# Patient Record
Sex: MALE | Race: ASIAN | NOT HISPANIC OR LATINO | Employment: OTHER | URBAN - METROPOLITAN AREA
[De-identification: names, ages, dates, MRNs, and addresses within clinical notes are randomized per-mention and may not be internally consistent; named-entity substitution may affect disease eponyms.]

---

## 2017-07-14 ENCOUNTER — HOSPITAL ENCOUNTER (INPATIENT)
Facility: CLINIC | Age: 22
LOS: 6 days | Discharge: HOME | DRG: 305 | End: 2017-07-20
Attending: EMERGENCY MEDICINE | Admitting: INTERNAL MEDICINE
Payer: COMMERCIAL

## 2017-07-14 ENCOUNTER — APPOINTMENT (OUTPATIENT)
Dept: CT IMAGING | Facility: CLINIC | Age: 22
DRG: 305 | End: 2017-07-14
Attending: EMERGENCY MEDICINE
Payer: COMMERCIAL

## 2017-07-14 ENCOUNTER — APPOINTMENT (OUTPATIENT)
Dept: ULTRASOUND IMAGING | Facility: CLINIC | Age: 22
DRG: 305 | End: 2017-07-14
Attending: EMERGENCY MEDICINE
Payer: COMMERCIAL

## 2017-07-14 ENCOUNTER — OFFICE VISIT (OUTPATIENT)
Dept: FAMILY MEDICINE | Facility: CLINIC | Age: 22
End: 2017-07-14
Payer: COMMERCIAL

## 2017-07-14 VITALS
DIASTOLIC BLOOD PRESSURE: 159 MMHG | BODY MASS INDEX: 39.2 KG/M2 | SYSTOLIC BLOOD PRESSURE: 202 MMHG | OXYGEN SATURATION: 100 % | WEIGHT: 280 LBS | HEART RATE: 98 BPM | HEIGHT: 71 IN | TEMPERATURE: 98.6 F | RESPIRATION RATE: 20 BRPM

## 2017-07-14 DIAGNOSIS — I16.0 HYPERTENSIVE URGENCY: Primary | ICD-10-CM

## 2017-07-14 DIAGNOSIS — R80.9 PROTEINURIA, UNSPECIFIED TYPE: ICD-10-CM

## 2017-07-14 DIAGNOSIS — I16.1 HYPERTENSIVE EMERGENCY: ICD-10-CM

## 2017-07-14 DIAGNOSIS — N28.9 RENAL INSUFFICIENCY: ICD-10-CM

## 2017-07-14 PROBLEM — I10 HYPERTENSION: Status: ACTIVE | Noted: 2017-07-14

## 2017-07-14 PROBLEM — E66.01 MORBID OBESITY (H): Status: ACTIVE | Noted: 2017-07-14

## 2017-07-14 PROBLEM — I10 MALIGNANT HYPERTENSION: Status: ACTIVE | Noted: 2017-07-14

## 2017-07-14 PROBLEM — I11.9 LVH (LEFT VENTRICULAR HYPERTROPHY) DUE TO HYPERTENSIVE DISEASE: Status: ACTIVE | Noted: 2017-07-14

## 2017-07-14 LAB
ALBUMIN SERPL-MCNC: 3.5 G/DL (ref 3.4–5)
ALBUMIN UR-MCNC: >=300 MG/DL
ALP SERPL-CCNC: 97 U/L (ref 40–150)
ALT SERPL W P-5'-P-CCNC: 40 U/L (ref 0–70)
AMORPH CRY #/AREA URNS HPF: ABNORMAL /HPF
ANION GAP SERPL CALCULATED.3IONS-SCNC: 6 MMOL/L (ref 3–14)
APPEARANCE UR: CLEAR
AST SERPL W P-5'-P-CCNC: 25 U/L (ref 0–45)
BACTERIA #/AREA URNS HPF: ABNORMAL /HPF
BILIRUB DIRECT SERPL-MCNC: <0.1 MG/DL (ref 0–0.2)
BILIRUB SERPL-MCNC: 0.6 MG/DL (ref 0.2–1.3)
BILIRUB UR QL STRIP: NEGATIVE
BUN SERPL-MCNC: 33 MG/DL (ref 7–30)
CALCIUM SERPL-MCNC: 8.9 MG/DL (ref 8.5–10.1)
CHLORIDE SERPL-SCNC: 105 MMOL/L (ref 94–109)
CK SERPL-CCNC: 126 U/L (ref 30–300)
CO2 SERPL-SCNC: 27 MMOL/L (ref 20–32)
COLOR UR AUTO: YELLOW
CREAT SERPL-MCNC: 2.63 MG/DL (ref 0.66–1.25)
CREAT UR-MCNC: 99 MG/DL
ERYTHROCYTE [DISTWIDTH] IN BLOOD BY AUTOMATED COUNT: 15.5 % (ref 10–15)
GFR SERPL CREATININE-BSD FRML MDRD: 31 ML/MIN/1.7M2
GLUCOSE SERPL-MCNC: 121 MG/DL (ref 70–99)
GLUCOSE UR STRIP-MCNC: NEGATIVE MG/DL
GRAN CASTS #/AREA URNS LPF: ABNORMAL /LPF
HCT VFR BLD AUTO: 41.4 % (ref 40–53)
HETEROPH AB SER QL: NEGATIVE
HGB BLD-MCNC: 13.8 G/DL (ref 13.3–17.7)
HGB UR QL STRIP: ABNORMAL
KETONES UR STRIP-MCNC: NEGATIVE MG/DL
LEUKOCYTE ESTERASE UR QL STRIP: NEGATIVE
MAGNESIUM SERPL-MCNC: 2.3 MG/DL (ref 1.6–2.3)
MCH RBC QN AUTO: 25.7 PG (ref 26.5–33)
MCHC RBC AUTO-ENTMCNC: 33.3 G/DL (ref 31.5–36.5)
MCV RBC AUTO: 77 FL (ref 78–100)
NITRATE UR QL: NEGATIVE
PH UR STRIP: 5.5 PH (ref 5–7)
PLATELET # BLD AUTO: 172 10E9/L (ref 150–450)
POTASSIUM SERPL-SCNC: 3 MMOL/L (ref 3.4–5.3)
PROT SERPL-MCNC: 7.6 G/DL (ref 6.8–8.8)
PROT UR-MCNC: 2.3 G/L
PROT/CREAT 24H UR: 2.31 G/G CR (ref 0–0.2)
RBC # BLD AUTO: 5.37 10E12/L (ref 4.4–5.9)
RBC #/AREA URNS AUTO: ABNORMAL /HPF (ref 0–2)
SODIUM SERPL-SCNC: 138 MMOL/L (ref 133–144)
SP GR UR STRIP: 1.02 (ref 1–1.03)
TROPONIN I BLD-MCNC: 0.06 UG/L (ref 0–0.1)
TSH SERPL DL<=0.05 MIU/L-ACNC: 4.88 MU/L (ref 0.4–4)
URN SPEC COLLECT METH UR: ABNORMAL
UROBILINOGEN UR STRIP-ACNC: 0.2 EU/DL (ref 0.2–1)
WBC # BLD AUTO: 11.2 10E9/L (ref 4–11)
WBC #/AREA URNS AUTO: ABNORMAL /HPF (ref 0–2)

## 2017-07-14 PROCEDURE — 12000007 ZZH R&B INTERMEDIATE

## 2017-07-14 PROCEDURE — 80076 HEPATIC FUNCTION PANEL: CPT | Performed by: EMERGENCY MEDICINE

## 2017-07-14 PROCEDURE — 76770 US EXAM ABDO BACK WALL COMP: CPT

## 2017-07-14 PROCEDURE — 86308 HETEROPHILE ANTIBODY SCREEN: CPT | Performed by: EMERGENCY MEDICINE

## 2017-07-14 PROCEDURE — 96376 TX/PRO/DX INJ SAME DRUG ADON: CPT

## 2017-07-14 PROCEDURE — 80048 BASIC METABOLIC PNL TOTAL CA: CPT | Performed by: FAMILY MEDICINE

## 2017-07-14 PROCEDURE — 36415 COLL VENOUS BLD VENIPUNCTURE: CPT | Performed by: EMERGENCY MEDICINE

## 2017-07-14 PROCEDURE — 82088 ASSAY OF ALDOSTERONE: CPT | Performed by: EMERGENCY MEDICINE

## 2017-07-14 PROCEDURE — 82550 ASSAY OF CK (CPK): CPT | Performed by: EMERGENCY MEDICINE

## 2017-07-14 PROCEDURE — 85027 COMPLETE CBC AUTOMATED: CPT | Performed by: FAMILY MEDICINE

## 2017-07-14 PROCEDURE — 93005 ELECTROCARDIOGRAM TRACING: CPT

## 2017-07-14 PROCEDURE — 25000128 H RX IP 250 OP 636: Performed by: EMERGENCY MEDICINE

## 2017-07-14 PROCEDURE — 25000132 ZZH RX MED GY IP 250 OP 250 PS 637: Performed by: INTERNAL MEDICINE

## 2017-07-14 PROCEDURE — 93000 ELECTROCARDIOGRAM COMPLETE: CPT | Performed by: FAMILY MEDICINE

## 2017-07-14 PROCEDURE — 84484 ASSAY OF TROPONIN QUANT: CPT

## 2017-07-14 PROCEDURE — 84156 ASSAY OF PROTEIN URINE: CPT | Performed by: INTERNAL MEDICINE

## 2017-07-14 PROCEDURE — 82043 UR ALBUMIN QUANTITATIVE: CPT | Performed by: FAMILY MEDICINE

## 2017-07-14 PROCEDURE — 25000132 ZZH RX MED GY IP 250 OP 250 PS 637: Performed by: EMERGENCY MEDICINE

## 2017-07-14 PROCEDURE — 25000128 H RX IP 250 OP 636: Performed by: INTERNAL MEDICINE

## 2017-07-14 PROCEDURE — G0378 HOSPITAL OBSERVATION PER HR: HCPCS

## 2017-07-14 PROCEDURE — 84244 ASSAY OF RENIN: CPT | Performed by: EMERGENCY MEDICINE

## 2017-07-14 PROCEDURE — 96374 THER/PROPH/DIAG INJ IV PUSH: CPT

## 2017-07-14 PROCEDURE — 99223 1ST HOSP IP/OBS HIGH 75: CPT | Mod: AI | Performed by: INTERNAL MEDICINE

## 2017-07-14 PROCEDURE — 80048 BASIC METABOLIC PNL TOTAL CA: CPT | Performed by: EMERGENCY MEDICINE

## 2017-07-14 PROCEDURE — 84443 ASSAY THYROID STIM HORMONE: CPT | Performed by: FAMILY MEDICINE

## 2017-07-14 PROCEDURE — 84443 ASSAY THYROID STIM HORMONE: CPT | Performed by: EMERGENCY MEDICINE

## 2017-07-14 PROCEDURE — 84439 ASSAY OF FREE THYROXINE: CPT | Performed by: FAMILY MEDICINE

## 2017-07-14 PROCEDURE — 99207 ZZC OFFICE-HOSPITAL ADMIT: CPT | Performed by: FAMILY MEDICINE

## 2017-07-14 PROCEDURE — 83735 ASSAY OF MAGNESIUM: CPT | Performed by: EMERGENCY MEDICINE

## 2017-07-14 PROCEDURE — 81001 URINALYSIS AUTO W/SCOPE: CPT | Performed by: FAMILY MEDICINE

## 2017-07-14 PROCEDURE — 99285 EMERGENCY DEPT VISIT HI MDM: CPT | Mod: 25

## 2017-07-14 PROCEDURE — 70450 CT HEAD/BRAIN W/O DYE: CPT

## 2017-07-14 PROCEDURE — 36415 COLL VENOUS BLD VENIPUNCTURE: CPT | Performed by: FAMILY MEDICINE

## 2017-07-14 RX ORDER — ACETAMINOPHEN 325 MG/1
650 TABLET ORAL EVERY 4 HOURS PRN
Status: DISCONTINUED | OUTPATIENT
Start: 2017-07-14 | End: 2017-07-20 | Stop reason: HOSPADM

## 2017-07-14 RX ORDER — LABETALOL HYDROCHLORIDE 5 MG/ML
20 INJECTION, SOLUTION INTRAVENOUS ONCE
Status: COMPLETED | OUTPATIENT
Start: 2017-07-14 | End: 2017-07-14

## 2017-07-14 RX ORDER — CLONIDINE HYDROCHLORIDE 0.1 MG/1
0.2 TABLET ORAL ONCE
Status: COMPLETED | OUTPATIENT
Start: 2017-07-14 | End: 2017-07-14

## 2017-07-14 RX ORDER — ONDANSETRON 4 MG/1
4 TABLET, ORALLY DISINTEGRATING ORAL EVERY 6 HOURS PRN
Status: DISCONTINUED | OUTPATIENT
Start: 2017-07-14 | End: 2017-07-20 | Stop reason: HOSPADM

## 2017-07-14 RX ORDER — AMOXICILLIN 250 MG
1-2 CAPSULE ORAL 2 TIMES DAILY
Status: DISCONTINUED | OUTPATIENT
Start: 2017-07-14 | End: 2017-07-20 | Stop reason: HOSPADM

## 2017-07-14 RX ORDER — LIDOCAINE 40 MG/G
CREAM TOPICAL
Status: DISCONTINUED | OUTPATIENT
Start: 2017-07-14 | End: 2017-07-20 | Stop reason: HOSPADM

## 2017-07-14 RX ORDER — LISINOPRIL/HYDROCHLOROTHIAZIDE 10-12.5 MG
1 TABLET ORAL DAILY
Qty: 30 TABLET | Refills: 0 | Status: ON HOLD | OUTPATIENT
Start: 2017-07-14 | End: 2017-07-20

## 2017-07-14 RX ORDER — ONDANSETRON 2 MG/ML
4 INJECTION INTRAMUSCULAR; INTRAVENOUS EVERY 6 HOURS PRN
Status: DISCONTINUED | OUTPATIENT
Start: 2017-07-14 | End: 2017-07-20 | Stop reason: HOSPADM

## 2017-07-14 RX ORDER — SODIUM CHLORIDE 9 MG/ML
INJECTION, SOLUTION INTRAVENOUS CONTINUOUS
Status: DISCONTINUED | OUTPATIENT
Start: 2017-07-14 | End: 2017-07-15

## 2017-07-14 RX ORDER — POTASSIUM CHLORIDE 1500 MG/1
20 TABLET, EXTENDED RELEASE ORAL ONCE
Status: COMPLETED | OUTPATIENT
Start: 2017-07-14 | End: 2017-07-14

## 2017-07-14 RX ORDER — BISACODYL 5 MG
5-15 TABLET, DELAYED RELEASE (ENTERIC COATED) ORAL DAILY PRN
Status: DISCONTINUED | OUTPATIENT
Start: 2017-07-14 | End: 2017-07-20 | Stop reason: HOSPADM

## 2017-07-14 RX ORDER — HEPARIN SODIUM 5000 [USP'U]/.5ML
5000 INJECTION, SOLUTION INTRAVENOUS; SUBCUTANEOUS EVERY 12 HOURS
Status: DISCONTINUED | OUTPATIENT
Start: 2017-07-14 | End: 2017-07-14

## 2017-07-14 RX ORDER — HYDRALAZINE HYDROCHLORIDE 20 MG/ML
5 INJECTION INTRAMUSCULAR; INTRAVENOUS EVERY 4 HOURS PRN
Status: DISCONTINUED | OUTPATIENT
Start: 2017-07-14 | End: 2017-07-15

## 2017-07-14 RX ORDER — NALOXONE HYDROCHLORIDE 0.4 MG/ML
.1-.4 INJECTION, SOLUTION INTRAMUSCULAR; INTRAVENOUS; SUBCUTANEOUS
Status: DISCONTINUED | OUTPATIENT
Start: 2017-07-14 | End: 2017-07-20 | Stop reason: HOSPADM

## 2017-07-14 RX ORDER — BISACODYL 10 MG
10 SUPPOSITORY, RECTAL RECTAL DAILY PRN
Status: DISCONTINUED | OUTPATIENT
Start: 2017-07-14 | End: 2017-07-20 | Stop reason: HOSPADM

## 2017-07-14 RX ORDER — METOPROLOL TARTRATE 25 MG/1
25 TABLET, FILM COATED ORAL 2 TIMES DAILY
Status: DISCONTINUED | OUTPATIENT
Start: 2017-07-14 | End: 2017-07-15

## 2017-07-14 RX ADMIN — METOPROLOL TARTRATE 25 MG: 25 TABLET, FILM COATED ORAL at 20:59

## 2017-07-14 RX ADMIN — LABETALOL HYDROCHLORIDE 20 MG: 5 INJECTION, SOLUTION INTRAVENOUS at 12:36

## 2017-07-14 RX ADMIN — SENNOSIDES AND DOCUSATE SODIUM 1 TABLET: 8.6; 5 TABLET ORAL at 20:59

## 2017-07-14 RX ADMIN — SODIUM CHLORIDE: 9 INJECTION, SOLUTION INTRAVENOUS at 17:26

## 2017-07-14 RX ADMIN — CLONIDINE HYDROCHLORIDE 0.2 MG: 0.1 TABLET ORAL at 13:59

## 2017-07-14 RX ADMIN — METOPROLOL TARTRATE 12.5 MG: 25 TABLET ORAL at 19:43

## 2017-07-14 RX ADMIN — LABETALOL HYDROCHLORIDE 20 MG: 5 INJECTION, SOLUTION INTRAVENOUS at 13:59

## 2017-07-14 RX ADMIN — HYDRALAZINE HYDROCHLORIDE 5 MG: 20 INJECTION INTRAMUSCULAR; INTRAVENOUS at 22:30

## 2017-07-14 RX ADMIN — METOPROLOL TARTRATE 12.5 MG: 25 TABLET ORAL at 16:39

## 2017-07-14 RX ADMIN — POTASSIUM CHLORIDE 20 MEQ: 1500 TABLET, EXTENDED RELEASE ORAL at 17:29

## 2017-07-14 ASSESSMENT — ENCOUNTER SYMPTOMS
UNEXPECTED WEIGHT CHANGE: 1
FREQUENCY: 1
NECK PAIN: 1
FATIGUE: 1
HEADACHES: 1

## 2017-07-14 NOTE — IP AVS SNAPSHOT
[place] [address] [facility]    [address]    [facility] [address]    Phone:  [phone]    Fax:  [phone]                                       After Visit Summary   7/14/2017    Taylor Valiente    MRN: [id number]           After Visit Summary Signature Page     I have received my discharge instructions, and my questions have been answered. I have discussed any challenges I see with this plan with the nurse or doctor.    ..........................................................................................................................................  Patient/Patient Representative Signature      ..........................................................................................................................................  Patient Representative Print Name and Relationship to Patient    ..................................................               ................................................  Date                                            Time    ..........................................................................................................................................  Reviewed by Signature/Title    ...................................................              ..............................................  Date                                                            Time

## 2017-07-14 NOTE — ED PROVIDER NOTES
"  History     Chief Complaint:  Hypertension    HPI   Taylor Valiente is a 21 year old male who presents to the emergency department today via [facility] for evaluation of hypertension. The patient reports that he went in to see his primary care physician because he has been having intermittent headaches and neck pain for the past week that has been worsening recently, along with increasing fatigue and weight gain. He reports that he has not had headaches every day and that he had no headaches for two weeks until today. He describes the headaches as a pain around the sides of his head like \"someone is clamping down\". He denies any recent head or neck trauma. He also notes that he has had mild increased urinary frequency and \"foamy\" urine. He denies hematuria. When he was at the clinic, his blood pressure was measured 250/150s and he was shown to have protein in his urine. He was prescribed Lisinopril and took one today at 1050. His mother reports that the patient had higher blood pressure as a child but that it was attributed to his larger size so he was never given medications. The patient states that he has not had a recent physical exam and has not had his blood pressure checked recently.    Allergies:  No Known Drug Allergies     Medications:    Prinzide/Zestoretic    Past Medical History:    LVH (left ventricular hypertrophy due to hypertensive disease)  Obesity    Past Surgical History:    History reviewed. No pertinent surgical history.    Family History:    Mother: Hepatitis B, gestational diabetes  Father: Obesity  Maternal Grandmother: Hypertension, diabetes    Social History:  The patient was accompanied to the ED by his mother.  Smoking Status: Never Smoker  Smokeless Tobacco: Never Used  Alcohol Use: Negative  Marital Status:  Single     Review of Systems   Constitutional: Positive for fatigue and unexpected weight change (weight gain).   Genitourinary: Positive for frequency. "   Musculoskeletal: Positive for neck pain.   Neurological: Positive for headaches.     Physical Exam   Vitals:  Patient Vitals for the past 24 hrs:   BP Temp Temp src Pulse Heart Rate Resp SpO2 Height Weight   07/14/17 1533 (!) 187/111 - - - 86 - - - -   07/14/17 1531 (!) 172/98 99  F (37.2  C) Oral - 80 16 98 % - -   07/14/17 1505 (!) 168/116 - - - - - - - -   07/14/17 1435 (!) 180/123 - - - - - - - -   07/14/17 1420 (!) 170/129 - - 85 - - - - -   07/14/17 1405 (!) 146/118 - - 85 - - - - -   07/14/17 1350 (!) 173/129 - - - - - 99 % - -   07/14/17 1349 - - - - - - 98 % - -   07/14/17 1348 - - - - - - 93 % - -   07/14/17 1346 - - - - - - 100 % - -   07/14/17 1345 - - - - - - 99 % - -   07/14/17 1344 - - - - - - 99 % - -   07/14/17 1342 - - - - - - 100 % - -   07/14/17 1341 (!) 198/153 - - - - - - - -   07/14/17 1250 (!) 174/131 - - - 84 - 99 % - -   07/14/17 1249 - - - - 81 - (!) 84 % - -   07/14/17 1248 - - - - 73 - 99 % - -   07/14/17 1247 - - - - 72 - 100 % - -   07/14/17 1246 (!) 168/116 - - - 73 - 100 % - -   07/14/17 1235 (!) 195/150 - - - 95 - 98 % - -   07/14/17 1220 (!) 181/138 - - - 89 - 98 % - -   07/14/17 1159 (!) 186/145 - - - 99 - 99 % - -   07/14/17 1139 (!) 188/127 - - - - - 99 % - -   07/14/17 1134 (!) 87/63 98.8  F (37.1  C) Oral 100 100 18 100 % 1.829 m (6') 117.9 kg (260 lb)   07/14/17 1129 (!) 66/46 - - - - - - - -     Physical Exam  Vital signs and nursing notes reviewed.     Constitutional: laying on gurney appears comfortable  HENT: Oropharynx is clear and moist  Eyes: Conjunctivae are normal bilaterally. Pupils equal, round, reactive.  Neck: Normal range of motion  Cardiovascular: Normal rate, regular rhythm, normal heart sounds.   Pulmonary/Chest: Effort normal and breath sounds normal. No respiratory distress.   Abdominal: Soft. Bowel sounds are normal. No tenderness to palpation. No rebound or guarding.   Musculoskeletal: No joint swelling or edema.   Neurological: Alert and oriented. No  focal weakness  Skin: Skin is warm and dry. No rash noted.   Psych: normal affect    Emergency Department Course     ECG:  ECG taken at 1135, ECG read at 1138  Sinus tachycardia  Minimal voltage criteria for LVH, may be normal variant  T wave abnormality, consider inferolateral ischemia  Abnormal ECG  Rate 103 bpm. HI interval 156 ms. QRS duration 90 ms. QT/QTc 384/503 ms. P-R-T axes 55 23 166.    Imaging:  Radiology findings were communicated with the patient who voiced understanding of the findings.    Retroperitoneal US  Normal bilateral renal ultrasound. No hydronephrosis. No  renal cyst or mass.  DARRICK BURNHAM MD  Reading per radiology    Head CT w/o contrast  No evidence of acute hemorrhage, mass, or herniation.   JACKIE RIOS MD  Reading per radiology    Laboratory:  Laboratory findings were communicated with the patient who voiced understanding of the findings.    Renin activity: pending  Troponin (Collected 1158): 0.06  BMP: Potassium 3.0 (L), Glucose 121 (H), BUN 33 (H), Creatinine 2.63 (H), GFR Estimate 31 (L), o/w WNL  Hepatic panel: Negative  TSH: 4.88 (H)  Mononucleosis screen: Negative  Aldosterone: pending  CK total: 126  Magnesium: 2.3    Interventions:  1236 Labetalol 20 mg IV  1359 Labetalol 20 mg IV  1359 Catapres 0.2 mg PO    Emergency Department Course:  Nursing notes and vitals reviewed.  I performed an exam of the patient as documented above.   IV was inserted and blood was drawn for laboratory testing, results above.  The patient was sent for a Retroperitoneal US and Head CT w/o contrast while in the emergency department, results above.   1419: I spoke with Dr. Mery Snow of the hospitalist service from [facility] regarding patient's presentation, findings, and plan of care.  I discussed the treatment plan with the patient. They expressed understanding of this plan and consented to admission. I discussed the patient with Dr. Mery Snow, who will admit the patient to a monitored bed for  further evaluation and treatment.  I personally reviewed the ECG, imaging, and laboratory results with the patient and answered all related questions prior to admission.    Impression & Plan      Medical Decision Making:  Taylor Valiente is a 21 year old male who presents to the emergency department from an outlying facility for evaluation of significantly elevated high blood pressure as well as protein in his urine with fatigue symptoms. On examination, patient has no concerning neurologic abnormalities or reproducible symptoms. His ECG shows some lateral T wave inversions, without old ECG's to compare to, which may represent a cardiac strain. His troponin is 0.06, and he has a creatinine of 2.63 with a BUN of 33 and a potassium of 3.0. I did a renal ultrasound which did not show any obvious renal abnormalities and a CT of the head that was thankfully unremarkable. Patient may be experiencing long-standing hypertension that could be affecting his kidney function however I cannot rule out a more acute cause. I did discuss if he had any recent sever sore throat symptoms that went untreated that would suggest streptococcal induced glomerulonephritis but this is not noted in the history. We can't exclude a renal artery stenosis or other complication as well causing his symptoms. Based on his renal insufficiency and unclear etiology of his symptoms, I felt that he should be admitted especially as his blood pressure is so labile and significantly elevated. He was given IV medication for his blood pressure which did improve it but he was still moderately elevated. Discussed this with Dr. Snow and she agreed to accept the patient. I reviewed the results of all testing with the patient and family member and they are in agreement with admission.    Diagnosis:    ICD-10-CM   1. Hypertensive emergency I16.1   2. Renal insufficiency N28.9   3. Proteinuria, unspecified type R80.9     Disposition:   The patient is admitted  into the care of Dr. Mery Snow.    Scribe Disclosure:  I, Ghanshyam Mayes, am serving as a scribe at 11:29 AM on 7/14/2017 to document services personally performed by Kong Medrano MD based on my observations and the provider's statements to me.    [facility] EMERGENCY DEPARTMENT       Kong Medrano MD  07/14/17 8125

## 2017-07-14 NOTE — H&P
History and Physical     Taylor Valiente MRN# [id number]   YOB: 1995 Age: 21 year old      Date of Admission:  7/14/2017    Primary care provider: Priyank Lawrence          Assessment and Plan:   Mr Valiente is an otherwise healthy 20 yo who presented to [facility] and was found to be very hypertensive with e/o proteinuria and renal failure with creat 2.6 of unclear duration.  He is admitted 7/14/17 for further evaluation and treatment.      1.   Hypertension: given young age almost certainly related to a secondary cause.  Will check renin, glendy to eval for hyperaldo (especially in light of hypokalemia), should get eval of renal artery perfusion as well with duplex ultrasonography.  Of note TSH minimally elevated at 4.9-check Free T4. For now will treat with metoprolol in low dose titrating to get SBP < 180 and DBP < 105 if able.  I would not want to drop him too quickly as duration is unclear.  Pheo seems less likely but still in the differential     2.  Proteinuria:  ? Due to uncontrolled hypertension or due to nephrotic syndrome, he gives the classic description of foamy urine-present for months.  Not nephritic as has blood but no RBCs, check CK.  24 hour urine collection, check FLP in am.  High thrombotic risk if true nephrotic syndrome.      3.  Renal insufficiency:  Suspect subacute-will check FeNa, urine osm, ask for renal input for this and for above. Of note he did receive single dose of lisinopril-hctz at 10-12.5 mg (but doubt significant impact)    4.  Hypokalemia:  Check mag, check urine electrolytes-replaced with 20 meq KCL    PPX:  Ambulate, avoid pharmacologic AC in light of profound htn-if under better control and not leaving then would initiate  Code: Full  Dispo:  Admit observation               Chief Complaint:   hypertension       History of Present Illness:   Mr Valiente is an otherwise healthy 20 yo who presented to [facility] clinic at the urging of her mother to  get a PE and was found to be very hypertensive with e/o proteinuria and renal failure with creat 2.6 of unclear duration.     At that visit he was noted to be hypertensive and so was started on lisinopril/hctz.  About 20 minutes after being home he was called and told to come into the ER for evaluation due to heavy proteinuria and elevated creat. He feels fine    He states he feels well, he denies any cp/sob/vision changes/headache, he can walk up 4 flights of stairs without stopping but would be SOB, He hikes with his friends and can walk a mile but will get sob with that.  He has noted foamy urine for the past 3 months.  He denies any palpitations but has had infrequent episodes where he feels hot/sweaty.  He takes ibuprofen up to twice per week.  He has no edema except trace at the end of a long working shift.    He has infrequent headaches where he is photophobic/phonophobic that resolve with ibuprofen    In the ER initial VS are hypotensive which I think are erroneous as within 10 minutes they were very elevated.  He was given 20 mg IV labetalol x 2 and clonidine 0.2 mg x 1 and BPs still elevated and BPs did improve from the 180/120 range to the 180/ range. Labs notable for creat 2.6, K 3.0, UA with >/= 300, mod blood without RBCs, granular casts.     He is admitted for BP control, and evaluation for suspected nephrotic range proteinuria    History is obtained in discussion with the ER physician Dr. Medrano and the patient with good reliability             Past Medical History:     Past Medical History:   Diagnosis Date     LVH (left ventricular hypertrophy) due to hypertensive disease 7/14/2017     Obesity, unspecified              Past Surgical History:     Past Surgical History:   Procedure Laterality Date     NO HISTORY OF SURGERY               Social History:     Social History   Substance Use Topics     Smoking status: Never Smoker     Smokeless tobacco: Never Used     Alcohol use No              Family History:     Family History   Problem Relation Age of Onset     Blood Disease Mother      has hep b     DIABETES Mother      gestionanal diabetes     Hypertension Mother      Obesity Father      Hypertension Father      Hypertension Maternal Grandmother      DIABETES Maternal Grandmother      Hypertension Paternal Grandmother      Hypertension Paternal Grandfather      Coronary Artery Disease Maternal Uncle             Allergies:     Allergies   Allergen Reactions     No Known Allergies              Medications:     No current facility-administered medications on file prior to encounter.   Current Outpatient Prescriptions on File Prior to Encounter:  lisinopril-hydrochlorothiazide (PRINZIDE/ZESTORETIC) 10-12.5 MG per tablet Take 1 tablet by mouth daily               Review of Systems:   A Comprehensive greater than 10 system review of systems was carried out.  Pertinent positives and negatives are noted above.  Otherwise negative for contributory information.           Physical Exam:   Blood pressure (!) 173/129, pulse 100, temperature 98.8  F (37.1  C), temperature source Oral, resp. rate 18, height 1.829 m (6'), weight 117.9 kg (260 lb), SpO2 99 %.  Exam:  Pleasant nad, obese, head nc/at sclera clear perrl O/P moist mucus membranes no posterior pharyngeal erythema or exudate, dark spot on tip of tongue stable and chronic neck supple lungs ctab nl effort RRR no mrg trace le edema skin w/d no c/c abd s/nt/nd cn 2-12 grossly intact and strength-and -sensation intact in the b ue and le alert and oriented affect appropriate          Data:          Lab Results   Component Value Date     07/14/2017    Lab Results   Component Value Date    CHLORIDE 105 07/14/2017    Lab Results   Component Value Date    BUN 33 07/14/2017      Lab Results   Component Value Date    POTASSIUM 3.0 07/14/2017    Lab Results   Component Value Date    CO2 27 07/14/2017    Lab Results   Component Value Date    CR 2.63 07/14/2017         Lab Results   Component Value Date    WBC 11.2 (H) 07/14/2017    HGB 13.8 07/14/2017    HCT 41.4 07/14/2017    MCV 77 (L) 07/14/2017     07/14/2017     Lab Results   Component Value Date     (H) 07/14/2017     Lab Results   Component Value Date    AST 25 07/14/2017    ALT 40 07/14/2017    ALKPHOS 97 07/14/2017    BILITOTAL 0.6 07/14/2017   troponin 0.06  UA:  > 300 protein, mod blood with 0-2 RBCs, 5-10 granular casts  EKG:  NSR with LVH with strain pattern to my personal read         Imaging:     Recent Results (from the past 24 hour(s))   Head CT w/o contrast    Narrative    CT SCAN OF THE HEAD WITHOUT CONTRAST   7/14/2017 1:17 PM     HISTORY: Headaches. High blood pressure.    TECHNIQUE:  Axial images of the head and coronal reformations without  IV contrast material. Radiation dose for this scan was reduced using  automated exposure control, adjustment of the mA and/or kV according  to patient size, or iterative reconstruction technique.    COMPARISON: None.    FINDINGS:  The ventricles are normal in size, shape and configuration.   The brain parenchyma and subarachnoid spaces are normal. There is no  evidence of intracranial hemorrhage, mass, acute infarct or anomaly.     Large polypoid lesion almost completely filling the right maxillary  sinus representing mucous retention cyst or mucosal polyp. Remaining  visualized paranasal sinuses appear clear. Soft tissue debris within  the external auditory canals bilaterally, presumably represents  cerumen. There is no evidence of trauma.      Impression    IMPRESSION: No evidence of acute hemorrhage, mass, or herniation.     JACKIE RIOS MD   Retroperitoneal US    Narrative    ULTRASOUND RENAL COMPLETE 7/14/2017 1:31 PM     HISTORY: Proteinuria, hypertension, renal insufficiency.     FINDINGS: The kidneys are normal in echogenicity without  hydronephrosis bilaterally. Right kidney measures 11.4 x 4.3 x 6.5 cm  and the left measures 10.1 x 5.1 x 4.8  cm. No significant renal  cortical thinning is appreciated. No renal cyst or mass is evident. No  definite renal calculi are appreciated. Bladder is partly  decompressed, but otherwise unremarkable.      Impression    IMPRESSION: Normal bilateral renal ultrasound. No hydronephrosis. No  renal cyst or mass.    DARRICK UBRNHAM MD

## 2017-07-14 NOTE — IP AVS SNAPSHOT
MRN:[id number]                      After Visit Summary   7/14/2017    Taylor Valiente    MRN: [id number]           Thank you!     Thank you for choosing [facility] for your care. Our goal is always to provide you with excellent care. Hearing back from our patients is one way we can continue to improve our services. Please take a few minutes to complete the written survey that you may receive in the mail after you visit. If you would like to speak to someone directly about your visit please contact Patient Relations at [phone]. Thank you!          Patient Information     Date Of Birth          1995        Designated Caregiver       Most Recent Value    Caregiver    Will someone help with your care after discharge? yes    Name of designated caregiver Liyah    Phone number of caregiver [phone]    Caregiver address [place]      About your hospital stay     You were admitted on:  July 14, 2017 You last received care in the:  [facility]    You were discharged on:  July 20, 2017       Who to Call     For medical emergencies, please call 911.  For non-urgent questions about your medical care, please call your primary care provider or clinic, [phone]          Attending Provider     Provider Specialty    Kong Medrano MD Emergency Medicine    Mery Snow MD Internal Medicine    Sharon Durand MD Internal Medicine       Primary Care Provider Office Phone # Fax #    Priyank Lawrence [phone]      After Care Instructions     Activity       Your activity upon discharge: activity as tolerated            Diet       Follow this diet upon discharge: Orders Placed This Encounter      2 Gram K Diet                  Follow-up Appointments     Follow-up and recommended labs and tests        Follow up with primary care provider, Priyank Lawrence, within 7 days for hospital follow- up.  No follow up labs or test are needed. F/u kidney biopsy  "results.  Follow up with kidney doctor, Dr Hooper as scheduled.  Avoid aspirin, ibuprofen, advil.                  Further instructions from your care team       I will follow-up with him regarding the biopsy result. He can see us in 1-2 weeks. I advised him to avoid NSAIDs, no lifting over 10 lbs or physical/contact sport for 7-10 days.    Pending Results     Date and Time Order Name Status Description    2017 0000 Nuclear Antibody BOBO by IFA IgG In process             Statement of Approval     Ordered          17 1423  I have reviewed and agree with all the recommendations and orders detailed in this document.  EFFECTIVE NOW     Approved and electronically signed by:  Star Singleton MD             Admission Information     Date & Time Provider Department Dept. Phone    2017 HerSharon MD [place] [address] Medical Surgical [phone]      Your Vitals Were     Blood Pressure Pulse Temperature Respirations Height Weight    143/90 65 97.5  F (36.4  C) (Oral) 20 1.829 m (6') 126.4 kg (278 lb 10.6 oz)    Pulse Oximetry BMI (Body Mass Index)                97% 37.79 kg/m2          [employer] Information     [employer] lets you send messages to your doctor, view your test results, renew your prescriptions, schedule appointments and more. To sign up, go to [email or web address] . Click on \"Log in\" on the left side of the screen, which will take you to the Welcome page. Then click on \"Sign up Now\" on the right side of the page.     You will be asked to enter the access code listed below, as well as some personal information. Please follow the directions to create your username and password.     Your access code is: [email or web address]  Expires: 10/12/2017 11:01 AM     Your access code will  in 90 days. If you need help or a new code, please call your [facility] clinic or [phone].        Care EveryWhere ID     This is your Care EveryWhere ID. This could be used by other organizations to access your [facility] " medical records  [id number]        Equal Access to Services     DEUCE FLYNN : Hadii maggie wilson ananyabritni Somelanie, waaxda luqadaha, qaybta kajewelsda dashaandrzejtom, waxjerome bernice hickmantevinkathryn fan. So [facility] [phone].    ATENCIÓN: Si habla español, tiene a spangler disposición servicios gratuitos de asistencia lingüística. Llame al [phone].    We comply with applicable federal civil rights laws and [place] laws. We do not discriminate on the basis of race, color, national origin, age, disability sex, sexual orientation or gender identity.               Review of your medicines      START taking        Dose / Directions    amLODIPine 10 MG tablet   Commonly known as:  NORVASC   Used for:  Hypertensive urgency        Dose:  10 mg   Take 1 tablet (10 mg) by mouth daily   Quantity:  30 tablet   Refills:  0       cloNIDine 0.1 MG tablet   Commonly known as:  CATAPRES   Used for:  Hypertensive urgency        Dose:  0.1 mg   Take 1 tablet (0.1 mg) by mouth 2 times daily   Quantity:  60 tablet   Refills:  0       metoprolol 100 MG tablet   Commonly known as:  LOPRESSOR   Used for:  Hypertensive urgency        Dose:  100 mg   Take 1 tablet (100 mg) by mouth 2 times daily   Quantity:  60 tablet   Refills:  0         STOP taking     lisinopril-hydrochlorothiazide 10-12.5 MG per tablet   Commonly known as:  PRINZIDE/ZESTORETIC                Where to get your medicines      Some of these will need a paper prescription and others can be bought over the counter. Ask your nurse if you have questions.     Bring a paper prescription for each of these medications     amLODIPine 10 MG tablet    cloNIDine 0.1 MG tablet    metoprolol 100 MG tablet                Protect others around you: Learn how to safely use, store and throw away your medicines at www.disposemymeds.org.             Medication List: This is a list of all your medications and when to take them. Check marks below indicate your daily home schedule. Keep this list as  a reference.      Medications           Morning Afternoon Evening Bedtime As Needed    amLODIPine 10 MG tablet   Commonly known as:  NORVASC   Take 1 tablet (10 mg) by mouth daily   Last time this was given:  10 mg on 7/20/2017  7:33 AM                                   cloNIDine 0.1 MG tablet   Commonly known as:  CATAPRES   Take 1 tablet (0.1 mg) by mouth 2 times daily   Last time this was given:  0.1 mg on 7/20/2017  7:33 AM                                      metoprolol 100 MG tablet   Commonly known as:  LOPRESSOR   Take 1 tablet (100 mg) by mouth 2 times daily   Last time this was given:  100 mg on 7/20/2017  7:33 AM                                                More Information        Discharge Instructions: Taking Your Blood Pressure  Blood pressure is the force of blood as it moves from the heart through the blood vessels. You can take your own blood pressure reading using a digital monitor. Take readings as often as your healthcare provider instructs. Take your readings each time in the same way, using the same arm. Here are guidelines for taking your blood pressure.  The American Heart Association (AHA) recommends purchasing a blood pressure monitor that is validated and approved by the Association for the Advancement of Medical Instrumentation, the [place] Hypertension Society, and the International Protocol for the Validation of Automated BP Measuring Devices. If the blood pressure monitor is for a senior adult, a pregnant woman, or a child, make certain it is validated for use with such a population. For the most reliable readings, the AHA recommends an automatic, cuff-style, upper arm (bicep) monitor. The readings from finger and wrist monitors are not as reliable as the upper arm monitor.        Step 1. Relax      Wait at least a half hour after smoking, eating, or exercising. Do not drink coffee, tea, soda, or other caffeinated beverages before checking your blood pressure.     Sit comfortably at  a table. Place the monitor near you.    Rest for a few minutes before you begin.        Step 2. Wrap the cuff      Place your arm on the table, palm up. Put your arm in a position that is level with your heart. Wrap the cuff around your upper arm, about an inch above your elbow. It s best to wrap the cuff on bare skin, not over clothing.    Make sure your cuff fits. If it doesn t wrap around your upper arm, order a larger cuff. A cuff that is too large or too small can result in an inaccurate blood pressure reading.           Step 3. Inflate the cuff      Pump the cuff until the scale reads 200. If you have a self-inflating cuff, push the button that starts the pump.    The cuff will tighten, then loosen.    The numbers will change. When they stop changing, your blood pressure reading will appear.    If you get a reading that is too high or too low for you, relax for a few minutes. Then do the test again.    Step 4. Write down the results    Write down your blood pressure numbers. Corky the date and time. Keep your results in one place, such as a notebook.    Remove the cuff from your arm. Turn off the machine.    Take the readings with you to your medical appointments.    If you start a new blood pressure medicine, or change a blood pressure medicine dose, note the day you started the new drug or dosage on your blood pressure recording sheet. This will help your healthcare provider monitor the effect of medication changes.     Date Last Reviewed: 4/27/2016 2000-2017 [employer]. [address] [place] [address], [place] [address]. All rights reserved. This information is not intended as a substitute for professional medical care. Always follow your healthcare professional's instructions.                Amlodipine Besylate Oral tablet  What is this medicine?  AMLODIPINE (am ANDREW di peen) is a calcium-channel blocker. It affects the amount of calcium found in your heart and muscle cells. This relaxes your blood  vessels, which can reduce the amount of work the heart has to do. This medicine is used to lower high blood pressure. It is also used to prevent chest pain.  This medicine may be used for other purposes; ask your health care provider or pharmacist if you have questions.  What should I tell my health care provider before I take this medicine?  They need to know if you have any of these conditions:    heart problems like heart failure or aortic stenosis    liver disease    an unusual or allergic reaction to amlodipine, other medicines, foods, dyes, or preservatives    pregnant or trying to get pregnant    breast-feeding  How should I use this medicine?  Take this medicine by mouth with a glass of water. Follow the directions on the prescription label. Take your medicine at regular intervals. Do not take more medicine than directed.  Talk to your pediatrician regarding the use of this medicine in children. Special care may be needed. This medicine has been used in children as young as 6.  Persons over 65 years old may have a stronger reaction to this medicine and need smaller doses.  Overdosage: If you think you have taken too much of this medicine contact a poison control center or emergency room at once.  NOTE: This medicine is only for you. Do not share this medicine with others.  What if I miss a dose?  If you miss a dose, take it as soon as you can. If it is almost time for your next dose, take only that dose. Do not take double or extra doses.  What may interact with this medicine?    herbal or dietary supplements    local or general anesthetics    medicines for high blood pressure    medicines for prostate problems    rifampin  This list may not describe all possible interactions. Give your health care provider a list of all the medicines, herbs, non-prescription drugs, or dietary supplements you use. Also tell them if you smoke, drink alcohol, or use illegal drugs. Some items may interact with your  medicine.  What should I watch for while using this medicine?  Visit your doctor or health care professional for regular check ups. Check your blood pressure and pulse rate regularly. Ask your health care professional what your blood pressure and pulse rate should be, and when you should contact him or her.  This medicine may make you feel confused, dizzy or lightheaded. Do not drive, use machinery, or do anything that needs mental alertness until you know how this medicine affects you. To reduce the risk of dizzy or fainting spells, do not sit or stand up quickly, especially if you are an older patient. Avoid alcoholic drinks; they can make you more dizzy.  Do not suddenly stop taking amlodipine. Ask your doctor or health care professional how you can gradually reduce the dose.  What side effects may I notice from receiving this medicine?  Side effects that you should report to your doctor or health care professional as soon as possible:    allergic reactions like skin rash, itching or hives, swelling of the face, lips, or tongue    breathing problems    changes in vision or hearing    chest pain    fast, irregular heartbeat    swelling of legs or ankles  Side effects that usually do not require medical attention (report to your doctor or health care professional if they continue or are bothersome):    dry mouth    facial flushing    nausea, vomiting    stomach gas, pain    tired, weak    trouble sleeping  This list may not describe all possible side effects. Call your doctor for medical advice about side effects. You may report side effects to FDA at 2-396-FDA-5411.  Where should I keep my medicine?  Keep out of the reach of children.  Store at room temperature between 59 and 86 degrees F (15 and 30 degrees C). Protect from light. Keep container tightly closed. Throw away any unused medicine after the expiration date.  NOTE:This sheet is a summary. It may not cover all possible information. If you have questions  about this medicine, talk to your doctor, pharmacist, or health care provider. Copyright  2016 [employer] Standard                Clonidine Hydrochloride, Chlorthalidone Oral tablet  What is this medicine?  CHLORTHALIDONE; CLONIDINE (klor THAL i done ; ROD ramos) is a combination of two medicines that are used to treat high blood pressure. Chlorthalidone is a diuretic. It lowers blood pressure and increases the amount of urine passed, which causes the body to lose salt and water. Clonidine also lowers blood pressure.  This medicine may be used for other purposes; ask your health care provider or pharmacist if you have questions.  What should I tell my health care provider before I take this medicine?  They need to know if you have any of these conditions:    asthma    diabetes    gout    kidney disease    liver disease    systemic lupus erythematosus (SLE)    an unusual or allergic reaction to chlorthalidone, sulfa drugs, clonidine, other medicines, foods, dyes, or preservatives    pregnant or trying to get pregnant    breast-feeding  How should I use this medicine?  Take this medicine by mouth with a glass of water. Take this medicine with food. Follow the directions on the prescription label. Take your doses at regular intervals. Do not take your medicine more often than directed. Remember that you will need to pass urine frequently after taking this medicine. Do not suddenly stop taking this medicine. You must gradually reduce the dose or you may get a dangerous increase in blood pressure. Ask your doctor or health care professional for advice.  Talk to your pediatrician regarding the use of this medicine in children. Special care may be needed.  Overdosage: If you think you've taken too much of this medicine contact a poison control center or emergency room at once.  NOTE: This medicine is only for you. Do not share this medicine with others.  What if I miss a dose?  If you miss a dose, take it as soon as you can.  If it is almost time for your next dose, take only that dose. Do not take double or extra doses.  What may interact with this medicine?    barbiturate medicines for inducing sleep or treating seizures like phenobarbital    certain medicines for depression, like amitriptyline or imipramine    digoxin    ephedra, Ma Ramírez    glycyrrhizin (licorice)    lithium    medicines for diabetes    other medicines for high blood pressure    steroid medicines like prednisone or cortisone  This list may not describe all possible interactions. Give your health care provider a list of all the medicines, herbs, non-prescription drugs, or dietary supplements you use. Also tell them if you smoke, drink alcohol, or use illegal drugs. Some items may interact with your medicine.  What should I watch for while using this medicine?  Visit your doctor or health care professional for regular checks on your progress. Check your blood pressure regularly as directed. Ask your doctor or health care professional what your blood pressure should be and when you should contact him or her. You may need blood work done while you are taking this medicine.  You may need to be on a special diet while taking this medicine. Ask your doctor.  You may get drowsy or dizzy. Do not drive, use machinery, or do anything that needs mental alertness until you know how this medicine affects you. To avoid dizzy or fainting spells, do not stand or sit up quickly, especially if you are an older person. Alcohol can make you more drowsy and dizzy. Avoid alcoholic drinks.  This medicine may affect blood sugar levels. If you have diabetes, check with your doctor or health care professional before you change your diet or the dose of your diabetic medicine.  Your mouth may get dry. Chewing sugarless gum or sucking hard candy, and drinking plenty of water may help. Contact your doctor if the problem does not go away or is severe.  This medicine can make you more sensitive to  the sun. Keep out of the sun. If you cannot avoid being in the sun, wear protective clothing and use sunscreen. Do not use sun lamps or tanning beds/booths.  Do not treat yourself for coughs, colds, or pain while you are taking this medicine without asking your doctor or health care professional for advice. Some ingredients may increase your blood pressure.  If you are going to have surgery tell your doctor or health care professional that you are taking this medicine.  What side effects may I notice from receiving this medicine?  Side effects that you should report to your doctor or health care professional as soon as possible:    allergic reactions like skin rash, itching or hives, swelling of the face, lips, or tongue    anxiety, nervousness    chest pain    depressed mood    fast, irregular heartbeat    feeling faint or lightheaded, falls    increased hunger or thirst    muscle pain, cramps, or spasm    pain or difficulty when passing urine    pain, tingling, numbness in the hands or feet    redness, blistering, peeling or loosening of the skin, including inside the mouth    swelling of feet or legs    unusually weak or tired    yellowing of the eyes or skin  Side effects that usually do not require medical attention (Report these to your doctor or health care professional if they continue or are bothersome.):    change in sex drive or performance    drowsiness    dry mouth    headache    nausea    stomach upset  This list may not describe all possible side effects. Call your doctor for medical advice about side effects. You may report side effects to FDA at 6-878-FDA-6876.  Where should I keep my medicine?  Keep out of the reach of children.  Store between 20 and 25 degrees C (68 and 77 degrees F). Protect from moisture. Throw away any unused medicine after the expiration date.  NOTE: This sheet is a summary. It may not cover all possible information. If you have questions about this medicine, talk to your  doctor, pharmacist, or health care provider.  NOTE:This sheet is a summary. It may not cover all possible information. If you have questions about this medicine, talk to your doctor, pharmacist, or health care provider. Copyright  2016 Gold Standard                Metoprolol tablets  What is this medicine?  METOPROLOL (me TOE proe lole) is a beta-blocker. Beta-blockers reduce the workload on the heart and help it to beat more regularly. This medicine is used to treat high blood pressure and to prevent chest pain. It is also used to after a heart attack and to prevent an additional heart attack from occurring.  How should I use this medicine?  Take this medicine by mouth with a drink of water. Follow the directions on the prescription label. Take this medicine immediately after meals. Take your doses at regular intervals. Do not take more medicine than directed. Do not stop taking this medicine suddenly. This could lead to serious heart-related effects.  Talk to your pediatrician regarding the use of this medicine in children. Special care may be needed.  What side effects may I notice from receiving this medicine?  Side effects that you should report to your doctor or health care professional as soon as possible:    allergic reactions like skin rash, itching or hives    cold or numb hands or feet    depression    difficulty breathing    faint    fever with sore throat    irregular heartbeat, chest pain    rapid weight gain    swollen legs or ankles  Side effects that usually do not require medical attention (report to your doctor or health care professional if they continue or are bothersome):    anxiety or nervousness    change in sex drive or performance    dry skin    headache    nightmares or trouble sleeping    short term memory loss    stomach upset or diarrhea    unusually tired  What may interact with this medicine?  This medicine may interact with the following medications:    certain medicines for blood  pressure, heart disease, irregular heart beat    certain medicines for depression like monoamine oxidase (MAO) inhibitors, fluoxetine, or paroxetine    clonidine    dobutamine    epinephrine    isoproterenol    reserpine  What if I miss a dose?  If you miss a dose, take it as soon as you can. If it is almost time for your next dose, take only that dose. Do not take double or extra doses.  Where should I keep my medicine?  Keep out of the reach of children.  Store at room temperature between 15 and 30 degrees C (59 and 86 degrees F). Throw away any unused medicine after the expiration date.  What should I tell my health care provider before I take this medicine?  They need to know if you have any of these conditions:    diabetes    heart or vessel disease like slow heart rate, worsening heart failure, heart block, sick sinus syndrome or Raynaud's disease    kidney disease    liver disease    lung or breathing disease, like asthma or emphysema    pheochromocytoma    thyroid disease    an unusual or allergic reaction to metoprolol, other beta-blockers, medicines, foods, dyes, or preservatives    pregnant or trying to get pregnant    breast-feeding  What should I watch for while using this medicine?  Visit your doctor or health care professional for regular check ups. Contact your doctor right away if your symptoms worsen. Check your blood pressure and pulse rate regularly. Ask your health care professional what your blood pressure and pulse rate should be, and when you should contact them.  You may get drowsy or dizzy. Do not drive, use machinery, or do anything that needs mental alertness until you know how this medicine affects you. Do not sit or stand up quickly, especially if you are an older patient. This reduces the risk of dizzy or fainting spells. Contact your doctor if these symptoms continue. Alcohol may interfere with the effect of this medicine. Avoid alcoholic drinks.  NOTE:This sheet is a summary. It may  not cover all possible information. If you have questions about this medicine, talk to your doctor, pharmacist, or health care provider. Copyright  2017 [employer] Standard                Living with High Blood Pressure and Kidney Disease  By lowering high blood pressure, you can reduce the amount of damage to your kidneys, and help slow any progression of kidney disease. Visit your healthcare provider as scheduled and follow the tips below.    Eat right  To control blood pressure and kidney disease:    Limit salt (sodium) intake. Your sodium limit is 1,500 mg a day. Sodium makes up 40% of table salt, which is also called sodium chloride. So 1,500 mg of sodium equals 3,800 mg of salt. It's very important to read and understand this difference when reading food labels. The following guide will make it easy to understand how much sodium is in different amounts of salt:    1/4 teaspoon salt = 600 mg sodium    1/2 teaspoon salt = 1,200 mg sodium    3/4 teaspoon salt = 1,800 mg sodium    1 teaspoon salt = 2,400 mg sodium    Cook with spices and herbs instead of salt.    Eat fresh foods instead of canned or processed ones.    Eat less fat. Avoid fats that come from animal sources.    Choose low-fat dairy foods.  You may also need to    Eat less protein.    Drink less fluid.    Eat foods that are low in phosphorus and potassium.  Stay active  Regular activity helps reduce high blood pressure. For best results:    Talk with your healthcare provider before starting a fitness program. Your provider may be able to suggest activities that will help you feel your best.    Ask your healthcare provider how often you should exercise and for how long.    Try to exercise at the same time each day.  Date Last Reviewed: 2/1/2017 2000-2017 [employer]. [address] [place] [address], [place] [address]. All rights reserved. This information is not intended as a substitute for professional medical care. Always follow your healthcare  professional's instructions.

## 2017-07-14 NOTE — PLAN OF CARE
Problem: Renal Failure/Kidney Injury, Acute (Adult)  Goal: Signs and Symptoms of Listed Potential Problems Will be Absent or Manageable (Renal Failure/Kidney Injury, Acute)  Signs and symptoms of listed potential problems will be absent or manageable by discharge/transition of care (reference Renal Failure/Kidney Injury, Acute (Adult) CPG).  Outcome: Improving  B/p elevated. Started on metoprolol. B/p decreased slightly. edu given on new meds. poc discussed with pt. Voiding. Npo after midnight for abd us. ivf infusing.

## 2017-07-14 NOTE — PROGRESS NOTES
SUBJECTIVE:                                                    Taylor Valiente is a 21 year old male who presents to clinic today for the following health issues:      Hypertension noticed today on office visit.      Outpatient blood pressures are not being checked.    Low Salt Diet: low salt    Occasionally he gets headaches, on top of the head and sometimes the neck hurts.    At this time, his neck hurts slightly as he feel he slept wrong.          Amount of exercise or physical activity: None    Problems taking medications regularly: n/a    Medication side effects: n/a    Diet: regular (no restrictions)          Problem list and histories reviewed & adjusted, as indicated.  Additional history: as documented    There is no problem list on file for this patient.    History reviewed. No pertinent surgical history.    Social History   Substance Use Topics     Smoking status: Never Smoker     Smokeless tobacco: Never Used     Alcohol use No     Family History   Problem Relation Age of Onset     Blood Disease Mother      has hep b     DIABETES Mother      gestionanal diabetes     Obesity Father      Hypertension Maternal Grandmother      DIABETES Maternal Grandmother          No current outpatient prescriptions on file.     Allergies   Allergen Reactions     No Known Allergies        Reviewed and updated as needed this visit by clinical staff  Tobacco  Allergies  Med Hx  Surg Hx  Fam Hx  Soc Hx      Reviewed and updated as needed this visit by Provider         ROS:  C: NEGATIVE for fever, chills, change in weight  INTEGUMENTARY/SKIN: NEGATIVE for worrisome rashes, moles or lesions  E/M: NEGATIVE for ear, mouth and throat problems  R: NEGATIVE for significant cough or SOB  CV: NEGATIVE for chest pain, palpitations or peripheral edema  GI: NEGATIVE for nausea, abdominal pain, heartburn, or change in bowel habits  MUSCULOSKELETAL: NEGATIVE for significant arthralgias or myalgia  ENDOCRINE: NEGATIVE for  "temperature intolerance, skin/hair changes  HEME/ALLERGY/IMMUNE: NEGATIVE for bleeding problems  PSYCHIATRIC: NEGATIVE for changes in mood or affect    OBJECTIVE:     BP (!) 202/159 (BP Location: Right arm, Patient Position: Chair, Cuff Size: Adult Large)  Pulse 98  Temp 98.6  F (37  C) (Oral)  Resp 20  Ht 5' 11\" (1.803 m)  Wt 280 lb (127 kg)  SpO2 100%  BMI 39.05 kg/m2  Body mass index is 39.05 kg/(m^2).  GENERAL: healthy, alert and no distress  NECK: no adenopathy, no asymmetry, masses, or scars and thyroid normal to palpation  RESP: lungs clear to auscultation - no rales, rhonchi or wheezes  CV: regular rate and rhythm, normal S1 S2, no S3 or S4, no murmur, click or rub, no peripheral edema and peripheral pulses strong  ABDOMEN: soft, nontender, no hepatosplenomegaly, no masses and bowel sounds normal  MS: no gross musculoskeletal defects noted, no edema  NEURO: Normal strength and tone, sensory exam grossly normal, mentation intact, cranial nerves 2-12 intact and gait normal including heel/toe/tandem walking      EKG : showed LVH, which is consistent with long standing HTN.  Labs : pending.    ASSESSMENT/PLAN:             1. Hypertensive urgency  I noticed that his urine test is showing significant protien in the urine, EKG is showing LVH, which indicate end organ damage, I will send pt to ER for evaluation and management.  I called home and spoke with patient and mother, and she will drive him there right away.  - EKG 12-lead complete w/read - Clinics  - CBC with platelets  - Basic metabolic panel  - TSH with free T4 reflex  - Albumin Random Urine Quantitative  - *UA reflex to Microscopic and Culture ([facility] (except [place] and [place])  - lisinopril-hydrochlorothiazide (PRINZIDE/ZESTORETIC) 10-12.5 MG per tablet; Take 1 tablet by mouth daily  Dispense: 30 tablet; Refill: 0  - Urine Microscopic    Follow up with the ER.      Priyank Lawrence MD  [facility]"

## 2017-07-14 NOTE — MR AVS SNAPSHOT
After Visit Summary   7/14/2017    Taylor Valiente    MRN: [id number]           Patient Information     Date Of Birth          1995        Visit Information        Provider Department      7/14/2017 9:00 AM Priyank Lawrence MD [facility]        Today's Diagnoses     Hypertensive urgency    -  1      Care Instructions      Preventive Health Recommendations  Male Ages 18 - 25     Yearly exam:             See your health care provider every year in order to  o   Review health changes.   o   Discuss preventive care.    o   Review your medicines if your doctor has prescribed any.    You should be tested each year for STDs (sexually transmitted diseases).     Talk to your provider about cholesterol testing.      If you are at risk for diabetes, you should have a diabetes test (fasting glucose).    Shots: Get a flu shot each year. Get a tetanus shot every 10 years.     Nutrition:    Eat at least 5 servings of fruits and vegetables daily.     Eat whole-grain bread, whole-wheat pasta and brown rice instead of white grains and rice.     Talk to your provider about calcium and Vitamin D.     Lifestyle    Exercise for at least 150 minutes a week (30 minutes a day, 5 days a week). This will help you control your weight and prevent disease.     Limit alcohol to one drink per day.     No smoking.     Wear sunscreen to prevent skin cancer.     See your dentist every six months for an exam and cleaning.             Follow-ups after your visit        Your next 10 appointments already scheduled     Jul 14, 2017  3:00 PM CDT   Nurse Only with NIC MIRANDA MA/LPN   [facility] ([facility]    [id number] [place] [id number]   [phone]            Jul 15, 2017  9:00 AM CDT   SHORT with Carolin Ventura MD   [facility] ([facility])    [address] [place] [id number]   [phone]     "          Who to contact     If you have questions or need follow up information about today's clinic visit or your schedule please contact [facility] directly at [phone].  Normal or non-critical lab and imaging results will be communicated to you by MyChart, letter or phone within 4 business days after the clinic has received the results. If you do not hear from us within 7 days, please contact the clinic through MyChart or phone. If you have a critical or abnormal lab result, we will notify you by phone as soon as possible.  Submit refill requests through [employer] or call your pharmacy and they will forward the refill request to us. Please allow 3 business days for your refill to be completed.          Additional Information About Your Visit        [employer] Information     [employer] lets you send messages to your doctor, view your test results, renew your prescriptions, schedule appointments and more. To sign up, go to [email or web address] . Click on \"Log in\" on the left side of the screen, which will take you to the Welcome page. Then click on \"Sign up Now\" on the right side of the page.     You will be asked to enter the access code listed below, as well as some personal information. Please follow the directions to create your username and password.     Your access code is: [email or web address]  Expires: 10/12/2017 11:01 AM     Your access code will  in 90 days. If you need help or a new code, please call your [facility] clinic or [phone].        Care EveryWhere ID     This is your Care EveryWhere ID. This could be used by other organizations to access your [facility] medical records  [id number]        Your Vitals Were     Pulse Temperature Respirations Height Pulse Oximetry BMI (Body Mass Index)    98 98.6  F (37  C) (Oral) 20 5' 11\" (1.803 m) 100% 39.05 kg/m2       Blood Pressure from Last 3 Encounters:   17 (!) 202/159   08 124/62   07 128/60    Weight from Last 3 " Encounters:   07/14/17 280 lb (127 kg)   08/13/08 195 lb (88.5 kg) (>99 %)*   05/14/07 175 lb (79.4 kg) (>99 %)*     * Growth percentiles are based on [facility] 2-20 Years data.              We Performed the Following     *UA reflex to Microscopic and Culture ([facility] (except [place] and [place])     Albumin Random Urine Quantitative     Basic metabolic panel     CBC with platelets     EKG 12-lead complete w/read - Clinics     TSH with free T4 reflex     Urine Microscopic          Today's Medication Changes          These changes are accurate as of: 7/14/17 11:01 AM.  If you have any questions, ask your nurse or doctor.               Start taking these medicines.        Dose/Directions    lisinopril-hydrochlorothiazide 10-12.5 MG per tablet   Commonly known as:  PRINZIDE/ZESTORETIC   Used for:  Hypertensive urgency   Started by:  Priyank Lawrence MD        Dose:  1 tablet   Take 1 tablet by mouth daily   Quantity:  30 tablet   Refills:  0            Where to get your medicines      These medications were sent to [facility] [place] [id number]     Phone:  [phone]     lisinopril-hydrochlorothiazide 10-12.5 MG per tablet                Primary Care Provider Office Phone # Fax #    Priyank Lawrence [phone]       [address] [address]        Equal Access to Services     DEUCE FLYNN AH: Hadii maggie ku hadasho Soomaali, waaxda luqadaha, qaybta kaalmada adeegyada, jean-paul queen hayjyoti fan. So [facility] [phone].    ATENCIÓN: Si habla español, tiene a spangler disposición servicios gratuitos de asistencia lingüística. Holden al [phone].    We comply with applicable federal civil rights laws and [place] laws. We do not discriminate on the basis of race, color, national origin, age, disability sex, sexual orientation or gender identity.            Thank you!     Thank  you for choosing [facility]  for your care. Our goal is always to provide you with excellent care. Hearing back from our patients is one way we can continue to improve our services. Please take a few minutes to complete the written survey that you may receive in the mail after your visit with us. Thank you!             Your Updated Medication List - Protect others around you: Learn how to safely use, store and throw away your medicines at www.disposemymeds.org.          This list is accurate as of: 7/14/17 11:01 AM.  Always use your most recent med list.                   Brand Name Dispense Instructions for use Diagnosis    lisinopril-hydrochlorothiazide 10-12.5 MG per tablet    PRINZIDE/ZESTORETIC    30 tablet    Take 1 tablet by mouth daily    Hypertensive urgency

## 2017-07-14 NOTE — ED NOTES
[facility]  ED Nurse Handoff Report    Taylor Valiente is a 21 year old male   ED Chief complaint: Hypertension  . ED Diagnosis:   Final diagnoses:   Hypertensive emergency   Renal insufficiency   Proteinuria, unspecified type     Allergies:   Allergies   Allergen Reactions     No Known Allergies        Code Status: Full Code  Activity level - Baseline/Home:  Independent. Activity Level - Current:   Independent. Lift room needed: Yes. Bariatric: No   Needed: No   Isolation: No. Infection: Not Applicable.     Vital Signs:   Vitals:    07/14/17 1346 07/14/17 1348 07/14/17 1349 07/14/17 1350   BP:    (!) 173/129   Pulse:       Resp:       Temp:       TempSrc:       SpO2: 100% 93% 98% 99%   Weight:       Height:           Cardiac Rhythm:  ,      Pain level: 0-10 Pain Scale: 0  Patient confused: No. Patient Falls Risk: Yes   Elimination Status: Has eliminated  Patient Report - Initial Complaint: Hypertension. Focused Assessment: A&O. Independent. Denies pain. Elevated BP since arrival. No other complaints.  Pt has eaten box lunch, tolerated well.   Tests Performed: CT head negative, Renal US negative, . Abnormal Results: Cr 2.63.   Treatments provided: Labetalol x 2 doses, clonidine PO. Monitoring BP  Family Comments: Pt presented with his mother who has since left.   OBS brochure/video discussed/provided to patient:   ED Medications:   Medications   labetalol (NORMODYNE/TRANDATE) injection 20 mg (20 mg Intravenous Given 7/14/17 1236)   labetalol (NORMODYNE/TRANDATE) injection 20 mg (20 mg Intravenous Given 7/14/17 1359)   cloNIDine (CATAPRES) tablet 0.2 mg (0.2 mg Oral Given 7/14/17 1359)     Drips infusing:  No         ED Nurse Name/Phone Number: Ranjana Solares,   2:07 PM   .RECEIVING UNIT ED HANDOFF REVIEW    Above ED Nurse Handoff Report was reviewed: Yes  Reviewed by: Arie Berkowitz on July 14, 2017 at 3:09 PM

## 2017-07-14 NOTE — PHARMACY-ADMISSION MEDICATION HISTORY
Admission medication history interview status for this patient is complete. See [facility] admission navigator for allergy information, prior to admission medications and immunization status.     Medication history interview source(s):Patient  Medication history resources (including written lists, pill bottles, clinic record):None  Primary pharmacy:[facility]    Changes made to PTA medication list:  Added: none  Deleted: none  Changed: none    Actions taken by pharmacist (provider contacted, etc):None     Additional medication history information:None    Medication reconciliation/reorder completed by provider prior to medication history? No    Do you take OTC medications (eg tylenol, ibuprofen, fish oil, eye/ear drops, etc)? NO    For patients on insulin therapy: No    Prior to Admission medications    Medication Sig Last Dose Taking? Auth Provider   lisinopril-hydrochlorothiazide (PRINZIDE/ZESTORETIC) 10-12.5 MG per tablet Take 1 tablet by mouth daily 7/14/2017 at 1100 Yes Priyank Lawrence MD

## 2017-07-14 NOTE — NURSING NOTE
"Chief Complaint   Patient presents with     Hypertension       Initial BP (!) 202/159 (BP Location: Right arm, Patient Position: Chair, Cuff Size: Adult Large)  Pulse 98  Temp 98.6  F (37  C) (Oral)  Resp 20  Ht 5' 11\" (1.803 m)  Wt 280 lb (127 kg)  SpO2 100%  BMI 39.05 kg/m2 Estimated body mass index is 39.05 kg/(m^2) as calculated from the following:    Height as of this encounter: 5' 11\" (1.803 m).    Weight as of this encounter: 280 lb (127 kg).  Medication Reconciliation: complete   Maria Del Carmen Santos, ESETLLA      "

## 2017-07-15 ENCOUNTER — APPOINTMENT (OUTPATIENT)
Dept: ULTRASOUND IMAGING | Facility: CLINIC | Age: 22
DRG: 305 | End: 2017-07-15
Attending: INTERNAL MEDICINE
Payer: COMMERCIAL

## 2017-07-15 ENCOUNTER — APPOINTMENT (OUTPATIENT)
Dept: CARDIOLOGY | Facility: CLINIC | Age: 22
DRG: 305 | End: 2017-07-15
Attending: INTERNAL MEDICINE
Payer: COMMERCIAL

## 2017-07-15 PROBLEM — N17.9 ACUTE KIDNEY INJURY (H): Status: ACTIVE | Noted: 2017-07-15

## 2017-07-15 LAB
ALBUMIN SERPL-MCNC: 3 G/DL (ref 3.4–5)
ALDOST SERPL-MCNC: 39.3 NG/DL
ANION GAP SERPL CALCULATED.3IONS-SCNC: 11 MMOL/L (ref 3–14)
ANION GAP SERPL CALCULATED.3IONS-SCNC: 8 MMOL/L (ref 3–14)
BUN SERPL-MCNC: 33 MG/DL (ref 7–30)
BUN SERPL-MCNC: 33 MG/DL (ref 7–30)
CALCIUM SERPL-MCNC: 8.5 MG/DL (ref 8.5–10.1)
CALCIUM SERPL-MCNC: 8.8 MG/DL (ref 8.5–10.1)
CHLORIDE SERPL-SCNC: 104 MMOL/L (ref 94–109)
CHLORIDE SERPL-SCNC: 106 MMOL/L (ref 94–109)
CHOLEST SERPL-MCNC: 223 MG/DL
CO2 SERPL-SCNC: 25 MMOL/L (ref 20–32)
CO2 SERPL-SCNC: 26 MMOL/L (ref 20–32)
CREAT SERPL-MCNC: 2.4 MG/DL (ref 0.66–1.25)
CREAT SERPL-MCNC: 2.55 MG/DL (ref 0.66–1.25)
CREAT UR-MCNC: 139 MG/DL
ERYTHROCYTE [DISTWIDTH] IN BLOOD BY AUTOMATED COUNT: 15.3 % (ref 10–15)
FERRITIN SERPL-MCNC: 341 NG/ML (ref 26–388)
GFR SERPL CREATININE-BSD FRML MDRD: 32 ML/MIN/1.7M2
GFR SERPL CREATININE-BSD FRML MDRD: 34 ML/MIN/1.7M2
GLUCOSE SERPL-MCNC: 106 MG/DL (ref 70–99)
GLUCOSE SERPL-MCNC: 114 MG/DL (ref 70–99)
HCT VFR BLD AUTO: 39.8 % (ref 40–53)
HDLC SERPL-MCNC: 35 MG/DL
HGB BLD-MCNC: 12.8 G/DL (ref 13.3–17.7)
IRON SATN MFR SERPL: 15 % (ref 15–46)
IRON SERPL-MCNC: 38 UG/DL (ref 35–180)
LDLC SERPL CALC-MCNC: 154 MG/DL
MCH RBC QN AUTO: 25.1 PG (ref 26.5–33)
MCHC RBC AUTO-ENTMCNC: 32.2 G/DL (ref 31.5–36.5)
MCV RBC AUTO: 78 FL (ref 78–100)
MICROALBUMIN UR-MCNC: 5560 MG/L
MICROALBUMIN/CREAT UR: 4000 MG/G CR (ref 0–17)
NONHDLC SERPL-MCNC: 188 MG/DL
PHOSPHATE SERPL-MCNC: 3.8 MG/DL (ref 2.5–4.5)
PLATELET # BLD AUTO: 172 10E9/L (ref 150–450)
POTASSIUM SERPL-SCNC: 3 MMOL/L (ref 3.4–5.3)
POTASSIUM SERPL-SCNC: 3.1 MMOL/L (ref 3.4–5.3)
POTASSIUM SERPL-SCNC: 3.3 MMOL/L (ref 3.4–5.3)
POTASSIUM SERPL-SCNC: 3.5 MMOL/L (ref 3.4–5.3)
RBC # BLD AUTO: 5.09 10E12/L (ref 4.4–5.9)
SODIUM SERPL-SCNC: 139 MMOL/L (ref 133–144)
SODIUM SERPL-SCNC: 141 MMOL/L (ref 133–144)
T4 FREE SERPL-MCNC: 1.25 NG/DL (ref 0.76–1.46)
T4 FREE SERPL-MCNC: 1.26 NG/DL (ref 0.76–1.46)
TIBC SERPL-MCNC: 247 UG/DL (ref 240–430)
TRIGL SERPL-MCNC: 170 MG/DL
TSH SERPL DL<=0.005 MIU/L-ACNC: 4.38 MU/L (ref 0.4–4)
WBC # BLD AUTO: 12.7 10E9/L (ref 4–11)

## 2017-07-15 PROCEDURE — 84132 ASSAY OF SERUM POTASSIUM: CPT | Performed by: INTERNAL MEDICINE

## 2017-07-15 PROCEDURE — 80061 LIPID PANEL: CPT | Performed by: INTERNAL MEDICINE

## 2017-07-15 PROCEDURE — 25000132 ZZH RX MED GY IP 250 OP 250 PS 637: Performed by: INTERNAL MEDICINE

## 2017-07-15 PROCEDURE — 25000128 H RX IP 250 OP 636: Performed by: INTERNAL MEDICINE

## 2017-07-15 PROCEDURE — 93306 TTE W/DOPPLER COMPLETE: CPT | Mod: 26 | Performed by: INTERNAL MEDICINE

## 2017-07-15 PROCEDURE — 87340 HEPATITIS B SURFACE AG IA: CPT | Performed by: INTERNAL MEDICINE

## 2017-07-15 PROCEDURE — 86706 HEP B SURFACE ANTIBODY: CPT | Performed by: INTERNAL MEDICINE

## 2017-07-15 PROCEDURE — 93975 VASCULAR STUDY: CPT | Mod: TC

## 2017-07-15 PROCEDURE — 86160 COMPLEMENT ANTIGEN: CPT | Performed by: INTERNAL MEDICINE

## 2017-07-15 PROCEDURE — 36415 COLL VENOUS BLD VENIPUNCTURE: CPT | Performed by: INTERNAL MEDICINE

## 2017-07-15 PROCEDURE — 83516 IMMUNOASSAY NONANTIBODY: CPT | Performed by: INTERNAL MEDICINE

## 2017-07-15 PROCEDURE — 12000007 ZZH R&B INTERMEDIATE

## 2017-07-15 PROCEDURE — 25500064 ZZH RX 255 OP 636: Performed by: INTERNAL MEDICINE

## 2017-07-15 PROCEDURE — 86803 HEPATITIS C AB TEST: CPT | Performed by: INTERNAL MEDICINE

## 2017-07-15 PROCEDURE — 84439 ASSAY OF FREE THYROXINE: CPT | Performed by: INTERNAL MEDICINE

## 2017-07-15 PROCEDURE — 87389 HIV-1 AG W/HIV-1&-2 AB AG IA: CPT | Performed by: INTERNAL MEDICINE

## 2017-07-15 PROCEDURE — G0378 HOSPITAL OBSERVATION PER HR: HCPCS

## 2017-07-15 PROCEDURE — 99232 SBSQ HOSP IP/OBS MODERATE 35: CPT | Performed by: INTERNAL MEDICINE

## 2017-07-15 PROCEDURE — 85027 COMPLETE CBC AUTOMATED: CPT | Performed by: INTERNAL MEDICINE

## 2017-07-15 PROCEDURE — 83876 ASSAY MYELOPEROXIDASE: CPT | Performed by: INTERNAL MEDICINE

## 2017-07-15 PROCEDURE — 80069 RENAL FUNCTION PANEL: CPT | Performed by: INTERNAL MEDICINE

## 2017-07-15 PROCEDURE — 40000264 ECHO COMPLETE WITH LUMASON

## 2017-07-15 PROCEDURE — 83550 IRON BINDING TEST: CPT | Performed by: INTERNAL MEDICINE

## 2017-07-15 PROCEDURE — 82728 ASSAY OF FERRITIN: CPT | Performed by: INTERNAL MEDICINE

## 2017-07-15 PROCEDURE — 83540 ASSAY OF IRON: CPT | Performed by: INTERNAL MEDICINE

## 2017-07-15 RX ORDER — METOPROLOL TARTRATE 50 MG
50 TABLET ORAL 2 TIMES DAILY
Status: DISCONTINUED | OUTPATIENT
Start: 2017-07-15 | End: 2017-07-16

## 2017-07-15 RX ORDER — HYDRALAZINE HYDROCHLORIDE 20 MG/ML
10 INJECTION INTRAMUSCULAR; INTRAVENOUS EVERY 4 HOURS PRN
Status: DISCONTINUED | OUTPATIENT
Start: 2017-07-15 | End: 2017-07-20 | Stop reason: HOSPADM

## 2017-07-15 RX ORDER — AMLODIPINE BESYLATE 5 MG/1
5 TABLET ORAL DAILY
Status: DISCONTINUED | OUTPATIENT
Start: 2017-07-15 | End: 2017-07-16

## 2017-07-15 RX ORDER — POTASSIUM CHLORIDE 29.8 MG/ML
20 INJECTION INTRAVENOUS
Status: DISCONTINUED | OUTPATIENT
Start: 2017-07-15 | End: 2017-07-20 | Stop reason: HOSPADM

## 2017-07-15 RX ORDER — POTASSIUM CHLORIDE 1.5 G/1.58G
20-40 POWDER, FOR SOLUTION ORAL
Status: DISCONTINUED | OUTPATIENT
Start: 2017-07-15 | End: 2017-07-20 | Stop reason: HOSPADM

## 2017-07-15 RX ORDER — POTASSIUM CHLORIDE 7.45 MG/ML
10 INJECTION INTRAVENOUS
Status: DISCONTINUED | OUTPATIENT
Start: 2017-07-15 | End: 2017-07-20 | Stop reason: HOSPADM

## 2017-07-15 RX ORDER — HYDRALAZINE HYDROCHLORIDE 20 MG/ML
10 INJECTION INTRAMUSCULAR; INTRAVENOUS ONCE
Status: COMPLETED | OUTPATIENT
Start: 2017-07-15 | End: 2017-07-15

## 2017-07-15 RX ORDER — POTASSIUM CL/LIDO/0.9 % NACL 10MEQ/0.1L
10 INTRAVENOUS SOLUTION, PIGGYBACK (ML) INTRAVENOUS
Status: DISCONTINUED | OUTPATIENT
Start: 2017-07-15 | End: 2017-07-20 | Stop reason: HOSPADM

## 2017-07-15 RX ORDER — POTASSIUM CHLORIDE 1500 MG/1
20-40 TABLET, EXTENDED RELEASE ORAL
Status: DISCONTINUED | OUTPATIENT
Start: 2017-07-15 | End: 2017-07-20 | Stop reason: HOSPADM

## 2017-07-15 RX ORDER — LABETALOL HYDROCHLORIDE 5 MG/ML
20 INJECTION, SOLUTION INTRAVENOUS ONCE
Status: COMPLETED | OUTPATIENT
Start: 2017-07-15 | End: 2017-07-15

## 2017-07-15 RX ADMIN — SULFUR HEXAFLUORIDE 5 ML: KIT at 10:40

## 2017-07-15 RX ADMIN — HYDRALAZINE HYDROCHLORIDE 5 MG: 20 INJECTION INTRAMUSCULAR; INTRAVENOUS at 15:38

## 2017-07-15 RX ADMIN — POTASSIUM CHLORIDE 20 MEQ: 1500 TABLET, EXTENDED RELEASE ORAL at 14:30

## 2017-07-15 RX ADMIN — POTASSIUM CHLORIDE 40 MEQ: 1500 TABLET, EXTENDED RELEASE ORAL at 11:45

## 2017-07-15 RX ADMIN — HYDRALAZINE HYDROCHLORIDE 5 MG: 20 INJECTION INTRAMUSCULAR; INTRAVENOUS at 02:46

## 2017-07-15 RX ADMIN — METOPROLOL TARTRATE 50 MG: 50 TABLET, FILM COATED ORAL at 20:09

## 2017-07-15 RX ADMIN — HYDRALAZINE HYDROCHLORIDE 10 MG: 20 INJECTION INTRAMUSCULAR; INTRAVENOUS at 17:19

## 2017-07-15 RX ADMIN — AMLODIPINE BESYLATE 5 MG: 5 TABLET ORAL at 17:09

## 2017-07-15 RX ADMIN — LABETALOL HYDROCHLORIDE 20 MG: 5 INJECTION, SOLUTION INTRAVENOUS at 04:43

## 2017-07-15 RX ADMIN — HYDRALAZINE HYDROCHLORIDE 10 MG: 20 INJECTION INTRAMUSCULAR; INTRAVENOUS at 20:13

## 2017-07-15 RX ADMIN — HYDRALAZINE HYDROCHLORIDE 10 MG: 20 INJECTION INTRAMUSCULAR; INTRAVENOUS at 03:38

## 2017-07-15 RX ADMIN — METOPROLOL TARTRATE 50 MG: 50 TABLET, FILM COATED ORAL at 09:02

## 2017-07-15 NOTE — PROGRESS NOTES
[facility]  Hospitalist Progress Note  Name: Taylor Valiente    MRN: [id number]  YOB: 1995    Age: 21 year old  Date of admission: 7/14/2017  Primary care provider: Priyank Lawrence      Reason for Stay (Diagnosis): Hypertensive urgency/proteinuria         Assessment and Plan:      Summary of Stay:  Mr Valiente is an otherwise healthy 22 yo who presented to [facility] clinic and was found to be very hypertensive with e/o proteinuria and renal failure with creat 2.6 of unclear duration.  He is admitted 7/14/17 for further evaluation and treatment.       1.   Hypertensive urgency: given young age almost certainly related to a secondary cause. Nephrology input appreciated. Due to uncontrolled htn, will add norvasc 5mg  today and continue scheduled metoprolol with prn iv hydralazine.     2.  Proteinuria:  Suspect nephritic range proteinuria although no significant RBC's. Broad differential at this time. ?htnsive nephrosclerosis. FSGS also possible. Less likely vasculitides although checking labs. Suspect may need bx to confirm dx but will need better bp control.     3.  Renal insufficiency:  Suspect subacute vs chronic. Will continue to monitor and w/up as above.     4.  Hypokalemia:  start K+ replacement protocol      DVT Prophylaxis: Ambulate every shift  Code Status: Full Code  Discharge Dispo: home  Estimated Disch Date / # of Days until Disch: >2-3 midnights after better bp control and further w/up for Renal insufficiency/proteinuria    Will transition to inpatient        Interval History (Subjective):      No spec c/o. No uremic sx and denies headache         Physical Exam:      Vital signs:  Temp: 98.2  F (36.8  C) Temp src: Oral BP: (!) 178/118   Heart Rate: 72 Resp: 20 SpO2: 99 % O2 Device: None (Room air)   Height: 182.9 cm (6') Weight: 129 kg (284 lb 6.4 oz)  Estimated body mass index is 38.57 kg/(m^2) as calculated from the following:    Height as of this  encounter: 1.829 m (6').    Weight as of this encounter: 129 kg (284 lb 6.4 oz).      I/O last 3 completed shifts:  In: 2086 [P.O.:1040; I.V.:1046]  Out: 1250 [Urine:1250]  Vitals:    07/14/17 1134 07/14/17 1533 07/15/17 0421   Weight: 117.9 kg (260 lb) 127.3 kg (280 lb 9.6 oz) 129 kg (284 lb 6.4 oz)       Constitutional: Awake, alert, cooperative, no apparent distress   Respiratory: Nl work of breathing. Clear to auscultation bilaterally, no crackles or wheezing   Cardiovascular: Regular rate and rhythm, normal S1 and S2, and no murmur noted   Abdomen: Normal bowel sounds, soft, non-distended, non-tender   Skin: No rashes, no cyanosis, dry to touch   Neuro: CN 2-12 intact, no localizing weakness   Extremities: No edema, normal range of motion   HEENT Normocephalic, atraumatic, normal nasal turbinates; oropharynx clear   Neck Supple; nl inspection; trachea midline; no thryomegaly   Psychiatric: A+O x3. Normal affect          Medications:      All current medications were reviewed with changes reflected in problem list.         Data:      All new lab and imaging data was reviewed.   Labs:    Recent Labs  Lab 07/15/17  0635 07/14/17  0949   WBC 12.7* 11.2*   HGB 12.8* 13.8   HCT 39.8* 41.4   MCV 78 77*    172       Recent Labs  Lab 07/15/17  0635 07/14/17  1140 07/14/17  0949    138 141   POTASSIUM 3.1* 3.0* 3.0*   CHLORIDE 106 105 104   CO2 25 27 26   ANIONGAP 8 6 11   * 121* 106*   BUN 33* 33* 33*   CR 2.40* 2.63* 2.55*   GFRESTIMATED 34* 31* 32*   GFRESTBLACK 42* 37* 39*   BUBBA 8.5 8.9 8.8   MAG  --  2.3  --    PHOS 3.8  --   --    PROTTOTAL  --  7.6  --    ALBUMIN 3.0* 3.5  --    BILITOTAL  --  0.6  --    ALKPHOS  --  97  --    AST  --  25  --    ALT  --  40  --       Imaging:   No results found for this or any previous visit (from the past 24 hour(s)).    Sharon Durand [phone]

## 2017-07-15 NOTE — CONSULTS
RENAL CONSULTATION NOTE    REFERRING MD:  Mery Snow MD    REASON FOR CONSULTATION:  Hypertensive urgency, nephrotic range proteinuria and renal insuffiiency    HPI:  21 y.o obesity gentleman without significant medical disease, who was admitted on 7/15 for hypertensive urgency and renal insufficiency. Patient says he was told that he has high blood pressure when he was a child. It looks like his last visit to the doctor was in 2008. His blood pressure was good at the time. His mother urged him to see a doctor, so he went and had a physical done yesterday. His blood pressure was 202/159 at the clinic. He was prescribed lisinoprl-HCTZ 10-12.5, and he took one dose. Labs showed significant albuminuria and renal insufficiency, so he was directed to come to the hospital for further evaluation.     His father, mother and grandfather have hypertension but no kidney disease. No autoimmune disease in the family. He has headache at least once a week for the last year, associated with photophobia and loud noise. He takes 1-2 tabs of Advil for the headache. No headache for the last three weeks. He denies other OTC, herbal or drugs. He does not smoke. He has noticed foamy urine for the last three months. He had a cold in January/February. No other recent infection. No other  complaints. He denies dysuria or hematuria. No fever, chill, night sweat or weight lost. He denies peripheral edema. He denies muscle ache and joint pain. No new lump and bump in the groin or axillary areas.    ROS:  A complete review of systems was performed and is negative except as noted above.    PMH:    Past Medical History:   Diagnosis Date     LVH (left ventricular hypertrophy) due to hypertensive disease 7/14/2017     Obesity, unspecified        PSH:    Past Surgical History:   Procedure Laterality Date     NO HISTORY OF SURGERY         MEDICATIONS:      metoprolol  50 mg Oral BID     sodium chloride (PF)  3 mL Intracatheter Q8H      senna-docusate  1-2 tablet Oral BID       ALLERGIES:    Allergies as of 07/14/2017 - Corky as Reviewed 07/14/2017   Allergen Reaction Noted     No known allergies  10/26/2004       FH:    Family History   Problem Relation Age of Onset     Blood Disease Mother      has hep b     DIABETES Mother      gestionanal diabetes     Hypertension Mother      Obesity Father      Hypertension Father      Hypertension Maternal Grandmother      DIABETES Maternal Grandmother      Hypertension Paternal Grandmother      Hypertension Paternal Grandfather      Coronary Artery Disease Maternal Uncle        SH:    Social History     Social History     Marital status: Single     Spouse name: N/A     Number of children: N/A     Years of education: N/A     Occupational History           Social History Main Topics     Smoking status: Never Smoker     Smokeless tobacco: Never Used     Alcohol use No     Drug use: No     Sexual activity: Not Currently     Other Topics Concern     Not on file     Social History Narrative       PHYSICAL EXAM:    BP (!) 164/102 (BP Location: Right arm)  Pulse 85  Temp 98.5  F (36.9  C) (Oral)  Resp 18  Ht 1.829 m (6')  Wt 129 kg (284 lb 6.4 oz)  SpO2 100%  BMI 38.57 kg/m2  GENERAL: pleasant, alert, NAD  HEENT:  Normocephalic. No gross abnormalities.  Pupils equal.  MMM.  Dentition is ok.  CV: RRR, no murmurs, no clicks, gallops, or rubs, no edema, no carotid bruits  RESP: Clear bilaterally with good efforts. No wheezes or crackles.  GI: Abdomen obese, soft, NT  MUSCULOSKELETAL: extremities nl - no gross deformities noted  SKIN: no suspicious lesions or rashes, dry to touch  NEURO:  Strength normal and symmetric. A/o x 3. Nonfocal.  PSYCH: mood good, affect appropriate  LYMPH: No palpable ant/post cervical     LABS:      CBC RESULTS:     Recent Labs  Lab 07/15/17  0635 07/14/17  0949   WBC 12.7* 11.2*   RBC 5.09 5.37   HGB 12.8* 13.8   HCT 39.8* 41.4    172       BMP  RESULTS:    Recent Labs  Lab 07/15/17  0635 07/14/17  1140 07/14/17  0949    138 141   POTASSIUM 3.1* 3.0* 3.0*   CHLORIDE 106 105 104   CO2 25 27 26   BUN 33* 33* 33*   CR 2.40* 2.63* 2.55*   * 121* 106*   BUBBA 8.5 8.9 8.8       INRNo lab results found in last 7 days.     DIAGNOSTICS:  Reviewed    I reviewed the images of his renal ultrasound. Poor quality. Kidneys are not echogenic. No mass. No stone. No hydronephrosis. Renal duplex pending.    A/P:  21 y.o man without past medical history, admitted for hypertensive urgency and renal insufficiency.     1. Pt with unknown baseline kidney function.     2. Renal insufficiency. Chronic vs. Acute. UA with granular case, suggesting ATN. UA also has moderate blood but normal RBC. Heme pigmentation? CK level is not high. Hgb and plt are okay. He has 4 g/g of albuminuria and only 2.3 g/g proteinuria, so there is a big discrepancy. It looks like he has nephritic range proteinuria, which may be due to hypertensive nephrosclerosis. His hypertension could also be due to a primary renal disease. He does not have any other symptoms to suggest a vasculitis related renal disease, but I will check his vasculitis markers. His clinical presentation does not appear to be ЮЛИЯ. FSGS (secondary vs primary) is a possibility. I discussed the possible etiologies and work-up for his hypertension and renal disease. Most likely, he will need a renal biopsy on Monday. His SBP needs to be ~140s before I can biopsy his kidney to reduce the risk of bleeding.     3. Hypertensive urgency. Current BP is okay, and will lower it further tomorrow   -started metoprol 12.5 mg bid   -can add Norvasc 5 mg daily tomorrow.   -renal doppler pending   -low K in the setting of renal insufficiency suggests possible hyper glendy state   -PRA/ald pending   -can check serum catecholamines   -TTE pending to evaluate for LVH   -r/o secondary causes of hypertension    4. Hypokalemia? High mineralocorticoid  state?    5. Nephritic range proteinuria. Discrepancy between albumin/creat and protein/creat ratio.    -check 24 hrs urine for total protein.    Jer Hooper MD  [facility] - Nephrology  Office Phone: [phone]  Pager: [phone]

## 2017-07-15 NOTE — PROVIDER NOTIFICATION
BP remains hypertensive /121 HR 72 given 5mg IV hydralazine times two. Last dose at 0245. Pt asymptomatic. Please advise thank you.     -10mg IV hydralazine ordered    Pt /116 HR 79. Pt asymptomatic. 10mg IV hydralazine given at 0338. Please advise. Thank you  - 20mg IV labetalol given and IVF stopped

## 2017-07-15 NOTE — PLAN OF CARE
Problem: Renal Failure/Kidney Injury, Acute (Adult)  Goal: Signs and Symptoms of Listed Potential Problems Will be Absent or Manageable (Renal Failure/Kidney Injury, Acute)  Signs and symptoms of listed potential problems will be absent or manageable by discharge/transition of care (reference Renal Failure/Kidney Injury, Acute (Adult) CPG).   Outcome: Improving  B/p slightly improved. Metoprolol increased to 50mg po. Denies light headed, dizziness, or pain. Up ind. Voiding. Good intake. ivf d/c'd. ls clear. k 3.1 being replaced. A&Ox4. sr inverted T's

## 2017-07-15 NOTE — PLAN OF CARE
" Problem: Discharge Planning  Goal: Discharge Planning (Adult, OB, Behavioral, Peds)  PRIMARY DIAGNOSIS: \"GENERIC\" NURSING  OUTPATIENT/OBSERVATION GOALS TO BE MET BEFORE DISCHARGE:  1. ADLs back to baseline: Yes, Independent in the room      2. Activity and level of assistance: Ambulating independently.      3. Pain status: Pain free.      4. Return to near baseline physical activity: Yes      Discharge Planner Nurse   Barriers to discharge: Yes, Hypertensive, will have echo in the AM, and abd. US.     Please review provider order for any additional goals.   Nurse to notify provider when observation goals have been met and patient is ready for discharge.      [id number]: Pt resting comfortably. AOx4, afebrile.  BP hypertensive in the 180's over 110-130's. MD notified after 5mg hydralazine given times two with no relief /121. 10mg IV hydralazine given. MD notified after no relief  116. Labetalol 20mg given with relief /98. Pt remained asymptomatic. PT NPO since 0000 for abd US today and echo. Ind. In the room. Voiding appropriately. Tele reads SR with Inverted T's. Will continue to monitor.       "

## 2017-07-15 NOTE — PLAN OF CARE
Problem: Goal Outcome Summary  Goal: Goal Outcome Summary  Outcome: No Change  Page to  MD at 1642: PRN hydralazine given at 1538. DBP recheck at 1618 still >110. Please advise. Thanks!     PRN hydralazine order increased, amlodipine started.   Pt A&Ox4, VSS ex elevated BP. LS clear, BS audible - BM 7/15. K+ recheck 3.5. Up independently, renal diet. Renal US results pending. Tele - SR with ST depression and inverted T waves.

## 2017-07-15 NOTE — PLAN OF CARE
"Problem: Discharge Planning  Goal: Discharge Planning (Adult, OB, Behavioral, Peds)  PRIMARY DIAGNOSIS: \"GENERIC\" NURSING  OUTPATIENT/OBSERVATION GOALS TO BE MET BEFORE DISCHARGE:  1. ADLs back to baseline: Yes, Independent in the room      2. Activity and level of assistance: Ambulating independently.      3. Pain status: Pain free.      4. Return to near baseline physical activity: Yes          Discharge Planner Nurse   Barriers to discharge: Yes, Hypertensive, will have echo in the AM       Entered by: Elida Langley 07/15/2017 1:16 AM     Please review provider order for any additional goals.   Nurse to notify provider when observation goals have been met and patient is ready for discharge.      "

## 2017-07-15 NOTE — CONSULTS
"CLINICAL NUTRITION SERVICES  -  ASSESSMENT NOTE      Malnutrition: Patient does not meet criteria at this time         REASON FOR ASSESSMENT  Taylor Valiente is a 21 year old male seen by Registered Dietitian for Admission Nutrition Risk Screen - Unintentional weight loss of 10# or more in past 2 months.    NUTRITION HISTORY  - Information obtained from patient.   - Regular diet at baseline.   - Meals BID-TID.   - No decrease in appetite/intake PTA.    - NKFA.       CURRENT NUTRITION ORDERS  Diet Order:     Renal     Current Intake/Tolerance:  Previously NPO for US.   Renal diet ordered by MD.  Good appetite/intake for lunch meal though patient reports he \"wasn't overly hungry\".       PHYSICAL FINDINGS  Observed  Appears well-nourished  Obtained from Chart/Interdisciplinary Team  No nutritionally pertinent    ANTHROPOMETRICS  Height: 6' 0\"  Weight: 129.1 kg (284#)  Body mass index is 38.57 kg/(m^2).  Weight Status:  Obesity Grade II BMI 35-39.9  IBW: 80.9 kg (178#)  % IBW: 160%  Weight History  Wt Readings from Last 10 Encounters:   07/15/17 129 kg (284 lb 6.4 oz)   07/14/17 127 kg (280 lb)   08/13/08 88.5 kg (195 lb) (>99 %)*   05/14/07 79.4 kg (175 lb) (>99 %)*   12/29/06 73.7 kg (162 lb 8 oz) (>99 %)*   11/22/04 59 kg (130 lb) (>99 %)*   10/26/04 60.8 kg (134 lb) (>99 %)*     * Growth percentiles are based on CDC 2-20 Years data.   - 280# UBW per patient report.  Denies recent wt changes.     LABS  Labs reviewed    MEDICATIONS  Medications reviewed    Dosing Weight 93 kg - adjusted weight     ASSESSED NUTRITION NEEDS PER APPROVED PRACTICE GUIDELINES:  Estimated Energy Needs: 4705-9626 kcals (20-25 Kcal/Kg)  Justification: obese  Estimated Protein Needs:  grams protein (1-1.2 g pro/Kg)  Justification: maintenance  Estimated Fluid Needs: 2459-2412 mL (1 mL/Kcal)  Justification: maintenance and per provider pending fluid status    MALNUTRITION:  % Weight Loss:  None noted  % Intake:  No decreased intake " noted  Subcutaneous Fat Loss:  None observed  Muscle Loss:  None observed  Fluid Retention:  None noted    Malnutrition Diagnosis: Patient does not meet two of the above criteria necessary for diagnosing malnutrition    NUTRITION DIAGNOSIS:  No nutrition diagnosis at this time       NUTRITION INTERVENTIONS  Recommendations / Nutrition Prescription  Continue renal diet as ordered by MD.       Implementation  Nutrition education: Provided education on renal diet as above.  Discussed phos, K, and protein restrictions.  Patient had already received renal menu from nursing staff.       MONITORING AND EVALUATION:  Progress towards goals will be monitored and evaluated per protocol and Practice Guidelines          Annamarie Velazco RD, LD  Clinical Dietitian  3rd floor/ICU: [phone]  All other floors: [phone]  Weekend/holiday: [phone]

## 2017-07-16 LAB
ANION GAP SERPL CALCULATED.3IONS-SCNC: 10 MMOL/L (ref 3–14)
BUN SERPL-MCNC: 29 MG/DL (ref 7–30)
CALCIUM SERPL-MCNC: 9.1 MG/DL (ref 8.5–10.1)
CHLORIDE SERPL-SCNC: 106 MMOL/L (ref 94–109)
CO2 SERPL-SCNC: 23 MMOL/L (ref 20–32)
CORTIS SERPL-MCNC: 5 UG/DL (ref 4–22)
CREAT SERPL-MCNC: 2.22 MG/DL (ref 0.66–1.25)
ERYTHROCYTE [DISTWIDTH] IN BLOOD BY AUTOMATED COUNT: 15.7 % (ref 10–15)
GFR SERPL CREATININE-BSD FRML MDRD: 38 ML/MIN/1.7M2
GLUCOSE SERPL-MCNC: 104 MG/DL (ref 70–99)
HCT VFR BLD AUTO: 42.1 % (ref 40–53)
HGB BLD-MCNC: 13.5 G/DL (ref 13.3–17.7)
MCH RBC QN AUTO: 25.3 PG (ref 26.5–33)
MCHC RBC AUTO-ENTMCNC: 32.1 G/DL (ref 31.5–36.5)
MCV RBC AUTO: 79 FL (ref 78–100)
PLATELET # BLD AUTO: 187 10E9/L (ref 150–450)
POTASSIUM SERPL-SCNC: 3.5 MMOL/L (ref 3.4–5.3)
RBC # BLD AUTO: 5.33 10E12/L (ref 4.4–5.9)
RENIN PLAS-CCNC: 5.9 NG/ML/H
SODIUM SERPL-SCNC: 139 MMOL/L (ref 133–144)
WBC # BLD AUTO: 10.4 10E9/L (ref 4–11)

## 2017-07-16 PROCEDURE — 82533 TOTAL CORTISOL: CPT | Performed by: INTERNAL MEDICINE

## 2017-07-16 PROCEDURE — 36415 COLL VENOUS BLD VENIPUNCTURE: CPT | Performed by: INTERNAL MEDICINE

## 2017-07-16 PROCEDURE — 99233 SBSQ HOSP IP/OBS HIGH 50: CPT | Performed by: INTERNAL MEDICINE

## 2017-07-16 PROCEDURE — 80048 BASIC METABOLIC PNL TOTAL CA: CPT | Performed by: INTERNAL MEDICINE

## 2017-07-16 PROCEDURE — 25000128 H RX IP 250 OP 636: Performed by: INTERNAL MEDICINE

## 2017-07-16 PROCEDURE — 12000000 ZZH R&B MED SURG/OB

## 2017-07-16 PROCEDURE — 85027 COMPLETE CBC AUTOMATED: CPT | Performed by: INTERNAL MEDICINE

## 2017-07-16 PROCEDURE — 25000132 ZZH RX MED GY IP 250 OP 250 PS 637: Performed by: INTERNAL MEDICINE

## 2017-07-16 RX ORDER — METOPROLOL TARTRATE 50 MG
50 TABLET ORAL ONCE
Status: COMPLETED | OUTPATIENT
Start: 2017-07-16 | End: 2017-07-16

## 2017-07-16 RX ORDER — AMLODIPINE BESYLATE 5 MG/1
5 TABLET ORAL ONCE
Status: COMPLETED | OUTPATIENT
Start: 2017-07-16 | End: 2017-07-16

## 2017-07-16 RX ORDER — METOPROLOL TARTRATE 100 MG
100 TABLET ORAL 2 TIMES DAILY
Status: DISCONTINUED | OUTPATIENT
Start: 2017-07-16 | End: 2017-07-20 | Stop reason: HOSPADM

## 2017-07-16 RX ORDER — AMLODIPINE BESYLATE 10 MG/1
10 TABLET ORAL DAILY
Status: DISCONTINUED | OUTPATIENT
Start: 2017-07-17 | End: 2017-07-20 | Stop reason: HOSPADM

## 2017-07-16 RX ADMIN — METOPROLOL TARTRATE 50 MG: 50 TABLET, FILM COATED ORAL at 10:37

## 2017-07-16 RX ADMIN — HYDRALAZINE HYDROCHLORIDE 10 MG: 20 INJECTION INTRAMUSCULAR; INTRAVENOUS at 23:45

## 2017-07-16 RX ADMIN — METOPROLOL TARTRATE 50 MG: 50 TABLET, FILM COATED ORAL at 07:42

## 2017-07-16 RX ADMIN — HYDRALAZINE HYDROCHLORIDE 10 MG: 20 INJECTION INTRAMUSCULAR; INTRAVENOUS at 09:07

## 2017-07-16 RX ADMIN — AMLODIPINE BESYLATE 5 MG: 5 TABLET ORAL at 07:42

## 2017-07-16 RX ADMIN — METOPROLOL TARTRATE 100 MG: 100 TABLET, FILM COATED ORAL at 20:00

## 2017-07-16 RX ADMIN — HYDRALAZINE HYDROCHLORIDE 10 MG: 20 INJECTION INTRAMUSCULAR; INTRAVENOUS at 17:56

## 2017-07-16 RX ADMIN — AMLODIPINE BESYLATE 5 MG: 5 TABLET ORAL at 10:38

## 2017-07-16 NOTE — PROGRESS NOTES
Renal Medicine Progress Note            Assessment/Plan:     21 y.o man without past medical history, admitted for hypertensive urgency and renal insufficiency.      1. Pt with unknown kidney function at baseline.     2. Renal insufficiency. Chronic vs. Acute. UA with granular case, suggesting ATN. UA also has moderate blood but normal RBC. Heme pigmentation? CK level is not high. Hgb and plt are okay. He has 4 g/g of albuminuria and only 2.3 g/g proteinuria, so there is a big discrepancy. It looks like he has nephritic range proteinuria, which may be due to hypertensive nephrosclerosis. His hypertension could also be due to a primary renal disease. He does not have any other symptoms to suggest a vasculitis related renal disease, but I will check his vasculitis markers. His clinical presentation does not appear to be ЮЛИЯ. FSGS (secondary vs primary) is a possibility. I discussed the possible etiologies and work-up for his hypertension and renal disease. Most likely, he will need a renal biopsy on Monday. His SBP needs to be ~140s before I can biopsy his kidney to reduce the risk of bleeding.     -Scr is slightly better today    -vasculitis markers and 24hrs urine collection are pending.     3. Hypertensive urgency.                          -metoprolol up to 50 mg bid    -added Norvasc                         -renal doppler no MARY                         -PRA/ald pending                         -added catecholamines to urine collection                         -TTE mod cLVH and EF 40-45% in this young man suggesting longstanding uncontrolled hypertension                         -r/o secondary causes of hypertension     4. Hypokalemia? High mineralocorticoid state?     5. Nephritic range proteinuria. Discrepancy between albumin/creat and protein/creat ratio.                          -24 hrs urine for total protein in process    6. Obesity. Weight lost is highly recommended.    Plan.  1. TTE with moderate cLVH is  suggestive of longstanding uncontrolled hypertension. Kidney disease and proteinuria may be 2/2 hypertensive nephrosclerosis. No MARY on renal doppler.  2. Add ACEi or ARB when kidney function stabilized        Interval History:     Pt feels fine. He denies headache. No  complaints. No cardiopulmonary complaints.          Medications and Allergies:       metoprolol  100 mg Oral BID     [START ON 7/17/2017] amLODIPine  10 mg Oral Daily     sodium chloride (PF)  3 mL Intracatheter Q8H     senna-docusate  1-2 tablet Oral BID        Allergies   Allergen Reactions     No Known Allergies             Physical Exam:   Vitals were reviewed  Heart Rate: 73, Blood pressure (!) 178/96, pulse 85, temperature 97.9  F (36.6  C), temperature source Oral, resp. rate 22, height 1.829 m (6'), weight 127.4 kg (280 lb 12.8 oz), SpO2 93 %.    Wt Readings from Last 3 Encounters:   07/16/17 127.4 kg (280 lb 12.8 oz)   07/14/17 127 kg (280 lb)   08/13/08 88.5 kg (195 lb) (>99 %)*     * Growth percentiles are based on CDC 2-20 Years data.       Intake/Output Summary (Last 24 hours) at 07/16/17 1205  Last data filed at 07/16/17 0820   Gross per 24 hour   Intake              243 ml   Output              550 ml   Net             -307 ml       GENERAL APPEARANCE: pleasant, NAD, alert  HEENT:  Eyes/ears/nose/neck grossly normal  RESP: lungs cta b c good efforts, no crackles, rhonchi or wheezes  CV: RRR, nl S1/S2, no m/r/g   ABDOMEN: obese, soft, NT  EXTREMITIES/SKIN: no rashes/lesions on observed skin; no edema  Neuro: a/o x3         Data:     CBC RESULTS:     Recent Labs  Lab 07/16/17  0607 07/15/17  0635 07/14/17  0949   WBC 10.4 12.7* 11.2*   RBC 5.33 5.09 5.37   HGB 13.5 12.8* 13.8   HCT 42.1 39.8* 41.4    172 172       Basic Metabolic Panel:    Recent Labs  Lab 07/16/17  0607 07/15/17  2115 07/15/17  1535 07/15/17  0635 07/14/17  1140 07/14/17  0949     --   --  139 138 141   POTASSIUM 3.5 3.5 3.3* 3.1* 3.0* 3.0*   CHLORIDE 106   --   --  106 105 104   CO2 23  --   --  25 27 26   BUN 29  --   --  33* 33* 33*   CR 2.22*  --   --  2.40* 2.63* 2.55*   *  --   --  114* 121* 106*   BUBBA 9.1  --   --  8.5 8.9 8.8       INRNo lab results found in last 7 days.   Attestation:   I have reviewed today's relevant vital signs, notes, medications, labs and imaging.    Jer Hooper MD  [facility] - Nephrology  Office phone :[phone]  Pager: [phone]

## 2017-07-16 NOTE — PLAN OF CARE
Problem: Renal Failure/Kidney Injury, Acute (Adult)  Goal: Signs and Symptoms of Listed Potential Problems Will be Absent or Manageable (Renal Failure/Kidney Injury, Acute)  Signs and symptoms of listed potential problems will be absent or manageable by discharge/transition of care (reference Renal Failure/Kidney Injury, Acute (Adult) CPG).   Outcome: Improving    07/16/17 0017   Renal Failure/Kidney Injury, Acute   Problems Assessed (Acute Renal Failure/Kidney Injury) all   Problems Present (Acute Renal Failure/Kidney Injury) electrolyte imbalance;hypertension;situational response   Tele: SR, ST depression, inverted Ts (75-77). Physical assessment per Adult PCS. Creatinine levels trending down, this AM level is 2.22. Note acanthosis nigricans to posterior neck. BPs overnight 164-170/. Refer to VS, I/O, Adult PCS for full assessment & shift details. Disposition pending nephrology recommendations.   Salma Ellison, LEAHN, RN  Medical/Telemetry - 3

## 2017-07-16 NOTE — PROGRESS NOTES
[facility]  Hospitalist Progress Note  Name: Taylor Valiente    MRN: [id number]  YOB: 1995    Age: 21 year old  Date of admission: 7/14/2017  Primary care provider: Priyank Lawrence      Reason for Stay (Diagnosis): Hypertensive urgency/proteinuria         Assessment and Plan:      Summary of Stay:  Mr Valiente is an otherwise healthy 22 yo who presented to [facility] and was found to be very hypertensive with e/o proteinuria and renal failure with creat 2.6 of unclear duration.  He is admitted 7/14/17 for further evaluation and treatment.       1.   Hypertensive urgency: given young age almost certainly related to a secondary cause. Nephrology input appreciated. Due to uncontrolled htn, will increase to norvasc 10mg  today and continue scheduled metoprolol with prn iv hydralazine.     2.  Proteinuria:  Suspect nephritic range proteinuria although no significant RBC's. Broad differential at this time. ?htnsive nephrosclerosis. FSGS also possible. Less likely vasculitides although checking labs. Suspect may need bx to confirm dx but will need better bp control. Will defer to nephrology on need and timing for bx     3.  Renal insufficiency:  Suspect subacute vs chronic. Creatinine slightly improved. Will continue to monitor and w/up as above.     4.  Hypokalemia:  Cont K+ replacement protocol      DVT Prophylaxis: Ambulate every shift  Code Status: Full Code  Discharge Dispo: home  Estimated Disch Date / # of Days until Disch: >2 midnights after better bp control and further w/up for Renal insufficiency/proteinuria    Mom updated on poc  Time spent >35 minutes      Interval History (Subjective):      No spec c/o. No uremic sx and denies headache         Physical Exam:      Vital signs:  Temp: 98  F (36.7  C) Temp src: Axillary BP: (!) 166/118   Heart Rate: 75 Resp: 18 SpO2: 99 % O2 Device: None (Room air)   Height: 182.9 cm (6') Weight: 127.4 kg (280 lb 12.8  oz)  Estimated body mass index is 38.08 kg/(m^2) as calculated from the following:    Height as of this encounter: 1.829 m (6').    Weight as of this encounter: 127.4 kg (280 lb 12.8 oz).      I/O last 3 completed shifts:  In: 743 [P.O.:740; I.V.:3]  Out: 550 [Urine:550]  Vitals:    07/14/17 1134 07/14/17 1533 07/15/17 0421 07/16/17 0418   Weight: 117.9 kg (260 lb) 127.3 kg (280 lb 9.6 oz) 129 kg (284 lb 6.4 oz) 127.4 kg (280 lb 12.8 oz)       Constitutional: Awake, alert, cooperative, no apparent distress   Respiratory: Nl work of breathing. Clear to auscultation bilaterally, no crackles or wheezing   Cardiovascular: Regular rate and rhythm, normal S1 and S2, and no murmur noted   Abdomen: Normal bowel sounds, soft, non-distended, non-tender   Skin: No rashes, no cyanosis, dry to touch   Neuro: CN 2-12 intact, no localizing weakness   Extremities: No edema, normal range of motion   HEENT Normocephalic, atraumatic, normal nasal turbinates; oropharynx clear   Neck Supple; nl inspection; trachea midline; no thryomegaly   Psychiatric: A+O x3. Normal affect          Medications:      All current medications were reviewed with changes reflected in problem list.         Data:      All new lab and imaging data was reviewed.   Labs:    Recent Labs  Lab 07/16/17  0607 07/15/17  0635 07/14/17  0949   WBC 10.4 12.7* 11.2*   HGB 13.5 12.8* 13.8   HCT 42.1 39.8* 41.4   MCV 79 78 77*    172 172       Recent Labs  Lab 07/16/17  0607 07/15/17  2115 07/15/17  1535 07/15/17  0635 07/14/17  1140     --   --  139 138   POTASSIUM 3.5 3.5 3.3* 3.1* 3.0*   CHLORIDE 106  --   --  106 105   CO2 23  --   --  25 27   ANIONGAP 10  --   --  8 6   *  --   --  114* 121*   BUN 29  --   --  33* 33*   CR 2.22*  --   --  2.40* 2.63*   GFRESTIMATED 38*  --   --  34* 31*   GFRESTBLACK 45*  --   --  42* 37*   BUBBA 9.1  --   --  8.5 8.9   MAG  --   --   --   --  2.3   PHOS  --   --   --  3.8  --    PROTTOTAL  --   --   --   --  7.6    ALBUMIN  --   --   --  3.0* 3.5   BILITOTAL  --   --   --   --  0.6   ALKPHOS  --   --   --   --  97   AST  --   --   --   --  25   ALT  --   --   --   --  40      Imaging:   No results found for this or any previous visit (from the past 24 hour(s)).    Sharon Durand [phone]

## 2017-07-17 LAB
ANION GAP SERPL CALCULATED.3IONS-SCNC: 9 MMOL/L (ref 3–14)
BUN SERPL-MCNC: 30 MG/DL (ref 7–30)
C3 SERPL-MCNC: 152 MG/DL (ref 76–169)
C4 SERPL-MCNC: 30 MG/DL (ref 15–50)
CALCIUM SERPL-MCNC: 9 MG/DL (ref 8.5–10.1)
CHLORIDE SERPL-SCNC: 106 MMOL/L (ref 94–109)
CO2 SERPL-SCNC: 22 MMOL/L (ref 20–32)
COLLECT DURATION TIME UR: 24 H
CREAT 24H UR-MRATE: 1.69 G/(24.H) (ref 1–2)
CREAT SERPL-MCNC: 2.16 MG/DL (ref 0.66–1.25)
CREAT UR-MCNC: 109 MG/DL
GBM IGG SER IA-ACNC: NORMAL AI (ref 0–0.9)
GFR SERPL CREATININE-BSD FRML MDRD: 39 ML/MIN/1.7M2
GLUCOSE SERPL-MCNC: 96 MG/DL (ref 70–99)
HBV SURFACE AB SERPL IA-ACNC: 113.45 M[IU]/ML
HBV SURFACE AG SERPL QL IA: NONREACTIVE
HCV AB SERPL QL IA: NORMAL
HIV 1+2 AB+HIV1 P24 AG SERPL QL IA: NORMAL
INTERPRETATION ECG - MUSE: NORMAL
MYELOPEROXIDASE AB SER-ACNC: NORMAL AI (ref 0–0.9)
PLATELET # BLD AUTO: 222 10E9/L (ref 150–450)
POTASSIUM SERPL-SCNC: 3.7 MMOL/L (ref 3.4–5.3)
PROT 24H UR-MRATE: 3.28 G/(24.H) (ref 0.04–0.23)
PROT UR-MCNC: 2.12 G/L
PROT/CREAT 24H UR: 1.94 G/G CR (ref 0–0.2)
PROTEINASE3 IGG SER-ACNC: NORMAL AI (ref 0–0.9)
SODIUM 24H UR-SRATE: 87 MMOL/24 H (ref 40–220)
SODIUM SERPL-SCNC: 137 MMOL/L (ref 133–144)
SODIUM UR-SCNC: 56 MMOL/L
SPECIMEN VOL UR: 1550 ML

## 2017-07-17 PROCEDURE — 81050 URINALYSIS VOLUME MEASURE: CPT | Performed by: INTERNAL MEDICINE

## 2017-07-17 PROCEDURE — 12000000 ZZH R&B MED SURG/OB

## 2017-07-17 PROCEDURE — 82384 ASSAY THREE CATECHOLAMINES: CPT | Performed by: INTERNAL MEDICINE

## 2017-07-17 PROCEDURE — 99232 SBSQ HOSP IP/OBS MODERATE 35: CPT | Performed by: INTERNAL MEDICINE

## 2017-07-17 PROCEDURE — 36415 COLL VENOUS BLD VENIPUNCTURE: CPT | Performed by: INTERNAL MEDICINE

## 2017-07-17 PROCEDURE — 85049 AUTOMATED PLATELET COUNT: CPT | Performed by: INTERNAL MEDICINE

## 2017-07-17 PROCEDURE — 84156 ASSAY OF PROTEIN URINE: CPT | Performed by: INTERNAL MEDICINE

## 2017-07-17 PROCEDURE — 84300 ASSAY OF URINE SODIUM: CPT | Performed by: INTERNAL MEDICINE

## 2017-07-17 PROCEDURE — 25000132 ZZH RX MED GY IP 250 OP 250 PS 637: Performed by: INTERNAL MEDICINE

## 2017-07-17 PROCEDURE — 80048 BASIC METABOLIC PNL TOTAL CA: CPT | Performed by: INTERNAL MEDICINE

## 2017-07-17 RX ORDER — CLONIDINE HYDROCHLORIDE 0.1 MG/1
0.1 TABLET ORAL 2 TIMES DAILY
Status: DISCONTINUED | OUTPATIENT
Start: 2017-07-17 | End: 2017-07-17

## 2017-07-17 RX ORDER — CLONIDINE HYDROCHLORIDE 0.1 MG/1
0.1 TABLET ORAL 2 TIMES DAILY
Status: DISCONTINUED | OUTPATIENT
Start: 2017-07-17 | End: 2017-07-20 | Stop reason: HOSPADM

## 2017-07-17 RX ADMIN — CLONIDINE HYDROCHLORIDE 0.1 MG: 0.1 TABLET ORAL at 09:51

## 2017-07-17 RX ADMIN — AMLODIPINE BESYLATE 10 MG: 10 TABLET ORAL at 09:51

## 2017-07-17 RX ADMIN — METOPROLOL TARTRATE 100 MG: 100 TABLET, FILM COATED ORAL at 09:51

## 2017-07-17 RX ADMIN — CLONIDINE HYDROCHLORIDE 0.1 MG: 0.1 TABLET ORAL at 19:57

## 2017-07-17 RX ADMIN — METOPROLOL TARTRATE 100 MG: 100 TABLET, FILM COATED ORAL at 19:57

## 2017-07-17 RX ADMIN — SENNOSIDES AND DOCUSATE SODIUM 1 TABLET: 8.6; 5 TABLET ORAL at 19:57

## 2017-07-17 RX ADMIN — SENNOSIDES AND DOCUSATE SODIUM 1 TABLET: 8.6; 5 TABLET ORAL at 09:51

## 2017-07-17 NOTE — PLAN OF CARE
Problem: Goal Outcome Summary  Goal: Goal Outcome Summary  Outcome: No Change  Orientation: oriented and alert   VS: afebrile, hypertensive continues added clonidine 0.1mg BID  LS: clear  GI: intact   : 24 hr urine complete and pending   Activity: independent   Pain: denies   Plan: continue to regulate bp, pending kidney biopsy when sbp <140  Lines: piv

## 2017-07-17 NOTE — PROGRESS NOTES
Renal Medicine Progress Note            Assessment/Plan:     21 y.o man without past medical history, admitted for hypertensive urgency and renal insufficiency.       1. Pt with unknown kidney function at baseline.      2. Renal insufficiency. Chronic vs. Acute. UA with granular case, suggesting ATN. UA also has moderate blood but normal RBC. Heme pigmentation? CK level is not high. Hgb and plt are okay. He has 4 g/g of albuminuria and only 2.3 g/g proteinuria, so there is a big discrepancy. It looks like he has nephritic range proteinuria, which may be due to hypertensive nephrosclerosis. His hypertension could also be due to a primary renal disease. He does not have any other symptoms to suggest a vasculitis related renal disease, and vasculitis markers are pending. His clinical presentation does not appear to be ЮЛИЯ. FSGS (secondary vs primary) is a possibility. I discussed the possible etiologies and work-up for his hypertension and renal disease. \His kidney function is stable.         3. Hypertensive urgency   -metoprolol 100 mg bid   -Norvasc 10 mg daily   -clonidine 0.1 mg bid              -TTE mod cLVH and EF 40-45% in this young man suggesting longstanding uncontrolled hypertension                         -r/o secondary causes of hypertension      4. Hypokalemia? High mineralocorticoid state? PAC is high but PRA is also high, so PAC/PRA ratio is normal.      5. Nephritic range proteinuria. Discrepancy between albumin/creat and protein/creat ratio.                          -24 hrs urine for total protein in process     6. Obesity. Weight lost is highly recommended.     Plan.  1. Awaiting rest of the labs result. I suspect his renal disease is due to hypertensive nephrosclerosis. He could also have FSGS. Possible renal biopsy in 1-2 days.         Interval History:     Patient does not have any complaints. Kidney function is stable. 24-hrs urine collectin for protein and catecholamines completed. Clonidine  0.1 mg bid added.           Medications and Allergies:       cloNIDine  0.1 mg Oral BID     metoprolol  100 mg Oral BID     amLODIPine  10 mg Oral Daily     sodium chloride (PF)  3 mL Intracatheter Q8H     senna-docusate  1-2 tablet Oral BID        Allergies   Allergen Reactions     No Known Allergies             Physical Exam:   Vitals were reviewed  Heart Rate: 65, Blood pressure 130/75, pulse 91, temperature 97.7  F (36.5  C), temperature source Oral, resp. rate 18, height 1.829 m (6'), weight 126.6 kg (279 lb 3.2 oz), SpO2 98 %.    Wt Readings from Last 3 Encounters:   07/17/17 126.6 kg (279 lb 3.2 oz)   07/14/17 127 kg (280 lb)   08/13/08 88.5 kg (195 lb) (>99 %)*     * Growth percentiles are based on [facility] 2-20 Years data.       Intake/Output Summary (Last 24 hours) at 07/17/17 1229  Last data filed at 07/17/17 0627   Gross per 24 hour   Intake              800 ml   Output                0 ml   Net              800 ml     GENERAL APPEARANCE: pleasant, NAD, alert  HEENT:  Eyes/ears/nose/neck grossly normal  RESP: lungs cta b c good efforts, no crackles, rhonchi or wheezes  CV: RRR, nl S1/S2, no m/r/g   ABDOMEN: obese, soft, NT  EXTREMITIES/SKIN: no rashes/lesions on observed skin; no edema  Neuro: a/o x3         Data:     CBC RESULTS:     Recent Labs  Lab 07/17/17  0618 07/16/17  0607 07/15/17  0635 07/14/17  0949   WBC  --  10.4 12.7* 11.2*   RBC  --  5.33 5.09 5.37   HGB  --  13.5 12.8* 13.8   HCT  --  42.1 39.8* 41.4    187 172 172       Basic Metabolic Panel:    Recent Labs  Lab 07/17/17  0618 07/16/17  0607 07/15/17  2115 07/15/17  1535 07/15/17  0635 07/14/17  1140 07/14/17  0949    139  --   --  139 138 141   POTASSIUM 3.7 3.5 3.5 3.3* 3.1* 3.0* 3.0*   CHLORIDE 106 106  --   --  106 105 104   CO2 22 23  --   --  25 27 26   BUN 30 29  --   --  33* 33* 33*   CR 2.16* 2.22*  --   --  2.40* 2.63* 2.55*   GLC 96 104*  --   --  114* 121* 106*   BUBBA 9.0 9.1  --   --  8.5 8.9 8.8       [facility]  results found in last 7 days.   Attestation:   I have reviewed today's relevant vital signs, notes, medications, labs and imaging.    Jer Hooper MD  [facility]phrology  Office phone :[phone]  Pager: [phone]

## 2017-07-17 NOTE — PROGRESS NOTES
[facility]  Hospitalist Progress Note  Name: Taylor Valiente    MRN: [id number]  YOB: 1995    Age: 21 year old  Date of admission: 7/14/2017  Primary care provider: Priyank Lawrence      Reason for Stay (Diagnosis): Hypertensive urgency/proteinuria         Assessment and Plan:      Summary of Stay:  Mr Valiente is an otherwise healthy 22 yo who presented to [facility] and was found to be very hypertensive with e/o proteinuria and renal failure with creat 2.6 of unclear duration.  He is admitted 7/14/17 for further evaluation and treatment.       1.   Hypertensive urgency: Improved. given young age almost certainly related to a secondary cause. Nephrology input appreciated. Due to uncontrolled htn, 0.1 mg p.o. b.i.d. and will continue norvasc 10mg and scheduled metoprolol with prn iv hydralazine.     2.  Proteinuria:  Suspect nephritic range proteinuria although no significant RBC's. Broad differential at this time. ?htnsive nephrosclerosis. FSGS also possible. Less likely vasculitides although checking labs. Suspect may need bx to confirm dx but will need better bp control. Will defer to nephrology on need and timing for bx     3.  Renal insufficiency:  Suspect subacute vs chronic. Creatinine continues to slowly improve. Will continue to monitor and w/up as above.     4.  Hypokalemia:  Cont K+ replacement protocol      DVT Prophylaxis: Ambulate every shift  Code Status: Full Code  Discharge Dispo: home  Estimated Disch Date / # of Days until Disch: In 1-2 days after better bp control and further w/up for Renal insufficiency/proteinuria.  Possibly discharge home with outpatient follow-up after biopsy if needed and if blood pressure is better controlled        Interval History (Subjective):      No spec c/o. No uremic sx and denies headache         Physical Exam:      Vital signs:  Temp: 97.7  F (36.5  C) Temp src: Oral BP: 130/75 Pulse: 91 Heart Rate: 65 Resp: 18  SpO2: 98 % O2 Device: None (Room air)   Height: 182.9 cm (6') Weight: 126.6 kg (279 lb 3.2 oz)  Estimated body mass index is 37.87 kg/(m^2) as calculated from the following:    Height as of this encounter: 1.829 m (6').    Weight as of this encounter: 126.6 kg (279 lb 3.2 oz).      I/O last 3 completed shifts:  In: 800 [P.O.:800]  Out: 550 [Urine:550]  Vitals:    07/14/17 1134 07/14/17 1533 07/15/17 0421 07/16/17 0418   Weight: 117.9 kg (260 lb) 127.3 kg (280 lb 9.6 oz) 129 kg (284 lb 6.4 oz) 127.4 kg (280 lb 12.8 oz)    07/17/17 0627   Weight: 126.6 kg (279 lb 3.2 oz)       Constitutional: Awake, alert, cooperative, no apparent distress   Respiratory: Nl work of breathing. Clear to auscultation bilaterally, no crackles or wheezing   Cardiovascular: Regular rate and rhythm, normal S1 and S2, and no murmur noted   Abdomen: Normal bowel sounds, soft, non-distended, non-tender   Skin: No rashes, no cyanosis, dry to touch   Neuro: CN 2-12 intact, no localizing weakness   Extremities: No edema, normal range of motion   HEENT Normocephalic, atraumatic, normal nasal turbinates; oropharynx clear   Neck Supple; nl inspection; trachea midline; no thryomegaly   Psychiatric: A+O x3. Normal affect          Medications:      All current medications were reviewed with changes reflected in problem list.         Data:      All new lab and imaging data was reviewed.   Labs:    Recent Labs  Lab 07/17/17  0618 07/16/17  0607 07/15/17  0635 07/14/17  0949   WBC  --  10.4 12.7* 11.2*   HGB  --  13.5 12.8* 13.8   HCT  --  42.1 39.8* 41.4   MCV  --  79 78 77*    187 172 172       Recent Labs  Lab 07/17/17  0618 07/16/17  0607 07/15/17  2115  07/15/17  0635 07/14/17  1140    139  --   --  139 138   POTASSIUM 3.7 3.5 3.5  < > 3.1* 3.0*   CHLORIDE 106 106  --   --  106 105   CO2 22 23  --   --  25 27   ANIONGAP 9 10  --   --  8 6   GLC 96 104*  --   --  114* 121*   BUN 30 29  --   --  33* 33*   CR 2.16* 2.22*  --   --  2.40*  2.63*   GFRESTIMATED 39* 38*  --   --  34* 31*   GFRESTBLACK 47* 45*  --   --  42* 37*   BUBBA 9.0 9.1  --   --  8.5 8.9   MAG  --   --   --   --   --  2.3   PHOS  --   --   --   --  3.8  --    PROTTOTAL  --   --   --   --   --  7.6   ALBUMIN  --   --   --   --  3.0* 3.5   BILITOTAL  --   --   --   --   --  0.6   ALKPHOS  --   --   --   --   --  97   AST  --   --   --   --   --  25   ALT  --   --   --   --   --  40   < > = values in this interval not displayed.   Imaging:   No results found for this or any previous visit (from the past 24 hour(s)).    Sharon Durand -[phone]

## 2017-07-17 NOTE — PLAN OF CARE
Problem: Goal Outcome Summary  Goal: Goal Outcome Summary  Outcome: No Change  [id number]: Pt resting comfortably. AO. VSS- BP hypertensive.  10mg hydralazine given for DBP. Pt denies pain or nausea. Transferring ind. On a 24hr urine collection that ends 7/17 at 0930. Will continue to monitor.

## 2017-07-18 LAB
ABO + RH BLD: NORMAL
ABO + RH BLD: NORMAL
ANION GAP SERPL CALCULATED.3IONS-SCNC: 8 MMOL/L (ref 3–14)
APTT PPP: 34 SEC (ref 22–37)
BLD GP AB SCN SERPL QL: NORMAL
BLOOD BANK CMNT PATIENT-IMP: NORMAL
BUN SERPL-MCNC: 32 MG/DL (ref 7–30)
CALCIUM SERPL-MCNC: 8.9 MG/DL (ref 8.5–10.1)
CHLORIDE SERPL-SCNC: 106 MMOL/L (ref 94–109)
CLOSURE TME COLL+EPINEP BLD: 121 SEC
CO2 SERPL-SCNC: 25 MMOL/L (ref 20–32)
CREAT SERPL-MCNC: 2.46 MG/DL (ref 0.66–1.25)
GFR SERPL CREATININE-BSD FRML MDRD: 33 ML/MIN/1.7M2
GLUCOSE SERPL-MCNC: 94 MG/DL (ref 70–99)
INR PPP: 1.04 (ref 0.86–1.14)
PLATELET FUNCTION ASA: NORMAL ARU
POTASSIUM SERPL-SCNC: 3.6 MMOL/L (ref 3.4–5.3)
SODIUM SERPL-SCNC: 139 MMOL/L (ref 133–144)
SPECIMEN EXP DATE BLD: NORMAL

## 2017-07-18 PROCEDURE — 86900 BLOOD TYPING SEROLOGIC ABO: CPT | Performed by: INTERNAL MEDICINE

## 2017-07-18 PROCEDURE — 36415 COLL VENOUS BLD VENIPUNCTURE: CPT | Performed by: INTERNAL MEDICINE

## 2017-07-18 PROCEDURE — 85610 PROTHROMBIN TIME: CPT | Performed by: INTERNAL MEDICINE

## 2017-07-18 PROCEDURE — 80048 BASIC METABOLIC PNL TOTAL CA: CPT | Performed by: INTERNAL MEDICINE

## 2017-07-18 PROCEDURE — 40000556 ZZH STATISTIC PERIPHERAL IV START W US GUIDANCE

## 2017-07-18 PROCEDURE — 12000000 ZZH R&B MED SURG/OB

## 2017-07-18 PROCEDURE — 86850 RBC ANTIBODY SCREEN: CPT | Performed by: INTERNAL MEDICINE

## 2017-07-18 PROCEDURE — 25000132 ZZH RX MED GY IP 250 OP 250 PS 637: Performed by: INTERNAL MEDICINE

## 2017-07-18 PROCEDURE — 86039 ANTINUCLEAR ANTIBODIES (ANA): CPT | Performed by: INTERNAL MEDICINE

## 2017-07-18 PROCEDURE — 85730 THROMBOPLASTIN TIME PARTIAL: CPT | Performed by: INTERNAL MEDICINE

## 2017-07-18 PROCEDURE — 99232 SBSQ HOSP IP/OBS MODERATE 35: CPT | Performed by: INTERNAL MEDICINE

## 2017-07-18 PROCEDURE — 86901 BLOOD TYPING SEROLOGIC RH(D): CPT | Performed by: INTERNAL MEDICINE

## 2017-07-18 PROCEDURE — 85576 BLOOD PLATELET AGGREGATION: CPT | Performed by: INTERNAL MEDICINE

## 2017-07-18 PROCEDURE — 86225 DNA ANTIBODY NATIVE: CPT | Performed by: INTERNAL MEDICINE

## 2017-07-18 RX ADMIN — CLONIDINE HYDROCHLORIDE 0.1 MG: 0.1 TABLET ORAL at 21:19

## 2017-07-18 RX ADMIN — CLONIDINE HYDROCHLORIDE 0.1 MG: 0.1 TABLET ORAL at 08:17

## 2017-07-18 RX ADMIN — METOPROLOL TARTRATE 100 MG: 100 TABLET, FILM COATED ORAL at 21:19

## 2017-07-18 RX ADMIN — METOPROLOL TARTRATE 100 MG: 100 TABLET, FILM COATED ORAL at 08:17

## 2017-07-18 RX ADMIN — AMLODIPINE BESYLATE 10 MG: 10 TABLET ORAL at 08:17

## 2017-07-18 NOTE — PLAN OF CARE
Problem: Renal Failure/Kidney Injury, Acute (Adult)  Goal: Signs and Symptoms of Listed Potential Problems Will be Absent or Manageable (Renal Failure/Kidney Injury, Acute)  Signs and symptoms of listed potential problems will be absent or manageable by discharge/transition of care (reference Renal Failure/Kidney Injury, Acute (Adult) CPG).   Outcome: No Change  VSS- /80, 149/87, afebrile, denied pain, non tele. See documentation flowsheets Adult PCS for complete physical assessment. Up independently in room. Nephrology following. Plan is to continue to monitor BP, possible renal biopsy.

## 2017-07-18 NOTE — PLAN OF CARE
Problem: Goal Outcome Summary  Goal: Goal Outcome Summary  Outcome: No Change  A&Ox4, 's/90's.  Denies pain/headache/nausea. Tolerating Renal diet, good intake. Voiding spontaneously.  Up independently in room.  Cr. 2.46.  Plan for kidney biopsy tomorrow, given handout re kidney biopsy.  NPO after midnight. Continue with plan of care.

## 2017-07-18 NOTE — PROGRESS NOTES
[facility]  Hospitalist Progress Note  Name: Taylor Valiente    MRN: [id number]  YOB: 1995    Age: 21 year old  Date of admission: 7/14/2017  Primary care provider: Priyank Lawrence      Reason for Stay (Diagnosis): Hypertensive urgency/proteinuria         Assessment and Plan:      Summary of Stay:  Mr Valiente is an otherwise healthy 22 yo who presented to [facility] and was found to be very hypertensive with e/o proteinuria and renal failure with creat 2.6 of unclear duration.  He is admitted 7/14/17 for further evaluation and treatment.       1.   Hypertensive urgency: Improved. given young age almost certainly related to a secondary cause. Nephrology input appreciated.  Improved control.  Continue 0.1 mg p.o. b.i.d. and will continue norvasc 10mg and scheduled metoprolol with prn iv hydralazine.     2.  Proteinuria: 24-hour urine collection actually shows what appears to be nephrotic range proteinuria with total protein of greater than three.  Suspecting hypertensive nephrosclerosis. FSGS also possible and other protein losing nephropathy.  Other special diagnostic studies including complement levels, anti-glomerular basement antibody, myeloperoxidase antibody, viral hepatitides are negative.  Plan for biopsy tomorrow.     3.  Renal insufficiency:  Suspect subacute vs chronic. Creatinine slightly worse.  Will continue to monitor and plan for biopsy tomorrow.     4.  Hypokalemia:  Cont K+ replacement protocol      DVT Prophylaxis: Ambulate every shift  Code Status: Full Code  Discharge Dispo: home  Estimated Disch Date / # of Days until Disch: In 2 days after kidney biopsy and if blood pressure remains better controlled. Possibly discharge home with outpatient follow-up after biopsy if needed and if blood pressure is better controlled        Interval History (Subjective):      No spec c/o. No uremic sx and denies headache         Physical Exam:      Vital  signs:  Temp: 96.6  F (35.9  C) Temp src: Oral BP: (!) 141/95   Heart Rate: 63 Resp: 18 SpO2: 100 % O2 Device: None (Room air)   Height: 182.9 cm (6') Weight: 127 kg (279 lb 15.8 oz)  Estimated body mass index is 37.97 kg/(m^2) as calculated from the following:    Height as of this encounter: 1.829 m (6').    Weight as of this encounter: 127 kg (279 lb 15.8 oz).      I/O last 3 completed shifts:  In: 2013 [P.O.:2010; I.V.:3]  Out: 825 [Urine:825]  Vitals:    07/14/17 1533 07/15/17 0421 07/16/17 0418 07/17/17 0627   Weight: 127.3 kg (280 lb 9.6 oz) 129 kg (284 lb 6.4 oz) 127.4 kg (280 lb 12.8 oz) 126.6 kg (279 lb 3.2 oz)    07/18/17 0519   Weight: 127 kg (279 lb 15.8 oz)       Constitutional: Awake, alert, cooperative, no apparent distress   Respiratory: Nl work of breathing. Clear to auscultation bilaterally, no crackles or wheezing   Cardiovascular: Regular rate and rhythm, normal S1 and S2, and no murmur noted   Abdomen: Normal bowel sounds, soft, non-distended, non-tender   Skin: No rashes, no cyanosis, dry to touch   Neuro: CN 2-12 intact, no localizing weakness   Extremities: No edema, normal range of motion   HEENT Normocephalic, atraumatic, normal nasal turbinates; oropharynx clear   Neck Supple; nl inspection; trachea midline; no thryomegaly   Psychiatric: A+O x3. Normal affect          Medications:      All current medications were reviewed with changes reflected in problem list.         Data:      All new lab and imaging data was reviewed.   Labs:    Recent Labs  Lab 07/17/17  0618 07/16/17  0607 07/15/17  0635 07/14/17  0949   WBC  --  10.4 12.7* 11.2*   HGB  --  13.5 12.8* 13.8   HCT  --  42.1 39.8* 41.4   MCV  --  79 78 77*    187 172 172       Recent Labs  Lab 07/18/17  0655 07/17/17  0618 07/16/17  0607  07/15/17  0635 07/14/17  1140    137 139  --  139 138   POTASSIUM 3.6 3.7 3.5  < > 3.1* 3.0*   CHLORIDE 106 106 106  --  106 105   CO2 25 22 23  --  25 27   ANIONGAP 8 9 10  --  8 6   GLC  94 96 104*  --  114* 121*   BUN 32* 30 29  --  33* 33*   CR 2.46* 2.16* 2.22*  --  2.40* 2.63*   GFRESTIMATED 33* 39* 38*  --  34* 31*   GFRESTBLACK 40* 47* 45*  --  42* 37*   BUBBA 8.9 9.0 9.1  --  8.5 8.9   MAG  --   --   --   --   --  2.3   PHOS  --   --   --   --  3.8  --    PROTTOTAL  --   --   --   --   --  7.6   ALBUMIN  --   --   --   --  3.0* 3.5   BILITOTAL  --   --   --   --   --  0.6   ALKPHOS  --   --   --   --   --  97   AST  --   --   --   --   --  25   ALT  --   --   --   --   --  40   < > = values in this interval not displayed.   Imaging:   No results found for this or any previous visit (from the past 24 hour(s)).    Sharon Durand [phone]

## 2017-07-18 NOTE — PLAN OF CARE
Problem: Renal Failure/Kidney Injury, Acute (Adult)  Goal: Signs and Symptoms of Listed Potential Problems Will be Absent or Manageable (Renal Failure/Kidney Injury, Acute)  Signs and symptoms of listed potential problems will be absent or manageable by discharge/transition of care (reference Renal Failure/Kidney Injury, Acute (Adult) CPG).   Outcome: No Change  BP elevated on shift start, improved on recheck throughout night without intervention. Denies pain, CP, HA or SOB. Voiding into urinal with good UO. Tolerating PO overnight. PIV SL, flushes well. Ambulates ad ye in room. Alert and oriented, able to make needs known. Continue to monitor.

## 2017-07-18 NOTE — PROGRESS NOTES
Renal Medicine Progress Note            Assessment/Plan:     21 y.o man without past medical history, admitted for hypertensive urgency and renal insufficiency.       1. Pt with unknown kidney function at baseline.      2. Renal insufficiency. Chronic vs. Acute. UA with granular case, suggesting ATN. UA also has moderate blood but normal RBC. CK level is not high. Hgb and plt are okay. He has ~3.2 grams on a 24-hrs urine collection. Given TTE finding of moderate LVH, his kidney disease and proteinuria may be due to hypertensive nephrosclerosis. FSGS is a good possibility as well.  ANCA, antiGBM, complements, hepatitis, HIV are unremarkable. I discussed the possible etiologies, work-up and treatment options with him. I discussed the renal the risks and benefits of the renal biopsy with him. His parents are coming in later this afternoon. I asked him to have them call me to discuss his condition. He agreed to proceed with a renal biopsy, which I will schedule for tomorrow.      3. Hypertensive urgency. BP is much better.   -Norvasc 10 mg daily   -clonidine 0.1 mg bid   -metoprolol 100 mg bid    -cortisol, TSH and PAC/PRA levels are within carlos alberto limits   -24-hrs urine catecholamines are pending    4. Obesity. Weight lost is highly recommended    Plan.  1. NPO at midnight for renal biopsy tomorrow  2. No NSAIDs  3. Add ds-DNA, BOBO and RF for tomorrow        Interval History:     Pt does not have any new complaints. BP is much better. No  complaints. He ate breakfast this morning.          Medications and Allergies:       cloNIDine  0.1 mg Oral BID     metoprolol  100 mg Oral BID     amLODIPine  10 mg Oral Daily     sodium chloride (PF)  3 mL Intracatheter Q8H     senna-docusate  1-2 tablet Oral BID        Allergies   Allergen Reactions     No Known Allergies             Physical Exam:   Vitals were reviewed  Heart Rate: 66, Blood pressure (!) 142/96, pulse 91, temperature 96.6  F (35.9  C), temperature source Oral,  resp. rate 18, height 1.829 m (6'), weight 127 kg (279 lb 15.8 oz), SpO2 100 %.    Wt Readings from Last 3 Encounters:   07/18/17 127 kg (279 lb 15.8 oz)   07/14/17 127 kg (280 lb)   08/13/08 88.5 kg (195 lb) (>99 %)*     * Growth percentiles are based on [facility] 2-20 Years data.       Intake/Output Summary (Last 24 hours) at 07/18/17 1039  Last data filed at 07/18/17 1000   Gross per 24 hour   Intake             1773 ml   Output              400 ml   Net             1373 ml     GENERAL APPEARANCE: pleasant, NAD, alert  HEENT:  Eyes/ears/nose/neck grossly normal  RESP: lungs cta b c good efforts, no crackles, rhonchi or wheezes  CV: RRR, nl S1/S2, no m/r/g   ABDOMEN: obese, soft, NT  EXTREMITIES/SKIN: no rashes/lesions on observed skin; no edema  Neuro: a/o x3         Data:     CBC RESULTS:     Recent Labs  Lab 07/17/17  0618 07/16/17  0607 07/15/17  0635 07/14/17  0949   WBC  --  10.4 12.7* 11.2*   RBC  --  5.33 5.09 5.37   HGB  --  13.5 12.8* 13.8   HCT  --  42.1 39.8* 41.4    187 172 172       Basic Metabolic Panel:    Recent Labs  Lab 07/18/17  0655 07/17/17  0618 07/16/17  0607 07/15/17  2115 07/15/17  1535 07/15/17  0635 07/14/17  1140 07/14/17  0949    137 139  --   --  139 138 141   POTASSIUM 3.6 3.7 3.5 3.5 3.3* 3.1* 3.0* 3.0*   CHLORIDE 106 106 106  --   --  106 105 104   CO2 25 22 23  --   --  25 27 26   BUN 32* 30 29  --   --  33* 33* 33*   CR 2.46* 2.16* 2.22*  --   --  2.40* 2.63* 2.55*   GLC 94 96 104*  --   --  114* 121* 106*   BUBBA 8.9 9.0 9.1  --   --  8.5 8.9 8.8       INR  Recent Labs  Lab 07/18/17  0910   INR 1.04      Attestation:   I have reviewed today's relevant vital signs, notes, medications, labs and imaging.    Jer Hooper MD  [facility]  Office phone :[phone]  Pager: [phone]

## 2017-07-19 ENCOUNTER — APPOINTMENT (OUTPATIENT)
Dept: CT IMAGING | Facility: CLINIC | Age: 22
DRG: 305 | End: 2017-07-19
Attending: INTERNAL MEDICINE
Payer: COMMERCIAL

## 2017-07-19 LAB
ANION GAP SERPL CALCULATED.3IONS-SCNC: 5 MMOL/L (ref 3–14)
BUN SERPL-MCNC: 31 MG/DL (ref 7–30)
CALCIUM SERPL-MCNC: 8.9 MG/DL (ref 8.5–10.1)
CHLORIDE SERPL-SCNC: 108 MMOL/L (ref 94–109)
CO2 SERPL-SCNC: 26 MMOL/L (ref 20–32)
CREAT SERPL-MCNC: 2.29 MG/DL (ref 0.66–1.25)
GFR SERPL CREATININE-BSD FRML MDRD: 36 ML/MIN/1.7M2
GLUCOSE SERPL-MCNC: 92 MG/DL (ref 70–99)
HGB BLD-MCNC: 12.9 G/DL (ref 13.3–17.7)
PLATELET FUNCTION ASA: 638 ARU
POTASSIUM SERPL-SCNC: 3.7 MMOL/L (ref 3.4–5.3)
RHEUMATOID FACT SER NEPH-ACNC: <20 IU/ML (ref 0–20)
SODIUM SERPL-SCNC: 139 MMOL/L (ref 133–144)

## 2017-07-19 PROCEDURE — 99232 SBSQ HOSP IP/OBS MODERATE 35: CPT | Performed by: INTERNAL MEDICINE

## 2017-07-19 PROCEDURE — 25000128 H RX IP 250 OP 636

## 2017-07-19 PROCEDURE — 25000132 ZZH RX MED GY IP 250 OP 250 PS 637: Performed by: INTERNAL MEDICINE

## 2017-07-19 PROCEDURE — 0TB03ZX EXCISION OF RIGHT KIDNEY, PERCUTANEOUS APPROACH, DIAGNOSTIC: ICD-10-PCS | Performed by: RADIOLOGY

## 2017-07-19 PROCEDURE — 88346 IMFLUOR 1ST 1ANTB STAIN PX: CPT | Performed by: INTERNAL MEDICINE

## 2017-07-19 PROCEDURE — 85018 HEMOGLOBIN: CPT | Performed by: INTERNAL MEDICINE

## 2017-07-19 PROCEDURE — 00000159 ZZHCL STATISTIC H-SEND OUTS PREP: Performed by: INTERNAL MEDICINE

## 2017-07-19 PROCEDURE — 86039 ANTINUCLEAR ANTIBODIES (ANA): CPT | Performed by: INTERNAL MEDICINE

## 2017-07-19 PROCEDURE — 88305 TISSUE EXAM BY PATHOLOGIST: CPT | Performed by: INTERNAL MEDICINE

## 2017-07-19 PROCEDURE — 88313 SPECIAL STAINS GROUP 2: CPT | Performed by: INTERNAL MEDICINE

## 2017-07-19 PROCEDURE — 86431 RHEUMATOID FACTOR QUANT: CPT | Performed by: INTERNAL MEDICINE

## 2017-07-19 PROCEDURE — 80048 BASIC METABOLIC PNL TOTAL CA: CPT | Performed by: INTERNAL MEDICINE

## 2017-07-19 PROCEDURE — 12000000 ZZH R&B MED SURG/OB

## 2017-07-19 PROCEDURE — 77012 CT SCAN FOR NEEDLE BIOPSY: CPT

## 2017-07-19 PROCEDURE — 36415 COLL VENOUS BLD VENIPUNCTURE: CPT | Performed by: INTERNAL MEDICINE

## 2017-07-19 PROCEDURE — 85576 BLOOD PLATELET AGGREGATION: CPT | Performed by: INTERNAL MEDICINE

## 2017-07-19 PROCEDURE — 88348 ELECTRON MICROSCOPY DX: CPT | Performed by: INTERNAL MEDICINE

## 2017-07-19 PROCEDURE — 25000125 ZZHC RX 250: Performed by: RADIOLOGY

## 2017-07-19 PROCEDURE — 88350 IMFLUOR EA ADDL 1ANTB STN PX: CPT | Performed by: INTERNAL MEDICINE

## 2017-07-19 PROCEDURE — 86225 DNA ANTIBODY NATIVE: CPT | Performed by: INTERNAL MEDICINE

## 2017-07-19 RX ORDER — LIDOCAINE HYDROCHLORIDE 10 MG/ML
1-30 INJECTION, SOLUTION EPIDURAL; INFILTRATION; INTRACAUDAL; PERINEURAL
Status: DISCONTINUED | OUTPATIENT
Start: 2017-07-19 | End: 2017-07-20 | Stop reason: HOSPADM

## 2017-07-19 RX ORDER — ACETAMINOPHEN 325 MG/1
650 TABLET ORAL EVERY 4 HOURS PRN
Status: DISCONTINUED | OUTPATIENT
Start: 2017-07-19 | End: 2017-07-20 | Stop reason: HOSPADM

## 2017-07-19 RX ORDER — FENTANYL CITRATE 50 UG/ML
25-50 INJECTION, SOLUTION INTRAMUSCULAR; INTRAVENOUS EVERY 5 MIN PRN
Status: DISCONTINUED | OUTPATIENT
Start: 2017-07-19 | End: 2017-07-20 | Stop reason: HOSPADM

## 2017-07-19 RX ORDER — FENTANYL CITRATE 50 UG/ML
INJECTION, SOLUTION INTRAMUSCULAR; INTRAVENOUS
Status: COMPLETED
Start: 2017-07-19 | End: 2017-07-19

## 2017-07-19 RX ORDER — FLUMAZENIL 0.1 MG/ML
0.2 INJECTION, SOLUTION INTRAVENOUS
Status: DISCONTINUED | OUTPATIENT
Start: 2017-07-19 | End: 2017-07-20 | Stop reason: HOSPADM

## 2017-07-19 RX ORDER — HYDROCODONE BITARTRATE AND ACETAMINOPHEN 5; 325 MG/1; MG/1
1-2 TABLET ORAL EVERY 4 HOURS PRN
Status: DISCONTINUED | OUTPATIENT
Start: 2017-07-19 | End: 2017-07-20 | Stop reason: HOSPADM

## 2017-07-19 RX ORDER — NALOXONE HYDROCHLORIDE 0.4 MG/ML
.1-.4 INJECTION, SOLUTION INTRAMUSCULAR; INTRAVENOUS; SUBCUTANEOUS
Status: DISCONTINUED | OUTPATIENT
Start: 2017-07-19 | End: 2017-07-20 | Stop reason: HOSPADM

## 2017-07-19 RX ADMIN — LIDOCAINE HYDROCHLORIDE 10 ML: 10 INJECTION, SOLUTION EPIDURAL; INFILTRATION; INTRACAUDAL; PERINEURAL at 13:07

## 2017-07-19 RX ADMIN — MIDAZOLAM 2 MG: 1 INJECTION INTRAMUSCULAR; INTRAVENOUS at 13:05

## 2017-07-19 RX ADMIN — CLONIDINE HYDROCHLORIDE 0.1 MG: 0.1 TABLET ORAL at 07:43

## 2017-07-19 RX ADMIN — AMLODIPINE BESYLATE 10 MG: 10 TABLET ORAL at 07:43

## 2017-07-19 RX ADMIN — METOPROLOL TARTRATE 100 MG: 100 TABLET, FILM COATED ORAL at 21:57

## 2017-07-19 RX ADMIN — CLONIDINE HYDROCHLORIDE 0.1 MG: 0.1 TABLET ORAL at 21:57

## 2017-07-19 RX ADMIN — METOPROLOL TARTRATE 100 MG: 100 TABLET, FILM COATED ORAL at 07:43

## 2017-07-19 RX ADMIN — FENTANYL CITRATE 100 MCG: 50 INJECTION INTRAMUSCULAR; INTRAVENOUS at 13:05

## 2017-07-19 NOTE — PLAN OF CARE
Problem: Individualization  Goal: Patient Preferences  Outcome: Improving  RN [id number]: Pt had right kidney biopsy done this afternoon; bandaide intact, no hematoma, scant drainage. [place] until 1900 per restrictions. Pt denies pain. BP; 132/88, HR 60's. LS clear. Due to void post-procedure. No complaints at this time.

## 2017-07-19 NOTE — PROGRESS NOTES
[facility]  Hospitalist Progress Note  Name: Taylor Valiente    MRN: [id number]  YOB: 1995    Age: 21 year old  Date of admission: 7/14/2017  Primary care provider: Priyank Lawrence      Reason for Stay (Diagnosis): Hypertensive urgency/proteinuria         Assessment and Plan:      Summary of Stay:  Mr Valiente is an otherwise healthy 22 yo who presented to [facility] and was found to be very hypertensive with e/o proteinuria and renal failure with creat 2.6 of unclear duration.  He is admitted 7/14/17 for further evaluation and treatment.       1.   Hypertensive urgency: Improved. Given young age almost certainly related to a secondary cause although none so far. Nephrology input appreciated.  Improved control.  Continue 0.1 mg p.o. b.i.d. and continue norvasc 10mg and scheduled metoprolol with prn iv hydralazine.     2.  Proteinuria: 24-hour urine collection actually shows what appears to be nephrotic range proteinuria with total protein of greater than three.  Suspecting hypertensive nephrosclerosis. FSGS also possible and other protein losing nephropathy.  Other special diagnostic studies including complement levels, anti-glomerular basement antibody, myeloperoxidase antibody, viral hepatitides are negative.  Plan for biopsy today.     3.  Renal insufficiency:  Suspect subacute vs chronic. Creatinine slightly improved compared to yesterday.  Will continue to monitor and plan for biopsy today.     4.  Hypokalemia:  Cont K+ replacement protocol      DVT Prophylaxis: Ambulate every shift  Code Status: Full Code  Discharge Dispo: home  Estimated Disch Date / # of Days until Disch: Possibly discharge home tomorrow with outpatient follow-up after biopsy if there are no postprocedural complications, blood pressure is better controlled, and if okay with nephrology.         Interval History (Subjective):      No spec c/o. No uremic sx and denies headache          Physical Exam:      Vital signs:  Temp: 97.6  F (36.4  C) Temp src: Oral BP: (!) 139/91 Pulse: 65 Heart Rate: 71 Resp: 20 SpO2: 100 % O2 Device: None (Room air)   Height: 182.9 cm (6') Weight: 127 kg (279 lb 15.8 oz)  Estimated body mass index is 37.97 kg/(m^2) as calculated from the following:    Height as of this encounter: 1.829 m (6').    Weight as of this encounter: 127 kg (279 lb 15.8 oz).      I/O last 3 completed shifts:  In: 360 [P.O.:360]  Out: 1825 [Urine:1825]  Vitals:    07/15/17 0421 07/16/17 0418 07/17/17 0627 07/18/17 0519   Weight: 129 kg (284 lb 6.4 oz) 127.4 kg (280 lb 12.8 oz) 126.6 kg (279 lb 3.2 oz) 127 kg (279 lb 15.8 oz)    07/19/17 0637   Weight: 127 kg (279 lb 15.8 oz)       Constitutional: Awake, alert, cooperative, no apparent distress   Respiratory: Nl work of breathing. Clear to auscultation bilaterally, no crackles or wheezing   Cardiovascular: Regular rate and rhythm, normal S1 and S2, and no murmur noted   Abdomen: Normal bowel sounds, soft, non-distended, non-tender   Skin: No rashes, no cyanosis, dry to touch   Neuro: CN 2-12 intact, no localizing weakness   Extremities: No edema, normal range of motion   HEENT Normocephalic, atraumatic, normal nasal turbinates; oropharynx clear   Neck Supple; nl inspection; trachea midline; no thryomegaly   Psychiatric: A+O x3. Normal affect          Medications:      All current medications were reviewed with changes reflected in problem list.         Data:      All new lab and imaging data was reviewed.   Labs:    Recent Labs  Lab 07/17/17  0618 07/16/17  0607 07/15/17  0635 07/14/17  0949   WBC  --  10.4 12.7* 11.2*   HGB  --  13.5 12.8* 13.8   HCT  --  42.1 39.8* 41.4   MCV  --  79 78 77*    187 172 172       Recent Labs  Lab 07/19/17  0650 07/18/17  0655 07/17/17  0618  07/15/17  0635 07/14/17  1140    139 137  < > 139 138   POTASSIUM 3.7 3.6 3.7  < > 3.1* 3.0*   CHLORIDE 108 106 106  < > 106 105   CO2 26 25 22  < > 25 27    ANIONGAP 5 8 9  < > 8 6   GLC 92 94 96  < > 114* 121*   BUN 31* 32* 30  < > 33* 33*   CR 2.29* 2.46* 2.16*  < > 2.40* 2.63*   GFRESTIMATED 36* 33* 39*  < > 34* 31*   GFRESTBLACK 44* 40* 47*  < > 42* 37*   BUBBA 8.9 8.9 9.0  < > 8.5 8.9   MAG  --   --   --   --   --  2.3   PHOS  --   --   --   --  3.8  --    PROTTOTAL  --   --   --   --   --  7.6   ALBUMIN  --   --   --   --  3.0* 3.5   BILITOTAL  --   --   --   --   --  0.6   ALKPHOS  --   --   --   --   --  97   AST  --   --   --   --   --  25   ALT  --   --   --   --   --  40   < > = values in this interval not displayed.   Imaging:   No results found for this or any previous visit (from the past 24 hour(s)).    Sharon Durand -[phone]

## 2017-07-19 NOTE — PROGRESS NOTES
Renal Medicine Progress Note            Assessment/Plan:     21 y.o man without past medical history, admitted for hypertensive urgency and renal insufficiency.       1. Pt with unknown kidney function at baseline.      2. Renal insufficiency. Chronic vs. Acute. UA with granular case, suggesting ATN. UA also has moderate blood but normal RBC. CK level is not high. Hgb and plt are okay. He has ~3.2 grams on a 24-hrs urine collection. Given TTE finding of moderate LVH, his kidney disease and proteinuria may be due to hypertensive nephrosclerosis. FSGS is a good possibility as well.  ANCA, antiGBM, complements, hepatitis, HIV are unremarkable. I discussed the possible etiologies, work-up and treatment options with him. I discussed the renal the risks and benefits of the renal biopsy with him. His parents are coming in later this afternoon. I asked him to have them call me to discuss his condition. He agreed to proceed with a renal biopsy, which I will schedule for tomorrow.      3. Hypertensive urgency. BP is much better.                         -Norvasc 10 mg daily                         -clonidine 0.1 mg bid                         -metoprolol 100 mg bid                         -cortisol, TSH and PAC/PRA levels are within carlos alberto limits                         -24-hrs urine catecholamines are pending     4. Obesity. Weight lost is highly recommended     Plan.  1. Check Hgb at 1700  2. He can be discharged tomorrow if he remained stable          Interval History:     Pt was nervous prior to renal biopsy. R renal biopsy went smoothly, and we had three good cords.           Medications and Allergies:       cloNIDine  0.1 mg Oral BID     metoprolol  100 mg Oral BID     amLODIPine  10 mg Oral Daily     sodium chloride (PF)  3 mL Intracatheter Q8H     senna-docusate  1-2 tablet Oral BID        Allergies   Allergen Reactions     No Known Allergies             Physical Exam:   Vitals were reviewed  Heart Rate: 62, Blood  pressure 143/78, pulse 65, temperature 97.6  F (36.4  C), temperature source Oral, resp. rate 24, height 1.829 m (6'), weight 127 kg (279 lb 15.8 oz), SpO2 100 %.    Wt Readings from Last 3 Encounters:   07/19/17 127 kg (279 lb 15.8 oz)   07/14/17 127 kg (280 lb)   08/13/08 88.5 kg (195 lb) (>99 %)*     * Growth percentiles are based on CDC 2-20 Years data.       Intake/Output Summary (Last 24 hours) at 07/19/17 1335  Last data filed at 07/18/17 2111   Gross per 24 hour   Intake              600 ml   Output             1325 ml   Net             -725 ml     GENERAL APPEARANCE: pleasant, NAD, alert  HEENT:  Eyes/ears/nose/neck grossly normal  RESP: lungs cta b c good efforts, no crackles, rhonchi or wheezes  CV: RRR, nl S1/S2, no m/r/g   ABDOMEN: obese, soft, NT  EXTREMITIES/SKIN: no rashes/lesions on observed skin; no edema  Neuro: a/o x3            Data:     CBC RESULTS:     Recent Labs  Lab 07/17/17  0618 07/16/17  0607 07/15/17  0635 07/14/17  0949   WBC  --  10.4 12.7* 11.2*   RBC  --  5.33 5.09 5.37   HGB  --  13.5 12.8* 13.8   HCT  --  42.1 39.8* 41.4    187 172 172       Basic Metabolic Panel:    Recent Labs  Lab 07/19/17  0650 07/18/17  0655 07/17/17  0618 07/16/17  0607 07/15/17  2115 07/15/17  1535 07/15/17  0635 07/14/17  1140    139 137 139  --   --  139 138   POTASSIUM 3.7 3.6 3.7 3.5 3.5 3.3* 3.1* 3.0*   CHLORIDE 108 106 106 106  --   --  106 105   CO2 26 25 22 23  --   --  25 27   BUN 31* 32* 30 29  --   --  33* 33*   CR 2.29* 2.46* 2.16* 2.22*  --   --  2.40* 2.63*   GLC 92 94 96 104*  --   --  114* 121*   BUBBA 8.9 8.9 9.0 9.1  --   --  8.5 8.9       INR  Recent Labs  Lab 07/18/17  0910   INR 1.04      Attestation:   I have reviewed today's relevant vital signs, notes, medications, labs and imaging.    Jer Hooper MD  [facility] - Nephrology  Office phone :[phone]  Pager: [phone]

## 2017-07-19 NOTE — PLAN OF CARE
End of Shift Summary.  For vital signs and complete assessments, please see documentation flowsheets.     Pertinent assessments: pt alert and oriented, up ad ye in the room. Pt voiding without difficulty. Pt to have a renal biopsy today  Major Shift Events: none, slept well between cares  Plan (Upcoming Events): continue current cares, renal bopsy  Discharge/Transfer Needs: none    Bedside Shift Report Completed :   Bedside Safety Check Completed:

## 2017-07-19 NOTE — PLAN OF CARE
Problem: Goal Outcome Summary  Goal: Goal Outcome Summary  Outcome: No Change  VSS, afebrile   LS: clear  GI: intact   : intact  Skin: puncture from right kidney biopsy, dressing with marked drainage   Activity: bedrest till 1900  Pain: denies   Plan: tentative discharge if remains stable, due to void   Lines: piv

## 2017-07-19 NOTE — PROGRESS NOTES
Right kidney biopsy by Dr. Lombardo well tolerted. Dr. Hooper in room handling specimens.  Fentanyl 100 mcg and Versed 2.0 mgm Iv well tolerated.  Vital Signs stable.  Pt transported back to IP room.  Per cart with family.

## 2017-07-19 NOTE — PROCEDURES
RADIOLOGY PROCEDURE NOTE  Patient name: Taylor Valiente  MRN: [id number]  : 1995    Pre-procedure diagnosis: proteinuria  Post-procedure diagnosis: Same    Procedure Date/Time: 2017  1:31 PM  Procedure: CT guided renal biopsy  Estimated blood loss: None  Specimen(s) collected with description: 18ga cores  The patient tolerated the procedure well with no immediate complications.  Significant findings:none    See imaging dictation for procedural details.    Provider name: Trino Lombardo  Assistant(s):None

## 2017-07-19 NOTE — CONSULTS
"CLINICAL NUTRITION SERVICES - REASSESSMENT NOTE      Malnutrition: Does not meet criteria at this time      MD consult - \"renal diet education and overall healthy diet in general\".     EVALUATION OF PROGRESS TOWARD GOALS   Diet:  NPO  Intake:  NPO for renal biopsy today though previously on renal diet.  100% meal consumption TID since admit.  Patient reports good appetite and no issues with ordering.     Dosing Weight 93 kg - adjusted weight      ASSESSED NUTRITION NEEDS PER APPROVED PRACTICE GUIDELINES:  Estimated Energy Needs: 7140-0177 kcals (20-25 Kcal/Kg)  Justification: obese  Estimated Protein Needs:  grams protein (1-1.2 g pro/Kg)  Justification: maintenance  Estimated Fluid Needs: 8801-3302 mL (1 mL/Kcal)  Justification: maintenance and per provider pending fluid status      NEW FINDINGS:   - Medications reviewed.  - Labs reviewed:   Na, K WNL   Mag and phos WNL 7/15   Cr 2.29 this AM  - Wt stable since admit:  Vitals:    07/15/17 0421 07/16/17 0418 07/17/17 0627 07/18/17 0519   Weight: 129 kg (284 lb 6.4 oz) 127.4 kg (280 lb 12.8 oz) 126.6 kg (279 lb 3.2 oz) 127 kg (279 lb 15.8 oz)    07/19/17 0637   Weight: 127 kg (279 lb 15.8 oz)       Previous Goals:   None as no nutrition diagnosis   Evaluation: Unable to evaluate    Previous Nutrition Diagnosis:   No nutrition diagnosis at this time   Evaluation: No change, continued below      MALNUTRITION: (7/19/2017)  % Weight Loss:  None noted  % Intake:  No decreased intake noted  Subcutaneous Fat Loss:  None observed  Muscle Loss:  None observed  Fluid Retention:  None noted     Malnutrition Diagnosis: Patient does not meet two of the above criteria necessary for diagnosing malnutrition    CURRENT NUTRITION DIAGNOSIS  No nutrition diagnosis at this time.    INTERVENTIONS  Recommendations / Nutrition Prescription  Continue renal diet per MD.  Continue monitoring need for diet long-term.     Implementation  Collaboration and Referral of Nutrition care: " "Discussed POC with team during rounds.     Nutrition Education: Reviewed renal diet per MD order, discussed with patient that too soon to tell if diet required as d/c:    Assessed learning needs, learning preferences, and willingness to learn    Nutrition Education (Content):  a) Provided handout from [employer] on high potassium foods, appropriate substitutes + high phosphorus foods, appropriate substitutes  b) Discussed mg of Na, phos and K restrictions  c) Also discussed importance of portion control, limiting added sugars/fats (Nephrology note indicating wt loss)    Nutrition Education (Application):  a) Discussed eating habits and recommended alternative food choices    Patient verbalizes understanding of diet by stating need to \"not drink too much milk\"    Anticipate good compliance    Diet Education - refer to Education Flowsheet      MONITORING AND EVALUATION:  Progress towards goals will be monitored and evaluated per protocol and Practice Guidelines      Annamarie Velazco RD, LD  Clinical Dietitian  3rd floor/ICU: [phone]  All other floors: [phone]  Weekend/holiday: [phone]  "

## 2017-07-19 NOTE — PROGRESS NOTES
SPIRITUAL HEALTH SERVICES Progress Note  [facility]    Attempted to meet with pt. At each attempt pt. Was out of the room or receiving cares.     I and other chaplains remain available per pt/family/staff request.       Kandy Yu   Intern  [phone]

## 2017-07-20 VITALS
BODY MASS INDEX: 37.74 KG/M2 | SYSTOLIC BLOOD PRESSURE: 143 MMHG | RESPIRATION RATE: 20 BRPM | TEMPERATURE: 97.5 F | HEIGHT: 72 IN | OXYGEN SATURATION: 97 % | DIASTOLIC BLOOD PRESSURE: 90 MMHG | HEART RATE: 65 BPM | WEIGHT: 278.66 LBS

## 2017-07-20 LAB
ANION GAP SERPL CALCULATED.3IONS-SCNC: 7 MMOL/L (ref 3–14)
BUN SERPL-MCNC: 30 MG/DL (ref 7–30)
CALCIUM SERPL-MCNC: 8.9 MG/DL (ref 8.5–10.1)
CHLORIDE SERPL-SCNC: 106 MMOL/L (ref 94–109)
CO2 SERPL-SCNC: 25 MMOL/L (ref 20–32)
COPATH REPORT: NORMAL
CREAT SERPL-MCNC: 2.3 MG/DL (ref 0.66–1.25)
DSDNA AB SER-ACNC: NORMAL IU/ML
ERYTHROCYTE [DISTWIDTH] IN BLOOD BY AUTOMATED COUNT: 14.6 % (ref 10–15)
GFR SERPL CREATININE-BSD FRML MDRD: 36 ML/MIN/1.7M2
GLUCOSE SERPL-MCNC: 91 MG/DL (ref 70–99)
HCT VFR BLD AUTO: 41.2 % (ref 40–53)
HGB BLD-MCNC: 13.1 G/DL (ref 13.3–17.7)
MCH RBC QN AUTO: 25.1 PG (ref 26.5–33)
MCHC RBC AUTO-ENTMCNC: 31.8 G/DL (ref 31.5–36.5)
MCV RBC AUTO: 79 FL (ref 78–100)
PLATELET # BLD AUTO: 218 10E9/L (ref 150–450)
POTASSIUM SERPL-SCNC: 3.6 MMOL/L (ref 3.4–5.3)
RBC # BLD AUTO: 5.21 10E12/L (ref 4.4–5.9)
SODIUM SERPL-SCNC: 138 MMOL/L (ref 133–144)
WBC # BLD AUTO: 8.7 10E9/L (ref 4–11)

## 2017-07-20 PROCEDURE — 80048 BASIC METABOLIC PNL TOTAL CA: CPT | Performed by: INTERNAL MEDICINE

## 2017-07-20 PROCEDURE — 99239 HOSP IP/OBS DSCHRG MGMT >30: CPT | Performed by: INTERNAL MEDICINE

## 2017-07-20 PROCEDURE — 85049 AUTOMATED PLATELET COUNT: CPT | Performed by: INTERNAL MEDICINE

## 2017-07-20 PROCEDURE — 25000132 ZZH RX MED GY IP 250 OP 250 PS 637: Performed by: INTERNAL MEDICINE

## 2017-07-20 PROCEDURE — 36415 COLL VENOUS BLD VENIPUNCTURE: CPT | Performed by: INTERNAL MEDICINE

## 2017-07-20 PROCEDURE — 85027 COMPLETE CBC AUTOMATED: CPT | Performed by: INTERNAL MEDICINE

## 2017-07-20 RX ORDER — CLONIDINE HYDROCHLORIDE 0.1 MG/1
0.1 TABLET ORAL 2 TIMES DAILY
Qty: 60 TABLET | Refills: 0 | Status: SHIPPED | OUTPATIENT
Start: 2017-07-20 | End: 2017-07-28

## 2017-07-20 RX ORDER — METOPROLOL TARTRATE 100 MG
100 TABLET ORAL 2 TIMES DAILY
Qty: 60 TABLET | Refills: 0 | Status: SHIPPED | OUTPATIENT
Start: 2017-07-20 | End: 2017-07-28

## 2017-07-20 RX ORDER — AMLODIPINE BESYLATE 10 MG/1
10 TABLET ORAL DAILY
Qty: 30 TABLET | Refills: 0 | Status: SHIPPED | OUTPATIENT
Start: 2017-07-20 | End: 2017-07-28

## 2017-07-20 RX ADMIN — CLONIDINE HYDROCHLORIDE 0.1 MG: 0.1 TABLET ORAL at 07:33

## 2017-07-20 RX ADMIN — AMLODIPINE BESYLATE 10 MG: 10 TABLET ORAL at 07:33

## 2017-07-20 RX ADMIN — METOPROLOL TARTRATE 100 MG: 100 TABLET, FILM COATED ORAL at 07:33

## 2017-07-20 NOTE — PLAN OF CARE
Problem: Renal Failure/Kidney Injury, Acute (Adult)  Goal: Signs and Symptoms of Listed Potential Problems Will be Absent or Manageable (Renal Failure/Kidney Injury, Acute)  Signs and symptoms of listed potential problems will be absent or manageable by discharge/transition of care (reference Renal Failure/Kidney Injury, Acute (Adult) CPG).   Cr 2.30 today.

## 2017-07-20 NOTE — PHARMACY - DISCHARGE MEDICATION RECONCILIATION AND EDUCATION
Discharge medication review for this patient is complete. Face-to-face medication education was provided by the pharmacist.  See [facility] for allergy information and immunization status.   Pharmacist assisted with medication reconciliation of discharge medications with PTA medications.    Patient was informed to STOP taking the following HOME medications:   -zestoretic    Patient was informed to START taking the following NEW medications:   -clonidine, metoprolol, amlodipine    Patient was informed to make the following changes to prior to admission medications:  -None    Patient was educated on the following for each discharge medication:   Rationale for therapy  Duration of treatment  Dosing and or monitoring drug levels  Common side effects  Importance of compliance  Drug/food interactions  Missed doses  Self monitoring parameters    Left written materials and instructions (Clinical notes from [facility]) for new medications: No    OUTCOMES: Patient verbalized understanding    IMPORTANT FOLLOW UP NOTES:   - None    Discharge Medication List     Review of your medicines      START taking       Dose / Directions    amLODIPine 10 MG tablet   Commonly known as:  NORVASC   Used for:  Hypertensive urgency        Dose:  10 mg   Take 1 tablet (10 mg) by mouth daily   Quantity:  30 tablet   Refills:  0       cloNIDine 0.1 MG tablet   Commonly known as:  CATAPRES   Used for:  Hypertensive urgency        Dose:  0.1 mg   Take 1 tablet (0.1 mg) by mouth 2 times daily   Quantity:  60 tablet   Refills:  0       metoprolol 100 MG tablet   Commonly known as:  LOPRESSOR   Used for:  Hypertensive urgency        Dose:  100 mg   Take 1 tablet (100 mg) by mouth 2 times daily   Quantity:  60 tablet   Refills:  0         STOP taking          lisinopril-hydrochlorothiazide 10-12.5 MG per tablet   Commonly known as:  PRINZIDE/ZESTORETIC                Where to get your medicines      Some of these will need a paper prescription and others can be  bought over the counter. Ask your nurse if you have questions.     Bring a paper prescription for each of these medications      amLODIPine 10 MG tablet     cloNIDine 0.1 MG tablet     metoprolol 100 MG tablet

## 2017-07-20 NOTE — DISCHARGE INSTRUCTIONS
I will follow-up with him regarding the biopsy result. He can see us in 1-2 weeks. I advised him to avoid NSAIDs, no lifting over 10 lbs or physical/contact sport for 7-10 days.

## 2017-07-20 NOTE — PLAN OF CARE
Problem: Goal Outcome Summary  Goal: Goal Outcome Summary  Outcome: Improving  Right flank kidney biopsy site clean dry and intact. Voiding spontaneously. Eating and drinking well. Denies pain.

## 2017-07-20 NOTE — PLAN OF CARE
Problem: Goal Outcome Summary  Goal: Goal Outcome Summary  Outcome: No Change  Pt. A&O. VSS, /79, denies pain. Transfers independently in room. K 3.7. Continues with Norvasc, Clonidine, and Metoprolol. Biopsy site CDI. Possible d/c likely home today.

## 2017-07-20 NOTE — DISCHARGE SUMMARY
[facility]  Discharge Summary        Taylor Valiente MRN# [id number]   YOB: 1995 Age: 21 year old     Date of Admission:  7/14/2017  Date of Discharge:  7/20/2017  Admitting Physician:  Sharon Durand MD  Discharge Physician: Star Singleton MD  Discharging Service: Hospitalist     Primary Provider: Priyank Lawrence  Primary Care Physician Phone Number: [phone]         Discharge Diagnoses/Problem Oriented Hospital Course (Providers):    Taylor Valiente was admitted on 7/14/2017 by Sharon Durand MD and I would refer you to their history and physical.  The following problems were addressed during his hospitalization:    1.   Hypertensive urgency: Improved. Given young age almost certainly related to a secondary cause although none so far. Nephrology input appreciated.  Improved control.  Continue 0.1 mg p.o. b.i.d. and continue norvasc 10mg and scheduled metoprolol.      2.  Proteinuria: 24-hour urine collection actually shows what appears to be nephrotic range proteinuria with total protein of greater than three.  Suspecting hypertensive nephrosclerosis. FSGS also possible and other protein losing nephropathy.  Other special diagnostic studies including complement levels, anti-glomerular basement antibody, myeloperoxidase antibody, viral hepatitides are negative.  s/p R renal biopsy 7/19.      3.  Renal insufficiency:  Suspect subacute vs chronic. Creatinine stable. S/p renal bx 7/19.     Chief Complaint:       hypertension       History of Present Illness:   Mr Valiente is an otherwise healthy 20 yo who presented to [facility] clinic at the urging of her mother to get a PE and was found to be very hypertensive with e/o proteinuria and renal failure with creat 2.6 of unclear duration.      At that visit he was noted to be hypertensive and so was started on lisinopril/hctz.  About 20 minutes after being home he was called and told to come into the ER for evaluation due to heavy  proteinuria and elevated creat. He feels fine     He states he feels well, he denies any cp/sob/vision changes/headache, he can walk up 4 flights of stairs without stopping but would be SOB, He hikes with his friends and can walk a mile but will get sob with that.  He has noted foamy urine for the past 3 months.  He denies any palpitations but has had infrequent episodes where he feels hot/sweaty.  He takes ibuprofen up to twice per week.  He has no edema except trace at the end of a long working shift.     He has infrequent headaches where he is photophobic/phonophobic that resolve with ibuprofen     In the ER initial VS are hypotensive which I think are erroneous as within 10 minutes they were very elevated.  He was given 20 mg IV labetalol x 2 and clonidine 0.2 mg x 1 and BPs still elevated and BPs did improve from the 180/120 range to the 180/ range. Labs notable for creat 2.6, K 3.0, UA with >/= 300, mod blood without RBCs, granular casts.      He is admitted for BP control, and evaluation for suspected nephrotic range proteinuria     History is obtained in discussion with the ER physician Dr. Medrano and the patient with good reliability       Admitted as inpatient, was initiated on BP meds, TTE showed moderate LVH with EF 40-45%, can f/u with PCP. Renal saw him and he had a renal bx 7/19, results of which are pending.    No fevers/chills/chest pain/SOB/abdominal pain/N/V overnight.        Code Status:      Full Code        Brief Hospital Stay Summary Sent Home With Patient in AVS:                Important Results:      TTE, Cr, renal bx pending.         Pending Results:        Unresulted Labs Ordered in the Past 30 Days of this Admission     Date and Time Order Name Status Description    7/19/2017 0000 Nuclear Antibody BOBO by IFA IgG In process     7/19/2017 0000 DNA double stranded antibodies In process             Discharge Instructions and Follow-Up:      Follow-up Appointments     Follow-up and  recommended labs and tests        Follow up with primary care provider, Priyank Lawrence, within 7 days for   hospital follow- up.  No follow up labs or test are needed. F/u kidney   biopsy results.  Follow up with kidney doctor, Dr Hooper as scheduled.  Avoid aspirin, ibuprofen, advil.                      Discharge Disposition:      Discharged to home         Discharge Medications:        Current Discharge Medication List      START taking these medications    Details   cloNIDine (CATAPRES) 0.1 MG tablet Take 1 tablet (0.1 mg) by mouth 2 times daily  Qty: 60 tablet, Refills: 0    Associated Diagnoses: Hypertensive urgency      metoprolol (LOPRESSOR) 100 MG tablet Take 1 tablet (100 mg) by mouth 2 times daily  Qty: 60 tablet, Refills: 0    Associated Diagnoses: Hypertensive urgency      amLODIPine (NORVASC) 10 MG tablet Take 1 tablet (10 mg) by mouth daily  Qty: 30 tablet, Refills: 0    Associated Diagnoses: Hypertensive urgency         STOP taking these medications       lisinopril-hydrochlorothiazide (PRINZIDE/ZESTORETIC) 10-12.5 MG per tablet Comments:   Reason for Stopping:                 Allergies:         Allergies   Allergen Reactions     No Known Allergies            Consultations This Hospital Stay:      Consultation during this admission received from nephrology         Condition and Physical on Discharge:      Discharge condition: Stable   Vitals: Blood pressure (!) 142/91, pulse 65, temperature 97.5  F (36.4  C), temperature source Oral, resp. rate 20, height 1.829 m (6'), weight 126.4 kg (278 lb 10.6 oz), SpO2 97 %.  278 lbs 10.58 oz      Constitutional: AAO x 3 in NAD   Lungs: CTA B   Cardiovascular: RR,S1+S2 nml, no m/g/r.   Abdomen: Soft, NT, ND, + BS   Skin: No edema.   Other:          Discharge Time:        > 30 mins on chart review, exam and .        Image Results From This Hospital Stay (For Non-EPIC Providers):        Results for orders placed or performed during the hospital encounter of  07/14/17   Head CT w/o contrast    Narrative    CT SCAN OF THE HEAD WITHOUT CONTRAST   7/14/2017 1:17 PM     HISTORY: Headaches. High blood pressure.    TECHNIQUE:  Axial images of the head and coronal reformations without  IV contrast material. Radiation dose for this scan was reduced using  automated exposure control, adjustment of the mA and/or kV according  to patient size, or iterative reconstruction technique.    COMPARISON: None.    FINDINGS:  The ventricles are normal in size, shape and configuration.   The brain parenchyma and subarachnoid spaces are normal. There is no  evidence of intracranial hemorrhage, mass, acute infarct or anomaly.     Large polypoid lesion almost completely filling the right maxillary  sinus representing mucous retention cyst or mucosal polyp. Remaining  visualized paranasal sinuses appear clear. Soft tissue debris within  the external auditory canals bilaterally, presumably represents  cerumen. There is no evidence of trauma.      Impression    IMPRESSION: No evidence of acute hemorrhage, mass, or herniation.     JACKIE RIOS MD   Retroperitoneal US    Narrative    ULTRASOUND RENAL COMPLETE 7/14/2017 1:31 PM     HISTORY: Proteinuria, hypertension, renal insufficiency.     FINDINGS: The kidneys are normal in echogenicity without  hydronephrosis bilaterally. Right kidney measures 11.4 x 4.3 x 6.5 cm  and the left measures 10.1 x 5.1 x 4.8 cm. No significant renal  cortical thinning is appreciated. No renal cyst or mass is evident. No  definite renal calculi are appreciated. Bladder is partly  decompressed, but otherwise unremarkable.      Impression    IMPRESSION: Normal bilateral renal ultrasound. No hydronephrosis. No  renal cyst or mass.    DARRICK BURNHAM MD   US Renal Complete w Duplex Complete    Narrative    ULTRASOUND RETROPERITONEAL COMPLETE 7/15/2017 8:58 AM     HISTORY: Renal doppler for MARY.    COMPARISON: None.    TECHNIQUE: Doppler spectral waveform analysis, color Doppler  flow, and  grayscale imaging performed.    FINDINGS: The bilateral renal parenchyma are normal in echogenicity  without evidence for shadowing stone or mass. No renal cysts. No  hydronephrosis. Right kidney measures 11.7 x 5.4 x 5.4 cm and the left  measures 11.7 x 6.2 x 5.6 cm. Cortical thickness is 1.6 cm on the  right and 1.4 cm on the left. Urinary bladder was not visualized.    The bilateral renal arteries are patent without elevated velocities.  Renal veins are unremarkable on color Doppler imaging. Resistive  indices in the cortical arteries range from 0.55-0.59 on the right and  0.55-0.58 on the left.      Impression    IMPRESSION: Unremarkable renal ultrasound with Doppler. No evidence of  renal artery stenosis.    No significant discrepancy from the preliminary report.    ELADIO HOWARD MD   CT Renal Biopsy Percutaneous    Narrative    CT RENAL BIOPSY PERCUTANEOUS 7/19/2017 1:18 PM    HISTORY: Proteinuria, renal failure.    COMPARISON: None.    MEDICATIONS: 2 mg Versed, 100 mcg fentanyl.    TECHNIQUE: Informed consent was obtained from the patient. A timeout  was performed. Noncontrast images obtained through the mid abdomen for  procedural guidance. Radiation dose for this scan was reduced using  automated exposure control, adjustment of the mA and/or kV according  to patient size, or iterative reconstruction technique. Patient was  given conscious sedation with Versed and fentanyl with constant  monitoring by the physician and nursing staff. Total sedation time was  15 minutes.    The skin was prepped and draped in a sterile manner. 5 mL of 1%  lidocaine was used for local anesthesia. Under CT guidance, a 17-gauge  needle was advanced towards the lower pole of the right kidney.  Through this access needle, 3 core biopsy specimens were obtained with  a 18 gauge biopsy needle. The tissue was given to Dr. Hooper. There were  no immediate complications.    FINDINGS: Limited CT images demonstrate needle  "access towards the  lower pole of the right kidney. Follow-up images demonstrated no  evidence of complication.      Impression    IMPRESSION: Successful CT guided right native kidney biopsy.    ELADIO HOWARD MD           Most Recent Lab Results In EPIC (For Non-EPIC Providers):      Results for orders placed or performed during the hospital encounter of 07/14/17 (from the past 24 hour(s))   Surgical pathology exam   Result Value Ref Range    Copath Report       Patient Name: STEFAN RIOS  MR#: [id number]  Specimen #: [id number]  Collected: 7/19/2017  Received: 7/19/2017  Reported: 7/20/2017 07:01  Ordering Phy(s): TOSIN BRADLEY    For improved result formatting, select 'View Enhanced Report Format'  under Linked Documents section.    SPECIMEN(S):  Native right kidney biopsy    FINAL DIAGNOSIS:  Native right kidney, biopsy.  - Specimens sent to [facility] for evaluation.  - See separate forthcoming [facility] report.    Electronically signed out by:    Henrik Landeros M.D.    CLINICAL HISTORY:  Kidney disease.    GROSS:  The specimen is received in three tubes, labeled with the patient's  name, identifying information and \"native right kidney\".  A 0.9 cm pink  tissue fragment is received in immunofluorescence media.  The EM  fixative media to contains a 1 cm tan tissue core.  The 10% neutral  buffered formalin tube contains two pink tissue cores each up to 1 cm.  All three tubes are sent to [facility]. (Dictated  by: Melanie Bansal 7/19/2017 02:13 PM)/PEDRO LUISK    CPT Codes:  A: 72170-ZAPU(9), 67485-QGMOFX(3), 87558-YTVQB, 24591-AWIU, SOH    TESTING LAB LOCATION:  [facility] [address] [place]  [address] [phone]    COLLECTION SITE:  Client: [facility]  Location: [facility]3 (R)     CT Renal Biopsy Percutaneous    Narrative    CT RENAL BIOPSY PERCUTANEOUS 7/19/2017 1:18 PM    HISTORY: Proteinuria, renal " failure.    COMPARISON: None.    MEDICATIONS: 2 mg Versed, 100 mcg fentanyl.    TECHNIQUE: Informed consent was obtained from the patient. A timeout  was performed. Noncontrast images obtained through the mid abdomen for  procedural guidance. Radiation dose for this scan was reduced using  automated exposure control, adjustment of the mA and/or kV according  to patient size, or iterative reconstruction technique. Patient was  given conscious sedation with Versed and fentanyl with constant  monitoring by the physician and nursing staff. Total sedation time was  15 minutes.    The skin was prepped and draped in a sterile manner. 5 mL of 1%  lidocaine was used for local anesthesia. Under CT guidance, a 17-gauge  needle was advanced towards the lower pole of the right kidney.  Through this access needle, 3 core biopsy specimens were obtained with  a 18 gauge biopsy needle. The tissue was given to Dr. Hooper. There were  no immediate complications.    FINDINGS: Limited CT images demonstrate needle access towards the  lower pole of the right kidney. Follow-up images demonstrated no  evidence of complication.      Impression    IMPRESSION: Successful CT guided right native kidney biopsy.    ELADIO HOWARD MD   Hemoglobin   Result Value Ref Range    Hemoglobin 12.9 (L) 13.3 - 17.7 g/dL

## 2017-07-20 NOTE — PLAN OF CARE
Problem: Hypertensive Disease/Crisis (Arterial) (Adult)  Goal: Signs and Symptoms of Listed Potential Problems Will be Absent or Manageable (Hypertensive Disease/Crisis)  Signs and symptoms of listed potential problems will be absent or manageable by discharge/transition of care (reference Hypertensive Disease/Crisis (Arterial) (Adult) CPG).   Outcome: Improving  B/P improving on 3 medications. Reviewed meds by pharmacy for discharge. Discussed weight loss importance with managing b/p. Will follow up with nephrology for renal biopsy results.

## 2017-07-20 NOTE — PROGRESS NOTES
Renal Medicine Progress Note            Assessment/Plan:     21 y.o man without past medical history, admitted for hypertensive urgency and renal insufficiency.       1. Pt with unknown kidney function at baseline.      2. Renal insufficiency. Chronic vs. Acute. UA with granular case, suggesting ATN. UA also has moderate blood but normal RBC. CK level is not high. Hgb and plt are okay. He has ~3.2 grams on a 24-hrs urine collection. Given TTE finding of moderate LVH, his kidney disease and proteinuria may be due to hypertensive nephrosclerosis. FSGS is a good possibility as well.  ANCA, antiGBM, complements, ds-DNA, hepatitis, HIV are unremarkable. He had a CT guided right renal biopsy on 7/19.       3. Hypertensive urgency. BP is much better.                         -Norvasc 10 mg daily                         -clonidine 0.1 mg bid                         -metoprolol 100 mg bid                         -cortisol, TSH and PAC/PRA levels are within carlos alberto limits                         -24-hrs urine catecholamines is normal    -sleep study outpatient as needed      4. Obesity. Weight lost is highly recommended.      Plan.    1. Patient can be discharged. I will follow-up with him regarding the biopsy result. He can see us in 1-2 weeks. I advised him to avoid NSAIDs, no lifting over 10 lbs or physical/contact sport for 7-10 days.         Interval History:     Patient is doing well. He does not have any new complaints. He denies back pain. No gross hematuria. No fever or chill.          Medications and Allergies:       cloNIDine  0.1 mg Oral BID     metoprolol  100 mg Oral BID     amLODIPine  10 mg Oral Daily     sodium chloride (PF)  3 mL Intracatheter Q8H     senna-docusate  1-2 tablet Oral BID        Allergies   Allergen Reactions     No Known Allergies             Physical Exam:   Vitals were reviewed  Heart Rate: 62, Blood pressure (!) 142/91, pulse 65, temperature 97.5  F (36.4  C), temperature source Oral, resp.  rate 20, height 1.829 m (6'), weight 126.4 kg (278 lb 10.6 oz), SpO2 97 %.    Wt Readings from Last 3 Encounters:   07/20/17 126.4 kg (278 lb 10.6 oz)   07/14/17 127 kg (280 lb)   08/13/08 88.5 kg (195 lb) (>99 %)*     * Growth percentiles are based on [facility] 2-20 Years data.       Intake/Output Summary (Last 24 hours) at 07/20/17 1313  Last data filed at 07/20/17 0921   Gross per 24 hour   Intake             1260 ml   Output              450 ml   Net              810 ml     GENERAL APPEARANCE: pleasant, NAD, alert  HEENT:  Eyes/ears/nose/neck grossly normal  RESP: lungs cta b c good efforts, no crackles, rhonchi or wheezes  CV: RRR, nl S1/S2, no m/r/g   ABDOMEN: obese, soft, NT  EXTREMITIES/SKIN: no rashes/lesions on observed skin; no edema  Neuro: a/o x3         Data:     CBC RESULTS:     Recent Labs  Lab 07/20/17  0740 07/19/17  1820 07/17/17  0618 07/16/17  0607 07/15/17  0635 07/14/17  0949   WBC 8.7  --   --  10.4 12.7* 11.2*   RBC 5.21  --   --  5.33 5.09 5.37   HGB 13.1* 12.9*  --  13.5 12.8* 13.8   HCT 41.2  --   --  42.1 39.8* 41.4     --  222 187 172 172       Basic Metabolic Panel:    Recent Labs  Lab 07/20/17  0740 07/19/17  0650 07/18/17  0655 07/17/17  0618 07/16/17  0607 07/15/17  2115  07/15/17  0635    139 139 137 139  --   --  139   POTASSIUM 3.6 3.7 3.6 3.7 3.5 3.5  < > 3.1*   CHLORIDE 106 108 106 106 106  --   --  106   CO2 25 26 25 22 23  --   --  25   BUN 30 31* 32* 30 29  --   --  33*   CR 2.30* 2.29* 2.46* 2.16* 2.22*  --   --  2.40*   GLC 91 92 94 96 104*  --   --  114*   BUBBA 8.9 8.9 8.9 9.0 9.1  --   --  8.5   < > = values in this interval not displayed.    INR  Recent Labs  Lab 07/18/17  0910   INR 1.04      Attestation:   I have reviewed today's relevant vital signs, notes, medications, labs and imaging.    Jer Hooper MD  [facility] - Nephrology  Office phone :[phone]  Pager: [phone]

## 2017-07-21 LAB — NUCLEAR IGG TITR SER IF: NORMAL {TITER}

## 2017-07-28 ENCOUNTER — OFFICE VISIT (OUTPATIENT)
Dept: FAMILY MEDICINE | Facility: CLINIC | Age: 22
End: 2017-07-28
Payer: COMMERCIAL

## 2017-07-28 VITALS
OXYGEN SATURATION: 100 % | DIASTOLIC BLOOD PRESSURE: 85 MMHG | TEMPERATURE: 98.3 F | WEIGHT: 272 LBS | BODY MASS INDEX: 36.84 KG/M2 | HEART RATE: 60 BPM | RESPIRATION RATE: 16 BRPM | HEIGHT: 72 IN | SYSTOLIC BLOOD PRESSURE: 130 MMHG

## 2017-07-28 DIAGNOSIS — N05.1 FOCAL GLOMERULAR SCLEROSIS: ICD-10-CM

## 2017-07-28 DIAGNOSIS — N18.30 CKD (CHRONIC KIDNEY DISEASE) STAGE 3, GFR 30-59 ML/MIN (H): ICD-10-CM

## 2017-07-28 DIAGNOSIS — I16.0 HYPERTENSIVE URGENCY: ICD-10-CM

## 2017-07-28 DIAGNOSIS — E66.01 MORBID OBESITY DUE TO EXCESS CALORIES (H): Primary | ICD-10-CM

## 2017-07-28 DIAGNOSIS — I10 BENIGN ESSENTIAL HYPERTENSION: ICD-10-CM

## 2017-07-28 PROCEDURE — 99214 OFFICE O/P EST MOD 30 MIN: CPT | Performed by: FAMILY MEDICINE

## 2017-07-28 RX ORDER — CLONIDINE HYDROCHLORIDE 0.1 MG/1
0.1 TABLET ORAL 2 TIMES DAILY
Qty: 120 TABLET | Refills: 1 | Status: ON HOLD | OUTPATIENT
Start: 2017-07-28 | End: 2019-10-06

## 2017-07-28 RX ORDER — AMLODIPINE BESYLATE 10 MG/1
10 TABLET ORAL DAILY
Qty: 90 TABLET | Refills: 3 | Status: SHIPPED | OUTPATIENT
Start: 2017-07-28 | End: 2020-09-09

## 2017-07-28 RX ORDER — METOPROLOL TARTRATE 100 MG
100 TABLET ORAL 2 TIMES DAILY
Qty: 180 TABLET | Refills: 3 | Status: SHIPPED | OUTPATIENT
Start: 2017-07-28 | End: 2018-10-15

## 2017-07-28 NOTE — MR AVS SNAPSHOT
"              After Visit Summary   2017    Taylor Valiente    MRN: [id number]           Patient Information     Date Of Birth          1995        Visit Information        Provider Department      2017 9:30 AM Priyank Lawrence MD [facility]        Today's Diagnoses     Morbid obesity due to excess calories (H)    -  1    CKD (chronic kidney disease) stage 3, GFR 30-59 ml/min        Focal glomerular sclerosis        Hypertensive urgency        Benign essential hypertension           Follow-ups after your visit        Who to contact     If you have questions or need follow up information about today's clinic visit or your schedule please contact [facility] directly at [phone].  Normal or non-critical lab and imaging results will be communicated to you by MyChart, letter or phone within 4 business days after the clinic has received the results. If you do not hear from us within 7 days, please contact the clinic through MyChart or phone. If you have a critical or abnormal lab result, we will notify you by phone as soon as possible.  Submit refill requests through [employer] or call your pharmacy and they will forward the refill request to us. Please allow 3 business days for your refill to be completed.          Additional Information About Your Visit        MyChart Information     [employer] lets you send messages to your doctor, view your test results, renew your prescriptions, schedule appointments and more. To sign up, go to [email or web address] . Click on \"Log in\" on the left side of the screen, which will take you to the Welcome page. Then click on \"Sign up Now\" on the right side of the page.     You will be asked to enter the access code listed below, as well as some personal information. Please follow the directions to create your username and password.     Your access code is: [email or web address]  Expires: 10/12/2017 11:01 AM     Your access code will  in 90 " days. If you need help or a new code, please call your [facility] clinic or [phone].        Care EveryWhere ID     This is your Care EveryWhere ID. This could be used by other organizations to access your [facility] medical records  [id number]        Your Vitals Were     Pulse Temperature Respirations Height Pulse Oximetry BMI (Body Mass Index)    60 98.3  F (36.8  C) (Oral) 16 6' (1.829 m) 100% 36.89 kg/m2       Blood Pressure from Last 3 Encounters:   07/28/17 130/85   07/20/17 143/90   07/14/17 (!) 202/159    Weight from Last 3 Encounters:   07/28/17 272 lb (123.4 kg)   07/20/17 278 lb 10.6 oz (126.4 kg)   07/14/17 280 lb (127 kg)              Today, you had the following     No orders found for display         Where to get your medicines      These medications were sent to [facility] [id number] [place] [address]  [id number] [place] [address]     Phone:  [phone]     amLODIPine 10 MG tablet    cloNIDine 0.1 MG tablet    metoprolol 100 MG tablet          Primary Care Provider Office Phone # Fax #    Priyank Lawrence [phone]       [facility] [address] [place] [address]        Equal Access to Services     DEUCE FLYNN : Hadii maggie wilson hadasho Sogonzalezali, waaxda luqadaha, qaybta kaalmada isis, jean-paul fan. So [facility] [phone].    ATENCIÓN: Si habla español, tiene a spangler disposición servicios gratuitos de asistencia lingüística. Holden al [phone].    We comply with applicable federal civil rights laws and [place] laws. We do not discriminate on the basis of race, color, national origin, age, disability sex, sexual orientation or gender identity.            Thank you!     Thank you for choosing [facility]  for your care. Our goal is always to provide you with excellent care. Hearing back from our patients is one way we can continue to improve our services. Please take a few minutes to  complete the written survey that you may receive in the mail after your visit with us. Thank you!             Your Updated Medication List - Protect others around you: Learn how to safely use, store and throw away your medicines at www.disposemymeds.org.          This list is accurate as of: 7/28/17 10:01 AM.  Always use your most recent med list.                   Brand Name Dispense Instructions for use Diagnosis    amLODIPine 10 MG tablet    NORVASC    90 tablet    Take 1 tablet (10 mg) by mouth daily    Hypertensive urgency       cloNIDine 0.1 MG tablet    CATAPRES    120 tablet    Take 1 tablet (0.1 mg) by mouth 2 times daily    Hypertensive urgency       metoprolol 100 MG tablet    LOPRESSOR    180 tablet    Take 1 tablet (100 mg) by mouth 2 times daily    Hypertensive urgency

## 2017-07-28 NOTE — NURSING NOTE
Chief Complaint   Patient presents with     Hospital F/U     high blood pressure       Initial /85 (BP Location: Right arm, Patient Position: Chair, Cuff Size: Adult Large)  Pulse 60  Temp 98.3  F (36.8  C) (Oral)  Resp 16  Ht 6' (1.829 m)  Wt 272 lb (123.4 kg)  SpO2 100%  BMI 36.89 kg/m2 Estimated body mass index is 36.89 kg/(m^2) as calculated from the following:    Height as of this encounter: 6' (1.829 m).    Weight as of this encounter: 272 lb (123.4 kg).  Medication Reconciliation: complete   Maria Del Carmen Santos, CMA

## 2017-07-28 NOTE — PROGRESS NOTES
SUBJECTIVE:                                                    Taylor Valiente is a 21 year old male who presents to clinic today for the following health issues:          Hospital Follow-up Visit:    Hospital/Nursing Home/ Rehab Facility: [facility]  Date of Admission: 7/14/17  Date of Discharge: 7/20/17  Reason(s) for Admission: hypertension            Problems taking medications regularly:  None       Medication changes since discharge: None       Problems adhering to non-medication therapy:  None    Summary of hospitalization:  [facility] discharge summary reviewed  Diagnostic Tests/Treatments reviewed.  Follow up needed: with nephrologist.  Other Healthcare Providers Involved in Patient s Care:         None  Update since discharge: improved. Now he is having mild pain in the upper back.    Post Discharge Medication Reconciliation: discharge medications reconciled, continue medications without change.  Plan of care communicated with patient     Coding guidelines for this visit:  Type of Medical   Decision Making Face-to-Face Visit       within 7 Days of discharge Face-to-Face Visit        within 14 days of discharge   Moderate Complexity [id number]   High Complexity [id number]              Hypertension Follow-up      Outpatient blood pressures are not being checked.    Low Salt Diet: not monitoring salt          Problem list and histories reviewed & adjusted, as indicated.  Additional history: as documented    Patient Active Problem List   Diagnosis     Morbid obesity (H)     Hypertensive urgency     LVH (left ventricular hypertrophy) due to hypertensive disease     Malignant hypertension     Acute kidney injury (H)     CKD (chronic kidney disease) stage 3, GFR 30-59 ml/min     Focal glomerular sclerosis     Benign essential hypertension     Past Surgical History:   Procedure Laterality Date     NO HISTORY OF SURGERY         Social History   Substance Use Topics     Smoking status:  Never Smoker     Smokeless tobacco: Never Used     Alcohol use No     Family History   Problem Relation Age of Onset     Blood Disease Mother      has hep b     DIABETES Mother      gestionanal diabetes     Hypertension Mother      Obesity Father      Hypertension Father      Hypertension Maternal Grandmother      DIABETES Maternal Grandmother      Hypertension Paternal Grandmother      Hypertension Paternal Grandfather      Coronary Artery Disease Maternal Uncle          Current Outpatient Prescriptions   Medication Sig Dispense Refill     cloNIDine (CATAPRES) 0.1 MG tablet Take 1 tablet (0.1 mg) by mouth 2 times daily 120 tablet 1     metoprolol (LOPRESSOR) 100 MG tablet Take 1 tablet (100 mg) by mouth 2 times daily 180 tablet 3     amLODIPine (NORVASC) 10 MG tablet Take 1 tablet (10 mg) by mouth daily 90 tablet 3     [DISCONTINUED] cloNIDine (CATAPRES) 0.1 MG tablet Take 1 tablet (0.1 mg) by mouth 2 times daily 60 tablet 0     [DISCONTINUED] metoprolol (LOPRESSOR) 100 MG tablet Take 1 tablet (100 mg) by mouth 2 times daily 60 tablet 0     [DISCONTINUED] amLODIPine (NORVASC) 10 MG tablet Take 1 tablet (10 mg) by mouth daily 30 tablet 0     Allergies   Allergen Reactions     No Known Allergies          Reviewed and updated as needed this visit by clinical staffTobacco  Allergies  Med Hx  Surg Hx  Fam Hx  Soc Hx      Reviewed and updated as needed this visit by Provider         ROS:  C: NEGATIVE for fever, chills, change in weight  R: NEGATIVE for significant cough or SOB  CV: NEGATIVE for chest pain, palpitations or peripheral edema    OBJECTIVE:     /85 (BP Location: Right arm, Patient Position: Chair, Cuff Size: Adult Large)  Pulse 60  Temp 98.3  F (36.8  C) (Oral)  Resp 16  Ht 6' (1.829 m)  Wt 272 lb (123.4 kg)  SpO2 100%  BMI 36.89 kg/m2  Body mass index is 36.89 kg/(m^2).  GENERAL: healthy, alert and no distress  NECK: no adenopathy, no asymmetry, masses, or scars and thyroid normal to  palpation  RESP: lungs clear to auscultation - no rales, rhonchi or wheezes  CV: regular rate and rhythm, normal S1 S2, no S3 or S4, no murmur, click or rub, no peripheral edema and peripheral pulses strong  ABDOMEN: soft, nontender, no hepatosplenomegaly, no masses and bowel sounds normal  MS: no gross musculoskeletal defects noted, no edema        ASSESSMENT/PLAN:             1. CKD (chronic kidney disease) stage 3, GFR 30-59 ml/min  Stable.    2. Focal glomerular sclerosis  Related to HTN, follow up with Nephrology, his mother will call and make an appointment for him ASAP    3. Hypertensive urgency  Controlled BP, continue on same medications.  - cloNIDine (CATAPRES) 0.1 MG tablet; Take 1 tablet (0.1 mg) by mouth 2 times daily  Dispense: 120 tablet; Refill: 1  - metoprolol (LOPRESSOR) 100 MG tablet; Take 1 tablet (100 mg) by mouth 2 times daily  Dispense: 180 tablet; Refill: 3  - amLODIPine (NORVASC) 10 MG tablet; Take 1 tablet (10 mg) by mouth daily  Dispense: 90 tablet; Refill: 3    4. Morbid obesity due to excess calories (H)  Talked about diet and exercise, pt started on walking at this time.    5. Benign essential hypertension            Priyank Lawrence MD  [facility]

## 2017-08-09 ENCOUNTER — TELEPHONE (OUTPATIENT)
Dept: OTHER | Facility: CLINIC | Age: 22
End: 2017-08-09

## 2017-08-09 ENCOUNTER — CARE COORDINATION (OUTPATIENT)
Dept: CARE COORDINATION | Facility: CLINIC | Age: 22
End: 2017-08-09

## 2017-08-09 DIAGNOSIS — I10 HTN (HYPERTENSION): Primary | ICD-10-CM

## 2017-08-09 NOTE — PROGRESS NOTES
Clinic Care Coordination Contact  [facility]/Voicemail    Referral Source: Care Team (Zeer GALLITO MOREAU)  Clinical Data: Care Coordinator Outreach - referral from [employer] - hypertensive urgency   Patient admitted from 7/14 - 7/20 with hypertensive urgency and kidney injury     Patient followed up with pcp on 7/28/17   Patient advised to check blood pressures and low sodium diet under 2000 mg q 24 hours     Mom answered phone - CCRN explained reason for call and mom stated patient was at work and unable to reach him at this time   Mom asks if she can help with anything - CCRN advised that there is no consent to communicate on file so unable to talk with her at this time - advised that if patient would like [facility] to be able to talk to anyone other than himself that he can sign a consent form - will mail to patient after talking with him     Mom shared some information with CCRN without CCRN sharing information with her   Mom states patient has been checking blood pressures and they have not been able to get into see nephrology - however appt. set up for next week     CCRN asked mom to have patient call when he has time - advised it was not an urgent/emergent matter     Outreach attempted x 1.  Left message on voicemail with call back information and requested return call.    Plan: Care Coordinator will try to reach patient again in 1-2 business days.    Sultana Urbina Care Coordinator RN  [facility]  [phone]  August 9, 2017

## 2017-08-09 NOTE — LETTER
[facility]  Complex Care Plan  About Me  Patient Name:  Taylor Valiente    YOB: 1995  Age:   22 year old   [place] MRN: [id number] Telephone Information:     Home Phone [phone]   Mobile [phone]       Address:    [address] [id number] Email address:  No e-mail address on record      Emergency Contact(s)  Name Relationship Lgl Grd Work Phone Home Phone Mobile Phone   MANISH LAMBERT* Mother  [phone]           Primary language:  English     needed? No   [employer]:  [phone] op. 1  Other communication barriers: No  Preferred Method of Communication:  Phone  Current living arrangement: I live in a private home with family  Mobility Status/ Medical Equipment: Independent  Other information to know about me:    Health Maintenance  Health Maintenance Reviewed: Due/Overdue   Health Maintenance Due   Topic Date Due     HPV IMMUNIZATION (1 of 3 - Male 3 Dose Series) 08/05/2007     My Access Plan  Medical Emergency 911   Primary Clinic Line [facility]- [phone]   24 Hour Appointment Line [phone] or  [phone]) (toll-free)   24 Hour Nurse Line [phone] (toll-free)   Preferred Urgent Care [facility], [phone]   Preferred Hospital [facility]  [phone]   Preferred Pharmacy [facility] - [place], [place] - [address]     Behavioral Health Crisis Line The National Suicide Prevention Lifeline at 1-272.893.4642 or 911     My Care Team Members   Priyank Lawrence MD PCP - General Family Practice 7/14/17    Phone: [phone] Fax: [phone]         Sultana Urbina RN Clinic Care Coordinator  8/9/17    Phone: [phone] Fax: [phone]      Jer Hooper MD MD Nephrology 8/9/17    Phone: [phone] Fax: [phone]           My Care Plans  Self Management and Treatment Plan  Goals  and (Comments)  Goal #1: low sodium diet under 2000 mg daily           Goal #2: monitor blood pressure daily and call with high readings over 140/90          Action Plans on File: None  Advance Care Plans/Directives Type:   Type Advanced Care Plans/Directives:  (NA)    My Medical and Care Information  Problem List   Patient Active Problem List   Diagnosis     Morbid obesity (H)     Hypertensive urgency     LVH (left ventricular hypertrophy) due to hypertensive disease     Malignant hypertension     Acute kidney injury (H)     CKD (chronic kidney disease) stage 3, GFR 30-59 ml/min     Focal glomerular sclerosis     Benign essential hypertension        Current Medications and Allergies:  See printed Medication Report.

## 2017-08-09 NOTE — TELEPHONE ENCOUNTER
Clinical Product Navigator RN reviewed chart; patient on payer product coverage.  Review results: Met referral criteria for Care Coordinator; referral to be sent.    Pt had a recent IP stay for hypertensive emergency.  Pt was started on medication and needs to follow up with nephrologist.  Please assess for med compliance/adherence and follow appt has been made.    Abby Prater RN/Clinical Product Navigator

## 2017-08-09 NOTE — LETTER
[place] CARE COORDINATION  [address] [address]  [phone]    August 14, 2017    Taylor Valiente  [address] [address] [facility] [place] [address]    Dear Taylor ,  I am the Clinic Care Coordinator that works with your primary care provider's clinic. I wanted to introduce myself and provide you with my contact information for you to be able to call me with any questions or concerns. I wanted to thank you for spending the time to talk with me.  Below is a description of what Clinic Care Coordination is and how I can further assist you.     The Clinic Care Coordinator role is a Registered Nurse and/or  who understands the health care system. The goal of Clinic Care Coordination is to help you manage your health and improve access to the [facility] system in the most efficient manner.  The Registered Nurse can assist you in meeting your health care goals by providing education, coordinating services, and strengthening the communication among your providers. The  can assist you with financial, behavioral, psychosocial, and chemical dependency and counseling/psychiatric resources.    Please feel free to keep this letter and contact information to contact me at [phone] with any further questions or concerns that may arise. We at [facility] are focused on providing you with the highest-quality healthcare experience possible and that all starts with you.     Sincerely,     Sultana Urbina Care Coordinator RN  [facility]  [phone]  August 14, 2017     Enclosed: I have enclosed a copy of the Complex Care Plan. This has helpful information and goals that we have talked about. Please keep this in an easy to access place to use as needed.

## 2017-08-14 NOTE — PROGRESS NOTES
Clinic Care Coordination Contact  OUTREACH    Referral Information:  Referral Source: Care Team ([employer] GALLITO MOREAU)  Reason for Contact: hypertensive emergency   Care Conference: No     Universal Utilization:   ED Visits in last year: 0  Hospital visits in last year: 1 (IN [facility])  Last PCP appointment: 07/28/17  Missed Appointments:  (NONE)  Concerns: NO CONCERNS   Multiple Providers or Specialists: YES    Clinical Concerns:  Current Medical Concerns:   Recent admission from 7/14 - 7/20 for hypertensive emergency and kidney injury   Patient states he has been checking blood pressures and they have been running in the 130/under 90 range   CCRN asked patient if he had been following low sodium diet - he reports that his mom has been monitoring sodium as she does the cooking   CCRN asked patient if he was able to get an appt. With nephrology - patient reports he has an appt. Tomorrow with Dr. Hooper from nephrology     CCRN discussed with patient that she had joce a few times and mom had answered - advised that were unable to talk with mom as we do not have a consent form on file for her - patient does wish to fill out consent - CCRN will mail today     Patient agrees to have CCRN follow up with him next week     Current Behavioral Concerns: no concerns noted     Education Provided to patient: continue monitoring blood pressure and low sodium diet     Clinical Pathway Name: None    Medication Management:  States compliance - no concerns or side effects noted     Functional Status:  Mobility Status: Independent  Equipment Currently Used at Home:  (blood pressure cuff )  Transportation: no concerns   Lives with parents   Works however does not share what he does for work      Psychosocial:  Current living arrangement: I live in a private home with family  Financial/Insurance: No concerns noted      Resources and Interventions:  Current Resources:  (NA);  (NA)  PAS Number:  (NA)  Senior Linkage Line Referral  Placed:  (NA)  Advanced Care Plans/Directives on file:: No  Referrals Placed:  (NA)     Goals:   #1 low sodium diet - under 2000 mg sodium daily   #2 monitor blood pressure call with readings above 140/90    Patient/Caregiver understanding: yes   Frequency of Care Coordination: 1 WEEK   Upcoming appointment:  (NONE IN EPIC)     Plan:   Patient will f/u with Dr. Hooper nephrology tomorrow   Patient will continue to work on goals   Patient to call with questions/concerns   CCRN will f/u with patient in 1 week - CCRN mailed consent to communicate - [facility] care plan and care coordination letter     Sultana Urbina Care Coordinator RN  [facility]  [phone]  August 14, 2017

## 2017-08-25 DIAGNOSIS — I10 BENIGN HYPERTENSION: ICD-10-CM

## 2017-08-25 DIAGNOSIS — N18.30 CHRONIC KIDNEY DISEASE, STAGE III (MODERATE) (H): Primary | ICD-10-CM

## 2017-08-25 DIAGNOSIS — R80.9 PROTEINURIA: ICD-10-CM

## 2017-08-25 LAB
ANION GAP SERPL CALCULATED.3IONS-SCNC: 6 MMOL/L (ref 3–14)
BUN SERPL-MCNC: 18 MG/DL (ref 7–30)
CALCIUM SERPL-MCNC: 9.2 MG/DL (ref 8.5–10.1)
CHLORIDE SERPL-SCNC: 109 MMOL/L (ref 94–109)
CO2 SERPL-SCNC: 26 MMOL/L (ref 20–32)
CREAT SERPL-MCNC: 2.28 MG/DL (ref 0.66–1.25)
GFR SERPL CREATININE-BSD FRML MDRD: 36 ML/MIN/1.7M2
GLUCOSE SERPL-MCNC: 104 MG/DL (ref 70–99)
POTASSIUM SERPL-SCNC: 4.3 MMOL/L (ref 3.4–5.3)
SODIUM SERPL-SCNC: 141 MMOL/L (ref 133–144)

## 2017-08-25 PROCEDURE — 80048 BASIC METABOLIC PNL TOTAL CA: CPT | Performed by: FAMILY MEDICINE

## 2017-08-25 PROCEDURE — 36415 COLL VENOUS BLD VENIPUNCTURE: CPT | Performed by: FAMILY MEDICINE

## 2017-09-08 DIAGNOSIS — R80.9 PROTEINURIA: ICD-10-CM

## 2017-09-08 DIAGNOSIS — N18.30 CHRONIC KIDNEY DISEASE, STAGE III (MODERATE) (H): Primary | ICD-10-CM

## 2017-09-15 DIAGNOSIS — N18.30 CHRONIC KIDNEY DISEASE, STAGE III (MODERATE) (H): ICD-10-CM

## 2017-09-15 DIAGNOSIS — R80.9 PROTEINURIA: ICD-10-CM

## 2017-09-15 PROCEDURE — 36415 COLL VENOUS BLD VENIPUNCTURE: CPT | Performed by: FAMILY MEDICINE

## 2017-09-15 PROCEDURE — 80048 BASIC METABOLIC PNL TOTAL CA: CPT | Performed by: FAMILY MEDICINE

## 2017-09-16 LAB
ANION GAP SERPL CALCULATED.3IONS-SCNC: 13 MMOL/L (ref 3–14)
BUN SERPL-MCNC: 38 MG/DL (ref 7–30)
CALCIUM SERPL-MCNC: 9 MG/DL (ref 8.5–10.1)
CHLORIDE SERPL-SCNC: 108 MMOL/L (ref 94–109)
CO2 SERPL-SCNC: 22 MMOL/L (ref 20–32)
CREAT SERPL-MCNC: 2.56 MG/DL (ref 0.66–1.25)
GFR SERPL CREATININE-BSD FRML MDRD: 32 ML/MIN/1.7M2
GLUCOSE SERPL-MCNC: 103 MG/DL (ref 70–99)
POTASSIUM SERPL-SCNC: 4.5 MMOL/L (ref 3.4–5.3)
SODIUM SERPL-SCNC: 143 MMOL/L (ref 133–144)

## 2017-09-26 DIAGNOSIS — I10 ESSENTIAL HYPERTENSION, MALIGNANT: ICD-10-CM

## 2017-09-26 DIAGNOSIS — N18.30 CHRONIC KIDNEY DISEASE, STAGE III (MODERATE) (H): Primary | ICD-10-CM

## 2017-10-01 ENCOUNTER — CARE COORDINATION (OUTPATIENT)
Dept: CARE COORDINATION | Facility: CLINIC | Age: 22
End: 2017-10-01

## 2017-10-01 NOTE — LETTER
[place] CARE COORDINATION  [facility]   [address] [place] [address]. [address]  [phone]    October 9, 2017      Taylor Valiente  [address] [facility] [address]    Dear Taylor,  I am the Clinic Care Coordinator that works with your primary care provider's clinic. I recently tried to call and was unable to reach you. Below is a description of what Clinic Care Coordination is and how I can further assist you.     The Clinic Care Coordinator role is a Registered Nurse and/or  who understands the health care system. The goal of Clinic Care Coordination is to help you manage your health and improve access to the [facility] in the most efficient manner.  The Registered Nurse can assist you in meeting your health care goals by providing education, coordinating services, and strengthening the communication among your providers. The  can assist you with financial, behavioral, psychosocial, and chemical dependency and counseling/psychiatric resources.    Please feel free to keep this letter and contact information to contact me at [phone] with any further questions or concerns that may arise. We at [facility] are focused on providing you with the highest-quality healthcare experience possible and that all starts with you.       Sincerely,     Sultana Urbina Care Coordinator RN  [facility]  [phone]  October 9, 2017

## 2017-10-03 NOTE — PROGRESS NOTES
Clinic Care Coordination Contact  [facility]/Voicemail    Referral Source: Care Team ([employer] GALLITO MOREAU)  Clinical Data: Care Coordinator Outreach  Outreach attempted x 1 10/3 and outreach x 2 10/5.  Left message on voicemail with call back information and requested return call.  Plan:  Care Coordinator will try to reach patient again in 2 business days with unable to contact letter     Sultana Urbina Care Coordinator RN  [facility]  [phone]  October 3, 2017

## 2017-10-06 DIAGNOSIS — N18.30 CHRONIC KIDNEY DISEASE, STAGE III (MODERATE) (H): ICD-10-CM

## 2017-10-06 DIAGNOSIS — I10 ESSENTIAL HYPERTENSION, MALIGNANT: ICD-10-CM

## 2017-10-06 PROCEDURE — 80069 RENAL FUNCTION PANEL: CPT | Performed by: FAMILY MEDICINE

## 2017-10-06 PROCEDURE — 36415 COLL VENOUS BLD VENIPUNCTURE: CPT | Performed by: FAMILY MEDICINE

## 2017-10-07 LAB
ALBUMIN SERPL-MCNC: 3.7 G/DL (ref 3.4–5)
ANION GAP SERPL CALCULATED.3IONS-SCNC: 8 MMOL/L (ref 3–14)
BUN SERPL-MCNC: 27 MG/DL (ref 7–30)
CALCIUM SERPL-MCNC: 9.2 MG/DL (ref 8.5–10.1)
CHLORIDE SERPL-SCNC: 108 MMOL/L (ref 94–109)
CO2 SERPL-SCNC: 23 MMOL/L (ref 20–32)
CREAT SERPL-MCNC: 2.2 MG/DL (ref 0.66–1.25)
GFR SERPL CREATININE-BSD FRML MDRD: 38 ML/MIN/1.7M2
GLUCOSE SERPL-MCNC: 98 MG/DL (ref 70–99)
PHOSPHATE SERPL-MCNC: 3.6 MG/DL (ref 2.5–4.5)
POTASSIUM SERPL-SCNC: 4.5 MMOL/L (ref 3.4–5.3)
SODIUM SERPL-SCNC: 139 MMOL/L (ref 133–144)

## 2017-10-09 NOTE — PROGRESS NOTES
Clinic Care Coordination Contact  [facility]    Referral Source: Care Team ([employer] GALLITO MOREAU)  Clinical Data: Care Coordinator Outreach - DM/CKD/HTN   Outreach attempted x 3.  UNABLE TO CONTACT LETTER MAILED    Plan: Care Coordinator will f/u in 7-10 business days and if no contact from patient will close care coordination at that time.     Sultana Urbina Care Coordinator RN  [facility]  [phone]  October 9, 2017

## 2017-10-29 ENCOUNTER — CARE COORDINATION (OUTPATIENT)
Dept: CARE COORDINATION | Facility: CLINIC | Age: 22
End: 2017-10-29

## 2017-10-29 NOTE — PROGRESS NOTES
Clinic Care Coordination Contact    Situation: Patient chart reviewed by RN care coordinator.    Background: DM/CKD/HTN - received from Abby HERNANDEZ RN [employer]     Assessment: CCRN was unable to  F/u with patient via phone as he did not return calls - unable to assess goals, unable to contact letter was mailed and no contact from patient     Plan/Recommendations: no further care coordination outreach at this time as unable to reach patient - will advise Abby KASPER that unable to reach patient     Sultana Urbina Care Coordinator RN  [facility]  [phone]  October 29, 2017

## 2018-01-04 ENCOUNTER — TRANSFERRED RECORDS (OUTPATIENT)
Dept: HEALTH INFORMATION MANAGEMENT | Facility: CLINIC | Age: 23
End: 2018-01-04

## 2018-01-05 ENCOUNTER — MEDICAL CORRESPONDENCE (OUTPATIENT)
Dept: HEALTH INFORMATION MANAGEMENT | Facility: CLINIC | Age: 23
End: 2018-01-05

## 2018-01-05 DIAGNOSIS — N18.30 CHRONIC KIDNEY DISEASE, STAGE III (MODERATE) (H): Primary | ICD-10-CM

## 2018-01-05 DIAGNOSIS — I10 BENIGN ESSENTIAL HYPERTENSION: ICD-10-CM

## 2018-01-07 DIAGNOSIS — I16.0 HYPERTENSIVE URGENCY: ICD-10-CM

## 2018-01-09 RX ORDER — CLONIDINE HYDROCHLORIDE 0.1 MG/1
TABLET ORAL
Start: 2018-01-09

## 2018-01-09 NOTE — TELEPHONE ENCOUNTER
Denied with note to pharmacist:  Per Dr. Lawrence, please forward this refill request to patient's nephrologist.    Denton Abreu RN

## 2018-01-09 NOTE — TELEPHONE ENCOUNTER
"Routing refill request to provider for review/approval because:  Labs out of range:  Labs are ordered by nephrology  Gerardo Hooper RN, BSN        Last Written Prescription Date:  7/28/17  Last Fill Quantity: 120,  # refills: 1   Last Office Visit with [facility] or [facility] prescribing provider:  7/28/17   Future Office Visit:     Requested Prescriptions   Pending Prescriptions Disp Refills     cloNIDine (CATAPRES) 0.1 MG tablet [Pharmacy Med Name: CLONIDINE HCL 0.1MG TABS] 120 tablet 1     Sig: TAKE ONE TABLET BY MOUTH TWICE A DAY    Central Acting Antiadrenergic Agents Failed    1/7/2018 10:13 PM       Failed - Normal Serum Creatinine on file within past 12 months    Recent Labs   Lab Test  10/06/17   1359   CR  2.20*            Passed - Blood pressure less than 140/90    BP Readings from Last 3 Encounters:   07/28/17 130/85   07/20/17 143/90   07/14/17 (!) 202/159                Passed - Patient is 6 years of age or older       Passed - Past or future appointment on file.    Patient had office visit in the last year or has a visit in the next 30 days with authorizing provider.  See \"Patient Info\" tab in inbasket, or \"Choose Columns\" in Meds & Orders section of the refill encounter.                 "

## 2018-01-12 DIAGNOSIS — I10 BENIGN ESSENTIAL HYPERTENSION: ICD-10-CM

## 2018-01-12 DIAGNOSIS — N18.30 CHRONIC KIDNEY DISEASE, STAGE III (MODERATE) (H): ICD-10-CM

## 2018-01-12 PROCEDURE — 80069 RENAL FUNCTION PANEL: CPT | Performed by: FAMILY MEDICINE

## 2018-01-12 PROCEDURE — 36415 COLL VENOUS BLD VENIPUNCTURE: CPT | Performed by: FAMILY MEDICINE

## 2018-01-13 LAB
ALBUMIN SERPL-MCNC: 3.6 G/DL (ref 3.4–5)
ANION GAP SERPL CALCULATED.3IONS-SCNC: 11 MMOL/L (ref 3–14)
BUN SERPL-MCNC: 33 MG/DL (ref 7–30)
CALCIUM SERPL-MCNC: 8.9 MG/DL (ref 8.5–10.1)
CHLORIDE SERPL-SCNC: 106 MMOL/L (ref 94–109)
CO2 SERPL-SCNC: 21 MMOL/L (ref 20–32)
CREAT SERPL-MCNC: 1.92 MG/DL (ref 0.66–1.25)
GFR SERPL CREATININE-BSD FRML MDRD: 44 ML/MIN/1.7M2
GLUCOSE SERPL-MCNC: 82 MG/DL (ref 70–99)
PHOSPHATE SERPL-MCNC: 3.7 MG/DL (ref 2.5–4.5)
POTASSIUM SERPL-SCNC: 4.4 MMOL/L (ref 3.4–5.3)
SODIUM SERPL-SCNC: 138 MMOL/L (ref 133–144)

## 2018-01-16 DIAGNOSIS — I10 ESSENTIAL HYPERTENSION, MALIGNANT: Primary | ICD-10-CM

## 2018-01-16 DIAGNOSIS — N18.30 CKD (CHRONIC KIDNEY DISEASE) STAGE 3, GFR 30-59 ML/MIN (H): ICD-10-CM

## 2018-01-16 DIAGNOSIS — R80.9 PROTEINURIA: ICD-10-CM

## 2018-02-23 DIAGNOSIS — I10 ESSENTIAL HYPERTENSION, MALIGNANT: ICD-10-CM

## 2018-02-23 DIAGNOSIS — R80.9 PROTEINURIA: ICD-10-CM

## 2018-02-23 DIAGNOSIS — N18.30 CKD (CHRONIC KIDNEY DISEASE) STAGE 3, GFR 30-59 ML/MIN (H): ICD-10-CM

## 2018-02-23 PROCEDURE — 36415 COLL VENOUS BLD VENIPUNCTURE: CPT | Performed by: FAMILY MEDICINE

## 2018-02-23 PROCEDURE — 80069 RENAL FUNCTION PANEL: CPT | Performed by: FAMILY MEDICINE

## 2018-02-25 LAB
ALBUMIN SERPL-MCNC: 3.9 G/DL (ref 3.4–5)
ANION GAP SERPL CALCULATED.3IONS-SCNC: 9 MMOL/L (ref 3–14)
BUN SERPL-MCNC: 40 MG/DL (ref 7–30)
CALCIUM SERPL-MCNC: 9.2 MG/DL (ref 8.5–10.1)
CHLORIDE SERPL-SCNC: 108 MMOL/L (ref 94–109)
CO2 SERPL-SCNC: 22 MMOL/L (ref 20–32)
CREAT SERPL-MCNC: 1.9 MG/DL (ref 0.66–1.25)
GFR SERPL CREATININE-BSD FRML MDRD: 45 ML/MIN/1.7M2
GLUCOSE SERPL-MCNC: 93 MG/DL (ref 70–99)
PHOSPHATE SERPL-MCNC: 3.7 MG/DL (ref 2.5–4.5)
POTASSIUM SERPL-SCNC: 4.5 MMOL/L (ref 3.4–5.3)
SODIUM SERPL-SCNC: 139 MMOL/L (ref 133–144)

## 2018-03-12 LAB
CATECHOLS UR-IMP: NORMAL
COLLECT DURATION TIME UR: 24 HR
CREAT 24H UR-MRATE: 1587 MG/D (ref 1000–2500)
CREAT UR-MCNC: 92 MG/DL
DOPAMINE 24H UR-MRATE: 167 UG/D (ref 77–324)
DOPAMINE UR-MCNC: 2 UG/L
DOPAMINE/CREAT UR: 105 UG/G CRT (ref 0–250)
EPINEPH 24H UR-MRATE: 3 UG/D (ref 1–7)
EPINEPH UR-MCNC: 97 UG/L
EPINEPH/CREAT UR: 2 UG/G CRT (ref 0–20)
NOREPINEPH 24H UR-MRATE: 40 UG/D (ref 16–71)
NOREPINEPH UR-MCNC: 23 UG/L
NOREPINEPH/CREAT UR: 25 UG/G CRT (ref 0–45)
SPECIMEN VOL ?TM UR: 1725 ML

## 2018-08-04 ENCOUNTER — HEALTH MAINTENANCE LETTER (OUTPATIENT)
Age: 23
End: 2018-08-04

## 2018-09-14 ENCOUNTER — TRANSFERRED RECORDS (OUTPATIENT)
Dept: HEALTH INFORMATION MANAGEMENT | Facility: CLINIC | Age: 23
End: 2018-09-14

## 2018-10-15 DIAGNOSIS — I16.0 HYPERTENSIVE URGENCY: ICD-10-CM

## 2018-10-15 NOTE — LETTER
October 16, 2018          Taylor Valiente  [address] [address] [facility] [address]        Dear Taylor,     We sent a one month refill of metoprolol to your pharmacy today. This is a reminder to schedule an office visit as soon as possible and before the next refill. Your last clinic visit was   July 28, 2017.     Taking care of your health is important to us and ongoing visits with a provider are vital to your care. Please let us know if you have any questions about this reminder.     Sincerely,     GALLITO Chawla - Care Team of Priyank Lawrence MD

## 2018-10-15 NOTE — TELEPHONE ENCOUNTER
"Requested Prescriptions   Pending Prescriptions Disp Refills     metoprolol tartrate (LOPRESSOR) 100 MG tablet [Pharmacy Med Name: METOPROLOL TARTRATE 100MG TABS]  Last Written Prescription Date:  7/28/18  Last Fill Quantity: 180,  # refills: 3   Last office visit: 7/28/2017 with prescribing provider:  Howard   Future Office Visit:     180 tablet 3     Sig: TAKE ONE TABLET BY MOUTH TWICE A DAY    Beta-Blockers Protocol Failed    10/15/2018  5:01 AM       Failed - Blood pressure under 140/90 in past 12 months    BP Readings from Last 3 Encounters:   07/28/17 130/85   07/20/17 143/90   07/14/17 (!) 202/159          Failed - Recent (12 mo) or future (30 days) visit within the authorizing provider's specialty    Patient had office visit in the last 12 months or has a visit in the next 30 days with authorizing provider or within the authorizing provider's specialty.  See \"Patient Info\" tab in inbasket, or \"Choose Columns\" in Meds & Orders section of the refill encounter.           Passed - Patient is age 6 or older          "

## 2018-10-16 RX ORDER — METOPROLOL TARTRATE 100 MG
TABLET ORAL
Qty: 60 TABLET | Refills: 0 | Status: SHIPPED | OUTPATIENT
Start: 2018-10-16 | End: 2018-11-16

## 2018-10-16 NOTE — TELEPHONE ENCOUNTER
Medication is being filled for 1 time refill only due to:  Patient needs to be seen because it has been more than one year since last visit.    Letter.  Denton Abreu RN

## 2018-11-16 DIAGNOSIS — I16.0 HYPERTENSIVE URGENCY: ICD-10-CM

## 2018-11-16 NOTE — LETTER
November 19, 2018          Taylor Valiente  [address] [facility] [facility] [address]        Dear Taylor,     We sent a 7 day refill of metoprolol to your pharmacy today. This is a reminder to schedule an office visit. No further refills will be approved until your next appointment.    Taking care of your health is important to us and ongoing visits with a provider are vital to your care. Please let us know if you have any questions about this reminder.    Sincerely,     GALLITO Chawla - Care Team of Priyank Lawrence MD

## 2018-11-16 NOTE — TELEPHONE ENCOUNTER
"Requested Prescriptions   Pending Prescriptions Disp Refills     metoprolol tartrate (LOPRESSOR) 100 MG tablet [Pharmacy Med Name: METOPROLOL TARTRATE 100MG TABS] 60 tablet 0    Last Written Prescription Date:  10/16/18  Last Fill Quantity: 60,  # refills: 0   Last Office Visit: 7/28/2017   Future Office Visit:      Sig: TAKE ONE TABLET BY MOUTH TWICE A DAY (NEED TO BE SEEN IN CLINIC FOR FURTHER REFILLS)    Beta-Blockers Protocol Failed    11/16/2018  5:02 AM       Failed - Blood pressure under 140/90 in past 12 months    BP Readings from Last 3 Encounters:   07/28/17 130/85   07/20/17 143/90   07/14/17 (!) 202/159                Failed - Recent (12 mo) or future (30 days) visit within the authorizing provider's specialty    Patient had office visit in the last 12 months or has a visit in the next 30 days with authorizing provider or within the authorizing provider's specialty.  See \"Patient Info\" tab in inbasket, or \"Choose Columns\" in Meds & Orders section of the refill encounter.             Passed - Patient is age 6 or older        From last Refill:  Denton Abreu RN     10/16/18   11:51 AM   Note      Medication is being filled for 1 time refill only due to:  Patient needs to be seen because it has been more than one year since last visit.    Letter.  Denton Abreu, RN          "

## 2018-11-19 RX ORDER — METOPROLOL TARTRATE 100 MG
TABLET ORAL
Qty: 14 TABLET | Refills: 0 | Status: SHIPPED | OUTPATIENT
Start: 2018-11-19 | End: 2018-11-26

## 2018-11-19 NOTE — TELEPHONE ENCOUNTER
Prescription approved per [facility] Refill Protocol. #14 (7 days)  per Dr. Lawrence. Letter mailed.   Denton Abreu RN

## 2018-11-26 ENCOUNTER — OFFICE VISIT (OUTPATIENT)
Dept: FAMILY MEDICINE | Facility: CLINIC | Age: 23
End: 2018-11-26
Payer: COMMERCIAL

## 2018-11-26 VITALS
RESPIRATION RATE: 16 BRPM | SYSTOLIC BLOOD PRESSURE: 123 MMHG | HEART RATE: 60 BPM | OXYGEN SATURATION: 100 % | BODY MASS INDEX: 39.62 KG/M2 | DIASTOLIC BLOOD PRESSURE: 70 MMHG | HEIGHT: 71 IN | TEMPERATURE: 98.5 F | WEIGHT: 283 LBS

## 2018-11-26 DIAGNOSIS — E66.01 MORBID OBESITY DUE TO EXCESS CALORIES (H): ICD-10-CM

## 2018-11-26 DIAGNOSIS — Z23 NEED FOR TDAP VACCINATION: ICD-10-CM

## 2018-11-26 DIAGNOSIS — Z00.01 ENCOUNTER FOR ROUTINE ADULT MEDICAL EXAM WITH ABNORMAL FINDINGS: Primary | ICD-10-CM

## 2018-11-26 DIAGNOSIS — L91.8 SKIN TAG: ICD-10-CM

## 2018-11-26 DIAGNOSIS — Z23 NEED FOR PROPHYLACTIC VACCINATION AND INOCULATION AGAINST INFLUENZA: ICD-10-CM

## 2018-11-26 DIAGNOSIS — I16.0 HYPERTENSIVE URGENCY: ICD-10-CM

## 2018-11-26 DIAGNOSIS — I10 BENIGN ESSENTIAL HYPERTENSION: ICD-10-CM

## 2018-11-26 PROCEDURE — 11200 RMVL SKIN TAGS UP TO&INC 15: CPT | Performed by: FAMILY MEDICINE

## 2018-11-26 PROCEDURE — 90471 IMMUNIZATION ADMIN: CPT | Performed by: FAMILY MEDICINE

## 2018-11-26 PROCEDURE — 99395 PREV VISIT EST AGE 18-39: CPT | Mod: 25 | Performed by: FAMILY MEDICINE

## 2018-11-26 PROCEDURE — 90472 IMMUNIZATION ADMIN EACH ADD: CPT | Performed by: FAMILY MEDICINE

## 2018-11-26 PROCEDURE — 90686 IIV4 VACC NO PRSV 0.5 ML IM: CPT | Performed by: FAMILY MEDICINE

## 2018-11-26 PROCEDURE — 90715 TDAP VACCINE 7 YRS/> IM: CPT | Performed by: FAMILY MEDICINE

## 2018-11-26 RX ORDER — METOPROLOL TARTRATE 100 MG
TABLET ORAL
Qty: 180 TABLET | Refills: 3 | Status: ON HOLD | OUTPATIENT
Start: 2018-11-26 | End: 2019-10-06

## 2018-11-26 RX ORDER — CLONIDINE HYDROCHLORIDE 0.1 MG/1
0.1 TABLET ORAL 2 TIMES DAILY
Qty: 120 TABLET | Refills: 1 | Status: CANCELLED | OUTPATIENT
Start: 2018-11-26

## 2018-11-26 RX ORDER — LISINOPRIL 40 MG/1
40 TABLET ORAL DAILY
COMMUNITY
End: 2020-09-24

## 2018-11-26 ASSESSMENT — ENCOUNTER SYMPTOMS
CHILLS: 0
DYSURIA: 0
NAUSEA: 0
ABDOMINAL PAIN: 0
CONSTIPATION: 0
HEARTBURN: 0
NERVOUS/ANXIOUS: 1
PALPITATIONS: 0
MYALGIAS: 0
HEMATOCHEZIA: 0
PARESTHESIAS: 0
WEAKNESS: 0
EYE PAIN: 0
SHORTNESS OF BREATH: 0
DIZZINESS: 0
ARTHRALGIAS: 0
JOINT SWELLING: 0
HEADACHES: 0
FEVER: 0
FREQUENCY: 0
HEMATURIA: 0
COUGH: 0
SORE THROAT: 0
DIARRHEA: 0

## 2018-11-26 NOTE — MR AVS SNAPSHOT
After Visit Summary   11/26/2018    Taylor Valiente    MRN: [id number]           Patient Information     Date Of Birth          1995        Visit Information        Provider Department      11/26/2018 7:30 AM Priyank Lawrence MD [facility]        Today's Diagnoses     Encounter for routine adult medical exam with abnormal findings    -  1    Hypertensive urgency        Benign essential hypertension        Morbid obesity due to excess calories (H)        Need for prophylactic vaccination and inoculation against influenza        Need for Tdap vaccination        Skin tag          Care Instructions      Preventive Health Recommendations  Male Ages 21 - 25     Yearly exam:             See your health care provider every year in order to  o   Review health changes.   o   Discuss preventive care.    o   Review your medicines if your doctor has prescribed any.    You should be tested each year for STDs (sexually transmitted diseases).     Talk to your provider about cholesterol testing.      If you are at risk for diabetes, you should have a diabetes test (fasting glucose).    Shots: Get a flu shot each year. Get a tetanus shot every 10 years.     Nutrition:    Eat at least 5 servings of fruits and vegetables daily.     Eat whole-grain bread, whole-wheat pasta and brown rice instead of white grains and rice.     Get adequate calcium and Vitamin D.     Lifestyle    Exercise for at least 150 minutes a week (30 minutes a day, 5 days a week). This will help you control your weight and prevent disease.     Limit alcohol to one drink per day.     No smoking.     Wear sunscreen to prevent skin cancer.     See your dentist every six months for an exam and cleaning.             Follow-ups after your visit        Additional Services     HEALTH  REFERRAL       Your provider has referred you to a Health .    A Health  will reach out to the patient within three days, if the following  "criteria has been met.     Clinic: [facility]    Reason for Referral: Obesity    Patient does have knowledge of this service.    Patient Criteria: Obesity (BMI > 35kg/m2)    Provider's Main Focus: Diet/Weight Loss                  Follow-up notes from your care team     Return in about 1 year (around 11/26/2019).      Who to contact     If you have questions or need follow up information about today's clinic visit or your schedule please contact [facility] directly at [phone].  Normal or non-critical lab and imaging results will be communicated to you by MyChart, letter or phone within 4 business days after the clinic has received the results. If you do not hear from us within 7 days, please contact the clinic through MyChart or phone. If you have a critical or abnormal lab result, we will notify you by phone as soon as possible.  Submit refill requests through [employer] or call your pharmacy and they will forward the refill request to us. Please allow 3 business days for your refill to be completed.          Additional Information About Your Visit        Care EveryWhere ID     This is your Care EveryWhere ID. This could be used by other organizations to access your [place] medical records  [id number]        Your Vitals Were     Pulse Temperature Respirations Height Pulse Oximetry BMI (Body Mass Index)    60 98.5  F (36.9  C) (Oral) 16 5' 11\" (1.803 m) 100% 39.47 kg/m2       Blood Pressure from Last 3 Encounters:   11/26/18 123/70   07/28/17 130/85   07/20/17 143/90    Weight from Last 3 Encounters:   11/26/18 283 lb (128.4 kg)   07/28/17 272 lb (123.4 kg)   07/20/17 278 lb 10.6 oz (126.4 kg)              We Performed the Following     FLU VACCINE, SPLIT VIRUS, IM (QUADRIVALENT) [51214]- >3 YRS     HEALTH  REFERRAL     REMOVAL OF SKIN TAGS, FIRST 15     TDAP VACCINE (ADACEL)     Vaccine Administration, Each Additional [47990]          Today's Medication Changes        "   These changes are accurate as of 11/26/18  2:06 PM.  If you have any questions, ask your nurse or doctor.               These medicines have changed or have updated prescriptions.        Dose/Directions    metoprolol tartrate 100 MG tablet   Commonly known as:  LOPRESSOR   This may have changed:  See the new instructions.   Used for:  Hypertensive urgency   Changed by:  Priyank Lawrence MD        Twice daily   Quantity:  180 tablet   Refills:  3            Where to get your medicines      These medications were sent to [facility] [id number] [place] [address]  [id number] [place] [address]     Phone:  [phone]     metoprolol tartrate 100 MG tablet                Primary Care Provider Office Phone # Fax #    Priyank Lawrence [phone] [address] [facility] [address]        Equal Access to Services     [facility]: Rowan wilson hadasho Soomaali, waaxda luqadaha, qaybta kaalmada adelydiayada, jean-paul chavez . So [facility] [phone].    ATENCIÓN: Si habla español, tiene a spangler disposición servicios gratuitos de asistencia lingüística. [facility] [phone].    We comply with applicable federal civil rights laws and [place] laws. We do not discriminate on the basis of race, color, national origin, age, disability, sex, sexual orientation, or gender identity.            Thank you!     Thank you for choosing [facility]  for your care. Our goal is always to provide you with excellent care. Hearing back from our patients is one way we can continue to improve our services. Please take a few minutes to complete the written survey that you may receive in the mail after your visit with us. Thank you!             Your Updated Medication List - Protect others around you: Learn how to safely use, store and throw away your medicines at www.disposemymeds.org.          This list is accurate as of 11/26/18  2:06 PM.  Always use your  most recent med list.                   Brand Name Dispense Instructions for use Diagnosis    amLODIPine 10 MG tablet    NORVASC    90 tablet    Take 1 tablet (10 mg) by mouth daily    Hypertensive urgency       cloNIDine 0.1 MG tablet    CATAPRES    120 tablet    Take 1 tablet (0.1 mg) by mouth 2 times daily    Hypertensive urgency       lisinopril 40 MG tablet    PRINIVIL/ZESTRIL     Take 40 mg by mouth daily        metoprolol tartrate 100 MG tablet    LOPRESSOR    180 tablet    Twice daily    Hypertensive urgency

## 2018-11-26 NOTE — PROGRESS NOTES
SUBJECTIVE:   CC: Taylor Valiente is an 23 year old male who presents for preventative health visit.     Healthy Habits:  Answers for HPI/ROS submitted by the patient on 11/26/2018   Annual Exam:  Frequency of exercise:: 2-3 days/week  Getting at least 3 servings of Calcium per day:: NO  Diet:: Low salt  Taking medications regularly:: Yes  Medication side effects:: Not applicable  Bi-annual eye exam:: Yes  Dental care twice a year:: NO  Sleep apnea or symptoms of sleep apnea:: None  Positive for the following: nervous/anxious  Negative for the following: abdominal pain, Blood in stool, Blood in urine, chest pain, chills, congestion, constipation, cough, diarrhea, dizziness, ear pain, eye pain, fever, frequency, genital sores, headaches, hearing loss, heartburn, arthralgias, joint swelling, peripheral edema, mood changes, myalgias, nausea, dysuria, palpitations, Skin sensation changes, sore throat, urgency, rash, shortness of breath, visual disturbance, weakness  impotence: No  penile discharge: No  Additional concerns today:: Yes  PHQ-2 Score: 0  Duration of exercise:: 30-45 minutes           Today's PHQ-2 Score:   PHQ-2 ( 1999 Pf[employer]) 11/26/2018 7/28/2017   Q1: Little interest or pleasure in doing things 0 0   Q2: Feeling down, depressed or hopeless 0 0   PHQ-2 Score 0 0   Q1: Little interest or pleasure in doing things Not at all -   Q2: Feeling down, depressed or hopeless Not at all -   PHQ-2 Score 0 -       Abuse: Current or Past(Physical, Sexual or Emotional)- No  Do you feel safe in your environment - Yes    Social History   Substance Use Topics     Smoking status: Never Smoker     Smokeless tobacco: Never Used     Alcohol use No      If you drink alcohol do you typically have >3 drinks per day or >7 drinks per week? Not Applicable                      Last PSA: No results found for: PSA    Reviewed orders with patient. Reviewed health maintenance and updated orders accordingly - Yes  Labs reviewed in  EPIC  BP Readings from Last 3 Encounters:   11/26/18 (!) 150/118   07/28/17 130/85   07/20/17 143/90    Wt Readings from Last 3 Encounters:   11/26/18 283 lb (128.4 kg)   07/28/17 272 lb (123.4 kg)   07/20/17 278 lb 10.6 oz (126.4 kg)                  Patient Active Problem List   Diagnosis     Morbid obesity (H)     Hypertensive urgency     LVH (left ventricular hypertrophy) due to hypertensive disease     Malignant hypertension     Acute kidney injury (H)     CKD (chronic kidney disease) stage 3, GFR 30-59 ml/min (H)     Focal glomerular sclerosis     Benign essential hypertension     Past Surgical History:   Procedure Laterality Date     NO HISTORY OF SURGERY         Social History   Substance Use Topics     Smoking status: Never Smoker     Smokeless tobacco: Never Used     Alcohol use No     Family History   Problem Relation Age of Onset     Blood Disease Mother      has hep b     Diabetes Mother      gestionanal diabetes     Hypertension Mother      Obesity Father      Hypertension Father      Hypertension Maternal Grandmother      Diabetes Maternal Grandmother      Hypertension Paternal Grandmother      Hypertension Paternal Grandfather      Coronary Artery Disease Maternal Uncle          Current Outpatient Prescriptions   Medication Sig Dispense Refill     amLODIPine (NORVASC) 10 MG tablet Take 1 tablet (10 mg) by mouth daily 90 tablet 3     cloNIDine (CATAPRES) 0.1 MG tablet Take 1 tablet (0.1 mg) by mouth 2 times daily 120 tablet 1     lisinopril (PRINIVIL/ZESTRIL) 40 MG tablet Take 40 mg by mouth daily       metoprolol tartrate (LOPRESSOR) 100 MG tablet TAKE ONE TABLET BY MOUTH TWICE A DAY (NEED TO BE SEEN IN CLINIC FOR FURTHER REFILLS) 14 tablet 0     Allergies   Allergen Reactions     No Known Allergies        Reviewed and updated as needed this visit by clinical staff  Tobacco  Allergies  Meds  Med Hx  Surg Hx  Fam Hx  Soc Hx        Reviewed and updated as needed this visit by Provider        Past  "Medical History:   Diagnosis Date     Benign essential hypertension 7/28/2017     CKD (chronic kidney disease) stage 3, GFR 30-59 ml/min (H) 7/28/2017    Due to HTN     Focal glomerular sclerosis 7/28/2017    Due to Hypertension injury.     LVH (left ventricular hypertrophy) due to hypertensive disease 7/14/2017     Obesity, unspecified       Past Surgical History:   Procedure Laterality Date     NO HISTORY OF SURGERY         ROS:  CONSTITUTIONAL: NEGATIVE for fever, chills, change in weight  INTEGUMENTARY/SKIN: skin tags on the neck, bothersome, get stuck on the shirt collar.  EYES: NEGATIVE for vision changes or irritation  ENT: NEGATIVE for ear, mouth and throat problems  Plugged ears (common problem for patient)  RESP: NEGATIVE for significant cough or SOB  CV: NEGATIVE for chest pain, palpitations or peripheral edema  GI: NEGATIVE for nausea, abdominal pain, heartburn, or change in bowel habits   male: negative for dysuria, hematuria, decreased urinary stream, erectile dysfunction, urethral discharge  MUSCULOSKELETAL: NEGATIVE for significant arthralgias or myalgia  NEURO: NEGATIVE for weakness, dizziness or paresthesias  PSYCHIATRIC: NEGATIVE for changes in mood or affect    OBJECTIVE:   BP (!) 150/118 (BP Location: Right arm, Patient Position: Chair, Cuff Size: Adult Large)  Pulse 60  Temp 98.5  F (36.9  C) (Oral)  Resp 16  Ht 5' 11\" (1.803 m)  Wt 283 lb (128.4 kg)  SpO2 100%  BMI 39.47 kg/m2  EXAM:  GENERAL: healthy, alert and no distress  EYES: Eyes grossly normal to inspection, PERRL and conjunctivae and sclerae normal  HENT: right ear: occluded with wax, left ear: occluded with wax, nose and mouth without ulcers or lesions, oropharynx clear and oral mucous membranes moist  NECK: no adenopathy, no asymmetry, masses, or scars and thyroid normal to palpation  RESP: lungs clear to auscultation - no rales, rhonchi or wheezes  CV: regular rate and rhythm, normal S1 S2, no S3 or S4, no murmur, click or " rub, no peripheral edema and peripheral pulses strong  ABDOMEN: soft, nontender, no hepatosplenomegaly, no masses and bowel sounds normal  MS: no gross musculoskeletal defects noted, no edema  SKIN: 7 skin tags on the neck, range between 1mm to 4 mm in size.  NEURO: Normal strength and tone, mentation intact and speech normal  PSYCH: mentation appears normal, affect normal/bright    Diagnostic Test Results:  No results found for this or any previous visit (from the past 24 hour(s)).    ASSESSMENT/PLAN:   1. Encounter for routine adult medical exam with abnormal findings  Today I counseled the patient about diet, regular exercise and weight loss planning.      2. Hypertensive urgency  Controlled, BP now is better, continue folloiwng up with Nephrology.  - metoprolol tartrate (LOPRESSOR) 100 MG tablet; Twice daily  Dispense: 180 tablet; Refill: 3    3. Benign essential hypertension  As above.    4. Morbid obesity due to excess calories (H)  Today I counseled the patient about diet, regular exercise and weight loss planning.  Refer to   - HEALTH  REFERRAL    5. Need for prophylactic vaccination and inoculation against influenza    - FLU VACCINE, SPLIT VIRUS, IM (QUADRIVALENT) [55857]- >3 YRS  - Vaccine Administration, Each Additional [25676]    6. Need for Tdap vaccination    - TDAP VACCINE (ADACEL)    7. Skin tag  verbal consent was taken for removal of skin tags  After cleaning the area with Hibiclens, I injected about 1 cc of lidocaine 1% without epi into multiple skin tags, using a scissors and forceps I removed the lesions ffom the neck. the bleeding stopped by using Drysol.  Pt tolerated the procedure well,area was covered with bacitracin and guaze, pt was advised to watch for signs of infection.    - REMOVAL OF SKIN TAGS, FIRST 15    COUNSELING:  Reviewed preventive health counseling, as reflected in patient instructions       Regular exercise       Healthy diet/nutrition    BP Readings from Last 1  "Encounters:   11/26/18 (!) 150/118     Estimated body mass index is 39.47 kg/(m^2) as calculated from the following:    Height as of this encounter: 5' 11\" (1.803 m).    Weight as of this encounter: 283 lb (128.4 kg).      Weight management plan: Discussed healthy diet and exercise guidelines and patient will follow up in 12 months in clinic to re-evaluate.     reports that he has never smoked. He has never used smokeless tobacco.      Counseling Resources:  ATP IV Guidelines  Pooled Cohorts Equation Calculator  FRAX Risk Assessment  ICSI Preventive Guidelines  Dietary Guidelines for Americans, 2010  USDA's MyPlate  ASA Prophylaxis  Lung CA Screening    Priyank Lawrence MD  [facility]  "

## 2018-11-26 NOTE — PROGRESS NOTES

## 2018-11-30 ENCOUNTER — PATIENT OUTREACH (OUTPATIENT)
Dept: NURSING | Facility: CLINIC | Age: 23
End: 2018-11-30

## 2018-11-30 NOTE — PROGRESS NOTES
Received Health Coaching Referral-Outreached patient for the first time, but wasn't able to connect. Left message for call back.    [facility]    11/30/2018    4:18 PM

## 2018-12-11 ENCOUNTER — PATIENT OUTREACH (OUTPATIENT)
Dept: NURSING | Facility: CLINIC | Age: 23
End: 2018-12-11

## 2018-12-11 NOTE — PROGRESS NOTES
Outreached patient for second time, but wasn't able to connect. Left message for call back.    Abel Mendiola, Health    12/11/2018    1:49 PM

## 2018-12-21 ENCOUNTER — PATIENT OUTREACH (OUTPATIENT)
Dept: NURSING | Facility: CLINIC | Age: 23
End: 2018-12-21

## 2018-12-21 NOTE — PROGRESS NOTES
Outreached patient for third and final time, but wasn't able to connect. Unable to leave message as voicemail box is full.    Abel Mendiola, Health    12/21/2018    2:35 PM

## 2019-04-30 ENCOUNTER — TRANSFERRED RECORDS (OUTPATIENT)
Dept: HEALTH INFORMATION MANAGEMENT | Facility: CLINIC | Age: 24
End: 2019-04-30

## 2019-05-09 ENCOUNTER — MEDICAL CORRESPONDENCE (OUTPATIENT)
Dept: HEALTH INFORMATION MANAGEMENT | Facility: CLINIC | Age: 24
End: 2019-05-09

## 2019-05-09 DIAGNOSIS — E78.5 HYPERLIPIDEMIA: ICD-10-CM

## 2019-05-09 DIAGNOSIS — N18.30 CHRONIC KIDNEY DISEASE, STAGE III (MODERATE) (H): Primary | ICD-10-CM

## 2019-05-09 DIAGNOSIS — R80.9 PROTEINURIA: ICD-10-CM

## 2019-05-11 ENCOUNTER — HOSPITAL ENCOUNTER (OUTPATIENT)
Dept: CT IMAGING | Facility: CLINIC | Age: 24
Discharge: HOME | End: 2019-05-11
Attending: INTERNAL MEDICINE | Admitting: INTERNAL MEDICINE
Payer: COMMERCIAL

## 2019-05-11 DIAGNOSIS — I10 HYPERTENSION: ICD-10-CM

## 2019-05-11 PROCEDURE — 74150 CT ABDOMEN W/O CONTRAST: CPT

## 2019-06-06 ENCOUNTER — TRANSFERRED RECORDS (OUTPATIENT)
Dept: HEALTH INFORMATION MANAGEMENT | Facility: CLINIC | Age: 24
End: 2019-06-06

## 2019-07-03 DIAGNOSIS — E78.5 HYPERLIPIDEMIA: ICD-10-CM

## 2019-07-03 DIAGNOSIS — N18.30 CHRONIC KIDNEY DISEASE, STAGE III (MODERATE) (H): ICD-10-CM

## 2019-07-03 DIAGNOSIS — R80.9 PROTEINURIA: ICD-10-CM

## 2019-07-03 LAB
ALT SERPL W P-5'-P-CCNC: 40 U/L (ref 0–70)
AST SERPL W P-5'-P-CCNC: 20 U/L (ref 0–45)
CHOLEST SERPL-MCNC: 145 MG/DL
HDLC SERPL-MCNC: 34 MG/DL
HGB BLD-MCNC: 16.3 G/DL (ref 13.3–17.7)
LDLC SERPL CALC-MCNC: 86 MG/DL
NONHDLC SERPL-MCNC: 111 MG/DL
TRIGL SERPL-MCNC: 127 MG/DL

## 2019-07-03 PROCEDURE — 80061 LIPID PANEL: CPT | Performed by: INTERNAL MEDICINE

## 2019-07-03 PROCEDURE — 84450 TRANSFERASE (AST) (SGOT): CPT | Performed by: INTERNAL MEDICINE

## 2019-07-03 PROCEDURE — 84460 ALANINE AMINO (ALT) (SGPT): CPT | Performed by: INTERNAL MEDICINE

## 2019-07-03 PROCEDURE — 36415 COLL VENOUS BLD VENIPUNCTURE: CPT | Performed by: INTERNAL MEDICINE

## 2019-07-03 PROCEDURE — 84156 ASSAY OF PROTEIN URINE: CPT | Performed by: INTERNAL MEDICINE

## 2019-07-03 PROCEDURE — 85018 HEMOGLOBIN: CPT | Performed by: INTERNAL MEDICINE

## 2019-07-05 LAB
CREAT UR-MCNC: 25 MG/DL
PROT UR-MCNC: 0.67 G/L
PROT/CREAT 24H UR: 2.71 G/G CR (ref 0–0.2)

## 2019-10-04 ENCOUNTER — HOSPITAL ENCOUNTER (OUTPATIENT)
Facility: CLINIC | Age: 24
Setting detail: OBSERVATION
Discharge: HOME | End: 2019-10-06
Attending: EMERGENCY MEDICINE | Admitting: INTERNAL MEDICINE
Payer: COMMERCIAL

## 2019-10-04 ENCOUNTER — APPOINTMENT (OUTPATIENT)
Dept: CARDIOLOGY | Facility: CLINIC | Age: 24
End: 2019-10-04
Attending: INTERNAL MEDICINE
Payer: COMMERCIAL

## 2019-10-04 DIAGNOSIS — N17.9 ACUTE KIDNEY INJURY (H): Primary | ICD-10-CM

## 2019-10-04 DIAGNOSIS — R45.851 SUICIDAL IDEATION: ICD-10-CM

## 2019-10-04 DIAGNOSIS — I16.0 HYPERTENSIVE URGENCY: ICD-10-CM

## 2019-10-04 DIAGNOSIS — R79.89 ELEVATED TROPONIN: ICD-10-CM
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
## 2019-10-04 LAB
ALBUMIN SERPL-MCNC: 2.8 G/DL (ref 3.4–5)
ALP SERPL-CCNC: 78 U/L (ref 40–150)
ALT SERPL W P-5'-P-CCNC: 43 U/L (ref 0–70)
AMPHETAMINES UR QL SCN: NEGATIVE
ANION GAP SERPL CALCULATED.3IONS-SCNC: 6 MMOL/L (ref 3–14)
APAP SERPL-MCNC: <2 MG/L (ref 10–20)
AST SERPL W P-5'-P-CCNC: 19 U/L (ref 0–45)
BARBITURATES UR QL: NEGATIVE
BASOPHILS # BLD AUTO: 0.1 10E9/L (ref 0–0.2)
BASOPHILS NFR BLD AUTO: 0.5 %
BENZODIAZ UR QL: NEGATIVE
BILIRUB SERPL-MCNC: 0.2 MG/DL (ref 0.2–1.3)
BUN SERPL-MCNC: 28 MG/DL (ref 7–30)
CALCIUM SERPL-MCNC: 8.8 MG/DL (ref 8.5–10.1)
CANNABINOIDS UR QL SCN: POSITIVE
CHLORIDE SERPL-SCNC: 108 MMOL/L (ref 94–109)
CO2 SERPL-SCNC: 27 MMOL/L (ref 20–32)
COCAINE UR QL: NEGATIVE
CREAT SERPL-MCNC: 1.75 MG/DL (ref 0.66–1.25)
DIFFERENTIAL METHOD BLD: ABNORMAL
EOSINOPHIL # BLD AUTO: 0.5 10E9/L (ref 0–0.7)
EOSINOPHIL NFR BLD AUTO: 3.9 %
ERYTHROCYTE [DISTWIDTH] IN BLOOD BY AUTOMATED COUNT: 17.4 % (ref 10–15)
ETHANOL SERPL-MCNC: <0.01 G/DL
GFR SERPL CREATININE-BSD FRML MDRD: 54 ML/MIN/{1.73_M2}
GLUCOSE SERPL-MCNC: 109 MG/DL (ref 70–99)
HCT VFR BLD AUTO: 49.8 % (ref 40–53)
HGB BLD-MCNC: 15.8 G/DL (ref 13.3–17.7)
IMM GRANULOCYTES # BLD: 0.1 10E9/L (ref 0–0.4)
IMM GRANULOCYTES NFR BLD: 0.4 %
INTERPRETATION ECG - MUSE: NORMAL
INTERPRETATION ECG - MUSE: NORMAL
LYMPHOCYTES # BLD AUTO: 2.9 10E9/L (ref 0.8–5.3)
LYMPHOCYTES NFR BLD AUTO: 24.6 %
MAGNESIUM SERPL-MCNC: 1.9 MG/DL (ref 1.6–2.3)
MCH RBC QN AUTO: 25.7 PG (ref 26.5–33)
MCHC RBC AUTO-ENTMCNC: 31.7 G/DL (ref 31.5–36.5)
MCV RBC AUTO: 81 FL (ref 78–100)
MONOCYTES # BLD AUTO: 1.2 10E9/L (ref 0–1.3)
MONOCYTES NFR BLD AUTO: 9.7 %
NEUTROPHILS # BLD AUTO: 7.2 10E9/L (ref 1.6–8.3)
NEUTROPHILS NFR BLD AUTO: 60.9 %
NRBC # BLD AUTO: 0 10*3/UL
NRBC BLD AUTO-RTO: 0 /100
OPIATES UR QL SCN: NEGATIVE
PCP UR QL SCN: NEGATIVE
PLATELET # BLD AUTO: 226 10E9/L (ref 150–450)
POTASSIUM SERPL-SCNC: 3.4 MMOL/L (ref 3.4–5.3)
PROT SERPL-MCNC: 6.5 G/DL (ref 6.8–8.8)
RBC # BLD AUTO: 6.14 10E12/L (ref 4.4–5.9)
SALICYLATES SERPL-MCNC: <2 MG/DL
SODIUM SERPL-SCNC: 141 MMOL/L (ref 133–144)
TROPONIN I SERPL-MCNC: 0.04 UG/L (ref 0–0.04)
TROPONIN I SERPL-MCNC: 0.05 UG/L (ref 0–0.04)
TROPONIN I SERPL-MCNC: 0.05 UG/L (ref 0–0.04)
TROPONIN I SERPL-MCNC: 0.06 UG/L (ref 0–0.04)
WBC # BLD AUTO: 11.9 10E9/L (ref 4–11)

## 2019-10-04 PROCEDURE — 25000125 ZZHC RX 250: Performed by: EMERGENCY MEDICINE

## 2019-10-04 PROCEDURE — 80329 ANALGESICS NON-OPIOID 1 OR 2: CPT | Performed by: EMERGENCY MEDICINE

## 2019-10-04 PROCEDURE — 93005 ELECTROCARDIOGRAM TRACING: CPT

## 2019-10-04 PROCEDURE — 85025 COMPLETE CBC W/AUTO DIFF WBC: CPT | Performed by: EMERGENCY MEDICINE

## 2019-10-04 PROCEDURE — 84484 ASSAY OF TROPONIN QUANT: CPT | Performed by: EMERGENCY MEDICINE

## 2019-10-04 PROCEDURE — G0378 HOSPITAL OBSERVATION PER HR: HCPCS

## 2019-10-04 PROCEDURE — 83735 ASSAY OF MAGNESIUM: CPT | Performed by: EMERGENCY MEDICINE

## 2019-10-04 PROCEDURE — 96361 HYDRATE IV INFUSION ADD-ON: CPT

## 2019-10-04 PROCEDURE — 25800030 ZZH RX IP 258 OP 636: Performed by: EMERGENCY MEDICINE

## 2019-10-04 PROCEDURE — 25000132 ZZH RX MED GY IP 250 OP 250 PS 637: Performed by: INTERNAL MEDICINE

## 2019-10-04 PROCEDURE — 25000132 ZZH RX MED GY IP 250 OP 250 PS 637

## 2019-10-04 PROCEDURE — 40000264 ECHOCARDIOGRAM COMPLETE

## 2019-10-04 PROCEDURE — 80307 DRUG TEST PRSMV CHEM ANLYZR: CPT | Performed by: EMERGENCY MEDICINE

## 2019-10-04 PROCEDURE — 36415 COLL VENOUS BLD VENIPUNCTURE: CPT | Performed by: INTERNAL MEDICINE

## 2019-10-04 PROCEDURE — 25000128 H RX IP 250 OP 636: Performed by: EMERGENCY MEDICINE

## 2019-10-04 PROCEDURE — 90791 PSYCH DIAGNOSTIC EVALUATION: CPT

## 2019-10-04 PROCEDURE — 80320 DRUG SCREEN QUANTALCOHOLS: CPT | Performed by: EMERGENCY MEDICINE

## 2019-10-04 PROCEDURE — 80053 COMPREHEN METABOLIC PANEL: CPT | Performed by: EMERGENCY MEDICINE

## 2019-10-04 PROCEDURE — 25500064 ZZH RX 255 OP 636: Performed by: INTERNAL MEDICINE

## 2019-10-04 PROCEDURE — 96365 THER/PROPH/DIAG IV INF INIT: CPT

## 2019-10-04 PROCEDURE — 25000132 ZZH RX MED GY IP 250 OP 250 PS 637: Performed by: EMERGENCY MEDICINE

## 2019-10-04 PROCEDURE — 93306 TTE W/DOPPLER COMPLETE: CPT | Mod: 26 | Performed by: INTERNAL MEDICINE

## 2019-10-04 PROCEDURE — 99285 EMERGENCY DEPT VISIT HI MDM: CPT | Mod: 25

## 2019-10-04 PROCEDURE — 84484 ASSAY OF TROPONIN QUANT: CPT | Performed by: INTERNAL MEDICINE

## 2019-10-04 PROCEDURE — 99220 ZZC INITIAL OBSERVATION CARE,LEVL III: CPT | Performed by: INTERNAL MEDICINE

## 2019-10-04 PROCEDURE — 96366 THER/PROPH/DIAG IV INF ADDON: CPT

## 2019-10-04 PROCEDURE — 93005 ELECTROCARDIOGRAM TRACING: CPT | Mod: 76

## 2019-10-04 RX ORDER — NALOXONE HYDROCHLORIDE 0.4 MG/ML
.1-.4 INJECTION, SOLUTION INTRAMUSCULAR; INTRAVENOUS; SUBCUTANEOUS
Status: DISCONTINUED | OUTPATIENT
Start: 2019-10-04 | End: 2019-10-06 | Stop reason: HOSPADM

## 2019-10-04 RX ORDER — ACETAMINOPHEN 650 MG/1
650 SUPPOSITORY RECTAL EVERY 4 HOURS PRN
Status: DISCONTINUED | OUTPATIENT
Start: 2019-10-04 | End: 2019-10-06 | Stop reason: HOSPADM

## 2019-10-04 RX ORDER — ONDANSETRON 2 MG/ML
4 INJECTION INTRAMUSCULAR; INTRAVENOUS EVERY 6 HOURS PRN
Status: DISCONTINUED | OUTPATIENT
Start: 2019-10-04 | End: 2019-10-06 | Stop reason: HOSPADM

## 2019-10-04 RX ORDER — CHOLECALCIFEROL (VITAMIN D3) 50 MCG
50 TABLET ORAL DAILY
Status: ON HOLD | COMMUNITY
End: 2023-12-08

## 2019-10-04 RX ORDER — LISINOPRIL 40 MG/1
40 TABLET ORAL DAILY
Status: DISCONTINUED | OUTPATIENT
Start: 2019-10-04 | End: 2019-10-06 | Stop reason: HOSPADM

## 2019-10-04 RX ORDER — AMLODIPINE BESYLATE 10 MG/1
10 TABLET ORAL DAILY
Status: DISCONTINUED | OUTPATIENT
Start: 2019-10-04 | End: 2019-10-06 | Stop reason: HOSPADM

## 2019-10-04 RX ORDER — LORAZEPAM 1 MG/1
1 TABLET ORAL ONCE
Status: COMPLETED | OUTPATIENT
Start: 2019-10-04 | End: 2019-10-04

## 2019-10-04 RX ORDER — ONDANSETRON 4 MG/1
4 TABLET, ORALLY DISINTEGRATING ORAL EVERY 6 HOURS PRN
Status: DISCONTINUED | OUTPATIENT
Start: 2019-10-04 | End: 2019-10-06 | Stop reason: HOSPADM

## 2019-10-04 RX ORDER — ACETAMINOPHEN 325 MG/1
650 TABLET ORAL EVERY 4 HOURS PRN
Status: DISCONTINUED | OUTPATIENT
Start: 2019-10-04 | End: 2019-10-06 | Stop reason: HOSPADM

## 2019-10-04 RX ORDER — CLONIDINE HYDROCHLORIDE 0.1 MG/1
0.2 TABLET ORAL 2 TIMES DAILY
Status: DISCONTINUED | OUTPATIENT
Start: 2019-10-04 | End: 2019-10-05

## 2019-10-04 RX ORDER — HYDRALAZINE HYDROCHLORIDE 25 MG/1
25 TABLET, FILM COATED ORAL 3 TIMES DAILY
Status: DISCONTINUED | OUTPATIENT
Start: 2019-10-04 | End: 2019-10-05

## 2019-10-04 RX ORDER — METOPROLOL TARTRATE 100 MG
100 TABLET ORAL 2 TIMES DAILY
Status: DISCONTINUED | OUTPATIENT
Start: 2019-10-04 | End: 2019-10-05

## 2019-10-04 RX ORDER — ASPIRIN 81 MG/1
81 TABLET ORAL DAILY
COMMUNITY

## 2019-10-04 RX ORDER — SODIUM CHLORIDE 9 MG/ML
1000 INJECTION, SOLUTION INTRAVENOUS CONTINUOUS
Status: DISCONTINUED | OUTPATIENT
Start: 2019-10-04 | End: 2019-10-04

## 2019-10-04 RX ORDER — CLONIDINE HYDROCHLORIDE 0.1 MG/1
0.1 TABLET ORAL 2 TIMES DAILY
Status: DISCONTINUED | OUTPATIENT
Start: 2019-10-04 | End: 2019-10-04

## 2019-10-04 RX ORDER — HYDRALAZINE HYDROCHLORIDE 25 MG/1
25 TABLET, FILM COATED ORAL 3 TIMES DAILY
Status: DISCONTINUED | OUTPATIENT
Start: 2019-10-04 | End: 2019-10-04

## 2019-10-04 RX ORDER — ATORVASTATIN CALCIUM 10 MG/1
10 TABLET, FILM COATED ORAL AT BEDTIME
Status: ON HOLD | COMMUNITY
Start: 2019-08-02 | End: 2020-09-12

## 2019-10-04 RX ADMIN — CLONIDINE HYDROCHLORIDE 0.2 MG: 0.1 TABLET ORAL at 21:41

## 2019-10-04 RX ADMIN — Medication 2 G: at 05:11

## 2019-10-04 RX ADMIN — AMLODIPINE BESYLATE 10 MG: 10 TABLET ORAL at 11:51

## 2019-10-04 RX ADMIN — METOPROLOL TARTRATE 100 MG: 100 TABLET, FILM COATED ORAL at 21:41

## 2019-10-04 RX ADMIN — LORAZEPAM 1 MG: 1 TABLET ORAL at 01:44

## 2019-10-04 RX ADMIN — HYDRALAZINE HYDROCHLORIDE 25 MG: 25 TABLET, FILM COATED ORAL at 13:49

## 2019-10-04 RX ADMIN — CLONIDINE HYDROCHLORIDE 0.1 MG: 0.1 TABLET ORAL at 11:52

## 2019-10-04 RX ADMIN — SODIUM CHLORIDE 1000 ML: 9 INJECTION, SOLUTION INTRAVENOUS at 01:53

## 2019-10-04 RX ADMIN — HYDRALAZINE HYDROCHLORIDE 25 MG: 25 TABLET, FILM COATED ORAL at 18:57

## 2019-10-04 RX ADMIN — HUMAN ALBUMIN MICROSPHERES AND PERFLUTREN 3 ML: 10; .22 INJECTION, SOLUTION INTRAVENOUS at 08:45

## 2019-10-04 RX ADMIN — SODIUM CHLORIDE 1000 ML: 0.9 INJECTION, SOLUTION INTRAVENOUS at 04:19

## 2019-10-04 RX ADMIN — METOPROLOL TARTRATE 100 MG: 100 TABLET, FILM COATED ORAL at 09:01

## 2019-10-04 RX ADMIN — LISINOPRIL 40 MG: 40 TABLET ORAL at 13:19

## 2019-10-04 RX ADMIN — ASPIRIN 325 MG: 325 TABLET, DELAYED RELEASE ORAL at 03:39

## 2019-10-04 ASSESSMENT — MIFFLIN-ST. JEOR: SCORE: 2348.43

## 2019-10-04 NOTE — CONSULTS
Psychiatry Service  Patient was seen for full evaluation this am by [facility] psychiatry service which was reviewed by me today. Agree with recommendation for transfer to psychiatry when medically stable.. Psychiatric medication recommendation deferred to treating psychiatrist after transfer. Call prn until transfer.  Kelsey Calles M.D.

## 2019-10-04 NOTE — PLAN OF CARE
Pleasantly alert and appropriate.  Continue to monitor bp.  Denies being symptomatic,; light headed or headache etc.  Ate well.    DENIES any suicidal thoughts now and feels it was a mistake  Suicidal precautions in place .

## 2019-10-04 NOTE — ED NOTES
[facility]  ED Nurse Handoff Report    Taylor Valiente is a 24 year old male   ED Chief complaint: Drug Overdose  . ED Diagnosis:   Final diagnoses:   Suicidal ideation   Hypertensive urgency   Elevated troponin     Allergies:   Allergies   Allergen Reactions     No Known Allergies        Code Status: Full Code  Activity level - Baseline/Home:  Independent. Activity Level - Current:   Stand by Assist. Lift room needed: No. Bariatric: No   Needed: No   Isolation: No. Infection: Not Applicable.     Vital Signs:   Vitals:    10/04/19 0230 10/04/19 0245 10/04/19 0300 10/04/19 0400   BP: (!) 147/88  136/83 132/60   Pulse: 66  66 67   Resp:       Temp:       TempSrc:       SpO2: 98% 93% 100% 100%   Weight:       Height:           Cardiac Rhythm:  ,      Pain level: 0-10 Pain Scale: 0  Patient confused: No. Patient Falls Risk: Yes.   Elimination Status: Has voided We dumped urine by mistake. Need urine for drug test  Patient Report - Initial Complaint: took double dose of lisinoprl. Focused Assessment: calm and cooperative, elevated BPs,  Elevated troponin trending up. Denies CP or SOB  Tests Performed:   Labs Ordered and Resulted from Time of ED Arrival Up to the Time of Departure from the ED   CBC WITH PLATELETS DIFFERENTIAL - Abnormal; Notable for the following components:       Result Value    WBC 11.9 (*)     RBC Count 6.14 (*)     MCH 25.7 (*)     RDW 17.4 (*)     All other components within normal limits   COMPREHENSIVE METABOLIC PANEL - Abnormal; Notable for the following components:    Glucose 109 (*)     Creatinine 1.75 (*)     GFR Estimate 54 (*)     Albumin 2.8 (*)     Protein Total 6.5 (*)     All other components within normal limits   TROPONIN I - Abnormal; Notable for the following components:    Troponin I ES 0.052 (*)     All other components within normal limits   TROPONIN I - Abnormal; Notable for the following components:    Troponin I ES 0.056 (*)     All other components  within normal limits   SALICYLATE LEVEL   ACETAMINOPHEN LEVEL   ALCOHOL ETHYL   MAGNESIUM   DRUG ABUSE SCREEN 77 URINE (FL, RH, SH)   PULSE OXIMETRY NURSING   CARDIAC CONTINUOUS MONITORING   PERIPHERAL IV CATHETER     . Abnormal Results:   No orders to display     .   Treatments provided: Magnesium infusion. See EMAR for list of meds/ listed below  Family Comments: NA  OBS brochure/video discussed/provided to patient:  Yes  ED Medications:   Medications   0.9% sodium chloride BOLUS (0 mLs Intravenous Stopped 10/4/19 0349)     Followed by   sodium chloride 0.9% infusion (1,000 mLs Intravenous New Bag 10/4/19 0419)   magnesium sulfate 2 g in NS intermittent infusion ([employer] or [facility]) (2 g Intravenous New Bag 10/4/19 0511)   LORazepam (ATIVAN) tablet 1 mg (1 mg Oral Given 10/4/19 0144)   aspirin (ASA) EC tablet 325 mg (325 mg Oral Given 10/4/19 0339)     Drips infusing:  Yes  For the majority of the shift, the patient's behavior Green. Interventions performed were n/a.     Severe Sepsis OR Septic Shock Diagnosis Present: No      ED Nurse Name/Phone Number: Abiodun Sunshine RN,   5:14 AM    RECEIVING UNIT ED HANDOFF REVIEW    Above ED Nurse Handoff Report was reviewed: Yes  Reviewed by: Elen Gentile RN on October 4, 2019 at 7:04 AM

## 2019-10-04 NOTE — PROGRESS NOTES
Pt seen and examined.  See H&P from my colleague earlier this AM.  Pt presented for suicidal ideation.  Was admitted due to minimal trop elevation in the setting of severe HTN.  Psych input appreciatedf and recommend transfer to inpatient psych when medically stable.  BP remains uncontrolled and will start hydralazine 25 mg tid as well as resumeing lisinopril 40 mg daily.  Pt already received his usual doses of metoprolol and amlodipine earlier this AM.  Troponin downtrending and pt had no CP or SOB sx.  I reviewed ECG which shows small TWI in lateral leads, unchanged from ECG from 2017,.  TTE this admit shows no WMA with normal LV ftn.  Suspect his minimal  Trop elevation was due to severe HTN on presentation in the setting of reduced clearance from CKD    Agree with transfer to inpatient psych once BP is adequately controlled.  No further cardiac work up is planned    Addendum:  I attempted to call patient's mother at  to update her on POC per patient request.  There was no answer but I left a brief message

## 2019-10-04 NOTE — ED NOTES
Pt calm and cooperative. Parents left for home.   Pt given water and crackers.  BPs elevated    1:1 discontinued. On CHEMA

## 2019-10-04 NOTE — ED TRIAGE NOTES
Pt was mad at hid parents. Took 80 mg lisinopril instead of 40 mg because he missed the morning dose of 40 mg. Pt reports parents thought he took the whole bottle.   \  Denies suicidal ideation today.

## 2019-10-04 NOTE — PHARMACY-ADMISSION MEDICATION HISTORY
Admission medication history interview status for this patient is complete. See [facility] admission navigator for allergy information, prior to admission medications and immunization status.     Medication history interview source(s):Patient  Medication history resources (including written lists, pill bottles, clinic record): [facility] dispense records  [place] [facility] [place] [address]     Changes made to PTA medication list:  Added: Atorvastatin; Aspirin; Vitamin D;  Deleted:  None   Changed: None    Actions taken by pharmacist (provider contacted, etc): Sticky note to MD     Additional medication history information:None    Medication reconciliation/reorder completed by provider prior to medication history? Yes     Do you take OTC medications (eg tylenol, ibuprofen, fish oil, eye/ear drops, etc)?  Denies other than listed below.     For patients on insulin therapy: No      Prior to Admission medications    Medication Sig Last Dose Taking? Auth Provider   amLODIPine (NORVASC) 10 MG tablet Take 1 tablet (10 mg) by mouth daily 10/3/2019 at 2300 Yes Priyank Lawrence MD   aspirin 81 MG EC tablet Take 81 mg by mouth daily 10/3/2019 at Unknown time Yes Unknown, Entered By History   atorvastatin (LIPITOR) 10 MG tablet Take 10 mg by mouth At Bedtime 10/3/2019 at 2300 Yes Unknown, Entered By History   cloNIDine (CATAPRES) 0.1 MG tablet Take 1 tablet (0.1 mg) by mouth 2 times daily 10/3/2019 at Unknown time Yes Priyank Lawrence MD   lisinopril (PRINIVIL/ZESTRIL) 40 MG tablet Take 40 mg by mouth daily 10/3/2019 at 2300 Yes Reported, Patient   metoprolol tartrate (LOPRESSOR) 100 MG tablet Twice daily 10/3/2019 at Unknown time Yes Priyank Lawrence MD   vitamin D3 (CHOLECALCIFEROL) 2000 units (50 mcg) tablet Take 1 tablet by mouth daily 10/3/2019 at Unknown time Yes Unknown, Entered By History

## 2019-10-04 NOTE — ED PROVIDER NOTES
History     Chief Complaint:    Suicidal    The history is provided by the patient and the EMS personnel.      Taylor Valiente is a 24 year old male with a history of stage 3 chronic kidney disease and hypertension who arrived via EMS and presents with possible overdose and suicidal ideation. According to EMS, the patient's grandpa saw the patient put his bottle of lisinopril to his mouth threatening to ingest all its contents, and his grandma called 911 around 2300. The patient reports there being a misunderstanding and he only took 2 pills. He usually takes 1 40 mg pill in the evening, but missed his morning dose and doubled up in the evening. He denies suicidal ideation currently or taking any other pills. The patient does reports trying to harm himself in the past. He has never been admitted for similar instances to today's situation. He does not have a therapist and is not on any medications for depression.    Allergies:  No known drug allergies     Medications:    Norvasc  Catapres  Prinivil  Lopressor  Lisinopril  Atorvastatin  Amlodipine    Past Medical History:    CKD, stage 3  Focal glomerular sclerosis  Acute kidney injury  Morbid obesity  Hypertensive urgency   Left ventricular hypertrophy due to hypertensive disease  Malignant Hypertension    Past Surgical History:    The patient does not have any pertinent past surgical history.    Family History:    Hepatitis B - mother  Gestational diabetes - mother  Hypertension - mother    Social History:  Negative for tobacco use.  Negative for alcohol use.  Negative for drug use.  Marital Status:  Single [1]     Review of Systems   Psychiatric/Behavioral: Negative for suicidal ideas.   All other systems reviewed and are negative.        Physical Exam     Patient Vitals for the past 24 hrs:   BP Temp Temp src Pulse Resp Height Weight   10/04/19 0049 (!) 195/120 97.9  F (36.6  C) Oral 105 20 1.829 m (6') 131.5 kg (290 lb)     Physical Exam    Constitutional:   Oriented to person, place, and time.   HENT:   Head:    Normocephalic.   Mouth/Throat:   Oropharynx is clear and moist.   Eyes:    EOM are normal. Pupils are equal, round, and reactive to light.   Neck:    Neck supple.   Cardiovascular:  Normal rate, regular rhythm and normal heart sounds.      Exam reveals no gallop and no friction rub.       No murmur heard.  Pulmonary/Chest:  Effort normal and breath sounds normal.      No respiratory distress. No wheezes. No rales.      No reproducible chest wall pain.  Abdominal:   Soft. No distension. No tenderness. No rebound and no guarding.   Musculoskeletal:  Normal range of motion.   Neurological:   Alert and oriented to person, place, and time.           Moves all 4 extremities spontaneously    Skin:    No rash noted. No pallor.   Psych:   Positive for suicidal ideation.    Emergency Department Course     ECG (0:52:08):  Rate 108 bpm. WV interval 166. QRS duration 84. QT/QTc 368/493. P-R-T axes 52 35 8. Sinus tachycardia. Nonspecific T wave abnormality. No significant change compared to EKG dated 7/14/2017. Interpreted at 0102 by Jun Montero MD.    ECG (3:43:50):  Rate 69 bpm. WV interval 178. QRS duration 96. QT/QTc 472/505. P-R-T axes 72 56 20. Normal sinus rhythm with sinus arrhythmia. Nonspecific ST and T wave abnormality. Prolonged QT. Abnormal ECG. Interpreted at 0340 by Jun Montero MD.    Laboratory:  Laboratory findings were communicated with the patient who voiced understanding of the findings.    CBC: WBC 11.9 H o/w WNL (HGB 15.8, )  CMP: Glucose 109 H, Creatinine 1.75 H, GFR Estimate 54 L, Albumin 2.8 L, Protein total 6.5 L  Salicylate level: <2  Acetaminophen level: <2  Alcohol ethyl: <0.01  Troponin I (0224): 0.052 H  Troponin I (0413): 0.056 H  Magnesium: 1.9    Interventions:  0144: Ativan 1 mg PO   0153: 0.9% sodium chloride BOLUS 1 L IV  0339:  mg PO  0419: sodium chloride 0.9% infusion 1 L IV  0511: Pharmedium 2 g IV    Emergency  Department Course:  Past medical records, nursing notes, and vitals reviewed.    0042: I performed an exam of the patient as documented above.     EKG obtained in the ED, see results above.     IV was inserted and blood was drawn for laboratory testing, results above.    0103: I spoke with mental health intake regarding the patient.    I personally reviewed the results with the Patient and answered all related questions prior to admission.    Findings and plan explained to the Patient who consents to admission.     0502: Discussed the patient with Dr. Singleton, who will admit the patient to an adult med/surg bed for further monitoring, evaluation, and treatment.     Impression & Plan     Medical Decision Making:  A 24-year-old male who came in for attempted overdose, but did not actually overdose on his medications. He took one extra dose of his lisinopril that he missed. He was quite anxious, hypertensive, he has a history of high blood pressure as well as chronic kidney disease. In additional to looking for co ingestants , I did end up checking for end organ damage for hypertensive urgency as his blood pressure was quite elevated intially. Patient's creatinine is actually below baseline, which is good. Unfortunately troponin is minimally elevated. Repeat troponin shows mild continued elevation. Patient denied chest pain nor ever having chest pain. While he has nonspecific findings on his EKG, I would doubt NSTEMI and believe this is likely related to his hypertension, but patient will need further rule out. Therefore he will be admitted to observation after discussion with Dec. Dec does agree for inpatient psychiatric admission. Patient is on an CHEMA hold and will likely need transfer to psychiatric care once medically cleared from observation.    Discharge Diagnosis:    ICD-10-CM   1. Suicidal ideation R45.851     Disposition:  Admitted to med/surg bed.    Alysia Disclosure:  Ignacia LALA, am serving as a  scribe at 1:21 AM on 10/4/2019 to document services personally performed by Jun Montero MD based on my observations and the provider's statements to me.     Ignacia Cunningham  10/4/2019   [facility] EMERGENCY DEPARTMENT       Jun Montero MD  10/04/19 0621

## 2019-10-04 NOTE — PROGRESS NOTES
D:  Per record review, pt's discharge recommendation is IP psych.    I:  Sw called [facility] psych [phone] to see if they have a bed available for the pt.  María spoke with Nilda at [facility] who said that as of 0510 today, [facility] cancelled the bed that was reserved for the pt.  [facility]'s reason for canceling the bed was due to the pt needing medical care.    A/P:  Sw will continue with discharge planning and will be available as needed until discharge.      ADDENDUM:  Per physician's note, pt will be discharge ready once BP is controlled.  Due to unknown discharge date, sw will follow-up with IP psych once discharge date is known.  1400    JOHNATHAN Fajardo, LGSW  [phone]  [facility]

## 2019-10-05 PROCEDURE — 25000132 ZZH RX MED GY IP 250 OP 250 PS 637

## 2019-10-05 PROCEDURE — 25000132 ZZH RX MED GY IP 250 OP 250 PS 637: Performed by: INTERNAL MEDICINE

## 2019-10-05 PROCEDURE — 99207 ZZC CDG-CODE CATEGORY CHANGED: CPT | Performed by: INTERNAL MEDICINE

## 2019-10-05 PROCEDURE — G0378 HOSPITAL OBSERVATION PER HR: HCPCS

## 2019-10-05 PROCEDURE — 99224 ZZC SUBSEQUENT OBSERVATION CARE,LEVEL I: CPT | Performed by: INTERNAL MEDICINE

## 2019-10-05 RX ORDER — CARVEDILOL 25 MG/1
25 TABLET ORAL 2 TIMES DAILY WITH MEALS
Status: DISCONTINUED | OUTPATIENT
Start: 2019-10-05 | End: 2019-10-06 | Stop reason: HOSPADM

## 2019-10-05 RX ORDER — CLONIDINE HYDROCHLORIDE 0.1 MG/1
0.3 TABLET ORAL 2 TIMES DAILY
Status: DISCONTINUED | OUTPATIENT
Start: 2019-10-05 | End: 2019-10-06 | Stop reason: HOSPADM

## 2019-10-05 RX ORDER — HYDRALAZINE HYDROCHLORIDE 50 MG/1
50 TABLET, FILM COATED ORAL 3 TIMES DAILY
Status: DISCONTINUED | OUTPATIENT
Start: 2019-10-05 | End: 2019-10-06

## 2019-10-05 RX ADMIN — METOPROLOL TARTRATE 100 MG: 100 TABLET, FILM COATED ORAL at 09:19

## 2019-10-05 RX ADMIN — HYDRALAZINE HYDROCHLORIDE 25 MG: 25 TABLET, FILM COATED ORAL at 00:04

## 2019-10-05 RX ADMIN — HYDRALAZINE HYDROCHLORIDE 50 MG: 50 TABLET, FILM COATED ORAL at 16:01

## 2019-10-05 RX ADMIN — HYDRALAZINE HYDROCHLORIDE 50 MG: 50 TABLET, FILM COATED ORAL at 22:22

## 2019-10-05 RX ADMIN — CLONIDINE HYDROCHLORIDE 0.3 MG: 0.1 TABLET ORAL at 21:21

## 2019-10-05 RX ADMIN — CLONIDINE HYDROCHLORIDE 0.3 MG: 0.1 TABLET ORAL at 09:19

## 2019-10-05 RX ADMIN — AMLODIPINE BESYLATE 10 MG: 10 TABLET ORAL at 09:19

## 2019-10-05 RX ADMIN — HYDRALAZINE HYDROCHLORIDE 50 MG: 50 TABLET, FILM COATED ORAL at 09:19

## 2019-10-05 RX ADMIN — LISINOPRIL 40 MG: 40 TABLET ORAL at 09:19

## 2019-10-05 RX ADMIN — CARVEDILOL 25 MG: 25 TABLET, FILM COATED ORAL at 17:43

## 2019-10-05 NOTE — PLAN OF CARE
A and Ox4. Pt. Declines ant thoughts of suicide. Pt. Resting in bed, sitter in room. Suicide precautions maintained. Will continue to monitor.

## 2019-10-05 NOTE — PLAN OF CARE
A&Ox4. BP continues to be elevated (156/125 and 147/114) otherwise VSS. Given hydralazine per orders. Denies pain. Denies any suicidal ideation/plan overnight. Sitter at bedside. Resting comfortably in bed. Tele SR w/ prolonged QT. Plan to discharge to inpatient psych facility once BP is stable. Will continue to monitor.

## 2019-10-05 NOTE — PLAN OF CARE
B/p elevated. Will switch from metoprolol to coreg at dinner. Pt denies suicide ideation . Sleeping most of shift. Sitter at bedside. Good appetite.

## 2019-10-05 NOTE — PROGRESS NOTES
[facility]  Hospitalist Progress Note  Martin Melgoza MD 10/05/2019    Reason for Stay (Diagnosis): HTN, suicidal threat         Assessment and Plan:      Summary of Stay: Taylor Valiente is a 24 year old male admitted on 10/4/2019 with suicidal ideations.  Pt took one extra dose of his lisinopril after threatening to take the entire bottle in front of a family member.  He decided not to take the entire bottle because he knew it might make him sick.      In talking to the pt today, he does not feel suicidal and has no feelings he wants to harm himself.   DEC in ER initially recommended pt be transferred to inpatient psych, but he was admitted to the medical service because of high blood pressures.  Course has been complicated by difficult to control blood pressure and medications are being adjusted.      Problem List:   1. Suicidal threat:  Resolved.  Pt denies depression or sadness.  Denies suicidal sx.  Admits to hx of anxiety but has never been treated for this  2. Uncontrolled HTN:  Have continued his home meds including amlodipine, lisinopril, metoprolol, and clonidine.  Have increase clonidine dose and also started hydralazine.  Despite this BP remains elevated.    Plans/orders today  - increased clonidine to 0.3 mg bid  - increased hydralazine to 50 mg tid  - will change metoprolol to carvedilol for better BP management  - consult psychiatry to see if pt still requires inpatient psychiatry bed.  Does not appear suicidal or depressed on my assessment         DVT Prophylaxis: Low Risk/Ambulatory with no VTE prophylaxis indicated  Code Status: Full Code  Discharge Dispo: await psych input  Estimated Disch Date / # of Days until Disch: tomorrow after BP better controlled        Interval History (Subjective):      Denies suicidality.  Denies depression. Denies sadness.  States he has hx of anxiety in past                  Physical Exam:      Last Vital Signs:  BP (!) 138/100 (BP Location:  Right arm)   Pulse 68   Temp 96.5  F (35.8  C) (Oral)   Resp 20   Ht 1.829 m (6')   Wt 131.5 kg (290 lb)   SpO2 97%   BMI 39.33 kg/m        Intake/Output Summary (Last 24 hours) at 10/5/2019 1501  Last data filed at 10/4/2019 2054  Gross per 24 hour   Intake 1665 ml   Output --   Net 1665 ml       Constitutional: Awake, alert, cooperative, no apparent distress   Respiratory: Clear to auscultation bilaterally, no crackles or wheezing   Cardiovascular: Regular rate and rhythm, normal S1 and S2, and no murmur noted   Abdomen: Normal bowel sounds, soft, non-distended, non-tender   Skin: No rashes, no cyanosis, dry to touch   Neuro: Alert and oriented x3, no weakness, numbness, memory loss   Extremities: No edema, normal range of motion   Other(s):        All other systems: Negative          Medications:      All current medications were reviewed with changes reflected in problem list.         Data:      All new lab and imaging data was reviewed.   Labs:  Recent Labs   Lab 10/04/19  0148   WBC 11.9*   HGB 15.8   HCT 49.8   MCV 81         Imaging:   No results found for this or any previous visit (from the past 24 hour(s)).

## 2019-10-06 VITALS
HEIGHT: 72 IN | DIASTOLIC BLOOD PRESSURE: 94 MMHG | OXYGEN SATURATION: 99 % | BODY MASS INDEX: 39.28 KG/M2 | WEIGHT: 290 LBS | SYSTOLIC BLOOD PRESSURE: 154 MMHG | HEART RATE: 78 BPM | RESPIRATION RATE: 20 BRPM | TEMPERATURE: 97.3 F

## 2019-10-06 PROCEDURE — 99217 ZZC OBSERVATION CARE DISCHARGE: CPT | Performed by: INTERNAL MEDICINE

## 2019-10-06 PROCEDURE — G0378 HOSPITAL OBSERVATION PER HR: HCPCS

## 2019-10-06 PROCEDURE — 25000132 ZZH RX MED GY IP 250 OP 250 PS 637: Performed by: INTERNAL MEDICINE

## 2019-10-06 RX ORDER — HYDRALAZINE HYDROCHLORIDE 100 MG/1
100 TABLET, FILM COATED ORAL 3 TIMES DAILY
Qty: 90 TABLET | Refills: 1 | Status: SHIPPED | OUTPATIENT
Start: 2019-10-06 | End: 2019-10-28

## 2019-10-06 RX ORDER — HYDRALAZINE HYDROCHLORIDE 50 MG/1
100 TABLET, FILM COATED ORAL 3 TIMES DAILY
Status: DISCONTINUED | OUTPATIENT
Start: 2019-10-06 | End: 2019-10-06 | Stop reason: HOSPADM

## 2019-10-06 RX ORDER — CARVEDILOL 25 MG/1
25 TABLET ORAL 2 TIMES DAILY WITH MEALS
Qty: 60 TABLET | Refills: 1 | Status: SHIPPED | OUTPATIENT
Start: 2019-10-06 | End: 2019-12-06

## 2019-10-06 RX ORDER — HYDRALAZINE HYDROCHLORIDE 50 MG/1
50 TABLET, FILM COATED ORAL ONCE
Status: COMPLETED | OUTPATIENT
Start: 2019-10-06 | End: 2019-10-06

## 2019-10-06 RX ORDER — CLONIDINE HYDROCHLORIDE 0.3 MG/1
0.3 TABLET ORAL 2 TIMES DAILY
Qty: 60 TABLET | Refills: 1 | Status: SHIPPED | OUTPATIENT
Start: 2019-10-06 | End: 2021-07-01

## 2019-10-06 RX ADMIN — CARVEDILOL 25 MG: 25 TABLET, FILM COATED ORAL at 08:02

## 2019-10-06 RX ADMIN — AMLODIPINE BESYLATE 10 MG: 10 TABLET ORAL at 08:02

## 2019-10-06 RX ADMIN — LISINOPRIL 40 MG: 40 TABLET ORAL at 08:02

## 2019-10-06 RX ADMIN — HYDRALAZINE HYDROCHLORIDE 100 MG: 50 TABLET, FILM COATED ORAL at 16:08

## 2019-10-06 RX ADMIN — HYDRALAZINE HYDROCHLORIDE 50 MG: 50 TABLET, FILM COATED ORAL at 08:01

## 2019-10-06 RX ADMIN — HYDRALAZINE HYDROCHLORIDE 50 MG: 50 TABLET, FILM COATED ORAL at 10:34

## 2019-10-06 RX ADMIN — CLONIDINE HYDROCHLORIDE 0.3 MG: 0.1 TABLET ORAL at 08:01

## 2019-10-06 NOTE — PLAN OF CARE
"PRIMARY DIAGNOSIS: \"GENERIC\" NURSING  OUTPATIENT/OBSERVATION GOALS TO BE MET BEFORE DISCHARGE:  ADLs back to baseline: Yes    Activity and level of assistance: Ambulating independently.    Pain status: Pain free.    Return to near baseline physical activity: Yes     Discharge Planner Nurse   Safe discharge environment identified: Yes  Barriers to discharge: Yes HTN       Entered by: Arie Berkowitz 10/06/2019 1:56 PM     Please review provider order for any additional goals.   Nurse to notify provider when observation goals have been met and patient is ready for discharge.  "

## 2019-10-06 NOTE — PLAN OF CARE
B/p 180/125 this am prior to b/p meds. After regular scheduled b/p med b/p 166/115. Additional dose of hydralazine given po  b/p 150/94. Hydralazine was increased to 100mg po TID. . Pt asymptomatic.  Denies suicidal ideation. Pleasant. Up ind in room. Sitter d/c'd. Eating well.

## 2019-10-06 NOTE — DISCHARGE SUMMARY
Pt seen and examined.  Feels well.  No symptoms.  Wants to go home today.  I called his mother (with his permission) and discussed POC with her.      Med changes:  increased dose of clonidine, new script for hydralazine, and new script for carvedilol (replaces metoprolol)    I spoke with psychologist and pt appears appropriate for discharge home; pt not suicidal.      BP improved and most recent was 150/90    Home today

## 2019-10-06 NOTE — PLAN OF CARE
A/O  Independent in room.  BP elevated see flow sheet and MAR.  Tele SR.   PSC at bedside.  BM x2/loose. Will continue to monitor

## 2019-10-06 NOTE — PLAN OF CARE
RN discussed discharge instruction to patient, verbalized understanding, 3 prescription medication handed to patient, questions answered, just waiting for his ride home.    Pt left facility at 1700.

## 2019-10-06 NOTE — PLAN OF CARE
A&Ox4. BP continues to be elevated but much better tonight; 149/104 and 142/100. All other VSS. Denies pain and SOB. Up independently in the room w/ sitter at bedside. Denied any suicidal ideation or plan. Tele SR w/ prolonged QT. Awaiting psych input in regards to discharge. Will continue to monitor.
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
## 2019-10-08 ENCOUNTER — TELEPHONE (OUTPATIENT)
Dept: FAMILY MEDICINE | Facility: CLINIC | Age: 24
End: 2019-10-08

## 2019-10-08 ENCOUNTER — OFFICE VISIT (OUTPATIENT)
Dept: FAMILY MEDICINE | Facility: CLINIC | Age: 24
End: 2019-10-08
Payer: COMMERCIAL

## 2019-10-08 VITALS
DIASTOLIC BLOOD PRESSURE: 98 MMHG | SYSTOLIC BLOOD PRESSURE: 143 MMHG | TEMPERATURE: 98.8 F | OXYGEN SATURATION: 98 % | HEART RATE: 108 BPM | BODY MASS INDEX: 40.42 KG/M2 | WEIGHT: 298 LBS

## 2019-10-08 DIAGNOSIS — F41.0 ANXIETY ATTACK: Primary | ICD-10-CM

## 2019-10-08 PROCEDURE — 99214 OFFICE O/P EST MOD 30 MIN: CPT | Performed by: FAMILY MEDICINE

## 2019-10-08 NOTE — PROGRESS NOTES
Subjective     Taylor Valiente is a 24 year old male who presents to clinic today for the following health issues:    [facility]       Hospital Follow-up Visit:    Hospital/Nursing Home/IP Rehab Facility: [facility]  Date of Admission: 10/4/2019  Date of Discharge: 10/6/2019  Reason(s) for Admission: hypertention            Problems taking medications regularly:  None       Medication changes since discharge: None       Problems adhering to non-medication therapy:  None    Summary of hospitalization:  [facility] discharge summary reviewed  Diagnostic Tests/Treatments reviewed.  Follow up needed: none  Other Healthcare Providers Involved in Patient s Care:         Specialist appointment - Nephrology is arranged .they follow him regularly for hyoertension.  Update since discharge: improved. Still over idea and he's on five drugs     Post Discharge Medication Reconciliation: discharge medications reconciled, continue medications without change.  Plan of care communicated with patient and family     Coding guidelines for this visit:  Type of Medical   Decision Making Face-to-Face Visit       within 7 Days of discharge Face-to-Face Visit        within 14 days of discharge   Moderate Complexity [id number]   High Complexity [id number]            Inventory of mental status shows issues with anxiety and depression.    Discussed regarding anxiety, sleep, anhedonia, irritability, energy,  perspective, self image, affect, current stressors, holistic parameters, work situation,  physical and social mitigating factors.    Past history has included anxiety  episodes and positive  family history.    Prior treatment has include .    Suicidal ideation is  absent.    No problems with vision, hearing, thyroid, chest pain, dyspnea,rash,  stomach upset, polyuria, polydipsia, dysuria,muscle aches, numbness,  cough, tics or balance issues. Memory is generally sound .    AUSTIN,thyroid normal, lungs clear, s1s2,soft  abdoman, symmetrical dtrs,  no abdominal mass,good peripheral pulses, vs stable.  Discussed ssri vs dop/ser grouping vs wellbutrin in depression mgmt  Discussed anxiolytics as situational tools not specific therapy      BP Readings from Last 3 Encounters:   10/08/19 (!) 154/103   10/06/19 (!) 154/94   11/26/18 123/70    Wt Readings from Last 3 Encounters:   10/08/19 135.2 kg (298 lb)   10/04/19 131.5 kg (290 lb)   11/26/18 128.4 kg (283 lb)                    Reviewed and updated as needed this visit by Provider         Review of Systems   ROS COMP: Constitutional, HEENT, cardiovascular, pulmonary, GI, , musculoskeletal, neuro, skin, endocrine and psych systems are negative, except as otherwise noted.      Objective    There were no vitals taken for this visit.  There is no height or weight on file to calculate BMI.  Physical Exam   GENERAL: healthy, alert and no distress  EYES: Eyes grossly normal to inspection, PERRL and conjunctivae and sclerae normal  HENT: ear canals and TM's normal, nose and mouth without ulcers or lesions  NECK: no adenopathy, no asymmetry, masses, or scars and thyroid normal to palpation  RESP: lungs clear to auscultation - no rales, rhonchi or wheezes  CV: regular rate and rhythm, normal S1 S2, no S3 or S4, no murmur, click or rub, no peripheral edema and peripheral pulses strong  ABDOMEN: soft, nontender, no hepatosplenomegaly, no masses and bowel sounds normal  MS: no gross musculoskeletal defects noted, no edema  SKIN: no suspicious lesions or rashes  NEURO: Normal strength and tone, mentation intact and speech normal  PSYCH: mentation appears normal, affect normal/bright    Diagnostic Test Results:  Labs reviewed in Epic        Assessment & Plan     1. Anxiety and depression  Initial referral   - MENTAL HEALTH REFERRAL  - Adult; Outpatient Treatment; Individual/Couples/Family/Group Therapy/Health Psychology[place] [facility] [place] [facility] [phone]; We will contact you to  schedule the appointment or please call with any questions     BMI:   Estimated body mass index is 40.42 kg/m  as calculated from the following:    Height as of 10/4/19: 1.829 m (6').    Weight as of this encounter: 135.2 kg (298 lb).   Weight management plan: Specific weight management program called weight reduction discussed        Work on weight loss  Call Nephrology to verify med regimen with adjusted clonidine,   Mental Health will call regarding CBT evaluation and treatment     Return in about 6 weeks (around 11/19/2019).    Joe Muñiz MD  [facility]

## 2019-10-09 NOTE — TELEPHONE ENCOUNTER
ED / Discharge Outreach Protocol    Patient Contact    Attempt # 1    Was call answered?  No.  Left message on voicemail with information to call clinic back.          Patient did have follow up appointment yesterday in clinic.     Lia Flores RN Flex

## 2019-10-28 DIAGNOSIS — N17.9 ACUTE KIDNEY INJURY (H): ICD-10-CM

## 2019-10-28 NOTE — TELEPHONE ENCOUNTER
"Requested Prescriptions   Pending Prescriptions Disp Refills     hydrALAZINE (APRESOLINE) 100 MG tablet [Pharmacy Med Name: hydrALAZINE * 100MG * TAB]  Last Written Prescription Date:  10/6/2019  Last Fill Quantity: 90 tablet,  # refills: 1   Last office visit: 10/8/2019 with prescribing provider:  Howard   Future Office Visit:     90 tablet 1     Sig: TAKE ONE TABLET BY MOUTH THREE TIMES A DAY       Vasodilators Failed - 10/28/2019  4:28 PM        Failed - Most recent BP less than 140/90 on record     BP Readings from Last 3 Encounters:   10/08/19 (!) 143/98   10/06/19 (!) 154/94   11/26/18 123/70                 Passed - Most recent encounter is not a hospital encounter. Patient has recent (12 mos) or future (1 mos) visit with authorizing provider's specialty     Patient's most recent encounter is NOT a hospital encounter and has had an office visit in the last 12 months or has a visit in the next 30 days with authorizing provider or within the authorizing provider's specialty.      See \"Patient Info\" tab in inbasket, or \"Choose Columns\" in Meds & Orders section of the refill encounter.      If most recent encounter is a hospital encounter AND the patient does NOT have an appointment scheduled with the authorizing provider or authorizing provider's specialty within the next 30 days, forward refill to authorizing provider for medication review.          Passed - Medication is active on med list        Passed - Patient is of age 18 years or older          "

## 2019-10-28 NOTE — LETTER
[facility]  [address] [facility] [id number] [phone]      2019      Taylor Valiente  [address] [place] [address] [address] [place] [address]      We have been trying to reach you - 3 messages have been left and we have not been able to speak to you     Dr. Lawrence would like to see you in clinic - please schedule an appointment     Please schedule appointment using my chart, calling the clinic directly at:   [facility] [phone]  or using the  appointment [phone] ([phone])    If you have any questions feel free to contact me     Take care,   Sultana Urbina, Registered Nurse   [facility]

## 2019-10-29 RX ORDER — HYDRALAZINE HYDROCHLORIDE 100 MG/1
TABLET, FILM COATED ORAL
Qty: 90 TABLET | Refills: 1 | Status: SHIPPED | OUTPATIENT
Start: 2019-10-29 | End: 2019-12-27

## 2019-10-29 NOTE — TELEPHONE ENCOUNTER
I am a little upset about this, this patient supposed to see me for his hospital follow up.. why did he see another physician?   Is there anyway he can see me if possible?   Medicine approved.    Priyank Lawrence MD  [facility]  [phone]

## 2019-10-29 NOTE — TELEPHONE ENCOUNTER
Routing refill request to provider for review/approval because:  Blood pressures out of range, last prescribed in the hospital

## 2019-10-30 NOTE — TELEPHONE ENCOUNTER
Message left for patient to return call to clinic and ask to speak to available triage nurse     Sultana Urbina, Registered Nurse   [facility]

## 2019-10-31 NOTE — TELEPHONE ENCOUNTER
Message left for patient to return call to clinic and ask to speak to available triage nurse     Sultana Urbina, Registered Nurse   [facility]

## 2019-11-04 NOTE — TELEPHONE ENCOUNTER
Message #3 left to return call   My chart not active   Letter sent to patient asking to return call to clinic - when he returns call please assist with scheduling an appointment with Dr. Howard Urbina, Registered Nurse   [facility]

## 2019-12-01 DIAGNOSIS — I16.0 HYPERTENSIVE URGENCY: ICD-10-CM

## 2019-12-02 RX ORDER — METOPROLOL TARTRATE 100 MG
TABLET ORAL
Qty: 60 TABLET | Refills: 0 | Status: SHIPPED | OUTPATIENT
Start: 2019-12-02 | End: 2020-07-29

## 2019-12-02 NOTE — TELEPHONE ENCOUNTER
Routing refill request to provider for review/approval because:  Malini given x1 and patient did not follow up, please advise  BP Readings from Last 3 Encounters:   10/08/19 (!) 143/98   10/06/19 (!) 154/94   11/26/18 123/70

## 2019-12-02 NOTE — TELEPHONE ENCOUNTER
Contacted patient left message to call back. If patient calls back please schedule appointment.     Opal Campos MA

## 2019-12-05 DIAGNOSIS — N17.9 ACUTE KIDNEY INJURY (H): ICD-10-CM

## 2019-12-06 RX ORDER — CARVEDILOL 25 MG/1
25 TABLET ORAL 2 TIMES DAILY WITH MEALS
Qty: 60 TABLET | Refills: 0 | Status: SHIPPED | OUTPATIENT
Start: 2019-12-06 | End: 2020-07-29

## 2019-12-06 NOTE — TELEPHONE ENCOUNTER
"Requested Prescriptions   Pending Prescriptions Disp Refills     carvedilol (COREG) 25 MG tablet 60 tablet 1     Sig: Take 1 tablet (25 mg) by mouth 2 times daily (with meals)       Beta-Blockers Protocol Failed - 12/5/2019  6:56 PM        Failed - Blood pressure under 140/90 in past 12 months     BP Readings from Last 3 Encounters:   10/08/19 (!) 143/98   10/06/19 (!) 154/94   11/26/18 123/70                 Passed - Patient is age 6 or older        Passed - Recent (12 mo) or future (30 days) visit within the authorizing provider's specialty     Patient has had an office visit with the authorizing provider or a provider within the authorizing providers department within the previous 12 mos or has a future within next 30 days. See \"Patient Info\" tab in inbasket, or \"Choose Columns\" in Meds & Orders section of the refill encounter.              Passed - Medication is active on med list        Routing refill request to provider for review/approval because:  Blood pressure out of range       "

## 2020-01-07 DIAGNOSIS — N17.9 ACUTE KIDNEY INJURY (H): ICD-10-CM

## 2020-01-07 DIAGNOSIS — I16.0 HYPERTENSIVE URGENCY: ICD-10-CM

## 2020-01-07 NOTE — TELEPHONE ENCOUNTER
"Requested Prescriptions   Pending Prescriptions Disp Refills     carvedilol (COREG) 25 MG tablet [Pharmacy Med Name: CARVEDILOL 25MG TABS] 60 tablet 0     Sig: TAKE ONE TABLET BY MOUTH TWICE A DAY WITH MEALS   Last Written Prescription Date:  12/6/19  Last Fill Quantity: 60,  # refills: 0   Last office visit: 10/8/2019 with prescribing provider:  Mary Kay Crowe Office Visit:        Beta-Blockers Protocol Failed - 1/7/2020  5:03 AM        Failed - Blood pressure under 140/90 in past 12 months     BP Readings from Last 3 Encounters:   10/08/19 (!) 143/98   10/06/19 (!) 154/94   11/26/18 123/70             Passed - Patient is age 6 or older        Passed - Recent (12 mo) or future (30 days) visit within the authorizing provider's specialty     Patient has had an office visit with the authorizing provider or a provider within the authorizing providers department within the previous 12 mos or has a future within next 30 days. See \"Patient Info\" tab in inbasket, or \"Choose Columns\" in Meds & Orders section of the refill encounter.              Passed - Medication is active on med list        metoprolol tartrate (LOPRESSOR) 100 MG tablet [Pharmacy Med Name: METOPROLOL TARTRATE 100MG TABS] 60 tablet 0     Sig: TAKE ONE TABLET BY MOUTH TWICE A DAY - PATIENT NEEDS TO BE SEEN AT CLINIC FOR REFILLS   Last Written Prescription Date:  12/2/19  Last Fill Quantity: 60,  # refills: 0   Last office visit: 10/8/2019 with prescribing provider:  Mary Kay Crowe Office Visit:        Beta-Blockers Protocol Failed - 1/7/2020  5:03 AM        Failed - Blood pressure under 140/90 in past 12 months     BP Readings from Last 3 Encounters:   10/08/19 (!) 143/98   10/06/19 (!) 154/94   11/26/18 123/70           Passed - Patient is age 6 or older        Passed - Recent (12 mo) or future (30 days) visit within the authorizing provider's specialty     Patient has had an office visit with the authorizing provider or a provider within the " "authorizing providers department within the previous 12 mos or has a future within next 30 days. See \"Patient Info\" tab in inbasket, or \"Choose Columns\" in Meds & Orders section of the refill encounter.              Passed - Medication is active on med list          "

## 2020-01-07 NOTE — TELEPHONE ENCOUNTER
Routing refill request to provider for review/approval because:  Malini given x1 and patient did not follow up, please advise  Labs out of range:  bp    Paty Plummer RN on 1/7/2020 at 11:12 AM

## 2020-01-08 NOTE — TELEPHONE ENCOUNTER
Declined. Pt is not responding to our messages or letters!  Priyank Lawrence MD  [facility]  [phone]

## 2020-01-11 RX ORDER — METOPROLOL TARTRATE 100 MG
TABLET ORAL
Qty: 60 TABLET | Refills: 0 | OUTPATIENT
Start: 2020-01-11

## 2020-01-11 RX ORDER — CARVEDILOL 25 MG/1
TABLET ORAL
Qty: 60 TABLET | Refills: 0 | OUTPATIENT
Start: 2020-01-11

## 2020-01-22 ENCOUNTER — TELEPHONE (OUTPATIENT)
Dept: FAMILY MEDICINE | Facility: CLINIC | Age: 25
End: 2020-01-22

## 2020-01-22 NOTE — TELEPHONE ENCOUNTER
Panel Management Review      Patient has the following on his problem list:     Depression / Dysthymia review    Measure:  Needs PHQ-9 score of 4 or less during index window.  Administer PHQ-9 and if score is 5 or more, send encounter to provider for next steps.        No flowsheet data found.    If PHQ-9 recheck is 5 or more, route to provider for next steps.    Patient is due for:  PHQ9    Hypertension   Last three blood pressure readings:  BP Readings from Last 3 Encounters:   10/08/19 (!) 143/98   10/06/19 (!) 154/94   11/26/18 123/70     Blood pressure: FAILED    HTN Guidelines:  Less than 140/90      Composite cancer screening  Chart review shows that this patient is due/due soon for the following None  Summary:    Patient is due/failing the following:   BP CHECK    Action needed:   Patient needs office visit for Bp and med refills.    Type of outreach:    Phone, left message for patient to call back.     Questions for provider review:    None                                                                                                                                    Alice Harden MA  [facility].         Chart routed to Care Team .

## 2020-01-27 NOTE — TELEPHONE ENCOUNTER
Pt needs a refill of Atorvastatin 10mg  Last filled by Dr. Jer Hooper but they sent back saying to sent to PCP.    Thank you!  Mary Ann Cosby, Pharmacy [facility]  [phone]

## 2020-01-28 NOTE — TELEPHONE ENCOUNTER
"Routing refill request to provider for review/approval because:  Medication is reported/historical          Requested Prescriptions   Pending Prescriptions Disp Refills     atorvastatin (LIPITOR) 10 MG tablet       Sig: Take 1 tablet (10 mg) by mouth At Bedtime       Statins Protocol Passed - 1/27/2020  1:52 PM        Passed - LDL on file in past 12 months     Recent Labs   Lab Test 07/03/19  1529   LDL 86             Passed - No abnormal creatine kinase in past 12 months     Recent Labs   Lab Test 07/14/17  1140                   Passed - Recent (12 mo) or future (30 days) visit within the authorizing provider's specialty     Patient has had an office visit with the authorizing provider or a provider within the authorizing providers department within the previous 12 mos or has a future within next 30 days. See \"Patient Info\" tab in inbasket, or \"Choose Columns\" in Meds & Orders section of the refill encounter.              Passed - Medication is active on med list        Passed - Patient is age 18 or older          "

## 2020-01-31 RX ORDER — ATORVASTATIN CALCIUM 10 MG/1
10 TABLET, FILM COATED ORAL AT BEDTIME
Qty: 30 TABLET | Refills: 0 | OUTPATIENT
Start: 2020-01-31

## 2020-01-31 NOTE — TELEPHONE ENCOUNTER
Called pt-lm to call clinic, needs to schedule appt, letter sent, 01/31/2020.    Rochelle Rojas/ROSA

## 2020-03-10 ENCOUNTER — OFFICE VISIT (OUTPATIENT)
Dept: FAMILY MEDICINE | Facility: CLINIC | Age: 25
End: 2020-03-10
Payer: COMMERCIAL

## 2020-03-10 VITALS
DIASTOLIC BLOOD PRESSURE: 90 MMHG | HEART RATE: 79 BPM | TEMPERATURE: 98 F | BODY MASS INDEX: 40.55 KG/M2 | SYSTOLIC BLOOD PRESSURE: 138 MMHG | OXYGEN SATURATION: 100 % | WEIGHT: 299 LBS

## 2020-03-10 DIAGNOSIS — J06.9 VIRAL URI WITH COUGH: Primary | ICD-10-CM

## 2020-03-10 PROCEDURE — 99213 OFFICE O/P EST LOW 20 MIN: CPT | Performed by: PHYSICIAN ASSISTANT

## 2020-03-10 NOTE — LETTER
March 10, 2020      Taylor Valiente  [address]   [place] [address]        To Whom It May Concern:    Taylor Valiente  was seen on 3/10/2020.  Please excuse him  until 3/12/2020 due to illness.        Sincerely,        Bakari Chávez PA-C

## 2020-03-10 NOTE — PROGRESS NOTES
Subjective     Taylor Valiente is a 24 year old male who presents to clinic today for the following health issues:    HPI   Acute Illness   Acute illness concerns: cough   Onset: 03/07/2020    Fever: no    Chills/Sweats: no    Headache (location?): no    Sinus Pressure:YES    Conjunctivitis:  no    Ear Pain: no    Rhinorrhea: YES    Congestion: YES    Sore Throat: no      Cough: YES-productive of clear sputum    Wheeze: no     Decreased Appetite: no     Nausea: YES    Vomiting: no    Diarrhea:  no     Dysuria/Freq.: no     Fatigue/Achiness: no- body aches a couple days before this started     Sick/Strep Exposure: no      Therapies Tried and outcome: nighttime cough syrup, alkeseltzer with some relief     Did not get a flu shot. No recent travel.     Patient Active Problem List   Diagnosis     Morbid obesity (H)     Hypertensive urgency     LVH (left ventricular hypertrophy) due to hypertensive disease     Malignant hypertension     Acute kidney injury (H)     CKD (chronic kidney disease) stage 3, GFR 30-59 ml/min (H)     Focal glomerular sclerosis     Benign essential hypertension     Suicide (H)     Past Surgical History:   Procedure Laterality Date     NO HISTORY OF SURGERY         Social History     Tobacco Use     Smoking status: Never Smoker     Smokeless tobacco: Never Used   Substance Use Topics     Alcohol use: No     Family History   Problem Relation Age of Onset     Blood Disease Mother         has hep b     Diabetes Mother         gestionanal diabetes     Hypertension Mother      Obesity Father      Hypertension Father      Hypertension Maternal Grandmother      Diabetes Maternal Grandmother      Hypertension Paternal Grandmother      Hypertension Paternal Grandfather      Coronary Artery Disease Maternal Uncle          Current Outpatient Medications   Medication Sig Dispense Refill     amLODIPine (NORVASC) 10 MG tablet Take 1 tablet (10 mg) by mouth daily 90 tablet 3     aspirin 81 MG EC tablet Take  81 mg by mouth daily       atorvastatin (LIPITOR) 10 MG tablet Take 10 mg by mouth At Bedtime       carvedilol (COREG) 25 MG tablet Take 1 tablet (25 mg) by mouth 2 times daily (with meals) 60 tablet 0     cloNIDine (CATAPRES) 0.3 MG tablet Take 1 tablet (0.3 mg) by mouth 2 times daily 60 tablet 1     hydrALAZINE (APRESOLINE) 100 MG tablet TAKE ONE TABLET BY MOUTH THREE TIMES A DAY 30 tablet 0     lisinopril (PRINIVIL/ZESTRIL) 40 MG tablet Take 40 mg by mouth daily       metoprolol tartrate (LOPRESSOR) 100 MG tablet TAKE ONE TABLET BY MOUTH TWICE A DAY - PATIENT NEEDS TO BE SEEN AT CLINIC FOR REFILLS 60 tablet 0     vitamin D3 (CHOLECALCIFEROL) 2000 units (50 mcg) tablet Take 1 tablet by mouth daily       Allergies   Allergen Reactions     No Known Allergies          Reviewed and updated as needed this visit by Provider         Review of Systems   ROS COMP: Constitutional, HEENT, cardiovascular, pulmonary, gi and gu systems are negative, except as otherwise noted.        Objective    BP (!) 148/90 (BP Location: Right arm, Patient Position: Chair, Cuff Size: Adult Regular)   Pulse 79   Temp 98  F (36.7  C) (Oral)   Wt 135.6 kg (299 lb)   SpO2 100%   BMI 40.55 kg/m    Body mass index is 40.55 kg/m .         Physical Exam   GENERAL: healthy, alert and no distress  EYES: Eyes grossly normal to inspection, PERRL and conjunctivae and sclerae normal  HENT: ear canals and TM's normal, nose and mouth without ulcers or lesions  RESP: lungs clear to auscultation - no rales, rhonchi or wheezes  CV: regular rate and rhythm, normal S1 S2, no S3 or S4, no murmur, click or rub, no peripheral edema and peripheral pulses strong  MS: no gross musculoskeletal defects noted, no edema  SKIN: no suspicious lesions or rashes  NEURO: Normal strength and tone, mentation intact and speech normal  PSYCH: mentation appears normal, affect normal/bright  LYMPH: no cervical, supraclavicular, axillary, or inguinal adenopathy    Diagnostic Test  Results:  none         Assessment & Plan     (J06.9,  B97.89) Viral URI with cough  (primary encounter diagnosis)    Comment: Most likely viral. No concerning signs of influenza or other illnesses. Supportive cares encouraged.    Plan: See above.       Patient Instructions   Upper respiratory infections are usually caused by viruses and, sometimes certain bacteria.  Antibiotics don't help the vast majority of people recover any quicker even when caused by a bacteria.  The body will fight this infection but it needs to be treated well in order to help heal itself.  Rest as needed.  It is ok to reduce food intake if appetite is poor but it is quite important to maintain/increase fluid intake.    For cough, dextromethorphan/guaifenesin combinations help loosen secretions and suppress cough safely without significant risk of sedation. Often 2 puffs four times daily of an albuterol inhaler will help with bronchitis.  This is a prescription medicine.    For nasal congestion and sinus pressure, pseudoephedrine (Sudafed) or phenylephrine is often helpful but it can cause elevations in blood pressure and insomnia.  Short courses of a nasal decongestant spray (Afrin or Neosinephrine) can be appropriate but their use should be restricted to 3 days due to the high risk of nasal addiction.    For pain and fevers, acetaminophen (Tylenol) is most appropriate.  Ibuprofen (Advil) or naproxen (Aleve) are useful too and last longer but they can cause elevation of blood pressure or stomach problems.    Antihistamines (Benadryl, Dimetapp, etc.) cause sedation, confusion, bowel and urinary abnormalities and are of little use for infectious causes of cough and nasal congestion.  Their use should be reserved for allergic symptoms.        Return if symptoms worsen or fail to improve.    Bakari Chávez PA-C  [facility]

## 2020-06-17 ENCOUNTER — TELEPHONE (OUTPATIENT)
Dept: FAMILY MEDICINE | Facility: CLINIC | Age: 25
End: 2020-06-17

## 2020-07-29 DIAGNOSIS — N17.9 ACUTE KIDNEY INJURY (H): ICD-10-CM

## 2020-07-29 RX ORDER — HYDRALAZINE HYDROCHLORIDE 100 MG/1
TABLET, FILM COATED ORAL
Qty: 30 TABLET | Refills: 0 | Status: SHIPPED | OUTPATIENT
Start: 2020-07-29 | End: 2020-09-09

## 2020-07-29 NOTE — TELEPHONE ENCOUNTER
Routing refill request to provider for review/approval because:  Labs out of range:  BP    BP Readings from Last 3 Encounters:   03/10/20 (!) 138/90   10/08/19 (!) 143/98   10/06/19 (!) 154/94       Saba Elias RN

## 2020-07-29 NOTE — TELEPHONE ENCOUNTER
Message left for patient to return call to this RN PAL - please transfer if available     Direct number left for this RN PAL on message for return call     RN PAL reviewed chart and appears that patient's nephrologist Dr. Jer Hooper [facility] has advised patient's mother that he needed to schedule a visit 3/2/2020    Sultana Urbina Registered Nurse   [facility] [phone]

## 2020-07-29 NOTE — TELEPHONE ENCOUNTER
Pt needs to contact his nephrologist for these medications and also needs an appointment with them ASAP please.  Will give 15 days supply, but he really need to get an appointment with nephrology ASAP.

## 2020-07-31 NOTE — TELEPHONE ENCOUNTER
Spoke to pt's mother who was there at the time with son.  Relayed msg.  Pt will call Dr Hooper's office to get that set up.  Faviola Higuera RN

## 2020-08-30 DIAGNOSIS — N17.9 ACUTE KIDNEY INJURY (H): ICD-10-CM

## 2020-08-30 DIAGNOSIS — I16.0 HYPERTENSIVE URGENCY: ICD-10-CM

## 2020-08-31 NOTE — TELEPHONE ENCOUNTER
Routing refill request to provider for review/approval because:  Labs out of range:  BP   Patient needs to be seen because:  Annual visit with AA due, see PCP listed, confirm plan  Randee Tello RN, BSN  Message handled by CLINIC NURSE.

## 2020-09-01 RX ORDER — METOPROLOL TARTRATE 100 MG
TABLET ORAL
Qty: 60 TABLET | Refills: 0 | OUTPATIENT
Start: 2020-09-01

## 2020-09-01 RX ORDER — CARVEDILOL 25 MG/1
TABLET ORAL
Qty: 60 TABLET | Refills: 0 | OUTPATIENT
Start: 2020-09-01

## 2020-09-01 RX ORDER — HYDRALAZINE HYDROCHLORIDE 100 MG/1
TABLET, FILM COATED ORAL
Qty: 90 TABLET | Refills: 0 | OUTPATIENT
Start: 2020-09-01

## 2020-09-01 NOTE — TELEPHONE ENCOUNTER
See AA note, called our pharmacy, aware, they will have pt contact us if still sees AA  Randee Tello RN, BSN  Message handled by CLINIC NURSE.

## 2020-09-09 ENCOUNTER — OFFICE VISIT (OUTPATIENT)
Dept: URGENT CARE | Facility: URGENT CARE | Age: 25
End: 2020-09-09
Payer: COMMERCIAL

## 2020-09-09 ENCOUNTER — HOSPITAL ENCOUNTER (INPATIENT)
Facility: CLINIC | Age: 25
LOS: 3 days | Discharge: HOME | DRG: 281 | End: 2020-09-12
Attending: EMERGENCY MEDICINE | Admitting: INTERNAL MEDICINE
Payer: COMMERCIAL

## 2020-09-09 VITALS
WEIGHT: 290 LBS | HEART RATE: 80 BPM | SYSTOLIC BLOOD PRESSURE: 210 MMHG | DIASTOLIC BLOOD PRESSURE: 153 MMHG | OXYGEN SATURATION: 100 % | BODY MASS INDEX: 39.33 KG/M2 | TEMPERATURE: 98.5 F

## 2020-09-09 DIAGNOSIS — R11.2 NON-INTRACTABLE VOMITING WITH NAUSEA, UNSPECIFIED VOMITING TYPE: ICD-10-CM

## 2020-09-09 DIAGNOSIS — N18.30 CKD (CHRONIC KIDNEY DISEASE) STAGE 3, GFR 30-59 ML/MIN (H): ICD-10-CM

## 2020-09-09 DIAGNOSIS — I16.0 HYPERTENSIVE URGENCY: Primary | ICD-10-CM

## 2020-09-09 DIAGNOSIS — N17.9 ACUTE RENAL FAILURE, UNSPECIFIED ACUTE RENAL FAILURE TYPE (H): ICD-10-CM

## 2020-09-09 DIAGNOSIS — I10 MALIGNANT ESSENTIAL HYPERTENSION: ICD-10-CM

## 2020-09-09 LAB
ALBUMIN UR-MCNC: 300 MG/DL
ANION GAP SERPL CALCULATED.3IONS-SCNC: 10 MMOL/L (ref 3–14)
APPEARANCE UR: CLEAR
BASOPHILS # BLD AUTO: 0.1 10E9/L (ref 0–0.2)
BASOPHILS NFR BLD AUTO: 0.4 %
BILIRUB UR QL STRIP: NEGATIVE
BUN SERPL-MCNC: 75 MG/DL (ref 7–30)
CALCIUM SERPL-MCNC: 8.2 MG/DL (ref 8.5–10.1)
CHLORIDE SERPL-SCNC: 110 MMOL/L (ref 94–109)
CO2 SERPL-SCNC: 21 MMOL/L (ref 20–32)
COLOR UR AUTO: ABNORMAL
CREAT SERPL-MCNC: 6.94 MG/DL (ref 0.66–1.25)
DIFFERENTIAL METHOD BLD: ABNORMAL
EOSINOPHIL # BLD AUTO: 0.3 10E9/L (ref 0–0.7)
EOSINOPHIL NFR BLD AUTO: 1.6 %
ERYTHROCYTE [DISTWIDTH] IN BLOOD BY AUTOMATED COUNT: 14.2 % (ref 10–15)
GFR SERPL CREATININE-BSD FRML MDRD: 10 ML/MIN/{1.73_M2}
GLUCOSE SERPL-MCNC: 111 MG/DL (ref 70–99)
GLUCOSE UR STRIP-MCNC: 50 MG/DL
HCT VFR BLD AUTO: 41.5 % (ref 40–53)
HGB BLD-MCNC: 13.3 G/DL (ref 13.3–17.7)
HGB UR QL STRIP: ABNORMAL
IMM GRANULOCYTES # BLD: 0.1 10E9/L (ref 0–0.4)
IMM GRANULOCYTES NFR BLD: 0.5 %
INTERPRETATION ECG - MUSE: NORMAL
KETONES UR STRIP-MCNC: NEGATIVE MG/DL
LEUKOCYTE ESTERASE UR QL STRIP: NEGATIVE
LYMPHOCYTES # BLD AUTO: 2.9 10E9/L (ref 0.8–5.3)
LYMPHOCYTES NFR BLD AUTO: 17.7 %
MCH RBC QN AUTO: 26.9 PG (ref 26.5–33)
MCHC RBC AUTO-ENTMCNC: 32 G/DL (ref 31.5–36.5)
MCV RBC AUTO: 84 FL (ref 78–100)
MONOCYTES # BLD AUTO: 1.2 10E9/L (ref 0–1.3)
MONOCYTES NFR BLD AUTO: 7.5 %
MUCOUS THREADS #/AREA URNS LPF: PRESENT /LPF
NEUTROPHILS # BLD AUTO: 11.7 10E9/L (ref 1.6–8.3)
NEUTROPHILS NFR BLD AUTO: 72.3 %
NITRATE UR QL: NEGATIVE
NRBC # BLD AUTO: 0 10*3/UL
NRBC BLD AUTO-RTO: 0 /100
PH UR STRIP: 6 PH (ref 5–7)
PLATELET # BLD AUTO: 247 10E9/L (ref 150–450)
POTASSIUM SERPL-SCNC: 3.2 MMOL/L (ref 3.4–5.3)
RBC # BLD AUTO: 4.95 10E12/L (ref 4.4–5.9)
RBC #/AREA URNS AUTO: 1 /HPF (ref 0–2)
SODIUM SERPL-SCNC: 141 MMOL/L (ref 133–144)
SOURCE: ABNORMAL
SP GR UR STRIP: 1.01 (ref 1–1.03)
SQUAMOUS #/AREA URNS AUTO: <1 /HPF (ref 0–1)
TROPONIN I SERPL-MCNC: 0.07 UG/L (ref 0–0.04)
TROPONIN I SERPL-MCNC: 0.08 UG/L (ref 0–0.04)
TROPONIN I SERPL-MCNC: 0.08 UG/L (ref 0–0.04)
UROBILINOGEN UR STRIP-MCNC: NORMAL MG/DL (ref 0–2)
WBC # BLD AUTO: 16.2 10E9/L (ref 4–11)
WBC #/AREA URNS AUTO: 3 /HPF (ref 0–5)

## 2020-09-09 PROCEDURE — 81001 URINALYSIS AUTO W/SCOPE: CPT | Performed by: EMERGENCY MEDICINE

## 2020-09-09 PROCEDURE — 25000132 ZZH RX MED GY IP 250 OP 250 PS 637: Performed by: INTERNAL MEDICINE

## 2020-09-09 PROCEDURE — 36415 COLL VENOUS BLD VENIPUNCTURE: CPT | Performed by: INTERNAL MEDICINE

## 2020-09-09 PROCEDURE — U0003 INFECTIOUS AGENT DETECTION BY NUCLEIC ACID (DNA OR RNA); SEVERE ACUTE RESPIRATORY SYNDROME CORONAVIRUS 2 (SARS-COV-2) (CORONAVIRUS DISEASE [COVID-19]), AMPLIFIED PROBE TECHNIQUE, MAKING USE OF HIGH THROUGHPUT TECHNOLOGIES AS DESCRIBED BY CMS-2020-01-R: HCPCS | Performed by: EMERGENCY MEDICINE

## 2020-09-09 PROCEDURE — 99207 ZZC OFFICE-HOSPITAL ADMIT: CPT | Performed by: PHYSICIAN ASSISTANT

## 2020-09-09 PROCEDURE — 99285 EMERGENCY DEPT VISIT HI MDM: CPT | Mod: 25

## 2020-09-09 PROCEDURE — 84484 ASSAY OF TROPONIN QUANT: CPT | Performed by: INTERNAL MEDICINE

## 2020-09-09 PROCEDURE — 96374 THER/PROPH/DIAG INJ IV PUSH: CPT

## 2020-09-09 PROCEDURE — 85025 COMPLETE CBC W/AUTO DIFF WBC: CPT | Performed by: EMERGENCY MEDICINE

## 2020-09-09 PROCEDURE — 99223 1ST HOSP IP/OBS HIGH 75: CPT | Mod: AI | Performed by: INTERNAL MEDICINE

## 2020-09-09 PROCEDURE — C9803 HOPD COVID-19 SPEC COLLECT: HCPCS

## 2020-09-09 PROCEDURE — 25000125 ZZHC RX 250: Performed by: INTERNAL MEDICINE

## 2020-09-09 PROCEDURE — 25000128 H RX IP 250 OP 636: Performed by: EMERGENCY MEDICINE

## 2020-09-09 PROCEDURE — 84484 ASSAY OF TROPONIN QUANT: CPT | Performed by: EMERGENCY MEDICINE

## 2020-09-09 PROCEDURE — 96376 TX/PRO/DX INJ SAME DRUG ADON: CPT

## 2020-09-09 PROCEDURE — 80048 BASIC METABOLIC PNL TOTAL CA: CPT | Performed by: EMERGENCY MEDICINE

## 2020-09-09 PROCEDURE — 12000000 ZZH R&B MED SURG/OB

## 2020-09-09 PROCEDURE — 93005 ELECTROCARDIOGRAM TRACING: CPT

## 2020-09-09 RX ORDER — ACETAMINOPHEN 325 MG/1
650 TABLET ORAL EVERY 4 HOURS PRN
Status: DISCONTINUED | OUTPATIENT
Start: 2020-09-09 | End: 2020-09-12 | Stop reason: HOSPADM

## 2020-09-09 RX ORDER — LABETALOL 20 MG/4 ML (5 MG/ML) INTRAVENOUS SYRINGE
20 ONCE
Status: COMPLETED | OUTPATIENT
Start: 2020-09-09 | End: 2020-09-09

## 2020-09-09 RX ORDER — CARVEDILOL 25 MG/1
25 TABLET ORAL 2 TIMES DAILY WITH MEALS
Status: DISCONTINUED | OUTPATIENT
Start: 2020-09-09 | End: 2020-09-12 | Stop reason: HOSPADM

## 2020-09-09 RX ORDER — PROCHLORPERAZINE 25 MG
25 SUPPOSITORY, RECTAL RECTAL EVERY 12 HOURS PRN
Status: DISCONTINUED | OUTPATIENT
Start: 2020-09-09 | End: 2020-09-12 | Stop reason: HOSPADM

## 2020-09-09 RX ORDER — AMLODIPINE BESYLATE 5 MG/1
5 TABLET ORAL DAILY
Status: DISCONTINUED | OUTPATIENT
Start: 2020-09-09 | End: 2020-09-12 | Stop reason: HOSPADM

## 2020-09-09 RX ORDER — LIDOCAINE 40 MG/G
CREAM TOPICAL
Status: DISCONTINUED | OUTPATIENT
Start: 2020-09-09 | End: 2020-09-12 | Stop reason: HOSPADM

## 2020-09-09 RX ORDER — AMLODIPINE BESYLATE 10 MG/1
10 TABLET ORAL DAILY
COMMUNITY
End: 2024-04-17

## 2020-09-09 RX ORDER — AMOXICILLIN 250 MG
1 CAPSULE ORAL 2 TIMES DAILY PRN
Status: DISCONTINUED | OUTPATIENT
Start: 2020-09-09 | End: 2020-09-12 | Stop reason: HOSPADM

## 2020-09-09 RX ORDER — ATORVASTATIN CALCIUM 10 MG/1
10 TABLET, FILM COATED ORAL AT BEDTIME
Status: DISCONTINUED | OUTPATIENT
Start: 2020-09-09 | End: 2020-09-12 | Stop reason: HOSPADM

## 2020-09-09 RX ORDER — ONDANSETRON 2 MG/ML
4 INJECTION INTRAMUSCULAR; INTRAVENOUS EVERY 6 HOURS PRN
Status: DISCONTINUED | OUTPATIENT
Start: 2020-09-09 | End: 2020-09-12 | Stop reason: HOSPADM

## 2020-09-09 RX ORDER — METOPROLOL TARTRATE 1 MG/ML
5 INJECTION, SOLUTION INTRAVENOUS EVERY 6 HOURS PRN
Status: DISCONTINUED | OUTPATIENT
Start: 2020-09-09 | End: 2020-09-12 | Stop reason: HOSPADM

## 2020-09-09 RX ORDER — PROCHLORPERAZINE MALEATE 10 MG
10 TABLET ORAL EVERY 6 HOURS PRN
Status: DISCONTINUED | OUTPATIENT
Start: 2020-09-09 | End: 2020-09-12 | Stop reason: HOSPADM

## 2020-09-09 RX ORDER — CLONIDINE HYDROCHLORIDE 0.1 MG/1
0.3 TABLET ORAL 2 TIMES DAILY
Status: DISCONTINUED | OUTPATIENT
Start: 2020-09-09 | End: 2020-09-12 | Stop reason: HOSPADM

## 2020-09-09 RX ORDER — NALOXONE HYDROCHLORIDE 0.4 MG/ML
.1-.4 INJECTION, SOLUTION INTRAMUSCULAR; INTRAVENOUS; SUBCUTANEOUS
Status: DISCONTINUED | OUTPATIENT
Start: 2020-09-09 | End: 2020-09-12 | Stop reason: HOSPADM

## 2020-09-09 RX ORDER — HYDRALAZINE HYDROCHLORIDE 50 MG/1
100 TABLET, FILM COATED ORAL 3 TIMES DAILY
Status: DISCONTINUED | OUTPATIENT
Start: 2020-09-09 | End: 2020-09-12 | Stop reason: HOSPADM

## 2020-09-09 RX ORDER — AMOXICILLIN 250 MG
2 CAPSULE ORAL 2 TIMES DAILY PRN
Status: DISCONTINUED | OUTPATIENT
Start: 2020-09-09 | End: 2020-09-12 | Stop reason: HOSPADM

## 2020-09-09 RX ORDER — ONDANSETRON 4 MG/1
4 TABLET, ORALLY DISINTEGRATING ORAL EVERY 6 HOURS PRN
Status: DISCONTINUED | OUTPATIENT
Start: 2020-09-09 | End: 2020-09-12 | Stop reason: HOSPADM

## 2020-09-09 RX ORDER — POLYETHYLENE GLYCOL 3350 17 G/17G
17 POWDER, FOR SOLUTION ORAL DAILY PRN
Status: DISCONTINUED | OUTPATIENT
Start: 2020-09-09 | End: 2020-09-12 | Stop reason: HOSPADM

## 2020-09-09 RX ADMIN — LABETALOL 20 MG/4 ML (5 MG/ML) INTRAVENOUS SYRINGE 20 MG: at 12:09

## 2020-09-09 RX ADMIN — HYDRALAZINE HYDROCHLORIDE 100 MG: 50 TABLET, FILM COATED ORAL at 21:49

## 2020-09-09 RX ADMIN — ATORVASTATIN CALCIUM 10 MG: 10 TABLET, FILM COATED ORAL at 21:49

## 2020-09-09 RX ADMIN — LABETALOL 20 MG/4 ML (5 MG/ML) INTRAVENOUS SYRINGE 20 MG: at 13:54

## 2020-09-09 RX ADMIN — METOPROLOL TARTRATE 4 MG: 5 INJECTION INTRAVENOUS at 16:36

## 2020-09-09 RX ADMIN — CLONIDINE HYDROCHLORIDE 0.3 MG: 0.1 TABLET ORAL at 21:49

## 2020-09-09 RX ADMIN — CARVEDILOL 25 MG: 25 TABLET, FILM COATED ORAL at 17:59

## 2020-09-09 RX ADMIN — HYDRALAZINE HYDROCHLORIDE 100 MG: 50 TABLET, FILM COATED ORAL at 16:36

## 2020-09-09 RX ADMIN — AMLODIPINE BESYLATE 5 MG: 5 TABLET ORAL at 17:58

## 2020-09-09 ASSESSMENT — ENCOUNTER SYMPTOMS
SHORTNESS OF BREATH: 0
VOMITING: 1
COUGH: 0
HEADACHES: 0
NAUSEA: 1
FEVER: 0
PALPITATIONS: 0
CHILLS: 0

## 2020-09-09 ASSESSMENT — ACTIVITIES OF DAILY LIVING (ADL)
ADLS_ACUITY_SCORE: 10
ADLS_ACUITY_SCORE: 10

## 2020-09-09 NOTE — PROGRESS NOTES
Pt had IV in left AC. This writer pushing metoprolol and IV infiltrated. Quarter size hard area of infiltration noted. . This writer informed Primary RN Zaina ROSE This writer pulled IV.

## 2020-09-09 NOTE — ED NOTES
[facility]  ED Nurse Handoff Report    Taylor Valiente is a 25 year old male   ED Chief complaint: Hypertension and Nausea & Vomiting  . ED Diagnosis:   Final diagnoses:   Acute renal failure, unspecified acute renal failure type (H)   Malignant essential hypertension     Allergies:   Allergies   Allergen Reactions     No Known Allergies        Code Status: Full Code  Activity level - Baseline/Home:  Independent. Activity Level - Current:   Independent. Lift room needed: No. Bariatric: No   Needed: No   Isolation: No. Infection: Not Applicable.     Vital Signs:   Vitals:    09/09/20 1200 09/09/20 1215 09/09/20 1230 09/09/20 1315   BP: (!) 222/161 (!) 201/136 (!) 214/154 (!) 219/163   Pulse: 69 68 73 68   Resp:       Temp:       TempSrc:       SpO2:    99%   Weight:           Cardiac Rhythm:  ,      Pain level: 0-10 Pain Scale: 8  Patient confused: No. Patient Falls Risk: No.   Elimination Status: Has voided   Patient Report - Initial Complaint: Hypertension. Focused Assessment: A&O, up independently in ED. Seen at clinic for nausea and vomiting and sent to ED for elevated blood pressure. Hx of htn, reports taking blood pressure meds as prescribed. Creatinine elevated from baseline. Nephrology to be consulted.  Tests Performed: Labs. Abnormal Results:   Labs Ordered and Resulted from Time of ED Arrival Up to the Time of Departure from the ED   CBC WITH PLATELETS DIFFERENTIAL - Abnormal; Notable for the following components:       Result Value    WBC 16.2 (*)     Absolute Neutrophil 11.7 (*)     All other components within normal limits   BASIC METABOLIC PANEL - Abnormal; Notable for the following components:    Potassium 3.2 (*)     Chloride 110 (*)     Glucose 111 (*)     Urea Nitrogen 75 (*)     Creatinine 6.94 (*)     GFR Estimate 10 (*)     GFR Estimate If Black 12 (*)     Calcium 8.2 (*)     All other components within normal limits   ROUTINE UA WITH MICROSCOPIC REFLEX TO CULTURE -  Abnormal; Notable for the following components:    Glucose Urine 50 (*)     Blood Urine Small (*)     Protein Albumin Urine 300 (*)     Mucous Urine Present (*)     All other components within normal limits   TROPONIN I - Abnormal; Notable for the following components:    Troponin I ES 0.079 (*)     All other components within normal limits   COVID-19 VIRUS (CORONAVIRUS) BY PCR   PERIPHERAL IV CATHETER     .   Treatments provided: Labetalol x 2 with some improvement.  Family Comments: NA  OBS brochure/video discussed/provided to patient:  No  ED Medications:   Medications   labetalol (NORMODYNE/TRANDATE) syringe 20 mg (20 mg Intravenous Given 9/9/20 1209)   labetalol (NORMODYNE/TRANDATE) syringe 20 mg (20 mg Intravenous Given 9/9/20 1354)     Drips infusing:  No  For the majority of the shift, the patient's behavior Green. Interventions performed were NA.    Sepsis treatment initiated: No     Patient tested for COVID 19 prior to admission: YES    ED Nurse Name/Phone Number: Ranjana Solares RN,   2:50 PM    RECEIVING UNIT ED HANDOFF REVIEW    Above ED Nurse Handoff Report was reviewed: Yes  Reviewed by: Samaira Quiroz RN on September 9, 2020 at 2:57 PM

## 2020-09-09 NOTE — PROGRESS NOTES
SUBJECTIVE  HPI:  Taylor Valiente is a 25 year old male who presents with the CC of vomiting and nausea for 3 days.  Denies pain.  no change in vision, head ache, chest pain, shortness of breath.     Past Medical History:   Diagnosis Date     Benign essential hypertension 7/28/2017     CKD (chronic kidney disease) stage 3, GFR 30-59 ml/min (H) 7/28/2017    Due to HTN     Focal glomerular sclerosis 7/28/2017    Due to Hypertension injury.     LVH (left ventricular hypertrophy) due to hypertensive disease 7/14/2017     Obesity, unspecified      Current Outpatient Medications   Medication Sig Dispense Refill     amLODIPine (NORVASC) 10 MG tablet Take 1 tablet (10 mg) by mouth daily 90 tablet 3     aspirin 81 MG EC tablet Take 81 mg by mouth daily       atorvastatin (LIPITOR) 10 MG tablet Take 10 mg by mouth At Bedtime       carvedilol (COREG) 25 MG tablet TAKE ONE TABLET BY MOUTH TWICE A DAY WITH MEALS 60 tablet 0     cloNIDine (CATAPRES) 0.3 MG tablet Take 1 tablet (0.3 mg) by mouth 2 times daily 60 tablet 1     hydrALAZINE (APRESOLINE) 100 MG tablet TAKE ONE TABLET BY MOUTH THREE TIMES A DAY 30 tablet 0     hydrALAZINE (APRESOLINE) 100 MG tablet TAKE ONE TABLET BY MOUTH THREE TIMES A DAY 90 tablet 0     lisinopril (PRINIVIL/ZESTRIL) 40 MG tablet Take 40 mg by mouth daily       metoprolol tartrate (LOPRESSOR) 100 MG tablet TAKE ONE TABLET BY MOUTH TWICE A DAY - PATIENT NEEDS TO BE SEEN AT CLINIC FOR REFILLS 60 tablet 0     vitamin D3 (CHOLECALCIFEROL) 2000 units (50 mcg) tablet Take 1 tablet by mouth daily       Social History     Tobacco Use     Smoking status: Never Smoker     Smokeless tobacco: Never Used   Substance Use Topics     Alcohol use: No       ROS:  10 point ROS negative except as listed above      OBJECTIVE:  BP (!) 214/161   Pulse 82   Temp 98.5  F (36.9  C)   Wt 131.5 kg (290 lb)   SpO2 100%   BMI 39.33 kg/m    GENERAL APPEARANCE: healthy, alert and no distress  EYES: conjunctiva clear  NECK:  supple, nontender, no lymphadenopathy  RESP: lungs clear to auscultation - no rales, rhonchi or wheezes  CV: regular rates and rhythm, normal S1 S2, no murmur noted  ABDOMEN:  soft, nontender, no HSM or masses and bowel sounds normal  NEURO: Normal strength and tone, sensory exam grossly normal,  normal speech and mentation  SKIN: no suspicious lesions or rashes        ASSESSMENT:  (I16.0) Hypertensive urgency  (primary encounter diagnosis)  (R11.2) Non-intractable vomiting with nausea, unspecified vomiting type  (N18.3) CKD (chronic kidney disease) stage 3, GFR 30-59 ml/min (H)  Comment: vitals stable  Plan: To ER by private vehicle.  Patient is not driving.

## 2020-09-09 NOTE — ED PROVIDER NOTES
History     Chief Complaint:  Hypertension and Nausea & Vomiting       HPI   Taylor Valiente is a 25 year-old male with a history of hypertension who presents with hypertension, nausea, and vomiting. The patient reports having nausea and vomiting for the past 3 days. He states that he went to Urgent care this morning for his nausea and vomiting but was found to be hypertensive. He was therefore told to come into the ER for further evaluation. Here, the patient states that he is still taking all of his hypertension medications and has had no changes. The patient also complains of a bump around his rectum. He denies having a history of hemorrhoids and has been using preparation H on the bump.     Allergies:  No Known Allergies     Medications:    Amlodipine   aspirin 81 MG EC tablet  atorvastatin   carvedilol   Clonidine   Hydralazine   lisinopril   metoprolol tartrate    Past Medical History:    Benign essential hypertension   CKD (chronic kidney disease) stage 3, GFR 30-59 ml/min (H)   Focal glomerular sclerosis   LVH (left ventricular hypertrophy) due to hypertensive disease   Obesity, unspecified     Past Surgical History:    Surgical history reviewed. No pertinent surgical history.     Family History:    Blood disease  Diabetes   Hypertension      Social History:  Smoking Status: Never Smoker  Smokeless Tobacco: Never Used  Alcohol Use: No  Drug Use: yes, marijuana   PCP: Jer Hooper     Review of Systems   Constitutional: Negative for chills and fever.   Respiratory: Negative for cough and shortness of breath.    Cardiovascular: Negative for chest pain, palpitations and leg swelling.   Gastrointestinal: Positive for nausea and vomiting.        Bump around rectum   Neurological: Negative for headaches.   All other systems reviewed and are negative.      Physical Exam     Patient Vitals for the past 24 hrs:   BP Temp Temp src Pulse Resp SpO2 Weight   09/09/20 1315 (!) 219/163 -- -- 68 -- 99 % --    09/09/20 1230 (!) 214/154 -- -- 73 -- -- --   09/09/20 1215 (!) 201/136 -- -- 68 -- -- --   09/09/20 1200 (!) 222/161 -- -- 69 -- -- --   09/09/20 1132 (!) 250/194 -- -- -- -- -- --   09/09/20 1130 -- 97.7  F (36.5  C) Temporal 75 17 97 % 133 kg (293 lb 3.4 oz)        Physical Exam  Nursing note and vitals reviewed.  Constitutional: Cooperative.   HENT:   Mouth/Throat: Moist mucous membranes.   Eyes: EOMI, nonicteric sclera  Cardiovascular: Normal rate, regular rhythm, no murmurs, rubs, or gallops  Pulmonary/Chest: Effort normal and breath sounds normal. No respiratory distress. No wheezes. No rales.   Abdominal: Soft. Nontender, nondistended, no guarding or rigidity.   Musculoskeletal: Normal range of motion.   Neurological: Alert. Moves all extremities spontaneously.   Skin: Skin is warm and dry. No rash noted.   Psychiatric: Normal mood and affect.       Emergency Department Course   ECG:  ECG taken at 1140, ECG read at 1140  Normal sinus rhythm  T wave abnormality, consider inferolateral ischemia   Prolonged QT  Abnormal ECG  Rate 68 bpm. PA interval 156 ms. QRS duration 82 ms. QT/QTc 448/476 ms. P-R-T axes 33 42 216.   New TWI compared to 10/4/19    Laboratory:  Laboratory findings were communicated with the patient who voiced understanding of the findings.    UA with micro reflex to culture: Urine GLC 50 (A) Urine Blood small (A) Protein Albumin Urine 300 (A) Mucous urine present (A)  o/w wnl/negative       CBC:  WBC 16.2 (H), HGB 13.3, , o/w WNL     BMP: Glucose 111 (H), potassium 3.2 (L), chloride 110 (H), GFR 10 (L), calcium 8.2 (L), o/w WNL (Creatinine: 6.94 (H))     Troponin(1152):  0.079 (H)      Asymptomatic COVID-19 Virus (Coronavirus) by PCR Nasopharyngeal swab: pending      Interventions:  1209 Labetalol 20 mg IV  1354 Labetalol 20 mg IV    Emergency Department Course:  Past medical records, nursing notes, and vitals reviewed.    1145 I performed an exam of the patient as documented above.     IV was inserted and blood was drawn for laboratory testing, results above.     The patient provided a urine sample here in the emergency department. This was sent for laboratory testing, findings above.      1300 Patient rechecked and updated.      1414 I spoke with Dr. Kumari of the Hospitalist service from [facility] regarding patient's presentation, findings, and plan of care.       Findings and plan explained to the Patient who consents to admission. Discussed the patient with Dr. Kumari, who will admit the patient to a cardiac telemetry bed for further monitoring, evaluation, and treatment.      Impression & Plan   Covid-19  Taylor Valiente was evaluated during a global COVID-19 pandemic, which necessitated consideration that the patient might be at risk for infection with the SARS-CoV-2 virus that causes COVID-19.   Applicable protocols for evaluation were followed during the patient's care.   COVID-19 was considered as part of the patient's evaluation. The plan for testing is:  a test was obtained during this visit.    Medical Decision Making:  Taylor Valiente is a 25 year old male who presents to the emergency department today with chief complaint nausea and vomiting.  He has found to be significantly hypertensive.  This was treated with IV labetalol with some improvement.  We were careful not to drop his blood pressure too much, since it has likely been this elevated for some time.  Unfortunately, patient has evidence of end organ injury as his creatinine is significantly elevated to almost 7.  No indication for dialysis at this time.  He has been admitted with very high blood pressures twice in the past and has had rather extensive work-up for cause, but there is no known cause of secondary hypertension at this time.  Discussed with our hospitalist service, Dr. Kumari, who accepts patient for admission.  All of patient's questions answered and he is in agreement with the plan.      Discharge Diagnosis:     ICD-10-CM    1. Acute renal failure, unspecified acute renal failure type (H)  N17.9    2. Malignant essential hypertension  I10        Disposition:  Admitted.    Scribe Disclosure:  I, Alex Sears, am serving as a scribe at 11:45 AM on 9/9/2020 to document services personally performed by Kong Polo MD based on my observations and the provider's statements to me.      9/9/2020   Kong Polo MD [place], Lucas Ray, MD  09/09/20 [id number]

## 2020-09-09 NOTE — H&P
[facility]  Hospitalist Admission Note  Name: Taylor Valiente    MRN: [id number]  YOB: 1995    Age: 25 year old  Date of admission: 9/9/2020  Primary care provider: Jer Hooper    Chief Complaint: Nausea and vomiting    Assessment and Plan:     Taylor Valiente is a 25 year old male with PMH including hypertension with CKD stage III, obesity who was admitted on 9/9/2020 with nausea and vomiting and was found to have hypertensive emergency with acute renal failure and elevated troponin.    1.  Hypertensive emergency: Patient has severe drug-resistant hypertension with associated kidney disease, see below.  He reports that he has not been checking his blood pressure at home.  Presented with a blood pressure of 250/194.  He does have nausea and vomiting but no chest pain, vision changes, headache or shortness of breath.  He does report that he is been compliant with his medications although he has been taking both carvedilol and metoprolol and may have been taking the carvedilol 3 times daily, despite this he does not have bradycardia.  He does have a slightly elevated troponin and T wave inversions on his EKG.  Was also found to have acute renal failure.  He received IV labetalol in the emergency room x2.  Will attempt to slowly lower his blood pressure, should not be lowered below 190/140 tonight.  -Continue prior to admission metoprolol, hydralazine, amlodipine  -Hold lisinopril given acute renal failure  -Resume carvedilol 25 mg twice daily, hydralazine 100 mg 3 times daily, amlodipine 5 mg daily and clonidine 0.3 mg twice daily  -IV metoprolol PRN hypertension (I have placed high parameters on this as I do not want to lead to his blood pressure dropping too quickly)    2.  Acute renal failure with history of hypertensive nephrosclerosis and secondary FSGS: Patient follows with Dr. Hooper with nephrology.  He was originally diagnosed with hypertension in July 2017 with heavy  proteinuria.  He had a kidney biopsy and FSGS was diagnosed.  The cause of his hypertension was thought to be combination of obesity and strong family history.  He did undergo a CT scan of the adrenal glands and was found to have likely adenomas.  He has seen endocrinology and was ruled out for Cushing's and pheochromocytoma.  Last creatinine I have available was from 10/2019 which was 1.75.  At that time his baseline appeared to be 1.6-2.  Today his creatinine is elevated at 6.94.  For now avoid IV fluids given his severe hypertension.  Will check a renal ultrasound.  -Hold nephrotoxic agents  - Hold lisinopril  -Nephrology consult  -Check UA and FeNa  -Check renal ultrasound    3.  Troponin elevation, suspect demand ischemia: Previously patient has had mild troponin elevation in the setting of significant hypertension.  Last TTE was on 10/2019 which showed an EF of 55 to 60%.  He does have T wave inversions in the inferior and lateral leads.  He has not had chest pain or shortness of breath.  I suspect that this troponin elevation is secondary to his hypertensive emergency.  See above for treatment of that.  We will repeat a TTE and trend his troponins.  I did inform him that he needs to let the nursing staff know if he has chest pain or shortness of breath.  -Treatment of hypertension as above  -Continue statin  -Serial troponins  -TTE    4.  Leukocytosis: Patient has a white blood cell count of 16.2.  He has no infectious symptoms.  He has had intermittent leukocytosis looking through his prior chart.  No indication for antibiotics at this time.  Repeat CBC tomorrow.    5.  COVID-19 PUI: Asymptomatic swab pending.    6.  Nausea/vomitin days of nausea and vomiting.  No associated fevers, chills or abdominal pain.  No diarrhea constipation.  This could be related to his hypertensive emergency.  Could also have a viral gastroenteritis.  He also has acute renal failure which could lead to the symptoms.  For now  "symptomatic treatment.  Avoid IV fluids given his severe hypertension.    Diet: Renal diet  DVT Prophylaxis: Pneumatic Compression Devices  Hurt Catheter: not present  Code Status: Full code    Disposition Plan   Expected discharge: Admit inpatient status, recommended to prior living arrangement once renal function improved and Blood pressure improved.  Entered: Netta Kumari MD 09/09/2020, 2:34 PM     The patient's care was discussed with the Patient.    Netta Kumari MD  [facility]    History of Present Illness:  Taylor Valiente is a 25 year old male with PMH including hypertension with CKD stage III, obesity who was admitted on 9/9/2020 with nausea and vomiting and was found to have hypertensive emergency with acute renal failure and elevated troponin. History was obtained through patient interview, chart review and discussion with Dr. Polo in the ER.    The patient states that he was hanging out with his friends on Sunday afternoon (9/6) and he generally felt \"crappy\" so went home.  He woke up Monday morning and had 2-3 episodes of nonbloody emesis.  He continued to feel somewhat nauseated throughout the day and when he woke up again this morning he had another episode of emesis.  He thought that he might have eaten something bad to cause the symptoms but as they were persistent he decided to be evaluated.  He went to urgent care at which time he was found to have severe hypertension with a blood pressure of 120/153 and was told to present to the emergency room.    He states that other than the nausea and vomiting he has been feeling fine.  He denies abdominal pain, fevers, chills, diarrhea, constipation, chest pain, shortness of breath, headache, vision changes, leg swelling.  He has not had known COVID contacts.  He has been urinating normally.  He does have foamy urine but this is not a new issue.  No clots or hematuria.    He does follow with Dr. Hooper with nephrology.  He states " he is overdue for a visit.  He was supposed to a video visit but has not set this up yet.    He states that last night he took his clonidine.  This morning he took his metoprolol and lisinopril.  He has not taken his hydralazine yet today.  He took his amlodipine yesterday.  He reports that he is compliant with his medications.    In the emergency room his initial blood pressure was 250/194.  He had an EKG which does show T wave inversions in most of the inferior and lateral leads.  He has a slight troponin elevation.  He was fine to have acute renal failure.  He was given IV labetalol 20 mg x 2 doses.     Past Medical History:  Past Medical History:   Diagnosis Date     Benign essential hypertension 7/28/2017     CKD (chronic kidney disease) stage 3, GFR 30-59 ml/min (H) 7/28/2017    Due to HTN     Focal glomerular sclerosis 7/28/2017    Due to Hypertension injury.     LVH (left ventricular hypertrophy) due to hypertensive disease 7/14/2017     Obesity, unspecified      Past Surgical History:  Past Surgical History:   Procedure Laterality Date     NO HISTORY OF SURGERY       Social History:  Social History     Tobacco Use     Smoking status: Never Smoker     Smokeless tobacco: Never Used   Substance Use Topics     Alcohol use: No     Social History     Social History Narrative     Not on file   Patient uses marijuana 1-2 times per week.  He does not drink alcohol or use tobacco.  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      Family History:  Family History   Problem Relation Age of Onset     Blood Disease Mother         has hep b     Diabetes Mother         gestionanal diabetes     Hypertension Mother      Obesity Father      Hypertension Father      Hypertension Maternal Grandmother      Diabetes Maternal Grandmother      Hypertension Paternal Grandmother      Hypertension Paternal Grandfather      Coronary Artery Disease Maternal Uncle      Allergies:  Allergies   Allergen  Reactions     No Known Allergies      Medications:  No current facility-administered medications for this encounter.      Current Outpatient Medications   Medication     amLODIPine (NORVASC) 10 MG tablet     aspirin 81 MG EC tablet     atorvastatin (LIPITOR) 10 MG tablet     carvedilol (COREG) 25 MG tablet     cloNIDine (CATAPRES) 0.3 MG tablet     hydrALAZINE (APRESOLINE) 100 MG tablet     lisinopril (PRINIVIL/ZESTRIL) 40 MG tablet     metoprolol tartrate (LOPRESSOR) 100 MG tablet     vitamin D3 (CHOLECALCIFEROL) 2000 units (50 mcg) tablet      Review of Systems:  A Comprehensive greater than 10 system review of systems was carried out.  Pertinent positives and negatives are noted above.  Otherwise negative for contributory information.     Physical Exam:  Blood pressure (!) 219/163, pulse 68, temperature 97.7  F (36.5  C), temperature source Temporal, resp. rate 17, weight 133 kg (293 lb 3.4 oz), SpO2 99 %.  Wt Readings from Last 1 Encounters:   09/09/20 133 kg (293 lb 3.4 oz)     Exam:   General: Alert, awake, no acute distress.  HEENT: NC/AT, eyes anicteric and without injection, EOMI, face symmetric.  Dentition WNL, MMM.  Cardiac: RRR, normal S1, S2.  No murmurs/g/r.  No LE edema  Pulmonary: Normal chest rise, normal work of breathing.  Lungs CTAB  Abdomen: soft, non-tender, non-distended.  Normoactive BS.  No guarding or rebound tenderness.  Extremities: no deformities.  Warm, well perfused.  Skin: no rashes or lesions noted.  Warm and Dry.  Neuro: No focal deficits noted.  Speech clear.  Coordination and strength grossly normal.  Psych: Appropriate affect.  Alert and oriented x3    Data Reviewed Today:  EKG: Normal sinus rhythm with T wave inversions in the inferior and lateral leads  Imaging:  Results for orders placed or performed during the hospital encounter of 10/04/19   Echocardiogram Complete    Narrative    [id number]  [id number]  [id number]  [id number]^[id number]           [facility]  [facility]  Echocardiography Laboratory  [address] [place] [address]        Name: STEFAN RIOS  MRN: [id number]  : 1995  Study Date: 10/04/2019 08:15 AM  Age: 24 yrs  Gender: Male  Patient Location: [facility]  Reason For Study: Abn EKG  Ordering Physician: OPAL GRAVES  Referring Physician: Priyank Lawrence  Performed By: Serene Lee     BSA: 2.4 m2  Height: 72 in  Weight: 260 lb  BP: 105/59 mmHg  _____________________________________________________________________________  __        Procedure  Complete Portable Echo Adult. Optison (NDC #[id number]) given intravenously.  _____________________________________________________________________________  __        Interpretation Summary     The left ventricle is normal in size. Left ventricular systolic function is  normal. The visual ejection fraction is estimated at 55-60%. LVEF 58% based on  biplane 2D tracing. Left ventricular diastolic function is normal. No regional  wall motion abnormalities noted. There is no thrombus seen in the left  ventricle.  The right ventricle is normal size. The right ventricular systolic function is  normal.  Mild left atrial enlargement.  Trace mitral and tricuspid regurgitation.  No pericardial effusion.  In direct comparison to the previous study dated 07/15/2017, there has been an  interval improvement in left ventricular systolic function.  _____________________________________________________________________________  __        Left Ventricle  The left ventricle is normal in size. Left ventricular systolic function is  normal. The visual ejection fraction is estimated at 55-60%. LVEF 58% based on  biplane 2D tracing. Left ventricular diastolic function is normal. No regional  wall motion abnormalities noted. There is no thrombus seen in the left  ventricle.     Right Ventricle  The right ventricle is normal size. The right ventricular systolic function is  normal.     Atria  The left atrium is mildly dilated.  Right atrial size is normal. There is no  color Doppler evidence of an atrial shunt.     Mitral Valve  There is trace mitral regurgitation.        Tricuspid Valve  There is trace tricuspid regurgitation.     Aortic Valve  The aortic valve is not well visualized. No aortic regurgitation is present.  No aortic stenosis is present.     Pulmonic Valve  There is trace pulmonic valvular regurgitation. There is no pulmonic valvular  stenosis.     Vessels  The aortic root is normal size. Normal size ascending aorta. Descending aortic  velocity normal. The inferior vena cava is normal.     Pericardium  There is no pericardial effusion.        Rhythm  Sinus rhythm was noted.  _____________________________________________________________________________  __  MMode/2D Measurements & Calculations     IVSd: 1.0 cm  LVIDd: 5.1 cm  LVIDs: 3.6 cm  LVPWd: 1.0 cm  FS: 29.0 %  LV mass(C)d: 194.2 grams  LV mass(C)dI: 81.6 grams/m2  Ao root diam: 3.5 cm  asc Aorta Diam: 3.3 cm  LVOT diam: 2.6 cm  LVOT area: 5.4 cm2  LA Volume (BP): 85.6 ml  LA Volume Index (BP): 36.0 ml/m2  RWT: 0.40           Doppler Measurements & Calculations  MV E max alexys: 75.9 cm/sec  MV A max alexys: 35.0 cm/sec  MV E/A: 2.2  MV dec slope: 257.1 cm/sec2  MV dec time: 0.58 sec  E/E' av.4  Lateral E/e': 7.2  Medial E/e': 7.7           _____________________________________________________________________________  __           Report approved by: Arleth Blancas 10/04/2019 10:03 AM          Labs:  Recent Labs   Lab 20  1152   WBC 16.2*   HGB 13.3   HCT 41.5   MCV 84        Recent Labs   Lab 20  1152      POTASSIUM 3.2*   CHLORIDE 110*   CO2 21   ANIONGAP 10   *   BUN 75*   CR 6.94*   GFRESTIMATED 10*   GFRESTBLACK 12*   BUBBA 8.2*     Recent Labs   Lab 20  1152   TROPI 0.079*       Netta Kumari MD  Hospitalist  [facility]

## 2020-09-09 NOTE — PHARMACY-ADMISSION MEDICATION HISTORY
Admission medication history interview status for this patient is complete. See [facility] admission navigator for allergy information, prior to admission medications and immunization status.     Medication history interview done via telephone during Covid-19 pandemic, indicate source(s): Patient  Medication history resources (including written lists, pill bottles, clinic record):None    Changes made to PTA medication list:  Added: none  Deleted: none  Changed: amlodipine from 10mg to 5mg    Actions taken by pharmacist (provider contacted, etc):None     Additional medication history information: Pt had been filling and taking both carvedilol and metoprolol(it looked like he should have switched to carvedilol from metoprolol in the past) and he may have been mistakenly taking carvedilol three times daily.    Medication reconciliation/reorder completed by provider prior to medication history?  N   (Y/N)     For patients on insulin therapy: N  (Y/N)    Prior to Admission medications    Medication Sig Last Dose Taking? Auth Provider   amLODIPine (NORVASC) 10 MG tablet Take 5 mg by mouth daily 9/8/2020 at Unknown time Yes Unknown, Entered By History   aspirin 81 MG EC tablet Take 81 mg by mouth daily Past Week at Unknown time Yes Unknown, Entered By History   atorvastatin (LIPITOR) 10 MG tablet Take 10 mg by mouth At Bedtime 9/8/2020 at Unknown time Yes Unknown, Entered By History   carvedilol (COREG) 25 MG tablet TAKE ONE TABLET BY MOUTH TWICE A DAY WITH MEALS 9/8/2020 at Unknown time Yes Priyank Lawrence MD   cloNIDine (CATAPRES) 0.3 MG tablet Take 1 tablet (0.3 mg) by mouth 2 times daily 9/8/2020 at Unknown time Yes Martin Melgoza MD   hydrALAZINE (APRESOLINE) 100 MG tablet TAKE ONE TABLET BY MOUTH THREE TIMES A DAY 9/8/2020 at Unknown time Yes Priyank Lawrence MD   lisinopril (PRINIVIL/ZESTRIL) 40 MG tablet Take 40 mg by mouth daily 9/9/2020 at Unknown time Yes Reported, Patient   metoprolol tartrate (LOPRESSOR) 100 MG tablet  TAKE ONE TABLET BY MOUTH TWICE A DAY - PATIENT NEEDS TO BE SEEN AT CLINIC FOR REFILLS 9/9/2020 at am Yes Priyank Lawrence MD   vitamin D3 (CHOLECALCIFEROL) 2000 units (50 mcg) tablet Take 1 tablet by mouth daily Past Week at Unknown time Yes Unknown, Entered By History

## 2020-09-09 NOTE — ED TRIAGE NOTES
Presents with nausea and vomiting since Sunday. Today reports ongoing symptoms and coincidently is hypertensive at clinic who referred him here. Pt also reports bump by his rectum which he is concerned may be the source of infection.

## 2020-09-10 ENCOUNTER — APPOINTMENT (OUTPATIENT)
Dept: ULTRASOUND IMAGING | Facility: CLINIC | Age: 25
DRG: 281 | End: 2020-09-10
Attending: INTERNAL MEDICINE
Payer: COMMERCIAL

## 2020-09-10 ENCOUNTER — APPOINTMENT (OUTPATIENT)
Dept: CARDIOLOGY | Facility: CLINIC | Age: 25
DRG: 281 | End: 2020-09-10
Attending: INTERNAL MEDICINE
Payer: COMMERCIAL

## 2020-09-10 LAB
ALBUMIN SERPL-MCNC: 2.4 G/DL (ref 3.4–5)
ALP SERPL-CCNC: 94 U/L (ref 40–150)
ALT SERPL W P-5'-P-CCNC: 16 U/L (ref 0–70)
ANION GAP SERPL CALCULATED.3IONS-SCNC: 10 MMOL/L (ref 3–14)
AST SERPL W P-5'-P-CCNC: 11 U/L (ref 0–45)
BILIRUB DIRECT SERPL-MCNC: <0.1 MG/DL (ref 0–0.2)
BILIRUB SERPL-MCNC: 0.5 MG/DL (ref 0.2–1.3)
BUN SERPL-MCNC: 82 MG/DL (ref 7–30)
CALCIUM SERPL-MCNC: 7.9 MG/DL (ref 8.5–10.1)
CHLORIDE SERPL-SCNC: 109 MMOL/L (ref 94–109)
CO2 SERPL-SCNC: 20 MMOL/L (ref 20–32)
CREAT SERPL-MCNC: 7.24 MG/DL (ref 0.66–1.25)
ERYTHROCYTE [DISTWIDTH] IN BLOOD BY AUTOMATED COUNT: 14.1 % (ref 10–15)
GFR SERPL CREATININE-BSD FRML MDRD: 10 ML/MIN/{1.73_M2}
GLUCOSE SERPL-MCNC: 108 MG/DL (ref 70–99)
HCT VFR BLD AUTO: 36.5 % (ref 40–53)
HGB BLD-MCNC: 11.6 G/DL (ref 13.3–17.7)
LABORATORY COMMENT REPORT: NORMAL
MCH RBC QN AUTO: 26.7 PG (ref 26.5–33)
MCHC RBC AUTO-ENTMCNC: 31.8 G/DL (ref 31.5–36.5)
MCV RBC AUTO: 84 FL (ref 78–100)
PLATELET # BLD AUTO: 194 10E9/L (ref 150–450)
POTASSIUM SERPL-SCNC: 3.4 MMOL/L (ref 3.4–5.3)
PROT SERPL-MCNC: 6 G/DL (ref 6.8–8.8)
RBC # BLD AUTO: 4.34 10E12/L (ref 4.4–5.9)
SARS-COV-2 RNA SPEC QL NAA+PROBE: NEGATIVE
SARS-COV-2 RNA SPEC QL NAA+PROBE: NORMAL
SODIUM SERPL-SCNC: 139 MMOL/L (ref 133–144)
SPECIMEN SOURCE: NORMAL
SPECIMEN SOURCE: NORMAL
TROPONIN I SERPL-MCNC: 0.08 UG/L (ref 0–0.04)
WBC # BLD AUTO: 10.8 10E9/L (ref 4–11)

## 2020-09-10 PROCEDURE — 25000132 ZZH RX MED GY IP 250 OP 250 PS 637: Performed by: INTERNAL MEDICINE

## 2020-09-10 PROCEDURE — 76770 US EXAM ABDO BACK WALL COMP: CPT

## 2020-09-10 PROCEDURE — 36415 COLL VENOUS BLD VENIPUNCTURE: CPT | Performed by: INTERNAL MEDICINE

## 2020-09-10 PROCEDURE — 12000000 ZZH R&B MED SURG/OB

## 2020-09-10 PROCEDURE — 99233 SBSQ HOSP IP/OBS HIGH 50: CPT | Performed by: INTERNAL MEDICINE

## 2020-09-10 PROCEDURE — 80076 HEPATIC FUNCTION PANEL: CPT | Performed by: INTERNAL MEDICINE

## 2020-09-10 PROCEDURE — 25500064 ZZH RX 255 OP 636: Performed by: INTERNAL MEDICINE

## 2020-09-10 PROCEDURE — 80048 BASIC METABOLIC PNL TOTAL CA: CPT | Performed by: INTERNAL MEDICINE

## 2020-09-10 PROCEDURE — 85027 COMPLETE CBC AUTOMATED: CPT | Performed by: INTERNAL MEDICINE

## 2020-09-10 PROCEDURE — 40000264 ECHOCARDIOGRAM COMPLETE

## 2020-09-10 PROCEDURE — 93306 TTE W/DOPPLER COMPLETE: CPT | Mod: 26 | Performed by: INTERNAL MEDICINE

## 2020-09-10 RX ADMIN — HYDRALAZINE HYDROCHLORIDE 100 MG: 50 TABLET, FILM COATED ORAL at 17:00

## 2020-09-10 RX ADMIN — ACETAMINOPHEN 650 MG: 325 TABLET, FILM COATED ORAL at 13:29

## 2020-09-10 RX ADMIN — CLONIDINE HYDROCHLORIDE 0.3 MG: 0.1 TABLET ORAL at 21:16

## 2020-09-10 RX ADMIN — HYDRALAZINE HYDROCHLORIDE 100 MG: 50 TABLET, FILM COATED ORAL at 08:45

## 2020-09-10 RX ADMIN — HUMAN ALBUMIN MICROSPHERES AND PERFLUTREN 2 ML: 10; .22 INJECTION, SOLUTION INTRAVENOUS at 10:05

## 2020-09-10 RX ADMIN — Medication 1 LOZENGE: at 14:11

## 2020-09-10 RX ADMIN — CARVEDILOL 25 MG: 25 TABLET, FILM COATED ORAL at 17:00

## 2020-09-10 RX ADMIN — CARVEDILOL 25 MG: 25 TABLET, FILM COATED ORAL at 08:45

## 2020-09-10 RX ADMIN — CLONIDINE HYDROCHLORIDE 0.3 MG: 0.1 TABLET ORAL at 08:45

## 2020-09-10 RX ADMIN — ATORVASTATIN CALCIUM 10 MG: 10 TABLET, FILM COATED ORAL at 21:16

## 2020-09-10 RX ADMIN — HYDRALAZINE HYDROCHLORIDE 100 MG: 50 TABLET, FILM COATED ORAL at 21:17

## 2020-09-10 RX ADMIN — AMLODIPINE BESYLATE 5 MG: 5 TABLET ORAL at 08:45

## 2020-09-10 ASSESSMENT — ACTIVITIES OF DAILY LIVING (ADL)
ADLS_ACUITY_SCORE: 10

## 2020-09-10 NOTE — CONSULTS
"[facility]    Nephrology Consultation     Date of Admission:  9/9/2020    Assessment & Plan     Taylor Valiente is a 25 year old male who was admitted on 9/9/2020.     1) Baseline CKD 3: Due to hypertension and FSGS.      2) VANESSA vs. Progression of CKD:  Cr up to 7.24.  Time course between 2017 and present unclear.  He may have had a slow and steady progression over time.  He could have had a more abrupt fall off due to severe HTN. This is a renal lesion similar to scleroderma with \"onion skinning\" of arteries and arterioles.  If it is the latter it could improve if his BP is kept down.     3) Severe HTN: Strong FH.      4) LVH    5) Proteinuria    Plan:    Serial chemistries  Eval anemia   Jany Gordon MD  [facility] - Nephrology  [phone]    Reason for Consult     I was asked to see the patient for HTN and CKD.      Primary Care Physician     Jer Hooper    Chief Complaint     N/V    History is obtained from the patient and chart review.      History of Present Illness     Taylor Valiente is a 25 year old male who presents with severe HTN and advanced kidney failure.      He has been seen by Dr. Hooper of our group in 2017.  He was admitted for hypertensive urgency and was found to have CKD with nephrotic range proteinuria.  He underwent renal biopsy showing changes of long standing HTN and FSGS.  He never followed up with Dr. Hooper.  He cr then was 2.63 falling to 1.75.      He was admitted via ED on 9/9/20 after presenting with N/V.    He was found to have severe HTN up to 250/194.    He apparently was not adherent to his antihypertensive medicines.      On presentation his cr was up to 6.94 rising to 7.24 today.    Renal US shows only echogenic kidneys.  UA shows proteinuria.      He feels fine now.    No N/V.  Normal appetite.    BP down to 147/89 with amlodipine 5 mg daily, carvedilol 25 mg BID, clonidine 0.3 mg BID, hydralazine 100 mg TID.       Past " Medical History   I have reviewed this patient's medical history and updated it with pertinent information if needed.   Past Medical History:   Diagnosis Date     Benign essential hypertension 7/28/2017     CKD (chronic kidney disease) stage 3, GFR 30-59 ml/min (H) 7/28/2017    Due to HTN     Focal glomerular sclerosis 7/28/2017    Due to Hypertension injury.     LVH (left ventricular hypertrophy) due to hypertensive disease 7/14/2017     Obesity, unspecified        Past Surgical History   I have reviewed this patient's surgical history and updated it with pertinent information if needed.  Past Surgical History:   Procedure Laterality Date     NO HISTORY OF SURGERY         Prior to Admission Medications   Prior to Admission Medications   Prescriptions Last Dose Informant Patient Reported? Taking?   amLODIPine (NORVASC) 10 MG tablet 9/8/2020 at Unknown time  Yes Yes   Sig: Take 5 mg by mouth daily   aspirin 81 MG EC tablet Past Week at Unknown time  Yes Yes   Sig: Take 81 mg by mouth daily   atorvastatin (LIPITOR) 10 MG tablet 9/8/2020 at Unknown time  Yes Yes   Sig: Take 10 mg by mouth At Bedtime   carvedilol (COREG) 25 MG tablet 9/8/2020 at Unknown time  No Yes   Sig: TAKE ONE TABLET BY MOUTH TWICE A DAY WITH MEALS   cloNIDine (CATAPRES) 0.3 MG tablet 9/8/2020 at Unknown time  No Yes   Sig: Take 1 tablet (0.3 mg) by mouth 2 times daily   hydrALAZINE (APRESOLINE) 100 MG tablet 9/8/2020 at Unknown time  No Yes   Sig: TAKE ONE TABLET BY MOUTH THREE TIMES A DAY   lisinopril (PRINIVIL/ZESTRIL) 40 MG tablet 9/9/2020 at Unknown time  Yes Yes   Sig: Take 40 mg by mouth daily   metoprolol tartrate (LOPRESSOR) 100 MG tablet 9/9/2020 at am  No Yes   Sig: TAKE ONE TABLET BY MOUTH TWICE A DAY - PATIENT NEEDS TO BE SEEN AT CLINIC FOR REFILLS   vitamin D3 (CHOLECALCIFEROL) 2000 units (50 mcg) tablet Past Week at Unknown time  Yes Yes   Sig: Take 1 tablet by mouth daily      Facility-Administered Medications: None     Allergies    Allergies   Allergen Reactions     No Known Allergies        Social History   I have reviewed this patient's social history and updated it with pertinent information if needed. Taylor Valiente  reports that he has never smoked. He has never used smokeless tobacco. He reports current drug use. Drug: Marijuana. He reports that he does not drink alcohol.    Family History   I have reviewed this patient's family history and updated it with pertinent information if needed.   Family History   Problem Relation Age of Onset     Blood Disease Mother         has hep b     Diabetes Mother         gestionanal diabetes     Hypertension Mother      Obesity Father      Hypertension Father      Hypertension Maternal Grandmother      Diabetes Maternal Grandmother      Hypertension Paternal Grandmother      Hypertension Paternal Grandfather      Coronary Artery Disease Maternal Uncle        Review of Systems   The 10 point Review of Systems is negative other than noted in the HPI.     Physical Exam   Temp: 98.1  F (36.7  C) Temp src: Oral BP: (!) 147/89 Pulse: 61   Resp: 18 SpO2: 100 % O2 Device: None (Room air)    Vital Signs with Ranges  Temp:  [97.5  F (36.4  C)-98.2  F (36.8  C)] 98.1  F (36.7  C)  Pulse:  [61-87] 61  Resp:  [18-20] 18  BP: (134-227)/() 147/89  SpO2:  [97 %-100 %] 100 %  288 lbs 14.4 oz    GENERAL: sleeping in bed, awakens easily.   HEENT:  Normocephalic. No gross abnormalities.  Pupils equal.  MMM.  Dentition is fair.    CV: RRR, no murmurs, no clicks, gallops, or rubs, no edema, no carotid bruits  RESP: Clear bilaterally with good efforts  GI: Abdomen soft/nt/nd, BS normal. No masses, organomegaly  MUSCULOSKELETAL: extremities nl - no gross deformities noted  SKIN: no suspicious lesions or rashes, dry to touch  NEURO:  Strength normal and symmetric.   PSYCH: mood good, affect appropriate  LYMPH: No palpable ant/post cervical and supraclavicular adenopathy    Data   BMP  Recent Labs   Lab  09/10/20  0654 09/09/20  1152    141   POTASSIUM 3.4 3.2*   CHLORIDE 109 110*   BUBBA 7.9* 8.2*   CO2 20 21   BUN 82* 75*   CR 7.24* 6.94*   * 111*     Phos@[email or web address](phos:4)  CBC)  Recent Labs   Lab 09/10/20  0654 09/09/20  1152   WBC 10.8 16.2*   HGB 11.6* 13.3   HCT 36.5* 41.5   MCV 84 84    247     Recent Labs   Lab 09/10/20  0654   AST 11   ALT 16   ALKPHOS 94   BILITOTAL 0.5     No lab results found in last 7 days.  No results found for: D2VIT, D3VIT, DTOT  Recent Labs   Lab 09/10/20  0654   HGB 11.6*   HCT 36.5*   MCV 84     No results for input(s): PTHI in the last 168 hours.

## 2020-09-10 NOTE — PROGRESS NOTES
[facility]  Hospitalist Progress Note  Sandeep Staton MD 09/10/2020    Reason for Stay        Hypertensive emergency    Acute kidney injury on chronic kidney disease    Elevated troponin         Assessment and Plan:        Summary of Stay:     Taylor Valiente is a 25 year old male with PMH including hypertension with CKD stage III, obesity who was admitted on 9/9/2020 with nausea and vomiting and was found to have hypertensive emergency with acute renal failure and elevated troponin.  .Presented with a blood pressure of 250/194.  He does have nausea and vomiting but no chest pain, vision changes, headache or shortness of breath.  He does report that he is been compliant with his medications although he has been taking both carvedilol and metoprolol and may have been taking the carvedilol 3 times daily,  Patient progress during stay:    Patient was admitted and was closely monitored on his home medications and intravenous metoprolol was ordered to 3 days severely elevated blood pressure.  Nephrology team was consulted        Problem List with Assessment and Plan      1. Uncontrolled hypertension with hypertensive emergency on admission with evidence of acute kidney injury and elevated troponin    Improving blood pressure on oral medications including amlodipine 5 daily, carvedilol 25 mg twice a day, hydralazine 100 mg 3 times a day and Catapres 0.3 mg twice a day.    Patient was counseled regarding the need to stick with blood pressure medication regiment follow-up as serology and primary care physician.  Awaiting nephrology team to evaluate patient for further recommendations    2. Acute kidney injury on chronic kidney disease-history of hypertensive nephrosclerosis and secondary FSGS      He was originally diagnosed with hypertension in July 2017 with heavy proteinuria.  He had a kidney biopsy and FSGS was diagnosed.  The cause of his hypertension was thought to be combination of obesity  and strong family history.  He did undergo a CT scan of the adrenal glands and was found to have likely adenomas.  He has seen endocrinology and was ruled out for Cushing's and pheochromocytoma.  Last creatinine available was from 10/2019 which was 1.75.  At that time his baseline appeared to be 1.6-2.    On admission  his creatinine is elevated at 6.94.     Patient is nonoliguric, serum creatinine is worsening at 7.24 this morning.  BUN is 82.  Potassium is normal and there is no metabolic acidosis.      Ultrasound of the kidneys showed prominent diffuse increased parenchymal echogenicity throughout both kidneys suggesting medical renal disease.  Benign 1.5 cm right renal cyst without hydronephrosis.    Nephrology consult requested and input is pending    Continue to monitor electrolytes and serum creatinine, urine output    3. Elevated troponin: Demand ischemia and acute kidney injury.  No evidence of acute coronary syndrome.  Echocardiogram pending.  Last echo in October 2019 showed preserved EF of 55-60%  4. Nausea and vomiting: Resolved  5. Leukocytosis: Resolved        Plan for today :    Obtain nephrology input    Blood pressure monitoring        LDA     Access : Peripheral     Hurt Catheter: not present        FEN :    Orders Placed This Encounter      Combination Diet Regular Diet Adult; Dialysis Diet           Intake/Output Summary (Last 24 hours) at 9/10/2020 1415  Last data filed at 9/10/2020 1411  Gross per 24 hour   Intake 1280 ml   Output 1000 ml   Net 280 ml          DVT Prophylaxis: Pneumatic Compression Devices    Code Status:  Full Code    Estimated discharge  disposition and plan:  May discharge  to home in 1 day(s) if patient remains stable and okay with nephrology team          Interval History (Subjective):        Patient is seen and examined by me today and medical record reviewed.Overnight events noted and care discussed with nursing staff.    doing well; no cp, sob, n/v/d, or abd pain.                           Physical Exam:        Last Vital Signs:  BP (!) 147/89 (BP Location: Left arm)   Pulse 61   Temp 98.1  F (36.7  C) (Oral)   Resp 18   Wt 131 kg (288 lb 14.4 oz)   SpO2 100%   BMI 39.18 kg/m      I/O last 3 completed shifts:  In: 300 [P.O.:300]  Out: 450 [Urine:450]    Wt Readings from Last 5 Encounters:   09/10/20 131 kg (288 lb 14.4 oz)   09/09/20 131.5 kg (290 lb)   03/10/20 135.6 kg (299 lb)   10/08/19 135.2 kg (298 lb)   10/04/19 131.5 kg (290 lb)        Constitutional: Awake, alert, cooperative, no apparent distress     Respiratory: Clear to auscultation bilaterally, no crackles or wheezing   Cardiovascular: Regular rate and rhythm, normal S1 and S2, and no murmur noted   Abdomen: Normal bowel sounds, soft, non-distended, non-tender   Skin: No rashes, no cyanosis, dry to touch   Neuro: Alert with  no weakness, numbness, memory loss   Extremities: No edema, normal range of motion   Other(s):        All other systems: Negative          Medications:        All current medications were reviewed with changes reflected in problem list.         Data:      All new lab and imaging data was reviewed.      Data reviewed today: I reviewed all new labs and imaging results over the last 24 hours. I personally reviewed no images or EKG's today      Recent Labs   Lab 09/10/20  0654 09/09/20  1152   WBC 10.8 16.2*   HGB 11.6* 13.3   HCT 36.5* 41.5   MCV 84 84    247     No results for input(s): CULT in the last 168 hours.  Recent Labs   Lab 09/10/20  0654 09/09/20  1152    141   POTASSIUM 3.4 3.2*   CHLORIDE 109 110*   CO2 20 21   ANIONGAP 10 10   * 111*   BUN 82* 75*   CR 7.24* 6.94*   GFRESTIMATED 10* 10*   GFRESTBLACK 11* 12*   BUBBA 7.9* 8.2*   PROTTOTAL 6.0*  --    ALBUMIN 2.4*  --    BILITOTAL 0.5  --    ALKPHOS 94  --    AST 11  --    ALT 16  --        Recent Labs   Lab 09/10/20  0654 09/09/20  1152   * 111*       No results for input(s): INR in the last 168  hours.      Recent Labs   Lab 20  2344 20  1827 20  1653   TROPI 0.084* 0.083* 0.072*       Recent Results (from the past 48 hour(s))   US Renal Complete    Narrative    EXAM: US RENAL COMPLETE  LOCATION: [facility]  DATE/TIME: 9/10/2020 5:23 AM    INDICATION: Acute renal failure.  COMPARISON: 2017.  TECHNIQUE: Routine Bilateral Renal and Bladder Ultrasound.    FINDINGS:    RIGHT KIDNEY: 10.1 cm. Marked diffuse increased renal parenchymal echogenicity throughout the right kidney. Benign 1.5 cm right renal cyst. No hydronephrosis.     LEFT KIDNEY: 11.4 cm. Diffuse increased parenchymal echogenicity throughout the left kidney. No hydronephrosis.     BLADDER: Normal.      Impression    IMPRESSION:  1.  Prominent diffuse increased parenchymal echogenicity throughout both kidneys suggesting medical renal disease. This represents a significant change compared to 2017. Clinical correlation.    2.  Benign 1.5 cm right renal cyst. No hydronephrosis.                Echocardiogram Complete    Narrative    [id number]  [id number]  [id number]  [id number]^ONEL^DANY^AAKASH           [facility]  Echocardiography Laboratory  [address] [place] [address]        Name: STEFAN RIOS  MRN: [id number]  : 1995  Study Date: 09/10/2020 09:36 AM  Age: 25 yrs  Gender: Male  Patient Location: [facility]  Reason For Study: Hypertension  Ordering Physician: DANY SYKES  Referring Physician: Jer Hooper  Performed By: Milly Gudino RDCS     BSA: 2.5 m2  Height: 72 in  Weight: 293 lb  BP: 205/141 mmHg  _____________________________________________________________________________  __        Procedure  Complete Portable Echo Adult. Optison (NDC #[id number]) given intravenously.  _____________________________________________________________________________  __        Interpretation Summary     The visual ejection fraction is estimated at 55-60%.  There is mild to moderate  concentric left ventricular hypertrophy.  Regional wall motion abnormalities cannot be excluded due to limited  visualization.  There was essentially no subcostal acoustic window. Technically difficult,  suboptimal study.  _____________________________________________________________________________  __        Left Ventricle  The left ventricle is normal in size. There is mild to moderate concentric  left ventricular hypertrophy. Diastolic Doppler findings (E/E' ratio and/or  other parameters) suggest left ventricular filling pressures are  indeterminate. The visual ejection fraction is estimated at 55-60%. Regional  wall motion abnormalities cannot be excluded due to limited visualization. The  anterior wall was not seen to a diagnostic degree in the apical two chamber  view on this study. The apical two chamber view was foreshortened. The  anterior wall does contract normally in the subcostal views.     Right Ventricle  The right ventricle is normal in size and function.     Atria  Normal left atrial size. Right atrial size is normal. The interatrial septum  was not seen in the subcostal view. No shunt was seen in the apical views.     Mitral Valve  There is trace mitral regurgitation.        Tricuspid Valve  There is trace tricuspid regurgitation. The right ventricular systolic  pressure is approximated at 23.7 mmHg plus the right atrial pressure.     Aortic Valve  The aortic valve is not well visualized. No aortic regurgitation is present.  No hemodynamically significant valvular aortic stenosis.     Pulmonic Valve  The pulmonic valve is not well visualized. Normal pulmonic valve velocity.     Vessels  The aortic root is normal size. Normal size ascending aorta. Inferior vena  cava not well visualized for estimation of right atrial pressure.     Pericardium  There is no pericardial effusion.        Rhythm  The rhythm was undetermined. There was no EKG associated with this  study.  _____________________________________________________________________________  __  MMode/2D Measurements & Calculations     IVSd: 1.2 cm  LVIDd: 4.9 cm  LVIDs: 3.7 cm  LVPWd: 1.4 cm  FS: 25.1 %  LV mass(C)d: 260.1 grams  LV mass(C)dI: 103.8 grams/m2  Ao root diam: 3.5 cm  LA dimension: 4.2 cm  asc Aorta Diam: 3.2 cm  LA/Ao: 1.2  LA Volume (BP): 49.2 ml  LA Volume Index (BP): 19.6 ml/m2  RWT: 0.58           Doppler Measurements & Calculations  MV E max alexys: 50.9 cm/sec  MV A max alexys: 54.3 cm/sec  MV E/A: 0.94  MV max P.0 mmHg  MV mean P.85 mmHg  MV V2 VTI: 19.6 cm  MV dec time: 0.21 sec  TR max alexys: 243.2 cm/sec  TR max P.7 mmHg  E/E' av.9  Lateral E/e': 9.5  Medial E/e': 14.3           _____________________________________________________________________________  __           Report approved by: Arleth Luong 09/10/2020 02:13 PM            Disclaimer: This note consists of symbols derived from keyboarding, dictation and/or voice recognition software. As a result, there may be errors in the script that have gone undetected. Please consider this when interpreting information found in this chart.

## 2020-09-10 NOTE — PLAN OF CARE
A/O x 4. VSS on RA except elevated BP's , see flowsheets for details; improved w/ scheduled HTN meds. C/O sore throat, PRN PO tylenol 650 mg given per pt request, along w/ PRN lozenge and chloraseptic spray. Tele SR w/ invert T's, denied CP . LS dim, no SOB reported. PIV to right intact, SL . Up ad ye in room, steady gait and denies dizziness. Good UOP. Good PO intake dialysis diet. ECHO today. Covid results negative. Possible discharge 9/11, awaiting nephrology consult. Will continue POC.

## 2020-09-10 NOTE — PLAN OF CARE
Pt A/O x 4, VSS (ex BPs high 225/144 MD notified - wants to keep around 190/140) medications administered - see MAR. Pt denies pain, headache, dizziness, N/V & SOB. Pt up independent. Lung sounds clear, RA. Tele: SR w/Prolonged QT. Potassium level 3.2 - MD notified - not replacing at this time due to poor kidney function (creatine 6.94). ECHO & Renal U/S ordered. Neph consulted. Will continue with plan of care.    Trops: 0.043, 0.079, 0.072, 0.083

## 2020-09-10 NOTE — PLAN OF CARE
Pt A&Ox4,/104, RA, LS CTA, denies pain, denies headache.  Renal diet, independent in room, Tele:  w inv Ts. Trop 0.083, K 3.2 MD aware but doesn't want it replaced due to poor kidney function. Echo and US today.  Nephro consulted. Discharge plan TBD.  Continue plan of care.

## 2020-09-11 LAB
ANION GAP SERPL CALCULATED.3IONS-SCNC: 9 MMOL/L (ref 3–14)
BUN SERPL-MCNC: 85 MG/DL (ref 7–30)
CALCIUM SERPL-MCNC: 7.7 MG/DL (ref 8.5–10.1)
CHLORIDE SERPL-SCNC: 109 MMOL/L (ref 94–109)
CO2 SERPL-SCNC: 22 MMOL/L (ref 20–32)
CREAT SERPL-MCNC: 6.9 MG/DL (ref 0.66–1.25)
DEPRECATED CALCIDIOL+CALCIFEROL SERPL-MC: 8 UG/L (ref 20–75)
FERRITIN SERPL-MCNC: 325 NG/ML (ref 26–388)
FOLATE SERPL-MCNC: 10.4 NG/ML
GFR SERPL CREATININE-BSD FRML MDRD: 10 ML/MIN/{1.73_M2}
GLUCOSE SERPL-MCNC: 115 MG/DL (ref 70–99)
IRON SATN MFR SERPL: 18 % (ref 15–46)
IRON SERPL-MCNC: 33 UG/DL (ref 35–180)
POTASSIUM SERPL-SCNC: 3.3 MMOL/L (ref 3.4–5.3)
PTH-INTACT SERPL-MCNC: 440 PG/ML (ref 18–80)
SODIUM SERPL-SCNC: 140 MMOL/L (ref 133–144)
TIBC SERPL-MCNC: 183 UG/DL (ref 240–430)
VIT B12 SERPL-MCNC: 353 PG/ML (ref 193–986)

## 2020-09-11 PROCEDURE — 82607 VITAMIN B-12: CPT | Performed by: INTERNAL MEDICINE

## 2020-09-11 PROCEDURE — 82306 VITAMIN D 25 HYDROXY: CPT | Performed by: INTERNAL MEDICINE

## 2020-09-11 PROCEDURE — 99233 SBSQ HOSP IP/OBS HIGH 50: CPT | Performed by: INTERNAL MEDICINE

## 2020-09-11 PROCEDURE — 82746 ASSAY OF FOLIC ACID SERUM: CPT | Performed by: INTERNAL MEDICINE

## 2020-09-11 PROCEDURE — 12000000 ZZH R&B MED SURG/OB

## 2020-09-11 PROCEDURE — 83970 ASSAY OF PARATHORMONE: CPT | Performed by: INTERNAL MEDICINE

## 2020-09-11 PROCEDURE — 80048 BASIC METABOLIC PNL TOTAL CA: CPT | Performed by: INTERNAL MEDICINE

## 2020-09-11 PROCEDURE — 83550 IRON BINDING TEST: CPT | Performed by: INTERNAL MEDICINE

## 2020-09-11 PROCEDURE — 82728 ASSAY OF FERRITIN: CPT | Performed by: INTERNAL MEDICINE

## 2020-09-11 PROCEDURE — 25000132 ZZH RX MED GY IP 250 OP 250 PS 637: Performed by: INTERNAL MEDICINE

## 2020-09-11 PROCEDURE — 36415 COLL VENOUS BLD VENIPUNCTURE: CPT | Performed by: INTERNAL MEDICINE

## 2020-09-11 PROCEDURE — 83540 ASSAY OF IRON: CPT | Performed by: INTERNAL MEDICINE

## 2020-09-11 RX ORDER — POTASSIUM CHLORIDE 1500 MG/1
40 TABLET, EXTENDED RELEASE ORAL ONCE
Status: COMPLETED | OUTPATIENT
Start: 2020-09-11 | End: 2020-09-11

## 2020-09-11 RX ADMIN — CLONIDINE HYDROCHLORIDE 0.3 MG: 0.1 TABLET ORAL at 21:28

## 2020-09-11 RX ADMIN — HYDRALAZINE HYDROCHLORIDE 100 MG: 50 TABLET, FILM COATED ORAL at 07:46

## 2020-09-11 RX ADMIN — CARVEDILOL 25 MG: 25 TABLET, FILM COATED ORAL at 07:46

## 2020-09-11 RX ADMIN — HYDRALAZINE HYDROCHLORIDE 100 MG: 50 TABLET, FILM COATED ORAL at 15:00

## 2020-09-11 RX ADMIN — CARVEDILOL 25 MG: 25 TABLET, FILM COATED ORAL at 17:18

## 2020-09-11 RX ADMIN — HYDRALAZINE HYDROCHLORIDE 100 MG: 50 TABLET, FILM COATED ORAL at 21:28

## 2020-09-11 RX ADMIN — AMLODIPINE BESYLATE 5 MG: 5 TABLET ORAL at 07:46

## 2020-09-11 RX ADMIN — POTASSIUM CHLORIDE 40 MEQ: 1500 TABLET, EXTENDED RELEASE ORAL at 13:14

## 2020-09-11 RX ADMIN — ATORVASTATIN CALCIUM 10 MG: 10 TABLET, FILM COATED ORAL at 21:28

## 2020-09-11 RX ADMIN — CLONIDINE HYDROCHLORIDE 0.3 MG: 0.1 TABLET ORAL at 07:45

## 2020-09-11 ASSESSMENT — ACTIVITIES OF DAILY LIVING (ADL)
ADLS_ACUITY_SCORE: 10

## 2020-09-11 NOTE — DISCHARGE INSTRUCTIONS
Dr. Gordon  He will need follow up at our office.   I have made an appt for him to see Jennifer Manzanares NP   Sept 22 at 1:30PM, [employer],   [address] [facility] [address] [facility]   [address]   [phone]

## 2020-09-11 NOTE — PLAN OF CARE
A&O x 4. Up independent- ambulated in halls with encouragement. Denies pain. IV SL.   Plan to discharge tomorrow and follow up with Nephrology on Sep. 22ed. Appointment already made  Mother updated with plan with ok from pt

## 2020-09-11 NOTE — PROGRESS NOTES
[facility]  Hospitalist Progress Note  Sandeep Staton MD 09/11/2020    Reason for Stay        Hypertensive emergency    Acute kidney injury on chronic kidney disease    Elevated troponin         Assessment and Plan:        Summary of Stay:     Taylor Valiente is a 25 year old male with PMH including hypertension with CKD stage III, obesity who was admitted on 9/9/2020 with nausea and vomiting and was found to have hypertensive emergency with acute renal failure and elevated troponin.  .Presented with a blood pressure of 250/194.  He does have nausea and vomiting but no chest pain, vision changes, headache or shortness of breath.  He does report that he is been compliant with his medications although he has been taking both carvedilol and metoprolol and may have been taking the carvedilol 3 times daily,  Patient progress during stay:    Patient was admitted and was closely monitored on his home medications and intravenous metoprolol was ordered and the patient was monitored closely with intravenous metoprolol for severely elevated blood pressure.  Nephrology team was consulted and patient was seen and examined by nephrologist.      Problem List with Assessment and Plan      1. Uncontrolled hypertension with hypertensive emergency on admission with evidence of acute kidney injury and elevated troponin    Improving blood pressure on oral medications including amlodipine 5 daily, carvedilol 25 mg twice a day, hydralazine 100 mg 3 times a day and Catapres 0.3 mg twice a day.    Patient was counseled regarding the need to stick with blood pressure medication regiment follow-up as serology and primary care physician.  Patient was seen and examined by nephrology team and I discussed with Dr. Gordon     2. Acute kidney injury on chronic kidney disease-history of hypertensive nephrosclerosis and secondary FSGS      He was originally diagnosed with hypertension in July 2017 with heavy proteinuria.   He had a kidney biopsy and FSGS was diagnosed.  The cause of his hypertension was thought to be combination of obesity and strong family history.  He did undergo a CT scan of the adrenal glands and was found to have likely adenomas.  He has seen endocrinology and was ruled out for Cushing's and pheochromocytoma.  Last creatinine available was from 10/2019 which was 1.75.  At that time his baseline appeared to be 1.6-2.    On admission  his creatinine is elevated at 6.94.     Patient is nonoliguric, serum creatinine is slightly better today at 6.9.  Nephrology input appreciated.     Ultrasound of the kidneys showed prominent diffuse increased parenchymal echogenicity throughout both kidneys suggesting medical renal disease.  Benign 1.5 cm right renal cyst without hydronephrosis.    Continue to monitor electrolytes and serum creatinine, urine output    3. Elevated troponin: Demand ischemia and acute kidney injury.  No evidence of acute coronary syndrome.  Echocardiogram pending.  Last echo in October 2019 showed preserved EF of 55-60%  4. Nausea and vomiting: Resolved  5. Leukocytosis: Resolved        Plan for today :    Patient counseled regarding the need to follow with nephrologist and take his medications.  Plan to monitor kidney function, urine output         LDA     Access : Peripheral     Hurt Catheter: not present        FEN :    Orders Placed This Encounter      Combination Diet Regular Diet Adult; Dialysis Diet           Intake/Output Summary (Last 24 hours) at 9/10/2020 1415  Last data filed at 9/10/2020 1411  Gross per 24 hour   Intake 1280 ml   Output 1000 ml   Net 280 ml          DVT Prophylaxis: Pneumatic Compression Devices    Code Status:  Full Code    Estimated discharge  disposition and plan: May discharge home the next 1 day if okay with nephrology team.        Interval History (Subjective):        Patient is seen and examined by me today and medical record reviewed.Overnight events noted and care  discussed with nursing staff.    doing well; no cp, sob, n/v/d, or abd pain.                          Physical Exam:        Last Vital Signs:  /85 (BP Location: Left arm)   Pulse 68   Temp 98.1  F (36.7  C) (Oral)   Resp 16   Wt 131 kg (288 lb 14.4 oz)   SpO2 99%   BMI 39.18 kg/m      I/O last 3 completed shifts:  In: 1220 [P.O.:1220]  Out: 550 [Urine:550]    Wt Readings from Last 5 Encounters:   09/10/20 131 kg (288 lb 14.4 oz)   09/09/20 131.5 kg (290 lb)   03/10/20 135.6 kg (299 lb)   10/08/19 135.2 kg (298 lb)   10/04/19 131.5 kg (290 lb)        Constitutional: Awake, alert, cooperative, no apparent distress     Respiratory: Clear to auscultation bilaterally, no crackles or wheezing   Cardiovascular: Regular rate and rhythm, normal S1 and S2, and no murmur noted   Abdomen: Normal bowel sounds, soft, non-distended, non-tender   Skin: No rashes, no cyanosis, dry to touch   Neuro: Alert with  no weakness, numbness, memory loss   Extremities: No edema, normal range of motion   Other(s):        All other systems: Negative          Medications:        All current medications were reviewed with changes reflected in problem list.         Data:      All new lab and imaging data was reviewed.      Data reviewed today: I reviewed all new labs and imaging results over the last 24 hours. I personally reviewed no images or EKG's today      Recent Labs   Lab 09/10/20  0654 09/09/20  1152   WBC 10.8 16.2*   HGB 11.6* 13.3   HCT 36.5* 41.5   MCV 84 84    247     No results for input(s): CULT in the last 168 hours.  Recent Labs   Lab 09/11/20  0634 09/10/20  0654 09/09/20  1152    139 141   POTASSIUM 3.3* 3.4 3.2*   CHLORIDE 109 109 110*   CO2 22 20 21   ANIONGAP 9 10 10   * 108* 111*   BUN 85* 82* 75*   CR 6.90* 7.24* 6.94*   GFRESTIMATED 10* 10* 10*   GFRESTBLACK 12* 11* 12*   BUBBA 7.7* 7.9* 8.2*   PROTTOTAL  --  6.0*  --    ALBUMIN  --  2.4*  --    BILITOTAL  --  0.5  --    ALKPHOS  --  94  --     AST  --  11  --    ALT  --  16  --        Recent Labs   Lab 20  0634 09/10/20  0654 20  1152   * 108* 111*       No results for input(s): INR in the last 168 hours.      Recent Labs   Lab 20  2344 20  1827 20  1653   TROPI 0.084* 0.083* 0.072*       Recent Results (from the past 48 hour(s))   US Renal Complete    Narrative    EXAM: US RENAL COMPLETE  LOCATION: [facility]  DATE/TIME: 9/10/2020 5:23 AM    INDICATION: Acute renal failure.  COMPARISON: 2017.  TECHNIQUE: Routine Bilateral Renal and Bladder Ultrasound.    FINDINGS:    RIGHT KIDNEY: 10.1 cm. Marked diffuse increased renal parenchymal echogenicity throughout the right kidney. Benign 1.5 cm right renal cyst. No hydronephrosis.     LEFT KIDNEY: 11.4 cm. Diffuse increased parenchymal echogenicity throughout the left kidney. No hydronephrosis.     BLADDER: Normal.      Impression    IMPRESSION:  1.  Prominent diffuse increased parenchymal echogenicity throughout both kidneys suggesting medical renal disease. This represents a significant change compared to 2017. Clinical correlation.    2.  Benign 1.5 cm right renal cyst. No hydronephrosis.                Echocardiogram Complete    Narrative    [id number]  [id number]  [id number]  [id number]^ONEL^DANY^AAKASH           [facility]  Echocardiography Laboratory  [address] [place] [address]        Name: STEFAN RIOS  MRN: [id number]  : 1995  Study Date: 09/10/2020 09:36 AM  Age: 25 yrs  Gender: Male  Patient Location: [facility]  Reason For Study: Hypertension  Ordering Physician: DANY SYKES  Referring Physician: Jer Hooper  Performed By: Milly Gudion RDCS     BSA: 2.5 m2  Height: 72 in  Weight: 293 lb  BP: 205/141 mmHg  _____________________________________________________________________________  __        Procedure  Complete Portable Echo Adult. Optison (NDC #[id number]) given  intravenously.  _____________________________________________________________________________  __        Interpretation Summary     The visual ejection fraction is estimated at 55-60%.  There is mild to moderate concentric left ventricular hypertrophy.  Regional wall motion abnormalities cannot be excluded due to limited  visualization.  There was essentially no subcostal acoustic window. Technically difficult,  suboptimal study.  _____________________________________________________________________________  __        Left Ventricle  The left ventricle is normal in size. There is mild to moderate concentric  left ventricular hypertrophy. Diastolic Doppler findings (E/E' ratio and/or  other parameters) suggest left ventricular filling pressures are  indeterminate. The visual ejection fraction is estimated at 55-60%. Regional  wall motion abnormalities cannot be excluded due to limited visualization. The  anterior wall was not seen to a diagnostic degree in the apical two chamber  view on this study. The apical two chamber view was foreshortened. The  anterior wall does contract normally in the subcostal views.     Right Ventricle  The right ventricle is normal in size and function.     Atria  Normal left atrial size. Right atrial size is normal. The interatrial septum  was not seen in the subcostal view. No shunt was seen in the apical views.     Mitral Valve  There is trace mitral regurgitation.        Tricuspid Valve  There is trace tricuspid regurgitation. The right ventricular systolic  pressure is approximated at 23.7 mmHg plus the right atrial pressure.     Aortic Valve  The aortic valve is not well visualized. No aortic regurgitation is present.  No hemodynamically significant valvular aortic stenosis.     Pulmonic Valve  The pulmonic valve is not well visualized. Normal pulmonic valve velocity.     Vessels  The aortic root is normal size. Normal size ascending aorta. Inferior vena  cava not well visualized  for estimation of right atrial pressure.     Pericardium  There is no pericardial effusion.        Rhythm  The rhythm was undetermined. There was no EKG associated with this study.  _____________________________________________________________________________  __  MMode/2D Measurements & Calculations     IVSd: 1.2 cm  LVIDd: 4.9 cm  LVIDs: 3.7 cm  LVPWd: 1.4 cm  FS: 25.1 %  LV mass(C)d: 260.1 grams  LV mass(C)dI: 103.8 grams/m2  Ao root diam: 3.5 cm  LA dimension: 4.2 cm  asc Aorta Diam: 3.2 cm  LA/Ao: 1.2  LA Volume (BP): 49.2 ml  LA Volume Index (BP): 19.6 ml/m2  RWT: 0.58           Doppler Measurements & Calculations  MV E max alexys: 50.9 cm/sec  MV A max alexys: 54.3 cm/sec  MV E/A: 0.94  MV max P.0 mmHg  MV mean P.85 mmHg  MV V2 VTI: 19.6 cm  MV dec time: 0.21 sec  TR max alexys: 243.2 cm/sec  TR max P.7 mmHg  E/E' av.9  Lateral E/e': 9.5  Medial E/e': 14.3           _____________________________________________________________________________  __           Report approved by: Arleth Luong 09/10/2020 02:13 PM            Disclaimer: This note consists of symbols derived from keyboarding, dictation and/or voice recognition software. As a result, there may be errors in the script that have gone undetected. Please consider this when interpreting information found in this chart.

## 2020-09-11 NOTE — PROGRESS NOTES
"[facility]    Nephrology Progress Note     Assessment & Plan     Taylor Valiente is a 25 year old male who was admitted on 9/9/2020.      1) Baseline CKD 3: Due to hypertension and FSGS.       2) VANESSA vs. Progression of CKD:  Cr up to 7.24.  Time course between 2017 and present unclear.  He may have had a slow and steady progression over time.  He could have had a more abrupt fall off due to severe HTN. This is a renal lesion similar to scleroderma with \"onion skinning\" of arteries and arterioles.  If it is the latter it could improve if his BP is kept down. Cr down slightly.       3) Severe HTN: Strong FH.       4) LVH     5) Proteinuria    6) Anemia:  Iron stores OK.    7) MBD:  PTH and Vit D in process.     Plan:     Same Rx   Home tomorrow ?  He will need follow up at our office.  I have made an appt for him to see Jennifer Manzanares NP  Sept 22 at 1:30PM, [employer],   [address] [place] [address]   [address]  [phone]               Timothy Gordon MD  [facility] - Nephrology  [phone]    Interval History     Feels fine.  BP down to 128/85.  Cr down slightly.    Pt denies f/c/n/v.  No cp/sob/cough.    No dysuria/hematuria/abd or flank pain.  No dizziness, lightheadedness, headaches, or focal neuro sx.      Physical Exam   Temp: 98.1  F (36.7  C) Temp src: Oral BP: 128/85 Pulse: 68   Resp: 16 SpO2: 99 % O2 Device: None (Room air)    Vitals:    09/09/20 1130 09/10/20 0701   Weight: 133 kg (293 lb 3.4 oz) 131 kg (288 lb 14.4 oz)     Vital Signs with Ranges  Temp:  [98  F (36.7  C)-98.4  F (36.9  C)] 98.1  F (36.7  C)  Pulse:  [65-71] 68  Resp:  [16-18] 16  BP: (128-151)/() 128/85  SpO2:  [99 %-100 %] 99 %  I/O last 3 completed shifts:  In: 1220 [P.O.:1220]  Out: 550 [Urine:550]    GENERAL APPEARANCE: pleasant, NAD, a & o  HEENT:  Eyes/ears/nose/neck grossly normal  RESP: lungs cta b c good efforts, no crackles, rhonchi or wheezes  CV: RRR, nl S1/S2, no m/r/g   ABDOMEN: " o/s/nt/nd, bs present  EXTREMITIES/SKIN: no rashes/lesions; no edema    Medications       amLODIPine  5 mg Oral Daily     atorvastatin  10 mg Oral At Bedtime     carvedilol  25 mg Oral BID w/meals     cloNIDine  0.3 mg Oral BID     hydrALAZINE  100 mg Oral TID     potassium chloride  40 mEq Oral Once     sodium chloride (PF)  3 mL Intracatheter Q8H       Data   BMP  Recent Labs   Lab 09/11/20  0634 09/10/20  0654 09/09/20  1152    139 141   POTASSIUM 3.3* 3.4 3.2*   CHLORIDE 109 109 110*   BUBBA 7.7* 7.9* 8.2*   CO2 22 20 21   BUN 85* 82* 75*   CR 6.90* 7.24* 6.94*   * 108* 111*     [email or web address](phos:4[facility]  CBC)  Recent Labs   Lab 09/10/20  0654 09/09/20  1152   WBC 10.8 16.2*   HGB 11.6* 13.3   HCT 36.5* 41.5   MCV 84 84    247     Recent Labs   Lab 09/10/20  0654   AST 11   ALT 16   ALKPHOS 94   BILITOTAL 0.5     No lab results found in last 7 days.  No results found for: D2VIT, D3VIT, DTOT  Recent Labs   Lab 09/11/20  0634 09/10/20  0654   HGB  --  11.6*   HCT  --  36.5*   MCV  --  84   IRON 33*  --    IRONSAT 18  --    *  --    CARI 325  --    FOLIC 10.4  --      No results for input(s): PTHI in the last 168 hours.    Attestation:   I have reviewed today's relevant vital signs, notes, medications, labs and imaging.

## 2020-09-11 NOTE — PLAN OF CARE
A&Ox4. Independent in room. VSS ex htn, treated with scheduled anti-hypertensives. Renal diet. Nephrology following. Tele SR w/ invert T waves, denies pain or SOB. PIV saline locked. Pleasant and cooperative. Will continue with poc, discharge possible tomorrow if BP remains stable overnight.    Ghanshyam Galeano RN on 9/10/2020 at 9:46 PM

## 2020-09-11 NOTE — PLAN OF CARE
Pt A&Ox4, /91, RA, LS CTA, denies pain.  Renal diet, independent in room, Tele:  w inv Ts. Trop 0.084, K 3.4. Cr 7.24.  Nephro following. Possible discharge today if cleared by nephro.  Continue plan of care.

## 2020-09-12 VITALS
OXYGEN SATURATION: 97 % | TEMPERATURE: 97.5 F | SYSTOLIC BLOOD PRESSURE: 128 MMHG | WEIGHT: 287.5 LBS | DIASTOLIC BLOOD PRESSURE: 77 MMHG | BODY MASS INDEX: 38.99 KG/M2 | HEART RATE: 67 BPM | RESPIRATION RATE: 16 BRPM

## 2020-09-12 LAB
ANION GAP SERPL CALCULATED.3IONS-SCNC: 8 MMOL/L (ref 3–14)
BUN SERPL-MCNC: 86 MG/DL (ref 7–30)
CALCIUM SERPL-MCNC: 7.9 MG/DL (ref 8.5–10.1)
CHLORIDE SERPL-SCNC: 111 MMOL/L (ref 94–109)
CO2 SERPL-SCNC: 21 MMOL/L (ref 20–32)
CREAT SERPL-MCNC: 6.96 MG/DL (ref 0.66–1.25)
GFR SERPL CREATININE-BSD FRML MDRD: 10 ML/MIN/{1.73_M2}
GLUCOSE SERPL-MCNC: 104 MG/DL (ref 70–99)
POTASSIUM SERPL-SCNC: 3.6 MMOL/L (ref 3.4–5.3)
SODIUM SERPL-SCNC: 140 MMOL/L (ref 133–144)

## 2020-09-12 PROCEDURE — 36415 COLL VENOUS BLD VENIPUNCTURE: CPT | Performed by: INTERNAL MEDICINE

## 2020-09-12 PROCEDURE — 25000132 ZZH RX MED GY IP 250 OP 250 PS 637: Performed by: INTERNAL MEDICINE

## 2020-09-12 PROCEDURE — 99239 HOSP IP/OBS DSCHRG MGMT >30: CPT | Performed by: INTERNAL MEDICINE

## 2020-09-12 PROCEDURE — 80048 BASIC METABOLIC PNL TOTAL CA: CPT | Performed by: INTERNAL MEDICINE

## 2020-09-12 RX ORDER — CALCIUM CARBONATE 500 MG/1
1000 TABLET, CHEWABLE ORAL EVERY 4 HOURS PRN
Status: DISCONTINUED | OUTPATIENT
Start: 2020-09-12 | End: 2020-09-12 | Stop reason: HOSPADM

## 2020-09-12 RX ORDER — ATORVASTATIN CALCIUM 10 MG/1
10 TABLET, FILM COATED ORAL AT BEDTIME
Qty: 60 TABLET | Refills: 0 | Status: SHIPPED | OUTPATIENT
Start: 2020-09-12 | End: 2020-11-04

## 2020-09-12 RX ADMIN — AMLODIPINE BESYLATE 5 MG: 5 TABLET ORAL at 08:21

## 2020-09-12 RX ADMIN — CALCIUM CARBONATE (ANTACID) CHEW TAB 500 MG 1000 MG: 500 CHEW TAB at 12:23

## 2020-09-12 RX ADMIN — CARVEDILOL 25 MG: 25 TABLET, FILM COATED ORAL at 08:21

## 2020-09-12 RX ADMIN — CLONIDINE HYDROCHLORIDE 0.3 MG: 0.1 TABLET ORAL at 08:21

## 2020-09-12 RX ADMIN — HYDRALAZINE HYDROCHLORIDE 100 MG: 50 TABLET, FILM COATED ORAL at 08:21

## 2020-09-12 ASSESSMENT — ACTIVITIES OF DAILY LIVING (ADL)
ADLS_ACUITY_SCORE: 10

## 2020-09-12 NOTE — DISCHARGE SUMMARY
Physician Discharge Summary     Name: Taylor Valiente    MRN: [id number]     YOB: 1995    Age: 25 year old                                             [facility]  Hospitalist Discharge Summary-[facility]      Primary care provider: Jer Hooper      Admit date:  9/9/2020      Discharge date and time: 9/12/2020  3:21 PM       Discharge Physician:  Sandeep Staton MD      Primary Discharge Diagnosis        #1.  Hypertensive emergency  #2.  Acute kidney injury on chronic kidney disease  #3.  Elevated troponin due to type II MI from demand ischemia  #4.  Noncompliance            Secondary Diagnosis /chronic medical conditions         Past Medical History:   Diagnosis Date     Benign essential hypertension 7/28/2017     CKD (chronic kidney disease) stage 3, GFR 30-59 ml/min (H) 7/28/2017    Due to HTN     Focal glomerular sclerosis 7/28/2017    Due to Hypertension injury.     LVH (left ventricular hypertrophy) due to hypertensive disease 7/14/2017     Obesity, unspecified          Past Surgical History:      Past Surgical History:   Procedure Laterality Date     NO HISTORY OF SURGERY                 Brief Summary of Hospital stay :       Please refer to  Admission H&P note for full details of patient presentation.    Reason for Hospitalization(C/C,HPI and brief patient summary):Nausea and vomiting        Significant findings(Primary diagnosis )Procedures and treatments provided(Hospital course ,consults, procedures):Please see bellow for details  Taylor Valiente is a 25 year old male with PMH including hypertension with CKD stage III, obesity who was admitted on 9/9/2020 with nausea and vomiting and was found to have hypertensive emergency with acute renal failure and elevated troponin.  .Presented with a blood pressure of 250/194.  He does have nausea and vomiting but no chest pain, vision changes, headache or shortness of breath.  He does report that he is been compliant with his  medications although he has been taking both carvedilol and metoprolol and may have been taking the carvedilol 3 times daily,    Patient was admitted and was closely monitored on his home medications and intravenous metoprolol was ordered and the patient was monitored closely with intravenous metoprolol for severely elevated blood pressure.  Nephrology team was consulted and patient was seen and examined by nephrologist.    Patient was closely monitored and his blood pressure significantly improved and he was instructed and advised to continue his medications and follow-up with primary care provider and nephrology service.  He had nonoliguric acute kidney injury on chronic kidney disease for which nephrology recommended continuation of his blood pressure medications including lisinopril and follow-up as an outpatient and medical compliance.  On the day of discharge patient was asymptomatic without symptoms of nausea or vomiting.  No chest pain or shortness of breath.  He was able to eat and ambulate independently.  His questions and concerns addressed and written discharge instruction was provided.     Consultations during hospital stay:    NEPHROLOGY IP CONSULT      Patient discharge Condition:     good    /77   Pulse 67   Temp 97.5  F (36.4  C) (Oral)   Resp 16   Wt 130.4 kg (287 lb 8 oz)   SpO2 97%   BMI 38.99 kg/m               Discharge Instructions:       Patient/family instructions: Written discharge instruction given to patient/family    Discharge Medications:       Review of your medicines      CONTINUE these medicines which have NOT CHANGED      Dose / Directions   amLODIPine 10 MG tablet  Commonly known as:  NORVASC      Dose:  5 mg  Take 5 mg by mouth daily  Refills:  0     aspirin 81 MG EC tablet      Dose:  81 mg  Take 81 mg by mouth daily  Refills:  0     atorvastatin 10 MG tablet  Commonly known as:  LIPITOR  Used for:  Acute renal failure, unspecified acute renal failure type (H)       Dose:  10 mg  Take 1 tablet (10 mg) by mouth At Bedtime  Quantity:  60 tablet  Refills:  0     carvedilol 25 MG tablet  Commonly known as:  COREG  Used for:  Acute kidney injury (H)      TAKE ONE TABLET BY MOUTH TWICE A DAY WITH MEALS  Quantity:  60 tablet  Refills:  0     cloNIDine 0.3 MG tablet  Commonly known as:  CATAPRES  Used for:  Acute kidney injury (H)      Dose:  0.3 mg  Take 1 tablet (0.3 mg) by mouth 2 times daily  Quantity:  60 tablet  Refills:  1     hydrALAZINE 100 MG tablet  Commonly known as:  APRESOLINE  Used for:  Acute kidney injury (H)      TAKE ONE TABLET BY MOUTH THREE TIMES A DAY  Quantity:  90 tablet  Refills:  0     lisinopril 40 MG tablet  Commonly known as:  ZESTRIL      Dose:  40 mg  Take 40 mg by mouth daily  Refills:  0     vitamin D3 50 mcg (2000 units) tablet  Commonly known as:  CHOLECALCIFEROL      Dose:  1 tablet  Take 1 tablet by mouth daily  Refills:  0        STOP taking    metoprolol tartrate 100 MG tablet  Commonly known as:  LOPRESSOR              Where to get your medicines      These medications were sent to [facility][id number] - [address] AT [address] [address]    Phone:  [phone]     atorvastatin 10 MG tablet          Discharge diet:Orders Placed This Encounter      Combination Diet Regular Diet Adult; Dialysis Diet      Diet    cardiac diet      Discharge activity:Activity as tolerated      Discharge follow-up:    Follow up with primary care provider in 7 days or earlier if symptoms return or gets worse.    Follow up with consultant as instructed  with nephrology      Other instructions:    We discussed with patient/family about detail discharge instructions as well as discharge medications above including potential risks,side effects and benefits.Patient/family understood benefits and potential serious side effects of taking these medications and need to follow up with PCP if  the patient develops complications.  Patient is also advised to see a doctor immediately for severe symptoms.        Major procedure performed/  Significant Diagnostic Studies:            Results for orders placed or performed during the hospital encounter of 20   US Renal Complete    Narrative    EXAM: US RENAL COMPLETE  LOCATION: [facility]  DATE/TIME: 9/10/2020 5:23 AM    INDICATION: Acute renal failure.  COMPARISON: 2017.  TECHNIQUE: Routine Bilateral Renal and Bladder Ultrasound.    FINDINGS:    RIGHT KIDNEY: 10.1 cm. Marked diffuse increased renal parenchymal echogenicity throughout the right kidney. Benign 1.5 cm right renal cyst. No hydronephrosis.     LEFT KIDNEY: 11.4 cm. Diffuse increased parenchymal echogenicity throughout the left kidney. No hydronephrosis.     BLADDER: Normal.      Impression    IMPRESSION:  1.  Prominent diffuse increased parenchymal echogenicity throughout both kidneys suggesting medical renal disease. This represents a significant change compared to 2017. Clinical correlation.    2.  Benign 1.5 cm right renal cyst. No hydronephrosis.                Echocardiogram Complete    Narrative    [id number]  [id number]  [id number]  [id number]^ONEL^DANY^AAKASH           [facility]  Echocardiography Laboratory  [address] [place] [address]        Name: STEFAN RIOS  MRN: [id number]  : 1995  Study Date: 09/10/2020 09:36 AM  Age: 25 yrs  Gender: Male  Patient Location: [facility]  Reason For Study: Hypertension  Ordering Physician: DANY SYKES  Referring Physician: Jer Hooper  Performed By: Milly Gudino RDCS     BSA: 2.5 m2  Height: 72 in  Weight: 293 lb  BP: 205/141 mmHg  _____________________________________________________________________________  __        Procedure  Complete Portable Echo Adult. Optison (NDC #[id number]) given intravenously.  _____________________________________________________________________________  __         Interpretation Summary     The visual ejection fraction is estimated at 55-60%.  There is mild to moderate concentric left ventricular hypertrophy.  Regional wall motion abnormalities cannot be excluded due to limited  visualization.  There was essentially no subcostal acoustic window. Technically difficult,  suboptimal study.  _____________________________________________________________________________  __        Left Ventricle  The left ventricle is normal in size. There is mild to moderate concentric  left ventricular hypertrophy. Diastolic Doppler findings (E/E' ratio and/or  other parameters) suggest left ventricular filling pressures are  indeterminate. The visual ejection fraction is estimated at 55-60%. Regional  wall motion abnormalities cannot be excluded due to limited visualization. The  anterior wall was not seen to a diagnostic degree in the apical two chamber  view on this study. The apical two chamber view was foreshortened. The  anterior wall does contract normally in the subcostal views.     Right Ventricle  The right ventricle is normal in size and function.     Atria  Normal left atrial size. Right atrial size is normal. The interatrial septum  was not seen in the subcostal view. No shunt was seen in the apical views.     Mitral Valve  There is trace mitral regurgitation.        Tricuspid Valve  There is trace tricuspid regurgitation. The right ventricular systolic  pressure is approximated at 23.7 mmHg plus the right atrial pressure.     Aortic Valve  The aortic valve is not well visualized. No aortic regurgitation is present.  No hemodynamically significant valvular aortic stenosis.     Pulmonic Valve  The pulmonic valve is not well visualized. Normal pulmonic valve velocity.     Vessels  The aortic root is normal size. Normal size ascending aorta. Inferior vena  cava not well visualized for estimation of right atrial pressure.     Pericardium  There is no pericardial effusion.         Rhythm  The rhythm was undetermined. There was no EKG associated with this study.  _____________________________________________________________________________  __  MMode/2D Measurements & Calculations     IVSd: 1.2 cm  LVIDd: 4.9 cm  LVIDs: 3.7 cm  LVPWd: 1.4 cm  FS: 25.1 %  LV mass(C)d: 260.1 grams  LV mass(C)dI: 103.8 grams/m2  Ao root diam: 3.5 cm  LA dimension: 4.2 cm  asc Aorta Diam: 3.2 cm  LA/Ao: 1.2  LA Volume (BP): 49.2 ml  LA Volume Index (BP): 19.6 ml/m2  RWT: 0.58           Doppler Measurements & Calculations  MV E max alexys: 50.9 cm/sec  MV A max alexys: 54.3 cm/sec  MV E/A: 0.94  MV max P.0 mmHg  MV mean P.85 mmHg  MV V2 VTI: 19.6 cm  MV dec time: 0.21 sec  TR max alexys: 243.2 cm/sec  TR max P.7 mmHg  E/E' av.9  Lateral E/e': 9.5  Medial E/e': 14.3           _____________________________________________________________________________  __           Report approved by: Arleth Luong 09/10/2020 02:13 PM          [email or web address]   Lab 09/10/20  0654 20  1152   WBC 10.8 16.2*   HGB 11.6* 13.3   HCT 36.5* 41.5   MCV 84 84    247     No results for input(s): CULT in the last 168 hours.  Recent [employer]   Lab 20  0637 20  0634 09/10/20  0654    140 139   POTASSIUM 3.6 3.3* 3.4   CHLORIDE 111* 109 109   CO2 21 22 20   ANIONGAP 8 9 10   * 115* 108*   BUN 86* 85* 82*   CR 6.96* 6.90* 7.24*   GFRESTIMATED 10* 10* 10*   GFRESTBLACK 12* 12* 11*   BUBBA 7.9* 7.7* 7.9*   PROTTOTAL  --   --  6.0*   ALBUMIN  --   --  2.4*   BILITOTAL  --   --  0.5   ALKPHOS  --   --  94   AST  --   --  11   ALT  --   --  16       Recent Labs   Lab 2037 20  0634 09/10/20  0654 20  1152   * 115* 108* 111*       No results for input(s): INR in the last 168 hours.      Incidental findings that was discussed with patient/family and need follow up with PCP: Benign 1.5 cm right renal cyst    Pending Results:       Unresulted Labs Ordered in the Past 30 Days of  this Admission     No orders found from 8/10/2020 to 9/10/2020.             Patient Allergies:       Allergies   Allergen Reactions     No Known Allergies          Disposition:     Disposition: home    Discharge needs: none         I saw and evaluated the patient on day of discharge and  discharge instructions reviewed  and  all the patient's questions and concerns addressed. Over 30 minutes spent on discharge and coordination of discharge process for this patient.      Disclaimer: This note consists of symbols derived from keyboarding, dictation and/or voice recognition software. As a result, there may be errors in the script that have gone undetected. Please consider this when interpreting information found in this chart

## 2020-09-12 NOTE — SIGNIFICANT EVENT
[facility]  [place] [address]  [phone]              To whom it may concern:    Taylor Valiente was under my  professional care for a medical problem from 09/9-12/ 2020 .   The patient   may return to work with the following: no restrictions on or about 9/15/2020.        Sincerely,    Lepe MD

## 2020-09-12 NOTE — PLAN OF CARE
Assumed care at 1900.  Elevated BP otherwise VSS on RA. A/Ox4.  Denies pain.  R PIV SL.  Dialysis diet.  Up independently.  LS clear, denies SOB. Tele: SR w/ inv T's, denies CP.  Neph following.  Poss discharge home tomm.  Continue POC.

## 2020-09-12 NOTE — PLAN OF CARE
A&O x 4. Pt stable and ready for discharge, Vital signs stable. Discharge instructions, signs and symptoms to report, new medications, diet, activity, and follow up appointments reviewed with patient and mother. New Rx sent home with pt and education on each new medication.   All questions answered and verbalized understanding.    IV out and belongings sent home with pt    Reviewed HTN, diet, exercise and importance of lifestyle changes

## 2020-09-12 NOTE — PLAN OF CARE
Pt A&Ox4, BPs elevated, see chart,  RA, LS CTA, denies pain.  Renal diet, independent in room, Tele: SR w inv Ts and pQT. K 3.3 previous shift replaced rechecking in am. Cr 6.9.  Nephro following. Possible discharge today.  Continue plan of care.

## 2020-09-14 ENCOUNTER — TELEPHONE (OUTPATIENT)
Dept: FAMILY MEDICINE | Facility: CLINIC | Age: 25
End: 2020-09-14

## 2020-09-14 NOTE — TELEPHONE ENCOUNTER
ED / Discharge Outreach Protocol    Patient admitted from 9/9 - 9/12 Acute Kidney Injury   Stop metoprolol   F/u with Dr. Hooper within 7 days and BMP needed (Dr. Lawrence was pcp, Dr. Randle is nephrology and is now listed as pcp     Patient Contact    Attempt # 1    Was call answered?  No.  Left message on voicemail with information to call me back.    Sultana Urbina, Registered Nurse, PAL (Patient Advocate Liason)   [facility]   [phone]

## 2020-09-14 NOTE — LETTER
September 16, 2020      Taylor Valiente  [address] [place] [address]          Dear Taylor,      We have been trying to reach you since your hospital discharge to ensure you are feeling well and get the follow up needed. Please return my call     Sincerely,  Sultana Urbina, Registered Nurse, PAL (Patient Advocate Liason)   [facility]   [phone]

## 2020-09-15 NOTE — TELEPHONE ENCOUNTER
ED / Discharge Outreach Protocol    Patient Contact    Attempt # 2    Was call answered?  No.  Left message on voicemail with information to call me back. - RN PAL direct number left for call back     Sultana Urbina Registered Nurse, PAL (Patient Advocate Liason)   [facility]   [phone]

## 2020-09-16 NOTE — TELEPHONE ENCOUNTER
ED / Discharge Outreach Protocol    Patient Contact    Attempt # 3    Was call answered?  No.  Left message on voicemail with information to call me back.    Letter mailed     Sultana Urbina Registered Nurse, PAL (Patient Advocate Liason)   [facility]   [phone]

## 2020-09-22 LAB
CREAT SERPL-MCNC: 8.44 MG/DL (ref 0.6–1.35)
GFR SERPL CREATININE-BSD FRML MDRD: 8 ML/MIN/1.73M2
GLUCOSE SERPL-MCNC: 106 MG/DL (ref 65–99)
POTASSIUM SERPL-SCNC: 4.9 MMOL/L (ref 3.5–5.3)

## 2020-09-23 DIAGNOSIS — I10 BENIGN ESSENTIAL HYPERTENSION: Primary | ICD-10-CM

## 2020-09-24 NOTE — PROGRESS NOTES
Pre-Visit Planning     Future Appointments   Date Time Provider Department Center   2020 10:00 AM Priyank Lawrence MD [facility]     Arrival Time for this Appointment:  9:40 AM     Appointment Notes for this encounter:   Hospital F/U acute renal failure.malignant hypertension   Follow up with Dr. Hooper Nephrology scheduled for 10/21/2020     Questionnaires Reviewed/Assigned  No additional questionnaires are needed    Hospital/ER visits since last pcp appt? YES   Patient admitted from  -  Acute Kidney Injury   Stop metoprolol   F/u with Dr. Hooper within 7 days and BMP needed (Dr. Lawrence was pcp, Dr. Randle is nephrology and is now listed as pcp)    Imagin/10/2020 Renal Ultrasound   IMPRESSION:  1.  Prominent diffuse increased parenchymal echogenicity throughout both kidneys suggesting medical renal disease. This represents a significant change compared to 2017. Clinical correlation.     2.  Benign 1.5 cm right renal cyst. No hydronephrosis.    Lab review:   6.96 Creatinine 2020   Iron LOW   Parathyroid Hormone Elevated   Vitamin D low   Troponin Elevated in ER 2020   WBC 16.2 -  2020     Health Maintenance Due   Topic Date Due     ANNUAL REVIEW OF HM ORDERS  1995     HPV IMMUNIZATION (1 - Male 2-dose series) 2007     PREVENTIVE CARE VISIT  2019     INFLUENZA VACCINE (1) 2020     [employer]  Patient is not active on [employer]. note placed on appt to discuss during check in process    Specialty Visits - Dr. Hooper [facility]phrology [facility] 2019    Patient preferred phone number: [phone]      Spoke to patient via phone. Are there any additional questions or concerns you'd like to review with your provider during your visit? No    Patient does not have additional questions or concerns.        Visit is not preventive.    Meds  Is there anything on your medication list that needs to be updated? Yes: RN reviewed medications over the phone with patient, message  "placed on meds that are ordered by Nephrology   Patient had a new medication ordered by nephrology yesterday, however has not yet picked up so does not know the name, RN advised to bring to appt so we can update med list     Current Outpatient Medications   Medication     amLODIPine (NORVASC) 10 MG tablet     aspirin 81 MG EC tablet     atorvastatin (LIPITOR) 10 MG tablet     carvedilol (COREG) 25 MG tablet     cloNIDine (CATAPRES) 0.3 MG tablet     hydrALAZINE (APRESOLINE) 100 MG tablet     vitamin D3 (CHOLECALCIFEROL) 2000 units (50 mcg) tablet     No current facility-administered medications for this visit.      Which pharmacy do you prefer to use for medications during this visit if needed? [facility]     Do you need refills on any of your medications? Yes: RN pended     Health Maintenance Due   Topic Date Due     ANNUAL REVIEW OF  ORDERS  1995     HPV IMMUNIZATION (1 - Male 2-dose series) 08/05/2007     PREVENTIVE CARE VISIT  11/26/2019     INFLUENZA VACCINE (1) 09/01/2020     Patient is due for:  preventive care visit  will schedule at later date     Questionnaire Review   none needed     Call Summary  \"Thank you for your time today.  If anything comes up before your appointment, please feel free to contact us at [phone].\"    Sultana Urbina, Registered Nurse, PAL (Patient Advocate Liason)   [facility]   [phone]         "

## 2020-09-25 ENCOUNTER — OFFICE VISIT (OUTPATIENT)
Dept: FAMILY MEDICINE | Facility: CLINIC | Age: 25
End: 2020-09-25
Payer: COMMERCIAL

## 2020-09-25 VITALS
HEIGHT: 72 IN | TEMPERATURE: 98.6 F | BODY MASS INDEX: 38.06 KG/M2 | WEIGHT: 281 LBS | DIASTOLIC BLOOD PRESSURE: 74 MMHG | RESPIRATION RATE: 16 BRPM | HEART RATE: 73 BPM | SYSTOLIC BLOOD PRESSURE: 118 MMHG | OXYGEN SATURATION: 100 %

## 2020-09-25 DIAGNOSIS — I16.0 HYPERTENSIVE URGENCY: Primary | ICD-10-CM

## 2020-09-25 DIAGNOSIS — E66.01 MORBID OBESITY DUE TO EXCESS CALORIES (H): ICD-10-CM

## 2020-09-25 DIAGNOSIS — Z23 NEED FOR HPV VACCINATION: ICD-10-CM

## 2020-09-25 DIAGNOSIS — N17.9 ACUTE KIDNEY INJURY (H): ICD-10-CM
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
## 2020-09-25 PROBLEM — N18.5 CKD (CHRONIC KIDNEY DISEASE) STAGE 5, GFR LESS THAN 15 ML/MIN (H): Status: ACTIVE | Noted: 2017-07-28

## 2020-09-25 PROCEDURE — 90651 9VHPV VACCINE 2/3 DOSE IM: CPT | Performed by: FAMILY MEDICINE

## 2020-09-25 PROCEDURE — 36415 COLL VENOUS BLD VENIPUNCTURE: CPT | Performed by: FAMILY MEDICINE

## 2020-09-25 PROCEDURE — 90471 IMMUNIZATION ADMIN: CPT | Performed by: FAMILY MEDICINE

## 2020-09-25 PROCEDURE — 99495 TRANSJ CARE MGMT MOD F2F 14D: CPT | Mod: 25 | Performed by: FAMILY MEDICINE

## 2020-09-25 PROCEDURE — 80048 BASIC METABOLIC PNL TOTAL CA: CPT | Performed by: FAMILY MEDICINE

## 2020-09-25 RX ORDER — HYDRALAZINE HYDROCHLORIDE 100 MG/1
TABLET, FILM COATED ORAL
Qty: 360 TABLET | Refills: 1 | Status: SHIPPED | OUTPATIENT
Start: 2020-09-25 | End: 2021-03-31

## 2020-09-25 RX ORDER — CARVEDILOL 25 MG/1
25 TABLET ORAL 2 TIMES DAILY WITH MEALS
Qty: 180 TABLET | Refills: 1 | Status: ON HOLD | OUTPATIENT
Start: 2020-09-25 | End: 2020-11-29

## 2020-09-25 ASSESSMENT — MIFFLIN-ST. JEOR: SCORE: 2302.61

## 2020-09-25 NOTE — PROGRESS NOTES
RN PAL met with patient -   Welcome to [facility] letter given to patient with PAL direct number for questions/concerns   RN PAL also gave patient a business card with direct number as this will easily fit into his wallet    Dr. Lawrence requests RN PAL follow up every 1-2 months   RN PAL discussed concerns for reaching patient - patient states okay to call his cell phone number [phone] verified today with patient or his home number where parents may answer     Sultana Urbina Registered Nurse, PAL (Patient Advocate Liason)   [facility]   [phone]

## 2020-09-25 NOTE — LETTER
Thank you for choosing [facility] today for your health care needs.     [facility] is transforming primary care  At [facility], we re dedicated to constantly improve how we serve the health care needs of our patients and communities. We re currently making changes to the way we deliver care.     Changes you ll notice include:    An emphasis on building a relationship with a primary care provider    Access to a PAL (personal advocate and liaison) to help guide you with your care needs    Appointment lengths tailored to your specific needs and greater access to a care team to help you and your provider improve and maintain your health and well-being    Improved online access to your care team    Benefits of a primary care provider  If you don t have a designated primary care provider, we encourage you to get to know our care team online and find a provider you d like to see. Most of our providers have a short video on their online provider page. Visit [email or web address] to explore our providers and locations.    Benefits of having a primary care provider include:      They get to know you - your health history, family history and goals, making it easier to make a health plan together.     You get to know them - making health-related conversations and decisions easier      Primary care doctors help you when you re sick or hurt - but also focus on keeping you healthy with preventive care and screenings.      A doctor who sees you regularly is more likely to notice changes in your health.     You ll be connected to a broad care team who partners with your provider to support you.    Patient Advocate Liaison (PAL)   To help make sure you get the right care, at the right time, we include PALs, or Patient Advocate Liaisons, as part of your care team. Your PAL will be your first line of contact. They ll advocate for your needs and help you navigate our services, connecting you with care team members and community resources to ensure  your care is well coordinated. You ll be introduced to a PAL in an upcoming visit.     Expanded care team access with tailored appointment lengths  Depending on your health care needs, you may have longer or shorter appointments and see additional care team providers - including Medication Therapy Management (MTM) pharmacists, diabetes educators, behavioral health clinicians, or social workers. At times, they may be included in your visit with your provider, or you may see them individually.     Online access to your health care records and care team  [employer] is our online tool that makes it easy to see your health care information and communicate with your care team.     [employer] allows you to:     View your health maintenance plan so you know when you re due for a preventive screening    Send secure messages to your care team    View your health history and visit summaries     Schedule appointments     Complete questionnaires and eCheck-in before appointments      Get care from your provider with an e-visit      View and pay your bill     Sign up at [email or web address]. Once you have an account, you also can download the mobile ary.     Sultana Urbina, Registered Nurse, PAL (Patient Advocate Liason)   [facility]   [phone]

## 2020-09-25 NOTE — PROGRESS NOTES
Subjective     Taylor Valiente is a 25 year old male who presents to clinic today for the following health issues:    History of Present Illness        Hypertension: He presents for follow up of hypertension.  He does check blood pressure  regularly outside of the clinic. Outpatient blood pressures have not been over 140/90. He follows a low salt diet.             Hospital Follow-up Visit:    Hospital/Nursing Home/IP Rehab Facility: [facility]  Date of Admission: 9/9/2020  Date of Discharge: 9/12/2020  Reason(s) for Admission: hypertensive emergency      Was your hospitalization related to COVID-19? No   Problems taking medications regularly:  None  Medication changes since discharge: None  Problems adhering to non-medication therapy:  None    Summary of hospitalization:  [facility] discharge summary reviewed  Diagnostic Tests/Treatments reviewed.  Follow up needed: nephrology, done 3 days ago.  Other Healthcare Providers Involved in Patient s Care:         None  Update since discharge: improved. Post Discharge Medication Reconciliation: discharge medications reconciled, continue medications without change.  Plan of care communicated with patient              Review of Systems   CONSTITUTIONAL: NEGATIVE for fever, chills, change in weight  ENT/MOUTH: NEGATIVE for ear, mouth and throat problems  RESP: NEGATIVE for significant cough or SOB  CV: NEGATIVE for chest pain, palpitations or peripheral edema      Objective    /74 (BP Location: Right arm, Patient Position: Chair, Cuff Size: Adult Large)   Pulse 73   Temp 98.6  F (37  C) (Oral)   Resp 16   Ht 1.829 m (6')   Wt 127.5 kg (281 lb)   SpO2 100%   BMI 38.11 kg/m    Body mass index is 38.11 kg/m .  Physical Exam   GENERAL: healthy, alert and no distress  NECK: no adenopathy, no asymmetry, masses, or scars and thyroid normal to palpation  RESP: lungs clear to auscultation - no rales, rhonchi or wheezes  CV: regular rate and  rhythm, normal S1 S2, no S3 or S4, no murmur, click or rub, no peripheral edema and peripheral pulses strong  ABDOMEN: soft, nontender, no hepatosplenomegaly, no masses and bowel sounds normal  MS: no gross musculoskeletal defects noted, no edema            Assessment & Plan     Hypertensive urgency  BP is much better today, well controlled on meds, pt does have problem with compliance but he is back on his medicine regularly, he is using medication pill box to help remembering.   - REVIEW OF HEALTH MAINTENANCE PROTOCOL ORDERS  - Basic metabolic panel  (Ca, Cl, CO2, Creat, Gluc, K, Na, BUN)  - ADMIN 1st VACCINE    Need for HPV vaccination  Given today.  - REVIEW OF HEALTH MAINTENANCE PROTOCOL ORDERS  - C HUMAN PAPILLOMA VIRUS VACCINE (GARDASIL 9) 3 DOSE IM    Acute kidney injury (H)  Related to uncontrolled blood pressure, pt to continue on BP meds, and follow up with nephrology in 1 month (may start on lisinopril at that time if ok by Nephrology)  - carvedilol (COREG) 25 MG tablet; Take 1 tablet (25 mg) by mouth 2 times daily (with meals)  - hydrALAZINE (APRESOLINE) 100 MG tablet; TAKE ONE TABLET BY MOUTH THREE TIMES A DAY    Morbid obesity due to excess calories (H)  Today I counseled the patient about diet, regular exercise and weight loss planning.         BMI:   Estimated body mass index is 38.11 kg/m  as calculated from the following:    Height as of this encounter: 1.829 m (6').    Weight as of this encounter: 127.5 kg (281 lb).   Weight management plan: Discussed healthy diet and exercise guidelines            Return in about 6 months (around 3/25/2021) for CKD.    Priyank Lawrence MD  [facility]

## 2020-09-26 LAB
ANION GAP SERPL CALCULATED.3IONS-SCNC: 14 MMOL/L (ref 3–14)
BUN SERPL-MCNC: 114 MG/DL (ref 7–30)
CALCIUM SERPL-MCNC: 9.2 MG/DL (ref 8.5–10.1)
CHLORIDE SERPL-SCNC: 111 MMOL/L (ref 94–109)
CO2 SERPL-SCNC: 12 MMOL/L (ref 20–32)
CREAT SERPL-MCNC: 8.46 MG/DL (ref 0.66–1.25)
GFR SERPL CREATININE-BSD FRML MDRD: 8 ML/MIN/{1.73_M2}
GLUCOSE SERPL-MCNC: 126 MG/DL (ref 70–99)
POTASSIUM SERPL-SCNC: 5 MMOL/L (ref 3.4–5.3)
SODIUM SERPL-SCNC: 137 MMOL/L (ref 133–144)

## 2020-09-28 ENCOUNTER — TELEPHONE (OUTPATIENT)
Dept: FAMILY MEDICINE | Facility: CLINIC | Age: 25
End: 2020-09-28

## 2020-09-28 NOTE — TELEPHONE ENCOUNTER
I was wondering if we can forward these results to his Nephrologist.   Priyank Lawrence MD  [facility]  [phone]

## 2020-09-29 ENCOUNTER — HOSPITAL ENCOUNTER (EMERGENCY)
Facility: CLINIC | Age: 25
Discharge: HOME | End: 2020-09-29
Attending: NURSE PRACTITIONER | Admitting: NURSE PRACTITIONER
Payer: COMMERCIAL

## 2020-09-29 VITALS
DIASTOLIC BLOOD PRESSURE: 96 MMHG | RESPIRATION RATE: 18 BRPM | BODY MASS INDEX: 37.97 KG/M2 | OXYGEN SATURATION: 99 % | TEMPERATURE: 98.5 F | SYSTOLIC BLOOD PRESSURE: 149 MMHG | HEART RATE: 72 BPM | WEIGHT: 280 LBS

## 2020-09-29 DIAGNOSIS — I10 BENIGN ESSENTIAL HYPERTENSION: ICD-10-CM

## 2020-09-29 DIAGNOSIS — M62.830 BACK MUSCLE SPASM: ICD-10-CM

## 2020-09-29 LAB
ANION GAP SERPL CALCULATED.3IONS-SCNC: 9 MMOL/L (ref 3–14)
BUN SERPL-MCNC: 119 MG/DL (ref 7–30)
CALCIUM SERPL-MCNC: 8.6 MG/DL (ref 8.5–10.1)
CHLORIDE SERPL-SCNC: 113 MMOL/L (ref 94–109)
CO2 SERPL-SCNC: 14 MMOL/L (ref 20–32)
CREAT SERPL-MCNC: 7.94 MG/DL (ref 0.66–1.25)
GFR SERPL CREATININE-BSD FRML MDRD: 9 ML/MIN/{1.73_M2}
GLUCOSE SERPL-MCNC: 132 MG/DL (ref 70–99)
POTASSIUM SERPL-SCNC: 4.6 MMOL/L (ref 3.4–5.3)
SODIUM SERPL-SCNC: 136 MMOL/L (ref 133–144)

## 2020-09-29 PROCEDURE — 99283 EMERGENCY DEPT VISIT LOW MDM: CPT

## 2020-09-29 PROCEDURE — 80048 BASIC METABOLIC PNL TOTAL CA: CPT | Performed by: NURSE PRACTITIONER

## 2020-09-29 PROCEDURE — 25000132 ZZH RX MED GY IP 250 OP 250 PS 637: Performed by: NURSE PRACTITIONER

## 2020-09-29 PROCEDURE — 36415 COLL VENOUS BLD VENIPUNCTURE: CPT | Performed by: NURSE PRACTITIONER

## 2020-09-29 RX ORDER — METHOCARBAMOL 500 MG/1
1000 TABLET, FILM COATED ORAL ONCE
Status: COMPLETED | OUTPATIENT
Start: 2020-09-29 | End: 2020-09-29

## 2020-09-29 RX ORDER — LIDOCAINE 4 G/G
2 PATCH TOPICAL ONCE
Status: DISCONTINUED | OUTPATIENT
Start: 2020-09-29 | End: 2020-09-29 | Stop reason: HOSPADM

## 2020-09-29 RX ORDER — METHOCARBAMOL 500 MG/1
TABLET, FILM COATED ORAL
Qty: 24 TABLET | Refills: 0 | Status: SHIPPED | OUTPATIENT
Start: 2020-09-29 | End: 2020-11-27

## 2020-09-29 RX ADMIN — LIDOCAINE 2 PATCH: 560 PATCH PERCUTANEOUS; TOPICAL; TRANSDERMAL at 18:40

## 2020-09-29 RX ADMIN — METHOCARBAMOL TABLETS 1000 MG: 500 TABLET, COATED ORAL at 18:40

## 2020-09-29 ASSESSMENT — ENCOUNTER SYMPTOMS
NECK PAIN: 1
NUMBNESS: 0
CHILLS: 0
WEAKNESS: 0
SHORTNESS OF BREATH: 1
BACK PAIN: 1
FEVER: 0
COUGH: 0

## 2020-09-29 NOTE — LETTER
September 29, 2020      To Whom It May Concern:      Taylor Valiente was seen in our Emergency Department today, 09/29/20.  I expect his condition to improve over the next 2 days.  He may return to work when improved.    Sincerely,        Ranjana PRATER RN

## 2020-09-29 NOTE — ED TRIAGE NOTES
Right sided neck and shoulder blade pain. Started a few days ago. Denies known injury to area. Sitting makes it better, but when being on his feet for more than 5 to 10 minutes it becomes more painful. Feels a little short of breath, due to the pain. Taking tylenol for the pain without relief. Last dose yesterday.

## 2020-09-29 NOTE — ED AVS SNAPSHOT
[facility] Emergency Department  [address]  [facility] [address]  Phone:  [phone]  Fax:  [phone]                                    Taylor Valiente   MRN: [id number]    Department:  [facility] Emergency Department   Date of Visit:  9/29/2020           After Visit Summary Signature Page    I have received my discharge instructions, and my questions have been answered. I have discussed any challenges I see with this plan with the nurse or doctor.    ..........................................................................................................................................  Patient/Patient Representative Signature      ..........................................................................................................................................  Patient Representative Print Name and Relationship to Patient    ..................................................               ................................................  Date                                   Time    ..........................................................................................................................................  Reviewed by Signature/Title    ...................................................              ..............................................  Date                                               Time          22EPIC Rev 08/18

## 2020-09-30 NOTE — DISCHARGE INSTRUCTIONS
Tylenol scheduled for the next 3-5 days.  650-1000 mg of Tylenol.   Tylenol is every 6 hours Naproxen is 2 times daily. Follow directions. Use OTC lidocaine patches as directed.   Ice or heat to area.  I recommend ice in the first 24-48 hours.  Stretching as we discussed.

## 2020-10-01 ENCOUNTER — MEDICAL CORRESPONDENCE (OUTPATIENT)
Dept: HEALTH INFORMATION MANAGEMENT | Facility: CLINIC | Age: 25
End: 2020-10-01

## 2020-10-01 DIAGNOSIS — I12.0 UNSPECIFIED HYPERTENSIVE KIDNEY DISEASE WITH CHRONIC KIDNEY DISEASE STAGE V OR END STAGE RENAL DISEASE(403.91) (H): Primary | ICD-10-CM

## 2020-10-01 DIAGNOSIS — N18.5 UNSPECIFIED HYPERTENSIVE KIDNEY DISEASE WITH CHRONIC KIDNEY DISEASE STAGE V OR END STAGE RENAL DISEASE(403.91) (H): Primary | ICD-10-CM

## 2020-10-07 DIAGNOSIS — I12.0 UNSPECIFIED HYPERTENSIVE KIDNEY DISEASE WITH CHRONIC KIDNEY DISEASE STAGE V OR END STAGE RENAL DISEASE(403.91) (H): ICD-10-CM

## 2020-10-07 DIAGNOSIS — N18.5 UNSPECIFIED HYPERTENSIVE KIDNEY DISEASE WITH CHRONIC KIDNEY DISEASE STAGE V OR END STAGE RENAL DISEASE(403.91) (H): ICD-10-CM

## 2020-10-07 PROCEDURE — 80069 RENAL FUNCTION PANEL: CPT | Performed by: NURSE PRACTITIONER

## 2020-10-08 LAB
ALBUMIN SERPL-MCNC: 3.3 G/DL (ref 3.4–5)
ANION GAP SERPL CALCULATED.3IONS-SCNC: 7 MMOL/L (ref 3–14)
BUN SERPL-MCNC: 97 MG/DL (ref 7–30)
CALCIUM SERPL-MCNC: 9 MG/DL (ref 8.5–10.1)
CHLORIDE SERPL-SCNC: 115 MMOL/L (ref 94–109)
CO2 SERPL-SCNC: 18 MMOL/L (ref 20–32)
CREAT SERPL-MCNC: 7.24 MG/DL (ref 0.66–1.25)
GFR SERPL CREATININE-BSD FRML MDRD: 10 ML/MIN/{1.73_M2}
GLUCOSE SERPL-MCNC: 101 MG/DL (ref 70–99)
PHOSPHATE SERPL-MCNC: 5.3 MG/DL (ref 2.5–4.5)
POTASSIUM SERPL-SCNC: 4.7 MMOL/L (ref 3.4–5.3)
SODIUM SERPL-SCNC: 140 MMOL/L (ref 133–144)

## 2020-10-21 ENCOUNTER — TRANSFERRED RECORDS (OUTPATIENT)
Dept: HEALTH INFORMATION MANAGEMENT | Facility: CLINIC | Age: 25
End: 2020-10-21

## 2020-10-21 LAB
CREAT SERPL-MCNC: 6.69 MG/DL (ref 0.6–1.35)
GFR SERPL CREATININE-BSD FRML MDRD: 11 ML/MIN/1.73M2
GLUCOSE SERPL-MCNC: 121 MG/DL (ref 65–99)
POTASSIUM SERPL-SCNC: 4.4 MMOL/L (ref 3.5–5.3)

## 2020-10-22 ENCOUNTER — REFERRAL (OUTPATIENT)
Dept: TRANSPLANT | Facility: CLINIC | Age: 25
End: 2020-10-22

## 2020-10-22 DIAGNOSIS — Z01.818 PRE-TRANSPLANT EVALUATION FOR KIDNEY TRANSPLANT: ICD-10-CM

## 2020-10-22 DIAGNOSIS — I10 ESSENTIAL HYPERTENSION: ICD-10-CM

## 2020-10-22 DIAGNOSIS — N18.5 CHRONIC KIDNEY DISEASE, STAGE 5, KIDNEY FAILURE (H): Primary | ICD-10-CM

## 2020-10-22 DIAGNOSIS — N18.5 CHRONIC RENAL FAILURE, STAGE 5 (H): ICD-10-CM

## 2020-10-22 DIAGNOSIS — I10 HYPERTENSION: ICD-10-CM

## 2020-10-22 NOTE — LETTER
Taylor Valiente  [address] [place] [address]                November 13, 2020      Dear Taylor,       You have recently expressed interest in our Solid Organ Transplant Program and have requested to begin the evaluation process.  We have made several attempts to get in touch with you but have been unable to reach you.    If you are still interested and/or have any questions, please contact us at  [phone] or [phone]   Monday - Friday, between the hours of 8:30am and 5:00pm central time.    We look forward to hearing from you.      Regards,     Solid Organ Transplant Intake   [facility]'s [address]  [address], [facility] [address]  [place] [address] to

## 2020-10-22 NOTE — Clinical Note
Patient would like to keep his save the date 1/26/21, no need to call him. K alone, not on dialysis. Thank you!

## 2020-11-02 ENCOUNTER — PATIENT OUTREACH (OUTPATIENT)
Dept: FAMILY MEDICINE | Facility: CLINIC | Age: 25
End: 2020-11-02

## 2020-11-02 ENCOUNTER — DOCUMENTATION ONLY (OUTPATIENT)
Dept: TRANSPLANT | Facility: CLINIC | Age: 25
End: 2020-11-02

## 2020-11-02 NOTE — LETTER
November 4, 2020      Taylor Valiente  [address] [place] [address]          Dear Taylor,    We have been trying to reach you with a message from Dr. Lawrence     Please return call to discuss         Sincerely,    Sultana Urbina, Registered Nurse, PAL (Patient Advocate Liason)   [facility]   [phone]

## 2020-11-02 NOTE — TELEPHONE ENCOUNTER
Message left for patient to return call to this RN PAL - please transfer if available     Direct number left for this RN PAL on message for return call     Office visit needed every 1 to 2 months per Dr. Howard Urbina, Registered Nurse   [facility] [phone]

## 2020-11-03 NOTE — TELEPHONE ENCOUNTER
Message #2 left for patient to return call to this RN PAL - please transfer if available     Direct number left for this RN PAL on message for return call     Sultana Urbina, Registered Nurse   [facility] [phone]

## 2020-11-04 NOTE — TELEPHONE ENCOUNTER
Message #3 left for patient to return call to this RN PAL - please transfer if available     Direct number left for this RN PAL on message for return call     My chart message sent AND letter mailed - RN will follow up again in 1 month if no return call from patient     Patient does have an appointment scheduled 3/25/2021 however this is to long to wait for follow up   Dr. Lawrence would like to see patient every 1-2 months       Sultana Urbina, Registered Nurse   [facility] [phone]

## 2020-11-27 ENCOUNTER — APPOINTMENT (OUTPATIENT)
Dept: GENERAL RADIOLOGY | Facility: CLINIC | Age: 25
DRG: 177 | End: 2020-11-27
Attending: EMERGENCY MEDICINE
Payer: COMMERCIAL

## 2020-11-27 ENCOUNTER — HOSPITAL ENCOUNTER (INPATIENT)
Facility: CLINIC | Age: 25
LOS: 2 days | Discharge: HOME | DRG: 177 | End: 2020-11-29
Attending: EMERGENCY MEDICINE | Admitting: INTERNAL MEDICINE
Payer: COMMERCIAL

## 2020-11-27 DIAGNOSIS — R11.2 NAUSEA VOMITING AND DIARRHEA: ICD-10-CM

## 2020-11-27 DIAGNOSIS — N17.9 ACUTE RENAL FAILURE SUPERIMPOSED ON CHRONIC KIDNEY DISEASE, UNSPECIFIED CKD STAGE, UNSPECIFIED ACUTE RENAL FAILURE TYPE: ICD-10-CM

## 2020-11-27 DIAGNOSIS — R19.7 NAUSEA VOMITING AND DIARRHEA: ICD-10-CM

## 2020-11-27 DIAGNOSIS — U07.1 2019 NOVEL CORONAVIRUS DISEASE (COVID-19): ICD-10-CM

## 2020-11-27 DIAGNOSIS — N18.9 ACUTE RENAL FAILURE SUPERIMPOSED ON CHRONIC KIDNEY DISEASE, UNSPECIFIED CKD STAGE, UNSPECIFIED ACUTE RENAL FAILURE TYPE: ICD-10-CM

## 2020-11-27 DIAGNOSIS — N17.9 ACUTE KIDNEY INJURY (H): ICD-10-CM

## 2020-11-27 LAB
ALBUMIN SERPL-MCNC: 3.3 G/DL (ref 3.4–5)
ALP SERPL-CCNC: 63 U/L (ref 40–150)
ALT SERPL W P-5'-P-CCNC: 34 U/L (ref 0–70)
ANION GAP SERPL CALCULATED.3IONS-SCNC: 6 MMOL/L (ref 3–14)
ANION GAP SERPL CALCULATED.3IONS-SCNC: 9 MMOL/L (ref 3–14)
AST SERPL W P-5'-P-CCNC: 25 U/L (ref 0–45)
BASOPHILS # BLD AUTO: 0 10E9/L (ref 0–0.2)
BASOPHILS NFR BLD AUTO: 0.1 %
BILIRUB SERPL-MCNC: 0.3 MG/DL (ref 0.2–1.3)
BUN SERPL-MCNC: 83 MG/DL (ref 7–30)
BUN SERPL-MCNC: 87 MG/DL (ref 7–30)
CALCIUM SERPL-MCNC: 8 MG/DL (ref 8.5–10.1)
CALCIUM SERPL-MCNC: 8.5 MG/DL (ref 8.5–10.1)
CHLORIDE SERPL-SCNC: 111 MMOL/L (ref 94–109)
CHLORIDE SERPL-SCNC: 114 MMOL/L (ref 94–109)
CO2 SERPL-SCNC: 17 MMOL/L (ref 20–32)
CO2 SERPL-SCNC: 24 MMOL/L (ref 20–32)
CREAT SERPL-MCNC: 8.49 MG/DL (ref 0.66–1.25)
CREAT SERPL-MCNC: 9.43 MG/DL (ref 0.66–1.25)
CRP SERPL-MCNC: 13 MG/L (ref 0–8)
D DIMER PPP FEU-MCNC: 1.2 UG/ML FEU (ref 0–0.5)
DIFFERENTIAL METHOD BLD: ABNORMAL
EOSINOPHIL # BLD AUTO: 0 10E9/L (ref 0–0.7)
EOSINOPHIL NFR BLD AUTO: 0 %
ERYTHROCYTE [DISTWIDTH] IN BLOOD BY AUTOMATED COUNT: 13.4 % (ref 10–15)
GFR SERPL CREATININE-BSD FRML MDRD: 7 ML/MIN/{1.73_M2}
GFR SERPL CREATININE-BSD FRML MDRD: 8 ML/MIN/{1.73_M2}
GLUCOSE SERPL-MCNC: 120 MG/DL (ref 70–99)
GLUCOSE SERPL-MCNC: 141 MG/DL (ref 70–99)
HCT VFR BLD AUTO: 35.6 % (ref 40–53)
HGB BLD-MCNC: 11.3 G/DL (ref 13.3–17.7)
IMM GRANULOCYTES # BLD: 0 10E9/L (ref 0–0.4)
IMM GRANULOCYTES NFR BLD: 0.4 %
INTERPRETATION ECG - MUSE: NORMAL
INTERPRETATION ECG - MUSE: NORMAL
LDH SERPL L TO P-CCNC: 257 U/L (ref 85–227)
LYMPHOCYTES # BLD AUTO: 1.1 10E9/L (ref 0.8–5.3)
LYMPHOCYTES NFR BLD AUTO: 14.6 %
MAGNESIUM SERPL-MCNC: 2.2 MG/DL (ref 1.6–2.3)
MCH RBC QN AUTO: 27.4 PG (ref 26.5–33)
MCHC RBC AUTO-ENTMCNC: 31.7 G/DL (ref 31.5–36.5)
MCV RBC AUTO: 86 FL (ref 78–100)
MONOCYTES # BLD AUTO: 0.9 10E9/L (ref 0–1.3)
MONOCYTES NFR BLD AUTO: 12.3 %
NEUTROPHILS # BLD AUTO: 5.4 10E9/L (ref 1.6–8.3)
NEUTROPHILS NFR BLD AUTO: 72.6 %
NRBC # BLD AUTO: 0 10*3/UL
NRBC BLD AUTO-RTO: 0 /100
PLATELET # BLD AUTO: 159 10E9/L (ref 150–450)
POTASSIUM SERPL-SCNC: 4.2 MMOL/L (ref 3.4–5.3)
POTASSIUM SERPL-SCNC: 4.5 MMOL/L (ref 3.4–5.3)
PROT SERPL-MCNC: 7.4 G/DL (ref 6.8–8.8)
RBC # BLD AUTO: 4.12 10E12/L (ref 4.4–5.9)
SODIUM SERPL-SCNC: 140 MMOL/L (ref 133–144)
SODIUM SERPL-SCNC: 141 MMOL/L (ref 133–144)
WBC # BLD AUTO: 7.4 10E9/L (ref 4–11)

## 2020-11-27 PROCEDURE — 250N000013 HC RX MED GY IP 250 OP 250 PS 637: Performed by: INTERNAL MEDICINE

## 2020-11-27 PROCEDURE — 86140 C-REACTIVE PROTEIN: CPT | Performed by: EMERGENCY MEDICINE

## 2020-11-27 PROCEDURE — 99285 EMERGENCY DEPT VISIT HI MDM: CPT | Mod: 25

## 2020-11-27 PROCEDURE — 83735 ASSAY OF MAGNESIUM: CPT | Performed by: EMERGENCY MEDICINE

## 2020-11-27 PROCEDURE — 258N000003 HC RX IP 258 OP 636: Performed by: EMERGENCY MEDICINE

## 2020-11-27 PROCEDURE — 96374 THER/PROPH/DIAG INJ IV PUSH: CPT

## 2020-11-27 PROCEDURE — 36415 COLL VENOUS BLD VENIPUNCTURE: CPT | Performed by: INTERNAL MEDICINE

## 2020-11-27 PROCEDURE — 96361 HYDRATE IV INFUSION ADD-ON: CPT

## 2020-11-27 PROCEDURE — 250N000011 HC RX IP 250 OP 636: Performed by: EMERGENCY MEDICINE

## 2020-11-27 PROCEDURE — 96375 TX/PRO/DX INJ NEW DRUG ADDON: CPT

## 2020-11-27 PROCEDURE — 85025 COMPLETE CBC W/AUTO DIFF WBC: CPT | Performed by: EMERGENCY MEDICINE

## 2020-11-27 PROCEDURE — 120N000004 HC R&B MS OVERFLOW

## 2020-11-27 PROCEDURE — 80048 BASIC METABOLIC PNL TOTAL CA: CPT | Performed by: INTERNAL MEDICINE

## 2020-11-27 PROCEDURE — 250N000013 HC RX MED GY IP 250 OP 250 PS 637: Performed by: EMERGENCY MEDICINE

## 2020-11-27 PROCEDURE — 250N000009 HC RX 250: Performed by: EMERGENCY MEDICINE

## 2020-11-27 PROCEDURE — 250N000011 HC RX IP 250 OP 636: Performed by: INTERNAL MEDICINE

## 2020-11-27 PROCEDURE — 99223 1ST HOSP IP/OBS HIGH 75: CPT | Mod: AI | Performed by: INTERNAL MEDICINE

## 2020-11-27 PROCEDURE — 85379 FIBRIN DEGRADATION QUANT: CPT | Performed by: EMERGENCY MEDICINE

## 2020-11-27 PROCEDURE — 80053 COMPREHEN METABOLIC PANEL: CPT | Performed by: EMERGENCY MEDICINE

## 2020-11-27 PROCEDURE — 258N000003 HC RX IP 258 OP 636: Performed by: INTERNAL MEDICINE

## 2020-11-27 PROCEDURE — 93005 ELECTROCARDIOGRAM TRACING: CPT

## 2020-11-27 PROCEDURE — 83615 LACTATE (LD) (LDH) ENZYME: CPT | Performed by: INTERNAL MEDICINE

## 2020-11-27 PROCEDURE — 71045 X-RAY EXAM CHEST 1 VIEW: CPT

## 2020-11-27 RX ORDER — LIDOCAINE 40 MG/G
CREAM TOPICAL
Status: DISCONTINUED | OUTPATIENT
Start: 2020-11-27 | End: 2020-11-29 | Stop reason: HOSPADM

## 2020-11-27 RX ORDER — BENZONATATE 100 MG/1
100 CAPSULE ORAL 3 TIMES DAILY PRN
Status: DISCONTINUED | OUTPATIENT
Start: 2020-11-27 | End: 2020-11-29 | Stop reason: HOSPADM

## 2020-11-27 RX ORDER — AMLODIPINE BESYLATE 10 MG/1
10 TABLET ORAL DAILY
Status: DISCONTINUED | OUTPATIENT
Start: 2020-11-27 | End: 2020-11-29 | Stop reason: HOSPADM

## 2020-11-27 RX ORDER — CARVEDILOL 25 MG/1
25 TABLET ORAL 2 TIMES DAILY WITH MEALS
Status: DISCONTINUED | OUTPATIENT
Start: 2020-11-27 | End: 2020-11-27

## 2020-11-27 RX ORDER — CARVEDILOL 25 MG/1
50 TABLET ORAL 2 TIMES DAILY WITH MEALS
Status: DISCONTINUED | OUTPATIENT
Start: 2020-11-27 | End: 2020-11-29 | Stop reason: HOSPADM

## 2020-11-27 RX ORDER — METHOCARBAMOL 500 MG/1
500-1000 TABLET, FILM COATED ORAL 3 TIMES DAILY PRN
Status: DISCONTINUED | OUTPATIENT
Start: 2020-11-27 | End: 2020-11-27

## 2020-11-27 RX ORDER — ASPIRIN 81 MG/1
81 TABLET ORAL DAILY
Status: DISCONTINUED | OUTPATIENT
Start: 2020-11-27 | End: 2020-11-29 | Stop reason: HOSPADM

## 2020-11-27 RX ORDER — ATORVASTATIN CALCIUM 10 MG/1
10 TABLET, FILM COATED ORAL EVERY EVENING
Status: DISCONTINUED | OUTPATIENT
Start: 2020-11-27 | End: 2020-11-29 | Stop reason: HOSPADM

## 2020-11-27 RX ORDER — DEXAMETHASONE SODIUM PHOSPHATE 4 MG/ML
6 INJECTION, SOLUTION INTRA-ARTICULAR; INTRALESIONAL; INTRAMUSCULAR; INTRAVENOUS; SOFT TISSUE DAILY
Status: DISCONTINUED | OUTPATIENT
Start: 2020-11-28 | End: 2020-11-29 | Stop reason: HOSPADM

## 2020-11-27 RX ORDER — SODIUM BICARBONATE 650 MG/1
1300 TABLET ORAL 2 TIMES DAILY
Status: DISCONTINUED | OUTPATIENT
Start: 2020-11-27 | End: 2020-11-29 | Stop reason: HOSPADM

## 2020-11-27 RX ORDER — PROCHLORPERAZINE MALEATE 5 MG
10 TABLET ORAL EVERY 6 HOURS PRN
Status: DISCONTINUED | OUTPATIENT
Start: 2020-11-27 | End: 2020-11-29 | Stop reason: HOSPADM

## 2020-11-27 RX ORDER — CLONIDINE HYDROCHLORIDE 0.1 MG/1
0.3 TABLET ORAL 2 TIMES DAILY
Status: DISCONTINUED | OUTPATIENT
Start: 2020-11-27 | End: 2020-11-29 | Stop reason: HOSPADM

## 2020-11-27 RX ORDER — SODIUM BICARBONATE 650 MG/1
650 TABLET ORAL 2 TIMES DAILY
Status: DISCONTINUED | OUTPATIENT
Start: 2020-11-27 | End: 2020-11-27

## 2020-11-27 RX ORDER — ACETAMINOPHEN 650 MG/1
650 SUPPOSITORY RECTAL EVERY 4 HOURS PRN
Status: DISCONTINUED | OUTPATIENT
Start: 2020-11-27 | End: 2020-11-29 | Stop reason: HOSPADM

## 2020-11-27 RX ORDER — HEPARIN SODIUM 5000 [USP'U]/.5ML
5000 INJECTION, SOLUTION INTRAVENOUS; SUBCUTANEOUS EVERY 8 HOURS SCHEDULED
Status: DISCONTINUED | OUTPATIENT
Start: 2020-11-27 | End: 2020-11-29 | Stop reason: HOSPADM

## 2020-11-27 RX ORDER — HYDRALAZINE HYDROCHLORIDE 50 MG/1
100 TABLET, FILM COATED ORAL ONCE
Status: COMPLETED | OUTPATIENT
Start: 2020-11-27 | End: 2020-11-27

## 2020-11-27 RX ORDER — ONDANSETRON 2 MG/ML
4 INJECTION INTRAMUSCULAR; INTRAVENOUS ONCE
Status: COMPLETED | OUTPATIENT
Start: 2020-11-27 | End: 2020-11-27

## 2020-11-27 RX ORDER — ONDANSETRON 2 MG/ML
4 INJECTION INTRAMUSCULAR; INTRAVENOUS EVERY 6 HOURS PRN
Status: DISCONTINUED | OUTPATIENT
Start: 2020-11-27 | End: 2020-11-29 | Stop reason: HOSPADM

## 2020-11-27 RX ORDER — HYDRALAZINE HYDROCHLORIDE 50 MG/1
100 TABLET, FILM COATED ORAL 3 TIMES DAILY
Status: DISCONTINUED | OUTPATIENT
Start: 2020-11-27 | End: 2020-11-29 | Stop reason: HOSPADM

## 2020-11-27 RX ORDER — PANTOPRAZOLE SODIUM 40 MG/1
40 TABLET, DELAYED RELEASE ORAL
Status: DISCONTINUED | OUTPATIENT
Start: 2020-11-27 | End: 2020-11-29 | Stop reason: HOSPADM

## 2020-11-27 RX ORDER — SODIUM CHLORIDE 9 MG/ML
INJECTION, SOLUTION INTRAVENOUS CONTINUOUS
Status: DISCONTINUED | OUTPATIENT
Start: 2020-11-27 | End: 2020-11-29 | Stop reason: HOSPADM

## 2020-11-27 RX ORDER — POLYETHYLENE GLYCOL 3350 17 G/17G
17 POWDER, FOR SOLUTION ORAL DAILY PRN
Status: DISCONTINUED | OUTPATIENT
Start: 2020-11-27 | End: 2020-11-29 | Stop reason: HOSPADM

## 2020-11-27 RX ORDER — ONDANSETRON 4 MG/1
4 TABLET, ORALLY DISINTEGRATING ORAL ONCE
Status: DISCONTINUED | OUTPATIENT
Start: 2020-11-27 | End: 2020-11-27

## 2020-11-27 RX ORDER — PROCHLORPERAZINE 25 MG
25 SUPPOSITORY, RECTAL RECTAL EVERY 12 HOURS PRN
Status: DISCONTINUED | OUTPATIENT
Start: 2020-11-27 | End: 2020-11-29 | Stop reason: HOSPADM

## 2020-11-27 RX ORDER — AMOXICILLIN 250 MG
2 CAPSULE ORAL 2 TIMES DAILY PRN
Status: DISCONTINUED | OUTPATIENT
Start: 2020-11-27 | End: 2020-11-29 | Stop reason: HOSPADM

## 2020-11-27 RX ORDER — SODIUM BICARBONATE 650 MG/1
1300 TABLET ORAL 2 TIMES DAILY
COMMUNITY
End: 2021-07-01

## 2020-11-27 RX ORDER — VITAMIN B COMPLEX
50 TABLET ORAL DAILY
Status: DISCONTINUED | OUTPATIENT
Start: 2020-11-27 | End: 2020-11-29 | Stop reason: HOSPADM

## 2020-11-27 RX ORDER — DEXAMETHASONE SODIUM PHOSPHATE 10 MG/ML
6 INJECTION, SOLUTION INTRAMUSCULAR; INTRAVENOUS ONCE
Status: COMPLETED | OUTPATIENT
Start: 2020-11-27 | End: 2020-11-27

## 2020-11-27 RX ORDER — AMOXICILLIN 250 MG
1 CAPSULE ORAL 2 TIMES DAILY PRN
Status: DISCONTINUED | OUTPATIENT
Start: 2020-11-27 | End: 2020-11-29 | Stop reason: HOSPADM

## 2020-11-27 RX ORDER — ACETAMINOPHEN 325 MG/1
650 TABLET ORAL EVERY 4 HOURS PRN
Status: DISCONTINUED | OUTPATIENT
Start: 2020-11-27 | End: 2020-11-29 | Stop reason: HOSPADM

## 2020-11-27 RX ORDER — ONDANSETRON 4 MG/1
4 TABLET, ORALLY DISINTEGRATING ORAL EVERY 6 HOURS PRN
Status: DISCONTINUED | OUTPATIENT
Start: 2020-11-27 | End: 2020-11-29 | Stop reason: HOSPADM

## 2020-11-27 RX ADMIN — AMLODIPINE BESYLATE 10 MG: 10 TABLET ORAL at 10:31

## 2020-11-27 RX ADMIN — SODIUM CHLORIDE: 9 INJECTION, SOLUTION INTRAVENOUS at 10:33

## 2020-11-27 RX ADMIN — ASPIRIN 81 MG: 81 TABLET, COATED ORAL at 10:59

## 2020-11-27 RX ADMIN — Medication 50 MCG: at 10:31

## 2020-11-27 RX ADMIN — FAMOTIDINE 20 MG: 10 INJECTION, SOLUTION INTRAVENOUS at 02:23

## 2020-11-27 RX ADMIN — CARVEDILOL 25 MG: 25 TABLET, FILM COATED ORAL at 10:31

## 2020-11-27 RX ADMIN — ATORVASTATIN CALCIUM 10 MG: 10 TABLET ORAL at 20:37

## 2020-11-27 RX ADMIN — CLONIDINE HYDROCHLORIDE 0.3 MG: 0.1 TABLET ORAL at 10:31

## 2020-11-27 RX ADMIN — SODIUM BICARBONATE 650 MG TABLET 1300 MG: at 20:37

## 2020-11-27 RX ADMIN — ONDANSETRON 4 MG: 2 INJECTION INTRAMUSCULAR; INTRAVENOUS at 02:22

## 2020-11-27 RX ADMIN — HYDRALAZINE HYDROCHLORIDE 100 MG: 50 TABLET, FILM COATED ORAL at 10:31

## 2020-11-27 RX ADMIN — SODIUM CHLORIDE: 9 INJECTION, SOLUTION INTRAVENOUS at 18:23

## 2020-11-27 RX ADMIN — HYDRALAZINE HYDROCHLORIDE 100 MG: 50 TABLET, FILM COATED ORAL at 20:37

## 2020-11-27 RX ADMIN — DEXAMETHASONE SODIUM PHOSPHATE 6 MG: 10 INJECTION, SOLUTION INTRAMUSCULAR; INTRAVENOUS at 04:34

## 2020-11-27 RX ADMIN — HEPARIN SODIUM 5000 UNITS: 10000 INJECTION, SOLUTION INTRAVENOUS; SUBCUTANEOUS at 22:36

## 2020-11-27 RX ADMIN — HYDRALAZINE HYDROCHLORIDE 100 MG: 50 TABLET, FILM COATED ORAL at 05:30

## 2020-11-27 RX ADMIN — PANTOPRAZOLE SODIUM 40 MG: 40 TABLET, DELAYED RELEASE ORAL at 10:31

## 2020-11-27 RX ADMIN — HYDRALAZINE HYDROCHLORIDE 100 MG: 50 TABLET, FILM COATED ORAL at 14:53

## 2020-11-27 RX ADMIN — SODIUM CHLORIDE 1000 ML: 9 INJECTION, SOLUTION INTRAVENOUS at 02:22

## 2020-11-27 RX ADMIN — CLONIDINE HYDROCHLORIDE 0.3 MG: 0.1 TABLET ORAL at 20:37

## 2020-11-27 RX ADMIN — CARVEDILOL 50 MG: 25 TABLET, FILM COATED ORAL at 18:22

## 2020-11-27 RX ADMIN — HEPARIN SODIUM 5000 UNITS: 10000 INJECTION, SOLUTION INTRAVENOUS; SUBCUTANEOUS at 13:35

## 2020-11-27 ASSESSMENT — ENCOUNTER SYMPTOMS
NAUSEA: 1
SHORTNESS OF BREATH: 0
FEVER: 1
VOMITING: 1
DIARRHEA: 1
LIGHT-HEADEDNESS: 1
DIZZINESS: 1

## 2020-11-27 NOTE — PROGRESS NOTES
Patient is alert and oriented x4. BP elevated, but patient is on Norvasc, Coreg, Catapres, and Apresoline (will continue to monitor). Lung sounds diminished in posterior lobes, but patient remains on RA. Denies SOB. Active bowel sounds in all 4 quadrants with LBM yesterday. Patient denied any pain, urgency, and frequency when voiding. Denies pain. IV fluids infusing at 100 ml/hr. Patient is on a Renal diet. Independent in room. Creat was 9.43 and now 8.49. Positive Covid and precautions in place. No nausea since admission. Will continue to monitor.     BP (!) 167/103 (BP Location: Right arm)   Pulse 79   Temp 99.3  F (37.4  C) (Oral)   Resp 22   Wt 119.6 kg (263 lb 9.6 oz)   SpO2 98%   BMI 35.75 kg/m

## 2020-11-27 NOTE — PHARMACY-ADMISSION MEDICATION HISTORY
Admission medication history interview status for this patient is complete. See [facility] admission navigator for allergy information, prior to admission medications and immunization status.     Medication history interview done via telephone during Covid-19 pandemic, indicate source(s): Patient  Medication history resources (including written lists, pill bottles, clinic record):Cecilia, previous chart notes  Pharmacy: [facility] [place], [place] - [address]    Changes made to PTA medication list:  Added: None  Deleted: Robaxin   Changed: None    Actions taken by pharmacist (provider contacted, etc):None     Additional medication history information:  - Per chart review, patient is on amlodipine, carvedilol, clonidine, and hydralazine for resistant hypertension.  - Patient hasn't needed Robaxin for a while.  - Patient confirmed taking sodium bicarbonate 1300 mg BID, which is different from what was prescribed (650 mg BID).    Medication reconciliation/reorder completed by provider prior to medication history?  Y    Prior to Admission medications    Medication Sig Last Dose Taking? Auth Provider   amLODIPine (NORVASC) 10 MG tablet Take 5 mg by mouth daily 11/26/2020 at Unknown time Yes Unknown, Entered By History   aspirin 81 MG EC tablet Take 81 mg by mouth daily 11/26/2020 at Unknown time Yes Unknown, Entered By History   atorvastatin (LIPITOR) 10 MG tablet TAKE ONE TABLET BY MOUTH EVERY NIGHT AT BEDTIME 11/26/2020 at Unknown time Yes Priyank Lawrence MD   carvedilol (COREG) 25 MG tablet Take 1 tablet (25 mg) by mouth 2 times daily (with meals) 11/26/2020 at Unknown time Yes Priyank Lawrence MD   cloNIDine (CATAPRES) 0.3 MG tablet Take 1 tablet (0.3 mg) by mouth 2 times daily 11/26/2020 at Unknown time Yes Martin Melgoza MD   hydrALAZINE (APRESOLINE) 100 MG tablet TAKE ONE TABLET BY MOUTH THREE TIMES A DAY 11/26/2020 at Unknown time Yes Priyank Lawrence MD   vitamin D3 (CHOLECALCIFEROL) 2000 units  (50 mcg) tablet Take 1 tablet by mouth daily 11/26/2020 at Unknown time Yes Unknown, Entered By History

## 2020-11-27 NOTE — CONSULTS
[facility]    RENAL CONSULTATION NOTE    REFERRING MD:  Dr. Pinedo    REASON FOR CONSULTATION:  VANESSA/CKD V    DATE OF CONSULTATION: 11/27/20    SHORTHAND KEY FOR MY NOTES:  c = with, s = without, p = after, a = before, x = except, asx = asymptomatic, tx = transplant or treatment, sx = symptoms or symptomatic, cx = canceled or culture, rxn = reaction, yday = yesterday, nl = normal, abx = antibiotics, fxn = function, dx = diagnosis, dz = disease, m/h = melena/hematochezia, c/d/l/ha = cramping/dizziness/lightheadedness/headache, d/c = discharge or diarrhea/constipation, f/c/n/v = fevers/chills/nausea/vomiting, cp/sob = chest pain/shortness of breath, tbv = total body volume, rxn = reaction, tdc = tunneled dialysis catheter, pta = prior to admission, hd = hemodialysis, pd = peritoneal dialysis, hhd = home hemodialysis    HPI: Taylor Valiente is a 25 year old male c CKD V 2 bx-proven FSGS / HTN who was admitted on 11/27/2020 c intractable vomiting and COVID-19 infxn.    Pt has not been feeling well for ~1 wk and had f/c/cough/body aches.  Subsequently, he was tested and found to be COVID +.  His sx worsened and over the past few days he has been unable to eat/drink s vomiting.  On the day of admission, he had multiple episodes of n/v/diarrhea and became d/l.  As a result, he presented to the ER for eval.    In the ER, his vitals were ok, though his BP was relatively low compared to his usual it seems.  A CXR didn't show any fluid or significant infiltrates and labs showed a cr of 9.4.  He was given NS, Zofran, famotidine and dex and admitted.      Overnight, he is still weak, but feeling a little better.  He is urinating and denies any d/h/abd pain.  His appetite is still poor and he doesn't have any sense of smell or taste.  His breathing is fine and no cp.    Pt is followed by Dr. Hooper / Jennifer Manzanares CNP in our office and a recent cr was 6.7 about 1 mo ago.  In Sept, his cr was in the  mid-8s and he was hospitalized for a short period of time.  He states he is going to have an appt c tx coming up soon.  He is leaning towards PD when the time comes for dialysis.    ROS:  A complete review of systems was performed and is x as noted above.    PMH:    Past Medical History:   Diagnosis Date     Anemia      Benign essential hypertension 07/28/2017     CKD (chronic kidney disease) stage 5, GFR less than 15 ml/min (H)     FSGS     Focal glomerular sclerosis 07/28/2017    Due to Hypertension injury.     HLD (hyperlipidemia)      LVH (left ventricular hypertrophy) due to hypertensive disease 07/14/2017     Obesity, unspecified      PSH:    Past Surgical History:   Procedure Laterality Date     NO HISTORY OF SURGERY       MEDICATIONS:      amLODIPine  10 mg Oral Daily     aspirin  81 mg Oral Daily     atorvastatin  10 mg Oral QPM     carvedilol  25 mg Oral BID w/meals     cloNIDine  0.3 mg Oral BID     [START ON 11/28/2020] dexamethasone  6 mg Intravenous Daily     heparin ANTICOAGULANT  5,000 Units Subcutaneous Q8H ARNOL     hydrALAZINE  100 mg Oral TID     pantoprazole  40 mg Oral QAM AC     sodium bicarbonate  1,300 mg Oral BID     sodium chloride (PF)  3 mL Intracatheter Q8H     vitamin D3  50 mcg Oral Daily     ALLERGIES:    Allergies as of 11/27/2020 - Reviewed 11/27/2020   Allergen Reaction Noted     No known allergies  10/26/2004     FH:    Family History   Problem Relation Age of Onset     Blood Disease Mother         has hep b     Diabetes Mother         gestionanal diabetes     Hypertension Mother      Obesity Father      Hypertension Father      Hypertension Maternal Grandmother      Diabetes Maternal Grandmother      Hypertension Paternal Grandmother      Hypertension Paternal Grandfather      Coronary Artery Disease Maternal Uncle      MGM - ESKD on dialysis (in [place])    SH:    Social History     Socioeconomic History     Marital status: Single     Spouse name: Not on file     Number of children:  Not on file     Years of education: Not on file     Highest education level: Not on file   Occupational History     Occupation:    Social Needs     Financial resource strain: Not on file     Food insecurity     Worry: Not on file     Inability: Not on file     Transportation needs     Medical: Not on file     Non-medical: Not on file   Tobacco Use     Smoking status: Never Smoker     Smokeless tobacco: Never Used   Substance and Sexual Activity     Alcohol use: No     Drug use: Yes     Types: Marijuana     Comment: occ     Sexual activity: Yes     Partners: Female   Lifestyle     Physical activity     Days per week: Not on file     Minutes per session: Not on file     Stress: Not on file   Relationships     Social connections     Talks on phone: Not on file     Gets together: Not on file     Attends [employer] service: Not on file     Active member of club or organization: Not on file     Attends meetings of clubs or organizations: Not on file     Relationship status: Not on file     Intimate partner violence     Fear of current or ex partner: Not on file     Emotionally abused: Not on file     Physically abused: Not on file     Forced sexual activity: Not on file   Other Topics Concern     Not on file   Social History Narrative     Not on file     PHYSICAL EXAM:    BP (!) 163/103 (BP Location: Left arm)   Pulse 79   Temp 97.4  F (36.3  C) (Axillary)   Resp 18   Wt 119.6 kg (263 lb 9.6 oz)   SpO2 98%   BMI 35.75 kg/m      GENERAL: awake, alert, NAD  HEENT:  normocephalic, no gross abnormalities; pupils equal, EOMI, no scleral icterus or conj edema; + mask  CV: RRR c 2/6 m, no clicks, gallops, or rubs, no significant ble edema  RESP: CTA B c good efforts  GI: abd o/s/nt/nd, BS present; no masses  MUSCULOSKELETAL: extremities nl - no gross deformities noted  SKIN: no suspicious lesions or rashes, dry to touch  NEURO:  strength normal and symmetric  PSYCH: mood good, affect appropriate  LYMPH:  no palpable ant/post cervical and supraclavicular adenopathy    LABS:      CBC RESULTS:     Recent Labs   Lab 11/27/20  0221   WBC 7.4   RBC 4.12*   HGB 11.3*   HCT 35.6*        BMP RESULTS:  Recent Labs   Lab 11/27/20  1025 11/27/20  0221    141   POTASSIUM 4.5 4.2   CHLORIDE 114* 111*   CO2 17* 24   BUN 83* 87*   CR 8.49* 9.43*   * 120*   BUBBA 8.0* 8.5     INRNo lab results found in last 7 days.     DIAGNOSTICS:  Personally reviewed CXR - looks clear    A/P:  Taylor Valiente is a 25 year old male c CKD V 2 FSGS / HTN who has VANESSA and a symptomatic COVID-19 infxn.    1.  VANESSA/CKD V.  Pt's cr was 9.4 on admission and c fluids it has improved to the 8s.  His baseline is prob in the 6.5-7 range.  His volume status is ok and he is tolerating fluids.  He has some sx that could be considered to be uremic, but they are more likely related to his COVID infxn.  There is no indication for dialysis at this time.  A.  Follow labs, uo, sx daily.  B.  Continue IVF.  C.  When he is d/c'd, he will need a hosp f/u c Jennifer Manzanares CNP in our office in 2 wks.    2.  COVID-19 infxn.  Pt is on dex.  He is not requiring o2 at present.  A.  Follow clinically.  B.  Continue dex.    3.  HTN.  Pt's BP is generally on the high side and is ranging from 130-160s now.  His current regimen is ok, but he is not properly beta blocked.  The amlod is maxed and the clonidine is already at a higher dose.    A.  Increase carvedilol to 50 bid.  B.  Continue other meds at same doses.    4.  NAGMA.  Pt has diarrhea and renal dysfxn.  A.  Continue NaHCO3.  B.  Follow labs.    5.  FEN.  Electrolytes are ok.  A.  Renal diet.    Thank you for this consultation. We will follow c you.  Please call if any questions.      Attestation:   I have reviewed today's relevant vital signs, notes, medications, labs and imaging.    Balta Alba MD  [facility] - Nephrology  [phone]

## 2020-11-27 NOTE — PLAN OF CARE
ROOM # 222    Living Situation (if not independent, order SW consult): Lives with parents   Facility name:  : Radha (mom) [phone]    Activity level at baseline: Independent   Activity level on admit: Independent       Patient registered to observation; given Patient Bill of Rights; given the opportunity to ask questions about observation status and their plan of care.  Patient has been oriented to the observation room, bathroom and call light is in place.    Discussed discharge goals and expectations with patient/family.

## 2020-11-27 NOTE — ED TRIAGE NOTES
Pt states tested positive for COVID on 11/25. Pt c/o n/v/d, cough, and worsening myalgias. ABCs intact GCS 15

## 2020-11-27 NOTE — ED PROVIDER NOTES
History     Chief Complaint:  Nausea, vomiting and diarrhea    HPI   Taylor Valiente is a 25 year old male with hypertension and CKD who presents with nausea, vomiting and diarrhea. Two days ago the patient tested positive for COVID and since then he has experienced nausea, vomiting, nonbloody diarrhea and worsening myalgias. He states that he hasn't been able to keep any food down recently and has had 10 episodes of nonbloody emesis today and 6-7 episodes of diarrhea.  Along with this, he feels dizzy and light headed when he stands up but denies any shortness of breath, fever, chest pain, headache or other symptoms. Of note, he does smoke marijuana but hasn't smoked in over a week.    Allergies:  No Known Drug Allergies    Medications:    Norvasc  Lipitor  Coreg  Catapres  Apresoline  Robaxin  Vitamin D3  Sodium bicarbonate    Past Medical History:    Hypertension  CKD  Focal glomerular sclerosis  LVH  Obesity  ARF  Vitamin D deficiency  Proteinuria    Past Surgical History:    Surgical history reviewed. No pertinent surgical history.    Family History:    Mother: Hepatitis B, Diabetes, Hypertension  Father: Obesity, Hypertension    Social History:  The patient was unaccompanied to the ED  Smoking Status: Never Smoker  Smokeless Tobacco: Never Used  Alcohol Use: Negative  Drug Use: Positive  PCP: Jer Hooper  Marital Status:  Single     Review of Systems   Constitutional: Positive for fever.   Respiratory: Negative for shortness of breath.    Gastrointestinal: Positive for diarrhea (darker stool), nausea and vomiting.   Neurological: Positive for dizziness and light-headedness.   All other systems reviewed and are negative.      Physical Exam     Patient Vitals for the past 24 hrs:   BP Temp Temp src Pulse Resp SpO2   11/27/20 0400 (!) 153/131 -- -- 84 -- 100 %   11/27/20 0345 (!) 135/95 -- -- 87 -- 93 %   11/27/20 0330 (!) 172/103 -- -- 76 -- 90 %   11/27/20 0320 -- -- -- -- -- 96 %   11/27/20 0315 (!)  154/102 -- -- 82 -- 100 %   11/27/20 0310 -- -- -- -- -- 100 %   11/27/20 0305 -- -- -- -- -- 100 %   11/27/20 0300 (!) 147/69 -- -- 82 -- 100 %   11/27/20 0255 -- -- -- -- -- 100 %   11/27/20 0250 (!) 167/97 -- -- 72 -- 97 %   11/27/20 0245 (!) 158/105 -- -- 77 -- 95 %   11/27/20 0240 -- -- -- -- -- 91 %   11/27/20 0235 -- -- -- -- -- 99 %   11/27/20 0230 (!) 149/88 -- -- 75 -- 100 %   11/27/20 0227 -- -- -- -- -- 100 %   11/27/20 0057 129/80 98.2  F (36.8  C) Oral 84 20 98 %       Physical Exam  Nursing note and vitals reviewed.  Constitutional: Well nourished. Resting comfortably.   Eyes: Conjunctiva normal.  Pupils are equal, round, and reactive to light.   ENT: Nose normal. Mucous membranes pink and moist.    Neck: Normal range of motion.  CVS: Normal rate, regular rhythm.  Normal heart sounds.  No murmur.  Pulmonary: Lungs clear to auscultation bilaterally. No wheezes/rales/rhonchi.  GI: Abdomen soft. Nontender, nondistended. No rigidity or guarding.    MSK: No calf tenderness or swelling.  Neuro: Alert. Follows simple commands.  Skin: Skin is warm and dry. No rash noted.   Psychiatric: Normal affect.       Emergency Department Course     ECG:  ECG taken at 0245, ECG read at 0245  Normal sinus rhythm  Nonspecific T wave abnormality  Abnromal ECG  Rate 73 bpm. NM interval 152 ms. QRS duration 92 ms. QT/QTc 388/427 ms. P-R-T axes 30 50 90.    Imaging:   CXR  IMPRESSION: Cardiomediastinal silhouette is within normal limits. Peripheral infiltrates. Findings can be seen with COVID pneumonia. No vascular congestion or pleural effusion.    Laboratory:  Laboratory findings were communicated with the patient who voiced understanding of the findings.    CBC: WBC 7.4, HGB 11.3 (L),   CMP: Chloride 111 (H), Glucose 120 (H), BUN 87 (H), GFR 8 (L), Albumin 3.3 (L) o/w WNL (Creatinine 9.43 (H))    Magneium: 2.2    Interventions:  0222 NS 1L IV Bolus  0222 Zofran 4mg IV  0223 Pepcid 20mg IV  0420 Decadron 6mg  IV    Emergency Department Course:    Past medical records, nursing notes, and vitals reviewed.  0140: I performed an exam of the patient and obtained history, as documented above.     IV was inserted and blood was drawn for laboratory testing, results above.    0253: When tried to ambulate his pulse ox dropped to 90%    0255: Patient rechecked and updated.     0412: I spoke with the hospitalist, Dr. Webb, regarding this patient.    I personally reviewed the laboratory results with the Patient and answered all related questions prior to admission.     Findings and plan explained to the Patient who consents to admission. Discussed the patient with Dr. Webb, who will admit the patient to an inpatient bed for further monitoring, evaluation, and treatment.    Impression & Plan      Medical Decision Making:  Taylor Valiente is a 25 year old male who presents to the emergency department today for evaluation of nausea, vomiting and diarrhea in setting of known COVID 19 diagnosis.  Patient nontoxic on arrival.  He has no significant abdominal tenderness on exam and I do not feel emergent abdominal imaging is needed at this point in time.  Labs reviewed.  Noted acute on chronic renal insufficiency.  I suspect in part this is more secondary to reported volume losses.  No hyperkalemia noted.  He denies any urinary symptoms.  When patient attempted to ambulate his SPO2 did drop to 90%.  He became progressively fatigued. He was given a dose of IV decadron during his time in the ED; CXR suggests concerns for COVID pneumonia. At this point time I do feel that patient would benefit from continued IV fluids as well as close oxygen monitoring as he is at higher risk for decompensation.    Diagnosis:    ICD-10-CM    1. Acute renal failure superimposed on chronic kidney disease, unspecified CKD stage, unspecified acute renal failure type (H)  N17.9     N18.9    2. 2019 novel coronavirus disease (COVID-19)  U07.1    3. Nausea  vomiting and diarrhea  R11.2     R19.7        Disposition:   The patient is admitted into the care of Dr. Webb.    Scribe Disclosure:  I, Fabio Darling, am serving as a scribe at 1:36 AM on 11/27/2020 to document services personally performed by Renay Shannon DO based on my observations and the provider's statements to me.  [facility] EMERGENCY DEPT         Renay Shannon DO  11/27/20 0447

## 2020-11-27 NOTE — ED NOTES
[facility]  ED Nurse Handoff Report    Taylor Valiente is a 25 year old male   ED Chief complaint: Covid Concern  . ED Diagnosis:   Final diagnoses:   Acute renal failure superimposed on chronic kidney disease, unspecified CKD stage, unspecified acute renal failure type (H)   2019 novel coronavirus disease (COVID-19)   Nausea vomiting and diarrhea     Allergies:   Allergies   Allergen Reactions     No Known Allergies        Code Status: Full Code  Activity level - Baseline/Home:  Independent. Activity Level - Current:   Independent. Lift room needed: No. Bariatric: No   Needed: No   Isolation: Yes. Infection: Not Applicable  COVID r/o and special precautions.     Vital Signs:   Vitals:    11/27/20 0655 11/27/20 0700 11/27/20 0715 11/27/20 0730   BP:  (!) 146/92 (!) 147/95    Pulse:  84 84    Resp:       Temp:       TempSrc:       SpO2: 100% 100% 100% 100%       Cardiac Rhythm:  ,      Pain level:    Patient confused: No. Patient Falls Risk: Yes.   Elimination Status: has not voided since arrival   Patient Report - Initial Complaint: Increasing COVID sx. . Focused Assessment:   03:39 Gastrointestinal Gastrointestinal - Gastrointestinal WDL: .WDL except (n/v, decreased appetite. no sense of taste or smell x6 days) AM      03:39 Respiratory Respiratory - Respiratory WDL: WDL (denies SOB) Breath Sounds: All Fields   Head To Toe Assessment - All Lung Fields Breath Sounds: Anterior:; Posterior:; clear; equal bilaterally  AM     03:38 Musculoskeletal Musculoskeletal - Musculoskeletal WDL: .WDL except  General Mobility: generalized weakness (pt c/o generalized weakness that is increasing since dx with COVID on tuesday. ) LUE Extremity Movement: full active movement of extremity  RUE Extremity Movement: full active movement of extremity  LLE Extremity Movement: full active movement of extremity  RLE Extremity Movement: full active movement of extremity  LLE Weight-Bearing Status: full  weight-bearing  RLE Weight-Bearing Status: full weight-bearing  CMS Intact: Yes  AM     03:36 Neurological Cognitive - Cognitive/Neuro/Behavioral WDL: .WDL except (pt c/o light headedness and dizziness with moving. pt states that he feels unsteady on his feet and had one syncopal episiode the past couple of days. denies hitting his head or any LOC) Level of Consciousness: alert  Arousal Level: opens eyes spontaneously  Orientation: oriented x 4  Follows Commands: yes  Speech: clear; logical; spontaneous  Best Language: 0 - No aphasia  Mood/Behavior: calm; cooperative   Gregory Coma Scale - Best Eye Response: 4-->(E4) spontaneous  Best Motor Response: 6-->(M6) obeys commands  Best Verbal Response: 5-->(V5) oriented  Sims Coma Scale Score: 15          Tests Performed: blood work, ekg, chest xray. Abnormal Results:   Labs Ordered and Resulted from Time of ED Arrival Up to the Time of Departure from the ED   CBC WITH PLATELETS DIFFERENTIAL - Abnormal; Notable for the following components:       Result Value    RBC Count 4.12 (*)     Hemoglobin 11.3 (*)     Hematocrit 35.6 (*)     All other components within normal limits   COMPREHENSIVE METABOLIC PANEL - Abnormal; Notable for the following components:    Chloride 111 (*)     Glucose 120 (*)     Urea Nitrogen 87 (*)     Creatinine 9.43 (*)     GFR Estimate 7 (*)     GFR Estimate If Black 8 (*)     Albumin 3.3 (*)     All other components within normal limits   MAGNESIUM     XR Chest Port 1 View    (Results Pending)     .   Treatments provided: fluids, ice chips  Family Comments: no family present  OBS brochure/video discussed/provided to patient:  Yes  ED Medications:   Medications   0.9% sodium chloride BOLUS (1,000 mLs Intravenous New Bag 11/27/20 0222)   ondansetron (ZOFRAN) injection 4 mg (4 mg Intravenous Given 11/27/20 0222)   famotidine (PEPCID) injection 20 mg (20 mg Intravenous Given 11/27/20 0223)     Drips infusing:  No  For the majority of the shift, the  patient's behavior Green. Interventions performed were frequent rounding.    Sepsis treatment initiated: No     Patient tested for COVID 19 prior to admission: NO. Known COVID +    ED Nurse Name/Phone Number: Carmen Gibson RN,   3:35 AM    RECEIVING UNIT ED HANDOFF REVIEW    Above ED Nurse Handoff Report was reviewed: Yes  Reviewed by: Juana Gonzalez RN on November 27, 2020 at 8:47 AM        Acute depression

## 2020-11-27 NOTE — H&P
[facility]  Hospitalist Admission Note  Name: Taylor Valiente    MRN: [id number]  YOB: 1995    Age: 25 year old  Date of admission: 11/27/2020  Primary care provider: Jer Hooper    Chief Complaint:  vomiting    Assessment and Plan:   Intractable vomiting, COVID-19 pneumonia: Unwell for approximately 1 week.  Positive COVID-19 test on 11/24.  Primarily having fevers, chills, fatigue, decreased appetite.  Occasional cough and minimal shortness of breath.  Past 2-3 days has had intractable vomiting with any food or liquid intake.  Has been tolerating pills.  Endorsing heartburn.  Denies any abdominal pain or diarrhea.  Oxygen saturation 89-90% with ambulation in the ER.  Received 6 mg IV Decadron.  Nausea better with Zofran and Pepcid.  No leukocytosis.  Currently afebrile.  Chest x-ray showing some peripheral infiltrates consistent with COVID-19.  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  Creatinine had improved to 6 more recently.  Now with VANESSA with creatinine 9.4 due to GI losses.  Potassium is 4.2, BUN 87, bicarb 24.  He has not noticed any decrease in urine output or change in urine color.  Received 1 L normal saline in the ER.  Follows with Dr. Hooper from nephrology.  -Consult nephrology  -Continue IV NS at 100 ml/hr  -Repeat BMP time for 10 AM  -Track intake and output  -No NSAIDs  -Continue his p.o. sodium bicarb 650 mg twice daily    HTN: PTA on amlodipine 10 mg daily, carvedilol 25 mg twice daily, clonidine 0.3 mg twice daily, and hydralazine 100 mg 3 times daily that will be resumed.  Systolic blood pressure 120-170 in the ER.    Marijuana use: He denies any use in the past 1 week due to his  symptoms of COVID-19.    HLD: Resume PTA atorvastatin.    Chronic anemia: Secondary to kidney disease.  Hemoglobin at baseline 11.    Obesity: 127 kg.    DVT Prophylaxis: Heparin SQ  Code Status: Full Code  FEN: renal diet as tolerated, NS at 100 ml/hr  Discharge Dispo: Home  Estimated Disch Date / # of Days until Disch: Given worsening renal dysfunction and O2 sats 89% with ambulation in the setting of active COVID-19 and poor p.o. intake admit inpatient.  Anticipate 2-3 night hospitalization.      History of Present Illness:  Taylor Valiente is a 25 year old male with PMH including CKD stage V due to FSGS, HTN, HLD, obesity, anemia, and marijuana use who presents with intractable vomiting.  The patient has felt unwell for approximately 1 week.  He initially felt generally unwell with malaise and occasional cough.  This prompted him to get tested for COVID-19 on 11/24 which is positive.  For the past few days he has had numerous episodes of emesis each day anytime he tries to eat or drink.  He denies any hematemesis.  Is not really having any abdominal pain or significant diarrhea.  He is able to keep his medications down including his blood pressure pills.  He is feeling lightheaded and dizzy upon standing up.  He does not feel overly short of breath and his cough is very mild.  He has had some intermittent fevers.  Denies any headache.  No chest pain.  Has not noticed any decreased urine output or change in urine color.  Has not used marijuana in 1 week due to his symptoms.    History obtained from patient, medical record, and from Dr. Shannon in the emergency department.  Variable blood pressure in the -170 systolic.  Heart rate 84.  Temperature 98.2  F.  Oxygen was down to 89% with ambulation on room air.  WBC 7.4, hemoglobin 1.3, platelet count 159.  Creatinine up to 9.4 from 6.6 last month.  Sodium is 141, K4.2, chloride 111, bicarb 24, BUN 87.  LFTs normal.  Chest x-ray shows some peripheral  infiltrates consistent with COVID-19.  He received 6 mg of IV Decadron.  Also received 1 L normal saline, Zofran, and Pepcid 20 mg.  Nausea is better and he is tolerating water.  Admit inpatient due to relative hypoxia and significant rise in baseline creatinine.     Past Medical History reviewed:  Past Medical History:   Diagnosis Date     Benign essential hypertension 7/28/2017     CKD (chronic kidney disease) stage 3, GFR 30-59 ml/min (H) 7/28/2017    Due to HTN     Focal glomerular sclerosis 7/28/2017    Due to Hypertension injury.     LVH (left ventricular hypertrophy) due to hypertensive disease 7/14/2017     Obesity, unspecified      Past Surgical History reviewed:  Past Surgical History:   Procedure Laterality Date     NO HISTORY OF SURGERY       Social History reviewed:  Social History     Tobacco Use     Smoking status: Never Smoker     Smokeless tobacco: Never Used   Substance Use Topics     Alcohol use: No     Social History     Social History Narrative     Not on file     Family History reviewed:  Family History   Problem Relation Age of Onset     Blood Disease Mother         has hep b     Diabetes Mother         gestionanal diabetes     Hypertension Mother      Obesity Father      Hypertension Father      Hypertension Maternal Grandmother      Diabetes Maternal Grandmother      Hypertension Paternal Grandmother      Hypertension Paternal Grandfather      Coronary Artery Disease Maternal Uncle      Allergies:  Allergies   Allergen Reactions     No Known Allergies      Medications:  Prior to Admission medications    Medication Sig Last Dose Taking? Auth Provider   amLODIPine (NORVASC) 10 MG tablet Take 5 mg by mouth daily   Unknown, Entered By History   aspirin 81 MG EC tablet Take 81 mg by mouth daily   Unknown, Entered By History   atorvastatin (LIPITOR) 10 MG tablet TAKE ONE TABLET BY MOUTH EVERY NIGHT AT BEDTIME   Priyank Lawrence MD   carvedilol (COREG) 25 MG tablet Take 1 tablet (25 mg) by mouth 2 times  daily (with meals)   Priyank Lawrence MD   cloNIDine (CATAPRES) 0.3 MG tablet Take 1 tablet (0.3 mg) by mouth 2 times daily   Martin Melgoza MD   hydrALAZINE (APRESOLINE) 100 MG tablet TAKE ONE TABLET BY MOUTH THREE TIMES A DAY   Priyank aLwrence MD   methocarbamol (ROBAXIN) 500 MG tablet 1-2 tabs q TID PRN for muscle spasm/pain   Brian Roberto, APRN CNP   vitamin D3 (CHOLECALCIFEROL) 2000 units (50 mcg) tablet Take 1 tablet by mouth daily   Unknown, Entered By History     Review of Systems:  A Comprehensive greater than 10 system review of systems was carried out.  Pertinent positives and negatives are noted above.  Otherwise negative.     Physical Exam:  Blood pressure (!) 156/75, pulse 83, temperature 98.2  F (36.8  C), temperature source Oral, resp. rate 20, SpO2 99 %.  Wt Readings from Last 1 Encounters:   09/29/20 127 kg (280 lb)     Exam:  Constitutional: Awake, NAD   Eyes: sclera white   HEENT: atraumatic, MMM  Respiratory: On nasal cannula, no tachypnea, no focal crackles or wheeze  Cardiovascular: RRR.  No murmur   GI: Obese, non-tender, not distended, bowel sounds present  Skin: Dry skin to the lower extremities.  No rash  Musculoskeletal/extremities: Trace bilateral lower extremity edema  Neurologic: A&O, speech clear, strength and light touch sensation grossly normal  Psychiatric: calm, cooperative, normal affect    Lab and imaging data personally reviewed:  Labs:  Recent Labs   Lab 11/27/20 0221   WBC 7.4   HGB 11.3*   HCT 35.6*   MCV 86        Recent Labs   Lab 11/27/20 0221      POTASSIUM 4.2   CHLORIDE 111*   CO2 24   ANIONGAP 6   *   BUN 87*   CR 9.43*   GFRESTIMATED 7*   GFRESTBLACK 8*   BUBBA 8.5   MAG 2.2   PROTTOTAL 7.4   ALBUMIN 3.3*   BILITOTAL 0.3   ALKPHOS 63   AST 25   ALT 34     COVID-19 PCR from 11/24/2020 is positive      Imaging:  Recent Results (from the past 24 hour(s))   XR Chest Port 1 View    Narrative    EXAM: XR CHEST PORT 1 VW  LOCATION: [facility]  Services  DATE/TIME: 11/27/2020 3:24 AM    INDICATION: Cough.  COMPARISON: None.      Impression    IMPRESSION: Cardiomediastinal silhouette is within normal limits. Peripheral infiltrates. Findings can be seen with COVID pneumonia. No vascular congestion or pleural effusion.       Gideon Pinedo MD  Hospitalist  [facility]

## 2020-11-27 NOTE — PROGRESS NOTES
Patient was admitted by colleague, Dr. Pinedo, this morning.  I reviewed the chart and spoke with the patient briefly.  He has known Covid (diagnosed 11/24/2020).  He presented with 2 or 3 days of nausea, vomiting, and diarrhea.  He had some associated lightheadedness.  Emergency department evaluation showed acute on chronic renal failure with creatinine of 9.4.  Of note, patient has chronic kidney disease stage IV or V.  Follows with Dr. Deal of [facility] and pursuing renal transplant.  I agree with plan to advance diet as tolerated, hydrate with normal saline, avoid nephrotoxins, consult nephrology, and follow daily basic metabolic panel.  I will enter a full note tomorrow.

## 2020-11-28 LAB
ANION GAP SERPL CALCULATED.3IONS-SCNC: 7 MMOL/L (ref 3–14)
BASOPHILS # BLD AUTO: 0 10E9/L (ref 0–0.2)
BASOPHILS NFR BLD AUTO: 0.2 %
BUN SERPL-MCNC: 81 MG/DL (ref 7–30)
CALCIUM SERPL-MCNC: 7.8 MG/DL (ref 8.5–10.1)
CHLORIDE SERPL-SCNC: 113 MMOL/L (ref 94–109)
CO2 SERPL-SCNC: 20 MMOL/L (ref 20–32)
CREAT SERPL-MCNC: 7.58 MG/DL (ref 0.66–1.25)
CRP SERPL-MCNC: 7.4 MG/L (ref 0–8)
D DIMER PPP FEU-MCNC: 0.9 UG/ML FEU (ref 0–0.5)
DIFFERENTIAL METHOD BLD: ABNORMAL
EOSINOPHIL # BLD AUTO: 0 10E9/L (ref 0–0.7)
EOSINOPHIL NFR BLD AUTO: 0 %
ERYTHROCYTE [DISTWIDTH] IN BLOOD BY AUTOMATED COUNT: 13.2 % (ref 10–15)
GFR SERPL CREATININE-BSD FRML MDRD: 9 ML/MIN/{1.73_M2}
GLUCOSE SERPL-MCNC: 115 MG/DL (ref 70–99)
HCT VFR BLD AUTO: 32.1 % (ref 40–53)
HGB BLD-MCNC: 10.2 G/DL (ref 13.3–17.7)
IMM GRANULOCYTES # BLD: 0 10E9/L (ref 0–0.4)
IMM GRANULOCYTES NFR BLD: 0.6 %
LDH SERPL L TO P-CCNC: 268 U/L (ref 85–227)
LYMPHOCYTES # BLD AUTO: 1.2 10E9/L (ref 0.8–5.3)
LYMPHOCYTES NFR BLD AUTO: 18.6 %
MCH RBC QN AUTO: 27.6 PG (ref 26.5–33)
MCHC RBC AUTO-ENTMCNC: 31.8 G/DL (ref 31.5–36.5)
MCV RBC AUTO: 87 FL (ref 78–100)
MONOCYTES # BLD AUTO: 0.9 10E9/L (ref 0–1.3)
MONOCYTES NFR BLD AUTO: 14.4 %
NEUTROPHILS # BLD AUTO: 4.1 10E9/L (ref 1.6–8.3)
NEUTROPHILS NFR BLD AUTO: 66.2 %
NRBC # BLD AUTO: 0 10*3/UL
NRBC BLD AUTO-RTO: 0 /100
PLATELET # BLD AUTO: 138 10E9/L (ref 150–450)
POTASSIUM SERPL-SCNC: 4.4 MMOL/L (ref 3.4–5.3)
RBC # BLD AUTO: 3.69 10E12/L (ref 4.4–5.9)
SODIUM SERPL-SCNC: 140 MMOL/L (ref 133–144)
TROPONIN I SERPL-MCNC: <0.015 UG/L (ref 0–0.04)
WBC # BLD AUTO: 6.3 10E9/L (ref 4–11)

## 2020-11-28 PROCEDURE — 250N000013 HC RX MED GY IP 250 OP 250 PS 637: Performed by: INTERNAL MEDICINE

## 2020-11-28 PROCEDURE — 258N000003 HC RX IP 258 OP 636: Performed by: INTERNAL MEDICINE

## 2020-11-28 PROCEDURE — 120N000004 HC R&B MS OVERFLOW

## 2020-11-28 PROCEDURE — 99232 SBSQ HOSP IP/OBS MODERATE 35: CPT | Performed by: INTERNAL MEDICINE

## 2020-11-28 PROCEDURE — 80048 BASIC METABOLIC PNL TOTAL CA: CPT | Performed by: INTERNAL MEDICINE

## 2020-11-28 PROCEDURE — 86140 C-REACTIVE PROTEIN: CPT | Performed by: INTERNAL MEDICINE

## 2020-11-28 PROCEDURE — 85379 FIBRIN DEGRADATION QUANT: CPT | Performed by: INTERNAL MEDICINE

## 2020-11-28 PROCEDURE — 36415 COLL VENOUS BLD VENIPUNCTURE: CPT | Performed by: INTERNAL MEDICINE

## 2020-11-28 PROCEDURE — 83615 LACTATE (LD) (LDH) ENZYME: CPT | Performed by: INTERNAL MEDICINE

## 2020-11-28 PROCEDURE — 85025 COMPLETE CBC W/AUTO DIFF WBC: CPT | Performed by: INTERNAL MEDICINE

## 2020-11-28 PROCEDURE — 84484 ASSAY OF TROPONIN QUANT: CPT | Performed by: INTERNAL MEDICINE

## 2020-11-28 PROCEDURE — 250N000011 HC RX IP 250 OP 636: Performed by: INTERNAL MEDICINE

## 2020-11-28 RX ADMIN — ASPIRIN 81 MG: 81 TABLET, COATED ORAL at 08:40

## 2020-11-28 RX ADMIN — CARVEDILOL 50 MG: 25 TABLET, FILM COATED ORAL at 08:39

## 2020-11-28 RX ADMIN — HEPARIN SODIUM 5000 UNITS: 10000 INJECTION, SOLUTION INTRAVENOUS; SUBCUTANEOUS at 06:38

## 2020-11-28 RX ADMIN — HYDRALAZINE HYDROCHLORIDE 100 MG: 50 TABLET, FILM COATED ORAL at 13:16

## 2020-11-28 RX ADMIN — HYDRALAZINE HYDROCHLORIDE 100 MG: 50 TABLET, FILM COATED ORAL at 21:12

## 2020-11-28 RX ADMIN — HEPARIN SODIUM 5000 UNITS: 10000 INJECTION, SOLUTION INTRAVENOUS; SUBCUTANEOUS at 21:12

## 2020-11-28 RX ADMIN — CLONIDINE HYDROCHLORIDE 0.3 MG: 0.1 TABLET ORAL at 08:40

## 2020-11-28 RX ADMIN — SODIUM BICARBONATE 650 MG TABLET 1300 MG: at 08:40

## 2020-11-28 RX ADMIN — HYDRALAZINE HYDROCHLORIDE 100 MG: 50 TABLET, FILM COATED ORAL at 08:40

## 2020-11-28 RX ADMIN — DEXAMETHASONE SODIUM PHOSPHATE 6 MG: 4 INJECTION, SOLUTION INTRAMUSCULAR; INTRAVENOUS at 08:41

## 2020-11-28 RX ADMIN — PANTOPRAZOLE SODIUM 40 MG: 40 TABLET, DELAYED RELEASE ORAL at 06:38

## 2020-11-28 RX ADMIN — SODIUM BICARBONATE 650 MG TABLET 1300 MG: at 21:11

## 2020-11-28 RX ADMIN — Medication 50 MCG: at 08:40

## 2020-11-28 RX ADMIN — SODIUM CHLORIDE: 9 INJECTION, SOLUTION INTRAVENOUS at 04:34

## 2020-11-28 RX ADMIN — ATORVASTATIN CALCIUM 10 MG: 10 TABLET ORAL at 21:12

## 2020-11-28 RX ADMIN — AMLODIPINE BESYLATE 10 MG: 10 TABLET ORAL at 08:40

## 2020-11-28 RX ADMIN — CLONIDINE HYDROCHLORIDE 0.3 MG: 0.1 TABLET ORAL at 21:11

## 2020-11-28 RX ADMIN — CARVEDILOL 50 MG: 25 TABLET, FILM COATED ORAL at 17:31

## 2020-11-28 RX ADMIN — HEPARIN SODIUM 5000 UNITS: 10000 INJECTION, SOLUTION INTRAVENOUS; SUBCUTANEOUS at 13:17

## 2020-11-28 NOTE — PLAN OF CARE
BP (!) 154/92 (BP Location: Left arm)   Pulse 79   Temp 95.7  F (35.4  C) (Oral)   Resp 20   Wt 119.6 kg (263 lb 9.6 oz)   SpO2 95%   BMI 35.75 kg/m       Neuro: WDL  Cardiac: WDL  Lungs: diminished  GI: WDL  : WDL  Pain: Denies  IV: NS at 100 mL/hr.   Meds: subcutaneous Heparin, IV Decadron  Labs/tests: COVID + 11/24,  Cr 9.43  Diet: Renal  Activity: Ind  Plan: IVFs, supportive cares. Recheck labs. Nephrology following.

## 2020-11-28 NOTE — PROGRESS NOTES
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 He had felt unwell with malaise and cough for a few days so had COVID-19 testing done on 11/24/20 that came back positive. He developed intractable nausea and vomiting and was unable to eat so he came to the ED on 11/27/2020 for evaluation.  ED work up showed variable blood pressure in the range of 120-170 systolic.  Heart rate was 84.  Temperature was 98.2  F.  Oxygen saturation was 89% with ambulation on room air.  Laboratory evaluation showed: WBC 7.4, hemoglobin 11.3, [id number], BUN 87, creatinine 9.4 (up from 6.6 last month), sodium 141, potassium 4.2, chloride 111, bicarb 24, and normal LFTs.  Chest x-ray showed some peripheral infiltrates consistent with COVID-19.  He received 6 mg of IV Decadron, 1 L normal saline, Zofran, and Pepcid 20 mg. He was admitted to the hospital for ongoing care with IVF hydration, Nephrology consult, and symptom management.    Problem List:     COVID-19 pneumonia with borderline hypoxia  COVID-19 gastroenteritis with nausea and vomiting  - continue supportive care with antiemetics as needed and oxygen if needed  - continue Protonix for heartburn  - continue dexamethasone until discharge  - does not meet criteria for remdesivir- also, remdesivir would be contraindicated with his kidney disease  - will need to complete his quarantine on discharge  - subcutaneous heparin ordered for DVT prophylaxis    Acute kidney injury on chronic kidney disease, stage 5  Due to dehydration  - renal function is improving  - continue NS at 100 ml/hr  - repeat BMP in am  - nephrology is following (appreciated)  - renal diet ordererd    Hypertension  - continue prior to  admission amlodipine 10 mg daily, carvedilol 25 mg bid, clonidine 0.3 mg bid, and hydralazine 100 mg tid    Hypercholesterolemia  - continue prior to admission atorvastatin    Cannabis use  - denies in the last week    Obesity  - BMI 35.75    DVT Prophylaxis:  -  subcutaneous heparin  Code Status: Full Code  Discharge Dispo: Home  Estimated Disch Date / # of Days until Discharge: hopefully tomorrow if renal function continues to improve        Interval History:      Feeling better today. Nausea is better, tolerating oral intake better.                  Physical Exam:      Last Vital Signs:  /68 (BP Location: Left arm)   Pulse 72   Temp 96.8  F (36  C) (Oral)   Resp 18   Wt 119.6 kg (263 lb 9.6 oz)   SpO2 98%   BMI 35.75 kg/m      Intake/Output Summary (Last 24 hours) at 11/28/2020 1529  Last data filed at 11/28/2020 1320  Gross per 24 hour   Intake 3881 ml   Output 1700 ml   Net 2181 ml       GENERAL:  Comfortable. Cooperative.  PSYCH: pleasant, oriented, No acute distress.  EYES: PERRLA, Normal conjunctiva.  HEART:  Regular rate and rhythm. No JVD. Pulses normal. No edema.  LUNGS:  Clear to auscultation, normal Respiratory effort.  ABDOMEN:  Soft, no hepatosplenomegaly, normal bowel sounds.  EXTREMETIES: No clubbing, cyanosis or ischemia  SKIN:  Dry to touch, No rash.           Medications:      All current medications were reviewed.         Data:      All new lab and imaging data was reviewed.   Labs:       Lab Results   Component Value Date     11/28/2020     11/27/2020     11/27/2020    Lab Results   Component Value Date    CHLORIDE 113 11/28/2020    CHLORIDE 114 11/27/2020    CHLORIDE 111 11/27/2020    Lab Results   Component Value Date    BUN 81 11/28/2020    BUN 83 11/27/2020    BUN 87 11/27/2020      Lab Results   Component Value Date    POTASSIUM 4.4 11/28/2020    POTASSIUM 4.5 11/27/2020    POTASSIUM 4.2 11/27/2020    Lab Results   Component Value Date    CO2 20 11/28/2020     CO2 17 11/27/2020    CO2 24 11/27/2020    Lab Results   Component Value Date    CR 7.58 11/28/2020    CR 8.49 11/27/2020    CR 9.43 11/27/2020        Recent Labs   Lab 11/28/20  0606 11/27/20  0221   WBC 6.3 7.4   HGB 10.2* 11.3*   HCT 32.1* 35.6*   MCV 87 86   * 159      Imaging:   Recent Results (from the past 48 hour(s))   XR Chest Port 1 View    Narrative    EXAM: XR CHEST PORT 1 VW  LOCATION: [facility]  DATE/TIME: 11/27/2020 3:24 AM    INDICATION: Cough.  COMPARISON: None.      Impression    IMPRESSION: Cardiomediastinal silhouette is within normal limits. Peripheral infiltrates. Findings can be seen with COVID pneumonia. No vascular congestion or pleural effusion.

## 2020-11-28 NOTE — PROGRESS NOTES
Nephrology Progress Note  11/28/2020         Assessment & Recommendations:   1 advanced CKD 5-baseline creatinine 6.5-7.  Plan to do PD when times for dialysis comes.  2 COVID-19 infection-on dexamethasone.  No respiratory issues.  3 nausea and vomiting-possibly related to COVID-19 infection.  Likely not uremia.  Improving.  4 elevated creatinine-in setting of hypovolemia with nausea and vomiting.  Improving with IV fluid.  Good oral intake.  5 hypertension-blood pressure slightly high in setting of IV fluid and dexamethasone.  Continue oral antihypertensive.  -Decrease IV fluid to 75 cc an hour.   -Okay to discharge in the morning if able to tolerate oral intake and kidney function stable.  Follow-up with nephrology closely within 2 weeks after discharge.  Recommendations were communicated to primary team in person.    Senia Barrett MD  [facility] Nephrology   [phone]      Interval History :   Feels well.  No acute issues overnight.  GI symptoms has resolved.  No nausea vomiting.  Tolerated food this morning.  Continues on IV fluid.  Creatinine is improving and close to baseline now.  Urine volume of 1.1 L.  Electrolytes are fine.  Afebrile.  Blood pressure slightly on the higher side.  No chest pain or shortness of breath.      Physical Exam:   I/O last 3 completed shifts:  In: 3901 [P.O.:1730; I.V.:2171]  Out: 1150 [Urine:1150]  Detailed examination was deferred secondary to Covid positive status.    Labs:   All labs reviewed by me  Electrolytes/Renal -   Recent Labs   Lab Test 11/28/20  0606 11/27/20  1025 11/27/20  0221 10/07/20  1329 10/07/20  1329 10/04/19  0344 10/04/19  0344 02/23/18  1335 02/23/18  1335 01/12/18  1400 07/14/17  1140 07/14/17  1140    140 141  --  140   < >  --    < > 139 138   < > 138   POTASSIUM 4.4 4.5 4.2   < > 4.7   < >  --    < > 4.5 4.4   < > 3.0*   CHLORIDE 113* 114* 111*  --  115*   < >  --    < > 108 106   < > 105   CO2 20 17* 24  --  18*   < >  --    < > 22  21   < > 27   BUN 81* 83* 87*  --  97*   < >  --    < > 40* 33*   < > 33*   CR 7.58* 8.49* 9.43*   < > 7.24*   < >  --    < > 1.90* 1.92*   < > 2.63*   * 141* 120*   < > 101*   < >  --    < > 93 82   < > 121*   BUBBA 7.8* 8.0* 8.5  --  9.0   < >  --    < > 9.2 8.9   < > 8.9   MAG  --   --  2.2  --   --   --  1.9  --   --   --   --  2.3   PHOS  --   --   --   --  5.3*  --   --   --  3.7 3.7   < >  --     < > = values in this interval not displayed.       CBC -   Recent Labs   Lab Test 11/28/20  0606 11/27/20  0221 09/10/20  0654   WBC 6.3 7.4 10.8   HGB 10.2* 11.3* 11.6*   * 159 194       LFTs -   Recent Labs   Lab Test 11/27/20  0221 10/07/20  1329 09/10/20  0654 10/04/19  0148   ALKPHOS 63  --  94 78   BILITOTAL 0.3  --  0.5 0.2   ALT 34  --  16 43   AST 25  --  11 19   PROTTOTAL 7.4  --  6.0* 6.5*   ALBUMIN 3.3* 3.3* 2.4* 2.8*       Iron Panel -   Recent Labs   Lab Test 09/11/20  0634 07/15/17  0635   IRON 33* 38   IRONSAT 18 15   CARI 325 341         Current Medications:    amLODIPine  10 mg Oral Daily     aspirin  81 mg Oral Daily     atorvastatin  10 mg Oral QPM     carvedilol  50 mg Oral BID w/meals     cloNIDine  0.3 mg Oral BID     dexamethasone  6 mg Intravenous Daily     heparin ANTICOAGULANT  5,000 Units Subcutaneous Q8H ARNOL     hydrALAZINE  100 mg Oral TID     pantoprazole  40 mg Oral QAM AC     sodium bicarbonate  1,300 mg Oral BID     sodium chloride (PF)  3 mL Intracatheter Q8H     vitamin D3  50 mcg Oral Daily       - MEDICATION INSTRUCTIONS -       sodium chloride 100 mL/hr at 11/28/20 0434     Senia Barrett MD

## 2020-11-28 NOTE — PLAN OF CARE
Pt alert and oriented x4. He denies pain. COVID pos, special precautions. Denies nausea/vomiting today. No diarrhea on day shift. Renal diet - tolerating well. NS @ 100 ml/hr. Up indep in room. Possible discharge tomorrow. Nephrology following. Recheck labs in am. Will cont supportive cares.     /68 (BP Location: Left arm)   Pulse 72   Temp 96.8  F (36  C) (Oral)   Resp 18   Wt 119.6 kg (263 lb 9.6 oz)   SpO2 98%   BMI 35.75 kg/m

## 2020-11-29 VITALS
WEIGHT: 263.6 LBS | DIASTOLIC BLOOD PRESSURE: 102 MMHG | HEART RATE: 63 BPM | SYSTOLIC BLOOD PRESSURE: 143 MMHG | BODY MASS INDEX: 35.75 KG/M2 | TEMPERATURE: 96.3 F | RESPIRATION RATE: 18 BRPM | OXYGEN SATURATION: 98 %

## 2020-11-29 LAB
ANION GAP SERPL CALCULATED.3IONS-SCNC: 6 MMOL/L (ref 3–14)
BASOPHILS # BLD AUTO: 0 10E9/L (ref 0–0.2)
BASOPHILS NFR BLD AUTO: 0.2 %
BUN SERPL-MCNC: 80 MG/DL (ref 7–30)
CALCIUM SERPL-MCNC: 8.1 MG/DL (ref 8.5–10.1)
CHLORIDE SERPL-SCNC: 113 MMOL/L (ref 94–109)
CO2 SERPL-SCNC: 21 MMOL/L (ref 20–32)
CREAT SERPL-MCNC: 6.93 MG/DL (ref 0.66–1.25)
CRP SERPL-MCNC: 7.9 MG/L (ref 0–8)
D DIMER PPP FEU-MCNC: 0.7 UG/ML FEU (ref 0–0.5)
DIFFERENTIAL METHOD BLD: ABNORMAL
EOSINOPHIL # BLD AUTO: 0 10E9/L (ref 0–0.7)
EOSINOPHIL NFR BLD AUTO: 0 %
ERYTHROCYTE [DISTWIDTH] IN BLOOD BY AUTOMATED COUNT: 13.2 % (ref 10–15)
GFR SERPL CREATININE-BSD FRML MDRD: 10 ML/MIN/{1.73_M2}
GLUCOSE SERPL-MCNC: 124 MG/DL (ref 70–99)
HCT VFR BLD AUTO: 33.7 % (ref 40–53)
HGB BLD-MCNC: 10.6 G/DL (ref 13.3–17.7)
IMM GRANULOCYTES # BLD: 0.1 10E9/L (ref 0–0.4)
IMM GRANULOCYTES NFR BLD: 2 %
LDH SERPL L TO P-CCNC: 242 U/L (ref 85–227)
LYMPHOCYTES # BLD AUTO: 1.1 10E9/L (ref 0.8–5.3)
LYMPHOCYTES NFR BLD AUTO: 23.4 %
MCH RBC QN AUTO: 26.8 PG (ref 26.5–33)
MCHC RBC AUTO-ENTMCNC: 31.5 G/DL (ref 31.5–36.5)
MCV RBC AUTO: 85 FL (ref 78–100)
MONOCYTES # BLD AUTO: 0.7 10E9/L (ref 0–1.3)
MONOCYTES NFR BLD AUTO: 15.1 %
NEUTROPHILS # BLD AUTO: 2.7 10E9/L (ref 1.6–8.3)
NEUTROPHILS NFR BLD AUTO: 59.3 %
NRBC # BLD AUTO: 0 10*3/UL
NRBC BLD AUTO-RTO: 0 /100
PLATELET # BLD AUTO: 130 10E9/L (ref 150–450)
POTASSIUM SERPL-SCNC: 4.8 MMOL/L (ref 3.4–5.3)
RBC # BLD AUTO: 3.95 10E12/L (ref 4.4–5.9)
SODIUM SERPL-SCNC: 140 MMOL/L (ref 133–144)
WBC # BLD AUTO: 4.6 10E9/L (ref 4–11)

## 2020-11-29 PROCEDURE — 86140 C-REACTIVE PROTEIN: CPT | Performed by: INTERNAL MEDICINE

## 2020-11-29 PROCEDURE — 250N000013 HC RX MED GY IP 250 OP 250 PS 637: Performed by: INTERNAL MEDICINE

## 2020-11-29 PROCEDURE — 85025 COMPLETE CBC W/AUTO DIFF WBC: CPT | Performed by: INTERNAL MEDICINE

## 2020-11-29 PROCEDURE — 80048 BASIC METABOLIC PNL TOTAL CA: CPT | Performed by: INTERNAL MEDICINE

## 2020-11-29 PROCEDURE — 250N000011 HC RX IP 250 OP 636: Performed by: INTERNAL MEDICINE

## 2020-11-29 PROCEDURE — 99239 HOSP IP/OBS DSCHRG MGMT >30: CPT | Performed by: INTERNAL MEDICINE

## 2020-11-29 PROCEDURE — 36415 COLL VENOUS BLD VENIPUNCTURE: CPT | Performed by: INTERNAL MEDICINE

## 2020-11-29 PROCEDURE — 85379 FIBRIN DEGRADATION QUANT: CPT | Performed by: INTERNAL MEDICINE

## 2020-11-29 PROCEDURE — 258N000003 HC RX IP 258 OP 636: Performed by: INTERNAL MEDICINE

## 2020-11-29 PROCEDURE — 83615 LACTATE (LD) (LDH) ENZYME: CPT | Performed by: INTERNAL MEDICINE

## 2020-11-29 RX ORDER — ONDANSETRON 4 MG/1
4 TABLET, ORALLY DISINTEGRATING ORAL EVERY 6 HOURS PRN
Qty: 10 TABLET | Refills: 0 | Status: SHIPPED | OUTPATIENT
Start: 2020-11-29 | End: 2021-01-07

## 2020-11-29 RX ORDER — CARVEDILOL 25 MG/1
50 TABLET ORAL 2 TIMES DAILY WITH MEALS
Qty: 120 TABLET | Refills: 1 | Status: SHIPPED | OUTPATIENT
Start: 2020-11-29 | End: 2021-02-15

## 2020-11-29 RX ORDER — ONDANSETRON 4 MG/1
4 TABLET, ORALLY DISINTEGRATING ORAL EVERY 6 HOURS PRN
Qty: 10 TABLET | Refills: 0 | Status: SHIPPED | OUTPATIENT
Start: 2020-11-29 | End: 2020-11-29

## 2020-11-29 RX ORDER — ACETAMINOPHEN 500 MG
500-1000 TABLET ORAL EVERY 4 HOURS PRN
Start: 2020-11-29 | End: 2021-02-15

## 2020-11-29 RX ADMIN — ASPIRIN 81 MG: 81 TABLET, COATED ORAL at 08:00

## 2020-11-29 RX ADMIN — AMLODIPINE BESYLATE 10 MG: 10 TABLET ORAL at 07:59

## 2020-11-29 RX ADMIN — HYDRALAZINE HYDROCHLORIDE 100 MG: 50 TABLET, FILM COATED ORAL at 08:00

## 2020-11-29 RX ADMIN — CARVEDILOL 50 MG: 25 TABLET, FILM COATED ORAL at 07:59

## 2020-11-29 RX ADMIN — DEXAMETHASONE SODIUM PHOSPHATE 6 MG: 4 INJECTION, SOLUTION INTRAMUSCULAR; INTRAVENOUS at 08:02

## 2020-11-29 RX ADMIN — CLONIDINE HYDROCHLORIDE 0.3 MG: 0.1 TABLET ORAL at 07:59

## 2020-11-29 RX ADMIN — PANTOPRAZOLE SODIUM 40 MG: 40 TABLET, DELAYED RELEASE ORAL at 06:48

## 2020-11-29 RX ADMIN — Medication 50 MCG: at 08:00

## 2020-11-29 RX ADMIN — SODIUM BICARBONATE 650 MG TABLET 1300 MG: at 07:59

## 2020-11-29 RX ADMIN — SODIUM CHLORIDE: 9 INJECTION, SOLUTION INTRAVENOUS at 01:34

## 2020-11-29 RX ADMIN — HEPARIN SODIUM 5000 UNITS: 10000 INJECTION, SOLUTION INTRAVENOUS; SUBCUTANEOUS at 06:48

## 2020-11-29 NOTE — DISCHARGE SUMMARY
Discharge Summary    Taylor Valiente MRN# [id number]   YOB: 1995 Age: 25 year old     Date of Admission:  11/27/2020  Date of Discharge:  11/29/2020  Admitting Physician:  Gideon Pinedo MD  Discharge Physician:  Mike Tony MD  Discharging Service:  Hospitalist       Primary Provider: Jer Hooper          Discharge Diagnosis:   COVID-19 pneumonia with borderline hypoxia; hypoxia resolved  COVID-19 gastroenteritis with nausea and vomiting  Acute kidney injury on chronic kidney disease, stage 5  Dehydration due to nausea and vomiting (likely cause of acute kidney injury)  Hypertension  Hypercholesterolemia  Cannabis use  Obesity             Discharge Disposition:   Discharged to home           Allergies:   Allergies   Allergen Reactions     No Known Allergies                 Condition on Discharge:   Discharge condition: Stable   Discharge vitals: Blood pressure (!) 143/102, pulse 63, temperature 96.3  F (35.7  C), temperature source Oral, resp. rate 18, weight 119.6 kg (263 lb 9.6 oz), SpO2 98 %.   Code status on discharge: Full Code   Physical exam on day of discharge:   GENERAL:  Comfortable. Cooperative.  PSYCH: pleasant, oriented, No acute distress.  EYES: PERRLA, Normal conjunctiva.  HEART:  Regular rate and rhythm. No JVD. Pulses normal. No edema.  LUNGS:  Clear to auscultation, normal Respiratory effort.  ABDOMEN:  Soft, no hepatosplenomegaly, normal bowel sounds.  EXTREMETIES: No clubbing, cyanosis or ischemia  SKIN:  Dry to touch, No rash.         History of Present Illness and Hospital Course:     See detailed admission note for full details.  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 He had felt unwell with malaise and cough for a few days so had COVID-19 testing done on 11/24/20 that came back positive. He developed intractable nausea and vomiting and was unable to eat so he came to  the Emergency Department on 11/27/2020 for evaluation.  ED work up showed variable blood pressure in the range of 120-170 (systolic).  Heart rate was 84.  Temperature was 98.2  F. Oxygen saturation was 89% with ambulation on room air.  Laboratory evaluation showed: WBC 7.4, hemoglobin 11.3, [id number], BUN 87, creatinine 9.4 (up from 6.6 last month), sodium 141, potassium 4.2, chloride 111, bicarb 24, and normal LFTs.  Chest x-ray showed some peripheral infiltrates consistent with COVID-19.  Taylor received 6 mg of IV Decadron, 1 L normal saline, Zofran, and Pepcid 20 mg. He was admitted to the hospital for ongoing care with IVF hydration, Nephrology consult, and symptom management. He was treated with dexamethasone for COVID, IV fluid for acute kidney injury, and subcutaneous heparin for prophylaxis of thrombotic complications of COVID.  Renal function improved. Nausea and vomiting resolved. Taylor will discharge home today with Zofran for use as needed for nausea, and 30 days of Eliquis for prophylaxis of thrombotic complications of COVID.              Procedures / Imaging:   Chest X ray           Consultations:   Consultation during this admission received from nephrology             Pending Results:   None           Discharge Instructions and Follow-Up:   Discharge diet: Renal   Discharge activity: Activity as tolerated   Discharge follow-up: Follow up with Dr. Deal in 1-2 weeks   Outpatient therapy: None    Other instructions: None             Discharge Medications:   Discharge Medication List as of 11/29/2020 10:23 AM      START taking these medications    Details   acetaminophen (TYLENOL) 500 MG tablet Take 1-2 tablets (500-1,000 mg) by mouth every 4 hours as needed for fever or pain, No Print Out         CONTINUE these medications which have CHANGED    Details   apixaban ANTICOAGULANT (ELIQUIS) 2.5 MG tablet Take 1 tablet (2.5 mg) by mouth 2 times daily, Disp-60 tablet, R-0, E-Prescribe      carvedilol  (COREG) 25 MG tablet Take 2 tablets (50 mg) by mouth 2 times daily (with meals), Disp-120 tablet, R-1, E-Prescribe      ondansetron (ZOFRAN-ODT) 4 MG ODT tab Take 1 tablet (4 mg) by mouth every 6 hours as needed for nausea or vomiting, Disp-10 tablet, R-0, E-Prescribe         CONTINUE these medications which have NOT CHANGED    Details   amLODIPine (NORVASC) 10 MG tablet Take 5 mg by mouth daily, Historical      aspirin 81 MG EC tablet Take 81 mg by mouth daily, Historical      atorvastatin (LIPITOR) 10 MG tablet TAKE ONE TABLET BY MOUTH EVERY NIGHT AT BEDTIME, Disp-90 tablet, R-3, E-Prescribe      cloNIDine (CATAPRES) 0.3 MG tablet Take 1 tablet (0.3 mg) by mouth 2 times daily, Disp-60 tablet, R-1, E-Prescribe      hydrALAZINE (APRESOLINE) 100 MG tablet TAKE ONE TABLET BY MOUTH THREE TIMES A DAY, Disp-360 tablet,R-1, E-PrescribeProfile Rx: patient will contact pharmacy when needed      sodium bicarbonate 650 MG tablet Take 1,300 mg by mouth 2 times daily, Historical      vitamin D3 (CHOLECALCIFEROL) 2000 units (50 mcg) tablet Take 1 tablet by mouth daily, Historical                Total time spent in face to face contact with the patient and coordinating discharge was:  35 Minutes

## 2020-11-29 NOTE — PLAN OF CARE
Pt is alert and oriented x4. He denies pain. Up indep in room. Covid pos - special precautions. Renal diet. NS @ 100 ml/hr. Nephrology following today. VSS. On room air. Hopeful discharge today.    BP (!) 143/102 (BP Location: Left arm)   Pulse 63   Temp 96.3  F (35.7  C) (Oral)   Resp 18   Wt 119.6 kg (263 lb 9.6 oz)   SpO2 98%   BMI 35.75 kg/m

## 2020-11-29 NOTE — PLAN OF CARE
BP (!) 150/98 (BP Location: Left arm)   Pulse 66   Temp 96.3  F (35.7  C) (Oral)   Resp 18   Wt 119.6 kg (263 lb 9.6 oz)   SpO2 97%   BMI 35.75 kg/m       Neuro: WDL  Cardiac: WDL  Lungs: diminished  GI: WDL  : WDL  Pain: Denies  IV: NS at 100 mL/hr.   Meds: subcutaneous Heparin, IV Decadron  Labs/tests: COVID + 11/24,  Cr 7.58  Diet: Renal  Activity: Ind  Plan: IVFs. Recheck labs. Nephrology- Ok to discharge if Cr is stable.

## 2020-11-29 NOTE — PROGRESS NOTES
Nephrology Progress Note  11/29/2020         Assessment & Recommendations:   1 advanced CKD 5-baseline creatinine 6.5-7.  Plan to do PD when times for dialysis comes.  2 COVID-19 infection-on dexamethasone.  No respiratory issues.  3 nausea and vomiting-possibly related to COVID-19 infection.  Likely not uremia.  Improving.  4 elevated creatinine-in setting of hypovolemia with nausea and vomiting.  Improving with IV fluid.  Good oral intake.  5 hypertension-blood pressure slightly high in setting of IV fluid and dexamethasone.  Continue oral antihypertensive.  - discontinue IVF. Monitor BP at home and bring log to nephrology visit.       -Okay to discharge from renal standpoint.  Follow-up with nephrology closely within 2 weeks after discharge.      Senia Barrett MD  [facility] - Nephrology   [phone]      Interval History :   Feels well.  No acute issues overnight.   No nausea vomiting.    Creatinine is down to 6.9.  Electrolytes are fine.  Excellent urine output of 2.4 L.  Afebrile.  Blood pressure  on the higher side.  No chest pain or shortness of breath.      Physical Exam:   I/O last 3 completed shifts:  In: 3654 [P.O.:1460; I.V.:2194]  Out: 2400 [Urine:2400]  Detailed examination was deferred secondary to Covid positive status.    Labs:   All labs reviewed by me  Electrolytes/Renal -   Recent Labs   Lab Test 11/29/20  0627 11/28/20  0606 11/27/20  1025 11/27/20  0221 10/07/20  1329 10/07/20  1329 10/04/19  0344 10/04/19  0344 02/23/18  1335 02/23/18  1335 01/12/18  1400 07/14/17  1140 07/14/17  1140    140 140 141  --  140   < >  --    < > 139 138   < > 138   POTASSIUM 4.8 4.4 4.5 4.2   < > 4.7   < >  --    < > 4.5 4.4   < > 3.0*   CHLORIDE 113* 113* 114* 111*  --  115*   < >  --    < > 108 106   < > 105   CO2 21 20 17* 24  --  18*   < >  --    < > 22 21   < > 27   BUN 80* 81* 83* 87*  --  97*   < >  --    < > 40* 33*   < > 33*   CR 6.93* 7.58* 8.49* 9.43*   < > 7.24*   < >  --    < >  1.90* 1.92*   < > 2.63*   * 115* 141* 120*   < > 101*   < >  --    < > 93 82   < > 121*   BUBBA 8.1* 7.8* 8.0* 8.5  --  9.0   < >  --    < > 9.2 8.9   < > 8.9   MAG  --   --   --  2.2  --   --   --  1.9  --   --   --   --  2.3   PHOS  --   --   --   --   --  5.3*  --   --   --  3.7 3.7   < >  --     < > = values in this interval not displayed.       CBC -   Recent Labs   Lab Test 11/29/20 0627 11/28/20 0606 11/27/20 0221   WBC 4.6 6.3 7.4   HGB 10.6* 10.2* 11.3*   * 138* 159       LFTs -   Recent Labs   Lab Test 11/27/20  0221 10/07/20  1329 09/10/20  0654 10/04/19  0148   ALKPHOS 63  --  94 78   BILITOTAL 0.3  --  0.5 0.2   ALT 34  --  16 43   AST 25  --  11 19   PROTTOTAL 7.4  --  6.0* 6.5*   ALBUMIN 3.3* 3.3* 2.4* 2.8*       Iron Panel -   Recent Labs   Lab Test 09/11/20  0634 07/15/17  0635   IRON 33* 38   IRONSAT 18 15   CARI 325 341         Current Medications:    amLODIPine  10 mg Oral Daily     aspirin  81 mg Oral Daily     atorvastatin  10 mg Oral QPM     carvedilol  50 mg Oral BID w/meals     cloNIDine  0.3 mg Oral BID     dexamethasone  6 mg Intravenous Daily     heparin ANTICOAGULANT  5,000 Units Subcutaneous Q8H ARNOL     hydrALAZINE  100 mg Oral TID     pantoprazole  40 mg Oral QAM AC     sodium bicarbonate  1,300 mg Oral BID     sodium chloride (PF)  3 mL Intracatheter Q8H     vitamin D3  50 mcg Oral Daily       - MEDICATION INSTRUCTIONS -       sodium chloride 100 mL/hr at 11/29/20 0134     Senia Barrett MD

## 2020-11-29 NOTE — PLAN OF CARE
Patient's After Visit Summary was reviewed with patient.   Patient verbalized understanding of After Visit Summary, recommended follow up and was given an opportunity to ask questions.   Discharge medications sent home with patient/family: YES   Discharged with father.    Pt walked off unit. AVS reviewed and questions answered.    OBSERVATION patient END time: 1130 am

## 2020-11-30 ENCOUNTER — TELEPHONE (OUTPATIENT)
Dept: FAMILY MEDICINE | Facility: CLINIC | Age: 25
End: 2020-11-30

## 2020-11-30 NOTE — LETTER
December 2, 2020      Taylor Valiente  [address] [place] [address]              Dear Taylor,    We have been trying to reach you after your recent hospitalization and have been unsuccessful     Please return call, so we can check on your symptoms and assist with scheduling a hospital follow up appointment with Dr. Lawrence           Sincerely,    Sultana Urbina, Registered Nurse, PAL (Patient Advocate Liason) on behalf of Dr. Howard [facility]   [phone]

## 2020-12-01 NOTE — TELEPHONE ENCOUNTER
Attempt #2    LM on  to call this RN back if he should call the main clinic please forward to me [phone]  Faviola Higuera RN

## 2020-12-02 NOTE — TELEPHONE ENCOUNTER
ED / Discharge Outreach Protocol    Patient Contact    Attempt # 3    Was call answered?  No.  Left message on voicemail with information to call me back.    Letter mailed     Sultana Urbina Registered Nurse, PAL (Patient Advocate Liason)   [facility]   [phone]

## 2020-12-15 ENCOUNTER — TRANSFERRED RECORDS (OUTPATIENT)
Dept: HEALTH INFORMATION MANAGEMENT | Facility: CLINIC | Age: 25
End: 2020-12-15

## 2020-12-15 LAB
CREAT SERPL-MCNC: 6.97 MG/DL (ref 0.6–1.35)
GFR SERPL CREATININE-BSD FRML MDRD: 10 ML/MIN/1.73M2
GLUCOSE SERPL-MCNC: 119 MG/DL (ref 65–99)
POTASSIUM SERPL-SCNC: 4.4 MMOL/L (ref 3.5–5.3)

## 2020-12-16 ENCOUNTER — TELEPHONE (OUTPATIENT)
Dept: TRANSPLANT | Facility: CLINIC | Age: 25
End: 2020-12-16

## 2020-12-16 NOTE — TELEPHONE ENCOUNTER
[facility] consultant calls to discuss current status. Informed that [facility] has not been returning our calls and letter was sent for us to contact us. Consultant asked we use a different number as his primary number. Will forward to intake to try patient again.

## 2020-12-21 ENCOUNTER — OFFICE VISIT (OUTPATIENT)
Dept: FAMILY MEDICINE | Facility: CLINIC | Age: 25
End: 2020-12-21
Payer: COMMERCIAL

## 2020-12-21 ENCOUNTER — ANCILLARY PROCEDURE (OUTPATIENT)
Dept: GENERAL RADIOLOGY | Facility: CLINIC | Age: 25
End: 2020-12-21
Attending: FAMILY MEDICINE
Payer: COMMERCIAL

## 2020-12-21 VITALS
TEMPERATURE: 97.7 F | OXYGEN SATURATION: 97 % | HEART RATE: 76 BPM | SYSTOLIC BLOOD PRESSURE: 115 MMHG | WEIGHT: 261 LBS | BODY MASS INDEX: 35.35 KG/M2 | DIASTOLIC BLOOD PRESSURE: 75 MMHG | HEIGHT: 72 IN | RESPIRATION RATE: 15 BRPM

## 2020-12-21 DIAGNOSIS — N18.9 ANEMIA OF CHRONIC RENAL FAILURE: ICD-10-CM

## 2020-12-21 DIAGNOSIS — S93.402A SPRAIN OF LEFT ANKLE, UNSPECIFIED LIGAMENT, INITIAL ENCOUNTER: ICD-10-CM

## 2020-12-21 DIAGNOSIS — D63.1 ANEMIA OF CHRONIC RENAL FAILURE: ICD-10-CM

## 2020-12-21 DIAGNOSIS — S99.912A ANKLE INJURY, LEFT, INITIAL ENCOUNTER: ICD-10-CM

## 2020-12-21 DIAGNOSIS — M95.8 OSTEOCHONDRAL DEFECT OF ANKLE: ICD-10-CM

## 2020-12-21 DIAGNOSIS — S99.912A ANKLE INJURY, LEFT, INITIAL ENCOUNTER: Primary | ICD-10-CM

## 2020-12-21 PROBLEM — I16.0 HYPERTENSIVE URGENCY: Status: RESOLVED | Noted: 2017-07-14 | Resolved: 2020-12-21

## 2020-12-21 PROCEDURE — 99214 OFFICE O/P EST MOD 30 MIN: CPT | Performed by: FAMILY MEDICINE

## 2020-12-21 PROCEDURE — 73610 X-RAY EXAM OF ANKLE: CPT | Mod: LT | Performed by: RADIOLOGY

## 2020-12-21 ASSESSMENT — MIFFLIN-ST. JEOR: SCORE: 2211.89

## 2020-12-21 NOTE — LETTER
December 21, 2020      Taylor Valiente  [address] [place] [address]        To Whom It May Concern:    Taylor Valiente  was seen on 12/21/20. Patient is cleared to return to work on 12/21/20. I advise patient be allowed to take frequent breaks if needed for ankle pain.  For further questions or concerns please let us know.       Sincerely,          Dr. Sundeep MD

## 2020-12-21 NOTE — PATIENT INSTRUCTIONS
Patient Education     Ankle Sprain (Adult)    An ankle sprain is a stretching or tearing of the ligaments that hold the ankle joint together. There are no broken bones.  An ankle sprain is a common injury for both children and adults. It happens when the ankle turns, twists, or rolls in an awkward way. This can be caused by a sports injury. Or it can happen from doing something as simple as stepping on an uneven surface.  Ligaments are made of tough connective tissue. Normally, ligaments stretch a certain amount and then go back to their normal place. A sprain happens when a ligament is forced to stretch more than the normal amount. A severe sprain can actually tear the ligaments. If you have a severe sprain, you may have felt or heard something like a pop when you were injured.  Ankle sprains are given a grade depending on whether they are mild, moderate, or severe:    Grade 1 sprain. A mild sprain with minor stretching and damage to the ligament.    Grade 2 sprain. A moderate sprain where the ligament is partly torn.    Grade 3 sprain. The most severe kind of sprain. The ligament is completely torn.  Most sprains take about 4 to 6 weeks to heal. A severe sprain can take several months to recover.  Your healthcare provider may order X-rays to be sure you don t have a fracture, or broken bone.  The injured area will feel sore. Swelling and pain may make it hard to walk. You may need crutches if walking is painful. Or your provider may have you use a cast boot or air splint. This will depend on the grade of ankle sprain that you have.  Home care    For a Grade 1 sprain, use RICE (rest, ice, compression, and elevation):    Rest your ankle. Don t walk on it.    Ice should be used right away to help control swelling. Place an ice pack over the injured area for 20 minutes. Do this every 3 to 6 hours for the first 24 to 48 hours. Keep using ice packs to ease pain and swelling as needed. To make an ice pack, put ice  cubes in a plastic bag that seals at the top. Wrap the bag in a clean, thin towel or cloth. Never put ice or an ice pack directly on the skin. The ice pack can be put right on the cast, bandage, or splint. As the ice melts, be careful that the cast, bandage, or splint doesn t get wet. If you have a boot, open it to apply an ice pack, unless told otherwise by your provider.    Compression devices help to control swelling. They also keep the ankle from moving and support your injured ankle. These devices include dressings, bandages, and wraps.    Elevate or raise your ankle above the level of your heart when sitting or lying down. This is very important for the first 48 hours.    Follow the RICE guidelines for a Grade 2 sprain. This type of sprain will take longer to heal. Your provider may have you wear a splint, cast, or brace to keep your ankle from moving.     If you have a Grade 3 sprain, you are at risk for long-term ankle instability. In rare cases, surgery may be needed. Your provider may have you wear a short leg cast or a walking boot for 2 to 3 weeks.    After 48 hours, it may be helpful to apply heat for 20 minutes several times a day. You can do this with a heating pad or warm compress. Or you may want to go back and forth between using ice and heat. Never apply heat directly to the skin. Always wrap the heating pad or warm compress in a clean, thin towel or cloth.    You may use over-the-counter pain medicine (NSAIDS or nonsteroidal anti-inflammatory drugs) to control pain, unless another pain medicine was prescribed. Talk with your provider before using these medicines if you have chronic liver or kidney disease, or have ever had a stomach ulcer or gastrointestinal bleeding.    Follow any rehabilitation exercises your provider gives you. These can help you be more flexible and improve your balance and coordination. This is helpful in preventing long-term ankle problems.  Prevention  To help prevent  ankle sprains, it s important to have good strength, balance, and flexibility. Be sure to:    Always warm up before you exercise or do something very active    Be careful when walking or running on uneven or cracked surfaces    Wear shoes that are in good condition and fit well    Listen to your body s signals to slow down when you are in pain or tired  Follow-up care  Any X-rays you had today don t show any broken bones, breaks, or fractures. Sometimes fractures don t show up on the first X-ray. Bruises and sprains can sometimes hurt as much as a fracture. These injuries can take time to heal completely. If your symptoms don t get better or they get worse, talk with your healthcare provider. You may need a repeat X-ray.  Follow up with your healthcare provider, or as advised. Check for any warning signs listed below.  When to seek medical advice  Call your healthcare provider right away if any of these occur:    Fever of 100.4 F (38 C) or higher, or as directed by your healthcare provider    Chills    The injury doesn t seem to be healing    The swelling comes back    The cast or splint has a bad smell    The plaster cast or splint gets wet or soft    The fiberglass cast or splint gets wet and does not dry for 24 hours    The pain or swelling increases, or redness appears    Your toes become cold, blue, numb, or tingly    The skin is discolored (looks blue, purple, or gray), has blisters, or is irritated    You re-injure your ankle  [employer] last reviewed this educational content on 5/1/2018 2000-2020 [employer]. [address] [place] [address], [place] [address]. All rights reserved. This information is not intended as a substitute for professional medical care. Always follow your healthcare professional's instructions.

## 2020-12-21 NOTE — PROGRESS NOTES
Subjective     Taylor Valiente is a 25 year old male who presents to clinic today for the following health issues:    HPI         Musculoskeletal problem/pain  Onset/Duration: Friday   Description  Location: ankle - left  Joint Swelling: YES  Redness: no  Pain: YES  Warmth: no  Intensity:  moderate  Progression of Symptoms:  improving  Accompanying signs and symptoms:   Fevers: no  Numbness/tingling/weakness: no  History  Trauma to the area: YES- slid on water  Recent illness:  Positive covid in nov  Previous similar problem: no  Previous evaluation:  no  Precipitating or alleviating factors:  Aggravating factors include: standing  Therapies tried and outcome: ice, elevation, icy hot     Per patient his foot slid while at work because there was puddle of water. Has noticed pain since then.           Review of Systems   Constitutional, HEENT, cardiovascular, pulmonary, GI, , musculoskeletal, neuro, skin, endocrine and psych systems are negative, except as otherwise noted.      Objective    /75 (BP Location: Right arm, Patient Position: Chair, Cuff Size: Adult Large)   Pulse 76   Temp 97.7  F (36.5  C) (Oral)   Resp 15   Ht 1.829 m (6')   Wt 118.4 kg (261 lb)   SpO2 97%   BMI 35.40 kg/m    Body mass index is 35.4 kg/m .  Physical Exam   GENERAL: healthy, alert and no distress  MS: left ankle- no obvious edema, TTP anterior talofibular and superior extensor retinaculum; antalgic gait, pain with inversion    Results for orders placed or performed in visit on 12/21/20   XR Ankle Left G/E 3 Views     Status: None    Narrative    LEFT ANKLE THREE OR MORE VIEWS  12/21/2020 9:34 AM     HISTORY:  Left ankle injury, initial encounter.    FINDINGS: Mild calcaneal spurring.      Impression    IMPRESSION: There is an osteochondral defect at the medial talar dome.  This could represent an acute osteochondral fracture, versus  osteochondritis dissecans. I suspect a tibiotalar joint effusion.    SILVIO DUMAS MD            Assessment & Plan     Ankle injury, left, initial encounter  - XR Ankle Left G/E 3 Views; Future  - Ankle/Foot Bracing Supplies Order for DME - ONLY FOR DME  - Orthopedic & Spine  Referral; Future    Sprain of left ankle, unspecified ligament, initial encounter  - supportive care discussed   - Ankle/Foot Bracing Supplies Order for DME - ONLY FOR DME  - Orthopedic & Spine  Referral; Future    Osteochondral defect of ankle  - Orthopedic & Spine  Referral; Future        See Patient Instructions    Return if symptoms worsen or fail to improve.    Guerda Urbano MD  [facility]

## 2020-12-22 ENCOUNTER — OFFICE VISIT (OUTPATIENT)
Dept: INFUSION THERAPY | Facility: CLINIC | Age: 25
End: 2020-12-22
Attending: NURSE PRACTITIONER
Payer: COMMERCIAL

## 2020-12-22 ENCOUNTER — TELEPHONE (OUTPATIENT)
Dept: INFUSION THERAPY | Facility: CLINIC | Age: 25
End: 2020-12-22

## 2020-12-22 ENCOUNTER — MYC MEDICAL ADVICE (OUTPATIENT)
Dept: FAMILY MEDICINE | Facility: CLINIC | Age: 25
End: 2020-12-22

## 2020-12-22 ENCOUNTER — HOSPITAL ENCOUNTER (OUTPATIENT)
Facility: CLINIC | Age: 25
Setting detail: SPECIMEN
Discharge: HOME | End: 2020-12-22
Attending: NURSE PRACTITIONER | Admitting: PHYSICIAN ASSISTANT
Payer: COMMERCIAL

## 2020-12-22 DIAGNOSIS — D63.1 ANEMIA OF CHRONIC RENAL FAILURE: Primary | ICD-10-CM

## 2020-12-22 DIAGNOSIS — N18.9 ANEMIA OF CHRONIC RENAL FAILURE: Primary | ICD-10-CM

## 2020-12-22 DIAGNOSIS — N18.5 CKD (CHRONIC KIDNEY DISEASE) STAGE 5, GFR LESS THAN 15 ML/MIN (H): ICD-10-CM

## 2020-12-22 LAB
HCT VFR BLD AUTO: 29.8 % (ref 40–53)
HGB BLD-MCNC: 9.2 G/DL (ref 13.3–17.7)

## 2020-12-22 PROCEDURE — 36415 COLL VENOUS BLD VENIPUNCTURE: CPT

## 2020-12-22 PROCEDURE — 85018 HEMOGLOBIN: CPT | Performed by: PHYSICIAN ASSISTANT

## 2020-12-22 PROCEDURE — 85014 HEMATOCRIT: CPT | Performed by: PHYSICIAN ASSISTANT

## 2020-12-22 NOTE — TELEPHONE ENCOUNTER
This RN called patient and spoke with his mother to alert patient that he will need to come in to the clinic tomorrow for Aranesp as his Hgb is 9.2.  Patient's mother verbalized understanding and will pass the message on to the patient.

## 2020-12-22 NOTE — PROGRESS NOTES
Medical Assistant Note:  Taylor Valiente presents today for lab draw.    Patient seen by provider today: No.   present during visit today: Not Applicable.    Concerns: No Concerns.    Procedure:  Lab draw site: RAC, Needle type: Butterfly, Gauge: 23.    Post Assessment:  Labs drawn without difficulty: Yes.    Discharge Plan:  Departure Mode: Ambulatory.    Face to Face Time: 4 min.    Shari Schoenberger, CMA

## 2020-12-23 ENCOUNTER — OFFICE VISIT (OUTPATIENT)
Dept: ORTHOPEDICS | Facility: CLINIC | Age: 25
End: 2020-12-23
Payer: COMMERCIAL

## 2020-12-23 ENCOUNTER — INFUSION THERAPY VISIT (OUTPATIENT)
Dept: INFUSION THERAPY | Facility: CLINIC | Age: 25
End: 2020-12-23
Attending: NURSE PRACTITIONER
Payer: COMMERCIAL

## 2020-12-23 VITALS
DIASTOLIC BLOOD PRESSURE: 82 MMHG | SYSTOLIC BLOOD PRESSURE: 139 MMHG | HEART RATE: 98 BPM | TEMPERATURE: 98.2 F | RESPIRATION RATE: 18 BRPM

## 2020-12-23 VITALS
BODY MASS INDEX: 35.35 KG/M2 | SYSTOLIC BLOOD PRESSURE: 147 MMHG | WEIGHT: 261 LBS | HEIGHT: 72 IN | DIASTOLIC BLOOD PRESSURE: 92 MMHG

## 2020-12-23 DIAGNOSIS — D63.1 ANEMIA OF CHRONIC RENAL FAILURE: Primary | ICD-10-CM

## 2020-12-23 DIAGNOSIS — U07.1 2019 NOVEL CORONAVIRUS DISEASE (COVID-19): ICD-10-CM

## 2020-12-23 DIAGNOSIS — N18.5 CKD (CHRONIC KIDNEY DISEASE) STAGE 5, GFR LESS THAN 15 ML/MIN (H): ICD-10-CM

## 2020-12-23 DIAGNOSIS — M95.8 OSTEOCHONDRAL DEFECT OF ANKLE: ICD-10-CM

## 2020-12-23 DIAGNOSIS — S93.402A SPRAIN OF LEFT ANKLE, UNSPECIFIED LIGAMENT, INITIAL ENCOUNTER: ICD-10-CM

## 2020-12-23 DIAGNOSIS — N18.9 ANEMIA OF CHRONIC RENAL FAILURE: Primary | ICD-10-CM

## 2020-12-23 DIAGNOSIS — S99.912A ANKLE INJURY, LEFT, INITIAL ENCOUNTER: ICD-10-CM

## 2020-12-23 PROCEDURE — 99203 OFFICE O/P NEW LOW 30 MIN: CPT | Performed by: FAMILY MEDICINE

## 2020-12-23 PROCEDURE — 250N000011 HC RX IP 250 OP 636: Performed by: PHYSICIAN ASSISTANT

## 2020-12-23 PROCEDURE — 96372 THER/PROPH/DIAG INJ SC/IM: CPT | Performed by: PHYSICIAN ASSISTANT

## 2020-12-23 RX ADMIN — DARBEPOETIN ALFA 60 MCG: 60 INJECTION, SOLUTION INTRAVENOUS; SUBCUTANEOUS at 15:48

## 2020-12-23 ASSESSMENT — MIFFLIN-ST. JEOR: SCORE: 2211.89

## 2020-12-23 NOTE — LETTER
12/23/2020         RE: Taylor Valiente  [address] [place] [address]        Dear Colleague,    Thank you for referring your patient, Taylor Valiente, to the [facility] [place]. Please see a copy of my visit note below.    [facility]   Clinic Visit s Dec 23, 2020    PCP: Jer Hooper    ASSESSMENT/PLAN    ICD-10-CM    1. Ankle injury, left, initial encounter  S99.912A Orthopedic & Spine  Referral   2. Sprain of left ankle, unspecified ligament, initial encounter  S93.402A Orthopedic & Spine  Referral     MR Ankle Left w/o Contrast     Ankle/Foot Bracing Supplies Order for DME - ONLY FOR DME   3. Osteochondral defect of ankle  M95.8 Orthopedic & Spine  Referral     MR Ankle Left w/o Contrast     Orthopedic & Spine  Referral     Ankle/Foot Bracing Supplies Order for DME - ONLY FOR DME       Osteochondral defect on the medial talar dome represents fairly significant injury with potential long-term consequences.  MRI required to evaluate for stability of the osteochondral defect and potential surgical options.  Given a walking boot for protection and stability, and will have him follow-up after the MRI with foot and ankle surgery for definitive management.  Given note to be off work for the next 1 to 2 weeks until he can be seen, due to the need to wear a walking boot which is prohibitive in the .      Today's Visit:  Taylor is a 25 year old male who is seen in consultation at the request of Dr. Urbano for   Chief Complaint   Patient presents with     Left Ankle - Pain     Injury: Patient describes injury as he slid in a puddle at work on 12/18/2020, last Friday. He does not think he rolled or sprained his ankle.     Location of Pain: left ankle, anterior ankle across top of ankle joint, nonradiating   Duration of Pain: acute, 5 day(s),   Rating of Pain at worst: 9/10  Rating  of Pain Currently: 6/10  Pain is better with: nothing   Pain is worse with: walking, driving (drives manual car)  Treatment so far consists of: IcyHot, elevation, brace and ice  Associated symptoms: no distal numbness or tingling; denies swelling or warmth and swelling Mild  Recent imaging completed: X-rays completed 12/21/2020.  Prior History of related problems: denies    Social History: is employed at [employer] at [employer].       Past Medical History:   Diagnosis Date     Anemia      Benign essential hypertension 07/28/2017     CKD (chronic kidney disease) stage 5, GFR less than 15 ml/min (H)     FSGS     Focal glomerular sclerosis 07/28/2017    Due to Hypertension injury.     HLD (hyperlipidemia)      LVH (left ventricular hypertrophy) due to hypertensive disease 07/14/2017     Obesity, unspecified        Patient Active Problem List    Diagnosis Date Noted     Anemia of chronic renal failure 12/21/2020     Priority: Medium     Nausea vomiting and diarrhea 11/27/2020     Priority: Medium     Acute renal failure superimposed on chronic kidney disease, unspecified CKD stage, unspecified acute renal failure type (H) 11/27/2020     Priority: Medium     2019 novel coronavirus disease (COVID-19) 11/27/2020     Priority: Medium     ARF (acute renal failure) (H) 09/09/2020     Priority: Medium     CKD (chronic kidney disease) stage 5, GFR less than 15 ml/min (H) 07/28/2017     Priority: Medium     Due to HTN       Focal glomerular sclerosis 07/28/2017     Priority: Medium     Due to Hypertension injury.       Benign essential hypertension 07/28/2017     Priority: Medium     Acute kidney injury (H) 07/15/2017     Priority: Medium     Morbid obesity (H) 07/14/2017     Priority: Medium     LVH (left ventricular hypertrophy) due to hypertensive disease 07/14/2017     Priority: Medium     Malignant hypertension 07/14/2017     Priority: Medium       Family History   Problem Relation Age of Onset     Blood  Disease Mother         has hep b     Diabetes Mother         gestionanal diabetes     Hypertension Mother      Obesity Father      Hypertension Father      Hypertension Maternal Grandmother      Diabetes Maternal Grandmother      Hypertension Paternal Grandmother      Hypertension Paternal Grandfather      Coronary Artery Disease Maternal Uncle        Social History     Socioeconomic History     Marital status: Single     Spouse name: Not on file     Number of children: Not on file     Years of education: Not on file     Highest education level: Not on file   Occupational History     Occupation:    Social Needs     Financial resource strain: Not on file     Food insecurity     Worry: Not on file     Inability: Not on file     Transportation needs     Medical: Not on file     Non-medical: Not on file   Tobacco Use     Smoking status: Never Smoker     Smokeless tobacco: Never Used   Substance and Sexual Activity     Alcohol use: No     Drug use: Yes     Types: Marijuana     Comment: occ     Sexual activity: Not Currently     Partners: Female   Lifestyle     Physical activity     Days per week: Not on file     Minutes per session: Not on file     Stress: Not on file   Relationships     Social connections     Talks on phone: Not on file     Gets together: Not on file     Attends Jewish service: Not on file     Active member of club or organization: Not on file     Attends meetings of clubs or organizations: Not on file     Relationship status: Not on file     Intimate partner violence     Fear of current or ex partner: Not on file     Emotionally abused: Not on file     Physically abused: Not on file     Forced sexual activity: Not on file   Other Topics Concern     Not on file   Social History Narrative     Not on file       Past Surgical History:   Procedure Laterality Date     NO HISTORY OF SURGERY               Review of Systems   Musculoskeletal: Positive for joint pain.   All other systems  reviewed and are negative.        Physical Exam  BP (!) 147/92   Ht 1.829 m (6')   Wt 118.4 kg (261 lb)   BMI 35.40 kg/m    Constitutional:well-developed, well-nourished, and in no distress.   Cardiovascular: Intact distal pulses.    Neurological: alert. Gait Abnormal:   Antalgic gait  Skin: Skin is warm and dry.   Psychiatric: Mood and affect normal.   Respiratory: unlabored, speaks in full sentences  Lymph: no LAD, no lymphangitis          Left Ankle Exam     Tenderness   The patient is experiencing tenderness in the ATF (Also tibiotalar joint line medially, anteriorly and anterolaterally).   Swelling: mild    Range of Motion   Dorsiflexion: normal   Plantar flexion:  20 abnormal   Eversion: normal   Inversion: normal     Muscle Strength   Dorsiflexion:  5/5   Plantar flexion:  5/5   Anterior tibial:  5/5   Posterior tibial:  5/5  Gastrocsoleus:  5/5  Peroneal muscle:  5/5    Tests   Anterior drawer: negative  Varus tilt: negative    Other   Erythema: absent  Scars: absent  Sensation: normal  Pulse: present            X-ray images Previously done and independently reviewed by me in the office today with the patient. X-ray shows:   LEFT ANKLE THREE OR MORE VIEWS  12/21/2020 9:34 AM      HISTORY:  Left ankle injury, initial encounter.     FINDINGS: Mild calcaneal spurring.                                                                      IMPRESSION: There is an osteochondral defect at the medial talar dome.  This could represent an acute osteochondral fracture, versus  osteochondritis dissecans. I suspect a tibiotalar joint effusion.     SILVIO DUMAS MD      Again, thank you for allowing me to participate in the care of your patient.        Sincerely,        Tommie Levine MD

## 2020-12-23 NOTE — PROGRESS NOTES
Infusion Nursing Note:  Taylor Yousifmannie presents today for [facility].    Patient seen by provider today: No   present during visit today: Not Applicable.    Note: N/A.    Intravenous Access:  No Intravenous access/labs at this visit.    Treatment Conditions:  Lab Results   Component Value Date    HGB 9.2 12/22/2020     Lab Results   Component Value Date    WBC 4.6 11/29/2020      Lab Results   Component Value Date    ANEU 2.7 11/29/2020     Lab Results   Component Value Date     11/29/2020      Results reviewed, labs MET treatment parameters, ok to proceed with treatment.      Post Infusion Assessment:  Patient tolerated injection without incident.       Discharge Plan:   Discharge instructions reviewed with: Patient.  Patient and/or family verbalized understanding of discharge instructions and all questions answered.  Patient discharged in stable condition accompanied by: self.  Departure Mode: Ambulatory.    Ana Herrera RN

## 2020-12-23 NOTE — PROGRESS NOTES
[facility]   Clinic Visit s Dec 23, 2020    PCP: Jer Hooper    ASSESSMENT/PLAN    ICD-10-CM    1. Ankle injury, left, initial encounter  S99.912A Orthopedic & Spine  Referral   2. Sprain of left ankle, unspecified ligament, initial encounter  S93.402A Orthopedic & Spine  Referral     MR Ankle Left w/o Contrast     Ankle/Foot Bracing Supplies Order for DME - ONLY FOR DME   3. Osteochondral defect of ankle  M95.8 Orthopedic & Spine  Referral     MR Ankle Left w/o Contrast     Orthopedic & Spine  Referral     Ankle/Foot Bracing Supplies Order for DME - ONLY FOR DME       Osteochondral defect on the medial talar dome represents fairly significant injury with potential long-term consequences.  MRI required to evaluate for stability of the osteochondral defect and potential surgical options.  Given a walking boot for protection and stability, and will have him follow-up after the MRI with foot and ankle surgery for definitive management.  Given note to be off work for the next 1 to 2 weeks until he can be seen, due to the need to wear a walking boot which is prohibitive in the .      Today's Visit:  Taylor is a 25 year old male who is seen in consultation at the request of Dr. Urbano for   Chief Complaint   Patient presents with     Left Ankle - Pain     Injury: Patient describes injury as he slid in a puddle at work on 12/18/2020, last Friday. He does not think he rolled or sprained his ankle.     Location of Pain: left ankle, anterior ankle across top of ankle joint, nonradiating   Duration of Pain: acute, 5 day(s),   Rating of Pain at worst: 9/10  Rating of Pain Currently: 6/10  Pain is better with: nothing   Pain is worse with: walking, driving (drives manual car)  Treatment so far consists of: IcyHot, elevation, brace and ice  Associated symptoms: no distal numbness or tingling; denies swelling or warmth and swelling  Mild  Recent imaging completed: X-rays completed 12/21/2020.  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       Past Medical History:   Diagnosis Date     Anemia      Benign essential hypertension 07/28/2017     CKD (chronic kidney disease) stage 5, GFR less than 15 ml/min (H)     FSGS     Focal glomerular sclerosis 07/28/2017    Due to Hypertension injury.     HLD (hyperlipidemia)      LVH (left ventricular hypertrophy) due to hypertensive disease 07/14/2017     Obesity, unspecified        Patient Active Problem List    Diagnosis Date Noted     Anemia of chronic renal failure 12/21/2020     Priority: Medium     Nausea vomiting and diarrhea 11/27/2020     Priority: Medium     Acute renal failure superimposed on chronic kidney disease, unspecified CKD stage, unspecified acute renal failure type (H) 11/27/2020     Priority: Medium     2019 novel coronavirus disease (COVID-19) 11/27/2020     Priority: Medium     ARF (acute renal failure) (H) 09/09/2020     Priority: Medium     CKD (chronic kidney disease) stage 5, GFR less than 15 ml/min (H) 07/28/2017     Priority: Medium     Due to HTN       Focal glomerular sclerosis 07/28/2017     Priority: Medium     Due to Hypertension injury.       Benign essential hypertension 07/28/2017     Priority: Medium     Acute kidney injury (H) 07/15/2017     Priority: Medium     Morbid obesity (H) 07/14/2017     Priority: Medium     LVH (left ventricular hypertrophy) due to hypertensive disease 07/14/2017     Priority: Medium     Malignant hypertension 07/14/2017     Priority: Medium       Family History   Problem Relation Age of Onset     Blood Disease Mother         has hep b     Diabetes Mother         gestionanal diabetes     Hypertension Mother      Obesity Father      Hypertension Father      Hypertension Maternal Grandmother      Diabetes Maternal Grandmother      Hypertension Paternal Grandmother       Hypertension Paternal Grandfather      Coronary Artery Disease Maternal Uncle        Social History     Socioeconomic History     Marital status: Single     Spouse name: Not on file     Number of children: Not on file     Years of education: Not on file     Highest education level: Not on file   Occupational History     Occupation:    Social Needs     Financial resource strain: Not on file     Food insecurity     Worry: Not on file     Inability: Not on file     Transportation needs     Medical: Not on file     Non-medical: Not on file   Tobacco Use     Smoking status: Never Smoker     Smokeless tobacco: Never Used   Substance and Sexual Activity     Alcohol use: No     Drug use: Yes     Types: Marijuana     Comment: occ     Sexual activity: Not Currently     Partners: Female   Lifestyle     Physical activity     Days per week: Not on file     Minutes per session: Not on file     Stress: Not on file   Relationships     Social connections     Talks on phone: Not on file     Gets together: Not on file     Attends Muslim service: Not on file     Active member of club or organization: Not on file     Attends meetings of clubs or organizations: Not on file     Relationship status: Not on file     Intimate partner violence     Fear of current or ex partner: Not on file     Emotionally abused: Not on file     Physically abused: Not on file     Forced sexual activity: Not on file   Other Topics Concern     Not on file   Social History Narrative     Not on file       Past Surgical History:   Procedure Laterality Date     NO HISTORY OF SURGERY               Review of Systems   Musculoskeletal: Positive for joint pain.   All other systems reviewed and are negative.        Physical Exam  BP (!) 147/92   Ht 1.829 m (6')   Wt 118.4 kg (261 lb)   BMI 35.40 kg/m    Constitutional:well-developed, well-nourished, and in no distress.   Cardiovascular: Intact distal pulses.    Neurological: alert. Gait  Abnormal:   Antalgic gait  Skin: Skin is warm and dry.   Psychiatric: Mood and affect normal.   Respiratory: unlabored, speaks in full sentences  Lymph: no LAD, no lymphangitis          Left Ankle Exam     Tenderness   The patient is experiencing tenderness in the ATF (Also tibiotalar joint line medially, anteriorly and anterolaterally).   Swelling: mild    Range of Motion   Dorsiflexion: normal   Plantar flexion:  20 abnormal   Eversion: normal   Inversion: normal     Muscle Strength   Dorsiflexion:  5/5   Plantar flexion:  5/5   Anterior tibial:  5/5   Posterior tibial:  5/5  Gastrocsoleus:  5/5  Peroneal muscle:  5/5    Tests   Anterior drawer: negative  Varus tilt: negative    Other   Erythema: absent  Scars: absent  Sensation: normal  Pulse: present            X-ray images Previously done and independently reviewed by me in the office today with the patient. X-ray shows:   LEFT ANKLE THREE OR MORE VIEWS  12/21/2020 9:34 AM      HISTORY:  Left ankle injury, initial encounter.     FINDINGS: Mild calcaneal spurring.                                                                      IMPRESSION: There is an osteochondral defect at the medial talar dome.  This could represent an acute osteochondral fracture, versus  osteochondritis dissecans. I suspect a tibiotalar joint effusion.     SILVIO DUMAS MD

## 2020-12-23 NOTE — LETTER
[facility] [address] [address]  Phone: [phone]  Fax: [phone]    12/23/20    Taylor Valiente  [address] [place] [address]      To whom it may concern:     Taylor has an ankle injury. He will need to be off work for the next 1-2 weeks, while awaiting MRI and a surgical consultation.    Sincerely,      Tommie Levine MD

## 2020-12-24 NOTE — TELEPHONE ENCOUNTER
Routing refill request to provider for review/approval because:  Labs out of range:  Platelets, creatinine    CBC RESULTS:   Recent Labs   Lab Test 12/22/20  1528 11/29/20  0627   WBC  --  4.6   RBC  --  3.95*   HGB 9.2* 10.6*   HCT 29.8* 33.7*   MCV  --  85   MCH  --  26.8   MCHC  --  31.5   RDW  --  13.2   PLT  --  130*     Creatinine   Date Value Ref Range Status   11/29/2020 6.93 (H) 0.66 - 1.25 mg/dL Final     Lance DUARTE, RN, BSN

## 2020-12-28 VITALS — WEIGHT: 260 LBS | HEIGHT: 72 IN | BODY MASS INDEX: 35.21 KG/M2

## 2020-12-28 ASSESSMENT — MIFFLIN-ST. JEOR: SCORE: 2207.35

## 2020-12-28 NOTE — TELEPHONE ENCOUNTER
PCP: Priyank Lawrence MD   Referring Provider: Dr. Jer Hooper  Referring Diagnosis: CKD Stage 5  Hx Htn    Is patient under the age of 65? y  Is patient diabetic? n  Is patient on insulin? n  Was patient offered a pancreas transplant referral? n    Is patient in a group home/assisted living? n  Does patient have a guardian? n    Referral intake process completed.  Patient is aware that after financial approval is received, medical records will be requested.   Patient confirmed for a callback from transplant coordinator on January 11, 2021. (within 2 weeks)  Tentative evaluation date January 26, 2021. (within 4 weeks)    Confirmed coordinator will discuss evaluation process in more detail at the time of their call.   Patient is aware of the need to arrange age appropriate cancer screening, vaccinations, and dental care.  Reminded patient to complete questionnaire, complete medical records release, and review packet prior to evaluation visit .  Assessed patient for special needs (ie--wheelchair, assistance, guardian, and ):  none   Patient instructed to call [phone] with questions.     Patient gave verbal consent during intake call to obtain medical records and documents outside of [facility]:  yes

## 2020-12-29 ENCOUNTER — HOSPITAL ENCOUNTER (OUTPATIENT)
Dept: MRI IMAGING | Facility: CLINIC | Age: 25
Discharge: HOME | End: 2020-12-29
Attending: FAMILY MEDICINE | Admitting: FAMILY MEDICINE
Payer: COMMERCIAL

## 2020-12-29 DIAGNOSIS — S93.402A SPRAIN OF LEFT ANKLE, UNSPECIFIED LIGAMENT, INITIAL ENCOUNTER: ICD-10-CM

## 2020-12-29 DIAGNOSIS — M95.8 OSTEOCHONDRAL DEFECT OF ANKLE: ICD-10-CM

## 2020-12-29 PROCEDURE — 73721 MRI JNT OF LWR EXTRE W/O DYE: CPT | Mod: 26 | Performed by: RADIOLOGY

## 2020-12-29 PROCEDURE — 73721 MRI JNT OF LWR EXTRE W/O DYE: CPT | Mod: LT

## 2021-01-01 NOTE — ED PROVIDER NOTES
History     Chief Complaint:  Back Pain     The history is provided by the patient.     Taylor Valiente is a 25 year old year old male with a history of CKD, stage 4 and hypertension who presents for evaluation of upper back pain/neck pain, worse on his right side, with some shortness of breath secondary to his pain for the last week. 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 Denies any fall. Worsens with standing and being on his feet but improves with sitting. Patient has a history of back pain but not like this or a history of neck pain. Tried taking Tylenol, found little relief. Cannot take ibuprofen secondary to kidney disease.    Allergies:  No Known Allergies    Medications:   Amlodipine   Aspirin 81 mg  Lipitor  Coreg  Clonidine  Hydralazine    Medical History:   Hypertension  CKD, stage 4  Focal glomerular sclerosis  Obesity     Surgical History   The patient does not have any pertinent past surgical history.    Family History:   Hypertension  Diabetes  Coronary arteriosclerosis  Obesity    Social History:  Smoking status: Negative  Alcohol use: Negative  Drug use: Positive  Marital Status: Single  Presents to the ED alone.   PCP: Jer Hooper    Review of Systems   Constitutional: Negative for chills and fever.   Respiratory: Positive for shortness of breath (secondary to pain\). Negative for cough.    Musculoskeletal: Positive for back pain and neck pain.   Neurological: Negative for weakness and numbness.   All other systems reviewed and are negative.      Physical Exam     Patient Vitals for the past 24 hrs:   BP Temp Temp src Pulse Resp SpO2 Weight   09/29/20 1542 (!) 149/96 98.5  F (36.9  C) Oral 72 18 99 % 127 kg (280 lb)       Physical Exam  Nursing notes reviewed. Vitals reviewed.  General: Alert.  Mild  discomfort . Well kept.  HENT: Normal voice.   Neck: non-tender. Full ROM, no bony deformity, step-off, or crepitus.   Eyes:  Sclera and conjunctiva normal  Pulmonary: Normal respiratory effort. No cough.    Musculoskeletal: Normal gross range of motion of all 4 extremities. No spinal tenderness, deformity, step-off, or crepitus. Mild trapezius/parascapular pain, worse on the right.   Neurological: Alert. Normal speech. Responds appropriately. Normal sensation and strength distally.  Skin: Warm and dry. Normal appearance of visualized exposed skin.  Psych: Affect normal. Normal personal interaction. Good eye contact.    Emergency Department Course   Interventions:  1840 Robaxin 1,000 mg PO  1840 2 Lidocaine Patches Transdermal     Emergency Department Course:  Past medical records, nursing notes, and vitals reviewed.    6:25 PM I performed an exam of the patient as documented above.     Findings and plan explained to the Patient. Patient discharged home with instructions regarding supportive care, medications, and reasons to return. The importance of close follow-up was reviewed. The patient was prescribed Robaxin.     I personally answered all related questions prior to discharge.     Impression & Plan   Medical Decision Making:  Taylor Valiente is a 25 year old male presented today with concerns of back pain.  Unable to manage discomfort at home he presented for evaluation. Exam showed muscular source of discomfort. No focal neurological findings, no red flags for cauda equina, epidural abscess, or meningitis. Imagery not indicated. Advised to use methocarbamol, use ice, and stretch. RX for Methocarbamol given. Follow up with PCP in 5-7 days if no improvement immediately if worsening. Advised to return to ED if develops numbness, weakness, loss of bowel or bladder, or for other concerns.     Diagnosis:    ICD-10-CM    1. Back muscle spasm  M62.830        Disposition:  Discharged to home.    Discharge Medications:  New Prescriptions    METHOCARBAMOL (ROBAXIN) 500 MG TABLET    1-2 tabs q TID PRN for muscle spasm/pain       Scribe  Disclosure:  I, Valerie Ibanez, am serving as a scribe on 9/29/2020 at 6:25 PM to personally document services performed by Brian Roberto APRN* based on my observations and the provider's statements to me.      Brian Roberto APRN CNP  09/29/20 6634     No

## 2021-01-07 ENCOUNTER — OFFICE VISIT (OUTPATIENT)
Dept: PODIATRY | Facility: CLINIC | Age: 26
End: 2021-01-07
Attending: FAMILY MEDICINE
Payer: COMMERCIAL

## 2021-01-07 ENCOUNTER — REFERRAL (OUTPATIENT)
Dept: TRANSPLANT | Facility: CLINIC | Age: 26
End: 2021-01-07

## 2021-01-07 VITALS
BODY MASS INDEX: 34.81 KG/M2 | HEIGHT: 72 IN | SYSTOLIC BLOOD PRESSURE: 124 MMHG | DIASTOLIC BLOOD PRESSURE: 78 MMHG | WEIGHT: 257 LBS

## 2021-01-07 DIAGNOSIS — S99.912A INJURY OF LEFT ANKLE, INITIAL ENCOUNTER: Primary | ICD-10-CM

## 2021-01-07 DIAGNOSIS — M95.8 OSTEOCHONDRAL DEFECT OF ANKLE: ICD-10-CM

## 2021-01-07 PROCEDURE — 99204 OFFICE O/P NEW MOD 45 MIN: CPT | Performed by: PODIATRIST

## 2021-01-07 ASSESSMENT — MIFFLIN-ST. JEOR: SCORE: 2193.74

## 2021-01-07 NOTE — PATIENT INSTRUCTIONS
Thank you for choosing [facility]!    DR. GUAMAN'S CLINIC LOCATIONS     [facility] SCHEDULE SURGERY: [phone]   [address] [place] [address] APPOINTMENTS: [phone]   [place] [address] BILLING QUESTIONS: [phone]  [phone] RADIOLOGY: [phone]       [place]    [phone]    [place] [address]    [phone]  [phone]      Follow up: as needed    Next steps: Referral was placed and they should call you within 2 business days. Use crutches and continue in boot.    Please read through the following handouts and if you have any questions, please feel free to call us or send a Wishpot message!      PRICE THERAPY  Many aches and pains throughout the foot and ankle can be helped with many simple treatments. This is usually described as PRICE Therapy.      P - Protection - often times, inflammation/pain in the lower extremity is not able to improve simply because the areas involved are never allowed to rest. Every step we take can bother the problematic area. Protecting those areas is an important step in the healing process. This may involve a walking cast boot, a special insert/orthotic device, an ankle brace, or simply avoiding barefoot walking.    R - Rest - in addition to protecting the foot/ankle, resting is an important, but often times difficult, treatment option. Getting off your feet when they bother you, and specifically avoiding activities that cause pain/discomfort, are very beneficial to prevent, and treat, foot/ankle pain.      I - Ice - icing regularly can help to decrease inflammation and swelling in the foot, thus decreasing pain. Using an ice pack or a bag of frozen veggies works very well. Ice for 20 minutes multiple times per day as needed.  Do not place the ice directly on the skin as this can cause tissue damage.    C - Compression - using a compression wrap or an ACE wrap can help to decrease swelling, which can  help to decrease pain. Wearing the wraps is generally not needed at night, but they should be worn on a regular basis when you are going to be on your feet for prolonged periods as gravity tends to pull fluids down to your feet/ankles.    E - Elevation - elevating your lower extremities multiple times daily for 15-20 minutes can help to decrease swelling, which works well in decreasing pain levels.    NSAID/Tylenol - Anti-inflammatories like Aleve or ibuprofen, and/or a pain medication, such as Tylenol, can help to improve pain levels and get the issue resolved sooner rather than later. Anyone with liver issues should be careful with Tylenol, and anyone with high blood pressure or heart, stomach or kidney issues should be careful with anti-inflammatories. Please ask if you have questions about these medications, including dosage.    AIRCAST / CAM WALKING BOOT INSTRUCTIONS  - Do NOT drive with CAM walker on. This is due to safety and legal issues.   - Do NOT wear the CAM walker on long car/train rides or on an airplane.  - Remove the CAM walker several times a day and do ankle range of motion (ROM) exercises/wiggle toes.  - It is recommended that a thick-soled shoe be worn on the other foot to offset any created leg length issue.   - The boot does not have to be worn at night.   - There is an increased risk of developing a blood clot with lower extremity immobilization. ROM exercises and knee-high compression (tenso /ACE wrap) is recommended to lower that risk.   - You should seek medical attention if you experience calf swelling and/or pain, chest pain, or shortness of breath.

## 2021-01-07 NOTE — LETTER
1/7/2021         RE: Taylor Valiente  [address]   [place] [address]        Dear Colleague,    Thank you for referring your patient, Taylor Valiente, to the [facility]. Please see a copy of my visit note below.    ASSESSMENT:  Encounter Diagnoses   Name Primary?     Osteochondral defect of left ankle      Injury of left ankle, initial encounter Yes     MEDICAL DECISION MAKING:  This particular type of acute injury is likely to become a chronic problem, with ankle pain and degenerative changes.  I discussed the high risk of future ankle arthrosis with the patient.     I reviewed the pertinent documentation from Dr. Levine as well as test results including the plain films and MRI.  I personally reviewed the images with the patient.     With the patient's permission, I discussed this case with Dr. Moreno of Foot and Ankle Surgery. She questioned if ankle arthroscopy would be useful in this case, at this time.   We both agreed to refer Mr. Valiente, and transfer care, to our colleague, Dr. Nicolás Nelson, for his expert opinion.  We question if and when an OATS type procedure or enbloc bone transfer might be considered (niether of which we do).      Mr. Valiente was agreeable with this plan.      For additional protection, in addition to the tall Aircast, I offered crutches.  He had a difficult time learning how to use them and did not feel safe.  Ultimately this was discontinued and an order for a wheeled knee walkerwas provided.      Disclaimer: This note consists of symbols derived from keyboarding, dictation and/or voice recognition software. As a result, there may be errors in the script that have gone undetected. Please consider this when interpreting information found in this chart.    Kevin Sharp DPM, FACFAS, MS    [facility]      ____________________________________________________________________    HPI:        I was asked by Tommie Levine MD  to evaluate Taylor Valiente in consultation for an osteochondral defect of his left ankle..     Chief Complaint: left ankle problem  Onset of problem: 3 weeks. He said his foot slipped out from under him while at work. He does not remember the mechanism of injury. He was standing on a wet surface.   Pain/ discomfort is described as:  Deep ache  Pain Ratin/10   Frequency:  daily    The pain is exacerbated by walking, driving  Previous treatment: ice, rest, elevation    He initially followed up in family practice and then was evaluated by Dr. Tommie Levine, sports medicine.  XR and MRI consistent with a fairly large osteochondral injury of the medial talar dome.    *  Past Medical History:   Diagnosis Date     Anemia      Benign essential hypertension 2017     CKD (chronic kidney disease) stage 5, GFR less than 15 ml/min (H)     FSGS     Focal glomerular sclerosis 2017    Due to Hypertension injury.     HLD (hyperlipidemia)      LVH (left ventricular hypertrophy) due to hypertensive disease 2017     Obesity, unspecified    *  *  Past Surgical History:   Procedure Laterality Date     BIOPSY      renal- [facility]     NO HISTORY OF SURGERY     *  *  Current Outpatient Medications   Medication Sig Dispense Refill     acetaminophen (TYLENOL) 500 MG tablet Take 1-2 tablets (500-1,000 mg) by mouth every 4 hours as needed for fever or pain       amLODIPine (NORVASC) 10 MG tablet Take 5 mg by mouth daily       apixaban ANTICOAGULANT (ELIQUIS) 2.5 MG tablet Take 1 tablet (2.5 mg) by mouth 2 times daily 180 tablet 1     aspirin 81 MG EC tablet Take 81 mg by mouth daily       atorvastatin (LIPITOR) 10 MG tablet TAKE ONE TABLET BY MOUTH EVERY NIGHT AT BEDTIME 90 tablet 3     carvedilol (COREG) 25 MG tablet Take 2 tablets (50 mg) by mouth 2 times daily (with meals) 120 tablet 1     cloNIDine (CATAPRES) 0.3 MG tablet Take 1 tablet (0.3 mg) by  mouth 2 times daily 60 tablet 1     hydrALAZINE (APRESOLINE) 100 MG tablet TAKE ONE TABLET BY MOUTH THREE TIMES A  tablet 1     sodium bicarbonate 650 MG tablet Take 1,300 mg by mouth 2 times daily       vitamin D3 (CHOLECALCIFEROL) 2000 units (50 mcg) tablet Take 1 tablet by mouth daily           EXAM:    Vitals: /78   Ht 1.829 m (6')   Wt 116.6 kg (257 lb)   BMI 34.86 kg/m    BMI: Body mass index is 34.86 kg/m .    Constitutional:  Taylor Valiente is in no apparent distress, appears well-nourished.  Cooperative with history and physical exam.    Vascular:  Pedal pulses are palpable for both the DP and PT arteries.  CFT < 3 sec.  No edema.      Neuro: Light touch sensation is intact to the L4, L5, S1 distributions  No evidence of weakness, spasticity, or contracture in the lower extremities.     Derm: Normal texture and turgor.  No erythema, ecchymosis, or cyanosis.  No open lesions.     Musculoskeletal:    Lower extremity muscle strength is normal. No gross deformities.  Pain on palpation over the anteromedial left ankle. Painful with sagittal plan range of motion - dorsiflexion. No pain on palpation to the left Achilles.     X-Ray Findings:  I personally reviewed the 12/21/20 ankle images.  Medial talar dome defect.    MRI LEFT ANKLE:  IMPRESSION:  1. Moderate to large size left ankle joint effusion with synovitis.  2. Extensive bone marrow edema within the body of the left ankle talus  with an osteochondral lesion along the superior medial aspect of the  talar dome, measuring 5 mm in transverse dimension and 10 mm in AP  dimension. There is mild subchondral bone collapse. Subtle linear low  signal line adjacent to the osteochondral lesion in the region of the  extensive bone marrow edema, related to the osteochondral lesion  versus a nondisplaced fracture.  3. Mild tendinosis of the distal Achilles tendon with minimal  increased intrasubstance signal. The remaining tendinous and  ligamentous  structures about the ankle are intact.     DEANA LEDESMA MD              Again, thank you for allowing me to participate in the care of your patient.        Sincerely,        Kevin Sharp DPM

## 2021-01-07 NOTE — PROGRESS NOTES
ASSESSMENT:  Encounter Diagnoses   Name Primary?     Osteochondral defect of left ankle      Injury of left ankle, initial encounter Yes     MEDICAL DECISION MAKING:  This particular type of acute injury is likely to become a chronic problem, with ankle pain and degenerative changes.  I discussed the high risk of future ankle arthrosis with the patient.     I reviewed the pertinent documentation from Dr. Levine as well as test results including the plain films and MRI.  I personally reviewed the images with the patient.     With the patient's permission, I discussed this case with Dr. Moreno of Foot and Ankle Surgery. She questioned if ankle arthroscopy would be useful in this case, at this time.   We both agreed to refer Mr. Valiente, and transfer care, to our colleague, Dr. Nicolás Nelson, for his expert opinion.  We question if and when an OATS type procedure or enbloc bone transfer might be considered (niether of which we do).      Mr. Valiente was agreeable with this plan.      For additional protection, in addition to the tall Aircast, I offered crutches.  He had a difficult time learning how to use them and did not feel safe.  Ultimately this was discontinued and an order for a wheeled knee walkerwas provided.      Disclaimer: This note consists of symbols derived from keyboarding, dictation and/or voice recognition software. As a result, there may be errors in the script that have gone undetected. Please consider this when interpreting information found in this chart.    Kevin Sharp DPM, FACFAS, MS    [place] [facility] of Pod[facility]/Foot & Ankle Surgery      ____________________________________________________________________    HPI:       I was asked by Tommie Levine MD  to evaluate Taylor Valiente in consultation for an osteochondral defect of his left ankle..     Chief Complaint: left ankle problem  Onset of problem: 3 weeks. He said his foot slipped out from under him while at work. He  does not remember the mechanism of injury. He was standing on a wet surface.   Pain/ discomfort is described as:  Deep ache  Pain Ratin/10   Frequency:  daily    The pain is exacerbated by walking, driving  Previous treatment: ice, rest, elevation    He initially followed up in family practice and then was evaluated by Dr. Tommie Levine, sports medicine.  XR and MRI consistent with a fairly large osteochondral injury of the medial talar dome.    *  Past Medical History:   Diagnosis Date     Anemia      Benign essential hypertension 2017     CKD (chronic kidney disease) stage 5, GFR less than 15 ml/min (H)     FSGS     Focal glomerular sclerosis 2017    Due to Hypertension injury.     HLD (hyperlipidemia)      LVH (left ventricular hypertrophy) due to hypertensive disease 2017     Obesity, unspecified    *  *  Past Surgical History:   Procedure Laterality Date     BIOPSY  2017    renal- [facility]     NO HISTORY OF SURGERY     *  *  Current Outpatient Medications   Medication Sig Dispense Refill     acetaminophen (TYLENOL) 500 MG tablet Take 1-2 tablets (500-1,000 mg) by mouth every 4 hours as needed for fever or pain       amLODIPine (NORVASC) 10 MG tablet Take 5 mg by mouth daily       apixaban ANTICOAGULANT (ELIQUIS) 2.5 MG tablet Take 1 tablet (2.5 mg) by mouth 2 times daily 180 tablet 1     aspirin 81 MG EC tablet Take 81 mg by mouth daily       atorvastatin (LIPITOR) 10 MG tablet TAKE ONE TABLET BY MOUTH EVERY NIGHT AT BEDTIME 90 tablet 3     carvedilol (COREG) 25 MG tablet Take 2 tablets (50 mg) by mouth 2 times daily (with meals) 120 tablet 1     cloNIDine (CATAPRES) 0.3 MG tablet Take 1 tablet (0.3 mg) by mouth 2 times daily 60 tablet 1     hydrALAZINE (APRESOLINE) 100 MG tablet TAKE ONE TABLET BY MOUTH THREE TIMES A  tablet 1     sodium bicarbonate 650 MG tablet Take 1,300 mg by mouth 2 times daily       vitamin D3 (CHOLECALCIFEROL) 2000 units (50 mcg) tablet Take 1  tablet by mouth daily           EXAM:    Vitals: /78   Ht 1.829 m (6')   Wt 116.6 kg (257 lb)   BMI 34.86 kg/m    BMI: Body mass index is 34.86 kg/m .    Constitutional:  Taylor Valiente is in no apparent distress, appears well-nourished.  Cooperative with history and physical exam.    Vascular:  Pedal pulses are palpable for both the DP and PT arteries.  CFT < 3 sec.  No edema.      Neuro: Light touch sensation is intact to the L4, L5, S1 distributions  No evidence of weakness, spasticity, or contracture in the lower extremities.     Derm: Normal texture and turgor.  No erythema, ecchymosis, or cyanosis.  No open lesions.     Musculoskeletal:    Lower extremity muscle strength is normal. No gross deformities.  Pain on palpation over the anteromedial left ankle. Painful with sagittal plan range of motion - dorsiflexion. No pain on palpation to the left Achilles.     X-Ray Findings:  I personally reviewed the 12/21/20 ankle images.  Medial talar dome defect.    MRI LEFT ANKLE:  IMPRESSION:  1. Moderate to large size left ankle joint effusion with synovitis.  2. Extensive bone marrow edema within the body of the left ankle talus  with an osteochondral lesion along the superior medial aspect of the  talar dome, measuring 5 mm in transverse dimension and 10 mm in AP  dimension. There is mild subchondral bone collapse. Subtle linear low  signal line adjacent to the osteochondral lesion in the region of the  extensive bone marrow edema, related to the osteochondral lesion  versus a nondisplaced fracture.  3. Mild tendinosis of the distal Achilles tendon with minimal  increased intrasubstance signal. The remaining tendinous and  ligamentous structures about the ankle are intact.     DEANA LEDESMA MD

## 2021-01-08 NOTE — TELEPHONE ENCOUNTER
RECORDS RECEIVED FROM: Osteochondral defect of left ankle/XR MRI/Kevin Sharp DPM/P1/ortho con   DATE RECEIVED: Jan 19, 2021     NOTES STATUS DETAILS   OFFICE NOTE from referring provider Internal    OFFICE NOTE from other specialist N/A    DISCHARGE SUMMARY from hospital N/A    DISCHARGE REPORT from the ER N/A    OPERATIVE REPORT N/A    MEDICATION LIST Internal    IMPLANT RECORD/STICKER N/A    LABS     CBC/DIFF N/A    CULTURES N/A    INJECTIONS DONE IN RADIOLOGY N/A    MRI Internal    CT SCAN N/A    XRAYS (IMAGES & REPORTS) Internal    TUMOR     PATHOLOGY  Slides & report N/A    01/08/21   12:18 PM   Complete  Cecilia Hernandez, CMA

## 2021-01-11 NOTE — TELEPHONE ENCOUNTER
Reviewed pt's chart for pre-kidney transplant evaluation planning. Pt lives in [place], [place]. Pt has biopsy-proven hypertensive nephrosclerosis and FSGS (secondary type) completed at [facility] on 7/21/17 . GFR 10 on 12/15/2020. Pt is not yet on dialysis. Pt is not diabetic or on insulin. Other hx includes hypertension.  Lung status: denies smoking. Surgical hx includes: none.  BMI 34.9 on 1/7/2021.  Patient has never had a colonoscopy d/t age.  Dental: Saw dentist last year, encouraged to make an appointment for this year, and 6 months after that.  Pt is not a smoker, does rarely consume alcohol, and endorses marjiuana use; encouraged to cease. Pt is independent w/ ADLs.  Pt lives w/mother, father and sister and seems to have adequate support following transplant. Writer and patient discussed the importance of compliance with medications before and after transplant; patient stated he understood this.    I also introduced [employer] and asked pt to create an account and view pre-kidney transplant videos for review with me following evaluation. Confirmed STD 1/26/2021. Informed pt they will hear from scheduling to arrange the evaluation. Smartset orders entered, chart routed to scheduling pool.      Contacted patient and introduced myself as their Transplant Coordinator, also introduced the role of the Transplant Coordinator in the transplant process.  Explained the purpose of this call including reviewing next steps and answering questions.    Confirmed Referring Provider, Dialysis Center, and Primary Care Physician. Notified patient of the importance of continued communication with referring providers and primary care physicians.    Reviewed components of transplant evaluation process including necessary appointments, tests, and procedures.    Answered questions for patient regarding evaluation, provided my name and contact information and requested they call with any additional questions.    Determined  that patient would like additional information regarding transplant by:     Drop Down choices: Mail, Email, MyChart, Phone Call   Encourage MyChart   Notified patients that they will hear from a Transplant  to schedule evaluation.

## 2021-01-14 ENCOUNTER — TRANSFERRED RECORDS (OUTPATIENT)
Dept: HEALTH INFORMATION MANAGEMENT | Facility: CLINIC | Age: 26
End: 2021-01-14

## 2021-01-15 ENCOUNTER — HEALTH MAINTENANCE LETTER (OUTPATIENT)
Age: 26
End: 2021-01-15

## 2021-01-18 ENCOUNTER — TELEPHONE (OUTPATIENT)
Dept: ONCOLOGY | Facility: CLINIC | Age: 26
End: 2021-01-18

## 2021-01-18 ENCOUNTER — MEDICAL CORRESPONDENCE (OUTPATIENT)
Dept: HEALTH INFORMATION MANAGEMENT | Facility: CLINIC | Age: 26
End: 2021-01-18

## 2021-01-18 ENCOUNTER — MYC MEDICAL ADVICE (OUTPATIENT)
Dept: TRANSPLANT | Facility: CLINIC | Age: 26
End: 2021-01-18

## 2021-01-19 ENCOUNTER — TELEPHONE (OUTPATIENT)
Dept: OTHER | Facility: CLINIC | Age: 26
End: 2021-01-19

## 2021-01-19 ENCOUNTER — HOSPITAL ENCOUNTER (OUTPATIENT)
Dept: ULTRASOUND IMAGING | Facility: CLINIC | Age: 26
Discharge: HOME | End: 2021-01-19
Attending: SURGERY | Admitting: SURGERY
Payer: COMMERCIAL

## 2021-01-19 ENCOUNTER — PRE VISIT (OUTPATIENT)
Dept: ORTHOPEDICS | Facility: CLINIC | Age: 26
End: 2021-01-19

## 2021-01-19 ENCOUNTER — PREP FOR PROCEDURE (OUTPATIENT)
Dept: ORTHOPEDICS | Facility: CLINIC | Age: 26
End: 2021-01-19

## 2021-01-19 ENCOUNTER — OFFICE VISIT (OUTPATIENT)
Dept: ORTHOPEDICS | Facility: CLINIC | Age: 26
End: 2021-01-19
Attending: PODIATRIST
Payer: COMMERCIAL

## 2021-01-19 VITALS — HEIGHT: 72 IN | WEIGHT: 257 LBS | BODY MASS INDEX: 34.81 KG/M2

## 2021-01-19 DIAGNOSIS — N18.5 CKD (CHRONIC KIDNEY DISEASE) STAGE 5, GFR LESS THAN 15 ML/MIN (H): Primary | ICD-10-CM

## 2021-01-19 DIAGNOSIS — M93.20 OD (OSTEOCHONDRITIS DISSECANS): Primary | ICD-10-CM

## 2021-01-19 DIAGNOSIS — Z01.818 PREOP TESTING: ICD-10-CM

## 2021-01-19 DIAGNOSIS — S99.912A INJURY OF LEFT ANKLE, INITIAL ENCOUNTER: ICD-10-CM

## 2021-01-19 DIAGNOSIS — N18.5 CKD (CHRONIC KIDNEY DISEASE) STAGE 5, GFR LESS THAN 15 ML/MIN (H): ICD-10-CM

## 2021-01-19 DIAGNOSIS — M95.8 OSTEOCHONDRAL DEFECT OF ANKLE: ICD-10-CM

## 2021-01-19 PROCEDURE — 99203 OFFICE O/P NEW LOW 30 MIN: CPT | Performed by: ORTHOPAEDIC SURGERY

## 2021-01-19 PROCEDURE — 93970 EXTREMITY STUDY: CPT

## 2021-01-19 ASSESSMENT — MIFFLIN-ST. JEOR: SCORE: 2193.74

## 2021-01-19 NOTE — LETTER
1/19/2021         RE: Taylor Valiente  [id number]   [place] [address]        Dear Colleague,    Thank you for referring your patient, Taylor Valiente, to the [facility] [place]. Please see a copy of my visit note below.    CHIEF COMPLAINT:  Left ankle pain secondary to an injury sustained in mid 12/2020.      HISTORY OF PRESENT ILLNESS:  Mr. Valiente is a 25-year-old male who presents today for evaluation of his left ankle.  The patient reports to have slipped on some water while being at work even though he claims that this is not a workers' compensation injury.  He is very clear about the fact that he did not have any pain in this ankle prior to this injury.  Reports not to have twisted his ankle or to have a sustained a rolling mechanism.  He just had an ankle that slipped forward and eventually he caught himself.        He did well until the night of the injury when he developed pain.  Eventually, he was evaluated with plain x-rays and MRI and he has been diagnosed with an osteochondral defect at the dome of the talus.  The patient has been placed in a CAM Walker and presents today for discussion of treatment options.      The patient reports to be making some progress but still to have a significant amount of pain and discomfort with any degree of ambulation.  Currently, he is unemployed but reports to have a line of work where it would be physical.      PAST MEDICAL HISTORY:  Obesity, hypertension, among others.      PAST SURGICAL HISTORY:  Reviewed today.      DRUG ALLERGIES:  None.      CURRENT MEDICATIONS:  Please refer to encounter form.      PHYSICAL EXAMINATION:  On today's visit, he presents as a pleasant male in no apparent distress with a height of 6 feet and a weight of 257 pounds with a BMI of 34.8.      On today's visit, he presents with a relatively unremarkable exam with full range of motion of the left ankle, hindfoot and midfoot joints.   CMS intact.  Skin intact.  There is no effusion.  Ankle is stable to anterior drawer.      ASSESSMENT:  Left ankle osteochondral defect.      PLAN:  I discussed with the patient and his adult  that at this point the options are to proceed with observation and a course of physical therapy with a corticosteroid injection with the hope that we can bring him back to his baseline versus an arthroscopic debridement.  I discussed with the patient also that it is highly unlikely that the injury that he has sustained has provoked the damage that we can see on the MRI given the fact that he never sustained a rolling injury.      After a lengthy discussion, the patient opted to proceed with an arthroscopic debridement of the left ankle.  I discussed with him the most likely postoperative course and complications which include but are not limited to infection, bleeding, nerve damage, residual pain and stiffness.      All questions were answered.  Patient was pleased with the discussion.  The patient has no activity restrictions until the day of surgery.  Surgery will consist again of a left ankle arthroscopic debridement with microfracture.      TT 30 minutes, CT 20 minutes.       Mirza Nelson MD

## 2021-01-19 NOTE — TELEPHONE ENCOUNTER
Referral received via fax on 1/18/21.    Pt referred to [facility] by Dr. Hooper for AVF creation.    Patient is not yet on dialysis.    Pt needs to be scheduled for bilateral UE vein mapping and in person consult with Vascular Surgery.  Will route to scheduling to coordinate an appointment at next available.    Appt note: Ref by by Dr. Hooper for AVF creation; pt not yet on dialysis.    LEAH WuN, RN-[facility] [place]

## 2021-01-19 NOTE — TELEPHONE ENCOUNTER
Patient scheduled as follows     1/19/2021 US  1/27/2021 Appt with Dr. Moran in [facility]

## 2021-01-19 NOTE — NURSING NOTE
Teaching Flowsheet   Relevant Diagnosis: Left ankle OCD  Teaching Topic: preop Left ankle scope and debridement    Liberty lives with his parents in [place], presently not working due to ankle pain, was working in kitchen at the .  Pt had COVID-19 in November which affected him, he has kidney disease, anemia, hypertension, and is on Eloquios.  He has appt for kidney transplant soon.  Pt will plan for surgery at [place] with Dr Nelson on 2/24/21 and has PAC for preop on 2/9/21.     Person(s) involved in teaching:   Patient and grandfather     Motivation Level:  Asks Questions: Yes  Eager to Learn: Yes  Cooperative: Yes  Receptive (willing/able to accept information): Yes  Any cultural factors/Quaker beliefs that may influence understanding or compliance? No  Comments:      Patient and Family demonstrates understanding of the following:  Reason for the appointment, diagnosis and treatment plan: Yes  Knowledge of proper use of medications and conditions for which they are ordered (with special attention to potential side effects or drug interactions): Yes  Which situations necessitate calling provider and whom to contact: Yes       Teaching Concerns Addressed:   Comments:      Proper use and care of dressings (medical equip, care aids, etc.): Yes  Nutritional needs and diet plan: Yes  Pain management techniques: Yes  Wound Care: Yes  How and/when to access community resources: NA     Instructional Materials Used/Given: preop pkt, antiseptic soap     Time spent with patient: 30 minutes.

## 2021-01-19 NOTE — PROGRESS NOTES
CHIEF COMPLAINT:  Left ankle pain secondary to an injury sustained in mid 12/2020.      HISTORY OF PRESENT ILLNESS:  Mr. Valiente is a 25-year-old male who presents today for evaluation of his left ankle.  The patient reports to have slipped on some water while being at work even though he claims that this is not a workers' compensation injury.  He is very clear about the fact that he did not have any pain in this ankle prior to this injury.  Reports not to have twisted his ankle or to have a sustained a rolling mechanism.  He just had an ankle that slipped forward and eventually he caught himself.        He did well until the night of the injury when he developed pain.  Eventually, he was evaluated with plain x-rays and MRI and he has been diagnosed with an osteochondral defect at the dome of the talus.  The patient has been placed in a CAM Walker and presents today for discussion of treatment options.      The patient reports to be making some progress but still to have a significant amount of pain and discomfort with any degree of ambulation.  Currently, he is unemployed but reports to have a line of work where it would be physical.      PAST MEDICAL HISTORY:  Obesity, hypertension, among others.      PAST SURGICAL HISTORY:  Reviewed today.      DRUG ALLERGIES:  None.      CURRENT MEDICATIONS:  Please refer to encounter form.      PHYSICAL EXAMINATION:  On today's visit, he presents as a pleasant male in no apparent distress with a height of 6 feet and a weight of 257 pounds with a BMI of 34.8.      On today's visit, he presents with a relatively unremarkable exam with full range of motion of the left ankle, hindfoot and midfoot joints.  CMS intact.  Skin intact.  There is no effusion.  Ankle is stable to anterior drawer.      ASSESSMENT:  Left ankle osteochondral defect.      PLAN:  I discussed with the patient and his adult  that at this point the options are to proceed with observation and a course  of physical therapy with a corticosteroid injection with the hope that we can bring him back to his baseline versus an arthroscopic debridement.  I discussed with the patient also that it is highly unlikely that the injury that he has sustained has provoked the damage that we can see on the MRI given the fact that he never sustained a rolling injury.      After a lengthy discussion, the patient opted to proceed with an arthroscopic debridement of the left ankle.  I discussed with him the most likely postoperative course and complications which include but are not limited to infection, bleeding, nerve damage, residual pain and stiffness.      All questions were answered.  Patient was pleased with the discussion.  The patient has no activity restrictions until the day of surgery.  Surgery will consist again of a left ankle arthroscopic debridement with microfracture.      TT 30 minutes, CT 20 minutes.

## 2021-01-19 NOTE — NURSING NOTE
Reason For Visit:   Chief Complaint   Patient presents with     Consult     Left ankle osteochondral defect       Ht 1.829 m (6')   Wt 116.6 kg (257 lb)   BMI 34.86 kg/m      Pain Assessment  Patient Currently in Pain: Denies(patient relates that the pain comes with extended walking)    Ana Cortez, ATC

## 2021-01-24 NOTE — TELEPHONE ENCOUNTER
FUTURE VISIT INFORMATION      SURGERY INFORMATION:    Date: 21    Location:  OR    Surgeon:  Dr. Nelson    Anesthesia Type:  Choice    Procedure: Left ankle arthroscopy and debridement    Consult: 21    RECORDS REQUESTED FROM:       Primary Care Provider: Dr. Hooper    Most recent EKG+ Tracin20    Most recent ECHO: 9.10.20

## 2021-01-25 NOTE — PROGRESS NOTES
Taylor is a 25 year old who is being evaluated via a billable video visit.      How would you like to obtain your AVS? MyChart  If the video visit is dropped, the invitation should be resent by: Text to cell phone: [phone]  Will anyone else be joining your video visit? No      Video Start Time: 9:21 am   Video-Visit Details    Type of service:  Video Visit    Video End Time:10:20 am     Originating Location (pt. Location): Home    Distant Location (provider location):  [facility]     Platform used for Video Visit: [facility]    TRANSPLANT NEPHROLOGY RECIPIENT EVALUATION NOTE    Assessment and Plan:  # Kidney Transplant Evaluation: Patient is a good candidate overall. Benefits of a living donor transplant were discussed.    # CKD likely from long standing HTN and secondary FSGS: with prior antihypertensive medication nonadherence and possible contributing adrenal adenomas. Will complete genetic renal panel as his 2017 kidney biopsy had some features of primary FSGS. Nearing dialysis with a GFR of 10. When ready, he would likely benefit from a kidney transplant.       # Adrenal adenomas: with suspicion for Cushings. He reportedly saw an outside endocrinologist and was told he makes too much cortisol although these records as not available today. Will need endocrine here for continued management.     # Noncompliance, depression: with a suicide attempt in 2019 (this was unknown during nephrology visit, later discovered by social work).  Appreciate social work assessment and recommendations. Will likely need neuropsych, compliance contract and establish with mental health provider.     # Marijuana use: a few times weekly to cope with stress. Provided education regarding risk of post-transplant fungal respiratory infections and interactions with immunosuppression.    # Borderline BMI (34.8)    # COVID-19 positive (11/2020): asymptomatic, still needs two repeat negative PCR's. Since maintained  on Eliquis.     # Cardiac Risk: will need risk assessment given history of stress cardiomyopathy in 2017 since improved as of 9/2020 ECHO.     # Health Maintenance: Dental: Not up to date    Discussed the risks and benefits of a transplant, including the risk of surgery and immunosuppression medications.  Patient's overall evaluation will be discussed in the Transplant Program's regular meeting with a final recommendation on the patients suitability for transplant to be made at that time.    Pending completion of the full evaluation, patient presently appears to be enough of an acceptable kidney transplant recipient candidate to have any potential kidney donors start the evaluation process.  Patient was seen in conjunction with Dr. Fernando Estrada as part of a shared visit.    Evaluation:  Taylor Valiente was seen in consultation at the request of Dr. Kevin Maldonado for evaluation as a potential kidney transplant recipient.    Reason for Visit:  Taylor Valiente is a 25 year old male with CKD likely secondary to long standing hypertension and secondary FSGS , who presents for kidney transplant evaluation.    History of Present Illness:  Taylor Valiente is a 25-year-old gentleman of [place] decent who presents with CKD likely secondary to long standing hypertension and secondary FSGS.  He initially presented the summer of 2017 with VANESSA (creatinine 2.5), hypertensive urgency, nephrotic proteinuria with negative serologic work up and had a kidney biopsy that showed features of chronic hypertensive changes and secondary FSGS, although greater than 80% foot effacement was noted.  He followed with Dr. Hooper every 6 months, but had non-adherence issues with blood pressure medications.  He was hospitalized more recently in 9/2020 with VANESSA on CKD (creatinine in the mid 8s), hypertensive urgency and noted to be noncompliance with antihypertensives. He had a renal ultrasound that showed prominent diffuse increased  parenchymal echogenicity throughout both kidneys, representing a significant change compared to 2017.  Of note, a 5/2019 CT noted two adrenal nodules, and he reportedly saw an outside endocrinologist who told him he makes too much cortisol. He was hospitalized again in 12/2020 with COVID-19 and his GFR dropped to 10. He continues to follow with Dr. Hooper and is being assessed for fistula placement. He lives with his parents and is not working. Using a medbox now with reportedly better compliance. No potential donors at this time.            Kidney Disease Hx:        Kidney Disease Dx: likely secondary to long standing hypertension and secondary FSGS       Biopsy Proven: Yes; 2017         On Dialysis: No       Primary Nephrologist: Dr. Hooper /Jennifer Manzanares CNP       H/o Kidney Stones: No       H/o Recurrent/Frequent UTI: No         Diabetic Hx: None           Cardiac/Vascular Disease Risk Factors:        Cardiac Risk Factors: Hypertension and CKD       Known CAD: No, regional wall abnormalities could no be excluded on 9/2020 ECHO.        Known PAD/Caludication Symptoms: No       Known Heart Failure: HFpEF, EF 55-60% in 9/2020 improved from 40-45% in 2017       Arrhythmia: No       Pulmonary Hypertension: No       Valvular Disease: No       Other: None         Viral Serology Status       CMV IgG Antibody: Unknown       EBV IgG Antibody: Unknown         Volume Status/Weight:        Volume status: Not assessed       Weight:  borderline       BMI: There is no height or weight on file to calculate BMI.         Functional Capacity/Frailty:        Formerly worked as a  and was working out at the gym prior to covid.       Fatigue/Decreased Energy: [] No [x] Yes    Chest Pain or SOB with Exertion: [x] No [] Yes    Significant Weight Change: [] No [x] Yes 40 lbs of loss since September d/t poor appetite and some intention with exercise.    Nausea, Vomiting or Diarrhea: [x] No [] Yes    Fever, Sweats or Chills:  [x] No  [] Yes    Leg Swelling [x] No [] Yes        History of Cancer: None    Other Significant Medical Issues:     #Noncompliance, depression: with a suicide attempt in 2019 (this was unknown during nephrologist vist, later discovered by social work).    #Marijuana use: a few times weekly to cope with with stress.   #Borderline BMI (34.8)  #COVID-19 positive in 11/2020: asymptomatic, no repeat PCR's Still on Eliquis.     Review of Systems:  A comprehensive review of systems was obtained and negative, except as noted in the HPI or PMH.    Past Medical History:   Medical record was reviewed and PMH was discussed with patient and noted below.  Past Medical History:   Diagnosis Date     Adrenal adenoma, left      Anemia      Benign essential hypertension 07/28/2017     CKD (chronic kidney disease) stage 5, GFR less than 15 ml/min (H)     FSGS     Depression      Focal glomerular sclerosis 07/28/2017     HLD (hyperlipidemia)      LVH (left ventricular hypertrophy) due to hypertensive disease 07/14/2017     Noncompliance      Obesity, unspecified      Stress-induced cardiomyopathy      Suicide attempt (H) 2019       Past Social History:   Past Surgical History:   [facility]     [facility]     NO HISTORY OF SURGERY       Personal history of bleeding or anesthesia problems: No    Family History:  Family History   Problem Relation Age of Onset     Blood Disease Mother         has hep b     Diabetes Mother         gestionanal diabetes     Hypertension Mother      Obesity Father      Hypertension Father      Hypertension Maternal Grandmother      Diabetes Maternal Grandmother      Hypertension Paternal Grandmother      Hypertension Paternal Grandfather      Coronary Artery Disease Maternal Uncle      Cancer No family hx of        Personal History:   Social History     Socioeconomic History     Marital status: Single     Spouse name: Not on file     Number of children: Not on file      Years of education: Not on file     Highest education level: Not on file   Occupational History     Occupation:    Social Needs     Financial resource strain: Not on file     Food insecurity     Worry: Not on file     Inability: Not on file     Transportation needs     Medical: Not on file     Non-medical: Not on file   Tobacco Use     Smoking status: Never Smoker     Smokeless tobacco: Never Used   Substance and Sexual Activity     Alcohol use: No     Drug use: Yes     Types: Marijuana     Comment: occ     Sexual activity: Not Currently     Partners: Female   Lifestyle     Physical activity     Days per week: Not on file     Minutes per session: Not on file     Stress: Not on file   Relationships     Social connections     Talks on phone: Not on file     Gets together: Not on file     Attends Voodoo service: Not on file     Active member of club or organization: Not on file     Attends meetings of clubs or organizations: Not on file     Relationship status: Not on file     Intimate partner violence     Fear of current or ex partner: Not on file     Emotionally abused: Not on file     Physically abused: Not on file     Forced sexual activity: Not on file   Other Topics Concern     Parent/sibling w/ CABG, MI or angioplasty before 65F 55M? Not Asked   Social History Narrative     Not on file       Allergies:  Allergies   Allergen Reactions     No Known Allergies        Medications:  Current Outpatient Medications   Medication Sig     acetaminophen (TYLENOL) 500 MG tablet Take 1-2 tablets (500-1,000 mg) by mouth every 4 hours as needed for fever or pain     amLODIPine (NORVASC) 10 MG tablet Take 5 mg by mouth daily     apixaban ANTICOAGULANT (ELIQUIS) 2.5 MG tablet Take 1 tablet (2.5 mg) by mouth 2 times daily     aspirin 81 MG EC tablet Take 81 mg by mouth daily     atorvastatin (LIPITOR) 10 MG tablet TAKE ONE TABLET BY MOUTH EVERY NIGHT AT BEDTIME     carvedilol (COREG) 25 MG tablet Take 2  tablets (50 mg) by mouth 2 times daily (with meals)     cloNIDine (CATAPRES) 0.3 MG tablet Take 1 tablet (0.3 mg) by mouth 2 times daily     hydrALAZINE (APRESOLINE) 100 MG tablet TAKE ONE TABLET BY MOUTH THREE TIMES A DAY     sodium bicarbonate 650 MG tablet Take 1,300 mg by mouth 2 times daily     vitamin D3 (CHOLECALCIFEROL) 2000 units (50 mcg) tablet Take 1 tablet by mouth daily     No current facility-administered medications for this visit.        Vitals:  There were no vitals taken for this visit.    Exam:  GENERAL APPEARANCE: alert and no distress  HENT: no obvious abnormalities on appearance  RESP: breathing appears unremarkable with normal rate, no audible wheezing or cough and no apparent shortness of breath with conversation  MS: extremities normal - no gross deformities noted  SKIN: no apparent rash and normal skin tone  NEURO: speech is clear with no obvious neurological deficits  PSYCH: mentation appears normal and affect normal.      Results:   No results found for this or any previous visit (from the past 336 hour(s)).      Patient was seen by myself, Dr. Fernando Estrada, in conjunction with Denton Talavera NP as part of a shared visit.    I personally reviewed past medical and surgical history, vital signs, medications and labs.  Present and past medical history, along with significant physical exam findings were all reviewed with HENRIQUE.    My key findings:  Taylor Valiente is 25 year old, who presents for Kidney transplant evaluation.    Key management decisions made by me and discussed with HENRIQUE:  1.  Advanced CKD likely on account of longstanding hypertension with secondary FSGS.  2.  Transplant candidacy evaluation.  3.  Adrenal adenomas with suspicion for Cushing.  4.  Stress cardiomyopathy resolved pending formal assessment by cardiology.  5.  Need for psychosocial assessment: Remote historic nonadherence resolved no other identifiable barriers pending assessment.  Discussion:  Mr. Belle is a  delightful 25-year-old gentleman who was found to have hypertensive urgency and acute renal dysfunction on chronic kidney disease back in 2017.  His blood pressure has been somewhat hard to control and complicated by early nonadherence.  His biopsy showed features suggestive of secondary FSGS and longstanding hypertensive changes.  As a child he started battling obesity at age 4 and according to his mother this may had been attributed to food intake although 2 adrenal adenomas were discovered on CT scan.  He was seen by endocrine although notes are not available and was told that his body was secreting more cortisol than it should.  His features and his presenting chemistries with hypokalemia and element of alkalosis are in keeping with this suspicion.  Although, the exact mechanism is not fully understood.  It may be reasonable to send a genetic panel to help decipher the etiology of the renal failure and in the meantime patient will be referred to endocrine to comment on adrenal adenomas and any work-up as needed prior to transplantation.  We discussed the importance of securing live donor if possible.  Otherwise I feel that Yoshi is a reasonable candidate for kidney transplantation.  The case will be discussed during our multidisciplinary meeting for final candidacy decision on behalf of [facility] transplant center I would like to thank Dr. Jer Hooper for allowing us to participate in the care of Mr. Valiente

## 2021-01-26 ENCOUNTER — ALLIED HEALTH/NURSE VISIT (OUTPATIENT)
Dept: TRANSPLANT | Facility: CLINIC | Age: 26
End: 2021-01-26
Attending: PHYSICIAN ASSISTANT
Payer: COMMERCIAL

## 2021-01-26 ENCOUNTER — TELEPHONE (OUTPATIENT)
Dept: TRANSPLANT | Facility: CLINIC | Age: 26
End: 2021-01-26

## 2021-01-26 ENCOUNTER — DOCUMENTATION ONLY (OUTPATIENT)
Dept: TRANSPLANT | Facility: CLINIC | Age: 26
End: 2021-01-26

## 2021-01-26 DIAGNOSIS — I10 ESSENTIAL HYPERTENSION: ICD-10-CM

## 2021-01-26 DIAGNOSIS — I51.81 STRESS-INDUCED CARDIOMYOPATHY: ICD-10-CM

## 2021-01-26 DIAGNOSIS — F32.5 MAJOR DEPRESSIVE DISORDER IN FULL REMISSION, UNSPECIFIED WHETHER RECURRENT (H): ICD-10-CM

## 2021-01-26 DIAGNOSIS — N18.5 CHRONIC KIDNEY DISEASE, STAGE 5, KIDNEY FAILURE (H): ICD-10-CM

## 2021-01-26 DIAGNOSIS — I10 HYPERTENSION: ICD-10-CM

## 2021-01-26 DIAGNOSIS — N18.5 CHRONIC RENAL FAILURE, STAGE 5 (H): ICD-10-CM

## 2021-01-26 DIAGNOSIS — N18.5 CKD (CHRONIC KIDNEY DISEASE) STAGE 5, GFR LESS THAN 15 ML/MIN (H): Primary | ICD-10-CM

## 2021-01-26 DIAGNOSIS — N05.1 FOCAL GLOMERULAR SCLEROSIS: ICD-10-CM

## 2021-01-26 DIAGNOSIS — Z91.148 NONCOMPLIANCE WITH MEDICATION REGIMEN: ICD-10-CM

## 2021-01-26 DIAGNOSIS — Z76.82 ORGAN TRANSPLANT CANDIDATE: ICD-10-CM

## 2021-01-26 DIAGNOSIS — Z01.818 PRE-TRANSPLANT EVALUATION FOR KIDNEY TRANSPLANT: ICD-10-CM

## 2021-01-26 DIAGNOSIS — Z91.199 NONCOMPLIANCE: ICD-10-CM

## 2021-01-26 DIAGNOSIS — I15.0 RENOVASCULAR HYPERTENSION: ICD-10-CM

## 2021-01-26 DIAGNOSIS — D35.02 ADRENAL ADENOMA, LEFT: ICD-10-CM

## 2021-01-26 PROCEDURE — 99205 OFFICE O/P NEW HI 60 MIN: CPT | Mod: 95

## 2021-01-26 PROCEDURE — 99213 OFFICE O/P EST LOW 20 MIN: CPT | Mod: 95

## 2021-01-26 NOTE — PROGRESS NOTES
[facility] Solid Organ Transplant  Outpatient MNT: Kidney Transplant Evaluation    Current BMI: 34.9 (HT 72 in,  lbs/117 kg)- data from 1/19  BMI is within recommendation of <35 for kidney transplant      Time Spent: 15 minutes  Visit Type: Initial   Referring Physician: Erica   Pt accompanied by: his mom     History of previous txp: none   Dialysis: no     Nutrition Assessment  Follows low Na, low K, low phos. Low Na/K going well. Has list of high phos foods to limit. Pt or his mom cook at home; very little added salt.     Appetite: reduced from baseline, but still okay     Vitamins, Supplements, Pertinent Meds: vit D, iron, calcium   Herbal Medicines/Supplements: none     Edema: none     Weight hx: lost 40 lbs since September intentionally (diet changes, increased activity)    Food Security Survey  Question 1.  In the last 12 months: We worried food would run out before we had money to buy more. Sometimes True- starting to be more of a concern for pt    Question 2.  In the last 12 months: The food we bought just didn't last and we didn't have money to buy more. Never True    Did the patient answer Sometimes True or Often True to EITHER Question 1 or Question 2? YES      Question 3.  Would you be interested in receiving a call with additional resources? RD will provide resources in AVS    Additional points of discussion: transportation, access to grocery stores-->no concerns    Unemployed now x 1 month  Ankle surgery soon, expected to be out of work x a few months    Diet Recall  Breakfast Boiled egg    Lunch None, combines with dinner    Dinner McChicken; turkey burger; pork stir mae with veggies    Snacks Fruit (pineapple, apples); was eating fruit cups   Beverages Tea with half and half, water, diet cran juice, occ Sprite zero   Alcohol None    Dining out None currently      Physical Activity  Lifts weights at the gym 1-2x/week (60 minutes)  Otherwise limited with ankle     Labs  12/15 K 4.4  1/14 K  4.4, Phos 5.6 with off/on high levels for phos     Nutrition Diagnosis  No nutrition diagnosis identified at this time     Nutrition Intervention  Nutrition education provided:  Discussed sodium intake (low sodium foods and drinks, seasoning food without salt and tips for low sodium diet).  Reviewed wnl K levels, with off and on high phos levels. Reviewed that his occasional fast food or processed food could be contributing to this level. Otherwise, no overtly high phos foods detected via diet recall.     Reviewed current BMI and goal for transplant; pt likely will continue losing weight.     Reviewed post txp diet guidelines in brief (will review in further detail post txp):  (1) Review of proper food safety measures d/t immunosuppressant therapy post-op and increased risk for food-borne illness    (2) Avoid the following post txp d/t risk for rejection, unknown effects on the organs, and/or potential interactions with immunosuppressants:  - Herbal, Chinese, holistic, chiropractic, natural, alternative medicines and supplements  - Detoxes and cleanses  - Weight loss pills  - Protein powders or other products with extracts or herbs (ie green tea extract)    (3) Med regimen and possible side effects    Patient Understanding: Pt verbalized understanding of education provided.  Expected Engagement: Good  Follow-Up Plans: PRN     Nutrition Goals  No nutrition goals identified at this time     Ankita Armando, RD, LD, CCTD    Pt evaluated via billable telephone visit d/t COVID-19 restrictions. Time spent: 15 min

## 2021-01-26 NOTE — LETTER
1/26/2021         RE: Taylor Valiente  [address]   [place] [address]        Dear Colleague,    Thank you for referring your patient, Taylor Valiente, to the [facility]. Please see a copy of my visit note below.    na    How would you like to obtain your AVS? MyChart  Will anyone else be joining your video visit? No       Video Start Time: 10:20 am   Video-Visit Details     Type of service:  Video Visit     Video End Time:10:40 pm    Originating Location (pt. Location): Home     Distant Location (provider location):  [facility]      Platform used for Video Visit: Doximity    Transplant Surgery Consult Note     Medical record number: [id number]  YOB: 1995,   Consult requested by Dr. Hooper for evaluation of kidney transplant candidacy.    Assessment and Recommendations:Mr. Valiente appears to be a excellent candidate for kidney transplantation and has a good understanding of the risks and benefits of this approach to the management of renal failure. The following issues should be addressed prior to finalizing his transplant candidacy:     Mr. 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  Cardiology consult for cardiac risk stratification to be ordered: Yes  Dietician consult ordered: Yes  Social work consult ordered: Yes  Transplant listing labs ordered to include HLA, ABOx2, Cr, etc.  Obtain past medical records  Up-to-date cancer screening    The majority of our visit was spent in counselling, discussing the medical and surgical risks of kidney transplantation. We talked about the pros and cons of transplantation vs. dialysis.  We discussed the fact that it was important to think about the pros and cons of each treatment option and make an  "active decision.  We also discussed the fact that the two were interconnected and that if the transplant failed, it is possible that dialysis might be necessary before another transplant.       We also discussed the fact that if choosing to have a transplant, a second important decision was a living versus a  donor transplant.  We talked about the pros and cons of each option - the advantage of a  donor transplant being not asking someone to go through the living donor operation, the disadvantage, 5-6 years waiting; the advantage of a living donor transplant, much shorter time to transplant and significantly better long-term outcomes, the disadvantage being the risk to the donor.  Although I didn't express an opinion regarding transplantation or dialysis, I suggested that if opting for a transplant, we would strongly recommend a living donor transplant.       He stated he has potential donors.  His mother, who was with him, is interested in donating.  But she has hypertension and, given her age - 46, she would not be accepted for donation.      I also discussed the new ( donor) kidney (KDPI) scoring system. We discussed the advantages and disadvantages of accepting an \"expanded criteria\" donor kidney, and the latest data as to who potentially benefits - those >45 and/or having diabetes a cause for ESKD - by receiving an expanded criteria donor kidney versus waiting longer for a standard criteria donor. I recommended he say \"no\" to a high KDPI kidney.    I attempted to answer any remaining questions.  I also told him that should she have subsequent questions, him should feel free to contact us.  We would be glad to answer any questions either over the phone or at another clinic visit.  His transplant coordinator is Latoyna Avila and may be reached at [phone].  Thank you for the opportunity to see him.     I spent 20 minutes with this patient.  Over 90% of that time was spent in " counseling and coordination of care.      Thank you for the opportunity to participate in the care of this patient.      Total time: 20 minutes          Kevin Maldonado MD  Surgical Director, Kidney Transplantation                                                                                                      ---------------------------------------------------------------------------------------------------    Past medical history includes:  Hypertension  Smoking: rare  Blood tf: no  Alcohol: no  Recreational drugs: yes     Past Medical History:   Diagnosis Date     Adrenal adenoma, left      Anemia      Benign essential hypertension 07/28/2017     CKD (chronic kidney disease) stage 5, GFR less than 15 ml/min (H)     FSGS     Depression      Focal glomerular sclerosis 07/28/2017     HLD (hyperlipidemia)      LVH (left ventricular hypertrophy) due to hypertensive disease 07/14/2017     Noncompliance      Obesity, unspecified      Stress-induced cardiomyopathy      Suicide attempt (H) 2019     Past Surgical History:   Procedure Laterality Date     BIOPSY  2017    renal- [facility]     NO HISTORY OF SURGERY       Family History   Problem Relation Age of Onset     Blood Disease Mother         has hep b     Diabetes Mother         gestionanal diabetes     Hypertension Mother      Obesity Father      Hypertension Father      Hypertension Maternal Grandmother      Diabetes Maternal Grandmother      Hypertension Paternal Grandmother      Hypertension Paternal Grandfather      Coronary Artery Disease Maternal Uncle      Cancer No family hx of      Social History     Socioeconomic History     Marital status: Single     Spouse name: Not on file     Number of children: Not on file     Years of education: Not on file     Highest education level: Not on file   Occupational History     Occupation:    Social Needs     Financial resource strain: Not on file     Food insecurity     Worry: Not on file      Inability: Not on file     Transportation needs     Medical: Not on file     Non-medical: Not on file   Tobacco Use     Smoking status: Never Smoker     Smokeless tobacco: Never Used   Substance and Sexual Activity     Alcohol use: No     Drug use: Yes     Types: Marijuana     Comment: occ     Sexual activity: Not Currently     Partners: Female   Lifestyle     Physical activity     Days per week: Not on file     Minutes per session: Not on file     Stress: Not on file   Relationships     Social connections     Talks on phone: Not on file     Gets together: Not on file     Attends Yazidi service: Not on file     Active member of club or organization: Not on file     Attends meetings of clubs or organizations: Not on file     Relationship status: Not on file     Intimate partner violence     Fear of current or ex partner: Not on file     Emotionally abused: Not on file     Physically abused: Not on file     Forced sexual activity: Not on file   Other Topics Concern     Parent/sibling w/ CABG, MI or angioplasty before 65F 55M? Not Asked   Social History Narrative     Not on file       ROS:   CONSTITUTIONAL:  No fevers or chills  EYES: negative for icterus  ENT:  negative for hearing loss, tinnitus and sore throat  RESPIRATORY:  negative for cough, sputum, dyspnea  CARDIOVASCULAR:  negative for chest pain  GASTROINTESTINAL:  negative for nausea, vomiting, diarrhea or constipation  GENITOURINARY:  negative for incontinence, dysuria, bladder emptying problems  HEME:  No easy bruising  INTEGUMENT:  negative for rash and pruritus  NEURO:  Negative for headache, seizure disorder  Allergies:   Allergies   Allergen Reactions     No Known Allergies      Medications:  Prescription Medications as of 2/3/2021       Rx Number Disp Refills Start End Last Dispensed Date Next Fill Date Owning Pharmacy    acetaminophen (TYLENOL) 500 MG tablet    11/29/2020    [facility] [address]     Sig: Take 1-2 tablets (500-1,000 mg) by mouth every 4 hours as needed for fever or pain    Class: No Print Out    Route: Oral    amLODIPine (NORVASC) 10 MG tablet            Sig: Take 5 mg by mouth daily    Class: Historical    Route: Oral    apixaban ANTICOAGULANT (ELIQUIS) 2.5 MG tablet  180 tablet 1 12/24/2020    [facility] [place] [address] [address]    Sig: Take 1 tablet (2.5 mg) by mouth 2 times daily    Class: E-Prescribe    Route: Oral    aspirin 81 MG EC tablet            Sig: Take 81 mg by mouth daily    Class: Historical    Route: Oral    atorvastatin (LIPITOR) 10 MG tablet  90 tablet 3 11/4/2020    [facility] - [place] - [address] [address]    Sig: TAKE ONE TABLET BY MOUTH EVERY NIGHT AT BEDTIME    Class: E-Prescribe    carvedilol (COREG) 25 MG tablet  120 tablet 1 11/29/2020    [facility] [place] [address] [address]    Sig: Take 2 tablets (50 mg) by mouth 2 times daily (with meals)    Class: E-Prescribe    Route: Oral    cloNIDine (CATAPRES) 0.3 MG tablet  60 tablet 1 10/6/2019    [facility] [place] [address] [address]    Sig: Take 1 tablet (0.3 mg) by mouth 2 times daily    Class: E-Prescribe    Route: Oral    hydrALAZINE (APRESOLINE) 100 MG tablet  360 tablet 1 9/25/2020    [facility], [place] - [address]    Sig: TAKE ONE TABLET BY MOUTH THREE TIMES A DAY    Class: E-Prescribe    Notes to Pharmacy: Profile Rx: patient will contact pharmacy when needed    sodium bicarbonate 650 MG tablet        [facility], [place] - [address]    Sig: Take 1,300 mg by mouth 2 times daily    Class: Historical    Route: Oral    vitamin D3 (CHOLECALCIFEROL) 2000 units (50 mcg) tablet            Sig: Take 1 tablet by mouth daily    Class: Historical    Route: Oral        Exam:   Constitutional - A&O in NAD.   Eyes - no redness or  discharge.  Sclera anicteric  Respiratory - no cough, no labored breathing  Musculoskeletal - range of motion normal  Skin - no discoloration, no jaundice  Neurological - no tremors.  No facial droop or dysarthria  Psychiatric - normal mood and affect  The rest of a comprehensive physical examination is deferred due to PHE (public health emergency) video visit restrictions     Diagnostics:   Recent Results (from the past 672 hour(s))   Hemoglobin    Collection Time: 01/28/21  1:19 PM   Result Value Ref Range    Hemoglobin 10.3 (L) 13.3 - 17.7 g/dL     No results found for: CPRA      Again, thank you for allowing me to participate in the care of your patient.        Sincerely,        NANCY

## 2021-01-26 NOTE — PROGRESS NOTES
Psychosocial Assessment For Kidney Transplantation  Patient Name/ Age: Taylor Valiente 25 year old   Medical Record #: [id number]  Duration of Interview: 40 min  Process:  Telephone Interview                (counseling < 50%)   Present at Appointment: Taylor and his mother         : JOHNATHAN Mistry LIC Date:  January 26, 2021        Type of transplant: Kidney    Donor type:      Cadaver   Prior Transplants:    No Status of Transplant: N/A           Current Living Situation    Location:   [address] [place] [address]  With Whom: lives with their family parents and sister       Family/ Social Support:    Taylor does not have any children. He lives with his younger sister and parents.   available, helpful   Committed Relationship:     Single   Other Supports: Taylor reported his family is his only support.    none       Activities/ Functional Ability    Current Level: ambulatory and independent with ADL's     Transportation drives self       Vocational/Employment/Financial     Employment   unemployed   Job Description   Taylor reported he was working at [employer] [place] [employer] but only worked for a couple weeks until he broke his ankle. He was not eligible for any benefits as he was not employed there long enough. He has not looked for a new job yet but reported he is thinking about it.      Income   other: Parents are helping as much as they can however his mother reported to this writer she is not sure how much longer they will be able to continue to support Taylor financially as they are currently paying for all of his bills (medical, phone, car, food, etc).     Encouraged Taylor to speak with his local county regarding cash assistance, food assistance, and medical assistance to help bridge income while he is looking for employment.      Insurance      At this time, patient can afford medication costs:  Yes  Private Insurance- [employer] through mother's  insurance    Discussed Taylor will only be eligible to be on his mother's insurance until he is 27 years old.        Medical Status    Current Mode of Treatment for ESRD None   Complications None       Behavioral    Tobacco Use No Chemical Dependency Yes   Taylor denied any tobacco use.  Taylor denied any alcohol use. He reported he smokes marijuana a couple times a week. Discussed recommendation of cessation after transplant. Taylor reported he does not have any concerns about stopping smoking post-transplant. Taylor denied any history of chemical dependency treatment.      Psychiatric Impairment Yes   Taylor reported he has anxiety and depression. He reported he does not take any medications at this time and reported he feels he can manage on his own. Taylor reported he had a psychiatric hospitalization in 2019 due to   anger issues, depression, and a suicide attempt (he reported he tried to take a bunch of pills). Taylor reported he did not follow through with establishing a mental health therapist as it was recommended. When asked why he did not follow through he reported it was because he didn't want to. Discussed due to recent suicide attempt and untreated depression this writer recommends Taylor establish with a mental health provider. Taylor stated he would. Explained he needs to contact his insurance to find a provider in-network. Taylor denied any current suicidal ideation. Taylor reported he has the [facility] Crisis Line.    Reading Ability: Good  Education Level: Technical College Recent Legal History Yes   DUI last year- can't drive in [place]    Coping Style/Strategies: Listen to music smoke marijuana        Ability to Adhere to Complex Medical Regime: Yes     Adherence History: Taylor reported he has not always taken his medications like he should. He reported he now uses a pill box which he feels has helped improve his compliance. Taylor reported he follows his physician's  recommendations and attends his appointments. Per chart review, Taylor has a history of medication non-compliance and not following through with medical recommendations. Discussed importance of compliance with Taylor. Due to history of noncompliance transplant team may recommend a compliance contact.             Education  _X_ Medicare  _X_ Rehabilitation  _X_ Donor issues  _X_ Community resources  _X_ Post discharge housing  _X_ Financial resources  _X_ Medical insurance options  _X_ Psych adjustment  _X_ Family adjustment  _X_ Health Care Directive Provided Education   Psychosocial Risks of Transplant Reviewed and Discussed:  _X_ Increased stress related to emotional,            family, social, employment or financial           situation  _X_ Effect on work and/or disability benefits  _X_ Effect on future health and life           insurance  _X_ Transplant outcome expectations may           not be met  _X_ Mental Health Risks: anxiety,           depression, PTSD, guilt, grief and           chronic fatigue     Notable Items:   Taylor reported he has anxiety and depression. He reported he does not take any medications at this time and reported he feels he can manage on his own. Taylor reported he had a psychiatric hospitalization in 2019 due to   anger issues, depression, and a suicide attempt (he reported he tried to take a bunch of pills). Taylor reported he did not follow through with establishing a mental health therapist as it was recommended. When asked why he did not follow through he reported it was because he didn't want to. Discussed due to recent suicide attempt and untreated depression this writer recommends Taylor establish with a mental health provider.     Taylor reported he has not always taken his medications like he should. He reported he now uses a pill box which he feels has helped improve his compliance. Taylor reported he follows his physician's recommendations and attends his  appointments. Per chart review, Taylor has a history of medication non-compliance and not following through with medical recommendations. Discussed importance of compliance with Taylor. Due to history of noncompliance transplant team may recommend a compliance contact.     Taylor is currently unemployed. His parents are supporting him financially but his mother reported she is not sure how long they will be able to. Encouraged Taylor to speak with his local county regarding cash assistance, food assistance, and medical assistance to help bridge income while he is looking for employment. Taylor needs to demonstrate financial stability (find employment or another source of income such as disability) so he will be able to afford post-transplant expenses.       Final Evaluation/Assessment   Patient seemed to process information well. Appeared informed, motivated and able to follow post transplant requirements. Behavior was appropriate during interview. Has adequate insurance coverage. Does not have adequate income. Adequate social support. Recent history of psychiatric hospitalization and suicide attempt and did not follow through with mental health recommendation.  At this time patient appears to understand the risks and benefits of transplant.      Recommendation  Conditional- due to mental health history, this writer recommends pt establish with a mental health provider. This writer also recommends pt have a neuropsych exam due mental health history and noncompliance history. Team may recommend compliance contract.    Selection Criteria Met:  Plan for support Yes   Chemical Dependence Yes   Smoking Yes   Mental Health No  Adequate Finances No   Signature: JOHNATHAN Mistry [facility]   Title: Clinical

## 2021-01-26 NOTE — PATIENT INSTRUCTIONS
[employer]:   Call the [place] Food Help Line at 1-135.860.9310 to talk with a specialist in community and government food assistance programs. They can help you sign-up for SNAP benefits, find food osman, food shelves and free food in your community and help refer you to any other community assistance programs that you qualify for.   https://www.hungersolutions.org/find-help/    [employer]are For All:  Provides discounted groceries. Up to 40% off retail pricing. You can preorder and  or buy in person, depending on the site. Visit their website for list of 38 locations in [place].  [email or web address]/    [place] [facility]:  To find a map of food shelves and food distribution pop-ups. Visit www.[facility].gov/foodshelves    To find map of winter farmers markets (nearly all accept SNAP-EBT benefits). Visit   [email or web address]-markets/  [email or web address].gov/[facility]

## 2021-01-26 NOTE — PROGRESS NOTES
"Kidney Transplant Referral - 10/22/2020  Taylor Valiente  Content reviewed:    Living Donation and how to access that program    Paired exchange    Kidney Donor Profile Index (KDPI)    Waiting list issues (right to decline without penalty, high PHS risk donors, what to expect when called with an offer)    Hospital experience,  length of stay , need to stay locally post-discharge (2-4 weeks)    Surgical options (with pictures)                             Post-surgery lifting and driving restrictions    Post-transplant routines, frequency of lab work and clinic visits    Need to stay locally post-discharge (2-4 weeks)    Role of Transplant Coordinator    Participants were informed of the benefits of transplant as well as potential risks such as infection, cancer, and death.  The need for total adherence with immunosuppression medications and following transplant regimens was stressed.  The overall evaluation/approval/listing process was reviewed.        The patient was provided with the following documents:  What You Need to Know About a Kidney Transplant  Adult Kidney Transplant - A Guide for Patients  SRTR Data Sheet - Kidney  Brochure - Kidney Allocation  What Every Patient Needs to Know (UNOS)  UNOS Facts and Figures  Finding a Donor  My Transplant Place - Quick Start Guide  KDPI Consent  Receipt of Information form    Taylor Wisetelma signed the  Receipt of Information for Organ Transplant Recipient.\" He was provided Latonya Avila's business card and instructed to call with additional questions.      Summary    Team s concerns/comments:   - cardiology   - genetic renal panel   - neuropsych   - compliance contract   - establish with mental health provider   - 2 negative covid PCRs.     Candidacy category: yellow    Action/Plan: continue with evaluation    Expected Selection Meeting Discussion: 2/2/2021    Left voicemail with patient to review PKE and answer any questions, and remind patient that he will be " called next week with next steps or any decisions made. Left RNCC phone number to contact if needed

## 2021-01-26 NOTE — PROGRESS NOTES
"  Patient was called and message left stating I would call back.   Lashon Bull on 1/26/2021 at 8:05 AM    Patient was called and message left stating I would call back.   Lashon Bull on 1/26/2021 at 7:52 AM    Patient was called and message left stating I would call back.   Lashon Bull on 1/26/2021 at 7:35 AM    Taylor is a 25 year old who is being evaluated via a billable video visit.      How would you like to obtain your AVS? MyChart  If the video visit is dropped, the invitation should be resent by: Text to cell phone: [phone]  Will anyone else be joining your video visit? No  {If patient encounters technical issues they should call [phone] :[id number]}    Video Start Time: {video visit start/end time for provider to select:682170}  Video-Visit Details    Type of service:  Video Visit    Video End Time:{video visit start/end time for provider to select:152948}    Originating Location (pt. Location): {video visit patient location:648561::\"Home\"}    Distant Location (provider location):  [facility]     Platform used for Video Visit: {Virtual Visit Platforms:600156::\"[facility]\"}    "

## 2021-01-26 NOTE — LETTER
1/26/2021         RE: Taylor Valiente  [address]   [place] [address]        Dear Colleague,    Thank you for referring your patient, Taylor Valiente, to the [facility]. Please see a copy of my visit note below.    Taylor is a 25 year old who is being evaluated via a billable video visit.      How would you like to obtain your AVS? MyChart  If the video visit is dropped, the invitation should be resent by: Text to cell phone: [phone]  Will anyone else be joining your video visit? No      Video Start Time: 9:21 am   Video-Visit Details    Type of service:  Video Visit    Video End Time:10:20 am     Originating Location (pt. Location): Home    Distant Location (provider location):  [facility]     Platform used for Video Visit: [employer]    TRANSPLANT NEPHROLOGY RECIPIENT EVALUATION NOTE    Assessment and Plan:  # Kidney Transplant Evaluation: Patient is a good candidate overall. Benefits of a living donor transplant were discussed.    # CKD likely from long standing HTN and secondary FSGS: with prior antihypertensive medication nonadherence and possible contributing adrenal adenomas. Will complete genetic renal panel as his 2017 kidney biopsy had some features of primary FSGS. Nearing dialysis with a GFR of 10. When ready, he would likely benefit from a kidney transplant.       # Adrenal adenomas: with suspicion for Cushings. He reportedly saw an outside endocrinologist and was told he makes too much cortisol although these records as not available today. Will need endocrine here for continued management.     # Noncompliance, depression: with a suicide attempt in 2019 (this was unknown during nephrology visit, later discovered by social work).  Appreciate social work assessment and recommendations. Will likely need neuropsych, compliance contract and establish with mental health provider.     # Marijuana use: a few times weekly to cope  with stress. Provided education regarding risk of post-transplant fungal respiratory infections and interactions with immunosuppression.    # Borderline BMI (34.8)    # COVID-19 positive (11/2020): asymptomatic, still needs two repeat negative PCR's. Since maintained on Eliquis.     # Cardiac Risk: will need risk assessment given history of stress cardiomyopathy in 2017 since improved as of 9/2020 ECHO.     # Health Maintenance: Dental: Not up to date    Discussed the risks and benefits of a transplant, including the risk of surgery and immunosuppression medications.  Patient's overall evaluation will be discussed in the Transplant Program's regular meeting with a final recommendation on the patients suitability for transplant to be made at that time.    Pending completion of the full evaluation, patient presently appears to be enough of an acceptable kidney transplant recipient candidate to have any potential kidney donors start the evaluation process.  Patient was seen in conjunction with Dr. Fernando Estrada as part of a shared visit.    Evaluation:  Taylor Valiente was seen in consultation at the request of Dr. Kevin Maldonado for evaluation as a potential kidney transplant recipient.    Reason for Visit:  Taylor Valiente is a 25 year old male with CKD likely secondary to long standing hypertension and secondary FSGS , who presents for kidney transplant evaluation.    History of Present Illness:  Taylor Valiente is a 25-year-old gentleman of [place] decent who presents with CKD likely secondary to long standing hypertension and secondary FSGS.  He initially presented the summer of 2017 with VANESSA (creatinine 2.5), hypertensive urgency, nephrotic proteinuria with negative serologic work up and had a kidney biopsy that showed features of chronic hypertensive changes and secondary FSGS, although greater than 80% foot effacement was noted.  He followed with Dr. Hooper every 6 months, but had non-adherence issues  with blood pressure medications.  He was hospitalized more recently in 9/2020 with VANESSA on CKD (creatinine in the mid 8s), hypertensive urgency and noted to be noncompliance with antihypertensives. He had a renal ultrasound that showed prominent diffuse increased parenchymal echogenicity throughout both kidneys, representing a significant change compared to 2017.  Of note, a 5/2019 CT noted two adrenal nodules, and he reportedly saw an outside endocrinologist who told him he makes too much cortisol. He was hospitalized again in 12/2020 with COVID-19 and his GFR dropped to 10. He continues to follow with Dr. Hooper and is being assessed for fistula placement. He lives with his parents and is not working. Using a medbox now with reportedly better compliance. No potential donors at this time.            Kidney Disease Hx:        Kidney Disease Dx: likely secondary to long standing hypertension and secondary FSGS       Biopsy Proven: Yes; 2017         On Dialysis: No       Primary Nephrologist: Dr. Hooper /Jennifer Manzanares CNP       H/o Kidney Stones: No       H/o Recurrent/Frequent UTI: No         Diabetic Hx: None           Cardiac/Vascular Disease Risk Factors:        Cardiac Risk Factors: Hypertension and CKD       Known CAD: No, regional wall abnormalities could no be excluded on 9/2020 ECHO.        Known PAD/Caludication Symptoms: No       Known Heart Failure: HFpEF, EF 55-60% in 9/2020 improved from 40-45% in 2017       Arrhythmia: No       Pulmonary Hypertension: No       Valvular Disease: No       Other: None         Viral Serology Status       CMV IgG Antibody: Unknown       EBV IgG Antibody: Unknown         Volume Status/Weight:        Volume status: Not assessed       Weight:  borderline       BMI: There is no height or weight on file to calculate BMI.         Functional Capacity/Frailty:        Formerly worked as a  and was working out at the gym prior to covid.       Fatigue/Decreased Energy: [] No [x]  Yes    Chest Pain or SOB with Exertion: [x] No [] Yes    Significant Weight Change: [] No [x] Yes 40 lbs of loss since September d/t poor appetite and some intention with exercise.    Nausea, Vomiting or Diarrhea: [x] No [] Yes    Fever, Sweats or Chills:  [x] No [] Yes    Leg Swelling [x] No [] Yes        History of Cancer: None    Other Significant Medical Issues:     #Noncompliance, depression: with a suicide attempt in 2019 (this was unknown during nephrologist vist, later discovered by social work).    #Marijuana use: a few times weekly to cope with with stress.   #Borderline BMI (34.8)  #COVID-19 positive in 11/2020: asymptomatic, no repeat PCR's Still on Eliquis.     Review of Systems:  A comprehensive review of systems was obtained and negative, except as noted in the HPI or PMH.    Past Medical History:   Medical record was reviewed and PMH was discussed with patient and noted below.  Past Medical History:   Diagnosis Date     Adrenal adenoma, left      Anemia      Benign essential hypertension 07/28/2017     CKD (chronic kidney disease) stage 5, GFR less than 15 ml/min (H)     FSGS     Depression      Focal glomerular sclerosis 07/28/2017     HLD (hyperlipidemia)      LVH (left ventricular hypertrophy) due to hypertensive disease 07/14/2017     Noncompliance      Obesity, unspecified      Stress-induced cardiomyopathy      Suicide attempt (H) 2019       Past Social History:   Past Surgical History:   Procedure Laterality Date     BIOPSY  2017    renal- [facility]     NO HISTORY OF SURGERY       Personal history of bleeding or anesthesia problems: No    Family History:  Family History   Problem Relation Age of Onset     Blood Disease Mother         has hep b     Diabetes Mother         gestionanal diabetes     Hypertension Mother      Obesity Father      Hypertension Father      Hypertension Maternal Grandmother      Diabetes Maternal Grandmother      Hypertension Paternal Grandmother       Hypertension Paternal Grandfather      Coronary Artery Disease Maternal Uncle      Cancer No family hx of        Personal History:   Social History     Socioeconomic History     Marital status: Single     Spouse name: Not on file     Number of children: Not on file     Years of education: Not on file     Highest education level: Not on file   Occupational History     Occupation:    Social Needs     Financial resource strain: Not on file     Food insecurity     Worry: Not on file     Inability: Not on file     Transportation needs     Medical: Not on file     Non-medical: Not on file   Tobacco Use     Smoking status: Never Smoker     Smokeless tobacco: Never Used   Substance and Sexual Activity     Alcohol use: No     Drug use: Yes     Types: Marijuana     Comment: occ     Sexual activity: Not Currently     Partners: Female   Lifestyle     Physical activity     Days per week: Not on file     Minutes per session: Not on file     Stress: Not on file   Relationships     Social connections     Talks on phone: Not on file     Gets together: Not on file     Attends Holiness service: Not on file     Active member of club or organization: Not on file     Attends meetings of clubs or organizations: Not on file     Relationship status: Not on file     Intimate partner violence     Fear of current or ex partner: Not on file     Emotionally abused: Not on file     Physically abused: Not on file     Forced sexual activity: Not on file   Other Topics Concern     Parent/sibling w/ CABG, MI or angioplasty before 65F 55M? Not Asked   Social History Narrative     Not on file       Allergies:  Allergies   Allergen Reactions     No Known Allergies        Medications:  Current Outpatient Medications   Medication Sig     acetaminophen (TYLENOL) 500 MG tablet Take 1-2 tablets (500-1,000 mg) by mouth every 4 hours as needed for fever or pain     amLODIPine (NORVASC) 10 MG tablet Take 5 mg by mouth daily     apixaban  ANTICOAGULANT (ELIQUIS) 2.5 MG tablet Take 1 tablet (2.5 mg) by mouth 2 times daily     aspirin 81 MG EC tablet Take 81 mg by mouth daily     atorvastatin (LIPITOR) 10 MG tablet TAKE ONE TABLET BY MOUTH EVERY NIGHT AT BEDTIME     carvedilol (COREG) 25 MG tablet Take 2 tablets (50 mg) by mouth 2 times daily (with meals)     cloNIDine (CATAPRES) 0.3 MG tablet Take 1 tablet (0.3 mg) by mouth 2 times daily     hydrALAZINE (APRESOLINE) 100 MG tablet TAKE ONE TABLET BY MOUTH THREE TIMES A DAY     sodium bicarbonate 650 MG tablet Take 1,300 mg by mouth 2 times daily     vitamin D3 (CHOLECALCIFEROL) 2000 units (50 mcg) tablet Take 1 tablet by mouth daily     No current facility-administered medications for this visit.        Vitals:  There were no vitals taken for this visit.    Exam:  GENERAL APPEARANCE: alert and no distress  HENT: no obvious abnormalities on appearance  RESP: breathing appears unremarkable with normal rate, no audible wheezing or cough and no apparent shortness of breath with conversation  MS: extremities normal - no gross deformities noted  SKIN: no apparent rash and normal skin tone  NEURO: speech is clear with no obvious neurological deficits  PSYCH: mentation appears normal and affect normal.      Results:   No results found for this or any previous visit (from the past 336 hour(s)).      Patient was seen by myself, Dr. Fernando Estrada, in conjunction with Denton Talavera NP as part of a shared visit.    I personally reviewed past medical and surgical history, vital signs, medications and labs.  Present and past medical history, along with significant physical exam findings were all reviewed with HENRIQUE.    My key findings:  Taylor Valiente is 25 year old, who presents for Kidney transplant evaluation.    Key management decisions made by me and discussed with HENRIQUE:  1.  Advanced CKD likely on account of longstanding hypertension with secondary FSGS.  2.  Transplant candidacy evaluation.  3.  Adrenal  adenomas with suspicion for Cushing.  4.  Stress cardiomyopathy resolved pending formal assessment by cardiology.  5.  Need for psychosocial assessment: Remote historic nonadherence resolved no other identifiable barriers pending assessment.  Discussion:  Mr. Belle is a delightful 25-year-old gentleman who was found to have hypertensive urgency and acute renal dysfunction on chronic kidney disease back in 2017.  His blood pressure has been somewhat hard to control and complicated by early nonadherence.  His biopsy showed features suggestive of secondary FSGS and longstanding hypertensive changes.  As a child he started battling obesity at age 4 and according to his mother this may had been attributed to food intake although 2 adrenal adenomas were discovered on CT scan.  He was seen by endocrine although notes are not available and was told that his body was secreting more cortisol than it should.  His features and his presenting chemistries with hypokalemia and element of alkalosis are in keeping with this suspicion.  Although, the exact mechanism is not fully understood.  It may be reasonable to send a genetic panel to help decipher the etiology of the renal failure and in the meantime patient will be referred to endocrine to comment on adrenal adenomas and any work-up as needed prior to transplantation.  We discussed the importance of securing live donor if possible.  Otherwise I feel that Yoshi is a reasonable candidate for kidney transplantation.  The case will be discussed during our multidisciplinary meeting for final candidacy decision on behalf of [facility] transplant center I would like to thank Dr. Jer Hooper for allowing us to participate in the care of Mr. Valiente      Again, thank you for allowing me to participate in the care of your patient.        Sincerely,        NANCY

## 2021-01-26 NOTE — TELEPHONE ENCOUNTER
Patient's mother called writer reporting that patient had missed a call from a provider during evaluation and wanted to be sure that patient completed the entire evaluation day. After discussion with [facility] team, it was deciphered that patient had missed a call from Dr. Maldonado earlier in the AM and that appt can be done after he is done with nephrology.

## 2021-01-27 ENCOUNTER — OFFICE VISIT (OUTPATIENT)
Dept: SURGERY | Facility: CLINIC | Age: 26
End: 2021-01-27
Attending: SURGERY
Payer: COMMERCIAL

## 2021-01-27 VITALS
RESPIRATION RATE: 16 BRPM | HEART RATE: 80 BPM | HEIGHT: 72 IN | SYSTOLIC BLOOD PRESSURE: 130 MMHG | DIASTOLIC BLOOD PRESSURE: 80 MMHG | BODY MASS INDEX: 34.81 KG/M2 | WEIGHT: 257 LBS | OXYGEN SATURATION: 100 %

## 2021-01-27 DIAGNOSIS — N18.5 CHRONIC KIDNEY DISEASE, STAGE V (H): Primary | ICD-10-CM

## 2021-01-27 PROBLEM — I15.0 RENOVASCULAR HYPERTENSION: Status: ACTIVE | Noted: 2017-07-14

## 2021-01-27 PROCEDURE — 99203 OFFICE O/P NEW LOW 30 MIN: CPT | Performed by: SURGERY

## 2021-01-27 ASSESSMENT — ENCOUNTER SYMPTOMS
ENDOCRINE NEGATIVE: 1
DEPRESSION: 1
NEUROLOGICAL NEGATIVE: 1
ALLERGIC/IMMUNOLOGIC NEGATIVE: 1
RESPIRATORY NEGATIVE: 1
GASTROINTESTINAL NEGATIVE: 1
EYES NEGATIVE: 1
CARDIOVASCULAR NEGATIVE: 1
CONSTITUTIONAL NEGATIVE: 1
BRUISES/BLEEDS EASILY: 1

## 2021-01-27 ASSESSMENT — MIFFLIN-ST. JEOR: SCORE: 2193.74

## 2021-01-27 NOTE — PROGRESS NOTES
[facility] INITIAL VASCULAR SURGERY CONSULT    Impression:   1.  Chronic kidney disease stage V in need of long-term dialysis access.  He is not yet dialysis dependent.    2.  Hospitalized in November 2020 for COVID-19 infection.  Empirically started on Eliquis at that time.  He remains on Eliquis.    Plan:  I had a lengthy discussion with  regarding dialysis access in the creation of AV fistulas.  He is a left-handed individual.  He does not have good quality superficial veins readily visualized on exam.  Upper extremity vein mapping suggests a satisfactory right cephalic vein.  I am therefore recommending creation of a right brachial cephalic AV fistula.  This would be performed as an outpatient at [facility].  He would need to hold his Eliquis preoperatively.    I gave him patient literature regarding AV fistulas and I gave him materials and instructions for vein building exercises.  He is to undergo a left ankle surgery next week.  My office will contact him to schedule creation of a right upper extremity AV fistula.  He is to avoid right arm venipunctures of any kind.  All of his questions were answered and he verbalizes full understanding to the above and agreement with this management plan.      HPI:   Taylor Valiente is a 25-year-old gentleman with worsening stage V chronic kidney disease.  He is not yet dialysis dependent.  He has been referred to me to discuss creation of upper extremity dialysis access.  He is a left-handed individual.  He remains on empiric Eliquis following a COVID-19 infection this past November.  He reports no clotting or thrombotic events during that time, nevertheless he remains on Eliquis.      CURRENT MEDICATIONS       acetaminophen (TYLENOL) 500 MG tablet, Take 1-2 tablets (500-1,000 mg) by mouth every 4 hours as needed for fever or pain       amLODIPine (NORVASC) 10 MG tablet, Take 5 mg by mouth daily        apixaban ANTICOAGULANT (ELIQUIS) 2.5 MG tablet, Take 1 tablet (2.5 mg) by mouth 2 times daily       aspirin 81 MG EC tablet, Take 81 mg by mouth daily       atorvastatin (LIPITOR) 10 MG tablet, TAKE ONE TABLET BY MOUTH EVERY NIGHT AT BEDTIME       carvedilol (COREG) 25 MG tablet, Take 2 tablets (50 mg) by mouth 2 times daily (with meals)       cloNIDine (CATAPRES) 0.3 MG tablet, Take 1 tablet (0.3 mg) by mouth 2 times daily       hydrALAZINE (APRESOLINE) 100 MG tablet, TAKE ONE TABLET BY MOUTH THREE TIMES A DAY       sodium bicarbonate 650 MG tablet, Take 1,300 mg by mouth 2 times daily       vitamin D3 (CHOLECALCIFEROL) 2000 units (50 mcg) tablet, Take 1 tablet by mouth daily    No current facility-administered medications on file prior to visit.         PAST MEDICAL HISTORY  Past Medical History:   Diagnosis Date     Adrenal adenoma, left      Anemia      Benign essential hypertension 07/28/2017     CKD (chronic kidney disease) stage 5, GFR less than 15 ml/min (H)     FSGS     Depression      Focal glomerular sclerosis 07/28/2017     HLD (hyperlipidemia)      LVH (left ventricular hypertrophy) due to hypertensive disease 07/14/2017     Noncompliance      Obesity, unspecified      Stress-induced cardiomyopathy      Suicide attempt (H) 2019         PAST SURGICAL HISTORY:  Past Surgical History:   Procedure Laterality Date     BIOPSY  2017    renal- [facility]     NO HISTORY OF SURGERY         ALLERGIES     Allergies   Allergen Reactions     No Known Allergies        FAMILY HISTORY  Family History   Problem Relation Age of Onset     Blood Disease Mother         has hep b     Diabetes Mother         gestionanal diabetes     Hypertension Mother      Obesity Father      Hypertension Father      Hypertension Maternal Grandmother      Diabetes Maternal Grandmother      Hypertension Paternal Grandmother      Hypertension Paternal Grandfather      Coronary Artery Disease Maternal Uncle      Cancer No family hx of         SOCIAL HISTORY  Social History     Tobacco Use     Smoking status: Never Smoker     Smokeless tobacco: Never Used   Substance Use Topics     Alcohol use: No     Drug use: Yes     Types: Marijuana     Comment: occ       ROS:   Review of Systems   Constitution: Negative.   HENT: Negative.    Eyes: Negative.    Cardiovascular: Negative.    Respiratory: Negative.    Endocrine: Negative.    Hematologic/Lymphatic: Bruises/bleeds easily.   Skin: Negative.    Musculoskeletal: Positive for joint pain.   Gastrointestinal: Negative.    Genitourinary: Negative.    Neurological: Negative.    Psychiatric/Behavioral: Positive for depression.   Allergic/Immunologic: Negative.          EXAM:  /80   Pulse 80   Resp 16   Ht 6' (1.829 m)   Wt 257 lb (116.6 kg)   SpO2 100%   BMI 34.86 kg/m    Physical Exam  Vitals signs reviewed.   Constitutional:       Appearance: Normal appearance. He is obese.   HENT:      Head: Normocephalic and atraumatic.   Eyes:      General: No scleral icterus.     Extraocular Movements: Extraocular movements intact.      Pupils: Pupils are equal, round, and reactive to light.   Cardiovascular:      Pulses:           Radial pulses are 2+ on the right side and 2+ on the left side.      Comments: 2+ brachial pulses bilaterally.  No visible superficial veins in either upper extremity.    Musculoskeletal: Normal range of motion.   Skin:     General: Skin is warm and dry.   Neurological:      General: No focal deficit present.      Mental Status: He is alert and oriented to person, place, and time. Mental status is at baseline.   Psychiatric:         Mood and Affect: Mood normal.         Behavior: Behavior normal.         Thought Content: Thought content normal.         Judgment: Judgment normal.         Labs:  LIPID RESULTS:  Lab Results   Component Value Date    CHOL 145 07/03/2019    HDL 34 (L) 07/03/2019    LDL 86 07/03/2019    TRIG 127 07/03/2019       CBC RESULTS:  Lab Results   Component  Value Date    WBC 4.6 11/29/2020    RBC 3.95 (L) 11/29/2020    HGB 9.2 (L) 12/22/2020    HCT 29.8 (L) 12/22/2020    MCV 85 11/29/2020    MCH 26.8 11/29/2020    MCHC 31.5 11/29/2020    RDW 13.2 11/29/2020     (L) 11/29/2020       BMP RESULTS:  Lab Results   Component Value Date     11/29/2020    POTASSIUM 4.4 12/15/2020    CHLORIDE 113 (H) 11/29/2020    CO2 21 11/29/2020    ANIONGAP 6 11/29/2020     (H) 12/15/2020    BUN 80 (H) 11/29/2020    CR 6.97 (H) 12/15/2020    GFRESTIMATED 10 (L) 12/15/2020    GFRESTBLACK 12 (L) 12/15/2020    BUBBA 8.1 (L) 11/29/2020        A1C RESULTS:  No results found for: A1C      Imaging:    I have reviewed the bilateral upper extremity vein mapping performed on 1/19/2021.  This suggests an adequate right cephalic vein above the elbow.      Total length of this encounter was 30 minutes.      Henrik Moran MD

## 2021-01-28 ENCOUNTER — HOSPITAL ENCOUNTER (OUTPATIENT)
Facility: CLINIC | Age: 26
Setting detail: SPECIMEN
Discharge: HOME | End: 2021-01-28
Attending: NURSE PRACTITIONER | Admitting: PHYSICIAN ASSISTANT
Payer: COMMERCIAL

## 2021-01-28 ENCOUNTER — INFUSION THERAPY VISIT (OUTPATIENT)
Dept: INFUSION THERAPY | Facility: CLINIC | Age: 26
End: 2021-01-28
Payer: COMMERCIAL

## 2021-01-28 ENCOUNTER — TELEPHONE (OUTPATIENT)
Dept: OTHER | Facility: CLINIC | Age: 26
End: 2021-01-28

## 2021-01-28 DIAGNOSIS — N18.6 END STAGE RENAL DISEASE (H): Primary | ICD-10-CM

## 2021-01-28 DIAGNOSIS — D63.1 ANEMIA OF CHRONIC RENAL FAILURE, STAGE 5 (H): Primary | ICD-10-CM

## 2021-01-28 DIAGNOSIS — N18.5 CKD (CHRONIC KIDNEY DISEASE) STAGE 5, GFR LESS THAN 15 ML/MIN (H): ICD-10-CM

## 2021-01-28 DIAGNOSIS — N18.5 ANEMIA OF CHRONIC RENAL FAILURE, STAGE 5 (H): Primary | ICD-10-CM

## 2021-01-28 LAB — HGB BLD-MCNC: 10.3 G/DL (ref 13.3–17.7)

## 2021-01-28 PROCEDURE — 85018 HEMOGLOBIN: CPT | Performed by: PHYSICIAN ASSISTANT

## 2021-01-28 PROCEDURE — 36415 COLL VENOUS BLD VENIPUNCTURE: CPT

## 2021-01-28 NOTE — PROGRESS NOTES
Nursing Note:  Taylor Valiente presents today for labs.    Patient seen by provider today: No   present during visit today: Not Applicable.    Note: N/A.    Intravenous Access:  Lab draw site left ac, Needle type butterfly, Gauge 23.  Labs drawn without difficulty.    Discharge Plan:   Patient was sent to home.    Nalini Calero RN

## 2021-02-03 ENCOUNTER — TELEPHONE (OUTPATIENT)
Dept: TRANSPLANT | Facility: CLINIC | Age: 26
End: 2021-02-03

## 2021-02-03 ENCOUNTER — COMMITTEE REVIEW (OUTPATIENT)
Dept: TRANSPLANT | Facility: CLINIC | Age: 26
End: 2021-02-03

## 2021-02-03 NOTE — LETTER
Taylor Valiente  [address] [place] [address]          2021      Because you have had a problem in the past following a treatment plan, we ask that you read and sign this agreement. Here are the specific reasons we are asking this:     1.  Previous non-adherence to prescribed medication regimen or suggested follow-up recommendations.        Your Compliance Contract is from 21 until 21.      To have a successful transplant, you must follow your treatment plan, both before and after your transplant. We ask that you agree to the followin.  I will keep all my medical appointments, including dialysis sessions (if/when applicable) and visits for my mental ankita, as required by my health care team.     2.  I will get all blood work or other tests as required by my health care team.     3.  I will take all medicines prescribed for me by my health care team, and I will take them as prescribed. I will tell my health care team promptly if I have trouble getting my medicines, paying for them, or dealing with side effects, or if I have any other problems or concerns.    4. If I disagree with a recommendation, I will discuss this with my health care team. My health care team has the final say about whether I am following my treatment plan and what my status should be.                         I have read this agreement. I understand that my status on the transplant waitlist will not be active unless I follow my treatment plan.  I understand that I will be given a copy of this agreement, as will the members of my health care team.        Name___________________________________  Date_________     Transplant Coordinator___ Latonya Avila RN, BSN, CCTN____Date_21________     Transplant Surgeon_________________________ Date_________

## 2021-02-03 NOTE — TELEPHONE ENCOUNTER
Patient's mother called to discuss results of committee review. RNCC discussed the list of items needed to list patient as active status, including mental health care, neuropsych, endocrinology, cardiology, genetic renal panel and 2 negative COVID screens. It was also discussed that the patient will need to sign a compliance contract and follow that contract to remain an eligible candidate. Jessy was agreeable to that and stated she would support the patient in this.

## 2021-02-03 NOTE — PROGRESS NOTES
How would you like to obtain your AVS? MyChart  Will anyone else be joining your video visit? No       Video Start Time: 10:20 am   Video-Visit Details     Type of service:  Video Visit     Video End Time:10:40 pm    Originating Location (pt. Location): Home     Distant Location (provider location):  [facility]      Platform used for Video Visit: [facility]    Transplant Surgery Consult Note     Medical record number: [id number]  YOB: 1995,   Consult requested by Dr. Hooper for evaluation of kidney transplant candidacy.    Assessment and Recommendations:Mr. Valiente appears to be a excellent candidate for kidney transplantation and has a good understanding of the risks and benefits of this approach to the management of renal failure. The following issues should be addressed prior to finalizing his transplant candidacy:     Mr. 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  Cardiology consult for cardiac risk stratification to be ordered: Yes  Dietician consult ordered: Yes  Social work consult ordered: Yes  Transplant listing labs ordered to include HLA, ABOx2, Cr, etc.  Obtain past medical records  Up-to-date cancer screening    The majority of our visit was spent in counselling, discussing the medical and surgical risks of kidney transplantation. We talked about the pros and cons of transplantation vs. dialysis.  We discussed the fact that it was important to think about the pros and cons of each treatment option and make an active decision.  We also discussed the fact that the two were interconnected and that if the transplant failed, it is possible that dialysis might be necessary before another transplant.       We also discussed the fact that if choosing to have a transplant, a second  "important decision was a living versus a  donor transplant.  We talked about the pros and cons of each option - the advantage of a  donor transplant being not asking someone to go through the living donor operation, the disadvantage, 5-6 years waiting; the advantage of a living donor transplant, much shorter time to transplant and significantly better long-term outcomes, the disadvantage being the risk to the donor.  Although I didn't express an opinion regarding transplantation or dialysis, I suggested that if opting for a transplant, we would strongly recommend a living donor transplant.       He stated he has potential donors.  His mother, who was with him, is interested in donating.  But she has hypertension and, given her age - 46, she would not be accepted for donation.      I also discussed the new ( donor) kidney (KDPI) scoring system. We discussed the advantages and disadvantages of accepting an \"expanded criteria\" donor kidney, and the latest data as to who potentially benefits - those >45 and/or having diabetes a cause for ESKD - by receiving an expanded criteria donor kidney versus waiting longer for a standard criteria donor. I recommended he say \"no\" to a high KDPI kidney.    I attempted to answer any remaining questions.  I also told him that should she have subsequent questions, him should feel free to contact us.  We would be glad to answer any questions either over the phone or at another clinic visit.  His transplant coordinator is Latonya Avila and may be reached at [phone].  Thank you for the opportunity to see him.     I spent 20 minutes with this patient.  Over 90% of that time was spent in counseling and coordination of care.      Thank you for the opportunity to participate in the care of this patient.      Total time: 20 minutes          Kevin Madlonado MD  Surgical Director, Kidney Transplantation                                                                   "                                    ---------------------------------------------------------------------------------------------------    Past medical history includes:  Hypertension  Smoking: rare  Blood tf: no  Alcohol: no  Recreational drugs: yes     Past Medical History:   Diagnosis Date     Adrenal adenoma, left      Anemia      Benign essential hypertension 07/28/2017     CKD (chronic kidney disease) stage 5, GFR less than 15 ml/min (H)     FSGS     Depression      Focal glomerular sclerosis 07/28/2017     HLD (hyperlipidemia)      LVH (left ventricular hypertrophy) due to hypertensive disease 07/14/2017     Noncompliance      Obesity, unspecified      Stress-induced cardiomyopathy      Suicide attempt (H) 2019     Past Surgical History:   Procedure Laterality Date     BIOPSY  2017    [facility]     NO HISTORY OF SURGERY       Family History   Problem Relation Age of Onset     Blood Disease Mother         has hep b     Diabetes Mother         gestionanal diabetes     Hypertension Mother      Obesity Father      Hypertension Father      Hypertension Maternal Grandmother      Diabetes Maternal Grandmother      Hypertension Paternal Grandmother      Hypertension Paternal Grandfather      Coronary Artery Disease Maternal Uncle      Cancer No family hx of      Social History     Socioeconomic History     Marital status: Single     Spouse name: Not on file     Number of children: Not on file     Years of education: Not on file     Highest education level: Not on file   Occupational History     Occupation:    Social Needs     Financial resource strain: Not on file     Food insecurity     Worry: Not on file     Inability: Not on file     Transportation needs     Medical: Not on file     Non-medical: Not on file   Tobacco Use     Smoking status: Never Smoker     Smokeless tobacco: Never Used   Substance and Sexual Activity     Alcohol use: No     Drug use: Yes     Types: Marijuana      Comment: occ     Sexual activity: Not Currently     Partners: Female   Lifestyle     Physical activity     Days per week: Not on file     Minutes per session: Not on file     Stress: Not on file   Relationships     Social connections     Talks on phone: Not on file     Gets together: Not on file     Attends Yazdanism service: Not on file     Active member of club or organization: Not on file     Attends meetings of clubs or organizations: Not on file     Relationship status: Not on file     Intimate partner violence     Fear of current or ex partner: Not on file     Emotionally abused: Not on file     Physically abused: Not on file     Forced sexual activity: Not on file   Other Topics Concern     Parent/sibling w/ CABG, MI or angioplasty before 65F 55M? Not Asked   Social History Narrative     Not on file       ROS:   CONSTITUTIONAL:  No fevers or chills  EYES: negative for icterus  ENT:  negative for hearing loss, tinnitus and sore throat  RESPIRATORY:  negative for cough, sputum, dyspnea  CARDIOVASCULAR:  negative for chest pain  GASTROINTESTINAL:  negative for nausea, vomiting, diarrhea or constipation  GENITOURINARY:  negative for incontinence, dysuria, bladder emptying problems  HEME:  No easy bruising  INTEGUMENT:  negative for rash and pruritus  NEURO:  Negative for headache, seizure disorder  Allergies:   Allergies   Allergen Reactions     No Known Allergies      Medications:  Prescription Medications as of 2/3/2021       Rx Number Disp Refills Start End Last Dispensed Date Next Fill Date Owning Pharmacy    acetaminophen (TYLENOL) 500 MG tablet    11/29/2020    [facility] [address]    Sig: Take 1-2 tablets (500-1,000 mg) by mouth every 4 hours as needed for fever or pain    Class: No Print Out    Route: Oral    amLODIPine (NORVASC) 10 MG tablet            Sig: Take 5 mg by mouth daily    Class: Historical    Route: Oral    apixaban ANTICOAGULANT (ELIQUIS) 2.5  MG tablet  180 tablet 1 12/24/2020    [place], [place] - [address] [facility]    Sig: Take 1 tablet (2.5 mg) by mouth 2 times daily    Class: E-Prescribe    Route: Oral    aspirin 81 MG EC tablet            Sig: Take 81 mg by mouth daily    Class: Historical    Route: Oral    atorvastatin (LIPITOR) 10 MG tablet  90 tablet 3 11/4/2020    [facility] [place], [place] - [address] [facility]    Sig: TAKE ONE TABLET BY MOUTH EVERY NIGHT AT BEDTIME    Class: E-Prescribe    carvedilol (COREG) 25 MG tablet  120 tablet 1 11/29/2020    [place], [place] - [address] [facility]    Sig: Take 2 tablets (50 mg) by mouth 2 times daily (with meals)    Class: E-Prescribe    Route: Oral    cloNIDine (CATAPRES) 0.3 MG tablet  60 tablet 1 10/6/2019    [place], [place] - [address] [facility]    Sig: Take 1 tablet (0.3 mg) by mouth 2 times daily    Class: E-Prescribe    Route: Oral    hydrALAZINE (APRESOLINE) 100 MG tablet  360 tablet 1 9/25/2020    [place], [place] - [address]    Sig: TAKE ONE TABLET BY MOUTH THREE TIMES A DAY    Class: E-Prescribe    Notes to Pharmacy: Profile Rx: patient will contact pharmacy when needed    sodium bicarbonate 650 MG tablet        [place], [place] - [address]    Sig: Take 1,300 mg by mouth 2 times daily    Class: Historical    Route: Oral    vitamin D3 (CHOLECALCIFEROL) 2000 units (50 mcg) tablet            Sig: Take 1 tablet by mouth daily    Class: Historical    Route: Oral        Exam:   Constitutional - A&O in NAD.   Eyes - no redness or discharge.  Sclera anicteric  Respiratory - no cough, no labored breathing  Musculoskeletal - range of motion normal  Skin - no discoloration, no jaundice  Neurological - no tremors.  No facial droop or dysarthria  Psychiatric - normal mood and affect  The rest of a comprehensive  physical examination is deferred due to PHE (public health emergency) video visit restrictions     Diagnostics:   Recent Results (from the past 672 hour(s))   Hemoglobin    Collection Time: 01/28/21  1:19 PM   Result Value Ref Range    Hemoglobin 10.3 (L) 13.3 - 17.7 g/dL     No results found for: CPRA

## 2021-02-03 NOTE — LETTER
02/05/21    Taylor Valiente  [address]   [place] [address]    Dear Taylor,    It was a pleasure to see you recently for consideration of kidney transplantation. Your pre-transplant evaluation results were reviewed at our Multidisciplinary Selection Committee. The committee is requesting the following items are completed before determining your candidacy:    1. Get your listing labs drawn as soon as possible. These are required to list you inactive.    2. Cardiology consult for risk assessment due to your history of hypertension.  3. Genetic Renal Panel- lab work to further evaluate your kidney disease.  4. Establish care with a mental health practitioner. Your primary care doctor can help you find a provider if needed.  5. Complete a neuropsychology evaluation to assess mental readiness for transplant/major surgery.  6. Establish care with endocrinology due to your history of adrenal adenoma.  7. You will need 2 negative COVID screens to be listed ACTIVE, please try to schedule your first test as soon as possible.  8. Successful adherence to compliance contract.    For any questions, please contact the Transplant Office at [phone], or contact me directly at [phone].    Sincerely,    Latonya Avila  Solid Organ Transplant  [facility]    CC: Dr. Jer Hooper MD; Dr. Guerda Urbano MD

## 2021-02-03 NOTE — COMMITTEE REVIEW
Abdominal Committee Review Note     Evaluation Date:   Committee Review Date: 2/3/2021    Organ being evaluated for: Kidney    Transplant Phase: Referral  Transplant Status: Active    Transplant Coordinator: Latonya Avila  Transplant Surgeon:       Referring Physician: Jer Hooper    Primary Diagnosis:   Secondary Diagnosis:     Committee Review Members:  Nephrology Fausto Whyte MD, Denton Talavera, APRN CNP, Stephanie Lynn MD, Fernando Estrada MD   Nurse Kamla Miller, RN, Vesna Chaney, RN, Yves Fountain, GALLITO, Latonya Avila, GALLITO   Nutrition Ankita Armando, STEPHEN   Pharmacist Mira Jack, [facility]    - Clinical Leslie Shrestha, [facility], Gunjan Beard, [facility]   Transplant Carmen Venegas PA-C, Jany Moses, GALLITO, Rizwana Martin, GALLITO, Tho Vuong MD, Gretta Benitez, RN, Cate Thomson, GALLITO, Marcy Baires RN       Transplant Eligibility: Irreversible chronic kidney disease treated w/dialysis or expected need for dialysis    Committee Review Decision: Approved    Relative Contraindications:     Absolute Contraindications:     Committee Chair Tho Vuong MD verbally attested to the committee's decision.    Committee Discussion Details: Patient was approved as a candidate for kidney transplant, pending the following items:  1. Compliance contract d/t non-adherence with hypertension medications   2. Establish care with endocrinologist to assess possible Cushing's disease.  3. Cardiology consult with stress ECHO.  4. Genetic renal panel due to previous biopsy results  5. Neuropsychology consult   6. Establish care with mental health provider due to previous suicide attempt.    Patient will be listed INACTIVE to gain wait time as he is not currently on dialysis.

## 2021-02-03 NOTE — Clinical Note
Please see orders for cardiology consult, neuropsych referral, endocrinology consult, and lab work. Thank you!

## 2021-02-03 NOTE — TELEPHONE ENCOUNTER
Spoke with patient regarding results of committee review and gave instructions on next steps. In discussion, patient was instructed to set up a COVID test as he needs 2 negative COVID screens. He will be listed INACTIVE. He was given a list of items that he would need to complete to be listed ACTIVE: adherence to compliance contract for 6 months, establish care with endorcrinologist d/t history of dysthymia, establish care with mental health provider d/t history of suicide attempt, neuropsych consult, genetic renal panel, and cardiology consult. Patient understands reasoning for all tests and compliance contract and is in agreement with the plan.

## 2021-02-08 ENCOUNTER — TELEPHONE (OUTPATIENT)
Dept: TRANSPLANT | Facility: CLINIC | Age: 26
End: 2021-02-08

## 2021-02-08 RX ORDER — CALCITRIOL 0.25 UG/1
0.25 CAPSULE, LIQUID FILLED ORAL
COMMUNITY
End: 2021-07-01

## 2021-02-08 NOTE — TELEPHONE ENCOUNTER
Called patient to remind him to get his listing labs drawn. Patient has a pre-op appointment for ankle surgery and has a lab appt for that, he also stated he is going to see his primary care doctor 2/15 and he can have his labs drawn then as well. RNCC directed patient to get them drawn wherever he can get them done faster so he can be listed inactive and start accruing wait time for kidney transplant. Patient stated he understood this and was agreeable to the plan.

## 2021-02-08 NOTE — PHARMACY - PREOPERATIVE ASSESSMENT CENTER
Anticoagulation Note - Preoperative Assessment Center (PAC) Pharmacist     Patient was interviewed on February 8, 2021 as a part of PAC clinic appointment. The purpose of this note is to document the perioperative anticoagulation plan outlined by the providers caring for Taylor Valiente.     Current Regimen  Anticoagulation Regimen as of February 8, 2021: Apixaban 2.5mg by mouth twice daily   Indication: post covid ppx  Prescriber:  Dr. Yost  Expected Duration of therapy: unknown     Perioperative plan  Taylor Valiente is scheduled for Left ankle arthroscopy and debridement on 2/24/21 with Dr. Nelson and the perioperative anticoagulation plan outlined by PAC is to hold apixaban 48 hours prior to the procedure. Last dose on 2/21/21. Patient will also hold aspirin 7 days prior to the procedure with last dose on 2/16/21.     Resumption of anticoagulation after procedure will be based on surgery team assessment of bleeding risks and complications.  This plan may require re-assessment and modification by his primary team in the perioperative setting depending on patients clinical situation.        Dmitri Ross RPH  February 8, 2021  12:09 PM

## 2021-02-08 NOTE — PROGRESS NOTES
Preoperative Assessment Center Medication History Note    Medication history completed on February 8, 2021 by this writer. See Epic admission navigator for prior to admission medications. Operating room staff will still need to confirm medications and last dose information on day of surgery.     Medication history interview sources:  Patient Interview    Changes made to PTA medication list (reason)  Added:   -- calcitriol    Deleted: None    Changed:   -- carvediolol updated to 25mg by mouth twice daily      Additional medication history information (including reliability of information, actions taken by pharmacist):    -- No recent (within 30 days) course of antibiotics  -- No recent (within 30 days) course of systemic steroids  -- Patient declines being on any other prescription or over-the-counter medications  -- patient on apixaban for post covid ppx so I'm not sure why he's still on it 30 days+. I asked him if he was informed as to how long he was suppose to be on it for and he reported no  -- patient on aspirin for preventative reason only.     Prior to Admission medications    Medication Sig Last Dose Taking? Auth Provider   amLODIPine (NORVASC) 10 MG tablet Take 10 mg by mouth daily  Taking Yes Unknown, Entered By History   apixaban ANTICOAGULANT (ELIQUIS) 2.5 MG tablet Take 1 tablet (2.5 mg) by mouth 2 times daily Taking Yes Priyank Lawrence MD   aspirin 81 MG EC tablet Take 81 mg by mouth daily Taking Yes Unknown, Entered By History   atorvastatin (LIPITOR) 10 MG tablet TAKE ONE TABLET BY MOUTH EVERY NIGHT AT BEDTIME Taking Yes Priyank Lawrence MD   calcitRIOL (ROCALTROL) 0.25 MCG capsule Take 0.25 mcg by mouth three times a week Monday, Wednesday, Friday Taking Yes Unknown, Entered By History   carvedilol (COREG) 25 MG tablet Take 2 tablets (50 mg) by mouth 2 times daily (with meals)  Patient taking differently: Take 25 mg by mouth 2 times daily (with meals)  Taking Yes Mike Tony MD   cloNIDine (CATAPRES)  0.3 MG tablet Take 1 tablet (0.3 mg) by mouth 2 times daily Taking Yes Martin Melgoza MD   hydrALAZINE (APRESOLINE) 100 MG tablet TAKE ONE TABLET BY MOUTH THREE TIMES A DAY Taking Yes Priyank Lawrence MD   sodium bicarbonate 650 MG tablet Take 1,300 mg by mouth 2 times daily Taking Yes Unknown, Entered By History   vitamin D3 (CHOLECALCIFEROL) 2000 units (50 mcg) tablet Take 1 tablet by mouth daily Taking Yes Unknown, Entered By History   acetaminophen (TYLENOL) 500 MG tablet Take 1-2 tablets (500-1,000 mg) by mouth every 4 hours as needed for fever or pain  Patient not taking: Reported on 2/8/2021 Not Taking  Mike Tony MD         Medication history completed by: Dmitri Ross [facility]

## 2021-02-09 ENCOUNTER — ANESTHESIA EVENT (OUTPATIENT)
Dept: SURGERY | Facility: CLINIC | Age: 26
End: 2021-02-09

## 2021-02-09 ENCOUNTER — OFFICE VISIT (OUTPATIENT)
Dept: SURGERY | Facility: CLINIC | Age: 26
End: 2021-02-09
Payer: COMMERCIAL

## 2021-02-09 ENCOUNTER — TELEPHONE (OUTPATIENT)
Dept: ORTHOPEDICS | Facility: CLINIC | Age: 26
End: 2021-02-09

## 2021-02-09 VITALS
BODY MASS INDEX: 34.13 KG/M2 | WEIGHT: 252 LBS | HEART RATE: 80 BPM | TEMPERATURE: 98.5 F | RESPIRATION RATE: 16 BRPM | DIASTOLIC BLOOD PRESSURE: 77 MMHG | SYSTOLIC BLOOD PRESSURE: 118 MMHG | HEIGHT: 72 IN | OXYGEN SATURATION: 99 %

## 2021-02-09 DIAGNOSIS — Z76.82 ORGAN TRANSPLANT CANDIDATE: ICD-10-CM

## 2021-02-09 DIAGNOSIS — N05.1 FOCAL GLOMERULAR SCLEROSIS: ICD-10-CM

## 2021-02-09 DIAGNOSIS — Z01.818 PRE-OP TESTING: Primary | ICD-10-CM

## 2021-02-09 DIAGNOSIS — N18.5 CKD (CHRONIC KIDNEY DISEASE) STAGE 5, GFR LESS THAN 15 ML/MIN (H): ICD-10-CM

## 2021-02-09 DIAGNOSIS — Z01.818 PREOP EXAMINATION: Primary | ICD-10-CM

## 2021-02-09 LAB
ABO + RH BLD: NORMAL
ALBUMIN SERPL-MCNC: 3.8 G/DL (ref 3.4–5)
ALBUMIN UR-MCNC: >499 MG/DL
ALP SERPL-CCNC: 71 U/L (ref 40–150)
ALT SERPL W P-5'-P-CCNC: 19 U/L (ref 0–70)
ANION GAP SERPL CALCULATED.3IONS-SCNC: 9 MMOL/L (ref 3–14)
APPEARANCE UR: ABNORMAL
APTT PPP: 35 SEC (ref 22–37)
AST SERPL W P-5'-P-CCNC: 6 U/L (ref 0–45)
BASOPHILS # BLD AUTO: 0.1 10E9/L (ref 0–0.2)
BASOPHILS NFR BLD AUTO: 0.6 %
BILIRUB SERPL-MCNC: 0.2 MG/DL (ref 0.2–1.3)
BILIRUB UR QL STRIP: NEGATIVE
BLD GP AB SCN SERPL QL: NORMAL
BLOOD BANK CMNT PATIENT-IMP: NORMAL
BLOOD BANK CMNT PATIENT-IMP: NORMAL
BUN SERPL-MCNC: 81 MG/DL (ref 7–30)
CALCIUM SERPL-MCNC: 9.8 MG/DL (ref 8.5–10.1)
CHLORIDE SERPL-SCNC: 110 MMOL/L (ref 94–109)
CO2 SERPL-SCNC: 23 MMOL/L (ref 20–32)
COLOR UR AUTO: YELLOW
CREAT SERPL-MCNC: 7.99 MG/DL (ref 0.66–1.25)
DIFFERENTIAL METHOD BLD: ABNORMAL
EOSINOPHIL # BLD AUTO: 0.5 10E9/L (ref 0–0.7)
EOSINOPHIL NFR BLD AUTO: 5.1 %
ERYTHROCYTE [DISTWIDTH] IN BLOOD BY AUTOMATED COUNT: 13.1 % (ref 10–15)
GFR SERPL CREATININE-BSD FRML MDRD: 8 ML/MIN/{1.73_M2}
GLUCOSE SERPL-MCNC: 101 MG/DL (ref 70–99)
GLUCOSE UR STRIP-MCNC: NEGATIVE MG/DL
GRAN CASTS #/AREA URNS LPF: 4 /LPF
HBV CORE AB SERPL QL IA: NONREACTIVE
HBV SURFACE AB SERPL IA-ACNC: 104.51 M[IU]/ML
HBV SURFACE AG SERPL QL IA: NONREACTIVE
HCT VFR BLD AUTO: 33.4 % (ref 40–53)
HCV AB SERPL QL IA: NONREACTIVE
HGB BLD-MCNC: 10.5 G/DL (ref 13.3–17.7)
HGB UR QL STRIP: NEGATIVE
HIV 1+2 AB+HIV1 P24 AG SERPL QL IA: NONREACTIVE
IMM GRANULOCYTES # BLD: 0 10E9/L (ref 0–0.4)
IMM GRANULOCYTES NFR BLD: 0.2 %
INR PPP: 1.23 (ref 0.86–1.14)
KETONES UR STRIP-MCNC: NEGATIVE MG/DL
LEUKOCYTE ESTERASE UR QL STRIP: ABNORMAL
LYMPHOCYTES # BLD AUTO: 1.6 10E9/L (ref 0.8–5.3)
LYMPHOCYTES NFR BLD AUTO: 18.3 %
MCH RBC QN AUTO: 26.6 PG (ref 26.5–33)
MCHC RBC AUTO-ENTMCNC: 31.4 G/DL (ref 31.5–36.5)
MCV RBC AUTO: 85 FL (ref 78–100)
MONOCYTES # BLD AUTO: 0.7 10E9/L (ref 0–1.3)
MONOCYTES NFR BLD AUTO: 7.9 %
MUCOUS THREADS #/AREA URNS LPF: PRESENT /LPF
NEUTROPHILS # BLD AUTO: 6 10E9/L (ref 1.6–8.3)
NEUTROPHILS NFR BLD AUTO: 67.9 %
NITRATE UR QL: NEGATIVE
NRBC # BLD AUTO: 0 10*3/UL
NRBC BLD AUTO-RTO: 0 /100
PH UR STRIP: 5 PH (ref 5–7)
PHOSPHATE SERPL-MCNC: 4.4 MG/DL (ref 2.5–4.5)
PLATELET # BLD AUTO: 307 10E9/L (ref 150–450)
POTASSIUM SERPL-SCNC: 4.2 MMOL/L (ref 3.4–5.3)
PROT SERPL-MCNC: 7.6 G/DL (ref 6.8–8.8)
RBC # BLD AUTO: 3.95 10E12/L (ref 4.4–5.9)
RBC #/AREA URNS AUTO: 2 /HPF (ref 0–2)
SODIUM SERPL-SCNC: 142 MMOL/L (ref 133–144)
SOURCE: ABNORMAL
SP GR UR STRIP: 1.01 (ref 1–1.03)
SPECIMEN EXP DATE BLD: NORMAL
SPECIMEN EXP DATE BLD: NORMAL
T PALLIDUM AB SER QL: NONREACTIVE
UROBILINOGEN UR STRIP-MCNC: 0 MG/DL (ref 0–2)
WBC # BLD AUTO: 8.9 10E9/L (ref 4–11)
WBC #/AREA URNS AUTO: 4 /HPF (ref 0–5)

## 2021-02-09 PROCEDURE — 81241 F5 GENE: CPT | Performed by: PATHOLOGY

## 2021-02-09 PROCEDURE — 86803 HEPATITIS C AB TEST: CPT | Performed by: PATHOLOGY

## 2021-02-09 PROCEDURE — 86706 HEP B SURFACE ANTIBODY: CPT | Mod: 90 | Performed by: PATHOLOGY

## 2021-02-09 PROCEDURE — 36415 COLL VENOUS BLD VENIPUNCTURE: CPT | Performed by: PATHOLOGY

## 2021-02-09 PROCEDURE — 86644 CMV ANTIBODY: CPT | Performed by: PATHOLOGY

## 2021-02-09 PROCEDURE — 86900 BLOOD TYPING SEROLOGIC ABO: CPT | Performed by: PATHOLOGY

## 2021-02-09 PROCEDURE — 99000 SPECIMEN HANDLING OFFICE-LAB: CPT | Performed by: PATHOLOGY

## 2021-02-09 PROCEDURE — 86147 CARDIOLIPIN ANTIBODY EA IG: CPT | Performed by: PATHOLOGY

## 2021-02-09 PROCEDURE — G0452 MOLECULAR PATHOLOGY INTERPR: HCPCS | Mod: 90 | Performed by: PATHOLOGY

## 2021-02-09 PROCEDURE — 86787 VARICELLA-ZOSTER ANTIBODY: CPT | Performed by: PATHOLOGY

## 2021-02-09 PROCEDURE — 86850 RBC ANTIBODY SCREEN: CPT | Performed by: PATHOLOGY

## 2021-02-09 PROCEDURE — 86704 HEP B CORE ANTIBODY TOTAL: CPT | Performed by: PATHOLOGY

## 2021-02-09 PROCEDURE — 81001 URINALYSIS AUTO W/SCOPE: CPT | Performed by: PATHOLOGY

## 2021-02-09 PROCEDURE — 86665 EPSTEIN-BARR CAPSID VCA: CPT | Performed by: PATHOLOGY

## 2021-02-09 PROCEDURE — 86481 TB AG RESPONSE T-CELL SUSP: CPT | Performed by: PATHOLOGY

## 2021-02-09 PROCEDURE — 86886 COOMBS TEST INDIRECT TITER: CPT | Performed by: PATHOLOGY

## 2021-02-09 PROCEDURE — 85025 COMPLETE CBC W/AUTO DIFF WBC: CPT | Performed by: PATHOLOGY

## 2021-02-09 PROCEDURE — 85730 THROMBOPLASTIN TIME PARTIAL: CPT | Performed by: PATHOLOGY

## 2021-02-09 PROCEDURE — 85670 THROMBIN TIME PLASMA: CPT | Performed by: PATHOLOGY

## 2021-02-09 PROCEDURE — 80053 COMPREHEN METABOLIC PANEL: CPT | Performed by: PATHOLOGY

## 2021-02-09 PROCEDURE — 86905 BLOOD TYPING RBC ANTIGENS: CPT | Performed by: PATHOLOGY

## 2021-02-09 PROCEDURE — 84100 ASSAY OF PHOSPHORUS: CPT | Performed by: PATHOLOGY

## 2021-02-09 PROCEDURE — 86780 TREPONEMA PALLIDUM: CPT | Performed by: PATHOLOGY

## 2021-02-09 PROCEDURE — 81240 F2 GENE: CPT | Mod: 90 | Performed by: PATHOLOGY

## 2021-02-09 PROCEDURE — 99203 OFFICE O/P NEW LOW 30 MIN: CPT | Performed by: CLINICAL NURSE SPECIALIST

## 2021-02-09 PROCEDURE — 85610 PROTHROMBIN TIME: CPT | Performed by: PATHOLOGY

## 2021-02-09 PROCEDURE — 86901 BLOOD TYPING SEROLOGIC RH(D): CPT | Performed by: PATHOLOGY

## 2021-02-09 PROCEDURE — 87340 HEPATITIS B SURFACE AG IA: CPT | Performed by: PATHOLOGY

## 2021-02-09 ASSESSMENT — LIFESTYLE VARIABLES: TOBACCO_USE: 0

## 2021-02-09 ASSESSMENT — MIFFLIN-ST. JEOR: SCORE: 2171.06

## 2021-02-09 ASSESSMENT — PAIN SCALES - GENERAL: PAINLEVEL: NO PAIN (0)

## 2021-02-09 NOTE — ANESTHESIA PREPROCEDURE EVALUATION
Anesthesia Pre-Procedure Evaluation    Patient: Taylor Valiente   MRN: [id number] : 1995        Preoperative Diagnosis: * No surgery found *   Procedure :      Past Medical History:   Diagnosis Date     Adrenal adenoma, left      Anemia      Benign essential hypertension 2017     CKD (chronic kidney disease) stage 5, GFR less than 15 ml/min (H)     FSGS     Depression      Focal glomerular sclerosis 2017     HLD (hyperlipidemia)      LVH (left ventricular hypertrophy) due to hypertensive disease 2017     Noncompliance      Obesity, unspecified      OD (osteochondritis dissecans) 2021     Stress-induced cardiomyopathy      Suicide attempt (H)       Past Surgical History:   Procedure Laterality Date     BIOPSY  2017    renal- [facility]     NO HISTORY OF SURGERY        Allergies   Allergen Reactions     No Known Allergies       Social History     Tobacco Use     Smoking status: Never Smoker     Smokeless tobacco: Never Used   Substance Use Topics     Alcohol use: No      Wt Readings from Last 1 Encounters:   21 114.3 kg (252 lb)        Anesthesia Evaluation   Pt has not had prior anesthetic         ROS/MED HX  ENT/Pulmonary:     (+) FRANCIA risk factors, snores loudly, hypertension,  (-) tobacco use   Neurologic:  - neg neurologic ROS     Cardiovascular:     (+) Dyslipidemia hypertension-range: 120-130/70-85/ ----Taking blood thinners Pt has received instructions: Instructions Given to patient: Will hold aspirin for 7 days prior to surgery, will hold apixaban 48 hours prior. CHF etiology: stress cardiomyopathy  Last EF: 55-60% date: 2020 Previous cardiac testing   Echo: Date: 2020 Results:    Stress Test: Date: Results:    ECG Reviewed: Date: 20 Results:  SR, nonspecific T wave abnormality  Cath: Date: Results:      METS/Exercise Tolerance: >4 METS    Hematologic:     (+) anemia,  (-) history of blood transfusion   Musculoskeletal: Comment: Left ankle  osteochondritis dissecans      GI/Hepatic:  - neg GI/hepatic ROS     Renal/Genitourinary:     (+) renal disease, type: ESRD, Pt does not require dialysis,  (-) History of transplant   Endo: Comment: Adrenal adenoma. Reported history of high Cortisol.     (+) Obesity,     Psychiatric/Substance Use:     (+) psychiatric history : Occ marijuana. (-) alcohol abuse history   Infectious Disease:  - neg infectious disease ROS     Malignancy:  - neg malignancy ROS     Other:     (-) Any chance pregnant       Physical Exam    Airway        Mallampati: III   TM distance: > 3 FB   Neck ROM: full   Mouth opening: > 3 cm    Respiratory Devices and Support         Dental  no notable dental history         Cardiovascular          Rhythm and rate: regular and normal     Pulmonary           breath sounds clear to auscultation           OUTSIDE LABS:  CBC:   Lab Results   Component Value Date    WBC 4.6 11/29/2020    WBC 6.3 11/28/2020    HGB 10.3 (L) 01/28/2021    HGB 9.2 (L) 12/22/2020    HCT 29.8 (L) 12/22/2020    HCT 33.7 (L) 11/29/2020     (L) 11/29/2020     (L) 11/28/2020     BMP:   Lab Results   Component Value Date     11/29/2020     11/28/2020    POTASSIUM 4.4 12/15/2020    POTASSIUM 4.8 11/29/2020    CHLORIDE 113 (H) 11/29/2020    CHLORIDE 113 (H) 11/28/2020    CO2 21 11/29/2020    CO2 20 11/28/2020    BUN 80 (H) 11/29/2020    BUN 81 (H) 11/28/2020    CR 6.97 (H) 12/15/2020    CR 6.93 (H) 11/29/2020     (H) 12/15/2020     (H) 11/29/2020     COAGS:   Lab Results   Component Value Date    PTT 34 07/18/2017    INR 1.04 07/18/2017     POC: No results found for: BGM, HCG, HCGS  HEPATIC:   Lab Results   Component Value Date    ALBUMIN 3.3 (L) 11/27/2020    PROTTOTAL 7.4 11/27/2020    ALT 34 11/27/2020    AST 25 11/27/2020    ALKPHOS 63 11/27/2020    BILITOTAL 0.3 11/27/2020     OTHER:   Lab Results   Component Value Date    BUBBA 8.1 (L) 11/29/2020    PHOS 5.3 (H) 10/07/2020    MAG 2.2  11/27/2020    TSH 4.88 (H) 07/14/2017    T4 1.26 07/15/2017    CRP 7.9 11/29/2020       Anesthesia Plan    ASA Status:  4   NPO Status:  NPO Appropriate    Anesthesia Type: General.     - Airway: LMA   Induction: Intravenous.   Maintenance: Balanced.        Consents    Anesthesia Plan(s) and associated risks, benefits, and realistic alternatives discussed. Questions answered and patient/representative(s) expressed understanding.     - Discussed with:  Patient      - Extended Intubation/Ventilatory Support Discussed: no Extended Intubation.      - Patient is DNR/DNI Status: No    Use of blood products discussed: No .     Postoperative Care    Pain management: IV analgesics.   PONV prophylaxis: Ondansetron (or other 5HT-3), Dexamethasone or Solumedrol     Comments:              PAC Discussion and Assessment    ASA Classification: 3  Case is suitable for: [facility]  Anesthetic techniques and relevant risks discussed: GA, Regional, MAC with GA as backup and GA with regional block for post-op pain control  Invasive monitoring and risk discussed: No    Possibility and Risk of blood transfusion discussed: No            PAC Resident/NP Anesthesia Assessment: Taylor Valiente is a 25 year old male scheduled to undergo  Left ankle arthroscopy and debridement with Dr. Nelson on 2/24/21. He has the following specific operative considerations:   - RCRI : 0.9% risk of major adverse cardiac event.   - VTE risk: 0.5%  - FRANCIA # of risks 3/8 = Intermediate risk  - Risk of PONV score = 2.  If > 2, anti-emetic intervention recommended.    --Left ankle pain with findings of osteochondritis dissecans. Above procedure now planned.   --ESRD with AVF also planned for dialysis access at [facility] on 3/4/21. Undergoing transplant evaluation. Last Cr in 7.50.   --History adrenal adenoma(s). Has seen Endocrinologist at OSH a year ago with elevated Cortisol. Has been referred back for pretransplant evaluation.   --HLD, atorvastatin at HS. HTN.  Will take amlodipine, Coreg, clonidine, and hydralazine. ASA 81 mg daily with planned hold for 7 days prior to surgery. History of stress cardiomyopathy 2017, with improved EF on echo from 9/10/20 55-60%. Denies cardiac symptoms. Good exercise tolerance. Has been referred to Cardiology by transplant team for pre transplant evaluation, not scheduled yet.   --Nonsmoker. Denies pulmonary symptoms. Intermediate risk for FRANCIA.  --Continues on apixaban after COVID 19 infection in 11/2020. Will hold for 48 hours prior to ankle surgery.     Arrival time, NPO, shower and medication instructions provided by nursing staff today for his ankle surgery.     Informed patient that he will receive further instructions from [facility] preadmissions staff prior to AVF procedure. Instructed him to make sure he knows what Dr. Moran would prefer regarding his apixaban and aspirin for that procedure. Message also sent to Dr. Moran.       Patient was discussed with Dr Johnson.    Reviewed and Signed by PAC Mid-Level Provider/Resident  Mid-Level Provider/Resident: ROSA Reyna, CNS  Date: 2/9/21  Time: 9:28am                               ROSA Lara CNS

## 2021-02-09 NOTE — PATIENT INSTRUCTIONS
Preparing for Your Surgery      Name:  Taylor Valiente   MRN:  [id number]   :  1995   Today's Date:  2021       Arriving for surgery:  Surgery date:  21  Arrival time:  5:30AM    Restrictions due to COVID 19:  One consistent visitor per patient is allowed  No ill visitors  All visitors must wear face mask     parking is available for anyone with mobility limitations or disabilities.  ([facility]  24 hours/ 7 days a week; [facility]  7 am- 3:30 pm, Mon- Fri)    Please come to:     [facility] [address]  [address] [place]  [address]    -Proceed to the 3rd floor, check in at the Adult Surgery Waiting Lounge. [phone]    If an escort is needed stop at the Information Desk in the lobby. Inform the information person that you are here for surgery. An escort to the Adult Surgery Waiting Lounge will be provided.       Special Note:  Please make sure you check with Dr. Moran and that you are aware of when to hold the Aspirin and Apixaban(Eliquis) for your upcoming surgery for [place] at [facility].     What can I eat or drink?  -  You may eat and drink normally for up to 8 hours before your surgery. (Until 21, 11:30PM)  -  You may have clear liquids until 2 hours before surgery. (Until 21, 5:30AM)    Examples of clear liquids:  Water  Clear broth  Juices (apple, white grape, white cranberry  and cider) without pulp  Noncarbonated, powder based beverages  (lemonade and Tk-Aid)  Sodas (Sprite, 7-Up, ginger ale and seltzer)  Coffee or tea (without milk or cream)  Gatorade    -  No Alcohol for at least 24 hours before surgery     Which medicines can I take?    Please Hold Apixaban(Eliquis) for 48 hours prior to surgery, last dose 21.   Hold Aspirin for 7 days before surgery.   Hold Multivitamins for 7 days before surgery.  Hold Supplements for 7 days before surgery.  Hold Ibuprofen (Advil, Motrin) for 1 day before  surgery--unless otherwise directed by surgeon.  Hold Naproxen (Aleve) for 4 days before surgery.    -  DO NOT take these medications the day of surgery:    Calcitriol(Rocaltrol)   Vitamin D3    -  PLEASE TAKE these medications the day of surgery:    Amlodipine(Norvasc)   Carvedilol(Coreg)    Clonidine(Catapres)   Hydralazine(Apresoline)    Sodium Bicarbonate   Acetaminophen(Tylenol) as needed    How do I prepare myself?  - Please take 2 showers before surgery using Scrubcare or Hibiclens soap.    Use this soap only from the neck to your toes.     Leave the soap on your skin for one minute--then rinse thoroughly.      You may use your own shampoo and conditioner; no other hair products.   - Please remove all jewelry and body piercings.  - No lotions, deodorants or fragrance.  - Bring your ID and insurance card.    - All patients are required to have a Covid-19 test within 4 days of surgery/procedure.      -Patients will be contacted by the [facility] scheduling team within 1 week of surgery to make an appointment.      - Patients may call the Scheduling team at [phone] if they have not been scheduled within 4 days of  surgery.      ALL PATIENTS GOING HOME THE SAME DAY OF SURGERY ARE REQUIRED TO HAVE A RESPONSIBLE ADULT TO DRIVE AND BE IN ATTENDANCE WITH THEM FOR 24 HOURS FOLLOWING SURGERY     Questions or Concerns:    - For any questions regarding the day of surgery or your hospital stay, please contact the Pre Admission Nursing Office at [phone].       - If you have health changes between today and your surgery please call your surgeon.       For questions after surgery please call your surgeons office.

## 2021-02-09 NOTE — H&P
Pre-Operative H & P     CC:  Preoperative exam to assess for increased cardiopulmonary risk while undergoing surgery and anesthesia.    Date of Encounter: 2/9/2021  Primary Care Physician:  Jer Hooper  Reason for visit: OD (osteochondritis dissecans) [M93.20]  Reason for visit: End stage renal disease (H) [N18.6]  ZEB Valiente is a 25 year old male who presents for pre-operative H & P in preparation for Left ankle arthroscopy and debridement with Dr. Nelson on 2/24/21 at [facility]. This will be later followed by right brachial-cephalic AVF on 3/4/21 by Dr. Moran at [facility]. History is obtained from the patient and medical records.     Patient who was recently evaluated by Dr. Nelson for left ankle pain after slipping on some water at work. He was evaluated with plain x-rays and MRI, and was diagnosed with an osteochondral defect at the dome of the talus. The patient has been placed in a CAM walker. His pain has not improved much over time. His imaging was reviewed and he was counseled for above procedures.     His history is otherwise significant for chronic renal failure due to hypertensive focal segmental glomerulosclerosis (FSGS). He is under evaluation for kidney transplant in the future. He is in need of long term dialysis access and has also been evaluated by Dr. Moran for AVF planned as above. The patient has adrenal adenoma(s) with suspicion for Cushings and has been referred to Endocrinologist for further evaluation. He was first seen by outside Endocrinologist a year ago with reportedly elevated cortisol. Notes are not available for review. In addition, he has history of stress cardiomyopathy in 2017, since improved but has been referred to Cardiology for evaluation prior to transplant.     He was hospitalized in November 2020 for COVID-19 infection and empirically started on Eliquis at that time.     Today patient denies fever, cough, shortness  of breath, chest pain, irregular HR, or ankle edema.     Past Medical History  Past Medical History:   Diagnosis Date     Adrenal adenoma, left      Anemia      Benign essential hypertension 07/28/2017     CKD (chronic kidney disease) stage 5, GFR less than 15 ml/min (H)     FSGS     Depression      Focal glomerular sclerosis 07/28/2017     HLD (hyperlipidemia)      LVH (left ventricular hypertrophy) due to hypertensive disease 07/14/2017     Noncompliance      Obesity, unspecified      OD (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/2021     Stress-induced cardiomyopathy      Suicide attempt (H) 2019       Past Surgical History  Past Surgical History:   Procedure Laterality Date     BIOPSY  2017    renal- [facility]     NO HISTORY OF SURGERY         Hx of Blood transfusions/reactions: Denies.      Hx of abnormal bleeding or anti-platelet use: Anticoagulated on Eliquis, ASA 81 mg daily.     Menstrual history: No LMP for male patient.    Steroid use in the last year: Denies.     Personal or FH with difficulty with Anesthesia:  Denies.     Prior to Admission Medications  Current Outpatient Medications   Medication Sig Dispense Refill     acetaminophen (TYLENOL) 500 MG tablet Take 1-2 tablets (500-1,000 mg) by mouth every 4 hours as needed for fever or pain (Patient not taking: Reported on 2/8/2021)       amLODIPine (NORVASC) 10 MG tablet Take 10 mg by mouth daily        apixaban ANTICOAGULANT (ELIQUIS) 2.5 MG tablet Take 1 tablet (2.5 mg) by mouth 2 times daily 180 tablet 1     aspirin 81 MG EC tablet Take 81 mg by mouth daily       atorvastatin (LIPITOR) 10 MG tablet TAKE ONE TABLET BY MOUTH EVERY NIGHT AT BEDTIME 90 tablet 3     calcitRIOL (ROCALTROL) 0.25 MCG capsule Take 0.25 mcg by mouth three times a week Monday, Wednesday, Friday       carvedilol (COREG) 25 MG tablet Take 2 tablets (50 mg) by mouth 2 times daily (with meals) (Patient taking differently: Take 25 mg by mouth 2 times daily (with meals) ) 120 tablet  1     cloNIDine (CATAPRES) 0.3 MG tablet Take 1 tablet (0.3 mg) by mouth 2 times daily 60 tablet 1     hydrALAZINE (APRESOLINE) 100 MG tablet TAKE ONE TABLET BY MOUTH THREE TIMES A  tablet 1     sodium bicarbonate 650 MG tablet Take 1,300 mg by mouth 2 times daily       vitamin D3 (CHOLECALCIFEROL) 2000 units (50 mcg) tablet Take 1 tablet by mouth daily         Allergies  Allergies   Allergen Reactions     No Known Allergies        Social History  Social History     Socioeconomic History     Marital status: Single     Spouse name: Not on file     Number of children: Not on file     Years of education: Not on file     Highest education level: Not on file   Occupational History     Occupation:    Social Needs     Financial resource strain: Not on file     Food insecurity     Worry: Not on file     Inability: Not on file     Transportation needs     Medical: Not on file     Non-medical: Not on file   Tobacco Use     Smoking status: Never Smoker     Smokeless tobacco: Never Used   Substance and Sexual Activity     Alcohol use: No     Drug use: Yes     Types: Marijuana     Comment: occ     Sexual activity: Not Currently     Partners: Female   Lifestyle     Physical activity     Days per week: Not on file     Minutes per session: Not on file     Stress: Not on file   Relationships     Social connections     Talks on phone: Not on file     Gets together: Not on file     Attends Taoism service: Not on file     Active member of club or organization: Not on file     Attends meetings of clubs or organizations: Not on file     Relationship status: Not on file     Intimate partner violence     Fear of current or ex partner: Not on file     Emotionally abused: Not on file     Physically abused: Not on file     Forced sexual activity: Not on file   Other Topics Concern     Parent/sibling w/ CABG, MI or angioplasty before 65F 55M? Not Asked   Social History Narrative     Not on file       Family  "History  Family History   Problem Relation Age of Onset     Blood Disease Mother         has hep b     Diabetes Mother         gestionanal diabetes     Hypertension Mother      Obesity Father      Hypertension Father      Hypertension Maternal Grandmother      Diabetes Maternal Grandmother      Hypertension Paternal Grandmother      Hypertension Paternal Grandfather      Coronary Artery Disease Maternal Uncle      Cancer No family hx of        ROS/MED HISTORY  The complete review of systems is negative other than noted in the HPI or here.     ENT/Pulmonary:     (+) FRANCIA risk factors, snores loudly, hypertension,  (-) tobacco use   Neurologic:  - neg neurologic ROS     Cardiovascular:     (+) Dyslipidemia hypertensio-range: 120-130/70-85/ ----Taking blood thinners Pt has received instructions: Instructions Given to patient: Will hold aspirin for 7 days prior to surgery, will hold apixaban 48 hours prior. CHF etiology: stress cardiomyopathy 2017 Last EF: 55-60% date: 9/2020 Previous cardiac testing   Echo: Date: 9/2020 Results:    Stress Test: Date: Results:    ECG Reviewed: Date: 11/27/20 Results:  SR, nonspecific T wave abnormality  Cath: Date: Results:      METS/Exercise Tolerance: >4 METS    Hematologic:  - neg hematologic  ROS     Musculoskeletal: Comment: Left ankle osteochondritis dissecans      GI/Hepatic:  - neg GI/hepatic ROS     Renal/Genitourinary:     (+) renal disease, type: ESRD, Pt does not require dialysis,  (-) History of transplant   Endo:     (+) Obesity,     Psychiatric/Substance Use:     (+) psychiatric history : Occ marijuana. (-) alcohol abuse history   Infectious Disease:  - neg infectious disease ROS     Malignancy:  - neg malignancy ROS     Other:     (-) Any chance pregnant       Temp: 98.5  F (36.9  C) Temp src: Oral BP: 118/77 Pulse: 80   Resp: 16 SpO2: 99 %         252 lbs 0 oz  6' 0\"[pt reported[   Body mass index is 34.18 kg/m .       Physical Exam  Constitutional: Awake, alert, " cooperative, no apparent distress, and appears stated age. Accompanied by family member.   Eyes: Pupils equal, round and reactive to light, extra ocular muscles intact, sclera clear, conjunctiva normal.  HENT: Normocephalic, oral pharynx with moist mucus membranes, good dentition. Cheek fullness. No goiter appreciated.   Respiratory: Clear to auscultation bilaterally, no crackles or wheezing. No cough or obvious dyspnea.  Cardiovascular: Regular rate and rhythm, normal S1 and S2, and no murmur noted.  Carotids +2, no bruits. No edema. Palpable pulses to radial  DP and PT arteries.   GI: Normal bowel sounds, soft, non-distended, non-tender, no masses palpated.  Lymph/Hematologic: No cervical lymphadenopathy and no supraclavicular lymphadenopathy.  Genitourinary: Deferred.   Skin: Warm and dry.    Musculoskeletal: Full ROM of neck. There is no redness, warmth, or swelling of the joints. Gross motor strength is normal. Boot to left ankle.  Neurologic: Awake, alert, oriented to name, place and time. Cranial nerves II-XII are grossly intact. Gait is normal.   Neuropsychiatric: Calm, cooperative. Normal affect.     Labs: (personally reviewed) Labs to be updated tomorrow at Nephrology appt.   Last labs: 1/28/1  Hgb 10.3  OSH 1/14/21    Calcium 9.4  Glu 95  Cr 7.50  GFR 9-11  Na 138  K 4.4  Cl 107  Phosphate 5.6  EKG: Personally reviewed 11/27/20 Sinus rhythm, nonspecific T wave abnormality  Cardiac echo:  9/10/20  Interpretation Summary     The visual ejection fraction is estimated at 55-60%.  There is mild to moderate concentric left ventricular hypertrophy.  Regional wall motion abnormalities cannot be excluded due to limited  visualization.  There was essentially no subcostal acoustic window. Technically difficult,  suboptimal study.    12/29/20 MR ankle, left                                                                   IMPRESSION:  1. Moderate to large size left ankle joint effusion with synovitis.  2.  Extensive bone marrow edema within the body of the left ankle talus with an osteochondral lesion along the superior medial aspect of the talar dome, measuring 5 mm in transverse dimension and 10 mm in AP dimension. There is mild subchondral bone collapse. Subtle linear low signal line adjacent to the osteochondral lesion in the region of the extensive bone marrow edema, related to the osteochondral lesion versus a nondisplaced fracture.  3. Mild tendinosis of the distal Achilles tendon with minimal  increased intrasubstance signal. The remaining tendinous and  ligamentous structures about the ankle are intact.  12/21/20 Left ankle Xray                                                                   IMPRESSION: There is an osteochondral defect at the medial talar dome.  This could represent an acute osteochondral fracture, versus  osteochondritis dissecans. I suspect a tibiotalar joint effusion.   Imaging and cardiac testing reviewed by this provider    Outside records reviewed from: Care Everywhere    ASSESSMENT and PLAN  Taylor Valiente is a 25 year old male scheduled to undergo  Left ankle arthroscopy and debridement with Dr. Nelson on 2/24/21. He has the following specific operative considerations:   - RCRI : 0.9% risk of major adverse cardiac event.   - Anesthesia considerations:  Refer to PAC assessment in anesthesia records  - VTE risk: 0.5%  - FRANCIA # of risks 3/8 = Intermediate risk  - Risk of PONV score = 2.  If > 2, anti-emetic intervention recommended.    --Left ankle pain with findings of osteochondritis dissecans. Above procedure now planned.   --ESRD with AVF also planned for dialysis access at [facility] on 3/4/21. Undergoing transplant evaluation. Last Cr in 7.50.   --History adrenal adenoma(s). Has seen Endocrinologist at OSH a year ago with elevated Cortisol. Has been referred back for pretransplant evaluation.   --HLD, atorvastatin at HS. HTN. Will take amlodipine, Coreg, clonidine, and  hydralazine. ASA 81 mg daily with planned hold for 7 days prior to surgery. History of stress cardiomyopathy 2017, with improved EF on echo from 9/10/20 55-60%. Denies cardiac symptoms. Good exercise tolerance. Has been referred to Cardiology by transplant team for pre transplant evaluation, not scheduled yet.   --Nonsmoker. Denies pulmonary symptoms. Intermediate risk for FRANCIA.  --Continues on apixaban after COVID 19 infection in 11/2020. Will hold for 48 hours prior to ankle surgery.     Arrival time, NPO, shower and medication instructions provided by nursing staff today for his ankle surgery.     Informed patient that he will receive further instructions from [facility] preadmissions staff prior to AVF procedure. Instructed him to make sure he knows what Dr. Moran would prefer regarding his apixaban and aspirin for that procedure. Message also sent to Dr. Moran.       Patient was discussed with Dr Johnson.    ROSA Lara CNS  Preoperative Assessment Center  [facility]  Phone: [phone]  Fax: [phone]

## 2021-02-09 NOTE — TELEPHONE ENCOUNTER
Message from PAC clinic came regarding pt's need for COVID test, since surgery is scheduled 92 days from pt's positive COVID test.  Order was entered and pt was given number to schedule the lab test.  Samaria Logan RN

## 2021-02-10 LAB
BLD GP AB SCN TITR SERPL: NORMAL {TITER}
CARDIOLIPIN ANTIBODY IGG: <1.6 GPL-U/ML (ref 0–19.9)
CARDIOLIPIN ANTIBODY IGM: 0.2 MPL-U/ML (ref 0–19.9)
CMV IGG SERPL QL IA: >8 AI (ref 0–0.8)
EBV VCA IGG SER QL IA: >8 AI (ref 0–0.8)
GAMMA INTERFERON BACKGROUND BLD IA-ACNC: 0.05 IU/ML
LA PPP-IMP: ABNORMAL
M TB IFN-G CD4+ BCKGRND COR BLD-ACNC: 9.95 IU/ML
M TB TUBERC IFN-G BLD QL: NEGATIVE
MITOGEN IGNF BCKGRD COR BLD-ACNC: 0.01 IU/ML
MITOGEN IGNF BCKGRD COR BLD-ACNC: 0.04 IU/ML
THROMBIN TIME: 16.7 SEC (ref 13–19)
VZV IGG SER QL IA: 2.5 AI (ref 0–0.8)

## 2021-02-11 ENCOUNTER — TELEPHONE (OUTPATIENT)
Dept: TRANSPLANT | Facility: CLINIC | Age: 26
End: 2021-02-11

## 2021-02-11 DIAGNOSIS — N18.6 END STAGE RENAL DISEASE (H): ICD-10-CM

## 2021-02-11 DIAGNOSIS — Z76.82 ORGAN TRANSPLANT CANDIDATE: Primary | ICD-10-CM

## 2021-02-11 LAB
A* LOCUS: NORMAL
A*: NORMAL
ABTEST METHOD: NORMAL
B* LOCUS: NORMAL
B*: NORMAL
BW-1: NORMAL
BW-2: NORMAL
C* LOCUS: NORMAL
C*: NORMAL
DPA1* LOCUS NMDP: NORMAL
DPA1* NMDP: NORMAL
DPA1*: NORMAL
DPA1*LOCUS: NORMAL
DPB1* LOCUS NMDP: NORMAL
DPB1* NMDP: NORMAL
DPB1*: NORMAL
DPB1*LOCUS: NORMAL
DQA1*LOCUS: NORMAL
DQB1* LOCUS: NORMAL
DRB1* LOCUS: NORMAL
DRB5* LOCUS: NORMAL
DRSSO TEST METHOD: NORMAL

## 2021-02-11 NOTE — PROGRESS NOTES
Pre-Visit Planning     Future Appointments   Date Time Provider Department Center   2/15/2021  7:10 AM Priyank Lawrence MD [facility]   3/10/2021  9:00 AM Nurse, [facility]   3/25/2021  9:30 AM Priyank Lawrence MD [facility]   2021 10:00 AM Mirza Nelson MD [facility]     Arrival Time for this Appointment:  6:50 AM     Appointment Notes for this encounter:   Normal check up    Last pcp visit 20     Questionnaires Reviewed/Assigned  Additional questionnaires assigned PHQ 9 and NIURKA 7     Hospital/ER visits since last pcp appt? YES   ER 20 muscle back spasm   20 Covid, CKD     Imagin21 ultrasound venous mapping   20 MRI Ankle   IMPRESSION:  1. Moderate to large size left ankle joint effusion with synovitis.  2. Extensive bone marrow edema within the body of the left ankle talus  with an osteochondral lesion along the superior medial aspect of the  talar dome, measuring 5 mm in transverse dimension and 10 mm in AP  dimension. There is mild subchondral bone collapse. Subtle linear low  signal line adjacent to the osteochondral lesion in the region of the  extensive bone marrow edema, related to the osteochondral lesion  versus a nondisplaced fracture.  3. Mild tendinosis of the distal Achilles tendon with minimal  increased intrasubstance signal. The remaining tendinous and  ligamentous structures about the ankle are intact.    Lab review: multiple specialty labs in [facility] for review     Specialty Visits -   2/10/21 Nephrology, stage 5 end stage kidney disease plan below   Plan:  # Renal panel and Hgb next month  # Tx team is planning to have [facility] to re-evaluate adrenal nodules.   # Decrease Norvasc to 5 mg   # Increase vitamin D to 2000 units  # Aim for goal BP is ~ 140s/90 given advanced CKD  # AVF creation on 3/4 with Dr. Moran  # RTC in 4-6 weeks or sooner sooner as needed.     MyChart  Patient is active on MyChart.     Patient preferred phone number:  [phone]    Unable to reach. Left voicemail. RN PAL direct number left for return call     Sultana Urbina Registered Nurse, PAL (Patient Advocate Liason)   [facility]   [phone]

## 2021-02-11 NOTE — TELEPHONE ENCOUNTER
Called patient to explain that for the renal genetic testing, the kit will be sent to his home. He is to follow the instructions to collect the sample and send it back to us as soon as he can. He stated he understood, had no questions and could call RNCC if something came up.

## 2021-02-12 LAB
COPATH REPORT: NORMAL
PROTOCOL CUTOFF: NORMAL
SA1 CELL: NORMAL
SA1 COMMENTS: NORMAL
SA1 HI RISK ABY: NORMAL
SA1 MOD RISK ABY: NORMAL
SA1 TEST METHOD: NORMAL
SA2 CELL: NORMAL
SA2 COMMENTS: NORMAL
SA2 HI RISK ABY UA: NORMAL
SA2 MOD RISK ABY: NORMAL
SA2 TEST METHOD: NORMAL
UNACCEPTABLE ANTIGEN: NORMAL
UNOS CPRA: 46

## 2021-02-15 ENCOUNTER — PATIENT OUTREACH (OUTPATIENT)
Dept: FAMILY MEDICINE | Facility: CLINIC | Age: 26
End: 2021-02-15

## 2021-02-15 ENCOUNTER — OFFICE VISIT (OUTPATIENT)
Dept: FAMILY MEDICINE | Facility: CLINIC | Age: 26
End: 2021-02-15
Payer: COMMERCIAL

## 2021-02-15 ENCOUNTER — TELEPHONE (OUTPATIENT)
Dept: TRANSPLANT | Facility: CLINIC | Age: 26
End: 2021-02-15

## 2021-02-15 VITALS
WEIGHT: 250 LBS | RESPIRATION RATE: 16 BRPM | HEART RATE: 80 BPM | HEIGHT: 72 IN | SYSTOLIC BLOOD PRESSURE: 118 MMHG | DIASTOLIC BLOOD PRESSURE: 80 MMHG | BODY MASS INDEX: 33.86 KG/M2 | TEMPERATURE: 98.2 F | OXYGEN SATURATION: 99 %

## 2021-02-15 DIAGNOSIS — F33.42 RECURRENT MAJOR DEPRESSIVE DISORDER, IN FULL REMISSION (H): ICD-10-CM

## 2021-02-15 DIAGNOSIS — Z23 NEED FOR HPV VACCINATION: ICD-10-CM

## 2021-02-15 DIAGNOSIS — Z00.00 ROUTINE GENERAL MEDICAL EXAMINATION AT A HEALTH CARE FACILITY: Primary | ICD-10-CM

## 2021-02-15 DIAGNOSIS — Z23 NEED FOR PNEUMOCOCCAL VACCINATION: ICD-10-CM

## 2021-02-15 DIAGNOSIS — Z76.82 KIDNEY TRANSPLANT CANDIDATE: ICD-10-CM

## 2021-02-15 DIAGNOSIS — E66.01 MORBID OBESITY DUE TO EXCESS CALORIES (H): ICD-10-CM

## 2021-02-15 DIAGNOSIS — N17.9 ACUTE KIDNEY INJURY (H): ICD-10-CM

## 2021-02-15 PROBLEM — N18.6 END STAGE RENAL DISEASE (H): Status: RESOLVED | Noted: 2021-01-28 | Resolved: 2021-02-15

## 2021-02-15 PROBLEM — R19.7 NAUSEA VOMITING AND DIARRHEA: Status: RESOLVED | Noted: 2020-11-27 | Resolved: 2021-02-15

## 2021-02-15 PROBLEM — E87.1 HYPO-OSMOLALITY AND HYPONATREMIA: Status: RESOLVED | Noted: 2018-09-19 | Resolved: 2021-02-15

## 2021-02-15 PROBLEM — R11.2 NAUSEA VOMITING AND DIARRHEA: Status: RESOLVED | Noted: 2020-11-27 | Resolved: 2021-02-15

## 2021-02-15 PROBLEM — E87.1 HYPO-OSMOLALITY AND HYPONATREMIA: Status: ACTIVE | Noted: 2018-09-19

## 2021-02-15 PROBLEM — I10 BENIGN ESSENTIAL HYPERTENSION: Status: RESOLVED | Noted: 2017-07-28 | Resolved: 2021-02-15

## 2021-02-15 PROBLEM — I15.0 RENOVASCULAR HYPERTENSION: Status: RESOLVED | Noted: 2017-07-14 | Resolved: 2021-02-15

## 2021-02-15 PROBLEM — E78.5 HYPERLIPIDEMIA: Status: ACTIVE | Noted: 2019-05-01

## 2021-02-15 PROCEDURE — 90471 IMMUNIZATION ADMIN: CPT | Performed by: FAMILY MEDICINE

## 2021-02-15 PROCEDURE — 90651 9VHPV VACCINE 2/3 DOSE IM: CPT | Performed by: FAMILY MEDICINE

## 2021-02-15 PROCEDURE — 99395 PREV VISIT EST AGE 18-39: CPT | Mod: 25 | Performed by: FAMILY MEDICINE

## 2021-02-15 PROCEDURE — 99213 OFFICE O/P EST LOW 20 MIN: CPT | Mod: 25 | Performed by: FAMILY MEDICINE

## 2021-02-15 RX ORDER — FERROUS SULFATE 325(65) MG
325 TABLET ORAL DAILY
COMMUNITY
End: 2021-07-01

## 2021-02-15 RX ORDER — CARVEDILOL 25 MG/1
25 TABLET ORAL 2 TIMES DAILY WITH MEALS
COMMUNITY
Start: 2021-02-15 | End: 2021-05-26

## 2021-02-15 SDOH — ECONOMIC STABILITY: FOOD INSECURITY: WITHIN THE PAST 12 MONTHS, THE FOOD YOU BOUGHT JUST DIDN'T LAST AND YOU DIDN'T HAVE MONEY TO GET MORE.: NOT ASKED

## 2021-02-15 SDOH — ECONOMIC STABILITY: FOOD INSECURITY: WITHIN THE PAST 12 MONTHS, YOU WORRIED THAT YOUR FOOD WOULD RUN OUT BEFORE YOU GOT MONEY TO BUY MORE.: NOT ASKED

## 2021-02-15 SDOH — ECONOMIC STABILITY: INCOME INSECURITY: HOW HARD IS IT FOR YOU TO PAY FOR THE VERY BASICS LIKE FOOD, HOUSING, MEDICAL CARE, AND HEATING?: NOT ASKED

## 2021-02-15 SDOH — ECONOMIC STABILITY: TRANSPORTATION INSECURITY
IN THE PAST 12 MONTHS, HAS THE LACK OF TRANSPORTATION KEPT YOU FROM MEDICAL APPOINTMENTS OR FROM GETTING MEDICATIONS?: NOT ASKED

## 2021-02-15 SDOH — ECONOMIC STABILITY: TRANSPORTATION INSECURITY
IN THE PAST 12 MONTHS, HAS LACK OF TRANSPORTATION KEPT YOU FROM MEETINGS, WORK, OR FROM GETTING THINGS NEEDED FOR DAILY LIVING?: NOT ASKED

## 2021-02-15 ASSESSMENT — ENCOUNTER SYMPTOMS
HEARTBURN: 0
CONSTIPATION: 0
FREQUENCY: 0
JOINT SWELLING: 0
DYSURIA: 0
EYE PAIN: 0
HEMATURIA: 0
SORE THROAT: 0
ARTHRALGIAS: 0
NAUSEA: 0
SHORTNESS OF BREATH: 0
MYALGIAS: 0
COUGH: 0
WEAKNESS: 0
FEVER: 0
DIARRHEA: 0
NERVOUS/ANXIOUS: 0
HEADACHES: 0
CHILLS: 0
DIZZINESS: 0
PARESTHESIAS: 0
PALPITATIONS: 0
ABDOMINAL PAIN: 0
HEMATOCHEZIA: 0

## 2021-02-15 ASSESSMENT — ANXIETY QUESTIONNAIRES
2. NOT BEING ABLE TO STOP OR CONTROL WORRYING: NOT AT ALL
4. TROUBLE RELAXING: NOT AT ALL
7. FEELING AFRAID AS IF SOMETHING AWFUL MIGHT HAPPEN: NOT AT ALL
GAD7 TOTAL SCORE: 1
7. FEELING AFRAID AS IF SOMETHING AWFUL MIGHT HAPPEN: NOT AT ALL
3. WORRYING TOO MUCH ABOUT DIFFERENT THINGS: SEVERAL DAYS
6. BECOMING EASILY ANNOYED OR IRRITABLE: NOT AT ALL
5. BEING SO RESTLESS THAT IT IS HARD TO SIT STILL: NOT AT ALL
1. FEELING NERVOUS, ANXIOUS, OR ON EDGE: NOT AT ALL
GAD7 TOTAL SCORE: 1
GAD7 TOTAL SCORE: 1

## 2021-02-15 ASSESSMENT — PATIENT HEALTH QUESTIONNAIRE - PHQ9
SUM OF ALL RESPONSES TO PHQ QUESTIONS 1-9: 0
SUM OF ALL RESPONSES TO PHQ QUESTIONS 1-9: 0
10. IF YOU CHECKED OFF ANY PROBLEMS, HOW DIFFICULT HAVE THESE PROBLEMS MADE IT FOR YOU TO DO YOUR WORK, TAKE CARE OF THINGS AT HOME, OR GET ALONG WITH OTHER PEOPLE: NOT DIFFICULT AT ALL

## 2021-02-15 ASSESSMENT — MIFFLIN-ST. JEOR: SCORE: 2161.99

## 2021-02-15 NOTE — PROGRESS NOTES
SUBJECTIVE:   CC: Taylor Valiente is an 25 year old male who presents for preventative health visit.       Patient has been advised of split billing requirements and indicates understanding: Yes  Healthy Habits:     Getting at least 3 servings of Calcium per day:  Yes    Bi-annual eye exam:  NO    Dental care twice a year:  NO    Sleep apnea or symptoms of sleep apnea:  Excessive snoring    Diet:  Low salt and Other    Frequency of exercise:  2-3 days/week    Duration of exercise:  45-60 minutes    Taking medications regularly:  Yes    Medication side effects:  None    PHQ-2 Total Score: 0    Additional concerns today:  No          Depression Followup    How are you doing with your depression since your last visit? Improved he is living with his parents, and now he is not working.    Are you having other symptoms that might be associated with depression? No    Have you had a significant life event?  OTHER: medical problems.     Are you feeling anxious or having panic attacks?   No    Do you have any concerns with your use of alcohol or other drugs? No    Social History     Tobacco Use     Smoking status: Never Smoker     Smokeless tobacco: Never Used   Substance Use Topics     Alcohol use: No     Drug use: Yes     Types: Marijuana     Comment: occ     PHQ 2/15/2021   PHQ-9 Total Score 0   Q9: Thoughts of better off dead/self-harm past 2 weeks Not at all     NIURKA-7 SCORE 2/15/2021   Total Score 1 (minimal anxiety)   Total Score 1     Last PHQ-9 2/15/2021   1.  Little interest or pleasure in doing things 0   2.  Feeling down, depressed, or hopeless 0   3.  Trouble falling or staying asleep, or sleeping too much 0   4.  Feeling tired or having little energy 0   5.  Poor appetite or overeating 0   6.  Feeling bad about yourself 0   7.  Trouble concentrating 0   8.  Moving slowly or restless 0   Q9: Thoughts of better off dead/self-harm past 2 weeks 0   PHQ-9 Total Score 0     NIURKA-7  2/15/2021   1. Feeling nervous,  anxious, or on edge 0   2. Not being able to stop or control worrying 0   3. Worrying too much about different things 1   4. Trouble relaxing 0   5. Being so restless that it is hard to sit still 0   6. Becoming easily annoyed or irritable 0   7. Feeling afraid, as if something awful might happen 0   NIURKA-7 Total Score 1         Suicide Assessment Five-step Evaluation and Treatment (SAFE-T)    Chronic Kidney Disease Follow-up      Do you take any over the counter pain medicine?: No      Today's PHQ-2 Score:   PHQ-2 ( 1999 Pfizer) 2/15/2021   Q1: Little interest or pleasure in doing things 0   Q2: Feeling down, depressed or hopeless 0   PHQ-2 Score 0   Q1: Little interest or pleasure in doing things Not at all   Q2: Feeling down, depressed or hopeless Not at all   PHQ-2 Score 0       Abuse: Current or Past(Physical, Sexual or Emotional)- No  Do you feel safe in your environment? Yes        Social History     Tobacco Use     Smoking status: Never Smoker     Smokeless tobacco: Never Used   Substance Use Topics     Alcohol use: No     If you drink alcohol do you typically have >3 drinks per day or >7 drinks per week? Not applicable    Alcohol Use 2/15/2021   Prescreen: >3 drinks/day or >7 drinks/week? No   No flowsheet data found.    Last PSA: No results found for: PSA    Reviewed orders with patient. Reviewed health maintenance and updated orders accordingly - Yes  Lab work is in process  Labs reviewed in EPIC  BP Readings from Last 3 Encounters:   02/15/21 118/80   02/09/21 118/77   01/27/21 130/80    Wt Readings from Last 3 Encounters:   02/15/21 113.4 kg (250 lb)   02/09/21 114.3 kg (252 lb)   01/27/21 116.6 kg (257 lb)                  Patient Active Problem List   Diagnosis     Morbid obesity (H)     LVH (left ventricular hypertrophy) due to hypertensive disease     Renovascular hypertension     Acute kidney injury (H)     CKD (chronic kidney disease) stage 5, GFR less than 15 ml/min (H)     Focal glomerular  sclerosis     2019 novel coronavirus disease (COVID-19)     Anemia of chronic renal failure     OD (osteochondritis dissecans)     Adrenal adenoma, left     Stress-induced cardiomyopathy     Depression     Hyperlipidemia     Past Surgical History:   Procedure Laterality Date     BIOPSY  2017    renal- [facility]     NO HISTORY OF SURGERY         Social History     Tobacco Use     Smoking status: Never Smoker     Smokeless tobacco: Never Used   Substance Use Topics     Alcohol use: No     Family History   Problem Relation Age of Onset     Blood Disease Mother         has hep b     Diabetes Mother         gestionanal diabetes     Hypertension Mother      Obesity Father      Hypertension Father      Hypertension Maternal Grandmother      Diabetes Maternal Grandmother      Hypertension Paternal Grandmother      Hypertension Paternal Grandfather      Coronary Artery Disease Maternal Uncle      Cancer No family hx of          Current Outpatient Medications   Medication Sig Dispense Refill     carvedilol (COREG) 25 MG tablet Take 1 tablet (25 mg) by mouth 2 times daily (with meals)       amLODIPine (NORVASC) 10 MG tablet Take 10 mg by mouth daily        aspirin 81 MG EC tablet Take 81 mg by mouth daily       atorvastatin (LIPITOR) 10 MG tablet TAKE ONE TABLET BY MOUTH EVERY NIGHT AT BEDTIME 90 tablet 3     calcitRIOL (ROCALTROL) 0.25 MCG capsule Take 0.25 mcg by mouth three times a week Monday, Wednesday, Friday       cloNIDine (CATAPRES) 0.3 MG tablet Take 1 tablet (0.3 mg) by mouth 2 times daily 60 tablet 1     ferrous sulfate (FEROSUL) 325 (65 Fe) MG tablet Take 325 mg by mouth daily       hydrALAZINE (APRESOLINE) 100 MG tablet TAKE ONE TABLET BY MOUTH THREE TIMES A  tablet 1     sodium bicarbonate 650 MG tablet Take 1,300 mg by mouth 2 times daily       vitamin D3 (CHOLECALCIFEROL) 2000 units (50 mcg) tablet Take 1 tablet by mouth daily         Reviewed and updated as needed this visit by clinical  staff                 Reviewed and updated as needed this visit by Provider                Past Medical History:   Diagnosis Date     Adrenal adenoma, left      Anemia      Benign essential hypertension 07/28/2017     CKD (chronic kidney disease) stage 5, GFR less than 15 ml/min (H)     FSGS     Depression      Focal glomerular sclerosis 07/28/2017     HLD (hyperlipidemia)      LVH (left ventricular hypertrophy) due to hypertensive disease 07/14/2017     Noncompliance      Obesity, unspecified      OD (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/2021     Stress-induced cardiomyopathy      Suicide attempt (H) 2019      Past Surgical History:   Procedure Laterality Date     BIOPSY  2017    renal- [facility]     NO HISTORY OF SURGERY         Review of Systems   Constitutional: Negative for chills and fever.   HENT: Negative for congestion, ear pain, hearing loss and sore throat.    Eyes: Negative for pain and visual disturbance.   Respiratory: Negative for cough and shortness of breath.    Cardiovascular: Negative for chest pain, palpitations and peripheral edema.   Gastrointestinal: Negative for abdominal pain, constipation, diarrhea, heartburn, hematochezia and nausea.   Genitourinary: Negative for dysuria, frequency, genital sores, hematuria, impotence and urgency.   Musculoskeletal: Negative for arthralgias, joint swelling and myalgias.   Skin: Negative for rash.   Neurological: Negative for dizziness, weakness, headaches and paresthesias.   Psychiatric/Behavioral: Negative for mood changes. The patient is not nervous/anxious.          OBJECTIVE:   There were no vitals taken for this visit.    Physical Exam  GENERAL: healthy, alert and no distress  EYES: Eyes grossly normal to inspection, PERRL and conjunctivae and sclerae normal  HENT: ear canals and TM's normal, nose and mouth without ulcers or lesions  NECK: no adenopathy, no asymmetry, masses, or scars and thyroid normal to palpation  RESP: lungs clear to  auscultation - no rales, rhonchi or wheezes  CV: regular rate and rhythm, normal S1 S2, no S3 or S4, no murmur, click or rub, no peripheral edema and peripheral pulses strong  ABDOMEN: soft, nontender, no hepatosplenomegaly, no masses and bowel sounds normal  MS: no gross musculoskeletal defects noted, no edema  NEURO: Normal strength and tone, mentation intact and speech normal  PSYCH: mentation appears normal, affect normal/bright        ASSESSMENT/PLAN:   1. Need for HPV vaccination  Given today.     2. Recurrent major depressive disorder, in full remission (H)  Under full remission, he is doing excellent.  - DEPRESSION ACTION PLAN (DAP)    3. Need for pneumococcal vaccination  Await for nephrology to approve these faccines.    4. Routine general medical examination at a health care facility  Today I counseled the patient about diet, regular exercise and weight loss planning.      5. Morbid obesity due to excess calories (H)  As above.     - carvedilol (COREG) 25 MG tablet; Take 1 tablet (25 mg) by mouth 2 times daily (with meals)    7. Kidney transplant candidate  Pt is applying for kidney transplant, will refer to Cardiology and psychiatry per Nephrology recommendations.   - CARDIOLOGY EVAL ADULT REFERRAL; Future  - MENTAL HEALTH REFERRAL  - Adult; Outpatient Treatment; Individual/Couples/Family/Group Therapy/Health Psychology; [facility] the [place]: [facility] [phone]; We will contact you to schedule the appointment or please call with any questions    Patient has been advised of split billing requirements and indicates understanding: Yes  COUNSELING:   Reviewed preventive health counseling, as reflected in patient instructions       Regular exercise       Healthy diet/nutrition    Estimated body mass index is 34.18 kg/m  as calculated from the following:    Height as of 2/9/21: 1.829 m (6').    Weight as of 2/9/21: 114.3 kg (252 lb).     Weight management plan: Discussed healthy diet and exercise  guidelines    He reports that he has never smoked. He has never used smokeless tobacco.      Counseling Resources:  ATP IV Guidelines  Pooled Cohorts Equation Calculator  FRAX Risk Assessment  ICSI Preventive Guidelines  Dietary Guidelines for Americans, 2010  USDA's MyPlate  ASA Prophylaxis  Lung CA Screening    Priyank Lawrence MD  [facility]  Answers for HPI/ROS submitted by the patient on 2/15/2021   Annual Exam:  If you checked off any problems, how difficult have these problems made it for you to do your work, take care of things at home, or get along with other people?: Not difficult at all  PHQ9 TOTAL SCORE: 0  NIURKA 7 TOTAL SCORE: 1

## 2021-02-15 NOTE — TELEPHONE ENCOUNTER
Dr. Howard Liu RN with Dr. Hooper's office (neurology) calling with medication question     Eliquis - was thought to be given for 30 days after Covid + infection     However this medication was given refills - diagnosis listed in rx is covid infection     Is there a change in status? New diagnosis? That would require patient to continue taking this?     Please advise     Sultana Urbina, Registered Nurse, PAL (Patient Advocate Liason)   [facility]   [phone]

## 2021-02-15 NOTE — TELEPHONE ENCOUNTER
RN placed call to [facility] nephrology Dr. Hooper's office     Advised that the eliquis was stopped this am when patient was in to see Dr. Howard Urbina, Registered Nurse, PAL (Patient Advocate Liason)   [facility]   [phone]

## 2021-02-15 NOTE — TELEPHONE ENCOUNTER
"No, I\"M so sorry it was my mistake, I shouldn't renewed it, it was given only for 1 month.  Talked to him today in the clinic and stopped it.  "

## 2021-02-16 ASSESSMENT — PATIENT HEALTH QUESTIONNAIRE - PHQ9: SUM OF ALL RESPONSES TO PHQ QUESTIONS 1-9: 0

## 2021-02-16 ASSESSMENT — ANXIETY QUESTIONNAIRES: GAD7 TOTAL SCORE: 1

## 2021-02-17 ENCOUNTER — VIRTUAL VISIT (OUTPATIENT)
Dept: CARDIOLOGY | Facility: CLINIC | Age: 26
End: 2021-02-17
Attending: INTERNAL MEDICINE
Payer: COMMERCIAL

## 2021-02-17 DIAGNOSIS — Z76.82 ORGAN TRANSPLANT CANDIDATE: ICD-10-CM

## 2021-02-17 DIAGNOSIS — N05.1 FOCAL GLOMERULAR SCLEROSIS: ICD-10-CM

## 2021-02-17 DIAGNOSIS — N18.5 CKD (CHRONIC KIDNEY DISEASE) STAGE 5, GFR LESS THAN 15 ML/MIN (H): ICD-10-CM

## 2021-02-17 PROCEDURE — 99204 OFFICE O/P NEW MOD 45 MIN: CPT | Mod: 95 | Performed by: INTERNAL MEDICINE

## 2021-02-17 NOTE — PROGRESS NOTES
"Taylor is a 25 year old who is being evaluated via a billable video visit.      How would you like to obtain your AVS? Jackhart  If the video visit is dropped, the invitation should be resent by: Other e-mail: jackhart  Will anyone else be joining your video visit? No      The patient has been notified of following:     \"This video visit will be conducted via a call between you and your physician/provider. We have found that certain health care needs can be provided without the need for an in-person physical exam.  This service lets us provide the care you need with a video conversation.  If a prescription is necessary we can send it directly to your pharmacy.  If lab work is needed we can place an order for that and you can then stop by our lab to have the test done at a later time.    Video visits are billed at different rates depending on your insurance coverage.  Please reach out to your insurance provider with any questions.    If during the course of the call the physician/provider feels a video visit is not appropriate, you will not be charged for this service.\"    Patient has given verbal consent for video visit? Yes    How would you like to obtain your AVS? Mail    Video-Visit Details    Type of service:  Video Visit    Video Start Time:322pm    Video End Time:333pm    Total visit time:20min    Originating Location (pt. Location):patient home      Distant Location (provider location):  home office    Platform used for Video Visit: Bladimir    See dictation #[id number]    "

## 2021-02-17 NOTE — PROGRESS NOTES
Service Date: 2021      Jer Hooper MD   [facility]   [address]   [place]  [address]      RE: Taylor Valiente   MRN: [id number]   : 1995      Dear Dr. Hooper:      It was a pleasure participating in the care of your patient, Mr. Taylor Valiente.  As you know, he is a 25-year-old gentleman who I saw today over virtual video visit via [employer] for preoperative evaluation prior to kidney transplant.      His past medical history is significant for the followin.  Hypertension.   2.  Hyperlipidemia.   3.  Chronic renal insufficiency, thought secondary to hypertension and/or FSGS, currently not on dialysis.  Plan for fistula placement 2021.   4.  Depression with suicide attempt .   5.  Adrenal adenoma with possible Cushing disease.   6.  COVID-19 positive 2020 with secondary anosmia and a loss of taste, along with diarrhea and nausea.   7.  Morbid obesity.   8.  Noncompliance.   9.  Marijuana abuse.      His cardiac history is significant for a mild transient cardiomyopathy, at which time 2017 his echocardiogram revealed an ejection fraction of 40%-45% without significant valvular pathology.  His blood pressures he says were in the 200s at that time and possibly secondary to adrenal adenoma.  After his blood pressure control was improved, his ejection fraction has improved as of echo 09/10/2020.      However, he did not undergo any type of ischemic workup.      In terms of his present symptoms, he recently broke his ankle slipping on the ice, but he can currently walk a flight of stairs without symptoms.  He does some light weights, but denies other significant chest pain, shortness of breath, PND, orthopnea, edema, palpitations, syncope or near-syncope.      He denies history of diabetes.  He does have hypertension.  Denies smoking or drinking.  Uses marijuana twice a week recreationally.  His uncle had a heart attack at age 49.  He does have  hyperlipidemia.      He used to work in the [employer].      CURRENT MEDICATIONS:     1.  Aspirin 81 mg a day.   2.  Amlodipine 10 mg a day.   3.  Lipitor 10 mg a day.   4.  Carvedilol 25 twice daily.   5.  Clonidine 0.3 twice daily.   6.  Hydralazine 100 three times a day.      PHYSICAL EXAMINATION:     VITAL SIGNS:  Blood pressure as of 02/15/2021 was 118/80 with a pulse of 80.  His weight was 250 pounds.   GENERAL:  He appears comfortable, well groomed.   PSYCHIATRIC:  He is alert and oriented x3.   HEENT:  His eyes do not appear grossly erythematous or have exudate.   RESPIRATORY:  He is breathing comfortably without gross cough.      The remainder of the comprehensive physical exam was deferred secondary to the COVID-19 pandemic and secondary to video visit restrictions.      LABORATORY DATA:  02/09/2021 potassium 4.2, GFR 8, LDL was 86 on 07/03/2019, hemoglobin 10.5.      Echocardiogram 07/14/2017 revealed ejection fraction of 40%-45%, global hypokinesis, no significant valvular pathology identified.      Echocardiogram 09/10/2020 reveals an ejection fraction of 55%-60% without significant valve pathology.      EKG 11/27/2020 normal sinus rhythm at a rate of 73 beats per minute, nonspecific T-wave changes laterally.      02/9/2021 potassium 4.2, GFR 8, hemoglobin 10.5.        IMPRESSION:      Taylor is a 25-year-old gentleman whose cardiac history is significant for a transient cardiomyopathy of unclear etiology as of 07/14/2017.  At that time, his ejection fraction was documented to be 40%-45% range, which subsequently improved.      The patient never underwent an ischemic workup and he now presents for preoperative evaluation prior to kidney transplant.      From a clinical standpoint, he broke his ankle recently and is currently asymptomatic at a low level of exertion.  However, he likely cannot do more than 4 metabolic equivalents of activity due to his broken ankle.        Further  noninvasive evaluation in particular for the underlying etiology for his prior cardiomyopathy to rule out an ischemic etiology would be indicated.        PLAN:     1.  The patient is currently not on dialysis.      However, coronary CTA after the patient is stable on dialysis would be warranted in order to rule out significant underlying coronary artery disease as the underlying etiology for his prior cardiomyopathy.      If coronary CTA is completely normal, then the underlying etiology is most likely hypertensive in origin otherwise and should be approved for his procedure at acceptable perioperative risk for event; otherwise, further updates to follow.      Once again, it was a pleasure participating in the care of your patient, Mr. Valiente.  Please feel free to contact me anytime if you have any questions regarding his care in the future.      Sincerely,         CYNTHIA RANDOLPH MD             D: 2021   T: 2021   MT: [email or web address]      Name:     STEFAN VALIENTE   MRN:      [id number]        Account:      [id number]   :      1995           Service Date: 2021      Document: [id number]

## 2021-02-17 NOTE — PATIENT INSTRUCTIONS
Patient Instructions:  It was a pleasure to see you in the cardiology clinic today.      If you have any questions, you can reach my nurse, Winter PRATER LPN, at [phone].  Press Option #1 for the [facility], and then press Option #4 for nursing.    We are encouraging the use of MyChart to communicate with your HealthCare Provider    Medication Changes: None.    Recommendations: None.    Studies Ordered: We will have you complete a CT Coronary Angiogram once you are up and stable on dialysis.  Please let your transplant team know when this has occurred.    The results from today include: None.    Please follow up: With Dr. Marin to be determined by your transplant team.    Sincerely,    Enrique Marin MD     If you have an urgent need after hours (8:00 am to 4:30 pm) please call [phone] and ask for the cardiology fellow on call.

## 2021-02-17 NOTE — LETTER
"2/17/2021      RE: Taylor Valiente  [address]   [place] [address]       Dear Colleague,    Thank you for the opportunity to participate in the care of your patient, Taylor Valiente, at the [facility] [place] at [facility]. Please see a copy of my visit note below.    Taylor is a 25 year old who is being evaluated via a billable video visit.      How would you like to obtain your AVS? MyChart  If the video visit is dropped, the invitation should be resent by: Other e-mail: [email or web address]  Will anyone else be joining your video visit? No      The patient has been notified of following:     \"This video visit will be conducted via a call between you and your physician/provider. We have found that certain health care needs can be provided without the need for an in-person physical exam.  This service lets us provide the care you need with a video conversation.  If a prescription is necessary we can send it directly to your pharmacy.  If lab work is needed we can place an order for that and you can then stop by our lab to have the test done at a later time.    Video visits are billed at different rates depending on your insurance coverage.  Please reach out to your insurance provider with any questions.    If during the course of the call the physician/provider feels a video visit is not appropriate, you will not be charged for this service.\"    Patient has given verbal consent for video visit? Yes    How would you like to obtain your AVS? Mail    Video-Visit Details    Type of service:  Video Visit    Video Start Time:322pm    Video End Time:333pm    Total visit time:20min    Originating Location (pt. Location):patient home      Distant Location (provider location):  home office    Platform used for Video Visit: Podimetrics      Service Date: 02/17/2021      Jer Hooper MD   [facility]   [address], [place] [address]   [place], [place]  " [address]      RE: Taylor Valiente   MRN: [id number]   : 1995      Dear Dr. Hooper:      It was a pleasure participating in the care of your patient, . Taylor Valiente.  As you know, he is a 25-year-old gentleman who I saw today over virtual video visit via [employer] for preoperative evaluation prior to kidney transplant.      His past medical history is significant for the followin.  Hypertension.   2.  Hyperlipidemia.   3.  Chronic renal insufficiency, thought secondary to hypertension and/or FSGS, currently not on dialysis.  Plan for fistula placement 2021.   4.  Depression with suicide attempt .   5.  Adrenal adenoma with possible Cushing disease.   6.  COVID-19 positive 2020 with secondary anosmia and a loss of taste, along with diarrhea and nausea.   7.  Morbid obesity.   8.  Noncompliance.   9.  Marijuana abuse.      His cardiac history is significant for a mild transient cardiomyopathy, at which time 2017 his echocardiogram revealed an ejection fraction of 40%-45% without significant valvular pathology.  His blood pressures he says were in the 200s at that time and possibly secondary to adrenal adenoma.  After his blood pressure control was improved, his ejection fraction has improved as of echo 09/10/2020.      However, he did not undergo any type of ischemic workup.      In terms of his present symptoms, he recently broke his ankle slipping on the ice, but he can currently walk a flight of stairs without symptoms.  He does some light weights, but denies other significant chest pain, shortness of breath, PND, orthopnea, edema, palpitations, syncope or near-syncope.      He denies history of diabetes.  He does have hypertension.  Denies smoking or drinking.  Uses marijuana twice a week recreationally.  His uncle had a heart attack at age 49.  He does have hyperlipidemia.      He used to work in the [employer].      CURRENT MEDICATIONS:     1.  Aspirin 81 mg  a day.   2.  Amlodipine 10 mg a day.   3.  Lipitor 10 mg a day.   4.  Carvedilol 25 twice daily.   5.  Clonidine 0.3 twice daily.   6.  Hydralazine 100 three times a day.      PHYSICAL EXAMINATION:     VITAL SIGNS:  Blood pressure as of 02/15/2021 was 118/80 with a pulse of 80.  His weight was 250 pounds.   GENERAL:  He appears comfortable, well groomed.   PSYCHIATRIC:  He is alert and oriented x3.   HEENT:  His eyes do not appear grossly erythematous or have exudate.   RESPIRATORY:  He is breathing comfortably without gross cough.      The remainder of the comprehensive physical exam was deferred secondary to the COVID-19 pandemic and secondary to video visit restrictions.      LABORATORY DATA:  02/09/2021 potassium 4.2, GFR 8, LDL was 86 on 07/03/2019, hemoglobin 10.5.      Echocardiogram 07/14/2017 revealed ejection fraction of 40%-45%, global hypokinesis, no significant valvular pathology identified.      Echocardiogram 09/10/2020 reveals an ejection fraction of 55%-60% without significant valve pathology.      EKG 11/27/2020 normal sinus rhythm at a rate of 73 beats per minute, nonspecific T-wave changes laterally.      02/9/2021 potassium 4.2, GFR 8, hemoglobin 10.5.        IMPRESSION:      Taylor is a 25-year-old gentleman whose cardiac history is significant for a transient cardiomyopathy of unclear etiology as of 07/14/2017.  At that time, his ejection fraction was documented to be 40%-45% range, which subsequently improved.      The patient never underwent an ischemic workup and he now presents for preoperative evaluation prior to kidney transplant.      From a clinical standpoint, he broke his ankle recently and is currently asymptomatic at a low level of exertion.  However, he likely cannot do more than 4 metabolic equivalents of activity due to his broken ankle.        Further noninvasive evaluation in particular for the underlying etiology for his prior cardiomyopathy to rule out an ischemic etiology  would be indicated.        PLAN:     1.  The patient is currently not on dialysis.      However, coronary CTA after the patient is stable on dialysis would be warranted in order to rule out significant underlying coronary artery disease as the underlying etiology for his prior cardiomyopathy.      If coronary CTA is completely normal, then the underlying etiology is most likely hypertensive in origin otherwise and should be approved for his procedure at acceptable perioperative risk for event; otherwise, further updates to follow.      Once again, it was a pleasure participating in the care of your patient, Mr. Valiente.  Please feel free to contact me anytime if you have any questions regarding his care in the future.      Sincerely,         CYNTHIA RANDOLPH MD             D: 2021   T: 2021   MT: saurabh      Name:     STEFAN VALIENTE   MRN:      [id number]        Account:      [id number]   :      1995           Service Date: 2021      Document: [id number]        Please do not hesitate to contact me if you have any questions/concerns.     Sincerely,     Cynthia Randolph MD

## 2021-02-18 ENCOUNTER — DOCUMENTATION ONLY (OUTPATIENT)
Dept: TRANSPLANT | Facility: CLINIC | Age: 26
End: 2021-02-18

## 2021-02-18 DIAGNOSIS — Z11.59 ENCOUNTER FOR SCREENING FOR OTHER VIRAL DISEASES: ICD-10-CM

## 2021-02-18 NOTE — LETTER
2021    Taylor Clarkajital  [address]   [place] [address]      Dear Mr. Valiente,    This letter is sent to confirm you are a candidate in the kidney transplant program at the [facility].  You were placed on the kidney INACTIVE waitlist on 2021.  This means you will accumulate waiting time but not receive  donor calls.       Items we will need from you:      We have received approval from you insurance company for the transplant procedure.  It is critical that you notify us if there is any change in your insurance.  It is also important that you familiarize yourself with the details of your specific insurance policy.  Our patient  is available to assist you if you should have any questions regarding your coverage.      During this waiting period, we may request additional periodic laboratory tests with your primary physician.  It will be your responsibility to remind your physician to forward your results to the Transplant Office.      We need to be kept informed of any changes in your medical condition such as:    o changes in your medications,   o significant changes in your health  o significant infections (such as pneumonia or abscesses)  o blood transfusions  o any condition which requires hospitalization  o any surgery      Remember to complete any routine cancer screening tests required before your transplant.  This includes colonoscopy; prostrate screening for men, and mammogram and gynecologic testing for women, as well as dental work.  Your primary care clinic can assist you with arranging for these exams.  Remind your caregivers to forward copies of the records and final reports.    We want you to know that our program has physician and surgeon coverage 24 hours a day, 365 days a year. If this coverage changes or there are substantial program changes, you will be notified in writing by letter.      Attached is a letter from the [employer] for Organ Sharing (UNOS). It describes the services and information offered to patients by UNOS and the Organ Procurement and Transplantation Network.    We appreciate having had the opportunity to participate in your care.  If you have questions, please feel free to call the Transplant Office at [phone] or [phone].      Sincerely,     Latonya Avila RN BSN CCTN   Pre-Kidney/Pancreas Transplant Coordinator  [facility]  Solid Organ Transplant Care      Enclosures: Telephone Contact List, Travel Resources, UNOS Letter, Waitlist Information Update and While You Are Waiting  CC:   Dr. Jer Hooper MD; Dr. Priyank Lawrence MD                                The Organ Procurement and Transplantation Network  Toll-free patient services line:     Your resource for organ transplant information    If you have a question regarding your own medical care, you always should call your transplant hospital first. However, for general organ transplant-related information, you can call the Organ Procurement and Transplantation Network (OPTN) toll-free patient services line at [phone]. Anyone, including potential transplant candidates, candidates, recipients, family members, friends, living donors, and donor family members, can call this number to:          Talk about organ donation, living donation, the transplant process, the donation process, and transplant policies.    Get a free patient information kit with helpful booklets, waiting list and transplant information, and a list of all transplant hospitals.    Ask questions about the OPTN website (https://optn.transplant.hrsa.gov/), the [employer] for Organ Sharing s (UNOS) website (https://unos.org/), or the UNOS website for living donors and transplant recipients. (https://www.transplantliving.org/).    Learn how the OPTN can help you.    Talk about any concerns that you may have with a transplant  hospital.    The [facility] s transplant system, the OPTN, is managed under federal contract by the [employer] Organ Sharing (UNOS), which is a non-profit charitable organization. The OPTN helps create and define organ sharing policies that make the best use of donated organs. This process continuously evaluating new advances and discoveries so policies can be adapted to best serve patients waiting for transplants. To do so, the OPTN works closely with transplant professionals, transplant patients, transplant candidates, donor families, living donors, and the public. All transplant programs and organ procurement organizations throughout the country are OPTN members and are obligated to follow the policies the OPTN creates for allocating organs.    The OPTN also is responsible for:      Providing educational material for patients, the public, and professionals.    Raising awareness of the need for donated organs and tissue.    Coordinating organ procurement, matching, and placement.    Collecting information about every organ transplant and donation that occurs in the [place].    Remember, you should contact your transplant hospital directly if you have questions or concerns about your own medical care including medical records, work-up progress, and test results.    We are not your transplant hospital, and our staff will not be able to answer questions about your case, so please keep your transplant hospital s phone number handy.    However, while you research your transplant needs and learn as much as you can about transplantation and donation, we welcome your call to our toll-free patient services line at [phone].          Updated 4/1/2019

## 2021-02-22 DIAGNOSIS — Z01.818 PRE-OP TESTING: ICD-10-CM

## 2021-02-22 LAB
LABORATORY COMMENT REPORT: NORMAL
SARS-COV-2 RNA RESP QL NAA+PROBE: NEGATIVE
SARS-COV-2 RNA RESP QL NAA+PROBE: NORMAL
SPECIMEN SOURCE: NORMAL
SPECIMEN SOURCE: NORMAL

## 2021-02-22 PROCEDURE — U0003 INFECTIOUS AGENT DETECTION BY NUCLEIC ACID (DNA OR RNA); SEVERE ACUTE RESPIRATORY SYNDROME CORONAVIRUS 2 (SARS-COV-2) (CORONAVIRUS DISEASE [COVID-19]), AMPLIFIED PROBE TECHNIQUE, MAKING USE OF HIGH THROUGHPUT TECHNOLOGIES AS DESCRIBED BY CMS-2020-01-R: HCPCS | Mod: 90 | Performed by: PATHOLOGY

## 2021-02-22 PROCEDURE — U0005 INFEC AGEN DETEC AMPLI PROBE: HCPCS | Performed by: PATHOLOGY

## 2021-02-23 PROBLEM — N18.6 END STAGE RENAL DISEASE (H): Status: ACTIVE | Noted: 2021-01-28

## 2021-02-24 ENCOUNTER — ANESTHESIA (OUTPATIENT)
Dept: SURGERY | Facility: CLINIC | Age: 26
End: 2021-02-24

## 2021-02-24 ENCOUNTER — HOSPITAL ENCOUNTER (OUTPATIENT)
Facility: CLINIC | Age: 26
Discharge: HOME | End: 2021-02-24
Attending: ORTHOPAEDIC SURGERY | Admitting: ORTHOPAEDIC SURGERY
Payer: COMMERCIAL

## 2021-02-24 VITALS
BODY MASS INDEX: 33.98 KG/M2 | HEIGHT: 72 IN | SYSTOLIC BLOOD PRESSURE: 121 MMHG | TEMPERATURE: 97.5 F | HEART RATE: 56 BPM | DIASTOLIC BLOOD PRESSURE: 80 MMHG | RESPIRATION RATE: 16 BRPM | OXYGEN SATURATION: 100 % | WEIGHT: 250.88 LBS

## 2021-02-24 DIAGNOSIS — M93.20 OD (OSTEOCHONDRITIS DISSECANS): ICD-10-CM

## 2021-02-24 DIAGNOSIS — G89.18 POST-OP PAIN: Primary | ICD-10-CM

## 2021-02-24 LAB
CREAT SERPL-MCNC: 8.25 MG/DL (ref 0.66–1.25)
GFR SERPL CREATININE-BSD FRML MDRD: 8 ML/MIN/{1.73_M2}
GLUCOSE BLDC GLUCOMTR-MCNC: 114 MG/DL (ref 70–99)
POTASSIUM SERPL-SCNC: 3.9 MMOL/L (ref 3.4–5.3)

## 2021-02-24 PROCEDURE — 250N000009 HC RX 250: Performed by: NURSE ANESTHETIST, CERTIFIED REGISTERED

## 2021-02-24 PROCEDURE — 272N000001 HC OR GENERAL SUPPLY STERILE: Performed by: ORTHOPAEDIC SURGERY

## 2021-02-24 PROCEDURE — 360N000076 HC SURGERY LEVEL 3, PER MIN: Performed by: ORTHOPAEDIC SURGERY

## 2021-02-24 PROCEDURE — 250N000011 HC RX IP 250 OP 636: Performed by: ORTHOPAEDIC SURGERY

## 2021-02-24 PROCEDURE — 710N000012 HC RECOVERY PHASE 2, PER MINUTE: Performed by: ORTHOPAEDIC SURGERY

## 2021-02-24 PROCEDURE — 250N000025 HC SEVOFLURANE, PER MIN: Performed by: ORTHOPAEDIC SURGERY

## 2021-02-24 PROCEDURE — 271N000001 HC OR GENERAL SUPPLY NON-STERILE: Performed by: ORTHOPAEDIC SURGERY

## 2021-02-24 PROCEDURE — 710N000010 HC RECOVERY PHASE 1, LEVEL 2, PER MIN: Performed by: ORTHOPAEDIC SURGERY

## 2021-02-24 PROCEDURE — 250N000013 HC RX MED GY IP 250 OP 250 PS 637: Performed by: NURSE PRACTITIONER

## 2021-02-24 PROCEDURE — 250N000009 HC RX 250: Performed by: ORTHOPAEDIC SURGERY

## 2021-02-24 PROCEDURE — 258N000001 HC RX 258: Performed by: ORTHOPAEDIC SURGERY

## 2021-02-24 PROCEDURE — 82565 ASSAY OF CREATININE: CPT | Performed by: CLINICAL NURSE SPECIALIST

## 2021-02-24 PROCEDURE — 82962 GLUCOSE BLOOD TEST: CPT

## 2021-02-24 PROCEDURE — 999N000141 HC STATISTIC PRE-PROCEDURE NURSING ASSESSMENT: Performed by: ORTHOPAEDIC SURGERY

## 2021-02-24 PROCEDURE — 258N000003 HC RX IP 258 OP 636: Performed by: NURSE ANESTHETIST, CERTIFIED REGISTERED

## 2021-02-24 PROCEDURE — 370N000017 HC ANESTHESIA TECHNICAL FEE, PER MIN: Performed by: ORTHOPAEDIC SURGERY

## 2021-02-24 PROCEDURE — 36415 COLL VENOUS BLD VENIPUNCTURE: CPT | Performed by: CLINICAL NURSE SPECIALIST

## 2021-02-24 PROCEDURE — 250N000011 HC RX IP 250 OP 636: Performed by: NURSE ANESTHETIST, CERTIFIED REGISTERED

## 2021-02-24 PROCEDURE — 84132 ASSAY OF SERUM POTASSIUM: CPT | Performed by: CLINICAL NURSE SPECIALIST

## 2021-02-24 RX ORDER — FENTANYL CITRATE 50 UG/ML
INJECTION, SOLUTION INTRAMUSCULAR; INTRAVENOUS PRN
Status: DISCONTINUED | OUTPATIENT
Start: 2021-02-24 | End: 2021-02-24

## 2021-02-24 RX ORDER — SODIUM CHLORIDE, SODIUM LACTATE, POTASSIUM CHLORIDE, CALCIUM CHLORIDE 600; 310; 30; 20 MG/100ML; MG/100ML; MG/100ML; MG/100ML
INJECTION, SOLUTION INTRAVENOUS CONTINUOUS
Status: DISCONTINUED | OUTPATIENT
Start: 2021-02-24 | End: 2021-02-24 | Stop reason: HOSPADM

## 2021-02-24 RX ORDER — BUPIVACAINE HYDROCHLORIDE 5 MG/ML
INJECTION, SOLUTION PERINEURAL PRN
Status: DISCONTINUED | OUTPATIENT
Start: 2021-02-24 | End: 2021-02-24 | Stop reason: HOSPADM

## 2021-02-24 RX ORDER — PROPOFOL 10 MG/ML
INJECTION, EMULSION INTRAVENOUS PRN
Status: DISCONTINUED | OUTPATIENT
Start: 2021-02-24 | End: 2021-02-24

## 2021-02-24 RX ORDER — NALOXONE HYDROCHLORIDE 0.4 MG/ML
0.2 INJECTION, SOLUTION INTRAMUSCULAR; INTRAVENOUS; SUBCUTANEOUS
Status: DISCONTINUED | OUTPATIENT
Start: 2021-02-24 | End: 2021-02-24 | Stop reason: HOSPADM

## 2021-02-24 RX ORDER — HYDROCODONE BITARTRATE AND ACETAMINOPHEN 5; 325 MG/1; MG/1
1 TABLET ORAL EVERY 6 HOURS PRN
Qty: 20 TABLET | Refills: 0 | Status: SHIPPED | OUTPATIENT
Start: 2021-02-24 | End: 2021-03-01

## 2021-02-24 RX ORDER — IBUPROFEN 600 MG/1
600 TABLET, FILM COATED ORAL
Status: DISCONTINUED | OUTPATIENT
Start: 2021-02-24 | End: 2021-02-24 | Stop reason: HOSPADM

## 2021-02-24 RX ORDER — ONDANSETRON 4 MG/1
4 TABLET, ORALLY DISINTEGRATING ORAL EVERY 30 MIN PRN
Status: DISCONTINUED | OUTPATIENT
Start: 2021-02-24 | End: 2021-02-24 | Stop reason: HOSPADM

## 2021-02-24 RX ORDER — HYDROXYZINE PAMOATE 25 MG/1
25 CAPSULE ORAL EVERY 6 HOURS PRN
Qty: 30 CAPSULE | Refills: 0 | Status: SHIPPED | OUTPATIENT
Start: 2021-02-24 | End: 2021-03-06

## 2021-02-24 RX ORDER — CEFAZOLIN SODIUM 1 G/3ML
1 INJECTION, POWDER, FOR SOLUTION INTRAMUSCULAR; INTRAVENOUS SEE ADMIN INSTRUCTIONS
Status: DISCONTINUED | OUTPATIENT
Start: 2021-02-24 | End: 2021-02-24 | Stop reason: HOSPADM

## 2021-02-24 RX ORDER — LIDOCAINE 40 MG/G
CREAM TOPICAL
Status: DISCONTINUED | OUTPATIENT
Start: 2021-02-24 | End: 2021-02-24 | Stop reason: HOSPADM

## 2021-02-24 RX ORDER — ONDANSETRON 2 MG/ML
INJECTION INTRAMUSCULAR; INTRAVENOUS PRN
Status: DISCONTINUED | OUTPATIENT
Start: 2021-02-24 | End: 2021-02-24

## 2021-02-24 RX ORDER — CEFAZOLIN SODIUM 2 G/100ML
2 INJECTION, SOLUTION INTRAVENOUS
Status: COMPLETED | OUTPATIENT
Start: 2021-02-24 | End: 2021-02-24

## 2021-02-24 RX ORDER — DEXAMETHASONE SODIUM PHOSPHATE 4 MG/ML
INJECTION, SOLUTION INTRA-ARTICULAR; INTRALESIONAL; INTRAMUSCULAR; INTRAVENOUS; SOFT TISSUE PRN
Status: DISCONTINUED | OUTPATIENT
Start: 2021-02-24 | End: 2021-02-24

## 2021-02-24 RX ORDER — ONDANSETRON 2 MG/ML
4 INJECTION INTRAMUSCULAR; INTRAVENOUS EVERY 30 MIN PRN
Status: DISCONTINUED | OUTPATIENT
Start: 2021-02-24 | End: 2021-02-24 | Stop reason: HOSPADM

## 2021-02-24 RX ORDER — NALOXONE HYDROCHLORIDE 0.4 MG/ML
0.4 INJECTION, SOLUTION INTRAMUSCULAR; INTRAVENOUS; SUBCUTANEOUS
Status: DISCONTINUED | OUTPATIENT
Start: 2021-02-24 | End: 2021-02-24 | Stop reason: HOSPADM

## 2021-02-24 RX ORDER — FENTANYL CITRATE 50 UG/ML
25-50 INJECTION, SOLUTION INTRAMUSCULAR; INTRAVENOUS
Status: DISCONTINUED | OUTPATIENT
Start: 2021-02-24 | End: 2021-02-24 | Stop reason: HOSPADM

## 2021-02-24 RX ORDER — HYDRALAZINE HYDROCHLORIDE 20 MG/ML
2.5-5 INJECTION INTRAMUSCULAR; INTRAVENOUS EVERY 10 MIN PRN
Status: DISCONTINUED | OUTPATIENT
Start: 2021-02-24 | End: 2021-02-24 | Stop reason: HOSPADM

## 2021-02-24 RX ORDER — EPHEDRINE SULFATE 50 MG/ML
INJECTION, SOLUTION INTRAMUSCULAR; INTRAVENOUS; SUBCUTANEOUS PRN
Status: DISCONTINUED | OUTPATIENT
Start: 2021-02-24 | End: 2021-02-24

## 2021-02-24 RX ORDER — HYDROMORPHONE HYDROCHLORIDE 1 MG/ML
.3-.5 INJECTION, SOLUTION INTRAMUSCULAR; INTRAVENOUS; SUBCUTANEOUS EVERY 5 MIN PRN
Status: DISCONTINUED | OUTPATIENT
Start: 2021-02-24 | End: 2021-02-24 | Stop reason: HOSPADM

## 2021-02-24 RX ORDER — HYDROCODONE BITARTRATE AND ACETAMINOPHEN 5; 325 MG/1; MG/1
1 TABLET ORAL
Status: COMPLETED | OUTPATIENT
Start: 2021-02-24 | End: 2021-02-24

## 2021-02-24 RX ORDER — SODIUM CHLORIDE, SODIUM LACTATE, POTASSIUM CHLORIDE, CALCIUM CHLORIDE 600; 310; 30; 20 MG/100ML; MG/100ML; MG/100ML; MG/100ML
INJECTION, SOLUTION INTRAVENOUS CONTINUOUS PRN
Status: DISCONTINUED | OUTPATIENT
Start: 2021-02-24 | End: 2021-02-24

## 2021-02-24 RX ORDER — LIDOCAINE HYDROCHLORIDE 20 MG/ML
INJECTION, SOLUTION INFILTRATION; PERINEURAL PRN
Status: DISCONTINUED | OUTPATIENT
Start: 2021-02-24 | End: 2021-02-24

## 2021-02-24 RX ADMIN — HYDROCODONE BITARTRATE AND ACETAMINOPHEN 1 TABLET: 5; 325 TABLET ORAL at 08:39

## 2021-02-24 RX ADMIN — Medication 5 MG: at 07:30

## 2021-02-24 RX ADMIN — CEFAZOLIN 2 G: 10 INJECTION, POWDER, FOR SOLUTION INTRAVENOUS at 07:27

## 2021-02-24 RX ADMIN — HYDROMORPHONE HYDROCHLORIDE 0.5 MG: 1 INJECTION, SOLUTION INTRAMUSCULAR; INTRAVENOUS; SUBCUTANEOUS at 07:49

## 2021-02-24 RX ADMIN — PROPOFOL 200 MG: 10 INJECTION, EMULSION INTRAVENOUS at 07:30

## 2021-02-24 RX ADMIN — MIDAZOLAM 2 MG: 1 INJECTION INTRAMUSCULAR; INTRAVENOUS at 07:23

## 2021-02-24 RX ADMIN — SODIUM CHLORIDE, POTASSIUM CHLORIDE, SODIUM LACTATE AND CALCIUM CHLORIDE: 600; 310; 30; 20 INJECTION, SOLUTION INTRAVENOUS at 07:25

## 2021-02-24 RX ADMIN — FENTANYL CITRATE 100 MCG: 50 INJECTION, SOLUTION INTRAMUSCULAR; INTRAVENOUS at 07:39

## 2021-02-24 RX ADMIN — ONDANSETRON 4 MG: 2 INJECTION INTRAMUSCULAR; INTRAVENOUS at 07:37

## 2021-02-24 RX ADMIN — FENTANYL CITRATE 50 MCG: 50 INJECTION, SOLUTION INTRAMUSCULAR; INTRAVENOUS at 07:46

## 2021-02-24 RX ADMIN — DEXAMETHASONE SODIUM PHOSPHATE 6 MG: 4 INJECTION, SOLUTION INTRAMUSCULAR; INTRAVENOUS at 07:35

## 2021-02-24 RX ADMIN — LIDOCAINE HYDROCHLORIDE 100 MG: 20 INJECTION, SOLUTION INFILTRATION; PERINEURAL at 07:30

## 2021-02-24 ASSESSMENT — MIFFLIN-ST. JEOR: SCORE: 2166.13

## 2021-02-24 NOTE — ANESTHESIA CARE TRANSFER NOTE
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    Anesthesia Type:   General     Note:    Oropharynx: oral airway in place  Level of Consciousness: drowsy  Oxygen Supplementation: face mask    Independent Airway: airway patency satisfactory and stable    Vital Signs Stable: post-procedure vital signs reviewed and stable  Report to RN Given: handoff report given  Patient transferred to: PACU    Handoff Report: Identifed the Patient, Identified the Reponsible Provider, Reviewed the pertinent medical history, Discussed the surgical course, Reviewed Intra-OP anesthesia mangement and issues during anesthesia, Set expectations for post-procedure period and Allowed opportunity for questions and acknowledgement of understanding      Vitals: (Last set prior to Anesthesia Care Transfer)  CRNA VITALS  2/24/2021 0743 - 2/24/2021 0828      2/24/2021             NIBP:  110/82    NIBP Mean:  87        Electronically Signed By: ROSA Gunn CRNA  February 24, 2021  8:28 AM

## 2021-02-24 NOTE — OR NURSING
Discharge instructions reviewed with patient's father Ricarda.  Stated no questions at this time.

## 2021-02-24 NOTE — ANESTHESIA POSTPROCEDURE EVALUATION
Patient: Taylor Yousifdial    Procedure(s):  Left ankle arthroscopy and debridement/micro fracture    Diagnosis:OD (osteochondritis dissecans) [M93.20]  Diagnosis Additional Information: No value filed.    Anesthesia Type:  General    Note:  Disposition: Outpatient   Postop Pain Control: Uneventful            Sign Out: Well controlled pain   PONV: No   Neuro/Psych: Uneventful            Sign Out: Acceptable/Baseline neuro status   Airway/Respiratory: Uneventful            Sign Out: Acceptable/Baseline resp. status   CV/Hemodynamics: Uneventful            Sign Out: Acceptable CV status   Other NRE: NONE   DID A NON-ROUTINE EVENT OCCUR? No         Last vitals:  Vitals:    02/24/21 0540 02/24/21 0818 02/24/21 0830   BP: 117/81 122/83 134/83   Pulse: 64 71 58   Resp: 16 11 10   Temp: 36.7  C (98  F) 36.6  C (97.9  F)    SpO2: 99% 100% 98%       Last vitals prior to Anesthesia Care Transfer:  CRNA VITALS  2/24/2021 0743 - 2/24/2021 0843      2/24/2021             NIBP:  127/81    Pulse:  77    NIBP Mean:  96    Temp:  36.6  C (97.9  F)    SpO2:  98 %          Electronically Signed By: Bautista Ross MD  February 24, 2021  8:47 AM

## 2021-02-24 NOTE — BRIEF OP NOTE
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  Complications: None.  Implants: * No implants in log *

## 2021-02-24 NOTE — DISCHARGE INSTRUCTIONS
Same-Day Surgery   Adult Discharge Orders & Instructions     For 24 hours after surgery:  1. Get plenty of rest.  A responsible adult must stay with you for at least 24 hours after you leave the hospital.   2. Pain medication can slow your reflexes. Do not drive or use heavy equipment.  If you have weakness or tingling, don't drive or use heavy equipment until this feeling goes away.  3. Mixing alcohol and pain medication can cause dizziness and slow your breathing. It can even be fatal. Do not drink alcohol while taking pain medication.  4. Avoid strenuous or risky activities.  Ask for help when climbing stairs.   5. You may feel lightheaded.  If so, sit for a few minutes before standing.  Have someone help you get up.   6. If you have nausea (feel sick to your stomach), drink only clear liquids such as apple juice, ginger ale, broth or 7-Up.  Rest may also help.  Be sure to drink enough fluids.  Move to a regular diet as you feel able. Take pain medications with a small amount of solid food, such as toast or crackers, to avoid nausea.   7. A slight fever is normal. Call the doctor if your fever is over 100 F (37.7 C) (taken under the tongue) or lasts longer than 24 hours.  8. You may have a dry mouth, muscle aches, trouble sleeping or a sore throat.  These symptoms should go away after 24 hours.  9. Do not make important or legal decisions.   Pain Management:      1. Take pain medication (if prescribed) for pain as directed by your physician.        2. WARNING: If the pain medication you have been prescribed contains Tylenol  (acetaminophen), DO NOT take additional doses of Tylenol (acetaminophen).     Call your doctor for any of the followin.  Signs of infection (fever, growing tenderness at the surgery site, severe pain, a large amount of drainage or bleeding, foul-smelling drainage, redness, swelling).    2.  It has been over 8 to 10 hours since surgery and you are still not able to urinate (pee).    3.   Headache for over 24 hours.    4.  Numbness, tingling or weakness the day after surgery (if you had spinal anesthesia).  To contact a doctor, call _____________________________________ or:      [phone] and ask for the Resident On Call for:          _____orthopedics _________________ (answered 24 hours a day)      Emergency Department:  [facility]: [phone]  [facility]: [phone]               Rev. 10/2014

## 2021-02-24 NOTE — PROGRESS NOTES
Contacted patient to review outcome of selection committee meeting (See selection committee encounter).   Explained to patient that he/she needs to complete all components of the evaluation to be eligible for active status on the waiting list or to proceed with a live donor kidney transplant.   Reviewed next steps based on outcomes:   Patient will be listed as inactive (is not on dialysis and evaluation is not complete)-will receive:   -An Evaluation Summary Letter indicating what is needed to complete evaluation-discussed with patient if they would like to have testing done with [facility] or locally   -A listing letter indicating inactive status  Confirmed with patient that on successful completion of outstanding components, patient is eligible for active status and they will receive a follow-up call.   Confirmed that patient has contact information for additional questions or concerns.

## 2021-02-24 NOTE — OP NOTE
Procedure Date: 02/24/2021      PREOPERATIVE DIAGNOSIS:  Left ankle osteochondral defect.      POSTOPERATIVE DIAGNOSES:     1.  Left ankle osteochondral defect.   2.  Left ankle extensive hypertrophic synovitis.      PROCEDURES:   1.  Left ankle extensive arthroscopic debridement.   2.  Left ankle arthroscopic debridement with microfracture of osteochondral defect.      SURGEON:  Mirza Nelson MD      ASSISTANT:  Timothy Lopez CNP  MrReshma Lopez' assistance was required in order to provide assistance with positioning, the surgery itself, holding retractors, closure of the wound and application of immobilization devices.  At the time of the surgery, there was no available help from an orthopedic trainee.     COMPLICATIONS:  None.      DRAINS:  None.      ESTIMATED BLOOD LOSS:  Less than 20 mL      ANESTHESIA:  General endotracheal.      INDICATIONS:  Please refer to clinic notes for further details regarding indications for Mr. Valiente's case.      DESCRIPTION OF PROCEDURE:  On 02/24/2021, the patient was taken to surgery.  Preoperative antibiotics were administered to the patient prior to arrival to the OR.      After successful induction of general endotracheal anesthesia, he was placed supine on the operating table.  The left lower extremity was prepped and draped in a sterile fashion.  After exsanguination by gravity, the tourniquet cuff was inflated to 300 mmHg on the proximal third of the left thigh.      The pause for the cause was performed according to our institution's policy, which confirmed laterality of the procedure.      Eventually, the patient was placed in a low beachchair position with 30 pounds of traction through a soft tissue harness.  An anteromedial portal was created medial to the anterior tibialis tendon and a lateral portal was created lateral to the extensor tendon longus.  We proceeded with exploration of the joint with the following findings:   1.  Large amount of hemorrhagic,  hypertrophic synovitis in the anterior compartment of the ankle joint.   2.  The presence of anterior distal tibia osteophyte.   3.  Extensive arthritic changes.   4.  The presence of an osteochondral defect across the medial dome of the talus measuring approximately 1.5 cm from anterior to posterior with 1 cm medial to lateral.   5.  Clear medial and lateral gutters.   6.  Absence of loose bodies.      Upon the findings mentioned above, we proceeded with debridement of the anterior compartment of the ankle joint with a shaver through both anteromedial and anterolateral portals in order to avoid any soft tissue impingement.  Once this was concluded, we proceeded with resection of the most anterior margin of the distal tibia in order to have absence of any bony impingement as well as better access to the subchondral defect.      We proceeded with debridement of the osteochondral defect until stable chondral flaps were created.  Following this, we proceeded with a microfracture of the defect.      Inflow was stopped, the tourniquet cuff was deflated, and we confirmed the presence of retrograde bleeding into the defect.      Sterile dressings were applied.  The patient was placed in a toe CAM walker and transferred in stable condition to PACU.      PLAN:  The patient will remain nonweightbearing x 6 weeks.  He will maintain the CAM walker at all times except for hygiene for the first 2 weeks from surgery.  At that time, sutures will be removed if indicated.  Proceed with the CAM walker at all times except for hygiene, resting, sitting and sleeping activities.      He will proceed with physical therapy without restrictions except for the fact that he will be nonweightbearing between weeks 2 and 6.      At his 6-week appointment, he will be evaluated; no x-rays will be obtained.  Based on clinical findings, further recommendations will be given to the patient.         MARAH OJEDA MD             D: 02/24/2021   T:  2021   MT: VANESSA      Name:     STEFAN RIOS   MRN:      [id number]        Account:        [id number]   :      1995           Procedure Date: 2021      Document: [id number]

## 2021-03-02 DIAGNOSIS — Z11.59 ENCOUNTER FOR SCREENING FOR OTHER VIRAL DISEASES: ICD-10-CM

## 2021-03-02 DIAGNOSIS — Z11.59 ENCOUNTER FOR SCREENING FOR OTHER VIRAL DISEASES: Primary | ICD-10-CM

## 2021-03-02 LAB
SARS-COV-2 RNA RESP QL NAA+PROBE: NORMAL
SPECIMEN SOURCE: NORMAL

## 2021-03-02 PROCEDURE — U0005 INFEC AGEN DETEC AMPLI PROBE: HCPCS | Performed by: SURGERY

## 2021-03-02 PROCEDURE — U0003 INFECTIOUS AGENT DETECTION BY NUCLEIC ACID (DNA OR RNA); SEVERE ACUTE RESPIRATORY SYNDROME CORONAVIRUS 2 (SARS-COV-2) (CORONAVIRUS DISEASE [COVID-19]), AMPLIFIED PROBE TECHNIQUE, MAKING USE OF HIGH THROUGHPUT TECHNOLOGIES AS DESCRIBED BY CMS-2020-01-R: HCPCS | Performed by: SURGERY

## 2021-03-03 ENCOUNTER — ANESTHESIA EVENT (OUTPATIENT)
Dept: SURGERY | Facility: CLINIC | Age: 26
End: 2021-03-03
Payer: COMMERCIAL

## 2021-03-03 LAB
LABORATORY COMMENT REPORT: NORMAL
SARS-COV-2 RNA RESP QL NAA+PROBE: NEGATIVE
SPECIMEN SOURCE: NORMAL

## 2021-03-04 ENCOUNTER — HOSPITAL ENCOUNTER (OUTPATIENT)
Facility: CLINIC | Age: 26
Discharge: HOME | End: 2021-03-04
Attending: SURGERY | Admitting: SURGERY
Payer: COMMERCIAL

## 2021-03-04 ENCOUNTER — ANESTHESIA (OUTPATIENT)
Dept: SURGERY | Facility: CLINIC | Age: 26
End: 2021-03-04
Payer: COMMERCIAL

## 2021-03-04 ENCOUNTER — APPOINTMENT (OUTPATIENT)
Dept: SURGERY | Facility: PHYSICIAN GROUP | Age: 26
End: 2021-03-04
Payer: COMMERCIAL

## 2021-03-04 ENCOUNTER — TELEPHONE (OUTPATIENT)
Dept: FAMILY MEDICINE | Facility: CLINIC | Age: 26
End: 2021-03-04

## 2021-03-04 VITALS
SYSTOLIC BLOOD PRESSURE: 128 MMHG | OXYGEN SATURATION: 100 % | RESPIRATION RATE: 12 BRPM | HEIGHT: 72 IN | WEIGHT: 250.4 LBS | TEMPERATURE: 96.6 F | HEART RATE: 56 BPM | DIASTOLIC BLOOD PRESSURE: 82 MMHG | BODY MASS INDEX: 33.92 KG/M2

## 2021-03-04 DIAGNOSIS — N18.6 END STAGE RENAL DISEASE (H): ICD-10-CM

## 2021-03-04 DIAGNOSIS — Z79.2 NEED FOR PROPHYLACTIC ANTIBIOTIC: Primary | ICD-10-CM

## 2021-03-04 LAB
ANION GAP SERPL CALCULATED.3IONS-SCNC: 10 MMOL/L (ref 3–14)
BUN SERPL-MCNC: 99 MG/DL (ref 7–30)
CALCIUM SERPL-MCNC: 9.3 MG/DL (ref 8.5–10.1)
CHLORIDE SERPL-SCNC: 110 MMOL/L (ref 94–109)
CO2 SERPL-SCNC: 21 MMOL/L (ref 20–32)
CREAT SERPL-MCNC: 8.2 MG/DL (ref 0.66–1.25)
GFR SERPL CREATININE-BSD FRML MDRD: 8 ML/MIN/{1.73_M2}
GLUCOSE SERPL-MCNC: 104 MG/DL (ref 70–99)
HBA1C MFR BLD: 5 % (ref 0–5.6)
POTASSIUM SERPL-SCNC: 4 MMOL/L (ref 3.4–5.3)
SODIUM SERPL-SCNC: 141 MMOL/L (ref 133–144)

## 2021-03-04 PROCEDURE — 250N000011 HC RX IP 250 OP 636: Performed by: SURGERY

## 2021-03-04 PROCEDURE — 250N000009 HC RX 250: Performed by: SURGERY

## 2021-03-04 PROCEDURE — 250N000025 HC SEVOFLURANE, PER MIN: Performed by: SURGERY

## 2021-03-04 PROCEDURE — 250N000011 HC RX IP 250 OP 636

## 2021-03-04 PROCEDURE — 258N000003 HC RX IP 258 OP 636: Performed by: SURGERY

## 2021-03-04 PROCEDURE — 258N000003 HC RX IP 258 OP 636

## 2021-03-04 PROCEDURE — 93010 ELECTROCARDIOGRAM REPORT: CPT | Performed by: INTERNAL MEDICINE

## 2021-03-04 PROCEDURE — 272N000001 HC OR GENERAL SUPPLY STERILE: Performed by: SURGERY

## 2021-03-04 PROCEDURE — 93005 ELECTROCARDIOGRAM TRACING: CPT

## 2021-03-04 PROCEDURE — 83036 HEMOGLOBIN GLYCOSYLATED A1C: CPT | Performed by: SURGERY

## 2021-03-04 PROCEDURE — 36415 COLL VENOUS BLD VENIPUNCTURE: CPT | Performed by: SURGERY

## 2021-03-04 PROCEDURE — 999N000141 HC STATISTIC PRE-PROCEDURE NURSING ASSESSMENT: Performed by: SURGERY

## 2021-03-04 PROCEDURE — 710N000012 HC RECOVERY PHASE 2, PER MINUTE: Performed by: SURGERY

## 2021-03-04 PROCEDURE — 999N000054 HC STATISTIC EKG NON-CHARGEABLE

## 2021-03-04 PROCEDURE — 710N000009 HC RECOVERY PHASE 1, LEVEL 1, PER MIN: Performed by: SURGERY

## 2021-03-04 PROCEDURE — 258N000003 HC RX IP 258 OP 636: Performed by: ANESTHESIOLOGY

## 2021-03-04 PROCEDURE — 360N000076 HC SURGERY LEVEL 3, PER MIN: Performed by: SURGERY

## 2021-03-04 PROCEDURE — 250N000009 HC RX 250

## 2021-03-04 PROCEDURE — 80048 BASIC METABOLIC PNL TOTAL CA: CPT | Performed by: SURGERY

## 2021-03-04 PROCEDURE — 370N000017 HC ANESTHESIA TECHNICAL FEE, PER MIN: Performed by: SURGERY

## 2021-03-04 RX ORDER — DEXAMETHASONE SODIUM PHOSPHATE 4 MG/ML
INJECTION, SOLUTION INTRA-ARTICULAR; INTRALESIONAL; INTRAMUSCULAR; INTRAVENOUS; SOFT TISSUE PRN
Status: DISCONTINUED | OUTPATIENT
Start: 2021-03-04 | End: 2021-03-04

## 2021-03-04 RX ORDER — OXYCODONE HYDROCHLORIDE 5 MG/1
5-10 TABLET ORAL EVERY 4 HOURS PRN
Qty: 10 TABLET | Refills: 0 | Status: SHIPPED | OUTPATIENT
Start: 2021-03-04 | End: 2021-03-10

## 2021-03-04 RX ORDER — EPHEDRINE SULFATE 50 MG/ML
INJECTION, SOLUTION INTRAMUSCULAR; INTRAVENOUS; SUBCUTANEOUS PRN
Status: DISCONTINUED | OUTPATIENT
Start: 2021-03-04 | End: 2021-03-04

## 2021-03-04 RX ORDER — BUPIVACAINE HYDROCHLORIDE 2.5 MG/ML
INJECTION, SOLUTION INFILTRATION; PERINEURAL PRN
Status: DISCONTINUED | OUTPATIENT
Start: 2021-03-04 | End: 2021-03-04 | Stop reason: HOSPADM

## 2021-03-04 RX ORDER — ONDANSETRON 4 MG/1
4 TABLET, ORALLY DISINTEGRATING ORAL EVERY 30 MIN PRN
Status: DISCONTINUED | OUTPATIENT
Start: 2021-03-04 | End: 2021-03-04 | Stop reason: HOSPADM

## 2021-03-04 RX ORDER — SODIUM CHLORIDE, SODIUM LACTATE, POTASSIUM CHLORIDE, CALCIUM CHLORIDE 600; 310; 30; 20 MG/100ML; MG/100ML; MG/100ML; MG/100ML
INJECTION, SOLUTION INTRAVENOUS CONTINUOUS
Status: DISCONTINUED | OUTPATIENT
Start: 2021-03-04 | End: 2021-03-04 | Stop reason: HOSPADM

## 2021-03-04 RX ORDER — ACETAMINOPHEN 325 MG/1
650 TABLET ORAL EVERY 4 HOURS PRN
Qty: 50 TABLET | Refills: 0 | Status: SHIPPED | OUTPATIENT
Start: 2021-03-04 | End: 2021-07-01

## 2021-03-04 RX ORDER — GLYCOPYRROLATE 0.2 MG/ML
INJECTION, SOLUTION INTRAMUSCULAR; INTRAVENOUS PRN
Status: DISCONTINUED | OUTPATIENT
Start: 2021-03-04 | End: 2021-03-04

## 2021-03-04 RX ORDER — OXYCODONE HYDROCHLORIDE 5 MG/1
5 TABLET ORAL
Status: DISCONTINUED | OUTPATIENT
Start: 2021-03-04 | End: 2021-03-04 | Stop reason: HOSPADM

## 2021-03-04 RX ORDER — AMOXICILLIN 250 MG
1-2 CAPSULE ORAL 2 TIMES DAILY
Qty: 30 TABLET | Refills: 0 | Status: SHIPPED | OUTPATIENT
Start: 2021-03-04 | End: 2021-03-10

## 2021-03-04 RX ORDER — HEPARIN SODIUM 1000 [USP'U]/ML
INJECTION, SOLUTION INTRAVENOUS; SUBCUTANEOUS PRN
Status: DISCONTINUED | OUTPATIENT
Start: 2021-03-04 | End: 2021-03-04 | Stop reason: HOSPADM

## 2021-03-04 RX ORDER — MAGNESIUM HYDROXIDE 1200 MG/15ML
LIQUID ORAL PRN
Status: DISCONTINUED | OUTPATIENT
Start: 2021-03-04 | End: 2021-03-04 | Stop reason: HOSPADM

## 2021-03-04 RX ORDER — HEPARIN SODIUM 5000 [USP'U]/.5ML
INJECTION, SOLUTION INTRAVENOUS; SUBCUTANEOUS PRN
Status: DISCONTINUED | OUTPATIENT
Start: 2021-03-04 | End: 2021-03-04

## 2021-03-04 RX ORDER — SODIUM CHLORIDE 9 MG/ML
INJECTION, SOLUTION INTRAVENOUS CONTINUOUS
Status: DISCONTINUED | OUTPATIENT
Start: 2021-03-04 | End: 2021-03-04 | Stop reason: HOSPADM

## 2021-03-04 RX ORDER — ACETAMINOPHEN 325 MG/1
650 TABLET ORAL
Status: DISCONTINUED | OUTPATIENT
Start: 2021-03-04 | End: 2021-03-04 | Stop reason: HOSPADM

## 2021-03-04 RX ORDER — HYDROMORPHONE HYDROCHLORIDE 1 MG/ML
.3-.5 INJECTION, SOLUTION INTRAMUSCULAR; INTRAVENOUS; SUBCUTANEOUS EVERY 10 MIN PRN
Status: DISCONTINUED | OUTPATIENT
Start: 2021-03-04 | End: 2021-03-04 | Stop reason: HOSPADM

## 2021-03-04 RX ORDER — NALOXONE HYDROCHLORIDE 0.4 MG/ML
0.4 INJECTION, SOLUTION INTRAMUSCULAR; INTRAVENOUS; SUBCUTANEOUS
Status: DISCONTINUED | OUTPATIENT
Start: 2021-03-04 | End: 2021-03-04 | Stop reason: HOSPADM

## 2021-03-04 RX ORDER — FENTANYL CITRATE 50 UG/ML
INJECTION, SOLUTION INTRAMUSCULAR; INTRAVENOUS PRN
Status: DISCONTINUED | OUTPATIENT
Start: 2021-03-04 | End: 2021-03-04

## 2021-03-04 RX ORDER — MEPERIDINE HYDROCHLORIDE 25 MG/ML
12.5 INJECTION INTRAMUSCULAR; INTRAVENOUS; SUBCUTANEOUS
Status: DISCONTINUED | OUTPATIENT
Start: 2021-03-04 | End: 2021-03-04 | Stop reason: HOSPADM

## 2021-03-04 RX ORDER — NALOXONE HYDROCHLORIDE 0.4 MG/ML
0.2 INJECTION, SOLUTION INTRAMUSCULAR; INTRAVENOUS; SUBCUTANEOUS
Status: DISCONTINUED | OUTPATIENT
Start: 2021-03-04 | End: 2021-03-04 | Stop reason: HOSPADM

## 2021-03-04 RX ORDER — DIPHENHYDRAMINE HYDROCHLORIDE 50 MG/ML
INJECTION INTRAMUSCULAR; INTRAVENOUS PRN
Status: DISCONTINUED | OUTPATIENT
Start: 2021-03-04 | End: 2021-03-04

## 2021-03-04 RX ORDER — FENTANYL CITRATE 50 UG/ML
25-50 INJECTION, SOLUTION INTRAMUSCULAR; INTRAVENOUS
Status: DISCONTINUED | OUTPATIENT
Start: 2021-03-04 | End: 2021-03-04 | Stop reason: HOSPADM

## 2021-03-04 RX ORDER — LIDOCAINE HYDROCHLORIDE 20 MG/ML
INJECTION, SOLUTION INFILTRATION; PERINEURAL PRN
Status: DISCONTINUED | OUTPATIENT
Start: 2021-03-04 | End: 2021-03-04

## 2021-03-04 RX ORDER — ONDANSETRON 2 MG/ML
INJECTION INTRAMUSCULAR; INTRAVENOUS PRN
Status: DISCONTINUED | OUTPATIENT
Start: 2021-03-04 | End: 2021-03-04

## 2021-03-04 RX ORDER — ONDANSETRON 2 MG/ML
4 INJECTION INTRAMUSCULAR; INTRAVENOUS EVERY 30 MIN PRN
Status: DISCONTINUED | OUTPATIENT
Start: 2021-03-04 | End: 2021-03-04 | Stop reason: HOSPADM

## 2021-03-04 RX ORDER — CEFAZOLIN SODIUM 500 MG/2.2ML
500 INJECTION, POWDER, FOR SOLUTION INTRAMUSCULAR; INTRAVENOUS
Status: COMPLETED | OUTPATIENT
Start: 2021-03-04 | End: 2021-03-04

## 2021-03-04 RX ORDER — PROPOFOL 10 MG/ML
INJECTION, EMULSION INTRAVENOUS PRN
Status: DISCONTINUED | OUTPATIENT
Start: 2021-03-04 | End: 2021-03-04

## 2021-03-04 RX ORDER — HEPARIN SODIUM 1000 [USP'U]/ML
INJECTION, SOLUTION INTRAVENOUS; SUBCUTANEOUS PRN
Status: DISCONTINUED | OUTPATIENT
Start: 2021-03-04 | End: 2021-03-04

## 2021-03-04 RX ORDER — PROPOFOL 10 MG/ML
INJECTION, EMULSION INTRAVENOUS CONTINUOUS PRN
Status: DISCONTINUED | OUTPATIENT
Start: 2021-03-04 | End: 2021-03-04

## 2021-03-04 RX ADMIN — Medication 5 MG: at 09:10

## 2021-03-04 RX ADMIN — CEFAZOLIN 500 MG: 225 INJECTION, POWDER, FOR SOLUTION INTRAMUSCULAR; INTRAVENOUS at 07:43

## 2021-03-04 RX ADMIN — SODIUM CHLORIDE: 9 INJECTION, SOLUTION INTRAVENOUS at 07:33

## 2021-03-04 RX ADMIN — FENTANYL CITRATE 25 MCG: 50 INJECTION, SOLUTION INTRAMUSCULAR; INTRAVENOUS at 07:36

## 2021-03-04 RX ADMIN — PHENYLEPHRINE HYDROCHLORIDE 50 MCG: 10 INJECTION INTRAVENOUS at 09:16

## 2021-03-04 RX ADMIN — PHENYLEPHRINE HYDROCHLORIDE 50 MCG: 10 INJECTION INTRAVENOUS at 09:29

## 2021-03-04 RX ADMIN — DIPHENHYDRAMINE HYDROCHLORIDE 12.5 MG: 50 INJECTION, SOLUTION INTRAMUSCULAR; INTRAVENOUS at 07:44

## 2021-03-04 RX ADMIN — Medication 5 MG: at 08:56

## 2021-03-04 RX ADMIN — PROPOFOL 150 MG: 10 INJECTION, EMULSION INTRAVENOUS at 07:36

## 2021-03-04 RX ADMIN — LIDOCAINE HYDROCHLORIDE 40 MG: 20 INJECTION, SOLUTION INFILTRATION; PERINEURAL at 07:36

## 2021-03-04 RX ADMIN — HEPARIN SODIUM 5000 UNITS: 1000 INJECTION INTRAVENOUS; SUBCUTANEOUS at 08:57

## 2021-03-04 RX ADMIN — PROPOFOL 50 MCG/KG/MIN: 10 INJECTION, EMULSION INTRAVENOUS at 07:40

## 2021-03-04 RX ADMIN — MIDAZOLAM 2 MG: 1 INJECTION INTRAMUSCULAR; INTRAVENOUS at 07:33

## 2021-03-04 RX ADMIN — FENTANYL CITRATE 25 MCG: 50 INJECTION, SOLUTION INTRAMUSCULAR; INTRAVENOUS at 07:49

## 2021-03-04 RX ADMIN — DEXAMETHASONE SODIUM PHOSPHATE 4 MG: 4 INJECTION, SOLUTION INTRA-ARTICULAR; INTRALESIONAL; INTRAMUSCULAR; INTRAVENOUS; SOFT TISSUE at 08:17

## 2021-03-04 RX ADMIN — ONDANSETRON 4 MG: 2 INJECTION INTRAMUSCULAR; INTRAVENOUS at 10:06

## 2021-03-04 RX ADMIN — PHENYLEPHRINE HYDROCHLORIDE 100 MCG: 10 INJECTION INTRAVENOUS at 09:44

## 2021-03-04 RX ADMIN — FENTANYL CITRATE 50 MCG: 50 INJECTION, SOLUTION INTRAMUSCULAR; INTRAVENOUS at 08:13

## 2021-03-04 RX ADMIN — Medication 5 MG: at 09:04

## 2021-03-04 RX ADMIN — GLYCOPYRROLATE 0.1 MG: 0.2 INJECTION, SOLUTION INTRAMUSCULAR; INTRAVENOUS at 09:36

## 2021-03-04 RX ADMIN — Medication 5 MG: at 08:25

## 2021-03-04 ASSESSMENT — MIFFLIN-ST. JEOR: SCORE: 2163.81

## 2021-03-04 NOTE — TELEPHONE ENCOUNTER
Pt's called back, information below was notified. Scheduled pt phone visit 03/11/2021 @2:00om with Dr.azar Hank Wolfe on 3/4/2021 at 2:49 PM

## 2021-03-04 NOTE — DISCHARGE INSTRUCTIONS
Same Day Surgery Discharge Instructions for  Sedation and General Anesthesia       It's not unusual to feel dizzy, light-headed or faint for up to 24 hours after surgery or while taking pain medication.  If you have these symptoms: sit for a few minutes before standing and have someone assist you when you get up to walk or use the bathroom.      You should rest and relax for the next 24 hours. We recommend you make arrangements to have an adult stay with you for at least 24 hours after your discharge.  Avoid hazardous and strenuous activity.      DO NOT DRIVE any vehicle or operate mechanical equipment for 24 hours following the end of your surgery.  Even though you may feel normal, your reactions may be affected by the medication you have received.      Do not drink alcoholic beverages for 24 hours following surgery.       Slowly progress to your regular diet as you feel able. It's not unusual to feel nauseated and/or vomit after receiving anesthesia.  If you develop these symptoms, drink clear liquids (apple juice, ginger ale, broth, 7-up, etc. ) until you feel better.  If your nausea and vomiting persists for 24 hours, please notify your surgeon.        All narcotic pain medications, along with inactivity and anesthesia, can cause constipation. Drinking plenty of liquids and increasing fiber intake will help.      For any questions of a medical nature, call your surgeon.      Do not make important decisions for 24 hours.      If you had general anesthesia, you may have a sore throat for a couple of days related to the breathing tube used during surgery.  You may use Cepacol lozenges to help with this discomfort.  If it worsens or if you develop a fever, contact your surgeon.       If you feel your pain is not well managed with the pain medications prescribed by your surgeon, please contact your surgeon's office to let them know so they can address your concerns.       CoVid 19 Information    We want to give you  information regarding Covid. Please consult your primary care provider with any questions you might have.     Patient who have symptoms (cough, fever, or shortness of breath), need to isolate for 7 days from when symptoms started OR 72 hours after fever resolves (without fever reducing medications) AND improvement of respiratory symptoms (whichever is longer).      Isolate yourself at home (in own room/own bathroom if possible)    Do Not allow any visitors    Do Not go to work or school    Do Not go to [employer],  centers, shopping, or other public places.    Do Not shake hands.    Avoid close and intimate contact with others (hugging, kissing).    Follow CDC recommendations for household cleaning of frequently touched services.     After the initial 7 days, continue to isolate yourself from household members as much as possible. To continue decrease the risk of community spread and exposure, you and any members of your household should limit activities in public for 14 days after starting home isolation.     You can reference the following CDC link for helpful home isolation/care tips:  https://www.cdc.gov/coronavirus/2019-ncov/downloads/10Things.pdf    Protect Others:    Cover Your Mouth and Nose with a mask, disposable tissue or wash cloth to avoid spreading germs to others.    Wash your hands and face frequently with soap and water    Call Your Primary Doctor If: Breathing difficulty develops or you become worse.    For more information about COVID19 and options for caring for yourself at home, please visit the CDC website at https://www.cdc.gov/coronavirus/2019-ncov/about/steps-when-sick.html  For more options for care at [facility], please visit our website at [email or web address]    Call your Surgeon If You Have Any of the Following:  Fever of 100.4 F or higher  Signs of infection at the incision site, such as increased redness or swelling, warmth, worsening pain,  "bleeding, or foul-smelling drainage  Lack of a \"thrill\" (you can t feel it)  Pain or numbness in your fingers, hand, or arm  Bleeding, redness, or warmth around your fistula  Sudden bulging of the fistula (more than usual; a slight bulge is normal)   Follow-Up  The doctor will check your fistula within 1 to 2 weeks after the procedure.   It will likely take about 6 to 8 weeks for the fistula to enlarge enough to start dialysis. After that, make sure the fistula is checked each time you have dialysis.       **If you have questions or concerns about your procedure,   call Dr. Moran at [phone]**  "

## 2021-03-04 NOTE — OP NOTE
Procedure Date: 03/04/2021      PREOPERATIVE DIAGNOSIS:  End-stage renal disease.      POSTOPERATIVE DIAGNOSIS:  End-stage renal disease.      PROCEDURE PERFORMED:  Creation of a proximal right radial artery to median cephalic vein arteriovenous fistula.      SURGEON:  Henrik Moran MD      ASSISTANT:  Fallon Martinez MD, [facility] Surgery resident      ANESTHESIA:  LMA general anesthesia.      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  He is not yet dialysis dependent.  He is a left-handed individual.  Preoperative vein mapping has demonstrated a satisfactory appearing cephalic vein of the upper right arm.      OPERATIVE FINDINGS:  Upon interrogating his right upper arm with ultrasound prior to the procedure, I felt that he had a good quality median cephalic vein crossing over the antecubital fossa.  At the level of his brachial artery bifurcation, he had a good quality proximal radial artery.  I chose to base the fistula off of that, thereby decreasing the likelihood of a steal phenomenon in his right hand.  We performed an end-to-side median cephalic vein to proximal radial artery anastomosis.  There was a good quality thrill in the median cephalic vein post-procedure and a multiphasic Doppler signal in the radial artery at the wrist.      DESCRIPTION OF PROCEDURE:  After informed consent was obtained, the patient was brought to the operating room and placed on the table in a supine position.  LMA general anesthesia was achieved without incident.  I utilized a portable ultrasound and mapped and marked the course of the cephalic vein of the upper arm, including a good quality median cephalic vein crossing over the antecubital crease.  I also marked out the course of the brachial artery below the antecubital crease to the level of its bifurcation into the radial and ulnar  arteries.  The right upper extremity was then prepped and draped in the usual sterile fashion.  A timeout was called and we verified the patient's identity, the operative site, and the proposed procedure.        We began with a transverse incision centered 2 cm below the right antecubital crease.  Dissection proceeded sharply downward to isolate both the median cephalic vein and the median cubital vein along with their common confluence into a median antebrachial vein.  These structures were completely freed up and mobilized.  We incised the biceps aponeurosis.  Dissection proceeded sharply downward to isolate the brachial artery at the level of its bifurcation.  I dissected out 2 cm of the proximal right radial artery, including the recurrent radial artery branch.  These structures were individually encircled with vessel loops.  The patient was systemically heparinized with 5000 units of intravenous heparin.  After a 5-minute circulation time, we divided the median cubital vein between ties.  We controlled the radial artery proximally and distally.  An 8 mm radial arteriotomy was made.  Our median cephalic vein was cut to length in an appropriately spatulated manner.  We proceeded with an end-to-side median cephalic vein to proximal radial artery anastomosis using running 7-0 Prolene suture.  Prior to securing the suture line, all vessel loops were briefly released to flush any debris out of the arterial lumen.  The anastomosis was subsequently secured and flow was restored up the median cephalic vein.  A single 7-0 Prolene repair suture was required at our anastomosis.  Following this, we had excellent anastomotic hemostasis.  The wound was locally infiltrated with 1% lidocaine, 0.5% Marcaine mixture.  Hemostasis for the wound was assured.  The wound was then closed with interrupted 3-0 Vicryl subdermal sutures.  Skin was closed with a 4-0 Monocryl running subcuticular suture.  Steri-Strips and sterile dressings  were applied.  Final sponge and needle count were reported as correct.  He had a palpable thrill along the median cephalic vein with dopplerable flow in the cephalic vein throughout his upper arm.  He had a triphasic Doppler signal in his ulnar artery at the wrist and a biphasic Doppler signal in the right radial artery at the wrist.  He was extubated and returned to the outpatient recovery area awake and hemodynamically stable.         CAYLA LOCKWOOD MD             D: 2021   T: 2021   MT: INDIRA      Name:     STEFAN RIOS   MRN:      [id number]        Account:        [id number]   :      1995           Procedure Date: 2021      Document: [id number]

## 2021-03-04 NOTE — TELEPHONE ENCOUNTER
Forms/Letter Request    Name of form/letter:NADIA    Have you been seen for this request: Yes     Do we have the form/letter: Yes:     When is form/letter needed by: asap    How would you like the form/letter returned:     Patient Notified form requests are processed in 3-5 business days:Yes    Okay to leave a detailed message? Yes Other phone number:   [phone]

## 2021-03-04 NOTE — BRIEF OP NOTE
[facility]    Brief Operative Note    Pre-operative diagnosis: End stage renal disease (H) [N18.6]  Post-operative diagnosis Same as pre-operative diagnosis    Procedure: Procedure(s):  RIGHT proximal radial  to CEPHALIC ARTERIOVENOUS FISTULA  Surgeon: Surgeon(s) and Role:     * Henrik Moran MD - Primary     * Fallon Martinez MD - Resident - Assisting  Anesthesia: General   Estimated blood loss: 25ml  Drains: None  Specimens: * No specimens in log *  Findings:   Right AVF radiocephalic, anastomosis distal to antecubital fossa.  Complications: None.  Implants: * No implants in log *

## 2021-03-04 NOTE — ANESTHESIA PROCEDURE NOTES
Airway   Date/Time: 3/4/2021 7:53 AM   Patient location during procedure: OR  Staff -   Anesthesiologist:  Phoebe Ledesma MD  CRNA: Tej Andino APRN CRNA  Other Anesthesia Staff: Jocelyn Nieto  Performed By: RYNE    Consent for Airway   Urgency: elective    Indications and Patient Condition  Indications for airway management: stephanie-procedural  Induction type:intravenousMask difficulty assessment: 0 - not attempted    Final Airway Details  Final airway type: supraglottic airway    Endotracheal Airway Details   Secured with: pink tape    Post intubation assessment   Placement verified by: capnometry, equal breath sounds and chest rise   Number of attempts at approach: 1  Secured with:pink tape  Ease of procedure: easy  Dentition: Unchanged

## 2021-03-04 NOTE — TELEPHONE ENCOUNTER
AA - pt dropped off [place] paperwork.  Do you need to have a visit with pt to complete?  Maria Del Carmen Santos CMA

## 2021-03-04 NOTE — ANESTHESIA PREPROCEDURE EVALUATION
Anesthesia Pre-Procedure Evaluation    Patient: Taylor Valiente   MRN: [id number] : 1995        Preoperative Diagnosis: End stage renal disease (H) [N18.6]   Procedure : Procedure(s):  RIGHT BRACHIAL-CEPHALIC ARTERIOVENOUS FISTULA     Past Medical History:   Diagnosis Date     Adrenal adenoma, left      Anemia      Benign essential hypertension 2017     CKD (chronic kidney disease) stage 5, GFR less than 15 ml/min (H)     FSGS     Depression      Focal glomerular sclerosis 2017     HLD (hyperlipidemia)      LVH (left ventricular hypertrophy) due to hypertensive disease 2017     Noncompliance      Obesity, unspecified      OD (osteochondritis dissecans) 2021     Stress-induced cardiomyopathy      Suicide attempt (H)       Past Surgical History:   Procedure Laterality Date     ARTHROSCOPY ANKLE Left 2021    Procedure: Left ankle arthroscopy and debridement/micro fracture;  Surgeon: Mirza Nelson MD;  Location: [facility]     [facility]     NO HISTORY OF SURGERY        Allergies   Allergen Reactions     No Known Allergies       Social History     Tobacco Use     Smoking status: Never Smoker     Smokeless tobacco: Never Used   Substance Use Topics     Alcohol use: No      Wt Readings from Last 1 Encounters:   21 113.8 kg (250 lb 14.1 oz)        Anesthesia Evaluation   Pt has had prior anesthetic.     No history of anesthetic complications       ROS/MED HX  ENT/Pulmonary:    (-) sleep apnea   Neurologic:       Cardiovascular: Comment: H/o stress induced CM, h/o HTN now low BP    Study Date: 09/10/2020 09:36 AM  Age: 25 yrs  Gender: Male  Patient [facility]  Reason For Study: Hypertension  Ordering Physician: DANY SYKES  Referring Physician: Jer Hooper  Performed By: Milly Gudino RDCS     BSA: 2.5 m2  Height: 72 in  Weight: 293 lb  BP: 205/141  mmHg  _____________________________________________________________________________  __        Procedure  Complete Portable Echo Adult. Optison (NDC #0597-1602) given intravenously.  _____________________________________________________________________________  __        Interpretation Summary     The visual ejection fraction is estimated at 55-60%.  There is mild to moderate concentric left ventricular hypertrophy.  Regional wall motion abnormalities cannot be excluded due to limited  visualization.  There was essentially no subcostal acoustic window. Technically difficult,  suboptimal study.  _____________________________________________________________________________    (+) Dyslipidemia hypertension-----Taking blood thinners CHF     METS/Exercise Tolerance: >4 METS    Hematologic:     (+) anemia,     Musculoskeletal:       GI/Hepatic:    (-) GERD   Renal/Genitourinary: Comment: Denies nephrotic sx.     (+) renal disease, type: CRI,     Endo: Comment: Adrenal adenoma, increased cortisol , hyperparathyroid    (+) Obesity,     Psychiatric/Substance Use: Comment: Suicide attempt    (+) psychiatric history depression Recreational drug usage: Cannabis.    Infectious Disease:       Malignancy:       Other:            Physical Exam    Airway        Mallampati: III   TM distance: > 3 FB   Neck ROM: full   Mouth opening: > 3 cm    Respiratory Devices and Support         Dental  no notable dental history         Cardiovascular          Rhythm and rate: regular     Pulmonary           breath sounds clear to auscultation           OUTSIDE LABS:  CBC:   Lab Results   Component Value Date    WBC 8.9 02/09/2021    WBC 4.6 11/29/2020    HGB 10.5 (L) 02/09/2021    HGB 10.3 (L) 01/28/2021    HCT 33.4 (L) 02/09/2021    HCT 29.8 (L) 12/22/2020     02/09/2021     (L) 11/29/2020     BMP:   Lab Results   Component Value Date     02/09/2021     11/29/2020    POTASSIUM 3.9 02/24/2021    POTASSIUM 4.2 02/09/2021     CHLORIDE 110 (H) 02/09/2021    CHLORIDE 113 (H) 11/29/2020    CO2 23 02/09/2021    CO2 21 11/29/2020    BUN 81 (H) 02/09/2021    BUN 80 (H) 11/29/2020    CR 8.25 (H) 02/24/2021    CR 7.99 (H) 02/09/2021     (H) 02/09/2021     (H) 12/15/2020     COAGS:   Lab Results   Component Value Date    PTT 35 02/09/2021    INR 1.23 (H) 02/09/2021     POC:   Lab Results   Component Value Date     (H) 02/24/2021     HEPATIC:   Lab Results   Component Value Date    ALBUMIN 3.8 02/09/2021    PROTTOTAL 7.6 02/09/2021    ALT 19 02/09/2021    AST 6 02/09/2021    ALKPHOS 71 02/09/2021    BILITOTAL 0.2 02/09/2021     OTHER:   Lab Results   Component Value Date    BUBBA 9.8 02/09/2021    PHOS 4.4 02/09/2021    MAG 2.2 11/27/2020    TSH 4.88 (H) 07/14/2017    T4 1.26 07/15/2017    CRP 7.9 11/29/2020       Anesthesia Plan    ASA Status:  3      Anesthesia Type: General.     - Airway: LMA              Consents    Anesthesia Plan(s) and associated risks, benefits, and realistic alternatives discussed. Questions answered and patient/representative(s) expressed understanding.     - Discussed with:  Patient         Postoperative Care       PONV prophylaxis: Ondansetron (or other 5HT-3), Background Propofol Infusion     Comments:                Phoebe Ledesma MD

## 2021-03-04 NOTE — ANESTHESIA CARE TRANSFER NOTE
Patient: Chaitram Thakurdial    Procedure(s):  RIGHT proximal radial  to CEPHALIC ARTERIOVENOUS FISTULA    Diagnosis: End stage renal disease (H) [N18.6]  Diagnosis Additional Information: No value filed.    Anesthesia Type:   General     Note:    Oropharynx: oropharynx clear of all foreign objects  Level of Consciousness: awake  Oxygen Supplementation: face mask    Independent Airway: airway patency satisfactory and stable  Dentition: dentition unchanged  Vital Signs Stable: post-procedure vital signs reviewed and stable  Report to RN Given: handoff report given  Patient transferred to: PACU    Handoff Report: Identifed the Patient, Identified the Reponsible Provider, Reviewed the pertinent medical history, Discussed the surgical course, Reviewed Intra-OP anesthesia mangement and issues during anesthesia, Set expectations for post-procedure period and Allowed opportunity for questions and acknowledgement of understanding      Vitals: (Last set prior to Anesthesia Care Transfer)  CRNA VITALS  3/4/2021 1020 - 3/4/2021 1100      3/4/2021             Pulse:  62    SpO2:  100 %    Resp Rate (set):  10        Electronically Signed By: ROSA Valdez CRNA  March 4, 2021  11:00 AM

## 2021-03-06 LAB — INTERPRETATION ECG - MUSE: NORMAL

## 2021-03-10 ENCOUNTER — OFFICE VISIT (OUTPATIENT)
Dept: ORTHOPEDICS | Facility: CLINIC | Age: 26
End: 2021-03-10
Payer: COMMERCIAL

## 2021-03-10 DIAGNOSIS — S99.912A INJURY OF LEFT ANKLE, INITIAL ENCOUNTER: Primary | ICD-10-CM

## 2021-03-10 PROCEDURE — 99024 POSTOP FOLLOW-UP VISIT: CPT

## 2021-03-10 NOTE — PROGRESS NOTES
Reason for visit:    Taylor Valiente came in to the clinic for a two week post op check.    His surgery was done 2/24/21 by Dr Nelson.  He had Left ankle arthroscopy and debridement/micro fracture     Assessment:    Taylor came into the clinic in CAM walker Non-WB, although he did WB during transfer to bed as well as once visit was complete we walked to the door to meet me with his wheel chair. I reminded him that he is Non-WB and needs to be sure that he is not walking.    The Surgical wounds were exposed and found to be well-healed and without evidence of infection; so the sutures were removed. CMS was found to be intact.     Plan:     He was placed in CAM walker.  He was told to remain Non-WB     He has an appointment to see Dr. Nelson at that time Dr. Nelson will determine further restrictions. A course of physical therapy was prescribed today that he can begin without restrictions except for weightbearing of course.     He has our phone number and will call with questions or problems.      Ana Cortez, ATC

## 2021-03-10 NOTE — PROGRESS NOTES
Pre-Visit Planning     Future Appointments   Date Time Provider Department Center   3/11/2021  2:00 PM Priyank Lawrence MD [facility]   3/25/2021  9:30 AM Priyank Lawrence MD [facility]   2021 10:00 AM Mirza Nelson MD [facility]   2021  3:00 PM [facility]     Arrival Time for this Appointment:  1:45 PM     Appointment Notes for this encounter:   FMLA paperwork - Forms in AA folder at [facility]    Questionnaires Reviewed/Assigned  No additional questionnaires are needed    Hospital/ER visits since last pcp appt? YES   2021 left ankle arthroscopy and debridement   3/4/021 Right proximal radial to cephalic arteriovenous fistula     Imagin21 MRI left ankle   IMPRESSION:  1. Moderate to large size left ankle joint effusion with synovitis.  2. Extensive bone marrow edema within the body of the left ankle talus  with an osteochondral lesion along the superior medial aspect of the  talar dome, measuring 5 mm in transverse dimension and 10 mm in AP  dimension. There is mild subchondral bone collapse. Subtle linear low  signal line adjacent to the osteochondral lesion in the region of the  extensive bone marrow edema, related to the osteochondral lesion  versus a nondisplaced fracture.  3. Mild tendinosis of the distal Achilles tendon with minimal  increased intrasubstance signal. The remaining tendinous and  ligamentous structures about the ankle are intact.    Lab review: multiple labs for review in epic   Creatinine   Date Value Ref Range Status   2021 8.20 (H) 0.66 - 1.25 mg/dL Final     Specialty Visits -   21 cardiology preop physical prior to procedures on  and 3/4     [facility]  Patient is active on [employer].     Patient preferred phone number: [phone]      Spoke to patient via phone. Are there any additional questions or concerns you'd like to review with your provider during your visit? No    Patient does not have additional questions or concerns.    "     Visit is not preventive.    Meds  Is there anything on your medication list that needs to be updated? Yes: RN reviewed with patient and removed oxycodone and stool softener as patient has completed this was given after surgery     Current Outpatient Medications   Medication     acetaminophen (TYLENOL) 325 MG tablet     amLODIPine (NORVASC) 10 MG tablet     aspirin 81 MG EC tablet     atorvastatin (LIPITOR) 10 MG tablet     calcitRIOL (ROCALTROL) 0.25 MCG capsule     carvedilol (COREG) 25 MG tablet     cloNIDine (CATAPRES) 0.3 MG tablet     ferrous sulfate (FEROSUL) 325 (65 Fe) MG tablet     hydrALAZINE (APRESOLINE) 100 MG tablet     sodium bicarbonate 650 MG tablet     vitamin D3 (CHOLECALCIFEROL) 2000 units (50 mcg) tablet     No current facility-administered medications for this visit.      Which pharmacy do you prefer to use for medications during this visit if needed? [facility]     Do you need refills on any of your medications? No    Health Maintenance Due   Topic Date Due     ADVANCE CARE PLANNING  Never done     Pneumococcal Vaccine: Pediatrics (0 to 5 Years) and At-Risk Patients (6 to 64 Years) (1 of 4 - PCV13) Never done     COVID-19 Vaccine (1 of 2) Never done       Call Summary  \"Thank you for your time today.  If anything comes up before your appointment, please feel free to contact us at [phone].\"    Sultana Urbina Registered Nurse, PAL (Patient Advocate Liason)   [facility]   [phone]         "

## 2021-03-11 ENCOUNTER — VIRTUAL VISIT (OUTPATIENT)
Dept: FAMILY MEDICINE | Facility: CLINIC | Age: 26
End: 2021-03-11
Payer: COMMERCIAL

## 2021-03-11 DIAGNOSIS — M93.20 OD (OSTEOCHONDRITIS DISSECANS): Primary | ICD-10-CM

## 2021-03-11 PROCEDURE — 99213 OFFICE O/P EST LOW 20 MIN: CPT | Mod: TEL | Performed by: FAMILY MEDICINE

## 2021-03-11 NOTE — PROGRESS NOTES
Taylor is a 25 year old who is being evaluated via a billable telephone visit.      What phone number would you like to be contacted at? [phone]  How would you like to obtain your AVS? Jackhart    Assessment & Plan     OD (osteochondritis dissecans)  S/p surgical procedure, FMLA form filled for mother as she will be driving patient to his PT and specialist appointment as he cannot put any weight on the left foot.                     Priyank Lawrence MD  [facility]    Subjective     HPI       Form: FMLA paperwork  Pt has been working in [employer] and he was out of work since 12/26 since he had his ankle injury.  Pt did have a surgery on it recently, and it is improving gradually, but he cannot put any weight on it, so his mother has to take time off work to drive him to his PT and specialist appointments.        Review of Systems         Objective           Vitals:  No vitals were obtained today due to virtual visit.    Physical Exam   healthy, alert and no distress  PSYCH: Alert and oriented times 3; coherent speech, normal   rate and volume, able to articulate logical thoughts, able   to abstract reason, no tangential thoughts, no hallucinations   or delusions  His affect is normal                Phone call duration: 15 minutes

## 2021-03-11 NOTE — TELEPHONE ENCOUNTER
Called pt-informed form at  ready for , copy in accordion file at [facility].    Rochelle Rojas/ROSA

## 2021-03-12 ENCOUNTER — THERAPY VISIT (OUTPATIENT)
Dept: PHYSICAL THERAPY | Facility: CLINIC | Age: 26
End: 2021-03-12
Attending: ORTHOPAEDIC SURGERY
Payer: COMMERCIAL

## 2021-03-12 DIAGNOSIS — Z47.89 AFTERCARE FOLLOWING SURGERY OF THE MUSCULOSKELETAL SYSTEM: ICD-10-CM

## 2021-03-12 DIAGNOSIS — M25.572 PAIN IN JOINT INVOLVING ANKLE AND FOOT, LEFT: ICD-10-CM

## 2021-03-12 DIAGNOSIS — S99.912A INJURY OF LEFT ANKLE, INITIAL ENCOUNTER: ICD-10-CM

## 2021-03-12 PROCEDURE — 97110 THERAPEUTIC EXERCISES: CPT | Mod: GP | Performed by: PHYSICAL THERAPIST

## 2021-03-12 PROCEDURE — 97161 PT EVAL LOW COMPLEX 20 MIN: CPT | Mod: GP | Performed by: PHYSICAL THERAPIST

## 2021-03-12 NOTE — PROGRESS NOTES
Physical Therapy Initial Evaluation  Subjective:  The history is provided by the patient. No  was used.   Patient Health History  Taylor Valiente being seen for Left ankle surgery.     Problem began: 2/24/2021.   Problem occurred: skipped on ice on 12/18/20   Pain is reported as 0/10 on pain scale.  General health as reported by patient is fair.  Pertinent medical history includes: anemia, high blood pressure and kidney disease.   Red flags:  None as reported by patient.  Medical allergies: none.   Surgeries include:  Orthopedic surgery and other. Other surgery history details: L ankle, fistula in arm.    Current medications:  High blood pressure medication.    Current occupation is Nutrition services.   Primary job tasks include:  Prolonged standing.                  Therapist Generated HPI Evaluation  Problem details: Patient slipped on the ice and injured his left ankle on 12/18/20.  He went into the clinic and had x rays on 12/21/20 which showed an osteochondral defect of the medial talar dome.  He had an MRI on 12/29/20 which showed a moderate to large joint effusion and synovitis, talus bone marrow edema and a large osteochondal lesion as well as mild distal achilles tendinosis.  He underwent a left ankle arthroscopic debridement and microfracture of the osteochondral defect on 2/24/21.  He has been in a CAM boot NWB using a walker or knee roller.  He had his stitches removed on 3/10/21 and have a recheck with Dr. Nelson on 4/6/21.  Today the patient walked into our clinic with his CAM and no assistive device, FWB on the left.  I advised his that he is to be NWB on left and gave his a walker to use while in the clinic and to get back into the car.  I reviewed the importance limiting his weight bearing to allow proper postop healing. Patient denies ankle pain and has limited function, stiffness, weakness and swelling. Patient also has stage 5 renal disease and is awaiting a transplant..          Type of problem:  Left ankle.    This is a new condition.  Condition occurred with:  A fall/slip.  Where condition occurred: at home.  Patient reports pain:  Joint.  Pain is described as aching and is intermittent.  Pain radiates to:  No radiation. Pain is worse during the day.  Since onset symptoms are gradually improving.  Associated symptoms:  Loss of strength and loss of motion/stiffness. Symptoms are exacerbated by activity  and relieved by ice, rest and bracing/immobilizing.  Special tests included:  MRI and x-ray.  Previous treatment includes surgery. There was moderate improvement following previous treatment.  Restrictions due to condition include:  Currently not working due to present treatment.                          Objective:    Gait:  Despite NWB status patient came to the clinic without an assistive device.  He has a walker and knee roller at home. A clinic walker was used during his appointment.   Gait Type:  Antalgic   Weight Bearing Status:  NWB   Assistive Devices:  CAM            Ankle/Foot Evaluation  ROM:    AROM:    Dorsiflexion:  Left:   2  Right:   20  Plantarflexion:  Left:  18    Right:  30  Inversion:  Left:  20     Right:  50  Eversion:  5     Right:  20      PROM:              Great Toe Extension:  Left:    70     Right: 75      Strength:                Anterior Tibialis:Left: 4-/5  Pain:  Right: 5/5  Pain:  Posterior Tibialis: Left: 4-/5  Pain:  Right: 5/5  Pain:  Peroneals: Left: 3+/5  Pain:  Right: 5/5  Pain:  Extensor Digitorum: Left: 3+/5  Pain:Right: 5/5  Pain:  Gastroc/Soleus:Left: 3-/5   Pain:  Right: 5/5  Pain:  LIGAMENT TESTING: not assessed              SPECIAL TESTS: not assessed    PALPATION:   Left ankle tenderness present at:  incisional (Lateral scope hole is closed and healing well, anterior has scabbed and has broken open)    EDEMA: Edema ankle: mild L lateral ankle edema.                                                              General      ROS    Assessment/Plan:    Patient is a 25 year old male with left side ankle complaints.    Patient has the following significant findings with corresponding treatment plan.                Diagnosis 1:  S/p Left ankle arthroscopic debridement and microfracture of osteochondral defect on 2/24/21  Pain -  hot/cold therapy and home program  Decreased ROM/flexibility - therapeutic exercise  Decreased strength - therapeutic exercise and therapeutic activities  Edema - cold therapy  Impaired gait - gait training  Impaired muscle performance - neuro re-education  Decreased function - therapeutic activities    Therapy Evaluation Codes:   1) History comprised of:   Personal factors that impact the plan of care:      None.   2)  Examination of Body Systems comprised of:   Body structures and functions that impact the plan of care:      Ankle.   Activity limitations that impact the plan of care are:      Bathing, Squatting/kneeling, Stairs, Standing, Walking and Working.  3) Clinical presentation characteristics are:   Stable/Uncomplicated.  4) Decision-Making    Low complexity using standardized patient assessment instrument and/or measureable assessment of functional outcome.  Cumulative Therapy Evaluation is: Low complexity.    Previous and current functional limitations:  (See Goal Flow Sheet for this information)    Short term and Long term goals: (See Goal Flow Sheet for this information)     Communication ability:  Patient appears to be able to clearly communicate and understand verbal and written communication and follow directions correctly.  Treatment Explanation - The following has been discussed with the patient:   RX ordered/plan of care  Anticipated outcomes  Possible risks and side effects  This patient would benefit from PT intervention to resume normal activities.   Rehab potential is good.    Frequency:  1 X week, once daily  Duration:  for 12 weeks  Discharge Plan:  Achieve all LTG.  Independent in home  treatment program.  Reach maximal therapeutic benefit.    Please refer to the daily flowsheet for treatment today, total treatment time and time spent performing 1:1 timed codes.

## 2021-03-16 ENCOUNTER — OFFICE VISIT (OUTPATIENT)
Dept: OTHER | Facility: CLINIC | Age: 26
End: 2021-03-16
Attending: SURGERY
Payer: COMMERCIAL

## 2021-03-16 VITALS — TEMPERATURE: 98 F | DIASTOLIC BLOOD PRESSURE: 89 MMHG | HEART RATE: 86 BPM | SYSTOLIC BLOOD PRESSURE: 148 MMHG

## 2021-03-16 DIAGNOSIS — Z09 FOLLOW-UP EXAMINATION FOLLOWING SURGERY: Primary | ICD-10-CM

## 2021-03-16 PROCEDURE — G0463 HOSPITAL OUTPT CLINIC VISIT: HCPCS

## 2021-03-16 PROCEDURE — 99024 POSTOP FOLLOW-UP VISIT: CPT | Performed by: SURGERY

## 2021-03-16 NOTE — PROGRESS NOTES
Taylor Valiente is a 25 year old male who presents for:  Chief Complaint   Patient presents with     RECHECK     1st PO - S/P Creation of a proximal right radial artery to median cephalic vein arteriovenous fistula 3/4/2021         Vitals:    Vitals:    03/16/21 1600   BP: (!) 148/89   BP Location: Left arm   Patient Position: Chair   Cuff Size: Adult Regular   Pulse: 86   Temp: 98  F (36.7  C)   TempSrc: Temporal       BMI:  Estimated body mass index is 33.96 kg/m  as calculated from the following:    Height as of 3/4/21: 6' (1.829 m).    Weight as of 3/4/21: 250 lb 6.4 oz (113.6 kg).    Pain Score:  Data Unavailable        Yadi Gonzalez MA

## 2021-03-16 NOTE — PROGRESS NOTES
Taylor Valiente is POD #12 status post creation of a proximal right radial artery to median cephalic vein AV fistula.  He notes some mild pain in his right forearm with activity but he is not limited by this discomfort.  He denies right hand pain or paresthesias.  He has not yet dialysis dependent.    Exam:  Well-developed male in no acute distress.  Blood pressure 148/89 with a pulse of 86.  Nicely healing incision just below the right antecubital crease.  Palpable thrill along the course of the median cephalic vein for about 5 or 6 cm before the vein courses deeper in the arm and becomes more difficult to palpate.  1+ palpable right radial pulse at the wrist.    ASSESSMENT:  POD #12 creation of a right proximal radial artery to median cephalic vein AV fistula clinically doing well.    RECOMMENDATION:  I reviewed all the above with Taylor.  He will resume his vein building exercises and I reviewed instructions for that.  Vascular surgical follow-up will be with me in 4-6 weeks for a repeat right arm AV fistula ultrasound.    Edwardo Moran MD

## 2021-03-17 ENCOUNTER — THERAPY VISIT (OUTPATIENT)
Dept: PHYSICAL THERAPY | Facility: CLINIC | Age: 26
End: 2021-03-17
Payer: COMMERCIAL

## 2021-03-17 DIAGNOSIS — M25.572 PAIN IN JOINT INVOLVING ANKLE AND FOOT, LEFT: ICD-10-CM

## 2021-03-17 DIAGNOSIS — Z47.89 AFTERCARE FOLLOWING SURGERY OF THE MUSCULOSKELETAL SYSTEM: ICD-10-CM

## 2021-03-17 PROCEDURE — 97110 THERAPEUTIC EXERCISES: CPT | Mod: GP | Performed by: PHYSICAL THERAPIST

## 2021-03-22 ENCOUNTER — APPOINTMENT (OUTPATIENT)
Dept: LAB | Facility: CLINIC | Age: 26
End: 2021-03-22
Attending: INTERNAL MEDICINE
Payer: COMMERCIAL

## 2021-03-22 ENCOUNTER — TRANSFERRED RECORDS (OUTPATIENT)
Dept: HEALTH INFORMATION MANAGEMENT | Facility: CLINIC | Age: 26
End: 2021-03-22

## 2021-03-24 ENCOUNTER — THERAPY VISIT (OUTPATIENT)
Dept: PHYSICAL THERAPY | Facility: CLINIC | Age: 26
End: 2021-03-24
Payer: COMMERCIAL

## 2021-03-24 DIAGNOSIS — M25.572 PAIN IN JOINT INVOLVING ANKLE AND FOOT, LEFT: ICD-10-CM

## 2021-03-24 DIAGNOSIS — Z47.89 AFTERCARE FOLLOWING SURGERY OF THE MUSCULOSKELETAL SYSTEM: ICD-10-CM

## 2021-03-24 PROCEDURE — 97110 THERAPEUTIC EXERCISES: CPT | Mod: GP | Performed by: PHYSICAL THERAPIST

## 2021-03-24 NOTE — PROGRESS NOTES
SUBJECTIVE  Subjective changes as noted by pt:   Mild soreness in L ankle with exercises. Not icing much. Trying to stay NWB.   Current pain level: (1-2/10)   Changes in function:  None     Adverse reaction to treatment or activity:  None    OBJECTIVE  Changes in objective findings:  Yes,  Came in to clinic today with walker NWB on L with CAM boot. L ankle AROM DF 14 deg, PF 34, inv 46, ev 24. Incisions are closed with small scab on meidial scope incision. MMT generall grade 4/5 t/o.     ASSESSMENT  Leesaitram continues to require intervention to meet STG and LTG's: PT  Patient's symptoms are resolving.  Patient is ready to progress to more complex exercises.  Response to therapy has shown an improvement in  ROM  and strength  Progress made towards STG/LTG?  Yes (See Goal flowsheet attached for updates on achievement of STG and LTG)    PLAN  Current treatment program is being advanced to more complex exercises.    PTA/ATC plan:  N/A    Please refer to the daily flowsheet for treatment today, total treatment time and time spent performing 1:1 timed codes.

## 2021-03-26 ENCOUNTER — TELEPHONE (OUTPATIENT)
Dept: OTHER | Facility: CLINIC | Age: 26
End: 2021-03-26

## 2021-03-26 DIAGNOSIS — I77.0 AVF (ARTERIOVENOUS FISTULA) (H): Primary | ICD-10-CM

## 2021-03-26 DIAGNOSIS — N18.6 END STAGE RENAL DISEASE (H): ICD-10-CM

## 2021-03-26 NOTE — TELEPHONE ENCOUNTER
Per Dr. Moran, pt to follow up in approximately 1 month with AVF US.  Order entered.  Routing to scheduling to contact patient to coordinate.    Meka Vazquez, LEAHN, RN-[facility] [place]

## 2021-03-31 ENCOUNTER — THERAPY VISIT (OUTPATIENT)
Dept: PHYSICAL THERAPY | Facility: CLINIC | Age: 26
End: 2021-03-31
Payer: COMMERCIAL

## 2021-03-31 DIAGNOSIS — M25.572 PAIN IN JOINT INVOLVING ANKLE AND FOOT, LEFT: ICD-10-CM

## 2021-03-31 DIAGNOSIS — N17.9 ACUTE KIDNEY INJURY (H): ICD-10-CM

## 2021-03-31 DIAGNOSIS — Z47.89 AFTERCARE FOLLOWING SURGERY OF THE MUSCULOSKELETAL SYSTEM: ICD-10-CM

## 2021-03-31 PROCEDURE — 97110 THERAPEUTIC EXERCISES: CPT | Mod: GP | Performed by: PHYSICAL THERAPIST

## 2021-03-31 RX ORDER — HYDRALAZINE HYDROCHLORIDE 100 MG/1
TABLET, FILM COATED ORAL
Qty: 360 TABLET | Refills: 1 | Status: SHIPPED | OUTPATIENT
Start: 2021-03-31 | End: 2021-07-01

## 2021-03-31 NOTE — PROGRESS NOTES
Addendum:  This patient failed to follow up with his surgeon or return to PT after 3/31/21. He will be discharged with a home exercise program.    PROGRESS  REPORT    Progress reporting period is from 3/12/21 to 3/31/21.       SUBJECTIVE  Subjective changes noted by patient:  Exercises going well. Occassional soreness with stretching exercises. Trying to maintain NWB status on L. Came into clinic today with knee roller and CAM on L.     Current pain level is 0/10  .     Previous pain level was  0/10  .   Changes in function:  None  Adverse reaction to treatment or activity: None    OBJECTIVE  Changes noted in objective findings:  Yes, L ankle AROM: DF 18 deg, PF 40. inv 55, ev 25. MMT DF, Inv , Ev grade 5-/5, PF grade 4-/5 at least. Scope holes well healed and minimal edema. Gait is NWB on L with walker or knee roller with CAM boot on L.    ASSESSMENT/PLAN  Updated problem list and treatment plan: Diagnosis 1:  S/p Left ankle arthroscopic debridement and microfracture of osteochondral defect on 2/24/21  Pain -  hot/cold therapy  Decreased ROM/flexibility - therapeutic exercise  Decreased strength - therapeutic exercise and therapeutic activities  Impaired gait - gait training  Impaired muscle performance - neuro re-education  Decreased function - therapeutic activities  STG/LTGs have been met or progress has been made towards goals:  None  Assessment of Progress: The patient's condition is improving.  Self Management Plans:  Patient has been instructed in a home treatment program.  I have re-evaluated this patient and find that the nature, scope, duration and intensity of the therapy is appropriate for the medical condition of the patient.  Taylor continues to require the following intervention to meet STG and LTG's:  PT    Recommendations:  This patient would benefit from continued therapy.     Frequency:  1 X week, once daily  Duration:  for 6 weeks      Please advise if weight bearing status can be progress  from NWB on L and status of CAM use moving forward.    Please refer to the daily flowsheet for treatment today, total treatment time and time spent performing 1:1 timed codes.

## 2021-03-31 NOTE — TELEPHONE ENCOUNTER
2nd and final attempt to schedule:    AVF US & in person OV with [facility] in [place].    Adia MCCANN

## 2021-04-02 DIAGNOSIS — N18.5 CHRONIC KIDNEY DISEASE, STAGE V (H): Primary | ICD-10-CM

## 2021-04-05 DIAGNOSIS — N18.5 CHRONIC KIDNEY DISEASE, STAGE V (H): ICD-10-CM

## 2021-04-05 LAB
ALBUMIN SERPL-MCNC: 3.3 G/DL (ref 3.4–5)
ANION GAP SERPL CALCULATED.3IONS-SCNC: <1 MMOL/L (ref 3–14)
BUN SERPL-MCNC: 84 MG/DL (ref 7–30)
CALCIUM SERPL-MCNC: 9.1 MG/DL (ref 8.5–10.1)
CHLORIDE SERPL-SCNC: 108 MMOL/L (ref 94–109)
CO2 SERPL-SCNC: 29 MMOL/L (ref 20–32)
CREAT SERPL-MCNC: 8.23 MG/DL (ref 0.66–1.25)
GFR SERPL CREATININE-BSD FRML MDRD: 8 ML/MIN/{1.73_M2}
GLUCOSE SERPL-MCNC: 105 MG/DL (ref 70–99)
PHOSPHATE SERPL-MCNC: 6.1 MG/DL (ref 2.5–4.5)
POTASSIUM SERPL-SCNC: 4.2 MMOL/L (ref 3.4–5.3)
SODIUM SERPL-SCNC: 137 MMOL/L (ref 133–144)

## 2021-04-05 PROCEDURE — 80069 RENAL FUNCTION PANEL: CPT | Performed by: NURSE PRACTITIONER

## 2021-04-05 PROCEDURE — 36415 COLL VENOUS BLD VENIPUNCTURE: CPT | Performed by: NURSE PRACTITIONER

## 2021-04-06 ENCOUNTER — PRE VISIT (OUTPATIENT)
Dept: ORTHOPEDICS | Facility: CLINIC | Age: 26
End: 2021-04-06

## 2021-04-12 ENCOUNTER — VIRTUAL VISIT (OUTPATIENT)
Dept: PSYCHOLOGY | Facility: CLINIC | Age: 26
End: 2021-04-12
Attending: FAMILY MEDICINE
Payer: COMMERCIAL

## 2021-04-12 DIAGNOSIS — F43.22 ADJUSTMENT DISORDER WITH ANXIETY: Primary | ICD-10-CM

## 2021-04-12 PROCEDURE — 90834 PSYTX W PT 45 MINUTES: CPT | Mod: GT | Performed by: SOCIAL WORKER

## 2021-04-12 ASSESSMENT — ANXIETY QUESTIONNAIRES
6. BECOMING EASILY ANNOYED OR IRRITABLE: SEVERAL DAYS
GAD7 TOTAL SCORE: 2
3. WORRYING TOO MUCH ABOUT DIFFERENT THINGS: NOT AT ALL
1. FEELING NERVOUS, ANXIOUS, OR ON EDGE: SEVERAL DAYS
7. FEELING AFRAID AS IF SOMETHING AWFUL MIGHT HAPPEN: NOT AT ALL
6. BECOMING EASILY ANNOYED OR IRRITABLE: SEVERAL DAYS
5. BEING SO RESTLESS THAT IT IS HARD TO SIT STILL: NOT AT ALL
5. BEING SO RESTLESS THAT IT IS HARD TO SIT STILL: NOT AT ALL
2. NOT BEING ABLE TO STOP OR CONTROL WORRYING: NOT AT ALL
2. NOT BEING ABLE TO STOP OR CONTROL WORRYING: NOT AT ALL
1. FEELING NERVOUS, ANXIOUS, OR ON EDGE: SEVERAL DAYS
3. WORRYING TOO MUCH ABOUT DIFFERENT THINGS: NOT AT ALL
IF YOU CHECKED OFF ANY PROBLEMS ON THIS QUESTIONNAIRE, HOW DIFFICULT HAVE THESE PROBLEMS MADE IT FOR YOU TO DO YOUR WORK, TAKE CARE OF THINGS AT HOME, OR GET ALONG WITH OTHER PEOPLE: SOMEWHAT DIFFICULT
7. FEELING AFRAID AS IF SOMETHING AWFUL MIGHT HAPPEN: NOT AT ALL
IF YOU CHECKED OFF ANY PROBLEMS ON THIS QUESTIONNAIRE, HOW DIFFICULT HAVE THESE PROBLEMS MADE IT FOR YOU TO DO YOUR WORK, TAKE CARE OF THINGS AT HOME, OR GET ALONG WITH OTHER PEOPLE: SOMEWHAT DIFFICULT
GAD7 TOTAL SCORE: 2

## 2021-04-12 ASSESSMENT — COLUMBIA-SUICIDE SEVERITY RATING SCALE - C-SSRS
FIRST ATTEMPT DATE: 65344
LETHALITY/MEDICAL DAMAGE CODE MOST RECENT ACTUAL ATTEMPT: NO PHYSICAL DAMAGE OR VERY MINOR PHYSICAL DAMAGE
6. HAVE YOU EVER DONE ANYTHING, STARTED TO DO ANYTHING, OR PREPARED TO DO ANYTHING TO END YOUR LIFE?: NO
1. IN THE PAST MONTH, HAVE YOU WISHED YOU WERE DEAD OR WISHED YOU COULD GO TO SLEEP AND NOT WAKE UP?: NO
5. HAVE YOU STARTED TO WORK OUT OR WORKED OUT THE DETAILS OF HOW TO KILL YOURSELF? DO YOU INTEND TO CARRY OUT THIS PLAN?: NO
2. HAVE YOU ACTUALLY HAD ANY THOUGHTS OF KILLING YOURSELF LIFETIME?: YES
TOTAL  NUMBER OF ACTUAL ATTEMPTS LIFETIME: 1
REASONS FOR IDEATION LIFETIME: EQUALLY TO GET ATTENTION, REVENGE OR A REACTION FROM OTHERS AND TO END/STOP THE PAIN
4. HAVE YOU HAD THESE THOUGHTS AND HAD SOME INTENTION OF ACTING ON THEM?: YES
6. HAVE YOU EVER DONE ANYTHING, STARTED TO DO ANYTHING, OR PREPARED TO DO ANYTHING TO END YOUR LIFE?: NO
LETHALITY/MEDICAL DAMAGE CODE FIRST POTENTIAL ATTEMPT: BEHAVIOR LIKELY TO RESULT IN INJURY BUT NOT LIKELY TO CAUSE DEATH
5. HAVE YOU STARTED TO WORK OUT OR WORKED OUT THE DETAILS OF HOW TO KILL YOURSELF? DO YOU INTEND TO CARRY OUT THIS PLAN?: YES
LETHALITY/MEDICAL DAMAGE CODE FIRST ACTUAL ATTEMPT: NO PHYSICAL DAMAGE OR VERY MINOR PHYSICAL DAMAGE
MOST LETHAL DATE: 65344
3. HAVE YOU BEEN THINKING ABOUT HOW YOU MIGHT KILL YOURSELF?: YES
TOTAL  NUMBER OF INTERRUPTED ATTEMPTS LIFETIME: NO
4. HAVE YOU HAD THESE THOUGHTS AND HAD SOME INTENTION OF ACTING ON THEM?: NO
TOTAL  NUMBER OF ABORTED OR SELF INTERRUPTED ATTEMPTS PAST LIFETIME: NO
3. HAVE YOU BEEN THINKING ABOUT HOW YOU MIGHT KILL YOURSELF?: SEE ABOVE
LETHALITY/MEDICAL DAMAGE CODE FIRST PROTENTIAL ATTEMPT: BEHAVIOR LIKELY TO RESULT IN INJURY BUT NOT LIKELY TO CAUSE DEATH
TOTAL  NUMBER OF ABORTED OR SELF INTERRUPTED ATTEMPTS PAST 3 MONTHS: NO
1. IN THE PAST MONTH, HAVE YOU WISHED YOU WERE DEAD OR WISHED YOU COULD GO TO SLEEP AND NOT WAKE UP?: YES
2. HAVE YOU ACTUALLY HAD ANY THOUGHTS OF KILLING YOURSELF?: NO
LETHALITY/MEDICAL DAMAGE CODE MOST RECENT POTENTIAL ATTEMPT: BEHAVIOR LIKELY TO RESULT IN INJURY BUT NOT LIKELY TO CAUSE DEATH
LETHALITY/MEDICAL DAMAGE CODE MOST LETHAL ACTUAL ATTEMPT: NO PHYSICAL DAMAGE OR VERY MINOR PHYSICAL DAMAGE
ATTEMPT LIFETIME: YES
ATTEMPT PAST THREE MONTHS: NO
TOTAL  NUMBER OF INTERRUPTED ATTEMPTS PAST 3 MONTHS: NO
1. IN THE PAST MONTH, HAVE YOU WISHED YOU WERE DEAD OR WISHED YOU COULD GO TO SLEEP AND NOT WAKE UP?: SEE ABOVE.

## 2021-04-12 ASSESSMENT — PATIENT HEALTH QUESTIONNAIRE - PHQ9
5. POOR APPETITE OR OVEREATING: NOT AT ALL
SUM OF ALL RESPONSES TO PHQ QUESTIONS 1-9: 0
5. POOR APPETITE OR OVEREATING: NOT AT ALL

## 2021-04-12 NOTE — PROGRESS NOTES
"                                                             Taylor Valiente     SAFETY PLAN:  Step 1: Warning signs / cues (Thoughts, images, mood, situation, behavior) that a crisis may be developing:    Thoughts: \"People would be better off without me\" and this was how he felt at the time 1 1/2 years ago but not now.    Images: na    Thinking Processes: ruminations (can't stop thinking about my problems): lost job; broke up with girlfriend; conflict with his parents and health condition. This was again 1 1/2 years ago.    Mood:     Behaviors:     Situations:    Step 2: Coping strategies - Things I can do to take my mind off of my problems without contacting another person (relaxation technique, physical activity):    Distress Tolerance Strategies:      Physical Activities:     Focus on helpful thoughts:    Step 3: People and social settings that provide distraction:   Name:   Phone:     Name:   Phone:     Name:   Phone:         Step 4: Remind myself of people and things that are important to me and worth living for:         Step 5: When I am in crisis, I can ask these people to help me use my safety plan:   Name:   Phone:     Name:   Phone:     Name:   Phone:    Step 6: Making the environment safe:       Step 7: Professionals or agencies I can contact during a crisis:    [facility] Daytime Number: [phone]    Suicide Prevention Lifeline: 2-721-690-GFUT (9275)    Crisis Text Line Service (available 24 hours a day, 7 days a week): Text [place] to 089850  Local Crisis Services:      Call 911 or go to my nearest emergency department.   I helped develop this safety plan and agree to use it when needed.  I have been given a copy of this plan.      Client signature _________________________________________________________________  Today s date:  4/12/2021  Adapted from Safety Plan Template 2008 Linda Ravi and Davion Palencia is reprinted with the express permission of the authors.  No portion " of the Safety Plan Template may be reproduced without the express, written permission.  You can contact the authors at bhs@[email or web address] or [email or web address].                                 Progress Note    Patient Name: Taylor Valiente  Date: 21         Service Type: Individual      Session Start Time: 3 pm  Session End Time: 3:45 pm     Session Length: 45 min    Session #: 1    Attendees: Client attended alone    Service Modality:  Video Visit: went to phone after video started to freeze.      Provider verified identity through the following two step process.  Patient provided:  Patient photo, Patient  and Patient address    Telemedicine Visit: The patient's condition can be safely assessed and treated via synchronous audio and visual telemedicine encounter.      Reason for Telemedicine Visit: Services only offered telehealth    Originating Site (Patient Location): Patient's home    Distant Site (Provider Location): [facility]    Consent:  The patient/guardian has verbally consented to: the potential risks and benefits of telemedicine (video visit) versus in person care; bill my insurance or make self-payment for services provided; and responsibility for payment of non-covered services.     Patient would like the video invitation sent by:  Text to cell phone: [phone]    Mode of Communication:  Video Conference via [employer]    As the provider I attest to compliance with applicable laws and regulations related to telemedicine.     Treatment Plan Last Reviewed: na  PHQ-9 / NIURKA-7 : phq=0; niurka=2.    DATA  Interactive Complexity: No  Crisis: No       Progress Since Last Session (Related to Symptoms / Goals / Homework):   Symptoms: first session    Homework: none given      Episode of Care Goals: first session    Current / Ongoing Stressors and Concerns:  He reported needing to see a therapist related to kidney transplant. Told me  he was depressed in the past and took an overdose of his medication in front of his father and grandfather following argument with father. This occurred he believed in November 2019. He was dealing with kidney disease; recent break up; loss of job and arguments with father. He reported things are better with his parents. He is close with his younger sister and has a puppy he is quite fond of.     Treatment Objective(s) Addressed in This Session:   Medical condition; anxiety.        Intervention:   Assessed functioning and went over the results of the phq/margaux. Explored reason for referral. Completed the [place] and began working on a safety plan.        ASSESSMENT: Current Emotional / Mental Status (status of significant symptoms):   Risk status (Self / Other harm or suicidal ideation)   Patient denies current fears or concerns for personal safety.   Patient denies current or recent suicidal ideation or behaviors.   Patient denies current or recent homicidal ideation or behaviors.   Patient denies current or recent self injurious behavior or ideation.   Patient denies other safety concerns.   Patient reports there has been no change in risk factors since their last session.     Patient reports there has been no change in protective factors since their last session.     Recommended that patient call 911 or go to the local ED should there be a change in any of these risk factors. we started a safety plan before disconnected.     Appearance:   Appropriate    Eye Contact:   Good    Psychomotor Behavior: Normal    Attitude:   Cooperative    Orientation:   All   Speech    Rate / Production: Normal     Volume:  Normal    Mood:    Anxious  Normal   Affect:    Appropriate    Thought Content:  Clear    Thought Form:  Coherent  Logical    Insight:    Good      Medication Review:   No changes to current psychiatric medication(s)     Medication Compliance:   Yes     Changes in Health Issues:   None reported     Chemical Use  Review:   Substance Use: Chemical use reviewed, no active concerns identified  he endorsed occasional use of marijuana; it helps him feel relaxed.     Tobacco Use: No current tobacco use.      Diagnosis:  Adjustment disorder with anxious mood.     Collateral Reports Completed:   Communicated with: PCP and my supervisor    PLAN: (Patient Tasks / Therapist Tasks / Other)  It was requested he call our behavioral access dept at [phone] to reschedule. I attempted to reach him at least 3 times after call was interrupted. Sent message to his PCP and my supervisor about how session ended.        MYLES Sanford                                                         ______________________________________________________________________    Treatment Plan    Patient's Name: Taylor Valiente  YOB: 1995    Date:      DSM5 Diagnoses: Adjustment Disorders  309.24 (F43.22) With anxiety  Psychosocial / Contextual Factors: awaiting kidney transplant.  WHODAS:     Referral / Collaboration:  Referral to another professional/service is not indicated at this time.    Anticipated number of session or this episode of care:        MeasurableTreatment Goal(s) related to diagnosis / functional impairment(s)  Goal 1: Patient will      I will know I've met my goal when  .      Objective #A (Patient Action)    Patient will .  Status:      Intervention(s)  Therapist will .    Objective #B  Patient will .  Status:      Intervention(s)  Therapist will .    Objective #C  Patient will .  Status:      Intervention(s)  Therapist will .          .      MYLES Sanford  April 12, 2021

## 2021-04-13 ENCOUNTER — TELEPHONE (OUTPATIENT)
Dept: PSYCHOLOGY | Facility: CLINIC | Age: 26
End: 2021-04-13

## 2021-04-13 ASSESSMENT — ANXIETY QUESTIONNAIRES: GAD7 TOTAL SCORE: 2

## 2021-04-21 ENCOUNTER — HOSPITAL ENCOUNTER (OUTPATIENT)
Dept: ULTRASOUND IMAGING | Facility: CLINIC | Age: 26
End: 2021-04-21
Attending: SURGERY
Payer: COMMERCIAL

## 2021-04-21 ENCOUNTER — OFFICE VISIT (OUTPATIENT)
Dept: OTHER | Facility: CLINIC | Age: 26
End: 2021-04-21
Attending: SURGERY
Payer: COMMERCIAL

## 2021-04-21 VITALS — DIASTOLIC BLOOD PRESSURE: 102 MMHG | HEART RATE: 83 BPM | SYSTOLIC BLOOD PRESSURE: 162 MMHG | TEMPERATURE: 98.4 F

## 2021-04-21 DIAGNOSIS — N18.6 END STAGE RENAL DISEASE (H): ICD-10-CM

## 2021-04-21 DIAGNOSIS — Z76.82 ORGAN TRANSPLANT CANDIDATE: ICD-10-CM

## 2021-04-21 DIAGNOSIS — Z09 SURGICAL FOLLOW-UP CARE: Primary | ICD-10-CM

## 2021-04-21 DIAGNOSIS — I77.0 AVF (ARTERIOVENOUS FISTULA) (H): ICD-10-CM

## 2021-04-21 LAB
LAB SCANNED RESULT: NORMAL
MISCELLANEOUS TEST: NORMAL

## 2021-04-21 PROCEDURE — 99024 POSTOP FOLLOW-UP VISIT: CPT | Performed by: SURGERY

## 2021-04-21 PROCEDURE — G0463 HOSPITAL OUTPT CLINIC VISIT: HCPCS

## 2021-04-21 PROCEDURE — 93990 DOPPLER FLOW TESTING: CPT

## 2021-04-21 NOTE — PROGRESS NOTES
Taylor Valiente is a 25 year old male who presents for:  Chief Complaint   Patient presents with     RECHECK     AVF (8:00[facility];9:00[facility])TRANSPORTATION History of creation of proximal right radial artery to median cephalic vein AVF on 3/4/21; 1 month follow up to 3/16/21 appointment with Dr. Moran.        Vitals:    Vitals:    04/21/21 0846   BP: (!) 162/102   BP Location: Right arm   Patient Position: Chair   Cuff Size: Adult Large   Pulse: 83   Temp: 98.4  F (36.9  C)   TempSrc: Temporal       BMI:  Estimated body mass index is 33.96 kg/m  as calculated from the following:    Height as of 3/4/21: 6' (1.829 m).    Weight as of 3/4/21: 250 lb 6.4 oz (113.6 kg).    Pain Score:  Data Unavailable        Yadi Gonzalez MA

## 2021-04-22 NOTE — PROGRESS NOTES
Taylor Valiente returns today 6 weeks status post creation of a proximal right radial artery to median cephalic vein AV fistula.  He had a 6-week postop right AV fistula ultrasound earlier.  At today's visit he is entirely without complaints.    Exam:  Obese male alert and oriented x3.  Blood pressure 162/102 with a pulse of 83.  Well-healed right antecubital incision.  There is a prominent, easily visualized right median cubital vein with an excellent thrill.  As one follows this up the cephalic vein it becomes a bit more difficult to appreciate the thrill due to the depth of the vein.  The vein is distended and readily visible for at least 5 or 6 cm before diving a bit deeper in the upper arm.  1+ palpable right radial pulse at the wrist.    Imaging:  ULTRASOUND EXTREMITY ARTERIOVENOUS DIALYSIS ACCESS GRAFT 4/21/2021  8:55 AM     HISTORY:  25-year-old patient with history of AV fistula, created  March 4, 2021 in the right upper extremity.     COMPARISON: None.     TECHNIQUE: Color Doppler and spectral waveform analysis obtained  throughout the inflow brachial artery as well as proximal-most radial  artery. Images also obtained in the outflow cephalic vein of the upper  arm.     FINDINGS: Inflow brachial artery is patent ranging from 5.6 to 6.1 mm.  Proximal-most radial artery is 4 mm. AV anastomosis is patent with  velocity just beyond the anastomosis of 573/280 cm/sec. Total blood  flow volume is 1074 mL/min. Diameter is 3.6 mm just beyond the  anastomosis, though throughout the remainder of the length, diameters  range from 2.6 mm centrally to 10.8 mm several centimeters beyond the  anastomosis. Two side branches identified, measuring 4.1 mm at the  distal humerus with velocity of 310/202 cm/sec and at the mid humerus  measuring 2.4 mm and velocity of 145/90 cm/sec.                                                                      IMPRESSION:  1. Patent right upper arm AV fistula. Mild stenosis in the  central  cephalic vein with diameter of 2.6 mm.  2. Two side branches noted in the mid upper arm and distal upper arm  measuring 2.4 mm and 4.1 mm, respectively.     PINEDA CAVAZOS MD    ASSESSMENT:  6 weeks status post creation of proximal right radial artery to median cephalic vein AV fistula clinically doing well.  There is an excellent thrill along the first 5-6 cm of the fistula before it becomes a bit deeper and more difficult to appreciate.  Adequate calculated outflow volume of 1074 mL/min.  Mild stenosis of the central cephalic vein with luminal diameter at that level of 2.6 mm.    RECOMMENDATION:  I reviewed all the above with Taylor.  I encouraged him to continue his vein building exercises 5-6 times per day and reviewed those instructions.  He is not yet dialysis dependent.  When he becomes dialysis dependent I will authorize his center to attempt access of his fistula.  He understands that the mid and upper part of his fistula is a bit deeper below the skin surface.  If the techs have difficulty accessing this fistula I may need to consider him for a secondary procedure either in the form of lipectomy or transposition of the vein.  There is also the possibility that the central venous stenosis may become clinically significant.  For now, he will perform his vein building exercises and we await the results of any attempt to access his fistula.  Follow-up will be with me on an as-needed basis.    Edwardo Moran MD

## 2021-04-26 ENCOUNTER — TELEPHONE (OUTPATIENT)
Dept: OTHER | Facility: CLINIC | Age: 26
End: 2021-04-26

## 2021-04-26 NOTE — TELEPHONE ENCOUNTER
"GALLITO Thomas [phone] called, states   Dr. Hooper and RN cannot interpret per 4/21/21 progress note if they may attempt dialysis/cannulate fistula.    Chart reviewed.   \"When he becomes dialysis dependent I will authorize his center to attempt access of his fistula.\"  Verified pt not yet on dialysis, however Dr. Hooper is looking at starting dialysis soon.   We will discuss with Dr. Moran and verify okay to begin access.     Routing to Meka CONTI and Dr. Moran for follow up.     LEAH ColonN, RN  [facility]  Office:  [phone] Fax: [phone]    "

## 2021-04-27 NOTE — TELEPHONE ENCOUNTER
Returned call to Martha.  Confirmed patient's fistula could be accessed at this time.  [place] [facility]  Requested a call back if staff has issues accessing fistula.  Martha verbalized understanding and had no further questions.    Meka Vazquez, BSN, RN-[facility] [place]

## 2021-05-26 DIAGNOSIS — N17.9 ACUTE KIDNEY INJURY (H): ICD-10-CM

## 2021-05-26 RX ORDER — CARVEDILOL 25 MG/1
50 TABLET ORAL 2 TIMES DAILY WITH MEALS
Qty: 180 TABLET | Refills: 3 | Status: SHIPPED | OUTPATIENT
Start: 2021-05-26 | End: 2022-03-03

## 2021-05-26 NOTE — TELEPHONE ENCOUNTER
Patient received dose of 50mg BID upon discharge from the hospital. Please review and approve if appropriate.    Thank you,  Sabiha [facility] Staff Technician   [facility]  
no

## 2021-05-28 ENCOUNTER — TELEPHONE (OUTPATIENT)
Dept: BEHAVIORAL HEALTH | Facility: CLINIC | Age: 26
End: 2021-05-28

## 2021-06-01 ENCOUNTER — HOSPITAL ENCOUNTER (OUTPATIENT)
Dept: BEHAVIORAL HEALTH | Facility: CLINIC | Age: 26
Discharge: HOME | End: 2021-06-01
Attending: FAMILY MEDICINE | Admitting: FAMILY MEDICINE
Payer: COMMERCIAL

## 2021-06-01 VITALS — BODY MASS INDEX: 33.86 KG/M2 | WEIGHT: 250 LBS | HEIGHT: 72 IN

## 2021-06-01 PROCEDURE — H0001 ALCOHOL AND/OR DRUG ASSESS: HCPCS | Mod: 95

## 2021-06-01 ASSESSMENT — PATIENT HEALTH QUESTIONNAIRE - PHQ9: SUM OF ALL RESPONSES TO PHQ QUESTIONS 1-9: 2

## 2021-06-01 ASSESSMENT — MIFFLIN-ST. JEOR: SCORE: 2161.99

## 2021-06-01 ASSESSMENT — ANXIETY QUESTIONNAIRES
2. NOT BEING ABLE TO STOP OR CONTROL WORRYING: NOT AT ALL
3. WORRYING TOO MUCH ABOUT DIFFERENT THINGS: SEVERAL DAYS
GAD7 TOTAL SCORE: 2
5. BEING SO RESTLESS THAT IT IS HARD TO SIT STILL: NOT AT ALL
4. TROUBLE RELAXING: NOT AT ALL
6. BECOMING EASILY ANNOYED OR IRRITABLE: SEVERAL DAYS
IF YOU CHECKED OFF ANY PROBLEMS ON THIS QUESTIONNAIRE, HOW DIFFICULT HAVE THESE PROBLEMS MADE IT FOR YOU TO DO YOUR WORK, TAKE CARE OF THINGS AT HOME, OR GET ALONG WITH OTHER PEOPLE: NOT DIFFICULT AT ALL
7. FEELING AFRAID AS IF SOMETHING AWFUL MIGHT HAPPEN: NOT AT ALL
1. FEELING NERVOUS, ANXIOUS, OR ON EDGE: NOT AT ALL

## 2021-06-01 ASSESSMENT — PAIN SCALES - GENERAL: PAINLEVEL: MILD PAIN (2)

## 2021-06-01 NOTE — PROGRESS NOTES
[facility]  Provider Name:  Maria Esther Suresh     Credentials:  [facility]    PATIENT'S NAME: Taylor Wisetelma  PREFERRED NAME: Taylor  PRONOUNS: he/him/his  MRN: [id number]  : 1995  ADDRESS: [address] [place] [address] [place] [address]  ACCT. NUMBER:  [id number]  DATE OF SERVICE: 21  START TIME:1:00 pm  END TIME: 2:07 pm  PREFERRED PHONE: [phone]  May we leave a program related message: Yes  SERVICE MODALITY:  Video Visit:      Provider verified identity through the following two step process.  Patient provided:  Patient  and Patient's last 4 digits of SSN    Telemedicine Visit: The patient's condition can be safely assessed and treated via synchronous audio and visual telemedicine encounter.      Reason for Telemedicine Visit: Patient has requested telehealth visit    Originating Site (Patient Location): Patient's car    Distant Site (Provider Location): Provider Remote Setting    Consent:  The patient/guardian has verbally consented to: the potential risks and benefits of telemedicine (video visit) versus in person care; bill my insurance or make self-payment for services provided; and responsibility for payment of non-covered services.     The pt gives verbal consent for ROSALIA to and from:      Himself, Email: [email or web address]     Department of Transportation [facility], FAX: [phone], Tel:[phone]    His Emergency and Collateral Contact, Ricarda Valiente (Dad) Tel: [phone]    [facility]    Patient would like the video invitation sent by:  My Chart    Mode of Communication:  Video Conference via Amwell    As the provider I attest to compliance with applicable laws and regulations related to telemedicine.    UNIVERSAL ADULT Substance Use Disorder DIAGNOSTIC ASSESSMENT    Identifying Information:  Patient is a 25 year old,  American.  The pronoun use throughout this assessment reflects the  "patient's chosen pronoun.  Patient was referred for an assessment by Patient attended the session alone.     Chief Complaint:   The reason for seeking services at this time is:  \"I had a DUI(marijuana) about two years ago, April of 2019 in [place] and the DOT wanted me to get this assessment.\"   The problem(s) began \"with the DUI\". Patient has attempted to resolve these concerns in the past through abstinence from marijuana for the past year and alcohol for the past 2 years..  Patient does not appear to be in severe withdrawal, an imminent safety risk to self or others, or requiring immediate medical attention and may proceed with the assessment interview.    Social/Family History:  Patient reported they grew up in [place] until I was 13 and then [place], [place].  They were raised by his mother, father and grandparents.  \"I have a younger sister\".  Patient reported that their childhood was \"It was pretty good.  We went on a lot of road trips\".   Patient describes current relationships with family of origin as \"good\".      The patient describes their cultural background as Welsh and Sabianist.  Cultural influences and impact on patient's life structure, values, norms, and healthcare: Pt reports his family were brought over by the [place]an in the late 1800's/early 1900's.  Contextual influences on patient's health include: Individual Factors The pt reports some heavier use of marijuana from age [age], but alo for a year.  He also reports no use of alcohol for the past 2 years.  He states he was diagnosed with Depression, but is currently feeling much better and is not on any medication., Family Factors The pt reports that he lives with his parents, paternal grandparents, his younger sister and their dog.  He reports close relationships with his family.  He reports being single at this time and having no children., Learning Environment Factors The pt reports a technical degree in [employer]., Community Factors " "The pt reports that he is connected to his Scientology culture and community,, Societal Factors none identified., Economic Factors The pt reports he is currently on disability for late stage kidney disease caused by high blood pressure. and Health- Seeking Factors The pt reports that he is interested in his health..  Patient identified their preferred language to be English. Patient reported they does not need the assistance of an  or other support involved in therapy.  Patient reports they are not involved in community of jaylon activities.  \"Our family is Scientology and I go to [employer] on occasion\".  They reports spirituality impacts recovery in the following ways:  'I believe in a Higher power and it is a positive impact\".    Patient reported had no significant delays in developmental tasks.   Patient's highest education level was associate degree / vocational certificate. Patient identified the following learning problems: none reported.  Patient reports they are  able to understand written materials.    Patient reported the following relationship history \"never \".  Patient's current relationship status is single for a year and a half. .   Patient identified their sexual orientation as heterosexual.  Patient reported having no child(chucky).     Patient's current living/housing situation involves staying with his parents, paternal grandparents and little sister..  They live with his family and their dog and they report that housing is stable. Patient identified parents, siblings, pets, friends, therapist and his grandparents as part of their support system.  Patient identified the quality of these relationships as good.      Patient reports engaging in the following recreational/leisure activities: \"hang out with friends, car shows\" .  Patient is currently disabled.  Patient reports their income is obtained through disability.  Patient does identify finances as a current stressor.      Patientreports the " "following substance related arrests or legal issues: DUI for marijuana in [place] in April of 2019.     Patient denies being on probation / parole / under the jurisdiction of the court.  \"I cannot drive in [place] at this time\".    Patient's Strengths and Limitations:  Patient identified the following strengths or resources that will help them succeed in treatment: Christian / Jain, commitment to health and well being, community involvement, jaylon / spirituality, friends / good social support, family support, insight, intelligence, motivation, sense of humor and strong social skills. Things that may interfere with the patient's success in treatment include: financial hardship and transportation concerns.     Personal and Family Medical History:  Patient did not report a family history of mental health concerns.  Patient reports family history includes Blood Disease in his mother; Coronary Artery Disease in his maternal uncle; Diabetes in his maternal grandmother and mother; Hypertension in his father, maternal grandmother, mother, paternal grandfather, and paternal grandmother; Obesity in his father..      Patient reported the following previous mental health diagnoses: \"Depression\"..  Patient reports their primary mental health symptoms include:  \"none currently\" and these do not impact his ability to function.   Patient has received mental health services in the past: \"recently started seeing a therapist\".  Psychiatric Hospitalizations: [facility].  Patient denies a history of civil commitment.  Current mental health services/providers include:  \"seeing a therapist\".    GAIN-SS Tool:    When was the last time that you had significant problems... 6/1/2021   with feeling very trapped, lonely, sad, blue, depressed or hopeless about the future? 1+ years ago   with sleep trouble, such as bad dreams, sleeping restlessly, or falling asleep during the day? Never   with feeling very anxious, nervous, tense, " "scared, panicked or like something bad was going to happen? Never   with becoming very distressed & upset when something reminded you of the past? Never   with thinking about ending your life or committing suicide? 1+ years ago     When was the last time that you did the following things 2 or more times? 6/1/2021   Lied or conned to get things you wanted or to avoid having to do something? 1+ years ago   Had a hard time paying attention at school, work or home? Never   Had a hard time listening to instructions at school, work or home? Never   Were a bully or threatened other people? 1+ years ago   Started physical fights with other people? 1+ years ago       Patient has had a physical exam to rule out medical causes for current symptoms.  Date of last physical exam was within the past year. Client was encouraged to follow up with PCP if symptoms were to develop. The patient has a [place] Primary Care Provider, who is named Priyank Lawrence..  Patient reports the following current medical concerns: \"End stage kidney disease and am on the transplant list and dialysis, ankle surgery in April of 2020 and High Blood Pressure.\".  Patient reports pain concerns including \"some leg pain at a 2 out of 10\"..  Patient does not want help addressing pain concerns. There are not significant appetite / nutritional concerns / weight changes.  Patient does not report a history of an eating disorder.  Patient does not report a history of head injury / trauma / cognitive impairment.      Patient reports current meds as:   Outpatient Medications Marked as Taking for the 6/1/21 encounter (Hospital Encounter) with Maria Esther Suresh LADC   Medication Sig     acetaminophen (TYLENOL) 325 MG tablet Take 2 tablets (650 mg) by mouth every 4 hours as needed for mild pain     amLODIPine (NORVASC) 10 MG tablet Take 10 mg by mouth daily      aspirin 81 MG EC tablet Take 81 mg by mouth daily     atorvastatin (LIPITOR) 10 MG tablet TAKE ONE TABLET BY " MOUTH EVERY NIGHT AT BEDTIME     calcitRIOL (ROCALTROL) 0.25 MCG capsule Take 0.25 mcg by mouth three times a week Monday, Wednesday, Friday     carvedilol (COREG) 25 MG tablet Take 2 tablets (50 mg) by mouth 2 times daily (with meals)     cloNIDine (CATAPRES) 0.3 MG tablet Take 1 tablet (0.3 mg) by mouth 2 times daily (Patient taking differently: Take 0.1 mg by mouth 2 times daily Changed from 0.3 to 0.1 on Tuesday)     ferrous sulfate (FEROSUL) 325 (65 Fe) MG tablet Take 325 mg by mouth daily     hydrALAZINE (APRESOLINE) 100 MG tablet TAKE ONE TABLET BY MOUTH THREE TIMES A DAY     sodium bicarbonate 650 MG tablet Take 1,300 mg by mouth 2 times daily     vitamin D3 (CHOLECALCIFEROL) 2000 units (50 mcg) tablet Take 1 tablet by mouth daily       Medication Adherence:  Patient reports taking prescribed medications as prescribed.    Patient Allergies:    Allergies   Allergen Reactions     Benadryl [Diphenhydramine]      No Known Allergies        Medical History:    Past Medical History:   Diagnosis Date     Adrenal adenoma, left      Anemia      Benign essential hypertension 07/28/2017     CKD (chronic kidney disease) stage 5, GFR less than 15 ml/min (H)     FSGS     Depression      Focal glomerular sclerosis 07/28/2017     HLD (hyperlipidemia)      LVH (left ventricular hypertrophy) due to hypertensive disease 07/14/2017     Noncompliance      Obesity, unspecified      OD (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/2021     Stress-induced cardiomyopathy      Suicide attempt (H) 2019       Rating Scales:    PHQ9:    PHQ-9 SCORE 4/12/2021 4/12/2021 6/1/2021   PHQ-9 Total Score MyChart - - -   PHQ-9 Total Score 0 0 2   ;      GAD7:    NIURKA-7 SCORE 4/12/2021 4/12/2021 6/1/2021   Total Score - - -   Total Score 2 2 2       Substance Use:  Patient reported no family history of chemical health issues.  Patient has not received substance use disorder and/or gambling treatment in the past.  Patient has not ever been to detox.  Patient is  "not currently receiving any chemical dependency treatment. Patient reports no history of support group attendance.        Substance Age of first use Pattern and duration of use (include amounts and frequency) Date of last use     Owen potential Route of administration   has used Alcohol 19 From age [age]:  Age 19- 22, \"I drank shots of hard liquor on the weekends, 4-5 shots in a night, 2-3x/week. I have never been a daily drinker.  At age 22, I cut down because of my kidney diagnosis.  Age [age], Maybe a drink or 2 out at the bar once or twice a week.\" \"2 years ago.  I am not supposed to drink\" No oral   has used Marijuana   19  \"when out I would smoke 1-2 joints in a night, twice a week on the weekends\". \"a year ago\" No smoked     has not used Amphetamines          has not used Cocaine/crack           has not used Hallucinogens        has not used Inhalants        has not used Heroin        has not used Other Opiates        has not used Benzodiazepine          has not used Barbiturates        has not used Over the counter meds.        has use Caffeine 3-4 years old. Soda Pop- \"1-2 a week.\"  Coffee- \"every once in awhile\" 6/1/2021   No oral   has used Nicotine  19 \"just smoked with co-workers in the past.  Did some vaping\". 2016 No smoked   has not used other substances not listed above:  Identify:             Patient reported the following problems as a result of their substance use: DUI and family problems in the past.  Patient is not concerned about substance use. Patient reports no one is concerned about their substance use.  Patient reports their recovery goals are \"to not use alcohol again.  I am not planning to use pot again either.\".     Patient reports experiencing the following withdrawal symptoms within the past 12 months: none and the following within the past 30 days: none.   Patients reports no urges to use Alcohol and Cannabis/ Hashish.  Patient reports he has used more Cannabis/ Hashish than " "intended and over a longer period of time than intended. Patient reports he has not had unsuccessful attempts to cut down or control use of Alcohol and Cannabis/ Hashish.  Patient reports longest period of abstinence was this past year for pot and 2 years for alcohol and he has not returned to use. Patient reports he has not needed to use more Alcohol and Cannabis/ Hashish to achieve the same effect.  Patient does not report diminished effect with use of same amount of Alcohol and Cannabis/ Hashish.     Patient does not report a great deal of time is spent in activities necessary to obtain, use, or recover from Alcohol and Cannabis/ Hashish effects.  Patient does not report important social, occupational, or recreational activities are given up or reduced because of Alcohol and Cannabis/ Hashish use.  Alcohol and Cannabis/ Hashish use is continued despite knowledge of having a persistent or recurrent physical or psychological problem that is likely to have caused or exacerbated by use.  Patient reports the following problem behaviors while under the influence of substances \"none\".     Patient reports substance use has not ever impacted their ability to function in a school setting. Patient reports substance use has not ever impacted their ability to function in a work setting.  Patients demographics and history impact their recovery in the following ways:  None identified.  Patient reports engaging in the following recreation/leisure activities while using:  \"none\".  Patient reports the following people are supportive of recovery: his family.  Patient does not have a history of gambling concerns and/or treatment.  Patient does not have other addictive behaviors he is concerned about.    \"I used to eat a lot, but not anymore\".    Dimension Scale Ratings:    Dimension 1 -  Acute Intoxication/Withdrawal: 0 - No Problem  Dimension 2 - Biomedical: 2 - Moderate Problem  Dimension 3 - Emotional/Behavioral/Cognitive " Conditions: 1 - Minor Problem  Dimension 4 - Readiness to Change:  0 - No Problem  Dimension 5 - Relapse/Continued Use/ Continued Problem Potential: 1 - Minor Problem  Dimension 6 - Recovery Environment:  1 - Minor Problem    Significant Losses / Trauma / Abuse / Neglect Issues:   Patient has not served in the .  There are indications or report of significant loss, trauma, abuse or neglect issues related to: are no indications and client denies any losses, trauma, abuse, or neglect concerns.  Concerns for possible neglect are not present.     Safety Assessment:   Current Safety Concerns:  Ragan Suicide Severity Rating Scale (Short Version)  Ragan Suicide Severity Rating (Short Version) 9/9/2020 9/29/2020 11/27/2020 11/27/2020 2/24/2021 3/4/2021 6/1/2021   Over the past 2 weeks have you felt down, depressed, or hopeless? no no no no no no no   Comments - - - - - - -   Over the past 2 weeks have you had thoughts of killing yourself? no no no no no no no   Have you ever attempted to kill yourself? no no no no yes no yes   When did this last happen? - - - - more than 6 months ago - (No Data)   Comments - - - - - - 2 years ago, bunch of pills, hospitalized, no major damage   Q1 Wished to be Dead (Past Month) - - - - - - -   Q2 Suicidal Thoughts (Past Month) - - - - - - -   Comments - - - - - - -   Q3 Suicidal Thought Method - - - - - - -   Q4 Suicidal Intent without Specific Plan - - - - - - -   Q6 Suicide Behavior (Lifetime) - - - - - - -   Comments - - - - - - -   High Risk Required Interventions - - - - - - -   Required Interventions - - - - - - -   Interventions - - - - - - -     Patient denies current homicidal ideation and behaviors.  Patient denies current self-injurious ideation and behaviors.    Patient denied risk behaviors associated with substance use.  Patient denies any high risk behaviors associated with mental health symptoms.  Patient reports the following current concerns for their  "personal safety: None.  Patient reports there are no firearms in the house.        History of Safety Concerns:  Patient denied a history of homicidal ideation.     Patient denied a history of personal safety concerns.    Patient denied a history of assaultive behaviors.    Patient denied a history of sexual assault behaviors.     Patient reported a history unsafe motor vehicle operation associated with substance use.  Patient denies any history of high risk behaviors associated with mental health symptoms.  Patient reports the following protective factors:      Risk Plan:  See Recommendations for Safety and Risk Management Plan    Review of Symptoms per patient report:  Substance Use:  hangovers and driving under the influence     Diagnostic Criteria:  OP BEH SPENSER CRITERIA: Substance is often taken in larger amounts or over a longer period than was intended.  Met for:  Cannabis in the past.    Collateral Contact Summary:   Collateral contacts contributing to this assessment:      His Emergency and Collateral Contact, Ricarda Valiente (Dad) Tel: [phone]     6/2/21,  \"His car always smells like marijuana.  I think he has a problem with marijuana.  He has not been drinking alcohol that I have seen.  He does not listen to us.  He gets irritable sometimes and yells.  We are concerned about him.  We have only 2 kids\".  Per conversation with the patient on 6/25/2021 he reports no use of marijuana for a year.  Pt states that \"there are cigarettes in my car and maybe that is what he smells.\"    If court related records were reviewed, summarize here: NA    Information from collateral contacts supported/largely agreed with information from the client and associated risk ratings.    Information in this assessment was obtained from the medical record and provided by patient who is a good historian.    Patient will have open access to their mental health medical record.      Recommendations:     1. Plan for Safety and " "Risk Management:  Recommended that patient call 911 or go to the local ED should there be a change in any of these risk factors..      Report to child / adult protection services was NA.     2. SPENSER Referrals:     Recommendations:      Pt does not meet criteria for a substance use disorder at this time according to the information he reported.  However, collateral information indicates he may be currently using marijuana based on the way his car smells, but the pt reported last use as \"a year ago\".  This evaluator attempted to call the pt on 6/2/2021 to discuss collateral information and to verify last use.  The pt did not answer.  A message was left requesting a call back, but as of 3:56 pm on 6/3/2021 he has not returned the call.  Pt returned this W's call on 6/25/2021.  Per conversation with the patient on 6/25/2021 he reports no use of marijuana for a year.  Pt states that \"there are cigarettes in my car and maybe that is what he(his father) smells.\"            1). Participate in and complete a Level One Driving with Care DWI prevention class.  Pt was provided with a contact         phone number to enroll in [facility] Driving With Care classes.     2).  If drinking alcohol, follow the National [place] on Alcohol Abuse and Alcoholism guidelines for men, consuming no            more no more than 14 standard drinks/week, with no more than 4 of the 14 on any one day.     3).  Refrain from using all mood-altering substances except those prescribed by a medical professional.     4.)  If he drinks or uses mood-altering drugs, do not drive.     5). Follow all court recommendations including regular drug testing and remain law abiding.    Patient reports they are willing to follow these recommendations.  Patient would like the following family or other support people involved in their treatment:. Patient does not have a history of opiate use.    3. Mental Health Referrals:  Pt will be seeing his MH therapist, "     4. Patient's identified no special considerations needed..     5. Recommendations for treatment focus:   NA, no treatment recommended..        Provider Name/ Credentials:  LISA Tobar  June 1, 2021

## 2021-06-02 PROBLEM — M25.572 PAIN IN JOINT INVOLVING ANKLE AND FOOT, LEFT: Status: RESOLVED | Noted: 2021-03-12 | Resolved: 2021-06-02

## 2021-06-02 PROBLEM — Z47.89 AFTERCARE FOLLOWING SURGERY OF THE MUSCULOSKELETAL SYSTEM: Status: RESOLVED | Noted: 2021-03-12 | Resolved: 2021-06-02

## 2021-06-02 ASSESSMENT — ANXIETY QUESTIONNAIRES: GAD7 TOTAL SCORE: 2

## 2021-06-02 NOTE — ADDENDUM NOTE
Encounter addended by: Maria Esther Suresh, [facility] on: 6/2/2021 8:56 AM   Actions taken: Flowsheet accepted, Pend clinical note, Clinical Note Signed

## 2021-06-03 NOTE — ADDENDUM NOTE
Encounter addended by: Maria Esther Suresh, [facility] on: 6/3/2021 4:04 PM   Actions taken: Pend clinical note, Clinical Note Signed

## 2021-06-15 ENCOUNTER — APPOINTMENT (OUTPATIENT)
Dept: CT IMAGING | Facility: CLINIC | Age: 26
DRG: 673 | End: 2021-06-15
Attending: EMERGENCY MEDICINE
Payer: COMMERCIAL

## 2021-06-15 ENCOUNTER — HOSPITAL ENCOUNTER (EMERGENCY)
Facility: CLINIC | Age: 26
End: 2021-06-15

## 2021-06-15 ENCOUNTER — HOSPITAL ENCOUNTER (INPATIENT)
Facility: CLINIC | Age: 26
LOS: 4 days | Discharge: HOME | DRG: 673 | End: 2021-06-19
Attending: EMERGENCY MEDICINE | Admitting: HOSPITALIST
Payer: COMMERCIAL

## 2021-06-15 DIAGNOSIS — N17.0 ACUTE RENAL FAILURE WITH ACUTE TUBULAR NECROSIS SUPERIMPOSED ON STAGE 5 CHRONIC KIDNEY DISEASE, NOT ON CHRONIC DIALYSIS (H): ICD-10-CM

## 2021-06-15 DIAGNOSIS — R94.31 PROLONGED Q-T INTERVAL ON ECG: ICD-10-CM

## 2021-06-15 DIAGNOSIS — R11.2 NAUSEA AND VOMITING, INTRACTABILITY OF VOMITING NOT SPECIFIED, UNSPECIFIED VOMITING TYPE: ICD-10-CM

## 2021-06-15 DIAGNOSIS — N18.5 ACUTE RENAL FAILURE WITH ACUTE TUBULAR NECROSIS SUPERIMPOSED ON STAGE 5 CHRONIC KIDNEY DISEASE, NOT ON CHRONIC DIALYSIS (H): ICD-10-CM

## 2021-06-15 PROBLEM — N18.6 ESRD NEEDING DIALYSIS (H): Status: ACTIVE | Noted: 2021-06-15

## 2021-06-15 PROBLEM — Z99.2 ESRD NEEDING DIALYSIS (H): Status: ACTIVE | Noted: 2021-06-15

## 2021-06-15 LAB
ALBUMIN UR-MCNC: 600 MG/DL
AMPHETAMINES UR QL SCN: NEGATIVE
ANION GAP SERPL CALCULATED.3IONS-SCNC: 13 MMOL/L (ref 3–14)
APPEARANCE UR: CLEAR
BACTERIA #/AREA URNS HPF: ABNORMAL /HPF
BARBITURATES UR QL: NEGATIVE
BASOPHILS # BLD AUTO: 0 10E9/L (ref 0–0.2)
BASOPHILS NFR BLD AUTO: 0.5 %
BENZODIAZ UR QL: NEGATIVE
BILIRUB UR QL STRIP: NEGATIVE
BUN SERPL-MCNC: 163 MG/DL (ref 7–30)
CALCIUM SERPL-MCNC: 8.5 MG/DL (ref 8.5–10.1)
CANNABINOIDS UR QL SCN: NEGATIVE
CHLORIDE SERPL-SCNC: 111 MMOL/L (ref 94–109)
CO2 SERPL-SCNC: 19 MMOL/L (ref 20–32)
COCAINE UR QL: NEGATIVE
COLOR UR AUTO: ABNORMAL
CREAT SERPL-MCNC: 16.1 MG/DL (ref 0.66–1.25)
DIFFERENTIAL METHOD BLD: ABNORMAL
EOSINOPHIL # BLD AUTO: 0.2 10E9/L (ref 0–0.7)
EOSINOPHIL NFR BLD AUTO: 3 %
ERYTHROCYTE [DISTWIDTH] IN BLOOD BY AUTOMATED COUNT: 13.6 % (ref 10–15)
GFR SERPL CREATININE-BSD FRML MDRD: 4 ML/MIN/{1.73_M2}
GLUCOSE SERPL-MCNC: 112 MG/DL (ref 70–99)
GLUCOSE UR STRIP-MCNC: 50 MG/DL
HCT VFR BLD AUTO: 29.2 % (ref 40–53)
HGB BLD-MCNC: 9.3 G/DL (ref 13.3–17.7)
HGB UR QL STRIP: ABNORMAL
IMM GRANULOCYTES # BLD: 0 10E9/L (ref 0–0.4)
IMM GRANULOCYTES NFR BLD: 0.1 %
KETONES UR STRIP-MCNC: NEGATIVE MG/DL
LABORATORY COMMENT REPORT: NORMAL
LEUKOCYTE ESTERASE UR QL STRIP: NEGATIVE
LYMPHOCYTES # BLD AUTO: 1.9 10E9/L (ref 0.8–5.3)
LYMPHOCYTES NFR BLD AUTO: 25 %
MCH RBC QN AUTO: 27 PG (ref 26.5–33)
MCHC RBC AUTO-ENTMCNC: 31.8 G/DL (ref 31.5–36.5)
MCV RBC AUTO: 85 FL (ref 78–100)
MONOCYTES # BLD AUTO: 0.6 10E9/L (ref 0–1.3)
MONOCYTES NFR BLD AUTO: 7.9 %
MUCOUS THREADS #/AREA URNS LPF: PRESENT /LPF
NEUTROPHILS # BLD AUTO: 4.8 10E9/L (ref 1.6–8.3)
NEUTROPHILS NFR BLD AUTO: 63.5 %
NITRATE UR QL: NEGATIVE
NRBC # BLD AUTO: 0 10*3/UL
NRBC BLD AUTO-RTO: 0 /100
OPIATES UR QL SCN: NEGATIVE
PCP UR QL SCN: NEGATIVE
PH UR STRIP: 6.5 PH (ref 5–7)
PLATELET # BLD AUTO: 252 10E9/L (ref 150–450)
POTASSIUM SERPL-SCNC: 3.9 MMOL/L (ref 3.4–5.3)
RBC # BLD AUTO: 3.44 10E12/L (ref 4.4–5.9)
RBC #/AREA URNS AUTO: 1 /HPF (ref 0–2)
SARS-COV-2 RNA RESP QL NAA+PROBE: NEGATIVE
SODIUM SERPL-SCNC: 143 MMOL/L (ref 133–144)
SOURCE: ABNORMAL
SP GR UR STRIP: 1.01 (ref 1–1.03)
SPECIMEN SOURCE: NORMAL
UROBILINOGEN UR STRIP-MCNC: NORMAL MG/DL (ref 0–2)
WBC # BLD AUTO: 7.6 10E9/L (ref 4–11)
WBC #/AREA URNS AUTO: 3 /HPF (ref 0–5)

## 2021-06-15 PROCEDURE — 120N000001 HC R&B MED SURG/OB

## 2021-06-15 PROCEDURE — 99223 1ST HOSP IP/OBS HIGH 75: CPT | Mod: AI | Performed by: HOSPITALIST

## 2021-06-15 PROCEDURE — 258N000003 HC RX IP 258 OP 636: Performed by: HOSPITALIST

## 2021-06-15 PROCEDURE — 250N000013 HC RX MED GY IP 250 OP 250 PS 637: Performed by: HOSPITALIST

## 2021-06-15 PROCEDURE — 93005 ELECTROCARDIOGRAM TRACING: CPT | Performed by: HOSPITALIST

## 2021-06-15 PROCEDURE — C9803 HOPD COVID-19 SPEC COLLECT: HCPCS

## 2021-06-15 PROCEDURE — 96361 HYDRATE IV INFUSION ADD-ON: CPT | Performed by: HOSPITALIST

## 2021-06-15 PROCEDURE — 258N000003 HC RX IP 258 OP 636: Performed by: EMERGENCY MEDICINE

## 2021-06-15 PROCEDURE — 80048 BASIC METABOLIC PNL TOTAL CA: CPT | Performed by: EMERGENCY MEDICINE

## 2021-06-15 PROCEDURE — 99285 EMERGENCY DEPT VISIT HI MDM: CPT | Mod: 25 | Performed by: HOSPITALIST

## 2021-06-15 PROCEDURE — 80307 DRUG TEST PRSMV CHEM ANLYZR: CPT | Performed by: EMERGENCY MEDICINE

## 2021-06-15 PROCEDURE — 250N000011 HC RX IP 250 OP 636: Performed by: EMERGENCY MEDICINE

## 2021-06-15 PROCEDURE — 96374 THER/PROPH/DIAG INJ IV PUSH: CPT

## 2021-06-15 PROCEDURE — 74176 CT ABD & PELVIS W/O CONTRAST: CPT

## 2021-06-15 PROCEDURE — 87635 SARS-COV-2 COVID-19 AMP PRB: CPT | Performed by: EMERGENCY MEDICINE

## 2021-06-15 PROCEDURE — 81001 URINALYSIS AUTO W/SCOPE: CPT | Mod: XU | Performed by: EMERGENCY MEDICINE

## 2021-06-15 PROCEDURE — 85025 COMPLETE CBC W/AUTO DIFF WBC: CPT | Performed by: EMERGENCY MEDICINE

## 2021-06-15 RX ORDER — ATORVASTATIN CALCIUM 10 MG/1
10 TABLET, FILM COATED ORAL EVERY EVENING
Status: DISCONTINUED | OUTPATIENT
Start: 2021-06-15 | End: 2021-06-19 | Stop reason: HOSPADM

## 2021-06-15 RX ORDER — METOCLOPRAMIDE HYDROCHLORIDE 5 MG/ML
2.5 INJECTION INTRAMUSCULAR; INTRAVENOUS ONCE
Status: COMPLETED | OUTPATIENT
Start: 2021-06-15 | End: 2021-06-15

## 2021-06-15 RX ORDER — CARVEDILOL 25 MG/1
50 TABLET ORAL 2 TIMES DAILY WITH MEALS
Status: DISCONTINUED | OUTPATIENT
Start: 2021-06-16 | End: 2021-06-19 | Stop reason: HOSPADM

## 2021-06-15 RX ORDER — SODIUM BICARBONATE 650 MG/1
1300 TABLET ORAL 2 TIMES DAILY
Status: DISCONTINUED | OUTPATIENT
Start: 2021-06-15 | End: 2021-06-17

## 2021-06-15 RX ORDER — HYDRALAZINE HYDROCHLORIDE 50 MG/1
100 TABLET, FILM COATED ORAL 2 TIMES DAILY
Status: DISCONTINUED | OUTPATIENT
Start: 2021-06-15 | End: 2021-06-19 | Stop reason: HOSPADM

## 2021-06-15 RX ORDER — SODIUM CHLORIDE 9 MG/ML
1000 INJECTION, SOLUTION INTRAVENOUS CONTINUOUS
Status: DISCONTINUED | OUTPATIENT
Start: 2021-06-15 | End: 2021-06-15

## 2021-06-15 RX ORDER — ASPIRIN 81 MG/1
81 TABLET ORAL DAILY
Status: DISCONTINUED | OUTPATIENT
Start: 2021-06-16 | End: 2021-06-19 | Stop reason: HOSPADM

## 2021-06-15 RX ORDER — CALCITRIOL 0.25 UG/1
0.25 CAPSULE, LIQUID FILLED ORAL
Status: DISCONTINUED | OUTPATIENT
Start: 2021-06-16 | End: 2021-06-17

## 2021-06-15 RX ORDER — VITAMIN B COMPLEX
75 TABLET ORAL DAILY
Status: DISCONTINUED | OUTPATIENT
Start: 2021-06-16 | End: 2021-06-19 | Stop reason: HOSPADM

## 2021-06-15 RX ORDER — FERROUS SULFATE 325(65) MG
325 TABLET ORAL DAILY
Status: DISCONTINUED | OUTPATIENT
Start: 2021-06-16 | End: 2021-06-17

## 2021-06-15 RX ORDER — METOCLOPRAMIDE HYDROCHLORIDE 5 MG/ML
2.5 INJECTION INTRAMUSCULAR; INTRAVENOUS EVERY 6 HOURS
Status: DISCONTINUED | OUTPATIENT
Start: 2021-06-15 | End: 2021-06-15 | Stop reason: DRUGHIGH

## 2021-06-15 RX ORDER — CLONIDINE HYDROCHLORIDE 0.1 MG/1
0.3 TABLET ORAL 2 TIMES DAILY
Status: DISCONTINUED | OUTPATIENT
Start: 2021-06-15 | End: 2021-06-19 | Stop reason: HOSPADM

## 2021-06-15 RX ORDER — METOCLOPRAMIDE HYDROCHLORIDE 5 MG/ML
2.5 INJECTION INTRAMUSCULAR; INTRAVENOUS EVERY 6 HOURS
Status: COMPLETED | OUTPATIENT
Start: 2021-06-16 | End: 2021-06-16

## 2021-06-15 RX ORDER — LIDOCAINE 40 MG/G
CREAM TOPICAL
Status: DISCONTINUED | OUTPATIENT
Start: 2021-06-15 | End: 2021-06-19 | Stop reason: HOSPADM

## 2021-06-15 RX ORDER — SODIUM CHLORIDE 9 MG/ML
INJECTION, SOLUTION INTRAVENOUS CONTINUOUS
Status: DISCONTINUED | OUTPATIENT
Start: 2021-06-15 | End: 2021-06-16

## 2021-06-15 RX ADMIN — METOCLOPRAMIDE HYDROCHLORIDE 2.5 MG: 5 INJECTION INTRAMUSCULAR; INTRAVENOUS at 21:00

## 2021-06-15 RX ADMIN — SODIUM BICARBONATE 650 MG TABLET 1300 MG: at 22:34

## 2021-06-15 RX ADMIN — SODIUM CHLORIDE: 9 INJECTION, SOLUTION INTRAVENOUS at 22:34

## 2021-06-15 RX ADMIN — CLONIDINE HYDROCHLORIDE 0.3 MG: 0.1 TABLET ORAL at 22:35

## 2021-06-15 RX ADMIN — HYDRALAZINE HYDROCHLORIDE 100 MG: 50 TABLET, FILM COATED ORAL at 22:35

## 2021-06-15 RX ADMIN — SODIUM CHLORIDE 250 ML: 9 INJECTION, SOLUTION INTRAVENOUS at 20:59

## 2021-06-15 RX ADMIN — ATORVASTATIN CALCIUM 10 MG: 10 TABLET, FILM COATED ORAL at 22:35

## 2021-06-15 ASSESSMENT — ENCOUNTER SYMPTOMS
DIZZINESS: 1
CONSTIPATION: 0
ABDOMINAL PAIN: 0
VOMITING: 1
FEVER: 0
CHILLS: 0
NAUSEA: 1
DIARRHEA: 0
FREQUENCY: 1
SHORTNESS OF BREATH: 0
FATIGUE: 1
ROS GI COMMENTS: DENIES HEMATEMESIS.
FLANK PAIN: 1

## 2021-06-15 ASSESSMENT — MIFFLIN-ST. JEOR: SCORE: 2144.98

## 2021-06-15 NOTE — LETTER
Dialysis Intake Checklist      Your Name: Valerie Becker RN Contact Phone Number: [phone]     Fax Number and Email: [phone] Hospital/Practice: [facility]     Patient Name: Taylor Valiente   Referring Nephrologist: Dr. Alba     Requested Facility(s) or Zip Code: [facility]     Patient Started Date of Dialysis: 6/17/21      Estimated Outpatient Start Date of Dialysis: Monday 6/21    Treatment Duration & Frequency: 3x/wk      Preferred Schedule: No preference MWF or TTS schedule, prefers 3rd shift     Is the patient employed? No   Is there a working shift we can accommodate?  No   Is patient flexible? Yes Facility: 2nd choice would be [place]       Modality:   In-Center Hemo   Diagnosis:   End Stage Renal Disease (ESRD - Stage 5 Chronic Kidney Disease)     Access Type(s):   CVC and Fistula - Has immature fistula, CVC placed 6/17    If only a CVC, is there an active AV Access Plan (e.g., Vessel Mapping, Surgeon Consult, etc.)?:   NA         Where will this patient be discharged to? Home     Is this patient trach or vent dependent? No     Does this patient have an L-VAD or Life Vest? No     Does this patient have outpatient dialysis history? No     Does this patient receive continuous medication via infusion pumps? No     Daily Care - Activity Management/Activity assistance needed?   Activity Management: up ad ye   Activity Assistance Provided: assistance, stand-by          Can this patient sit in a standard chair to dialyze? Yes     Require treatment in a bed?  No     Is the patient HEP B positive? No     Can this patient sign their own legal consents? Yes     Other special needs? NA       Allergies:   Allergies   Allergen Reactions     Benadryl [Diphenhydramine]      No Known Allergies         Medication List:   Current Facility-Administered Medications   Medication     - MEDICATION INSTRUCTIONS for Dialysis Patients -     0.9% sodium chloride  BOLUS     0.9% sodium chloride BOLUS     0.9% sodium chloride BOLUS     acetaminophen (TYLENOL) tablet 325 mg     albumin human 25 % injection 50 mL     albumin human 5 % injection 250 mL     anticoagulant citrate flush 3 mL     anticoagulant citrate flush 3 mL     aspirin EC tablet 81 mg     atorvastatin (LIPITOR) tablet 10 mg     carvedilol (COREG) tablet 50 mg     ceFAZolin-dextrose (ANCEF) 2-4 GM/100ML-% infusion     cloNIDine (CATAPRES) tablet 0.3 mg     glucose gel 15-30 g    Or     dextrose 50 % injection 25-50 mL    Or     glucagon injection 1 mg     epoetin melanie-epbx (RETACRIT) injection 3,000 Units     fentaNYL (PF) (SUBLIMAZE) 100 MCG/2ML injection     fentaNYL (PF) (SUBLIMAZE) injection 25-50 mcg     flumazenil (ROMAZICON) injection 0.2 mg     heparin (porcine) 1000 UNIT/ML injection     heparin (PRESSURE BAG) 2 Units/mL 0.9% NaCl (1000 mL)     heparin Lock (1000 units/mL High concentration) 3,000 Units     heparin Lock (1000 units/mL High concentration) 3,000 Units     hydrALAZINE (APRESOLINE) tablet 100 mg     lidocaine (LMX4) cream     lidocaine (PF) (XYLOCAINE) 1 % injection     lidocaine 1 % 0.1-1 mL     lidocaine 1 % 0.5 mL     lidocaine 1 % 0.5 mL     lidocaine 1 % 1-30 mL     Medication Instructions     melatonin tablet 1 mg     midazolam (VERSED) 1 MG/ML injection     midazolam (VERSED) injection 0.5-2 mg     naloxone (NARCAN) injection 0.2 mg    Or     naloxone (NARCAN) injection 0.4 mg    Or     naloxone (NARCAN) injection 0.2 mg    Or     naloxone (NARCAN) injection 0.4 mg     No heparin via hemodialysis machine     sodium chloride (PF) 0.9% PF flush 3 mL     sodium chloride (PF) 0.9% PF flush 3 mL     sodium chloride 0.9 % bag TABLE SOLN     Stop Heparin 60 minutes before end of treatment     Vitamin D3 (CHOLECALCIFEROL) tablet 75 mcg          HEP Panel:  Hep B Antigen (HBsAG):   Lab Results   Component Value Date    HEPBANG Nonreactive 02/09/2021     Hep B Surface Antibody (HBsAb):   Lab  Results   Component Value Date    AUSAB 104.51 (H) 02/09/2021     Hep B Total Core Antibody:   Lab Results   Component Value Date    HBCAB Nonreactive 02/09/2021        Chest X-Ray:   Results for orders placed during the hospital encounter of 06/15/21   XR CHEST 2 VW    Narrative CHEST TWO VIEWS  6/17/2021 1:20 PM     HISTORY:  End-stage renal disease. On dialysis. Anemia. Hypertension.    COMPARISON: 11/27/2020.      Impression IMPRESSION: Right IJ dual-lumen central venous catheter, with tip near  the SVC/RA junction. Lungs clear. No pneumothorax. Heart size and  pulmonary vascularity are within normal limits.        PPD:  M TUBERCULOSIS RESULT: No results found for: TBRSLT  M TUBERCULOSIS ANTIGEN VALUE: No results found for: TBAGN

## 2021-06-15 NOTE — ED PROVIDER NOTES
History   Chief Complaint:  Vomiting       HPI   Taylor Valiente is a 25 year old male with history of FSGS, renovascular hypertension, hyperlipidemia, and Chronic kidney disease, Stage V who presents with vomiting. Symptoms have been ongoing since Friday and Saturday. Symptoms are worse in the morning with multiple episodes of vomiting, but he states symptoms progressively improve throughout out the day. Notes a past similar episode when he had COVID-19. Associated symptoms include nausea, urinary frequency, leg swelling (baseline), and one episode of dizziness here in the ER involving a room spinning sensation while he was in the bathroom which caused him to lose his balance. He notes stinging, right sided flank pain since the incident. Denies fever, chills, diarrhea, hematemesis, abdominal pain, stool incontinence, chest pain , and shortness of breath. He took warm showers without relief. Currently, he is still slightly nauseated . He has a scheduled appointment tomorrow with nephrology at [facility] with Dr. Hooper. He called their office today, but was told to come into the ER.     Per his mother, she states the patient sleeps a lot since his chronic kidney disease diagnosis.     Review of Systems   Constitutional: Positive for fatigue. Negative for chills and fever.   Respiratory: Negative for shortness of breath.    Cardiovascular: Positive for leg swelling (baseline). Negative for chest pain.   Gastrointestinal: Positive for nausea and vomiting. Negative for abdominal pain, constipation and diarrhea.        Denies hematemesis.    Genitourinary: Positive for flank pain and frequency.   Neurological: Positive for dizziness.   All other systems reviewed and are negative.        Allergies:  Benadryl [Diphenhydramine]    Medications:  amlodipine   aspirin   atorvastatin   calcitriol  carvedilol   clonidine   ferrous sulfate   hydralazine   senna-docusate  sodium bicarbonate 650 MG tablet    Past  Medical History:    Adrenal adenoma, left   Anemia  Hypertension   Chronic kidney disease , stage 5 GFR less than 15ml/min  Hyperlipidemia   Depression   Focal glomerular sclerosis   LVH due to hypertensive disease   Noncompliance   Obesity   Suicidal attempt   Stress-induced cardiomyopathy   End stage renal disease   Osteochondritis dissecans   Renovascular hypertension   Acute kidney injury   2019 novel coronavirus disease (COVID-19)   Vitamin D deficiency   Heart failure   Proteinuria      Past Surgical History:    Renal biopsy   Create fistula arteriovenous upper extremity     Family History:    Mother: hepatitis B , diabetes mellitus, hypertension   Father: hypertension, obesity     Social History:  Presents with his mother, Nikky.  PCP: Priyank Lawrence  Smokes marijuana.     Physical Exam     Patient Vitals for the past 24 hrs:   BP Temp Temp src Pulse Resp SpO2 Height Weight   06/15/21 2204 (!) 162/89 98.8  F (37.1  C) Oral 85 20 99 % 1.829 m (6') 111.7 kg (246 lb 4 oz)   06/15/21 1900 (!) 141/100 -- -- 82 -- -- -- --   06/15/21 1845 -- -- -- -- -- 98 % -- --   06/15/21 1830 138/87 -- -- 76 -- 100 % -- --   06/15/21 1628 121/72 -- -- 78 16 99 % -- --   06/15/21 1556 (!) 168/110 97.2  F (36.2  C) Temporal 93 18 99 % -- --       Physical Exam  General: Alert, no acute distress; well appearing  HEENT:  Moist mucous membranes.  Conjunctiva normal.   CV:  RRR, no m/r/g, skin warm and well perfused; palpable thrill to RUE antecubital fistula  Pulm:  CTAB, no wheezes/ronchi/rales.  No acute distress, breathing comfortably  GI:  Soft, nontender, nondistended.  No rebound or guarding.  Normal bowel sounds  MSK:  Moving all extremities.  No focal areas of edema, erythema; right CVA tenderness  Skin:  WWP, no rashes, no lower extremity edema, skin color normal, no diaphoresis  Psych:  Well-appearing, normal affect, regular speech    Emergency Department Course     ECG:  ECG taken at 1855  Normal sinus rhythm  Nonspecific T  wave abnormality   Prolonged QT   Abnormal ECG  No significant change when compared to EKG dated 3/4/21.  Rate 70 bpm. CT interval 144 ms. QRS duration 92 ms. QT/QTc 506/546 ms. P-R-T axes 39 49 62.    Imaging:  Abd/pelvis CT no contrast - Stone Protocol  IMPRESSION:   1.  No renal calculi or hydronephrosis.  2.  Small right renal cyst.  3.  Diffuse bladder wall thickening can be seen with cystitis or hypertrophy.  4.  Left adrenal adenoma.  Reading per radiology    Laboratory:  CBC: WBC 7.6, HGB 9.3 (L) ,    BMP: chloride 111 (H), carbon dioxide 19 (L) , Glucose 112 (H),  (H) , GFR 4 (L)   Creatinine 16.10 (H)  o/w WNL  Asymptomatic COVID19 Virus PCR by nasopharyngeal swab Negative   Drug abuse screen 77 urine: pending o/w all negative    UA with microscopic: glucose 50, blood trace, protein 600, bacteria few, mucous present  o/w WNL    Emergency Department Course:    Reviewed:  I reviewed nursing notes, vitals, past medical history and care everywhere    Assessments:  1940 I obtained history and examined the patient as noted above.   1956 I rechecked the patient and explained findings.     Consults:   1952 : I spoke with Dr. Hernandez of the Nephrology service from [facility] regarding patient's presentation, findings, and plan of care.    2021 : I spoke with Dr. Jones of the Hospitalist service from [facility] regarding patient's presentation, findings, and plan of care.    Interventions:  2100 Reglan 2.5mg IV   2059 NS, 250mL, IV     Disposition:  The patient was admitted to the hospital under the care of Dr. Jones .     Impression & Plan     Medical Decision Making:  Tayolr Valiente is a 25 year old male with history of FSGS, renovascular hypertension, hyperlipidemia presents to the ER for evaluation of nausea/vomiting.  Please see above for details of HPI and exam.  He is noted to be slightly hypertensive but otherwise vitally stable.  He denies any fevers or abdominal pain.   Abdominal exam is completely benign without any focal tenderness or guarding suggest perforated viscus or other intra-abdominal catastrophe.  He did have an episode of dizziness and right flank pain starting as noted above.  I did consider possible nephrolithiasis but CT scan showed no acute pathology.  Clinically doubt intra-abdominal vascular emergency causing his symptoms of right flank pain.  Lab work-up remarkable for worsened renal function with creatinine 16.10 (baseline ~8), uremia 163, anemia 9.3 (likely from CKD).  Suspect her is nausea/vomiting from worsening renal failure and uremia.  Gastroenteritis also possible.  Patient given Reglan in the ER for nausea given prolonged QT on EKG.  Otherwise no acute ischemic changes on screening EKG.  Discussed case with nephrology we will plan to admit the patient for gentle fluid hydration and he will likely need dialysis tomorrow.  Agreeable with admission.  Discussed case with Dr. Jones who accepted the patient for admission.    Covid-19  Taylor Valiente was evaluated during a global COVID-19 pandemic, which necessitated consideration that the patient might be at risk for infection with the SARS-CoV-2 virus that causes COVID-19.   Applicable protocols for evaluation were followed during the patient's care.   COVID-19 was considered as part of the patient's evaluation. The plan for testing is:  a test was obtained during this visit.     Diagnosis:    ICD-10-CM    1. Acute renal failure with acute tubular necrosis superimposed on stage 5 chronic kidney disease, not on chronic dialysis (H)  N17.0 UA with Microscopic    N18.5 Drug abuse screen 77 urine     Asymptomatic SARS-CoV-2 COVID-19 Virus (Coronavirus) by PCR   2. Nausea and vomiting, intractability of vomiting not specified, unspecified vomiting type  R11.2    3. Prolonged Q-T interval on ECG  R94.31        Scribe Disclosure:  Niraj LALA, am serving as a scribe at 6:30 PM on 6/15/2021 to document  services personally performed by Jaret Mohr MD based on my observations and the provider's statements to me.          Jarte Mohr MD  06/16/21 0253

## 2021-06-15 NOTE — ED TRIAGE NOTES
Hx of Kidney disease. x4 days of vomiting. Concerned he has been vomiting and can't keep anything down.

## 2021-06-16 LAB
ANION GAP SERPL CALCULATED.3IONS-SCNC: 15 MMOL/L (ref 3–14)
BUN SERPL-MCNC: 159 MG/DL (ref 7–30)
CALCIUM SERPL-MCNC: 8.3 MG/DL (ref 8.5–10.1)
CHLORIDE SERPL-SCNC: 108 MMOL/L (ref 94–109)
CO2 SERPL-SCNC: 18 MMOL/L (ref 20–32)
CREAT SERPL-MCNC: 15.8 MG/DL (ref 0.66–1.25)
GFR SERPL CREATININE-BSD FRML MDRD: 4 ML/MIN/{1.73_M2}
GLUCOSE SERPL-MCNC: 107 MG/DL (ref 70–99)
INTERPRETATION ECG - MUSE: NORMAL
POTASSIUM SERPL-SCNC: 4 MMOL/L (ref 3.4–5.3)
SODIUM SERPL-SCNC: 141 MMOL/L (ref 133–144)

## 2021-06-16 PROCEDURE — 99233 SBSQ HOSP IP/OBS HIGH 50: CPT | Performed by: INTERNAL MEDICINE

## 2021-06-16 PROCEDURE — 80048 BASIC METABOLIC PNL TOTAL CA: CPT | Performed by: HOSPITALIST

## 2021-06-16 PROCEDURE — 120N000001 HC R&B MED SURG/OB

## 2021-06-16 PROCEDURE — 36415 COLL VENOUS BLD VENIPUNCTURE: CPT | Performed by: HOSPITALIST

## 2021-06-16 PROCEDURE — 250N000013 HC RX MED GY IP 250 OP 250 PS 637: Performed by: HOSPITALIST

## 2021-06-16 PROCEDURE — 250N000011 HC RX IP 250 OP 636: Performed by: HOSPITALIST

## 2021-06-16 RX ORDER — ALBUMIN (HUMAN) 12.5 G/50ML
50 SOLUTION INTRAVENOUS
Status: DISCONTINUED | OUTPATIENT
Start: 2021-06-16 | End: 2021-06-16

## 2021-06-16 RX ORDER — ALBUMIN, HUMAN INJ 5% 5 %
250 SOLUTION INTRAVENOUS
Status: DISCONTINUED | OUTPATIENT
Start: 2021-06-16 | End: 2021-06-16

## 2021-06-16 RX ADMIN — CALCITRIOL 0.25 MCG: 0.25 CAPSULE, LIQUID FILLED ORAL at 08:22

## 2021-06-16 RX ADMIN — METOCLOPRAMIDE HYDROCHLORIDE 2.5 MG: 5 INJECTION INTRAMUSCULAR; INTRAVENOUS at 08:15

## 2021-06-16 RX ADMIN — CLONIDINE HYDROCHLORIDE 0.3 MG: 0.1 TABLET ORAL at 22:13

## 2021-06-16 RX ADMIN — METOCLOPRAMIDE HYDROCHLORIDE 2.5 MG: 5 INJECTION INTRAMUSCULAR; INTRAVENOUS at 18:42

## 2021-06-16 RX ADMIN — HYDRALAZINE HYDROCHLORIDE 100 MG: 50 TABLET, FILM COATED ORAL at 08:17

## 2021-06-16 RX ADMIN — ATORVASTATIN CALCIUM 10 MG: 10 TABLET, FILM COATED ORAL at 22:12

## 2021-06-16 RX ADMIN — FERROUS SULFATE TAB 325 MG (65 MG ELEMENTAL FE) 325 MG: 325 (65 FE) TAB at 08:17

## 2021-06-16 RX ADMIN — CARVEDILOL 50 MG: 25 TABLET, FILM COATED ORAL at 08:17

## 2021-06-16 RX ADMIN — Medication 75 MCG: at 08:17

## 2021-06-16 RX ADMIN — ASPIRIN 81 MG: 81 TABLET ORAL at 08:17

## 2021-06-16 RX ADMIN — SODIUM BICARBONATE 650 MG TABLET 1300 MG: at 08:17

## 2021-06-16 RX ADMIN — METOCLOPRAMIDE HYDROCHLORIDE 2.5 MG: 5 INJECTION INTRAMUSCULAR; INTRAVENOUS at 02:21

## 2021-06-16 RX ADMIN — SODIUM BICARBONATE 650 MG TABLET 1300 MG: at 22:12

## 2021-06-16 RX ADMIN — CARVEDILOL 50 MG: 25 TABLET, FILM COATED ORAL at 18:42

## 2021-06-16 RX ADMIN — CLONIDINE HYDROCHLORIDE 0.3 MG: 0.1 TABLET ORAL at 08:17

## 2021-06-16 RX ADMIN — HYDRALAZINE HYDROCHLORIDE 100 MG: 50 TABLET, FILM COATED ORAL at 22:12

## 2021-06-16 ASSESSMENT — ACTIVITIES OF DAILY LIVING (ADL)
ADLS_ACUITY_SCORE: 12

## 2021-06-16 ASSESSMENT — MIFFLIN-ST. JEOR: SCORE: 2149.29

## 2021-06-16 NOTE — PROGRESS NOTES
[facility]    Hospitalist Progress Note  Name: Taylor Valiente    MRN: [id number]  Provider:  Tej Machado DO  Date of Service: 06/16/2021    Summary of Stay: Taylor Valiente is a 25 year old male with a history of chronic kidney disease with fistula placement but not on HD yet, hyperlipidemia, LVH, anemia of chronic kidney disease, obesity admitted on 6/15/2021 with 48 hours of profuse emesis.  The patient was schedule to see his nephrologist on 6/16/2021, but considering his symptoms he was told to come to the emergency department.  Pt reports he still makes urine.  In the Emergency Department, the patient was found to have , bicarb 19, creatinine 16.10, hgb 9.3.  The patient was admitted for dialysis with an Nephrology consult and social work assistance with placement.  The patient also reports that he is on the kidney transplant waiting list.    Problem List:   1.  Acute Intractable Nausea and Vomiting  - Improved this AM  - Likely secondary to uremia  - Recommend metoclopramide if needed as QTc is prolonged at 546 ms    2.  ESRD  - Nephrology consulted - discussed with Nephro, plans for HD this hospitalization  - Social Work consulted for dialysis chair placement  - Daily BMP    3.  Anemia of Chronic Kidney Disease   - Stable  - Daily CBC    4.  Obesity  - BMI = 33    5.  Prolonged QTc    Chronic Medical Problems:  Depression  Hx of Adrenal Adenoma    TODAY'S PLAN: Appreciate nephrology recommendations.  Continue home blood pressure regimen and other medications.  Plan for hemodialysis.    DVT Prophylaxis: Pneumatic Compression Devices  Code Status: Full Code  Diet: Dialysis Diet    Hurt Catheter: not present  Disposition: Expected discharge in 1-3 days to home. Goals prior to discharge include dialysis completed and outpatient dialysis set up.   Family updated today: No     Interval History   Pt seen and examined.  Pt states he feels better this morning.    -Data reviewed  today: I personally reviewed all new labs and imaging results over the last 24 hours.     Physical Exam   Temp: 95.9  F (35.5  C) Temp src: Oral BP: (!) 170/111 Pulse: 67   Resp: 18 SpO2: 100 % O2 Device: None (Room air)    Vitals:    06/15/21 2204 06/16/21 0606   Weight: 111.7 kg (246 lb 4 oz) 112.1 kg (247 lb 3.2 oz)     Vital Signs with Ranges  Temp:  [95.9  F (35.5  C)-98.8  F (37.1  C)] 95.9  F (35.5  C)  Pulse:  [67-94] 67  Resp:  [16-20] 18  BP: (121-170)/() 170/111  SpO2:  [98 %-100 %] 100 %  I/O last 3 completed shifts:  In: -   Out: 300 [Urine:300]    GENERAL: No apparent distress. Awake, alert, and fully oriented.  HEENT: Normocephalic, atraumatic. Extraocular movements intact.  CARDIOVASCULAR: Regular rate and rhythm without murmurs or rubs. No S3.  PULMONARY: Clear bilaterally.  GASTROINTESTINAL: Soft, non-tender, non-distended. Bowel sounds normoactive.   EXTREMITIES: No cyanosis or clubbing. No edema.  NEUROLOGICAL: CN 2-12 grossly intact, no focal neurological deficits.  DERMATOLOGICAL: No rash, ulcer, bruising, nor jaundice.    Medications     - MEDICATION INSTRUCTIONS -         sodium chloride 0.9%  250 mL Intravenous Once in dialysis     sodium chloride 0.9%  300 mL Hemodialysis Machine Once     aspirin  81 mg Oral Daily     atorvastatin  10 mg Oral QPM     calcitRIOL  0.25 mcg Oral Once per day on Mon Wed Fri     carvedilol  50 mg Oral BID w/meals     cloNIDine  0.3 mg Oral BID     ferrous sulfate  325 mg Oral Daily     hydrALAZINE  100 mg Oral BID     metoclopramide  2.5 mg Intravenous Q6H     - MEDICATION INSTRUCTIONS -   Does not apply Once     sodium bicarbonate  1,300 mg Oral BID     sodium chloride (PF)  3 mL Intracatheter Q8H     vitamin D3  75 mcg Oral Daily     Data     Laboratory:  Recent Labs   Lab 06/15/21  1634   WBC 7.6   HGB 9.3*   HCT 29.2*   MCV 85        Recent Labs   Lab 06/16/21  0738 06/15/21  1634    143   POTASSIUM 4.0 3.9   CHLORIDE 108 111*   CO2 18*  19*   ANIONGAP 15* 13   * 112*   * 163*   CR 15.80* 16.10*   GFRESTIMATED 4* 4*   GFRESTBLACK 4* 4*   BUBBA 8.3* 8.5     No results for input(s): CULT in the last 168 hours.    Imaging:  Recent Results (from the past 24 hour(s))   Abd/pelvis CT no contrast - Stone Protocol    Narrative    EXAM: CT ABDOMEN PELVIS W/O CONTRAST  LOCATION: [facility]  DATE/TIME: 6/15/2021 8:00 PM    INDICATION: Flank pain, kidney stone suspected  COMPARISON: CT abdomen exam 05/11/2019  TECHNIQUE: CT scan of the abdomen and pelvis was performed without IV contrast. Multiplanar reformats were obtained. Dose reduction techniques were used.  CONTRAST: None.    FINDINGS:   LOWER CHEST: Normal.    HEPATOBILIARY: Normal.    PANCREAS: Normal.    SPLEEN: Normal.    ADRENAL GLANDS: Enlarged left adrenal gland is unchanged and should represent an adenoma.    KIDNEYS/BLADDER: Both kidneys are negative for renal calculi or hydroureter versus. Small cyst upper pole right kidney. No bladder stones. Diffuse bladder wall thickening.    BOWEL: No evidence for bowel obstruction. No inflammatory changes.    LYMPH NODES: A few borderline-enlarged retroperitoneal nodes are unchanged.    VASCULATURE: Unremarkable.    PELVIC ORGANS: Normal.    MUSCULOSKELETAL: Normal.      Impression    IMPRESSION:   1.  No renal calculi or hydronephrosis.  2.  Small right renal cyst.  3.  Diffuse bladder wall thickening can be seen with cystitis or hypertrophy.  4.  Left adrenal adenoma.           Tej Machado DO  [facility] Hospital[facility]  [address] [place] [address]  06/16/2021

## 2021-06-16 NOTE — PROGRESS NOTES
Attempted first dialysis treatment, using a brand new fistula on the right arm.  Arterial needle was successful with blood return, but venous needle infiltrated with two attempts.  MD aware.  VO from Dr. Alba to try again tomorrow with the RAVF and if that doesn't work again, the pt will get a line placed and an ultrasound performed to check the fistula's patency.  Pt vitally stable throughout.     Sabiha Pham RN

## 2021-06-16 NOTE — ED NOTES
[facility]  ED Nurse Handoff Report    Taylor Valiente is a 25 year old male   ED Chief complaint: Vomiting  . ED Diagnosis:   Final diagnoses:   Acute renal failure with acute tubular necrosis superimposed on stage 5 chronic kidney disease, not on chronic dialysis (H)   Nausea and vomiting, intractability of vomiting not specified, unspecified vomiting type   Prolonged Q-T interval on ECG     Allergies:   Allergies   Allergen Reactions     Benadryl [Diphenhydramine]      No Known Allergies        Code Status: Full Code  Activity level - Baseline/Home:  Independent. Activity Level - Current:   Stand by Assist. Lift room needed: No. Bariatric: No   Needed: No   Isolation: No. Infection: Not Applicable.     Vital Signs:   Vitals:    06/15/21 1628 06/15/21 1830 06/15/21 1845 06/15/21 1900   BP: 121/72 138/87  (!) 141/100   Pulse: 78 76  82   Resp: 16      Temp:       TempSrc:       SpO2: 99% 100% 98%        Cardiac Rhythm:  ,      Pain level:    Patient confused: No. Patient Falls Risk: Yes.   Elimination Status: Unable to void   Patient Report - Initial Complaint:    15:55 ED Triage Notes Filed Hx of Kidney disease. x4 days of vomiting. Concerned he has been vomiting and can't keep anything down.      . Focused Assessment:   18:35 Musculoskeletal Musculoskeletal - General Mobility: generalized weakness  KA     18:34 Gastrointestinal Gastrointestinal - Gastrointestinal WDL: nausea and vomiting  Nausea/Vomiting Signs/Symptoms: nausea continuous  Gastrointestinal Comment: Vomiting last few days, hx of kidney failure         Tests Performed: Labs, CT. Abnormal Results:   Labs Ordered and Resulted from Time of ED Arrival Up to the Time of Departure from the ED   CBC WITH PLATELETS DIFFERENTIAL - Abnormal; Notable for the following components:       Result Value    RBC Count 3.44 (*)     Hemoglobin 9.3 (*)     Hematocrit 29.2 (*)     All other components within normal limits   BASIC METABOLIC  PANEL - Abnormal; Notable for the following components:    Chloride 111 (*)     Carbon Dioxide 19 (*)     Glucose 112 (*)     Urea Nitrogen 163 (*)     Creatinine 16.10 (*)     GFR Estimate 4 (*)     GFR Estimate If Black 4 (*)     All other components within normal limits   SARS-COV-2 (COVID-19) VIRUS RT-PCR   ROUTINE UA WITH MICROSCOPIC   DRUG ABUSE SCREEN 77 URINE (FL, RH, SH)     Abd/pelvis CT no contrast - Stone Protocol   Final Result   IMPRESSION:    1.  No renal calculi or hydronephrosis.   2.  Small right renal cyst.   3.  Diffuse bladder wall thickening can be seen with cystitis or hypertrophy.   4.  Left adrenal adenoma.           .   Treatments provided: See MAR  Family Comments: Mother aware of admit  OBS brochure/video discussed/provided to patient:  N/A  ED Medications:   Medications   sodium chloride 0.9% infusion (has no administration in time range)   0.9% sodium chloride BOLUS (250 mLs Intravenous New Bag 6/15/21 2059)   metoclopramide (REGLAN) injection 2.5 mg (2.5 mg Intravenous Given 6/15/21 2100)     Drips infusing:  No  For the majority of the shift, the patient's behavior Green.     Sepsis treatment initiated: No     Patient tested for COVID 19 prior to admission: YES    ED Nurse Name/Phone Number: Gladys Ham RN,   9:02 PM    RECEIVING UNIT ED HANDOFF REVIEW    Above ED Nurse Handoff Report was reviewed: Yes  Reviewed by: Ranjana Palencia RN on Marcelina 15, 2021 at 9:36 PM

## 2021-06-16 NOTE — CONSULTS
[facility]    RENAL CONSULTATION NOTE    REFERRING MD:  Dr. Jones    REASON FOR CONSULTATION:  Worsening renal failure, n/v    DATE OF CONSULTATION: 06/16/21    SHORTHAND KEY FOR MY NOTES:  c = with, s = without, p = after, a = before, x = except, asx = asymptomatic, tx = transplant or treatment, sx = symptoms or symptomatic, cx = canceled or culture, rxn = reaction, yday = yesterday, nl = normal, abx = antibiotics, fxn = function, dx = diagnosis, dz = disease, m/h = melena/hematochezia, c/d/l/ha = cramping/dizziness/lightheadedness/headache, d/c = discharge or diarrhea/constipation, f/c/n/v = fevers/chills/nausea/vomiting, cp/sob = chest pain/shortness of breath, tbv = total body volume, rxn = reaction, tdc = tunneled dialysis catheter, pta = prior to admission, hd = hemodialysis, pd = peritoneal dialysis, hhd = home hemodialysis, edw = estimated dry wt    HPI: Taylor Valiente is a 25 year old male c CKD V 2 HTN/FSGS who was admitted on 6/15/2021 c c/o persistent n/v and found to have severe VANESSA.  Pt is followed by Dr. Hooper in our office.      The pt's mother called our office yday stating that she had just returned in town and found the pt lying in the ground. He reported that he had been having n/v all week.  She was advised to bring him to the ER.  In the ER, he was noted to be dry and his labs showed significant abnormalities.  His BUN was in the 160s and Cr 16s.  Reviewing records in Care Everywhere, his last cr was in the 8s in April.  He was given some IVF and admitted.    Overnight, the pt rec'd IVF and today he feels better.  He is tired, but is not having any n/v.  He is urinating a nl amt still and denies any UTI sx.  No f/c/abd pain.  No cp/sob.  He is willing to start dialysis.    ROS:  A complete review of systems was performed and is x as noted above.    PMH:    Past Medical History:   Diagnosis Date     Adrenal adenoma, left      Anemia      Benign essential hypertension  07/28/2017     CKD (chronic kidney disease) stage 5, GFR less than 15 ml/min (H)     FSGS     Depression      Focal glomerular sclerosis 07/28/2017     HLD (hyperlipidemia)      LVH (left ventricular hypertrophy) due to hypertensive disease 07/14/2017     Noncompliance      Obesity, unspecified      OD (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/2021     Stress-induced cardiomyopathy      Suicide attempt (H) 2019     PSH:    Past Surgical History:   Procedure Laterality Date     ARTHROSCOPY ANKLE Left 2/24/2021    Procedure: Left ankle arthroscopy and debridement/micro fracture;  Surgeon: Mirza Nelson MD;  Location: [facility] OR     BIOPSY  2017    renal- [facility]     CREATE FISTULA ARTERIOVENOUS UPPER EXTREMITY Right 3/4/2021    Procedure: RIGHT proximal radial  to CEPHALIC ARTERIOVENOUS FISTULA;  Surgeon: Henrik Moran MD;  Location: [facility] OR     NO HISTORY OF SURGERY       MEDICATIONS:      sodium chloride 0.9%  250 mL Intravenous Once in dialysis     sodium chloride 0.9%  300 mL Hemodialysis Machine Once     aspirin  81 mg Oral Daily     atorvastatin  10 mg Oral QPM     calcitRIOL  0.25 mcg Oral Once per day on Mon Wed Fri     carvedilol  50 mg Oral BID w/meals     cloNIDine  0.3 mg Oral BID     ferrous sulfate  325 mg Oral Daily     hydrALAZINE  100 mg Oral BID     metoclopramide  2.5 mg Intravenous Q6H     - MEDICATION INSTRUCTIONS -   Does not apply Once     sodium bicarbonate  1,300 mg Oral BID     sodium chloride (PF)  3 mL Intracatheter Q8H     vitamin D3  75 mcg Oral Daily     ALLERGIES:    Allergies as of 06/15/2021 - Reviewed 06/15/2021   Allergen Reaction Noted     Benadryl [diphenhydramine]  06/01/2021     No known allergies  10/26/2004     FH:    Family History   Problem Relation Age of Onset     Blood Disease Mother         has hep b     Diabetes Mother         gestionanal diabetes     Hypertension Mother      Obesity Father      Hypertension Father      Hypertension Maternal Grandmother       Diabetes Maternal Grandmother      Hypertension Paternal Grandmother      Hypertension Paternal Grandfather      Coronary Artery Disease Maternal Uncle      Cancer No family hx of      SH:    Social History     Socioeconomic History     Marital status: Single     Spouse name: Not on file     Number of children: 0     Years of education: Not on file     Highest education level: Not on file   Occupational History     Occupation:      Occupation: kitchen     Comment: [employer]   Social Needs     Financial resource strain: Not on file     Food insecurity     Worry: Not on file     Inability: Not on file     Transportation needs     Medical: Not on file     Non-medical: Not on file   Tobacco Use     Smoking status: Former Smoker     Packs/day: 0.00     Types: Cigarettes     Smokeless tobacco: Never Used   Substance and Sexual Activity     Alcohol use: No     Drug use: Yes     Types: Marijuana     Comment: occ     Sexual activity: Not Currently     Partners: Female   Lifestyle     Physical activity     Days per week: Not on file     Minutes per session: Not on file     Stress: Not on file   Relationships     Social connections     Talks on phone: Not on file     Gets together: Not on file     Attends Pentecostalism service: Not on file     Active member of club or organization: Not on file     Attends meetings of clubs or organizations: Not on file     Relationship status: Not on file     Intimate partner violence     Fear of current or ex partner: Not on file     Emotionally abused: Not on file     Physically abused: Not on file     Forced sexual activity: Not on file   Other Topics Concern     Parent/sibling w/ CABG, MI or angioplasty before 65F 55M? Not Asked   Social History Narrative     Not on file     PHYSICAL EXAM:    BP (!) 170/111 (BP Location: Left arm)   Pulse 67   Temp 95.9  F (35.5  C) (Oral)   Resp 18   Ht 1.829 m (6')   Wt 112.1 kg (247 lb 3.2 oz)   SpO2 100%   BMI 33.53 kg/m       GENERAL: awake, alert, NAD  HEENT:  normocephalic, no gross abnormalities; dry mouth, dentition is ok; pupils equal, EOMI, no scleral icterus or conj edema  CV: RRR c 2/6 m; no clicks, gallops, or rubs; no significant ble edema  RESP: CTA B c good efforts; no crackles, rhonchi, wheezes  GI: abd o/s/nt/nd, BS present; no masses  MUSCULOSKELETAL: extremities nl - no gross deformities noted  SKIN: no suspicious lesions or rashes, dry to touch  NEURO:  strength normal and symmetric  PSYCH: mood good, affect appropriate  LYMPH: no palpable ant/post cervical and supraclavicular adenopathy  LINES:   +PIV  ACCESS:  + RADHA - feels ok, good thrill/bruit; no high-pitched sounds    LABS:      CBC RESULTS:     Recent Labs   Lab 06/15/21  1634   WBC 7.6   RBC 3.44*   HGB 9.3*   HCT 29.2*        BMP RESULTS:  Recent Labs   Lab 06/16/21  0738 06/15/21  1634    143   POTASSIUM 4.0 3.9   CHLORIDE 108 111*   CO2 18* 19*   * 163*   CR 15.80* 16.10*   * 112*   BUBBA 8.3* 8.5     INRNo lab results found in last 7 days.     DIAGNOSTICS:  Personally reviewed abd CT - no hydro B    A/P:  Taylor Valiente is a 25 year old male c ESKD 2 HTN/FSGS who has uremic sx and must initiate HD.    1.  ESKD.  Pt has a RADHA that seems mature.  Hopefully, it will work today.  He is willing to initiate HD today due to uremic sx and understands he will be in the hospital for a few days as we initiate HD.  We will not pull any fluid on his first run given that he still urinates quite a bit and his volume is fine.  A.  HD #1 today - 2.5h, 250 BFR, 17g needles, 0L UF.  B.  If the fistula doesn't work, we will plan a TDC placement by [facility].  ANN.  Will need SW to help find a dialysis unit.  [facility].  [facility].    2.  HTN.  BP is high today and he is on multiple meds.  Once he's run a couple of times, we will try to pull a little fluid.  A.  Follow BP, clinically.  B.  Continue  meds at same doses for now.  We will adjust prn.    3.  Anemia.  Pt's is in the 9s, which is a bit lower than before.  It was ~10 in our office (see Care Everywhere) in April.  No obv bleeding.  This is related to EPO def due to renal dysfxn.  He has not been fe def.  A.  Follow hb, clinically.    4.  FEN.  Electrolytes are ok.  A.  Dialysis diet.    Thank you for this consultation. We will follow c you.  Please call if any questions.      Attestation:   I have reviewed today's relevant vital signs, notes, medications, labs and imaging.    Balta Alba MD  [facility] Nephrology  [phone]

## 2021-06-16 NOTE — PHARMACY-ADMISSION MEDICATION HISTORY
Admission medication history interview status for this patient is complete. See [facility] admission navigator for allergy information, prior to admission medications and immunization status.     Medication history interview done, indicate source(s): Patient  Medication history resources (including written lists, pill bottles, clinic record):None  Pharmacy: -    Changes made to PTA medication list:  Added: -  Deleted: senokot  Changed: vit D3 to 3000 units daily    Actions taken by pharmacist (provider contacted, etc):None     Additional medication history information:None    Medication reconciliation/reorder completed by provider prior to medication history?  no   (Y/N)     For patients on insulin therapy:   Do you use sliding scale insulin based on blood sugars?   What is your pre-meal insulin coverage?    Do you typically eat three meals a day?   How many times do you check your blood glucose per day?   How many episodes of hypoglycemia do you typically have per month?   Do you have a Continuous Glucose Monitor (CGM)?      Prior to Admission medications    Medication Sig Last Dose Taking? Auth Provider   acetaminophen (TYLENOL) 325 MG tablet Take 2 tablets (650 mg) by mouth every 4 hours as needed for mild pain  Yes Fallon Martinez MD   amLODIPine (NORVASC) 10 MG tablet Take 10 mg by mouth daily  6/15/2021 at Unknown time Yes Unknown, Entered By History   aspirin 81 MG EC tablet Take 81 mg by mouth daily 6/15/2021 at Unknown time Yes Unknown, Entered By History   atorvastatin (LIPITOR) 10 MG tablet TAKE ONE TABLET BY MOUTH EVERY NIGHT AT BEDTIME 6/14/2021 at Unknown time Yes Priyank Lawrence MD   calcitRIOL (ROCALTROL) 0.25 MCG capsule Take 0.25 mcg by mouth three times a week Monday, Wednesday, Friday 6/14/2021 at noon Yes Unknown, Entered By History   carvedilol (COREG) 25 MG tablet Take 2 tablets (50 mg) by mouth 2 times daily (with meals) 6/15/2021 at x1 Yes Priyank Lawrence MD   cloNIDine (CATAPRES) 0.3 MG tablet Take 1  tablet (0.3 mg) by mouth 2 times daily  Patient taking differently: Take 0.1 mg by mouth 2 times daily Changed from 0.3 to 0.1 on Tuesday 6/15/2021 at x1 Yes Martin Melgoza MD   ferrous sulfate (FEROSUL) 325 (65 Fe) MG tablet Take 325 mg by mouth daily 6/14/2021 at noon Yes Reported, Patient   hydrALAZINE (APRESOLINE) 100 MG tablet TAKE ONE TABLET BY MOUTH THREE TIMES A DAY 6/15/2021 at x2 Yes Priyank Lawrence MD   sodium bicarbonate 650 MG tablet Take 1,300 mg by mouth 2 times daily 6/15/2021 at x1 Yes Unknown, Entered By History   vitamin D3 (CHOLECALCIFEROL) 2000 units (50 mcg) tablet Take 75 mcg by mouth daily  6/15/2021 at Unknown time Yes Unknown, Entered By History

## 2021-06-16 NOTE — PLAN OF CARE
Presentation/Diagnosis: Pt admitted 6/15 with nausea , fatigue, flank pain, and dizziness over the past few days.   History: Recent placement of AV fistula, CKD stage 5, HLD, HTN, anemia   Labs/Protocols: Cr: 16.1, BUN: 163. GFR: 4   Vitals: Elevated Bp's. No pain reported.   Telemetry: T SR with prolonged QT   Respiratory: RA. LS dim but clear,   Neuro: Aox4  GI/: Continent.  Skin: WDL   LDA's: LPIV infusing NS @ 50/hr  Diet: Renal   Activity: SBA    Plan: Continue to monitor symptoms overnight. Continue POC.

## 2021-06-16 NOTE — H&P (VIEW-ONLY)
[facility]    RENAL CONSULTATION NOTE    REFERRING MD:  Dr. Jones    REASON FOR CONSULTATION:  Worsening renal failure, n/v    DATE OF CONSULTATION: 06/16/21    SHORTHAND KEY FOR MY NOTES:  c = with, s = without, p = after, a = before, x = except, asx = asymptomatic, tx = transplant or treatment, sx = symptoms or symptomatic, cx = canceled or culture, rxn = reaction, yday = yesterday, nl = normal, abx = antibiotics, fxn = function, dx = diagnosis, dz = disease, m/h = melena/hematochezia, c/d/l/ha = cramping/dizziness/lightheadedness/headache, d/c = discharge or diarrhea/constipation, f/c/n/v = fevers/chills/nausea/vomiting, cp/sob = chest pain/shortness of breath, tbv = total body volume, rxn = reaction, tdc = tunneled dialysis catheter, pta = prior to admission, hd = hemodialysis, pd = peritoneal dialysis, hhd = home hemodialysis, edw = estimated dry wt    HPI: Taylor Valiente is a 25 year old male c CKD V 2 HTN/FSGS who was admitted on 6/15/2021 c c/o persistent n/v and found to have severe VANESSA.  Pt is followed by Dr. Hooper in our office.      The pt's mother called our office yday stating that she had just returned in town and found the pt lying in the ground. He reported that he had been having n/v all week.  She was advised to bring him to the ER.  In the ER, he was noted to be dry and his labs showed significant abnormalities.  His BUN was in the 160s and Cr 16s.  Reviewing records in Care Everywhere, his last cr was in the 8s in April.  He was given some IVF and admitted.    Overnight, the pt rec'd IVF and today he feels better.  He is tired, but is not having any n/v.  He is urinating a nl amt still and denies any UTI sx.  No f/c/abd pain.  No cp/sob.  He is willing to start dialysis.    ROS:  A complete review of systems was performed and is x as noted above.    PMH:    Past Medical History:   Diagnosis Date     Adrenal adenoma, left      Anemia      Benign essential hypertension  07/28/2017     CKD (chronic kidney disease) stage 5, GFR less than 15 ml/min (H)     FSGS     Depression      Focal glomerular sclerosis 07/28/2017     HLD (hyperlipidemia)      LVH (left ventricular hypertrophy) due to hypertensive disease 07/14/2017     Noncompliance      Obesity, unspecified      OD (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/2021     Stress-induced cardiomyopathy      Suicide attempt (H) 2019     PSH:    Past Surgical History:   Procedure Laterality Date     ARTHROSCOPY ANKLE Left 2/24/2021    Procedure: Left ankle arthroscopy and debridement/micro fracture;  Surgeon: Mirza Nelson MD;  Location: [facility] OR     BIOPSY  2017    renal- [facility]     CREATE FISTULA ARTERIOVENOUS UPPER EXTREMITY Right 3/4/2021    Procedure: RIGHT proximal radial  to CEPHALIC ARTERIOVENOUS FISTULA;  Surgeon: Henrik Moran MD;  Location: [facility] OR     NO HISTORY OF SURGERY       MEDICATIONS:      sodium chloride 0.9%  250 mL Intravenous Once in dialysis     sodium chloride 0.9%  300 mL Hemodialysis Machine Once     aspirin  81 mg Oral Daily     atorvastatin  10 mg Oral QPM     calcitRIOL  0.25 mcg Oral Once per day on Mon Wed Fri     carvedilol  50 mg Oral BID w/meals     cloNIDine  0.3 mg Oral BID     ferrous sulfate  325 mg Oral Daily     hydrALAZINE  100 mg Oral BID     metoclopramide  2.5 mg Intravenous Q6H     - MEDICATION INSTRUCTIONS -   Does not apply Once     sodium bicarbonate  1,300 mg Oral BID     sodium chloride (PF)  3 mL Intracatheter Q8H     vitamin D3  75 mcg Oral Daily     ALLERGIES:    Allergies as of 06/15/2021 - Reviewed 06/15/2021   Allergen Reaction Noted     Benadryl [diphenhydramine]  06/01/2021     No known allergies  10/26/2004     FH:    Family History   Problem Relation Age of Onset     Blood Disease Mother         has hep b     Diabetes Mother         gestionanal diabetes     Hypertension Mother      Obesity Father      Hypertension Father      Hypertension Maternal Grandmother       Diabetes Maternal Grandmother      Hypertension Paternal Grandmother      Hypertension Paternal Grandfather      Coronary Artery Disease Maternal Uncle      Cancer No family hx of      SH:    Social History     Socioeconomic History     Marital status: Single     Spouse name: Not on file     Number of children: 0     Years of education: Not on file     Highest education level: Not on file   Occupational History     Occupation:      Occupation: kitchen     Comment: [employer]   Social Needs     Financial resource strain: Not on file     Food insecurity     Worry: Not on file     Inability: Not on file     Transportation needs     Medical: Not on file     Non-medical: Not on file   Tobacco Use     Smoking status: Former Smoker     Packs/day: 0.00     Types: Cigarettes     Smokeless tobacco: Never Used   Substance and Sexual Activity     Alcohol use: No     Drug use: Yes     Types: Marijuana     Comment: occ     Sexual activity: Not Currently     Partners: Female   Lifestyle     Physical activity     Days per week: Not on file     Minutes per session: Not on file     Stress: Not on file   Relationships     Social connections     Talks on phone: Not on file     Gets together: Not on file     Attends Mandaeism service: Not on file     Active member of club or organization: Not on file     Attends meetings of clubs or organizations: Not on file     Relationship status: Not on file     Intimate partner violence     Fear of current or ex partner: Not on file     Emotionally abused: Not on file     Physically abused: Not on file     Forced sexual activity: Not on file   Other Topics Concern     Parent/sibling w/ CABG, MI or angioplasty before 65F 55M? Not Asked   Social History Narrative     Not on file     PHYSICAL EXAM:    BP (!) 170/111 (BP Location: Left arm)   Pulse 67   Temp 95.9  F (35.5  C) (Oral)   Resp 18   Ht 1.829 m (6')   Wt 112.1 kg (247 lb 3.2 oz)   SpO2 100%   BMI 33.53 kg/m       GENERAL: awake, alert, NAD  HEENT:  normocephalic, no gross abnormalities; dry mouth, dentition is ok; pupils equal, EOMI, no scleral icterus or conj edema  CV: RRR c 2/6 m; no clicks, gallops, or rubs; no significant ble edema  RESP: CTA B c good efforts; no crackles, rhonchi, wheezes  GI: abd o/s/nt/nd, BS present; no masses  MUSCULOSKELETAL: extremities nl - no gross deformities noted  SKIN: no suspicious lesions or rashes, dry to touch  NEURO:  strength normal and symmetric  PSYCH: mood good, affect appropriate  LYMPH: no palpable ant/post cervical and supraclavicular adenopathy  LINES:   +PIV  ACCESS:  + RADHA - feels ok, good thrill/bruit; no high-pitched sounds    LABS:      CBC RESULTS:     Recent Labs   Lab 06/15/21  1634   WBC 7.6   RBC 3.44*   HGB 9.3*   HCT 29.2*        BMP RESULTS:  Recent Labs   Lab 06/16/21  0738 06/15/21  1634    143   POTASSIUM 4.0 3.9   CHLORIDE 108 111*   CO2 18* 19*   * 163*   CR 15.80* 16.10*   * 112*   BUBBA 8.3* 8.5     INRNo lab results found in last 7 days.     DIAGNOSTICS:  Personally reviewed abd CT - no hydro B    A/P:  Taylor Valiente is a 25 year old male c ESKD 2 HTN/FSGS who has uremic sx and must initiate HD.    1.  ESKD.  Pt has a RADHA that seems mature.  Hopefully, it will work today.  He is willing to initiate HD today due to uremic sx and understands he will be in the hospital for a few days as we initiate HD.  We will not pull any fluid on his first run given that he still urinates quite a bit and his volume is fine.  A.  HD #1 today - 2.5h, 250 BFR, 17g needles, 0L UF.  B.  If the fistula doesn't work, we will plan a TDC placement by [facility].  ANN.  Will need SW to help find a dialysis unit.  [facility].  [facility]    2.  HTN.  BP is high today and he is on multiple meds.  Once he's run a couple of times, we will try to pull a little fluid.  A.  Follow BP, clinically.  B.  Continue  meds at same doses for now.  We will adjust prn.    3.  Anemia.  Pt's is in the 9s, which is a bit lower than before.  It was ~10 in our office (see Care Everywhere) in April.  No obv bleeding.  This is related to EPO def due to renal dysfxn.  He has not been fe def.  A.  Follow hb, clinically.    4.  FEN.  Electrolytes are ok.  A.  Dialysis diet.    Thank you for this consultation. We will follow c you.  Please call if any questions.      Attestation:   I have reviewed today's relevant vital signs, notes, medications, labs and imaging.    Balta Alba MD  [facility] - Nephrology  [phone]

## 2021-06-16 NOTE — PLAN OF CARE
[id number]: Pt resting comfortably. VSS. A&O. Denies pain or nausea. R arm fistula CDI. Mother updated. Tele reads SR long QT. Pt currently at dialysis.

## 2021-06-16 NOTE — PLAN OF CARE
Orientation: A&Ox4  VS: stable, hypertensive  Pain: denies  Tele: SR w/ prolonged QT  Activity: SBA  Resp: WDL  GI: WDL  : pt on hemodialysis   Notable Labs: Cr: 16.1, BUN: 163, GFR: 4  Lines: PIV S/L   Other: recent AV fistula placement  Plan: nephrology consult and hemodialysis

## 2021-06-16 NOTE — UTILIZATION REVIEW
Admission Status; Secondary Review Determination       Under the authority of the Utilization Management Committee, the utilization review process indicated a secondary review on the above patient. The review outcome is based on review of the medical records, discussions with staff, and applying clinical experience noted on the date of the review.     (x) Inpatient Status Appropriate - This patient's medical care is consistent with medical management for inpatient care and reasonable inpatient medical practice.     RATIONALE FOR DETERMINATION   26 yo man with complex medical history including hypertension, CKD stage 5, obesity, anemia of chronic disease, h/o COVID-19 infection, depression, lower extremity edema who presented with acute symptoms of uremia including nausea, intractable vomiting, somnolence. Uremia is an indication for acute dialysis and nephrology consultation has been requested to initiate dialysis. Creatinine now >16. He will need close monitoring of electrolytes while dialysis is initiated and to manage his uremic symptoms.     At the time of admission with the information available to the attending physician more than 2 nights hospital complex care was anticipated, based on patient risk of adverse outcome if treated as outpatient and complex care required. Inpatient admission is appropriate based on the Medicare guidelines.     This document was produced using voice recognition software.    The information on this document is developed by the utilization review team in order for the business office to ensure compliance. This only denotes the appropriateness of proper admission status and does not reflect the quality of care rendered.   The definitions of Inpatient Status and Observation Status used in making the determination above are those provided in the CMS Coverage Manual, Chapter 1 and Chapter 6, section 70.4.     Sincerely,   Sabiha Levy MD  Utilization Review  Physician  Advisor  [facility].

## 2021-06-16 NOTE — H&P
[facility]    History and Physical  Hospitalist     Date of Admission:  6/15/2021  Date of Service (when I saw the patient): 06/15/21  Provider: Tone Jones MD      Chief Complaint   Vomiting     History is obtained from the patient, electronic health record and emergency department physician    History of Present Illness   Taylor Valiente is a 25 year old male who presents with profuse emesis in the last 48 hours.  He has a complicated past medical history of essential hypertension and chronic kidney disease in need for hemodialysis, a fistula has been already created in his right arm.  He has history of hyperlipidemia, LVH, chronic lower extremity edema, chronic anemia, obesity and others.  He was supposed to see nephrologist tomorrow for initiation of dialysis, however because of profuse vomiting in the last 2 days and feeling somnolent he called the clinic for advice and they asked him to present to the emergency department for admission.  He is making urine still, denies dysuria, no diarrhea, no fever and no respiratory symptoms.  In conversation over the phone with the nephrologist on-call, he recommended to Dr. Mohr from the emergency department to bolus the patient with 250 mL and continue normal saline 50 mL/h overnight maintenance.  In the emergency department he has been treated with metoclopramide with improvement of his nausea.  Last emesis episode was this morning at home.  At the moment of my visit the patient is feeling hungry and better than earlier today.  Vital signs:  Temp: 97.2  F (36.2  C) Temp src: Temporal BP: (Abnormal) 141/100 Pulse: 82   Resp: 16 SpO2: 98 % O2 Device: None (Room air)        Estimated body mass index is 33.91 kg/m  as calculated from the following:    Height as of 6/1/21: 1.829 m (6').    Weight as of 6/1/21: 113.4 kg (250 lb).    Lab work-up:  CBC with normal white count, hemoglobin 9.3, differential is normal.  Platelets are  normal.  Chemistry with normal sodium and potassium, chloride is 111, carbon dioxide 19, , creatinine 16.10, GFR 4, calcium is normal anion gap is normal.  Glycemia 112.  CT of the abdomen and pelvis:  1.  No renal calculi or hydronephrosis.  2.  Small right renal cyst.  3.  Diffuse bladder wall thickening can be seen with cystitis or hypertrophy.  4.  Left adrenal adenoma.      Past Medical History    I have reviewed this patient's medical history and updated it with pertinent information if needed.   Past Medical History:   Diagnosis Date     Adrenal adenoma, left      Anemia      Benign essential hypertension 07/28/2017     CKD (chronic kidney disease) stage 5, GFR less than 15 ml/min (H)     FSGS     Depression      Focal glomerular sclerosis 07/28/2017     HLD (hyperlipidemia)      LVH (left ventricular hypertrophy) due to hypertensive disease 07/14/2017     Noncompliance      Obesity, unspecified      OD (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/2021     Stress-induced cardiomyopathy      Suicide attempt (H) 2019       Assessment & Plan   Taylor Valiente is a 25 year old male who has several comorbidities including longstanding hypertension and chronic kidney disease in need for hemodialysis.  He presents with profuse emesis in the last 2 days.  He has been feeling weak and somnolent.  No evidence of any acute intercurrent infection.  He consulted his nephrology clinic and was redirected to the emergency department for admission.    1.  Profuse emesis in the context of uremia, end-stage renal disease in need to start hemodialysis.  Symptomatically improved with hydration and antiemetic in the emergency department.  2.  CKD, now end-stage and need for hemodialysis.  Fistula already created in the right arm.  Nephrology consultation requested for a.m.  3.  Longstanding history of hypertension, with severe damage to target organs such as LVH and CKD.  Patient has family history of hypertension and renal  disease in the maternal side.  In his past medical history essential hypertension and renovascular hypertension have been both listed.  4.  Anemia of chronic disease in the context of chronic kidney disease.  5.  Obesity.  6.  History of depression.  7.  History of adrenal adenoma.    Plan.  Inpatient.  Telemetry.  Dialysis diet.  Hydration with NS at 50 mils an hour.  Metoclopramide 2.5 mg IV every 6 hour as needed.  Amlodipine 10 mg 1 tablet daily.  Carvedilol 50 mg twice daily.  Clonidine 0.3 mg 1 tablet twice daily hydralazine 100 mg 1 tablet 3 times daily  Aspirin 81 mg 1 tablet daily.  Atorvastatin 10 mg 1 tablet at bedtime.  Ferrous sulfate 325 mg 1 tablet daily.  Vitamin D3 2000 units over 75 mcg daily.  Sodium bicarbonate 650 mg 2 tablet twice daily.  Calcitriol 0.25 mg 1 capsule 3 times daily.  On Monday Wednesdays and Fridays  Nephrology consult.    Code Status   Full Code    Primary Care Physician   Priyank Lawrence      Past Surgical History   I have reviewed this patient's surgical history and updated it with pertinent information if needed.  Past Surgical History:   Procedure Laterality Date     ARTHROSCOPY ANKLE Left 2/24/2021    Procedure: Left ankle arthroscopy and debridement/micro fracture;  Surgeon: Mirza Nelson MD;  Location: [facility] OR     BIOPSY  2017    renal- [facility]     CREATE FISTULA ARTERIOVENOUS UPPER EXTREMITY Right 3/4/2021    Procedure: RIGHT proximal radial  to CEPHALIC ARTERIOVENOUS FISTULA;  Surgeon: Henrik Moran MD;  Location: [facility] OR     NO HISTORY OF SURGERY         Prior to Admission Medications   Prior to Admission Medications   Prescriptions Last Dose Informant Patient Reported? Taking?   acetaminophen (TYLENOL) 325 MG tablet   No No   Sig: Take 2 tablets (650 mg) by mouth every 4 hours as needed for mild pain   amLODIPine (NORVASC) 10 MG tablet   Yes No   Sig: Take 10 mg by mouth daily    aspirin 81 MG EC tablet   Yes No   Sig: Take 81 mg by mouth daily    atorvastatin (LIPITOR) 10 MG tablet   No No   Sig: TAKE ONE TABLET BY MOUTH EVERY NIGHT AT BEDTIME   calcitRIOL (ROCALTROL) 0.25 MCG capsule   Yes No   Sig: Take 0.25 mcg by mouth three times a week Monday, Wednesday, Friday   carvedilol (COREG) 25 MG tablet   No No   Sig: Take 2 tablets (50 mg) by mouth 2 times daily (with meals)   cloNIDine (CATAPRES) 0.3 MG tablet   No No   Sig: Take 1 tablet (0.3 mg) by mouth 2 times daily   Patient taking differently: Take 0.1 mg by mouth 2 times daily Changed from 0.3 to 0.1 on Tuesday   ferrous sulfate (FEROSUL) 325 (65 Fe) MG tablet   Yes No   Sig: Take 325 mg by mouth daily   hydrALAZINE (APRESOLINE) 100 MG tablet   No No   Sig: TAKE ONE TABLET BY MOUTH THREE TIMES A DAY   senna-docusate (SENOKOT-S/PERICOLACE) 8.6-50 MG tablet   No No   Sig: Take 1-2 tablets by mouth daily as needed for constipation Take these any day in which you are taking narcotic pain medications   Patient not taking: Reported on 6/1/2021   sodium bicarbonate 650 MG tablet   Yes No   Sig: Take 1,300 mg by mouth 2 times daily   vitamin D3 (CHOLECALCIFEROL) 2000 units (50 mcg) tablet   Yes No   Sig: Take 1 tablet by mouth daily      Facility-Administered Medications: None     Allergies   Allergies   Allergen Reactions     Benadryl [Diphenhydramine]      No Known Allergies        Social History   I have personally reviewed the social history with the patient showing.  Social History     Tobacco Use     Smoking status: Former Smoker     Packs/day: 0.00     Types: Cigarettes     Smokeless tobacco: Never Used   Substance Use Topics     Alcohol use: No     Family History   I have reviewed this patient's family history and it is not contributory to the admission .       Review of Systems   Except for positive as noted in the HPI, a 12-system Review of Systems was found to be negative.    Physical Exam   Vital Signs with Ranges  Temp:  [97.2  F (36.2  C)] 97.2  F (36.2  C)  Pulse:  [76-93] 82  Resp:  [16-18]  16  BP: (121-168)/() 141/100  SpO2:  [98 %-100 %] 98 %  0 lbs 0 oz    GEN:  Alert, oriented x 3, appears comfortable, NAD.  HEENT:  Normocephalic/atraumatic, no scleral icterus, no nasal discharge, mouth moist.  CV:  Regular rate and rhythm, no murmur or JVD.  S1 + S2 noted, no S3 or S4.  LUNGS:  Clear to auscultation bilaterally without rales/rhonchi/wheezing/retractions.  Symmetric chest rise on inhalation noted.  ABD:  Active bowel sounds, soft, non-tender/non-distended.  No rebound/guarding/rigidity.  EXT:  No edema or cyanosis.  No joint synovitis noted.  SKIN:  Dry to touch, no exanthems noted in the visualized areas.       Data   I personally reviewed the EKG tracing showing NSR and long QTi.  Results for orders placed or performed during the hospital encounter of 06/15/21 (from the past 24 hour(s))   CBC with platelets differential   Result Value Ref Range    WBC 7.6 4.0 - 11.0 10e9/L    RBC Count 3.44 (L) 4.4 - 5.9 10e12/L    Hemoglobin 9.3 (L) 13.3 - 17.7 g/dL    Hematocrit 29.2 (L) 40.0 - 53.0 %    MCV 85 78 - 100 fl    MCH 27.0 26.5 - 33.0 pg    MCHC 31.8 31.5 - 36.5 g/dL    RDW 13.6 10.0 - 15.0 %    Platelet Count 252 150 - 450 10e9/L    Diff Method Automated Method     % Neutrophils 63.5 %    % Lymphocytes 25.0 %    % Monocytes 7.9 %    % Eosinophils 3.0 %    % Basophils 0.5 %    % Immature Granulocytes 0.1 %    Nucleated RBCs 0 0 /100    Absolute Neutrophil 4.8 1.6 - 8.3 10e9/L    Absolute Lymphocytes 1.9 0.8 - 5.3 10e9/L    Absolute Monocytes 0.6 0.0 - 1.3 10e9/L    Absolute Eosinophils 0.2 0.0 - 0.7 10e9/L    Absolute Basophils 0.0 0.0 - 0.2 10e9/L    Abs Immature Granulocytes 0.0 0 - 0.4 10e9/L    Absolute Nucleated RBC 0.0    Basic metabolic panel   Result Value Ref Range    Sodium 143 133 - 144 mmol/L    Potassium 3.9 3.4 - 5.3 mmol/L    Chloride 111 (H) 94 - 109 mmol/L    Carbon Dioxide 19 (L) 20 - 32 mmol/L    Anion Gap 13 3 - 14 mmol/L    Glucose 112 (H) 70 - 99 mg/dL    Urea Nitrogen  163 (H) 7 - 30 mg/dL    Creatinine 16.10 (H) 0.66 - 1.25 mg/dL    GFR Estimate 4 (L) >60 mL/min/[1.73_m2]    GFR Estimate If Black 4 (L) >60 mL/min/[1.73_m2]    Calcium 8.5 8.5 - 10.1 mg/dL   EKG 12 lead   Result Value Ref Range    Interpretation ECG Click View Image link to view waveform and result    Abd/pelvis CT no contrast - Stone Protocol    Narrative    EXAM: CT ABDOMEN PELVIS W/O CONTRAST  LOCATION: [facility]  DATE/TIME: 6/15/2021 8:00 PM    INDICATION: Flank pain, kidney stone suspected  COMPARISON: CT abdomen exam 05/11/2019  TECHNIQUE: CT scan of the abdomen and pelvis was performed without IV contrast. Multiplanar reformats were obtained. Dose reduction techniques were used.  CONTRAST: None.    FINDINGS:   LOWER CHEST: Normal.    HEPATOBILIARY: Normal.    PANCREAS: Normal.    SPLEEN: Normal.    ADRENAL GLANDS: Enlarged left adrenal gland is unchanged and should represent an adenoma.    KIDNEYS/BLADDER: Both kidneys are negative for renal calculi or hydroureter versus. Small cyst upper pole right kidney. No bladder stones. Diffuse bladder wall thickening.    BOWEL: No evidence for bowel obstruction. No inflammatory changes.    LYMPH NODES: A few borderline-enlarged retroperitoneal nodes are unchanged.    VASCULATURE: Unremarkable.    PELVIC ORGANS: Normal.    MUSCULOSKELETAL: Normal.      Impression    IMPRESSION:   1.  No renal calculi or hydronephrosis.  2.  Small right renal cyst.  3.  Diffuse bladder wall thickening can be seen with cystitis or hypertrophy.  4.  Left adrenal adenoma.         Disclaimer: This note consists of symbols derived from keyboarding, dictation and/or voice recognition software. As a result, there may be errors in the script that have gone undetected. Please consider this when interpreting information found in this chart.

## 2021-06-17 ENCOUNTER — APPOINTMENT (OUTPATIENT)
Dept: INTERVENTIONAL RADIOLOGY/VASCULAR | Facility: CLINIC | Age: 26
DRG: 673 | End: 2021-06-17
Attending: INTERNAL MEDICINE
Payer: COMMERCIAL

## 2021-06-17 ENCOUNTER — APPOINTMENT (OUTPATIENT)
Dept: GENERAL RADIOLOGY | Facility: CLINIC | Age: 26
DRG: 673 | End: 2021-06-17
Attending: INTERNAL MEDICINE
Payer: COMMERCIAL

## 2021-06-17 LAB
ALBUMIN SERPL-MCNC: 2.8 G/DL (ref 3.4–5)
ANION GAP SERPL CALCULATED.3IONS-SCNC: 16 MMOL/L (ref 3–14)
BUN SERPL-MCNC: 147 MG/DL (ref 7–30)
CALCIUM SERPL-MCNC: 8.2 MG/DL (ref 8.5–10.1)
CHLORIDE SERPL-SCNC: 107 MMOL/L (ref 94–109)
CO2 SERPL-SCNC: 17 MMOL/L (ref 20–32)
CREAT SERPL-MCNC: 15.8 MG/DL (ref 0.66–1.25)
ERYTHROCYTE [DISTWIDTH] IN BLOOD BY AUTOMATED COUNT: 13.3 % (ref 10–15)
GFR SERPL CREATININE-BSD FRML MDRD: 4 ML/MIN/{1.73_M2}
GLUCOSE SERPL-MCNC: 99 MG/DL (ref 70–99)
HBV CORE AB SERPL QL IA: NONREACTIVE
HBV SURFACE AB SERPL IA-ACNC: 78.16 M[IU]/ML
HBV SURFACE AG SERPL QL IA: NONREACTIVE
HCT VFR BLD AUTO: 27.6 % (ref 40–53)
HGB BLD-MCNC: 8.9 G/DL (ref 13.3–17.7)
MCH RBC QN AUTO: 27.3 PG (ref 26.5–33)
MCHC RBC AUTO-ENTMCNC: 32.2 G/DL (ref 31.5–36.5)
MCV RBC AUTO: 85 FL (ref 78–100)
PHOSPHATE SERPL-MCNC: 8.2 MG/DL (ref 2.5–4.5)
PLATELET # BLD AUTO: 213 10E9/L (ref 150–450)
POTASSIUM SERPL-SCNC: 3.9 MMOL/L (ref 3.4–5.3)
RBC # BLD AUTO: 3.26 10E12/L (ref 4.4–5.9)
SODIUM SERPL-SCNC: 138 MMOL/L (ref 133–144)
WBC # BLD AUTO: 7.5 10E9/L (ref 4–11)

## 2021-06-17 PROCEDURE — 86704 HEP B CORE ANTIBODY TOTAL: CPT | Performed by: INTERNAL MEDICINE

## 2021-06-17 PROCEDURE — 36558 INSERT TUNNELED CV CATH: CPT

## 2021-06-17 PROCEDURE — 02H633Z INSERTION OF INFUSION DEVICE INTO RIGHT ATRIUM, PERCUTANEOUS APPROACH: ICD-10-PCS | Performed by: RADIOLOGY

## 2021-06-17 PROCEDURE — 120N000001 HC R&B MED SURG/OB

## 2021-06-17 PROCEDURE — 71046 X-RAY EXAM CHEST 2 VIEWS: CPT

## 2021-06-17 PROCEDURE — 0JH63XZ INSERTION OF TUNNELED VASCULAR ACCESS DEVICE INTO CHEST SUBCUTANEOUS TISSUE AND FASCIA, PERCUTANEOUS APPROACH: ICD-10-PCS | Performed by: RADIOLOGY

## 2021-06-17 PROCEDURE — 250N000011 HC RX IP 250 OP 636: Performed by: RADIOLOGY

## 2021-06-17 PROCEDURE — 272N000604 HC WOUND GLUE CR3

## 2021-06-17 PROCEDURE — 99233 SBSQ HOSP IP/OBS HIGH 50: CPT | Performed by: INTERNAL MEDICINE

## 2021-06-17 PROCEDURE — 80069 RENAL FUNCTION PANEL: CPT | Performed by: INTERNAL MEDICINE

## 2021-06-17 PROCEDURE — 250N000013 HC RX MED GY IP 250 OP 250 PS 637: Performed by: HOSPITALIST

## 2021-06-17 PROCEDURE — 36415 COLL VENOUS BLD VENIPUNCTURE: CPT | Performed by: INTERNAL MEDICINE

## 2021-06-17 PROCEDURE — 77001 FLUOROGUIDE FOR VEIN DEVICE: CPT

## 2021-06-17 PROCEDURE — 272N000504 HC NEEDLE CR4

## 2021-06-17 PROCEDURE — 87340 HEPATITIS B SURFACE AG IA: CPT | Performed by: INTERNAL MEDICINE

## 2021-06-17 PROCEDURE — 86706 HEP B SURFACE ANTIBODY: CPT | Performed by: INTERNAL MEDICINE

## 2021-06-17 PROCEDURE — 99152 MOD SED SAME PHYS/QHP 5/>YRS: CPT

## 2021-06-17 PROCEDURE — 76937 US GUIDE VASCULAR ACCESS: CPT

## 2021-06-17 PROCEDURE — 99232 SBSQ HOSP IP/OBS MODERATE 35: CPT | Performed by: INTERNAL MEDICINE

## 2021-06-17 PROCEDURE — 634N000001 HC RX 634: Performed by: INTERNAL MEDICINE

## 2021-06-17 PROCEDURE — 250N000009 HC RX 250: Performed by: RADIOLOGY

## 2021-06-17 PROCEDURE — C1750 CATH, HEMODIALYSIS,LONG-TERM: HCPCS

## 2021-06-17 PROCEDURE — C1769 GUIDE WIRE: HCPCS

## 2021-06-17 PROCEDURE — 258N000003 HC RX IP 258 OP 636: Performed by: INTERNAL MEDICINE

## 2021-06-17 PROCEDURE — 85027 COMPLETE CBC AUTOMATED: CPT | Performed by: INTERNAL MEDICINE

## 2021-06-17 RX ORDER — LIDOCAINE HYDROCHLORIDE 10 MG/ML
INJECTION, SOLUTION EPIDURAL; INFILTRATION; INTRACAUDAL; PERINEURAL
Status: DISCONTINUED
Start: 2021-06-17 | End: 2021-06-19 | Stop reason: HOSPADM

## 2021-06-17 RX ORDER — FENTANYL CITRATE 50 UG/ML
INJECTION, SOLUTION INTRAMUSCULAR; INTRAVENOUS
Status: DISCONTINUED
Start: 2021-06-17 | End: 2021-06-19 | Stop reason: HOSPADM

## 2021-06-17 RX ORDER — HEPARIN SODIUM 1000 [USP'U]/ML
3 INJECTION, SOLUTION INTRAVENOUS; SUBCUTANEOUS ONCE
Status: COMPLETED | OUTPATIENT
Start: 2021-06-17 | End: 2021-06-17

## 2021-06-17 RX ORDER — ALBUMIN (HUMAN) 12.5 G/50ML
50 SOLUTION INTRAVENOUS
Status: DISCONTINUED | OUTPATIENT
Start: 2021-06-17 | End: 2021-06-18

## 2021-06-17 RX ORDER — NICOTINE POLACRILEX 4 MG
15-30 LOZENGE BUCCAL
Status: DISCONTINUED | OUTPATIENT
Start: 2021-06-17 | End: 2021-06-19 | Stop reason: HOSPADM

## 2021-06-17 RX ORDER — ALBUMIN, HUMAN INJ 5% 5 %
250 SOLUTION INTRAVENOUS
Status: DISCONTINUED | OUTPATIENT
Start: 2021-06-17 | End: 2021-06-17

## 2021-06-17 RX ORDER — NALOXONE HYDROCHLORIDE 0.4 MG/ML
0.2 INJECTION, SOLUTION INTRAMUSCULAR; INTRAVENOUS; SUBCUTANEOUS
Status: DISCONTINUED | OUTPATIENT
Start: 2021-06-17 | End: 2021-06-19 | Stop reason: HOSPADM

## 2021-06-17 RX ORDER — CEFAZOLIN SODIUM 2 G/100ML
2 INJECTION, SOLUTION INTRAVENOUS
Status: COMPLETED | OUTPATIENT
Start: 2021-06-17 | End: 2021-06-17

## 2021-06-17 RX ORDER — FENTANYL CITRATE 50 UG/ML
INJECTION, SOLUTION INTRAMUSCULAR; INTRAVENOUS PRN
Status: COMPLETED | OUTPATIENT
Start: 2021-06-17 | End: 2021-06-17

## 2021-06-17 RX ORDER — CEFAZOLIN SODIUM 2 G/100ML
INJECTION, SOLUTION INTRAVENOUS
Status: DISCONTINUED
Start: 2021-06-17 | End: 2021-06-19 | Stop reason: HOSPADM

## 2021-06-17 RX ORDER — NALOXONE HYDROCHLORIDE 0.4 MG/ML
0.4 INJECTION, SOLUTION INTRAMUSCULAR; INTRAVENOUS; SUBCUTANEOUS
Status: DISCONTINUED | OUTPATIENT
Start: 2021-06-17 | End: 2021-06-19 | Stop reason: HOSPADM

## 2021-06-17 RX ORDER — HEPARIN SODIUM 1000 [USP'U]/ML
INJECTION, SOLUTION INTRAVENOUS; SUBCUTANEOUS
Status: DISCONTINUED
Start: 2021-06-17 | End: 2021-06-19 | Stop reason: HOSPADM

## 2021-06-17 RX ORDER — ALBUMIN, HUMAN INJ 5% 5 %
250 SOLUTION INTRAVENOUS
Status: DISCONTINUED | OUTPATIENT
Start: 2021-06-17 | End: 2021-06-18

## 2021-06-17 RX ORDER — FENTANYL CITRATE 50 UG/ML
25-50 INJECTION, SOLUTION INTRAMUSCULAR; INTRAVENOUS EVERY 5 MIN PRN
Status: DISCONTINUED | OUTPATIENT
Start: 2021-06-17 | End: 2021-06-19 | Stop reason: HOSPADM

## 2021-06-17 RX ORDER — HEPARIN SODIUM 200 [USP'U]/100ML
1 INJECTION, SOLUTION INTRAVENOUS CONTINUOUS PRN
Status: DISCONTINUED | OUTPATIENT
Start: 2021-06-17 | End: 2021-06-19 | Stop reason: HOSPADM

## 2021-06-17 RX ORDER — HYDRALAZINE HYDROCHLORIDE 20 MG/ML
10-20 INJECTION INTRAMUSCULAR; INTRAVENOUS EVERY 4 HOURS PRN
Status: DISCONTINUED | OUTPATIENT
Start: 2021-06-17 | End: 2021-06-19 | Stop reason: HOSPADM

## 2021-06-17 RX ORDER — ACETAMINOPHEN 325 MG/1
325 TABLET ORAL
Status: DISCONTINUED | OUTPATIENT
Start: 2021-06-17 | End: 2021-06-19 | Stop reason: HOSPADM

## 2021-06-17 RX ORDER — FLUMAZENIL 0.1 MG/ML
0.2 INJECTION, SOLUTION INTRAVENOUS
Status: DISCONTINUED | OUTPATIENT
Start: 2021-06-17 | End: 2021-06-19 | Stop reason: HOSPADM

## 2021-06-17 RX ORDER — DEXTROSE MONOHYDRATE 25 G/50ML
25-50 INJECTION, SOLUTION INTRAVENOUS
Status: DISCONTINUED | OUTPATIENT
Start: 2021-06-17 | End: 2021-06-19 | Stop reason: HOSPADM

## 2021-06-17 RX ORDER — ALBUMIN (HUMAN) 12.5 G/50ML
50 SOLUTION INTRAVENOUS
Status: DISCONTINUED | OUTPATIENT
Start: 2021-06-17 | End: 2021-06-17

## 2021-06-17 RX ADMIN — CLONIDINE HYDROCHLORIDE 0.3 MG: 0.1 TABLET ORAL at 09:46

## 2021-06-17 RX ADMIN — CARVEDILOL 50 MG: 25 TABLET, FILM COATED ORAL at 09:47

## 2021-06-17 RX ADMIN — HEPARIN SODIUM 3000 UNITS: 1000 INJECTION, SOLUTION INTRAVENOUS; SUBCUTANEOUS at 16:39

## 2021-06-17 RX ADMIN — Medication 75 MCG: at 09:45

## 2021-06-17 RX ADMIN — SODIUM CHLORIDE 300 ML: 9 INJECTION, SOLUTION INTRAVENOUS at 16:38

## 2021-06-17 RX ADMIN — LIDOCAINE HYDROCHLORIDE 14 ML: 10 INJECTION, SOLUTION EPIDURAL; INFILTRATION; INTRACAUDAL; PERINEURAL at 11:27

## 2021-06-17 RX ADMIN — ASPIRIN 81 MG: 81 TABLET ORAL at 09:45

## 2021-06-17 RX ADMIN — HEPARIN SODIUM 3000 UNITS: 1000 INJECTION, SOLUTION INTRAVENOUS; SUBCUTANEOUS at 16:40

## 2021-06-17 RX ADMIN — HYDRALAZINE HYDROCHLORIDE 100 MG: 50 TABLET, FILM COATED ORAL at 09:45

## 2021-06-17 RX ADMIN — CEFAZOLIN SODIUM 2 G: 2 INJECTION, SOLUTION INTRAVENOUS at 11:35

## 2021-06-17 RX ADMIN — CARVEDILOL 50 MG: 25 TABLET, FILM COATED ORAL at 17:33

## 2021-06-17 RX ADMIN — MIDAZOLAM 0.5 MG: 1 INJECTION INTRAMUSCULAR; INTRAVENOUS at 11:26

## 2021-06-17 RX ADMIN — EPOETIN ALFA-EPBX 3000 UNITS: 3000 INJECTION, SOLUTION INTRAVENOUS; SUBCUTANEOUS at 16:36

## 2021-06-17 RX ADMIN — FERROUS SULFATE TAB 325 MG (65 MG ELEMENTAL FE) 325 MG: 325 (65 FE) TAB at 09:45

## 2021-06-17 RX ADMIN — SODIUM CHLORIDE 250 ML: 9 INJECTION, SOLUTION INTRAVENOUS at 16:38

## 2021-06-17 RX ADMIN — ATORVASTATIN CALCIUM 10 MG: 10 TABLET, FILM COATED ORAL at 20:35

## 2021-06-17 RX ADMIN — HYDRALAZINE HYDROCHLORIDE 100 MG: 50 TABLET, FILM COATED ORAL at 20:35

## 2021-06-17 RX ADMIN — CLONIDINE HYDROCHLORIDE 0.3 MG: 0.1 TABLET ORAL at 20:38

## 2021-06-17 RX ADMIN — FENTANYL CITRATE 50 MCG: 50 INJECTION, SOLUTION INTRAMUSCULAR; INTRAVENOUS at 11:26

## 2021-06-17 RX ADMIN — SODIUM BICARBONATE 650 MG TABLET 1300 MG: at 09:46

## 2021-06-17 ASSESSMENT — MIFFLIN-ST. JEOR: SCORE: 2166.07

## 2021-06-17 ASSESSMENT — ACTIVITIES OF DAILY LIVING (ADL)
ADLS_ACUITY_SCORE: 12
DEPENDENT_IADLS:: INDEPENDENT
ADLS_ACUITY_SCORE: 12
ADLS_ACUITY_SCORE: 12

## 2021-06-17 NOTE — PROGRESS NOTES
Potassium   Date Value Ref Range Status   06/17/2021 3.9 3.4 - 5.3 mmol/L Final     Hemoglobin   Date Value Ref Range Status   06/17/2021 8.9 (L) 13.3 - 17.7 g/dL Final     Creatinine   Date Value Ref Range Status   06/17/2021 15.80 (H) 0.66 - 1.25 mg/dL Final     Urea Nitrogen   Date Value Ref Range Status   06/17/2021 147 (H) 7 - 30 mg/dL Final     Sodium   Date Value Ref Range Status   06/17/2021 138 133 - 144 mmol/L Final     INR   Date Value Ref Range Status   02/09/2021 1.23 (H) 0.86 - 1.14 Final       DIALYSIS PROCEDURE NOTE  Hepatitis status of previous patient on machine log was checked and verified ok to use with this patients hepatitis status.  Patient dialyzed for 2.5 hrs. on a K3 bath with a net fluid removal of  0L.  A BFR of 250 ml/min was obtained via a RIJ. Attempted to use RAVF with 17 gauge needles this morning without success. New tunneled CVC works well.      The treatment plan was discussed with Dr. Alba during the treatment.    Total heparin received during the treatment: 0 units.   Line flushed, clamped and capped with heparin 1:1000 1.9 mL (1900 units) per lumen    Meds  given: Epogen, see MAR   Complications: none      Person educated: patient. Knowledge base none. Barriers to learning: none. Educated on procedure and access care via verbal mode. Patient requires follow-up. Pt prefers verbal and written education style.     ICEBOAT? Timeout performed pre-treatment  I: Patient was identified using 2 identifiers  C:  Consent Signed Yes  E: Equipment preventative maintenance is current and dialysis delivery system OK to use  B: Hepatitis B Surface Antigen: NEGATIVE; Draw Date: 2/9/2021      Hepatitis B Surface Antibody: IMMUNE; Draw Date: 2/9/2021  O: Dialysis orders present and complete prior to treatment  A: Vascular access verified and assessed prior to treatment  T: Treatment was performed at a clinically appropriate time  ?: Patient was allowed to ask questions and address concerns prior  to treatment  See flowsheet in EPIC for further details and post assessment.  Machine water alarm in place and functioning. Transducer pods intact and checked every 15min.   Pt returned via wheelchair.  Chlorine/Chloramine water system checked every 4 hours.  Outpatient Dialysis at [facility]    Nilda Ling RN

## 2021-06-17 NOTE — PRE-PROCEDURE
GENERAL PRE-PROCEDURE:   Procedure:  IR Tunneled HD Cath Placement  Date/Time:  6/17/2021 10:52 AM    Written consent obtained?: Yes    Risks and benefits: Risks, benefits and alternatives were discussed    Consent given by:  Patient  Patient states understanding of procedure being performed: Yes    Patient's understanding of procedure matches consent: Yes    Procedure consent matches procedure scheduled: Yes    Expected level of sedation:  Moderate  Appropriately NPO:  Yes  ASA Class:  Class 2- mild systemic disease, no acute problems, no functional limitations  Mallampati  :  Grade 2- soft palate, base of uvula, tonsillar pillars, and portion of posterior pharyngeal wall visible  Lungs:  Lungs clear with good breath sounds bilaterally  Heart:  Normal heart sounds and rate  History & Physical reviewed:  History and physical reviewed and no updates needed  Statement of review:  I have reviewed the lab findings, diagnostic data, medications, and the plan for sedation

## 2021-06-17 NOTE — PROGRESS NOTES
[facility]     Renal Progress Note       SHORTHAND KEY FOR MY NOTES:  c = with, s = without, p = after, a = before, x = except, asx = asymptomatic, tx = transplant or treatment, sx = symptoms or symptomatic, cx = canceled or culture, rxn = reaction, yday = yesterday, nl = normal, abx = antibiotics, fxn = function, dx = diagnosis, dz = disease, m/h = melena/hematochezia, c/d/l/ha = cramping/dizziness/lightheadedness/headache, d/c = discharge or diarrhea/constipation, f/c/n/v = fevers/chills/nausea/vomiting, cp/sob = chest pain/shortness of breath, tbv = total body volume, rxn = reaction, tdc = tunneled dialysis catheter, pta = prior to admission, hd = hemodialysis, pd = peritoneal dialysis, hhd = home hemodialysis, edw = estimated dry wt         Assessment/Plan:     1.  ESKD. Pt will initiate HD today via a RIJ TDC since the access infiltrated again when attempted by the dialysis RN.  We will give his arm a rest for 2-3 wks and he will work on strengthening the fistula.  We will consider another US of the access before discharge.    A.  Plan to initiate HD later today.  2.5h today, 3h tmrw, 4h Sat.  B.  Will ask SW to look for a spot at [facility] as that is closer to the pt's house than [place].    2.  HTN.  Pt's BP is still high, even p receiving his meds.  We are not planning to pull any fluid today given that this is his first run, but we will attempt fluid removal tmrw.  A.  Continue same meds/doses.  B.  Plan to slowly pull fluid tmrw, as able.  C.  BP in the 150-160s is fine for now.    3.  Anemia.  Hb is low, but he has not been fe def.  He is on oral fe.  A.  Follow hb, clinically.  B.  EPO 3k qHD.  C.  Stop oral fe.    4.  RTA.  Pt was on oral bicarb as an outpt.  Now that he's starting HD, we will stop it.  Bicarb is 17, but it will improve c dialysis.  A.  Stop bicarb.    5.  Secondary hyperPTH.  Pt was on calcitriol, which will now be stopped.  A.  Start doxercalciferol  at HD.    6.  FEN.  Electrolytes are ok.        Interval History:     Pt is feeling fine right now p the dialysis catheter was placed x he has some pain at the insertion site.  No f/c.  He is not having any n/v now, but does report he was sick at home for a few day.  No cp/sob/abd pain.  He is still urinating a decent amt.          Medications and Allergies:       - MEDICATION INSTRUCTIONS for Dialysis Patients -   Does not apply See Admin Instructions     sodium chloride 0.9%  250 mL Intravenous Once in dialysis     sodium chloride 0.9%  300 mL Hemodialysis Machine Once     anticoagulant citrate  3 mL Intracatheter Once     anticoagulant citrate  3 mL Intracatheter Once     aspirin  81 mg Oral Daily     atorvastatin  10 mg Oral QPM     calcitRIOL  0.25 mcg Oral Once per day on Mon Wed Fri     carvedilol  50 mg Oral BID w/meals     ceFAZolin-dextrose         cloNIDine  0.3 mg Oral BID     epoetin melanie-epbx (RETACRIT) inj ESRD  3,000 Units Intravenous Once in dialysis     fentaNYL (PF)         ferrous sulfate  325 mg Oral Daily     heparin (porcine)         heparin Lock (1000 units/mL High concentration)  3 mL Intracatheter Once     heparin Lock (1000 units/mL High concentration)  3 mL Intracatheter Once     hydrALAZINE  100 mg Oral BID     lidocaine (PF)         midazolam         - MEDICATION INSTRUCTIONS -   Does not apply Once     sodium bicarbonate  1,300 mg Oral BID     sodium chloride (PF)  3 mL Intracatheter Q8H     vitamin D3  75 mcg Oral Daily     Allergies   Allergen Reactions     Benadryl [Diphenhydramine]      No Known Allergies           Physical Exam:     Vitals were reviewed     , Blood pressure (!) 154/102, pulse 69, temperature 95.3  F (35.2  C), temperature source Oral, resp. rate 20, height 1.829 m (6'), weight 113.8 kg (250 lb 14.4 oz), SpO2 100 %.  Wt Readings from Last 3 Encounters:   06/17/21 113.8 kg (250 lb 14.4 oz)   03/04/21 113.6 kg (250 lb 6.4 oz)   02/24/21 113.8 kg (250 lb 14.1 oz)      Intake/Output Summary (Last 24 hours) at 6/17/2021 1318  Last data filed at 6/17/2021 0800  Gross per 24 hour   Intake 1830 ml   Output 1500 ml   Net 330 ml     GENERAL APPEARANCE: pleasant, NAD, alert  HEENT:  eyes/ears/nose/neck grossly nl  RESP: CTA B c good efforts; no crackles  CV: RRR c m, nl S1/S2   ABDOMEN: o/s/nt/nd, bs present  EXTREMITIES/SKIN: no significant ble edema  ACCESS:   RADHA c good bruit, but upper part is immature and infiltrated; + RIJ TDC - slightly tender at insertion site         Data:     CBC RESULTS:     Recent Labs   Lab 06/17/21  0655 06/15/21  1634   WBC 7.5 7.6   RBC 3.26* 3.44*   HGB 8.9* 9.3*   HCT 27.6* 29.2*    252     Basic Metabolic Panel:  Recent Labs   Lab 06/17/21  0655 06/16/21  0738 06/15/21  1634    141 143   POTASSIUM 3.9 4.0 3.9   CHLORIDE 107 108 111*   CO2 17* 18* 19*   * 159* 163*   CR 15.80* 15.80* 16.10*   GLC 99 107* 112*   BUBBA 8.2* 8.3* 8.5     INRNo lab results found in last 7 days.   Attestation:   I have reviewed today's relevant vital signs, notes, medications, labs and imaging.    Balta Alba MD  [facility] - Nephrology  [phone]

## 2021-06-17 NOTE — PROCEDURES
[facility]    Procedure: Tunneled HD Cath Placement    Date/Time: 6/17/2021 11:36 AM  Performed by: Karl Whiting MD  Authorized by: Karl Whiting MD     UNIVERSAL PROTOCOL   Site Marked: Yes  Prior Images Obtained and Reviewed:  Yes  Required items: Required blood products, implants, devices and special equipment available    Patient identity confirmed:  Verbally with patient  Patient was reevaluated immediately before administering moderate or deep sedation or anesthesia  Confirmation Checklist:  Patient's identity using two indicators, relevant allergies, procedure was appropriate and matched the consent or emergent situation and correct equipment/implants were available  Time out: Immediately prior to the procedure a time out was called    Universal Protocol: the Joint Commission Universal Protocol was followed    Preparation: Patient was prepped and draped in usual sterile fashion    ESBL (mL):  10         ANESTHESIA    Local Anesthetic: Lidocaine 1% without epinephrine  Anesthetic Total (mL):  17      SEDATION    Patient Sedated: Yes    Sedation Type:  Moderate (conscious) sedation  Sedation:  Fentanyl, midazolam and see MAR for details  Vital signs: Vital signs monitored during sedation    See dictated procedure note for full details.  PROCEDURE   Patient Tolerance:  Patient tolerated the procedure well with no immediate complications  Describe Procedure: Successful placement of right IJV tunneled HD catheter.  Tip at the cavoatrial junction.  Catheter is flushed with heparin and ready to use.  Length of time physician/provider present for 1:1 monitoring during sedation: 15

## 2021-06-17 NOTE — PROGRESS NOTES
RN report provided to Leonila Hines RN. Pt is NPO now - however, at 0735, the pt had eaten 50% breakfast sandwich and apple juice/water. Transfer to 315 at 0736 for dialysis.    Addendum 1100: Pt to IR for parveen cath placement now. Originally was to have placed at 3PM. Cardiac meds given this a.m.     Addendum 1200 - Dr Alba in room as pt returned from IR. Pt able to walk from cart to bed, steady on feet. Parveen site clean/dry/clamped. Plan dialysis this afternoon, Friday 3 hours, Saturday 4 hours. CC following for outpt dialysis planning. Mother updated by Dr. Alba.

## 2021-06-17 NOTE — INTERVAL H&P NOTE
H&P documented within 30 days.  I have reviewed the pertinent progress notes since the initial H&P.     I have performed an assessment and examined the patient, as necessary, to update the patient's current status that may have changed.     Karl Whiting M.D.   Vascular and Interventional Radiology   Pager: [phone]   After Hours / Scheduling: [phone]

## 2021-06-17 NOTE — PROGRESS NOTES
[facility]  Hospitalist Progress Note  Martin Melgoza MD 06/17/2021    Reason for Stay (Diagnosis): uremic sx, ESRD         Assessment and Plan:      Summary of Stay: Taylor Valiente is a 25 year old male with a history of chronic kidney disease with fistula placement but not on HD yet, hyperlipidemia, LVH, anemia of chronic kidney disease, obesity admitted on 6/15/2021 with 48 hours of profuse emesis.  The patient was schedule to see his nephrologist on 6/16/2021, but considering his symptoms he was told to come to the emergency department.  Pt reports he still makes urine.  In the Emergency Department, the patient was found to have , bicarb 19, creatinine 16.10, hgb 9.3.  The patient was admitted for dialysis with an Nephrology consult and social work assistance with placement.  The patient also reports that he is on the kidney transplant waiting list.  Course complicated by malfunctioning fistula (placed in March of this year and had not yet been used for access).  Because of this, tunneled dialysis cath placed by IR this admission to allow fistula to continue to mature.  Anticipate discharge when OK with nephrology after several dialysis runs     Problem List:   1.  Acute Intractable Nausea and Vomiting  - Improved   - Likely secondary to uremia       2.  ESRD  - Nephrology consulted - anticipate initiating dialysis today.  Plan to dialyze again tomorrow  - Social Work consulted for outpt dialysis scheduling  - Daily BMP     3.  Anemia of Chronic Kidney Disease   - Stable  - Daily CBC     4.  Obesity  - BMI = 33     5.  Prolonged QTc     Chronic Medical Problems:  Depression  Hx of Adrenal Adenoma        DVT Prophylaxis: Pneumatic Compression Devices  Code Status: Full Code  Diet: Dialysis Diet    Hurt Catheter: not present  Disposition: Expected discharge in 1-3 days to home. Goals prior to discharge include dialysis completed and outpatient dialysis set up.   Family updated  today: No        Interval History (Subjective):      No complaints.  Feels better since admit.  Tunneled dialysis cath placed by IR today                  Physical Exam:      Last Vital Signs:  BP (!) 140/106   Pulse 56   Temp 95.3  F (35.2  C) (Oral)   Resp 20   Ht 1.829 m (6')   Wt 113.8 kg (250 lb 14.4 oz)   SpO2 100%   BMI 34.03 kg/m        Intake/Output Summary (Last 24 hours) at 6/17/2021 1508  Last data filed at 6/17/2021 0800  Gross per 24 hour   Intake 1590 ml   Output 1500 ml   Net 90 ml       Constitutional: Awake, alert, cooperative, no apparent distress   Respiratory: Clear to auscultation bilaterally, no crackles or wheezing   Cardiovascular: Regular rate and rhythm, normal S1 and S2, and no murmur noted   Abdomen: Normal bowel sounds, soft, non-distended, non-tender   Skin: No rashes, no cyanosis, dry to touch   Neuro: Alert and oriented x3, no weakness, numbness, memory loss   Extremities: No edema, normal range of motion   Other(s): tunneled dialysis site examined       All other systems: Negative          Medications:      All current medications were reviewed with changes reflected in problem list.         Data:      All new lab and imaging data was reviewed.   Labs:  Recent Labs   Lab 06/17/21  0655   WBC 7.5   HGB 8.9*   HCT 27.6*   MCV 85         Imaging:   Recent Results (from the past 24 hour(s))   IR CVC Tunnel Placement > 5 Yrs of Age    Narrative    DATE: 6/17/2021    PROCEDURE: TUNNELED DIALYSIS CATHETER PLACEMENT  1.  Insertion of a tunneled central venous catheter.  2.  Ultrasound guidance for vascular access. A permanent image was  stored.  3.  Fluoroscopic guidance for central venous access device placement.  4.  Moderate sedation.    INTERVENTIONAL RADIOLOGIST: Karl Whiting MD    INDICATION: End-stage renal disease. Failed fistula. The patient  presents to Interventional Radiology for placement of a tunneled  hemodialysis catheter for moderate-term central venous  access for  hemodialysis.    CONSENT: The risks, benefits and alternatives of tunneled hemodialysis  catheter placement were discussed with the patient  in detail. All  questions were answered. Informed consent was given to proceed with  the procedure.    MODERATE SEDATION: Versed 0.5 mg IV; Fentanyl 50 mcg IV. During the  time out, immediately prior to the administration of medications, the  patient was reassessed for adequacy to receive conscious sedation.   Under physician supervision, Versed and fentanyl were administered for  moderate sedation. Pulse oximetry, heart rate and blood pressure were  continuously monitored by an independent trained observer. The  physician spent 15 minutes of face-to-face sedation time with the  patient.    CONTRAST: None.  ANTIBIOTICS: 2 g of IV Ancef.  ADDITIONAL MEDICATIONS: None.    FLUOROSCOPIC TIME: 0.5 minutes.  RADIATION DOSE: Air Kerma: 10 mGy.    COMPLICATIONS: No immediate complications.    STERILE BARRIER TECHNIQUE: Maximum sterile barrier technique was used.  Cutaneous antisepsis was performed at the operative site with  application of 2% chlorhexidine and large sterile drape. Prior to the  procedure, the  and assistant performed hand hygiene and wore  hat, mask, sterile gown, and sterile gloves during the entire  procedure.    PROCEDURE:    The Central Venous Catheter Insertion checklist was reviewed prior to  placement and followed throughout the procedure. Using local  anesthesia and real-time ultrasound guidance the right internal  jugular vein was accessed. A permanent ultrasound image was saved,  documenting patency and compressibility. A subcutaneous tunnel was  created requiring a second incision. Using this access, a 23 cm tip to  cuff 14 Fr Palindrome dialysis catheter was advanced until the tip was  in the mid/low right atrium.  The catheter was tested and found to  flush and aspirate appropriately.    FINDINGS:  Ultrasound shows an anechoic and  compressible jugular vein.      Fluoroscopic spot film at completion of study show that the tunneled  dialysis catheter tip lies in the mid/low right atrium.      Impression    IMPRESSION:    1.  Successful tunneled dialysis catheter placement.  2.  The catheter is ready for use.    JOHN PEREZ MD   XR Chest 2 Views    Narrative    CHEST TWO VIEWS  6/17/2021 1:20 PM     HISTORY:  End-stage renal disease. On dialysis. Anemia. Hypertension.    COMPARISON: 11/27/2020.      Impression    IMPRESSION: Right IJ dual-lumen central venous catheter, with tip near  the SVC/RA junction. Lungs clear. No pneumothorax. Heart size and  pulmonary vascularity are within normal limits.

## 2021-06-17 NOTE — PLAN OF CARE
Vitals stable. Denies pain. Tele sinus rhythm with prolonged QT. Sleeping between cares. Mother called and was updated on pt condition. Pt spoke to her on phone and they seemed to have good conversation. Ate well. Denied pain.

## 2021-06-17 NOTE — CONSULTS
Care Management Initial Consult    General Information  Assessment completed with: Patient, Family, Pt and Mother at bedside   Type of CM/SW Visit: Initial Assessment    Primary Care Provider verified and updated as needed: Yes   Readmission within the last 30 days: no previous admission in last 30 days      Reason for Consult: care coordination/care conference, discharge planning    Communication Assessment  Patient's communication style: spoken language (English or Bilingual)    Hearing Difficulty or Deaf: no   Wear Glasses or Blind: no    Cognitive  Cognitive/Neuro/Behavioral: WDL                    Living Environment:   People in home: parent(s)     Current living Arrangements: house      Able to return to prior arrangements: yes       Family/Social Support:  Care provided by: self  Provides care for: no one     Parent(s)          Description of Support System: Supportive, Involved       Current Resources:   Patient receiving home care services: No  Community Resources: OP Dialysis  Equipment currently used at home: none    Employment/Financial:  Employment Status: unemployed        Lifestyle & Psychosocial Needs:        Socioeconomic History     Marital status: Single     Spouse name: Not on file     Number of children: 0     Years of education: Not on file     Highest education level: Not on file   Occupational History     Occupation:      Occupation: kitchen     Comment: [employer]     Tobacco Use     Smoking status: Former Smoker     Packs/day: 0.00     Types: Cigarettes     Smokeless tobacco: Never Used   Substance and Sexual Activity     Alcohol use: No     Drug use: Yes     Types: Marijuana     Comment: occ     Sexual activity: Not Currently     Partners: Female       Functional Status:  Prior to admission patient needed assistance:   Dependent ADLs:: Independent  Dependent IADLs:: Independent  Assesssment of Functional Status: At functional baseline        Additional  Information:  CM consulted for coordination of new OP HD. Met w/ pt and mother at bedside to discuss discharge needs. Pt indicates that he would like to attend HD at [facility], 2nd choice would be [facility]. He has no preference on MWF or TTS schedule, he would prefer 3rd shift. HD was attempted through fistula on 6/16 without success so CVC was placed today 6/17 with plan to intitiate HD today as well. Referral sent to [facility] admissions [phone], awaiting response.     Pt and mother also have questions regarding insurance coverage. Pt is currently unemployed and on his mother's insurance, they would like to explore if pt would qualify for state insurance. Called and spoke with Ankita from [employer] [phone] who will plan to follow-up with pt later today.     Pt and mother feel that pt or family will be able to assist with transportation to/from OP HD. They do not have any other concerns related to discharge planning at this time.     Valerie Becker RN BSN   Inpatient Care Coordination  [facility]   Phone [phone]

## 2021-06-17 NOTE — PLAN OF CARE
End of Shift Summary  For vital signs and complete assessments, please see documentation flowsheets.     Pertinent assessments: VSS. Afebrile. LS clear. BS x4. Pt denies pain and nausea. Dialysis diet. SBA to ambulate, steady. PIV - SL. Thrill and Bruit noted in (R) arm fistula. Tele monitoring - SR, HR 70s. A&O, alarm on bed for safety. Creat 15.80. Slept well.     Major Shift Events: uneventful     Treatment Plan: Dialysis

## 2021-06-18 LAB
ALBUMIN SERPL-MCNC: 2.8 G/DL (ref 3.4–5)
ANION GAP SERPL CALCULATED.3IONS-SCNC: 7 MMOL/L (ref 3–14)
BUN SERPL-MCNC: 95 MG/DL (ref 7–30)
CALCIUM SERPL-MCNC: 8.4 MG/DL (ref 8.5–10.1)
CHLORIDE SERPL-SCNC: 108 MMOL/L (ref 94–109)
CO2 SERPL-SCNC: 23 MMOL/L (ref 20–32)
CREAT SERPL-MCNC: 11.5 MG/DL (ref 0.66–1.25)
ERYTHROCYTE [DISTWIDTH] IN BLOOD BY AUTOMATED COUNT: 13.4 % (ref 10–15)
GFR SERPL CREATININE-BSD FRML MDRD: 5 ML/MIN/{1.73_M2}
GLUCOSE SERPL-MCNC: 106 MG/DL (ref 70–99)
HCT VFR BLD AUTO: 28.1 % (ref 40–53)
HGB BLD-MCNC: 9.1 G/DL (ref 13.3–17.7)
MCH RBC QN AUTO: 27.2 PG (ref 26.5–33)
MCHC RBC AUTO-ENTMCNC: 32.4 G/DL (ref 31.5–36.5)
MCV RBC AUTO: 84 FL (ref 78–100)
PHOSPHATE SERPL-MCNC: 7.2 MG/DL (ref 2.5–4.5)
PLATELET # BLD AUTO: 203 10E9/L (ref 150–450)
POTASSIUM SERPL-SCNC: 3.8 MMOL/L (ref 3.4–5.3)
RBC # BLD AUTO: 3.35 10E12/L (ref 4.4–5.9)
SODIUM SERPL-SCNC: 138 MMOL/L (ref 133–144)
WBC # BLD AUTO: 6.8 10E9/L (ref 4–11)

## 2021-06-18 PROCEDURE — 99232 SBSQ HOSP IP/OBS MODERATE 35: CPT | Performed by: INTERNAL MEDICINE

## 2021-06-18 PROCEDURE — 250N000011 HC RX IP 250 OP 636: Performed by: INTERNAL MEDICINE

## 2021-06-18 PROCEDURE — 90937 HEMODIALYSIS REPEATED EVAL: CPT

## 2021-06-18 PROCEDURE — 258N000003 HC RX IP 258 OP 636: Performed by: INTERNAL MEDICINE

## 2021-06-18 PROCEDURE — 250N000013 HC RX MED GY IP 250 OP 250 PS 637: Performed by: INTERNAL MEDICINE

## 2021-06-18 PROCEDURE — 80069 RENAL FUNCTION PANEL: CPT | Performed by: INTERNAL MEDICINE

## 2021-06-18 PROCEDURE — 5A1D70Z PERFORMANCE OF URINARY FILTRATION, INTERMITTENT, LESS THAN 6 HOURS PER DAY: ICD-10-PCS | Performed by: INTERNAL MEDICINE

## 2021-06-18 PROCEDURE — 99233 SBSQ HOSP IP/OBS HIGH 50: CPT | Performed by: INTERNAL MEDICINE

## 2021-06-18 PROCEDURE — 120N000001 HC R&B MED SURG/OB

## 2021-06-18 PROCEDURE — 36415 COLL VENOUS BLD VENIPUNCTURE: CPT | Performed by: INTERNAL MEDICINE

## 2021-06-18 PROCEDURE — 85027 COMPLETE CBC AUTOMATED: CPT | Performed by: INTERNAL MEDICINE

## 2021-06-18 PROCEDURE — 250N000013 HC RX MED GY IP 250 OP 250 PS 637: Performed by: HOSPITALIST

## 2021-06-18 RX ORDER — AMLODIPINE BESYLATE 10 MG/1
10 TABLET ORAL DAILY
Status: DISCONTINUED | OUTPATIENT
Start: 2021-06-18 | End: 2021-06-19 | Stop reason: HOSPADM

## 2021-06-18 RX ADMIN — HYDRALAZINE HYDROCHLORIDE 10 MG: 20 INJECTION INTRAMUSCULAR; INTRAVENOUS at 00:08

## 2021-06-18 RX ADMIN — AMLODIPINE BESYLATE 10 MG: 10 TABLET ORAL at 14:27

## 2021-06-18 RX ADMIN — ATORVASTATIN CALCIUM 10 MG: 10 TABLET, FILM COATED ORAL at 21:18

## 2021-06-18 RX ADMIN — ASPIRIN 81 MG: 81 TABLET ORAL at 12:04

## 2021-06-18 RX ADMIN — SODIUM CHLORIDE 300 ML: 9 INJECTION, SOLUTION INTRAVENOUS at 08:51

## 2021-06-18 RX ADMIN — HYDRALAZINE HYDROCHLORIDE 100 MG: 50 TABLET, FILM COATED ORAL at 12:04

## 2021-06-18 RX ADMIN — SODIUM CHLORIDE 250 ML: 9 INJECTION, SOLUTION INTRAVENOUS at 08:52

## 2021-06-18 RX ADMIN — CARVEDILOL 50 MG: 25 TABLET, FILM COATED ORAL at 18:37

## 2021-06-18 RX ADMIN — Medication 75 MCG: at 12:05

## 2021-06-18 RX ADMIN — CLONIDINE HYDROCHLORIDE 0.3 MG: 0.1 TABLET ORAL at 12:06

## 2021-06-18 RX ADMIN — CLONIDINE HYDROCHLORIDE 0.3 MG: 0.1 TABLET ORAL at 21:18

## 2021-06-18 RX ADMIN — CARVEDILOL 50 MG: 25 TABLET, FILM COATED ORAL at 12:08

## 2021-06-18 RX ADMIN — HYDRALAZINE HYDROCHLORIDE 100 MG: 50 TABLET, FILM COATED ORAL at 21:18

## 2021-06-18 ASSESSMENT — ACTIVITIES OF DAILY LIVING (ADL)
ADLS_ACUITY_SCORE: 12

## 2021-06-18 ASSESSMENT — MIFFLIN-ST. JEOR: SCORE: 2161.99

## 2021-06-18 NOTE — PROGRESS NOTES
Potassium   Date Value Ref Range Status   06/18/2021 3.8 3.4 - 5.3 mmol/L Final     Hemoglobin   Date Value Ref Range Status   06/18/2021 9.1 (L) 13.3 - 17.7 g/dL Final     Creatinine   Date Value Ref Range Status   06/18/2021 11.50 (H) 0.66 - 1.25 mg/dL Final     Urea Nitrogen   Date Value Ref Range Status   06/18/2021 95 (H) 7 - 30 mg/dL Final     Sodium   Date Value Ref Range Status   06/18/2021 138 133 - 144 mmol/L Final     INR   Date Value Ref Range Status   02/09/2021 1.23 (H) 0.86 - 1.14 Final       DIALYSIS PROCEDURE NOTE  Hepatitis status of previous patient on machine log was checked and verified ok to use with this patients hepatitis status.  Patient dialyzed for 3 hrs. on a K3 bath with a net fluid removal of  0.5L.  A BFR of 300 ml/min was obtained via a right subclavian.      The treatment plan was discussed with Dr. Alba during the treatment.    Total heparin received during the treatment: 0 units.   Line flushed, clamped and capped with heparin 1:1000 2 mL (2000 units) per lumen    Meds  given: none ordered   Complications: none - pt presented hypertensive, this is pt's baseline. Floor nurse inquired about giving patients BP meds due to high BP, nephrologist consulted and advised to hold them until after dialysis.     Person educated: patient. Knowledge base minimal. Barriers to learning: none. Educated on access care and procedural via verbal mode. Patient verbalized understanding. Pt prefers verbal education style.     ICEBOAT? Timeout performed pre-treatment  I: Patient was identified using 2 identifiers  C:  Consent Signed Yes  E: Equipment preventative maintenance is current and dialysis delivery system OK to use  B: Hepatitis B Surface Antigen: Negative; Draw Date: 6/17/2021      Hepatitis B Surface Antibody: Immune; Draw Date: 6/17/2021  O: Dialysis orders present and complete prior to treatment  A: Vascular access verified and assessed prior to treatment  T: Treatment was performed at a  clinically appropriate time  ?: Patient was allowed to ask questions and address concerns prior to treatment  See flowsheet in EPIC for further details and post assessment.  Machine water alarm in place and functioning. Transducer pods intact and checked every 15min.   Pt returned via wheelchair - is ambulatory without assist.  Chlorine/Chloramine water system checked every 4 hours.  Outpatient Dialysis at [facility]

## 2021-06-18 NOTE — PROGRESS NOTES
[facility]  Hospitalist Progress Note  Martin Melgoza MD 06/18/2021    Reason for Stay (Diagnosis): uremia, CKD         Assessment and Plan:      Summary of Stay: Taylor Valiente is a 25 year old male with a history of chronic kidney disease with fistula placement but not on HD yet, hyperlipidemia, LVH, anemia of chronic kidney disease, obesity admitted on 6/15/2021 with 48 hours of profuse emesis.  The patient was schedule to see his nephrologist on 6/16/2021, but considering his symptoms he was told to come to the emergency department.  Pt reports he still makes urine.  In the Emergency Department, the patient was found to have , bicarb 19, creatinine 16.10, hgb 9.3.  The patient was admitted for dialysis with an Nephrology consult and social work assistance with placement.  The patient also reports that he is on the kidney transplant waiting list.  Course complicated by malfunctioning fistula - attempt was made to dialyze using fistula (created in 3/21) this admission unsuccessful.  Because of this, tunneled dialysis cath placed by IR this admission to allow fistula to continue to mature.  Anticipate discharge when OK with nephrology; likely this weekend     Problem List:   1.  Acute Intractable Nausea and Vomiting  - Improved/resolved  - Likely secondary to uremia     2.  ESRD  - Nephrology assistance with dialysis initiation this admission appreciated  - Social Work consulted for outpt dialysis scheduling.  Care coordinator arranging outpatient dialysis starting on Monday     3.  Anemia of Chronic Kidney Disease     4.  HTN  - anticipate improvement with dialysis but if persists may need medication dose change.  Currently on Coreg, hydralazine, and clonidine.    - Will resume home dose of amlodipine today    5.  Prolonged QTc     Chronic Medical Problems:  Depression  Hx of Adrenal Adenoma        DVT Prophylaxis: Pneumatic Compression Devices  Code Status: Full  Code  Diet: Dialysis Diet    Hurt Catheter: not present  Disposition: Expected discharge when OK with nephrology; likely this weekend  Family updated today: No           Interval History (Subjective):      No complaints.  Had another dialysis run today; pt reported no issues during run                   Physical Exam:      Last Vital Signs:  BP (!) 183/113   Pulse 79   Temp 98  F (36.7  C) (Oral)   Resp 18   Ht 1.829 m (6')   Wt 113.4 kg (250 lb)   SpO2 100%   BMI 33.91 kg/m        Intake/Output Summary (Last 24 hours) at 6/18/2021 1417  Last data filed at 6/18/2021 1141  Gross per 24 hour   Intake 550 ml   Output 1975 ml   Net -1425 ml       Constitutional: Awake, alert, cooperative, no apparent distress   Respiratory: Clear to auscultation bilaterally, no crackles or wheezing   Cardiovascular: Regular rate and rhythm, normal S1 and S2, and no murmur noted   Abdomen: Normal bowel sounds, soft, non-distended, non-tender   Skin: No rashes, no cyanosis, dry to touch   Neuro: Alert and oriented x3, no weakness, numbness, memory loss   Extremities: No edema, normal range of motion   Other(s):        All other systems: Negative          Medications:      All current medications were reviewed with changes reflected in problem list.         Data:      All new lab and imaging data was reviewed.   Labs:  Recent Labs   Lab 06/18/21  0740 06/17/21  0655 06/16/21  0738    138 141   POTASSIUM 3.8 3.9 4.0   CHLORIDE 108 107 108   CO2 23 17* 18*   ANIONGAP 7 16* 15*   * 99 107*   BUN 95* 147* 159*   CR 11.50* 15.80* 15.80*   GFRESTIMATED 5* 4* 4*   GFRESTBLACK 6* 4* 4*   BUBBA 8.4* 8.2* 8.3*     Recent Labs   Lab 06/18/21  0740   WBC 6.8   HGB 9.1*   HCT 28.1*   MCV 84         Imaging:   No results found for this or any previous visit (from the past 24 hour(s)).

## 2021-06-18 NOTE — PLAN OF CARE
After morning meds , given late due to dialysis run this am, bp came down to 140/83.  Additional antihypertensive ordered and given.  Mild edema in rt arm where fistula use was tried yesterday.  Elevated on 2 pillows with less tightness by afternoon.  Tele SR

## 2021-06-18 NOTE — DISCHARGE INSTRUCTIONS
You will get your outpatient dialysis at:  [facility]  [address] [place] [address]  Phone: [phone]    Your first outpatient run will be on Monday 6/21.  Please arrive at 1:45pm to complete paperwork.    For subsequent runs, your chair time will be every  Monday, Wednesday and Friday at 3pm.  You should plan to arrive 15 minutes prior to your chair time.    Please bring:   Two forms of ID  Insurance cards  Your CURRENT medication list  Something quiet to do  A small blanket because the room can be chilly.

## 2021-06-18 NOTE — PROGRESS NOTES
[facility]     Renal Progress Note       SHORTHAND KEY FOR MY NOTES:  c = with, s = without, p = after, a = before, x = except, asx = asymptomatic, tx = transplant or treatment, sx = symptoms or symptomatic, cx = canceled or culture, rxn = reaction, yday = yesterday, nl = normal, abx = antibiotics, fxn = function, dx = diagnosis, dz = disease, m/h = melena/hematochezia, c/d/l/ha = cramping/dizziness/lightheadedness/headache, d/c = discharge or diarrhea/constipation, f/c/n/v = fevers/chills/nausea/vomiting, cp/sob = chest pain/shortness of breath, tbv = total body volume, rxn = reaction, tdc = tunneled dialysis catheter, pta = prior to admission, hd = hemodialysis, pd = peritoneal dialysis, hhd = home hemodialysis, edw = estimated dry wt         Assessment/Plan:     1.  ESKD. Pt is having run #2 today.  No issues expected.  He needs to keep exercising the RADHA.  A.  Next HD tmrw.  B.  Outpt HD arranged at [place] instead of [facility].  He can start there and then transfer later given the shortage of available spaces.    2.  HTN.  Pt's BP is better and he tolerated a little bit of fluid removal today.    A.  Continue same meds/doses for now.  B.  Will try to pull a little more tmrw and see if he can tolerate it.    3.  Anemia.  Hb is stable.  He is getting EPO c HD.  A.  Follow hb, clinically.  B.  EPO 3k qHD.    4.  RTA.  Resolved c HD.    A.  Follow labs.    5.  Secondary hyperPTH.  Pt was on calcitriol and now will start doxercalciferol.   A.  Start doxercalciferol  1qHD.    6.  FEN.  Electrolytes are ok.        Interval History:     Pt is doing ok and feels fine.  He did exercise his arm a bit yday p his mother brought in the squeeze ball.  No issues c c/d/l/ha p his first run.  So far, he is ok today.          Medications and Allergies:       - MEDICATION INSTRUCTIONS for Dialysis Patients -   Does not apply See Admin Instructions     amLODIPine  10 mg Oral Daily     aspirin  81 mg Oral  Daily     atorvastatin  10 mg Oral QPM     carvedilol  50 mg Oral BID w/meals     ceFAZolin-dextrose         cloNIDine  0.3 mg Oral BID     fentaNYL (PF)         heparin (porcine)         hydrALAZINE  100 mg Oral BID     lidocaine (PF)         midazolam         sodium chloride (PF)  3 mL Intracatheter Q8H     vitamin D3  75 mcg Oral Daily     Allergies   Allergen Reactions     Benadryl [Diphenhydramine]      No Known Allergies           Physical Exam:     Vitals were reviewed     , Blood pressure (!) 139/90, pulse 78, temperature 96.4  F (35.8  C), temperature source Oral, resp. rate 18, height 1.829 m (6'), weight 113.4 kg (250 lb), SpO2 100 %.  Wt Readings from Last 3 Encounters:   06/18/21 113.4 kg (250 lb)   03/04/21 113.6 kg (250 lb 6.4 oz)   02/24/21 113.8 kg (250 lb 14.1 oz)     Intake/Output Summary (Last 24 hours) at 6/18/2021 1628  Last data filed at 6/18/2021 1141  Gross per 24 hour   Intake 550 ml   Output 1975 ml   Net -1425 ml     GENERAL APPEARANCE: pleasant, NAD, alert  HEENT:  eyes/ears/nose/neck grossly nl  RESP: CTA B c good efforts; no crackles  CV: RRR c m, nl S1/S2   ABDOMEN: o/s/nt/nd  EXTREMITIES/SKIN: no significant ble edema  ACCESS:   RADHA c good bruit; lower part mature, upper immature    Pt seen on HD.  Stable run expected. -0.5L UF goal.  Good BFR via RIJ.         Data:     CBC RESULTS:     Recent Labs   Lab 06/18/21  0740 06/17/21  0655 06/15/21  1634   WBC 6.8 7.5 7.6   RBC 3.35* 3.26* 3.44*   HGB 9.1* 8.9* 9.3*   HCT 28.1* 27.6* 29.2*    213 252     Basic Metabolic Panel:  Recent Labs   Lab 06/18/21  0740 06/17/21  0655 06/16/21  0738 06/15/21  1634    138 141 143   POTASSIUM 3.8 3.9 4.0 3.9   CHLORIDE 108 107 108 111*   CO2 23 17* 18* 19*   BUN 95* 147* 159* 163*   CR 11.50* 15.80* 15.80* 16.10*   * 99 107* 112*   BUBBA 8.4* 8.2* 8.3* 8.5     INRNo lab results found in last 7 days.   Attestation:   I have reviewed today's relevant vital signs, notes, medications,  labs and imaging.    Balta Alba MD  [facility] - Nephrology  [phone]

## 2021-06-18 NOTE — PLAN OF CARE
Patient in dialysis.  Informed dialyses nurse of elevated bp  Run started and said he would talk to Dr Hernandez about bp

## 2021-06-18 NOTE — PLAN OF CARE
A&Ox4, BP elevated, has prn Hydralazine IV for SBP>180, scheduled Coreg, Clonidine and Hydralazine, up with SBA, HD this shift, tomorrow and Saturday, Nephrology following, will continue with POC.

## 2021-06-18 NOTE — PLAN OF CARE
Patient resting comfortably overnight. Elevated blood pressure, hydralazine administered and effective in lowering blood pressure. Up in room independently. Nephrology following. Plan for dialysis today and Saturday. Tele is

## 2021-06-18 NOTE — PROGRESS NOTES
Care Management Follow Up    Length of Stay (days): 3    Expected Discharge Date: 06/19/21     Concerns to be Addressed: care coordination/care conferences, discharge planning     Patient plan of care discussed at interdisciplinary rounds: Yes    Anticipated Discharge Disposition: Home, Outpatient Dialysis     Anticipated Discharge Services: None  Anticipated Discharge DME: None    Patient/family educated on Medicare website which has current facility and service quality ratings: no  Education Provided on the Discharge Plan:  yes  Patient/Family in Agreement with the Plan: yes    Referrals Placed by CM/SW: [employer]    Additional Information:  CM continues to follow for coordination of new OP HD. Faxed additional records with lab results, chest x-ray and dialysis notes to [facility] Admissions. Received call from Asia at [facility], pt has a confirmed chair time for [facility] on Monday, Wednesday, Friday at 3pm. Pt should arrive on Monday 6/21 at 1:45pm to complete admission paperwork. AVS updated. Spoke with Dr. Melgoza who anticipates that pt will be medically ready to discharge on 6/19 after HD. Updated pt at bedside.     Valerie Becker RN BSN   Inpatient Care Coordination  [facility]   Phone [phone]

## 2021-06-19 VITALS
OXYGEN SATURATION: 98 % | BODY MASS INDEX: 33.79 KG/M2 | HEART RATE: 80 BPM | SYSTOLIC BLOOD PRESSURE: 140 MMHG | DIASTOLIC BLOOD PRESSURE: 102 MMHG | TEMPERATURE: 98.3 F | RESPIRATION RATE: 18 BRPM | WEIGHT: 249.5 LBS | HEIGHT: 72 IN

## 2021-06-19 LAB
ANION GAP SERPL CALCULATED.3IONS-SCNC: 7 MMOL/L (ref 3–14)
BUN SERPL-MCNC: 58 MG/DL (ref 7–30)
CALCIUM SERPL-MCNC: 8.6 MG/DL (ref 8.5–10.1)
CHLORIDE SERPL-SCNC: 105 MMOL/L (ref 94–109)
CO2 SERPL-SCNC: 27 MMOL/L (ref 20–32)
CREAT SERPL-MCNC: 8.93 MG/DL (ref 0.66–1.25)
GFR SERPL CREATININE-BSD FRML MDRD: 7 ML/MIN/{1.73_M2}
GLUCOSE SERPL-MCNC: 99 MG/DL (ref 70–99)
POTASSIUM SERPL-SCNC: 3.8 MMOL/L (ref 3.4–5.3)
SODIUM SERPL-SCNC: 139 MMOL/L (ref 133–144)

## 2021-06-19 PROCEDURE — 250N000013 HC RX MED GY IP 250 OP 250 PS 637: Performed by: HOSPITALIST

## 2021-06-19 PROCEDURE — 99232 SBSQ HOSP IP/OBS MODERATE 35: CPT | Performed by: INTERNAL MEDICINE

## 2021-06-19 PROCEDURE — 99239 HOSP IP/OBS DSCHRG MGMT >30: CPT | Performed by: INTERNAL MEDICINE

## 2021-06-19 PROCEDURE — 36415 COLL VENOUS BLD VENIPUNCTURE: CPT | Performed by: INTERNAL MEDICINE

## 2021-06-19 PROCEDURE — 250N000013 HC RX MED GY IP 250 OP 250 PS 637: Performed by: INTERNAL MEDICINE

## 2021-06-19 PROCEDURE — 80048 BASIC METABOLIC PNL TOTAL CA: CPT | Performed by: INTERNAL MEDICINE

## 2021-06-19 PROCEDURE — 258N000003 HC RX IP 258 OP 636: Performed by: INTERNAL MEDICINE

## 2021-06-19 PROCEDURE — 90937 HEMODIALYSIS REPEATED EVAL: CPT

## 2021-06-19 RX ORDER — ALBUMIN (HUMAN) 12.5 G/50ML
50 SOLUTION INTRAVENOUS
Status: DISCONTINUED | OUTPATIENT
Start: 2021-06-19 | End: 2021-06-19

## 2021-06-19 RX ORDER — ALBUMIN, HUMAN INJ 5% 5 %
250 SOLUTION INTRAVENOUS
Status: DISCONTINUED | OUTPATIENT
Start: 2021-06-19 | End: 2021-06-19

## 2021-06-19 RX ADMIN — CLONIDINE HYDROCHLORIDE 0.3 MG: 0.1 TABLET ORAL at 07:52

## 2021-06-19 RX ADMIN — AMLODIPINE BESYLATE 10 MG: 10 TABLET ORAL at 07:48

## 2021-06-19 RX ADMIN — CARVEDILOL 50 MG: 25 TABLET, FILM COATED ORAL at 07:50

## 2021-06-19 RX ADMIN — Medication 75 MCG: at 07:50

## 2021-06-19 RX ADMIN — ASPIRIN 81 MG: 81 TABLET ORAL at 07:49

## 2021-06-19 RX ADMIN — SODIUM CHLORIDE 250 ML: 9 INJECTION, SOLUTION INTRAVENOUS at 13:25

## 2021-06-19 RX ADMIN — SODIUM CHLORIDE 300 ML: 9 INJECTION, SOLUTION INTRAVENOUS at 13:25

## 2021-06-19 RX ADMIN — HYDRALAZINE HYDROCHLORIDE 100 MG: 50 TABLET, FILM COATED ORAL at 07:51

## 2021-06-19 ASSESSMENT — ACTIVITIES OF DAILY LIVING (ADL)
ADLS_ACUITY_SCORE: 12

## 2021-06-19 ASSESSMENT — MIFFLIN-ST. JEOR: SCORE: 2159.72

## 2021-06-19 NOTE — PROGRESS NOTES
[facility]     Renal Progress Note       SHORTHAND KEY FOR MY NOTES:  c = with, s = without, p = after, a = before, x = except, asx = asymptomatic, tx = transplant or treatment, sx = symptoms or symptomatic, cx = canceled or culture, rxn = reaction, yday = yesterday, nl = normal, abx = antibiotics, fxn = function, dx = diagnosis, dz = disease, m/h = melena/hematochezia, c/d/l/ha = cramping/dizziness/lightheadedness/headache, d/c = discharge or diarrhea/constipation, f/c/n/v = fevers/chills/nausea/vomiting, cp/sob = chest pain/shortness of breath, tbv = total body volume, rxn = reaction, tdc = tunneled dialysis catheter, pta = prior to admission, hd = hemodialysis, pd = peritoneal dialysis, hhd = home hemodialysis, edw = estimated dry wt         Assessment/Plan:     1.  ESKD.  Pt has done well c initiation of HD.  No issues so far.  He is having run #3 today and we will try to pull a little fluid.  He still makes a decent amt of urine, so won't be too aggressive to avoid sx of c/d/l.  He needs to keep exercising the RADHA.  A.  Next HD on Monday at [facility].  This isn't his first choice, so he can transfer to AV later.    2.  HTN.  Pt's BP is better today, but he was given his BP meds.  This will potentially inhibit our ability to pull fluid on the run.  A.  Continue same meds/doses for now.    3.  Anemia.  Hb is stable.  He is getting EPO c HD.  A.  Follow hb, clinically.  B.  EPO 3k qHD.    4.  RTA.  Resolved c HD and oral bicarb stopped.  A.  Follow as outpt.    5.  Secondary hyperPTH.  Pt was on calcitriol and now will start doxercalciferol.   A.  Start doxercalciferol  1qHD and adjust based on protocols as outpt.    6.  Dispo.  Ok to discharge today p HD.        Interval History:     Pt is doing ok and feels fine.  No issues c HD.  Still urinating a decent amt.  No f/c/n/v.  No cp/sob.          Medications and Allergies:       - MEDICATION INSTRUCTIONS for Dialysis Patients -   Does  not apply See Admin Instructions     amLODIPine  10 mg Oral Daily     aspirin  81 mg Oral Daily     atorvastatin  10 mg Oral QPM     carvedilol  50 mg Oral BID w/meals     ceFAZolin-dextrose         cloNIDine  0.3 mg Oral BID     fentaNYL (PF)         heparin (porcine)         hydrALAZINE  100 mg Oral BID     lidocaine (PF)         midazolam         sodium chloride (PF)  3 mL Intracatheter Q8H     vitamin D3  75 mcg Oral Daily     Allergies   Allergen Reactions     Benadryl [Diphenhydramine]      No Known Allergies           Physical Exam:     Vitals were reviewed     , Blood pressure (!) 151/96, pulse 81, temperature 97.9  F (36.6  C), temperature source Oral, resp. rate 18, height 1.829 m (6'), weight 113.2 kg (249 lb 8 oz), SpO2 100 %.  Wt Readings from Last 3 Encounters:   06/19/21 113.2 kg (249 lb 8 oz)   03/04/21 113.6 kg (250 lb 6.4 oz)   02/24/21 113.8 kg (250 lb 14.1 oz)     Intake/Output Summary (Last 24 hours) at 6/19/2021 1119  Last data filed at 6/18/2021 1851  Gross per 24 hour   Intake 700 ml   Output 1400 ml   Net -700 ml     GENERAL APPEARANCE: pleasant, NAD, alert  HEENT:  eyes/ears/nose/neck grossly nl  RESP: CTA B c good efforts; no crackles  CV: RRR c m, nl S1/S2   ABDOMEN: o/s/nt/nd  EXTREMITIES/SKIN: no significant ble edema  ACCESS:   RADHA c good bruit; lower part mature, upper immature         Data:     CBC RESULTS:     Recent Labs   Lab 06/18/21  0740 06/17/21  0655 06/15/21  1634   WBC 6.8 7.5 7.6   RBC 3.35* 3.26* 3.44*   HGB 9.1* 8.9* 9.3*   HCT 28.1* 27.6* 29.2*    213 252     Basic Metabolic Panel:  Recent Labs   Lab 06/19/21  0659 06/18/21  0740 06/17/21  0655 06/16/21  0738 06/15/21  1634    138 138 141 143   POTASSIUM 3.8 3.8 3.9 4.0 3.9   CHLORIDE 105 108 107 108 111*   CO2 27 23 17* 18* 19*   BUN 58* 95* 147* 159* 163*   CR 8.93* 11.50* 15.80* 15.80* 16.10*   GLC 99 106* 99 107* 112*   BUBBA 8.6 8.4* 8.2* 8.3* 8.5     INRNo lab results found in last 7 days.    Attestation:   I have reviewed today's relevant vital signs, notes, medications, labs and imaging.    Balta Alba MD  [facility] - Nephrology  [phone]

## 2021-06-19 NOTE — PROVIDER NOTIFICATION
PT is scheduled for discharge this afternoon, but is complaining of R arm pain around fistula site after dialysis. Fistula was not accessed for dialysis, but PT complains of R arm pain and has swelling of RUE. Please advise, thanks.

## 2021-06-19 NOTE — PLAN OF CARE
BP (!) 142/94   Pulse 78   Temp 96.4  F (35.8  C) (Oral)   Resp 18   Ht 1.829 m (6')   Wt 113.4 kg (250 lb)   SpO2 100%   BMI 33.91 kg/m    VSS, A&Ox4. Elevated blood pressure, hydralazine administered and effective in lowering blood pressure. Up in room independently. Nephrology following.Tele SR. New IV placed in lower L hand. Will continue PoC.

## 2021-06-19 NOTE — PROGRESS NOTES
Spoke to Nephrology - said PT okay for discharge tonight, recommend PT elevate limb and apply cold pack. Okay to evaluate arm at next dialysis appointment.

## 2021-06-19 NOTE — PLAN OF CARE
BP (!) 140/102   Pulse 80   Temp 98.3  F (36.8  C) (Oral)   Resp 18   Ht 1.829 m (6')   Wt 113.2 kg (249 lb 8 oz)   SpO2 98%   BMI 33.84 kg/m    VSS, PT tolerated dialysis well. PT cleared for discharge. Reviewed discharge paperwork with PT and PT verbalized understanding. PT left unit via wheelchair and transferred home via private car with family at 1800. PT has appointment for OP dialysis on 6/21.

## 2021-06-19 NOTE — PLAN OF CARE
Denies any discomfort.  Rt arm still swollen from fistula access attempt two days ago.  Dr Guzman informed .  Elevation encouraged.   In dialysis now with probable dischg after.  AM meds given early due to late dialysis run today.  Agreed to give by dialyses nurse.  BP at 1130,  146/97  Tele SR

## 2021-06-19 NOTE — PLAN OF CARE
Vitals VSS except BP elevated  Neuro A&Ox4  Respiratory 99% RA  Cardiac/Tele NSR  GI/ Continent  Skin edema  LDAs PIV SL. R Fistula, Parveen Cath. HD MWF schedule  Labs Creatinine 11.50  Diet dialysis   Activity Independent  Plan Possible discharge sometime this weekend. [facility] MWF schedule set up. Continue with POC.

## 2021-06-19 NOTE — PROGRESS NOTES
Potassium   Date Value Ref Range Status   06/19/2021 3.8 3.4 - 5.3 mmol/L Final     Hemoglobin   Date Value Ref Range Status   06/18/2021 9.1 (L) 13.3 - 17.7 g/dL Final     Creatinine   Date Value Ref Range Status   06/19/2021 8.93 (H) 0.66 - 1.25 mg/dL Final     Urea Nitrogen   Date Value Ref Range Status   06/19/2021 58 (H) 7 - 30 mg/dL Final     Sodium   Date Value Ref Range Status   06/19/2021 139 133 - 144 mmol/L Final     INR   Date Value Ref Range Status   02/09/2021 1.23 (H) 0.86 - 1.14 Final       DIALYSIS PROCEDURE NOTE  Hepatitis status of previous patient on machine log was checked and verified ok to use with this patients hepatitis status.  Patient dialyzed for 3 hrs. on a K3 bath with a net fluid removal of  1L.  A BFR of 300 ml/min was obtained via a right subclavian.      The treatment plan was discussed with Dr. Alba during the treatment.    Total heparin received during the treatment: 0 units.   Line flushed, clamped and capped with heparin 1:1000 2 mL (2000 units) per lumen    Meds  given: none ordered   Complications: none      Person educated: patient. Knowledge base minimal. Barriers to learning: none. Educated on access care via verbal mode. patient verbalized understanding. Pt prefers verbal education style.     ICEBOAT? Timeout performed pre-treatment  I: Patient was identified using 2 identifiers  C:  Consent Signed Yes  E: Equipment preventative maintenance is current and dialysis delivery system OK to use  B: Hepatitis B Surface Antigen: Negative; Draw Date: 6/17/2021      Hepatitis B Surface Antibody: Immune; Draw Date: 6/17/2021  O: Dialysis orders present and complete prior to treatment  A: Vascular access verified and assessed prior to treatment  T: Treatment was performed at a clinically appropriate time  ?: Patient was allowed to ask questions and address concerns prior to treatment  See flowsheet in EPIC for further details and post assessment.  Machine water alarm in place and  functioning. Transducer pods intact and checked every 15min.   Pt returned via wheelchair, is ambulatory.  Chlorine/Chloramine water system checked every 4 hours.  Outpatient Dialysis at D

## 2021-06-21 ENCOUNTER — TELEPHONE (OUTPATIENT)
Dept: FAMILY MEDICINE | Facility: CLINIC | Age: 26
End: 2021-06-21

## 2021-06-21 NOTE — TELEPHONE ENCOUNTER
"Hospital/TCU/ED for chronic condition Discharge Protocol    \"Hi, my name is Sultana Urbina RN, a registered nurse, and I am calling from [facility].  I am calling to follow up and see how things are going for you after your recent emergency visit/hospital/TCU stay.\"    Admission from 6/15-6/19 end stage renal disease on hemodialysis     Tell me how you are doing now that you are home?\"   feeling much better than I was before - no complaints   Patient is monitoring I & O along with BP as advised on discharge   Scheduled for dialysis today at [facility]     Discharge Instructions    \"Let's review your discharge instructions.  What is/are the follow-up recommendations?  Pt. Response: f/u with nephrology and pcp and dialysis     \"Has an appointment with your primary care provider been scheduled?\"   No (schedule appointment)    RN scheduled with pcp - patient will f/u schedule nephrology appt which has not yet been scheduled   Next 5 appointments (look out 90 days)    Jul 01, 2021  9:50 AM  (Arrive by 9:30 AM)  Office Visit with Priyank Lawrence MD  [facility] ([facility] ) [address] [place] [id number] [phone]        \"When you see the provider, I would recommend that you bring your medications with you.\"    Medications    \"Tell me what changed about your medicines when you discharged?\"    Changes to chronic meds?    No medication changes made     \"What questions do you have about your medications?\"    None     New diagnoses of heart failure, COPD, diabetes, or MI?    No         Post Discharge Medication Reconciliation Status: discharge medications reconciled, continue medications without change.    Was MTM referral placed (*Make sure to put transitions as reason for referral)?   No    Call Summary    \"What questions or concerns do you have about your recent visit and your follow-up care?\"     none    \"If you have questions " "or things don't continue to improve, we encourage you contact us through the main clinic number (give number).  Even if the clinic is not open, triage nurses are available 24/7 to help you.     We would like you to know that our clinic has extended hours (provide information).  We also have urgent care (provide details on closest location and hours/contact info)\"    \"Thank you for your time and take care!\"    Patient to call RN PAL with questions/concerns   Sultana Urbina Registered Nurse, PAL (Patient Advocate Liason)   [facility]   [phone]                "

## 2021-06-21 NOTE — TELEPHONE ENCOUNTER
ED / Discharge Outreach Protocol    Patient admission from 6/15-6/19 end stage renal disease on hemodialysis   Advised to monitor I&O and BP daily   F/u with  within 7 days - needs to be scheduled   F/u with nephrology as scheduled   Dialysis [place] scheduled for today 6/21/21 @ 1:45 advised to arrive 15 mins early   No medication changes     Patient Contact    Attempt # 1    Was call answered?  No.  Left message on voicemail with information to call me back.    Sultana Urbina, Registered Nurse, PAL (Patient Advocate Liason)   [facility]   [phone]

## 2021-06-23 DIAGNOSIS — N17.9 ACUTE RENAL FAILURE, UNSPECIFIED ACUTE RENAL FAILURE TYPE (H): ICD-10-CM

## 2021-06-24 RX ORDER — ATORVASTATIN CALCIUM 10 MG/1
TABLET, FILM COATED ORAL
Qty: 30 TABLET | Refills: 0 | OUTPATIENT
Start: 2021-06-24

## 2021-06-24 NOTE — TELEPHONE ENCOUNTER
Should already have refills on file.   7 months ago (11/4/2020)   atorvastatin (LIPITOR) 10 MG tablet   TAKE ONE TABLET BY MOUTH EVERY NIGHT AT BEDTIME   Dispense: 90 tablet     Refills: 3     Start: 11/4/2020         Mabel Paiz, LEAHN, RN  [facility]

## 2021-06-24 NOTE — PROGRESS NOTES
Pre-Visit Planning   Next 5 appointments (look out 90 days)    Jul 01, 2021  9:50 AM  (Arrive by 9:30 AM)  Office Visit with Priyank Lawrence MD  [facility] ([facility] ) [address] [facility] [address] [phone]        Appointment Notes for this encounter:   hospital follow up  Admission from 6/15-6/19 end stage renal disease started on hemodialysis   Patient advised to see nephrology, pcp and continue with dialysis   No medication changes made     Last pcp visit, 3/11/21 - nothing pending from this visit     Specialty -   4/20/21 Nephrology, stage 5 kidney disease, eliquis stopped   4/21/21 vascular, returns today 6 weeks status post creation of a proximal right radial artery to median cephalic vein AV fistula - advised to f/u PRN   6/1/21 behavioral health (RN did not break glass to review)     Labs -   Creatinine   Date Value Ref Range Status   06/19/2021 8.93 (H) 0.66 - 1.25 mg/dL Final     Lab Results   Component Value Date    ALBUMIN 2.8 06/18/2021     Component      Latest Ref Rng & Units 6/17/2021   Phosphorus      2.5 - 4.5 mg/dL 8.2 (H)     Hemoglobin   Date Value Ref Range Status   06/18/2021 9.1 (L) 13.3 - 17.7 g/dL Final     Imaging -   6/15/21 CT abdomen/pelvis   IMPRESSION:   1.  No renal calculi or hydronephrosis.  2.  Small right renal cyst.  3.  Diffuse bladder wall thickening can be seen with cystitis or hypertrophy.  4.  Left adrenal adenoma.    6/17/21 Xray Chest   IMPRESSION: Right IJ dual-lumen central venous catheter, with tip near  the SVC/RA junction. Lungs clear. No pneumothorax. Heart size and  pulmonary vascularity are within normal limits.  Questionnaires Reviewed/Assigned    No additional questionnaires are needed     Patient preferred phone number: [phone]    Unable to reach. Left voicemail with time/date of appt along with RN PAL number for return call     Sultana Urbina Registered Nurse, PAL (Patient  Advocate Melendrez)   [facility]   [phone]

## 2021-06-25 NOTE — ADDENDUM NOTE
[facility]: [facility] on: 6/25/2021 11:39 AM   Actions taken: Clinical Note Signed

## 2021-06-29 NOTE — ADDENDUM NOTE
Enc[facility]ended [facility]: [facility] on: 6/29/2021 11:34 AM   Actions taken: Clinical Note Signed

## 2021-07-01 ENCOUNTER — OFFICE VISIT (OUTPATIENT)
Dept: FAMILY MEDICINE | Facility: CLINIC | Age: 26
End: 2021-07-01
Payer: COMMERCIAL

## 2021-07-01 VITALS
WEIGHT: 246.1 LBS | OXYGEN SATURATION: 100 % | DIASTOLIC BLOOD PRESSURE: 82 MMHG | SYSTOLIC BLOOD PRESSURE: 121 MMHG | RESPIRATION RATE: 16 BRPM | HEART RATE: 74 BPM | BODY MASS INDEX: 33.38 KG/M2

## 2021-07-01 DIAGNOSIS — Z71.89 ADVANCED CARE PLANNING/COUNSELING DISCUSSION: ICD-10-CM

## 2021-07-01 DIAGNOSIS — N17.0 ACUTE RENAL FAILURE WITH ACUTE TUBULAR NECROSIS SUPERIMPOSED ON STAGE 5 CHRONIC KIDNEY DISEASE, NOT ON CHRONIC DIALYSIS (H): ICD-10-CM

## 2021-07-01 DIAGNOSIS — Z23 NEED FOR HPV VACCINATION: ICD-10-CM

## 2021-07-01 DIAGNOSIS — D35.02 ADRENAL ADENOMA, LEFT: ICD-10-CM

## 2021-07-01 DIAGNOSIS — N18.5 ACUTE RENAL FAILURE WITH ACUTE TUBULAR NECROSIS SUPERIMPOSED ON STAGE 5 CHRONIC KIDNEY DISEASE, NOT ON CHRONIC DIALYSIS (H): ICD-10-CM

## 2021-07-01 DIAGNOSIS — Z23 NEED FOR PNEUMOCOCCAL VACCINATION: ICD-10-CM

## 2021-07-01 DIAGNOSIS — N17.9 ACUTE KIDNEY INJURY (H): Primary | ICD-10-CM

## 2021-07-01 PROBLEM — I15.0 RENOVASCULAR HYPERTENSION: Status: RESOLVED | Noted: 2017-07-14 | Resolved: 2021-07-01

## 2021-07-01 PROBLEM — E66.01 MORBID OBESITY (H): Status: RESOLVED | Noted: 2017-07-14 | Resolved: 2021-07-01

## 2021-07-01 PROBLEM — N18.6 END STAGE RENAL DISEASE (H): Status: RESOLVED | Noted: 2021-01-28 | Resolved: 2021-07-01

## 2021-07-01 PROBLEM — R11.2 NAUSEA AND VOMITING, INTRACTABILITY OF VOMITING NOT SPECIFIED, UNSPECIFIED VOMITING TYPE: Status: RESOLVED | Noted: 2021-06-15 | Resolved: 2021-07-01

## 2021-07-01 PROCEDURE — 90471 IMMUNIZATION ADMIN: CPT | Performed by: FAMILY MEDICINE

## 2021-07-01 PROCEDURE — 90651 9VHPV VACCINE 2/3 DOSE IM: CPT | Performed by: FAMILY MEDICINE

## 2021-07-01 PROCEDURE — 90472 IMMUNIZATION ADMIN EACH ADD: CPT | Performed by: FAMILY MEDICINE

## 2021-07-01 PROCEDURE — 99495 TRANSJ CARE MGMT MOD F2F 14D: CPT | Mod: 25 | Performed by: FAMILY MEDICINE

## 2021-07-01 PROCEDURE — 90732 PPSV23 VACC 2 YRS+ SUBQ/IM: CPT | Performed by: FAMILY MEDICINE

## 2021-07-01 RX ORDER — CLONIDINE HYDROCHLORIDE 0.1 MG/1
0.1 TABLET ORAL
Status: ON HOLD | COMMUNITY
Start: 2021-07-01 | End: 2023-05-30

## 2021-07-01 RX ORDER — HYDRALAZINE HYDROCHLORIDE 100 MG/1
100 TABLET, FILM COATED ORAL 3 TIMES DAILY
Qty: 360 TABLET | Refills: 1 | COMMUNITY
Start: 2021-07-01 | End: 2021-12-15

## 2021-07-01 RX ORDER — VIT B COMP NO.3/FOLIC/C/BIOTIN 1 MG-60 MG
1 TABLET ORAL DAILY
COMMUNITY
Start: 2021-06-28 | End: 2023-08-04

## 2021-07-01 ASSESSMENT — PATIENT HEALTH QUESTIONNAIRE - PHQ9
SUM OF ALL RESPONSES TO PHQ QUESTIONS 1-9: 1
10. IF YOU CHECKED OFF ANY PROBLEMS, HOW DIFFICULT HAVE THESE PROBLEMS MADE IT FOR YOU TO DO YOUR WORK, TAKE CARE OF THINGS AT HOME, OR GET ALONG WITH OTHER PEOPLE: NOT DIFFICULT AT ALL
SUM OF ALL RESPONSES TO PHQ QUESTIONS 1-9: 1

## 2021-07-01 ASSESSMENT — ANXIETY QUESTIONNAIRES
4. TROUBLE RELAXING: NOT AT ALL
5. BEING SO RESTLESS THAT IT IS HARD TO SIT STILL: NOT AT ALL
7. FEELING AFRAID AS IF SOMETHING AWFUL MIGHT HAPPEN: NOT AT ALL
3. WORRYING TOO MUCH ABOUT DIFFERENT THINGS: SEVERAL DAYS
GAD7 TOTAL SCORE: 1
7. FEELING AFRAID AS IF SOMETHING AWFUL MIGHT HAPPEN: NOT AT ALL
GAD7 TOTAL SCORE: 1
1. FEELING NERVOUS, ANXIOUS, OR ON EDGE: NOT AT ALL
GAD7 TOTAL SCORE: 1
2. NOT BEING ABLE TO STOP OR CONTROL WORRYING: NOT AT ALL
6. BECOMING EASILY ANNOYED OR IRRITABLE: NOT AT ALL

## 2021-07-01 NOTE — PROGRESS NOTES
RN placed call to Dr. Hooper's office at [facility]     Patient is now on dialysis and will no longer see Dr. Hooper - he is seeing [facility] provider/dialysis     Sultana Urbina, Registered Nurse, PAL (Patient Advocate Liason)   [facility]   [phone]

## 2021-07-01 NOTE — PROGRESS NOTES
Assessment & Plan     Need for pneumococcal vaccination    - PNEUMOCOCCAL VACCINE,ADULT,SQ OR IM ([id number])    Need for HPV vaccination    - C HUMAN PAPILLOMA VIRUS VACCINE (GARDASIL 9) 3 DOSE IM    Advanced care planning/counseling discussion    - HONORING CHOICES REFERRAL    Acute kidney injury (H)    - cloNIDine (CATAPRES) 0.1 MG tablet  - hydrALAZINE (APRESOLINE) 100 MG tablet; Take 1 tablet (100 mg) by mouth 3 times daily    Acute renal failure with acute tubular necrosis superimposed on stage 5 chronic kidney disease,   I couldn't find any discharge summery, but patient is on dialysis at this time, 3 times weekly, using the catheter, AV fistula  is not used it.   Pt to follow up with Dr. Hooper, medications were changed and updated today.   Doing very well, denies any symptoms at this time, except for mild fatigue, and cramps of the leg at the end of dialysis.  Adrenal adenoma, left  Stable.                   Return in about 1 year (around 7/1/2022) for Routine physical..    Priyank Lawrence MD  [facility]    Gaudencio Vazquez is a 25 year old who presents for the following health issues     History of Present Illness       CKD: He uses over the counter pain medication, including Tylenol, a few times a month.He exercises with enough effort to increase his heart rate 10 to 19 minutes per day.    He is taking medications regularly.         Hospital Follow-up Visit:    Hospital/Nursing Home/IP Rehab Facility: [facility]  Date of Admission: 06/15/2021  Date of Discharge: 06/19/2021  Reason(s) for Admission: Acute renal failure       Was your hospitalization related to COVID-19? No   Problems taking medications regularly:  None  Medication changes since discharge: Stopped Sodium bicarbonate, CalcitRIOL and Ferrous sulfate. Statred taking Dalvate   Problems adhering to non-medication therapy:  None    Summary of hospitalization:  Discharge summary  unavailable  Diagnostic Tests/Treatments reviewed.  Follow up needed: with [facility] for dialysis, he is doing it 3 times a week (switching to [place] locations).  Other Healthcare Providers Involved in Patient s Care:         None  Update since discharge: improved. Post Discharge Medication Reconciliation: discharge medications reconciled, continue medications without change.  Plan of care communicated with patient              Review of Systems   CONSTITUTIONAL:fatigue.  ENT/MOUTH: NEGATIVE for ear, mouth and throat problems  RESP: NEGATIVE for significant cough or SOB  CV: NEGATIVE for chest pain, palpitations or peripheral edema      Objective    /82 (BP Location: Right arm, Patient Position: Sitting, Cuff Size: Adult Large)   Pulse 74   Resp 16   Wt 111.6 kg (246 lb 1.6 oz)   SpO2 100%   BMI 33.38 kg/m    Body mass index is 33.38 kg/m .  Physical Exam   GENERAL: healthy, alert and no distress  NECK: no adenopathy, no asymmetry, masses, or scars and thyroid normal to palpation  RESP: lungs clear to auscultation - no rales, rhonchi or wheezes  RESP: catheter in place, no signs of infection.  CV: regular rate and rhythm, normal S1 S2, no S3 or S4, no murmur, click or rub, no peripheral edema and peripheral pulses strong  ABDOMEN: soft, nontender, no hepatosplenomegaly, no masses and bowel sounds normal  MS: AV fistula in the right arm, positive bruit.                 Answers for HPI/ROS submitted by the patient on 7/1/2021   Chronic problems general questions HPI Form  If you checked off any problems, how difficult have these problems made it for you to do your work, take care of things at home, or get along with other people?: Not difficult at all  PHQ9 TOTAL SCORE: 1  NIURKA 7 TOTAL SCORE: 1    Conflicting answers have been found for some questions. Please document the patient's answers manually.

## 2021-07-02 ASSESSMENT — ANXIETY QUESTIONNAIRES: GAD7 TOTAL SCORE: 1

## 2021-07-02 ASSESSMENT — PATIENT HEALTH QUESTIONNAIRE - PHQ9: SUM OF ALL RESPONSES TO PHQ QUESTIONS 1-9: 1

## 2021-07-19 NOTE — DISCHARGE SUMMARY
[facility]    Discharge Summary  Hospitalist    Date of Admission:  6/15/2021  Date of Discharge:  6/19/2021  6:08 PM  Discharging Provider: Eric Guzman MD  Date of Service (when I saw the patient):  6/19/2021    Discharge Diagnoses   ESRD, started on hemodialysis.  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  Hypertension  Anemia, anemia of chronic disease  Dyslipidemia  Nausea and vomiting, in the setting of ESRD, uremia.  Malfuntioning fistula  Depression  Adrenal adenoma    History of Present Illness   Taylor Valiente is a 25 year old male with a history of chronic kidney disease with fistula placement but not on HD yet, hyperlipidemia, LVH, anemia of chronic kidney disease, obesity admitted on 6/15/2021 with 48 hours of profuse emesis.  The patient was schedule to see his nephrologist on 6/16/2021, but considering his symptoms he was told to come to the emergency department.  Pt reports he still makes urine.  In the Emergency Department, the patient was found to have , bicarb 19, creatinine 16.10, hgb 9.3.  The patient was admitted for dialysis with an Nephrology consult and social work assistance with placement.  The patient also reports that he is on the kidney transplant waiting list.  Course complicated by malfunctioning fistula - attempt was made to dialyze using fistula (created in 3/21) this admission unsuccessful.  Because of this, tunneled dialysis cath placed by IR this admission to allow fistula to continue to mature.    Hospital Course     ESRD.   During this admission, patient was started on hemodialysis. His symptoms of nausea and vomiting are now resolved. Tolerating diet by mouth.  Dr Alba has arranged outpatient HD.    Acute Intractable Nausea and Vomiting  - Improved/resolved  - Likely secondary to uremia    Anemia of Chronic Kidney Disease      I personally evaluated and examined the patient on the day of discharge.    Eric Guzman MD      Pending Results   These results  will be followed up by PCP  Unresulted Labs Ordered in the Past 30 Days of this Admission     No orders found from 5/16/2021 to 6/16/2021.               Primary Care Physician   Priyank Lawrence        Discharge Disposition   Discharged to home  Condition at discharge: Stable    Consultations This Hospital Stay   CARE MANAGEMENT / SOCIAL WORK IP CONSULT    Time Spent on this Encounter   Discharge time: greater than 30 minutes.    Discharge Orders      Reason for your hospital stay    End stage renal disease, started on hemo-dialysis     Follow-up and recommended labs and tests     Follow up with primary care provider, Priyank Lawrence, within 7 days for hospital follow- up.   Follow up with nephrology as planned. Hemodialysis per nephrology recommendations.     Activity    Your activity upon discharge: activity as tolerated     Monitor and record    blood pressure daily  fluid intake and output daily     Diet    Follow this diet upon discharge: Orders Placed This Encounter      Dialysis Diet     Discharge Medications   Discharge Medication List as of 6/19/2021  5:12 PM      CONTINUE these medications which have NOT CHANGED    Details   amLODIPine (NORVASC) 10 MG tablet Take 10 mg by mouth daily , Historical      aspirin 81 MG EC tablet Take 81 mg by mouth daily, Historical      atorvastatin (LIPITOR) 10 MG tablet TAKE ONE TABLET BY MOUTH EVERY NIGHT AT BEDTIME, Disp-90 tablet, R-3, E-Prescribe      carvedilol (COREG) 25 MG tablet Take 2 tablets (50 mg) by mouth 2 times daily (with meals), Disp-180 tablet, R-3, E-Prescribe      vitamin D3 (CHOLECALCIFEROL) 2000 units (50 mcg) tablet Take 75 mcg by mouth daily , Historical      acetaminophen (TYLENOL) 325 MG tablet Take 2 tablets (650 mg) by mouth every 4 hours as needed for mild pain, Disp-50 tablet, R-0, E-Prescribe      calcitRIOL (ROCALTROL) 0.25 MCG capsule Take 0.25 mcg by mouth three times a week Monday, Wednesday, Friday, Historical      cloNIDine (CATAPRES) 0.3 MG tablet  Take 1 tablet (0.3 mg) by mouth 2 times daily, Disp-60 tablet, R-1, E-Prescribe      ferrous sulfate (FEROSUL) 325 (65 Fe) MG tablet Take 325 mg by mouth daily, Historical      hydrALAZINE (APRESOLINE) 100 MG tablet TAKE ONE TABLET BY MOUTH THREE TIMES A DAY, Disp-360 tablet, R-1, E-Prescribe      sodium bicarbonate 650 MG tablet Take 1,300 mg by mouth 2 times daily, Historical           Allergies   Allergies   Allergen Reactions     Benadryl [Diphenhydramine]      No Known Allergies      Data   Most Recent 3 CBC's:Recent Labs   Lab Test 06/18/21  0740 06/17/21  0655 06/15/21  1634   WBC 6.8 7.5 7.6   HGB 9.1* 8.9* 9.3*   MCV 84 85 85    213 252      Most Recent 3 BMP's:  Recent Labs   Lab Test 06/19/21  0659 06/18/21  0740 06/17/21  0655    138 138   POTASSIUM 3.8 3.8 3.9   CHLORIDE 105 108 107   CO2 27 23 17*   BUN 58* 95* 147*   CR 8.93* 11.50* 15.80*   ANIONGAP 7 7 16*   BUBBA 8.6 8.4* 8.2*   GLC 99 106* 99     Most Recent 2 LFT's:  Recent Labs   Lab Test 02/09/21  1055 11/27/20  0221   AST 6 25   ALT 19 34   ALKPHOS 71 63   BILITOTAL 0.2 0.3     Most Recent INR's and Anticoagulation Dosing History:  Anticoagulation Dose History     Recent Dosing and Labs Latest Ref Rng & Units 7/18/2017 2/9/2021    INR 0.86 - 1.14 1.04 1.23(H)        Most Recent 3 Troponin's:  Recent Labs   Lab Test 11/28/20  0606 09/09/20  2344 09/09/20  1827 07/14/17  1158   TROPI <0.015 0.084* 0.083*  --    TROPONIN  --   --   --  0.06     Most Recent Cholesterol Panel:  Recent Labs   Lab Test 07/03/19  1529   CHOL 145   LDL 86   HDL 34*   TRIG 127     Most Recent 6 Bacteria Isolates From Any Culture (See EPIC Reports for Culture Details):No lab results found.  Most Recent TSH, T4 and A1c Labs:  Recent Labs   Lab Test 03/04/21  0627 07/15/17  0635 07/14/17  1140   TSH  --   --  4.88*   T4  --  1.26  --    A1C 5.0  --   --

## 2021-07-21 ENCOUNTER — TELEPHONE (OUTPATIENT)
Dept: NEUROPSYCHOLOGY | Facility: CLINIC | Age: 26
End: 2021-07-21

## 2021-07-27 ENCOUNTER — OFFICE VISIT (OUTPATIENT)
Dept: INTERNAL MEDICINE | Facility: CLINIC | Age: 26
End: 2021-07-27
Payer: COMMERCIAL

## 2021-07-27 VITALS
OXYGEN SATURATION: 100 % | HEIGHT: 72 IN | BODY MASS INDEX: 32.76 KG/M2 | HEART RATE: 81 BPM | SYSTOLIC BLOOD PRESSURE: 136 MMHG | WEIGHT: 241.9 LBS | DIASTOLIC BLOOD PRESSURE: 86 MMHG | TEMPERATURE: 97.8 F | RESPIRATION RATE: 18 BRPM

## 2021-07-27 DIAGNOSIS — M76.62 ACHILLES TENDINITIS OF BOTH LOWER EXTREMITIES: Primary | ICD-10-CM

## 2021-07-27 DIAGNOSIS — M76.61 ACHILLES TENDINITIS OF BOTH LOWER EXTREMITIES: Primary | ICD-10-CM

## 2021-07-27 PROCEDURE — 99213 OFFICE O/P EST LOW 20 MIN: CPT | Performed by: INTERNAL MEDICINE

## 2021-07-27 RX ORDER — BUMETANIDE 2 MG/1
2 TABLET ORAL DAILY
Status: ON HOLD | COMMUNITY
Start: 2021-07-21 | End: 2023-12-08

## 2021-07-27 ASSESSMENT — MIFFLIN-ST. JEOR: SCORE: 2125.25

## 2021-07-27 NOTE — PROGRESS NOTES
Assessment & Plan     (M76.61,  M76.62) Achilles tendinitis of both lower extremities  (primary encounter diagnosis)  Plan: Explained about the condition, advised to use ice as needed as directed, use  over-the-counter Tylenol as needed, advised to wear proper shoe wear  and avoid running on hard surfaces, advised to wrap ankle with elastic bandage, patient was given Ace wrap.  Patient was also given printed information on Achilles tendinitis and also exercises.  Also referred to podiatrist for further evaluation and management- orthopedic  Referral  Follow-up with his PCP if no symptom relief.        Note for work given that patient was seen in the clinic today for medical reasons       Return f/u with PCP Dr Lawrence if symptoms worsen or fail to improve.    Lorin Wong MD  [facility]   Taylor is a 25 year old who presents for the following health issues     HPI     Pt is a 25 year old male who is pt of  Dr Howard Kennyer is  seen here to day with c/o pain back of bilateral ankles/ heel since 2 days, no h/o injury  Pt went to [facility] 2 days ago and was standing for 2-3 hrs.  took Tyelnol.for pain   Patient is also requesting letter for work that he was seen in clinic today.         Current Outpatient Medications   Medication Sig Dispense Refill     amLODIPine (NORVASC) 10 MG tablet Take 10 mg by mouth daily        aspirin 81 MG EC tablet Take 81 mg by mouth daily       atorvastatin (LIPITOR) 10 MG tablet TAKE ONE TABLET BY MOUTH EVERY NIGHT AT BEDTIME 90 tablet 3     bumetanide (BUMEX) 2 MG tablet        carvedilol (COREG) 25 MG tablet Take 2 tablets (50 mg) by mouth 2 times daily (with meals) 180 tablet 3     cloNIDine (CATAPRES) 0.1 MG tablet        hydrALAZINE (APRESOLINE) 100 MG tablet Take 1 tablet (100 mg) by mouth 3 times daily 360 tablet 1     multivitamin RENAL (RENAVITE RX/NEPHROVITE) 1 MG tablet Take 1 tablet by mouth        vitamin D3 (CHOLECALCIFEROL) 2000 units (50 mcg) tablet Take 75 mcg by mouth daily               Review of Systems   CONSTITUTIONAL: NEGATIVE for fever, chills, change in weight  MUSCULOSKELETAL: pain back of ricky ankles      Objective    /86   Pulse 81   Temp 97.8  F (36.6  C) (Oral)   Resp 18   Ht 1.829 m (6')   Wt 109.7 kg (241 lb 14.4 oz)   SpO2 100%   BMI 32.81 kg/m    Body mass index is 32.81 kg/m .  Physical Exam   GENERAL: healthy, alert and no distress  MS:  Flat feet noted bilaterally , has tenderness on bilateral Achilles tendons, no redness or swelling on the Achilles tendon, no tenderness on bilateral heels.  Range of motion normal  .

## 2021-07-27 NOTE — LETTER
[place] [address]  [facility] [address]  [phone]          July 27, 2021    RE:  Taylor Valiente                                                                                                                                                       [address]   [place] [address]            To whom it may concern:    Taylor Valiente was seen in clinic today 07/27/21 for medical reasons.       Sincerely,        Lorin Wong MD

## 2021-07-27 NOTE — NURSING NOTE
/86   Pulse 81   Temp 97.8  F (36.6  C) (Oral)   Resp 18   Ht 1.829 m (6')   Wt 109.7 kg (241 lb 14.4 oz)   SpO2 100%   BMI 32.81 kg/m    Patient in for Bilateral back of heels x's 2 days.  Sabiha Brown, [facility]

## 2021-07-28 ENCOUNTER — TRANSFERRED RECORDS (OUTPATIENT)
Dept: HEALTH INFORMATION MANAGEMENT | Facility: CLINIC | Age: 26
End: 2021-07-28

## 2021-08-03 ENCOUNTER — TELEPHONE (OUTPATIENT)
Dept: TRANSPLANT | Facility: CLINIC | Age: 26
End: 2021-08-03

## 2021-08-03 NOTE — TELEPHONE ENCOUNTER
Spoke with Pretty at patient's dialysis center to request the 2728 form. Gave fax number to send today.

## 2021-08-03 NOTE — TELEPHONE ENCOUNTER
Call from pt's mom  Pt was to have called us when he starts dialysis  She does not think he called  He started dialysis while inpt in June  She thinks he started on 6/17/2021   His number has changed but she will get his current #  Call her with questions and she will carine out to him

## 2021-08-03 NOTE — TELEPHONE ENCOUNTER
Spoke with Radha about Taylor's next steps. Cardiology to arrange angiogram now that patient is stable on dialysis.

## 2021-08-04 ENCOUNTER — TELEPHONE (OUTPATIENT)
Dept: OTHER | Facility: CLINIC | Age: 26
End: 2021-08-04

## 2021-08-04 DIAGNOSIS — Z01.818 PREOP TESTING: ICD-10-CM

## 2021-08-04 DIAGNOSIS — I77.0 AVF (ARTERIOVENOUS FISTULA) (H): Primary | ICD-10-CM

## 2021-08-04 NOTE — TELEPHONE ENCOUNTER
"Received fax on 7/28/21 stating patient needed to be referred back to vascular.    Pt is s/p creation of proximal right radial artery to median cephalic vein AVF on 3/4/21 with Dr. Moran.    Contacted [facility] to discuss further.  Spoke with GALLITO Scott.  Staff has been having difficulty accessing fistula and there are concerns the fistula is \"diving deeper\".  Sonia also mentioned a concern for central stenosis.    Per review of chart, pt was admitted on 6/15/21 at [facility] and HD was initiated.  This was attempted on 6/16/21 with successful placement of arterial needle, however venous needle infiltrated with two attempts.  Patient ultimately required a CVC tunnel catheter and is currently dialyzing via CVC.    Patient needs AVF US and bilateral upper extremity vein mapping for possible new fistula creation.  Patient will need to see Dr. Moran in clinic, at next available, to discuss further.  Please schedule as 30 minute return appointment.    Appt note:  History of proximal right radial artery to median cephalic vein AVF on 3/4/21; pt recently initiated on dialysis, fistula infiltrated multiple times, see nurse note; AVF US and BUE vein mapping to be scheduled.    Meka Vazquez, LEAHN, RN-[facility]        "

## 2021-08-04 NOTE — TELEPHONE ENCOUNTER
8/4/2021 LMTCB and schedule    Patient needs AVF US and bilateral upper extremity vein mapping for possible new fistula creation.  Patient will need to see Dr. Moran in clinic, at next available, to discuss further.  Please schedule as 30 minute return appointment.     Appt note:  History of proximal right radial artery to median cephalic vein AVF on 3/4/21; pt recently initiated on dialysis, fistula infiltrated multiple times, see nurse note; AVF US and BUE vein mapping to be scheduled.      Marika OLIVER

## 2021-08-10 ENCOUNTER — APPOINTMENT (OUTPATIENT)
Dept: GENERAL RADIOLOGY | Facility: CLINIC | Age: 26
End: 2021-08-10
Attending: EMERGENCY MEDICINE
Payer: COMMERCIAL

## 2021-08-10 ENCOUNTER — HOSPITAL ENCOUNTER (EMERGENCY)
Facility: CLINIC | Age: 26
Discharge: HOME | End: 2021-08-10
Attending: EMERGENCY MEDICINE | Admitting: EMERGENCY MEDICINE
Payer: COMMERCIAL

## 2021-08-10 VITALS
RESPIRATION RATE: 20 BRPM | WEIGHT: 244 LBS | BODY MASS INDEX: 33.09 KG/M2 | DIASTOLIC BLOOD PRESSURE: 125 MMHG | TEMPERATURE: 100.1 F | HEART RATE: 80 BPM | OXYGEN SATURATION: 96 % | SYSTOLIC BLOOD PRESSURE: 196 MMHG

## 2021-08-10 DIAGNOSIS — B34.9 VIRAL SYNDROME: ICD-10-CM

## 2021-08-10 LAB
ALBUMIN SERPL-MCNC: 3.5 G/DL (ref 3.4–5)
ALP SERPL-CCNC: 64 U/L (ref 40–150)
ALT SERPL W P-5'-P-CCNC: 18 U/L (ref 0–70)
ANION GAP SERPL CALCULATED.3IONS-SCNC: 9 MMOL/L (ref 3–14)
AST SERPL W P-5'-P-CCNC: 12 U/L (ref 0–45)
BASOPHILS # BLD AUTO: 0 10E3/UL (ref 0–0.2)
BASOPHILS NFR BLD AUTO: 1 %
BILIRUB SERPL-MCNC: 0.4 MG/DL (ref 0.2–1.3)
BUN SERPL-MCNC: 36 MG/DL (ref 7–30)
CALCIUM SERPL-MCNC: 9.1 MG/DL (ref 8.5–10.1)
CHLORIDE BLD-SCNC: 103 MMOL/L (ref 94–109)
CO2 SERPL-SCNC: 24 MMOL/L (ref 20–32)
CREAT SERPL-MCNC: 8.52 MG/DL (ref 0.66–1.25)
EOSINOPHIL # BLD AUTO: 0.2 10E3/UL (ref 0–0.7)
EOSINOPHIL NFR BLD AUTO: 2 %
ERYTHROCYTE [DISTWIDTH] IN BLOOD BY AUTOMATED COUNT: 13.7 % (ref 10–15)
GFR SERPL CREATININE-BSD FRML MDRD: 8 ML/MIN/1.73M2
GLUCOSE BLD-MCNC: 100 MG/DL (ref 70–99)
HCT VFR BLD AUTO: 28.6 % (ref 40–53)
HGB BLD-MCNC: 9 G/DL (ref 13.3–17.7)
IMM GRANULOCYTES # BLD: 0 10E3/UL
IMM GRANULOCYTES NFR BLD: 0 %
LACTATE SERPL-SCNC: 0.7 MMOL/L (ref 0.7–2)
LYMPHOCYTES # BLD AUTO: 1 10E3/UL (ref 0.8–5.3)
LYMPHOCYTES NFR BLD AUTO: 13 %
MCH RBC QN AUTO: 27.3 PG (ref 26.5–33)
MCHC RBC AUTO-ENTMCNC: 31.5 G/DL (ref 31.5–36.5)
MCV RBC AUTO: 87 FL (ref 78–100)
MONOCYTES # BLD AUTO: 0.9 10E3/UL (ref 0–1.3)
MONOCYTES NFR BLD AUTO: 12 %
NEUTROPHILS # BLD AUTO: 5.6 10E3/UL (ref 1.6–8.3)
NEUTROPHILS NFR BLD AUTO: 72 %
NRBC # BLD AUTO: 0 10E3/UL
NRBC BLD AUTO-RTO: 0 /100
PLATELET # BLD AUTO: 162 10E3/UL (ref 150–450)
POTASSIUM BLD-SCNC: 4.1 MMOL/L (ref 3.4–5.3)
PROT SERPL-MCNC: 7.1 G/DL (ref 6.8–8.8)
RBC # BLD AUTO: 3.3 10E6/UL (ref 4.4–5.9)
SARS-COV-2 RNA RESP QL NAA+PROBE: NEGATIVE
SODIUM SERPL-SCNC: 136 MMOL/L (ref 133–144)
WBC # BLD AUTO: 7.7 10E3/UL (ref 4–11)

## 2021-08-10 PROCEDURE — 87635 SARS-COV-2 COVID-19 AMP PRB: CPT | Performed by: EMERGENCY MEDICINE

## 2021-08-10 PROCEDURE — 71046 X-RAY EXAM CHEST 2 VIEWS: CPT

## 2021-08-10 PROCEDURE — 83605 ASSAY OF LACTIC ACID: CPT | Performed by: EMERGENCY MEDICINE

## 2021-08-10 PROCEDURE — 99284 EMERGENCY DEPT VISIT MOD MDM: CPT | Mod: 25

## 2021-08-10 PROCEDURE — 36415 COLL VENOUS BLD VENIPUNCTURE: CPT | Performed by: EMERGENCY MEDICINE

## 2021-08-10 PROCEDURE — C9803 HOPD COVID-19 SPEC COLLECT: HCPCS

## 2021-08-10 PROCEDURE — 250N000011 HC RX IP 250 OP 636: Performed by: EMERGENCY MEDICINE

## 2021-08-10 PROCEDURE — 85025 COMPLETE CBC W/AUTO DIFF WBC: CPT | Performed by: EMERGENCY MEDICINE

## 2021-08-10 PROCEDURE — 80053 COMPREHEN METABOLIC PANEL: CPT | Performed by: EMERGENCY MEDICINE

## 2021-08-10 PROCEDURE — 250N000013 HC RX MED GY IP 250 OP 250 PS 637: Performed by: EMERGENCY MEDICINE

## 2021-08-10 RX ORDER — ACETAMINOPHEN 325 MG/1
650 TABLET ORAL ONCE
Status: COMPLETED | OUTPATIENT
Start: 2021-08-10 | End: 2021-08-10

## 2021-08-10 RX ORDER — ONDANSETRON 2 MG/ML
4 INJECTION INTRAMUSCULAR; INTRAVENOUS EVERY 30 MIN PRN
Status: DISCONTINUED | OUTPATIENT
Start: 2021-08-10 | End: 2021-08-10 | Stop reason: HOSPADM

## 2021-08-10 RX ORDER — ONDANSETRON 4 MG/1
4 TABLET, ORALLY DISINTEGRATING ORAL ONCE
Status: COMPLETED | OUTPATIENT
Start: 2021-08-10 | End: 2021-08-10

## 2021-08-10 RX ORDER — ACETAMINOPHEN 500 MG
1000 TABLET ORAL EVERY 4 HOURS PRN
Status: DISCONTINUED | OUTPATIENT
Start: 2021-08-10 | End: 2021-08-10 | Stop reason: HOSPADM

## 2021-08-10 RX ORDER — IBUPROFEN 600 MG/1
600 TABLET, FILM COATED ORAL ONCE
Status: DISCONTINUED | OUTPATIENT
Start: 2021-08-10 | End: 2021-08-10

## 2021-08-10 RX ADMIN — ACETAMINOPHEN 650 MG: 325 TABLET, FILM COATED ORAL at 08:07

## 2021-08-10 RX ADMIN — ONDANSETRON 4 MG: 4 TABLET, ORALLY DISINTEGRATING ORAL at 09:48

## 2021-08-10 RX ADMIN — ACETAMINOPHEN 1000 MG: 500 TABLET, FILM COATED ORAL at 07:03

## 2021-08-10 ASSESSMENT — ENCOUNTER SYMPTOMS
DIARRHEA: 1
FEVER: 1
COUGH: 1
VOMITING: 0
HEADACHES: 1
SHORTNESS OF BREATH: 1
RHINORRHEA: 1
ARTHRALGIAS: 1
NAUSEA: 1

## 2021-08-10 NOTE — ED TRIAGE NOTES
Pt aox4, ABCs intact. Pt c/o fever, cough, and headache x3 days. Pt states that this morning he started having bilateral knee and elbow pain, shortness of breath, and nausea. Temp of 103 at home. Tylenol last at 2300. Pt is COVID vaccinated

## 2021-08-10 NOTE — ED NOTES
All patient questions have been answered, no further questions at this time. Discharge paperwork was gone over with patient. Patient verbalizes understanding of discharge teaching, medications, when to return and the importance of follow up.  Patient verbalizes feeling safe returning home at this time.    MD aware of BP upon discharge. No further orders. Okay to discharge per Himanshu KLINE

## 2021-08-10 NOTE — ED PROVIDER NOTES
History   Chief Complaint:  Multiple complaints     The history is provided by the patient.      Taylor Valiente is a 26 year old male with history of chronic kidney disease, hyperlipidemia, and hypertension who presents with multiple complaints. Patient has had rhinorrhea, fever, headache, and cough for the past three days. No neck pain.  No bites and rashes.  This morning he woke up with joint pain, shortness of breath, nausea, and diarrhea. Fever of 103 last night at home and took Tylenol last night at 2300. Patient is on dialysis and underwent the whole thing yesterday. He has another dialysis appointment tomorrow. He is Covid and flu vaccinated. No vomiting    Review of Systems   Constitutional: Positive for fever.   HENT: Positive for rhinorrhea.    Respiratory: Positive for cough and shortness of breath.    Gastrointestinal: Positive for diarrhea and nausea. Negative for vomiting.   Musculoskeletal: Positive for arthralgias.   Neurological: Positive for headaches.   All other systems reviewed and are negative.    Allergies:  Benadryl    Medications:  Norvasc  Aspirin 81 mg  Lipitor  Bumex  Coreg  Catapres  Apresoline  Renvela    Past Medical History:    Adrenal adenoma  Anemia  Benign essential hypertension  Chronic kidney disease stage 5  Depression  Focal glomerular sclerosis  Hyperlipidemia  Left ventricular hypertrophy  Obesity  Osteochondritis dissecans  Stress-induced cardiomyopathy  Suicide attempt  Prolonged Q-T interval  Acute renal failure  COVID-19    Past Surgical History:    Arthroscopy ankle  Renal biopsy  Create fistula arteriovenous upper extremity  CVC tunnel placement     Family History:    Mother: hepatitis b, gestational diabetes, hypertension  Father: hypertension, obesity    Social History:  Presents to ED alone    Physical Exam     Patient Vitals for the past 24 hrs:   BP Temp Pulse Resp SpO2 Weight   08/10/21 1045 (!) 196/125 -- 80 -- 96 % --   08/10/21 1030 (!) 207/128 -- 84 --  (!) 83 % --   08/10/21 1015 (!) 192/123 -- 87 -- 100 % --   08/10/21 1000 (!) 180/113 -- 87 -- 100 % --   08/10/21 0950 (!) 181/122 -- 86 -- 95 % --   08/10/21 0945 (!) 181/122 -- -- -- 96 % --   08/10/21 0940 -- -- -- -- 100 % --   08/10/21 0930 -- -- -- -- 100 % --   08/10/21 0920 -- -- -- -- 99 % --   08/10/21 0915 (!) 190/125 -- 87 -- 96 % --   08/10/21 0910 (!) 190/125 -- -- -- -- --   08/10/21 0700 -- -- -- -- -- 110.7 kg (244 lb)   08/10/21 0658 -- 100.1  F (37.8  C) -- -- -- --   08/10/21 0657 (!) 178/117 -- 92 20 99 % --       Physical Exam  Constitutional: Patient is well appearing. No distress. Not septic.  Smiling and well in room.  Head: Atraumatic.  Eyes: Conjunctivae and EOM are normal. No scleral icterus.  Neck: Normal range of motion. Neck supple.   Cardiovascular: Normal rate, regular rhythm, normal heart sounds and intact distal pulses.   Pulmonary/Chest: Breath sounds normal. No respiratory distress.   Abdominal: Soft. Bowel sounds are normal. No distension. No tenderness. No rebound or guarding.   Musculoskeletal: Normal range of motion. No edema or tenderness. Right side dialysis catheter.  Neurological: Alert and orientated to person, place, and time. No observable focal neuro deficit  Skin: Warm and dry. No rash noted. Not diaphoretic.     Emergency Department Course   Imaging:  XR Chest 2 Views  Dual lumen right IJ central venous catheter, with tip in   the right atrium. Chest otherwise negative. Lungs clear. No pleural   effusions. Heart size and pulmonary vascularity are within normal   limits.  As per radiology.     Laboratory:  SARS-COV2 (COVID-19) Virus RT-PCR: Negative  Lactic acid (result time 1026): 0.7   CMP: Urea Nitrogen: 36(H), Creatinine: 8.52(H), Glucose: 100(H), GFR: 8(L) o/w WNL  CBC: WBC 7.7, HGB 9.0(L),      Emergency Department Course:    Reviewed:  I reviewed nursing notes, vitals, past medical history and care everywhere    Assessments:  0808 I obtained history  and examined the patient as noted above.   1050 I rechecked the patient and explained findings.     Interventions:  0703 Tylenol, 1000 mg, PO  0807 Tylenol, 650 mg, PO  0948 Zofran-odt, 4 mg, PO    Disposition:  The patient was discharged to home.       Impression & Plan       Medical Decision Making:  Taylro Valiente is a 26 year old male was evaluated in the ED for a rhinorrhea, fever, headache, and cough. The patient was febrile in the ED. The patient's workup did not reveal a severe source for infection.  WBC CMP and Lactate reassuring.  The patient's exam was unremarkable. At this time the patient is stable for discharge. Anticipatory guidance given prior to discharge and will follow up with HD tomorrow.    All questions and concerns were answered. The patient was discharged home and recommended to follow up with his primary physician and return with any new or worsening symptoms.       Covid-19  Taylor Valiente was evaluated during a global COVID-19 pandemic, which necessitated consideration that the patient might be at risk for infection with the SARS-CoV-2 virus that causes COVID-19.   Applicable protocols for evaluation were followed during the patient's care.   COVID-19 was considered as part of the patient's evaluation. The plan for testing is:  a test was obtained during this visit.    Diagnosis:    ICD-10-CM    1. Viral syndrome  B34.9        Scribe Disclosure:  Orlando LALA, am serving as a scribe at 8:07 AM on 8/10/2021 to document services personally performed by Huey Downs MD based on my observations and the provider's statements to me.            Huey Downs MD  08/10/21 2794

## 2021-08-17 ENCOUNTER — HOSPITAL ENCOUNTER (OUTPATIENT)
Dept: ULTRASOUND IMAGING | Facility: CLINIC | Age: 26
End: 2021-08-17
Attending: SURGERY
Payer: COMMERCIAL

## 2021-08-17 ENCOUNTER — TELEPHONE (OUTPATIENT)
Dept: OTHER | Facility: CLINIC | Age: 26
End: 2021-08-17

## 2021-08-17 ENCOUNTER — OFFICE VISIT (OUTPATIENT)
Dept: OTHER | Facility: CLINIC | Age: 26
End: 2021-08-17
Attending: SURGERY
Payer: COMMERCIAL

## 2021-08-17 VITALS — DIASTOLIC BLOOD PRESSURE: 101 MMHG | HEART RATE: 85 BPM | SYSTOLIC BLOOD PRESSURE: 146 MMHG

## 2021-08-17 DIAGNOSIS — Z01.818 PREOP TESTING: ICD-10-CM

## 2021-08-17 DIAGNOSIS — I77.0 AVF (ARTERIOVENOUS FISTULA) (H): ICD-10-CM

## 2021-08-17 DIAGNOSIS — Z11.59 ENCOUNTER FOR SCREENING FOR OTHER VIRAL DISEASES: ICD-10-CM

## 2021-08-17 DIAGNOSIS — N18.6 ESRD (END STAGE RENAL DISEASE) ON DIALYSIS (H): ICD-10-CM

## 2021-08-17 DIAGNOSIS — T82.590A DIALYSIS AV FISTULA MALFUNCTION, INITIAL ENCOUNTER (H): Primary | ICD-10-CM

## 2021-08-17 DIAGNOSIS — Z99.2 ESRD (END STAGE RENAL DISEASE) ON DIALYSIS (H): ICD-10-CM

## 2021-08-17 PROCEDURE — 99214 OFFICE O/P EST MOD 30 MIN: CPT | Performed by: SURGERY

## 2021-08-17 PROCEDURE — G0463 HOSPITAL OUTPT CLINIC VISIT: HCPCS

## 2021-08-17 PROCEDURE — 93990 DOPPLER FLOW TESTING: CPT

## 2021-08-17 PROCEDURE — 93970 EXTREMITY STUDY: CPT | Mod: 59

## 2021-08-17 NOTE — PROGRESS NOTES
Taylor Valiente is 5 months status post creation of a right proximal radial artery to cephalic vein AV fistula.  At his last office visit on 4/21/2021 there was a prominent easily visualized right median cubital vein with an excellent thrill.  As one followed that vein up into the cephalic vein it became deeper and more difficult to appreciate.  The vein was distended and readily visible for at least 5 or 6 cm before diving a bit deeper in the upper arm.  AV fistula ultrasound at that time showed a widely patent fistula with a calculated outflow volume of 1074 mL/min.  At the time of that visit he was not yet dialysis dependent.    He subsequently has become dialysis dependent, but the technicians at the dialysis center are having difficulty accessing the fistula.  It has infiltrated on multiple occasions.  He is therefore returning today for reevaluation of the fistula.  He has been dialyzing via a right IJ tunneled catheter placed on 6/15/2021.  At today's visit he is without complaints.  He is dialyzing on Mondays, Wednesdays, and Fridays at [facility] in [place].    Exam:  Pleasant, overweight male alert and oriented x3.  Blood pressure 146/101 with a pulse of 85.  Prominent median cubital vein coursing across the right antecubital fossa with an excellent thrill.  The cephalic vein is more difficult to appreciate due to his body habitus and the depth of that vein.  There is a thrill but it is a bit difficult to localize.  2+ palpable right radial pulse at the wrist.        Right antecubital fossa with prominent median cubital vein.      Imaging:  Review of today's AV fistula ultrasound shows a widely patent fistula with excellent caliber with diameters of the outflow vein ranging between 6.2 to 9.4 mm.  Calculated outflow volume is also excellent at 2230 mL/min.    ASSESSMENT:  5 months status post creation of a right proximal radial artery to cephalic vein AV fistula with widely patent fistula having  excellent caliber and calculated outflow volume but with difficulty for the technicians to access due to body habitus and the depth of the cephalic vein under his skin.    RECOMMENDATION:  I reviewed all the above with Taylor.  Although the fistula would appear to be excellent on the AV ultrasound the reality is that technicians are having difficulty accessing it more distally.  I feel this mandates some form of revision.  We may be able to superficialize the cephalic vein closer to the skin surface or I may actually have to transpose it.  Another possibility would be placement of an interposition graft again closer to the skin surface.  The above-noted procedure would be performed as an outpatient at [facility].  He will need at least 6-8 weeks for this revision to mature before he can be utilized.  He will continue to dialyze via his existing tunneled catheter.  He will need a preop medical exam and a negative COVID-19 test.    All of this was discussed in great detail.  He verbalizes full understanding to the above and agreement with this management plan.    Total length of this encounter was 30 minutes.    Edwardo Moran MD

## 2021-08-17 NOTE — NURSING NOTE
Patient Education    Procedure: Revision of right brachiocephalic AV-fistula  Diagnosis: ESRD on dialysis  Anticoagulation Instruction: continue ASA  Pre-Operative Physical Exam: You need to have a pre-op physical exam within 30 days of your procedure. Your procedure may be cancelled if you do not have a current History and Physical. Call your PCP's office to schedule.  Allergies:  Updated in Epic  Bowel Prep: NPO per protocol.   Post Procedure Education: [facility] post-procedure fact sheet reviewed with patient.    COVID-19 instructions for isolation and pre testing reviewed with pt.    Learner(s):patient  Method: Listening  Barriers to Learning:No Barrier  Outcome: Patient did verbalize understanding of above education.    Meka Vazquez, LEAHN, RN-[facility]

## 2021-08-17 NOTE — LETTER
August 17, 2021      Taylor Valiente  [address] [place] [address]        To Whom It May Concern:    Taylor Valiente was seen in clinic on 8/17/21.  Please excuse him due to physician appointment.        Sincerely,        Henrik Moran MD

## 2021-08-17 NOTE — PROGRESS NOTES
[facility]        Patient is here for a  follow up      Pt is currently taking Aspirin and Statin.    Patient's condition is stable.    BP (!) 146/101 (BP Location: Left arm, Patient Position: Chair, Cuff Size: Adult Regular)   Pulse 85     The provider has been notified that the patient has no concerns.     Questions patient would like addressed today are: N/A.    Refills are needed: No    Has homecare services and agency name:  Juanita Gonzalez MA

## 2021-08-17 NOTE — TELEPHONE ENCOUNTER
Type of surgery: REVISION OF RIGHT BRACHIOCEPHALIC ARTERIOVENOUS FISTULA   Location of surgery: [facility]  Date and time of surgery: 8/26/2021 2:05pm  Surgeon: DR. LOCKWOOD  Pre-Op Appt Date: TO BE SCHEDULED BY PATIENT  Post-Op Appt Date: 9/8/2021 Andrew   Packet sent out: GIVEN AT 8/17/2021 APPT  Pre-cert/Authorization completed:  Yes  Date: 8/17/2021 VG

## 2021-08-24 ENCOUNTER — LAB (OUTPATIENT)
Dept: LAB | Facility: CLINIC | Age: 26
End: 2021-08-24
Payer: COMMERCIAL

## 2021-08-24 DIAGNOSIS — Z11.59 ENCOUNTER FOR SCREENING FOR OTHER VIRAL DISEASES: ICD-10-CM

## 2021-08-24 PROCEDURE — U0005 INFEC AGEN DETEC AMPLI PROBE: HCPCS

## 2021-08-24 PROCEDURE — U0003 INFECTIOUS AGENT DETECTION BY NUCLEIC ACID (DNA OR RNA); SEVERE ACUTE RESPIRATORY SYNDROME CORONAVIRUS 2 (SARS-COV-2) (CORONAVIRUS DISEASE [COVID-19]), AMPLIFIED PROBE TECHNIQUE, MAKING USE OF HIGH THROUGHPUT TECHNOLOGIES AS DESCRIBED BY CMS-2020-01-R: HCPCS

## 2021-08-25 ENCOUNTER — OFFICE VISIT (OUTPATIENT)
Dept: FAMILY MEDICINE | Facility: CLINIC | Age: 26
End: 2021-08-25
Payer: COMMERCIAL

## 2021-08-25 ENCOUNTER — TELEPHONE (OUTPATIENT)
Dept: FAMILY MEDICINE | Facility: CLINIC | Age: 26
End: 2021-08-25

## 2021-08-25 VITALS
RESPIRATION RATE: 18 BRPM | SYSTOLIC BLOOD PRESSURE: 150 MMHG | WEIGHT: 254 LBS | HEIGHT: 72 IN | HEART RATE: 64 BPM | DIASTOLIC BLOOD PRESSURE: 90 MMHG | BODY MASS INDEX: 34.4 KG/M2 | OXYGEN SATURATION: 99 % | TEMPERATURE: 98.2 F

## 2021-08-25 DIAGNOSIS — Z01.818 PREOP GENERAL PHYSICAL EXAM: Primary | ICD-10-CM

## 2021-08-25 DIAGNOSIS — N18.6 ESRD NEEDING DIALYSIS (H): ICD-10-CM

## 2021-08-25 DIAGNOSIS — Z99.2 ESRD NEEDING DIALYSIS (H): ICD-10-CM

## 2021-08-25 DIAGNOSIS — I15.0 RENOVASCULAR HYPERTENSION: ICD-10-CM

## 2021-08-25 LAB
ANION GAP SERPL CALCULATED.3IONS-SCNC: 8 MMOL/L (ref 3–14)
BUN SERPL-MCNC: 68 MG/DL (ref 7–30)
CALCIUM SERPL-MCNC: 8.8 MG/DL (ref 8.5–10.1)
CHLORIDE BLD-SCNC: 112 MMOL/L (ref 94–109)
CO2 SERPL-SCNC: 24 MMOL/L (ref 20–32)
CREAT SERPL-MCNC: 11.3 MG/DL (ref 0.66–1.25)
ERYTHROCYTE [DISTWIDTH] IN BLOOD BY AUTOMATED COUNT: 14.6 % (ref 10–15)
GFR SERPL CREATININE-BSD FRML MDRD: 6 ML/MIN/1.73M2
GLUCOSE BLD-MCNC: 112 MG/DL (ref 70–99)
HCT VFR BLD AUTO: 30 % (ref 40–53)
HGB BLD-MCNC: 9.5 G/DL (ref 13.3–17.7)
MCH RBC QN AUTO: 27.9 PG (ref 26.5–33)
MCHC RBC AUTO-ENTMCNC: 31.7 G/DL (ref 31.5–36.5)
MCV RBC AUTO: 88 FL (ref 78–100)
PLATELET # BLD AUTO: 203 10E3/UL (ref 150–450)
POTASSIUM BLD-SCNC: 5.5 MMOL/L (ref 3.4–5.3)
RBC # BLD AUTO: 3.41 10E6/UL (ref 4.4–5.9)
SARS-COV-2 RNA RESP QL NAA+PROBE: NEGATIVE
SODIUM SERPL-SCNC: 144 MMOL/L (ref 133–144)
WBC # BLD AUTO: 7 10E3/UL (ref 4–11)

## 2021-08-25 PROCEDURE — 80048 BASIC METABOLIC PNL TOTAL CA: CPT | Performed by: PHYSICIAN ASSISTANT

## 2021-08-25 PROCEDURE — 36415 COLL VENOUS BLD VENIPUNCTURE: CPT | Performed by: PHYSICIAN ASSISTANT

## 2021-08-25 PROCEDURE — 99214 OFFICE O/P EST MOD 30 MIN: CPT | Performed by: PHYSICIAN ASSISTANT

## 2021-08-25 PROCEDURE — 85027 COMPLETE CBC AUTOMATED: CPT | Performed by: PHYSICIAN ASSISTANT

## 2021-08-25 ASSESSMENT — PAIN SCALES - GENERAL: PAINLEVEL: NO PAIN (0)

## 2021-08-25 ASSESSMENT — MIFFLIN-ST. JEOR: SCORE: 2175.14

## 2021-08-25 NOTE — PROGRESS NOTES
[facility]  [address] [facility] [address]  Phone: [phone]  Primary Provider: Priyank Lawrence  Pre-op Performing Provider: NOMAN MAZARIEGOS      PREOPERATIVE EVALUATION:  Today's date: 8/25/2021    Taylor Valiente is a 26 year old male who presents for a preoperative evaluation.    Surgical Information:  Surgery/Procedure: revision of right brachiocephalic AV fistula  Surgery Location: [facility]   Surgeon: randee Moran   Surgery Date: 08/26/2021  Time of Surgery: 2:05pm   Where patient plans to recover: At home with family  Fax number for surgical facility: Note does not need to be faxed, will be available electronically in Epic.    Type of Anesthesia Anticipated: General    Assessment & Plan     The proposed surgical procedure is considered INTERMEDIATE risk.    Preop general physical exam  CBC and BMP obtained.   AV fistula is malfunctioning per patient, this is a somewhat urgent procedure.  Blood pressure not optimal today.  CBC shows stable hemoglobin.  BMP shows quite elevated creatinine. Continue with plan for procedure for tomorrow. Patient certainly needs intervention regarding his kidney function/status.  - CBC with platelets; Future  - Basic metabolic panel  (Ca, Cl, CO2, Creat, Gluc, K, Na, BUN); Future  - CBC with platelets  - Basic metabolic panel  (Ca, Cl, CO2, Creat, Gluc, K, Na, BUN)    Renovascular hypertension    - CBC with platelets; Future  - Basic metabolic panel  (Ca, Cl, CO2, Creat, Gluc, K, Na, BUN); Future  - CBC with platelets  - Basic metabolic panel  (Ca, Cl, CO2, Creat, Gluc, K, Na, BUN)    ESRD needing dialysis (H)    - CBC with platelets; Future  - Basic metabolic panel  (Ca, Cl, CO2, Creat, Gluc, K, Na, BUN); Future  - CBC with platelets  - Basic metabolic panel  (Ca, Cl, CO2, Creat, Gluc, K, Na, BUN)    Possible Sleep Apnea:   STOP-Bang Total Score 8/25/2021   Total Score 3   Risk Stratification 3 - 4: Moderate Risk for FRANCIA           Risks and Recommendations:  The patient has the following additional risks and recommendations for perioperative complications:   - Monitor kidney function, due for intervention regarding kidney function    Medication Instructions:  Patient is to take all scheduled medications on the day of surgery EXCEPT for modifications listed below:   - Diuretics: HOLD on the day of surgery.    RECOMMENDATION:  APPROVAL GIVEN to proceed with proposed procedure, without further diagnostic evaluation.    30 minutes spent on the date of the encounter doing chart review, history and exam, documentation and further activities per the note        STOP Bang: 3    Subjective     HPI related to upcoming procedure: AV fistula malfunction, CKD stage 5 on hemodialysis     Preop Questions 8/25/2021   1. Have you ever had a heart attack or stroke? No   2. Have you ever had surgery on your heart or blood vessels, such as a stent placement, a coronary artery bypass, or surgery on an artery in your head, neck, heart, or legs? UNKNOWN - Has had AV fistula placed previously   3. Do you have chest pain with activity? No   4. Do you have a history of  heart failure? UNKNOWN - History of cardiomyopathy   5. Do you currently have a cold, bronchitis or symptoms of other infection? YES - Rhinorrhea, slight cough (no shortness of breath or chest pain)   6. Do you have a cough, shortness of breath, or wheezing? No   7. Do you or anyone in your family have previous history of blood clots? UNKNOWN - No known personal history of blood clots, patient unsure about family history   8. Do you or does anyone in your family have a serious bleeding problem such as prolonged bleeding following surgeries or cuts? No   9. Have you ever had problems with anemia or been told to take iron pills? YES - anemia of chronic disease   10. Have you had any abnormal blood loss such as black, tarry or bloody stools? No   11. Have you ever had a blood transfusion? No    12. Are you willing to have a blood transfusion if it is medically needed before, during, or after your surgery? YES   13. Have you or any of your relatives ever had problems with anesthesia? YES - Patient had some itching after a surgery (ankle surgery)   14. Do you have sleep apnea, excessive snoring or daytime drowsiness? UNKNOWN - He does snore, no daytime drowsiness   15. Do you have any artifical heart valves or other implanted medical devices like a pacemaker, defibrillator, or continuous glucose monitor? No   16. Do you have artificial joints? No   17. Are you allergic to latex? No       Preoperative Review of :   reviewed - controlled substances prescribed by other outside provider(s).      Status of Chronic Conditions:  See problem list for active medical problems.  Problems all longstanding and stable, except as noted/documented.  See ROS for pertinent symptoms related to these conditions.      Review of Systems  CONSTITUTIONAL: NEGATIVE for fever, chills, change in weight  INTEGUMENTARY/SKIN: NEGATIVE for worrisome rashes, moles or lesions  EYES: NEGATIVE for vision changes or irritation  ENT/MOUTH: As noted above  RESP: NEGATIVE for significant cough or SOB  CV: NEGATIVE for chest pain, palpitations or peripheral edema  GI: NEGATIVE for nausea, abdominal pain, heartburn, or change in bowel habits  : NEGATIVE for frequency, dysuria, or hematuria  MUSCULOSKELETAL: NEGATIVE for significant arthralgias or myalgia  NEURO: NEGATIVE for weakness, dizziness or paresthesias  ENDOCRINE: NEGATIVE for temperature intolerance, skin/hair changes  HEME/ALLERGY/IMMUNE: chronic anemia  PSYCHIATRIC: NEGATIVE for changes in mood or affect    Patient Active Problem List    Diagnosis Date Noted     Prolonged Q-T interval on ECG 06/15/2021     Priority: Medium     Acute renal failure with acute tubular necrosis superimposed on stage 5 chronic kidney disease, not on chronic dialysis (H) 06/15/2021     Priority:  Medium     ESRD needing dialysis (H) 06/15/2021     Priority: Medium     Adrenal adenoma, left      Priority: Medium     Benign adenoma.        Stress-induced cardiomyopathy      Priority: Medium     OD (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/2021     Priority: Medium     Added automatically from request for surgery [id number]       Anemia of chronic renal failure 12/21/2020     Priority: Medium     2019 novel coronavirus disease (COVID-19) 11/27/2020     Priority: Medium     Hyperlipidemia 05/01/2019     Priority: Medium     CKD (chronic kidney disease) stage 5, GFR less than 15 ml/min (H) 07/28/2017     Priority: Medium     Due to HTN       Focal glomerular sclerosis 07/28/2017     Priority: Medium     Due to Hypertension injury.       LVH (left ventricular hypertrophy) due to hypertensive disease 07/14/2017     Priority: Medium     Renovascular hypertension 07/14/2017     Priority: Medium      Past Medical History:   Diagnosis Date     Adrenal adenoma, left      Anemia      Benign essential hypertension 07/28/2017     CKD (chronic kidney disease) stage 5, GFR less than 15 ml/min (H)     FSGS     Depression      Focal glomerular sclerosis 07/28/2017     HLD (hyperlipidemia)      LVH (left ventricular hypertrophy) due to hypertensive disease 07/14/2017     Noncompliance      Obesity, unspecified      OD (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/2021     Stress-induced cardiomyopathy      Suicide attempt (H) 2019     Past Surgical History:   Procedure Laterality Date     ARTHROSCOPY ANKLE Left 2/24/2021    Procedure: Left ankle arthroscopy and debridement/micro fracture;  Surgeon: Mirza Nelson MD;  Location: [facility] OR     BIOPSY  2017    renal- [facility]     CREATE FISTULA ARTERIOVENOUS UPPER EXTREMITY Right 3/4/2021    Procedure: RIGHT proximal radial  to CEPHALIC ARTERIOVENOUS FISTULA;  Surgeon: Henrik Moran MD;  Location: [facility] OR     IR CVC TUNNEL PLACEMENT > 5 YRS OF AGE  6/17/2021     NO HISTORY OF SURGERY        Current Outpatient Medications   Medication Sig Dispense Refill     amLODIPine (NORVASC) 10 MG tablet Take 10 mg by mouth daily        aspirin 81 MG EC tablet Take 81 mg by mouth daily       atorvastatin (LIPITOR) 10 MG tablet TAKE ONE TABLET BY MOUTH EVERY NIGHT AT BEDTIME 90 tablet 3     bumetanide (BUMEX) 2 MG tablet        carvedilol (COREG) 25 MG tablet Take 2 tablets (50 mg) by mouth 2 times daily (with meals) 180 tablet 3     cloNIDine (CATAPRES) 0.1 MG tablet        hydrALAZINE (APRESOLINE) 100 MG tablet Take 1 tablet (100 mg) by mouth 3 times daily 360 tablet 1     multivitamin RENAL (RENAVITE RX/NEPHROVITE) 1 MG tablet Take 1 tablet by mouth       vitamin D3 (CHOLECALCIFEROL) 2000 units (50 mcg) tablet Take 75 mcg by mouth daily          Allergies   Allergen Reactions     Benadryl [Diphenhydramine] Itching        Social History     Tobacco Use     Smoking status: Former Smoker     Packs/day: 0.00     Types: Cigarettes     Smokeless tobacco: Never Used   Substance Use Topics     Alcohol use: No     Family History   Problem Relation Age of Onset     Blood Disease Mother         has hep b     Diabetes Mother         gestionanal diabetes     Hypertension Mother      Obesity Father      Hypertension Father      Hypertension Maternal Grandmother      Diabetes Maternal Grandmother      Hypertension Paternal Grandmother      Hypertension Paternal Grandfather      Coronary Artery Disease Maternal Uncle      Cancer No family hx of      History   Drug Use     Types: Marijuana     Comment: occ         Objective     BP (!) 150/90   Pulse 64   Temp 98.2  F (36.8  C) (Oral)   Resp 18   Ht 1.829 m (6')   Wt 115.2 kg (254 lb)   SpO2 99%   BMI 34.45 kg/m      Physical Exam    GENERAL APPEARANCE: healthy, alert and no distress     EYES: EOMI,  PERRL     HENT: ear canals and TM's normal and nose and mouth without ulcers or lesions     NECK: no adenopathy, no asymmetry, masses, or scars and thyroid normal to  palpation     RESP: lungs clear to auscultation - no rales, rhonchi or wheezes     CV: regular rates and rhythm, normal S1 S2, no S3 or S4 and no murmur, click or rub     ABDOMEN:  soft, nontender, no HSM or masses and bowel sounds normal     MS: extremities normal- no gross deformities noted, no evidence of inflammation in joints, FROM in all extremities. AV fistula in right arm     SKIN: no suspicious lesions or rashes     NEURO: Normal strength and tone, sensory exam grossly normal, mentation intact and speech normal     PSYCH: mentation appears normal. and affect normal/bright     LYMPHATICS: No cervical adenopathy    Recent Labs   Lab Test 08/10/21  1006 06/19/21  0659 06/18/21  0740 04/05/21  1545 03/04/21  0627 02/09/21  1055   HGB 9.0*  --  9.1*   < >  --  10.5*     --  203   < >  --  307   INR  --   --   --   --   --  1.23*    139 138  --  141 142   POTASSIUM 4.1 3.8 3.8  --  4.0 4.2   CR 8.52* 8.93* 11.50*  --  8.20* 7.99*   A1C  --   --   --   --  5.0  --     < > = values in this interval not displayed.        Diagnostics:  Recent Results (from the past 48 hour(s))   Asymptomatic COVID-19 Virus (Coronavirus) by PCR Nasopharyngeal    Collection Time: 08/24/21  2:50 PM    Specimen: Nasopharyngeal; Swab   Result Value Ref Range    SARS CoV2 PCR Negative Negative   CBC with platelets    Collection Time: 08/25/21  3:30 PM   Result Value Ref Range    WBC Count 7.0 4.0 - 11.0 10e3/uL    RBC Count 3.41 (L) 4.40 - 5.90 10e6/uL    Hemoglobin 9.5 (L) 13.3 - 17.7 g/dL    Hematocrit 30.0 (L) 40.0 - 53.0 %    MCV 88 78 - 100 fL    MCH 27.9 26.5 - 33.0 pg    MCHC 31.7 31.5 - 36.5 g/dL    RDW 14.6 10.0 - 15.0 %    Platelet Count 203 150 - 450 10e3/uL   Basic metabolic panel  (Ca, Cl, CO2, Creat, Gluc, K, Na, BUN)    Collection Time: 08/25/21  3:30 PM   Result Value Ref Range    Sodium 144 133 - 144 mmol/L    Potassium 5.5 (H) 3.4 - 5.3 mmol/L    Chloride 112 (H) 94 - 109 mmol/L    Carbon Dioxide (CO2) 24 20  - 32 mmol/L    Anion Gap 8 3 - 14 mmol/L    Urea Nitrogen 68 (H) 7 - 30 mg/dL    Creatinine 11.30 (HH) 0.66 - 1.25 mg/dL    Calcium 8.8 8.5 - 10.1 mg/dL    Glucose 112 (H) 70 - 99 mg/dL    GFR Estimate 6 (L) >60 mL/min/1.73m2      No EKG this visit, completed in the last 90 days.   Please see EKG from 6/15/21    ECHO 9/9/20  Interpretation Summary     The visual ejection fraction is estimated at 55-60%.  There is mild to moderate concentric left ventricular hypertrophy.  Regional wall motion abnormalities cannot be excluded due to limited  visualization.  There was essentially no subcostal acoustic window. Technically difficult,  suboptimal study.    Revised Cardiac Risk Index (RCRI):  The patient has the following serious cardiovascular risks for perioperative complications:   - Serum Creatinine >2.0 mg/dl = 1 point     RCRI Interpretation: 1 point: Class II (low risk - 0.9% complication rate)           Signed Electronically by: Stefani Fischer PA-C  Copy of this evaluation report is provided to requesting physician.

## 2021-08-25 NOTE — PATIENT INSTRUCTIONS

## 2021-08-25 NOTE — H&P (VIEW-ONLY)
[facility]  [address] [address] [address] [address]  Phone: [phone]  Primary Provider: Priyank Lawrence  Pre-op Performing Provider: NOMAN MAZARIEGOS      PREOPERATIVE EVALUATION:  Today's date: 8/25/2021    Taylor Valiente is a 26 year old male who presents for a preoperative evaluation.    Surgical Information:  Surgery/Procedure: revision of right brachiocephalic AV fistula  Surgery Location: [facility]   Surgeon: randee Moran   Surgery Date: 08/26/2021  Time of Surgery: 2:05pm   Where patient plans to recover: At home with family  Fax number for surgical facility: Note does not need to be faxed, will be available electronically in Epic.    Type of Anesthesia Anticipated: General    Assessment & Plan     The proposed surgical procedure is considered INTERMEDIATE risk.    Preop general physical exam  CBC and BMP obtained.   AV fistula is malfunctioning per patient, this is a somewhat urgent procedure.  Blood pressure not optimal today.  CBC shows stable hemoglobin.  BMP shows quite elevated creatinine. Continue with plan for procedure for tomorrow. Patient certainly needs intervention regarding his kidney function/status.  - CBC with platelets; Future  - Basic metabolic panel  (Ca, Cl, CO2, Creat, Gluc, K, Na, BUN); Future  - CBC with platelets  - Basic metabolic panel  (Ca, Cl, CO2, Creat, Gluc, K, Na, BUN)    Renovascular hypertension    - CBC with platelets; Future  - Basic metabolic panel  (Ca, Cl, CO2, Creat, Gluc, K, Na, BUN); Future  - CBC with platelets  - Basic metabolic panel  (Ca, Cl, CO2, Creat, Gluc, K, Na, BUN)    ESRD needing dialysis (H)    - CBC with platelets; Future  - Basic metabolic panel  (Ca, Cl, CO2, Creat, Gluc, K, Na, BUN); Future  - CBC with platelets  - Basic metabolic panel  (Ca, Cl, CO2, Creat, Gluc, K, Na, BUN)    Possible Sleep Apnea:   STOP-Bang Total Score 8/25/2021   Total Score 3   Risk Stratification 3 - 4: Moderate Risk for FRANCIA           Risks and Recommendations:  The patient has the following additional risks and recommendations for perioperative complications:   - Monitor kidney function, due for intervention regarding kidney function    Medication Instructions:  Patient is to take all scheduled medications on the day of surgery EXCEPT for modifications listed below:   - Diuretics: HOLD on the day of surgery.    RECOMMENDATION:  APPROVAL GIVEN to proceed with proposed procedure, without further diagnostic evaluation.    30 minutes spent on the date of the encounter doing chart review, history and exam, documentation and further activities per the note        STOP Bang: 3    Subjective     HPI related to upcoming procedure: AV fistula malfunction, CKD stage 5 on hemodialysis     Preop Questions 8/25/2021   1. Have you ever had a heart attack or stroke? No   2. Have you ever had surgery on your heart or blood vessels, such as a stent placement, a coronary artery bypass, or surgery on an artery in your head, neck, heart, or legs? UNKNOWN - Has had AV fistula placed previously   3. Do you have chest pain with activity? No   4. Do you have a history of  heart failure? UNKNOWN - History of cardiomyopathy   5. Do you currently have a cold, bronchitis or symptoms of other infection? YES - Rhinorrhea, slight cough (no shortness of breath or chest pain)   6. Do you have a cough, shortness of breath, or wheezing? No   7. Do you or anyone in your family have previous history of blood clots? UNKNOWN - No known personal history of blood clots, patient unsure about family history   8. Do you or does anyone in your family have a serious bleeding problem such as prolonged bleeding following surgeries or cuts? No   9. Have you ever had problems with anemia or been told to take iron pills? YES - anemia of chronic disease   10. Have you had any abnormal blood loss such as black, tarry or bloody stools? No   11. Have you ever had a blood transfusion? No    12. Are you willing to have a blood transfusion if it is medically needed before, during, or after your surgery? YES   13. Have you or any of your relatives ever had problems with anesthesia? YES - Patient had some itching after a surgery (ankle surgery)   14. Do you have sleep apnea, excessive snoring or daytime drowsiness? UNKNOWN - He does snore, no daytime drowsiness   15. Do you have any artifical heart valves or other implanted medical devices like a pacemaker, defibrillator, or continuous glucose monitor? No   16. Do you have artificial joints? No   17. Are you allergic to latex? No       Preoperative Review of :   reviewed - controlled substances prescribed by other outside provider(s).      Status of Chronic Conditions:  See problem list for active medical problems.  Problems all longstanding and stable, except as noted/documented.  See ROS for pertinent symptoms related to these conditions.      Review of Systems  CONSTITUTIONAL: NEGATIVE for fever, chills, change in weight  INTEGUMENTARY/SKIN: NEGATIVE for worrisome rashes, moles or lesions  EYES: NEGATIVE for vision changes or irritation  ENT/MOUTH: As noted above  RESP: NEGATIVE for significant cough or SOB  CV: NEGATIVE for chest pain, palpitations or peripheral edema  GI: NEGATIVE for nausea, abdominal pain, heartburn, or change in bowel habits  : NEGATIVE for frequency, dysuria, or hematuria  MUSCULOSKELETAL: NEGATIVE for significant arthralgias or myalgia  NEURO: NEGATIVE for weakness, dizziness or paresthesias  ENDOCRINE: NEGATIVE for temperature intolerance, skin/hair changes  HEME/ALLERGY/IMMUNE: chronic anemia  PSYCHIATRIC: NEGATIVE for changes in mood or affect    Patient Active Problem List    Diagnosis Date Noted     Prolonged Q-T interval on ECG 06/15/2021     Priority: Medium     Acute renal failure with acute tubular necrosis superimposed on stage 5 chronic kidney disease, not on chronic dialysis (H) 06/15/2021     Priority:  Medium     ESRD needing dialysis (H) 06/15/2021     Priority: Medium     Adrenal adenoma, left      Priority: Medium     Benign adenoma.        Stress-induced cardiomyopathy      Priority: Medium     OD (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/2021     Priority: Medium     Added automatically from request for surgery [id number]       Anemia of chronic renal failure 12/21/2020     Priority: Medium     2019 novel coronavirus disease (COVID-19) 11/27/2020     Priority: Medium     Hyperlipidemia 05/01/2019     Priority: Medium     CKD (chronic kidney disease) stage 5, GFR less than 15 ml/min (H) 07/28/2017     Priority: Medium     Due to HTN       Focal glomerular sclerosis 07/28/2017     Priority: Medium     Due to Hypertension injury.       LVH (left ventricular hypertrophy) due to hypertensive disease 07/14/2017     Priority: Medium     Renovascular hypertension 07/14/2017     Priority: Medium      Past Medical History:   Diagnosis Date     Adrenal adenoma, left      Anemia      Benign essential hypertension 07/28/2017     CKD (chronic kidney disease) stage 5, GFR less than 15 ml/min (H)     FSGS     Depression      Focal glomerular sclerosis 07/28/2017     HLD (hyperlipidemia)      LVH (left ventricular hypertrophy) due to hypertensive disease 07/14/2017     Noncompliance      Obesity, unspecified      OD (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/2021     Stress-induced cardiomyopathy      Suicide attempt (H) 2019     Past Surgical History:   Procedure Laterality Date     ARTHROSCOPY ANKLE Left 2/24/2021    Procedure: Left ankle arthroscopy and debridement/micro fracture;  Surgeon: Mirza Nelson MD;  Location: [facility]     BIOPSY  2017    [facility]al- [facility]     CREATE FISTULA ARTERIOVENOUS UPPER EXTREMITY Right 3/4/2021    Procedure: RIGHT proximal radial  to CEPHALIC ARTERIOVENOUS FISTULA;  Surgeon: Henrik Moran MD;  Location: [facility] OR     IR CVC TUNNEL PLACEMENT > 5 YRS OF AGE  6/17/2021     NO HISTORY OF SURGERY        Current Outpatient Medications   Medication Sig Dispense Refill     amLODIPine (NORVASC) 10 MG tablet Take 10 mg by mouth daily        aspirin 81 MG EC tablet Take 81 mg by mouth daily       atorvastatin (LIPITOR) 10 MG tablet TAKE ONE TABLET BY MOUTH EVERY NIGHT AT BEDTIME 90 tablet 3     bumetanide (BUMEX) 2 MG tablet        carvedilol (COREG) 25 MG tablet Take 2 tablets (50 mg) by mouth 2 times daily (with meals) 180 tablet 3     cloNIDine (CATAPRES) 0.1 MG tablet        hydrALAZINE (APRESOLINE) 100 MG tablet Take 1 tablet (100 mg) by mouth 3 times daily 360 tablet 1     multivitamin RENAL (RENAVITE RX/NEPHROVITE) 1 MG tablet Take 1 tablet by mouth       vitamin D3 (CHOLECALCIFEROL) 2000 units (50 mcg) tablet Take 75 mcg by mouth daily          Allergies   Allergen Reactions     Benadryl [Diphenhydramine] Itching        Social History     Tobacco Use     Smoking status: Former Smoker     Packs/day: 0.00     Types: Cigarettes     Smokeless tobacco: Never Used   Substance Use Topics     Alcohol use: No     Family History   Problem Relation Age of Onset     Blood Disease Mother         has hep b     Diabetes Mother         gestionanal diabetes     Hypertension Mother      Obesity Father      Hypertension Father      Hypertension Maternal Grandmother      Diabetes Maternal Grandmother      Hypertension Paternal Grandmother      Hypertension Paternal Grandfather      Coronary Artery Disease Maternal Uncle      Cancer No family hx of      History   Drug Use     Types: Marijuana     Comment: occ         Objective     BP (!) 150/90   Pulse 64   Temp 98.2  F (36.8  C) (Oral)   Resp 18   Ht 1.829 m (6')   Wt 115.2 kg (254 lb)   SpO2 99%   BMI 34.45 kg/m      Physical Exam    GENERAL APPEARANCE: healthy, alert and no distress     EYES: EOMI,  PERRL     HENT: ear canals and TM's normal and nose and mouth without ulcers or lesions     NECK: no adenopathy, no asymmetry, masses, or scars and thyroid normal to  palpation     RESP: lungs clear to auscultation - no rales, rhonchi or wheezes     CV: regular rates and rhythm, normal S1 S2, no S3 or S4 and no murmur, click or rub     ABDOMEN:  soft, nontender, no HSM or masses and bowel sounds normal     MS: extremities normal- no gross deformities noted, no evidence of inflammation in joints, FROM in all extremities. AV fistula in right arm     SKIN: no suspicious lesions or rashes     NEURO: Normal strength and tone, sensory exam grossly normal, mentation intact and speech normal     PSYCH: mentation appears normal. and affect normal/bright     LYMPHATICS: No cervical adenopathy    Recent Labs   Lab Test 08/10/21  1006 06/19/21  0659 06/18/21  0740 04/05/21  1545 03/04/21  0627 02/09/21  1055   HGB 9.0*  --  9.1*   < >  --  10.5*     --  203   < >  --  307   INR  --   --   --   --   --  1.23*    139 138  --  141 142   POTASSIUM 4.1 3.8 3.8  --  4.0 4.2   CR 8.52* 8.93* 11.50*  --  8.20* 7.99*   A1C  --   --   --   --  5.0  --     < > = values in this interval not displayed.        Diagnostics:  Recent Results (from the past 48 hour(s))   Asymptomatic COVID-19 Virus (Coronavirus) by PCR Nasopharyngeal    Collection Time: 08/24/21  2:50 PM    Specimen: Nasopharyngeal; Swab   Result Value Ref Range    SARS CoV2 PCR Negative Negative   CBC with platelets    Collection Time: 08/25/21  3:30 PM   Result Value Ref Range    WBC Count 7.0 4.0 - 11.0 10e3/uL    RBC Count 3.41 (L) 4.40 - 5.90 10e6/uL    Hemoglobin 9.5 (L) 13.3 - 17.7 g/dL    Hematocrit 30.0 (L) 40.0 - 53.0 %    MCV 88 78 - 100 fL    MCH 27.9 26.5 - 33.0 pg    MCHC 31.7 31.5 - 36.5 g/dL    RDW 14.6 10.0 - 15.0 %    Platelet Count 203 150 - 450 10e3/uL   Basic metabolic panel  (Ca, Cl, CO2, Creat, Gluc, K, Na, BUN)    Collection Time: 08/25/21  3:30 PM   Result Value Ref Range    Sodium 144 133 - 144 mmol/L    Potassium 5.5 (H) 3.4 - 5.3 mmol/L    Chloride 112 (H) 94 - 109 mmol/L    Carbon Dioxide (CO2) 24 20  - 32 mmol/L    Anion Gap 8 3 - 14 mmol/L    Urea Nitrogen 68 (H) 7 - 30 mg/dL    Creatinine 11.30 (HH) 0.66 - 1.25 mg/dL    Calcium 8.8 8.5 - 10.1 mg/dL    Glucose 112 (H) 70 - 99 mg/dL    GFR Estimate 6 (L) >60 mL/min/1.73m2      No EKG this visit, completed in the last 90 days.   Please see EKG from 6/15/21    ECHO 9/9/20  Interpretation Summary     The visual ejection fraction is estimated at 55-60%.  There is mild to moderate concentric left ventricular hypertrophy.  Regional wall motion abnormalities cannot be excluded due to limited  visualization.  There was essentially no subcostal acoustic window. Technically difficult,  suboptimal study.    Revised Cardiac Risk Index (RCRI):  The patient has the following serious cardiovascular risks for perioperative complications:   - Serum Creatinine >2.0 mg/dl = 1 point     RCRI Interpretation: 1 point: Class II (low risk - 0.9% complication rate)           Signed Electronically by: Stefani Fischer PA-C  Copy of this evaluation report is provided to requesting physician.

## 2021-08-26 ENCOUNTER — HOSPITAL ENCOUNTER (OUTPATIENT)
Facility: CLINIC | Age: 26
Discharge: HOME | End: 2021-08-26
Attending: SURGERY | Admitting: SURGERY
Payer: COMMERCIAL

## 2021-08-26 ENCOUNTER — ANESTHESIA (OUTPATIENT)
Dept: SURGERY | Facility: CLINIC | Age: 26
End: 2021-08-26
Payer: COMMERCIAL

## 2021-08-26 ENCOUNTER — ANESTHESIA EVENT (OUTPATIENT)
Dept: SURGERY | Facility: CLINIC | Age: 26
End: 2021-08-26
Payer: COMMERCIAL

## 2021-08-26 ENCOUNTER — APPOINTMENT (OUTPATIENT)
Dept: SURGERY | Facility: PHYSICIAN GROUP | Age: 26
End: 2021-08-26
Payer: COMMERCIAL

## 2021-08-26 ENCOUNTER — TELEPHONE (OUTPATIENT)
Dept: OTHER | Facility: CLINIC | Age: 26
End: 2021-08-26

## 2021-08-26 VITALS
DIASTOLIC BLOOD PRESSURE: 98 MMHG | HEIGHT: 72 IN | HEART RATE: 68 BPM | SYSTOLIC BLOOD PRESSURE: 145 MMHG | OXYGEN SATURATION: 97 % | BODY MASS INDEX: 33.49 KG/M2 | TEMPERATURE: 98.4 F | RESPIRATION RATE: 14 BRPM | WEIGHT: 247.3 LBS

## 2021-08-26 DIAGNOSIS — N18.6 ESRD NEEDING DIALYSIS (H): ICD-10-CM

## 2021-08-26 DIAGNOSIS — Z99.2 ESRD (END STAGE RENAL DISEASE) ON DIALYSIS (H): ICD-10-CM

## 2021-08-26 DIAGNOSIS — Z79.2 NEED FOR PROPHYLACTIC ANTIBIOTIC: Primary | ICD-10-CM

## 2021-08-26 DIAGNOSIS — N18.6 ESRD (END STAGE RENAL DISEASE) ON DIALYSIS (H): ICD-10-CM

## 2021-08-26 DIAGNOSIS — Z99.2 ESRD NEEDING DIALYSIS (H): ICD-10-CM

## 2021-08-26 LAB
ANION GAP SERPL CALCULATED.3IONS-SCNC: 7 MMOL/L (ref 3–14)
BUN SERPL-MCNC: 49 MG/DL (ref 7–30)
CALCIUM SERPL-MCNC: 8.9 MG/DL (ref 8.5–10.1)
CHLORIDE BLD-SCNC: 105 MMOL/L (ref 94–109)
CO2 SERPL-SCNC: 25 MMOL/L (ref 20–32)
CREAT SERPL-MCNC: 8.41 MG/DL (ref 0.66–1.25)
GFR SERPL CREATININE-BSD FRML MDRD: 8 ML/MIN/1.73M2
GLUCOSE BLD-MCNC: 102 MG/DL (ref 70–99)
HBA1C MFR BLD: 5.1 % (ref 0–5.6)
POTASSIUM BLD-SCNC: 4.7 MMOL/L (ref 3.4–5.3)
SODIUM SERPL-SCNC: 137 MMOL/L (ref 133–144)

## 2021-08-26 PROCEDURE — 250N000013 HC RX MED GY IP 250 OP 250 PS 637: Performed by: ANESTHESIOLOGY

## 2021-08-26 PROCEDURE — 999N000141 HC STATISTIC PRE-PROCEDURE NURSING ASSESSMENT: Performed by: SURGERY

## 2021-08-26 PROCEDURE — 250N000011 HC RX IP 250 OP 636: Performed by: NURSE ANESTHETIST, CERTIFIED REGISTERED

## 2021-08-26 PROCEDURE — 93005 ELECTROCARDIOGRAM TRACING: CPT

## 2021-08-26 PROCEDURE — 250N000009 HC RX 250: Performed by: NURSE ANESTHETIST, CERTIFIED REGISTERED

## 2021-08-26 PROCEDURE — 250N000011 HC RX IP 250 OP 636: Performed by: ANESTHESIOLOGY

## 2021-08-26 PROCEDURE — 36415 COLL VENOUS BLD VENIPUNCTURE: CPT | Performed by: SURGERY

## 2021-08-26 PROCEDURE — 710N000009 HC RECOVERY PHASE 1, LEVEL 1, PER MIN: Performed by: SURGERY

## 2021-08-26 PROCEDURE — 710N000012 HC RECOVERY PHASE 2, PER MINUTE: Performed by: SURGERY

## 2021-08-26 PROCEDURE — 999N000054 HC STATISTIC EKG NON-CHARGEABLE

## 2021-08-26 PROCEDURE — 36832 AV FISTULA REVISION OPEN: CPT | Mod: RT | Performed by: SURGERY

## 2021-08-26 PROCEDURE — 250N000011 HC RX IP 250 OP 636: Performed by: SURGERY

## 2021-08-26 PROCEDURE — 258N000003 HC RX IP 258 OP 636: Performed by: NURSE ANESTHETIST, CERTIFIED REGISTERED

## 2021-08-26 PROCEDURE — 82374 ASSAY BLOOD CARBON DIOXIDE: CPT | Performed by: SURGERY

## 2021-08-26 PROCEDURE — 272N000001 HC OR GENERAL SUPPLY STERILE: Performed by: SURGERY

## 2021-08-26 PROCEDURE — 250N000025 HC SEVOFLURANE, PER MIN: Performed by: SURGERY

## 2021-08-26 PROCEDURE — 360N000076 HC SURGERY LEVEL 3, PER MIN: Performed by: SURGERY

## 2021-08-26 PROCEDURE — 370N000017 HC ANESTHESIA TECHNICAL FEE, PER MIN: Performed by: SURGERY

## 2021-08-26 PROCEDURE — 250N000009 HC RX 250: Performed by: SURGERY

## 2021-08-26 PROCEDURE — 83036 HEMOGLOBIN GLYCOSYLATED A1C: CPT | Performed by: SURGERY

## 2021-08-26 PROCEDURE — 258N000003 HC RX IP 258 OP 636: Performed by: SURGERY

## 2021-08-26 RX ORDER — OXYCODONE HYDROCHLORIDE 5 MG/1
5 TABLET ORAL EVERY 4 HOURS PRN
Status: DISCONTINUED | OUTPATIENT
Start: 2021-08-26 | End: 2021-08-26 | Stop reason: HOSPADM

## 2021-08-26 RX ORDER — FENTANYL CITRATE 0.05 MG/ML
50 INJECTION, SOLUTION INTRAMUSCULAR; INTRAVENOUS EVERY 5 MIN PRN
Status: DISCONTINUED | OUTPATIENT
Start: 2021-08-26 | End: 2021-08-26 | Stop reason: HOSPADM

## 2021-08-26 RX ORDER — HEPARIN SODIUM 1000 [USP'U]/ML
INJECTION, SOLUTION INTRAVENOUS; SUBCUTANEOUS PRN
Status: DISCONTINUED | OUTPATIENT
Start: 2021-08-26 | End: 2021-08-26

## 2021-08-26 RX ORDER — BUPIVACAINE HYDROCHLORIDE 2.5 MG/ML
INJECTION, SOLUTION INFILTRATION; PERINEURAL PRN
Status: DISCONTINUED | OUTPATIENT
Start: 2021-08-26 | End: 2021-08-26 | Stop reason: HOSPADM

## 2021-08-26 RX ORDER — SODIUM CHLORIDE 9 MG/ML
INJECTION, SOLUTION INTRAVENOUS CONTINUOUS PRN
Status: DISCONTINUED | OUTPATIENT
Start: 2021-08-26 | End: 2021-08-26

## 2021-08-26 RX ORDER — FENTANYL CITRATE 50 UG/ML
INJECTION, SOLUTION INTRAMUSCULAR; INTRAVENOUS PRN
Status: DISCONTINUED | OUTPATIENT
Start: 2021-08-26 | End: 2021-08-26

## 2021-08-26 RX ORDER — SODIUM CHLORIDE, SODIUM LACTATE, POTASSIUM CHLORIDE, CALCIUM CHLORIDE 600; 310; 30; 20 MG/100ML; MG/100ML; MG/100ML; MG/100ML
INJECTION, SOLUTION INTRAVENOUS CONTINUOUS
Status: DISCONTINUED | OUTPATIENT
Start: 2021-08-26 | End: 2021-08-26 | Stop reason: HOSPADM

## 2021-08-26 RX ORDER — PROPOFOL 10 MG/ML
INJECTION, EMULSION INTRAVENOUS PRN
Status: DISCONTINUED | OUTPATIENT
Start: 2021-08-26 | End: 2021-08-26

## 2021-08-26 RX ORDER — LIDOCAINE HYDROCHLORIDE 20 MG/ML
INJECTION, SOLUTION INFILTRATION; PERINEURAL PRN
Status: DISCONTINUED | OUTPATIENT
Start: 2021-08-26 | End: 2021-08-26

## 2021-08-26 RX ORDER — OXYCODONE HYDROCHLORIDE 5 MG/1
5 TABLET ORAL EVERY 4 HOURS PRN
Qty: 15 TABLET | Refills: 0 | Status: SHIPPED | OUTPATIENT
Start: 2021-08-26 | End: 2021-08-29

## 2021-08-26 RX ORDER — ONDANSETRON 4 MG/1
4 TABLET, ORALLY DISINTEGRATING ORAL EVERY 30 MIN PRN
Status: DISCONTINUED | OUTPATIENT
Start: 2021-08-26 | End: 2021-08-26 | Stop reason: HOSPADM

## 2021-08-26 RX ORDER — HYDROMORPHONE HYDROCHLORIDE 1 MG/ML
0.5 INJECTION, SOLUTION INTRAMUSCULAR; INTRAVENOUS; SUBCUTANEOUS EVERY 5 MIN PRN
Status: DISCONTINUED | OUTPATIENT
Start: 2021-08-26 | End: 2021-08-26 | Stop reason: HOSPADM

## 2021-08-26 RX ORDER — DEXAMETHASONE SODIUM PHOSPHATE 4 MG/ML
INJECTION, SOLUTION INTRA-ARTICULAR; INTRALESIONAL; INTRAMUSCULAR; INTRAVENOUS; SOFT TISSUE PRN
Status: DISCONTINUED | OUTPATIENT
Start: 2021-08-26 | End: 2021-08-26

## 2021-08-26 RX ORDER — PROTAMINE SULFATE 10 MG/ML
INJECTION, SOLUTION INTRAVENOUS PRN
Status: DISCONTINUED | OUTPATIENT
Start: 2021-08-26 | End: 2021-08-26

## 2021-08-26 RX ORDER — ONDANSETRON 2 MG/ML
4 INJECTION INTRAMUSCULAR; INTRAVENOUS EVERY 30 MIN PRN
Status: DISCONTINUED | OUTPATIENT
Start: 2021-08-26 | End: 2021-08-26 | Stop reason: HOSPADM

## 2021-08-26 RX ORDER — EPHEDRINE SULFATE 50 MG/ML
INJECTION, SOLUTION INTRAMUSCULAR; INTRAVENOUS; SUBCUTANEOUS PRN
Status: DISCONTINUED | OUTPATIENT
Start: 2021-08-26 | End: 2021-08-26

## 2021-08-26 RX ORDER — MAGNESIUM HYDROXIDE 1200 MG/15ML
LIQUID ORAL PRN
Status: DISCONTINUED | OUTPATIENT
Start: 2021-08-26 | End: 2021-08-26 | Stop reason: HOSPADM

## 2021-08-26 RX ORDER — HEPARIN SODIUM 1000 [USP'U]/ML
INJECTION, SOLUTION INTRAVENOUS; SUBCUTANEOUS PRN
Status: DISCONTINUED | OUTPATIENT
Start: 2021-08-26 | End: 2021-08-26 | Stop reason: HOSPADM

## 2021-08-26 RX ORDER — CEFAZOLIN SODIUM 500 MG/2.2ML
500 INJECTION, POWDER, FOR SOLUTION INTRAMUSCULAR; INTRAVENOUS
Status: COMPLETED | OUTPATIENT
Start: 2021-08-26 | End: 2021-08-26

## 2021-08-26 RX ORDER — PROPOFOL 10 MG/ML
INJECTION, EMULSION INTRAVENOUS CONTINUOUS PRN
Status: DISCONTINUED | OUTPATIENT
Start: 2021-08-26 | End: 2021-08-26

## 2021-08-26 RX ADMIN — SODIUM CHLORIDE: 0.9 INJECTION, SOLUTION INTRAVENOUS at 15:19

## 2021-08-26 RX ADMIN — PROPOFOL 40 MG: 10 INJECTION, EMULSION INTRAVENOUS at 16:41

## 2021-08-26 RX ADMIN — PHENYLEPHRINE HYDROCHLORIDE 50 MCG: 10 INJECTION INTRAVENOUS at 15:54

## 2021-08-26 RX ADMIN — Medication 8 MCG: at 16:45

## 2021-08-26 RX ADMIN — DEXAMETHASONE SODIUM PHOSPHATE 4 MG: 4 INJECTION, SOLUTION INTRA-ARTICULAR; INTRALESIONAL; INTRAMUSCULAR; INTRAVENOUS; SOFT TISSUE at 15:40

## 2021-08-26 RX ADMIN — PROPOFOL 200 MG: 10 INJECTION, EMULSION INTRAVENOUS at 15:23

## 2021-08-26 RX ADMIN — Medication 8 MCG: at 16:49

## 2021-08-26 RX ADMIN — PROPOFOL 90 MG: 10 INJECTION, EMULSION INTRAVENOUS at 15:24

## 2021-08-26 RX ADMIN — PROPOFOL 80 MG: 10 INJECTION, EMULSION INTRAVENOUS at 16:01

## 2021-08-26 RX ADMIN — Medication 4 MCG: at 17:15

## 2021-08-26 RX ADMIN — PHENYLEPHRINE HYDROCHLORIDE 50 MCG: 10 INJECTION INTRAVENOUS at 15:46

## 2021-08-26 RX ADMIN — PROPOFOL 50 MG: 10 INJECTION, EMULSION INTRAVENOUS at 16:44

## 2021-08-26 RX ADMIN — CEFAZOLIN 500 MG: 225 INJECTION, POWDER, FOR SOLUTION INTRAMUSCULAR; INTRAVENOUS at 15:29

## 2021-08-26 RX ADMIN — PHENYLEPHRINE HYDROCHLORIDE 100 MCG: 10 INJECTION INTRAVENOUS at 16:10

## 2021-08-26 RX ADMIN — HEPARIN SODIUM 5000 UNITS: 1000 INJECTION INTRAVENOUS; SUBCUTANEOUS at 17:20

## 2021-08-26 RX ADMIN — PHENYLEPHRINE HYDROCHLORIDE 100 MCG: 10 INJECTION INTRAVENOUS at 16:40

## 2021-08-26 RX ADMIN — PROPOFOL 25 MCG/KG/MIN: 10 INJECTION, EMULSION INTRAVENOUS at 15:34

## 2021-08-26 RX ADMIN — PHENYLEPHRINE HYDROCHLORIDE 100 MCG: 10 INJECTION INTRAVENOUS at 16:28

## 2021-08-26 RX ADMIN — FENTANYL CITRATE 50 MCG: 50 INJECTION, SOLUTION INTRAMUSCULAR; INTRAVENOUS at 16:03

## 2021-08-26 RX ADMIN — PROPOFOL 50 MG: 10 INJECTION, EMULSION INTRAVENOUS at 16:56

## 2021-08-26 RX ADMIN — FENTANYL CITRATE 25 MCG: 50 INJECTION, SOLUTION INTRAMUSCULAR; INTRAVENOUS at 16:16

## 2021-08-26 RX ADMIN — OXYCODONE HYDROCHLORIDE 5 MG: 5 TABLET ORAL at 19:35

## 2021-08-26 RX ADMIN — Medication 5 MG: at 16:31

## 2021-08-26 RX ADMIN — LIDOCAINE HYDROCHLORIDE 100 MG: 20 INJECTION, SOLUTION INFILTRATION; PERINEURAL at 15:23

## 2021-08-26 RX ADMIN — FENTANYL CITRATE 25 MCG: 50 INJECTION, SOLUTION INTRAMUSCULAR; INTRAVENOUS at 15:23

## 2021-08-26 RX ADMIN — MIDAZOLAM 2 MG: 1 INJECTION INTRAMUSCULAR; INTRAVENOUS at 15:19

## 2021-08-26 RX ADMIN — FENTANYL CITRATE 25 MCG: 50 INJECTION, SOLUTION INTRAMUSCULAR; INTRAVENOUS at 16:09

## 2021-08-26 RX ADMIN — Medication 5 MG: at 16:40

## 2021-08-26 RX ADMIN — FENTANYL CITRATE 75 MCG: 50 INJECTION, SOLUTION INTRAMUSCULAR; INTRAVENOUS at 15:31

## 2021-08-26 RX ADMIN — PROTAMINE SULFATE 40 MG: 10 INJECTION, SOLUTION INTRAVENOUS at 17:59

## 2021-08-26 ASSESSMENT — LIFESTYLE VARIABLES: TOBACCO_USE: 1

## 2021-08-26 ASSESSMENT — MIFFLIN-ST. JEOR: SCORE: 2144.75

## 2021-08-26 NOTE — TELEPHONE ENCOUNTER
[facility]     Who is the name of the provider? Dean     What is the location you see this provider at?  [place]     Reason for call:  Patient's Creatinine level is 11.30    :  Fallon (RN - [facility])    Phone number to call:  [phone]    Additional Notes:  Dr Lawrence has cleared patient for surgery today (08/26/21) but would like Dr Moran to be aware of Creatinine Level

## 2021-08-26 NOTE — ANESTHESIA CARE TRANSFER NOTE
Patient: Taylor Valiente    Procedure(s):  Second stage RIGHT BRACHIOCEPHALIC transposition ARTERIOVENOUS FISTULA    Diagnosis: ESRD (end stage renal disease) on dialysis (H) [N18.6, Z99.2]  Diagnosis Additional Information: No value filed.    Anesthesia Type:   General     Note:    Oropharynx: oral airway in place  Level of Consciousness: drowsy  Oxygen Supplementation: face mask  Level of Supplemental Oxygen (L/min / FiO2): 6  Independent Airway: airway patency satisfactory and stable  Dentition: dentition unchanged  Vital Signs Stable: post-procedure vital signs reviewed and stable  Report to RN Given: handoff report given  Patient transferred to: PACU    Handoff Report: Identifed the Patient, Identified the Reponsible Provider, Reviewed the pertinent medical history, Discussed the surgical course, Reviewed Intra-OP anesthesia mangement and issues during anesthesia, Set expectations for post-procedure period and Allowed opportunity for questions and acknowledgement of understanding      Vitals:  Vitals Value Taken Time   /69 08/26/21 1846   Temp     Pulse 73 08/26/21 1850   Resp 24 08/26/21 1850   SpO2 98 % 08/26/21 1850   Vitals shown include unvalidated device data.    Electronically Signed By: ROSA Jaeger CRNA  August 26, 2021  6:52 PM

## 2021-08-26 NOTE — TELEPHONE ENCOUNTER
I called [facility] about his less desireable renal funcion, his procedure is set for tomorrow. No answer on his line. Left message to go to ER today  if he feels poorly, nauseous tired or weak and how the labs showed that he really needs his kidney intervention. TOMMY MENDEZ

## 2021-08-26 NOTE — TELEPHONE ENCOUNTER
RN placed call to number below - advised need to call [place] office at [phone]  Spoke to Dr. Moran's team, message will be sent to his RN to review results   Creatinine   Date Value Ref Range Status   08/25/2021 11.30 (HH) 0.66 - 1.25 mg/dL Final   06/19/2021 8.93 (H) 0.66 - 1.25 mg/dL Final     Procedure is scheduled today at 2:00 pm     This RN direct number given for call back if any questions/concerns     Sultana Urbina, Registered Nurse, PAL (Patient Advocate Liason)   [facility]   [phone]

## 2021-08-26 NOTE — BRIEF OP NOTE
[facility]    Brief Operative Note    Pre-operative diagnosis: ESRD (end stage renal disease) on dialysis (H) [N18.6, Z99.2]  Post-operative diagnosis Same as pre-operative diagnosis    Procedure: Procedure(s):  Second stage RIGHT BRACHIOCEPHALIC transposition ARTERIOVENOUS FISTULA  Surgeon: Surgeon(s) and Role:     * Henrik Moran MD - Primary     * Chrissie Gentile MD - Fellow - Assisting  Anesthesia: General   Estimated blood loss: Less than 50 ml  Drains: None  Specimens: * No specimens in log *  Findings:   Cephalic outflow vein was torturous and deep. Divided fistula near anastomosis, tunneled superiorly and re-anastomosed in end-to-end fashion.  Complications: None.  Implants: * No implants in log *

## 2021-08-26 NOTE — TELEPHONE ENCOUNTER
Please call surgery team ([phone]) to inform them of the markedly high creatinine. He has fistula surgery today.

## 2021-08-26 NOTE — TELEPHONE ENCOUNTER
Stefani Fischer, PAC   FYI only     RN received call back from Meka RN with Dr. Moran's office   Okay for procedure but requested RN call nephrology team to advise of creatinine     RN placed call to [facility] reviewed concern with creatinine results and they will pass this onto the nephrologist     Sultana Urbina, Registered Nurse, PAL (Patient Advocate Liason)   [facility]   [phone]

## 2021-08-26 NOTE — ANESTHESIA PREPROCEDURE EVALUATION
Anesthesia Pre-Procedure Evaluation    Patient: Taylor Valiente   MRN: [id number] : 1995        Preoperative Diagnosis: ESRD (end stage renal disease) on dialysis (H) [N18.6, Z99.2]   Procedure : Procedure(s):  REVISION OF RIGHT BRACHIOCEPHALIC ARTERIOVENOUS FISTULA     Past Medical History:   Diagnosis Date     Adrenal adenoma, left      Anemia      Benign essential hypertension 2017     CKD (chronic kidney disease) stage 5, GFR less than 15 ml/min (H)     FSGS     Depression      Focal glomerular sclerosis 2017     HLD (hyperlipidemia)      LVH (left ventricular hypertrophy) due to hypertensive disease 2017     Noncompliance      Obesity, unspecified      OD (osteochondritis dissecans) 2021     Stress-induced cardiomyopathy      Suicide attempt (H)       Past Surgical History:   Procedure Laterality Date     ARTHROSCOPY ANKLE Left 2021    Procedure: Left ankle arthroscopy and debridement/micro fracture;  Surgeon: Mirza Nelson MD;  [facility]     [facility]     CREATE FISTULA ARTERIOVENOUS UPPER EXTREMITY Right 3/4/2021    Procedure: RIGHT proximal radial  to CEPHALIC ARTERIOVENOUS FISTULA;  Surgeon: Henrik Moran MD;  Location: [facility] OR     IR CVC TUNNEL PLACEMENT > 5 YRS OF AGE  2021     NO HISTORY OF SURGERY        Allergies   Allergen Reactions     Benadryl [Diphenhydramine] Itching      Social History     Tobacco Use     Smoking status: Never Smoker     Smokeless tobacco: Never Used   Substance Use Topics     Alcohol use: No      Wt Readings from Last 1 Encounters:   21 112.2 kg (247 lb 4.8 oz)        Anesthesia Evaluation   Pt has had prior anesthetic.     No history of anesthetic complications       ROS/MED HX  ENT/Pulmonary:     (+) tobacco use, Past use,  (-) asthma and sleep apnea   Neurologic:  - neg neurologic ROS     Cardiovascular:     (+) Dyslipidemia hypertension-----CHF etiology: NICM Last EF:  55 Previous cardiac testing   Echo: Date: 2020 Results:        Interpretation Summary     The visual ejection fraction is estimated at 55-60%.  There is mild to moderate concentric left ventricular hypertrophy.  Regional wall motion abnormalities cannot be excluded due to limited  visualization.  There was essentially no subcostal acoustic window. Technically difficult,  suboptimal study.  Stress Test: Date: Results:    ECG Reviewed: Date: Results:    Cath: Date: Results:   (-) CAD   METS/Exercise Tolerance:     Hematologic:     (+) anemia,     Musculoskeletal:   (+) arthritis,     GI/Hepatic:    (-) GERD   Renal/Genitourinary: Comment: Hypertensive ESRD/Glomular sclerosis    (+) renal disease, type: ESRD, Pt requires dialysis, type: Hemodialysis,     Endo:     (+) Obesity,  (-) Type II DM   Psychiatric/Substance Use: Comment: Suicide attempt    (+) psychiatric history depression     Infectious Disease:       Malignancy:       Other:            Physical Exam    Airway        Mallampati: III   TM distance: > 3 FB   Neck ROM: full   Mouth opening: > 3 cm    Respiratory Devices and Support         Dental  no notable dental history         Cardiovascular   cardiovascular exam normal       Rhythm and rate: regular     Pulmonary   pulmonary exam normal                OUTSIDE LABS:  CBC:   Lab Results   Component Value Date    WBC 7.0 08/25/2021    WBC 7.7 08/10/2021    HGB 9.5 (L) 08/25/2021    HGB 9.0 (L) 08/10/2021    HCT 30.0 (L) 08/25/2021    HCT 28.6 (L) 08/10/2021     08/25/2021     08/10/2021     BMP:   Lab Results   Component Value Date     08/26/2021     08/25/2021    POTASSIUM 4.7 08/26/2021    POTASSIUM 5.5 (H) 08/25/2021    CHLORIDE 105 08/26/2021    CHLORIDE 112 (H) 08/25/2021    CO2 25 08/26/2021    CO2 24 08/25/2021    BUN 49 (H) 08/26/2021    BUN 68 (H) 08/25/2021    CR 8.41 (H) 08/26/2021    CR 11.30 (HH) 08/25/2021     (H) 08/26/2021     (H) 08/25/2021     COAGS:    Lab Results   Component Value Date    PTT 35 02/09/2021    INR 1.23 (H) 02/09/2021     POC:   Lab Results   Component Value Date     (H) 02/24/2021     HEPATIC:   Lab Results   Component Value Date    ALBUMIN 3.5 08/10/2021    PROTTOTAL 7.1 08/10/2021    ALT 18 08/10/2021    AST 12 08/10/2021    ALKPHOS 64 08/10/2021    BILITOTAL 0.4 08/10/2021     OTHER:   Lab Results   Component Value Date    LACT 0.7 08/10/2021    A1C 5.1 08/26/2021    BUBBA 8.9 08/26/2021    PHOS 7.2 (H) 06/18/2021    MAG 2.2 11/27/2020    TSH 4.88 (H) 07/14/2017    T4 1.26 07/15/2017    CRP 7.9 11/29/2020       Anesthesia Plan    ASA Status:  3   NPO Status:  NPO Appropriate    Anesthesia Type: General.     - Airway: LMA   Induction: Intravenous, Propofol.   Maintenance: Balanced.        Consents    Anesthesia Plan(s) and associated risks, benefits, and realistic alternatives discussed. Questions answered and patient/representative(s) expressed understanding.     - Discussed with:  Patient      - Extended Intubation/Ventilatory Support Discussed: No.      - Patient is DNR/DNI Status: No    Use of blood products discussed: No .     Postoperative Care    Pain management: Multi-modal analgesia.   PONV prophylaxis: Ondansetron (or other 5HT-3), Dexamethasone or Solumedrol     Comments:    Patient is counseled on the anesthesia plan and relevant anesthesia procedures (airway devices) including all risks and benefits. All patient questions were answered.               Deepak Chowdhury MD

## 2021-08-26 NOTE — TELEPHONE ENCOUNTER
Returned call to Fallon.  Patient is on dialysis.  No further action needed.  Fallon stated she will call [facility] with update as well.  Meka Vazquez, LEAHN, RN-[facility] [place]

## 2021-08-27 ENCOUNTER — TELEPHONE (OUTPATIENT)
Dept: OTHER | Facility: CLINIC | Age: 26
End: 2021-08-27

## 2021-08-27 NOTE — TELEPHONE ENCOUNTER
Patient had surgery yesterday with Dr. Moran. He is requesting a letter be put in [facility] [employer] regarding any work restrictions.

## 2021-08-27 NOTE — OR NURSING
Dr. Edwardo Moran to bedside in PACU to explain to patient what he did in surgery.  Patient nodded understanding.  Patient's sister has been called by Dr. Moran.

## 2021-08-27 NOTE — DISCHARGE INSTRUCTIONS
Same Day Surgery Discharge Instructions for  Sedation and General Anesthesia       It's not unusual to feel dizzy, light-headed or faint for up to 24 hours after surgery or while taking pain medication.  If you have these symptoms: sit for a few minutes before standing and have someone assist you when you get up to walk or use the bathroom.      You should rest and relax for the next 24 hours. We recommend you make arrangements to have an adult stay with you for at least 24 hours after your discharge.  Avoid hazardous and strenuous activity.      DO NOT DRIVE any vehicle or operate mechanical equipment for 24 hours following the end of your surgery.  Even though you may feel normal, your reactions may be affected by the medication you have received.      Do not drink alcoholic beverages for 24 hours following surgery.       Slowly progress to your regular diet as you feel able. It's not unusual to feel nauseated and/or vomit after receiving anesthesia.  If you develop these symptoms, drink clear liquids (apple juice, ginger ale, broth, 7-up, etc. ) until you feel better.  If your nausea and vomiting persists for 24 hours, please notify your surgeon.        All narcotic pain medications, along with inactivity and anesthesia, can cause constipation. Drinking plenty of liquids and increasing fiber intake will help.      For any questions of a medical nature, call your surgeon.      Do not make important decisions for 24 hours.      If you had general anesthesia, you may have a sore throat for a couple of days related to the breathing tube used during surgery.  You may use Cepacol lozenges to help with this discomfort.  If it worsens or if you develop a fever, contact your surgeon.       If you feel your pain is not well managed with the pain medications prescribed by your surgeon, please contact your surgeon's office to let them know so they can address your concerns.       CoVid 19 Information    We want to give you  information regarding Covid. Please consult your primary care provider with any questions you might have.     Patient who have symptoms (cough, fever, or shortness of breath), need to isolate for 7 days from when symptoms started OR 72 hours after fever resolves (without fever reducing medications) AND improvement of respiratory symptoms (whichever is longer).      Isolate yourself at home (in own room/own bathroom if possible)    Do Not allow any visitors    Do Not go to work or school    Do Not go to [employer],  centers, shopping, or other public places.    Do Not shake hands.    Avoid close and intimate contact with others (hugging, kissing).    Follow CDC recommendations for household cleaning of frequently touched services.     After the initial 7 days, continue to isolate yourself from household members as much as possible. To continue decrease the risk of community spread and exposure, you and any members of your household should limit activities in public for 14 days after starting home isolation.     You can reference the following CDC link for helpful home isolation/care tips:  https://www.cdc.gov/coronavirus/2019-ncov/downloads/10Things.pdf    Protect Others:    Cover Your Mouth and Nose with a mask, disposable tissue or wash cloth to avoid spreading germs to others.    Wash your hands and face frequently with soap and water    Call Your Primary Doctor If: Breathing difficulty develops or you become worse.    For more information about COVID19 and options for caring for yourself at home, please visit the CDC website at https://www.cdc.gov/coronavirus/2019-ncov/about/steps-when-sick.html  For more options for care at [facility], please visit our website at [email or web address]

## 2021-08-27 NOTE — OP NOTE
Procedure Date: 08/26/2021    PREOPERATIVE DIAGNOSIS:  Status post right brachiocephalic arteriovenous fistula, now with difficulty obtaining satisfactory access of the fistula.    POSTOPERATIVE DIAGNOSIS:  Status post right brachiocephalic arteriovenous fistula, now with difficulty obtaining satisfactory access of the fistula.    PROCEDURE PERFORMED:  Second stage right brachiocephalic transposition AV fistula.    SURGEON:  Edwardo Moran MD    :  Chrissie Gentile MD    ANESTHESIA:  General endotracheal anesthesia.    ESTIMATED BLOOD LOSS:  50 mL    OPERATIVE INDICATIONS:  This patient is a 26-year-old gentleman who is 5 months status post creation of a right brachiocephalic AV fistula.  Attempts at accessing the fistula have been unsuccessful on several occasions as the technicians at the dialysis center have infiltrated the fistula at the mid humeral level.  He has undergone AV fistula ultrasound, which demonstrates an excellent caliber to the cephalic vein.  It appears to be rather tortuous and a bit deep.  Flow throughout the fistula is excellent with a calculated out flow volume of 2230 mL per minute.  My plan for today is to locally cut down and explore the central portion of the fistula, potentially performing lipectomy versus transposition.    OPERATIVE FINDINGS:  The outflow vein from the level of the antecubital crease to the level of the proximal humerus was extremely tortuous in a corkscrew like configuration.  Ultimately, I made a longitudinal incision parallel to this fistula and I completely freed it up.  I then created a new subcutaneous tunnel lateral to the existing fistula.  The fistula was divided just above the antecubital crease and drawn through the subcutaneous tunnel.  We then created an end-to-end anastomosis and shortened the fistula just a bit.  At the completion of this procedure, there was a nice thrill palpated along the course of the transposed cephalic vein and a 1+  palpable right radial artery pulse at the wrist.    DESCRIPTION OF TECHNIQUE:  After informed consent was obtained, the patient was brought to the operating room and placed on the table in a supine position.  General endotracheal anesthesia was achieved without incident.  I used a portable ultrasound, and I interrogated the fistula throughout its length.  It was clear that this was extremely tortuous and that was likely the reason that there was such difficulty obtaining satisfactory access.  The entire right upper extremity was then prepped and draped in the usual sterile fashion.  Timeout was called and we verified the patient's identity, the operative site, and the proposed procedure.    I began with a longitudinal incision just 1 fingerbreadth medial and parallel to the existing cephalic vein fistula.  This incision extended from the level of the antecubital crease to the level of the proximal humerus.  Dissection proceeded sharply downward to isolate the cephalic vein at the mid humeral level.  There was significant venous hypertension and considerable time was spent identifying crossing veins and dividing them between ties.  Ultimately, we dissected free the entire length of the fistula.  It was extremely tortuous and in this corkscrew like configuration.  Two very large venous side branches were identified and divided between silk ties.  We skeletonized the entire cephalic vein from the antecubital crease to the level of the proximal humerus.  A subcutaneous tunnel was then created lateral to the existing fistula.  The patient was systemically heparinized with 5000 units of intravenous heparin.  After a 5-minute circulation time, the cephalic vein fistula at the level of the antecubital crease was divided.  The vein was attached to our tunneler and drawn through the lateral subcutaneous tunnel, taking great care to avoid any twisting or kinking of the cephalic vein.  We had additional length of cephalic vein  secondary to its tortuosity.  We resected about 2 cm of cephalic vein and prepared it in a spatulated manner.  We then proceeded with an end to end venovenostomy between these 2 transected ends of cephalic vein using running 6-0 Prolene suture.  This anastomotic level is just above the antecubital crease.  This anastomosis was subsequently secured and flow was restored back up the fistula.  Two repair sutures were required along the anastomosis and ultimately we had satisfactory anastomotic hemostasis.  Surgicel gauze and gentle compression was held over the open aspects of the incision for about 5-10 minutes.  Heparin was reversed with 40 mg of protamine.  Ultimately, we had excellent wound hemostasis.  Fascia for the old fistula bed had been reapproximated with interrupted 2-0 Vicryl suture.  Subcutaneous tissue and subdermal tissue was closed with interrupted 3-0 Vicryl suture.  Great care was taken to avoid any fascial impingement on the vein at either end of our incision.  Skin was closed with interrupted 3-0 nylon simple sutures.  Sterile dressing was applied.  Final sponge and needle count were reported as correct.  The wound was also infiltrated with 30 mL of 0.25% Marcaine.  At the completion of this procedure, there was a readily palpable thrill along the course of the transposed cephalic vein and a 1+ palpable right radial pulse at the wrist.  He was returned awake and hemodynamically stable to the outpatient surgical area with no immediate complications apparent.  I called his sister, Belinda and provided her with an update.      Henrik Moran MD        D: 2021   T: 2021   MT: SPMT    Name:     STEFAN RIOS  MRN:      [id number]        Account:        [id number]   :      1995           Procedure Date: 2021     Document: [id number]

## 2021-08-27 NOTE — LETTER
[facility] [address]  [place] [address]  Phone: [phone]  Fax: [phone]      August 27, 2021      Taylor Valiente  [address] [place] [address]          To Whom It May Concern,    Mr. Taylor Valiente has recently been under my professional medical care.  He may return to work with the following restrictions:     No lifting more than 10 pounds with right arm until 9/9/21.    Sincerely,      Edwardo Moran MD    [facility] [place] [address]  [place] [address]  Phone: [phone]  Fax: [phone]

## 2021-08-27 NOTE — ANESTHESIA POSTPROCEDURE EVALUATION
Patient: Taylor Valiente    Procedure(s):  Second stage RIGHT BRACHIOCEPHALIC transposition ARTERIOVENOUS FISTULA    Diagnosis:ESRD (end stage renal disease) on dialysis (H) [N18.6, Z99.2]  Diagnosis Additional Information: No value filed.    Anesthesia Type:  General    Note:  Disposition: Admission   Postop Pain Control: Uneventful            Sign Out: Well controlled pain   PONV: No   Neuro/Psych: Uneventful            Sign Out: Acceptable/Baseline neuro status   Airway/Respiratory: Uneventful            Sign Out: Acceptable/Baseline resp. status   CV/Hemodynamics: Uneventful            Sign Out: Acceptable CV status; No obvious hypovolemia; No obvious fluid overload   Other NRE: NONE   DID A NON-ROUTINE EVENT OCCUR? No           Last vitals:  Vitals Value Taken Time   /98 08/26/21 1940   Temp 36.9  C (98.4  F) 08/26/21 1846   Pulse 68 08/26/21 1950   Resp 14 08/26/21 1950   SpO2 95 % 08/26/21 1916   Vitals shown include unvalidated device data.    Electronically Signed By: Laura Sim MD  August 26, 2021  7:59 PM

## 2021-08-29 LAB
ATRIAL RATE - MUSE: 81 BPM
DIASTOLIC BLOOD PRESSURE - MUSE: NORMAL MMHG
INTERPRETATION ECG - MUSE: NORMAL
P AXIS - MUSE: 51 DEGREES
PR INTERVAL - MUSE: 164 MS
QRS DURATION - MUSE: 82 MS
QT - MUSE: 428 MS
QTC - MUSE: 497 MS
R AXIS - MUSE: 55 DEGREES
SYSTOLIC BLOOD PRESSURE - MUSE: NORMAL MMHG
T AXIS - MUSE: 72 DEGREES
VENTRICULAR RATE- MUSE: 81 BPM

## 2021-09-08 ENCOUNTER — OFFICE VISIT (OUTPATIENT)
Dept: SURGERY | Facility: CLINIC | Age: 26
End: 2021-09-08
Payer: COMMERCIAL

## 2021-09-08 VITALS
DIASTOLIC BLOOD PRESSURE: 88 MMHG | OXYGEN SATURATION: 98 % | BODY MASS INDEX: 33.46 KG/M2 | RESPIRATION RATE: 16 BRPM | HEIGHT: 72 IN | HEART RATE: 68 BPM | SYSTOLIC BLOOD PRESSURE: 150 MMHG | WEIGHT: 247 LBS

## 2021-09-08 DIAGNOSIS — Z09 SURGICAL FOLLOW-UP CARE: Primary | ICD-10-CM

## 2021-09-08 PROCEDURE — 99024 POSTOP FOLLOW-UP VISIT: CPT | Performed by: SURGERY

## 2021-09-08 ASSESSMENT — MIFFLIN-ST. JEOR: SCORE: 2143.38

## 2021-09-08 NOTE — PROGRESS NOTES
Taylor Valiente is a 26-year-old gentleman who is 5-1/2 months status post creation of a right brachiocephalic AV fistula.  Attempts at accessing the fistula have been unsuccessful on several occasions as the technicians at his dialysis center have infiltrated the fistula at the mid humeral level.  AV fistula ultrasound had demonstrated an excellent caliber cephalic vein which appeared rather tortuous and deep but had a calculated outflow volume of 2230 mL/min.  I took him to surgery for revision of this fistula on 8/26/2021.  I originally planned to perform lipectomy or superficialization of the fistula.  Upon mobilizing the fistula, however, I discovered that it was extremely tortuous.  Ultimately, I completely freed up the cephalic vein and shortened it.  It was transposed and drawn through a more superficial subcutaneous tunnel lateral to the original fistula and anastomosed end and into a short remnant.  In effect this became a second stage right brachial cephalic transposition AV fistula.  He returns today for his first postoperative check.  He had no complaints.    Exam:  Well-developed male alert and oriented x3 in no acute distress.  The medial right arm incision is healing nicely with sutures intact.  They are not yet ready to be taken out.  There is a palpable thrill along the course of the transposed cephalic vein.  2+ palpable right radial pulse at the wrist.    ASSESSMENT:  POD #13 status post revision of right brachiocephalic AV fistula (second stage transposition with lateral subcutaneous tunneling of the cephalic vein) clinically doing well.  Excellent thrill along the course of the transposed cephalic vein.  His medial right upper arm sutures are not yet ready to be removed.    RECOMMENDATION:  I reviewed all the above with Taylor.  He will resume his vein building exercises.  He continues to dialyze via a tunneled IJ catheter.  Vascular surgical follow-up will be with me in 1-2 weeks for  right upper arm suture removal.  We will plan on a right upper arm AV fistula ultrasound in 4-5 weeks to assess fistula maturation.    Edwardo Moran MD

## 2021-09-15 NOTE — TELEPHONE ENCOUNTER
Routing refill request to provider for review/approval because:  Labs out of range:  Creat & Bp's:  Lab Test 08/26/21  1245   CR 8.41*

## 2021-09-15 NOTE — TELEPHONE ENCOUNTER
amLODIPine (NORVASC) 10 MG tablet         Sig: Take 1 tablet (10 mg) by mouth daily    Disp:  90 tablet    Refills:      Start: 9/15/2021    Class: E-Prescribe    Last ordered: 1 year ago by Entered By History Unknown     Calcium Channel Blockers Protocol Fpmddp41/15/2021 01:09 PM   Blood pressure under 140/90 in past 12 months Protocol Details    Normal serum creatinine on file in past 12 months

## 2021-09-21 ENCOUNTER — TELEPHONE (OUTPATIENT)
Dept: OTHER | Facility: CLINIC | Age: 26
End: 2021-09-21

## 2021-09-21 NOTE — TELEPHONE ENCOUNTER
Per 9/8/21 appointment with Dr. Moran, pt was to scheduled a follow up for suture removal.  Per appt desk review, it does not appear patient has scheduled.      Routing to scheduling to contact patient for in clinic visit with Dr. Moran for suture removal.     Meka Vazquez, BSN, RN-[facility] [place]

## 2021-09-23 NOTE — TELEPHONE ENCOUNTER
LM on preferred cell asking patient to call to schedule.  Did note this is our final attempt to reach him.

## 2021-09-24 RX ORDER — AMLODIPINE BESYLATE 10 MG/1
10 TABLET ORAL DAILY
Qty: 90 TABLET | OUTPATIENT
Start: 2021-09-24

## 2021-09-24 NOTE — TELEPHONE ENCOUNTER
Ward Higuera,     Patient is on dialysis. I think his nephrologist might be Dr. Barrett if he gets dialysis in [place].   Thanks.

## 2021-09-28 NOTE — TELEPHONE ENCOUNTER
Looks like pt did fill this medication on 9/16/21.  He has 3 refills. At  AV    Faviola Higuera RN

## 2021-10-05 ENCOUNTER — OFFICE VISIT (OUTPATIENT)
Dept: OTHER | Facility: CLINIC | Age: 26
End: 2021-10-05
Attending: SURGERY
Payer: COMMERCIAL

## 2021-10-05 VITALS — RESPIRATION RATE: 16 BRPM | SYSTOLIC BLOOD PRESSURE: 170 MMHG | DIASTOLIC BLOOD PRESSURE: 103 MMHG | HEART RATE: 76 BPM

## 2021-10-05 DIAGNOSIS — Z09 SURGICAL FOLLOW-UP CARE: Primary | ICD-10-CM

## 2021-10-05 PROCEDURE — 99024 POSTOP FOLLOW-UP VISIT: CPT | Performed by: SURGERY

## 2021-10-05 PROCEDURE — G0463 HOSPITAL OUTPT CLINIC VISIT: HCPCS

## 2021-10-05 NOTE — PROGRESS NOTES
[facility]        Patient is here for a follow up  to discuss about suture removal of AVF    BP (!) 170/103 (BP Location: Left arm, Patient Position: Chair, Cuff Size: Adult Regular)   Pulse 76   Resp 16     The provider has been notified that the patient has no concerns.     Questions patient would like addressed today are: N/A.    Refills are needed: No    Has homecare services and agency name:  Juanita Olguin [facility]

## 2021-10-06 ENCOUNTER — TELEPHONE (OUTPATIENT)
Dept: OTHER | Facility: CLINIC | Age: 26
End: 2021-10-06

## 2021-10-06 DIAGNOSIS — N18.6 ESRD (END STAGE RENAL DISEASE) ON DIALYSIS (H): Primary | ICD-10-CM

## 2021-10-06 DIAGNOSIS — Z99.2 ESRD (END STAGE RENAL DISEASE) ON DIALYSIS (H): Primary | ICD-10-CM

## 2021-10-06 NOTE — TELEPHONE ENCOUNTER
Per 10/5/21 visit with Dr. Moran, pt needs AVF US at next available.  No specific appointment needed for follow up, Dr. Moran to call with results.    Please route back to RN to place flag once US is scheduled.    Meka Vazquez, BSN, RN-[facility] [place]

## 2021-10-07 ENCOUNTER — HOSPITAL ENCOUNTER (OUTPATIENT)
Dept: ULTRASOUND IMAGING | Facility: CLINIC | Age: 26
Discharge: HOME | End: 2021-10-07
Attending: SURGERY | Admitting: SURGERY
Payer: COMMERCIAL

## 2021-10-07 DIAGNOSIS — N18.6 ESRD (END STAGE RENAL DISEASE) ON DIALYSIS (H): ICD-10-CM

## 2021-10-07 DIAGNOSIS — Z99.2 ESRD (END STAGE RENAL DISEASE) ON DIALYSIS (H): ICD-10-CM

## 2021-10-07 PROCEDURE — 93990 DOPPLER FLOW TESTING: CPT

## 2021-10-08 ENCOUNTER — TELEPHONE (OUTPATIENT)
Dept: OTHER | Facility: CLINIC | Age: 26
End: 2021-10-08

## 2021-10-08 NOTE — TELEPHONE ENCOUNTER
Reviewed 10/7/21 AVF US with Dr. Moran.  Per Dr. Moran, patient's fistula may be accessed in 2 weeks.  Will update Leonila [place] patient can be accessed on 10/25/21.      LVM for patient with update.  Contacted Leonila and spoke with GALLITO Scott.  Provided update of the above.  Will also fax note to Leonila Buenrostro.    Requested Leonila contact us should they have any issues or concerns with access.    Meka Vazquez, LEAHN, RN-[facility] [place]

## 2021-10-12 ENCOUNTER — MYC MEDICAL ADVICE (OUTPATIENT)
Dept: FAMILY MEDICINE | Facility: CLINIC | Age: 26
End: 2021-10-12

## 2021-10-12 DIAGNOSIS — N17.9 ACUTE KIDNEY INJURY (H): ICD-10-CM

## 2021-10-12 RX ORDER — HYDRALAZINE HYDROCHLORIDE 100 MG/1
TABLET, FILM COATED ORAL
Qty: 360 TABLET | Refills: 1 | OUTPATIENT
Start: 2021-10-12

## 2021-10-13 NOTE — TELEPHONE ENCOUNTER
Summary:    Patient is due/failing the following:   Flu vaccine    Reviewed:  [] CARE EVERYWHERE  [] LAST OV NOTE INCLUDING ENDO  [] FYI TAB  [] MYCHART ACTIVE?  [] LAST PANEL ENCOUNTER  [] FUTURE APPTS  [] IMMUNIZATIONS          Action needed:   Patient needs nurse only appointment.    Type of outreach:    Sent [employer] message.                                                                               Cydney Baumann/ESTELLA  [place]---[facility]

## 2021-10-15 DIAGNOSIS — N17.9 ACUTE KIDNEY INJURY (H): ICD-10-CM

## 2021-10-15 NOTE — TELEPHONE ENCOUNTER
Ranjana Stone PA  PCP is requesting meds be ordered by nephrology.  Thank you  Faviola Higuera. RN, BSN, PAL (Patient Advocate Liaison)  [facility]   [phone]

## 2021-10-15 NOTE — TELEPHONE ENCOUNTER
I really think his meds should be ordered by his nephrologist.   Priyank Lawrence MD  [facility]  [phone]

## 2021-10-15 NOTE — TELEPHONE ENCOUNTER
Routing refill request to provider for review/approval because:  Medication is reported/historical    Radha Chino RN

## 2021-10-19 NOTE — PROGRESS NOTES
Taylor Valiente is a 26-year-old gentleman who is 7 months status post creation of a proximal right radial to cephalic vein AV fistula.  Attempts at accessing the fistula have been unsuccessful on several occasions due to tortuosity of the cephalic vein and the depth of the cephalic vein given his body habitus.  I took him back to surgery for revision of this fistula on 8/26/2021.  I expose the cephalic vein throughout the humeral level.  It was shortened, transposed, and reanastomosed to a proximal remnant.  In effect this became a second stage right proximal radial to cephalic vein transposition AV fistula.  He returns today for his second postoperative check and suture removal.  He had no complaints.    Exam:  Obese male alert oriented x3.  Blood pressure 170/103 with a pulse of 76.  The medial right arm incision is healed.  Sutures are removed today.  Excellent thrill along the course of the transposed cephalic vein.  2+ palpable right radial pulse at the wrist.    ASSESSMENT:  6 weeks status post revision of right proximal radial to cephalic vein AV fistula (second stage transposition with lateral subcutaneous tunneling of the cephalic vein) clinically doing well.  Excellent thrill along the course of the transposed cephalic vein.    RECOMMENDATION:  I reviewed all the above with Taylor.  He will continue his vein building exercises.  He continues to dialyze via a tunneled IJ catheter.  We will arrange for a right AV fistula ultrasound and call him with results.  Assuming adequate maturation, we will authorize his dialysis center to attempt access.  Vascular surgical follow-up will be with me on an as-needed basis.    Edwardo Moran MD

## 2021-10-20 RX ORDER — HYDRALAZINE HYDROCHLORIDE 100 MG/1
TABLET, FILM COATED ORAL
Qty: 360 TABLET | Refills: 1 | OUTPATIENT
Start: 2021-10-20

## 2021-10-21 ENCOUNTER — TELEPHONE (OUTPATIENT)
Dept: TRANSPLANT | Facility: CLINIC | Age: 26
End: 2021-10-21

## 2021-10-21 NOTE — TELEPHONE ENCOUNTER
Patient Call:Елена wanted to discuss what test Taylor needs to do.       Call back needed? Yes    Return Call Needed  Same as documented in contacts section  When to return call?: Same day: Route [facility]

## 2021-10-24 ENCOUNTER — HEALTH MAINTENANCE LETTER (OUTPATIENT)
Age: 26
End: 2021-10-24

## 2021-11-03 ENCOUNTER — COMMITTEE REVIEW (OUTPATIENT)
Dept: TRANSPLANT | Facility: CLINIC | Age: 26
End: 2021-11-03

## 2021-11-03 NOTE — COMMITTEE REVIEW
Abdominal Committee Review Note     Evaluation Date: 1/26/2021  Committee Review Date: 11/3/2021    Organ being evaluated for: Kidney    Transplant Phase: Waitlist  Transplant Status: Inactive    Transplant Coordinator: Latonya Avila  Transplant Surgeon:       Referring Physician: Jer Hooper    Primary Diagnosis:   Secondary Diagnosis:     Committee Review Members:  Nephrology Fausto Whyte MD, Denton Talavera, APRN CNP, Bhupinder Mcmanus MD, Adam Simons MD   Nutrition Ankita Armando, RD   Pharmacy Carlos Dudley [facility]    - Clinical Gunjanparish Beard, [facility]   Transplant Rizwana Martin, GALLITO, Tho Vuong MD, Kamla Miller, RN, Flor Rose, RN, Yves Fountain, RN, Cate Thomson, RN, Latonya Avila, RN, Marcy Baires, RN       Transplant Eligibility: Irreversible chronic kidney disease treated w/dialysis or expected need for dialysis    Committee Review Decision: Remain Inactive    Relative Contraindications:     Absolute Contraindications: Active/ongoing non-compliance and/or failure to provide medical information     Committee Chair Tho Vuong MD verbally attested to the committee's decision.    Committee Discussion Details: Reviewed patient's health history and evaluation to date. Patient has no showed or failed to answer voicemails for appointments to complete transplant evaluation. He has not set up neuropsych testing, nor has he established with a mental health provider. He needs an angiogram and an endocrine consult. His compliance contract was up in August and his mother has been calling to attempt to help patient complete evaluation. Decision was made to renew compliance contract for 6 months. If these appointments are not completed within 6 months, he will be delisted.

## 2021-11-04 NOTE — TELEPHONE ENCOUNTER
"Discussed with Radha the results of selection committee: the transplant team is concerned about his ability to follow up post-transplant, and he failed his compliance contract. The decision was made to renew the contract for 6 month, with the expectation that his evaluation is complete within 6 months. Radha understands the above and voiced that the she has been trying to help the patient get things completed, but stated \"I have to be on him all the time for him to get things done\". RNCC thanked Radha for supporting patient. She will have patient call writer from his phone, as he has a new phone number and she is unsure if his records reflect his updated contact information.  "

## 2021-11-16 ENCOUNTER — OFFICE VISIT (OUTPATIENT)
Dept: FAMILY MEDICINE | Facility: CLINIC | Age: 26
End: 2021-11-16
Payer: COMMERCIAL

## 2021-11-16 VITALS
SYSTOLIC BLOOD PRESSURE: 122 MMHG | OXYGEN SATURATION: 98 % | DIASTOLIC BLOOD PRESSURE: 89 MMHG | HEART RATE: 74 BPM | BODY MASS INDEX: 32.64 KG/M2 | WEIGHT: 241 LBS | RESPIRATION RATE: 20 BRPM | HEIGHT: 72 IN

## 2021-11-16 DIAGNOSIS — Z99.2 ESRD NEEDING DIALYSIS (H): ICD-10-CM

## 2021-11-16 DIAGNOSIS — H91.93 HEARING DIFFICULTY OF BOTH EARS: Primary | ICD-10-CM

## 2021-11-16 DIAGNOSIS — N18.6 ESRD NEEDING DIALYSIS (H): ICD-10-CM

## 2021-11-16 DIAGNOSIS — H61.23 BILATERAL IMPACTED CERUMEN: ICD-10-CM

## 2021-11-16 PROCEDURE — 99214 OFFICE O/P EST MOD 30 MIN: CPT | Mod: 25 | Performed by: FAMILY MEDICINE

## 2021-11-16 PROCEDURE — 69209 REMOVE IMPACTED EAR WAX UNI: CPT | Mod: 50 | Performed by: FAMILY MEDICINE

## 2021-11-16 ASSESSMENT — ANXIETY QUESTIONNAIRES
7. FEELING AFRAID AS IF SOMETHING AWFUL MIGHT HAPPEN: NOT AT ALL
3. WORRYING TOO MUCH ABOUT DIFFERENT THINGS: SEVERAL DAYS
GAD7 TOTAL SCORE: 1
4. TROUBLE RELAXING: NOT AT ALL
5. BEING SO RESTLESS THAT IT IS HARD TO SIT STILL: NOT AT ALL
1. FEELING NERVOUS, ANXIOUS, OR ON EDGE: NOT AT ALL
7. FEELING AFRAID AS IF SOMETHING AWFUL MIGHT HAPPEN: NOT AT ALL
GAD7 TOTAL SCORE: 1
6. BECOMING EASILY ANNOYED OR IRRITABLE: NOT AT ALL
8. IF YOU CHECKED OFF ANY PROBLEMS, HOW DIFFICULT HAVE THESE MADE IT FOR YOU TO DO YOUR WORK, TAKE CARE OF THINGS AT HOME, OR GET ALONG WITH OTHER PEOPLE?: NOT DIFFICULT AT ALL
2. NOT BEING ABLE TO STOP OR CONTROL WORRYING: NOT AT ALL
GAD7 TOTAL SCORE: 1

## 2021-11-16 ASSESSMENT — PATIENT HEALTH QUESTIONNAIRE - PHQ9
10. IF YOU CHECKED OFF ANY PROBLEMS, HOW DIFFICULT HAVE THESE PROBLEMS MADE IT FOR YOU TO DO YOUR WORK, TAKE CARE OF THINGS AT HOME, OR GET ALONG WITH OTHER PEOPLE: NOT DIFFICULT AT ALL
SUM OF ALL RESPONSES TO PHQ QUESTIONS 1-9: 1
SUM OF ALL RESPONSES TO PHQ QUESTIONS 1-9: 1

## 2021-11-16 ASSESSMENT — MIFFLIN-ST. JEOR: SCORE: 2116.17

## 2021-11-16 NOTE — PROGRESS NOTES
Assessment & Plan     Hearing difficulty of both ears  - likely related to impacted cerumen. If in a few days following irrigation he continues to have issues with refer to ENT.     Bilateral impacted cerumen  -    Bilateral impacted cerumen.  Verbal consent obtained. Cerumen disimpacted using a combination of forced water irrigation, bionix lighted curette. Otoscopic Examination at conclusion of procedure confirms clean ear Canal and normal tympanic membrane.  - PA REMOVAL IMPACTED CERUMEN IRRIGATION/LVG UNILAT    ESRD needing dialysis (H)  - doing well with HD. Follow nephrology recommendations              BMI:   Estimated body mass index is 32.69 kg/m  as calculated from the following:    Height as of this encounter: 1.829 m (6').    Weight as of this encounter: 109.3 kg (241 lb).       See Patient Instructions    Return in about 1 month (around 12/16/2021) for Routine Visit, with your primary care physician.    Guerda Urbano MD  [facility]    Gaudencio Vazquez is a 26 year old who presents for the following health issues     Hearing Problem    History of Present Illness       He eats 0-1 servings of fruits and vegetables daily.He consumes 2 sweetened beverage(s) daily.He exercises with enough effort to increase his heart rate 20 to 29 minutes per day.  He exercises with enough effort to increase his heart rate 5 days per week.   He is taking medications regularly.     Patient here today with concerns of decreased hearing over the last month or so. States this is worse at work even when the person is right next to him. Notes the right ear is worse than the left.   Denies any pain or discharge.      Review of Systems   Constitutional, HEENT, cardiovascular, pulmonary, GI, , musculoskeletal, neuro, skin, endocrine and psych systems are negative, except as otherwise noted.      Objective    /89   Pulse 74   Resp 20   Ht 1.829 m (6')   Wt 109.3 kg (241 lb)    SpO2 98%   BMI 32.69 kg/m    Body mass index is 32.69 kg/m .  Physical Exam   GENERAL: healthy, alert and no distress  HENT: normal cephalic/atraumatic, both ears: occluded with wax, oropharynx clear and oral mucous membranes moist  RESP: lungs clear to auscultation - no rales, rhonchi or wheezes  CV: regular rate and rhythm, normal S1 S2, no S3 or S4, no murmur, click or rub, no peripheral edema and peripheral pulses strong  PSYCH: mentation appears normal, affect normal/bright            Answers for HPI/ROS submitted by the patient on 11/16/2021  If you checked off any problems, how difficult have these problems made it for you to do your work, take care of things at home, or get along with other people?: Not difficult at all  PHQ9 TOTAL SCORE: 1  NIURKA 7 TOTAL SCORE: 1

## 2021-11-16 NOTE — PATIENT INSTRUCTIONS
Patient Education       Earwax, Home Treatment    Everyone produces earwax from the lining of the ear canal. It serves to lubricate and protect the ear. The wax that forms in the canal naturally moves toward the outside of the ear and falls out. Sometimes the ear canal may contain too much wax. This can cause a blockage and loss of hearing. Directions are given below for home treatment.  Home care  If your doctor has advised you to remove a wax blockage yourself, follow these directions:    Unless a medicine was prescribed, you may use an over-the-counter product made for clearing earwax. These contain carbamide peroxide. Lie down with the blocked ear facing upward. Apply one dropper full of medicine and wait a few minutes. Grasp the outer ear and wiggle it to help the solution enter the canal.    Lean over a sink or basin with the blocked ear facing downward. Use a bulb syringe filled with warm (not hot or cold) water to rinse the ear several times. Use gentle pressure only.    If you are having trouble draining the water out of your ear canal, put a few drops of rubbing alcohol (isopropyl alcohol) into the ear canal. This will help remove the remaining water.    Repeat this procedure once a day for up to three days, or until your hearing is back to normal. Do not use this treatment for more than three days in a row.  Don ts    Don t use cold water to rinse the ear. This will make you dizzy.    Don t perform this procedure if you have an ear infection.    Don t perform this procedure if you have a ruptured eardrum.    Don t use cotton swabs, matches, hairpins, keys, or other objects to  clean  the ear canal. This can cause infection of the ear canal or rupture the eardrum. Because of their size and shape, cotton swabs can push earwax deeper into the ear canal instead of removing it.  Follow-up care  Follow up with your health care provider if you are not improving after three cleaning attempts, or as  advised.  When to seek medical advice  Call your health care provider right away if any of these occur:    Worsening ear pain    Fever of 101 F (38.3 C) or higher, or as directed by your health care provider    Hearing does not return to normal after three days of treatment    Fluid drainage or bleeding from the ear canal    Swelling, redness, or tenderness of the outer ear    Headache, neck pain, or stiff neck    7904-2178 [employer]. [address] [place] [address], [place] [id number]. All rights reserved. This information is not intended as a substitute for professional medical care. Always follow your healthcare professional's instructions.

## 2021-11-17 ASSESSMENT — PATIENT HEALTH QUESTIONNAIRE - PHQ9: SUM OF ALL RESPONSES TO PHQ QUESTIONS 1-9: 1

## 2021-11-17 ASSESSMENT — ANXIETY QUESTIONNAIRES: GAD7 TOTAL SCORE: 1

## 2021-11-20 ENCOUNTER — TRANSFERRED RECORDS (OUTPATIENT)
Dept: HEALTH INFORMATION MANAGEMENT | Facility: CLINIC | Age: 26
End: 2021-11-20
Payer: COMMERCIAL

## 2021-11-22 ENCOUNTER — TRANSFERRED RECORDS (OUTPATIENT)
Dept: HEALTH INFORMATION MANAGEMENT | Facility: CLINIC | Age: 26
End: 2021-11-22
Payer: COMMERCIAL

## 2021-11-22 LAB
ALT SERPL-CCNC: 20 U/L (ref 10–49)
AST SERPL-CCNC: 12 U/L (ref 0–34)
CREATININE (EXTERNAL): 15.79 MG/DL (ref 0.7–1.3)
POTASSIUM (EXTERNAL): 5.1 MEQ/L (ref 3.5–5.5)

## 2021-11-23 ENCOUNTER — E-VISIT (OUTPATIENT)
Dept: URGENT CARE | Facility: CLINIC | Age: 26
End: 2021-11-23
Payer: COMMERCIAL

## 2021-11-23 DIAGNOSIS — Z20.822 SUSPECTED COVID-19 VIRUS INFECTION: Primary | ICD-10-CM

## 2021-11-23 PROCEDURE — 99421 OL DIG E/M SVC 5-10 MIN: CPT | Performed by: PHYSICIAN ASSISTANT

## 2021-11-23 NOTE — PATIENT INSTRUCTIONS
Dear Taylor Valiente,    Your symptoms show that you may have coronavirus (COVID-19). This illness can cause fever, cough and trouble breathing. Many people get a mild case and get better on their own. Some people can get very sick.    Will I be tested for COVID-19?  We would like to test you for Covid-19 virus. I have placed orders for this test.     For all employees or close contacts (except [employer] and [employer] - see below), go to your [employer] home page and scroll down to the section that says  You have an appointment that needs to be scheduled  and click the large green button that says  Schedule Now  and follow the steps to find the next available opening.     If you are unable to complete these steps or if you cannot find any available times, please call [phone] to schedule employee testing.     [employer] employees or close contacts, please call [phone].   [employer]) employees or close contacts call [phone].    Return to work/school/ guidance:  Please let your workplace manager and staffing office know when your quarantine ends     We can t give you an exact date as it depends on the above. You can calculate this on your own or work with your manager/staffing office to calculate this. (For example if you were exposed on 10/4, you would have to quarantine for 14 full days. That would be through 10/18. You could return on 10/19.)      If you receive a positive COVID-19 test result, follow the guidance of the those who are giving you the results. Usually the return to work is 10 (or in some cases 20 days from symptom onset.) If you work at [employer], you must also be cleared by Employee Occupational Health and Safety to return to work.        If you receive a negative COVID-19 test result and did not have a high risk exposure to someone with a known positive COVID-19 test, you can return to work once you're free of fever for 24 hours without fever-reducing medication  and your symptoms are improving or resolved.      If you receive a negative COVID-19 test and If you had a high risk exposure to someone who has tested positive for COVID-19 then you can return to work 14 days after your last contact with the positive individual    Note: If you have ongoing exposure to the covid positive person, this quarantine period may be more than 14 days. (For example, if you are continued to be exposed to your child who tested positive and cannot isolate from them, then the quarantine of 7-14 days can't start until your child is no longer contagious. This is typically 10 days from onset of the child's symptoms. So the total duration may be 17-24 days in this case.)    Sign up for [employer].   We know it's scary to hear that you might have COVID-19. We want to track your symptoms to make sure you're okay over the next 2 weeks. Please look for an email from [employer]--this is a free, online program that we'll use to keep in touch. To sign up, follow the link in the email you will receive. Learn more at [email or web address]    How can I take care of myself?    Get lots of rest. Drink extra fluids (unless a doctor has told you not to)    Take Tylenol (acetaminophen) or ibuprofen for fever or pain. If you have liver or kidney problems, ask your family doctor if it's okay to take Tylenol o ibuprofen    If you have other health problems (like cancer, heart failure, an organ transplant or severe kidney disease): Call your specialty clinic if you don't feel better in the next 2 days.    Know when to call 911. Emergency warning signs include:  o Trouble breathing or shortness of breath  o Pain or pressure in the chest that doesn't go away  o Feeling confused like you haven't felt before, or not being able to wake up  o Bluish-colored lips or face    Where can I get more information?   [employer] - About COVID-19:   [email or web address]    CDC - What to Do If You're  Sick:   www.cdc.gov/coronavirus/2019-ncov/about/steps-when-sick.html    November 23, 2021  RE:  Taylor Valiente                                                                                                                  [address]   [place] [address]      To whom it may concern:    I evaluated Taylor Valiente on November 23, 2021. Taylor Valiente should be excused from work/school.     They should let their workplace manager and staffing office know when their quarantine ends.    We can not give an exact date as it depends on the information below. They can calculate this on their own or work with their manager/staffing office to calculate this. (For example if they were exposed on 10/04, they would have to quarantine for 14 full days. That would be through 10/18. They could return on 10/19.)    Quarantine Guidelines:      If patient receives a positive COVID-19 test result, they should follow the guidance of those who are giving the results. Usually the return to work is 10 (or in some cases 20 days from symptom onset.) If they work at [employer], they must be cleared by Employee Occupational Health and Safety to return to work.        If patient receives a negative COVID-19 test result and did not have a high risk exposure to someone with a known positive COVID-19 test, they can return to work once they're free of fever for 24 hours without fever-reducing medication and their symptoms are improving or resolved.      If patient receives a negative COVID-19 test and if they had a high risk exposure to someone who has tested positive for COVID-19 then they can return to work 14 days after their last contact with the positive individual    Note: If there is ongoing exposure to the covid positive person, this quarantine period may be longer than 14 days. (For example, if they are continually exposed to their child, who tested positive and cannot isolate from them, then the quarantine  of 7-14 days can't start until their child is no longer contagious. This is typically 10 days from onset to the child's symptoms. So the total duration may be 17-24 days in this case.)     Sincerely,  Vesna Encinas PA-C, KAELYN

## 2021-11-29 ENCOUNTER — HOSPITAL ENCOUNTER (OUTPATIENT)
Facility: CLINIC | Age: 26
Setting detail: OBSERVATION
Discharge: HOME | End: 2021-11-29
Attending: EMERGENCY MEDICINE | Admitting: HOSPITALIST
Payer: COMMERCIAL

## 2021-11-29 VITALS
SYSTOLIC BLOOD PRESSURE: 195 MMHG | OXYGEN SATURATION: 99 % | DIASTOLIC BLOOD PRESSURE: 119 MMHG | RESPIRATION RATE: 16 BRPM | TEMPERATURE: 36.7 F | HEART RATE: 88 BPM | BODY MASS INDEX: 32.55 KG/M2 | WEIGHT: 240 LBS

## 2021-11-29 DIAGNOSIS — Z99.2 ESRD (END STAGE RENAL DISEASE) ON DIALYSIS (H): ICD-10-CM

## 2021-11-29 DIAGNOSIS — N18.6 ESRD (END STAGE RENAL DISEASE) (H): ICD-10-CM

## 2021-11-29 DIAGNOSIS — N18.6 ESRD (END STAGE RENAL DISEASE) ON DIALYSIS (H): ICD-10-CM

## 2021-11-29 DIAGNOSIS — I10 ESSENTIAL HYPERTENSION: ICD-10-CM

## 2021-11-29 DIAGNOSIS — E87.5 HYPERKALEMIA: ICD-10-CM

## 2021-11-29 LAB
ALBUMIN SERPL-MCNC: 3.8 G/DL (ref 3.4–5)
ALP SERPL-CCNC: 73 U/L (ref 40–150)
ALT SERPL W P-5'-P-CCNC: 34 U/L (ref 0–70)
ANION GAP SERPL CALCULATED.3IONS-SCNC: 15 MMOL/L (ref 3–14)
AST SERPL W P-5'-P-CCNC: 37 U/L (ref 0–45)
ATRIAL RATE - MUSE: 75 BPM
BASOPHILS # BLD AUTO: 0.1 10E3/UL (ref 0–0.2)
BASOPHILS NFR BLD AUTO: 1 %
BILIRUB SERPL-MCNC: 0.4 MG/DL (ref 0.2–1.3)
BUN SERPL-MCNC: 99 MG/DL (ref 7–30)
CALCIUM SERPL-MCNC: 9.4 MG/DL (ref 8.5–10.1)
CHLORIDE BLD-SCNC: 104 MMOL/L (ref 94–109)
CO2 SERPL-SCNC: 17 MMOL/L (ref 20–32)
CREAT SERPL-MCNC: 15.1 MG/DL (ref 0.66–1.25)
DIASTOLIC BLOOD PRESSURE - MUSE: NORMAL MMHG
EOSINOPHIL # BLD AUTO: 0.2 10E3/UL (ref 0–0.7)
EOSINOPHIL NFR BLD AUTO: 2 %
ERYTHROCYTE [DISTWIDTH] IN BLOOD BY AUTOMATED COUNT: 13.5 % (ref 10–15)
FLUAV RNA SPEC QL NAA+PROBE: NEGATIVE
FLUBV RNA RESP QL NAA+PROBE: NEGATIVE
GFR SERPL CREATININE-BSD FRML MDRD: 4 ML/MIN/1.73M2
GLUCOSE BLD-MCNC: 99 MG/DL (ref 70–99)
HCT VFR BLD AUTO: 42.6 % (ref 40–53)
HGB BLD-MCNC: 13.1 G/DL (ref 13.3–17.7)
IMM GRANULOCYTES # BLD: 0 10E3/UL
IMM GRANULOCYTES NFR BLD: 0 %
INTERPRETATION ECG - MUSE: NORMAL
LIPASE SERPL-CCNC: 304 U/L (ref 73–393)
LYMPHOCYTES # BLD AUTO: 1.7 10E3/UL (ref 0.8–5.3)
LYMPHOCYTES NFR BLD AUTO: 16 %
MCH RBC QN AUTO: 28.4 PG (ref 26.5–33)
MCHC RBC AUTO-ENTMCNC: 30.8 G/DL (ref 31.5–36.5)
MCV RBC AUTO: 92 FL (ref 78–100)
MONOCYTES # BLD AUTO: 0.4 10E3/UL (ref 0–1.3)
MONOCYTES NFR BLD AUTO: 4 %
NEUTROPHILS # BLD AUTO: 8 10E3/UL (ref 1.6–8.3)
NEUTROPHILS NFR BLD AUTO: 77 %
NRBC # BLD AUTO: 0 10E3/UL
NRBC BLD AUTO-RTO: 0 /100
P AXIS - MUSE: 26 DEGREES
PLAT MORPH BLD: NORMAL
PLATELET # BLD AUTO: 172 10E3/UL (ref 150–450)
POTASSIUM BLD-SCNC: 4.9 MMOL/L (ref 3.4–5.3)
POTASSIUM BLD-SCNC: 6.7 MMOL/L (ref 3.4–5.3)
POTASSIUM BLD-SCNC: 6.7 MMOL/L (ref 3.4–5.3)
PR INTERVAL - MUSE: 158 MS
PROT SERPL-MCNC: 8 G/DL (ref 6.8–8.8)
QRS DURATION - MUSE: 88 MS
QT - MUSE: 454 MS
QTC - MUSE: 506 MS
R AXIS - MUSE: 42 DEGREES
RBC # BLD AUTO: 4.62 10E6/UL (ref 4.4–5.9)
RBC MORPH BLD: NORMAL
SARS-COV-2 RNA RESP QL NAA+PROBE: NEGATIVE
SODIUM SERPL-SCNC: 136 MMOL/L (ref 133–144)
SYSTOLIC BLOOD PRESSURE - MUSE: NORMAL MMHG
T AXIS - MUSE: 62 DEGREES
VENTRICULAR RATE- MUSE: 75 BPM
WBC # BLD AUTO: 10.4 10E3/UL (ref 4–11)

## 2021-11-29 PROCEDURE — G0378 HOSPITAL OBSERVATION PER HR: HCPCS

## 2021-11-29 PROCEDURE — 36415 COLL VENOUS BLD VENIPUNCTURE: CPT | Performed by: EMERGENCY MEDICINE

## 2021-11-29 PROCEDURE — 87636 SARSCOV2 & INF A&B AMP PRB: CPT | Performed by: EMERGENCY MEDICINE

## 2021-11-29 PROCEDURE — 80053 COMPREHEN METABOLIC PANEL: CPT | Performed by: EMERGENCY MEDICINE

## 2021-11-29 PROCEDURE — 96361 HYDRATE IV INFUSION ADD-ON: CPT

## 2021-11-29 PROCEDURE — 36415 COLL VENOUS BLD VENIPUNCTURE: CPT | Performed by: INTERNAL MEDICINE

## 2021-11-29 PROCEDURE — 85025 COMPLETE CBC W/AUTO DIFF WBC: CPT | Performed by: EMERGENCY MEDICINE

## 2021-11-29 PROCEDURE — 84132 ASSAY OF SERUM POTASSIUM: CPT | Mod: 91 | Performed by: EMERGENCY MEDICINE

## 2021-11-29 PROCEDURE — 250N000011 HC RX IP 250 OP 636: Performed by: EMERGENCY MEDICINE

## 2021-11-29 PROCEDURE — 99285 EMERGENCY DEPT VISIT HI MDM: CPT | Mod: 25

## 2021-11-29 PROCEDURE — 83690 ASSAY OF LIPASE: CPT | Performed by: EMERGENCY MEDICINE

## 2021-11-29 PROCEDURE — 96365 THER/PROPH/DIAG IV INF INIT: CPT

## 2021-11-29 PROCEDURE — 250N000013 HC RX MED GY IP 250 OP 250 PS 637: Performed by: EMERGENCY MEDICINE

## 2021-11-29 PROCEDURE — G0257 UNSCHED DIALYSIS ESRD PT HOS: HCPCS

## 2021-11-29 PROCEDURE — 99220 PR INITIAL OBSERVATION CARE,LEVEL III: CPT | Performed by: PHYSICIAN ASSISTANT

## 2021-11-29 PROCEDURE — C9803 HOPD COVID-19 SPEC COLLECT: HCPCS

## 2021-11-29 PROCEDURE — 84132 ASSAY OF SERUM POTASSIUM: CPT | Mod: 91 | Performed by: INTERNAL MEDICINE

## 2021-11-29 PROCEDURE — 93005 ELECTROCARDIOGRAM TRACING: CPT

## 2021-11-29 PROCEDURE — 99214 OFFICE O/P EST MOD 30 MIN: CPT | Performed by: INTERNAL MEDICINE

## 2021-11-29 PROCEDURE — 258N000003 HC RX IP 258 OP 636: Performed by: INTERNAL MEDICINE

## 2021-11-29 PROCEDURE — 258N000001 HC RX 258: Performed by: EMERGENCY MEDICINE

## 2021-11-29 PROCEDURE — 250N000011 HC RX IP 250 OP 636: Performed by: INTERNAL MEDICINE

## 2021-11-29 PROCEDURE — 90937 HEMODIALYSIS REPEATED EVAL: CPT

## 2021-11-29 PROCEDURE — 250N000009 HC RX 250: Performed by: EMERGENCY MEDICINE

## 2021-11-29 PROCEDURE — 96375 TX/PRO/DX INJ NEW DRUG ADDON: CPT | Mod: XU

## 2021-11-29 RX ORDER — CARVEDILOL 25 MG/1
50 TABLET ORAL 2 TIMES DAILY WITH MEALS
Status: DISCONTINUED | OUTPATIENT
Start: 2021-11-29 | End: 2021-11-29 | Stop reason: HOSPADM

## 2021-11-29 RX ORDER — DOXERCALCIFEROL 4 UG/2ML
0.5 INJECTION INTRAVENOUS
Status: COMPLETED | OUTPATIENT
Start: 2021-11-29 | End: 2021-11-29

## 2021-11-29 RX ORDER — ONDANSETRON 2 MG/ML
4 INJECTION INTRAMUSCULAR; INTRAVENOUS ONCE
Status: COMPLETED | OUTPATIENT
Start: 2021-11-29 | End: 2021-11-29

## 2021-11-29 RX ORDER — BUMETANIDE 2 MG/1
2 TABLET ORAL DAILY
Status: DISCONTINUED | OUTPATIENT
Start: 2021-11-29 | End: 2021-11-29 | Stop reason: HOSPADM

## 2021-11-29 RX ORDER — AMLODIPINE BESYLATE 5 MG/1
10 TABLET ORAL DAILY
Status: DISCONTINUED | OUTPATIENT
Start: 2021-11-29 | End: 2021-11-29 | Stop reason: HOSPADM

## 2021-11-29 RX ORDER — VIT B COMP NO.3/FOLIC/C/BIOTIN 1 MG-60 MG
1 TABLET ORAL DAILY
Status: DISCONTINUED | OUTPATIENT
Start: 2021-11-29 | End: 2021-11-29 | Stop reason: HOSPADM

## 2021-11-29 RX ORDER — SEVELAMER HYDROCHLORIDE 400 MG/1
800 TABLET, FILM COATED ORAL
Status: DISCONTINUED | OUTPATIENT
Start: 2021-11-29 | End: 2021-11-29 | Stop reason: HOSPADM

## 2021-11-29 RX ORDER — AMLODIPINE BESYLATE 5 MG/1
10 TABLET ORAL ONCE
Status: COMPLETED | OUTPATIENT
Start: 2021-11-29 | End: 2021-11-29

## 2021-11-29 RX ORDER — CLONIDINE HYDROCHLORIDE 0.1 MG/1
0.1 TABLET ORAL ONCE
Status: COMPLETED | OUTPATIENT
Start: 2021-11-29 | End: 2021-11-29

## 2021-11-29 RX ORDER — ONDANSETRON 4 MG/1
4 TABLET, ORALLY DISINTEGRATING ORAL EVERY 6 HOURS PRN
Status: DISCONTINUED | OUTPATIENT
Start: 2021-11-29 | End: 2021-11-29 | Stop reason: HOSPADM

## 2021-11-29 RX ORDER — ATORVASTATIN CALCIUM 10 MG/1
10 TABLET, FILM COATED ORAL DAILY
Status: DISCONTINUED | OUTPATIENT
Start: 2021-11-29 | End: 2021-11-29 | Stop reason: HOSPADM

## 2021-11-29 RX ORDER — CLONIDINE HYDROCHLORIDE 0.1 MG/1
0.3 TABLET ORAL 2 TIMES DAILY
Status: DISCONTINUED | OUTPATIENT
Start: 2021-11-29 | End: 2021-11-29 | Stop reason: HOSPADM

## 2021-11-29 RX ORDER — CALCIUM GLUCONATE 94 MG/ML
1 INJECTION, SOLUTION INTRAVENOUS ONCE
Status: COMPLETED | OUTPATIENT
Start: 2021-11-29 | End: 2021-11-29

## 2021-11-29 RX ORDER — DEXTROSE MONOHYDRATE 25 G/50ML
25 INJECTION, SOLUTION INTRAVENOUS ONCE
Status: COMPLETED | OUTPATIENT
Start: 2021-11-29 | End: 2021-11-29

## 2021-11-29 RX ORDER — SEVELAMER HYDROCHLORIDE 800 MG/1
4000 TABLET, FILM COATED ORAL
Status: ON HOLD | COMMUNITY
End: 2023-12-08

## 2021-11-29 RX ORDER — ASPIRIN 81 MG/1
81 TABLET ORAL DAILY
Status: DISCONTINUED | OUTPATIENT
Start: 2021-11-29 | End: 2021-11-29 | Stop reason: HOSPADM

## 2021-11-29 RX ORDER — HYDRALAZINE HYDROCHLORIDE 100 MG/1
100 TABLET, FILM COATED ORAL 3 TIMES DAILY
Status: DISCONTINUED | OUTPATIENT
Start: 2021-11-29 | End: 2021-11-29 | Stop reason: HOSPADM

## 2021-11-29 RX ORDER — ONDANSETRON 2 MG/ML
4 INJECTION INTRAMUSCULAR; INTRAVENOUS EVERY 6 HOURS PRN
Status: DISCONTINUED | OUTPATIENT
Start: 2021-11-29 | End: 2021-11-29 | Stop reason: HOSPADM

## 2021-11-29 RX ORDER — VITAMIN B COMPLEX
75 TABLET ORAL DAILY
Status: DISCONTINUED | OUTPATIENT
Start: 2021-11-29 | End: 2021-11-29 | Stop reason: HOSPADM

## 2021-11-29 RX ORDER — HYDRALAZINE HYDROCHLORIDE 50 MG/1
100 TABLET, FILM COATED ORAL ONCE
Status: COMPLETED | OUTPATIENT
Start: 2021-11-29 | End: 2021-11-29

## 2021-11-29 RX ORDER — HEPARIN SODIUM 1000 [USP'U]/ML
500 INJECTION, SOLUTION INTRAVENOUS; SUBCUTANEOUS CONTINUOUS
Status: DISCONTINUED | OUTPATIENT
Start: 2021-11-29 | End: 2021-11-29

## 2021-11-29 RX ORDER — ALBUTEROL SULFATE 0.83 MG/ML
10 SOLUTION RESPIRATORY (INHALATION) ONCE
Status: COMPLETED | OUTPATIENT
Start: 2021-11-29 | End: 2021-11-29

## 2021-11-29 RX ADMIN — SODIUM CHLORIDE 250 ML: 9 INJECTION, SOLUTION INTRAVENOUS at 14:25

## 2021-11-29 RX ADMIN — HUMAN INSULIN 10 UNITS: 100 INJECTION, SOLUTION SUBCUTANEOUS at 08:46

## 2021-11-29 RX ADMIN — CLONIDINE HYDROCHLORIDE 0.1 MG: 0.1 TABLET ORAL at 09:43

## 2021-11-29 RX ADMIN — HEPARIN SODIUM 500 UNITS/HR: 1000 INJECTION INTRAVENOUS; SUBCUTANEOUS at 14:24

## 2021-11-29 RX ADMIN — SODIUM CHLORIDE 300 ML: 9 INJECTION, SOLUTION INTRAVENOUS at 14:25

## 2021-11-29 RX ADMIN — SODIUM BICARBONATE 25 MEQ: 84 INJECTION, SOLUTION INTRAVENOUS at 08:41

## 2021-11-29 RX ADMIN — HEPARIN SODIUM 500 UNITS: 1000 INJECTION INTRAVENOUS; SUBCUTANEOUS at 14:24

## 2021-11-29 RX ADMIN — ALBUTEROL SULFATE 10 MG: 2.5 SOLUTION RESPIRATORY (INHALATION) at 08:41

## 2021-11-29 RX ADMIN — AMLODIPINE BESYLATE 10 MG: 5 TABLET ORAL at 09:43

## 2021-11-29 RX ADMIN — DOXERCALCIFEROL 0.5 MCG: 2 INJECTION, SOLUTION INTRAVENOUS at 17:46

## 2021-11-29 RX ADMIN — DEXTROSE MONOHYDRATE 25 G: 25 INJECTION, SOLUTION INTRAVENOUS at 08:41

## 2021-11-29 RX ADMIN — ONDANSETRON 4 MG: 2 INJECTION INTRAMUSCULAR; INTRAVENOUS at 07:33

## 2021-11-29 RX ADMIN — CALCIUM GLUCONATE 1 G: 98 INJECTION, SOLUTION INTRAVENOUS at 08:42

## 2021-11-29 RX ADMIN — HYDRALAZINE HYDROCHLORIDE 100 MG: 50 TABLET, FILM COATED ORAL at 09:43

## 2021-11-29 ASSESSMENT — ENCOUNTER SYMPTOMS
CHILLS: 1
NAUSEA: 1
LIGHT-HEADEDNESS: 1
DIAPHORESIS: 1
DYSURIA: 0
FEVER: 0
ABDOMINAL PAIN: 1
VOMITING: 1
DIARRHEA: 0

## 2021-11-29 NOTE — ED TRIAGE NOTES
Pt aox4, ABCs intact. Pt c/o vomiting, lightheaded and chills x2 hours. Pt has dialysis scheduled today at 1500

## 2021-11-29 NOTE — ED NOTES
[facility]  ED Nurse Handoff Report    Taylor Valiente is a 26 year old male   ED Chief complaint: Vomiting  . ED Diagnosis:   Final diagnoses:   Hyperkalemia   ESRD (end stage renal disease) on dialysis (H)   Essential hypertension     Allergies:   Allergies   Allergen Reactions     Benadryl [Diphenhydramine] Itching       Code Status: Full Code  Activity level - Baseline/Home:  Independent. Activity Level - Current:   Independent. Lift room needed: No. Bariatric: No   Needed: No   Isolation: No. Infection: Not Applicable.     Vital Signs:   Vitals:    11/29/21 0930 11/29/21 1000 11/29/21 1030 11/29/21 1045   BP: (!) 199/158 (!) 181/114 (!) 173/124 (!) 170/116   Pulse: 79 72 86 88   Resp:       Temp:       TempSrc:       SpO2: 100% 100% 95% 97%   Weight:           Cardiac Rhythm:  ,      Pain level:    Patient confused: No. Patient Falls Risk: No.   Elimination Status: makes little urine   Patient Report - Initial Complaint: Taylor Valiente is a 26 year old male with history of ESRD on dialysis, hypertension, hyperlipidemia who presents with vomiting. he complains of vomiting, chills, and lightheadedness that started about five hours ago. He woke up from sleep to get water and then vomited. He has two episodes of emesis. He states it was a large volume of emesis and he became sweaty. He also complains of mild mid abdominal pain. The pain on its own is not bad enough that he would have come to the ER. He makes some urine. Denies dysuria. No hematemesis. No diarrhea. No fever. No history of abdominal surgeries. He dialyzes Monday, Wednesday, and Friday. He admits that he can have nausea the morning he is due for dialysis but normally does not persist like it did today.. Focused Assessment: N/V currently relieved   Tests Performed: refer to results review. Abnormal Results: refer to results review.   Treatments provided: refer to MAR  Family Comments: grand father at bedside,  aware  OBS brochure/video discussed/provided to patient:  Yes  ED Medications:   Medications   0.9% sodium chloride BOLUS (has no administration in time range)   0.9% sodium chloride BOLUS (has no administration in time range)   0.9% sodium chloride BOLUS (has no administration in time range)   heparin (porcine) injection (has no administration in time range)   heparin 10,000 units/10 mL infusion (DIALYSIS USE) (has no administration in time range)   doxercalciferol (HECTOROL) injection 0.5 mcg (has no administration in time range)   lidocaine 1 % 0.5 mL (has no administration in time range)   lidocaine 1 % 0.5 mL (has no administration in time range)   Stop Heparin 60 minutes before end of treatment (has no administration in time range)   ondansetron (ZOFRAN) injection 4 mg (4 mg Intravenous Given 11/29/21 0733)   calcium gluconate 10 % injection 1 g (0 g Intravenous Stopped 11/29/21 0906)   insulin regular 1 unit/mL injection 10 Units (10 Units Intravenous Given 11/29/21 0846)   dextrose 50 % injection 25 g (25 g Intravenous Given 11/29/21 0841)   sodium bicarbonate 8.4 % injection 25 mEq (25 mEq Intravenous Given 11/29/21 0841)   albuterol (PROVENTIL) neb solution 10 mg (10 mg Nebulization Given 11/29/21 0841)   hydrALAZINE (APRESOLINE) tablet 100 mg (100 mg Oral Given 11/29/21 0943)   amLODIPine (NORVASC) tablet 10 mg (10 mg Oral Given 11/29/21 0943)   cloNIDine (CATAPRES) tablet 0.1 mg (0.1 mg Oral Given 11/29/21 0943)     Drips infusing:  No  For the majority of the shift, the patient's behavior Green. Interventions performed were n/a.    Sepsis treatment initiated: No     Patient tested for COVID 19 prior to admission: YES    ED Nurse Name/Phone Number: Emeka Pickens RN,   10:55 AM

## 2021-11-29 NOTE — LETTER
November 29, 2021      To Whom It May Concern:      Taylor Valiente was seen in our Emergency Department today, 11/29/21.  He may return to work without restriction.        Sincerely,            Lloyd Abdi, DO

## 2021-11-29 NOTE — ED PROVIDER NOTES
History   Chief Complaint:  Vomiting     The history is provided by the patient.      Taylor Valiente is a 26 year old male with history of ESRD on dialysis, hypertension, hyperlipidemia who presents with vomiting. he complains of vomiting, chills, and lightheadedness that started about five hours ago. He woke up from sleep to get water and then vomited. He has two episodes of emesis. He states it was a large volume of emesis and he became sweaty. He also complains of mild mid abdominal pain. The pain on its own is not bad enough that he would have come to the ER. He makes some urine. Denies dysuria. No hematemesis. No diarrhea. No fever. No history of abdominal surgeries. He dialyzes Monday, Wednesday, and Friday. He admits that he can have nausea the morning he is due for dialysis but normally does not persist like it did today.    Review of Systems   Constitutional: Positive for chills and diaphoresis. Negative for fever.   Gastrointestinal: Positive for abdominal pain, nausea and vomiting. Negative for diarrhea.   Genitourinary: Negative for dysuria.   Neurological: Positive for light-headedness.   All other systems reviewed and are negative.    Allergies:  Benadryl     Medications:  Norvasc  Aspirin 81 mg  Lipitor  Bumex  Coreg  Catapres  Apresoline  Renvela   Sensipar     Past Medical History:     Adrenal adenoma  Anemia  CKD  Hypertension  Depression  Hyperlipidemia  Left ventricular hypertrophy   Obesity   Osteochondritis dissecans  Stress-induced cardiomyopathy   Suicide attempt   COVID-19  Prolonged q-t  ESRD on dialysis       Past Surgical History:    Arthroscopy ankle  Biopsy   Create fistula arteriovenous upper extremity   CVC tunnel placement  Revision fistula arteriovenous upper extremity      Family History:    Mother: hepatitis b, gestational diabetes, hypertension  Father: obesity, hypertension    Social History:  Presents to ED alone    Physical Exam     Patient Vitals for the past 24 hrs:    BP Temp Temp src Pulse Resp SpO2 Weight   11/29/21 0930 (!) 199/158 -- -- 79 -- 100 % --   11/29/21 0915 (!) 196/122 -- -- 77 -- 100 % --   11/29/21 0900 (!) 193/117 -- -- 75 -- 100 % --   11/29/21 0815 (!) 188/131 -- -- 77 -- 97 % --   11/29/21 0800 (!) 179/132 -- -- 77 -- 97 % --   11/29/21 0745 (!) 185/131 -- -- 75 -- 97 % --   11/29/21 0739 (!) 191/133 -- -- 75 -- 96 % --   11/29/21 0738 -- -- -- -- -- -- 108.9 kg (240 lb)   11/29/21 0737 -- -- -- 70 -- 98 % --   11/29/21 0632 (!) 184/132 -- -- -- -- -- --   11/29/21 0631 (!) 198/143 -- -- -- -- -- --   11/29/21 0630 -- 97.5  F (36.4  C) Temporal 82 20 99 % --       Physical Exam    Eyes:    Conjunctiva normal  Neck:    Supple, no meningismus.     CV:     Regular rate and rhythm.      No murmurs, rubs or gallops.     No lower extremity edema.     RUE fistula     Tunneled dialysis catheter to right upper chest  PULM:    Clear to auscultation bilateral.       No respiratory distress.      Good air exchange.     No rales or wheezing.  ABD:    Soft, non-distended.       Minimal tenderness at the umbilicus.     Bowel sounds normal.     No pulsatile masses.       No rebound, guarding or rigidity.     No CVA tenderness.   MSK:     No gross deformity to all four extremities.   LYMPH:   No cervical lymphadenopathy.  NEURO:   Alert.  Good muscular tone, no atrophy.   Skin:    Warm, dry and intact.    Psych:    Mood is good and affect is appropriate.      Emergency Department Course   ECG  ECG obtained at 0700, ECG read at 0705  Normal sinus rhythm   Prolonged QT  Abnormal ECG   No significant change as compared to prior, dated 8/26/21.  Rate 75 bpm. MS interval 158 ms. QRS duration 88 ms. QT/QTc 454/506 ms. P-R-T axes 26 42 62.     Laboratory:  Labs Ordered and Resulted from Time of ED Arrival to Time of ED Departure   COMPREHENSIVE METABOLIC PANEL - Abnormal       Result Value    Sodium 136      Potassium 6.7 (*)     Chloride 104      Carbon Dioxide (CO2) 17 (*)      Anion Gap 15 (*)     Urea Nitrogen 99 (*)     Creatinine 15.10 (*)     Calcium 9.4      Glucose 99      Alkaline Phosphatase 73      AST 37      ALT 34      Protein Total 8.0      Albumin 3.8      Bilirubin Total 0.4      GFR Estimate 4 (*)    CBC WITH PLATELETS AND DIFFERENTIAL - Abnormal    WBC Count 10.4      RBC Count 4.62      Hemoglobin 13.1 (*)     Hematocrit 42.6      MCV 92      MCH 28.4      MCHC 30.8 (*)     RDW 13.5      Platelet Count 172      % Neutrophils 77      % Lymphocytes 16      % Monocytes 4      % Eosinophils 2      % Basophils 1      % Immature Granulocytes 0      NRBCs per 100 WBC 0      Absolute Neutrophils 8.0      Absolute Lymphocytes 1.7      Absolute Monocytes 0.4      Absolute Eosinophils 0.2      Absolute Basophils 0.1      Absolute Immature Granulocytes 0.0      Absolute NRBCs 0.0     POTASSIUM - Abnormal    Potassium 6.7 (*)    LIPASE - Normal    Lipase 304     INFLUENZA A/B & SARS-COV2 PCR MULTIPLEX - Normal    Influenza A PCR Negative      Influenza B PCR Negative      SARS CoV2 PCR Negative     RBC AND PLATELET MORPHOLOGY    Platelet Assessment        Value: Automated Count Confirmed. Platelet morphology is normal.    RBC Morphology Confirmed RBC Indices        Emergency Department Course:  Reviewed:  I reviewed nursing notes, vitals, past medical history and Care Everywhere    Assessments:  0640 I obtained history and examined the patient as noted above.   0830 I rechecked and updated the patient.   0924 I rechecked the patient and explained findings.     Consults:  0921 I spoke with Dr. Gordon from Nephrology regarding patient's presentation, findings, and plan of care.   0936 I spoke with Jing Aguila PA-C for Dr. Nagel from the Hospitalist service regarding patient's presentation, findings, and plan of care.     Interventions:  0733 Zofran, 4 mg, IV   0841 Proventil, 10 mg, Nebulization  0841 Sodium bicarbonate 8.4%, 25 mEq, IV  0841 Dextrose 50%, 25 g, IV  0842 Calcium  gluconate 10%, 1 g, IV  0846 Insulin regular, 10 units, IV  0943 Apresoline, 100 mg, PO   0943 Norvasc, 10 mg, PO  0943 Catapres, 0.1 mg, PO    Disposition:  The patient was admitted to the hospital under the care of Dr. Nagel.     Impression & Plan       Medical Decision Makin-year-old male with end-stage renal disease on dialysis presents with nausea and vomiting.  He reports symptoms similar on mornings in which he is due for dialysis.  Abdominal examination is benign.  No indication for advanced imaging of the abdomen.  COVID/influenza swabs negative.  Low suspicion for infectious process although may represent nonspecific viral illness.  Laboratory studies reveal hyperkalemia without EKG changes.  Patient was given calcium gluconate and reversal agents with insulin, D50, sodium bicarb and albuterol.  I spoke with Dr. Gordon of nephrology who recommended urgent dialysis.  Patient will be transferred to an observation bed pending dialysis.    Patient was also noted to be markedly hypertensive.  He vomited is antihypertensives this morning likely resulting in marked hypertension today.  Patient was given his oral amlodipine, clonidine and hydroxyzine.    Diagnosis:    ICD-10-CM    1. Hyperkalemia  E87.5    2. ESRD (end stage renal disease) on dialysis (H)  N18.6     Z99.2    3. Essential hypertension  I10      Scribe Disclosure:  [place] LALA, am serving as a scribe at 6:39 AM on 2021 to document services personally performed by Julio Escoto MD based on my observations and the provider's statements to me.            Julio Escoto MD  21 1002

## 2021-11-29 NOTE — PHARMACY-ADMISSION MEDICATION HISTORY
Admission medication history interview status for this patient is complete. See Psychiatric admission navigator for allergy information, prior to admission medications and immunization status.     Medication history interview done, indicate source(s): Patient  Medication history resources (including written lists, pill bottles, clinic record):Cecilia, Care Everywhere  Pharmacy: [facility] ([facility])     Changes made to [facility] medication list:  Added: sevelamer  Changed: added dose/directions to bumex, clonidine, renal multivitamin  Reported as Not Taking: none  Removed: none    Actions taken by pharmacist (provider contacted, etc):None     Additional medication history information: Patient is a reliable historian. Cinacalcet 30 mg tablet filled 11/10/21 #84 day supply, #36 (3x/week dosing), on Care Everywhere records from [facility], however patient states not taking, so not added to [facility] med list.     Medication reconciliation/reorder completed by provider prior to medication history?  N     Prior to Admission medications    Medication Sig Last Dose Taking? Auth Provider   amLODIPine (NORVASC) 10 MG tablet Take 10 mg by mouth daily  11/28/2021 at AM Yes Unknown, Entered By History   aspirin 81 MG EC tablet Take 81 mg by mouth daily 11/28/2021 at AM Yes Unknown, Entered By History   atorvastatin (LIPITOR) 10 MG tablet TAKE ONE TABLET BY MOUTH EVERY NIGHT AT BEDTIME 11/28/2021 at PM Yes Priyank Lawrence MD   bumetanide (BUMEX) 2 MG tablet Take 2 mg by mouth daily  11/28/2021 at AM Yes Reported, Patient   carvedilol (COREG) 25 MG tablet Take 2 tablets (50 mg) by mouth 2 times daily (with meals) 11/28/2021 at PM Yes Priyank Lawrence MD   cloNIDine (CATAPRES) 0.1 MG tablet Take 0.3 mg by mouth 2 times daily  11/28/2021 at PM Yes Priyank Lawrence MD   hydrALAZINE (APRESOLINE) 100 MG tablet Take 1 tablet (100 mg) by mouth 3 times daily 11/28/2021 at PM Yes Priyank Lawrence MD   multivitamin RENAL (RENAVITE RX/NEPHROVITE) 1 MG tablet Take 1 tablet  by mouth daily  11/28/2021 at AM Yes Reported, Patient   sevelamer HCl (RENAGEL) 800 MG tablet Take 800 mg by mouth 3 times daily (with meals) 11/28/2021 at PM Yes Unknown, Entered By History   vitamin D3 (CHOLECALCIFEROL) 2000 units (50 mcg) tablet Take 75 mcg by mouth daily  11/28/2021 at AM Yes Unknown, Entered By History       Jaylene Arriaza, PharmD

## 2021-11-29 NOTE — CONSULTS
[facility]    Nephrology Consultation     Date of Admission:  11/29/2021    Assessment & Plan     Taylor Valiente is a 26 year old male with ESKD who was admitted on 11/29/2021.     1) N/V:  Cause unclear.  It is not a typical Monday AM symptom..    2) HTN: He has not had his usual meds yet.  I expect it will be better once he has.      3) ESKD: This is due to FSGS (secondary) and HTN.  He has HD at [place] on MWF under direction of Dr. Barrett and Ranjana Stone.  He runs for 4 hours via a R AVF at  BFR of 400 to a TW of 109 kg.      4) HTN: Quite severe. He is on a number of antihypertensives.  BP high as has not had his meds due to N/V.  I think he may have some fluid on despite the N/V. Will pull 1-2 kg as tolerated.      5) anemia: HGB 13.  No need for BRIE now.  Last week hgb was 11.6.  Holding BRIE.    6) MBD:  On hectorol 0.5 mcg     Plan:    HD today  I think he could be discharged after.  He should resume prior antihypertensives.         Timothy Gordon MD  [facility] - Nephrology  [phone]    Reason for Consult     I was asked to see the patient for ESKD.      Primary Care Physician     Priyank Lawrence    Chief Complaint     N/V and hyperkalemia.      History is obtained from the patient and chart review.      History of Present Illness     Taylor Valiente is a 26 year old male with ESKD.who presents with N/V and hyperkalemia.      He normally has HD MWF at [place] [facility].  He is due to run today.  He came to ED this AM with complaints of N/V.  He says nausea often comes on Monday AM.  Usually he can wait it out.    His K was found to be 6.7 confirmed on repeat.      He did have treatment for hyperkalemia in ED.  Follow up K was 4.9.    He is undergoing dialysis at present and feels much better.    His BP is high as he has not had his usual BP meds yet.     Past Medical History   I have reviewed this patient's medical history and updated it with pertinent  information if needed.   Past Medical History:   Diagnosis Date     Adrenal adenoma, left      Anemia      Benign essential hypertension 07/28/2017     CKD (chronic kidney disease) stage 5, GFR less than 15 ml/min (H)     FSGS     Depression      Focal glomerular sclerosis 07/28/2017     HLD (hyperlipidemia)      LVH (left ventricular hypertrophy) due to hypertensive disease 07/14/2017     Noncompliance      Obesity, unspecified      OD (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/2021     Stress-induced cardiomyopathy      Suicide attempt (H) 2019       Past Surgical History   I have reviewed this patient's surgical history and updated it with pertinent information if needed.  Past Surgical History:   Procedure Laterality Date     ARTHROSCOPY ANKLE Left 2/24/2021    Procedure: Left ankle arthroscopy and debridement/micro fracture;  Surgeon: Mirza Nelson MD;  [facility]     [facility]     CREATE FISTULA ARTERIOVENOUS UPPER EXTREMITY Right 3/4/2021    Procedure: RIGHT proximal radial  to CEPHALIC ARTERIOVENOUS FISTULA;  Surgeon: Henrik Moran MD;  Location: [facility] OR     IR CVC TUNNEL PLACEMENT > 5 YRS OF AGE  6/17/2021     NO HISTORY OF SURGERY       REVISION FISTULA ARTERIOVENOUS UPPER EXTREMITY Right 8/26/2021    Procedure: Second stage RIGHT BRACHIOCEPHALIC transposition ARTERIOVENOUS FISTULA;  Surgeon: Henrik Moran MD;  Location: [facility] OR       Prior to Admission Medications   Prior to Admission Medications   Prescriptions Last Dose Informant Patient Reported? Taking?   amLODIPine (NORVASC) 10 MG tablet 11/28/2021 at AM Self Yes Yes   Sig: Take 10 mg by mouth daily    aspirin 81 MG EC tablet 11/28/2021 at AM Self Yes Yes   Sig: Take 81 mg by mouth daily   atorvastatin (LIPITOR) 10 MG tablet 11/28/2021 at PM Self No Yes   Sig: TAKE ONE TABLET BY MOUTH EVERY NIGHT AT BEDTIME   bumetanide (BUMEX) 2 MG tablet 11/28/2021 at AM Self Yes Yes   Sig: Take 2 mg by mouth daily     carvedilol (COREG) 25 MG tablet 11/28/2021 at PM Self No Yes   Sig: Take 2 tablets (50 mg) by mouth 2 times daily (with meals)   cloNIDine (CATAPRES) 0.1 MG tablet 11/28/2021 at PM Self Yes Yes   Sig: Take 0.3 mg by mouth 2 times daily    hydrALAZINE (APRESOLINE) 100 MG tablet 11/28/2021 at PM Self Yes Yes   Sig: Take 1 tablet (100 mg) by mouth 3 times daily   multivitamin RENAL (RENAVITE RX/NEPHROVITE) 1 MG tablet 11/28/2021 at AM Self Yes Yes   Sig: Take 1 tablet by mouth daily    sevelamer HCl (RENAGEL) 800 MG tablet 11/28/2021 at PM Self Yes Yes   Sig: Take 800 mg by mouth 3 times daily (with meals)   vitamin D3 (CHOLECALCIFEROL) 2000 units (50 mcg) tablet 11/28/2021 at AM Self Yes Yes   Sig: Take 75 mcg by mouth daily       Facility-Administered Medications: None     Allergies   Allergies   Allergen Reactions     Benadryl [Diphenhydramine] Itching       Social History   I have reviewed this patient's social history and updated it with pertinent information if needed. Taylor Valiente  reports that he has never smoked. He has never used smokeless tobacco. He reports current drug use. Drug: Marijuana. He reports that he does not drink alcohol.    Family History   I have reviewed this patient's family history and updated it with pertinent information if needed.   Family History   Problem Relation Age of Onset     Blood Disease Mother         has hep b     Diabetes Mother         gestionanal diabetes     Hypertension Mother      Obesity Father      Hypertension Father      Hypertension Maternal Grandmother      Diabetes Maternal Grandmother      Hypertension Paternal Grandmother      Hypertension Paternal Grandfather      Coronary Artery Disease Maternal Uncle      Cancer No family hx of        Review of Systems   The 10 point Review of Systems is negative other than noted in the HPI.     Physical Exam   Temp: 97.9  F (36.6  C) Temp src: Oral BP: (!) 171/137 Pulse: 81   Resp: 16 SpO2: 99 %      Vital Signs  with Ranges  Temp:  [97.5  F (36.4  C)-97.9  F (36.6  C)] 97.9  F (36.6  C)  Pulse:  [70-99] 81  Resp:  [16-20] 16  BP: (159-199)/() 171/137  SpO2:  [95 %-100 %] 99 %  240 lbs 0 oz    GENERAL: healthy, alert, NAD  HEENT:  Normocephalic. No gross abnormalities.  Pupils equal.  MMM.  Dentition is ok.  CV: RRR, no murmurs, no clicks, gallops, or rubs, 1+ BLE edema, no carotid bruits  RESP: Clear bilaterally with good efforts  GI: Abdomen soft/nt/nd, BS normal. No masses, organomegaly  MUSCULOSKELETAL: extremities nl - no gross deformities noted  SKIN: no suspicious lesions or rashes, dry to touch  NEURO:  Strength normal and symmetric.   PSYCH: mood good, affect appropriate  LYMPH: No palpable ant/post cervical and supraclavicular adenopathy    Data   BMP  Recent Labs   Lab 11/29/21  1034 11/29/21  0836 11/29/21  0650   NA  --   --  136   POTASSIUM 4.9 6.7* 6.7*   CHLORIDE  --   --  104   BUBBA  --   --  9.4   CO2  --   --  17*   BUN  --   --  99*   CR  --   --  15.10*   GLC  --   --  99     [email or web address](phos:4)  CBC)  Recent Labs   Lab 11/29/21  0650   WBC 10.4   HGB 13.1*   HCT 42.6   MCV 92        Recent Labs   Lab 11/29/21  0650   AST 37   ALT 34   ALKPHOS 73   BILITOTAL 0.4

## 2021-11-29 NOTE — H&P
History and Physical     Taylor Valiente MRN# [id number]   YOB: 1995 Age: 26 year old      Date of Admission:  11/29/2021    Primary care provider: Priyank Lawrence          Assessment and Plan:   Taylor Valiente is a 26 year old male with history of ESRD on dialysis secondary to hypertension and FSGS, hypertension, hyperlipidemia who presents with episode of nausea and vomiting along w/ chills, and lightheadedness that started earlier this morning. He had no illness prior. States that he sometimes does have chronic nausea, and feels a bit down the morning of dialysis but certainly his symptoms today seem to last longer which point him to come into the emergency room.    The emergency room, he is found to be hypotensive with systolic in the 170s to 190s.   Laboratory results shows hyperkalemia at 6.7 with expected BUN/creatinine of 99 and 15.  No signs of infection with normal white count.  Covid influenza were both negative.  EKG sinus rhythm with prolonged QT but unchanged from previous.    When I saw him in emergency room, he had received IV Zofran and symptoms were better and states that he is feeling hungry and was able to tolerate a bit of water nephrology was contacted and he will be planning for hemodialysis and potential discharge home after his symptoms have improved.    1.  Episode of nausea vomiting  --This seems to be chronic in nature, perhaps related to uremia/hyperkalemia  --Symptoms have already improved when I saw him in the emergency room  --Continue antiemetics as needed  --Advance diet as tolerated    2.  End-stage renal disease on hemodialysis  #Hyperkalemia 2/2 above  --Appreciate above assessment, plan for hemodialysis now  --Repeat potassium improved at 4.9    3.  Hypertension  --Blood pressure elevated likely related to episodes of nausea vomiting, acute illness and not having meds yet  --BP meds resumed, showed improve after dialysis as well  --Continue to monitor    Other  chronic medical illness including anemia chronic disease, depression, adrenal adenoma all stable  --Continue other pertinent home medications only if needed    Covid-negative status post vaccination  DVT prophylaxis-not needed   Disposition-if patient is tolerating fluids able to hold down a meal, can likely discharge after dialysis if okay with nephrology.  If patient is still symptomatic, will need to come into observation overnight for symptom support.           Chief Complaint:   N/v        History of Present Illness:   Taylor Valiente is a 26 year old male with history of ESRD on dialysis secondary to hypertension and FSGS (still makes some urine), hypertension, hyperlipidemia who presents with episode of nausea and vomiting along w/ chills, and lightheadedness that started earlier this morning. He had no illness prior. States that he sometimes does have chronic nausea, and feels a bit down the morning of dialysis but certainly his symptoms today seem to last longer which point him to come into the emergency room.    The emergency room, he is found to be hypotensive with systolic in the 170s to 190s.   Laboratory results shows hyperkalemia at 6.7 with expected BUN/creatinine of 99 and 15.  No signs of infection with normal white count.  Covid influenza were both negative.  EKG sinus rhythm with prolonged QT but unchanged from previous.    When I saw him in emergency room, he had received IV Zofran and symptoms were better and states that he is feeling hungry and was able to tolerate a bit of water nephrology was contacted and he will be planning for hemodialysis and potential discharge home after his symptoms have improved.           Past Medical History:     Past Medical History:   Diagnosis Date     Adrenal adenoma, left      Anemia      Benign essential hypertension 07/28/2017     CKD (chronic kidney disease) stage 5, GFR less than 15 ml/min (H)     FSGS     Depression      Focal glomerular sclerosis  07/28/2017     HLD (hyperlipidemia)      LVH (left ventricular hypertrophy) due to hypertensive disease 07/14/2017     Noncompliance      Obesity, unspecified      OD (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/2021     Stress-induced cardiomyopathy      Suicide attempt (H) 2019               Past Surgical History:     Past Surgical History:   Procedure Laterality Date     ARTHROSCOPY ANKLE Left 2/24/2021    Procedure: Left ankle arthroscopy and debridement/micro fracture;  Surgeon: Mirza Nelson MD;  [facility]     BIOPSY  2017    [facility]- [facility]     CREATE FISTULA ARTERIOVENOUS UPPER EXTREMITY Right 3/4/2021    Procedure: RIGHT proximal radial  to CEPHALIC ARTERIOVENOUS FISTULA;  Surgeon: Henrik Moran MD;  [facility]     IR CVC TUNNEL PLACEMENT > 5 YRS OF AGE  6/17/2021     NO HISTORY OF SURGERY       REVISION FISTULA ARTERIOVENOUS UPPER EXTREMITY Right 8/26/2021    Procedure: Second stage RIGHT BRACHIOCEPHALIC transposition ARTERIOVENOUS FISTULA;  Surgeon: Henrik Moran MD;  Location: [facility] OR               Social History:     Social History     Socioeconomic History     Marital status: Single     Spouse name: Not on file     Number of children: 0     Years of education: Not on file     Highest education level: Not on file   Occupational History     Occupation:      Occupation: kitchen     Comment: [employer]   Tobacco Use     Smoking status: Never Smoker     Smokeless tobacco: Never Used   Vaping Use     Vaping Use: Some days     Substances: THC     Devices: Disposable, Pre-filled or refillable cartridge   Substance and Sexual Activity     Alcohol use: No     Drug use: Yes     Types: Marijuana     Comment: occ     Sexual activity: Not Currently     Partners: Female   Other Topics Concern     Parent/sibling w/ CABG, MI or angioplasty before 65F 55M? Not Asked   Social History Narrative     Not on file     Social Determinants of Health     Financial Resource  Strain: Not on file   Food Insecurity: Not on file   Transportation Needs: Not on file   Physical Activity: Not on file   Stress: Not on file   Social Connections: Not on file   Intimate Partner Violence: Not on file   Housing Stability: Not on file               Family History:     Family History   Problem Relation Age of Onset     Blood Disease Mother         has hep b     Diabetes Mother         gestionanal diabetes     Hypertension Mother      Obesity Father      Hypertension Father      Hypertension Maternal Grandmother      Diabetes Maternal Grandmother      Hypertension Paternal Grandmother      Hypertension Paternal Grandfather      Coronary Artery Disease Maternal Uncle      Cancer No family hx of               Allergies:      Allergies   Allergen Reactions     Benadryl [Diphenhydramine] Itching               Medications:     Prior to Admission medications    Medication Sig Last Dose Taking? Auth Provider   amLODIPine (NORVASC) 10 MG tablet Take 10 mg by mouth daily  11/28/2021 at AM Yes Unknown, Entered By History   aspirin 81 MG EC tablet Take 81 mg by mouth daily 11/28/2021 at AM Yes Unknown, Entered By History   atorvastatin (LIPITOR) 10 MG tablet TAKE ONE TABLET BY MOUTH EVERY NIGHT AT BEDTIME 11/28/2021 at PM Yes Priyank Lawrence MD   bumetanide (BUMEX) 2 MG tablet Take 2 mg by mouth daily  11/28/2021 at AM Yes Reported, Patient   carvedilol (COREG) 25 MG tablet Take 2 tablets (50 mg) by mouth 2 times daily (with meals) 11/28/2021 at PM Yes Priyank Lawrence MD   cloNIDine (CATAPRES) 0.1 MG tablet Take 0.3 mg by mouth 2 times daily  11/28/2021 at PM Yes Priyank Lawrence MD   hydrALAZINE (APRESOLINE) 100 MG tablet Take 1 tablet (100 mg) by mouth 3 times daily 11/28/2021 at PM Yes Priyank Lawrence MD   multivitamin RENAL (RENAVITE RX/NEPHROVITE) 1 MG tablet Take 1 tablet by mouth daily  11/28/2021 at AM Yes Reported, Patient   sevelamer HCl (RENAGEL) 800 MG tablet Take 800 mg by mouth 3 times daily (with meals) 11/28/2021  at PM Yes Unknown, Entered By History   vitamin D3 (CHOLECALCIFEROL) 2000 units (50 mcg) tablet Take 75 mcg by mouth daily  11/28/2021 at AM Yes Unknown, Entered By History              Review of Systems:     A Comprehensive greater than 10 system review of systems was carried out.  Pertinent positives and negatives are noted above.  Otherwise negative for contributory information.            Physical Exam:   Blood pressure (!) 170/116, pulse 88, temperature 97.5  F (36.4  C), temperature source Temporal, resp. rate 20, weight 108.9 kg (240 lb), SpO2 97 %.  Exam:  GENERAL:  Comfortable.Non toxic appearing, asking for water.   PSYCH: pleasant, oriented, No acute distress.  HEENT:  PERRLA. Normal conjunctiva, normal hearing, nasal mucosa and Oropharynx are normal.  NECK:  Supple, no neck vein distention, adenopathy or bruits, normal thyroid.  HEART:  Normal S1, S2 with no murmur, no pericardial rub, gallops or S3 or S4.  LUNGS:  Clear to auscultation, normal Respiratory effort. No wheezing, rales or ronchi.  ABDOMEN:  Soft, no hepatosplenomegaly, normal bowel sounds. Non-tender, non distended.   EXTREMITIES:  No pedal edema, +2 pulses bilateral and equal. Right arm fistula intact with p[palpable thrill  SKIN:  Dry to touch, No rash, wound or ulcerations.  NEUROLOGIC:  CN 2-12 intact, BL 5/5 symmetric upper and lower extremity strength, sensation is intact with no focal deficits.           Data:     Recent Labs   Lab 11/29/21  0650   WBC 10.4   HGB 13.1*   HCT 42.6   MCV 92        Recent Labs   Lab 11/29/21  1034 11/29/21  0836 11/29/21  0650   NA  --   --  136   POTASSIUM 4.9 6.7* 6.7*   CHLORIDE  --   --  104   CO2  --   --  17*   ANIONGAP  --   --  15*   GLC  --   --  99   BUN  --   --  99*   CR  --   --  15.10*   GFRESTIMATED  --   --  4*   BUBBA  --   --  9.4       Results for orders placed or performed during the hospital encounter of 10/07/21   US Ext Arterial Venous Dialys Acs Graft    Narrative     ULTRASOUND EXTREMITY ARTERIOVENOUS DIALYSIS ACCESS GRAFT  10/7/2021  8:58 AM    CLINICAL HISTORY/INDICATION: History of right brachiocephalic AVF  8/26/2021. ESRD (end stage renal disease) on dialysis (H).    COMPARISON: 8/17/2021    TECHNIQUE: Grayscale, color-flow, and spectral waveform analysis with  velocities were performed of the dialysis access circuit.    FINDINGS:  The arterial inflow is patent with diameters ranging  between 5.4 and 6.3 millimeters. The arterial anastomosis is patent.  The fistula is patent.    Total outflow volume is 2523 mL/min.      Impression    IMPRESSION: Patent arteriovenous fistula.    ARNEL HICKS DO         SYSTEM ID:  [id number]         Anne Marie Valerio PA-C  Text page via [facility] Paging/Directory

## 2021-11-29 NOTE — ED NOTES
DATE:  11/29/2021   TIME OF RECEIPT FROM LAB:  905  LAB TEST:  k  LAB VALUE:  6.7  RESULTS GIVEN WITH READ-BACK TO (PROVIDER):  Julio Escoto MD  TIME LAB VALUE REPORTED TO PROVIDER:   90

## 2021-11-30 NOTE — PROGRESS NOTES
Potassium   Date Value Ref Range Status   11/29/2021 4.9 3.4 - 5.3 mmol/L Final   06/19/2021 3.8 3.4 - 5.3 mmol/L Final     Hemoglobin   Date Value Ref Range Status   11/29/2021 13.1 (L) 13.3 - 17.7 g/dL Final   06/18/2021 9.1 (L) 13.3 - 17.7 g/dL Final     Creatinine   Date Value Ref Range Status   11/29/2021 15.10 (H) 0.66 - 1.25 mg/dL Final   06/19/2021 8.93 (H) 0.66 - 1.25 mg/dL Final     Urea Nitrogen   Date Value Ref Range Status   11/29/2021 99 (H) 7 - 30 mg/dL Final   06/19/2021 58 (H) 7 - 30 mg/dL Final     Sodium   Date Value Ref Range Status   11/29/2021 136 133 - 144 mmol/L Final   06/19/2021 139 133 - 144 mmol/L Final     INR   Date Value Ref Range Status   02/09/2021 1.23 (H) 0.86 - 1.14 Final       DIALYSIS PROCEDURE NOTE  Hepatitis status of previous patient on machine log was checked and verified ok to use with this patients hepatitis status.  Patient dialyzed for 4 hrs. on a K2 bath with a net fluid removal of  1.5L.  A BFR of 350 ml/min was obtained via a R AVF using 15gauge needles.      The treatment plan was discussed with Dr. Gordon during the treatment.    Total heparin received during the treatment: 1800 units.   Needle cannulation sites held x 8 min.       Meds  given: hect   Complications: none      Person educated: pt. Knowledge base good. Barriers to learning: none. Educated on procedure.     ICEBOAT? Timeout performed pre-treatment  I: Patient was identified using 2 identifiers  C:  Consent Signed Yes  E: Equipment preventative maintenance is current and dialysis delivery system OK to use  B: Hepatitis B Surface Antigen: neg; Draw Date: 6/17/21       Hepatitis B Surface Antibody: immune; Draw Date: 6/17/21  O: Dialysis orders present and complete prior to treatment  A: Vascular access verified and assessed prior to treatment  T: Treatment was performed at a clinically appropriate time  ?: Patient was allowed to ask questions and address concerns prior to treatment  See flowsheet in EPIC  for further details and post assessment.  Machine water alarm in place and functioning. Transducer pods intact and checked every 15min.   Pt returned via w.  Chlorine/Chloramine water system checked every 4 hours.  Outpatient Dialysis at [facility]    Please remove patient dressing on AVF and AVG needle sites 24 hours after dialysis. If leaking occurs please apply a Band-Aid.

## 2021-12-02 NOTE — PROGRESS NOTES
Ordering Clinic:  [facility]    IR procedure #:  Procedure: cvc removal  Reason for procedure: no longer needed  Arrival date: 12/3/21  Arrival time: 1200  Covid-19 testing requirements explained: 11/29 epic  NPO explained: na                 Does patient have transportation available pre and post procedure?   na  Check-in procedure explained: yes  Allergies reviewed: yes  Blood thinners: none  Labs: na  Results:_____________________________________  H&P:  leah 11/22/21  H&P requested?:   Letter sent/email?: none  Does patient require /language?        PMH:

## 2021-12-03 ENCOUNTER — APPOINTMENT (OUTPATIENT)
Dept: INTERVENTIONAL RADIOLOGY/VASCULAR | Facility: CLINIC | Age: 26
End: 2021-12-03
Attending: INTERNAL MEDICINE
Payer: COMMERCIAL

## 2021-12-03 ENCOUNTER — HOSPITAL ENCOUNTER (OUTPATIENT)
Facility: CLINIC | Age: 26
Discharge: HOME | End: 2021-12-03
Attending: RADIOLOGY | Admitting: RADIOLOGY
Payer: COMMERCIAL

## 2021-12-03 DIAGNOSIS — N18.6 ESRD (END STAGE RENAL DISEASE) (H): ICD-10-CM

## 2021-12-03 PROCEDURE — 250N000009 HC RX 250

## 2021-12-03 PROCEDURE — 36589 REMOVAL TUNNELED CV CATH: CPT

## 2021-12-03 RX ORDER — LIDOCAINE HYDROCHLORIDE 10 MG/ML
1-30 INJECTION, SOLUTION EPIDURAL; INFILTRATION; INTRACAUDAL; PERINEURAL
Status: COMPLETED | OUTPATIENT
Start: 2021-12-03 | End: 2021-12-03

## 2021-12-03 RX ORDER — LIDOCAINE HYDROCHLORIDE 10 MG/ML
INJECTION, SOLUTION EPIDURAL; INFILTRATION; INTRACAUDAL; PERINEURAL
Status: COMPLETED
Start: 2021-12-03 | End: 2021-12-03

## 2021-12-03 RX ADMIN — LIDOCAINE HYDROCHLORIDE 10 ML: 10 INJECTION, SOLUTION EPIDURAL; INFILTRATION; INTRACAUDAL; PERINEURAL at 12:42

## 2021-12-03 NOTE — DISCHARGE INSTRUCTIONS
Going Home after the Removal of Your Tunneled Dialysis Catheter  __________________________________________    Patient Name:  Taylor Valiente  Today's Date:   December 3, 2021    The doctor who remove your port or catheter was: Dr. Cerrato    When you get home:    Nffects from the medicine you received today (you may feel drowsy, unsteady or forgetful)    You should have an adult with you for your first six hours at home.    You may go driving or drinking alcohol until tomorrow.  You may still have side eo back to your regular diet today.     If you take aspirin or Plavix, you may begin taking it again tomorrow. You may restart all other medicines today. Use pain medicine as directed.    Avoid heavy lifting or the excess use of your shoulder for three days.    Port sit and bandage care:    Keep the wound clean and dry for three days. Cover it with plastic before taking a shower.    Change the bandage if it becomes wet or dirty.  Use bacitracin (antibiotic cream) and clean gauze.    After three days, you may use Band-Aids until the wound has healed.    If you have oozing or bleeding from the port sit or catheter (tube) site:    Put direct pressure on the wound for 5 to 10 minutes with a gauze pad.    If you still have bleeding after 10 minutes, call your doctor.     If you are bleeding a lot and can't control it with direct pressure, call 911.    Call your doctor if you have:    Swelling in your neck.    Signs of infection: fever over 100 degrees Fahrenheit (37.8 degrees celsius) under the tongue; the wound is red, tender or draining.      [facility] at [phone]

## 2021-12-03 NOTE — SEDATION DOCUMENTATION
CVC Removed at bedside.  Tip intact.  Sterile precautions followed.  Removed by [facility].  No complications.

## 2021-12-03 NOTE — IP AVS SNAPSHOT
After Visit Summary Template Not Found    This Print Group is only intended to be used in the After Visit Summary and can only be used in a report that uses a released After Visit Summary Template.                      MRN:[id number]                   After Visit Summary   12/3/2021    Taylor Valiente   MRN: [id number]           Visit Information        Department      12/3/2021 11:46 AM [facility]          Review of your medicines      UNREVIEWED medicines. Ask your doctor about these medicines       Dose / Directions   amLODIPine 10 MG tablet  Commonly known as: NORVASC      Dose: 10 mg  Take 10 mg by mouth daily  Refills: 0     aspirin 81 MG EC tablet      Dose: 81 mg  Take 81 mg by mouth daily  Refills: 0     atorvastatin 10 MG tablet  Commonly known as: LIPITOR  Used for: Acute renal failure, unspecified acute renal failure type (H)      TAKE ONE TABLET BY MOUTH EVERY NIGHT AT BEDTIME  Quantity: 90 tablet  Refills: 3     bumetanide 2 MG tablet  Commonly known as: BUMEX      Dose: 2 mg  Take 2 mg by mouth daily  Refills: 0     carvedilol 25 MG tablet  Commonly known as: COREG  Used for: Acute kidney injury (H)      Dose: 50 mg  Take 2 tablets (50 mg) by mouth 2 times daily (with meals)  Quantity: 180 tablet  Refills: 3     cloNIDine 0.1 MG tablet  Commonly known as: CATAPRES  Used for: Acute kidney injury (H)      Dose: 0.3 mg  Take 0.3 mg by mouth 2 times daily  Refills: 0     hydrALAZINE 100 MG tablet  Commonly known as: APRESOLINE  Used for: Acute kidney injury (H)      Dose: 100 mg  Take 1 tablet (100 mg) by mouth 3 times daily  Quantity: 360 tablet  Refills: 1     multivitamin RENAL 1 MG tablet      Dose: 1 tablet  Take 1 tablet by mouth daily  Refills: 0     sevelamer HCl 800 MG tablet  Commonly known as: RENAGEL      Dose: 800 mg  Take 800 mg by mouth 3 times daily (with meals)  Refills: 0     vitamin D3 50 mcg (2000 units) tablet  Commonly known as: CHOLECALCIFEROL      Dose:  75 mcg  Take 75 mcg by mouth daily  Refills: 0              Protect others around you: Learn how to safely use, store and throw away your medicines at www.disposemymeds.org.       Follow-ups after your visit       Your next 10 appointments already scheduled    Dec 28, 2021  2:00 PM  (Arrive by 1:40 PM)  PHYSICAL with Priyank Lawrence MD  [facility] ([facility] ) [address] [place] [id number] [phone]   The purpose of this visit is to discuss your medical history and prevent health problems before you are sick. If you have additional concerns you would like to discuss outside of preventive care, you may be billed for that service.  If you have further questions, you may want to contact your insurance company to understand what is included in your preventive health benefits.         Care Instructions       Further instructions from your care team       Going Home after the Removal of Your Tunneled Dialysis Catheter  __________________________________________    Patient Name:  Taylor Valiente  Today's Date:   December 3, 2021    The doctor who remove your port or catheter was: Dr. Cerrato    When you get home:    No driving or drinking alcohol until tomorrow.  You may still have side effects from the medicine you received today (you may feel drowsy, unsteady or forgetful)    You should have an adult with you for your first six hours at home.    You may go back to your regular diet today.     If you take aspirin or Plavix, you may begin taking it again tomorrow. You may restart all other medicines today. Use pain medicine as directed.    Avoid heavy lifting or the excess use of your shoulder for three days.    Port sit and bandage care:    Keep the wound clean and dry for three days. Cover it with plastic before taking a shower.    Change the bandage if it becomes wet or dirty.  Use bacitracin (antibiotic cream) and clean gauze.    After three  days, you may use Band-Aids until the wound has healed.    If you have oozing or bleeding from the port sit or catheter (tube) site:    Put direct pressure on the wound for 5 to 10 minutes with a gauze pad.    If you still have bleeding after 10 minutes, call your doctor.     If you are bleeding a lot and can't control it with direct pressure, call 911.    Call your doctor if you have:    Swelling in your neck.    Signs of infection: fever over 100 degrees Fahrenheit (37.8 degrees celsius) under the tongue; the wound is red, tender or draining.      [facility] at [phone]        Additional Information About Your Visit       [employer] Information    [employer] gives you secure access to your electronic health record. If you see a primary care provider, you can also send messages to your care team and make appointments. If you have questions, please call your primary care clinic.  If you do not have a primary care provider, please call [phone] and they will assist you.       Care EveryWhere ID    This is your Care EveryWhere ID. This could be used by other organizations to access your [facility] medical records  [id number]        Primary Care Provider Office Phone # Fax #    Priyank Lawrence [phone]      Equal Access to Services    DEUCE FLYNN : Rowan hareo Sogonzalezali, waaxda luqadaha, qaybta kaalmada adeegyada, jean-paul fan. So [facility] [phone].    ATENCIÓN: Si habla español, tiene a spangler disposición servicios gratuitos de asistencia lingüística. Llame al [phone].    We comply with applicable federal and state civil rights laws, including the [place] Human Rights Act. We do not discriminate on the basis of race, color, creed, Temple, national origin, marital status, age, disability, sex, sexual orientation, or gender identity.    If you would like an itemization of your charges they will now be available in [employer] 30 days after discharge.  To access the itemized statements in [employer] go to billing/billing summary. From there select view account. There will be multiple tabs showing an overview of your account, detail, payments, and communications. From the communications tab you can see your monthly statements, your itemized statements, and any billing letters generated for your account. If you do not have a [employer] account and need help getting access please contact [employer] support at [phone].  If you would prefer to have your itemized statements mailed please contact our automated itemized bill request line at [phone] option  2.       Thank you!    Thank you for choosing [facility] for your care. Our goal is always to provide you with excellent care. Hearing back from our patients is one way we can continue to improve our services. Please take a few minutes to complete the written survey that you may receive in the mail after you visit. If you would like to speak to someone directly about your visit please contact Patient Relations at [phone]. Thank you!              Medication List      ASK your doctor about these medications          Morning Afternoon Evening Bedtime As Needed    amLODIPine 10 MG tablet  Also known as: NORVASC  INSTRUCTIONS: Take 10 mg by mouth daily                     aspirin 81 MG EC tablet  INSTRUCTIONS: Take 81 mg by mouth daily                     atorvastatin 10 MG tablet  Also known as: LIPITOR  INSTRUCTIONS: TAKE ONE TABLET BY MOUTH EVERY NIGHT AT BEDTIME                     bumetanide 2 MG tablet  Also known as: BUMEX  INSTRUCTIONS: Take 2 mg by mouth daily                     carvedilol 25 MG tablet  Also known as: COREG  INSTRUCTIONS: Take 2 tablets (50 mg) by mouth 2 times daily (with meals)                     cloNIDine 0.1 MG tablet  Also known as: CATAPRES  INSTRUCTIONS: Take 0.3 mg by mouth 2 times daily                     hydrALAZINE 100 MG tablet  Also known as:  APRESOLINE  INSTRUCTIONS: Take 1 tablet (100 mg) by mouth 3 times daily                     multivitamin RENAL 1 MG tablet  INSTRUCTIONS: Take 1 tablet by mouth daily                     sevelamer HCl 800 MG tablet  Also known as: RENAGEL  INSTRUCTIONS: Take 800 mg by mouth 3 times daily (with meals)                     vitamin D3 50 mcg (2000 units) tablet  Also known as: CHOLECALCIFEROL  INSTRUCTIONS: Take 75 mcg by mouth daily

## 2021-12-03 NOTE — IP AVS SNAPSHOT
[facility]  [address]  [facility] [address]  Phone: [phone]                                  After Visit Summary   12/3/2021    Taylor Valiente   MRN: [id number]           After Visit Summary Signature Page    I have received my discharge instructions, and my questions have been answered. I have discussed any challenges I see with this plan with the nurse or doctor.    ..........................................................................................................................................  Patient/Patient Representative Signature      ..........................................................................................................................................  Patient Representative Print Name and Relationship to Patient    ..................................................               ................................................  Date                                   Time    ..........................................................................................................................................  Reviewed by Signature/Title    ...................................................              ..............................................  Date                                               Time          22EPIC Rev 08/18

## 2021-12-10 ENCOUNTER — TELEPHONE (OUTPATIENT)
Dept: TRANSPLANT | Facility: CLINIC | Age: 26
End: 2021-12-10
Payer: COMMERCIAL

## 2021-12-10 DIAGNOSIS — Z91.199 PERSONAL HISTORY OF NONCOMPLIANCE WITH MEDICAL TREATMENT, PRESENTING HAZARDS TO HEALTH: ICD-10-CM

## 2021-12-10 DIAGNOSIS — N05.1 FSGS (FOCAL SEGMENTAL GLOMERULOSCLEROSIS): ICD-10-CM

## 2021-12-10 DIAGNOSIS — Z01.818 PRE-TRANSPLANT EVALUATION FOR KIDNEY TRANSPLANT: ICD-10-CM

## 2021-12-10 DIAGNOSIS — N18.5 CHRONIC KIDNEY DISEASE, STAGE V (H): ICD-10-CM

## 2021-12-10 DIAGNOSIS — I10 ESSENTIAL HYPERTENSION: ICD-10-CM

## 2021-12-10 NOTE — TELEPHONE ENCOUNTER
Left message for patient regarding his next steps in transplant evaluation, explaining the following are needed for his evaluation:  1) due to prior non-compliance and depression, neuropsych testing is requested. The department had left several voicemails this summer and was unable to get ahold of him.  2) Cardiology requests an angiogram now that he is on dialysis  3) Endocrinology consult to address his increased cortisol levels and adrenal adenoma history  4) Due to prior non-compliance and depression symptoms with prior suicide attempt, he needs to establish care with a mental health provider/therapist and attend appointments consistently for 6 months.    In message, requested that patient refrain from calling multiple times in the same minute and explained that there are times that writer is in a meeting or on another call and can't answer, but his call will be returned when writer is able.     Letter sent to patient via [employer] with next steps for evaluation.

## 2021-12-10 NOTE — LETTER
12/10/21        Taylor Valiente  [address]   [place] [address]        Dear Taylor,    The committee is requesting the following items are completed before determining your candidacy:    1. Due to prior non-compliance and depression, the transplant team requests that you undergo neuropsychology testing to determine your ability to comply with a complex medication and follow-up routine that is required of a transplant patient. The neurospychology department will reach out to schedule this.  2. In your visit with the cardiologist 2/17/21, it was request that you undergo a coronary angiogram to assess your heart vessels once you start dialysis. Now that you are on dialysis, this can be scheduled. The cardiology team will reach out to facilitate this.  3. Because of your history of increased cortisol levels and adrenal adenomas, the transplant team requests that you are seen by the endocrinology team. The transplant scheduling team will reach out to schedule this.  4. Because of your history of depression, suicide attempt and non-compliance, it is requested that you establish care with a mental health professional (therapist) and consistently attend appointments for 6 months prior to being put on the wait list. Please work with your primary care provider to find a mental health professional in network with your insurance.      For any questions, please contact the Transplant Office at [phone].      Sincerely,      Solid Organ Transplant  [facility], [facility]

## 2021-12-12 DIAGNOSIS — N17.9 ACUTE KIDNEY INJURY (H): ICD-10-CM

## 2021-12-13 NOTE — TELEPHONE ENCOUNTER
Routing refill request to provider for review/approval because:  Elevated BP    Ana Rollins RN   [facility] [place]  -- Triage Nurse

## 2021-12-15 RX ORDER — HYDRALAZINE HYDROCHLORIDE 100 MG/1
TABLET, FILM COATED ORAL
Qty: 360 TABLET | Refills: 1 | Status: SHIPPED | OUTPATIENT
Start: 2021-12-15 | End: 2023-08-04

## 2021-12-22 NOTE — PROGRESS NOTES
Pre-Visit Planning     Date Time Provider Department Center   12/28/2021  2:00 PM Priyank Lawrence MD [facility]     Arrival Time for this Appointment:  1:40 PM     Appointment Notes for this encounter:   Check up    Questionnaires Reviewed/Assigned  No additional questionnaires are needed     Health Maintenance Due   Topic Date Due     MICROALBUMIN  10/03/2019     LIPID  07/03/2020     COVID-19 Vaccine (3 - Pfizer risk 4-dose series) 06/09/2021     ANNUAL REVIEW OF HM ORDERS  09/25/2021     Patient preferred phone number: [phone]    Unable to reach. Left voicemail with time/date of appt along with RN PAL direct call back number     Sultana Urbina Registered Nurse, PAL (Patient Advocate Liason)   [employer]   [phone]

## 2021-12-26 ENCOUNTER — E-VISIT (OUTPATIENT)
Dept: URGENT CARE | Facility: CLINIC | Age: 26
End: 2021-12-26
Payer: COMMERCIAL

## 2021-12-26 ENCOUNTER — APPOINTMENT (OUTPATIENT)
Dept: GENERAL RADIOLOGY | Facility: CLINIC | Age: 26
End: 2021-12-26
Attending: EMERGENCY MEDICINE
Payer: COMMERCIAL

## 2021-12-26 ENCOUNTER — HOSPITAL ENCOUNTER (EMERGENCY)
Facility: CLINIC | Age: 26
Discharge: HOME | End: 2021-12-26
Attending: EMERGENCY MEDICINE | Admitting: EMERGENCY MEDICINE
Payer: COMMERCIAL

## 2021-12-26 VITALS
TEMPERATURE: 98.7 F | SYSTOLIC BLOOD PRESSURE: 159 MMHG | OXYGEN SATURATION: 99 % | DIASTOLIC BLOOD PRESSURE: 103 MMHG | HEART RATE: 72 BPM | BODY MASS INDEX: 32.23 KG/M2 | HEIGHT: 72 IN | WEIGHT: 238 LBS | RESPIRATION RATE: 18 BRPM

## 2021-12-26 DIAGNOSIS — J02.9 SORE THROAT: ICD-10-CM

## 2021-12-26 DIAGNOSIS — R11.10 VOMITING AND DIARRHEA: ICD-10-CM

## 2021-12-26 DIAGNOSIS — Z00.00 ROUTINE GENERAL MEDICAL EXAMINATION AT A HEALTH CARE FACILITY: ICD-10-CM

## 2021-12-26 DIAGNOSIS — R19.7 VOMITING AND DIARRHEA: ICD-10-CM

## 2021-12-26 DIAGNOSIS — Z20.822 SUSPECTED 2019 NOVEL CORONAVIRUS INFECTION: ICD-10-CM

## 2021-12-26 DIAGNOSIS — R68.89 FLU-LIKE SYMPTOMS: Primary | ICD-10-CM

## 2021-12-26 DIAGNOSIS — Z20.822 SUSPECTED COVID-19 VIRUS INFECTION: ICD-10-CM

## 2021-12-26 LAB
ANION GAP SERPL CALCULATED.3IONS-SCNC: 11 MMOL/L (ref 3–14)
BASOPHILS # BLD AUTO: 0.1 10E3/UL (ref 0–0.2)
BASOPHILS NFR BLD AUTO: 1 %
BUN SERPL-MCNC: 91 MG/DL (ref 7–30)
CALCIUM SERPL-MCNC: 9.1 MG/DL (ref 8.5–10.1)
CHLORIDE BLD-SCNC: 104 MMOL/L (ref 94–109)
CO2 SERPL-SCNC: 24 MMOL/L (ref 20–32)
CREAT SERPL-MCNC: 14 MG/DL (ref 0.66–1.25)
DEPRECATED S PYO AG THROAT QL EIA: NEGATIVE
EOSINOPHIL # BLD AUTO: 0.5 10E3/UL (ref 0–0.7)
EOSINOPHIL NFR BLD AUTO: 5 %
ERYTHROCYTE [DISTWIDTH] IN BLOOD BY AUTOMATED COUNT: 13.6 % (ref 10–15)
FLUAV RNA SPEC QL NAA+PROBE: NEGATIVE
FLUBV RNA RESP QL NAA+PROBE: NEGATIVE
GFR SERPL CREATININE-BSD FRML MDRD: 5 ML/MIN/1.73M2
GLUCOSE BLD-MCNC: 96 MG/DL (ref 70–99)
GROUP A STREP BY PCR: NOT DETECTED
HCT VFR BLD AUTO: 40.1 % (ref 40–53)
HGB BLD-MCNC: 12.8 G/DL (ref 13.3–17.7)
IMM GRANULOCYTES # BLD: 0 10E3/UL
IMM GRANULOCYTES NFR BLD: 0 %
LYMPHOCYTES # BLD AUTO: 1.9 10E3/UL (ref 0.8–5.3)
LYMPHOCYTES NFR BLD AUTO: 19 %
MCH RBC QN AUTO: 28.7 PG (ref 26.5–33)
MCHC RBC AUTO-ENTMCNC: 31.9 G/DL (ref 31.5–36.5)
MCV RBC AUTO: 90 FL (ref 78–100)
MONOCYTES # BLD AUTO: 0.9 10E3/UL (ref 0–1.3)
MONOCYTES NFR BLD AUTO: 9 %
NEUTROPHILS # BLD AUTO: 6.6 10E3/UL (ref 1.6–8.3)
NEUTROPHILS NFR BLD AUTO: 66 %
NRBC # BLD AUTO: 0 10E3/UL
NRBC BLD AUTO-RTO: 0 /100
PLATELET # BLD AUTO: 140 10E3/UL (ref 150–450)
POTASSIUM BLD-SCNC: 5.2 MMOL/L (ref 3.4–5.3)
RBC # BLD AUTO: 4.46 10E6/UL (ref 4.4–5.9)
RSV RNA SPEC NAA+PROBE: NEGATIVE
SARS-COV-2 RNA RESP QL NAA+PROBE: NEGATIVE
SODIUM SERPL-SCNC: 139 MMOL/L (ref 133–144)
WBC # BLD AUTO: 10 10E3/UL (ref 4–11)

## 2021-12-26 PROCEDURE — 80048 BASIC METABOLIC PNL TOTAL CA: CPT | Performed by: EMERGENCY MEDICINE

## 2021-12-26 PROCEDURE — C9803 HOPD COVID-19 SPEC COLLECT: HCPCS

## 2021-12-26 PROCEDURE — 99421 OL DIG E/M SVC 5-10 MIN: CPT | Performed by: PHYSICIAN ASSISTANT

## 2021-12-26 PROCEDURE — 85025 COMPLETE CBC W/AUTO DIFF WBC: CPT | Performed by: EMERGENCY MEDICINE

## 2021-12-26 PROCEDURE — 87651 STREP A DNA AMP PROBE: CPT | Performed by: EMERGENCY MEDICINE

## 2021-12-26 PROCEDURE — 80061 LIPID PANEL: CPT

## 2021-12-26 PROCEDURE — 36415 COLL VENOUS BLD VENIPUNCTURE: CPT | Performed by: EMERGENCY MEDICINE

## 2021-12-26 PROCEDURE — 99284 EMERGENCY DEPT VISIT MOD MDM: CPT | Mod: 25

## 2021-12-26 PROCEDURE — 87637 SARSCOV2&INF A&B&RSV AMP PRB: CPT | Performed by: EMERGENCY MEDICINE

## 2021-12-26 PROCEDURE — 71045 X-RAY EXAM CHEST 1 VIEW: CPT

## 2021-12-26 ASSESSMENT — MIFFLIN-ST. JEOR: SCORE: 2102.56

## 2021-12-26 ASSESSMENT — ENCOUNTER SYMPTOMS
FEVER: 1
COUGH: 1
DIARRHEA: 1
NAUSEA: 1
VOMITING: 1

## 2021-12-26 NOTE — ED PROVIDER NOTES
History   Chief Complaint:  Fever and Cough     HPI   Taylor Valiente is a 26 year old male with history of focal glomerular sclerosis and ESRD on dialysis who presents for evaluation of a fever and a cough. Approximately three days ago the patient started to develop a fever and a cough with intermittent symptoms of nausea, vomiting, and diarrhea. He presents to the ED today with primary concern that he could have COVID-19. He is vaccinated for COVID-19. He is scheduled for his next dialysis run tomorrow.     Review of Systems   Constitutional: Positive for fever.   Respiratory: Positive for cough.    Gastrointestinal: Positive for diarrhea, nausea and vomiting.   All other systems reviewed and are negative.      Allergies:  Benadryl     Medications:  Amlodipine   Aspirin  Lipitor  Bumex  Coreg  Clonidine   Hydralazine  Renagel     Past Medical History:     Anemia  Adrenal adenoma left  Hypertension  End stage renal disease on dialysis   Depression  Focal glomerular sclerosis   Hyperlipidemia  Left ventricular hypertrophy   Osteochondritis dissecans  Stress-induced cardiomyopathy        Past Surgical History:    Left knee arthroscopy  Biopsy  Create fistula arteriovenous upper extremity right   IR CVC tunnel placement  IR CVC tunnel removal right   Revision fistula arteriovenous upper extremity right      Family History:    Hepatitis B - Mother  Hypertension - Mother and father     Social History:  The patient presents to the ED alone.   The patient is vaccinated for COVID-19 but has not received a booster.     Physical Exam     Patient Vitals for the past 24 hrs:   BP Temp Temp src Pulse Resp SpO2 Height Weight   12/26/21 1645 -- -- -- -- -- 100 % -- --   12/26/21 1600 -- -- -- -- -- 100 % -- --   12/26/21 1545 -- -- -- -- -- 100 % -- --   12/26/21 1530 -- -- -- -- -- 98 % -- --   12/26/21 1515 (!) 151/110 -- -- -- -- -- -- --   12/26/21 1426 (!) 167/124 98.7  F (37.1  C) Oral 72 16 99 % 1.829 m (6') 108 kg  (238 lb)       Physical Exam  Constitutional: Alert, attentive  HENT:    Nose: Nose normal.    Mouth/Throat: Oropharynx is clear, mucous membranes are moist   Eyes: EOM are normal.   CV: regular rate and rhythm; no murmurs, rubs or gallups  Chest: Effort normal and breath sounds normal.   GI:  There is no tenderness. No distension. Normal bowel sounds  MSK: Normal range of motion.   Neurological: Alert, attentive  Skin: Skin is warm and dry.      Emergency Department Course     Imaging:  XR Chest Port:  IMPRESSION: Negative chest.  Per radiology.      Laboratory:  CBC: WBC 10.0, HGB 12.8 low,  low  BMP: Urea nitrogen 91 high, Creatinine 14.00 high, GFR estimate 5 low   Symptomatic Influenza A/B & SARS-CoV2 (COVID19) Virus PCR Multiplex: Pending   Streptococcus A rapid screen with reflex to PCR: Negative   Group A streptococcus PCR throat swab: Pending       Emergency Department Course:  Reviewed:  I reviewed nursing notes, vitals and past medical history    Assessments:  1639: I obtained history and examined the patient as noted above.     1746: I updated and reassessed the patient.     Disposition:  The patient was discharged to home.     Impression & Plan       Medical Decision Making:  Taylor Valiente is a 26 year old year old male with history of ESRD on dialysis who presents for evaluation of symptoms consistent with URI vs. influenza-like vs. COVID-19 illness.    Covid or influenza are possible but he is afebrile and has mild symptoms.  . Testing is performed and pending; educated to home isolation until results are available. There is no convincing clinical signs of pneumonia; XR deferred. The patient is well-appearing, and safe for discharge at this time. Return to the ED is indicated for high fevers and/or worsening life-threatening symptoms such as increasing productive cough, shortness of breath, confusion, or any other concerning symptoms.  I have recommended they stay home, practice good  personal health hygiene, and try to limit interactions with others (self quarantine for 7 days), and at least 3 days of being fever-free. Plan primary care follow-up for recheck thereafter and strict return precautions as per above. The patient voiced understanding of the above.      Covid-19  Taylor Valiente was evaluated during a global COVID-19 pandemic, which necessitated consideration that the patient might be at risk for infection with the SARS-CoV-2 virus that causes COVID-19.   Applicable protocols for evaluation were followed during the patient's care.   COVID-19 was considered as part of the patient's evaluation. The plan for testing is:  a test was obtained during this visit.     Diagnosis:    ICD-10-CM    1. Suspected 2019 novel coronavirus infection  Z20.822    2. Vomiting and diarrhea  R11.10     R19.7         Scribe Disclosure:  I, Manjinder Red, am serving as a scribe at 4:15 PM on 12/26/2021 to document services personally performed by Timothy Smith MD based on my observations and the provider's statements to me.         Timothy Smith MD  12/27/21 9705

## 2021-12-26 NOTE — ED TRIAGE NOTES
Pt presents for complaint of fevers, nausea and vomiting, abdominal pain and diarrhea. Pt states he started coughing a few days ago with the other symptoms starting this morning. ABC intact, A&Ox4.    Pt is currently receiving dialysis. States most recent run was Friday morning and he is scheduled for this next run tomorrow.

## 2021-12-26 NOTE — DISCHARGE INSTRUCTIONS
Discharge Instructions  Suspected COVID-19    COVID-19 is the disease caused by a new coronavirus. The virus spreads from person-to-person primarily by droplets when an infected person coughs or sneezes and the droplets are then breathed in by another person. There are tests available to diagnose COVID-19. You may have been diagnosed with COVID, may be being tested for COVID and have a pending test result, or may have been exposed to COVID.    Symptoms of COVID-19  Many people have no symptoms or mild symptoms.  Symptoms may usually appear 4 to 5 days (up to 14 days) after contact with a person with COVID-19. Some people will get severe symptoms and pneumonia. Usual symptoms are:     ? Fever  ? Cough  ? Trouble breathing    Less common symptoms are: Headache, body aches, sore throat, sneezing, diarrhea, loss of taste or smell.    Isolation and Quarantine    You may have been seen because you have symptoms, had an exposure, or had some other concern about possible COVID. The best way to stop the spread of the virus is to avoid contact with others.    Isolation refers to sick people staying away from people who are not sick. A person in quarantine is limiting activity because they were exposed and are waiting to see if they might become sick.    If you test positive for COVID, you should stay home (isolation) for at least 10 days after your symptoms began, and for 24 hours with no fever and improvement of symptoms--whichever is longer. (Your fever should be gone for 24 hours without using fever-reducing medicine). If you have no symptoms, you should stay home (isolation) for 10 days from the day of the test. If you have been vaccinated for COVID, the vaccination will not cause you to test positive so a positive test result generally is a  true positive .    For example, if you have a fever and cough for 6 days, you need to stay home 4 more days with no fever for a total of 10 days. Or, if you have a fever and cough  for 10 days, you need to stay home one more day with no fever for a total of 11 days.    If you have a high-risk exposure to COVID (you spent 15 minutes or more within six feet of somebody who has COVID), you should stay home (quarantine) for 14 days, unless you are vaccinated. Even if you test negative for COVID, the CDC recommends a 14-day quarantine from the time of your last exposure to that individual (unless you are vaccinated). There are options for a shortened (<14 day quarantine) you can review at:  https://www.health.[facility]./diseases/coronavirus/close.html#long    If you live in the same house as somebody with COVID and cannot separate from them, you will need to quarantine for 14-days after that person's isolation (infectious) period. That means that you may need to quarantine for 24-days after that person became symptomatic/ill.    If you are vaccinated and do not develop symptoms, you do not need to quarantine after exposure.    If you have symptoms but a negative test, you should stay at home until you are symptom-free and without fever for 24 hours, using the same judgment you would for when it is safe to return to work/school from strep throat, influenza, or the common cold. If you worsen, you should consider being re-evaluated.    If you are being tested for COVID because of symptoms and your test is pending, you should stay home until you know your test result.    If I have COVID, how should I protect myself and others?    Do not go to work or school. Have a friend or relative do your shopping. Do not use public transportation (bus, train) or ridesharing (Lyft, Uber).    Separate yourself from other people in your home. As much as possible, you should stay in one room and away from other people in your home. Also, use a separate bathroom, if possible. Avoid handling pets or other animals while sick.     Wear a facemask if you need to be around other people and cover your mouth and nose with a  tissue when you cough or sneeze.     Avoid sharing personal household items. You should not share dishes, drinking glasses, forks/knives/spoons, towels, or bedding with other people in your home. After using these items, they should be washed with soap and water. Clean parts of your home that are touched often (doorknobs, faucets, countertops, etc.) daily.     Wash your hands often with soap and water for at least 20 seconds or use an alcohol-based hand  containing at least 60% alcohol.     Avoid touching your face.    Treat your symptoms. You can take Acetaminophen (Tylenol) to treat body aches and fever as needed for comfort. Ibuprofen (Advil or Motrin) can be used as well if you still have symptoms after taking Tylenol. Drink fluids. Rest.    Watch for worsening symptoms such as shortness of breath/difficulty breathing or very severe weakness.    Employers/workplaces are being asked by the Centers for Disease Control (CDC) to not request notes/documentation for you to return to work or prove that you were ill. You may choose to show your employer this paperwork. Also, repeat testing should not be required to return to work.    Exercise/Sports in rare cases, COVID could affect your heart in a way that makes exercise or participation in sports dangerous.    If you have a mild COVID illness (fever, cough, sore throat, and similar symptoms but no difficulty breathing or abnormalities of the lung): After your COVID symptoms have resolved, wait 14-days before returning to activity.  If you have more than a mild illness (meaning that you have problems with your breathing or lungs) or if you participate in competitive or strenuous activity or have a history of heart disease: Please see your primary doctor/provider prior to return to activity/competition.    Antibody treatments are available for patients with mild to moderate COVID illness in order to prevent severe illness. In general, only patients with risk  factors for severe illness are eligible for treatment. For more information, to see if you are eligible, and to find treatment, go to the [facility]:  [email or web address].[facility].[place]./diseases/coronavirus/mnrap.html     Return to the Emergency Department if:    If you are developing worsening breathing, shortness of breath, or feel worse you should seek medical attention.  If you are uncertain, contact your health care provider/clinic. If you need emergency medical attention, call 911 and tell them you have been ill.

## 2021-12-26 NOTE — PATIENT INSTRUCTIONS
Dear Taylor Valiente,    Your symptoms show that you may have coronavirus (COVID-19). This illness can cause fever, cough and trouble breathing. Many people get a mild case and get better on their own. Some people can get very sick.    Because you also reported sore throat I would like to also test you for Strep Throat and Influenza A&B to determine if we need to treat you for that as well.    What should I do?  We would like to test you for Covid-19 virus and Strep Throat. I have placed orders for these tests.     For all employees or close contacts (except [employer] [place] [employer] and [employer] - see below), go to your "[employer] home page and scroll down to the section that says  You have an appointment that needs to be scheduled  and click the large green button that says  Schedule Now  and follow the steps to find the next available opening.  It is important that when you are asked what the reason for your appointment is that you mention you need ALL Covid, Influenza and Strep tests.  tests.     If you are unable to complete these steps or if you cannot find any available times, please call [phone] to schedule employee testing.     [employer] [place] employees or close contacts, please call [phone].   [place] ([employer]) employees or close contacts call [phone].    If you test positive for strep we will contact you and treat with antibiotics. If Covid positive you can seek treatment through the [place] department of health: COVID-19 [facility] Monoclonal Antibody Program Information  Monoclonal antibody infusion via the [place] [employer] (MNRAP) system      [email or web address]/mnrap - Providers, patients or someone helping may complete this screening tool. [facility] offers this lottery process to refer patients for COVID-19 monoclonal antibody therapeutic REGEN-COV (casirivimab and imdevimab) including post-exposure prophylaxis. Referrals are provisional, and final clinical judgment remains with the infusion site to  determine eligibility and scheduling.      REGEN-COV is authorized to treat non-hospitalized patients with mild to moderate COVID-19 in adult and pediatric patients, age 12 and older, with positive results of direct SARS-CoV-2 viral testing, and who are at high risk for progression to severe COVID 19, including hospitalization or death. For more detail, refer to the Health Advisory: REGEN-COV Approved for Post-Exposure Prophylactic Use (PDF) and Fact Sheet for Health Care Providers Emergency Use Authorization of REGEN-COV  (casirivimab and imdevimab) (PDF).      Return to work/school/ guidance:  Please let your workplace manager and staffing office know when your quarantine ends     We can t give you an exact date as it depends on the above. You can calculate this on your own or work with your manager/staffing office to calculate this. (For example if you were exposed on 10/4, you would have to quarantine for 14 full days. That would be through 10/18. You could return on 10/19.)      If you receive a positive COVID-19 test result, follow the guidance of the those who are giving you the results. Usually the return to work is 10 (or in some cases 20 days from symptom onset.) If you work at [facility], you must also be cleared by Employee Occupational Health and Safety to return to work.        If you receive a negative COVID-19 test result and did not have a high risk exposure to someone with a known positive COVID-19 test, you can return to work once you're free of fever for 24 hours without fever-reducing medication and your symptoms are improving or resolved.      If you receive a negative COVID-19 test and If you had a high risk exposure to someone who has tested positive for COVID-19 then you can return to work 14 days after your last contact with the positive individual    Note: If you have ongoing exposure to the covid positive person, this quarantine period may be more than 14 days. (For example,  if you are continued to be exposed to your child who tested positive and cannot isolate from them, then the quarantine of 7-14 days can't start until your child is no longer contagious. This is typically 10 days from onset of the child's symptoms. So the total duration may be 17-24 days in this case.)    Sign up for [employer].   We know it's scary to hear that you might have COVID-19. We want to track your symptoms to make sure you're okay over the next 2 weeks. Please look for an email from [employer]--this is a free, online program that we'll use to keep in touch. To sign up, follow the link in the email you will receive. Learn more at [email or web address]    How can I take care of myself?    Get lots of rest. Drink extra fluids (unless a doctor has told you not to)    Take Tylenol (acetaminophen) or ibuprofen for fever or pain. If you have liver or kidney problems, ask your family doctor if it's okay to take Tylenol o ibuprofen    If you have other health problems (like cancer, heart failure, an organ transplant or severe kidney disease): Call your specialty clinic if you don't feel better in the next 2 days.    Know when to call 911. Emergency warning signs include:  o Trouble breathing or shortness of breath  o Pain or pressure in the chest that doesn't go away  o Feeling confused like you haven't felt before, or not being able to wake up  o Bluish-colored lips or face    Where can I get more information?  [facility] - About COVID-19:   [email or web address]    CDC - What to Do If You're Sick:   www.cdc.gov/coronavirus/2019-ncov/about/steps-when-sick.html      December 26, 2021  RE:  Taylor Valiente                                                                                                                  [address] [place] [address]      To whom it may concern:    I evaluated Taylor Valiente on December 26, 2021. Taylor Valiente should be  excused from work/school.     They should let their workplace manager and staffing office know when their quarantine ends.    We can not give an exact date as it depends on the information below. They can calculate this on their own or work with their manager/staffing office to calculate this. (For example if they were exposed on 10/04, they would have to quarantine for 14 full days. That would be through 10/18. They could return on 10/19.)    Quarantine Guidelines:      If patient receives a positive COVID-19 test result, they should follow the guidance of those who are giving the results. Usually the return to work is 10 (or in some cases 20 days from symptom onset.) If they work at [employer], they must be cleared by Employee Occupational Health and Safety to return to work.        If patient receives a negative COVID-19 test result and did not have a high risk exposure to someone with a known positive COVID-19 test, they can return to work once they're free of fever for 24 hours without fever-reducing medication and their symptoms are improving or resolved.      If patient receives a negative COVID-19 test and if they had a high risk exposure to someone who has tested positive for COVID-19 then they can return to work 14 days after their last contact with the positive individual    Note: If there is ongoing exposure to the covid positive person, this quarantine period may be longer than 14 days. (For example, if they are continually exposed to their child, who tested positive and cannot isolate from them, then the quarantine of 7-14 days can't start until their child is no longer contagious. This is typically 10 days from onset to the child's symptoms. So the total duration may be 17-24 days in this case.)     Sincerely,  Letitia Adam PA-C

## 2021-12-28 ENCOUNTER — OFFICE VISIT (OUTPATIENT)
Dept: FAMILY MEDICINE | Facility: CLINIC | Age: 26
End: 2021-12-28
Payer: COMMERCIAL

## 2021-12-28 VITALS
HEART RATE: 84 BPM | BODY MASS INDEX: 32.51 KG/M2 | HEIGHT: 72 IN | WEIGHT: 240 LBS | OXYGEN SATURATION: 100 % | RESPIRATION RATE: 16 BRPM | TEMPERATURE: 99 F | SYSTOLIC BLOOD PRESSURE: 112 MMHG | DIASTOLIC BLOOD PRESSURE: 74 MMHG

## 2021-12-28 DIAGNOSIS — Z00.00 ROUTINE GENERAL MEDICAL EXAMINATION AT A HEALTH CARE FACILITY: Primary | ICD-10-CM

## 2021-12-28 DIAGNOSIS — L85.3 DRY SKIN: ICD-10-CM

## 2021-12-28 DIAGNOSIS — N18.5 CKD (CHRONIC KIDNEY DISEASE) STAGE 5, GFR LESS THAN 15 ML/MIN (H): ICD-10-CM

## 2021-12-28 DIAGNOSIS — Z13.220 SCREENING FOR HYPERLIPIDEMIA: ICD-10-CM

## 2021-12-28 LAB
CHOLEST SERPL-MCNC: 112 MG/DL
HDLC SERPL-MCNC: 41 MG/DL
LDLC SERPL CALC-MCNC: 59 MG/DL
NONHDLC SERPL-MCNC: 71 MG/DL
TRIGL SERPL-MCNC: 60 MG/DL

## 2021-12-28 PROCEDURE — 0004A COVID-19,PF,PFIZER (12+ YRS): CPT | Performed by: FAMILY MEDICINE

## 2021-12-28 PROCEDURE — 99395 PREV VISIT EST AGE 18-39: CPT | Performed by: FAMILY MEDICINE

## 2021-12-28 PROCEDURE — 91300 COVID-19,PF,PFIZER (12+ YRS): CPT | Performed by: FAMILY MEDICINE

## 2021-12-28 RX ORDER — HYDROCORTISONE 2.5 %
CREAM (GRAM) TOPICAL 2 TIMES DAILY
Qty: 60 G | Refills: 3 | Status: SHIPPED | OUTPATIENT
Start: 2021-12-28 | End: 2022-10-28

## 2021-12-28 RX ORDER — CINACALCET 90 MG/1
90 TABLET, FILM COATED ORAL DAILY
Status: ON HOLD | COMMUNITY
Start: 2021-11-10 | End: 2023-12-08

## 2021-12-28 SDOH — ECONOMIC STABILITY: INCOME INSECURITY: HOW HARD IS IT FOR YOU TO PAY FOR THE VERY BASICS LIKE FOOD, HOUSING, MEDICAL CARE, AND HEATING?: SOMEWHAT HARD

## 2021-12-28 SDOH — HEALTH STABILITY: PHYSICAL HEALTH: ON AVERAGE, HOW MANY DAYS PER WEEK DO YOU ENGAGE IN MODERATE TO STRENUOUS EXERCISE (LIKE A BRISK WALK)?: 0 DAYS

## 2021-12-28 SDOH — ECONOMIC STABILITY: FOOD INSECURITY: WITHIN THE PAST 12 MONTHS, THE FOOD YOU BOUGHT JUST DIDN'T LAST AND YOU DIDN'T HAVE MONEY TO GET MORE.: SOMETIMES TRUE

## 2021-12-28 SDOH — ECONOMIC STABILITY: INCOME INSECURITY: IN THE LAST 12 MONTHS, WAS THERE A TIME WHEN YOU WERE NOT ABLE TO PAY THE MORTGAGE OR RENT ON TIME?: YES

## 2021-12-28 SDOH — ECONOMIC STABILITY: TRANSPORTATION INSECURITY
IN THE PAST 12 MONTHS, HAS THE LACK OF TRANSPORTATION KEPT YOU FROM MEDICAL APPOINTMENTS OR FROM GETTING MEDICATIONS?: NO

## 2021-12-28 SDOH — ECONOMIC STABILITY: TRANSPORTATION INSECURITY
IN THE PAST 12 MONTHS, HAS LACK OF TRANSPORTATION KEPT YOU FROM MEETINGS, WORK, OR FROM GETTING THINGS NEEDED FOR DAILY LIVING?: NO

## 2021-12-28 SDOH — HEALTH STABILITY: PHYSICAL HEALTH: ON AVERAGE, HOW MANY MINUTES DO YOU ENGAGE IN EXERCISE AT THIS LEVEL?: 30 MIN

## 2021-12-28 SDOH — ECONOMIC STABILITY: FOOD INSECURITY: WITHIN THE PAST 12 MONTHS, YOU WORRIED THAT YOUR FOOD WOULD RUN OUT BEFORE YOU GOT MONEY TO BUY MORE.: SOMETIMES TRUE

## 2021-12-28 ASSESSMENT — ENCOUNTER SYMPTOMS
DIARRHEA: 0
NERVOUS/ANXIOUS: 0
WEAKNESS: 0
FREQUENCY: 0
HEARTBURN: 0
SORE THROAT: 0
DIZZINESS: 0
EYE PAIN: 0
MYALGIAS: 0
PALPITATIONS: 0
PARESTHESIAS: 0
NAUSEA: 0
JOINT SWELLING: 0
HEMATURIA: 0
CONSTIPATION: 0
HEMATOCHEZIA: 0
COUGH: 1
DYSURIA: 0
ABDOMINAL PAIN: 0
FEVER: 0
ARTHRALGIAS: 0
HEADACHES: 0
SHORTNESS OF BREATH: 0
CHILLS: 0

## 2021-12-28 ASSESSMENT — LIFESTYLE VARIABLES
HOW OFTEN DO YOU HAVE SIX OR MORE DRINKS ON ONE OCCASION: NEVER
HOW OFTEN DO YOU HAVE A DRINK CONTAINING ALCOHOL: NEVER
HOW MANY STANDARD DRINKS CONTAINING ALCOHOL DO YOU HAVE ON A TYPICAL DAY: PATIENT DECLINED

## 2021-12-28 ASSESSMENT — SOCIAL DETERMINANTS OF HEALTH (SDOH)
ARE YOU MARRIED, WIDOWED, DIVORCED, SEPARATED, NEVER MARRIED, OR LIVING WITH A PARTNER?: NEVER MARRIED
HOW OFTEN DO YOU GET TOGETHER WITH FRIENDS OR RELATIVES?: MORE THAN THREE TIMES A WEEK
HOW OFTEN DO YOU ATTEND CHURCH OR RELIGIOUS SERVICES?: 1 TO 4 TIMES PER YEAR
IN A TYPICAL WEEK, HOW MANY TIMES DO YOU TALK ON THE PHONE WITH FAMILY, FRIENDS, OR NEIGHBORS?: ONCE A WEEK
DO YOU BELONG TO ANY CLUBS OR ORGANIZATIONS SUCH AS CHURCH GROUPS UNIONS, FRATERNAL OR ATHLETIC GROUPS, OR SCHOOL GROUPS?: NO

## 2021-12-28 ASSESSMENT — MIFFLIN-ST. JEOR: SCORE: 2111.63

## 2021-12-28 NOTE — LETTER
Taylor Valiente  [address]   [place] [address]    Thank you for choosing [facility] today for your health care needs.     [facility] is transforming primary care  At [facility], we re dedicated to constantly improve how we serve the health care needs of our patients and communities. We re currently making changes to the way we deliver care.     Changes you ll notice include:    An emphasis on building a relationship with a primary care provider    Access to a PAL (personal advocate and liaison) to help guide you with your care needs    Appointment lengths tailored to your specific needs and greater access to a care team to help you and your provider improve and maintain your health and well-being    Improved online access to your care team    Benefits of a primary care provider  If you don t have a designated primary care provider, we encourage you to get to know our care team online and find a provider you d like to see. Most of our providers have a short video on their online provider page. Visit [email or web address] to explore our providers and locations.    Benefits of having a primary care provider include:      They get to know you - your health history, family history and goals, making it easier to make a health plan together.     You get to know them - making health-related conversations and decisions easier      Primary care doctors help you when you re sick or hurt - but also focus on keeping you healthy with preventive care and screenings.      A doctor who sees you regularly is more likely to notice changes in your health.     You ll be connected to a broad care team who partners with your provider to support you.    Patient Advocate Liaison (PAL)   To help make sure you get the right care, at the right time, we include PALs, or Patient Advocate Liaisons, as part of your care team. Your PAL will be your first line of contact. They ll advocate for your needs and help you  navigate our services, connecting you with care team members and community resources to ensure your care is well coordinated. You ll be introduced to a PAL in an upcoming visit.     Expanded care team access with tailored appointment lengths  Depending on your health care needs, you may have longer or shorter appointments and see additional care team providers - including Medication Therapy Management (MTM) pharmacists, diabetes educators, behavioral health clinicians, or social workers. At times, they may be included in your visit with your provider, or you may see them individually.     Online access to your health care records and care team  [employer] is our online tool that makes it easy to see your health care information and communicate with your care team.     [employer] allows you to:     View your health maintenance plan so you know when you re due for a preventive screening    Send secure messages to your care team    View your health history and visit summaries     Schedule appointments     Complete questionnaires and eCheck-in before appointments      Get care from your provider with an e-visit      View and pay your bill     Sign up at [email or web address]. Once you have an account, you also can download the mobile ary.     Connecting to fast and convenient care  When you need fast, convenient care - consider one of the following options:       Video Visit: A convenient care option for visiting with your provider out of the comfort of your own home. Most of the things you come to the clinic to address with your provider can now be done virtually through a video. This includes your chronic medication follow up, questions or concerns you may have, and even your annual Medicare Wellness Visit.       Phone Visit: Another convenient option for follow up of common problems that may require a more in-depth discussion with your provider.       E-visit: When you need acute care quickly, or have a quick question about  your medication, an E-visit is completed through [employer] and your provider will respond within one business day.      Sultana Urbina, Registered Nurse, PAL (Patient Advocate Liason)   [facility]   [phone]

## 2021-12-28 NOTE — PROGRESS NOTES
SUBJECTIVE:   CC: Taylor Valiente is an 26 year old male who presents for preventative health visit.       Patient has been advised of split billing requirements and indicates understanding: Yes  Healthy Habits:     Getting at least 3 servings of Calcium per day:  NO    Bi-annual eye exam:  NO    Dental care twice a year:  Yes    Sleep apnea or symptoms of sleep apnea:  Excessive snoring    Diet:  Low salt and Other    Frequency of exercise:  1 day/week    Duration of exercise:  15-30 minutes    Taking medications regularly:  Yes    Medication side effects:  Other    PHQ-2 Total Score: 1    Additional concerns today:  No          Chronic Kidney Disease Follow-up      Do you take any over the counter pain medicine?: No  Still on dialysis 3 times a week, and done through the Rt arm fistula.    Today's PHQ-2 Score:   PHQ-2 ( 1999 [employer]) 12/28/2021   Q1: Little interest or pleasure in doing things 0   Q2: Feeling down, depressed or hopeless 1   PHQ-2 Score 1   PHQ-2 Total Score (12-17 Years)- Positive if 3 or more points; Administer PHQ-A if positive -   Q1: Little interest or pleasure in doing things Not at all   Q2: Feeling down, depressed or hopeless Several days   PHQ-2 Score 1       Abuse: Current or Past(Physical, Sexual or Emotional)- No  Do you feel safe in your environment? Yes        Social History     Tobacco Use     Smoking status: Never Smoker     Smokeless tobacco: Never Used   Substance Use Topics     Alcohol use: No     If you drink alcohol do you typically have >3 drinks per day or >7 drinks per week? No    Alcohol Use 12/28/2021   Prescreen: >3 drinks/day or >7 drinks/week? No   Prescreen: >3 drinks/day or >7 drinks/week? -       Last PSA: No results found for: PSA    Reviewed orders with patient. Reviewed health maintenance and updated orders accordingly - Yes  Patient Active Problem List   Diagnosis     LVH (left ventricular hypertrophy) due to hypertensive disease     Renovascular hypertension      CKD (chronic kidney disease) stage 5, GFR less than 15 ml/min (H)     Focal glomerular sclerosis     2019 novel coronavirus disease (COVID-19)     Anemia of chronic renal failure     OD (osteochondritis dissecans)     Adrenal adenoma, left     Stress-induced cardiomyopathy     Hyperlipidemia     Prolonged Q-T interval on ECG     Acute renal failure with acute tubular necrosis superimposed on stage 5 chronic kidney disease, not on chronic dialysis (H)     ESRD needing dialysis (H)     Hyperkalemia     Essential hypertension     ESRD (end stage renal disease) on dialysis (H)     Past Surgical History:   Procedure Laterality Date     ARTHROSCOPY ANKLE Left 2/24/2021    Procedure: Left ankle arthroscopy and debridement/micro fracture;  Surgeon: Mirza Nelson MD;  [facility]     [facility]     CREATE FISTULA ARTERIOVENOUS UPPER EXTREMITY Right 3/4/2021    Procedure: RIGHT proximal radial  to CEPHALIC ARTERIOVENOUS FISTULA;  Surgeon: Henrik Moran MD;  Location: [facility] OR     IR CVC TUNNEL PLACEMENT > 5 YRS OF AGE  6/17/2021     IR CVC TUNNEL REMOVAL RIGHT  12/3/2021     NO HISTORY OF SURGERY       REVISION FISTULA ARTERIOVENOUS UPPER EXTREMITY Right 8/26/2021    Procedure: Second stage RIGHT BRACHIOCEPHALIC transposition ARTERIOVENOUS FISTULA;  Surgeon: Henrik Moran MD;  Location: [facility] OR       Social History     Tobacco Use     Smoking status: Never Smoker     Smokeless tobacco: Never Used   Substance Use Topics     Alcohol use: No     Family History   Problem Relation Age of Onset     Blood Disease Mother         has hep b     Diabetes Mother         gestionanal diabetes     Hypertension Mother      Obesity Father      Hypertension Father      Hypertension Maternal Grandmother      Diabetes Maternal Grandmother      Hypertension Paternal Grandmother      Hypertension Paternal Grandfather      Coronary Artery Disease Maternal Uncle      Cancer No family hx of           Current Outpatient Medications   Medication Sig Dispense Refill     ACE/ARB/ARNI NOT PRESCRIBED (INTENTIONAL) Please choose reason not prescribed from choices below.       cinacalcet (SENSIPAR) 30 MG tablet Take 30 mg by mouth three times a week       hydrocortisone 2.5 % cream Apply topically 2 times daily 60 g 3     amLODIPine (NORVASC) 10 MG tablet Take 10 mg by mouth daily        aspirin 81 MG EC tablet Take 81 mg by mouth daily       atorvastatin (LIPITOR) 10 MG tablet TAKE ONE TABLET BY MOUTH EVERY NIGHT AT BEDTIME 90 tablet 0     bumetanide (BUMEX) 2 MG tablet Take 2 mg by mouth daily        carvedilol (COREG) 25 MG tablet Take 2 tablets (50 mg) by mouth 2 times daily (with meals) 180 tablet 3     cloNIDine (CATAPRES) 0.1 MG tablet Take 0.3 mg by mouth 2 times daily        hydrALAZINE (APRESOLINE) 100 MG tablet TAKE ONE TABLET BY MOUTH THREE TIMES A  tablet 1     multivitamin RENAL (RENAVITE RX/NEPHROVITE) 1 MG tablet Take 1 tablet by mouth daily        sevelamer HCl (RENAGEL) 800 MG tablet Take 800 mg by mouth 3 times daily (with meals)       vitamin D3 (CHOLECALCIFEROL) 2000 units (50 mcg) tablet Take 75 mcg by mouth daily        Allergies   Allergen Reactions     Benadryl [Diphenhydramine] Itching       Reviewed and updated as needed this visit by clinical staff  Tobacco  Allergies    Med Hx  Surg Hx  Fam Hx  Soc Hx       Reviewed and updated as needed this visit by Provider               Past Medical History:   Diagnosis Date     Adrenal adenoma, left      Anemia      Benign essential hypertension 07/28/2017     CKD (chronic kidney disease) stage 5, GFR less than 15 ml/min (H)     FSGS     Depression      Focal glomerular sclerosis 07/28/2017     HLD (hyperlipidemia)      LVH (left ventricular hypertrophy) due to hypertensive disease 07/14/2017     Noncompliance      Obesity, unspecified      OD (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/2021     Stress-induced cardiomyopathy      Suicide attempt  (H) 2019      Past Surgical History:   Procedure Laterality Date     ARTHROSCOPY ANKLE Left 2/24/2021    Procedure: Left ankle arthroscopy and debridement/micro fracture;  Surgeon: Mirza Nelson MD;  [facility]     BIOPSY  2017    [facility]al- [facility]     CREATE FISTULA ARTERIOVENOUS UPPER EXTREMITY Right 3/4/2021    Procedure: RIGHT proximal radial  to CEPHALIC ARTERIOVENOUS FISTULA;  Surgeon: Henrik Moran MD;  [facility]     IR CVC TUNNEL PLACEMENT > 5 YRS OF AGE  6/17/2021     IR CVC TUNNEL REMOVAL RIGHT  12/3/2021     NO HISTORY OF SURGERY       REVISION FISTULA ARTERIOVENOUS UPPER EXTREMITY Right 8/26/2021    Procedure: Second stage RIGHT BRACHIOCEPHALIC transposition ARTERIOVENOUS FISTULA;  Surgeon: Henrik Moran MD;  Location:  OR       Review of Systems   Constitutional: Negative for chills and fever.   HENT: Negative for congestion, ear pain, hearing loss and sore throat.    Eyes: Negative for pain and visual disturbance.   Respiratory: Positive for cough. Negative for shortness of breath.    Cardiovascular: Negative for chest pain, palpitations and peripheral edema.   Gastrointestinal: Negative for abdominal pain, constipation, diarrhea, heartburn, hematochezia and nausea.   Genitourinary: Negative for dysuria, frequency, genital sores, hematuria, impotence, penile discharge and urgency.   Musculoskeletal: Negative for arthralgias, joint swelling and myalgias.   Skin: Negative for rash.   Neurological: Negative for dizziness, weakness, headaches and paresthesias.   Psychiatric/Behavioral: Negative for mood changes. The patient is not nervous/anxious.          OBJECTIVE:   BP (!) 142/86 (BP Location: Left arm, Patient Position: Chair, Cuff Size: Adult Large)   Pulse 84   Temp 99  F (37.2  C) (Oral)   Resp 16   Ht 1.829 m (6')   Wt 108.9 kg (240 lb)   SpO2 100%   BMI 32.55 kg/m      Physical Exam  GENERAL: healthy, alert and no distress  EYES: Eyes grossly  normal to inspection, PERRL and conjunctivae and sclerae normal  HENT: ear canals and TM's normal, nose and mouth without ulcers or lesions  NECK: no adenopathy, no asymmetry, masses, or scars and thyroid normal to palpation  RESP: lungs clear to auscultation - no rales, rhonchi or wheezes  CV: regular rate and rhythm, normal S1 S2, no S3 or S4, no murmur, click or rub, no peripheral edema and peripheral pulses strong  ABDOMEN: soft, nontender, no hepatosplenomegaly, no masses and bowel sounds normal  MS: fistula on the Rt upper arm, normal bruit.   SKIN: hyperpigmented macules on the lower legs, spread all over the calves.   NEURO: Normal strength and tone, mentation intact and speech normal  PSYCH: mentation appears normal, affect normal/bright        ASSESSMENT/PLAN:   (Z00.00) Routine general medical examination at a health care facility  (primary encounter diagnosis)  Comment: encourage exercise and weight loss.   Plan: Lipid panel reflex to direct LDL Fasting            (Z13.220) Screening for hyperlipidemia  Comment:   Plan:     (N18.5) CKD (chronic kidney disease) stage 5, GFR less than 15 ml/min (H)  Comment: following up with Nephrology, on dialysis 3 times weekly.  Plan: Albumin Random Urine Quantitative with Creat         Ratio, ACE/ARB/ARNI NOT PRESCRIBED         (INTENTIONAL)            (L85.3) Dry skin  Comment: with hyperpigmented lesions, post inflamatory on the lower legs, start on   Plan: hydrocortisone 2.5 % cream        Also Advised the patient to use topical moisturizers daily./    Patient has been advised of split billing requirements and indicates understanding: Yes  COUNSELING:   Reviewed preventive health counseling, as reflected in patient instructions       Regular exercise       Healthy diet/nutrition    Estimated body mass index is 32.55 kg/m  as calculated from the following:    Height as of this encounter: 1.829 m (6').    Weight as of this encounter: 108.9 kg (240 lb).     Weight  management plan: Discussed healthy diet and exercise guidelines    He reports that he has never smoked. He has never used smokeless tobacco.      Counseling Resources:  ATP IV Guidelines  Pooled Cohorts Equation Calculator  FRAX Risk Assessment  ICSI Preventive Guidelines  Dietary Guidelines for Americans, 2010  USDA's MyPlate  ASA Prophylaxis  Lung CA Screening    Priyank Lawrence MD  [facility]

## 2022-01-28 NOTE — TELEPHONE ENCOUNTER
RECORDS RECEIVED FROM: internal    DATE RECEIVED:  3.2.22   NOTES (FOR ALL VISITS) STATUS DETAILS   OFFICE NOTES from referring provider internal  [facility]    OFFICE NOTES from other specialist internal  [facility]    ED NOTES internal  [facility]    OPERATIVE REPORT  (thyroid, pituitary, adrenal, parathyroid) na    MEDICATION LIST internal     IMAGING      DEXASCAN na    MRI (BRAIN) na    XR (Chest) internal  12.26.21, 8/10/21, 6.17.21   CT (HEAD/NECK/CHEST/ABDOMEN) internal  6.15.21   NUCLEAR  na    ULTRASOUND (HEAD/NECK) na    LABS     DIABETES: HBGA1C, CREATININE, FASTING LIPIDS, MICROALBUMIN URINE, POTASSIUM, TSH, T4    THYROID: TSH, T4, CBC, THYRODLONULIN, TOTAL T3, FREE T4, CALCITONIN, CEA internal

## 2022-03-01 NOTE — PROGRESS NOTES
adrenal   Lucille Gould, [facility]    Taylor Valiente is a 26 year old male who is being evaluated via a billable video visit.        [facility]    Consulting provider: ROSA Mckenzie CNP  [address] [place] [address]    Reason for consultation: Adrenal incidentaloma    HPI:   Taylor Valiente is a 26 year old male, comorbidity of hypertension, CRI related to HTN, coming in for work off of adrenal incidentaloma on CT scan.  This is a requirement for the patient in order to be active on renal transplant list.    Patient had prior lab evaluation with a cortisol of 5.  Patient does not recall taking any medications prior to lab draw.    Obesity  Weight heaviest at age 22 at about 300 lbs, gained weight after leaving high school; patient always had been heavy even  throughout his childhood starting around age 4-5 years old and as teenager; father is overweight, mother normal size    Currently has been able to lose weight, lost about 60 pounds over the last 4 years including 7 to 9 pounds over the last 6 months    Patient denies acne, no easy bruising, no muscle weakness, stretch marks are longstanding and unchanged, facial fullness actually has improved over the years since losing weight,     Pt denies any spells of palpitations, headaches, sweating, labile blood pressure    FH negative for MEN1,         Current Problem List:   Patient Active Problem List   Diagnosis     LVH (left ventricular hypertrophy) due to hypertensive disease     Renovascular hypertension     CKD (chronic kidney disease) stage 5, GFR less than 15 ml/min (H)     Focal glomerular sclerosis     2019 novel coronavirus disease (COVID-19)     Anemia of chronic renal failure     OD (osteochondritis dissecans)     Adrenal adenoma, left     Stress-induced cardiomyopathy     Hyperlipidemia     Prolonged Q-T interval on ECG     Acute renal failure with acute tubular necrosis superimposed on stage 5 chronic kidney  "disease, not on chronic dialysis (H)     ESRD needing dialysis (H)     Hyperkalemia     Essential hypertension     ESRD (end stage renal disease) on dialysis (H)         Assessment:  Adrenal incidentaloma  Two nodular regions at the left adrenal have very low density suggesting adenomas  Young male with adrenal incidentaloma in left adrenal gland on CT scan without contrast obtained in May 2019 and June 2021 described as hypodense and \"enlarged\", previously measured at about 14 mm.  Appears unchanged from prior study.  Appearance of adrenal finding and history do not indicate concern of pheochromocytoma.  Patient clinically is obese however not with peripheral wasting and not pronounced abdominal obesity, has been diagnosed with hypertension in 2017 at which time aldosterone access was ruled out will adjust her renin ratio.  He is without occipital dorsal fat pad, his striae are not widened.  He has no muscle weakness.  Triglycerides and glucose are normal.  Therefore clinically no suspicion for cortisol excess.  Patient was pronounced dry skin particular tenderness legs, prior borderline TSH several years ago.  Will recheck today.    Plan:   Lab for   1mg dexmethasone suppression test  Coritsol salivary at HS  Aldosterone/renin ratio    Follow up as needed      The  Following reports and notes were reviewed CT scans abdomen,     Kath Brenner MD  Endocrinology and Diabetes  Telephone contact:  [facility] [place] [phone]  [facility] [phone]        Past Medical and Past Surgical History:  Past Medical History:   Diagnosis Date     Adrenal adenoma, left      Anemia      Benign essential hypertension 07/28/2017     CKD (chronic kidney disease) stage 5, GFR less than 15 ml/min (H)     FSGS     Depression      Focal glomerular sclerosis 07/28/2017     HLD (hyperlipidemia)      LVH (left ventricular hypertrophy) due to hypertensive disease 07/14/2017     " Noncompliance      Obesity, unspecified      OD (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/2021     Stress-induced cardiomyopathy      Suicide attempt (H) 2019       Past Surgical History:   Procedure Laterality Date     ARTHROSCOPY ANKLE Left 2/24/2021    Procedure: Left ankle arthroscopy and debridement/micro fracture;  Surgeon: Mirza Nelson MD;  [facility]     BIOPSY  2017    [facility]al- [facility]     CREATE FISTULA ARTERIOVENOUS UPPER EXTREMITY Right 3/4/2021    Procedure: RIGHT proximal radial  to CEPHALIC ARTERIOVENOUS FISTULA;  Surgeon: Henrik Moran MD;  Location: [facility] OR     IR CVC TUNNEL PLACEMENT > 5 YRS OF AGE  6/17/2021     IR CVC TUNNEL REMOVAL RIGHT  12/3/2021     NO HISTORY OF SURGERY       REVISION FISTULA ARTERIOVENOUS UPPER EXTREMITY Right 8/26/2021    Procedure: Second stage RIGHT BRACHIOCEPHALIC transposition ARTERIOVENOUS FISTULA;  Surgeon: Henrik Moran MD;  Location:  OR       Medications:   Current Outpatient Medications   Medication Sig Dispense Refill     ACE/ARB/ARNI NOT PRESCRIBED (INTENTIONAL) Please choose reason not prescribed from choices below.       amLODIPine (NORVASC) 10 MG tablet Take 10 mg by mouth daily        aspirin 81 MG EC tablet Take 81 mg by mouth daily       atorvastatin (LIPITOR) 10 MG tablet TAKE ONE TABLET BY MOUTH EVERY NIGHT AT BEDTIME 90 tablet 0     bumetanide (BUMEX) 2 MG tablet Take 2 mg by mouth daily        carvedilol (COREG) 25 MG tablet Take 2 tablets (50 mg) by mouth 2 times daily (with meals) 180 tablet 3     cinacalcet (SENSIPAR) 30 MG tablet Take 30 mg by mouth three times a week       cloNIDine (CATAPRES) 0.1 MG tablet Take 0.3 mg by mouth 2 times daily        hydrALAZINE (APRESOLINE) 100 MG tablet TAKE ONE TABLET BY MOUTH THREE TIMES A  tablet 1     hydrocortisone 2.5 % cream Apply topically 2 times daily 60 g 3     multivitamin RENAL (RENAVITE RX/NEPHROVITE) 1 MG tablet Take 1 tablet by mouth daily        sevelamer HCl  (RENAGEL) 800 MG tablet Take 800 mg by mouth 3 times daily (with meals)       vitamin D3 (CHOLECALCIFEROL) 2000 units (50 mcg) tablet Take 75 mcg by mouth daily          Allergies:   Allergies   Allergen Reactions     Benadryl [Diphenhydramine] Itching       Social History     Tobacco Use     Smoking status: Never Smoker     Smokeless tobacco: Never Used   Substance Use Topics     Alcohol use: No       Family History   Problem Relation Age of Onset     Blood Disease Mother         has hep b     Diabetes Mother         gestionanal diabetes     Hypertension Mother      Obesity Father      Hypertension Father      Hypertension Maternal Grandmother      Diabetes Maternal Grandmother      Hypertension Paternal Grandmother      Hypertension Paternal Grandfather      Coronary Artery Disease Maternal Uncle      Cancer No family hx of        Review of Systems:  12 point review of system was negative except for above mentioned    Physical Examination:  BP (!) 146/95 (BP Location: Left arm, Patient Position: Sitting, Cuff Size: Adult Large)   Pulse 76   Ht 1.829 m (6')   Wt 109.6 kg (241 lb 9.6 oz)   BMI 32.77 kg/m        Wt Readings from Last 4 Encounters:   03/02/22 109.6 kg (241 lb 9.6 oz)   12/28/21 108.9 kg (240 lb)   12/26/21 108 kg (238 lb)   11/29/21 108.9 kg (240 lb)     General: Well appearing obese male in no distress, up and go quick  Eyes: EOMI. Sclerae and conjunctivae are clear.  Weight is evenly distributed between trunk and extremity,   HENT: No thyromegaly or mass.  Patient has mild moon facies  Lymphatic: No cervical or supraclavicular lymphadenopathy.  Cardiovascular: RRR, with normal S1+S2 and no murmurs.   Skin: No rash or lesions. No abdominal striae.    Extremities: Trace peripheral edema.   Neurologic: No tremor with hands outstretched. 2+ patellar reflexes.     Labs and Studies:   Lab Results   Component Value Date     12/26/2021    CO2 24 12/26/2021    CHLORIDE 104 12/26/2021    CR 14.00  (H) 12/26/2021    TRIG 60 12/26/2021    HDL 41 12/26/2021    HGB 12.8 (L) 12/26/2021     Recent Labs   Lab Test 02/09/21  1055 07/16/17  0607 07/15/17  0635 07/14/17  1535 07/14/17  1140 07/14/17  0949   TT 16.7  --   --   --   --   --    T4  --   --  1.26  --   --  1.25   TSH  --   --   --   --  4.88* 4.38*   LE  --  5.0  --   --   --   --    LILLY  --   --   --  5.9  --   --    7/14/2017  Aldosterone 39    Lab Results   Component Value Date     12/26/2021    CHLORIDE 104 12/26/2021    CO2 24 12/26/2021    GLC 96 12/26/2021    CR 14.00 (H) 12/26/2021    CR 15.10 (H) 11/29/2021    CR 8.41 (H) 08/26/2021    CR 11.30 (HH) 08/25/2021    CR 8.52 (H) 08/10/2021    BUBBA 9.1 12/26/2021    MAG 2.2 11/27/2020    ALBUMIN 3.8 11/29/2021    ALKPHOS 73 11/29/2021    LDL 59 12/26/2021    HDL 41 12/26/2021    TRIG 60 12/26/2021    TSH 4.88 (H) 07/14/2017    TSH 4.38 (H) 07/14/2017    TSH 3.90 08/19/2002     Lab Results   Component Value Date    MICROL 5,560 07/14/2017     Lab Results   Component Value Date    A1C 5.1 08/26/2021    A1C 5.0 03/04/2021       Lab Results   Component Value Date    HGB 12.8 (L) 12/26/2021       Results for orders placed during the hospital encounter of 06/15/21    Abd/pelvis CT no contrast - Stone Protocol    Narrative  EXAM: CT ABDOMEN PELVIS W/O CONTRAST  LOCATION: [facility]  DATE/TIME: 6/15/2021 8:00 PM    INDICATION: Flank pain, kidney stone suspected  COMPARISON: CT abdomen exam 05/11/2019  TECHNIQUE: CT scan of the abdomen and pelvis was performed without IV contrast. Multiplanar reformats were obtained. Dose reduction techniques were used.  CONTRAST: None.    FINDINGS:  LOWER CHEST: Normal.    HEPATOBILIARY: Normal.    PANCREAS: Normal.    SPLEEN: Normal.    ADRENAL GLANDS: Enlarged left adrenal gland is unchanged and should represent an adenoma.    KIDNEYS/BLADDER: Both kidneys are negative for renal calculi or hydroureter versus. Small cyst upper pole right kidney. No  bladder stones. Diffuse bladder wall thickening.    BOWEL: No evidence for bowel obstruction. No inflammatory changes.    LYMPH NODES: A few borderline-enlarged retroperitoneal nodes are unchanged.    VASCULATURE: Unremarkable.    PELVIC ORGANS: Normal.    MUSCULOSKELETAL: Normal.    Impression  IMPRESSION:  1.  No renal calculi or hydronephrosis.  2.  Small right renal cyst.  3.  Diffuse bladder wall thickening can be seen with cystitis or hypertrophy.  4.  Left adrenal adenoma.

## 2022-03-02 ENCOUNTER — PRE VISIT (OUTPATIENT)
Dept: ENDOCRINOLOGY | Facility: CLINIC | Age: 27
End: 2022-03-02

## 2022-03-02 ENCOUNTER — OFFICE VISIT (OUTPATIENT)
Dept: ENDOCRINOLOGY | Facility: CLINIC | Age: 27
End: 2022-03-02
Attending: NURSE PRACTITIONER
Payer: COMMERCIAL

## 2022-03-02 VITALS
SYSTOLIC BLOOD PRESSURE: 146 MMHG | HEIGHT: 72 IN | BODY MASS INDEX: 32.72 KG/M2 | HEART RATE: 76 BPM | WEIGHT: 241.6 LBS | DIASTOLIC BLOOD PRESSURE: 95 MMHG

## 2022-03-02 DIAGNOSIS — D35.02 ADRENAL ADENOMA, LEFT: Primary | ICD-10-CM

## 2022-03-02 DIAGNOSIS — N05.1 FSGS (FOCAL SEGMENTAL GLOMERULOSCLEROSIS): ICD-10-CM

## 2022-03-02 DIAGNOSIS — Z01.818 PRE-TRANSPLANT EVALUATION FOR KIDNEY TRANSPLANT: ICD-10-CM

## 2022-03-02 DIAGNOSIS — Z91.199 PERSONAL HISTORY OF NONCOMPLIANCE WITH MEDICAL TREATMENT, PRESENTING HAZARDS TO HEALTH: ICD-10-CM

## 2022-03-02 DIAGNOSIS — N18.5 CHRONIC KIDNEY DISEASE, STAGE V (H): ICD-10-CM

## 2022-03-02 DIAGNOSIS — I10 ESSENTIAL HYPERTENSION: ICD-10-CM

## 2022-03-02 PROCEDURE — 99204 OFFICE O/P NEW MOD 45 MIN: CPT | Performed by: INTERNAL MEDICINE

## 2022-03-02 RX ORDER — DEXAMETHASONE 1 MG
TABLET ORAL
Qty: 1 TABLET | Refills: 0 | Status: SHIPPED | OUTPATIENT
Start: 2022-03-02 | End: 2022-10-28

## 2022-03-02 ASSESSMENT — PAIN SCALES - GENERAL: PAINLEVEL: NO PAIN (0)

## 2022-03-02 NOTE — PATIENT INSTRUCTIONS
OVERNIGHT DEXAMETHASONE SUPPRESSION TEST    Timing is important for this test      Purpose: Test of adrenal gland function to ensure the adrenal glands are not overactive.    1. You will be given a prescription for one tablet of a synthetic hormone called dexamethasone.    2. You should take the dexamethasone tablet at 11 pm at night    3. The following morning you will need to have a Cortisol level drawn between 8:00 - 9:00 am. If you have lab drawn at a local clinic, arrange for the result to be faxed to [phone].        Lab order - 8:00 - 9:00 am serum Cortisol.        Please contact us to schedule at any of our [facility] lab locations  Call [phone] ([phone]), select option [phone]    Or you can schedule via [employer]

## 2022-03-02 NOTE — LETTER
3/2/2022       RE: Taylor Valiente  [address] [place] [address]     Dear Colleague,    Thank you for referring your patient, Taylor Valiente, to the [facility] ENDOCRINOLOG[facility] [place] at [facility]. Please see a copy of my visit note below.    adrenal   Navis Bryanna, CMA    Taylor Valiente is a 26 year old male who is being evaluated via a billable video visit.        Endocrinology and Diabetes Clinic    Consulting provider: Denton Talavera, APRN CNP  [address] [place] [address]    Reason for consultation: Adrenal incidentaloma    HPI:   Taylor Valiente is a 26 year old male, comorbidity of hypertension, CRI related to HTN, coming in for work off of adrenal incidentaloma on CT scan.  This is a requirement for the patient in order to be active on renal transplant list.    Patient had prior lab evaluation with a cortisol of 5.  Patient does not recall taking any medications prior to lab draw.    Obesity  Weight heaviest at age 22 at about 300 lbs, gained weight after leaving high school; patient always had been heavy even  throughout his childhood starting around age 4-5 years old and as teenager; father is overweight, mother normal size    Currently has been able to lose weight, lost about 60 pounds over the last 4 years including 7 to 9 pounds over the last 6 months    Patient denies acne, no easy bruising, no muscle weakness, stretch marks are longstanding and unchanged, facial fullness actually has improved over the years since losing weight,     Pt denies any spells of palpitations, headaches, sweating, labile blood pressure    FH negative for MEN1,         Current Problem List:   Patient Active Problem List   Diagnosis     LVH (left ventricular hypertrophy) due to hypertensive disease     Renovascular hypertension     CKD (chronic kidney disease) stage 5, GFR less than 15 ml/min (H)     Focal glomerular  "sclerosis     2019 novel coronavirus disease (COVID-19)     Anemia of chronic renal failure     OD (osteochondritis dissecans)     Adrenal adenoma, left     Stress-induced cardiomyopathy     Hyperlipidemia     Prolonged Q-T interval on ECG     Acute renal failure with acute tubular necrosis superimposed on stage 5 chronic kidney disease, not on chronic dialysis (H)     ESRD needing dialysis (H)     Hyperkalemia     Essential hypertension     ESRD (end stage renal disease) on dialysis (H)         Assessment:  Adrenal incidentaloma  Two nodular regions at the left adrenal have very low density suggesting adenomas  Young male with adrenal incidentaloma in left adrenal gland on CT scan without contrast obtained in May 2019 and June 2021 described as hypodense and \"enlarged\", previously measured at about 14 mm.  Appears unchanged from prior study.  Appearance of adrenal finding and history do not indicate concern of pheochromocytoma.  Patient clinically is obese however not with peripheral wasting and not pronounced abdominal obesity, has been diagnosed with hypertension in 2017 at which time aldosterone access was ruled out will adjust her renin ratio.  He is without occipital dorsal fat pad, his striae are not widened.  He has no muscle weakness.  Triglycerides and glucose are normal.  Therefore clinically no suspicion for cortisol excess.  Patient was pronounced dry skin particular tenderness legs, prior borderline TSH several years ago.  Will recheck today.    Plan:   Lab for   1mg dexmethasone suppression test  Coritsol salivary at HS  Aldosterone/renin ratio    Follow up as needed      The  Following reports and notes were reviewed CT scans abdomen,     Kath Brenner MD  Endocrinology and Diabetes  Telephone contact:  [facility] [place] [phone]  [facility] [phone]        Past Medical and Past Surgical History:  Past Medical History:   Diagnosis Date     " Adrenal adenoma, left      Anemia      Benign essential hypertension 07/28/2017     CKD (chronic kidney disease) stage 5, GFR less than 15 ml/min (H)     FSGS     Depression      Focal glomerular sclerosis 07/28/2017     HLD (hyperlipidemia)      LVH (left ventricular hypertrophy) due to hypertensive disease 07/14/2017     Noncompliance      Obesity, unspecified      OD (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/2021     Stress-induced cardiomyopathy      Suicide attempt (H) 2019       Past Surgical History:   Procedure Laterality Date     ARTHROSCOPY ANKLE Left 2/24/2021    Procedure: Left ankle arthroscopy and debridement/micro fracture;  Surgeon: Mirza Nelson MD;  Location: [facility] OR     BIOPSY  2017    renal- [facility]     CREATE FISTULA ARTERIOVENOUS UPPER EXTREMITY Right 3/4/2021    Procedure: RIGHT proximal radial  to CEPHALIC ARTERIOVENOUS FISTULA;  Surgeon: Henrik Moran MD;  Location: [facility] OR     IR CVC TUNNEL PLACEMENT > 5 YRS OF AGE  6/17/2021     IR CVC TUNNEL REMOVAL RIGHT  12/3/2021     NO HISTORY OF SURGERY       REVISION FISTULA ARTERIOVENOUS UPPER EXTREMITY Right 8/26/2021    Procedure: Second stage RIGHT BRACHIOCEPHALIC transposition ARTERIOVENOUS FISTULA;  Surgeon: Henrik Moran MD;  Location:  OR       Medications:   Current Outpatient Medications   Medication Sig Dispense Refill     ACE/ARB/ARNI NOT PRESCRIBED (INTENTIONAL) Please choose reason not prescribed from choices below.       amLODIPine (NORVASC) 10 MG tablet Take 10 mg by mouth daily        aspirin 81 MG EC tablet Take 81 mg by mouth daily       atorvastatin (LIPITOR) 10 MG tablet TAKE ONE TABLET BY MOUTH EVERY NIGHT AT BEDTIME 90 tablet 0     bumetanide (BUMEX) 2 MG tablet Take 2 mg by mouth daily        carvedilol (COREG) 25 MG tablet Take 2 tablets (50 mg) by mouth 2 times daily (with meals) 180 tablet 3     cinacalcet (SENSIPAR) 30 MG tablet Take 30 mg by mouth three times a week       cloNIDine (CATAPRES) 0.1  MG tablet Take 0.3 mg by mouth 2 times daily        hydrALAZINE (APRESOLINE) 100 MG tablet TAKE ONE TABLET BY MOUTH THREE TIMES A  tablet 1     hydrocortisone 2.5 % cream Apply topically 2 times daily 60 g 3     multivitamin RENAL (RENAVITE RX/NEPHROVITE) 1 MG tablet Take 1 tablet by mouth daily        sevelamer HCl (RENAGEL) 800 MG tablet Take 800 mg by mouth 3 times daily (with meals)       vitamin D3 (CHOLECALCIFEROL) 2000 units (50 mcg) tablet Take 75 mcg by mouth daily          Allergies:   Allergies   Allergen Reactions     Benadryl [Diphenhydramine] Itching       Social History     Tobacco Use     Smoking status: Never Smoker     Smokeless tobacco: Never Used   Substance Use Topics     Alcohol use: No       Family History   Problem Relation Age of Onset     Blood Disease Mother         has hep b     Diabetes Mother         gestionanal diabetes     Hypertension Mother      Obesity Father      Hypertension Father      Hypertension Maternal Grandmother      Diabetes Maternal Grandmother      Hypertension Paternal Grandmother      Hypertension Paternal Grandfather      Coronary Artery Disease Maternal Uncle      Cancer No family hx of        Review of Systems:  12 point review of system was negative except for above mentioned    Physical Examination:  BP (!) 146/95 (BP Location: Left arm, Patient Position: Sitting, Cuff Size: Adult Large)   Pulse 76   Ht 1.829 m (6')   Wt 109.6 kg (241 lb 9.6 oz)   BMI 32.77 kg/m        Wt Readings from Last 4 Encounters:   03/02/22 109.6 kg (241 lb 9.6 oz)   12/28/21 108.9 kg (240 lb)   12/26/21 108 kg (238 lb)   11/29/21 108.9 kg (240 lb)     General: Well appearing obese male in no distress, up and go quick  Eyes: EOMI. Sclerae and conjunctivae are clear.  Weight is evenly distributed between trunk and extremity,   HENT: No thyromegaly or mass.  Patient has mild moon facies  Lymphatic: No cervical or supraclavicular lymphadenopathy.  Cardiovascular: RRR, with normal  S1+S2 and no murmurs.   Skin: No rash or lesions. No abdominal striae.    Extremities: Trace peripheral edema.   Neurologic: No tremor with hands outstretched. 2+ patellar reflexes.     Labs and Studies:   Lab Results   Component Value Date     12/26/2021    CO2 24 12/26/2021    CHLORIDE 104 12/26/2021    CR 14.00 (H) 12/26/2021    TRIG 60 12/26/2021    HDL 41 12/26/2021    HGB 12.8 (L) 12/26/2021     Recent Labs   Lab Test 02/09/21  1055 07/16/17  0607 07/15/17  0635 07/14/17  1535 07/14/17  1140 07/14/17  0949   TT 16.7  --   --   --   --   --    T4  --   --  1.26  --   --  1.25   TSH  --   --   --   --  4.88* 4.38*   LE  --  5.0  --   --   --   --    LILLY  --   --   --  5.9  --   --    7/14/2017  Aldosterone 39    Lab Results   Component Value Date     12/26/2021    CHLORIDE 104 12/26/2021    CO2 24 12/26/2021    GLC 96 12/26/2021    CR 14.00 (H) 12/26/2021    CR 15.10 (H) 11/29/2021    CR 8.41 (H) 08/26/2021    CR 11.30 (HH) 08/25/2021    CR 8.52 (H) 08/10/2021    BUBBA 9.1 12/26/2021    MAG 2.2 11/27/2020    ALBUMIN 3.8 11/29/2021    ALKPHOS 73 11/29/2021    LDL 59 12/26/2021    HDL 41 12/26/2021    TRIG 60 12/26/2021    TSH 4.88 (H) 07/14/2017    TSH 4.38 (H) 07/14/2017    TSH 3.90 08/19/2002     Lab Results   Component Value Date    MICROL 5,560 07/14/2017     Lab Results   Component Value Date    A1C 5.1 08/26/2021    A1C 5.0 03/04/2021       Lab Results   Component Value Date    HGB 12.8 (L) 12/26/2021       Results for orders placed during the hospital encounter of 06/15/21    Abd/pelvis CT no contrast - Stone Protocol    Narrative  EXAM: CT ABDOMEN PELVIS W/O CONTRAST  LOCATION: [facility]  DATE/TIME: 6/15/2021 8:00 PM    INDICATION: Flank pain, kidney stone suspected  COMPARISON: CT abdomen exam 05/11/2019  TECHNIQUE: CT scan of the abdomen and pelvis was performed without IV contrast. Multiplanar reformats were obtained. Dose reduction techniques were used.  CONTRAST:  None.    FINDINGS:  LOWER CHEST: Normal.    HEPATOBILIARY: Normal.    PANCREAS: Normal.    SPLEEN: Normal.    ADRENAL GLANDS: Enlarged left adrenal gland is unchanged and should represent an adenoma.    KIDNEYS/BLADDER: Both kidneys are negative for renal calculi or hydroureter versus. Small cyst upper pole right kidney. No bladder stones. Diffuse bladder wall thickening.    BOWEL: No evidence for bowel obstruction. No inflammatory changes.    LYMPH NODES: A few borderline-enlarged retroperitoneal nodes are unchanged.    VASCULATURE: Unremarkable.    PELVIC ORGANS: Normal.    MUSCULOSKELETAL: Normal.    Impression  IMPRESSION:  1.  No renal calculi or hydronephrosis.  2.  Small right renal cyst.  3.  Diffuse bladder wall thickening can be seen with cystitis or hypertrophy.  4.  Left adrenal adenoma.      Kath Brenner MD

## 2022-03-03 ENCOUNTER — LAB (OUTPATIENT)
Dept: LAB | Facility: CLINIC | Age: 27
End: 2022-03-03
Payer: COMMERCIAL

## 2022-03-03 DIAGNOSIS — N17.9 ACUTE KIDNEY INJURY (H): ICD-10-CM

## 2022-03-03 DIAGNOSIS — N18.5 ACUTE RENAL FAILURE WITH ACUTE TUBULAR NECROSIS SUPERIMPOSED ON STAGE 5 CHRONIC KIDNEY DISEASE, NOT ON CHRONIC DIALYSIS (H): Primary | ICD-10-CM

## 2022-03-03 DIAGNOSIS — D35.02 ADRENAL ADENOMA, LEFT: ICD-10-CM

## 2022-03-03 DIAGNOSIS — N18.5 CKD (CHRONIC KIDNEY DISEASE) STAGE 5, GFR LESS THAN 15 ML/MIN (H): ICD-10-CM

## 2022-03-03 DIAGNOSIS — N17.0 ACUTE RENAL FAILURE WITH ACUTE TUBULAR NECROSIS SUPERIMPOSED ON STAGE 5 CHRONIC KIDNEY DISEASE, NOT ON CHRONIC DIALYSIS (H): Primary | ICD-10-CM

## 2022-03-03 DIAGNOSIS — N17.9 ACUTE RENAL FAILURE, UNSPECIFIED ACUTE RENAL FAILURE TYPE (H): ICD-10-CM

## 2022-03-03 DIAGNOSIS — N18.5 CHRONIC KIDNEY DISEASE, STAGE V (H): ICD-10-CM

## 2022-03-03 DIAGNOSIS — I10 ESSENTIAL HYPERTENSION: ICD-10-CM

## 2022-03-03 LAB
ANION GAP SERPL CALCULATED.3IONS-SCNC: 6 MMOL/L (ref 3–14)
BUN SERPL-MCNC: 48 MG/DL (ref 7–30)
CALCIUM SERPL-MCNC: 9 MG/DL (ref 8.5–10.1)
CHLORIDE BLD-SCNC: 104 MMOL/L (ref 94–109)
CO2 SERPL-SCNC: 28 MMOL/L (ref 20–32)
CORTIS SERPL-MCNC: 2.6 UG/DL (ref 4–22)
CREAT SERPL-MCNC: 9.96 MG/DL (ref 0.66–1.25)
GFR SERPL CREATININE-BSD FRML MDRD: 7 ML/MIN/1.73M2
GLUCOSE BLD-MCNC: 105 MG/DL (ref 70–99)
POTASSIUM BLD-SCNC: 5.3 MMOL/L (ref 3.4–5.3)
SODIUM SERPL-SCNC: 138 MMOL/L (ref 133–144)
TSH SERPL DL<=0.005 MIU/L-ACNC: 1.67 MU/L (ref 0.4–4)

## 2022-03-03 PROCEDURE — 80048 BASIC METABOLIC PNL TOTAL CA: CPT

## 2022-03-03 PROCEDURE — 84244 ASSAY OF RENIN: CPT | Mod: 90

## 2022-03-03 PROCEDURE — 36415 COLL VENOUS BLD VENIPUNCTURE: CPT

## 2022-03-03 PROCEDURE — 82088 ASSAY OF ALDOSTERONE: CPT

## 2022-03-03 PROCEDURE — 84443 ASSAY THYROID STIM HORMONE: CPT

## 2022-03-03 RX ORDER — CARVEDILOL 25 MG/1
TABLET ORAL
Qty: 180 TABLET | Refills: 3 | Status: SHIPPED | OUTPATIENT
Start: 2022-03-03 | End: 2022-08-26

## 2022-03-03 RX ORDER — ATORVASTATIN CALCIUM 10 MG/1
TABLET, FILM COATED ORAL
Qty: 90 TABLET | Refills: 0 | Status: SHIPPED | OUTPATIENT
Start: 2022-03-03 | End: 2022-06-01

## 2022-03-03 NOTE — TELEPHONE ENCOUNTER
Prescription approved per [facility] Refill Protocol.  Latonya Taylor, RN, BSN, PHN  [facility]

## 2022-03-04 LAB — ALDOST SERPL-MCNC: 34.5 NG/DL (ref 0–31)

## 2022-03-05 LAB — RENIN PLAS-CCNC: 0.4 NG/ML/HR

## 2022-03-07 ENCOUNTER — TELEPHONE (OUTPATIENT)
Dept: ENDOCRINOLOGY | Facility: CLINIC | Age: 27
End: 2022-03-07
Payer: COMMERCIAL

## 2022-03-07 DIAGNOSIS — D35.02 ADRENAL ADENOMA, LEFT: ICD-10-CM

## 2022-03-07 DIAGNOSIS — I10 ESSENTIAL HYPERTENSION: Primary | ICD-10-CM

## 2022-03-07 LAB — CORTIS SAL-MCNC: 0.14 UG/DL

## 2022-03-08 NOTE — TELEPHONE ENCOUNTER
Lab Results   Component Value Date    POTASSIUM 5.3 03/03/2022    POTASSIUM 5.2 12/26/2021    POTASSIUM 4.9 11/29/2021    POTASSIUM 6.7 (HH) 11/29/2021    POTASSIUM 6.7 (HH) 11/29/2021    POTASSIUM 4.7 08/26/2021    POTASSIUM 5.5 (H) 08/25/2021    POTASSIUM 4.1 08/10/2021    POTASSIUM 3.8 06/19/2021    POTASSIUM 3.8 06/18/2021    POTASSIUM 3.9 06/17/2021    POTASSIUM 4.0 06/16/2021    POTASSIUM 3.9 06/15/2021    POTASSIUM 4.2 04/05/2021    POTASSIUM 4.0 03/04/2021    POTASSIUM 3.9 02/24/2021    POTASSIUM 4.2 02/09/2021    POTASSIUM 4.4 12/15/2020    POTASSIUM 4.8 11/29/2020    POTASSIUM 4.4 11/28/2020    POTASSIUM 4.5 11/27/2020    POTASSIUM 4.2 11/27/2020    POTASSIUM 4.4 10/21/2020    POTASSIUM 4.7 10/07/2020    POTASSIUM 4.6 09/29/2020    POTASSIUM 5.0 09/25/2020    POTASSIUM 4.9 09/22/2020    POTASSIUM 3.6 09/12/2020    POTASSIUM 3.3 (L) 09/11/2020    POTASSIUM 3.4 09/10/2020    POTASSIUM 3.2 (L) 09/09/2020    POTASSIUM 3.4 10/04/2019    POTASSIUM 4.5 02/23/2018    POTASSIUM 4.4 01/12/2018    POTASSIUM 4.5 10/06/2017    POTASSIUM 4.5 09/15/2017    POTASSIUM 4.3 08/25/2017    POTASSIUM 3.6 07/20/2017    POTASSIUM 3.7 07/19/2017    POTASSIUM 3.6 07/18/2017    POTASSIUM 3.7 07/17/2017    POTASSIUM 3.5 07/16/2017    POTASSIUM 3.5 07/15/2017    POTASSIUM 3.3 (L) 07/15/2017    POTASSIUM 3.1 (L) 07/15/2017    POTASSIUM 3.0 (L) 07/14/2017    POTASSIUM 3.0 (L) 07/14/2017     Lab Results   Component Value Date    CR 9.96 (HH) 03/03/2022    CR 14.00 (H) 12/26/2021    CR 15.10 (H) 11/29/2021    CR 8.41 (H) 08/26/2021    CR 11.30 (HH) 08/25/2021    CR 8.52 (H) 08/10/2021    CR 8.93 (H) 06/19/2021    CR 11.50 (H) 06/18/2021    CR 15.80 (H) 06/17/2021    CR 15.80 (H) 06/16/2021    CR 16.10 (H) 06/15/2021    CR 8.23 (H) 04/05/2021    CR 8.20 (H) 03/04/2021    CR 8.25 (H) 02/24/2021    CR 7.99 (H) 02/09/2021    CR 6.97 (H) 12/15/2020    CR 6.93 (H) 11/29/2020    CR 7.58 (H) 11/28/2020    CR 8.49 (H) 11/27/2020    CR 9.43 (H)  11/27/2020    CR 6.69 (A) 10/21/2020    CR 7.24 (H) 10/07/2020    CR 7.94 (H) 09/29/2020    CR 8.46 (H) 09/25/2020    CR 8.44 (A) 09/22/2020    CR 6.96 (H) 09/12/2020    CR 6.90 (H) 09/11/2020    CR 7.24 (H) 09/10/2020    CR 6.94 (H) 09/09/2020    CR 1.75 (H) 10/04/2019    CR 1.90 (H) 02/23/2018    CR 1.92 (H) 01/12/2018    CR 2.20 (H) 10/06/2017    CR 2.56 (H) 09/15/2017    CR 2.28 (H) 08/25/2017    CR 2.30 (H) 07/20/2017    CR 2.29 (H) 07/19/2017    CR 2.46 (H) 07/18/2017    CR 2.16 (H) 07/17/2017    CR 2.22 (H) 07/16/2017    CR 2.40 (H) 07/15/2017    CR 2.63 (H) 07/14/2017    CR 2.55 (H) 07/14/2017         Lab Results   Component Value Date    ALDOSTERONE 34.5 (H) 03/03/2022    ALDOSTERONE 39.3 07/14/2017     Lab Results   Component Value Date    LLILY 0.4 03/03/2022    LILLY 5.9 07/14/2017     1mg Dex suppression in 2022,   Component Ref Range & Units 4 d ago 4 yr ago     Cortisol 4.0 - 22.0 ug/dL 2.6 Low   5.0 R, CM        Bood pressure meds: Carvedilol, amlodipine, clonidine  Pt - young male- with ESRD and left adrenal adenoma, eval pre transplant,     Kath Brenner MD  Staff Physician  Division of Endocrinology  [facility] [place]  Pager [phone]

## 2022-03-11 ENCOUNTER — MYC MEDICAL ADVICE (OUTPATIENT)
Dept: ENDOCRINOLOGY | Facility: CLINIC | Age: 27
End: 2022-03-11
Payer: COMMERCIAL

## 2022-03-16 ENCOUNTER — LAB (OUTPATIENT)
Dept: LAB | Facility: CLINIC | Age: 27
End: 2022-03-16
Payer: COMMERCIAL

## 2022-03-16 DIAGNOSIS — N18.5 ACUTE RENAL FAILURE WITH ACUTE TUBULAR NECROSIS SUPERIMPOSED ON STAGE 5 CHRONIC KIDNEY DISEASE, NOT ON CHRONIC DIALYSIS (H): Primary | ICD-10-CM

## 2022-03-16 DIAGNOSIS — Z76.82 ORGAN TRANSPLANT CANDIDATE: ICD-10-CM

## 2022-03-16 DIAGNOSIS — N18.5 CKD (CHRONIC KIDNEY DISEASE) STAGE 5, GFR LESS THAN 15 ML/MIN (H): ICD-10-CM

## 2022-03-16 DIAGNOSIS — N18.5 CHRONIC KIDNEY DISEASE, STAGE V (H): ICD-10-CM

## 2022-03-16 DIAGNOSIS — D35.02 ADRENAL ADENOMA, LEFT: ICD-10-CM

## 2022-03-16 DIAGNOSIS — N05.1 FOCAL GLOMERULAR SCLEROSIS: ICD-10-CM

## 2022-03-16 DIAGNOSIS — N17.0 ACUTE RENAL FAILURE WITH ACUTE TUBULAR NECROSIS SUPERIMPOSED ON STAGE 5 CHRONIC KIDNEY DISEASE, NOT ON CHRONIC DIALYSIS (H): Primary | ICD-10-CM

## 2022-03-16 PROCEDURE — 82043 UR ALBUMIN QUANTITATIVE: CPT

## 2022-03-16 NOTE — TELEPHONE ENCOUNTER
"Ramila from [facility] calls requesting clarification on     [facility]; [id number]   (PRA) (Laboratory Miscellaneous Order)     Lab states PT is currently at lab and they need clarification on what this lab order is for.   Clinic visit notes do not mention this lab:   \"Plan:   Lab for   1mg dexmethasone suppression test  Coritsol salivary at HS  Aldosterone/renin ratio\"    Sent to on-call Adv to advise.   Molly Gentile RN on 3/16/2022 at 10:01 AM     "

## 2022-03-16 NOTE — TELEPHONE ENCOUNTER
Spoke w/ Ramila at  [facility]. Confirms understanding of lab order per Dr Campa.     LVM for PT to please have lab drawn at his convenience.   Molly Gentile RN on 3/16/2022 at 10:16 AM        Lab clarification needed     Ishan Campa MD  You; Kath Brenner MD 1 minute ago (10:12 AM)     AM    PRA is plasma renin activity     Message text

## 2022-03-18 LAB
CREAT UR-MCNC: 57 MG/DL
MICROALBUMIN UR-MCNC: 1850 MG/L
MICROALBUMIN/CREAT UR: 3245.61 MG/G CR (ref 0–17)

## 2022-03-24 ENCOUNTER — LAB (OUTPATIENT)
Dept: LAB | Facility: CLINIC | Age: 27
End: 2022-03-24
Payer: COMMERCIAL

## 2022-03-24 DIAGNOSIS — Z76.82 ORGAN TRANSPLANT CANDIDATE: ICD-10-CM

## 2022-03-24 DIAGNOSIS — N18.5 ACUTE RENAL FAILURE WITH ACUTE TUBULAR NECROSIS SUPERIMPOSED ON STAGE 5 CHRONIC KIDNEY DISEASE, NOT ON CHRONIC DIALYSIS (H): ICD-10-CM

## 2022-03-24 DIAGNOSIS — N18.5 CHRONIC KIDNEY DISEASE, STAGE V (H): ICD-10-CM

## 2022-03-24 DIAGNOSIS — D35.02 ADRENAL ADENOMA, LEFT: ICD-10-CM

## 2022-03-24 DIAGNOSIS — N17.0 ACUTE RENAL FAILURE WITH ACUTE TUBULAR NECROSIS SUPERIMPOSED ON STAGE 5 CHRONIC KIDNEY DISEASE, NOT ON CHRONIC DIALYSIS (H): ICD-10-CM

## 2022-03-24 DIAGNOSIS — N05.1 FOCAL GLOMERULAR SCLEROSIS: ICD-10-CM

## 2022-03-24 DIAGNOSIS — N18.5 CKD (CHRONIC KIDNEY DISEASE) STAGE 5, GFR LESS THAN 15 ML/MIN (H): ICD-10-CM

## 2022-03-24 PROCEDURE — 84244 ASSAY OF RENIN: CPT | Mod: 90

## 2022-03-24 PROCEDURE — 82088 ASSAY OF ALDOSTERONE: CPT

## 2022-03-24 PROCEDURE — 36415 COLL VENOUS BLD VENIPUNCTURE: CPT

## 2022-03-24 PROCEDURE — 99000 SPECIMEN HANDLING OFFICE-LAB: CPT

## 2022-03-25 LAB — ALDOST SERPL-MCNC: 26.9 NG/DL (ref 0–31)

## 2022-03-29 LAB — RENIN PLAS-CCNC: 0.4 NG/ML/HR

## 2022-04-13 DIAGNOSIS — N18.6 END STAGE RENAL DISEASE (H): ICD-10-CM

## 2022-04-13 RX ORDER — AMOXICILLIN 250 MG
1-2 CAPSULE ORAL DAILY PRN
Qty: 180 TABLET | Refills: 3 | Status: SHIPPED | OUTPATIENT
Start: 2022-04-13 | End: 2022-10-28

## 2022-04-13 NOTE — TELEPHONE ENCOUNTER
Pt is requesting the Senna s 8.6-50mg tabs not on med list please send over script to the [facility] order spec [phone] [address]

## 2022-04-13 NOTE — TELEPHONE ENCOUNTER
Dr. Lawrence   See note below from Radha CONTI     Patient requested this refill     Sultana Urbina, Registered Nurse, PAL (Patient Advocate Liason)   [facility]   [phone]

## 2022-04-13 NOTE — TELEPHONE ENCOUNTER
See message below.  Senna DC'd by [facility] as med rec clean up.  T'd up.  Last visit was physical 12/28/21.  Visit in 1 year advised.  Please advise.  Radha Chino RN      Date/Time Action Taken User Additional Information   03/24/21 1242 Pend Fallon Lee    03/24/21 1333 Sign Priyank Lawrence MD Reorder from Order:[id number]   03/24/21 1333 Taking Flag Checked Priyank Lawrence MD [phone]   04/21/21 0846 Taking Flag Checked Yadi Gonzalez    06/15/21 2047 Discontinue Manjinder Conklin [facility] Reason:Medication Reconciliation Clean Up     Outpatient Medication Detail     Disp Refills Start End ROSA   senna-docusate (SENOKOT-S/PERICOLACE) 8.6-50 MG tablet (Discontinued) 180 tablet 3 3/24/2021 6/15/2021 No   Sig - Route: Take 1-2 tablets by mouth daily as needed for constipation Take these any day in which you are taking narcotic pain medications - Oral   Patient not taking: Reported on 6/1/2021        Sent to pharmacy as: Sennosides-Docusate Sodium 8.6-50 MG Oral Tablet (SENOKOT-S/PERICOLACE)   Class: E-Prescribe   Reason for Discontinue: Medication Reconciliation Clean Up   Order: [id number]   E-Prescribing Status: Receipt confirmed by pharmacy (3/24/2021  1:33 PM CDT)

## 2022-04-22 NOTE — ED NOTES
Pt presents to ED from [facility] with concerns for hypertension. Reported BP was 250/150s, EKG with LVH and also had protein in urine. Pt has c/o headaches on and off x 1 year, pt reports he does not have one currently. Pt presents with is mother and reports as a boy he has always had blood pressures on the higher side but has never needed meds. Was given lisinopril and took one after picking up at about 1050. BP on arrival 87/63. Denies visual disturbances, dizziness or lightheaded ness at this time.    Currently NPO < 5 days

## 2022-05-11 ENCOUNTER — TELEPHONE (OUTPATIENT)
Dept: TRANSPLANT | Facility: CLINIC | Age: 27
End: 2022-05-11

## 2022-05-11 NOTE — TELEPHONE ENCOUNTER
General      Reason for call: Patient wanted to know if orders for angiogram and therapist were in the system because he hasn't gotten a connie from anyone to schedule those appointments.     Call back needed? Yes  Return Call Needed  Same as documented in contacts section

## 2022-05-17 DIAGNOSIS — Z76.82 ORGAN TRANSPLANT CANDIDATE: Primary | ICD-10-CM

## 2022-05-24 ENCOUNTER — OFFICE VISIT (OUTPATIENT)
Dept: NEUROPSYCHOLOGY | Facility: CLINIC | Age: 27
End: 2022-05-24
Attending: NURSE PRACTITIONER
Payer: COMMERCIAL

## 2022-05-24 DIAGNOSIS — Z01.818 PRE-TRANSPLANT EVALUATION FOR KIDNEY TRANSPLANT: ICD-10-CM

## 2022-05-24 DIAGNOSIS — N18.5 CHRONIC KIDNEY DISEASE, STAGE V (H): ICD-10-CM

## 2022-05-24 DIAGNOSIS — F54 PSYCHOLOGICAL AND BEHAVIORAL FACTORS ASSOCIATED WITH DISORDERS OR DISEASES CLASSIFIED ELSEWHERE: Primary | ICD-10-CM

## 2022-05-24 PROCEDURE — 96139 PSYCL/NRPSYC TST TECH EA: CPT

## 2022-05-24 PROCEDURE — 96132 NRPSYC TST EVAL PHYS/QHP 1ST: CPT

## 2022-05-24 PROCEDURE — 96133 NRPSYC TST EVAL PHYS/QHP EA: CPT

## 2022-05-24 PROCEDURE — 96138 PSYCL/NRPSYC TECH 1ST: CPT

## 2022-05-24 PROCEDURE — 90791 PSYCH DIAGNOSTIC EVALUATION: CPT

## 2022-05-24 NOTE — LETTER
2022       RE: Taylor Valiente  [address]   [place] [address]     Dear Colleague,    Thank you for referring your patient, Taylor Valiente, to the [facility] NEUROPSYCHOLOGY [place] at [facility]. Please see a copy of my visit note below.    NEUROPSYCHOLOGICAL EVALUATION  **CONFIDENTIAL**    **This is a medical document intended for communication with the referring provider. It is written in medical language and may contain abbreviations or verbiage that are unfamiliar. It is recommended that you follow up with your neuropsychologist and/or referring provider to discuss the results of this evaluation. This report and the results within are not intended to be interpreted in isolation without consultation with your medical provider. **      Name: Taylor Valiente Education: 13 years    (age): 1995 (26 years) GODFREY:  2022   Referral: ROSA Machado, CNP  Department of Nephrology Handedness:  MRN (Epic): Left  [id number]     IDENTIFYING INFORMATION AND REASON FOR REFERRAL   Mr. Valiente is a 26-year-old, left handed, [place] male with a history of stage 5 chronic kidney disease, end stage renal failure on dialysis, hyperlipidemia, hyperkalemia, left ventricular hypertrophy, renovascular hypertension, and cardiomyopathy. This evaluation was requested to provide an assessment of his current neuropsychological status as part of a comprehensive workup for kidney transplantation.     IMPRESSION  See below for relevant background information and detailed test results. See separate abstract for supporting documentation including a list of neuropsychological measures and test scores.    Mr. Valiente s neuropsychological profile revealed a select weakness on a test of planning/organization and variability on tests of processing speed, with best performance within expectation. Scores were within expectation across  other cognitive tests including attention, language, basic visuoconstruction, learning and memory, abstract reasoning, and mental flexibility.    Assessment of current psychological and emotional status revealed mild symptoms of depression and anxiety. On a self-report measure of personality and psychopathology, he endorsed a significant history of antisocial behavior, conflictual family relationships, and lack of support from family members. Further examination of his responses revealed he is experiencing poor health, feeling weak or tired, and vague neurological complaints. He endorsed a history of suicidal ideation and/or past attempts as well as feeling helpless and/or hopeless and pessimistic. He reported multiple anxiety-related experiences and engaging in compulsive behavior. Interpersonally, he described himself as shy, easily embarrassed, and uncomfortable around others.    With regard to transplant candidacy, Mr. Valiente demonstrated an adequate understanding of his renal disease and risks of transplantation and he expressed a high degree of motivation for this treatment. Despite responses on a self-report questionnaire suggesting a lack of support from family, he described strong psychosocial supports in place and recognizes the need for increased functional assistance during the post-operative period. Despite ongoing cannabis use, he acknowledged the need to terminate this use if pursuing transplantation and he was confident in his ability to do so. Despite history of depression with prior suicide attempt, he endorsed only mild symptoms of depression currently, normative health-related anxiety, and he adamantly denied suicidal ideation, plan, and intent. Although he acknowledged prior difficulties with managing medications, suggesting possible history of treatment non-compliance, he stated he has a system in place and has demonstrated good treatment compliance in the past year, as well as the capacity  to make lifestyle changes, all of which are positive outcome indicators.     Overall, Mr. Valiente is functioning quite well from a neuropsychological standpoint and does not evidence any objective cognitive deficits. Results revealed only a select weakness on a test of planning/organization and variability on tests of processing speed in the context of other well preserved cognitive abilities. In considering his history, chronic kidney disease, heart conditions, and associated cerebrovascular factors are likely etiological contributors to observed weaknesses on cognitive testing. Mild depression and anxiety, as well as cannabis use, may also be contributory. Although there was no evidence for significant psychiatric issues at this time, ongoing monitoring of his mental health is strongly encouraged and consideration may be given to engagement with a health psychologist for appropriate management of his symptoms. Aside from needing to quit cannabis use there are no neuropsychological contraindications for kidney transplantation.    RECOMMENDATIONS  1. Mr. Valiente is encouraged to live a healthy lifestyle that promotes brain health including getting appropriate exercise, as advised by his healthcare providers, eating healthy, getting regular sleep, and abstaining from cannabis use.  2. Interventions to address mild depression, health-related anxiety, and coping with chronic health conditions may be helpful and he would likely benefit from engagement with a health psychologist.   3. Given Mr. Valiente's report of snoring and possible gasping arousals, it is recommended he complete a sleep study to rule out FRANCIA and ensure adequate oxygen supply to the brain at night.   4. The current evaluation will serve as an objective cognitive baseline against which results of future evaluations may be compared.  No follow-up is currently indicated; however, Mr. Valiente may be referred for a repeat neuropsychological  evaluation if there is significant change in cognitive status in the future.     Thank you for the opportunity to participate in Mr. Valiente s care.  Multiple attempts were made to contact the patient to review findings and recommendations but he was unable to be reached. If you have any questions regarding this evaluation, please do not hesitate to contact me.       Genesis Jones, Ph.D.,   Clinical Neuropsychologist    RELEVANT BACKGROUND INFORMATION AND SUPPORTING DOCUMENTATION  Gathered from a clinical interview with the patient and reviews of electronic medical record.    History of Presenting Problem(s)  Mr. Valiente presented with a history of stage 5 chronic kidney disease, end stage renal failure on dialysis, hyperlipidemia, hyperkalemia, left ventricular hypertrophy, renovascular hypertension, and cardiomyopathy. He denied any cognitive complaints, physical complaints, or sensorimotor disturbances. He is functionally independent with all activities of daily living without difficulty including managing medications, finances, meal preparation, driving, and basic self-care. He admitted to prior difficulty taking medications accurately but he stated he has a system in place since 2020/2021, he has not missed medication doses since using a pillbox, and he has never missed a dialysis appointment. Emotionally, he stated feeling  okay  and denied feelings of sadness/low mood. He reported a history of depression with suicide attempt (overdose with pills) 1.5 years ago. He denied any suicidal ideation, intent or plan since then. He endorsed mild health-related anxiety but denied excessive worry. He denied other psychopathology or behavioral/personality changes. He has abstained from alcohol for the past three years and denied problematic alcohol use in the past. He admitted to daily cannabis use to aid with nausea, low appetite, sleep, and stress. He stated he did not use cannabis prior to this evaluation.  He acknowledged he may need to quit using cannabis for transplantation and he denied concern about his ability to do so.     Mr. Valiente expressed a high degree of motivation for kidney transplantation. He was able to articulate a basic lay knowledge and understanding of his renal disease and risks of transplantation including infection, blood clots, and kidney rejection. He was aware of some necessary lifestyle changes required such as taking medications and dietary restrictions, and stated his immune system would be reduced. He reported his goals/expectations for transplant are to be off dialysis, be in better health, and be able to take a vacation. His current living situation is stable. He is living with his parents, sister, and grandparents and stated his mother and grandfather will provide post-transplant assistance as needed. He described a strong social support network of family and friends.     Neurodiagnostic Findings   ? Head CT w/o contrast (7/14/2017) IMPRESSION: No evidence of acute hemorrhage, mass, or herniation.    Medical History  ? Stage 5 chronic kidney disease  ? End stage renal failure on dialysis  ? Hyperlipidemia  ? Hyperkalemia  ? Left ventricular hypertrophy  ? Renovascular hypertension  ? Cardiomyopathy  ? History of COVID-19 infection  ? Denied history of seizure or head injury with LOC.    Health Behaviors and Activities of Daily Living  ? Sleep: 5-6 hours of cumulative sleep nightly on nights prior to dialysis, and 8-9 hours of sleep on other nights; denied delay in onset with cannabis at night; occasional waking due to noise but can quickly return to sleep; reported snoring and possible gasping arousals; denied parasomnic behaviors; energy is good most days when staying active  ? Appetite: Reduced since prior to starting dialysis  ? Exercise: 1-2 days per week; going to the gym or walking around with friends  ? Pain: Reported occasional muscle cramps; soreness when walking long  distances; denied pain at the time of this evaluation  ? Independent in ADLs and instrumental ADLs    Psychiatric History  ? Mr. Valiente reported current mood is  okay . He admitted to normative health-related anxiety in the context of his transplant workup and dialysis treatment. He denied prominent symptoms of depression currently.   ? He reported a history of depression 1.5 years ago and stated he last felt depressed 1 year ago.  ? He otherwise denied history of significant irritable, hypomanic or manic mood symptoms, excessive rumination, worry or uncontrollable anxiety, engagement in repetitive or compulsive behaviors, alteration of sensory perceptual processes or disordered thought.  ? Suicidality/ Self-harm: He reported a history of suicide attempt 1.5 years ago when he attempted to overdose with pills. He went to the ER and following discharge began outpatient individual psychotherapy. He denied any recent thoughts of suicide and denied intent and plan since his prior attempt.   ? Trauma: Denied  ? Psychiatric treatment: Engaged in individual psychotherapy for a few months following his suicide attempt    Substance Use History  ? Alcohol: None for the past 3 years; prior was drinking socially on weekends   ? Nicotine: None  ? Cannabis: Daily use (afternoons and evenings)  ? Problematic Substance Use: He denied problematic alcohol or cannabis use; however, he admitted to the following (DSM-V criteria): 1. Cannabis is taken in larger amounts than was intended; 4. Craving or a strong desire or urge to use cannabis. He denied recurrent substance use in situations in which it is physically hazardous but admitted to obtaining a DWI in 2019 while driving under the influence of cannabis. Thus, within a 12-month period, he endorsed two symptoms suggesting he meets criteria for cannabis use disorder.     Current Medications (per EMR)  Current Outpatient Medications   Medication     atorvastatin (LIPITOR) 10 MG  tablet     carvedilol (COREG) 25 MG tablet     ACE/ARB/ARNI NOT PRESCRIBED (INTENTIONAL)     amLODIPine (NORVASC) 10 MG tablet     aspirin 81 MG EC tablet     bumetanide (BUMEX) 2 MG tablet     cinacalcet (SENSIPAR) 30 MG tablet     cloNIDine (CATAPRES) 0.1 MG tablet     dexamethasone (DECADRON) 1 MG tablet     hydrALAZINE (APRESOLINE) 100 MG tablet     hydrocortisone 2.5 % cream     multivitamin RENAL (RENAVITE RX/NEPHROVITE) 1 MG tablet     senna-docusate (SENOKOT-S/PERICOLACE) 8.6-50 MG tablet     sevelamer HCl (RENAGEL) 800 MG tablet     vitamin D3 (CHOLECALCIFEROL) 2000 units (50 mcg) tablet     No current facility-administered medications for this visit.     Developmental, Educational, & Occupational History  ? Gestational: Full-term  ?  complications: None reported  ? Developmental milestones: Met at expected ages  ? Childhood behavioral / emotional / academic problems: Denied  ? Native Language: English  ? Education: Graduated from high school on time; attended [employer] for nursing for 1 year; obtained certificate in automotive from [employer].  ? Occupation:  at [employer]; laid off last month; approved for disability but hopes to return to work    Psychosocial History  ? Family of origin: Born and raised in [place], [place]   ? Marital: Never   ? Children: None  ? Housing: Lives with parents, sister, and grandparents  ? Psychosocial support: Strong social support system of family and friends    Family History  ?  No known family history of dementia or neurological disorders    RESULTS  See separate abstract for list of neuropsychological measures and test scores.    PRE-MORBID ABILITY: Premorbid abilities were estimated within the average range based on single word reading ability.  GENERAL COGNITIVE STATUS: Overall level of intellectual functioning is in the average range. Comparison with estimated premorbid ability does not indicate significant decline  in intellectual status from baseline levels. Orientation was within expectation for general personal information, place, time, and cultural information.   ATTENTION/EXECUTIVE FUNCTIONS: Performance on a test of sustained attention was average. Visual reasoning through pattern identification was average. Performance on a test of set-shifting/cognitive flexibility was low average. Psychomotor processing speed performances were variable with average and below average scores.   LANGUAGE: Vocabulary knowledge was average. Confrontation naming performance was low average. Letter-cue verbal fluency performance was low average.   VISUOSPATIAL PROCESSING: Copy of increasingly intricate figures was low average. Copy of a complex figure was exceptionally low and notable for missing lines and incorrect proportions of elements but the overall figural gestalt was preserved.   LEARNING AND MEMORY: Initial encoding of a word list over multiple learning trials was average. Delayed recall of the list after a short delay was average and recall after a longer delay was also average. Recognition of the word list was average. Initial encoding of contextualized verbal information in the form of short stories was high average and delayed retrieval was high average. Recognition of story details was high average. Encoding of visual information was below average; however, delayed retrieval and recognition of visual information was average.   MOTOR: Performance on a test of fine-motor speed was average with the dominant (left) hand and above average with the non-dominant (right) hand.  PSYCHOLOGICAL AND BEHAVIORAL: He endorsed mild symptoms of depression and anxiety. On a self-report measure of personality and psychopathology, his profile was suggestive of consistent and valid responding. He endorsed a significant history of antisocial behavior. He reported conflictual family relationships and lack of support from family members. Further  examination of his responses revealed he is experiencing poor health, feeling weak or tired, and vague neurological complaints. He also endorsed a history of suicidal ideation and/or past attempts. He endorsed feeling helpless and/or hopeless and pessimistic. He reported multiple anxiety-related experiences, including generalized anxiety, re-experiencing, and/or panic, and endorsed engaging in compulsive behavior. Interpersonally, he described himself as shy, easily embarrassed, and uncomfortable around others.     TESTING CONSIDERATIONS  Performance on neuropsychological tests is dependent upon a number of factors, including sufficient engagement and motivation, to reliably establish an examinee s level of cognitive functioning.  Based upon observations made throughout the evaluation, the patient did not appear to deliberately perform in a suboptimal manner and demonstrated good frustration tolerance on cognitively challenging tasks. Overall, test results are believed to accurately represent the patient s current neurocognitive status.    BEHAVIORAL OBSERVATIONS  ? Alert, oriented to self, time, place, and circumstance; attentive and focused while undergoing testing  ? Appearance: Well-groomed, casually dressed  ? Gait/Posture: No abnormalities noted  ? Sensorimotor: No abnormalities noted  ? Behavior: Cooperative, pleasant, no behavioral abnormalities noted  ? Speech: Fluent, articulate, normal rate, prosody, and volume; no conversational word finding difficulty  ? Thought process: Logical, linear, and goal-directed   ? Thought content: Logical, appropriate   ? Affect: Broad, responsive, consistent with reported mood; good eye contact  ? Mood: Euthymic  ? Insight and Judgment: Intact  ? Approach to testing: Efficient, deliberate; good frustration on cognitively demanding tasks  ? Rapport: Easily established and maintained         Activities included in this evaluation: CPT Code #Units Time   Psychiatric diagnostic  interview [id number] 1 --   Test evaluation services by professional; first hour. 74597 1 1:45   Test evaluation services by professional, additional hour (+) [id number] 1    Test administration and scoring by technician, first 30 mins 91021 1 2:30   Test administration and scoring by technician, additional 30 mins (+) [id number]      Dx: F54; N18.5; Z01.818    Sincerely,    LYNDA SMALLWOOD, PhD

## 2022-05-26 DIAGNOSIS — N18.5 CHRONIC KIDNEY DISEASE, STAGE V (H): Primary | ICD-10-CM

## 2022-05-27 NOTE — CONFIDENTIAL NOTE
Provider: CE      Tech: JONNY      Patient Name: Taylor Valiente    : [birth year]      MRN: [id number]     GODFREY: 22      Age: 26      Education: 13      Ethnicity: O      Handedness: Left      Station: OP             NEUROPSYCHOLOGICAL TESTS RAW SCORE STANDARDIZED SCORE* DESCRIPTIVE RANGE**   PREMORBID ABILITY       WRAT-5 Reading 57 SS= 94 Average          INTELLIGENCE RAW SCORE STANDARDIZED SCORE* DESCRIPTIVE RANGE**   Wechsler Abbreviated Scale of Intelligence - 2nd Edition (WASI-II)    Full Scale IQ - 2 (FSIQ-2) 99 SS= 99 Average          ATTENTION AND EXECUTIVE FUNCTIONS RAW SCORE STANDARDIZED SCORE* DESCRIPTIVE RANGE**   Wechsler Adult Intelligence Scale - 4th Edition (WAIS-IV)    Coding 68 ss= 9 Average     TSs,r,e = 46 Average   Wechsler Abbreviated Scale of Intelligence - 2nd Edition (WASI-II)   Matrix Reasoning 21 TS= 50 Average   Test of Sustained Attention and Tracking (TSAT)    Total Time 87 z= 0.23 Average   Total Errors 1 z= 0.52 Average   Trail Making Test (Time/errors)       Part A s,r,e 38/0 TS= 33 Below Average   Part B s,r,e 83/0 TS= 37 Low Average          LANGUAGE RAW SCORE STANDARDIZED SCORE* DESCRIPTIVE RANGE**   WASI-II Vocabulary 38 TS= 49 Average   [place] Naming Test s,r,e 54 TS= 42 Low Average   Letter-Cue Verbal Fluency s,r,e 11-11-14 (36) TS= 41 Low Average          VISUOSPATIAL PROCESSING RAW SCORE STANDARDIZED SCORE* DESCRIPTIVE RANGE**   Shiva Complex Figure Test (RCFT) Copy 26 %ile= <=1 Exceptionally Low   WMS-IV Visual Reproduction Copy 41 %= 10-16% Low Average          LEARNING & MEMORY RAW SCORE STANDARDIZED SCORE* DESCRIPTIVE RANGE**   Wechsler Memory Scale-4th Edition (WMS-IV)     Logical Memory I 30 ss= 12 High Average     TSs,r,e = 57 High Average   Logical Memory II 29 ss= 12 High Average     TSs,r,e = 60 High Average   Logical Memory Recognition 29 %= >75 High Average   Visual Reproduction I 33 ss= 7 Low Average     TSs,r,e = 35 Below Average   Visual Reproduction II 26  ss= 9 Average     TSs,r,e = 45 Average   Visual Reproduction Recognition 6 %= 26-50 Average   Shiva Auditory Verbal Learning Test -  (RAVLT)    Total Trials 1-5 6-10-12-12-13 (53) z= -0.51 Average   Tiral 5 13 z= 0.06 Average   Short Delayed Recall 12 z= 0.22 Average   30' Recall 12 z= 0.28 Average   Recognition Hits 15 z= 0.91 High Average   Recognition False Alarms 0 --            MOTOR  RAW SCORE STANDARDIZED SCORE* DESCRIPTIVE RANGE**   Finger Tapping       RH s,r,e 58.33 TS= 67 Above Average   LH s,r,e 55.33 TS= 53 Average          PSYCHOLOGICAL & BEHAVIORAL SYMPTOMS RAW SCORE STANDARDIZED SCORE* DESCRIPTIVE RANGE**   Geronimo Anxiety Inventory (SHEN) 13 --  Mild   Geronimo Depression Inventory - 2nd Edition (BDI-II) 14 --  Mild   [place] Multiphasic Personality Inventory - 3rd Edition, (MMPI-3)            * All standardized scores are adjusted for age. Superscripts denote additional adjustment for (s)ex, (r)ace/ethnicity, (l)anguage, and/or (e)ducation.   ** Descriptive ranges are based on American Academy of Clinical Neuropsychology (2020) consensus guidelines, or test manuals where appropriate.    WNL = within normal limits. DC = discontinued due to patient s inability to complete the test.    Standardized scores: TS = T-score; mean = 50, standard deviation =10; z = z-score; mean = 0, standard deviation = 1; ss = scaled score; mean = 10, standard deviation = 3; MAS = [place] Older Adult Normative Study age adjusted scaled score; mean = 10, standard deviation = 3; SS = standard score; mean = 100, standard deviation = 15.

## 2022-05-27 NOTE — PROGRESS NOTES
NEUROPSYCHOLOGICAL EVALUATION  **CONFIDENTIAL**    **This is a medical document intended for communication with the referring provider. It is written in medical language and may contain abbreviations or verbiage that are unfamiliar. It is recommended that you follow up with your neuropsychologist and/or referring provider to discuss the results of this evaluation. This report and the results within are not intended to be interpreted in isolation without consultation with your medical provider. **      Name: Taylor Valiente Education: 13 years    (age): 1995 (26 years) GODFREY:  2022   Referral: ROSA Machado, CNP  Department of Nephrology Handedness:  MRN (Epic): Left  [id number]     IDENTIFYING INFORMATION AND REASON FOR REFERRAL   Mr. Valiente is a 26-year-old, left handed, Italian male with a history of stage 5 chronic kidney disease, end stage renal failure on dialysis, hyperlipidemia, hyperkalemia, left ventricular hypertrophy, renovascular hypertension, and cardiomyopathy. This evaluation was requested to provide an assessment of his current neuropsychological status as part of a comprehensive workup for kidney transplantation.     IMPRESSION  See below for relevant background information and detailed test results. See separate abstract for supporting documentation including a list of neuropsychological measures and test scores.    Mr. Valiente s neuropsychological profile revealed a select weakness on a test of planning/organization and variability on tests of processing speed, with best performance within expectation. Scores were within expectation across other cognitive tests including attention, language, basic visuoconstruction, learning and memory, abstract reasoning, and mental flexibility.    Assessment of current psychological and emotional status revealed mild symptoms of depression and anxiety. On a self-report measure of personality and psychopathology, he endorsed a  significant history of antisocial behavior, conflictual family relationships, and lack of support from family members. Further examination of his responses revealed he is experiencing poor health, feeling weak or tired, and vague neurological complaints. He endorsed a history of suicidal ideation and/or past attempts as well as feeling helpless and/or hopeless and pessimistic. He reported multiple anxiety-related experiences and engaging in compulsive behavior. Interpersonally, he described himself as shy, easily embarrassed, and uncomfortable around others.    With regard to transplant candidacy, Mr. Valiente demonstrated an adequate understanding of his renal disease and risks of transplantation and he expressed a high degree of motivation for this treatment. Despite responses on a self-report questionnaire suggesting a lack of support from family, he described strong psychosocial supports in place and recognizes the need for increased functional assistance during the post-operative period. Despite ongoing cannabis use, he acknowledged the need to terminate this use if pursuing transplantation and he was confident in his ability to do so. Despite history of depression with prior suicide attempt, he endorsed only mild symptoms of depression currently, normative health-related anxiety, and he adamantly denied suicidal ideation, plan, and intent. Although he acknowledged prior difficulties with managing medications, suggesting possible history of treatment non-compliance, he stated he has a system in place and has demonstrated good treatment compliance in the past year, as well as the capacity to make lifestyle changes, all of which are positive outcome indicators.     Overall, Mr. Valiente is functioning quite well from a neuropsychological standpoint and does not evidence any objective cognitive deficits. Results revealed only a select weakness on a test of planning/organization and variability on tests of  processing speed in the context of other well preserved cognitive abilities. In considering his history, chronic kidney disease, heart conditions, and associated cerebrovascular factors are likely etiological contributors to observed weaknesses on cognitive testing. Mild depression and anxiety, as well as cannabis use, may also be contributory. Although there was no evidence for significant psychiatric issues at this time, ongoing monitoring of his mental health is strongly encouraged and consideration may be given to engagement with a health psychologist for appropriate management of his symptoms. Aside from needing to quit cannabis use there are no neuropsychological contraindications for kidney transplantation.    RECOMMENDATIONS  1. Mr. Valiente is encouraged to live a healthy lifestyle that promotes brain health including getting appropriate exercise, as advised by his healthcare providers, eating healthy, getting regular sleep, and abstaining from cannabis use.  2. Interventions to address mild depression, health-related anxiety, and coping with chronic health conditions may be helpful and he would likely benefit from engagement with a health psychologist.   3. Given Mr. Valiente's report of snoring and possible gasping arousals, it is recommended he complete a sleep study to rule out FRANCIA and ensure adequate oxygen supply to the brain at night.   4. The current evaluation will serve as an objective cognitive baseline against which results of future evaluations may be compared.  No follow-up is currently indicated; however, Mr. Valiente may be referred for a repeat neuropsychological evaluation if there is significant change in cognitive status in the future.     Thank you for the opportunity to participate in Mr. Valiente s care.  Multiple attempts were made to contact the patient to review findings and recommendations but he was unable to be reached. If you have any questions regarding this  evaluation, please do not hesitate to contact me.       Genesis Jones, Ph.D.,   Clinical Neuropsychologist    RELEVANT BACKGROUND INFORMATION AND SUPPORTING DOCUMENTATION  Gathered from a clinical interview with the patient and reviews of electronic medical record.    History of Presenting Problem(s)  Mr. Valiente presented with a history of stage 5 chronic kidney disease, end stage renal failure on dialysis, hyperlipidemia, hyperkalemia, left ventricular hypertrophy, renovascular hypertension, and cardiomyopathy. He denied any cognitive complaints, physical complaints, or sensorimotor disturbances. He is functionally independent with all activities of daily living without difficulty including managing medications, finances, meal preparation, driving, and basic self-care. He admitted to prior difficulty taking medications accurately but he stated he has a system in place since 2020/2021, he has not missed medication doses since using a pillbox, and he has never missed a dialysis appointment. Emotionally, he stated feeling  okay  and denied feelings of sadness/low mood. He reported a history of depression with suicide attempt (overdose with pills) 1.5 years ago. He denied any suicidal ideation, intent or plan since then. He endorsed mild health-related anxiety but denied excessive worry. He denied other psychopathology or behavioral/personality changes. He has abstained from alcohol for the past three years and denied problematic alcohol use in the past. He admitted to daily cannabis use to aid with nausea, low appetite, sleep, and stress. He stated he did not use cannabis prior to this evaluation. He acknowledged he may need to quit using cannabis for transplantation and he denied concern about his ability to do so.     Mr. Valiente expressed a high degree of motivation for kidney transplantation. He was able to articulate a basic lay knowledge and understanding of his renal disease and risks of  transplantation including infection, blood clots, and kidney rejection. He was aware of some necessary lifestyle changes required such as taking medications and dietary restrictions, and stated his immune system would be reduced. He reported his goals/expectations for transplant are to be off dialysis, be in better health, and be able to take a vacation. His current living situation is stable. He is living with his parents, sister, and grandparents and stated his mother and grandfather will provide post-transplant assistance as needed. He described a strong social support network of family and friends.     Neurodiagnostic Findings   ? Head CT w/o contrast (7/14/2017) IMPRESSION: No evidence of acute hemorrhage, mass, or herniation.    Medical History  ? Stage 5 chronic kidney disease  ? End stage renal failure on dialysis  ? Hyperlipidemia  ? Hyperkalemia  ? Left ventricular hypertrophy  ? Renovascular hypertension  ? Cardiomyopathy  ? History of COVID-19 infection  ? Denied history of seizure or head injury with LOC.    Health Behaviors and Activities of Daily Living  ? Sleep: 5-6 hours of cumulative sleep nightly on nights prior to dialysis, and 8-9 hours of sleep on other nights; denied delay in onset with cannabis at night; occasional waking due to noise but can quickly return to sleep; reported snoring and possible gasping arousals; denied parasomnic behaviors; energy is good most days when staying active  ? Appetite: Reduced since prior to starting dialysis  ? Exercise: 1-2 days per week; going to the gym or walking around with friends  ? Pain: Reported occasional muscle cramps; soreness when walking long distances; denied pain at the time of this evaluation  ? Independent in ADLs and instrumental ADLs    Psychiatric History  ? Mr. Valiente reported current mood is  okay . He admitted to normative health-related anxiety in the context of his transplant workup and dialysis treatment. He denied prominent  symptoms of depression currently.   ? He reported a history of depression 1.5 years ago and stated he last felt depressed 1 year ago.  ? He otherwise denied history of significant irritable, hypomanic or manic mood symptoms, excessive rumination, worry or uncontrollable anxiety, engagement in repetitive or compulsive behaviors, alteration of sensory perceptual processes or disordered thought.  ? Suicidality/ Self-harm: He reported a history of suicide attempt 1.5 years ago when he attempted to overdose with pills. He went to the ER and following discharge began outpatient individual psychotherapy. He denied any recent thoughts of suicide and denied intent and plan since his prior attempt.   ? Trauma: Denied  ? Psychiatric treatment: Engaged in individual psychotherapy for a few months following his suicide attempt    Substance Use History  ? Alcohol: None for the past 3 years; prior was drinking socially on weekends   ? Nicotine: None  ? Cannabis: Daily use (afternoons and evenings)  ? Problematic Substance Use: He denied problematic alcohol or cannabis use; however, he admitted to the following (DSM-V criteria): 1. Cannabis is taken in larger amounts than was intended; 4. Craving or a strong desire or urge to use cannabis. He denied recurrent substance use in situations in which it is physically hazardous but admitted to obtaining a DWI in 2019 while driving under the influence of cannabis. Thus, within a 12-month period, he endorsed two symptoms suggesting he meets criteria for cannabis use disorder.     Current Medications (per EMR)  Current Outpatient Medications   Medication     atorvastatin (LIPITOR) 10 MG tablet     carvedilol (COREG) 25 MG tablet     ACE/ARB/ARNI NOT PRESCRIBED (INTENTIONAL)     amLODIPine (NORVASC) 10 MG tablet     aspirin 81 MG EC tablet     bumetanide (BUMEX) 2 MG tablet     cinacalcet (SENSIPAR) 30 MG tablet     cloNIDine (CATAPRES) 0.1 MG tablet     dexamethasone (DECADRON) 1 MG tablet      hydrALAZINE (APRESOLINE) 100 MG tablet     hydrocortisone 2.5 % cream     multivitamin RENAL (RENAVITE RX/NEPHROVITE) 1 MG tablet     senna-docusate (SENOKOT-S/PERICOLACE) 8.6-50 MG tablet     sevelamer HCl (RENAGEL) 800 MG tablet     vitamin D3 (CHOLECALCIFEROL) 2000 units (50 mcg) tablet     No current facility-administered medications for this visit.     Developmental, Educational, & Occupational History  ? Gestational: Full-term  ?  complications: None reported  ? Developmental milestones: Met at expected ages  ? Childhood behavioral / emotional / academic problems: Denied  ? Native Language: English  ? Education: Graduated from high school on time; attended [employer] for nursing for 1 year; obtained certificate in automotive from [employer].  ? 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 Family of origin: Born and raised in [place], [place]   ? Marital: Never   ? Children: None  ? Housing: Lives with parents, sister, and grandparents  ? Psychosocial support: Strong social support system of family and friends    Family History  ?  No known family history of dementia or neurological disorders    RESULTS  See separate abstract for list of neuropsychological measures and test scores.    PRE-MORBID ABILITY: Premorbid abilities were estimated within the average range based on single word reading ability.  GENERAL COGNITIVE STATUS: Overall level of intellectual functioning is in the average range. Comparison with estimated premorbid ability does not indicate significant decline in intellectual status from baseline levels. Orientation was within expectation for general personal information, place, time, and cultural information.   ATTENTION/EXECUTIVE FUNCTIONS: Performance on a test of sustained attention was average. Visual reasoning through pattern identification was average.  Performance on a test of set-shifting/cognitive flexibility was low average. Psychomotor processing speed performances were variable with average and below average scores.   LANGUAGE: Vocabulary knowledge was average. Confrontation naming performance was low average. Letter-cue verbal fluency performance was low average.   VISUOSPATIAL PROCESSING: Copy of increasingly intricate figures was low average. Copy of a complex figure was exceptionally low and notable for missing lines and incorrect proportions of elements but the overall figural gestalt was preserved.   LEARNING AND MEMORY: Initial encoding of a word list over multiple learning trials was average. Delayed recall of the list after a short delay was average and recall after a longer delay was also average. Recognition of the word list was average. Initial encoding of contextualized verbal information in the form of short stories was high average and delayed retrieval was high average. Recognition of story details was high average. Encoding of visual information was below average; however, delayed retrieval and recognition of visual information was average.   MOTOR: Performance on a test of fine-motor speed was average with the dominant (left) hand and above average with the non-dominant (right) hand.  PSYCHOLOGICAL AND BEHAVIORAL: He endorsed mild symptoms of depression and anxiety. On a self-report measure of personality and psychopathology, his profile was suggestive of consistent and valid responding. He endorsed a significant history of antisocial behavior. He reported conflictual family relationships and lack of support from family members. Further examination of his responses revealed he is experiencing poor health, feeling weak or tired, and vague neurological complaints. He also endorsed a history of suicidal ideation and/or past attempts. He endorsed feeling helpless and/or hopeless and pessimistic. He reported multiple anxiety-related experiences,  including generalized anxiety, re-experiencing, and/or panic, and endorsed engaging in compulsive behavior. Interpersonally, he described himself as shy, easily embarrassed, and uncomfortable around others.     TESTING CONSIDERATIONS  Performance on neuropsychological tests is dependent upon a number of factors, including sufficient engagement and motivation, to reliably establish an examinee s level of cognitive functioning.  Based upon observations made throughout the evaluation, the patient did not appear to deliberately perform in a suboptimal manner and demonstrated good frustration tolerance on cognitively challenging tasks. Overall, test results are believed to accurately represent the patient s current neurocognitive status.    BEHAVIORAL OBSERVATIONS  ? Alert, oriented to self, time, place, and circumstance; attentive and focused while undergoing testing  ? Appearance: Well-groomed, casually dressed  ? Gait/Posture: No abnormalities noted  ? Sensorimotor: No abnormalities noted  ? Behavior: Cooperative, pleasant, no behavioral abnormalities noted  ? Speech: Fluent, articulate, normal rate, prosody, and volume; no conversational word finding difficulty  ? Thought process: Logical, linear, and goal-directed   ? Thought content: Logical, appropriate   ? Affect: Broad, responsive, consistent with reported mood; good eye contact  ? Mood: Euthymic  ? Insight and Judgment: Intact  ? Approach to testing: Efficient, deliberate; good frustration on cognitively demanding tasks  ? Rapport: Easily established and maintained         Activities included in this evaluation: CPT Code #Units Time   Psychiatric diagnostic interview [id number] --   Test evaluation services by professional; first hour. [id number] 1 1:45   Test evaluation services by professional, additional hour (+) [id number] 1    Test administration and scoring by technician, first 30 mins 42804 1 2:30   Test administration and scoring by technician, additional 30 mins  (+) [id number]      Dx: F54; N18.5; Z01.818

## 2022-05-28 DIAGNOSIS — N17.9 ACUTE RENAL FAILURE, UNSPECIFIED ACUTE RENAL FAILURE TYPE (H): ICD-10-CM

## 2022-06-01 RX ORDER — ATORVASTATIN CALCIUM 10 MG/1
TABLET, FILM COATED ORAL
Qty: 90 TABLET | Refills: 1 | Status: SHIPPED | OUTPATIENT
Start: 2022-06-01 | End: 2023-12-27

## 2022-06-01 NOTE — TELEPHONE ENCOUNTER
Prescription approved per [facility] Refill Protocol.  Randee Tello RN, BSN  [facility]

## 2022-06-03 DIAGNOSIS — Z76.82 ORGAN TRANSPLANT CANDIDATE: Primary | ICD-10-CM

## 2022-06-03 DIAGNOSIS — Z01.818 PRE-TRANSPLANT EVALUATION FOR KIDNEY TRANSPLANT: ICD-10-CM

## 2022-06-09 ENCOUNTER — HOSPITAL ENCOUNTER (OUTPATIENT)
Dept: CARDIOLOGY | Facility: CLINIC | Age: 27
Discharge: HOME | End: 2022-06-09
Attending: INTERNAL MEDICINE
Payer: COMMERCIAL

## 2022-06-09 VITALS — SYSTOLIC BLOOD PRESSURE: 126 MMHG | HEART RATE: 56 BPM | DIASTOLIC BLOOD PRESSURE: 81 MMHG

## 2022-06-09 DIAGNOSIS — Z76.82 ORGAN TRANSPLANT CANDIDATE: ICD-10-CM

## 2022-06-09 LAB
CREAT BLD-MCNC: 10.3 MG/DL (ref 0.7–1.3)
GFR SERPL CREATININE-BSD FRML MDRD: 7 ML/MIN/1.73M2

## 2022-06-09 PROCEDURE — 250N000013 HC RX MED GY IP 250 OP 250 PS 637: Performed by: INTERNAL MEDICINE

## 2022-06-09 PROCEDURE — 82565 ASSAY OF CREATININE: CPT

## 2022-06-09 PROCEDURE — 250N000009 HC RX 250: Performed by: INTERNAL MEDICINE

## 2022-06-09 PROCEDURE — 250N000011 HC RX IP 250 OP 636: Performed by: INTERNAL MEDICINE

## 2022-06-09 PROCEDURE — 75574 CT ANGIO HRT W/3D IMAGE: CPT

## 2022-06-09 PROCEDURE — 75574 CT ANGIO HRT W/3D IMAGE: CPT | Mod: 26 | Performed by: INTERNAL MEDICINE

## 2022-06-09 RX ORDER — ONDANSETRON 2 MG/ML
4 INJECTION INTRAMUSCULAR; INTRAVENOUS
Status: DISCONTINUED | OUTPATIENT
Start: 2022-06-09 | End: 2022-06-10 | Stop reason: HOSPADM

## 2022-06-09 RX ORDER — DILTIAZEM HCL 60 MG
120 TABLET ORAL
Status: DISCONTINUED | OUTPATIENT
Start: 2022-06-09 | End: 2022-06-10 | Stop reason: HOSPADM

## 2022-06-09 RX ORDER — DIPHENHYDRAMINE HCL 25 MG
25 CAPSULE ORAL
Status: DISCONTINUED | OUTPATIENT
Start: 2022-06-09 | End: 2022-06-10 | Stop reason: HOSPADM

## 2022-06-09 RX ORDER — NITROGLYCERIN 0.4 MG/1
0.4 TABLET SUBLINGUAL
Status: DISCONTINUED | OUTPATIENT
Start: 2022-06-09 | End: 2022-06-10 | Stop reason: HOSPADM

## 2022-06-09 RX ORDER — METOPROLOL TARTRATE 25 MG/1
25-100 TABLET, FILM COATED ORAL
Status: COMPLETED | OUTPATIENT
Start: 2022-06-09 | End: 2022-06-09

## 2022-06-09 RX ORDER — METOPROLOL TARTRATE 1 MG/ML
5-15 INJECTION, SOLUTION INTRAVENOUS
Status: DISCONTINUED | OUTPATIENT
Start: 2022-06-09 | End: 2022-06-10 | Stop reason: HOSPADM

## 2022-06-09 RX ORDER — METHYLPREDNISOLONE SODIUM SUCCINATE 125 MG/2ML
125 INJECTION, POWDER, LYOPHILIZED, FOR SOLUTION INTRAMUSCULAR; INTRAVENOUS
Status: DISCONTINUED | OUTPATIENT
Start: 2022-06-09 | End: 2022-06-10 | Stop reason: HOSPADM

## 2022-06-09 RX ORDER — IOPAMIDOL 755 MG/ML
120 INJECTION, SOLUTION INTRAVASCULAR ONCE
Status: COMPLETED | OUTPATIENT
Start: 2022-06-09 | End: 2022-06-09

## 2022-06-09 RX ORDER — IVABRADINE 5 MG/1
5-15 TABLET, FILM COATED ORAL
Status: COMPLETED | OUTPATIENT
Start: 2022-06-09 | End: 2022-06-09

## 2022-06-09 RX ORDER — ACYCLOVIR 200 MG/1
0-1 CAPSULE ORAL
Status: DISCONTINUED | OUTPATIENT
Start: 2022-06-09 | End: 2022-06-10 | Stop reason: HOSPADM

## 2022-06-09 RX ORDER — DILTIAZEM HYDROCHLORIDE 5 MG/ML
10-15 INJECTION INTRAVENOUS
Status: DISCONTINUED | OUTPATIENT
Start: 2022-06-09 | End: 2022-06-10 | Stop reason: HOSPADM

## 2022-06-09 RX ORDER — DIPHENHYDRAMINE HYDROCHLORIDE 50 MG/ML
25-50 INJECTION INTRAMUSCULAR; INTRAVENOUS
Status: DISCONTINUED | OUTPATIENT
Start: 2022-06-09 | End: 2022-06-10 | Stop reason: HOSPADM

## 2022-06-09 RX ADMIN — METOPROLOL TARTRATE 10 MG: 5 INJECTION INTRAVENOUS at 09:08

## 2022-06-09 RX ADMIN — IOPAMIDOL 120 ML: 755 INJECTION, SOLUTION INTRAVENOUS at 09:51

## 2022-06-09 RX ADMIN — METOPROLOL TARTRATE 100 MG: 50 TABLET, FILM COATED ORAL at 08:08

## 2022-06-09 RX ADMIN — NITROGLYCERIN 0.4 MG: 0.4 TABLET SUBLINGUAL at 09:35

## 2022-06-09 RX ADMIN — IVABRADINE 10 MG: 5 TABLET, FILM COATED ORAL at 08:08

## 2022-06-10 ENCOUNTER — TELEPHONE (OUTPATIENT)
Dept: CARDIOLOGY | Facility: CLINIC | Age: 27
End: 2022-06-10

## 2022-06-10 ENCOUNTER — TELEPHONE (OUTPATIENT)
Dept: CARDIOLOGY | Facility: CLINIC | Age: 27
End: 2022-06-10
Payer: COMMERCIAL

## 2022-06-10 DIAGNOSIS — I11.9 LVH (LEFT VENTRICULAR HYPERTROPHY) DUE TO HYPERTENSIVE DISEASE: ICD-10-CM

## 2022-06-10 DIAGNOSIS — N18.5 CKD (CHRONIC KIDNEY DISEASE) STAGE 5, GFR LESS THAN 15 ML/MIN (H): Primary | ICD-10-CM

## 2022-06-13 ENCOUNTER — TELEPHONE (OUTPATIENT)
Dept: BEHAVIORAL HEALTH | Facility: CLINIC | Age: 27
End: 2022-06-13

## 2022-06-13 NOTE — TELEPHONE ENCOUNTER
Writer placed a second call this morning to check in patient for video appointment. Unable to get a hold of patient. Writer left a second voicemail with writer's call back number. Writer will inform patient's provider.

## 2022-06-20 ENCOUNTER — HOSPITAL ENCOUNTER (EMERGENCY)
Facility: CLINIC | Age: 27
Discharge: HOME | End: 2022-06-20
Attending: EMERGENCY MEDICINE | Admitting: EMERGENCY MEDICINE
Payer: COMMERCIAL

## 2022-06-20 VITALS
HEART RATE: 75 BPM | DIASTOLIC BLOOD PRESSURE: 99 MMHG | SYSTOLIC BLOOD PRESSURE: 147 MMHG | OXYGEN SATURATION: 100 % | TEMPERATURE: 97.7 F | RESPIRATION RATE: 18 BRPM

## 2022-06-20 DIAGNOSIS — E87.8: ICD-10-CM

## 2022-06-20 LAB
ALBUMIN SERPL-MCNC: 4 G/DL (ref 3.4–5)
ALP SERPL-CCNC: 123 U/L (ref 40–150)
ALT SERPL W P-5'-P-CCNC: 26 U/L (ref 0–70)
ANION GAP SERPL CALCULATED.3IONS-SCNC: 9 MMOL/L (ref 3–14)
AST SERPL W P-5'-P-CCNC: 9 U/L (ref 0–45)
BASOPHILS # BLD AUTO: 0.1 10E3/UL (ref 0–0.2)
BASOPHILS NFR BLD AUTO: 1 %
BILIRUB SERPL-MCNC: 0.3 MG/DL (ref 0.2–1.3)
BUN SERPL-MCNC: 35 MG/DL (ref 7–30)
CALCIUM SERPL-MCNC: 9.4 MG/DL (ref 8.5–10.1)
CHLORIDE BLD-SCNC: 102 MMOL/L (ref 94–109)
CO2 SERPL-SCNC: 25 MMOL/L (ref 20–32)
CREAT SERPL-MCNC: 7.5 MG/DL (ref 0.66–1.25)
EOSINOPHIL # BLD AUTO: 0.6 10E3/UL (ref 0–0.7)
EOSINOPHIL NFR BLD AUTO: 10 %
ERYTHROCYTE [DISTWIDTH] IN BLOOD BY AUTOMATED COUNT: 12.7 % (ref 10–15)
GFR SERPL CREATININE-BSD FRML MDRD: 10 ML/MIN/1.73M2
GLUCOSE BLD-MCNC: 137 MG/DL (ref 70–99)
HCT VFR BLD AUTO: 35.1 % (ref 40–53)
HGB BLD-MCNC: 11.4 G/DL (ref 13.3–17.7)
IMM GRANULOCYTES # BLD: 0 10E3/UL
IMM GRANULOCYTES NFR BLD: 1 %
LYMPHOCYTES # BLD AUTO: 1.3 10E3/UL (ref 0.8–5.3)
LYMPHOCYTES NFR BLD AUTO: 21 %
MCH RBC QN AUTO: 30.1 PG (ref 26.5–33)
MCHC RBC AUTO-ENTMCNC: 32.5 G/DL (ref 31.5–36.5)
MCV RBC AUTO: 93 FL (ref 78–100)
MONOCYTES # BLD AUTO: 0.6 10E3/UL (ref 0–1.3)
MONOCYTES NFR BLD AUTO: 10 %
NEUTROPHILS # BLD AUTO: 3.6 10E3/UL (ref 1.6–8.3)
NEUTROPHILS NFR BLD AUTO: 57 %
NRBC # BLD AUTO: 0 10E3/UL
NRBC BLD AUTO-RTO: 0 /100
PLATELET # BLD AUTO: 163 10E3/UL (ref 150–450)
POTASSIUM BLD-SCNC: 3.9 MMOL/L (ref 3.4–5.3)
PROT SERPL-MCNC: 7.8 G/DL (ref 6.8–8.8)
RBC # BLD AUTO: 3.79 10E6/UL (ref 4.4–5.9)
SODIUM SERPL-SCNC: 136 MMOL/L (ref 133–144)
WBC # BLD AUTO: 6.2 10E3/UL (ref 4–11)

## 2022-06-20 PROCEDURE — 96360 HYDRATION IV INFUSION INIT: CPT

## 2022-06-20 PROCEDURE — 258N000003 HC RX IP 258 OP 636: Performed by: EMERGENCY MEDICINE

## 2022-06-20 PROCEDURE — 85025 COMPLETE CBC W/AUTO DIFF WBC: CPT | Performed by: EMERGENCY MEDICINE

## 2022-06-20 PROCEDURE — 93005 ELECTROCARDIOGRAM TRACING: CPT

## 2022-06-20 PROCEDURE — 36415 COLL VENOUS BLD VENIPUNCTURE: CPT | Performed by: EMERGENCY MEDICINE

## 2022-06-20 PROCEDURE — 80053 COMPREHEN METABOLIC PANEL: CPT | Performed by: EMERGENCY MEDICINE

## 2022-06-20 PROCEDURE — 99284 EMERGENCY DEPT VISIT MOD MDM: CPT | Mod: 25

## 2022-06-20 RX ADMIN — SODIUM CHLORIDE 500 ML: 9 INJECTION, SOLUTION INTRAVENOUS at 12:45

## 2022-06-20 ASSESSMENT — ENCOUNTER SYMPTOMS
FEVER: 0
LIGHT-HEADEDNESS: 1

## 2022-06-20 NOTE — LETTER
June 20, 2022      To Whom It May Concern:      Taylor Valiente was seen in our Emergency Department today, 06/20/22.  I expect his condition to improve over the next day.  He may return to work/school when improved.    Sincerely,        Patrizia AREVALO RN

## 2022-06-20 NOTE — DISCHARGE INSTRUCTIONS
It is very common to have low blood pressure after dialysis.  Consider holding your blood pressure medication like hydralazine that you take 3 times a day and then check your blood pressures before reinstituting it after dialysis.  Please follow-up with your nephrologist if you continue to have low blood pressures after dialysis.  Return with fever chest pain shortness of breath or other concerns.

## 2022-06-20 NOTE — ED TRIAGE NOTES
A&O x4.  ABC's intact.      Pt arrives sent after dialysis this morning for low blood pressure of 60/20 towards the end of dialysis then returns to normal. Pt reports the past couple appts this has been occurring but the told him to come in today.

## 2022-06-20 NOTE — ED PROVIDER NOTES
History   Chief Complaint:  Hypotension       The history is provided by the patient.      Taylor Valiente is a 26 year old male with history of LVH, CKD stage V on dialysis, and end stage renal disease who presents with hypotension. Today at dialysis Taylor noticed that his blood pressure was 61/26. He comes in for evaluation with some lightheadedness and blurry vision secondary to his hypotension. He makes urine in the morning, but not a lot. No changes to appetitive. No fever or other symptoms..    Review of Systems   Constitutional: Negative for fever.   Eyes: Positive for visual disturbance.   Neurological: Positive for light-headedness.   All other systems reviewed and are negative.        Allergies:  Benadryl [Diphenhydramine]    Medications:  Amlodipine   Aspirin 81 MG EC tablet  Atorvastatin   Bumetanide   Carvedilol   cinacalcet   Clonidine    Dexamethasone   Hydralazine    Multivitamin    Senna-docusate   sevelamer HCl    Vitamin D3     Past Medical History:     Adrenal adenoma, left   Anemia   Benign essential hypertension   CKD (chronic kidney disease) stage 5, GFR less than 15 ml/min   Depression   Focal glomerular sclerosis   HLD (hyperlipidemia)   LVH (left ventricular hypertrophy) due to hypertensive disease   Noncompliance   Obesity, unspecified   OD (osteochondritis dissecans)   Stress-induced cardiomyopathy  Suicide attempt   Hyperkalemia  ESRD  Prolonged Q-T interval  Acute renal failure with acute tubular necrosis superimposed on stage V chronic kidney disease    Past Surgical History:    Arthroscopy ankle  Biopsy  Create fistula arteriovenous upper extremity  CVC tunnel placement and subsequent removal  Revision fistula arteriovenous upper extremity.    Family History:   Hepatitis B--mother  Gestational diabetes--mother  Hypertension--mother, father, all grandparents    Social History:  Patient unaccompanied  PCP: Priyank Lawrence     Physical Exam     Patient Vitals for the past 24 hrs:    BP Temp Temp src Pulse Resp SpO2   06/20/22 1100 131/89 97.7  F (36.5  C) Temporal 74 18 100 %       Physical Exam  Vitals reviewed.   HENT:      Head: Normocephalic.   Cardiovascular:      Rate and Rhythm: Normal rate and regular rhythm.   Pulmonary:      Effort: Pulmonary effort is normal.      Breath sounds: Normal breath sounds.   Abdominal:      General: Abdomen is flat.      Palpations: Abdomen is soft.   Musculoskeletal:         General: Normal range of motion.   Skin:     General: Skin is warm.      Capillary Refill: Capillary refill takes less than 2 seconds.   Neurological:      General: No focal deficit present.      Mental Status: He is alert.           Emergency Department Course   ECG  ECG results from 06/20/22   EKG 12 lead     Value    Systolic Blood Pressure     Diastolic Blood Pressure     Ventricular Rate 56    Atrial Rate 56    DE Interval 178    QRS Duration 102        QTc 478    P Axis 31    R AXIS 45    T Axis 52    Interpretation ECG      Sinus bradycardia  Nonspecific T wave abnormality  Prolonged QT  Abnormal ECG  When compared with ECG of 29-NOV-2021 07:00,  No significant change was found       Laboratory:  Labs Ordered and Resulted from Time of ED Arrival to Time of ED Departure   COMPREHENSIVE METABOLIC PANEL - Abnormal       Result Value    Sodium 136      Potassium 3.9      Chloride 102      Carbon Dioxide (CO2) 25      Anion Gap 9      Urea Nitrogen 35 (*)     Creatinine 7.50 (*)     Calcium 9.4      Glucose 137 (*)     Alkaline Phosphatase 123      AST 9      ALT 26      Protein Total 7.8      Albumin 4.0      Bilirubin Total 0.3      GFR Estimate 10 (*)    CBC WITH PLATELETS AND DIFFERENTIAL - Abnormal    WBC Count 6.2      RBC Count 3.79 (*)     Hemoglobin 11.4 (*)     Hematocrit 35.1 (*)     MCV 93      MCH 30.1      MCHC 32.5      RDW 12.7      Platelet Count 163      % Neutrophils 57      % Lymphocytes 21      % Monocytes 10      % Eosinophils 10      % Basophils 1      %  Immature Granulocytes 1      NRBCs per 100 WBC 0      Absolute Neutrophils 3.6      Absolute Lymphocytes 1.3      Absolute Monocytes 0.6      Absolute Eosinophils 0.6      Absolute Basophils 0.1      Absolute Immature Granulocytes 0.0      Absolute NRBCs 0.0        Emergency Department Course:             Reviewed:  I reviewed nursing notes, vitals, past medical history and Care Everywhere    Assessments/Consults:  ED Course as of 06/20/22 1313   Mon Jun 20, 2022   1133 Obtained history and examined the patient as noted above.    1225 Rechecked the patient.     Interventions:  1245  mL, IV    Disposition:  The patient was discharged to home.     Impression & Plan     CMS Diagnoses: None    Medical Decision Making:  Patient presents with low blood pressure after dialysis.  Patient has history of high blood pressure and on multiple medications.  Blood pressures actually seem somewhat reasonable in the emergency room seem to be low afterwards.  Lab work for sepsis and evaluation was sent and was normal.  Clinically suspect patient does take his blood pressure medications before dialysis.  Suspect this should be held care was discussed with the patient would recommend follow-up with his nephrologist to discuss blood pressure management in the setting of end-stage renal disease and hemodialysis.  For now patient has no signs of sepsis anemia dehydration blood pressure is improved with a small amount of return of IV fluids patient is due for dialysis tomorrow we will ask her to follow-up there and asked them to take a less fluid off as well as reduce blood pressure medications prior to dialysis.  Patient was discharged in stable condition.    Diagnosis:    ICD-10-CM    1. 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           Nate Sánchez MD  06/22/22 [id number]

## 2022-06-21 LAB
ATRIAL RATE - MUSE: 56 BPM
DIASTOLIC BLOOD PRESSURE - MUSE: NORMAL MMHG
INTERPRETATION ECG - MUSE: NORMAL
P AXIS - MUSE: 31 DEGREES
PR INTERVAL - MUSE: 178 MS
QRS DURATION - MUSE: 102 MS
QT - MUSE: 496 MS
QTC - MUSE: 478 MS
R AXIS - MUSE: 45 DEGREES
SYSTOLIC BLOOD PRESSURE - MUSE: NORMAL MMHG
T AXIS - MUSE: 52 DEGREES
VENTRICULAR RATE- MUSE: 56 BPM

## 2022-06-24 ENCOUNTER — TELEPHONE (OUTPATIENT)
Dept: BEHAVIORAL HEALTH | Facility: CLINIC | Age: 27
End: 2022-06-24

## 2022-06-24 ENCOUNTER — LAB (OUTPATIENT)
Dept: LAB | Facility: CLINIC | Age: 27
End: 2022-06-24
Payer: COMMERCIAL

## 2022-06-24 DIAGNOSIS — Z76.82 ORGAN TRANSPLANT CANDIDATE: ICD-10-CM

## 2022-06-24 DIAGNOSIS — Z01.818 PRE-TRANSPLANT EVALUATION FOR KIDNEY TRANSPLANT: ICD-10-CM

## 2022-06-24 PROCEDURE — 86833 HLA CLASS II HIGH DEFIN QUAL: CPT

## 2022-06-24 PROCEDURE — 36415 COLL VENOUS BLD VENIPUNCTURE: CPT

## 2022-06-24 PROCEDURE — 86832 HLA CLASS I HIGH DEFIN QUAL: CPT

## 2022-06-28 ENCOUNTER — HOSPITAL ENCOUNTER (OUTPATIENT)
Dept: BEHAVIORAL HEALTH | Facility: CLINIC | Age: 27
Discharge: HOME | End: 2022-06-28
Attending: FAMILY MEDICINE | Admitting: FAMILY MEDICINE
Payer: COMMERCIAL

## 2022-06-28 LAB
PROTOCOL CUTOFF: NORMAL
SA 1 CELL: NORMAL
SA 1 TEST METHOD: NORMAL
SA 2 CELL: NORMAL
SA 2 TEST METHOD: NORMAL
SA1 HI RISK ABY: NORMAL
SA1 MOD RISK ABY: NORMAL
SA2 HI RISK ABY: NORMAL
SA2 MOD RISK ABY: NORMAL
UNACCEPTABLE ANTIGENS: NORMAL
UNOS CPRA: 59
ZZZSA 1  COMMENTS: NORMAL
ZZZSA 2 COMMENTS: NORMAL

## 2022-06-28 PROCEDURE — 90791 PSYCH DIAGNOSTIC EVALUATION: CPT | Mod: GT,95 | Performed by: COUNSELOR

## 2022-06-28 ASSESSMENT — COLUMBIA-SUICIDE SEVERITY RATING SCALE - C-SSRS
6. HAVE YOU EVER DONE ANYTHING, STARTED TO DO ANYTHING, OR PREPARED TO DO ANYTHING TO END YOUR LIFE?: YES
BASED ON RESPONSES TO C-SSRS QS 1-6, WHAT IS THE PATIENT'S OVERALL RISK RATING FOR SUICIDE: MODERATE RISK
1. IN THE PAST MONTH, HAVE YOU WISHED YOU WERE DEAD OR WISHED YOU COULD GO TO SLEEP AND NOT WAKE UP?: NO
4. HAVE YOU HAD THESE THOUGHTS AND HAD SOME INTENTION OF ACTING ON THEM?: NO
5. HAVE YOU STARTED TO WORK OUT OR WORKED OUT THE DETAILS OF HOW TO KILL YOURSELF? DO YOU INTEND TO CARRY OUT THIS PLAN?: NO
2. HAVE YOU ACTUALLY HAD ANY THOUGHTS OF KILLING YOURSELF IN THE PAST MONTH?: NO
3. HAVE YOU BEEN THINKING ABOUT HOW YOU MIGHT KILL YOURSELF?: NO

## 2022-06-28 ASSESSMENT — PAIN SCALES - GENERAL: PAINLEVEL: NO PAIN (0)

## 2022-06-28 NOTE — PROGRESS NOTES
"[facility]  Provider Name:  Kane Payne     Credentials:  LPCC, LADC    PATIENT'S NAME: Taylor Valiente  PREFERRED NAME: \"Taylor\"  PRONOUNS: he/him/his     MRN: [id number]  : 1995  ADDRESS: [address] [place] [address] [address] [place] [address]  ACCT. NUMBER:  [id number]  DATE OF SERVICE: 22  START TIME: 8:00 AM  END TIME: 9:24 AM  PREFERRED PHONE: [phone]  May we leave a program related message: Yes  SERVICE MODALITY:  Video Visit:      Provider verified identity through the following two step process.  Patient provided:  Patient  and Patient address    Telemedicine Visit: The patient's condition can be safely assessed and treated via synchronous audio and visual telemedicine encounter.      Reason for Telemedicine Visit: Patient has requested telehealth visit    Originating Site (Patient Location): Patient's home    Distant Site (Provider Location): Provider Remote Setting- Home Office    Consent:  The patient/guardian has verbally consented to: the potential risks and benefits of telemedicine (video visit) versus in person care; bill my insurance or make self-payment for services provided; and responsibility for payment of non-covered services.     Patient would like the video invitation sent by:  My Chart    Mode of Communication:  Video Conference via [facility]    As the provider I attest to compliance with applicable laws and regulations related to telemedicine.    UNIVERSAL ADULT Mental Health DIAGNOSTIC ASSESSMENT    Identifying Information:  Patient is a 26 year old, [place], [place], American.  The pronoun use throughout this assessment reflects the patient's chosen pronoun.  Patient was referred for an assessment by his kidney coordinator from the [facility].  Patient attended the session alone.    Chief Complaint:   The reason for seeking services at this time is: \"Compliance for kidney transplant\". Patient reports that his  " "wants him to see a mental health therapist for 6 months in order to be in compliance for a kidney transplant\".  The problem(s) began 6/18/2021.    Patient has not attempted to resolve these concerns in the past.    Social/Family History:  Patient reported that he grew up in [facility], 11-12 years old moved to the [facility]. He was raised by biological parents; grandmother; grandfather  .  Parents were always together.  Patient reported that his childhood was \"pretty good\".  Patient described his current relationships with family of origin as \"really good, had some issues in the past 2-3 years ago but it's way better now\".     The patient describes his cultural background as [place], , American.  Cultural influences and impact on patient's life structure, values, norms, and healthcare: Grew up Pentec[employer]alism.  Contextual influences on patient's health include: NA.    These factors will be addressed in the Preliminary Treatment plan. Patient identified his preferred language to be English. Patient reported that he does not need the assistance of an  or other support involved in therapy.     Patient reported had no significant delays in developmental tasks.   Patient's highest education level was associate degree / vocational certificate  .  Patient identified the following learning problems: none reported.  Modifications will be used to assist communication in therapy. Patient reports that he is  able to understand written materials.    Patient reported the following relationship history \"was in a relationships for almost a year\".  Patient's current relationship status is single for about a little over a year.   Patient identified his sexual orientation as heterosexual.  Patient reported having no children. Patient identified parents; pets; friends as part of his support system.  Patient identified the quality of these relationships as fair.      Patient's current living/housing situation involves staying " with someone.  The immediate members of family and household include Ricarda Valiente, 49,Dad and report that housing is stable.    Patient is currently unemployed-Not working right now but have been  since 2015 before not working.  Patient reports that his finances are obtained through SSDI disability. Patient does identify finances as a current stressor.      Patient reported that he has been involved with the legal system.  OWI (operating while under the influence) in [place]-did Driving with care class. Patient does not report being under probation/ parole/ jurisdiction or being under any current court jurisdiction.     Patient's Strengths and Limitations:  Patient identified the following strengths or resources that will help them succeed in treatment: commitment to health and well being, community involvement, exercise routine, jaylon / spirituality, friends / good social support, family support, insight and sober support group / recovery support . Things that may interfere with the patient's success in treatment include: financial hardship and physical health concerns.     Assessments:  The following assessments were completed by patient for this visit:  PHQ9:   PHQ-9 SCORE 2/15/2021 4/11/2021 4/12/2021 4/12/2021 6/1/2021 7/1/2021 11/16/2021   PHQ-9 Total Score MyChart 0 3 (Minimal depression) - - - 1 (Minimal depression) 1 (Minimal depression)   PHQ-9 Total Score 0 3 0 0 2 1 1     GAD7:   NIURKA-7 SCORE 2/15/2021 4/11/2021 4/12/2021 4/12/2021 6/1/2021 7/1/2021 11/16/2021   Total Score 1 (minimal anxiety) 0 (minimal anxiety) - - - 1 (minimal anxiety) 1 (minimal anxiety)   Total Score 1 0 2 2 2 1 1     CAGE-AID:   CAGE-AID Total Score 6/13/2022   Total Score 2   Total Score MyChart 2 (A total score of 2 or greater is considered clinically significant)     PROMIS 10-Global Health (all questions and answers displayed):   PROMIS 10 6/28/2022   In general, would you say your health is: 3   In  general, would you say your quality of life is: 3   In general, how would you rate your physical health? 1   In general, how would you rate your mental health, including your mood and your ability to think? 3   In general, how would you rate your satisfaction with your social activities and relationships? 4   In general, please rate how well you carry out your usual social activities and roles. (This includes activities at home, at work and in your community, and responsibilities as a parent, child, spouse, employee, friend, etc.) 4   To what extent are you able to carry out your everyday physical activities such as walking, climbing stairs, carrying groceries, or moving a chair? 3   In the past 7 days, how often have you been bothered by emotional problems such as feeling anxious, depressed, or irritable? 3   In the past 7 days, how would you rate your fatigue on average? 2   In the past 7 days, how would you rate your pain on average, where 0 means no pain, and 10 means worst imaginable pain? 0   Global Mental Health Score 13   Global Physical Health Score 13   PROMIS TOTAL - SUBSCORES 26   Some recent data might be hidden     Carmel Suicide Severity Rating Scale (Short Version)  Carmel Suicide Severity Rating (Short Version) 6/15/2021 8/10/2021 8/26/2021 11/29/2021 12/26/2021 6/20/2022 6/28/2022   Over the past 2 weeks have you felt down, depressed, or hopeless? no no no no no no -   Comments - - - - - - -   Over the past 2 weeks have you had thoughts of killing yourself? no no no no no no -   Have you ever attempted to kill yourself? no no yes no no no -   When did this last happen? - - more than 6 months ago - - - -   Comments - - 2 years ago - - - -   Q1 Wished to be Dead (Past Month) - - - - - - no   Q2 Suicidal Thoughts (Past Month) - - - - - - no   Comments - - - - - - -   Q3 Suicidal Thought Method - - - - - - no   Q4 Suicidal Intent without Specific Plan - - - - - - no   Q5 Suicide Intent with  "Specific Plan - - - - - - no   Q6 Suicide Behavior (Lifetime) - - - - - - yes   Comments - - - - - - -   Within the Past 3 Months? - - - - - - no   Level of Risk per Screen - - - - - - moderate risk   High Risk Required Interventions - - - - - - -   Required Interventions - - - - - - -   Interventions - - - - - - -     WHODAS 2.0 Total Score 4/11/2021 6/13/2022   Total Score 22 20   Total Score [facility] 22 20       Personal and Family Medical History:  Patient does report a family history of mental health concerns.  Patient reports family history includes Blood Disease in his mother; Coronary Artery Disease in his maternal uncle; Diabetes in his maternal grandmother and mother; Hypertension in his father, maternal grandmother, mother, paternal grandfather, and paternal grandmother; Obesity in his father..     Patient does report Mental Health Diagnosis and/or Treatment.  Patient Patient reported the following previous diagnoses which include(s): depression .  Patient reported symptoms began in 2019 during his suicide attempt.  Patient has received mental health services in the past:  therapy.  Psychiatric Hospitalizations: none.  Patient denies a history of civil commitment.  Currently, patient none  receiving other mental health services.  These include none.         Patient has had a physical exam to rule out medical causes for current symptoms.  Date of last physical exam was within the past year. Client was encouraged to follow up with PCP if symptoms were to develop. The patient has a [facility] Primary Care Provider, who is named Priyank Lawrence.  Patient reports the following current medical concerns: kidney disease, high blood pressure and anemia and no current dental concerns.  Patient denies any issues with pain..   There are not significant appetite / nutritional concerns / weight changes.   Patient does report a history of head injury / trauma / cognitive impairment.  \"fell and got stitches in the back of his " "head\"    Patient reports current meds as:   Outpatient Medications Marked as Taking for the 6/28/22 encounter (Hospital Encounter) with Kane Payne LADC   Medication Sig     ACE/ARB/ARNI NOT PRESCRIBED (INTENTIONAL) Please choose reason not prescribed from choices below.     amLODIPine (NORVASC) 10 MG tablet Take 10 mg by mouth daily      aspirin 81 MG EC tablet Take 81 mg by mouth daily     atorvastatin (LIPITOR) 10 MG tablet TAKE ONE TABLET BY MOUTH EVERY NIGHT AT BEDTIME     bumetanide (BUMEX) 2 MG tablet Take 2 mg by mouth daily      carvedilol (COREG) 25 MG tablet TAKE TWO TABLETS BY MOUTH TWICE A DAY WITH A MEAL     cinacalcet (SENSIPAR) 30 MG tablet Take 30 mg by mouth three times a week     cloNIDine (CATAPRES) 0.1 MG tablet Take 0.3 mg by mouth 2 times daily      hydrALAZINE (APRESOLINE) 100 MG tablet TAKE ONE TABLET BY MOUTH THREE TIMES A DAY     multivitamin RENAL (RENAVITE RX/NEPHROVITE) 1 MG tablet Take 1 tablet by mouth daily      sevelamer HCl (RENAGEL) 800 MG tablet Take 800 mg by mouth 3 times daily (with meals)     vitamin D3 (CHOLECALCIFEROL) 2000 units (50 mcg) tablet Take 75 mcg by mouth daily        Medication Adherence:  Patient reports taking.  taking prescribed medications as prescribed.    Patient Allergies:    Allergies   Allergen Reactions     Benadryl [Diphenhydramine] Itching       Medical History:    Past Medical History:   Diagnosis Date     Adrenal adenoma, left      Anemia      Benign essential hypertension 07/28/2017     CKD (chronic kidney disease) stage 5, GFR less than 15 ml/min (H)     FSGS     Depression      Focal glomerular sclerosis 07/28/2017     HLD (hyperlipidemia)      LVH (left ventricular hypertrophy) due to hypertensive disease 07/14/2017     Noncompliance      Obesity, unspecified      OD (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/2021     Stress-induced cardiomyopathy      Suicide attempt (H) 2019         Current Mental Status Exam:   Appearance:  Appropriate  " "  Eye Contact:  Good   Psychomotor:  Normal       Gait / station:  slow  Attitude / Demeanor: Cooperative   Speech      Rate / Production: Normal/ Responsive      Volume:  Soft  volume      Language:  no problems  Mood:   Normal  Affect:   Flat    Thought Content: Clear   Thought Process: Coherent       Associations: No loosening of associations  Insight:   Fair   Judgment:  Poor  Orientation:  Person Place Time Situation  Attention/concentration: Good      Substance Use:  Patient did not report a family history of substance use concerns; see medical history section for details.  Patient has not received chemical dependency treatment in the past.  Patient has never been to detox.      Patient is not currently receiving any chemical dependency treatment.           Substance History of use Age of first use Date of last use     Pattern and duration of use (include amounts and frequency)   Alcohol used in the past   20 6/19/2018 From age [age]:  Age 19- 22, \"I drank shots of hard liquor on the weekends, 4-5 shots in a night, 2-3x/week. I have never been a daily drinker.  At age 22, I cut down because of my kidney diagnosis.  Age [age], Maybe a drink or 2 out at the bar once or twice a week.\"   Cannabis   currently use 17 6/12/2022 \"when out I would smoke 1-2 joints in a night, twice a week on the weekends\". He reports trying to his medical marijuana card soon.     Amphetamines   never used     REPORTS SUBSTANCE USE: N/A   Cocaine/crack    never used       REPORTS SUBSTANCE USE: N/A   Hallucinogens never used         REPORTS SUBSTANCE USE: N/A   Inhalants never used         REPORTS SUBSTANCE USE: N/A   Heroin never used         REPORTS SUBSTANCE USE: N/A   Other Opiates never used     REPORTS SUBSTANCE USE: N/A   Benzodiazepine   never used     REPORTS SUBSTANCE USE: N/A   Barbiturates never used     REPORTS SUBSTANCE USE: N/A   Over the counter meds never used     REPORTS SUBSTANCE USE: N/A   Caffeine currently use 4-5 " "6/27/22 Soda Pop- \"1-2 a week.\"  Coffee- \"every once in a while\"   Nicotine  used in the past 19 9/30/2016 \"just smoked with co-workers in the past.  Did some vaping\".   Other substances not listed above:  Identify:  never used     REPORTS SUBSTANCE USE: N/A     Patient reported the following problems as a result of their substance use: no problems, not applicable.    Substance Use: daily use    Based on the negative CAGE score and clinical interview there  are indications of drug or alcohol abuse. Recommendation for substance abuse disorder evaluation with a substance use professional was given. Therapist did not recommend client to reduce use or abstain from alcohol or substance use. Therapist did not recommend structured treatment and or community support (AA, 12 step group, etc.). NA.      Significant Losses / Trauma / Abuse / Neglect Issues:   Patient did not serve in the .  There are indications or report of significant loss, trauma, abuse or neglect issues related to: are no indications and client denies any losses, trauma, abuse, or neglect concerns.  Concerns for possible neglect are not present.     Safety Assessment:   Patient denies current homicidal ideation and behaviors.  Patient denies current self-injurious ideation and behaviors.    Patient denied risk behaviors associated with substance use.  Patient denies any high risk behaviors associated with mental health symptoms.  Patient reports the following current concerns for his personal safety: None.  Patient reports there are not firearms in the house. There are no firearms in the home..    History of Safety Concerns:  Patient denied a history of homicidal ideation.     Patient denied a history of personal safety concerns.    Patient denied a history of assaultive behaviors.    Patient denied a history of sexual assault behaviors.     Patient denied a history of risk behaviors associated with substance use.  Patient denies any history of " high risk behaviors associated with mental health symptoms.  Patient reports the following protective factors: forward or future oriented thinking; dedication to family or friends; sense of personal control or determination    Risk Plan:  See Recommendations for Safety and Risk Management Plan    Review of Symptoms per patient report:  Depression: Change in energy level and Change in appetite  Nadia:  No Symptoms  Psychosis: No Symptoms  Anxiety: Excessive worry and Irritability  Panic:  No symptoms  Post Traumatic Stress Disorder:  No Symptoms   Eating Disorder: No Symptoms  ADD / ADHD:  No symptoms  Conduct Disorder: No symptoms  Autism Spectrum Disorder: No symptoms  Obsessive Compulsive Disorder: No Symptoms    Patient reports the following compulsive behaviors and treatment history: none.      Diagnostic Criteria:     Substance Use Disorder There is persistent desire or unsuccessful efforts to cut down or control use of the substance.  Met for:  Cannabis Continued use of the substance despite having persistent or recurrent social or interpersonal problems caused or exacerbated by the effects of its use.  Met for:  Cannabis Tolerance:  either a need for markedly increased amounts of the substance to achieve the desired effect or a markedly diminished effect with continued use of the dame amount of the substance.  Met for:  Cannabis     Unspecified Mental Disorder This category applies to presentations in which symptoms characteristic of a mental disorder  due to another medical condition that cause clinically significant distress or impairment  in social, occupational, or other important areas of functioning predominate but do  not meet the full criteria for any specific mental disorder due to another medical condition.  The unspecified mental disorder due to another medical condition category is used in situations  in which the clinician chooses not to specify the reason that the criteria are not met  for a  specific mental disorder due to another medical condition, and includes presentations  for which there is insufficient information to make a more specific diagnosis (e.g., in emergency  room settings). This is done by recording the name of the disorder, with the specific  etiological medical condition inserted in place of  another medical condition.  Furthermore,  the diagnostic code for the specific medical condition must be listed immediately before  the code for the unspecified mental disorder due to another medical condition. For example,  dissociative symptoms due to complex partial seizures would be coded and recorded  as 345.40 (G40.209) complex partial seizures, 294.9 (F06.9) unspecified mental disorder  due to complex partial seizures.    Functional Status:  Patient reports the following functional impairments:  chronic disease management.     Nonprogrammatic care:  Patient is requesting basic services to address current mental health concerns.    Clinical Summary:  1. Reason for assessment: To determine the level of care.  2. Psychosocial, Cultural and Contextual Factors: biomedical issues.  3. Principal DSM5 Diagnoses  (Sustained by DSM5 Criteria Listed Above):  294.9 Unspecified Mental Disorder  4. Other Diagnoses that is relevant to services:   Substance-Related & Addictive Disorders 305.20 (F12.10) Cannabis Use Disorder Mild  Sustained.  5. Provisional Diagnosis:  none.  6. Prognosis: Relieve Acute Symptoms.  7. Likely consequences of symptoms if not treated: patient's mental health could get worse and may need a higher level of care.  8. Client strengths include:  educated, has a previous history of therapy and support of family, friends and providers .     Recommendations:     1. Plan for Safety and Risk Management:   Recommended that patient call 911 or go to the local ED should there be a change in any of these risk factors..          Report to child / adult protection services was NA.     2.  Patient's not identified any jaylon / Hoahaoism / spiritual influences, ethnic or cultural issues relevant to therapy at this time.     3. Initial Treatment will focus on:    Functional Impairment at: work   Risk Management / Safety Concerns related to: none   Alcohol / Substance Use -      4. Resources/Service Plan:    services are not indicated.   Modifications to assist communication are not indicated.   Additional disability accommodations are not indicated.      5. Collaboration:   Collaboration / coordination of treatment will be initiated with the following  support professionals: primary care physician and COLLINS RIOS (Mother) [phone] (Mobile).        6.  Referrals:   The following referral(s) will be initiated: Outpatient Mental [facility]. Next Scheduled Appointment: Date: Thursday, 6/30/2022  Time: 8:00 am - 9:00 am  Provider: Latonya Bishop  Supervised by: Kassi Wade MA  [facility]  Location: [place] [address]  Phone: [phone]  Type: Teletherapy.     A Release of Information has been obtained for the following: A verbal Release of Information has been obtained for: emergency contact.    7. SPENSER:    SPENSER:  Discussed the general effects of drugs and alcohol on health and well-being. Provider gave patient printed information about the effects of chemical use on their health and well being. Recommendations:  Abstinence.     8. Records:   These were reviewed at time of assessment.   Information in this assessment was obtained from the medical record and  provided by patient who is a fair historian.    Patient will have open access to their mental health medical record.    Recommendations:  Teletherapy  Clinical Substantiation for the above recommendations:  Patient is a 26-year-old heterosexual [place], [place], American single male who presents which a history of depression seeking a mental health evaluation for compliance for a kidney  "transplant. Patient reports that his  wants him to see a mental health therapist for 6 months in order to be in compliance for a kidney transplant\".  Patient has attempted to resolve his concerns in the past through therapy. Patient is currently unemployed and receiving social security disability benefits monthly. Patient endorses with Change in energy level and Change in appetite. Excessive worry and Irritability. Substance Use: daily use. Patient has agreed with referral to teletherapy at [facility] with Latonya Bishop on 6/30/22 at 8:00 AM. Patient's acute suicide risk was determined to be moderate due to his past suicide attempt in 2019 and no suicidal thoughts in the past month, and so a safety plan was not developed. Patient is not currently under the influence of alcohol or illicit substances, denies experiencing command hallucinations. Patient's acute risk could be higher if noncompliant with treatment plan, medications or using illicit substances or alcohol. Patient was instructed to present to his nearest emergency room if mental health symptoms become worse or exacerbate. Protective factors include forward or future oriented thinking; dedication to family or friends; sense of personal control or determination.    Provider Name/ Credentials:  Kane Payne, HODA, LISA  Dual   Phone: [phone]  Fax: [phone]    June 28, 2022                          "

## 2022-07-12 ENCOUNTER — DOCUMENTATION ONLY (OUTPATIENT)
Dept: TRANSPLANT | Facility: CLINIC | Age: 27
End: 2022-07-12

## 2022-07-13 ENCOUNTER — PATIENT OUTREACH (OUTPATIENT)
Dept: FAMILY MEDICINE | Facility: CLINIC | Age: 27
End: 2022-07-13

## 2022-07-13 NOTE — LETTER
July 21, 2022      Taylor Valiente  [address] [place] [address]        Dear Taylor,      We have been trying to reach you to assist in rescheduling an appointment with Dr. Lawrence as the one on 7/19/2022 was not completed     We would also like to discuss a resource that is available that may benefit you MTM (Medication Therapy Management Pharmacist)       Sincerely,    Sultana Urbina, Registered Nurse, PAL (Patient Advocate Liason)   [facility]   [phone]

## 2022-07-18 DIAGNOSIS — N18.5 CHRONIC KIDNEY DISEASE, STAGE 5, KIDNEY FAILURE (H): Primary | ICD-10-CM

## 2022-07-18 PROCEDURE — 999N001093 HLA VIRTUAL CROSSMATCH (VXM), LIVING DONOR: Performed by: SURGERY

## 2022-07-19 ENCOUNTER — TELEPHONE (OUTPATIENT)
Dept: TRANSPLANT | Facility: CLINIC | Age: 27
End: 2022-07-19

## 2022-07-19 NOTE — TELEPHONE ENCOUNTER
Patient Call: General  Route to LPN    Reason for call: patient called wanting to speak with coordinator in regards to kidney transplant compliance question.    Call back needed? Yes    Return Call Needed  Same as documented in contacts section  When to return call?: Greater than one day: Route standard priority

## 2022-07-20 ENCOUNTER — MYC MEDICAL ADVICE (OUTPATIENT)
Dept: OTHER | Age: 27
End: 2022-07-20

## 2022-07-20 LAB
CROSSMATCHDATEVXM: NORMAL
DONOR VXM: NORMAL
DSA VXM B1: NORMAL
DSA VXM B2: NORMAL
DSA VXMT1: NORMAL
DSA VXMT2: NORMAL
RESULT VXM B1: NORMAL
RESULT VXM B2: NORMAL
RESULT VXM T1: NORMAL
RESULT VXM T2: NORMAL
SERUM DATE VXM B1: NORMAL
SERUM DATE VXM B2: NORMAL
SERUM DATE VXM T1: NORMAL
SERUM DATE VXM T2: NORMAL
ZZZCOMMENT VXMB1: NORMAL
ZZZCOMMENT VXMB2: NORMAL
ZZZCOMMENT VXMT1: NORMAL
ZZZCOMMENT VXMT2: NORMAL

## 2022-07-20 NOTE — TELEPHONE ENCOUNTER
Patient called to touch base with the RNCC still waiting for a return call. Patient sent a my chart message.

## 2022-07-20 NOTE — TELEPHONE ENCOUNTER
Called Taylor at his request. He has been seeing a therapist for 3 weeks and he is wondering how often he needs to attend and what his goals in therapy are. We discussed that transplant developing coping skills to handle stressful situations such as transplant would be a good goal. He would like to check in with social work to discuss this.    He is wondering if there are any other tests for cardiology. It seems as though Dr. Marin wanted to see him after his CTA and have him complete an echo. We will check in with them.

## 2022-07-21 NOTE — TELEPHONE ENCOUNTER
Message left for patient to return call to this RN PAL - please transfer if available     Direct number left for this RN PAL on message for return call     Per chart review patient did not show up to see Dr Lawrence at the scheduled time spot 7/19/2022     Sultana Urbina Registered Nurse PAL (patient advocate liaison)   [facility] [phone]    
Message left for patient to return call to this RN PAL - please transfer if available     Direct number left for this RN PAL on message for return call     RN PAL outreach to discuss MTM / CDE resources     Patient is showing up on MTM/CDE list due to two or more of the following:   - Elevated A1C above 8%   - Asthma ACT <20   - BP over 140/90  - Statin medication concerns, no statin prescribed (dx of acute coronary syndrome, myocardial infarction, stable or unstable angina, stroke/TIA, peripheral artery disease, systolic heart failure (ejection fraction < 40 not on ACE/ARB or betablocker, tobacco use     Sultana Urbina, Registered Nurse, PAL (Patient Advocate Liason)   [facility]   [phone]     
Message left for patient to return call to this RN PAL - please transfer if available     Direct number left for this RN PAL on message for return call     Sultana Urbina Registered Nurse PAL (patient advocate liaison)   [facility] [phone]    
Patient has visit in clinic today with Dr. Lawrence. Requested Dr. Lawrence discuss MTM with patient during visit.     Val Ellis RN, BSN, PHN  [facility]    
RN has made 3 attempts to reach patient via phone - no return call     Letter mailed     Sultana Urbina Registered Nurse, PAL (Patient Advocate Liason)   [facility]   [phone]     
Detail Level: Simple
Instructions: This plan will send the code FBSD to the PM system.  DO NOT or CHANGE the price.
Price (Do Not Change): 0.00

## 2022-07-28 NOTE — LETTER
12/22/2020         RE: Taylor Valiente  [address]   [place] [address]        Dear Colleague,    Thank you for referring your patient, Taylor Valiente, to the [facility]. Please see a copy of my visit note below.    Medical Assistant Note:  Taylor Valiente presents today for lab draw.    Patient seen by provider today: No.   present during visit today: Not Applicable.    Concerns: No Concerns.    Procedure:  Lab draw site: RAC, Needle type: Butterfly, Gauge: 23.    Post Assessment:  Labs drawn without difficulty: Yes.    Discharge Plan:  Departure Mode: Ambulatory.    Face to Face Time: 4 min.    Shari Schoenberger, [facility]      Again, thank you for allowing me to participate in the care of your patient.        Sincerely,         Lab Draw    
Name band;

## 2022-08-03 ENCOUNTER — HOSPITAL ENCOUNTER (OUTPATIENT)
Dept: CARDIOLOGY | Facility: CLINIC | Age: 27
Discharge: HOME | End: 2022-08-03
Attending: INTERNAL MEDICINE | Admitting: INTERNAL MEDICINE
Payer: COMMERCIAL

## 2022-08-03 DIAGNOSIS — I11.9 LVH (LEFT VENTRICULAR HYPERTROPHY) DUE TO HYPERTENSIVE DISEASE: ICD-10-CM

## 2022-08-03 LAB — LVEF ECHO: NORMAL

## 2022-08-03 PROCEDURE — 93306 TTE W/DOPPLER COMPLETE: CPT | Mod: 26 | Performed by: INTERNAL MEDICINE

## 2022-08-03 PROCEDURE — 93306 TTE W/DOPPLER COMPLETE: CPT

## 2022-08-16 ENCOUNTER — TELEPHONE (OUTPATIENT)
Dept: CARDIOLOGY | Facility: CLINIC | Age: 27
End: 2022-08-16

## 2022-08-26 DIAGNOSIS — N17.9 ACUTE KIDNEY INJURY (H): ICD-10-CM

## 2022-08-26 RX ORDER — CARVEDILOL 25 MG/1
TABLET ORAL
Qty: 180 TABLET | Refills: 0 | Status: SHIPPED | OUTPATIENT
Start: 2022-08-26 | End: 2022-10-23

## 2022-08-30 ENCOUNTER — VIRTUAL VISIT (OUTPATIENT)
Dept: CARDIOLOGY | Facility: CLINIC | Age: 27
End: 2022-08-30
Attending: INTERNAL MEDICINE
Payer: MEDICAID

## 2022-08-30 DIAGNOSIS — E78.5 DYSLIPIDEMIA: Primary | ICD-10-CM

## 2022-08-30 DIAGNOSIS — N18.5 CKD (CHRONIC KIDNEY DISEASE) STAGE 5, GFR LESS THAN 15 ML/MIN (H): ICD-10-CM

## 2022-08-30 PROCEDURE — 99214 OFFICE O/P EST MOD 30 MIN: CPT | Mod: 95 | Performed by: INTERNAL MEDICINE

## 2022-08-30 NOTE — PROGRESS NOTES
"Visit Date: 2022        [place] [address]    Patient: Taylor Valiente  MRN: [id number]  : 1955    Dear Dr. Lawrence:    It was a pleasure participating in the care of your patient, . Taylor Valiente.  As you know, Taylor is a 27-year-old gentleman who I saw over virtual video visit via [employer] today for prior cardiomyopathy, nonobstructive coronary artery disease, hyperlipidemia and in preoperative evaluation prior to kidney transplant.    PAST MEDICAL HISTORY:      1.  Hyperlipidemia.  2.  Chronic renal insufficiency, thought secondary to hypertension and/or FSGS currently on hemodialysis.  3.  Depression with suicide attempt .  4.  Adrenal adenoma with possible Cushing disease.  5.  COVID-19 positive  with secondary anosmia, and loss of taste, along with diarrhea and nausea.  6.  Morbid obesity.  7.  Noncompliance.  8.  Marijuana abuse.    His cardiac history is significant for mild transient cardiomyopathy.  In 2017.  His echo revealed an ejection fraction of 40% -45% without significant valvular pathology.  He says his blood pressures were in the 200s at that time and possibly secondary to an adrenal adenoma.  After blood pressure control was improved, the ejection fraction has resolved and normalized as of echo  and most recent echo 2022.    When I last saw him 2021, we ordered coronary CTA, which was performed on 2022 with the following results:    Left main, normal.  LAD, less than 25% stenosis in midportion.  Circumflex normal.  RCA small, nondominant, normal.      Calcium score     Left main 0.  LAD 62.  Circumflex 3.  RCA 0.    Since our last visit in , he has not had any new symptoms.  He does not exercise on a regular basis due to the fact that he feels fatigued after dialysis and often does not have \"the time.\"  He does walk his dog every day for 10-15 minutes, " but that is pretty much the extent of his exercise.  He, however, denies any significant chest pain or shortness of breath during exercise.  He denies other PND, orthopnea, edema, palpitations, syncope or near syncope.    He says his blood pressures generally fluctuate from the 120s and 130s on nondialysis days and goes up on dialysis days to the 150s predialysis and generally comes back to the normal range after dialysis.    He otherwise has no other complaints.    He used to work in the [employer].    CURRENT MEDICATIONS:      1.  Amlodipine 10 mg a day.   2.  Aspirin 81 mg a day.  3.  Atorvastatin 10 mg a day.  4.  Bumex 2 mg a day.  5.  Carvedilol 50 mg twice daily.  6.  Clonidine 0.3 mg twice daily.  7.  Hydralazine three times a day.    PHYSICAL EXAMINATION:      VITAL SIGNS:  In general, he appears comfortable, well groomed.  PSYCHIATRIC:  He is alert and oriented x3.  HEENT:  Eyes do not appear grossly erythematous or have exudate.  RESPIRATORY:  He is breathing comfortably without gross cough.    The remainder of the comprehensive physical exam was deferred secondary to the COVID-19 pandemic and secondary to video visit restrictions.    LABORATORY:  Labs 12/26/2021, LDL was 59.  On 06/20/2022, potassium 3.9, GFR 10.  On 06/20/2022 hemoglobin 11.4.  On 03/03/2022 TSH was normal.    Echocardiogram 08/03/2022 reveals an ejection fraction of 60% to 65%, mild to moderate LVH, no significant valvular pathology identified.    EKG 06/20/2022 reveals sinus bradycardia at 56 beats per minute, nonspecific T-wave changes.      IMPRESSION:      Taylor is a 27-year-old gentleman who has several active cardiac issues:    1.  History of prior mild transient cardiomyopathy of unclear etiology.    This was discovered 07/14/2017 with ejection fraction on echo of 40% -45%.    Since he achieved improved blood pressure control, his ejection fraction normalized and latest echo 08/03/2022 reveals an ejection  fraction in the 60-65% range.    He currently maintains a dry weight of around 110 kilos and he feels good from this standpoint, we will continue to follow.    2.  Nonobstructive coronary artery disease.    Coronary CTA 06/09/2022 revealed only less than 25% stenosis in the mid portion of the LAD with the other coronary territories being normal.  He is currently asymptomatic.  We will continue to follow.    3.  Hyperlipidemia.    His goal LDL less than 70 has been achieved as of lipid profile 12/26/2021 when his LDL was 59.  Continue to follow.    4.  Pre-kidney transplant workup.    Considering his relatively unremarkable echo with normal LV systolic function without significant valvular pathology in conjunction with his coronary CTA 06/09/2022 revealed only less than 25% stenosis in the mid portion of the LAD with the other coronary territories being normal.  With him being currently asymptomatic, I do not believe that any additional testing or workup would be required to further decrease his perioperative mortality.      PLAN:      1.  He is approved for his transplant at acceptable perioperative risk for event.    2.  Continue his current medications at present doses.    3.  He can see his primary doctor for routine followup and see me again should he need cardiac clearance, or if he should develop further cardiac issues in the future as well.    Once again, it was a pleasure participating in the care of your patient, Mr. Taylor Valiente.  Please feel free to contact me at any time if any questions regarding his care in the future.          Enrique Marin MD      Addendum 5/5/23:    Notified that patient has had several episodes of chest pain since last visit in ED    Stress echo 10/29/22, negative, but decreased sensitivity due to lower heart rate achieved    Has history of mild non-obstructive CAD    Plan:    1.  Ellen nuc stress     2.  Virtual video visit followup after final results available (late  May/early Marcelina) to discuss clinical progress and transplant pre op eval            D: 2022   T: 2022   MT: SHREYAS    Name:     STEFAN RIOS  MRN:      [id number]        Account:    [id number]   :      1995           Visit Date: 2022     Document: [id number]    cc:  Priyank Lawrence MD

## 2022-08-30 NOTE — NURSING NOTE
Chief Complaint   Patient presents with     Follow Up     1 month, Echo 8/3, No questions while rooming pt, no other vitals to report today     Bienvenido Pelayo, VF/CMA

## 2022-08-30 NOTE — PROGRESS NOTES
"Taylor is a 27 year old who is being evaluated via a billable video visit.      How would you like to obtain your AVS? MyChart  If the video visit is dropped, the invitation should be resent by: Text to cell phone: [phone]  Will anyone else be joining your video visit? CHIO Jeffries/ESTELLA      The patient has been notified of following:     \"This video visit will be conducted via a call between you and your physician/provider. We have found that certain health care needs can be provided without the need for an in-person physical exam.  This service lets us provide the care you need with a video conversation.  If a prescription is necessary we can send it directly to your pharmacy.  If lab work is needed we can place an order for that and you can then stop by our lab to have the test done at a later time.    Video visits are billed at different rates depending on your insurance coverage.  Please reach out to your insurance provider with any questions.    If during the course of the call the physician/provider feels a video visit is not appropriate, you will not be charged for this service.\"    Patient has given verbal consent for video visit? Yes    How would you like to obtain your AVS? Mail    Video-Visit Details    Type of service:  Video Visit    Video Start Time:244pm    Video End Time:252pm    Total visit time including video visit, chart review, charting, coordination of care =33min    Originating Location (pt. Location):patient home      Distant Location (provider location):  home office    Platform used for Video Visit: Jazmine    See dictation #[id number]    [facility]#:[id number]  "

## 2022-10-03 ENCOUNTER — TELEPHONE (OUTPATIENT)
Dept: TRANSPLANT | Facility: CLINIC | Age: 27
End: 2022-10-03

## 2022-10-03 NOTE — TELEPHONE ENCOUNTER
Provider Call: General  Route to LPN    Reason for call: Call from Client Services- Latonya Therapist- she is to sees pt for 6 months - she has seen pt for 3 months  Is faxing over ROSALIA form today  Would like a call back     Call back needed? Yes  Ext- [phone]    Return Call Needed  Same as documented in contacts section  When to return call?: Greater than one day: Route standard priority

## 2022-10-03 NOTE — TELEPHONE ENCOUNTER
Left message for Latonya discussing that our request is that patient establish care with therapist for 6 months, and that his therapist gives the transplant team a letter of recommendation stating that his mental health is stable to follow the medication regimen and follow up necessary to have a successful transplant. Given direct line for call back number.

## 2022-10-04 NOTE — ED NOTES
Bed: ED03  Expected date: 10/4/19  Expected time: 12:26 AM  Means of arrival:   Comments:  Syd ?: 25 M beta blocker ingestion   Breath sounds clear and equal bilaterally.

## 2022-10-06 ENCOUNTER — DOCUMENTATION ONLY (OUTPATIENT)
Dept: TRANSPLANT | Facility: CLINIC | Age: 27
End: 2022-10-06

## 2022-10-16 ENCOUNTER — HEALTH MAINTENANCE LETTER (OUTPATIENT)
Age: 27
End: 2022-10-16

## 2022-10-21 DIAGNOSIS — N17.9 ACUTE KIDNEY INJURY (H): ICD-10-CM

## 2022-10-23 NOTE — TELEPHONE ENCOUNTER
Routing refill request to provider for review/approval because:  - Patient needs to be seen because:  BP out of range, due for visit with PCP in December.   - Closely followed by transplant team and with cardiology, should cards be managing?

## 2022-10-24 RX ORDER — CARVEDILOL 25 MG/1
TABLET ORAL
Qty: 180 TABLET | Refills: 0 | Status: SHIPPED | OUTPATIENT
Start: 2022-10-24 | End: 2023-01-03

## 2022-10-28 ENCOUNTER — HOSPITAL ENCOUNTER (OUTPATIENT)
Facility: CLINIC | Age: 27
Setting detail: OBSERVATION
Discharge: HOME | End: 2022-10-29
Attending: EMERGENCY MEDICINE | Admitting: INTERNAL MEDICINE
Payer: COMMERCIAL

## 2022-10-28 ENCOUNTER — APPOINTMENT (OUTPATIENT)
Dept: GENERAL RADIOLOGY | Facility: CLINIC | Age: 27
End: 2022-10-28
Attending: EMERGENCY MEDICINE
Payer: COMMERCIAL

## 2022-10-28 DIAGNOSIS — I15.0 RENOVASCULAR HYPERTENSION: ICD-10-CM

## 2022-10-28 DIAGNOSIS — N18.6 ESRD (END STAGE RENAL DISEASE) ON DIALYSIS (H): ICD-10-CM

## 2022-10-28 DIAGNOSIS — Z99.2 ESRD (END STAGE RENAL DISEASE) ON DIALYSIS (H): ICD-10-CM

## 2022-10-28 DIAGNOSIS — R07.89 OTHER CHEST PAIN: ICD-10-CM

## 2022-10-28 LAB
ANION GAP SERPL CALCULATED.3IONS-SCNC: 14 MMOL/L (ref 7–15)
BASOPHILS # BLD AUTO: 0.1 10E3/UL (ref 0–0.2)
BASOPHILS NFR BLD AUTO: 1 %
BUN SERPL-MCNC: 31.7 MG/DL (ref 6–20)
CALCIUM SERPL-MCNC: 9.3 MG/DL (ref 8.6–10)
CHLORIDE SERPL-SCNC: 95 MMOL/L (ref 98–107)
CREAT SERPL-MCNC: 7.66 MG/DL (ref 0.67–1.17)
DEPRECATED HCO3 PLAS-SCNC: 27 MMOL/L (ref 22–29)
EOSINOPHIL # BLD AUTO: 0.5 10E3/UL (ref 0–0.7)
EOSINOPHIL NFR BLD AUTO: 6 %
ERYTHROCYTE [DISTWIDTH] IN BLOOD BY AUTOMATED COUNT: 12.6 % (ref 10–15)
GFR SERPL CREATININE-BSD FRML MDRD: 9 ML/MIN/1.73M2
GLUCOSE SERPL-MCNC: 88 MG/DL (ref 70–99)
HCT VFR BLD AUTO: 40.2 % (ref 40–53)
HGB BLD-MCNC: 12.8 G/DL (ref 13.3–17.7)
HOLD SPECIMEN: NORMAL
HOLD SPECIMEN: NORMAL
IMM GRANULOCYTES # BLD: 0 10E3/UL
IMM GRANULOCYTES NFR BLD: 0 %
LYMPHOCYTES # BLD AUTO: 2.8 10E3/UL (ref 0.8–5.3)
LYMPHOCYTES NFR BLD AUTO: 29 %
MCH RBC QN AUTO: 29.5 PG (ref 26.5–33)
MCHC RBC AUTO-ENTMCNC: 31.8 G/DL (ref 31.5–36.5)
MCV RBC AUTO: 93 FL (ref 78–100)
MONOCYTES # BLD AUTO: 1.1 10E3/UL (ref 0–1.3)
MONOCYTES NFR BLD AUTO: 12 %
NEUTROPHILS # BLD AUTO: 5.3 10E3/UL (ref 1.6–8.3)
NEUTROPHILS NFR BLD AUTO: 52 %
NRBC # BLD AUTO: 0 10E3/UL
NRBC BLD AUTO-RTO: 0 /100
PLATELET # BLD AUTO: 193 10E3/UL (ref 150–450)
POTASSIUM SERPL-SCNC: 4.3 MMOL/L (ref 3.4–5.3)
RBC # BLD AUTO: 4.34 10E6/UL (ref 4.4–5.9)
SARS-COV-2 RNA RESP QL NAA+PROBE: NEGATIVE
SODIUM SERPL-SCNC: 136 MMOL/L (ref 136–145)
TROPONIN T SERPL HS-MCNC: 38 NG/L
WBC # BLD AUTO: 9.9 10E3/UL (ref 4–11)

## 2022-10-28 PROCEDURE — 82310 ASSAY OF CALCIUM: CPT | Performed by: EMERGENCY MEDICINE

## 2022-10-28 PROCEDURE — 84484 ASSAY OF TROPONIN QUANT: CPT | Performed by: EMERGENCY MEDICINE

## 2022-10-28 PROCEDURE — 93005 ELECTROCARDIOGRAM TRACING: CPT

## 2022-10-28 PROCEDURE — 96374 THER/PROPH/DIAG INJ IV PUSH: CPT

## 2022-10-28 PROCEDURE — 85025 COMPLETE CBC W/AUTO DIFF WBC: CPT | Performed by: EMERGENCY MEDICINE

## 2022-10-28 PROCEDURE — 93005 ELECTROCARDIOGRAM TRACING: CPT | Mod: 76

## 2022-10-28 PROCEDURE — 99219 PR INITIAL OBSERVATION CARE,LEVEL II: CPT | Performed by: INTERNAL MEDICINE

## 2022-10-28 PROCEDURE — 250N000011 HC RX IP 250 OP 636: Performed by: INTERNAL MEDICINE

## 2022-10-28 PROCEDURE — 99285 EMERGENCY DEPT VISIT HI MDM: CPT | Mod: 25

## 2022-10-28 PROCEDURE — 96375 TX/PRO/DX INJ NEW DRUG ADDON: CPT

## 2022-10-28 PROCEDURE — 36415 COLL VENOUS BLD VENIPUNCTURE: CPT | Performed by: EMERGENCY MEDICINE

## 2022-10-28 PROCEDURE — U0003 INFECTIOUS AGENT DETECTION BY NUCLEIC ACID (DNA OR RNA); SEVERE ACUTE RESPIRATORY SYNDROME CORONAVIRUS 2 (SARS-COV-2) (CORONAVIRUS DISEASE [COVID-19]), AMPLIFIED PROBE TECHNIQUE, MAKING USE OF HIGH THROUGHPUT TECHNOLOGIES AS DESCRIBED BY CMS-2020-01-R: HCPCS | Performed by: EMERGENCY MEDICINE

## 2022-10-28 PROCEDURE — 250N000011 HC RX IP 250 OP 636: Performed by: EMERGENCY MEDICINE

## 2022-10-28 PROCEDURE — G0378 HOSPITAL OBSERVATION PER HR: HCPCS

## 2022-10-28 PROCEDURE — 71046 X-RAY EXAM CHEST 2 VIEWS: CPT

## 2022-10-28 PROCEDURE — 250N000013 HC RX MED GY IP 250 OP 250 PS 637: Performed by: EMERGENCY MEDICINE

## 2022-10-28 PROCEDURE — C9803 HOPD COVID-19 SPEC COLLECT: HCPCS

## 2022-10-28 PROCEDURE — 250N000009 HC RX 250: Performed by: EMERGENCY MEDICINE

## 2022-10-28 RX ORDER — CLONIDINE HYDROCHLORIDE 0.1 MG/1
0.1 TABLET ORAL 2 TIMES DAILY
Status: DISCONTINUED | OUTPATIENT
Start: 2022-10-28 | End: 2022-10-29 | Stop reason: HOSPADM

## 2022-10-28 RX ORDER — METOPROLOL TARTRATE 1 MG/ML
5 INJECTION, SOLUTION INTRAVENOUS ONCE
Status: COMPLETED | OUTPATIENT
Start: 2022-10-28 | End: 2022-10-28

## 2022-10-28 RX ORDER — LANTHANUM CARBONATE 1000 MG/1
1000 TABLET, CHEWABLE ORAL 3 TIMES DAILY
COMMUNITY
End: 2022-10-28

## 2022-10-28 RX ORDER — SEVELAMER CARBONATE 800 MG/1
1600 TABLET, FILM COATED ORAL
Status: ON HOLD | COMMUNITY
End: 2023-03-09

## 2022-10-28 RX ORDER — LANTHANUM CARBONATE 500 MG/1
1500 TABLET, CHEWABLE ORAL
Status: ON HOLD | COMMUNITY
End: 2023-03-09

## 2022-10-28 RX ORDER — ATORVASTATIN CALCIUM 10 MG/1
10 TABLET, FILM COATED ORAL AT BEDTIME
Status: DISCONTINUED | OUTPATIENT
Start: 2022-10-28 | End: 2022-10-29 | Stop reason: HOSPADM

## 2022-10-28 RX ORDER — NITROGLYCERIN 0.4 MG/1
0.4 TABLET SUBLINGUAL EVERY 5 MIN PRN
Status: DISCONTINUED | OUTPATIENT
Start: 2022-10-28 | End: 2022-10-29 | Stop reason: HOSPADM

## 2022-10-28 RX ORDER — AMOXICILLIN 250 MG
2 CAPSULE ORAL 2 TIMES DAILY PRN
Status: DISCONTINUED | OUTPATIENT
Start: 2022-10-28 | End: 2022-10-29 | Stop reason: HOSPADM

## 2022-10-28 RX ORDER — CINACALCET 30 MG/1
60 TABLET, FILM COATED ORAL DAILY
Status: DISCONTINUED | OUTPATIENT
Start: 2022-10-29 | End: 2022-10-29 | Stop reason: HOSPADM

## 2022-10-28 RX ORDER — ACETAMINOPHEN 325 MG/1
650 TABLET ORAL EVERY 6 HOURS PRN
Status: DISCONTINUED | OUTPATIENT
Start: 2022-10-28 | End: 2022-10-29 | Stop reason: HOSPADM

## 2022-10-28 RX ORDER — ONDANSETRON 4 MG/1
4 TABLET, ORALLY DISINTEGRATING ORAL EVERY 6 HOURS PRN
Status: DISCONTINUED | OUTPATIENT
Start: 2022-10-28 | End: 2022-10-29 | Stop reason: HOSPADM

## 2022-10-28 RX ORDER — HYDRALAZINE HYDROCHLORIDE 50 MG/1
100 TABLET, FILM COATED ORAL 3 TIMES DAILY
Status: DISCONTINUED | OUTPATIENT
Start: 2022-10-29 | End: 2022-10-29 | Stop reason: HOSPADM

## 2022-10-28 RX ORDER — ACETAMINOPHEN 650 MG/1
650 SUPPOSITORY RECTAL EVERY 6 HOURS PRN
Status: DISCONTINUED | OUTPATIENT
Start: 2022-10-28 | End: 2022-10-29 | Stop reason: HOSPADM

## 2022-10-28 RX ORDER — NITROGLYCERIN 0.4 MG/1
0.4 TABLET SUBLINGUAL EVERY 5 MIN PRN
Status: DISCONTINUED | OUTPATIENT
Start: 2022-10-28 | End: 2022-10-28

## 2022-10-28 RX ORDER — AMOXICILLIN 250 MG
1 CAPSULE ORAL 2 TIMES DAILY PRN
Status: DISCONTINUED | OUTPATIENT
Start: 2022-10-28 | End: 2022-10-29 | Stop reason: HOSPADM

## 2022-10-28 RX ORDER — AMLODIPINE BESYLATE 10 MG/1
10 TABLET ORAL DAILY
Status: DISCONTINUED | OUTPATIENT
Start: 2022-10-29 | End: 2022-10-29 | Stop reason: HOSPADM

## 2022-10-28 RX ORDER — LANTHANUM CARBONATE 500 MG/1
1500 TABLET, CHEWABLE ORAL
COMMUNITY
End: 2022-10-28

## 2022-10-28 RX ORDER — BUMETANIDE 2 MG/1
2 TABLET ORAL DAILY
Status: DISCONTINUED | OUTPATIENT
Start: 2022-10-29 | End: 2022-10-29 | Stop reason: HOSPADM

## 2022-10-28 RX ORDER — ASPIRIN 81 MG/1
81 TABLET ORAL DAILY
Status: DISCONTINUED | OUTPATIENT
Start: 2022-10-29 | End: 2022-10-29 | Stop reason: HOSPADM

## 2022-10-28 RX ORDER — HYDRALAZINE HYDROCHLORIDE 20 MG/ML
10 INJECTION INTRAMUSCULAR; INTRAVENOUS EVERY 4 HOURS PRN
Status: DISCONTINUED | OUTPATIENT
Start: 2022-10-28 | End: 2022-10-29 | Stop reason: HOSPADM

## 2022-10-28 RX ORDER — ONDANSETRON 2 MG/ML
4 INJECTION INTRAMUSCULAR; INTRAVENOUS EVERY 6 HOURS PRN
Status: DISCONTINUED | OUTPATIENT
Start: 2022-10-28 | End: 2022-10-29 | Stop reason: HOSPADM

## 2022-10-28 RX ORDER — SEVELAMER HYDROCHLORIDE 400 MG/1
4000 TABLET, FILM COATED ORAL
Status: DISCONTINUED | OUTPATIENT
Start: 2022-10-29 | End: 2022-10-29 | Stop reason: CLARIF

## 2022-10-28 RX ORDER — LANTHANUM CARBONATE 500 MG/1
1500 TABLET, CHEWABLE ORAL
Status: DISCONTINUED | OUTPATIENT
Start: 2022-10-29 | End: 2022-10-29 | Stop reason: HOSPADM

## 2022-10-28 RX ORDER — ASPIRIN 81 MG/1
324 TABLET, CHEWABLE ORAL ONCE
Status: COMPLETED | OUTPATIENT
Start: 2022-10-28 | End: 2022-10-28

## 2022-10-28 RX ORDER — POLYETHYLENE GLYCOL 3350 17 G/17G
17 POWDER, FOR SOLUTION ORAL DAILY PRN
Status: DISCONTINUED | OUTPATIENT
Start: 2022-10-28 | End: 2022-10-29 | Stop reason: HOSPADM

## 2022-10-28 RX ORDER — SEVELAMER CARBONATE 800 MG/1
1600 TABLET, FILM COATED ORAL
Status: DISCONTINUED | OUTPATIENT
Start: 2022-10-29 | End: 2022-10-29

## 2022-10-28 RX ORDER — MORPHINE SULFATE 4 MG/ML
4 INJECTION, SOLUTION INTRAMUSCULAR; INTRAVENOUS ONCE
Status: COMPLETED | OUTPATIENT
Start: 2022-10-28 | End: 2022-10-28

## 2022-10-28 RX ADMIN — NITROGLYCERIN 0.4 MG: 0.4 TABLET SUBLINGUAL at 19:12

## 2022-10-28 RX ADMIN — ASPIRIN 81 MG CHEWABLE TABLET 324 MG: 81 TABLET CHEWABLE at 17:43

## 2022-10-28 RX ADMIN — METOPROLOL TARTRATE 5 MG: 5 INJECTION INTRAVENOUS at 19:37

## 2022-10-28 RX ADMIN — ONDANSETRON 4 MG: 4 TABLET, ORALLY DISINTEGRATING ORAL at 22:50

## 2022-10-28 RX ADMIN — NITROGLYCERIN 0.4 MG: 0.4 TABLET SUBLINGUAL at 17:45

## 2022-10-28 RX ADMIN — MORPHINE SULFATE 4 MG: 4 INJECTION INTRAVENOUS at 19:37

## 2022-10-28 ASSESSMENT — ENCOUNTER SYMPTOMS
DIAPHORESIS: 1
BACK PAIN: 1
LIGHT-HEADEDNESS: 1
NAUSEA: 0
SHORTNESS OF BREATH: 0

## 2022-10-28 ASSESSMENT — ACTIVITIES OF DAILY LIVING (ADL)
ADLS_ACUITY_SCORE: 35

## 2022-10-28 NOTE — ED PROVIDER NOTES
History     Chief Complaint:  Chest Pain     HPI:  The history is provided by the patient.      Taylor Valiente is a 27 year old male on 81 mg aspirin with history of ESRD (on dialysis), heart failure, stress-induced cardiomyopathy, hypertension, hyperlipidemia who presents with generalized left-sided chest pain which radiates to his back across his bilateral shoulders. He reports that this morning, he had a routine dialysis session during which he became hypotensive. He was, however, able to finish the whole dialysis run. Shortly after leaving the dialysis clinic approximately 4.5-5.5 hours ago, he was driving with a friend to purchase a new car when he developed left-sided chest pain. His pain has been constant and gradually worsening since the onset. The pain is described as dull and radiates to his back across his bilateral shoulders. The pain is exacerbated by deep inspiration but not movement/positional changes or exertion. In association with his chest pain, he endorses experiencing diaphoresis, lightheadedness, and difficulty focusing his vision. He does note that it is not uncommon for him to experience vision changes while undergoing dialysis. He has had no associated shortness of breath or nausea. Presently, he rates his pain as a 5 out of 10. No aspirin was taken today prior to arrival. He denies experiencing chest pain of this nature in the past. He denies being under an increased amount of stress recently, specifically surrounding the purchase of a new car today. No recent illness, travel, or trauma. He smokes cannabis but does not smoke tobacco or use cocaine. He reports that he started undergoing dialysis last year due to hypertension. His systolic blood pressures have been in the 140s recently. Denies history of migraines. He did undergo an echocardiogram in August 2022; see below for interpretation summary.    Echocardiogram Complete (8/3/22):  There is mild to moderate concentric left  ventricular hypertrophy.  The visual ejection fraction is 60-65%.  Compared to prior study, there is no significant change    Review of Systems   Constitutional: Positive for diaphoresis.   Eyes: Positive for visual disturbance.   Respiratory: Negative for shortness of breath.    Cardiovascular: Positive for chest pain.   Gastrointestinal: Negative for nausea.   Musculoskeletal: Positive for back pain.   Neurological: Positive for light-headedness.   All other systems reviewed and are negative.    Allergies:  Benadryl    Medications:  Amlodipine  Aspirin 81 mg  Lipitor  Bumex  Coreg  Sensipar  Clonidine  Decadron  Hydralazine  Renal multivitamin  Senna-docusate  Sevelamer  Vitamin D    Past Medical History:     Left ventricular hypertrophy due to hypertensive disease  Renovascular hypertension  Focal glomerular sclerosis  COVID-19  Anemia of chronic renal failure  Osteochondritis dissecans  Adrenal adenoma, left  Stress-induced cardiomyopathy  Hyperlipidemia  Prolonged Q-T interval  Acute renal failure with acute tubular necrosis superimposed on stage 5 chronic kidney disease  Hyperkalemia  Hypertension   ESRD on dialysis  Depression  Obesity  Suicide attempt  Hypo-osmolality and hyponatremia  Vitamin D deficiency   Heart failure    Past Surgical History:    Ankle arthroscopy, left  Renal biopsy  AV fistula creation, right upper extremity  AV fistula revision, right upper extremity  CVC tunnel placement  CVC tunnel removal    Family History:    Mother: hepatitis B, gestational diabetes, hypertension   Father: obesity, hypertension     Social History:  The patient presents to the ED alone.  The patient smokes cannabis.  The patient denies use of tobacco or cocaine.    Physical Exam     Patient Vitals for the past 24 hrs:   BP Temp Temp src Pulse Resp SpO2   10/28/22 2000 (!) 144/104 -- -- 85 -- 100 %   10/28/22 1941 131/86 -- -- 79 -- 100 %   10/28/22 1930 (!) 143/96 -- -- 79 -- 99 %   10/28/22 1920 (!) 138/91 -- --  85 -- 98 %   10/28/22 1905 (!) 142/96 -- -- 81 -- --   10/28/22 1850 (!) 150/98 -- -- 77 -- 100 %   10/28/22 1835 (!) 163/111 -- -- 74 -- 92 %   10/28/22 1820 (!) 159/108 -- -- 84 -- 99 %   10/28/22 1805 (!) 153/113 -- -- 72 -- 98 %   10/28/22 1750 (!) 156/109 -- -- 77 -- 100 %   10/28/22 1735 (!) 163/107 -- -- 76 -- 100 %   10/28/22 1724 (!) 146/97 99.5  F (37.5  C) Oral 79 16 100 %     Physical Exam  General: The patient is alert, in no respiratory distress.    HENT: Mucous membranes moist.    Cardiovascular: Regular rate and rhythm. Good pulses in all four extremities. Normal capillary refill and skin turgor. Right forearm vascular access for dialysis.     Respiratory: Lungs are clear. No nasal flaring. No retractions. No wheezing, no crackles.    Gastrointestinal: Abdomen soft. No guarding, no rebound. No palpable hernias.     Musculoskeletal: No gross deformity. Slight left chest wall tenderness.    Skin: No rashes or petechiae.     Neurologic: The patient is alert and oriented x3. GCS 15. No testable cranial nerve deficit. Follows commands with clear and appropriate speech. Gives appropriate answers. Good strength in all extremities. No gross neurologic deficit. Gross sensation intact. Pupils are round and reactive. No meningismus.     Lymphatic: No cervical adenopathy. No lower extremity swelling.    Psychiatric: The patient is non-tearful.     Emergency Department Course     ECG #1:  ECG taken at 1610, ECG read at 1718  Normal sinus rhythm  ST & T wave abnormality, consider inferolateral ischemia  Prolonged QT  Abnormal ECG  Normal ECG/Abnormal ECG  New lateral T wave inversion compared to prior, dated 6/20/2022.  Rate 65 bpm. AL interval 166 ms. QRS duration 88 ms. QT/QTc 474/492 ms. P-R-T axes 55 54 119.     ECG #2:  ECG taken at 1848, ECG read at 1852  Normal sinus rhythm  T wave abnormality, consider inferolateral ischemia  Prolonged QT  Abnormal ECG  No significant change as compared to ECG #1.  Rate 74  bpm. MD interval 174 ms. QRS duration 96 ms. QT/QTc 456/506 ms. P-R-T axes 49 43 193.     Imaging:    XR Chest 2 Views  Cardiac silhouette within normal limits. No pneumothorax or pleural effusion. No focal consolidation.  Report per radiology    Laboratory:  Labs Ordered and Resulted from Time of ED Arrival to Time of ED Departure   BASIC METABOLIC PANEL - Abnormal       Result Value    Sodium 136      Potassium 4.3      Chloride 95 (*)     Carbon Dioxide (CO2) 27      Anion Gap 14      Urea Nitrogen 31.7 (*)     Creatinine 7.66 (*)     Calcium 9.3      Glucose 88      GFR Estimate 9 (*)    TROPONIN T, HIGH SENSITIVITY - Abnormal    Troponin T, High Sensitivity 38 (*)    CBC WITH PLATELETS AND DIFFERENTIAL - Abnormal    WBC Count 9.9      RBC Count 4.34 (*)     Hemoglobin 12.8 (*)     Hematocrit 40.2      MCV 93      MCH 29.5      MCHC 31.8      RDW 12.6      Platelet Count 193      % Neutrophils 52      % Lymphocytes 29      % Monocytes 12      % Eosinophils 6      % Basophils 1      % Immature Granulocytes 0      NRBCs per 100 WBC 0      Absolute Neutrophils 5.3      Absolute Lymphocytes 2.8      Absolute Monocytes 1.1      Absolute Eosinophils 0.5      Absolute Basophils 0.1      Absolute Immature Granulocytes 0.0      Absolute NRBCs 0.0     COVID-19 VIRUS (CORONAVIRUS) BY PCR - Normal    SARS CoV2 PCR Negative        Emergency Department Course:       Reviewed:  I reviewed nursing notes, vitals, past medical history and Care Everywhere    Assessments:  1724 I obtained history and examined the patient as noted above.   1847 I rechecked the patient and explained findings. His pain has improved.  1931 The patient was rechecked and updated. His pain has continued to improve.    Consults:  1947 I spoke with Dr. Mason Machado from the hospitalist service regarding the patient's presentation, findings here in the ED, and plan of care.     Interventions:  1743 Aspirin 324 mg PO  1745 Nitroglycerin 0.4 mg  sublingual  1912 Nitroglycerin 0.4 mg sublingual  1937 Morphine 4 mg IV  1937 Metoprolol 5 mg IV    Disposition:  The patient was admitted to the hospital under the care of Dr. Mason Machado.     Impression & Plan     HEART Score  Background  Calculates the overall risk of adverse event in patient's presenting with chest pain.  Based on 5 criteria (each assigned 0-2 points) including suspiciousness of history, EKG, age, risk factors and troponin.    Data  27 year old male  has LVH (left ventricular hypertrophy) due to hypertensive disease; Renovascular hypertension; CKD (chronic kidney disease) stage 5, GFR less than 15 ml/min (H); Focal glomerular sclerosis; 2019 novel coronavirus disease (COVID-19); Anemia of chronic renal failure; OD (osteochondritis dissecans); Adrenal adenoma, left; Stress-induced cardiomyopathy; Hyperlipidemia; Prolonged Q-T interval on ECG; Acute renal failure with acute tubular necrosis superimposed on stage 5 chronic kidney disease, not on chronic dialysis (H); ESRD needing dialysis (H); Hyperkalemia; Essential hypertension; and ESRD (end stage renal disease) on dialysis (H) on their problem list.   reports that he has never smoked. He has never used smokeless tobacco.  family history includes Blood Disease in his mother; Coronary Artery Disease in his maternal uncle; Diabetes in his maternal grandmother and mother; Hypertension in his father, maternal grandmother, mother, paternal grandfather, and paternal grandmother; Obesity in his father.  Lab Results   Component Value Date    TROPI <0.015 11/28/2020     Criteria   0-2 points for each of 5 items (maximum of 10 points):  Score 1- History moderately suspicious for coronary syndrome  Score 1- EKG with Non-specific repolarization disturbance  Score 0- Age <45 years old  Score 1- One to 2 risk factors for atherosclerotic disease  Score 0- Within normal limits for troponin levels  Interpretation  Risk of adverse outcome  Heart Score: 3  Total  Score 0-3- Adverse Outcome Risk 2.5% - Supports early discharge with appropriate follow-up    Medical Decision Making:  Taylor Valiente is a 27 year old male who presents to the emergency department for evaluation of chest and back pain associate with diaphoresis.  The patient has an extensive medical history including severe hypertension leading to kidney disease necessitating dialysis.  The patient said that he was able to complete his full course of dialysis today but was lightheaded he noted shortly after dialysis when he was traveling with his friend that he noted back pain he thought that he had lifted something too heavy and then developed chest tightness.  The patient said that has persisted is not exertional he denies any trauma says he has had no overt shortness of breath nausea or palpitations.  He still has felt somewhat lightheaded.  He denies cough or cold symptoms was concerned about his heart and therefore presented to the ER.  I looked at his previous work-up he has had a CT scan of the coronary arteries without signs of significant disease but his current EKG compared to all previous show new T wave inversions.  He did have persistent chest pain and has not had in the past.  He tried this nitroglycerin which did help alleviate the pain.  A chest x-ray was checked that I did not feel this is likely a PE.  Intra-abdominal causes were considered but felt to be unlikely such as pancreatitis.  I think these this may be related to his earlier dialysis with lower blood pressure at that time leading to microvascular issues the patient was treated aspirin I did not start him on heparin and he was admitted to hospital in good condition under observation status.    Diagnosis:    ICD-10-CM    1. Other chest pain  R07.89       2. Renovascular hypertension  I15.0       3. ESRD (end stage renal disease) on dialysis (H)  N18.6     Z99.2         Scribe Disclosure:  I, Azeb Garnica, am serving as a scribe at 5:07  PM on 10/28/2022 to document services personally performed by Jose Armando Martinez MD based on my observations and the provider's statements to me.      Jose Armando Martinez MD  10/28/22 2055

## 2022-10-29 ENCOUNTER — APPOINTMENT (OUTPATIENT)
Dept: CARDIOLOGY | Facility: CLINIC | Age: 27
End: 2022-10-29
Attending: INTERNAL MEDICINE
Payer: COMMERCIAL

## 2022-10-29 VITALS
DIASTOLIC BLOOD PRESSURE: 81 MMHG | BODY MASS INDEX: 33.24 KG/M2 | OXYGEN SATURATION: 100 % | SYSTOLIC BLOOD PRESSURE: 128 MMHG | WEIGHT: 245.4 LBS | HEIGHT: 72 IN | TEMPERATURE: 98.1 F | HEART RATE: 79 BPM | RESPIRATION RATE: 16 BRPM

## 2022-10-29 LAB
ANION GAP SERPL CALCULATED.3IONS-SCNC: 13 MMOL/L (ref 7–15)
BUN SERPL-MCNC: 50.6 MG/DL (ref 6–20)
CALCIUM SERPL-MCNC: 9.2 MG/DL (ref 8.6–10)
CHLORIDE SERPL-SCNC: 96 MMOL/L (ref 98–107)
CREAT SERPL-MCNC: 10.38 MG/DL (ref 0.67–1.17)
DEPRECATED HCO3 PLAS-SCNC: 26 MMOL/L (ref 22–29)
ERYTHROCYTE [DISTWIDTH] IN BLOOD BY AUTOMATED COUNT: 12.6 % (ref 10–15)
GFR SERPL CREATININE-BSD FRML MDRD: 6 ML/MIN/1.73M2
GLUCOSE SERPL-MCNC: 93 MG/DL (ref 70–99)
HCT VFR BLD AUTO: 37.4 % (ref 40–53)
HGB BLD-MCNC: 11.6 G/DL (ref 13.3–17.7)
MCH RBC QN AUTO: 29.3 PG (ref 26.5–33)
MCHC RBC AUTO-ENTMCNC: 31 G/DL (ref 31.5–36.5)
MCV RBC AUTO: 94 FL (ref 78–100)
PLATELET # BLD AUTO: 148 10E3/UL (ref 150–450)
POTASSIUM SERPL-SCNC: 5.1 MMOL/L (ref 3.4–5.3)
RBC # BLD AUTO: 3.96 10E6/UL (ref 4.4–5.9)
SODIUM SERPL-SCNC: 135 MMOL/L (ref 136–145)
TROPONIN T SERPL HS-MCNC: 35 NG/L
TROPONIN T SERPL HS-MCNC: 37 NG/L
TROPONIN T SERPL HS-MCNC: 38 NG/L
WBC # BLD AUTO: 7.8 10E3/UL (ref 4–11)

## 2022-10-29 PROCEDURE — 93018 CV STRESS TEST I&R ONLY: CPT | Performed by: INTERNAL MEDICINE

## 2022-10-29 PROCEDURE — 250N000013 HC RX MED GY IP 250 OP 250 PS 637: Performed by: INTERNAL MEDICINE

## 2022-10-29 PROCEDURE — 93016 CV STRESS TEST SUPVJ ONLY: CPT | Performed by: INTERNAL MEDICINE

## 2022-10-29 PROCEDURE — 255N000002 HC RX 255 OP 636: Performed by: INTERNAL MEDICINE

## 2022-10-29 PROCEDURE — 93350 STRESS TTE ONLY: CPT | Mod: 26 | Performed by: INTERNAL MEDICINE

## 2022-10-29 PROCEDURE — 93325 DOPPLER ECHO COLOR FLOW MAPG: CPT | Mod: 26 | Performed by: INTERNAL MEDICINE

## 2022-10-29 PROCEDURE — 82310 ASSAY OF CALCIUM: CPT | Performed by: INTERNAL MEDICINE

## 2022-10-29 PROCEDURE — 36415 COLL VENOUS BLD VENIPUNCTURE: CPT | Performed by: INTERNAL MEDICINE

## 2022-10-29 PROCEDURE — 84484 ASSAY OF TROPONIN QUANT: CPT | Performed by: INTERNAL MEDICINE

## 2022-10-29 PROCEDURE — 93321 DOPPLER ECHO F-UP/LMTD STD: CPT | Mod: TC

## 2022-10-29 PROCEDURE — G0378 HOSPITAL OBSERVATION PER HR: HCPCS

## 2022-10-29 PROCEDURE — 84484 ASSAY OF TROPONIN QUANT: CPT | Mod: 91 | Performed by: INTERNAL MEDICINE

## 2022-10-29 PROCEDURE — 93321 DOPPLER ECHO F-UP/LMTD STD: CPT | Mod: 26 | Performed by: INTERNAL MEDICINE

## 2022-10-29 PROCEDURE — 85027 COMPLETE CBC AUTOMATED: CPT | Performed by: INTERNAL MEDICINE

## 2022-10-29 PROCEDURE — 99217 PR OBSERVATION CARE DISCHARGE: CPT | Performed by: PHYSICIAN ASSISTANT

## 2022-10-29 RX ORDER — SEVELAMER CARBONATE 800 MG/1
1600 TABLET, FILM COATED ORAL
Status: DISCONTINUED | OUTPATIENT
Start: 2022-10-29 | End: 2022-10-29 | Stop reason: HOSPADM

## 2022-10-29 RX ORDER — SEVELAMER CARBONATE 800 MG/1
4000 TABLET, FILM COATED ORAL
Status: DISCONTINUED | OUTPATIENT
Start: 2022-10-29 | End: 2022-10-29 | Stop reason: HOSPADM

## 2022-10-29 RX ORDER — NITROGLYCERIN 0.3 MG/1
TABLET SUBLINGUAL
Qty: 5 TABLET | Refills: 0 | Status: SHIPPED | OUTPATIENT
Start: 2022-10-29 | End: 2024-02-15

## 2022-10-29 RX ADMIN — ASPIRIN 81 MG: 81 TABLET, COATED ORAL at 08:26

## 2022-10-29 RX ADMIN — BUMETANIDE 2 MG: 2 TABLET ORAL at 08:25

## 2022-10-29 RX ADMIN — HUMAN ALBUMIN MICROSPHERES AND PERFLUTREN 3 ML: 10; .22 INJECTION, SOLUTION INTRAVENOUS at 10:21

## 2022-10-29 RX ADMIN — CLONIDINE HYDROCHLORIDE 0.1 MG: 0.1 TABLET ORAL at 00:52

## 2022-10-29 RX ADMIN — CLONIDINE HYDROCHLORIDE 0.1 MG: 0.1 TABLET ORAL at 08:27

## 2022-10-29 RX ADMIN — ATORVASTATIN CALCIUM 10 MG: 10 TABLET, FILM COATED ORAL at 00:52

## 2022-10-29 RX ADMIN — HYDRALAZINE HYDROCHLORIDE 100 MG: 50 TABLET, FILM COATED ORAL at 08:25

## 2022-10-29 RX ADMIN — CINACALCET HYDROCHLORIDE 60 MG: 30 TABLET, FILM COATED ORAL at 08:26

## 2022-10-29 RX ADMIN — AMLODIPINE BESYLATE 10 MG: 10 TABLET ORAL at 08:26

## 2022-10-29 ASSESSMENT — ACTIVITIES OF DAILY LIVING (ADL)
ADLS_ACUITY_SCORE: 18

## 2022-10-29 NOTE — PLAN OF CARE
PRIMARY DIAGNOSIS: CHEST PAIN  OUTPATIENT/OBSERVATION GOALS TO BE MET BEFORE DISCHARGE:  1. Ruled out acute coronary syndrome (negative or stable Troponin):   Troponin 38, 38  2. Pain Status: Pain free.  3. Appropriate provocative testing performed: Yes  - Stress Test Procedure: scheduled for am  - Interpretation of cardiac rhythm per telemetry tech: SR 60s    4. Cleared by Consultants (if applicable):Yes  5. Return to near baseline physical activity: Yes  Discharge Planner Nurse   Safe discharge environment identified: Yes  Barriers to discharge: No       Entered by: Nate Fraser RN 10/29/2022 4:21 AM  A & O  x 4, independent, SR 60s on telemetry, echo stress test scheduled for am, trending trops 38, 38, hemodialysis M,W,F , hydralazine for SBP > 180, renal diet.  Please review provider order for any additional goals.   Nurse to notify provider when observation goals have been met and patient is ready for discharge.  Goal Outcome Evaluation:

## 2022-10-29 NOTE — PLAN OF CARE
PRIMARY DIAGNOSIS: CHEST PAIN  OUTPATIENT/OBSERVATION GOALS TO BE MET BEFORE DISCHARGE:  1. Ruled out acute coronary syndrome (negative or stable Troponin):   troponins 38 and 38  2. Pain Status: Pain free.  3. Appropriate provocative testing performed: No  - Stress Test Procedure: Echo  - Interpretation of cardiac rhythm per telemetry tech: SR    4. Cleared by Consultants (if applicable):N/A  5. Return to near baseline physical activity: Yes  Discharge Planner Nurse   Safe discharge environment identified: Yes  Barriers to discharge: No       Entered by: Franny Marte RN 10/29/2022      Please review provider order for any additional goals.   Nurse to notify provider when observation goals have been met and patient is ready for discharge.

## 2022-10-29 NOTE — PROGRESS NOTES
Notified Cardiopulmonary department   Verbal approval from Dr. Casper to stress echocardiogram. Call, no stress if RWMA.

## 2022-10-29 NOTE — H&P
[facility]  History and Physical  Hospitalist - Tej Machado DO       Date of Admission:  10/28/2022    Chief Complaint   Chest pain    History is obtained from the patient, the emergency department physician, as well as the electronic medical record.    History of Present Illness   Taylor Valiente is a 27 year old male with past medical history of ESRD on HD (MWF), hypertension, depression, hyperlipidemia, history of suicide attempt who presented on 10/28/2022 with chief complaint of chest pain.  The patient states that he had dialysis today.  During dialysis he did have low blood pressures.  They were able to complete his session today.  Afterwards he was driving in a car with his friend when he developed some left-sided chest pain that radiated to his back and bilateral shoulders.  He also felt diaphoretic during this episode.  At that point he decided to come to the emergency department for evaluation.  He denies missing any of his medications or any blood pressure medication dose adjustments recently.  He admits to a family history of heart disease in his uncle on his mom's side and parents both have hypertension.  He does note that he recently saw a cardiologist for work-up regarding the possibility of a kidney transplant and, per patient, was cleared for transplant.  The chest pain did improve after nitroglycerin was given in the emergency department.    In the emergency department, the patient was found to have a temperature of 99.5  F, heart rate 79, blood pressure 146/97, respiratory rate 16, SPO2 100% on room air.  Initial lab work showed BUN/creatinine 31.7/7.66, high-sensitivity troponin 38, hemoglobin 12.8.  Chest x-ray was unremarkable.  EKG showed T wave inversion in the lateral leads.  The patient was admitted for observation.    ASSESSMENT/PLAN    Chest Pain  - Suspect secondary to fluctuating BP from dialysis  - CTA coronaries in 6/9/2022 showed minimal nonobstructive  coronary artery disease  - Trend trops overnight  - Check stress echo tomorrow.  If unavailable, could discharge home and plan for outpatient stress test next week  - Nitroglycerin prn  - Telemetry    Hypertension  - Resume antihypertensives once confirmed by pharmacy    ESRD on HD (MWF)  - Had dialysis on 10/28  - Pt currently getting worked up for possible transplant    Chronic Medical Problems:  Hyperlipidemia  Morbid Obesity  Hx of Suicide Attempt    PLAN: Resume home medications as appropriate once confirmed by pharmacy.     DVT Prophylaxis: Pneumatic Compression Devices  Code Status: Full Code  Discharge Plan:    Expected Discharge Date: 10/29/2022                 Tej Machado DO    Primary Care Physician   Priyank Lawrence    -----------------------------------------------------------------------------------------------------------------------------------------------------------------------------------------------------    Past Medical History    I have reviewed this patient's medical history and updated it with pertinent information if needed.   Past Medical History:   Diagnosis Date     Adrenal adenoma, left      Anemia      Benign essential hypertension 07/28/2017     CKD (chronic kidney disease) stage 5, GFR less than 15 ml/min (H)     FSGS     Depression      Focal glomerular sclerosis 07/28/2017     HLD (hyperlipidemia)      LVH (left ventricular hypertrophy) due to hypertensive disease 07/14/2017     Noncompliance      Obesity, unspecified      OD (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/2021     Stress-induced cardiomyopathy      Suicide attempt (H) 2019       Past Surgical History   I have reviewed this patient's surgical history and updated it with pertinent information if needed.  Past Surgical History:   Procedure Laterality Date     ARTHROSCOPY ANKLE Left 2/24/2021    Procedure: Left ankle arthroscopy and debridement/micro fracture;  Surgeon: Mirza Nelson MD;  Location: [facility] OR     BIOPSY  2017     renal- [place]     CREATE FISTULA ARTERIOVENOUS UPPER EXTREMITY Right 3/4/2021    Procedure: RIGHT proximal radial  to CEPHALIC ARTERIOVENOUS FISTULA;  Surgeon: Henrik Moran MD;  Location: [facility] OR     IR CVC TUNNEL PLACEMENT > 5 YRS OF AGE  6/17/2021     IR CVC TUNNEL REMOVAL RIGHT  12/3/2021     NO HISTORY OF SURGERY       REVISION FISTULA ARTERIOVENOUS UPPER EXTREMITY Right 8/26/2021    Procedure: Second stage RIGHT BRACHIOCEPHALIC transposition ARTERIOVENOUS FISTULA;  Surgeon: Henrik Moran MD;  Location: [facility] OR       Prior to Admission Medications   Prior to Admission Medications   Prescriptions Last Dose Informant Patient Reported? Taking?   ACE/ARB/ARNI NOT PRESCRIBED (INTENTIONAL)   No No   Sig: Please choose reason not prescribed from choices below.   amLODIPine (NORVASC) 10 MG tablet  Self Yes No   Sig: Take 10 mg by mouth daily    aspirin 81 MG EC tablet  Self Yes No   Sig: Take 81 mg by mouth daily   atorvastatin (LIPITOR) 10 MG tablet   No No   Sig: TAKE ONE TABLET BY MOUTH EVERY NIGHT AT BEDTIME   bumetanide (BUMEX) 2 MG tablet  Self Yes No   Sig: Take 2 mg by mouth daily    carvedilol (COREG) 25 MG tablet   No No   Sig: TAKE TWO TABLETS BY MOUTH TWICE A DAY WITH A MEAL   cinacalcet (SENSIPAR) 30 MG tablet   Yes No   Sig: Take 30 mg by mouth three times a week   cloNIDine (CATAPRES) 0.1 MG tablet  Self Yes No   Sig: Take 0.3 mg by mouth 2 times daily    dexamethasone (DECADRON) 1 MG tablet   No No   Sig: Sig 1 tablet orally at 10PM prior to lab draw the following morning   hydrALAZINE (APRESOLINE) 100 MG tablet   No No   Sig: TAKE ONE TABLET BY MOUTH THREE TIMES A DAY   hydrocortisone 2.5 % cream   No No   Sig: Apply topically 2 times daily   multivitamin RENAL (RENAVITE RX/NEPHROVITE) 1 MG tablet  Self Yes No   Sig: Take 1 tablet by mouth daily    senna-docusate (SENOKOT-S/PERICOLACE) 8.6-50 MG tablet   No No   Sig: Take 1-2 tablets by mouth daily as needed for constipation Take  these any day in which you are taking narcotic pain medications   sevelamer HCl (RENAGEL) 800 MG tablet  Self Yes No   Sig: Take 800 mg by mouth 3 times daily (with meals)   vitamin D3 (CHOLECALCIFEROL) 2000 units (50 mcg) tablet  Self Yes No   Sig: Take 75 mcg by mouth daily       Facility-Administered Medications: None     Allergies   Allergies   Allergen Reactions     Benadryl [Diphenhydramine] Itching       Social History   I have reviewed this patient's social history and updated it with pertinent information if needed. Taylor Valiente  reports that he has never smoked. He has never used smokeless tobacco. He reports current drug use. Drug: Marijuana. He reports that he does not drink alcohol.    Family History   I have reviewed this patient's family history and updated it with pertinent information if needed.   Family History   Problem Relation Age of Onset     Blood Disease Mother         has hep b     Diabetes Mother         gestionanal diabetes     Hypertension Mother      Obesity Father      Hypertension Father      Hypertension Maternal Grandmother      Diabetes Maternal Grandmother      Hypertension Paternal Grandmother      Hypertension Paternal Grandfather      Coronary Artery Disease Maternal Uncle      Cancer No family hx of        -----------------------------------------------------------------------------------------------------------------------------------------------------------------------------------------------------    Review of Systems   The 10 point Review of Systems is negative other than noted in the HPI or here.     Physical Exam   Temp: 99.5  F (37.5  C) Temp src: Oral BP: (!) 144/104 Pulse: 85   Resp: 16 SpO2: 100 % O2 Device: None (Room air)    Vital Signs with Ranges  Temp:  [99.5  F (37.5  C)] 99.5  F (37.5  C)  Pulse:  [72-85] 85  Resp:  [16] 16  BP: (131-163)/() 144/104  SpO2:  [92 %-100 %] 100 %  0 lbs 0 oz    Constitutional: Awake, alert, cooperative, no apparent  distress.  Eyes: Conjunctiva and pupils examined and normal.  HEENT: Moist mucous membranes, normal dentition.  Respiratory: Clear to auscultation bilaterally, no crackles or wheezing.  Cardiovascular: Regular rate and rhythm, normal S1 and S2, and no murmur noted.  GI: Soft, non-distended, non-tender, normal bowel sounds.  Lymph/Hematologic: No anterior cervical or supraclavicular adenopathy.  Skin: No rashes, no cyanosis, no edema.  Musculoskeletal: No joint swelling, erythema or tenderness.  Neurologic: Cranial nerves 2-12 intact, normal strength and sensation.  Psychiatric: Alert, oriented to person, place and time, no obvious anxiety or depression.     Data     Recent Labs   Lab 10/28/22  1556   WBC 9.9   HGB 12.8*   MCV 93         POTASSIUM 4.3   CHLORIDE 95*   CO2 27   BUN 31.7*   CR 7.66*   ANIONGAP 14   BUBBA 9.3   GLC 88       Recent Results (from the past 24 hour(s))   XR Chest 2 Views    Narrative    EXAM: XR CHEST 2 VIEWS  LOCATION: [facility]  DATE/TIME: 10/28/2022 6:16 PM    INDICATION: chest pain  COMPARISON: 12/26/2021      Impression    IMPRESSION: Cardiac silhouette within normal limits. No pneumothorax or pleural effusion. No focal consolidation.

## 2022-10-29 NOTE — PLAN OF CARE
Patient's After Visit Summary was reviewed with patient.   Patient verbalized understanding of After Visit Summary, recommended follow up and was given an opportunity to ask questions.   Discharge medications sent home with patient/family: YES -nitro  Discharged with other:self    OBSERVATION patient END time: [id number]

## 2022-10-29 NOTE — DISCHARGE SUMMARY
[facility]  Hospitalist Discharge Summary      Date of Admission:  10/28/2022  Date of Discharge:  10/29/2022  Discharging Provider: Jing Aguila PA-C  Discharge Service: Hospitalist Service    Discharge Diagnoses   Chest pain  ESRD  Renovascular HTN       Follow-ups Needed After Discharge   Follow-up Appointments     Follow-up and recommended labs and tests       Follow up with primary care provider, Priyank Lawrence, within 7-14 days for   hospital follow- up.  Follow up with nephrology as previously recommended for dialysis.  Continue baby aspirin.  Follow-up with cardiology in the clinic to discuss   further testing if symptoms recur.         {    Unresulted Labs Ordered in the Past 30 Days of this Admission     No orders found for last 31 day(s).      These results will be followed up by n/a    Discharge Disposition   Discharged to home  Condition at discharge: Stable      Hospital Course   Taylor Valiente is a 27 year old male with past medical history of ESRD on HD (MWF), hypertension, depression, hyperlipidemia, history of suicide attempt who presented on 10/28/2022 with chief complaint of chest pain.    After dialysis he had hypotension. While in the car on the way home had left-sided chest pain that radiated to his back and bilateral shoulders.  He also felt diaphoretic during this episode.  At that point he decided to come to the emergency department for evaluation.    In the ED VSS. Initial lab work showed BUN/creatinine 31.7/7.66, high-sensitivity troponin 38, hemoglobin 12.8.  Chest x-ray was unremarkable.  EKG showed T wave inversion in the lateral leads.  The patient was admitted for observation.     Serial HS TN I the same level. No recurrent symptoms. No dynamic ST changes on telemetry. Not heparinized or treated for ACS.  Underwent echo stress test at the approval of cardiopulmonary department. Stress testing returned without acute concerning changes however unable to reach  target HR per reading Cardiologist, report not yet finalized.    Patient is doing well day of discharge. No CP, dyspnea. He will return home, continue daily aspirin. Follow up with PCP, Cardiologist to discuss indications for further testing.        Chest Pain  - Suspect secondary to fluctuating BP from dialysis  - CTA coronaries in 6/9/2022 showed minimal nonobstructive coronary artery disease  - no rise in hs tn I, no concerning changes for new ischemia on stress echo however unable to reach target HR, discussed with Dr Casper of Cardiology. Report finalized just not yet scanned into EMR.   - continue current medications.  May discuss indications for further outpatient testing with cardiologist or PCP.     Hypertension  - Resume antihypertensives once confirmed by pharmacy     ESRD on HD (MWF)  - Had dialysis on 10/28  - Pt currently getting worked up for possible transplant  - labs c/w esrd      Chronic Medical Problems:  Hyperlipidemia  Morbid Obesity  Hx of Suicide Attempt    Consultations This Hospital Stay   None    Code Status   Full Code    Time Spent on this Encounter   I, Jing Aguila PA-C, personally saw the patient today and spent greater than 30 minutes discharging this patient.       Jing Aguila PA-C  [facility] OBSERVATION DEPT  [address] [place] [address]  Phone: [phone]  ______________________________________________________________________  Interval history:  No CP, dyspnea overnight. No dyspnea, nausea, vomiting. No recent lifting or chest wall injury.   Patient denies any recent chest pain with activity.   Tolerating diet. No nausea, vomiting.       Physical Exam   Vital Signs: Temp: 97.9  F (36.6  C) Temp src: Oral BP: (!) 136/93 Pulse: 76   Resp: 16 SpO2: 99 % O2 Device: None (Room air)    Weight: 245 lbs 6.4 oz  Constitutional:Awake, alert, no apparent distress  Respiratory:  Normal work of breathing. Lungs clear to auscultation bilaterally, no  crackles or wheezing.  Cardiovascular: Regular rate and rhythm, normal S1 and S2, and no murmur appreciated.   GI: Normal bowel sounds, soft, non-distended, non-tender.   Skin/Integument: No rashes, no cyanosis, no peripheral edema. Av fistula.   Neuro: Alert & oriented x4. Speech is clear. Moving all extremities without lateralizing weakness.  Coordination and sensation grossly intact.   Psych: Appropriate affect.          Primary Care Physician   Priyank Lawrence    Discharge Orders      Adult Cardiology [facility] Referral      Reason for your hospital stay    You were admitted to the observation unit for chest pain. Your heart was monitored overnight with no abnormal findings. Your cardiac enzymes were detectable but negative for heart attack. You had an stress echocardiogram that was negative for any inducible heart disease so we have a lower suspicion your chest pain was related to your heart. Other possibilities include reflux, stress, and musculoskeletal strain. We recommend you follow up with your primary doctor if you continue to have pain.     Follow-up and recommended labs and tests     Follow up with primary care provider, Priyank Lawrence, within 7-14 days for hospital follow- up.  Follow up with nephrology as previously recommended for dialysis.  Continue baby aspirin.  Follow-up with cardiology in the clinic to discuss further testing if symptoms recur.     Activity    Your activity upon discharge: activity as tolerated     When to contact your care team    Call your primary care doctor if you have any of the following: temperature greater than 101 F, worsening shortness of breath, increased swelling, worsening pain, new or unrelenting diarrhea, or any other concerning symptoms. Call 911 or go to the emergency room if you need immediate assistance.     Diet    Follow this diet upon discharge: Orders Placed This Encounter      Renal Diet (dialysis)       Significant Results and Procedures   Results for orders  placed or performed during the hospital encounter of 10/28/22   XR Chest 2 Views    Narrative    EXAM: XR CHEST 2 VIEWS  LOCATION: [facility]  DATE/TIME: 10/28/2022 6:16 PM    INDICATION: chest pain  COMPARISON: 12/26/2021      Impression    IMPRESSION: Cardiac silhouette within normal limits. No pneumothorax or pleural effusion. No focal consolidation.       Discharge Medications   Current Discharge Medication List      START taking these medications    Details   nitroGLYcerin (NITROSTAT) 0.3 MG sublingual tablet For chest pain place 1 tablet under the tongue every 5 minutes for 3 doses. If symptoms persist 5 minutes after 1st dose call 911.  Qty: 5 tablet, Refills: 0    Comments: Ok to adjust to supplied dose  Associated Diagnoses: Other chest pain         CONTINUE these medications which have NOT CHANGED    Details   amLODIPine (NORVASC) 10 MG tablet Take 10 mg by mouth daily       aspirin 81 MG EC tablet Take 81 mg by mouth daily      atorvastatin (LIPITOR) 10 MG tablet TAKE ONE TABLET BY MOUTH EVERY NIGHT AT BEDTIME  Qty: 90 tablet, Refills: 1    Associated Diagnoses: Acute renal failure, unspecified acute renal failure type (H)      bumetanide (BUMEX) 2 MG tablet Take 2 mg by mouth daily       carvedilol (COREG) 25 MG tablet TAKE TWO TABLETS BY MOUTH TWICE A DAY WITH A MEAL  Qty: 180 tablet, Refills: 0    Associated Diagnoses: Acute kidney injury (H)      cinacalcet (SENSIPAR) 30 MG tablet Take 60 mg by mouth daily      cloNIDine (CATAPRES) 0.1 MG tablet Take 0.1 mg by mouth 2 times daily    Associated Diagnoses: Acute kidney injury (H)      hydrALAZINE (APRESOLINE) 100 MG tablet TAKE ONE TABLET BY MOUTH THREE TIMES A DAY  Qty: 360 tablet, Refills: 1    Associated Diagnoses: Acute kidney injury (H)      lanthanum (FOSRENOL) 500 MG chewable tablet Take 1,500 mg by mouth 3 times daily (with meals) . 3 tabs TID with meals      multivitamin RENAL (RENAVITE RX/NEPHROVITE) 1 MG tablet Take 1  tablet by mouth daily       sevelamer carbonate (RENVELA) 800 MG tablet Take 1,600 mg by mouth Take with snacks or supplements      sevelamer HCl (RENAGEL) 800 MG tablet Take 4,000 mg by mouth 3 times daily (with meals) . 5 tabs TID with meals.      vitamin D3 (CHOLECALCIFEROL) 2000 units (50 mcg) tablet Take 75 mcg by mouth daily       ACE/ARB/ARNI NOT PRESCRIBED (INTENTIONAL) Please choose reason not prescribed from choices below.    Associated Diagnoses: CKD (chronic kidney disease) stage 5, GFR less than 15 ml/min (H)           Allergies   Allergies   Allergen Reactions     Benadryl [Diphenhydramine] Itching

## 2022-10-29 NOTE — PROGRESS NOTES
Stress echo completed. Reviewed resting echo and ekg with Dr. Casper prior to completing test. Contrast optison used for image enhancement.

## 2022-10-29 NOTE — PLAN OF CARE
ROOM # 227    Living Situation (if not independent, order SW consult):home  Facility name:  : dad    Activity level at baseline: independent  Activity level on admit: independent    Who will be transporting you at discharge: dad    Patient registered to observation; given Patient Bill of Rights; given the opportunity to ask questions about observation status and their plan of care.  Patient has been oriented to the observation room, bathroom and call light is in place.    Discussed discharge goals and expectations with patient/family.         Goal Outcome Evaluation:

## 2022-10-29 NOTE — PHARMACY-ADMISSION MEDICATION HISTORY
Admission medication history interview status for this patient is complete. See [facility] admission navigator for allergy information, prior to admission medications and immunization status.     Medication history interview done, indicate source(s): Patient  Medication history resources (including written lists, pill bottles, clinic record):None  Pharmacy: -    Changes made to PTA medication list:  Added: lanthanum  Changed: sevelamer, clonidine, cinacalcet,   Reported as Not Taking: -  Removed: dexamethasone, hydrocortisone crm, senna    Actions taken by pharmacist (provider contacted, etc):None     Additional medication history information:None    Medication reconciliation/reorder completed by provider prior to medication history?  no   (Y/N)     For patients on insulin therapy:   Do you use sliding scale insulin based on blood sugars?   What is your pre-meal insulin coverage?    Do you typically eat three meals a day?   How many times do you check your blood glucose per day?   How many episodes of hypoglycemia do you typically have per month?   Do you have a Continuous Glucose Monitor (CGM)?      Prior to Admission medications    Medication Sig Last Dose Taking? Auth Provider Long Term End Date   amLODIPine (NORVASC) 10 MG tablet Take 10 mg by mouth daily  10/27/2022 Yes Unknown, Entered By History No    aspirin 81 MG EC tablet Take 81 mg by mouth daily 10/27/2022 Yes Unknown, Entered By History     atorvastatin (LIPITOR) 10 MG tablet TAKE ONE TABLET BY MOUTH EVERY NIGHT AT BEDTIME 10/27/2022 Yes Priyank Lawrence MD Yes    bumetanide (BUMEX) 2 MG tablet Take 2 mg by mouth daily  10/27/2022 Yes Reported, Patient Yes    carvedilol (COREG) 25 MG tablet TAKE TWO TABLETS BY MOUTH TWICE A DAY WITH A MEAL 10/27/2022 Yes Priyank Lawrence MD Yes    cinacalcet (SENSIPAR) 30 MG tablet Take 60 mg by mouth daily 10/27/2022 at pm Yes Reported, Patient Yes    cloNIDine (CATAPRES) 0.1 MG tablet Take 0.1 mg by mouth 2 times daily 10/27/2022 Yes  Priyank Lawrence MD Yes    hydrALAZINE (APRESOLINE) 100 MG tablet TAKE ONE TABLET BY MOUTH THREE TIMES A DAY 10/27/2022 Yes Ranjana Stone PA-C Yes    lanthanum (FOSRENOL) 500 MG chewable tablet Take 1,500 mg by mouth 3 times daily (with meals) . 3 tabs TID with meals 10/28/2022 at lunch Yes Unknown, Entered By History     multivitamin RENAL (RENAVITE RX/NEPHROVITE) 1 MG tablet Take 1 tablet by mouth daily  10/27/2022 Yes Reported, Patient     sevelamer carbonate (RENVELA) 800 MG tablet Take 1,600 mg by mouth Take with snacks or supplements  Yes Unknown, Entered By History     sevelamer HCl (RENAGEL) 800 MG tablet Take 4,000 mg by mouth 3 times daily (with meals) . 5 tabs TID with meals. 10/28/2022 at lunch Yes Unknown, Entered By History     vitamin D3 (CHOLECALCIFEROL) 2000 units (50 mcg) tablet Take 75 mcg by mouth daily  10/27/2022 Yes Unknown, Entered By History     ACE/ARB/ARNI NOT PRESCRIBED (INTENTIONAL) Please choose reason not prescribed from choices below.   Priyank Lawrence MD Yes

## 2022-10-31 LAB
ATRIAL RATE - MUSE: 65 BPM
ATRIAL RATE - MUSE: 74 BPM
DIASTOLIC BLOOD PRESSURE - MUSE: NORMAL MMHG
DIASTOLIC BLOOD PRESSURE - MUSE: NORMAL MMHG
INTERPRETATION ECG - MUSE: NORMAL
INTERPRETATION ECG - MUSE: NORMAL
P AXIS - MUSE: 49 DEGREES
P AXIS - MUSE: 55 DEGREES
PR INTERVAL - MUSE: 166 MS
PR INTERVAL - MUSE: 174 MS
QRS DURATION - MUSE: 88 MS
QRS DURATION - MUSE: 96 MS
QT - MUSE: 456 MS
QT - MUSE: 474 MS
QTC - MUSE: 492 MS
QTC - MUSE: 506 MS
R AXIS - MUSE: 43 DEGREES
R AXIS - MUSE: 54 DEGREES
SYSTOLIC BLOOD PRESSURE - MUSE: NORMAL MMHG
SYSTOLIC BLOOD PRESSURE - MUSE: NORMAL MMHG
T AXIS - MUSE: 119 DEGREES
T AXIS - MUSE: 193 DEGREES
VENTRICULAR RATE- MUSE: 65 BPM
VENTRICULAR RATE- MUSE: 74 BPM

## 2022-11-09 ENCOUNTER — MYC MEDICAL ADVICE (OUTPATIENT)
Dept: TRANSPLANT | Facility: CLINIC | Age: 27
End: 2022-11-09

## 2022-11-09 DIAGNOSIS — E83.39 HYPERPHOSPHATEMIA: Primary | ICD-10-CM

## 2022-11-09 DIAGNOSIS — N17.9 ACUTE KIDNEY INJURY (H): ICD-10-CM

## 2022-11-09 DIAGNOSIS — N18.6 ESRD (END STAGE RENAL DISEASE) ON DIALYSIS (H): ICD-10-CM

## 2022-11-09 DIAGNOSIS — Z99.2 ESRD (END STAGE RENAL DISEASE) ON DIALYSIS (H): ICD-10-CM

## 2022-11-09 RX ORDER — LANTHANUM CARBONATE 1000 MG/1
1000 TABLET, CHEWABLE ORAL
Qty: 270 TABLET | Refills: 3 | Status: ON HOLD | OUTPATIENT
Start: 2022-11-09 | End: 2023-03-09

## 2022-11-16 NOTE — PROGRESS NOTES
[facility]    Reason for visit: There were no encounter diagnoses.  Referring Provider/PCP: Mahi Rojas MD    History of Present Illness:  Arron Ron is a 53 y.o. female with a history of GERD, constipation, sleeve gastrectomy who is presenting for follow-up evaluation of GERD, dysphagia and constipation.    Remote history:  Previously seen in 2018 for constipation, anemia and history of rectal bleeding, GERD.  She had an EGD and colonoscopy that showed a small hiatal hernia, external hemorrhoids.  Anemia was not iron deficiency, she was referred to Nephrology for possible anemia of chronic disease due to CKD.  She was started on PPI.  Prior to that she had 2 EGDs, showing hiatal hernia. She had a sleeve gastrectomy in [id number] (possible hiatal hernia repair but she is not sure), lost a significant amount of weight after that, but regained it back.     Initially seen by me in June 2020 for dysphagia and GERD. Started on PPI by her PCP, takes it after food, it improved her symptoms slightly but did not resolve them.  Was having constipation with bowel movements about once a week, sometimes less frequently.  I recommended EGD to evaluate for evidence of reflux and hiatal hernia that showed LA grade C esophagitis as well as a 5 cm hiatal hernia.  She failed MiraLax and Metamucil for constipation so I started her on Linzess (was also having abdominal pain associated with constipation).  Started PPI b.i.d. to heal esophagitis, EGD in July revealed healing of esophagitis, irregular Z-line, biopsied, no Tavera's.  Otherwise normal.  Esophageal manometry negative.    Interval history:  The patient reports ongoing postprandial epigastric abdominal pain that starts about 15 minutes after eating, described as fullness, if she eats too much she makes herself throw up to relieve the discomfort.  Reports some breakthrough heartburn symptoms but they are less common since she has been  D: SWS is following to coordinate discharge.   A: SW met with the psych. Pt will discharge home. Psych encouraged pt's mother to assist with follow up with his primary for psych meds and to assist with making therapy appointments.   P: [facility] is available upon request.     VIKTORIYA Burdick  Casual [facility]O [id number]    on PPI b.i.d..  Reports that she has been having 3 bowel movements daily that are loose, but feels like she does not completely evacuate.  During her endoscopy, she was told that her stomach is L-shaped.  She has a hiatal hernia but reports that she had a hiatal hernia repair concomitantly with her sleeve, so she is concerned that the repair was either not done or failed.  Overall her heartburn and constipation symptoms were there prior to sleeve, but the postprandial abdominal pain started after the surgery.    PEndoHx:  EGD 7/2022 - irregular Z-line, healed esophagitis.  Biopsies negative for Tavera's.  EGD 4/2022 - LA grade C esophagitis, 5 cm hiatal hernia, sleeve gastrectomy healthy appearing.  Colonoscopy 4/2021 - 3mm polyp in the descending colon, removed. Path: TA. Repeat in 5years.  EGD 2017 - small hiatal hernia, otherwise unremarkable.    Review of Systems   Constitutional:  Negative for weight loss.   Gastrointestinal:  Positive for abdominal pain, diarrhea, nausea and vomiting.     Medical History:  Past Medical History:   Diagnosis Date    Acid reflux     Anemia     Back pain     was under therapy    Chronic back pain     Constipation     Elevated cholesterol     Encounter for blood transfusion 1987    Endometriosis     Hyperlipidemia     Hypertension     Nausea & vomiting     Sleep apnea     Tendonitis of wrist, right        Past Surgical History:   Procedure Laterality Date    BREAST BIOPSY      left , benign    CHOLECYSTECTOMY      COLONOSCOPY  2017        COLONOSCOPY N/A 4/29/2021    Procedure: COLONOSCOPY;  Surgeon: Norbert Garcia MD;  Location: [facility];  Service: Colon and Rectal;  Laterality: N/A;    DILATION AND CURETTAGE OF UTERUS      ENDOMETRIAL ABLATION      ESOPHAGEAL MANOMETRY WITH MEASUREMENT OF IMPEDANCE N/A 7/13/2022    Procedure: MANOMETRY, ESOPHAGUS, WITH IMPEDANCE MEASUREMENT;  Surgeon: Alan Gomez MD;  Location: [facility] ([address] [place] [address]);  Service: Endoscopy;  Laterality: N/A;   Rapid not vaccinated  instructions mailed    ESOPHAGOGASTRODUODENOSCOPY N/A 4/1/2022    Procedure: EGD (ESOPHAGOGASTRODUODENOSCOPY);  Surgeon: Diego Spear MD;  Location: [facility] (4TH FLR);  Service: Endoscopy;  Laterality: N/A;  rapid covid test arrival is 12pm -   instructions handed -     ESOPHAGOGASTRODUODENOSCOPY N/A 7/20/2022    Procedure: ESOPHAGOGASTRODUODENOSCOPY (EGD);  Surgeon: Diego Spear MD;  Location: [facility] (2ND FLR);  Service: Endoscopy;  Laterality: N/A;  Rapid-not vaccinated  instructions mailed    [facility]      GASTRIC BYPASS  02/18/2020    HYSTERECTOMY  10/1/15    [facility]- bleeding/ endometriosis    OOPHORECTOMY      TUBAL LIGATION      UPPER GASTROINTESTINAL ENDOSCOPY  2017    [facility]        Family History   Problem Relation Age of Onset    Hypertension Mother     Heart attack Mother     Hypertension Brother     Heart disease Maternal Grandmother     Hypertension Maternal Grandmother     Diabetes Maternal Aunt     Breast cancer Neg Hx     Colon cancer Neg Hx     Ovarian cancer Neg Hx     Esophageal cancer Neg Hx        Social History     Socioeconomic History    Marital status: Single   Tobacco Use    Smoking status: Never    Smokeless tobacco: Never   Substance and Sexual Activity    Alcohol use: Yes     Comment: rare    Drug use: No    Sexual activity: Yes     Partners: Male     Birth control/protection: Surgical, See Surgical Hx     Comment: --BTL       Current Outpatient Medications on File Prior to Visit   Medication Sig Dispense Refill    albuterol (ACCUNEB) 1.25 mg/3 mL Nebu Take 3 mLs (1.25 mg total) by nebulization every 6 (six) hours as needed (increased cough/wheeze/shortness of breath). Rescue 150 mL 5    albuterol (PROAIR HFA) 90 mcg/actuation inhaler Inhale 2 puffs into the lungs every 6 (six) hours as needed for Wheezing. Rescue 18 g 3    albuterol (PROVENTIL/VENTOLIN HFA) 90 mcg/actuation inhaler Inhale 2 puffs into the lungs every 4 (four) hours as needed for Wheezing  or Shortness of Breath. Rescue 18 g 5    ALLERGY RELIEF, CETIRIZINE, 10 mg tablet Take 10 mg by mouth once daily.      ALPRAZolam (XANAX) 2 MG Tab Take 2 mg by mouth every evening.       aspirin (ECOTRIN) 81 MG EC tablet Take 81 mg by mouth every Mon, Wed, Fri.       atorvastatin (LIPITOR) 20 MG tablet TAKE ONE TABLET BY MOUTH ONCE A DAY IN THE EVENING      budesonide-formoterol 160-4.5 mcg (SYMBICORT) 160-4.5 mcg/actuation HFAA Inhale 2 puffs into the lungs every 12 (twelve) hours. Controller 10.2 g 11    cyclobenzaprine (FLEXERIL) 10 MG tablet TAKE ONE TABLET BY MOUTH THREE TIMES DAILY AS NEEDED FOR MUSCLE SPASMS 60 tablet 0    diclofenac sodium (VOLTAREN) 1 % Gel APPLY 2 GRAMS TOPICALLY TO THE AFFECTED AREA TWICE DAILY AS DIRECTED 100 g 0    doxazosin (CARDURA) 1 MG tablet Take 1 mg by mouth once daily.      ergocalciferol, vitamin D2, (VITAMIN D ORAL) Take 2,000 Units by mouth once daily.       fish oil-omega-3 fatty acids 300-1,000 mg capsule Take by mouth once daily.      fluticasone propionate (FLONASE) 50 mcg/actuation nasal spray 1 spray (50 mcg total) by Each Nostril route once daily. 16 g 3    gabapentin (NEURONTIN) 100 MG capsule Take 1 capsule (100 mg total) by mouth every evening. 30 capsule 11    linaCLOtide (LINZESS) 72 mcg Cap capsule Take 1 capsule (72 mcg total) by mouth before breakfast. 90 capsule 3    meloxicam (MOBIC) 15 MG tablet TAKE ONE TABLET BY MOUTH ONCE A DAY 30 tablet 1    montelukast (SINGULAIR) 10 mg tablet Take 10 mg by mouth every evening.      NASCOBAL 500 mcg/spray nasal spray SMARTSIG:Both Nares      pantoprazole (PROTONIX) 40 MG tablet Take 1 tablet (40 mg total) by mouth 2 (two) times daily. 180 tablet 1    polyethylene glycol (GLYCOLAX) 17 gram/dose powder Take 17 g by mouth once daily. 1530 g 3    spironolactone (ALDACTONE) 50 MG tablet spironolactone 50 mg tablet      sulindac (CLINORIL) 200 MG Tab Take 1 tablet (200 mg total) by mouth 2 (two) times daily with meals. 60  tablet 1    torsemide (DEMADEX) 20 MG Tab Take 20 mg by mouth once daily.       valsartan (DIOVAN) 320 MG tablet Take 320 mg by mouth once daily.  11    REGLAN 10 mg tablet Take by mouth.      sucralfate (CARAFATE) 1 gram tablet Take 1 tablet (1 g total) by mouth 2 (two) times daily. (Patient not taking: Reported on 11/16/2022) 60 tablet 3     No current facility-administered medications on file prior to visit.       Review of patient's allergies indicates:  No Known Allergies    Physical Exam:  Constitutional:  not in acute distress and well developed  HENT: Head: Normal, normocephalic, atraumatic.  Eyes: conjunctiva clear and sclera nonicteric  Cardiovascular: regular rate and rhythm  Respiratory: normal chest expansion & respiratory effort   and no accessory muscle use  GI: soft.  Tender in the right upper quadrant and right lower quadrant  Musculoskeletal: no muscular tenderness noted  Skin: normal color  Neurological: alert, oriented x3  Psychiatric: mood and affect are within normal limits, pt is a good historian; no memory problems were noted    Laboratory:  Lab Results   Component Value Date     10/04/2022    K 4.4 10/04/2022     10/04/2022    CO2 25 10/04/2022    BUN 19 10/04/2022    CREATININE 1.2 10/04/2022    CALCIUM 9.5 10/04/2022    ANIONGAP 8 10/04/2022    ESTGFRAFRICA 46 (A) 07/29/2022    EGFRNONAA 40 (A) 07/29/2022       Lab Results   Component Value Date    ALT 9 (L) 10/04/2022    AST 18 10/04/2022    ALKPHOS 92 10/04/2022    BILITOT 0.4 10/04/2022       Lab Results   Component Value Date    WBC 8.07 10/04/2022    HGB 10.6 (L) 10/04/2022    HCT 33.8 (L) 10/04/2022    MCV 89 10/04/2022     10/04/2022       Microbiology:  No Pertinent Microbiology    Imaging:  Independently reviewed EGD images from 7/2022, irregular Z line, normal mucosa.  Esophageal manometry reviewed.    Assessment:  Arron Ron is a 53 y.o. female who is presenting for follow-up evaluation of GERD,  IBS-C, postprandial pain.    Problems:  GERD  Postprandial abdominal pain  IBS C    The patient's main complaint today is postprandial abdominal pain, with occasional episodes of making herself throw up to relieve her symptoms.  Does not have them with liquids mostly.  This may be related to her sleeve anatomy causing restriction and postprandial fullness.  Her EGD showed some angulation of her sleeve, however it is wide open, therefore I do not believe any surgical intervention would help with this.  She might have an element of visceral hypersensitivity, therefore will start nortriptyline and re-evaluate her symptoms.  She might need PPI b.i.d. indefinitely given having esophagitis on PPI daily.  Currently has occasional breakthrough symptoms.  She is having diarrhea with Linzess 72 daily, therefore will reduce it to b.i.d. and reassess.  If she continues to have diarrhea/a sense of incomplete evacuation can consider switching to Amitiza or Motegrity.    Plan:  Continue PPI b.i.d.  Start nortriptyline 25mg daily, can escalate as needed   Linzess 72 mcg every other day  Follow up in about 3 months (around 2/16/2023).    Barbie Ford MD  Gastroenterology Fellow    No orders of the defined types were placed in this encounter.

## 2022-12-12 ENCOUNTER — HOSPITAL ENCOUNTER (EMERGENCY)
Facility: CLINIC | Age: 27
Discharge: HOME | End: 2022-12-12
Attending: EMERGENCY MEDICINE | Admitting: EMERGENCY MEDICINE
Payer: COMMERCIAL

## 2022-12-12 ENCOUNTER — APPOINTMENT (OUTPATIENT)
Dept: GENERAL RADIOLOGY | Facility: CLINIC | Age: 27
End: 2022-12-12
Attending: EMERGENCY MEDICINE
Payer: COMMERCIAL

## 2022-12-12 VITALS
DIASTOLIC BLOOD PRESSURE: 114 MMHG | OXYGEN SATURATION: 97 % | SYSTOLIC BLOOD PRESSURE: 164 MMHG | HEART RATE: 91 BPM | TEMPERATURE: 98.7 F | RESPIRATION RATE: 19 BRPM

## 2022-12-12 DIAGNOSIS — R11.2 NAUSEA AND VOMITING, UNSPECIFIED VOMITING TYPE: ICD-10-CM

## 2022-12-12 DIAGNOSIS — N18.6 STAGE 5 CHRONIC KIDNEY DISEASE ON CHRONIC DIALYSIS (H): ICD-10-CM

## 2022-12-12 DIAGNOSIS — Z99.2 STAGE 5 CHRONIC KIDNEY DISEASE ON CHRONIC DIALYSIS (H): ICD-10-CM

## 2022-12-12 DIAGNOSIS — R07.9 CHEST PAIN, UNSPECIFIED TYPE: ICD-10-CM

## 2022-12-12 DIAGNOSIS — R06.00 DYSPNEA, UNSPECIFIED TYPE: ICD-10-CM

## 2022-12-12 DIAGNOSIS — E87.5 HYPERKALEMIA: ICD-10-CM

## 2022-12-12 LAB
ANION GAP SERPL CALCULATED.3IONS-SCNC: 19 MMOL/L (ref 7–15)
ATRIAL RATE - MUSE: 84 BPM
BASOPHILS # BLD AUTO: 0.1 10E3/UL (ref 0–0.2)
BASOPHILS NFR BLD AUTO: 1 %
BUN SERPL-MCNC: 95.6 MG/DL (ref 6–20)
CALCIUM SERPL-MCNC: 8.9 MG/DL (ref 8.6–10)
CHLORIDE SERPL-SCNC: 101 MMOL/L (ref 98–107)
CREAT SERPL-MCNC: 15.45 MG/DL (ref 0.67–1.17)
DEPRECATED HCO3 PLAS-SCNC: 23 MMOL/L (ref 22–29)
DIASTOLIC BLOOD PRESSURE - MUSE: NORMAL MMHG
EOSINOPHIL # BLD AUTO: 0.7 10E3/UL (ref 0–0.7)
EOSINOPHIL NFR BLD AUTO: 6 %
ERYTHROCYTE [DISTWIDTH] IN BLOOD BY AUTOMATED COUNT: 13.4 % (ref 10–15)
FLUAV RNA SPEC QL NAA+PROBE: NEGATIVE
FLUBV RNA RESP QL NAA+PROBE: NEGATIVE
GFR SERPL CREATININE-BSD FRML MDRD: 4 ML/MIN/1.73M2
GLUCOSE SERPL-MCNC: 108 MG/DL (ref 70–99)
HCT VFR BLD AUTO: 34.6 % (ref 40–53)
HGB BLD-MCNC: 10.6 G/DL (ref 13.3–17.7)
IMM GRANULOCYTES # BLD: 0 10E3/UL
IMM GRANULOCYTES NFR BLD: 0 %
INTERPRETATION ECG - MUSE: NORMAL
LYMPHOCYTES # BLD AUTO: 2 10E3/UL (ref 0.8–5.3)
LYMPHOCYTES NFR BLD AUTO: 18 %
MAGNESIUM SERPL-MCNC: 2.3 MG/DL (ref 1.7–2.3)
MCH RBC QN AUTO: 29.9 PG (ref 26.5–33)
MCHC RBC AUTO-ENTMCNC: 30.6 G/DL (ref 31.5–36.5)
MCV RBC AUTO: 98 FL (ref 78–100)
MONOCYTES # BLD AUTO: 0.6 10E3/UL (ref 0–1.3)
MONOCYTES NFR BLD AUTO: 6 %
NEUTROPHILS # BLD AUTO: 7.9 10E3/UL (ref 1.6–8.3)
NEUTROPHILS NFR BLD AUTO: 69 %
NRBC # BLD AUTO: 0 10E3/UL
NRBC BLD AUTO-RTO: 0 /100
P AXIS - MUSE: 41 DEGREES
PLATELET # BLD AUTO: 145 10E3/UL (ref 150–450)
POTASSIUM SERPL-SCNC: 5.6 MMOL/L (ref 3.4–5.3)
PR INTERVAL - MUSE: 160 MS
QRS DURATION - MUSE: 90 MS
QT - MUSE: 448 MS
QTC - MUSE: 529 MS
R AXIS - MUSE: 48 DEGREES
RBC # BLD AUTO: 3.55 10E6/UL (ref 4.4–5.9)
RSV RNA SPEC NAA+PROBE: NEGATIVE
SARS-COV-2 RNA RESP QL NAA+PROBE: NEGATIVE
SODIUM SERPL-SCNC: 143 MMOL/L (ref 136–145)
SYSTOLIC BLOOD PRESSURE - MUSE: NORMAL MMHG
T AXIS - MUSE: 76 DEGREES
TROPONIN T SERPL HS-MCNC: 49 NG/L
TROPONIN T SERPL HS-MCNC: 52 NG/L
VENTRICULAR RATE- MUSE: 84 BPM
WBC # BLD AUTO: 11.2 10E3/UL (ref 4–11)

## 2022-12-12 PROCEDURE — C9803 HOPD COVID-19 SPEC COLLECT: HCPCS

## 2022-12-12 PROCEDURE — 99285 EMERGENCY DEPT VISIT HI MDM: CPT | Mod: CS,25

## 2022-12-12 PROCEDURE — 82310 ASSAY OF CALCIUM: CPT | Performed by: EMERGENCY MEDICINE

## 2022-12-12 PROCEDURE — 250N000013 HC RX MED GY IP 250 OP 250 PS 637: Performed by: EMERGENCY MEDICINE

## 2022-12-12 PROCEDURE — 36415 COLL VENOUS BLD VENIPUNCTURE: CPT | Performed by: EMERGENCY MEDICINE

## 2022-12-12 PROCEDURE — 85025 COMPLETE CBC W/AUTO DIFF WBC: CPT | Performed by: EMERGENCY MEDICINE

## 2022-12-12 PROCEDURE — 250N000011 HC RX IP 250 OP 636: Performed by: EMERGENCY MEDICINE

## 2022-12-12 PROCEDURE — 83735 ASSAY OF MAGNESIUM: CPT | Performed by: EMERGENCY MEDICINE

## 2022-12-12 PROCEDURE — 71045 X-RAY EXAM CHEST 1 VIEW: CPT

## 2022-12-12 PROCEDURE — 84484 ASSAY OF TROPONIN QUANT: CPT | Mod: 91 | Performed by: EMERGENCY MEDICINE

## 2022-12-12 PROCEDURE — 93005 ELECTROCARDIOGRAM TRACING: CPT

## 2022-12-12 PROCEDURE — 87637 SARSCOV2&INF A&B&RSV AMP PRB: CPT | Performed by: EMERGENCY MEDICINE

## 2022-12-12 RX ORDER — AMLODIPINE BESYLATE 5 MG/1
10 TABLET ORAL ONCE
Status: COMPLETED | OUTPATIENT
Start: 2022-12-12 | End: 2022-12-12

## 2022-12-12 RX ORDER — HYDRALAZINE HYDROCHLORIDE 50 MG/1
100 TABLET, FILM COATED ORAL ONCE
Status: COMPLETED | OUTPATIENT
Start: 2022-12-12 | End: 2022-12-12

## 2022-12-12 RX ORDER — CLONIDINE HYDROCHLORIDE 0.1 MG/1
0.1 TABLET ORAL ONCE
Status: COMPLETED | OUTPATIENT
Start: 2022-12-12 | End: 2022-12-12

## 2022-12-12 RX ORDER — NITROGLYCERIN 0.4 MG/1
0.4 TABLET SUBLINGUAL EVERY 5 MIN PRN
Status: DISCONTINUED | OUTPATIENT
Start: 2022-12-12 | End: 2022-12-12 | Stop reason: HOSPADM

## 2022-12-12 RX ORDER — CARVEDILOL 6.25 MG/1
25 TABLET ORAL ONCE
Status: COMPLETED | OUTPATIENT
Start: 2022-12-12 | End: 2022-12-12

## 2022-12-12 RX ORDER — ONDANSETRON 4 MG/1
4 TABLET, ORALLY DISINTEGRATING ORAL EVERY 8 HOURS PRN
Qty: 10 TABLET | Refills: 0 | Status: SHIPPED | OUTPATIENT
Start: 2022-12-12 | End: 2022-12-15

## 2022-12-12 RX ORDER — ONDANSETRON 4 MG/1
4 TABLET, ORALLY DISINTEGRATING ORAL ONCE
Status: COMPLETED | OUTPATIENT
Start: 2022-12-12 | End: 2022-12-12

## 2022-12-12 RX ADMIN — CARVEDILOL 25 MG: 6.25 TABLET, FILM COATED ORAL at 06:34

## 2022-12-12 RX ADMIN — NITROGLYCERIN 0.4 MG: 0.4 TABLET SUBLINGUAL at 06:09

## 2022-12-12 RX ADMIN — AMLODIPINE BESYLATE 10 MG: 5 TABLET ORAL at 06:34

## 2022-12-12 RX ADMIN — HYDRALAZINE HYDROCHLORIDE 100 MG: 50 TABLET, FILM COATED ORAL at 06:34

## 2022-12-12 RX ADMIN — CLONIDINE HYDROCHLORIDE 0.1 MG: 0.1 TABLET ORAL at 06:34

## 2022-12-12 RX ADMIN — ONDANSETRON 4 MG: 4 TABLET, ORALLY DISINTEGRATING ORAL at 06:34

## 2022-12-12 ASSESSMENT — ENCOUNTER SYMPTOMS
MYALGIAS: 1
HEADACHES: 0
DIARRHEA: 1
SORE THROAT: 0
ABDOMINAL PAIN: 0
SHORTNESS OF BREATH: 1
RHINORRHEA: 1
COUGH: 0

## 2022-12-12 ASSESSMENT — ACTIVITIES OF DAILY LIVING (ADL)
ADLS_ACUITY_SCORE: 35
ADLS_ACUITY_SCORE: 35

## 2022-12-12 NOTE — ED TRIAGE NOTES
Pt to ER with c/o SOB pt is a  Dialysis pt and is scheduled for it at 0600, pt stats that he never feels SOB  So  He is concerned,

## 2022-12-12 NOTE — ED PROVIDER NOTES
History   Chief Complaint:  Shortness of Breath       HPI   Taylor Valiente is a 27 year old male with history of stage 5 CKD, hypertension, hyperlipidemia, anemia, and obesity who presents with shortness of breath and chest pain he describes as mild in his left lower rib area which was worse with breathing around 0300 today.  He notes he had associated nausea with vomiting, although this is typical for him prior to dialysis.  Patient was scheduled for dialysis at 0600 choose to present to the ED as he has several concerning symptoms which have never occurred together before. He notes congestion, runny nose, body aches, and slight diarrhea this morning. Patient vomited up his hypertension medications this morning. He denies cough, headaches, sore throat, and sick contacts. He also denies abdominal pain, new leg swelling, leg pain, and history of DVT.  Patient has dialysis on Monday, Wednesday, and Friday. He has been on dialysis since June on 2021 for renal failure secondary to hypertension.     Review of Systems   HENT: Positive for congestion and rhinorrhea. Negative for sore throat.    Respiratory: Positive for shortness of breath. Negative for cough.    Cardiovascular: Positive for chest pain. Negative for leg swelling.   Gastrointestinal: Positive for diarrhea. Negative for abdominal pain.   Musculoskeletal: Positive for myalgias.   Neurological: Negative for headaches.   All other systems reviewed and are negative.    Allergies:  Benadryl [Diphenhydramine]     Medications:  Amlodipine   Aspirin 81 MG EC tablet  Atorvastatin   Bumetanide   Carvedilol   cinacalcet   Clonidine    Dexamethasone   Hydralazine    Multivitamin    Senna-docusate   sevelamer HCl    Nitroglycerin      Past Medical History:     Adrenal adenoma, left   Anemia      Hypertension           Benign essential hypertension             CKD (chronic kidney disease) stage 5, GFR less than 15 ml/min       Depression        Focal glomerular  sclerosis        HLD (hyperlipidemia)    LVH (left ventricular hypertrophy) due to hypertensive disease          Noncompliance             Obesity, unspecified     OD (osteochondritis dissecans)           Stress-induced cardiomyopathy  Suicide attempt   Hyperkalemia  ESRD  Prolonged Q-T interval  Acute renal failure with acute tubular necrosis superimposed on stage V chronic kidney disease     Past Surgical History:    Arthroscopy ankle  Biopsy  Create fistula arteriovenous upper extremity  CVC tunnel placement and subsequent removal  Revision fistula arteriovenous upper extremity.     Family History:   Hepatitis B--mother  Gestational diabetes--mother  Hypertension--mother, father, all grandparents    Social History:  The patient presents to the ED alone  PCP: Priyank Lawrence     Physical Exam     Patient Vitals for the past 24 hrs:   BP Temp Temp src Pulse Resp SpO2   12/12/22 0850 -- -- -- 91 19 97 %   12/12/22 0829 (!) 164/114 -- -- 93 21 --   12/12/22 0820 (!) 166/123 -- -- 91 -- --   12/12/22 0805 (!) 162/121 -- -- 88 10 --   12/12/22 0750 (!) 173/122 -- -- 92 19 --   12/12/22 0610 (!) 194/141 -- -- 86 15 100 %   12/12/22 0525 -- -- -- -- -- 95 %   12/12/22 0524 (!) 191/136 98.7  F (37.1  C) Temporal 81 20 93 %       Physical Exam  General: Large adult male sitting upright  Eyes: PERRL, Conjunctive within normal limits  ENT: Moist mucous membranes, oropharynx clear.   CV: Normal S1S2, no murmur, rub or gallop. Regular rate and rhythm. Fistula with palpable thrill RUE.  Resp: Clear to auscultation bilaterally, no wheezes, rales or rhonchi. Normal respiratory effort.  GI: Abdomen is soft, nontender and nondistended. No palpable masses. No rebound or guarding.  MSK: Edema at the sockline bilaterally.  No chest wall tenderness to palpation.  Nontender extremities. Normal active range of motion.  Skin: Warm and dry. No rashes or lesions or ecchymoses on visible skin.  Neuro: Alert and oriented. Responds appropriately to  all questions and commands. No focal findings appreciated. Normal muscle tone.  Psych: Normal mood and affect.     Emergency Department Course   ECG  ECG results from 12/12/22   EKG 12 lead     Value    Systolic Blood Pressure     Diastolic Blood Pressure     Ventricular Rate 84    Atrial Rate 84    PA Interval 160    QRS Duration 90        QTc 529    P Axis 41    R AXIS 48    T Axis 76    Interpretation ECG      Sinus rhythm  Prolonged QT  Abnormal ECG  When compared with ECG of 29-OCT-2022 10:28,  No significant change was found  Confirmed by - EMERGENCY ROOM, PHYSICIAN ([facility]),  EBER FLORES (Ana) on 12/12/2022 6:48:14 AM         Imaging:  XR Chest Port 1 View   Final Result   IMPRESSION: Pulmonary vascular congestion.        Report per radiology    Laboratory:  Labs Ordered and Resulted from Time of ED Arrival to Time of ED Departure   BASIC METABOLIC PANEL - Abnormal       Result Value    Sodium 143      Potassium 5.6 (*)     Chloride 101      Carbon Dioxide (CO2) 23      Anion Gap 19 (*)     Urea Nitrogen 95.6 (*)     Creatinine 15.45 (*)     Calcium 8.9      Glucose 108 (*)     GFR Estimate 4 (*)    CBC WITH PLATELETS AND DIFFERENTIAL - Abnormal    WBC Count 11.2 (*)     RBC Count 3.55 (*)     Hemoglobin 10.6 (*)     Hematocrit 34.6 (*)     MCV 98      MCH 29.9      MCHC 30.6 (*)     RDW 13.4      Platelet Count 145 (*)     % Neutrophils 69      % Lymphocytes 18      % Monocytes 6      % Eosinophils 6      % Basophils 1      % Immature Granulocytes 0      NRBCs per 100 WBC 0      Absolute Neutrophils 7.9      Absolute Lymphocytes 2.0      Absolute Monocytes 0.6      Absolute Eosinophils 0.7      Absolute Basophils 0.1      Absolute Immature Granulocytes 0.0      Absolute NRBCs 0.0     TROPONIN T, HIGH SENSITIVITY - Abnormal    Troponin T, High Sensitivity 52 (*)    TROPONIN T, HIGH SENSITIVITY - Abnormal    Troponin T, High Sensitivity 49 (*)    INFLUENZA A/B & SARS-COV2 PCR MULTIPLEX - Normal     Influenza A PCR Negative      Influenza B PCR Negative      RSV PCR Negative      SARS CoV2 PCR Negative     MAGNESIUM - Normal    Magnesium 2.3          Emergency Department Course:  Reviewed:  I reviewed nursing notes, vitals, past medical history and Care Everywhere    Assessments:  0611 I obtained history and examined the patient as noted above.   I reassessed the patient.  He notes he is feeling improved.  He denies any significant ongoing symptoms aside from baseline mild shortness of breath.  No further vomiting.    Consults:  0719 I spoke with Dr. Colvin of nephrology regarding patient's presentation, findings, and plan of care.    [facility] dialysis was contacted by unit clerk.  They will arrange for dialysis when the patient arrives after discharge.    Interventions:  0609 Nitrostat, 0.4 mg, sublingual   0634 Zofran, 4 mg, IV   0634 Coreg, 25 mg, oral  0634 Apresoline, 100 mg, oral  0634 Norvasc, 10 mg, oral  0634 Catapress, 0.1 mg, oral    Disposition:  The patient was discharged to home.     Impression & Plan   Medical Decision Making:  Taylor Valiente Is a 27-year-old male dialysis patient who presents emergency department with chest pain, shortness of breath, nausea with vomiting and URI symptoms.  He is afebrile.  He is not toxic in appearance.  Not hypoxic or any respiratory distress on arrival.  His chest pain seems atypical and after review of recent stress echocardiogram and serial troponins normal here, ACS seems less likely.  Chest x-ray shows pulmonary edema which would not be unreasonable to expect just prior to dialysis.  On reassessment he is sleeping and appears comfortable.  When awakened he feels comfortable and denies any ongoing pain, and is comfortable with the plan for discharge directly to dialysis for his regular therapy.  He will follow-up with his primary care provider within 3 days with any ongoing symptoms.  Cannot exclude underlying viral syndrome, COVID and influenza  testing negative here.  He is recommended to return immediately to the emergency room with worsening of symptoms.  All questions were answered prior to discharge.      Diagnosis:    ICD-10-CM    1. Chest pain, unspecified type  R07.9       2. Dyspnea, unspecified type  R06.00       3. Nausea and vomiting, unspecified vomiting type  R11.2       4. Hyperkalemia  E87.5       5. Stage 5 chronic kidney disease on chronic dialysis (H)  N18.6     Z99.2           Discharge Medications:  New Prescriptions    ONDANSETRON (ZOFRAN ODT) 4 MG ODT TAB    Take 1 tablet (4 mg) by mouth every 8 hours as needed for nausea or vomiting       Scribe Disclosure:  [place], am serving as a scribe at 6:02 AM on 12/12/2022 to document services personally performed by Lashon Cardoza MD based on my observations and the provider's statements to me.          Lashon Cardoza MD  12/13/22 6504

## 2022-12-19 ENCOUNTER — PATIENT OUTREACH (OUTPATIENT)
Dept: FAMILY MEDICINE | Facility: CLINIC | Age: 27
End: 2022-12-19

## 2022-12-19 NOTE — TELEPHONE ENCOUNTER
Message left for patient to return call to this RN PAL - please transfer if available     Direct number left for this RN PAL on message for return call     Patient on MTM list, elevated BP   BP Readings from Last 6 Encounters:   12/12/22 (!) 164/114   10/29/22 128/81   06/20/22 (!) 147/99   06/09/22 126/81   03/02/22 (!) 146/95   12/28/21 112/74     Patient has end stage kidney failure, due for visit with pcp     RN was unable to reach patient after several attempts for MTM outreach on 7/13/22    My chart message also sent today     Sultana Urbina Registered Nurse PAL (patient advocate liaison)   [facility] [phone]

## 2022-12-19 NOTE — LETTER
December 21, 2022    Taylor Valiente  [address] [place] [address]    Yennifer Vazquez,       I am reaching out to inform you of a resource that is available to you at the [facility] called Medication Therapy Management (MTM). MTM is designed to help you get the most of out of your medicines.      During an MTM appointment a specially trained pharmacist will review all of your medicines, both prescription and over-the-counter. They will make sure your medicines are the best choice for you and are safe and convenient for you.  MTM pharmacists work together with you and your doctor to help you understand your medicines, solve any problems related to your medicines and help you get the best results from taking your medicines.      At [facility], we strongly believe in a team approach to health care. We want to help you understand your medicines and health conditions. To learn more about how you might benefit from MTM services, watch the patient video at [email or web address].      MTM pharmacist can help get your blood pressure under better control       You are also due for a visit with Dr. Lawrence and some additional health maintenance items     Please return my call so I can assist you with scheduling an appointment      Thank you,      Sultana Urbina, Registered Nurse, PAL (Patient Advocate Liason)   [facility]   [phone]

## 2022-12-20 NOTE — TELEPHONE ENCOUNTER
RN attempted to contact patient phone was picked up and hung up x 2     My chart message sent     Sultana Urbina Registered Nurse, PAL (Patient Advocate Liason)   [facility]   [phone]

## 2022-12-21 NOTE — TELEPHONE ENCOUNTER
Patient has not returned call or viewed my chart message     Letter mailed     Sultana Urbina, Registered Nurse, PAL (Patient Advocate Liason)   [facility]   [phone]

## 2023-01-01 DIAGNOSIS — N17.9 ACUTE KIDNEY INJURY (H): ICD-10-CM

## 2023-01-03 RX ORDER — CARVEDILOL 25 MG/1
TABLET ORAL
Qty: 180 TABLET | Refills: 0 | OUTPATIENT
Start: 2023-01-03

## 2023-01-03 NOTE — TELEPHONE ENCOUNTER
Medication denied to pharmacy asking to resend to nephrology provider per Dr. Howard Urbina, Registered Nurse  [facility]

## 2023-01-03 NOTE — TELEPHONE ENCOUNTER
Routing refill request to provider for review/approval because:  Blood pressure is outside of the RN only protocol parameters.  Please review the sig and the amount dispensed.   Thank you.  PASQUALE Jacobson RN

## 2023-01-04 RX ORDER — CARVEDILOL 25 MG/1
TABLET ORAL
Qty: 180 TABLET | Refills: 0 | Status: SHIPPED | OUTPATIENT
Start: 2023-01-04 | End: 2023-08-04

## 2023-01-04 NOTE — TELEPHONE ENCOUNTER
carvedilol (COREG) 25 MG tablet   Last Written Prescription Date:  10/24/2022  Last Fill Quantity: 180,   # refills: 0     Prescription written by primary care provider  [facility] Priyank Lawrence MD  Forwarded to PCP team [facility]  These refills are not managed by our team.   BUCK Rose R.N.   [facility] Centralized Medication Refill Department

## 2023-01-04 NOTE — TELEPHONE ENCOUNTER
Routing refill request to provider for review/approval because:  Patient needs to be seen because it has been more than 1 year since last office visit.  Failing bp    Radha Chino RN

## 2023-01-16 ENCOUNTER — PATIENT OUTREACH (OUTPATIENT)
Dept: CARE COORDINATION | Facility: CLINIC | Age: 28
End: 2023-01-16

## 2023-01-16 NOTE — PROGRESS NOTES
Clinical Product Navigator RN reviewed chart; patient on payer product coverage.  Review results: Health Plan referred to RN Clinical Product Navigator due to potential for transplant care coordination.  Note patient is active on kidney transplant wait list and followed by SOT care coordinator and transplant team.  Patient also followed by Cardiology, nephrology and Endocrinology.  No further needs identified at this time.    Melissa Behl BSN, RN, PHN, CCM  RN Clinical Product Navigator  Ph: [phone]

## 2023-01-17 ENCOUNTER — TRANSFERRED RECORDS (OUTPATIENT)
Dept: HEALTH INFORMATION MANAGEMENT | Facility: CLINIC | Age: 28
End: 2023-01-17
Payer: COMMERCIAL

## 2023-01-25 ENCOUNTER — TELEPHONE (OUTPATIENT)
Dept: OTHER | Facility: CLINIC | Age: 28
End: 2023-01-25
Payer: COMMERCIAL

## 2023-01-25 DIAGNOSIS — Z99.2 ESRD (END STAGE RENAL DISEASE) ON DIALYSIS (H): Primary | ICD-10-CM

## 2023-01-25 DIAGNOSIS — N18.6 ESRD (END STAGE RENAL DISEASE) ON DIALYSIS (H): Primary | ICD-10-CM

## 2023-01-25 NOTE — TELEPHONE ENCOUNTER
Pt is s/p second stage right brachiocephalic transposition AVF on 8/26/21 with Dr. Moran.    Spoke with [facility] who stated patient has multiple fistula aneurysms and rounding provider requested patient follow up with Dr. Moran regarding this.  There have been no issues with the fistula itself, nor during the dialysis runs.    Will route to scheduling to contact patient to coordinate AVF US and in clinic visit with Dr. Moran at next available.    Meka Vazquez, LEAHN, RN-[facility] [place]

## 2023-01-25 NOTE — TELEPHONE ENCOUNTER
[facility]    Who is the name of the provider?:  Dean  What is the location you see this provider at/preferred location?: [place]   Person calling: Genesis/Sonia from [facility]   Phone number:  [phone]   Nurse call back needed: yes    Reason for call:   Requesting a follow up with Dr. Moran for dialysis access .     Pharmacy location:  na  Outside Imaging: na  Can we leave a detailed message on this number?  Na

## 2023-02-03 NOTE — TELEPHONE ENCOUNTER
Future Appointments   Date Time Provider Department Center   2/8/2023  1:45 PM [facility]   2/8/2023  3:20 PM Henrik Moran MD [facility]

## 2023-02-06 ENCOUNTER — TELEPHONE (OUTPATIENT)
Dept: TRANSPLANT | Facility: CLINIC | Age: 28
End: 2023-02-06
Payer: COMMERCIAL

## 2023-02-06 DIAGNOSIS — N18.6 ESRD (END STAGE RENAL DISEASE) (H): ICD-10-CM

## 2023-02-06 DIAGNOSIS — Z76.82 ORGAN TRANSPLANT CANDIDATE: ICD-10-CM

## 2023-02-08 ENCOUNTER — HOSPITAL ENCOUNTER (OUTPATIENT)
Dept: ULTRASOUND IMAGING | Facility: CLINIC | Age: 28
Discharge: HOME | End: 2023-02-08
Attending: SURGERY | Admitting: SURGERY
Payer: COMMERCIAL

## 2023-02-08 DIAGNOSIS — N18.6 ESRD (END STAGE RENAL DISEASE) ON DIALYSIS (H): ICD-10-CM

## 2023-02-08 DIAGNOSIS — Z99.2 ESRD (END STAGE RENAL DISEASE) ON DIALYSIS (H): ICD-10-CM

## 2023-02-08 PROCEDURE — 93990 DOPPLER FLOW TESTING: CPT

## 2023-02-21 ENCOUNTER — HOSPITAL ENCOUNTER (OUTPATIENT)
Dept: ULTRASOUND IMAGING | Facility: CLINIC | Age: 28
Discharge: HOME | End: 2023-02-21
Attending: SURGERY
Payer: COMMERCIAL

## 2023-02-21 ENCOUNTER — OFFICE VISIT (OUTPATIENT)
Dept: OTHER | Facility: CLINIC | Age: 28
End: 2023-02-21
Attending: SURGERY
Payer: COMMERCIAL

## 2023-02-21 VITALS — HEART RATE: 77 BPM | DIASTOLIC BLOOD PRESSURE: 90 MMHG | SYSTOLIC BLOOD PRESSURE: 158 MMHG

## 2023-02-21 DIAGNOSIS — Z01.818 PREOP TESTING: ICD-10-CM

## 2023-02-21 DIAGNOSIS — I77.0 AVF (ARTERIOVENOUS FISTULA) (H): ICD-10-CM

## 2023-02-21 DIAGNOSIS — T82.898A ANEURYSM OF ARTERIOVENOUS DIALYSIS FISTULA, INITIAL ENCOUNTER (H): Primary | ICD-10-CM

## 2023-02-21 PROCEDURE — 99214 OFFICE O/P EST MOD 30 MIN: CPT | Performed by: SURGERY

## 2023-02-21 PROCEDURE — G0463 HOSPITAL OUTPT CLINIC VISIT: HCPCS | Mod: 25

## 2023-02-21 PROCEDURE — 93970 EXTREMITY STUDY: CPT

## 2023-02-21 PROCEDURE — G0463 HOSPITAL OUTPT CLINIC VISIT: HCPCS | Mod: 25 | Performed by: SURGERY

## 2023-02-21 RX ORDER — CALCIUM ACETATE 667 MG/1
1334 TABLET ORAL
Status: ON HOLD | COMMUNITY
Start: 2022-11-23 | End: 2023-12-08

## 2023-02-21 NOTE — PROGRESS NOTES
Taylor Valiente is a 27-year-old gentleman who is status post creation of a right brachiocephalic AV fistula on 3/4/2021.  Despite an excellent calculated outflow volume, technicians were having difficulty accessing the fistula.  The outflow vein at the level of the antecubital crease to the level of the proximal humerus was extremely tortuous and in a corkscrew like configuration.  I made a longitudinal incision parallel to the fistula and completely freed it up.  I created a new subcutaneous tunnel lateral to the existing fistula.  The fistula was divided just above the antecubital crease and drawn through the subcutaneous tunnel.  We performed an end-to-end anastomosis and shorten the fistula just a bit.  With this revision I in effect created a second stage right brachial cephalic transposition AV fistula.    His dialysis center contacted us with concerns for multiple fistula aneurysms.  The fistula itself seems to be working fine.  He presents today for vascular surgical follow-up and an AV fistula ultrasound.  At today's visit Taylor was in good spirits and had no new complaints.  He is currently undergoing evaluation for renal transplantation.  He does note some subjectively increased bleeding with longer compression times postprocedure over the past few weeks.    Exam:  Alert male in no acute distress.  Blood pressure left arm 187/147 with a pulse of 77.  Right upper arm AV fistula with obvious aneurysmal degeneration throughout its length.  The overlying skin is intact and is not attenuated.  Excellent thrill.  1+ palpable right radial pulse at the wrist.      Imaging:  US EXTREMITY ARTERIAL VENOUS DIALYSIS ACCESS GRAFT 2/8/2023 2:18 PM     HISTORY: 27-year-old patient with right radial artery to cephalic AV  fistula created March 4, 2021. Second stage procedure was August 26, 2021.     COMPARISON: October 7, 2021     TECHNIQUE: Color Doppler and spectral waveform analysis obtained  through the  brachial and radial arteries as well as the outflow  cephalic vein.     FINDINGS: The brachial artery ranges from 7.2-7.7 mm. Radial artery is  6 mm. AV anastomosis is patent with total blood flow volume of 7957  mL/min. The fistula is aneurysmal ranging from 11.2 mm in the most  central segment to 34.3 mm in the segment just beyond the AV  anastomosis.                                                                      IMPRESSION: Patent aneurysmal right upper arm fistula. No suggestion  of stenosis.     PINEDA CAVAZOS MD      ASSESSMENT:  2 years status post creation of a right brachial cephalic AV fistula with subsequent revision.  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    RECOMMENDATION:  I have reviewed all the above with Leesalizabethlelo.  I recommend ligation of his right upper arm AV fistula with creation of a first stage left brachiobasilic AV fistula.  He will require placement of a tunneled dialysis catheter at that same setting.  I reviewed the specifics of this procedure including potential complications and the anticipated postprocedural course.  From this point forward he is to avoid left arm venipunctures.  His left arm basilic vein is marginal and he will continue with vein building exercises.  The above-noted procedure will be scheduled in a timely manner at [facility].    Total length of this encounter was 20 minutes with time spent reviewing studies, interviewing and examining the patient, answering questions, and coordinating a treatment plan.    Edwardo Moran MD

## 2023-02-21 NOTE — NURSING NOTE
Patient Education    Procedure: Ligation of right arm AVF: Creation of 1st stage left brachial-basilic AVF  Diagnosis: AVF aneurysms  Anticoagulation Instruction: continue ASA  Pre-Operative Physical Exam: You need to have a pre-op physical exam within 30 days of your procedure. Your procedure may be cancelled if you do not have a current History and Physical. Call your PCP's office to schedule.  Allergies:  Updated in Epic  Bowel Prep: n/a  NPO for solid 8 hours prior to arrival time.   NPO for clear liquids 2 hours prior to arrival time.   Post Procedure Education: [facility] post-procedure fact sheet reviewed with patient.    Showering instructions reviewed: Yes    Learner(s):patient  Method: Listening, Reading  Barriers to Learning:No Barrier  Outcome: Patient did verbalize understanding of above education.    Meka Vazquez, LEAHN, RN-[facility] [place]

## 2023-02-21 NOTE — PROGRESS NOTES
[facility]        Patient is here for a  follow up.     Pt is currently taking Aspirin and Statin.    BP (!) 158/90 (BP Location: Left arm, Patient Position: Chair, Cuff Size: Adult Large)   Pulse 77     The provider has been notified that the patient has no concerns.     Questions patient would like addressed today are: N/A.    Refills are needed: N/A    Has homecare services and agency name:  Juanita Gonzalez MA

## 2023-03-06 ENCOUNTER — HOSPITAL ENCOUNTER (EMERGENCY)
Facility: CLINIC | Age: 28
Discharge: HOME | End: 2023-03-06
Attending: EMERGENCY MEDICINE | Admitting: EMERGENCY MEDICINE
Payer: MEDICARE

## 2023-03-06 ENCOUNTER — HOSPITAL ENCOUNTER (OUTPATIENT)
Age: 28
End: 2023-03-06
Attending: SURGERY | Admitting: SURGERY
Payer: MEDICARE

## 2023-03-06 VITALS
TEMPERATURE: 98.2 F | DIASTOLIC BLOOD PRESSURE: 118 MMHG | SYSTOLIC BLOOD PRESSURE: 170 MMHG | OXYGEN SATURATION: 100 % | RESPIRATION RATE: 16 BRPM | HEART RATE: 77 BPM

## 2023-03-06 DIAGNOSIS — S46.002A ROTATOR CUFF INJURY, LEFT, INITIAL ENCOUNTER: ICD-10-CM

## 2023-03-06 PROCEDURE — 99283 EMERGENCY DEPT VISIT LOW MDM: CPT

## 2023-03-06 RX ORDER — PREDNISONE 10 MG/1
10 TABLET ORAL DAILY
Qty: 5 TABLET | Refills: 0 | Status: SHIPPED | OUTPATIENT
Start: 2023-03-06 | End: 2023-03-06

## 2023-03-06 RX ORDER — PREDNISONE 10 MG/1
10 TABLET ORAL DAILY
Qty: 5 TABLET | Refills: 0 | Status: SHIPPED | OUTPATIENT
Start: 2023-03-06 | End: 2023-03-13

## 2023-03-06 ASSESSMENT — ENCOUNTER SYMPTOMS
MYALGIAS: 1
SHORTNESS OF BREATH: 0

## 2023-03-06 ASSESSMENT — ACTIVITIES OF DAILY LIVING (ADL): ADLS_ACUITY_SCORE: 33

## 2023-03-06 NOTE — ED PROVIDER NOTES
History     Chief Complaint:  Arm Pain       The history is provided by the patient.      Taylor Valiente is a 27 year old male with a history of adrenal adenoma, anemia, hypertension, CKD, cardiomyopathy, among others, who presents with left arm pain after lifting a box a couple of days ago. Pain worse when moving the arm upwards and outwards. Denies chest pain and shortness of breath. The patient reports that he cannot take ibuprofen. He reports that he is supposed to have an arm surgery on 03/09 to move fistula    Independent Historian:   None - Patient Only    Review of External Notes: None.     ROS:  Review of Systems   Respiratory: Negative for shortness of breath.    Cardiovascular: Negative for chest pain.   Musculoskeletal: Positive for myalgias (left arm).   All other systems reviewed and are negative.      Allergies:  Benadryl [Diphenhydramine]     Medications:    predniSONE (DELTASONE) 10 MG tablet  ACE/ARB/ARNI NOT PRESCRIBED (INTENTIONAL)  amLODIPine (NORVASC) 10 MG tablet  aspirin 81 MG EC tablet  atorvastatin (LIPITOR) 10 MG tablet  bumetanide (BUMEX) 2 MG tablet  calcium acetate (CALPHRON) 667 MG TABS tablet  carvedilol (COREG) 25 MG tablet  cinacalcet (SENSIPAR) 90 MG tablet  cloNIDine (CATAPRES) 0.1 MG tablet  hydrALAZINE (APRESOLINE) 100 MG tablet  lanthanum (FOSRENOL) 1000 MG chewable tablet  lanthanum (FOSRENOL) 500 MG chewable tablet  multivitamin RENAL (RENAVITE RX/NEPHROVITE) 1 MG tablet  nitroGLYcerin (NITROSTAT) 0.3 MG sublingual tablet  sevelamer carbonate (RENVELA) 800 MG tablet  sevelamer HCl (RENAGEL) 800 MG tablet  vitamin D3 (CHOLECALCIFEROL) 2000 units (50 mcg) tablet      Past Medical History:    Past Medical History:   Diagnosis Date     Adrenal adenoma, left      Anemia      Benign essential hypertension 07/28/2017     CKD (chronic kidney disease) stage 5, GFR less than 15 ml/min (H)      Depression      Focal glomerular sclerosis 07/28/2017     HLD (hyperlipidemia)       LVH (left ventricular hypertrophy) due to hypertensive disease 07/14/2017     Noncompliance      Obesity, unspecified      OD (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/2021     Stress-induced cardiomyopathy      Suicide attempt (H) 2019       Past Surgical History:    Past Surgical History:   Procedure Laterality Date     ARTHROSCOPY ANKLE Left 2/24/2021    Procedure: Left ankle arthroscopy and debridement/micro fracture;  Surgeon: Mirza Nelson MD;  Location: [facility]     BIOPSY  2017    renal- [facility]     CREATE FISTULA ARTERIOVENOUS UPPER EXTREMITY Right 3/4/2021    Procedure: RIGHT proximal radial  to CEPHALIC ARTERIOVENOUS FISTULA;  Surgeon: Henrik Moran MD;  Location: [facility]     IR CVC TUNNEL PLACEMENT > 5 YRS OF AGE  6/17/2021     IR CVC TUNNEL REMOVAL RIGHT  12/3/2021     NO HISTORY OF SURGERY       REVISION FISTULA ARTERIOVENOUS UPPER EXTREMITY Right 8/26/2021    Procedure: Second stage RIGHT BRACHIOCEPHALIC transposition ARTERIOVENOUS FISTULA;  Surgeon: Henrik Moran MD;  Location: [facility] OR        Family History:    Family History   Problem Relation Age of Onset     Blood Disease Mother         has hep b     Diabetes Mother         gestionanal diabetes     Hypertension Mother      Obesity Father      Hypertension Father      Hypertension Maternal Grandmother      Diabetes Maternal Grandmother      Hypertension Paternal Grandmother      Hypertension Paternal Grandfather      Coronary Artery Disease Maternal Uncle      Cancer No family hx of      Social History:  The patient presents to the ED via car.  PCP: Priyank Lawrence     Physical Exam     Patient Vitals for the past 24 hrs:   BP Temp Pulse SpO2   03/06/23 1122 -- 98.2  F (36.8  C) -- --   03/06/23 1120 (!) 170/118 -- 77 100 %        Physical Exam  General: Patient is well appearing. No distress.  Head: Atraumatic.  Eyes: Conjunctivae and EOM are normal. No scleral icterus.  Neck: Normal range of motion. Neck supple.   Cardiovascular:  Normal rate, regular rhythm, normal heart sounds and intact distal pulses.   Pulmonary/Chest: Breath sounds normal. No respiratory distress.  Abdominal: Soft. Bowel sounds are normal. No distension. No tenderness. No rebound or guarding.   Musculoskeletal: Fistula right arm. Left arm opposition to abduction, external rotation and flexion of the shoulder reproduces pain and weakness consistent with the rotator cuff.  Skin: Warm and dry. No rash noted. Not diaphoretic.     Emergency Department Course     Emergency Department Course & Assessments:       Interventions:  Medications - No data to display     Assessments:  1320 I obtained history and examined the patient as noted above.    Social Determinants of Health affecting care:   None    Disposition:  The patient was discharged to home.     Impression & Plan      Medical Decision Making:  atraumatic left shoulder pain. Broad differential was considered.  Patient is well appearing with stable vitals. Neurovascular status is intact distally and no signs of compartment syndrome. I suspect soft tissue injury given physical examination. There is no evidence to suggest cervical radiculopathy or thoracic outlet syndrome as an etiology for symptoms. The patient's pain is clearly mechanical in nature, reproducible with movement/palpation, and there is no associated chest pain or shortness of breath.  Suspicion for referred pain from cardiac or pulmonary cause is extremely low.  I doubt septic arthritis or inflammatory effusion such as gout or pseudogout given absence of effusion, joint redness, and afebrile.  No clinical evidence of overlying skin or soft tissue infection such as cellulitis or necrotizing soft tissue infection.    Recommended conservative treatment with ice, rest, stretching and follow-up in clinic if symptoms not improving. Cannot take NSAIDs due to underlying medical disease. I prescribed him low dose steroid instead.    Diagnosis:    ICD-10-CM    1.  Rotator cuff injury, left, initial encounter  S46.002A            Discharge Medications:  New Prescriptions    PREDNISONE (DELTASONE) 10 MG TABLET    Take 1 tablet (10 mg) by mouth daily for 5 days      Scribe Disclosure:  I, Carlos Batista, am serving as a scribe at 1:13 PM on 3/6/2023 to document services personally performed by Huey Downs MD, based on my observations and the provider's statements to me.   3/6/2023   Huey Downs MD Stevens, Andrew C, MD  03/06/23 1503

## 2023-03-06 NOTE — ED TRIAGE NOTES
Pt presents to ED with left arm pain. States it hurts from the shoulder down to the elbow. Started a couple days ago. States he lifted a box prior to it starting, but states it wasn't that heavy. Pain worse with moving it around or lifting it straight up. Denies chest pain or shortness of breath.      Triage Assessment     Row Name 03/06/23 1119       Triage Assessment (Adult)    Airway WDL WDL

## 2023-03-07 ENCOUNTER — TRANSFERRED RECORDS (OUTPATIENT)
Dept: HEALTH INFORMATION MANAGEMENT | Facility: CLINIC | Age: 28
End: 2023-03-07

## 2023-03-07 ENCOUNTER — TELEPHONE (OUTPATIENT)
Dept: INTERVENTIONAL RADIOLOGY/VASCULAR | Facility: CLINIC | Age: 28
End: 2023-03-07
Payer: MEDICARE

## 2023-03-07 ENCOUNTER — OFFICE VISIT (OUTPATIENT)
Dept: FAMILY MEDICINE | Facility: CLINIC | Age: 28
End: 2023-03-07
Payer: MEDICARE

## 2023-03-07 VITALS
RESPIRATION RATE: 18 BRPM | OXYGEN SATURATION: 100 % | BODY MASS INDEX: 33.46 KG/M2 | DIASTOLIC BLOOD PRESSURE: 81 MMHG | SYSTOLIC BLOOD PRESSURE: 120 MMHG | WEIGHT: 247 LBS | HEART RATE: 87 BPM | TEMPERATURE: 98.4 F | HEIGHT: 72 IN

## 2023-03-07 DIAGNOSIS — N18.6 ESRD (END STAGE RENAL DISEASE) (H): ICD-10-CM

## 2023-03-07 DIAGNOSIS — N18.5 ACUTE RENAL FAILURE WITH ACUTE TUBULAR NECROSIS SUPERIMPOSED ON STAGE 5 CHRONIC KIDNEY DISEASE, NOT ON CHRONIC DIALYSIS (H): ICD-10-CM

## 2023-03-07 DIAGNOSIS — N17.0 ACUTE RENAL FAILURE WITH ACUTE TUBULAR NECROSIS SUPERIMPOSED ON STAGE 5 CHRONIC KIDNEY DISEASE, NOT ON CHRONIC DIALYSIS (H): ICD-10-CM

## 2023-03-07 DIAGNOSIS — N18.5 CKD (CHRONIC KIDNEY DISEASE) STAGE 5, GFR LESS THAN 15 ML/MIN (H): ICD-10-CM

## 2023-03-07 DIAGNOSIS — N18.6 ESRD (END STAGE RENAL DISEASE) ON DIALYSIS (H): ICD-10-CM

## 2023-03-07 DIAGNOSIS — Z01.818 PRE-OPERATIVE EXAMINATION: Primary | ICD-10-CM

## 2023-03-07 DIAGNOSIS — Z76.82 ORGAN TRANSPLANT CANDIDATE: ICD-10-CM

## 2023-03-07 DIAGNOSIS — Z99.2 ESRD (END STAGE RENAL DISEASE) ON DIALYSIS (H): ICD-10-CM

## 2023-03-07 DIAGNOSIS — I10 ESSENTIAL HYPERTENSION: ICD-10-CM

## 2023-03-07 LAB
ANION GAP SERPL CALCULATED.3IONS-SCNC: 21 MMOL/L (ref 7–15)
BUN SERPL-MCNC: 64.1 MG/DL (ref 6–20)
CALCIUM SERPL-MCNC: 9 MG/DL (ref 8.6–10)
CHLORIDE SERPL-SCNC: 98 MMOL/L (ref 98–107)
CREAT SERPL-MCNC: 12.2 MG/DL (ref 0.67–1.17)
DEPRECATED HCO3 PLAS-SCNC: 24 MMOL/L (ref 22–29)
ERYTHROCYTE [DISTWIDTH] IN BLOOD BY AUTOMATED COUNT: 13.9 % (ref 10–15)
GFR SERPL CREATININE-BSD FRML MDRD: 5 ML/MIN/1.73M2
GLUCOSE SERPL-MCNC: 109 MG/DL (ref 70–99)
HCT VFR BLD AUTO: 35.8 % (ref 40–53)
HGB BLD-MCNC: 11.2 G/DL (ref 13.3–17.7)
MCH RBC QN AUTO: 30.3 PG (ref 26.5–33)
MCHC RBC AUTO-ENTMCNC: 31.3 G/DL (ref 31.5–36.5)
MCV RBC AUTO: 97 FL (ref 78–100)
PLATELET # BLD AUTO: 213 10E3/UL (ref 150–450)
POTASSIUM SERPL-SCNC: 5 MMOL/L (ref 3.4–5.3)
RBC # BLD AUTO: 3.7 10E6/UL (ref 4.4–5.9)
SODIUM SERPL-SCNC: 143 MMOL/L (ref 136–145)
WBC # BLD AUTO: 8.2 10E3/UL (ref 4–11)

## 2023-03-07 PROCEDURE — 86832 HLA CLASS I HIGH DEFIN QUAL: CPT

## 2023-03-07 PROCEDURE — 99214 OFFICE O/P EST MOD 30 MIN: CPT

## 2023-03-07 PROCEDURE — 85027 COMPLETE CBC AUTOMATED: CPT

## 2023-03-07 PROCEDURE — 86833 HLA CLASS II HIGH DEFIN QUAL: CPT

## 2023-03-07 PROCEDURE — 96127 BRIEF EMOTIONAL/BEHAV ASSMT: CPT

## 2023-03-07 PROCEDURE — 80048 BASIC METABOLIC PNL TOTAL CA: CPT

## 2023-03-07 PROCEDURE — 36415 COLL VENOUS BLD VENIPUNCTURE: CPT

## 2023-03-07 ASSESSMENT — PATIENT HEALTH QUESTIONNAIRE - PHQ9
SUM OF ALL RESPONSES TO PHQ QUESTIONS 1-9: 3
SUM OF ALL RESPONSES TO PHQ QUESTIONS 1-9: 3
10. IF YOU CHECKED OFF ANY PROBLEMS, HOW DIFFICULT HAVE THESE PROBLEMS MADE IT FOR YOU TO DO YOUR WORK, TAKE CARE OF THINGS AT HOME, OR GET ALONG WITH OTHER PEOPLE: NOT DIFFICULT AT ALL

## 2023-03-07 NOTE — H&P (VIEW-ONLY)
[facility]  [address] [place] [id number]  Phone: [phone]  Primary Provider: Priyank Lawrence  Pre-op Performing Provider: MICKY OLEARY      PREOPERATIVE EVALUATION:  Today's date: 3/7/2023    Taylor Valiente is a 27 year old male who presents for a preoperative evaluation.    Surgical Information:  Surgery/Procedure: LIGATION OF RIGHT ARM ARTERIOVENOUS FISTULA, CREATION OF FIRST STAGE LEFT BRACHIOBASILIC ARTERIOVENOUS FISTULA  Surgery Location: [facility]  Surgeon: Dr. Henrik Moran  Surgery Date: 03/09/2023  Time of Surgery: Arrival 6:30am  Where patient plans to recover: At home with family  Fax number for surgical facility: Note does not need to be faxed, will be available electronically in Epic.    Type of Anesthesia Anticipated: General    Assessment & Plan     The proposed surgical procedure is considered LOW risk.    (Z01.818) Pre-operative examination  (primary encounter diagnosis)  Comment: patient approved for surgery at this point. Labs today, follow up with result. Patient agreeable with plan of care and at this point patient will follow up as needed unless acute concerns arise in the meantime.  Plan: BASIC METABOLIC PANEL, CBC with platelets    (N18.6,  Z99.2) ESRD (end stage renal disease) on dialysis (H)  (N17.0,  N18.5) Acute renal failure with acute tubular necrosis superimposed on stage 5 chronic kidney disease, not on chronic dialysis (H)  (N18.6) ESRD (end stage renal disease) (H)  (N18.5) CKD (chronic kidney disease) stage 5, GFR less than 15 ml/min (H)  Comment: BMP today,  Follow up with results. Likely that Cr. Will be elevated given Dialysis patient.   Plan: BASIC METABOLIC PANEL, CBC with platelets    (I10) Essential hypertension  Comment: on amlodipine, coreg, clonidine, hydralazine. Controlled, continue up to surgery.  Plan: see above.         Medication Instructions:  Patient is to take all scheduled medications on the day of surgery EXCEPT  for modifications listed below:  Aspirin instructed to hold starting today 3/7/2023. will notify/FYI surgeon given not 7-10 days prior to surgery.    RECOMMENDATION:  APPROVAL GIVEN to proceed with proposed procedure, without further diagnostic evaluation.            Subjective     HPI related to upcoming procedure:     Preop Questions 3/7/2023   1. Have you ever had a heart attack or stroke? No   2. Have you ever had surgery on your heart or blood vessels, such as a stent placement, a coronary artery bypass, or surgery on an artery in your head, neck, heart, or legs? YES - HD fistula   3. Do you have chest pain with activity? No   4. Do you have a history of  heart failure? No   5. Do you currently have a cold, bronchitis or symptoms of other infection? No   6. Do you have a cough, shortness of breath, or wheezing? No   7. Do you or anyone in your family have previous history of blood clots? UNKNOWN -   8. Do you or does anyone in your family have a serious bleeding problem such as prolonged bleeding following surgeries or cuts? No   9. Have you ever had problems with anemia or been told to take iron pills? YES - anemia of chronic renal failure   10. Have you had any abnormal blood loss such as black, tarry or bloody stools? No   11. Have you ever had a blood transfusion? No   12. Are you willing to have a blood transfusion if it is medically needed before, during, or after your surgery? Yes   13. Have you or any of your relatives ever had problems with anesthesia? No   14. Do you have sleep apnea, excessive snoring or daytime drowsiness? No   15. Do you have any artifical heart valves or other implanted medical devices like a pacemaker, defibrillator, or continuous glucose monitor? No   16. Do you have artificial joints? No   17. Are you allergic to latex? No       Health Care Directive:  Patient does not have a Health Care Directive or Living Will: Discussed advance care planning with patient; however, patient  declined at this time.    Preoperative Review of :   reviewed - no record of controlled substances prescribed.          Review of Systems  Constitutional, neuro, ENT, endocrine, pulmonary, cardiac, gastrointestinal, genitourinary, musculoskeletal, integument and psychiatric systems are negative, except as otherwise noted.    Patient has left rotator cuff tendon tear, was approved to proceed with previous visit today.  Patient Active Problem List    Diagnosis Date Noted     Hyperkalemia 11/29/2021     Priority: Medium     Essential hypertension 11/29/2021     Priority: Medium     ESRD (end stage renal disease) on dialysis (H) 11/29/2021     Priority: Medium     Prolonged Q-T interval on ECG 06/15/2021     Priority: Medium     Acute renal failure with acute tubular necrosis superimposed on stage 5 chronic kidney disease, not on chronic dialysis (H) 06/15/2021     Priority: Medium     ESRD needing dialysis (H) 06/15/2021     Priority: Medium     Adrenal adenoma, left      Priority: Medium     Benign adenoma.        Stress-induced cardiomyopathy      Priority: Medium     OD (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/2021     Priority: Medium     Added automatically from request for surgery [id number]       Anemia of chronic renal failure 12/21/2020     Priority: Medium     2019 novel coronavirus disease (COVID-19) 11/27/2020     Priority: Medium     Hyperlipidemia 05/01/2019     Priority: Medium     CKD (chronic kidney disease) stage 5, GFR less than 15 ml/min (H) 07/28/2017     Priority: Medium     Due to HTN       Focal glomerular sclerosis 07/28/2017     Priority: Medium     Due to Hypertension injury.       LVH (left ventricular hypertrophy) due to hypertensive disease 07/14/2017     Priority: Medium     Renovascular hypertension 07/14/2017     Priority: Medium      Past Medical History:   Diagnosis Date     Adrenal adenoma, left      Anemia      Benign essential hypertension 07/28/2017     CKD (chronic kidney disease)  stage 5, GFR less than 15 ml/min (H)     FSGS     Depression      Focal glomerular sclerosis 07/28/2017     HLD (hyperlipidemia)      LVH (left ventricular hypertrophy) due to hypertensive disease 07/14/2017     Noncompliance      Obesity, unspecified      OD (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/2021     Stress-induced cardiomyopathy      Suicide attempt (H) 2019     Past Surgical History:   Procedure Laterality Date     ARTHROSCOPY ANKLE Left 2/24/2021    Procedure: Left ankle arthroscopy and debridement/micro fracture;  Surgeon: Mirza Nelson MD;  Location: [facility]     BIOPSY  2017    [facility]al- [facility]     CREATE FISTULA ARTERIOVENOUS UPPER EXTREMITY Right 3/4/2021    Procedure: RIGHT proximal radial  to CEPHALIC ARTERIOVENOUS FISTULA;  Surgeon: Henrik Moran MD;  Location: [facility] OR     IR CVC TUNNEL PLACEMENT > 5 YRS OF AGE  6/17/2021     IR CVC TUNNEL REMOVAL RIGHT  12/3/2021     NO HISTORY OF SURGERY       REVISION FISTULA ARTERIOVENOUS UPPER EXTREMITY Right 8/26/2021    Procedure: Second stage RIGHT BRACHIOCEPHALIC transposition ARTERIOVENOUS FISTULA;  Surgeon: Henrik Moran MD;  Location:  OR     Current Outpatient Medications   Medication Sig Dispense Refill     amLODIPine (NORVASC) 10 MG tablet Take 10 mg by mouth daily        aspirin 81 MG EC tablet Take 81 mg by mouth daily       atorvastatin (LIPITOR) 10 MG tablet TAKE ONE TABLET BY MOUTH EVERY NIGHT AT BEDTIME 90 tablet 1     bumetanide (BUMEX) 2 MG tablet Take 2 mg by mouth daily        calcium acetate (CALPHRON) 667 MG TABS tablet 3 times daily (with meals)       carvedilol (COREG) 25 MG tablet TAKE TWO TABLETS BY MOUTH TWICE A DAY WITH A MEAL Strength: 25 mg 180 tablet 0     cinacalcet (SENSIPAR) 90 MG tablet Take 90 mg by mouth daily       cloNIDine (CATAPRES) 0.1 MG tablet Take 0.1 mg by mouth 2 times daily       hydrALAZINE (APRESOLINE) 100 MG tablet TAKE ONE TABLET BY MOUTH THREE TIMES A  tablet 1     multivitamin  RENAL (RENAVITE RX/NEPHROVITE) 1 MG tablet Take 1 tablet by mouth daily        nitroGLYcerin (NITROSTAT) 0.3 MG sublingual tablet For chest pain place 1 tablet under the tongue every 5 minutes for 3 doses. If symptoms persist 5 minutes after 1st dose call 911. 5 tablet 0     predniSONE (DELTASONE) 10 MG tablet Take 1 tablet (10 mg) by mouth daily 5 tablet 0     sevelamer HCl (RENAGEL) 800 MG tablet Take 4,000 mg by mouth 3 times daily (with meals) . 5 tabs TID with meals.       vitamin D3 (CHOLECALCIFEROL) 2000 units (50 mcg) tablet Take 75 mcg by mouth daily        ACE/ARB/ARNI NOT PRESCRIBED (INTENTIONAL) Please choose reason not prescribed from choices below. (Patient not taking: Reported on 3/7/2023)       lanthanum (FOSRENOL) 1000 MG chewable tablet Take 1 tablet (1,000 mg) by mouth 3 times daily (with meals) (Patient not taking: Reported on 2/21/2023) 270 tablet 3     lanthanum (FOSRENOL) 500 MG chewable tablet Take 1,500 mg by mouth 3 times daily (with meals) . 3 tabs TID with meals (Patient not taking: Reported on 2/21/2023)       sevelamer carbonate (RENVELA) 800 MG tablet Take 1,600 mg by mouth Take with snacks or supplements (Patient not taking: Reported on 2/21/2023)         Allergies   Allergen Reactions     Benadryl [Diphenhydramine] Itching        Social History     Tobacco Use     Smoking status: Never     Smokeless tobacco: Never   Substance Use Topics     Alcohol use: No     Family History   Problem Relation Age of Onset     Blood Disease Mother         has hep b     Diabetes Mother         gestionanal diabetes     Hypertension Mother      Obesity Father      Hypertension Father      Hypertension Maternal Grandmother      Diabetes Maternal Grandmother      Hypertension Paternal Grandmother      Hypertension Paternal Grandfather      Coronary Artery Disease Maternal Uncle      Cancer No family hx of      History   Drug Use     Types: Marijuana     Comment: occ     Cardiac stress test  10/28/2022:Probably normal stress echocardiogram with no wall motion abnormalities seen on the post exercise images. The sensitivity of the test for detection of  ischemia is limited since the target heart rate was not achieved. EF 55-60%.      Objective     /81 (BP Location: Left arm, Patient Position: Sitting, Cuff Size: Adult Large)   Pulse 87   Temp 98.4  F (36.9  C) (Oral)   Resp 18   Ht 1.829 m (6')   Wt 112 kg (247 lb)   SpO2 100%   BMI 33.50 kg/m      Physical Exam    GENERAL APPEARANCE: healthy, alert and no distress     EYES: EOMI,  PERRL     NECK: no adenopathy, no asymmetry, masses, or scars and thyroid normal to palpation     RESP: lungs clear to auscultation - no rales, rhonchi or wheezes     CV: regular rates and rhythm, normal S1 S2, no S3 or S4 and no murmur, click or rub     ABDOMEN:  soft, nontender, no HSM or masses and bowel sounds normal     MS: extremities normal- no gross deformities noted, no evidence of inflammation in joints, FROM in all extremities.     SKIN: no suspicious lesions or rashes     NEURO: Normal strength and tone, sensory exam grossly normal, mentation intact and speech normal     PSYCH: mentation appears normal. and affect normal/bright     LYMPHATICS: No cervical adenopathy    Recent Labs   Lab Test 12/12/22  0559 10/29/22  0939 11/29/21  0650 08/26/21  1245   HGB 10.6* 11.6*   < >  --    * 148*   < >  --     135*   < > 137   POTASSIUM 5.6* 5.1   < > 4.7   CR 15.45* 10.38*   < > 8.41*   A1C  --   --   --  5.1    < > = values in this interval not displayed.        Diagnostics:  Labs pending at this time.  Results will be reviewed when available.   No EKG this visit, completed in the last 90 days. 12/12/2022    Revised Cardiac Risk Index (RCRI):  The patient has the following serious cardiovascular risks for perioperative complications:   - Serum Creatinine >2.0 mg/dl = 1 point     RCRI Interpretation: 1 point: Class II (low risk - 0.9% complication  rate)         Signed Electronically by: ROSA Morton CNP  Copy of this evaluation report is provided to requesting physician.                      Answers for HPI/ROS submitted by the patient on 3/7/2023  If you checked off any problems, how difficult have these problems made it for you to do your work, take care of things at home, or get along with other people?: Not difficult at all  PHQ9 TOTAL SCORE: 3  What is the reason for your visit today? : pre op for surgery  How many servings of fruits and vegetables do you eat daily?: 0-1  On average, how many sweetened beverages do you drink each day (Examples: soda, juice, sweet tea, etc.  Do NOT count diet or artificially sweetened beverages)?: 1  How many minutes a day do you exercise enough to make your heart beat faster?: 9 or less  How many days a week do you exercise enough to make your heart beat faster?: 3 or less  How many days per week do you miss taking your medication?: 0

## 2023-03-07 NOTE — Clinical Note
Dr. Moran, Just wanted to FYI. Instructed patient to start holding Aspirin for the remainder up until surgery. Let me know if you have any questions.   ROSA Morton CNP

## 2023-03-07 NOTE — PROGRESS NOTES
[facility]  [id number] [place] [id number]  Phone: [phone]  Primary Provider: Priyank Lawrence  Pre-op Performing Provider: MICKY OLEARY      PREOPERATIVE EVALUATION:  Today's date: 3/7/2023    Taylor Valiente is a 27 year old male who presents for a preoperative evaluation.    Surgical Information:  Surgery/Procedure: LIGATION OF RIGHT ARM ARTERIOVENOUS FISTULA, CREATION OF FIRST STAGE LEFT BRACHIOBASILIC ARTERIOVENOUS FISTULA  Surgery Location: [facility]  Surgeon: Dr. Henrik Moran  Surgery Date: 03/09/2023  Time of Surgery: Arrival 6:30am  Where patient plans to recover: At home with family  Fax number for surgical facility: Note does not need to be faxed, will be available electronically in Epic.    Type of Anesthesia Anticipated: General    Assessment & Plan     The proposed surgical procedure is considered LOW risk.    (Z01.818) Pre-operative examination  (primary encounter diagnosis)  Comment: patient approved for surgery at this point. Labs today, follow up with result. Patient agreeable with plan of care and at this point patient will follow up as needed unless acute concerns arise in the meantime.  Plan: BASIC METABOLIC PANEL, CBC with platelets    (N18.6,  Z99.2) ESRD (end stage renal disease) on dialysis (H)  (N17.0,  N18.5) Acute renal failure with acute tubular necrosis superimposed on stage 5 chronic kidney disease, not on chronic dialysis (H)  (N18.6) ESRD (end stage renal disease) (H)  (N18.5) CKD (chronic kidney disease) stage 5, GFR less than 15 ml/min (H)  Comment: BMP today,  Follow up with results. Likely that Cr. Will be elevated given Dialysis patient.   Plan: BASIC METABOLIC PANEL, CBC with platelets    (I10) Essential hypertension  Comment: on amlodipine, coreg, clonidine, hydralazine. Controlled, continue up to surgery.  Plan: see above.         Medication Instructions:  Patient is to take all scheduled medications on the day of surgery EXCEPT  for modifications listed below:  Aspirin instructed to hold starting today 3/7/2023. will notify/FYI surgeon given not 7-10 days prior to surgery.    RECOMMENDATION:  APPROVAL GIVEN to proceed with proposed procedure, without further diagnostic evaluation.            Subjective     HPI related to upcoming procedure:     Preop Questions 3/7/2023   1. Have you ever had a heart attack or stroke? No   2. Have you ever had surgery on your heart or blood vessels, such as a stent placement, a coronary artery bypass, or surgery on an artery in your head, neck, heart, or legs? YES - HD fistula   3. Do you have chest pain with activity? No   4. Do you have a history of  heart failure? No   5. Do you currently have a cold, bronchitis or symptoms of other infection? No   6. Do you have a cough, shortness of breath, or wheezing? No   7. Do you or anyone in your family have previous history of blood clots? UNKNOWN -   8. Do you or does anyone in your family have a serious bleeding problem such as prolonged bleeding following surgeries or cuts? No   9. Have you ever had problems with anemia or been told to take iron pills? YES - anemia of chronic renal failure   10. Have you had any abnormal blood loss such as black, tarry or bloody stools? No   11. Have you ever had a blood transfusion? No   12. Are you willing to have a blood transfusion if it is medically needed before, during, or after your surgery? Yes   13. Have you or any of your relatives ever had problems with anesthesia? No   14. Do you have sleep apnea, excessive snoring or daytime drowsiness? No   15. Do you have any artifical heart valves or other implanted medical devices like a pacemaker, defibrillator, or continuous glucose monitor? No   16. Do you have artificial joints? No   17. Are you allergic to latex? No       Health Care Directive:  Patient does not have a Health Care Directive or Living Will: Discussed advance care planning with patient; however, patient  declined at this time.    Preoperative Review of :   reviewed - no record of controlled substances prescribed.          Review of Systems  Constitutional, neuro, ENT, endocrine, pulmonary, cardiac, gastrointestinal, genitourinary, musculoskeletal, integument and psychiatric systems are negative, except as otherwise noted.    Patient has left rotator cuff tendon tear, was approved to proceed with previous visit today.  Patient Active Problem List    Diagnosis Date Noted     Hyperkalemia 11/29/2021     Priority: Medium     Essential hypertension 11/29/2021     Priority: Medium     ESRD (end stage renal disease) on dialysis (H) 11/29/2021     Priority: Medium     Prolonged Q-T interval on ECG 06/15/2021     Priority: Medium     Acute renal failure with acute tubular necrosis superimposed on stage 5 chronic kidney disease, not on chronic dialysis (H) 06/15/2021     Priority: Medium     ESRD needing dialysis (H) 06/15/2021     Priority: Medium     Adrenal adenoma, left      Priority: Medium     Benign adenoma.        Stress-induced cardiomyopathy      Priority: Medium     OD (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/2021     Priority: Medium     Added automatically from request for surgery [id number]       Anemia of chronic renal failure 12/21/2020     Priority: Medium     2019 novel coronavirus disease (COVID-19) 11/27/2020     Priority: Medium     Hyperlipidemia 05/01/2019     Priority: Medium     CKD (chronic kidney disease) stage 5, GFR less than 15 ml/min (H) 07/28/2017     Priority: Medium     Due to HTN       Focal glomerular sclerosis 07/28/2017     Priority: Medium     Due to Hypertension injury.       LVH (left ventricular hypertrophy) due to hypertensive disease 07/14/2017     Priority: Medium     Renovascular hypertension 07/14/2017     Priority: Medium      Past Medical History:   Diagnosis Date     Adrenal adenoma, left      Anemia      Benign essential hypertension 07/28/2017     CKD (chronic kidney disease)  stage 5, GFR less than 15 ml/min (H)     FSGS     Depression      Focal glomerular sclerosis 07/28/2017     HLD (hyperlipidemia)      LVH (left ventricular hypertrophy) due to hypertensive disease 07/14/2017     Noncompliance      Obesity, unspecified      OD (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/2021     Stress-induced cardiomyopathy      Suicide attempt (H) 2019     Past Surgical History:   Procedure Laterality Date     ARTHROSCOPY ANKLE Left 2/24/2021    Procedure: Left ankle arthroscopy and debridement/micro fracture;  Surgeon: Mirza Nelson MD;  Location: [facility] OR     BIOPSY  2017    renal- [facility]     CREATE FISTULA ARTERIOVENOUS UPPER EXTREMITY Right 3/4/2021    Procedure: RIGHT proximal radial  to CEPHALIC ARTERIOVENOUS FISTULA;  Surgeon: Henrik Moran MD;  Location: [facility] OR     IR CVC TUNNEL PLACEMENT > 5 YRS OF AGE  6/17/2021     IR CVC TUNNEL REMOVAL RIGHT  12/3/2021     NO HISTORY OF SURGERY       REVISION FISTULA ARTERIOVENOUS UPPER EXTREMITY Right 8/26/2021    Procedure: Second stage RIGHT BRACHIOCEPHALIC transposition ARTERIOVENOUS FISTULA;  Surgeon: Henrik Moran MD;  Location:  OR     Current Outpatient Medications   Medication Sig Dispense Refill     amLODIPine (NORVASC) 10 MG tablet Take 10 mg by mouth daily        aspirin 81 MG EC tablet Take 81 mg by mouth daily       atorvastatin (LIPITOR) 10 MG tablet TAKE ONE TABLET BY MOUTH EVERY NIGHT AT BEDTIME 90 tablet 1     bumetanide (BUMEX) 2 MG tablet Take 2 mg by mouth daily        calcium acetate (CALPHRON) 667 MG TABS tablet 3 times daily (with meals)       carvedilol (COREG) 25 MG tablet TAKE TWO TABLETS BY MOUTH TWICE A DAY WITH A MEAL Strength: 25 mg 180 tablet 0     cinacalcet (SENSIPAR) 90 MG tablet Take 90 mg by mouth daily       cloNIDine (CATAPRES) 0.1 MG tablet Take 0.1 mg by mouth 2 times daily       hydrALAZINE (APRESOLINE) 100 MG tablet TAKE ONE TABLET BY MOUTH THREE TIMES A  tablet 1     multivitamin  RENAL (RENAVITE RX/NEPHROVITE) 1 MG tablet Take 1 tablet by mouth daily        nitroGLYcerin (NITROSTAT) 0.3 MG sublingual tablet For chest pain place 1 tablet under the tongue every 5 minutes for 3 doses. If symptoms persist 5 minutes after 1st dose call 911. 5 tablet 0     predniSONE (DELTASONE) 10 MG tablet Take 1 tablet (10 mg) by mouth daily 5 tablet 0     sevelamer HCl (RENAGEL) 800 MG tablet Take 4,000 mg by mouth 3 times daily (with meals) . 5 tabs TID with meals.       vitamin D3 (CHOLECALCIFEROL) 2000 units (50 mcg) tablet Take 75 mcg by mouth daily        ACE/ARB/ARNI NOT PRESCRIBED (INTENTIONAL) Please choose reason not prescribed from choices below. (Patient not taking: Reported on 3/7/2023)       lanthanum (FOSRENOL) 1000 MG chewable tablet Take 1 tablet (1,000 mg) by mouth 3 times daily (with meals) (Patient not taking: Reported on 2/21/2023) 270 tablet 3     lanthanum (FOSRENOL) 500 MG chewable tablet Take 1,500 mg by mouth 3 times daily (with meals) . 3 tabs TID with meals (Patient not taking: Reported on 2/21/2023)       sevelamer carbonate (RENVELA) 800 MG tablet Take 1,600 mg by mouth Take with snacks or supplements (Patient not taking: Reported on 2/21/2023)         Allergies   Allergen Reactions     Benadryl [Diphenhydramine] Itching        Social History     Tobacco Use     Smoking status: Never     Smokeless tobacco: Never   Substance Use Topics     Alcohol use: No     Family History   Problem Relation Age of Onset     Blood Disease Mother         has hep b     Diabetes Mother         gestionanal diabetes     Hypertension Mother      Obesity Father      Hypertension Father      Hypertension Maternal Grandmother      Diabetes Maternal Grandmother      Hypertension Paternal Grandmother      Hypertension Paternal Grandfather      Coronary Artery Disease Maternal Uncle      Cancer No family hx of      History   Drug Use     Types: Marijuana     Comment: occ     Cardiac stress test  10/28/2022:Probably normal stress echocardiogram with no wall motion abnormalities seen on the post exercise images. The sensitivity of the test for detection of  ischemia is limited since the target heart rate was not achieved. EF 55-60%.      Objective     /81 (BP Location: Left arm, Patient Position: Sitting, Cuff Size: Adult Large)   Pulse 87   Temp 98.4  F (36.9  C) (Oral)   Resp 18   Ht 1.829 m (6')   Wt 112 kg (247 lb)   SpO2 100%   BMI 33.50 kg/m      Physical Exam    GENERAL APPEARANCE: healthy, alert and no distress     EYES: EOMI,  PERRL     NECK: no adenopathy, no asymmetry, masses, or scars and thyroid normal to palpation     RESP: lungs clear to auscultation - no rales, rhonchi or wheezes     CV: regular rates and rhythm, normal S1 S2, no S3 or S4 and no murmur, click or rub     ABDOMEN:  soft, nontender, no HSM or masses and bowel sounds normal     MS: extremities normal- no gross deformities noted, no evidence of inflammation in joints, FROM in all extremities.     SKIN: no suspicious lesions or rashes     NEURO: Normal strength and tone, sensory exam grossly normal, mentation intact and speech normal     PSYCH: mentation appears normal. and affect normal/bright     LYMPHATICS: No cervical adenopathy    Recent Labs   Lab Test 12/12/22  0559 10/29/22  0939 11/29/21  0650 08/26/21  1245   HGB 10.6* 11.6*   < >  --    * 148*   < >  --     135*   < > 137   POTASSIUM 5.6* 5.1   < > 4.7   CR 15.45* 10.38*   < > 8.41*   A1C  --   --   --  5.1    < > = values in this interval not displayed.        Diagnostics:  Labs pending at this time.  Results will be reviewed when available.   No EKG this visit, completed in the last 90 days. 12/12/2022    Revised Cardiac Risk Index (RCRI):  The patient has the following serious cardiovascular risks for perioperative complications:   - Serum Creatinine >2.0 mg/dl = 1 point     RCRI Interpretation: 1 point: Class II (low risk - 0.9% complication  rate)         Signed Electronically by: ROSA Morton CNP  Copy of this evaluation report is provided to requesting physician.                      Answers for HPI/ROS submitted by the patient on 3/7/2023  If you checked off any problems, how difficult have these problems made it for you to do your work, take care of things at home, or get along with other people?: Not difficult at all  PHQ9 TOTAL SCORE: 3  What is the reason for your visit today? : pre op for surgery  How many servings of fruits and vegetables do you eat daily?: 0-1  On average, how many sweetened beverages do you drink each day (Examples: soda, juice, sweet tea, etc.  Do NOT count diet or artificially sweetened beverages)?: 1  How many minutes a day do you exercise enough to make your heart beat faster?: 9 or less  How many days a week do you exercise enough to make your heart beat faster?: 3 or less  How many days per week do you miss taking your medication?: 0

## 2023-03-08 ENCOUNTER — ANESTHESIA EVENT (OUTPATIENT)
Dept: SURGERY | Facility: CLINIC | Age: 28
End: 2023-03-08
Payer: MEDICARE

## 2023-03-08 ENCOUNTER — TELEPHONE (OUTPATIENT)
Dept: OTHER | Facility: CLINIC | Age: 28
End: 2023-03-08
Payer: MEDICARE

## 2023-03-08 RX ORDER — ACETAMINOPHEN 325 MG/1
325 TABLET ORAL
Status: CANCELLED | OUTPATIENT
Start: 2023-03-08

## 2023-03-08 ASSESSMENT — ENCOUNTER SYMPTOMS: DYSRHYTHMIAS: 1

## 2023-03-08 NOTE — TELEPHONE ENCOUNTER
[facility]    Who is the name of the provider?:  Dean  What is the location you see this provider at/preferred location?: [place]  Person calling / Facility: [facility] (in house)  Phone number:  [phone]  Nurse call back needed:  yes    Reason for call:   [facility] called asking which side can they draw from that would not be interfering with tomorrows procedure.    Ask for misa, or en when calling    Pharmacy location:  N/a  Outside Imaging: n/a   Can we leave a detailed message on this number?

## 2023-03-08 NOTE — TELEPHONE ENCOUNTER
Per review of 2/21/23 note:   From this point forward he is to avoid left arm venipunctures.    Discussed with Dr. Moran.  Ok to use right wrist area for IV stick/lab draws.  Returned call to [facility] and spoke with Chantelle and updated her of this.    Spoke with patient as it was also noted he had a left rotator cuff injury and was recently seen in the ED.  He was also given a prednisone taper. Pt stated he can bring his arm out to side.  Advised pt the concern with his injury and positioning for the procedure, in addition to the prednisone taper.  Pt would like to proceed with the surgery as scheduled.    Surgery scheduler notified.    Meka Vazquez, LEAHN, RN-[facility] [place]

## 2023-03-09 ENCOUNTER — ANESTHESIA (OUTPATIENT)
Dept: SURGERY | Facility: CLINIC | Age: 28
End: 2023-03-09
Payer: MEDICARE

## 2023-03-09 ENCOUNTER — HOSPITAL ENCOUNTER (OUTPATIENT)
Facility: CLINIC | Age: 28
Discharge: HOME | End: 2023-03-09
Attending: SURGERY | Admitting: SURGERY
Payer: MEDICARE

## 2023-03-09 ENCOUNTER — APPOINTMENT (OUTPATIENT)
Dept: SURGERY | Facility: PHYSICIAN GROUP | Age: 28
End: 2023-03-09
Payer: MEDICARE

## 2023-03-09 ENCOUNTER — APPOINTMENT (OUTPATIENT)
Dept: INTERVENTIONAL RADIOLOGY/VASCULAR | Facility: CLINIC | Age: 28
End: 2023-03-09
Attending: SURGERY
Payer: MEDICARE

## 2023-03-09 VITALS
TEMPERATURE: 97 F | RESPIRATION RATE: 16 BRPM | BODY MASS INDEX: 33.46 KG/M2 | HEART RATE: 72 BPM | SYSTOLIC BLOOD PRESSURE: 167 MMHG | HEIGHT: 72 IN | DIASTOLIC BLOOD PRESSURE: 94 MMHG | WEIGHT: 247 LBS | OXYGEN SATURATION: 95 %

## 2023-03-09 DIAGNOSIS — Z99.2 ESRD (END STAGE RENAL DISEASE) ON DIALYSIS (H): Primary | ICD-10-CM

## 2023-03-09 DIAGNOSIS — T82.898A ANEURYSM OF ARTERIOVENOUS DIALYSIS FISTULA, INITIAL ENCOUNTER (H): ICD-10-CM

## 2023-03-09 DIAGNOSIS — N18.6 ESRD (END STAGE RENAL DISEASE) ON DIALYSIS (H): Primary | ICD-10-CM

## 2023-03-09 LAB
ANION GAP SERPL CALCULATED.3IONS-SCNC: 15 MMOL/L (ref 7–15)
BUN SERPL-MCNC: 48.6 MG/DL (ref 6–20)
CALCIUM SERPL-MCNC: 9.3 MG/DL (ref 8.6–10)
CHLORIDE SERPL-SCNC: 96 MMOL/L (ref 98–107)
CREAT SERPL-MCNC: 9.91 MG/DL (ref 0.67–1.17)
DEPRECATED HCO3 PLAS-SCNC: 28 MMOL/L (ref 22–29)
GFR SERPL CREATININE-BSD FRML MDRD: 7 ML/MIN/1.73M2
GLUCOSE SERPL-MCNC: 104 MG/DL (ref 70–99)
POTASSIUM SERPL-SCNC: 4.5 MMOL/L (ref 3.4–5.3)
PROTOCOL CUTOFF: NORMAL
SA 1 CELL: NORMAL
SA 1 TEST METHOD: NORMAL
SA 2 CELL: NORMAL
SA 2 TEST METHOD: NORMAL
SA1 HI RISK ABY: NORMAL
SA1 MOD RISK ABY: NORMAL
SA2 HI RISK ABY: NORMAL
SA2 MOD RISK ABY: NORMAL
SODIUM SERPL-SCNC: 139 MMOL/L (ref 136–145)
UNACCEPTABLE ANTIGENS: NORMAL
UNOS CPRA: 57
ZZZSA 1  COMMENTS: NORMAL
ZZZSA 2 COMMENTS: NORMAL

## 2023-03-09 PROCEDURE — 250N000011 HC RX IP 250 OP 636: Performed by: RADIOLOGY

## 2023-03-09 PROCEDURE — 250N000011 HC RX IP 250 OP 636: Performed by: NURSE ANESTHETIST, CERTIFIED REGISTERED

## 2023-03-09 PROCEDURE — 37607 LIG/BANDING ANGIOACS AV FSTL: CPT | Mod: 59 | Performed by: SURGERY

## 2023-03-09 PROCEDURE — 250N000012 HC RX MED GY IP 250 OP 636 PS 637: Performed by: SURGERY

## 2023-03-09 PROCEDURE — 272N000001 HC OR GENERAL SUPPLY STERILE: Performed by: SURGERY

## 2023-03-09 PROCEDURE — 250N000011 HC RX IP 250 OP 636: Performed by: PHYSICIAN ASSISTANT

## 2023-03-09 PROCEDURE — 250N000009 HC RX 250: Performed by: SURGERY

## 2023-03-09 PROCEDURE — 710N000012 HC RECOVERY PHASE 2, PER MINUTE: Performed by: SURGERY

## 2023-03-09 PROCEDURE — 999N000141 HC STATISTIC PRE-PROCEDURE NURSING ASSESSMENT: Performed by: SURGERY

## 2023-03-09 PROCEDURE — 250N000013 HC RX MED GY IP 250 OP 250 PS 637: Performed by: ANESTHESIOLOGY

## 2023-03-09 PROCEDURE — C1750 CATH, HEMODIALYSIS,LONG-TERM: HCPCS

## 2023-03-09 PROCEDURE — 272N000602 HC WOUND GLUE CR1

## 2023-03-09 PROCEDURE — 360N000076 HC SURGERY LEVEL 3, PER MIN: Performed by: SURGERY

## 2023-03-09 PROCEDURE — 36558 INSERT TUNNELED CV CATH: CPT

## 2023-03-09 PROCEDURE — 250N000013 HC RX MED GY IP 250 OP 250 PS 637: Performed by: STUDENT IN AN ORGANIZED HEALTH CARE EDUCATION/TRAINING PROGRAM

## 2023-03-09 PROCEDURE — 258N000003 HC RX IP 258 OP 636: Performed by: SURGERY

## 2023-03-09 PROCEDURE — 36415 COLL VENOUS BLD VENIPUNCTURE: CPT | Performed by: ANESTHESIOLOGY

## 2023-03-09 PROCEDURE — 272N000196 HC ACCESSORY CR5

## 2023-03-09 PROCEDURE — 250N000025 HC SEVOFLURANE, PER MIN: Performed by: SURGERY

## 2023-03-09 PROCEDURE — 710N000009 HC RECOVERY PHASE 1, LEVEL 1, PER MIN: Performed by: SURGERY

## 2023-03-09 PROCEDURE — 250N000009 HC RX 250: Performed by: NURSE ANESTHETIST, CERTIFIED REGISTERED

## 2023-03-09 PROCEDURE — 99152 MOD SED SAME PHYS/QHP 5/>YRS: CPT

## 2023-03-09 PROCEDURE — 36821 AV FUSION DIRECT ANY SITE: CPT | Mod: LT | Performed by: SURGERY

## 2023-03-09 PROCEDURE — C1769 GUIDE WIRE: HCPCS

## 2023-03-09 PROCEDURE — 370N000017 HC ANESTHESIA TECHNICAL FEE, PER MIN: Performed by: SURGERY

## 2023-03-09 PROCEDURE — 250N000009 HC RX 250: Performed by: PHYSICIAN ASSISTANT

## 2023-03-09 PROCEDURE — 258N000003 HC RX IP 258 OP 636: Performed by: NURSE ANESTHETIST, CERTIFIED REGISTERED

## 2023-03-09 PROCEDURE — 82310 ASSAY OF CALCIUM: CPT | Performed by: ANESTHESIOLOGY

## 2023-03-09 RX ORDER — HYDROMORPHONE HCL IN WATER/PF 6 MG/30 ML
0.2 PATIENT CONTROLLED ANALGESIA SYRINGE INTRAVENOUS EVERY 5 MIN PRN
Status: DISCONTINUED | OUTPATIENT
Start: 2023-03-09 | End: 2023-03-09 | Stop reason: HOSPADM

## 2023-03-09 RX ORDER — SODIUM CHLORIDE, SODIUM LACTATE, POTASSIUM CHLORIDE, CALCIUM CHLORIDE 600; 310; 30; 20 MG/100ML; MG/100ML; MG/100ML; MG/100ML
INJECTION, SOLUTION INTRAVENOUS CONTINUOUS
Status: DISCONTINUED | OUTPATIENT
Start: 2023-03-09 | End: 2023-03-09 | Stop reason: HOSPADM

## 2023-03-09 RX ORDER — HYDROMORPHONE HCL IN WATER/PF 6 MG/30 ML
0.4 PATIENT CONTROLLED ANALGESIA SYRINGE INTRAVENOUS EVERY 5 MIN PRN
Status: DISCONTINUED | OUTPATIENT
Start: 2023-03-09 | End: 2023-03-09 | Stop reason: HOSPADM

## 2023-03-09 RX ORDER — NALOXONE HYDROCHLORIDE 0.4 MG/ML
0.4 INJECTION, SOLUTION INTRAMUSCULAR; INTRAVENOUS; SUBCUTANEOUS
Status: DISCONTINUED | OUTPATIENT
Start: 2023-03-09 | End: 2023-03-09 | Stop reason: HOSPADM

## 2023-03-09 RX ORDER — ONDANSETRON 4 MG/1
4 TABLET, ORALLY DISINTEGRATING ORAL
Status: DISCONTINUED | OUTPATIENT
Start: 2023-03-09 | End: 2023-03-09 | Stop reason: HOSPADM

## 2023-03-09 RX ORDER — FENTANYL CITRATE 50 UG/ML
INJECTION, SOLUTION INTRAMUSCULAR; INTRAVENOUS PRN
Status: DISCONTINUED | OUTPATIENT
Start: 2023-03-09 | End: 2023-03-09

## 2023-03-09 RX ORDER — BUPIVACAINE HYDROCHLORIDE 2.5 MG/ML
INJECTION, SOLUTION EPIDURAL; INFILTRATION; INTRACAUDAL
Status: DISCONTINUED
Start: 2023-03-09 | End: 2023-03-09 | Stop reason: HOSPADM

## 2023-03-09 RX ORDER — HEPARIN SODIUM 1000 [USP'U]/ML
2000-4000 INJECTION, SOLUTION INTRAVENOUS; SUBCUTANEOUS ONCE
Status: COMPLETED | OUTPATIENT
Start: 2023-03-09 | End: 2023-03-09

## 2023-03-09 RX ORDER — AMOXICILLIN 250 MG
1-2 CAPSULE ORAL 2 TIMES DAILY
Qty: 30 TABLET | Refills: 0 | Status: SHIPPED | OUTPATIENT
Start: 2023-03-09 | End: 2023-04-04

## 2023-03-09 RX ORDER — NALOXONE HYDROCHLORIDE 0.4 MG/ML
0.2 INJECTION, SOLUTION INTRAMUSCULAR; INTRAVENOUS; SUBCUTANEOUS
Status: DISCONTINUED | OUTPATIENT
Start: 2023-03-09 | End: 2023-03-09 | Stop reason: HOSPADM

## 2023-03-09 RX ORDER — FLUMAZENIL 0.1 MG/ML
0.2 INJECTION, SOLUTION INTRAVENOUS
Status: DISCONTINUED | OUTPATIENT
Start: 2023-03-09 | End: 2023-03-09 | Stop reason: HOSPADM

## 2023-03-09 RX ORDER — PROPOFOL 10 MG/ML
INJECTION, EMULSION INTRAVENOUS PRN
Status: DISCONTINUED | OUTPATIENT
Start: 2023-03-09 | End: 2023-03-09

## 2023-03-09 RX ORDER — HEPARIN SODIUM 1000 [USP'U]/ML
INJECTION, SOLUTION INTRAVENOUS; SUBCUTANEOUS PRN
Status: DISCONTINUED | OUTPATIENT
Start: 2023-03-09 | End: 2023-03-09

## 2023-03-09 RX ORDER — MAGNESIUM HYDROXIDE 1200 MG/15ML
LIQUID ORAL PRN
Status: DISCONTINUED | OUTPATIENT
Start: 2023-03-09 | End: 2023-03-09 | Stop reason: HOSPADM

## 2023-03-09 RX ORDER — SODIUM CHLORIDE 9 MG/ML
INJECTION, SOLUTION INTRAVENOUS CONTINUOUS PRN
Status: DISCONTINUED | OUTPATIENT
Start: 2023-03-09 | End: 2023-03-09

## 2023-03-09 RX ORDER — CEFAZOLIN SODIUM/WATER 2 G/20 ML
2 SYRINGE (ML) INTRAVENOUS
Status: COMPLETED | OUTPATIENT
Start: 2023-03-09 | End: 2023-03-09

## 2023-03-09 RX ORDER — FENTANYL CITRATE 0.05 MG/ML
25 INJECTION, SOLUTION INTRAMUSCULAR; INTRAVENOUS EVERY 5 MIN PRN
Status: DISCONTINUED | OUTPATIENT
Start: 2023-03-09 | End: 2023-03-09 | Stop reason: HOSPADM

## 2023-03-09 RX ORDER — LIDOCAINE HYDROCHLORIDE 20 MG/ML
INJECTION, SOLUTION INFILTRATION; PERINEURAL PRN
Status: DISCONTINUED | OUTPATIENT
Start: 2023-03-09 | End: 2023-03-09

## 2023-03-09 RX ORDER — FENTANYL CITRATE 50 UG/ML
25-50 INJECTION, SOLUTION INTRAMUSCULAR; INTRAVENOUS EVERY 5 MIN PRN
Status: DISCONTINUED | OUTPATIENT
Start: 2023-03-09 | End: 2023-03-09 | Stop reason: HOSPADM

## 2023-03-09 RX ORDER — LIDOCAINE HYDROCHLORIDE 10 MG/ML
INJECTION, SOLUTION EPIDURAL; INFILTRATION; INTRACAUDAL; PERINEURAL
Status: DISCONTINUED
Start: 2023-03-09 | End: 2023-03-09 | Stop reason: HOSPADM

## 2023-03-09 RX ORDER — OXYCODONE HYDROCHLORIDE 5 MG/1
5-10 TABLET ORAL EVERY 4 HOURS PRN
Qty: 10 TABLET | Refills: 0 | Status: SHIPPED | OUTPATIENT
Start: 2023-03-09 | End: 2023-03-13

## 2023-03-09 RX ORDER — ACETAMINOPHEN 325 MG/1
650 TABLET ORAL EVERY 4 HOURS PRN
Qty: 50 TABLET | Refills: 0 | Status: ON HOLD | OUTPATIENT
Start: 2023-03-09 | End: 2023-12-08

## 2023-03-09 RX ORDER — OXYCODONE HYDROCHLORIDE 5 MG/1
5 TABLET ORAL
Status: COMPLETED | OUTPATIENT
Start: 2023-03-09 | End: 2023-03-09

## 2023-03-09 RX ORDER — FENTANYL CITRATE 0.05 MG/ML
50 INJECTION, SOLUTION INTRAMUSCULAR; INTRAVENOUS EVERY 5 MIN PRN
Status: DISCONTINUED | OUTPATIENT
Start: 2023-03-09 | End: 2023-03-09 | Stop reason: HOSPADM

## 2023-03-09 RX ORDER — PROPOFOL 10 MG/ML
INJECTION, EMULSION INTRAVENOUS CONTINUOUS PRN
Status: DISCONTINUED | OUTPATIENT
Start: 2023-03-09 | End: 2023-03-09

## 2023-03-09 RX ORDER — CEFAZOLIN SODIUM 2 G/100ML
2 INJECTION, SOLUTION INTRAVENOUS
Status: DISCONTINUED | OUTPATIENT
Start: 2023-03-09 | End: 2023-03-09 | Stop reason: HOSPADM

## 2023-03-09 RX ORDER — HEPARIN SODIUM 1000 [USP'U]/ML
INJECTION, SOLUTION INTRAVENOUS; SUBCUTANEOUS
Status: DISCONTINUED
Start: 2023-03-09 | End: 2023-03-09 | Stop reason: HOSPADM

## 2023-03-09 RX ORDER — HYDRALAZINE HYDROCHLORIDE 50 MG/1
100 TABLET, FILM COATED ORAL ONCE
Status: COMPLETED | OUTPATIENT
Start: 2023-03-09 | End: 2023-03-09

## 2023-03-09 RX ORDER — ACETAMINOPHEN 325 MG/1
650 TABLET ORAL
Status: DISCONTINUED | OUTPATIENT
Start: 2023-03-09 | End: 2023-03-09 | Stop reason: HOSPADM

## 2023-03-09 RX ADMIN — HEPARIN SODIUM 3800 UNITS: 1000 INJECTION INTRAVENOUS; SUBCUTANEOUS at 09:06

## 2023-03-09 RX ADMIN — FENTANYL CITRATE 25 MCG: 50 INJECTION, SOLUTION INTRAMUSCULAR; INTRAVENOUS at 08:53

## 2023-03-09 RX ADMIN — MIDAZOLAM 0.5 MG: 1 INJECTION INTRAMUSCULAR; INTRAVENOUS at 08:46

## 2023-03-09 RX ADMIN — HYDRALAZINE HYDROCHLORIDE 100 MG: 50 TABLET, FILM COATED ORAL at 15:58

## 2023-03-09 RX ADMIN — HEPARIN SODIUM 5000 UNITS: 1000 INJECTION INTRAVENOUS; SUBCUTANEOUS at 13:35

## 2023-03-09 RX ADMIN — PROPOFOL 50 MG: 10 INJECTION, EMULSION INTRAVENOUS at 10:52

## 2023-03-09 RX ADMIN — MIDAZOLAM 0.5 MG: 1 INJECTION INTRAMUSCULAR; INTRAVENOUS at 08:53

## 2023-03-09 RX ADMIN — SODIUM CHLORIDE: 9 INJECTION, SOLUTION INTRAVENOUS at 09:40

## 2023-03-09 RX ADMIN — PROPOFOL 100 MG: 10 INJECTION, EMULSION INTRAVENOUS at 10:30

## 2023-03-09 RX ADMIN — FENTANYL CITRATE 50 MCG: 50 INJECTION, SOLUTION INTRAMUSCULAR; INTRAVENOUS at 10:20

## 2023-03-09 RX ADMIN — SODIUM CHLORIDE: 9 INJECTION, SOLUTION INTRAVENOUS at 12:46

## 2023-03-09 RX ADMIN — PROPOFOL 25 MCG/KG/MIN: 10 INJECTION, EMULSION INTRAVENOUS at 10:57

## 2023-03-09 RX ADMIN — PROPOFOL 100 MG: 10 INJECTION, EMULSION INTRAVENOUS at 10:22

## 2023-03-09 RX ADMIN — PROPOFOL 50 MG: 10 INJECTION, EMULSION INTRAVENOUS at 10:59

## 2023-03-09 RX ADMIN — PROPOFOL 200 MG: 10 INJECTION, EMULSION INTRAVENOUS at 10:20

## 2023-03-09 RX ADMIN — Medication 2 G: at 08:17

## 2023-03-09 RX ADMIN — PROPOFOL 50 MG: 10 INJECTION, EMULSION INTRAVENOUS at 11:37

## 2023-03-09 RX ADMIN — FENTANYL CITRATE 50 MCG: 50 INJECTION, SOLUTION INTRAMUSCULAR; INTRAVENOUS at 11:09

## 2023-03-09 RX ADMIN — FENTANYL CITRATE 25 MCG: 50 INJECTION, SOLUTION INTRAMUSCULAR; INTRAVENOUS at 08:43

## 2023-03-09 RX ADMIN — MIDAZOLAM 1 MG: 1 INJECTION INTRAMUSCULAR; INTRAVENOUS at 10:15

## 2023-03-09 RX ADMIN — LIDOCAINE HYDROCHLORIDE 100 MG: 20 INJECTION, SOLUTION INFILTRATION; PERINEURAL at 10:20

## 2023-03-09 RX ADMIN — OXYCODONE HYDROCHLORIDE 5 MG: 5 TABLET ORAL at 16:00

## 2023-03-09 RX ADMIN — FENTANYL CITRATE 50 MCG: 50 INJECTION, SOLUTION INTRAMUSCULAR; INTRAVENOUS at 10:30

## 2023-03-09 RX ADMIN — LIDOCAINE HYDROCHLORIDE 16 ML: 10 INJECTION, SOLUTION INFILTRATION; PERINEURAL at 08:55

## 2023-03-09 RX ADMIN — FENTANYL CITRATE 50 MCG: 50 INJECTION, SOLUTION INTRAMUSCULAR; INTRAVENOUS at 10:59

## 2023-03-09 ASSESSMENT — ACTIVITIES OF DAILY LIVING (ADL)
ADLS_ACUITY_SCORE: 18

## 2023-03-09 NOTE — INTERVAL H&P NOTE
I have reviewed the surgical (or preoperative) H&P that is linked to this encounter, and examined the patient. There are no significant changes    Clinical Conditions Present on Arrival:  Clinically Significant Risk Factors Present on Admission               # Anion Gap Metabolic Acidosis: Highest Anion Gap = 21 mmol/L in last 2 days, will monitor and treat as appropriate      # Obesity: Estimated body mass index is 33.5 kg/m  as calculated from the following:    Height as of this encounter: 1.829 m (6').    Weight as of this encounter: 112 kg (247 lb).

## 2023-03-09 NOTE — BRIEF OP NOTE
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 Creation of LUE AV fistula..  Complications: None.  Implants: * No implants in log *

## 2023-03-09 NOTE — IR NOTE
Interventional Radiology Intra-procedural Nursing Note    Patient Name: Taylor Valiente  Medical Record Number: [id number]  Today's Date: March 9, 2023    Start Time: 854  End of procedure time: 905  Procedure: Tunneled dialysis catheter placement with moderate sedation  Report given to: Patrice Same Day RN  Time pt departs:  909    Other Notes: Pt into IR suite 1 via cart. Pt awake and alert. To table in supine position. Monitoring equipment applied. VSS. Tele SR. Dr. Hernandez in room. Time out and procedure started. Pt tolerated procedure well. Debrief with Dr. Hernandez. CVC tunneled line placed and pressure held until hemostasis achieved. Dressing CDI. No complications. Pt transferred back Same Day.    Medications:    Versed 1 mg  Fentanyl 50 mcg  Lidocaine 1% 16 ml    Ji Giron RN

## 2023-03-09 NOTE — ANESTHESIA POSTPROCEDURE EVALUATION
Patient: Taylor Valiente    Procedure: Procedure(s):  LIGATION OF RIGHT ARM ARTERIOVENOUS FISTULA  CREATION OF FIRST STAGE LEFT BRACHIOBASILIC ARTERIOVENOUS FISTULA       Anesthesia Type:  MAC    Note:  Disposition: Outpatient   Postop Pain Control: Uneventful            Sign Out: Well controlled pain   PONV: No   Neuro/Psych: Uneventful            Sign Out: Acceptable/Baseline neuro status   Airway/Respiratory: Uneventful            Sign Out: Acceptable/Baseline resp. status   CV/Hemodynamics: Uneventful            Sign Out: Acceptable CV status; No obvious hypovolemia; No obvious fluid overload   Other NRE: NONE   DID A NON-ROUTINE EVENT OCCUR? No           Last vitals:  Vitals Value Taken Time   /113 03/09/23 1537   Temp 36.2  C (97.1  F) 03/09/23 1530   Pulse 72 03/09/23 1541   Resp 13 03/09/23 1541   SpO2 98 % 03/09/23 1541   Vitals shown include unvalidated device data.    Electronically Signed By: ROLAND COOPER MD  March 9, 2023  3:43 PM

## 2023-03-09 NOTE — OP NOTE
Procedure Date: 03/09/2023    PREOPERATIVE DIAGNOSIS:  Status post proximal right radial artery to cephalic vein AV fistula, now with severe aneurysmal degeneration of the fistula and high fistula output.    POSTOPERATIVE DIAGNOSIS:  Status post proximal right radial artery to cephalic vein AV fistula, now with severe aneurysmal degeneration of the fistula and high fistula output.    PROCEDURES PERFORMED:    1.  Ligation of right arm AV fistula.  2.  Creation of first stage left brachiobasilic AV fistula.    SURGEON:  Edwardo Moran MD    ASSISTANT:  Winter Mercado MD.  She is a [facility] surgery resident.    ANESTHESIA:  LMA general anesthesia.    ESTIMATED BLOOD LOSS:  75 mL.     INDICATIONS FOR PROCEDURE:  This patient is a 27-year-old gentleman who is status post creation of a proximal right ykwqtd-[id number]-[id number] vein AV fistula.  Over the years, he has developed significant aneurysmal degeneration of his fistula.  His dialysis center is having increasing difficulty properly accessing the fistula.  Furthermore, on ultrasound he now has a very high calculated outflow volume of 7957 mL per minute.  He does not complain of steal syndrome symptoms.  I am recommending ligation of this fistula.  He has a marginal basilic vein in the left arm.  I am recommending creation of a first stage brachiobasilic AV fistula on the left.    OPERATIVE FINDINGS:  On the right side, there is a tremendously aneurysmal and tortuous cephalic vein that made it somewhat challenging to dissect out the AV anastomosis.  Ultimately, this was secured and the fistula was ligated leaving a small remnant cuff of vein on the proximal radial artery.  Post completion of this procedure, there was a 2+ palpable right radial pulse in the wrist.  In the left arm, there was a reasonable quality basilic vein in the upper arm but in the distal humeral level it tapered down to about 3 mm.  There was a 3 mm median cubital vein off of this crossing over the  brachial artery at the antecubital crease.  We performed an end-to-side median cubital vein to brachial artery anastomosis.  At completion, there was a palpable thrill in the median cubital vein and a 2+ palpable pulse in the radial artery in the left wrist.    DESCRIPTION OF PROCEDURE:  After informed consent was obtained, the patient was brought to the operating room and placed on the table in a supine position.  LMA general anesthesia was achieved without incident.  His right arm was prepped and draped in the usual sterile fashion.  I interrogated that arm with ultrasound prior to the draping.  Timeout was called and we verified the patient's identity, the operative sites, and the proposed procedure.  We reincised the transverse incision directly over the AV fistula.  Dissection proceeded sharply downward to encounter this large aneurysmal cephalic vein.  This was fully mobilized throughout the length of our incision and our dissection proceeded downward to localize the brachial artery at its bifurcation.  We obtained proximal control of the radial artery and distal control of the radial artery.  During the course of that more distal dissection, a tear was created in the radial artery near the AV anastomosis.  This was controlled with an Allis clamp.  Proximal and distal control was achieved on the cephalic vein and it was divided.  The distal vein was ligated with 0 silk ties.  A profunda clamp was placed across the cephalic vein at the level of the radial artery anastomosis.  The vein was transected leaving a 2 mm cuff.  This was oversewn using a double running layer of 5-0 Prolene suture.  This clamp was removed and then the oversewn segment was observed to be hemostatic.  I then placed a running repair stitch in the radial artery just beyond this point.  Excellent hemostasis was achieved.  There was a palpable pulse in the radial artery distal to this repair.  We chose to accept this result.  We interrogated  both the radial and the ulnar arteries and had triphasic Doppler signals.  Hemostasis for the wound was assured.  Closure then proceeded in layers using interrupted absorbable suture and the skin was closed transversely with interrupted 3-0 nylon sutures.  At the completion of this procedure, there was a 2+ palpable right radial pulse at the wrist and the hand was warm and pink.  Next, the left upper extremity was prepped and draped in the usual sterile fashion.  I had interrogated the veins in this arm and I had marked the course of the median cubital vein relative to the brachial artery at the antecubital crease.  A transverse skin incision was made 1 fingerbreadth above the antecubital crease.  Dissection proceeded sharply downward to expose the median cubital vein at this level.  It was fully mobilized throughout the length of this incision.  We incised the fascia vertically and we exposed the brachial artery for 3 cm.  Proximal and distal control was achieved with vessel loops.  The patient was systemically heparinized with 5000 units of intravenous heparin.  After a 5-minute circulation time, proximal and distal control of the brachial artery was achieved with vessel loops.  A 6 mm brachial arteriotomy was made.  I could readily pass a 3 mm dilator up and down the brachial artery.  Heparinized saline was instilled up and down that vessel.  The artery was controlled with vessel loops.  The median cubital vein was cut to length in a spatulated manner.  It was gently dilated with a papaverine solution and I could easily pass a 3 mm dilator up the vein.  We proceeded with an end-to-side median cubital vein to brachial artery anastomosis using running 7-0 Prolene suture.  This anastomosis was subsequently secured and observed to be hemostatic as flow was restored first up the median cubital vein before releasing distal control of the brachial artery.  Immediately noted was a nice distention of the median cubital  vein at the anastomosis with a palpable thrill in the median cubital vein.  There was a 2+ palpable left radial artery pulse at the wrist.  We chose to accept this.  Hemostasis for the wound was assured.  The wound was infiltrated with 0.25% Marcaine with epinephrine and 1% lidocaine with epinephrine block.  The wound was closed in layers with interrupted absorbable suture.  Skin for this incision was closed with 4-0 Monocryl running subcuticular sutures.  Steri-Strips and sterile dressings were applied.  Final sponge and needle count were reported as correct.  The patient tolerated the procedure without incident.  He was returned extubated and hemodynamically stable to the recovery room.    Henrik Moran MD        D: 2023   T: 2023   MT: MADAN    Name:     STEFAN RIOS  MRN:      [id number]        Account:        [id number]   :      1995           Procedure Date: 2023     Document: [id number]

## 2023-03-09 NOTE — ANESTHESIA PREPROCEDURE EVALUATION
Anesthesia Pre-Procedure Evaluation    Patient: Taylor Valiente   MRN: [id number] : 1995        Procedure : Procedure(s):  LIGATION OF RIGHT ARM ARTERIOVENOUS FISTULA  CREATION OF FIRST STAGE LEFT BRACHIOBASILIC ARTERIOVENOUS FISTULA          Past Medical History:   Diagnosis Date     Adrenal adenoma, left      Anemia      Benign essential hypertension 2017     CKD (chronic kidney disease) stage 5, GFR less than 15 ml/min (H)     FSGS     Depression      Focal glomerular sclerosis 2017     HLD (hyperlipidemia)      LVH (left ventricular hypertrophy) due to hypertensive disease 2017     Noncompliance      Obesity, unspecified      OD (osteochondritis dissecans) 2021     Stress-induced cardiomyopathy      Suicide attempt (H) 2019      Past Surgical History:   Procedure Laterality Date     ARTHROSCOPY ANKLE Left 2021    Procedure: Left ankle arthroscopy and debridement/micro fracture;  Surgeon: Mirza Nelson MD;  Location: [facility] OR     BIOPSY  2017    renal- [facility]     CREATE FISTULA ARTERIOVENOUS UPPER EXTREMITY Right 3/4/2021    Procedure: RIGHT proximal radial  to CEPHALIC ARTERIOVENOUS FISTULA;  Surgeon: Henrik Moran MD;  Location: [facility] OR     IR CVC TUNNEL PLACEMENT > 5 YRS OF AGE  2021     IR CVC TUNNEL REMOVAL RIGHT  12/3/2021     NO HISTORY OF SURGERY       REVISION FISTULA ARTERIOVENOUS UPPER EXTREMITY Right 2021    Procedure: Second stage RIGHT BRACHIOCEPHALIC transposition ARTERIOVENOUS FISTULA;  Surgeon: Henrik Moran MD;  Location: [facility] OR      Allergies   Allergen Reactions     Benadryl [Diphenhydramine] Itching      Social History     Tobacco Use     Smoking status: Never     Smokeless tobacco: Never   Substance Use Topics     Alcohol use: No      Wt Readings from Last 1 Encounters:   23 112 kg (247 lb)        Anesthesia Evaluation            ROS/MED HX  ENT/Pulmonary:  - neg pulmonary ROS     Neurologic:  - neg  neurologic ROS     Cardiovascular:     (+) hypertension-----dysrhythmias, Long QT, Previous cardiac testing   Echo: Date: Results:    Stress Test: Date: 10/29/23 Results:  Procedure  Stress Echo Complete. Contrast Optison.  ______________________________________________________________________________  Interpretation Summary  Probably normal stress echocardiogram with no wall motion abnormalities seen  on the post exercise images. The sensitivity of the test for detection of  ischemia is limited since the target heart rate was not achieved.  ______________________________________________________________________________  Stress  RPP 26,640.  The patient exercised 9:19.  There was a borderline hypertensive BP response to exercise.  Exercise was stopped due to fatigue.  The patient exhibited no chest pain during exercise.  A low workload was achieved.  Target Heart Rate was not achieved due to fatigue.  The Duke treadmill score was low risk ( >5 Duke score).  The EKG portion of this stress test was negative for inducible ischemia (see  echo results below).  At target heart rate with Dobutamine left ventricular size decreases.  At target heart rate with Dobutamine, global left ventricular function  augments.  The visual ejection fraction is 65-70%.  Normal left ventricular function and wall motion at rest and post-stress.     Baseline  The patient is in normal sinus rhythm.  Early repolarization abnormalities are noted.  Normal left ventricular function and wall motion at rest.  The visual ejection fraction is estimated at 55-60%.  ECG Reviewed: Date: Results:    Cath: Date: Results:      METS/Exercise Tolerance:     Hematologic:    (-) anemia   Musculoskeletal:       GI/Hepatic:    (-) GERD   Renal/Genitourinary:     (+) renal disease, type: ESRD, Pt requires dialysis,     Endo:     (+) Obesity,     Psychiatric/Substance Use:  - neg psychiatric ROS     Infectious Disease:       Malignancy:       Other:             Physical Exam    Airway        Mallampati: II   TM distance: > 3 FB   Neck ROM: full   Mouth opening: > 3 cm    Respiratory Devices and Support         Dental       (+) Minor Abnormalities - some fillings, tiny chips      Cardiovascular   cardiovascular exam normal          Pulmonary   pulmonary exam normal                OUTSIDE LABS:  CBC:   Lab Results   Component Value Date    WBC 8.2 03/07/2023    WBC 11.2 (H) 12/12/2022    HGB 11.2 (L) 03/07/2023    HGB 10.6 (L) 12/12/2022    HCT 35.8 (L) 03/07/2023    HCT 34.6 (L) 12/12/2022     03/07/2023     (L) 12/12/2022     BMP:   Lab Results   Component Value Date     03/07/2023     12/12/2022    POTASSIUM 5.0 03/07/2023    POTASSIUM 5.6 (H) 12/12/2022    CHLORIDE 98 03/07/2023    CHLORIDE 101 12/12/2022    CO2 24 03/07/2023    CO2 23 12/12/2022    BUN 64.1 (H) 03/07/2023    BUN 95.6 (H) 12/12/2022    CR 12.20 (H) 03/07/2023    CR 15.45 (H) 12/12/2022     (H) 03/07/2023     (H) 12/12/2022     COAGS:   Lab Results   Component Value Date    PTT 35 02/09/2021    INR 1.23 (H) 02/09/2021     POC:   Lab Results   Component Value Date     (H) 02/24/2021     HEPATIC:   Lab Results   Component Value Date    ALBUMIN 4.0 06/20/2022    PROTTOTAL 7.8 06/20/2022    ALT 26 06/20/2022    AST 9 06/20/2022    ALKPHOS 123 06/20/2022    BILITOTAL 0.3 06/20/2022     OTHER:   Lab Results   Component Value Date    LACT 0.7 08/10/2021    A1C 5.1 08/26/2021    BUBBA 9.0 03/07/2023    PHOS 7.2 (H) 06/18/2021    MAG 2.3 12/12/2022    LIPASE 304 11/29/2021    TSH 1.67 03/03/2022    T4 1.26 07/15/2017    CRP 7.9 11/29/2020       Anesthesia Plan    ASA Status:  3   NPO Status:  NPO Appropriate    Anesthesia Type: General.     - Airway: LMA   Induction: Intravenous, Propofol.   Maintenance: Balanced.        Consents    Anesthesia Plan(s) and associated risks, benefits, and realistic alternatives discussed. Questions answered and  patient/representative(s) expressed understanding.    - Discussed:     - Discussed with:  Patient         Postoperative Care    Pain management: IV analgesics.   PONV prophylaxis: Ondansetron (or other 5HT-3), Background Propofol Infusion     Comments:                Abhijit Garza DO, DO

## 2023-03-09 NOTE — OR NURSING
AOX3-VSS-O2 sats >92% RA- Good PO intake, pain control adeqate 4/10- Pt and responsible adult verbalize understanding of discharge instructions, denies questions. Up in W/C - transported to door for discharge to home.

## 2023-03-09 NOTE — ANESTHESIA PROCEDURE NOTES
Airway       Patient location during procedure: OR ([facility] - Operating Room or Procedural Area)  Staff -        Anesthesiologist:  Abhijit Garza DO       CRNA: Gunjan Burleson APRN CRNA       Performed By: CRNA  Consent for Airway        Urgency: elective  Indications and Patient Condition       Indications for airway management: stephanie-procedural       Induction type:intravenous       Mask difficulty assessment: 2 - vent by mask + OA or adjuvant +/- NMBA    Final Airway Details       Final airway type: supraglottic airway    Supraglottic Airway Details        Type: LMA       Brand: Ambu AuraGain       LMA size: 5    Post intubation assessment        Number of attempts at approach: 1       Number of other approaches attempted: 0       Secured with: plastic tape and commercial tube watson       Ease of procedure: easy       Dentition: Intact and Unchanged

## 2023-03-09 NOTE — OR NURSING
Dr. Cox notified of patient's high blood pressure in PACU. Patient states he normally takes a dose of 100 mg hyrdalazine PO at 1500 daily. Dr. Cox gave verbal order to give patient this dose. Pharmacy notified and will tube med.

## 2023-03-09 NOTE — PRE-PROCEDURE
GENERAL PRE-PROCEDURE:   Procedure:  Tunneled dialysis catheter placement with moderate sedation  Date/Time:  3/9/2023 7:24 AM    Written consent obtained?: Yes    Risks and benefits: Risks, benefits and alternatives were discussed    Consent given by:  Patient  Patient states understanding of procedure being performed: Yes    Patient's understanding of procedure matches consent: Yes    Procedure consent matches procedure scheduled: Yes    Expected level of sedation:  Moderate  Appropriately NPO:  Yes  ASA Class:  3  Mallampati  :  Grade 1- soft palate, uvula, tonsillar pillars, and posterior pharyngeal wall visible  Lungs:  Lungs clear with good breath sounds bilaterally  Heart:  Normal heart sounds and rate and other (comment)  Heart exam comment:  Distant   History & Physical reviewed:  History and physical reviewed and no updates needed  Statement of review:  I have reviewed the lab findings, diagnostic data, medications, and the plan for sedation

## 2023-03-09 NOTE — ANESTHESIA CARE TRANSFER NOTE
Patient: Taylor Valiente    Procedure: Procedure(s):  LIGATION OF RIGHT ARM ARTERIOVENOUS FISTULA  CREATION OF FIRST STAGE LEFT BRACHIOBASILIC ARTERIOVENOUS FISTULA       Diagnosis: Aneurysm of arteriovenous dialysis fistula, initial encounter (H) [T82.929X]  Diagnosis Additional Information: No value filed.    Anesthesia Type:   MAC     Note:    Oropharynx: oropharynx clear of all foreign objects and spontaneously breathing  Level of Consciousness: drowsy  Oxygen Supplementation: face mask  Level of Supplemental Oxygen (L/min / FiO2): 8      Vital Signs Stable: post-procedure vital signs reviewed and stable  Report to RN Given: handoff report given  Patient transferred to: PACU  Comments: At end of procedure, spontaneous respirations, adequate tidal volumes, followed commands to voice, LMA removed atraumatically, oropharynx suctioned, airway patent after LMA removal. Oxygen via facemask at 8 liters per minute to PACU. Oxygen tubing connected to wall O2 in PACU, SpO2, NiBP, and EKG monitors and alarms on and functioning, Barry Hugger warmer connected to patient gown, report on patient's clinical status given to PACU RN, RN questions answered.  Handoff Report: Identifed the Patient, Identified the Reponsible Provider, Reviewed the pertinent medical history, Discussed the surgical course, Reviewed Intra-OP anesthesia mangement and issues during anesthesia, Set expectations for post-procedure period and Allowed opportunity for questions and acknowledgement of understanding      Vitals:  Vitals Value Taken Time   /70 03/09/23 1455   Temp     Pulse 81 03/09/23 1459   Resp 21 03/09/23 1459   SpO2 98 % 03/09/23 1459   Vitals shown include unvalidated device data.    Electronically Signed By: ROSA Varner CRNA  March 9, 2023  3:00 PM

## 2023-03-09 NOTE — DISCHARGE INSTRUCTIONS
[facility]  Discharge Instructions: AV Fistula Creation    ACTIVITY  Expect to feel tired after your surgery.  This will gradually resolve.    Take frequent, short walks and increase your activity gradually.    Avoid strenuous physical activity or heavy lifting greater than 25 lbs. for 2-3 weeks.  You may climb stairs.  You may drive without restrictions when you are not using any prescription pain medication and feel comfortable in a car.  You may return to work/school when you are comfortable without any prescription pain medication.    WOUND CARE  You may remove your outer dressing or Band-Aids and shower 48 hours after the surgery.  Pat your incisions dry and leave them open to air.  Re-apply dressing (Band-Aids or gauze/tape) as needed for comfort or drainage.  You may have steri-strips (looks like white tape) on your incision.  You may peel off the steri-strips 2 weeks after your surgery if they have not peeled off on their own.   Do not soak your incisions in a tub or pool for 2 weeks.   Do not apply any lotions, creams, or ointments to your incisions.  A ridge under your incisions is normal and will gradually resolve.    PAIN  Expect some tenderness and discomfort at the incision sites.  Use the prescribed pain medication at your discretion.  Expect gradual resolution of your pain over several days.  You may take ibuprofen with food (unless you have been told not to) or acetaminophen/Tylenol instead of or in addition to your prescribed pain medication.  However, if you are taking Norco or Percocet, do not take any additional acetaminophen/Tylenol.  Do not drink alcohol or drive while you are taking pain medications.  You may apply ice to your incisions in 20 minute intervals as needed for the next 48 hours.  After that time, consider switching to heat if you prefer.    EXPECTATIONS  Pain medications can cause constipation.  Limit use when possible.  Take an over the counter or prescribed stool  softener/stimulant, such as Colace or Senna, 1-2 times a day with plenty of water.  You may take a mild over the counter laxative, such as Miralax or a suppository, as needed.  You make discontinue these medications once you are having regular bowel movements and/or are no longer taking your narcotic pain medication.   If you are unable to urinate for 8 hours or feel as though you are not emptying your bladder adequately, we recommend you seek care at an ER or Urgent Care facility for possible catheter placement.     FOLLOW UP  Our office will contact you in approximately 2-3 weeks please call us at [phone] to schedule this appointment.    CALL OUR OFFICE -[phone] IF YOU HAVE:   Chills or fever above 101 F.  Increased redness, warmth, or drainage at your incisions.  Significant bleeding.  Pain not relieved by your pain medication or rest.  Increasing pain after the first 48 hours.  Any other concerns or questions.           Revised December 2021           Same Day Surgery Discharge Instructions for  Sedation and General Anesthesia     It's not unusual to feel dizzy, light-headed or faint for up to 24 hours after surgery or while taking pain medication.  If you have these symptoms: sit for a few minutes before standing and have someone assist you when you get up to walk or use the bathroom.    You should rest and relax for the next 24 hours. We recommend you make arrangements to have an adult stay with you for at least 24 hours after your discharge.  Avoid hazardous and strenuous activity.    DO NOT DRIVE any vehicle or operate mechanical equipment for 24 hours following the end of your surgery.  Even though you may feel normal, your reactions may be affected by the medication you have received.    Do not drink alcoholic beverages for 24 hours following surgery.     Slowly progress to your regular diet as you feel able. It's not unusual to feel nauseated and/or vomit after receiving anesthesia.  If you  develop these symptoms, drink clear liquids (apple juice, ginger ale, broth, 7-up, etc. ) until you feel better.  If your nausea and vomiting persists for 24 hours, please notify your surgeon.      All narcotic pain medications, along with inactivity and anesthesia, can cause constipation. Drinking plenty of liquids and increasing fiber intake will help.    For any questions of a medical nature, call your surgeon.    Do not make important decisions for 24 hours.    If you had general anesthesia, you may have a sore throat for a couple of days related to the breathing tube used during surgery.  You may use Cepacol lozenges to help with this discomfort.  If it worsens or if you develop a fever, contact your surgeon.     If you feel your pain is not well managed with the pain medications prescribed by your surgeon, please contact your surgeon's office to let them know so they can address your concerns.      **If you have questions or concerns about your procedure,   call Dr. Moran at [phone]**

## 2023-03-12 ENCOUNTER — HOSPITAL ENCOUNTER (EMERGENCY)
Facility: CLINIC | Age: 28
Discharge: HOME | DRG: 987 | End: 2023-03-12
Attending: EMERGENCY MEDICINE | Admitting: EMERGENCY MEDICINE
Payer: MEDICARE

## 2023-03-12 ENCOUNTER — APPOINTMENT (OUTPATIENT)
Dept: ULTRASOUND IMAGING | Facility: CLINIC | Age: 28
DRG: 987 | End: 2023-03-12
Attending: EMERGENCY MEDICINE
Payer: MEDICARE

## 2023-03-12 VITALS
TEMPERATURE: 99.3 F | HEIGHT: 72 IN | RESPIRATION RATE: 18 BRPM | DIASTOLIC BLOOD PRESSURE: 81 MMHG | OXYGEN SATURATION: 100 % | WEIGHT: 240 LBS | HEART RATE: 100 BPM | BODY MASS INDEX: 32.51 KG/M2 | SYSTOLIC BLOOD PRESSURE: 166 MMHG

## 2023-03-12 DIAGNOSIS — I82.90 ACUTE DEEP VEIN THROMBOSIS (DVT) OF NON-EXTREMITY VEIN: ICD-10-CM

## 2023-03-12 PROCEDURE — 250N000013 HC RX MED GY IP 250 OP 250 PS 637: Performed by: EMERGENCY MEDICINE

## 2023-03-12 PROCEDURE — 99284 EMERGENCY DEPT VISIT MOD MDM: CPT | Mod: 25

## 2023-03-12 PROCEDURE — 93971 EXTREMITY STUDY: CPT | Mod: RT

## 2023-03-12 RX ORDER — APIXABAN 5 MG (74)
KIT ORAL
Qty: 74 EACH | Refills: 0 | Status: ON HOLD | OUTPATIENT
Start: 2023-03-12 | End: 2023-03-19

## 2023-03-12 RX ORDER — OXYCODONE HYDROCHLORIDE 5 MG/1
10 TABLET ORAL ONCE
Status: COMPLETED | OUTPATIENT
Start: 2023-03-12 | End: 2023-03-12

## 2023-03-12 RX ORDER — OXYCODONE HYDROCHLORIDE 5 MG/1
5-10 TABLET ORAL EVERY 4 HOURS PRN
Qty: 10 TABLET | Refills: 0 | Status: SHIPPED | OUTPATIENT
Start: 2023-03-12 | End: 2023-03-13

## 2023-03-12 RX ADMIN — OXYCODONE HYDROCHLORIDE 10 MG: 5 TABLET ORAL at 22:11

## 2023-03-12 RX ADMIN — APIXABAN 10 MG: 5 TABLET, FILM COATED ORAL at 22:11

## 2023-03-12 ASSESSMENT — ACTIVITIES OF DAILY LIVING (ADL): ADLS_ACUITY_SCORE: 35

## 2023-03-12 ASSESSMENT — ENCOUNTER SYMPTOMS
MYALGIAS: 1
SHORTNESS OF BREATH: 0

## 2023-03-13 ENCOUNTER — APPOINTMENT (OUTPATIENT)
Dept: GENERAL RADIOLOGY | Facility: CLINIC | Age: 28
DRG: 987 | End: 2023-03-13
Attending: EMERGENCY MEDICINE
Payer: MEDICARE

## 2023-03-13 ENCOUNTER — HOSPITAL ENCOUNTER (INPATIENT)
Facility: CLINIC | Age: 28
LOS: 6 days | Discharge: HOME | DRG: 987 | End: 2023-03-19
Attending: EMERGENCY MEDICINE | Admitting: STUDENT IN AN ORGANIZED HEALTH CARE EDUCATION/TRAINING PROGRAM
Payer: MEDICARE

## 2023-03-13 DIAGNOSIS — I82.90 ACUTE DEEP VEIN THROMBOSIS (DVT) OF NON-EXTREMITY VEIN: ICD-10-CM

## 2023-03-13 DIAGNOSIS — T14.8XXA HEMATOMA OF SKIN: Primary | ICD-10-CM

## 2023-03-13 DIAGNOSIS — Z99.2 ESRD (END STAGE RENAL DISEASE) ON DIALYSIS (H): ICD-10-CM

## 2023-03-13 DIAGNOSIS — N18.6 ESRD (END STAGE RENAL DISEASE) ON DIALYSIS (H): ICD-10-CM

## 2023-03-13 LAB
ALBUMIN SERPL BCG-MCNC: 3.6 G/DL (ref 3.5–5.2)
ALP SERPL-CCNC: 131 U/L (ref 40–129)
ALT SERPL W P-5'-P-CCNC: 9 U/L (ref 10–50)
ANION GAP SERPL CALCULATED.3IONS-SCNC: 16 MMOL/L (ref 7–15)
APTT PPP: 47 SECONDS (ref 22–38)
APTT PPP: 93 SECONDS (ref 22–38)
AST SERPL W P-5'-P-CCNC: 25 U/L (ref 10–50)
ATRIAL RATE - MUSE: 88 BPM
ATRIAL RATE - MUSE: 95 BPM
BASOPHILS # BLD AUTO: 0 10E3/UL (ref 0–0.2)
BASOPHILS NFR BLD AUTO: 0 %
BILIRUB SERPL-MCNC: 0.9 MG/DL
BUN SERPL-MCNC: 35.7 MG/DL (ref 6–20)
CALCIUM SERPL-MCNC: 9.3 MG/DL (ref 8.6–10)
CHLORIDE SERPL-SCNC: 93 MMOL/L (ref 98–107)
CREAT SERPL-MCNC: 8.81 MG/DL (ref 0.67–1.17)
DEPRECATED HCO3 PLAS-SCNC: 24 MMOL/L (ref 22–29)
DIASTOLIC BLOOD PRESSURE - MUSE: NORMAL MMHG
DIASTOLIC BLOOD PRESSURE - MUSE: NORMAL MMHG
EOSINOPHIL # BLD AUTO: 0.4 10E3/UL (ref 0–0.7)
EOSINOPHIL NFR BLD AUTO: 4 %
ERYTHROCYTE [DISTWIDTH] IN BLOOD BY AUTOMATED COUNT: 13.4 % (ref 10–15)
GFR SERPL CREATININE-BSD FRML MDRD: 8 ML/MIN/1.73M2
GLUCOSE SERPL-MCNC: 105 MG/DL (ref 70–99)
HCT VFR BLD AUTO: 29.3 % (ref 40–53)
HGB BLD-MCNC: 9.5 G/DL (ref 13.3–17.7)
HOLD SPECIMEN: NORMAL
HOLD SPECIMEN: NORMAL
IMM GRANULOCYTES # BLD: 0 10E3/UL
IMM GRANULOCYTES NFR BLD: 0 %
INTERPRETATION ECG - MUSE: NORMAL
INTERPRETATION ECG - MUSE: NORMAL
LACTATE SERPL-SCNC: 0.9 MMOL/L (ref 0.7–2)
LYMPHOCYTES # BLD AUTO: 1.2 10E3/UL (ref 0.8–5.3)
LYMPHOCYTES NFR BLD AUTO: 13 %
MCH RBC QN AUTO: 29.6 PG (ref 26.5–33)
MCHC RBC AUTO-ENTMCNC: 32.4 G/DL (ref 31.5–36.5)
MCV RBC AUTO: 91 FL (ref 78–100)
MONOCYTES # BLD AUTO: 1.5 10E3/UL (ref 0–1.3)
MONOCYTES NFR BLD AUTO: 16 %
NEUTROPHILS # BLD AUTO: 6.2 10E3/UL (ref 1.6–8.3)
NEUTROPHILS NFR BLD AUTO: 67 %
NRBC # BLD AUTO: 0 10E3/UL
NRBC BLD AUTO-RTO: 0 /100
P AXIS - MUSE: 34 DEGREES
P AXIS - MUSE: 80 DEGREES
PLATELET # BLD AUTO: 141 10E3/UL (ref 150–450)
POTASSIUM SERPL-SCNC: 4.3 MMOL/L (ref 3.4–5.3)
PR INTERVAL - MUSE: 168 MS
PR INTERVAL - MUSE: 172 MS
PROT SERPL-MCNC: 6.9 G/DL (ref 6.4–8.3)
QRS DURATION - MUSE: 84 MS
QRS DURATION - MUSE: 86 MS
QT - MUSE: 374 MS
QT - MUSE: 378 MS
QTC - MUSE: 452 MS
QTC - MUSE: 475 MS
R AXIS - MUSE: 30 DEGREES
R AXIS - MUSE: 57 DEGREES
RBC # BLD AUTO: 3.21 10E6/UL (ref 4.4–5.9)
SODIUM SERPL-SCNC: 133 MMOL/L (ref 136–145)
SYSTOLIC BLOOD PRESSURE - MUSE: NORMAL MMHG
SYSTOLIC BLOOD PRESSURE - MUSE: NORMAL MMHG
T AXIS - MUSE: 136 DEGREES
T AXIS - MUSE: 157 DEGREES
TROPONIN T SERPL HS-MCNC: 68 NG/L
TROPONIN T SERPL HS-MCNC: 68 NG/L
TROPONIN T SERPL HS-MCNC: 74 NG/L
VENTRICULAR RATE- MUSE: 88 BPM
VENTRICULAR RATE- MUSE: 95 BPM
WBC # BLD AUTO: 9.3 10E3/UL (ref 4–11)

## 2023-03-13 PROCEDURE — 83605 ASSAY OF LACTIC ACID: CPT | Performed by: EMERGENCY MEDICINE

## 2023-03-13 PROCEDURE — 99222 1ST HOSP IP/OBS MODERATE 55: CPT | Performed by: SURGERY

## 2023-03-13 PROCEDURE — 250N000013 HC RX MED GY IP 250 OP 250 PS 637: Performed by: EMERGENCY MEDICINE

## 2023-03-13 PROCEDURE — 36415 COLL VENOUS BLD VENIPUNCTURE: CPT | Performed by: HOSPITALIST

## 2023-03-13 PROCEDURE — 250N000013 HC RX MED GY IP 250 OP 250 PS 637: Performed by: STUDENT IN AN ORGANIZED HEALTH CARE EDUCATION/TRAINING PROGRAM

## 2023-03-13 PROCEDURE — 93005 ELECTROCARDIOGRAM TRACING: CPT

## 2023-03-13 PROCEDURE — 84484 ASSAY OF TROPONIN QUANT: CPT | Performed by: EMERGENCY MEDICINE

## 2023-03-13 PROCEDURE — 250N000011 HC RX IP 250 OP 636: Performed by: EMERGENCY MEDICINE

## 2023-03-13 PROCEDURE — 250N000011 HC RX IP 250 OP 636: Performed by: STUDENT IN AN ORGANIZED HEALTH CARE EDUCATION/TRAINING PROGRAM

## 2023-03-13 PROCEDURE — 85730 THROMBOPLASTIN TIME PARTIAL: CPT | Performed by: EMERGENCY MEDICINE

## 2023-03-13 PROCEDURE — 86706 HEP B SURFACE ANTIBODY: CPT | Performed by: INTERNAL MEDICINE

## 2023-03-13 PROCEDURE — 71046 X-RAY EXAM CHEST 2 VIEWS: CPT

## 2023-03-13 PROCEDURE — 85014 HEMATOCRIT: CPT | Performed by: EMERGENCY MEDICINE

## 2023-03-13 PROCEDURE — 120N000001 HC R&B MED SURG/OB

## 2023-03-13 PROCEDURE — 99223 1ST HOSP IP/OBS HIGH 75: CPT | Mod: AI | Performed by: STUDENT IN AN ORGANIZED HEALTH CARE EDUCATION/TRAINING PROGRAM

## 2023-03-13 PROCEDURE — 84484 ASSAY OF TROPONIN QUANT: CPT | Performed by: HOSPITALIST

## 2023-03-13 PROCEDURE — 99291 CRITICAL CARE FIRST HOUR: CPT | Mod: 25

## 2023-03-13 PROCEDURE — 80053 COMPREHEN METABOLIC PANEL: CPT | Performed by: EMERGENCY MEDICINE

## 2023-03-13 PROCEDURE — 87340 HEPATITIS B SURFACE AG IA: CPT | Performed by: INTERNAL MEDICINE

## 2023-03-13 PROCEDURE — 36415 COLL VENOUS BLD VENIPUNCTURE: CPT | Performed by: EMERGENCY MEDICINE

## 2023-03-13 RX ORDER — ASPIRIN 81 MG/1
81 TABLET ORAL DAILY
Status: DISCONTINUED | OUTPATIENT
Start: 2023-03-14 | End: 2023-03-19 | Stop reason: HOSPADM

## 2023-03-13 RX ORDER — CARVEDILOL 25 MG/1
50 TABLET ORAL 2 TIMES DAILY WITH MEALS
Status: DISCONTINUED | OUTPATIENT
Start: 2023-03-13 | End: 2023-03-19 | Stop reason: HOSPADM

## 2023-03-13 RX ORDER — CLONIDINE HYDROCHLORIDE 0.1 MG/1
0.1 TABLET ORAL
Status: DISCONTINUED | OUTPATIENT
Start: 2023-03-13 | End: 2023-03-18

## 2023-03-13 RX ORDER — CINACALCET 30 MG/1
90 TABLET, FILM COATED ORAL DAILY
Status: DISCONTINUED | OUTPATIENT
Start: 2023-03-13 | End: 2023-03-19 | Stop reason: HOSPADM

## 2023-03-13 RX ORDER — SEVELAMER CARBONATE 800 MG/1
1600 TABLET, FILM COATED ORAL
COMMUNITY
End: 2023-04-04

## 2023-03-13 RX ORDER — HYDROMORPHONE HCL IN WATER/PF 6 MG/30 ML
0.2 PATIENT CONTROLLED ANALGESIA SYRINGE INTRAVENOUS
Status: DISCONTINUED | OUTPATIENT
Start: 2023-03-13 | End: 2023-03-16

## 2023-03-13 RX ORDER — CALCIUM ACETATE 667 MG/1
1334 CAPSULE ORAL
Status: DISCONTINUED | OUTPATIENT
Start: 2023-03-13 | End: 2023-03-19 | Stop reason: HOSPADM

## 2023-03-13 RX ORDER — BUMETANIDE 1 MG/1
2 TABLET ORAL DAILY
Status: DISCONTINUED | OUTPATIENT
Start: 2023-03-14 | End: 2023-03-19 | Stop reason: HOSPADM

## 2023-03-13 RX ORDER — ACETAMINOPHEN 325 MG/1
650 TABLET ORAL EVERY 4 HOURS PRN
Status: DISCONTINUED | OUTPATIENT
Start: 2023-03-13 | End: 2023-03-16

## 2023-03-13 RX ORDER — SEVELAMER CARBONATE 800 MG/1
3200 TABLET, FILM COATED ORAL
Status: DISCONTINUED | OUTPATIENT
Start: 2023-03-13 | End: 2023-03-19 | Stop reason: HOSPADM

## 2023-03-13 RX ORDER — LIDOCAINE 40 MG/G
CREAM TOPICAL
Status: DISCONTINUED | OUTPATIENT
Start: 2023-03-13 | End: 2023-03-19 | Stop reason: HOSPADM

## 2023-03-13 RX ORDER — ATORVASTATIN CALCIUM 10 MG/1
10 TABLET, FILM COATED ORAL AT BEDTIME
Status: DISCONTINUED | OUTPATIENT
Start: 2023-03-13 | End: 2023-03-19 | Stop reason: HOSPADM

## 2023-03-13 RX ORDER — HEPARIN SODIUM 10000 [USP'U]/100ML
0-5000 INJECTION, SOLUTION INTRAVENOUS CONTINUOUS
Status: DISCONTINUED | OUTPATIENT
Start: 2023-03-13 | End: 2023-03-16

## 2023-03-13 RX ORDER — CLONIDINE HYDROCHLORIDE 0.1 MG/1
0.1 TABLET ORAL AT BEDTIME
Status: ON HOLD | COMMUNITY
End: 2023-05-30

## 2023-03-13 RX ORDER — CHOLECALCIFEROL (VITAMIN D3) 50 MCG
50 TABLET ORAL DAILY
Status: DISCONTINUED | OUTPATIENT
Start: 2023-03-13 | End: 2023-03-19 | Stop reason: HOSPADM

## 2023-03-13 RX ORDER — HYDRALAZINE HYDROCHLORIDE 50 MG/1
100 TABLET, FILM COATED ORAL 3 TIMES DAILY
Status: DISCONTINUED | OUTPATIENT
Start: 2023-03-13 | End: 2023-03-19 | Stop reason: HOSPADM

## 2023-03-13 RX ORDER — HYDRALAZINE HYDROCHLORIDE 10 MG/1
10 TABLET, FILM COATED ORAL EVERY 6 HOURS PRN
Status: DISCONTINUED | OUTPATIENT
Start: 2023-03-13 | End: 2023-03-18

## 2023-03-13 RX ORDER — AMLODIPINE BESYLATE 10 MG/1
10 TABLET ORAL DAILY
Status: DISCONTINUED | OUTPATIENT
Start: 2023-03-13 | End: 2023-03-19 | Stop reason: HOSPADM

## 2023-03-13 RX ORDER — CLONIDINE HYDROCHLORIDE 0.1 MG/1
0.1 TABLET ORAL
Status: DISCONTINUED | OUTPATIENT
Start: 2023-03-14 | End: 2023-03-18

## 2023-03-13 RX ORDER — SEVELAMER HYDROCHLORIDE 800 MG/1
3200 TABLET, FILM COATED ORAL
Status: DISCONTINUED | OUTPATIENT
Start: 2023-03-13 | End: 2023-03-13

## 2023-03-13 RX ORDER — B COMPLEX, C NO.20/FOLIC ACID 1 MG
1 CAPSULE ORAL DAILY
Status: DISCONTINUED | OUTPATIENT
Start: 2023-03-13 | End: 2023-03-19 | Stop reason: HOSPADM

## 2023-03-13 RX ADMIN — CALCIUM ACETATE 1334 MG: 667 CAPSULE ORAL at 19:45

## 2023-03-13 RX ADMIN — CARVEDILOL 50 MG: 25 TABLET, FILM COATED ORAL at 18:55

## 2023-03-13 RX ADMIN — AMLODIPINE BESYLATE 10 MG: 10 TABLET ORAL at 19:47

## 2023-03-13 RX ADMIN — HYDROMORPHONE HYDROCHLORIDE 0.2 MG: 0.2 INJECTION, SOLUTION INTRAMUSCULAR; INTRAVENOUS; SUBCUTANEOUS at 22:39

## 2023-03-13 RX ADMIN — HYDRALAZINE HYDROCHLORIDE 100 MG: 50 TABLET, FILM COATED ORAL at 18:56

## 2023-03-13 RX ADMIN — CINACALCET 90 MG: 30 TABLET ORAL at 19:49

## 2023-03-13 RX ADMIN — SEVELAMER CARBONATE 3200 MG: 800 TABLET, FILM COATED ORAL at 19:47

## 2023-03-13 RX ADMIN — Medication 50 MCG: at 19:45

## 2023-03-13 RX ADMIN — HEPARIN SODIUM 1800 UNITS/HR: 10000 INJECTION, SOLUTION INTRAVENOUS at 15:20

## 2023-03-13 RX ADMIN — CLONIDINE HYDROCHLORIDE 0.1 MG: 0.1 TABLET ORAL at 22:18

## 2023-03-13 RX ADMIN — APIXABAN 10 MG: 5 TABLET, FILM COATED ORAL at 12:33

## 2023-03-13 RX ADMIN — Medication 1 CAPSULE: at 19:49

## 2023-03-13 RX ADMIN — ATORVASTATIN CALCIUM 10 MG: 10 TABLET, FILM COATED ORAL at 22:18

## 2023-03-13 ASSESSMENT — ACTIVITIES OF DAILY LIVING (ADL)
ADLS_ACUITY_SCORE: 35

## 2023-03-13 ASSESSMENT — ENCOUNTER SYMPTOMS
NECK PAIN: 1
ABDOMINAL PAIN: 1
MYALGIAS: 1

## 2023-03-13 NOTE — ED TRIAGE NOTES
pt presents from dialysis with new onset of chest, neck, and bilateral thigh pain, unable to finish dialysis run. Of note, was seen here yesterday in ED for pain in the arm as well and found comfirmed DVT in R jugular. R arm swollen from removed dialysis fistula, new port in R chest. Started eliquis yesterday, but unable to fill so pt hasn't taken any today. EMS reported 88% cyanosis around lips when they arrived, now on room air. EMS gave 324mg aspirin.

## 2023-03-13 NOTE — ED NOTES
[facility]  ED Nurse Handoff Report    ED Chief complaint: Shortness of Breath and Chest Pain      ED Diagnosis:   Final diagnoses:   Acute deep vein thrombosis (DVT) of non-extremity vein   ESRD (end stage renal disease) on dialysis (H)       Code Status: Full Code    Allergies:   Allergies   Allergen Reactions     Benadryl [Diphenhydramine] Itching       Patient Story: pt dx with a blood clot on 3/12 in rt internal jugular vein. Started on eliquis. Today went to dialysis and felt chest pain as well as bilat thigh pain. Rt arm is swollen from old fistula that they feel was the source for the blood clot. Pt has a dialysis access to rt chest as well as a new fistula to lt arm. Alert and oriented.   Focused Assessment:  See chart    Treatments and/or interventions provided: see chart  Patient's response to treatments and/or interventions: see chart    To be done/followed up on inpatient unit:  see chart    Does this patient have any cognitive concerns?: none    Activity level - Baseline/Home:  Independent  Activity Level - Current:   Independent    Patient's Preferred language: English   Needed?: No    Isolation: None  Infection: Not Applicable  Patient tested for COVID 19 prior to admission: NO  Bariatric?: No    Vital Signs:   Vitals:    03/13/23 1328 03/13/23 1348 03/13/23 1402 03/13/23 1431   BP: (!) 163/93  (!) 169/96    Pulse: 87 91 88    Resp: 28 26 28    Temp:       TempSrc:       SpO2: 100% 98% 98%    Weight:    110 kg (242 lb 8.1 oz)   Height:           Cardiac Rhythm:Cardiac Rhythm: Normal sinus rhythm    Was the PSS-3 completed:   Yes  What interventions are required if any?               Family Comments: mom here  OBS brochure/video discussed/provided to patient/family: N/A              Name of person given brochure if not patient: na              Relationship to patient: na    For the majority of the shift this patient's behavior was Green.   Behavioral interventions performed  were none.    ED NURSE PHONE NUMBER: [phone]

## 2023-03-13 NOTE — H&P
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  The patient had a ligation of his right arm fistula on 3/9/2023 but since then has started develop pain and swelling to the right arm.  Upper extremity ultrasound on 3/12 was obtained and showed a near occlusive DVT in the right internal jugular vein.  He was sent home from the emergency department with a prescription for Eliquis, which he has been unable to fill.  He returns to the emergency department today with chest and neck pain while undergoing dialysis.  He is noted to have significant right arm swelling. Given increasing pain and swelling of the R arm as well as his difficulty filling Eliquis, he will be admitted and initiated on heparin drip with vascular surgery consult.   -Started on IV heparin in the emergency department, continue  -Vascular surgery consult  -Pain control as needed    End-stage renal disease on hemodialysis  Renal disease thought to be secondary to hypertension. He reports his renal function significantly declined after getting COVID in 2020 and ultimately progressed to ESRD. Completed a partial dialysis run today (3/13). Volume status and electrolytes stable. Current access is tunneled dialysis catheter.   - Nephrology consult   -Continue prior to admission Cinacalcet, sevelamer, cholecalciferol    Hypertension  Hyperlipidemia  -Continue prior to  admission amlodipine, carvedilol, clonidine, hydralazine  -Continue prior to admission aspirin and atorvastatin    Chronic anemia and Thrombocytopenia  Hgb is 9.5 and platelet count is 141. Hgb is slightly lower than baseline of 10-11.   - Monitor CBC     Mild troponin elevation  Troponin is elevated to 68, and stable on re-check. Pt has chronically elevated troponin 2/2 ESRD.   - Cardiac monitoring     Hx of Adrenal adenoma  Noted       Diet: Renal Diet (non-dialysis)    DVT Prophylaxis: {IV heparin  Hurt Catheter: Not present  Lines: PRESENT             Cardiac Monitoring: None  Code Status: Full Code     Clinically Significant Risk Factors Present on Admission               # Drug Induced Coagulation Defect: home medication list includes an anticoagulant medication    # Hypertension: home medication list includes antihypertensive(s)      # Obesity: Estimated body mass index is 32.89 kg/m  as calculated from the following:    Height as of this encounter: 1.829 m (6').    Weight as of this encounter: 110 kg (242 lb 8.1 oz).           Disposition Plan      Expected Discharge Date: 03/15/2023                  Adenike Garcia MD  Hospitalist Service  [facility]  Securely message with [employer] (more info)  Text page via [facility] Paging/Directory     ______________________________________________________________________    Chief Complaint   R arm pain and swelling     History is obtained from the patient    History of Present Illness   Taylor Valiente is a 27 year old male who initially presented to the emergency department on 3/12 with right arm pain and swelling.  The patient had a ligation of his right arm fistula on 3/9/2023 but since then has started develop pain and swelling to the right arm.  Upper extremity ultrasound on 3/12 was obtained and showed a near occlusive DVT in the right internal jugular vein.  He was sent home from the emergency department with a prescription for  Eliquis, which he has been unable to fill.  He returns to the emergency department today with chest and neck pain while undergoing dialysis. He additionally noted some bilateral leg pain as well. All of that has resolved, but he continues to have severe right arm pain and significant swelling. He was able to complete 3 out of 4 hours of his dialysis run today.       Past Medical History    Past Medical History:   Diagnosis Date     Adrenal adenoma, left      Anemia      Benign essential hypertension 07/28/2017     CKD (chronic kidney disease) stage 5, GFR less than 15 ml/min (H)     FSGS     Depression      Focal glomerular sclerosis 07/28/2017     HLD (hyperlipidemia)      LVH (left ventricular hypertrophy) due to hypertensive disease 07/14/2017     Noncompliance      Obesity, unspecified      OD (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/2021     Stress-induced cardiomyopathy      Suicide attempt (H) 2019       Past Surgical History   Past Surgical History:   Procedure Laterality Date     ARTHROSCOPY ANKLE Left 2/24/2021    Procedure: Left ankle arthroscopy and debridement/micro fracture;  Surgeon: Mirza Nelson MD;  [facility]     [facility]     CREATE FISTULA ARTERIOVENOUS UPPER EXTREMITY Right 3/4/2021    Procedure: RIGHT proximal radial  to CEPHALIC ARTERIOVENOUS FISTULA;  Surgeon: Henrik Moran MD;  Location: [facility] OR     CREATE FISTULA ARTERIOVENOUS UPPER EXTREMITY Left 3/9/2023    Procedure: CREATION OF FIRST STAGE LEFT BRACHIOBASILIC ARTERIOVENOUS FISTULA;  Surgeon: Henrik Moran MD;  Location: [facility] OR     IR CVC TUNNEL PLACEMENT > 5 YRS OF AGE  6/17/2021     IR CVC TUNNEL PLACEMENT > 5 YRS OF AGE  3/9/2023     IR CVC TUNNEL REMOVAL RIGHT  12/3/2021     LIGATE FISTULA ARTERIOVENOUS UPPER EXTREMITY Right 3/9/2023    Procedure: LIGATION OF RIGHT ARM ARTERIOVENOUS FISTULA;  Surgeon: Henrik Moran MD;  Location: [facility] OR     NO HISTORY OF SURGERY       REVISION  FISTULA ARTERIOVENOUS UPPER EXTREMITY Right 8/26/2021    Procedure: Second stage RIGHT BRACHIOCEPHALIC transposition ARTERIOVENOUS FISTULA;  Surgeon: Henrik Moran MD;  Location: [facility] OR       Prior to Admission Medications   Prior to Admission Medications   Prescriptions Last Dose Informant Patient Reported? Taking?   ACE/ARB/ARNI NOT PRESCRIBED (INTENTIONAL)   No No   Sig: Please choose reason not prescribed from choices below.   Patient not taking: Reported on 3/7/2023   Apixaban Starter Pack (ELIQUIS DVT/PE STARTER PACK) 5 MG TBPK   No No   Sig: Take 10 mg by mouth 2 times daily for 7 days, THEN 5 mg 2 times daily for 23 days.   acetaminophen (TYLENOL) 325 MG tablet PRN  No Yes   Sig: Take 2 tablets (650 mg) by mouth every 4 hours as needed for mild pain   amLODIPine (NORVASC) 10 MG tablet 3/12/2023 Self Yes Yes   Sig: Take 10 mg by mouth daily    aspirin 81 MG EC tablet 3/12/2023 Self Yes Yes   Sig: Take 81 mg by mouth daily   atorvastatin (LIPITOR) 10 MG tablet 3/12/2023  No Yes   Sig: TAKE ONE TABLET BY MOUTH EVERY NIGHT AT BEDTIME   bumetanide (BUMEX) 2 MG tablet 3/12/2023 Self Yes Yes   Sig: Take 2 mg by mouth daily    calcium acetate (CALPHRON) 667 MG TABS tablet 3/12/2023  Yes Yes   Sig: Take 1,334 mg by mouth 3 times daily (with meals)   carvedilol (COREG) 25 MG tablet 3/12/2023  No Yes   Sig: TAKE TWO TABLETS BY MOUTH TWICE A DAY WITH A MEAL Strength: 25 mg   cinacalcet (SENSIPAR) 90 MG tablet 3/12/2023  Yes Yes   Sig: Take 90 mg by mouth daily   cloNIDine (CATAPRES) 0.1 MG tablet 3/12/2023 Self Yes Yes   Sig: Take 0.1 mg by mouth Twice daily on non dialysis days (Tue, Thurs, Sat, Sun)   cloNIDine (CATAPRES) 0.1 MG tablet 3/10/2023  Yes Yes   Sig: Take 0.1 mg by mouth At Bedtime On dialysis days (Mon, Wed, Fri)   hydrALAZINE (APRESOLINE) 100 MG tablet 3/12/2023  No Yes   Sig: TAKE ONE TABLET BY MOUTH THREE TIMES A DAY   multivitamin RENAL (RENAVITE RX/NEPHROVITE) 1 MG tablet 3/12/2023 Self Yes Yes    Sig: Take 1 tablet by mouth daily    nitroGLYcerin (NITROSTAT) 0.3 MG sublingual tablet   No Yes   Sig: For chest pain place 1 tablet under the tongue every 5 minutes for 3 doses. If symptoms persist 5 minutes after 1st dose call 911.   senna-docusate (SENOKOT-S/PERICOLACE) 8.6-50 MG tablet PRN  No Yes   Sig: Take 1-2 tablets by mouth 2 times daily   sevelamer HCl (RENAGEL) 800 MG tablet 3/12/2023 Self Yes Yes   Sig: Take 3,200 mg by mouth 3 times daily (with meals)   sevelamer carbonate (RENVELA) 800 MG tablet PRN  Yes Yes   Sig: Take 1,600 mg by mouth Take with snacks or supplements   vitamin D3 (CHOLECALCIFEROL) 2000 units (50 mcg) tablet 3/12/2023 Self Yes Yes   Sig: Take 50 mcg by mouth daily      Facility-Administered Medications: None        Review of Systems    The 10 point Review of Systems is negative other than noted in the HPI or here.     Physical Exam   Vital Signs: Temp: 98.9  F (37.2  C) Temp src: Oral BP: (!) 170/92 Pulse: 90   Resp: 26 SpO2: 99 %      Weight: 242 lbs 8.1 oz    Constitutional: Awake, alert, cooperative, no apparent distress.  Eyes: Conjunctiva and pupils examined and normal.  HEENT: Moist mucous membranes, normal dentition.  Respiratory: Clear to auscultation bilaterally, no crackles or wheezing.  Cardiovascular: Regular rate and rhythm, normal S1 and S2, and no murmur noted.  Skin: No rashes, no cyanosis, no edema. RUE AVF without pulses or thrill. There is significant pain to palpation of the upper arm. There is significant RUE swelling as well. L UE incision with intact bandage. Tunneled catheter present   Musculoskeletal: No joint swelling, erythema or tenderness.  Neurologic: Cranial nerves 2-12 intact, normal strength and sensation.  Psychiatric: Alert, oriented to person, place and time, no obvious anxiety or depression.      Medical Decision Making       75 MINUTES SPENT BY ME on the date of service doing chart review, history, exam, documentation & further activities per  the note.      Data     I have personally reviewed the following data over the past 24 hrs:    9.3  \   9.5 (L)   / 141 (L)     133 (L) 93 (L) 35.7 (H) /  105 (H)   4.3 24 8.81 (H) \       ALT: 9 (L) AST: 25 AP: 131 (H) TBILI: 0.9   ALB: 3.6 TOT PROTEIN: 6.9 LIPASE: N/A       Trop: 68 (H) BNP: N/A       Procal: N/A CRP: N/A Lactic Acid: 0.9       INR:  N/A PTT:  47 (H)   D-dimer:  N/A Fibrinogen:  N/A       Imaging results reviewed over the past 24 hrs:   Recent Results (from the past 24 hour(s))   US Upper Extremity Venous Duplex Right    Narrative    EXAM: US UPPER EXTREMITY VENOUS DUPLEX RIGHT  LOCATION: [facility]  DATE/TIME: 3/12/2023 9:36 PM    INDICATION: Right upper arm pain, swelling, concern for DVT  COMPARISON: Fluoroscopic images during dialysis catheter placement 03/09/2023, ultrasound 02/21/2023  TECHNIQUE: Venous Duplex ultrasound of the right upper extremity with (when possible) and without compression, augmentation, and duplex. Color flow and spectral Doppler with waveform analysis performed.    FINDINGS: Ultrasound includes evaluation of the internal jugular vein, innominate vein, subclavian vein, axillary vein, and brachial vein. The superficial cephalic and basilic veins were also evaluated where seen.     RIGHT: Near occlusive thrombus noted within the internal jugular vein. No additional acute deep vein thrombus visualized. No superficial thrombophlebitis. Clotted dialysis fistula noted in the right arm and shoulder.      Impression    IMPRESSION:  1.  Near occlusive deep vein thrombus in the right internal jugular vein.  2.  Clotted dialysis fistula partially visualized in the right shoulder and upper arm.       XR Chest 2 Views    Narrative    CHEST TWO VIEWS   3/13/2023 10:40 AM     HISTORY: Shortness of breath.    COMPARISON: Chest x-ray on 12/12/2022.      Impression    IMPRESSION: AP and lateral views of the chest were obtained. Right IJ  central catheter tip  projects over the high right atrium.  Cardiomediastinal silhouette is within normal limits. No suspicious  focal pulmonary opacities. No significant pleural effusion or  pneumothorax.     YONI HARDEN MD         SYSTEM ID:  [id number]

## 2023-03-13 NOTE — ED TRIAGE NOTES
ESRD hx on dalysis M-W-F. C/o right arm pain, tingling and swelling (where the old fistula is). Also sharp pain from the left groin shooting down his leg.      Triage Assessment       Row Name 03/12/23 1923       Triage Assessment (Adult)    Airway WDL WDL       Respiratory WDL    Respiratory WDL WDL       Skin Circulation/Temperature WDL    Skin Circulation/Temperature WDL WDL       Cardiac WDL    Cardiac WDL WDL       Peripheral/Neurovascular WDL    Peripheral Neurovascular WDL X  olf distula to right arm, new fistula to left arm and dialysis port to right chest

## 2023-03-13 NOTE — ED PROVIDER NOTES
History     Chief Complaint:  Arm Pain and swelling    HPI   Taylor Valiente is a 27 year old male with a history of ESRD on dialysis (MWF) who presents for evaluation of right arm pain and swelling. On 3/9/23 the patient had ligation of his right arm fistula with Dr. Moran. Today he started to develop new pain and swelling to the right arm. Due to concern for this he came into the ED. He also reports some sharp left groin pain with radiation down the leg but he denies new swelling to the area. He denies any recent chest pain or shortness of breath. He is scheduled for dialysis tomorrow morning.       Independent Historian:   None - Patient Only    Review of External Notes:   Procedure note 3/9/23.      ROS:  Review of Systems   Respiratory: Negative for shortness of breath.    Cardiovascular: Negative for chest pain and leg swelling.   Musculoskeletal: Positive for myalgias.   All other systems reviewed and are negative.      Allergies:  Benadryl     Medications:    Tylenol  Amlodipine  Aspirin  Lipitor  Bumex  Calphron  Coreg  Sensipar  Clonidine  Hydralazine  Nitroglycerin  Oxycodone  Prednisone  Senna-docusate  Renagel    Past Medical History:    Left adrenal adenoma  Anemia  Hypertension  Chronic kidney disease  Depression  Focal glomerular sclerosis   Hyperlipidemia  LVH   Obesity  Osteochondritis dissecans  Stress-induced cardiomyopathy     Past Surgical History:    Left ankle arthroscopy  Renal biopsy  Create fistula arteriovenous upper extremity right  Create fistula arteriovenous upper extremity left   IR CVC tunnel placement  IR CVC tunnel removal right   Ligate fistula arteriovenous upper extremity right   Revision fistula arteriovenous upper extremity right      Family History:    Blood disease - Mother   Diabetes - Mother  Hypertension - Mother and father     Social History:  The patient presents to the ED accompanied by a family member.   PCP: Priyank Lawrence     Physical Exam     Patient Vitals for  the past 24 hrs:   Temp Temp src Pulse Resp SpO2 Height Weight   03/12/23 1923 99.3  F (37.4  C) Oral 100 17 100 % 1.829 m (6') 108.9 kg (240 lb)        Physical Exam  Nursing note and vitals reviewed.  Constitutional:  Oriented to person, place, and time. Cooperative.   HENT:   Nose:    Nose normal.   Mouth/Throat:   Face mask in place.   Eyes:    Conjunctivae normal and EOM are normal.      Pupils are equal, round, and reactive to light.   Neck:    Trachea normal.   Cardiovascular:  Normal rate, regular rhythm, normal heart sounds and normal pulses. No murmur heard.    Pulmonary/Chest:  Effort normal and breath sounds normal.   Abdominal:   Soft. Normal appearance and bowel sounds are normal.      There is no tenderness.      There is no rebound and no CVA tenderness.   Musculoskeletal:  Swelling and tenderness to palpation to the entire right upper extremity.  Left lower extremity is normal in appearance with no reproducible tenderness to palpation. Extremities atraumatic x 4.   Lymphadenopathy:  No cervical adenopathy.   Neurological:   Alert and oriented to person, place, and time. Normal strength.      No cranial nerve deficit or sensory deficit. GCS eye subscore is 4. GCS verbal subscore is 5. GCS motor subscore is 6.   Skin:    Skin is intact. No rash noted.   Psychiatric:   Normal mood and affect.     Emergency Department Course     Imaging:  US Upper Extremity Venous Duplex Right   Final Result   IMPRESSION:   1.  Near occlusive deep vein thrombus in the right internal jugular vein.   2.  Clotted dialysis fistula partially visualized in the right shoulder and upper arm.            Report per radiology    Emergency Department Course & Assessments:     Interventions:  Medications   apixaban ANTICOAGULANT (ELIQUIS) tablet 10 mg (has no administration in time range)   oxyCODONE (ROXICODONE) tablet 10 mg (has no administration in time range)        Assessments:  2025: The patient was seen and evaluated.      2140: I updated and reassessed the patient.     2202: I updated and reassessed the patient.     Independent Interpretation (X-rays, CTs, rhythm strip):  None    Consultations/Discussion of Management or Tests:  2150: I spoke with Dr. Ligia Charles of the vascular surgery service regarding patient's presentation, findings, and plan of care.         Social Determinants of Health affecting care:   None    Disposition:  The patient was discharged to home.     Impression & Plan      Medical Decision Making:  This is a 27-year-old male who came in for further evaluation of worsening pain and swelling to his right upper extremity.  He just had the fistula in his right arm ligated a few days ago.  Therefore I was concerned he might have a DVT, and he underwent the above ultrasound.  This shows that he does in fact have a nearly occlusive thrombus in the right internal jugular vein.  I subsequently spoke with the on-call vascular surgeon, Dr. Charles, who recommended that we start him on Eliquis or Xarelto.  He felt that he was safe for discharge though, which I think is reasonable as well.  He was provided his first oral dose of Eliquis here, and I will send him home with a prescription for Eliquis.  I also agreed to send him home with a small prescription for oxycodone.  He is to follow-up with Dr. Moran in the next day or 2 and return here with any concerns or worsening symptoms.     Diagnosis:    ICD-10-CM    1. Acute deep vein thrombosis (DVT) of non-extremity vein  I82.90            Discharge Medications:  New Prescriptions    APIXABAN STARTER PACK (ELIQUIS DVT/PE STARTER PACK) 5 MG TBPK    Take 10 mg by mouth 2 times daily for 7 days, THEN 5 mg 2 times daily for 23 days.    OXYCODONE (ROXICODONE) 5 MG TABLET    Take 1-2 tablets (5-10 mg) by mouth every 4 hours as needed for pain          Scribe Disclosure:  I, Manjinder Red, am serving as a scribe at 8:22 PM on 3/12/2023 to document services personally performed by  Robby Sapp MD based on my observations and the provider's statements to me.   3/12/2023   Robby Sapp MD Lashkowitz, Seth H, MD  03/12/23 2324

## 2023-03-13 NOTE — ED NOTES
Patient's wife updated on status and plan of care to move to room 305. Patient's wife would like a call in the AM around 0600 with status update if possible.

## 2023-03-13 NOTE — PHARMACY-ADMISSION MEDICATION HISTORY
Pharmacy Medication History  Admission medication history interview status for the 3/13/2023  admission is complete. See EPIC admission navigator for prior to admission medications     Location of Interview: Patient room  Medication history sources: Patient and Surescripts    Significant changes made to the medication list:  Apixaban starter pack which was recently prescribed--has not picked up from his pharmacy yet  Changed:   - sevalamer 4000 mg with meals--> 3200 mg with meals and 1600 mg with snacks   - clonidine 0.1 mg twice daily--> 0.1 twice daily on non dialysis days and 0.1 mg at bedtime on dialysis days   - vitamin D3 75 mcg daily--> 50 mcg daily   - calcium acetate 667 mg three times daily--> 1334 mg three times daily    In the past week, patient estimated taking medication this percent of the time: greater than 90%    Medication Affordability:  Not including over the counter (OTC) medications, was there a time in the past 12 months when you did not take your medications as prescribed because of cost?: No    Additional medication history information:   NONE    Medication reconciliation completed by provider prior to medication history? No    Time spent in this activity: 20 minutes    Prior to Admission medications    Medication Sig Last Dose Taking? Auth Provider Long Term End Date   acetaminophen (TYLENOL) 325 MG tablet Take 2 tablets (650 mg) by mouth every 4 hours as needed for mild pain PRN Yes Winter Mercado MD     amLODIPine (NORVASC) 10 MG tablet Take 10 mg by mouth daily  3/12/2023 Yes Unknown, Entered By History No    aspirin 81 MG EC tablet Take 81 mg by mouth daily 3/12/2023 Yes Unknown, Entered By History     atorvastatin (LIPITOR) 10 MG tablet TAKE ONE TABLET BY MOUTH EVERY NIGHT AT BEDTIME 3/12/2023 Yes Priyank Lawrence MD Yes    bumetanide (BUMEX) 2 MG tablet Take 2 mg by mouth daily  3/12/2023 Yes Reported, Patient Yes    calcium acetate (CALPHRON) 667 MG TABS tablet Take 1,334 mg by mouth 3  times daily (with meals) 3/12/2023 Yes Reported, Patient     carvedilol (COREG) 25 MG tablet TAKE TWO TABLETS BY MOUTH TWICE A DAY WITH A MEAL Strength: 25 mg 3/12/2023 Yes Ranjana Stone PA-C Yes    cinacalcet (SENSIPAR) 90 MG tablet Take 90 mg by mouth daily 3/12/2023 Yes Reported, Patient Yes    cloNIDine (CATAPRES) 0.1 MG tablet Take 0.1 mg by mouth At Bedtime On dialysis days (Mon, Wed, Fri) 3/10/2023 Yes Unknown, Entered By History Yes    cloNIDine (CATAPRES) 0.1 MG tablet Take 0.1 mg by mouth Twice daily on non dialysis days (Tue, Thurs, Sat, Sun) 3/12/2023 Yes Priyank Lawrence MD Yes    hydrALAZINE (APRESOLINE) 100 MG tablet TAKE ONE TABLET BY MOUTH THREE TIMES A DAY 3/12/2023 Yes Ranjana Stone PA-C Yes    multivitamin RENAL (RENAVITE RX/NEPHROVITE) 1 MG tablet Take 1 tablet by mouth daily  3/12/2023 Yes Reported, Patient     nitroGLYcerin (NITROSTAT) 0.3 MG sublingual tablet For chest pain place 1 tablet under the tongue every 5 minutes for 3 doses. If symptoms persist 5 minutes after 1st dose call 911.  Yes Jing Aguila PA-C Yes    senna-docusate (SENOKOT-S/PERICOLACE) 8.6-50 MG tablet Take 1-2 tablets by mouth 2 times daily PRN Yes Winter Mercado MD     sevelamer carbonate (RENVELA) 800 MG tablet Take 1,600 mg by mouth Take with snacks or supplements PRN Yes Unknown, Entered By History No    sevelamer HCl (RENAGEL) 800 MG tablet Take 3,200 mg by mouth 3 times daily (with meals) 3/12/2023 Yes Unknown, Entered By History     vitamin D3 (CHOLECALCIFEROL) 2000 units (50 mcg) tablet Take 50 mcg by mouth daily 3/12/2023 Yes Unknown, Entered By History     ACE/ARB/ARNI NOT PRESCRIBED (INTENTIONAL) Please choose reason not prescribed from choices below.  Patient not taking: Reported on 3/7/2023   Priyank Lawrence MD Yes    Apixaban Starter Pack (ELIQUIS DVT/PE STARTER PACK) 5 MG TBPK Take 10 mg by mouth 2 times daily for 7 days, THEN 5 mg 2 times daily for 23 days. Has not yet started   Robby Sapp MD  4/11/23       The information provided in this note is only as accurate as the sources available at the time of update(s)

## 2023-03-13 NOTE — ED PROVIDER NOTES
History     Chief Complaint:  Shortness of Breath and Chest Pain       HPI   Taylor Valiente is a 27 year old male with a history of anemia, DVT, CKD, and a adrenal adenoma who presents with chest pain and neck pain. The patient states that he was at dialysis and was three hours in when he developed new neck pain, chest pain, and leg pain. He states that his lips turned blue and his right hand got more swollen. He was diagnosed with a blood clot in his neck possibly from a old fistula and was prescribed a blood thinner that he has not used yet because he could not get it filled yesterday. Compared to the visit yesterday the neck, chest, and leg pain is new and he also has slight abdominal pain. He still makes some urine on his own.      Independent Historian:   None - Patient Only    Review of External Notes: Yes I have reviewed past procedure for ligation of AV fistula as well as office visits for ESRD.    ROS:  Review of Systems   Cardiovascular: Positive for chest pain.   Gastrointestinal: Positive for abdominal pain.   Musculoskeletal: Positive for myalgias and neck pain.   All other systems reviewed and are negative.      Allergies:  Benadryl      Medications:    Tylenol  Amlodipine  Aspirin  Lipitor  Bumex  Calphron  Coreg  Sensipar  Clonidine  Hydralazine  Nitroglycerin  Oxycodone  Prednisone  Senna-docusate  Renagel     Past Medical History:    Left adrenal adenoma  Anemia  Hypertension  Chronic kidney disease  Depression  Focal glomerular sclerosis   Hyperlipidemia  LVH   Obesity  Osteochondritis dissecans  Stress-induced cardiomyopathy   DVT     Past Surgical History:    Left ankle arthroscopy  Renal biopsy  Create fistula arteriovenous upper extremity right  Create fistula arteriovenous upper extremity left   IR CVC tunnel placement  IR CVC tunnel removal right   Ligate fistula arteriovenous upper extremity right   Revision fistula arteriovenous upper extremity right       Family History:    Blood  disease - Mother   Diabetes - Mother  Hypertension - Mother and father     Social History:  The patient presents to the ED alone.    Physical Exam     Patient Vitals for the past 24 hrs:   BP Temp Temp src Pulse Resp SpO2 Height Weight   03/13/23 1348 -- -- -- 91 26 98 % -- --   03/13/23 1328 (!) 163/93 -- -- 87 28 100 % -- --   03/13/23 1300 -- -- -- -- 26 97 % -- --   03/13/23 1258 (!) 161/94 -- -- -- 26 98 % -- --   03/13/23 1245 -- -- -- -- 28 99 % -- --   03/13/23 1239 -- -- -- -- 25 99 % -- --   03/13/23 1230 (!) 169/105 -- -- 89 28 99 % -- --   03/13/23 1224 (!) 170/93 -- -- 87 28 99 % -- --   03/13/23 1030 -- -- -- 92 11 100 % -- --   03/13/23 1024 -- -- -- 98 20 100 % -- --   03/13/23 1015 -- -- -- 92 15 100 % -- --   03/13/23 1000 (!) 172/78 -- -- 88 11 100 % -- --   03/13/23 0950 139/77 98.9  F (37.2  C) Oral 93 20 100 % 1.829 m (6') 110 kg (242 lb 8.1 oz)        Physical Exam  Vitals: reviewed by me  General: [facility] seen on [facility] [place], cooperative, and alert to conversation, chronically ill-appearing  Eyes: Tracking well, clear conjunctiva BL  ENT: MMM, midline trachea.   Lungs: No tachypnea, no accessory muscle use. No respiratory distress.   CV: Rate as above  Abd: Soft, non tender, no guarding, no rebound. Non distended  MSK: no joint effusion.  No evidence of trauma, however right arm noticeably more edematous than left arm.  Still warm to touch.  Skin: No rash  Neuro: Clear speech and no facial droop.  Psych: Not RIS, no e/o AH/VH      Emergency Department Course     Imaging:  XR Chest 2 Views   Final Result   IMPRESSION: AP and lateral views of the chest were obtained. Right IJ   central catheter tip projects over the high right atrium.   Cardiomediastinal silhouette is within normal limits. No suspicious   focal pulmonary opacities. No significant pleural effusion or   pneumothorax.       YONI HARDEN MD            SYSTEM ID:  [id number]         Report per  radiology    Laboratory:  Labs Ordered and Resulted from Time of ED Arrival to Time of ED Departure   COMPREHENSIVE METABOLIC PANEL - Abnormal       Result Value    Sodium 133 (*)     Potassium 4.3      Chloride 93 (*)     Carbon Dioxide (CO2) 24      Anion Gap 16 (*)     Urea Nitrogen 35.7 (*)     Creatinine 8.81 (*)     Calcium 9.3      Glucose 105 (*)     Alkaline Phosphatase 131 (*)     AST 25      ALT 9 (*)     Protein Total 6.9      Albumin 3.6      Bilirubin Total 0.9      GFR Estimate 8 (*)    TROPONIN T, HIGH SENSITIVITY - Abnormal    Troponin T, High Sensitivity 68 (*)    CBC WITH PLATELETS AND DIFFERENTIAL - Abnormal    WBC Count 9.3      RBC Count 3.21 (*)     Hemoglobin 9.5 (*)     Hematocrit 29.3 (*)     MCV 91      MCH 29.6      MCHC 32.4      RDW 13.4      Platelet Count 141 (*)     % Neutrophils 67      % Lymphocytes 13      % Monocytes 16      % Eosinophils 4      % Basophils 0      % Immature Granulocytes 0      NRBCs per 100 WBC 0      Absolute Neutrophils 6.2      Absolute Lymphocytes 1.2      Absolute Monocytes 1.5 (*)     Absolute Eosinophils 0.4      Absolute Basophils 0.0      Absolute Immature Granulocytes 0.0      Absolute NRBCs 0.0     TROPONIN T, HIGH SENSITIVITY - Abnormal    Troponin T, High Sensitivity 68 (*)    LACTIC ACID WHOLE BLOOD - Normal    Lactic Acid 0.9        Emergency Department Course & Assessments:        Interventions:  Medications   heparin 25,000 units in 0.45% NaCl 250 mL ANTICOAGULANT infusion (has no administration in time range)   apixaban ANTICOAGULANT (ELIQUIS) tablet 10 mg (10 mg Oral $Given 3/13/23 1233)        Assessments:  1010 Obtained the patients history and performed initial exam  1348 Rechecked the patient and updated findings  1405 Rechecked the patient who states that he does not feel comfortable going home because he still has right arm pain.    Independent Interpretation (X-rays, CTs, rhythm strip):  Yes I have independently reviewed the patient's  chest x-ray, no obvious pneumothorax noted.    Consultations/Discussion of Management or Tests:  ED Course as of 03/13/23 1421   Mon Mar 13, 2023   1420 Talked to Dr. Garcia about the patients findings and possible admittance     I also have spoken to Dr. Hood of nephrology prior to admission just after speaking to Dr. Garcia above.  Dr. Hirsch's states he feels they can accommodate the patient's dialysis needs during this admission.  I also spoke to Dr. Givens of vascular surgery around this time as well, who recommended no additional imaging, but heparinization and admission.      Disposition:  The patient was admitted to the hospital under the care of Dr. Garcia.     Impression & Plan      Medical Decision Making:  This is a pleasant 27-year-old male who presents to the emergency room with generalized body discomfort during dialysis, and also specifically worsening right arm swelling and right arm pain.  I do not see any cause for his generalized discomfort, he does not seem to have a fever here, and his labs are overall reassuring given his ESRD history.  His right upper extremity however does show significant swelling, and since he has been unable to get his Eliquis due to possible insurance reasons, I did give him a dose here in the emergency room.  However on my reassessment after all the patient's labs are back, he did note that he was still having worsening right arm pain although his body discomfort has gotten better.  Because of this, and because of the frustrations he is having trying to get his Eliquis, plan to start heparinization and admit after speaking to the vascular surgery team.  The dialysis team is also aware of the patient, and is able to accommodate his dialysis needs on this hospitalization, and I do not think that the patient needs emergent dialysis today.    We will plan for admission to the hospitalist team, patient is on heparin, I am told by the vascular surgery team that we do not  need any additional imaging at this time, and the patient can be safely heparinized and admitted.  We will plan to continue trending his troponins, I do appreciate some possible lateral ST changes on his EKG, this can be safely monitored as well as his troponins are trended out.  He is on heparin, no evidence of STEMI on EKG      Critical care time 30 minutes.      Diagnosis:    ICD-10-CM    1. Acute deep vein thrombosis (DVT) of non-extremity vein  I82.90       2. ESRD (end stage renal disease) on dialysis (H)  N18.6     Z99.2              Scribe Disclosure:  Cory LALA, am serving as a scribe at 10:15 AM on 3/13/2023 to document services personally performed by Kevin Estrada MD based on my observations and the provider's statements to me.     3/13/2023   Kevin Estrada MD Fitzgerald, Michael Maxwell Kreofsky, MD  03/13/23 1520

## 2023-03-14 LAB
ANION GAP SERPL CALCULATED.3IONS-SCNC: 18 MMOL/L (ref 7–15)
APTT PPP: 58 SECONDS (ref 22–38)
BUN SERPL-MCNC: 53.9 MG/DL (ref 6–20)
CALCIUM SERPL-MCNC: 7.9 MG/DL (ref 8.6–10)
CHLORIDE SERPL-SCNC: 92 MMOL/L (ref 98–107)
CREAT SERPL-MCNC: 13.09 MG/DL (ref 0.67–1.17)
DEPRECATED HCO3 PLAS-SCNC: 23 MMOL/L (ref 22–29)
ERYTHROCYTE [DISTWIDTH] IN BLOOD BY AUTOMATED COUNT: 13.4 % (ref 10–15)
GFR SERPL CREATININE-BSD FRML MDRD: 5 ML/MIN/1.73M2
GLUCOSE SERPL-MCNC: 102 MG/DL (ref 70–99)
HBV SURFACE AB SERPL IA-ACNC: 73.85 M[IU]/ML
HBV SURFACE AB SERPL IA-ACNC: REACTIVE M[IU]/ML
HBV SURFACE AG SERPL QL IA: NONREACTIVE
HCT VFR BLD AUTO: 27.6 % (ref 40–53)
HGB BLD-MCNC: 8.9 G/DL (ref 13.3–17.7)
MCH RBC QN AUTO: 29.7 PG (ref 26.5–33)
MCHC RBC AUTO-ENTMCNC: 32.2 G/DL (ref 31.5–36.5)
MCV RBC AUTO: 92 FL (ref 78–100)
PLATELET # BLD AUTO: 151 10E3/UL (ref 150–450)
POTASSIUM SERPL-SCNC: 4.6 MMOL/L (ref 3.4–5.3)
RBC # BLD AUTO: 3 10E6/UL (ref 4.4–5.9)
SODIUM SERPL-SCNC: 133 MMOL/L (ref 136–145)
WBC # BLD AUTO: 10.3 10E3/UL (ref 4–11)

## 2023-03-14 PROCEDURE — 85018 HEMOGLOBIN: CPT | Performed by: STUDENT IN AN ORGANIZED HEALTH CARE EDUCATION/TRAINING PROGRAM

## 2023-03-14 PROCEDURE — 85730 THROMBOPLASTIN TIME PARTIAL: CPT | Performed by: HOSPITALIST

## 2023-03-14 PROCEDURE — 250N000013 HC RX MED GY IP 250 OP 250 PS 637: Performed by: HOSPITALIST

## 2023-03-14 PROCEDURE — 250N000011 HC RX IP 250 OP 636: Performed by: EMERGENCY MEDICINE

## 2023-03-14 PROCEDURE — 250N000013 HC RX MED GY IP 250 OP 250 PS 637: Performed by: STUDENT IN AN ORGANIZED HEALTH CARE EDUCATION/TRAINING PROGRAM

## 2023-03-14 PROCEDURE — 99233 SBSQ HOSP IP/OBS HIGH 50: CPT | Performed by: HOSPITALIST

## 2023-03-14 PROCEDURE — 250N000011 HC RX IP 250 OP 636: Performed by: HOSPITALIST

## 2023-03-14 PROCEDURE — 120N000001 HC R&B MED SURG/OB

## 2023-03-14 PROCEDURE — 80048 BASIC METABOLIC PNL TOTAL CA: CPT | Performed by: STUDENT IN AN ORGANIZED HEALTH CARE EDUCATION/TRAINING PROGRAM

## 2023-03-14 PROCEDURE — 99222 1ST HOSP IP/OBS MODERATE 55: CPT | Performed by: INTERNAL MEDICINE

## 2023-03-14 PROCEDURE — 36415 COLL VENOUS BLD VENIPUNCTURE: CPT | Performed by: HOSPITALIST

## 2023-03-14 PROCEDURE — 85041 AUTOMATED RBC COUNT: CPT | Performed by: STUDENT IN AN ORGANIZED HEALTH CARE EDUCATION/TRAINING PROGRAM

## 2023-03-14 RX ORDER — NALOXONE HYDROCHLORIDE 0.4 MG/ML
0.2 INJECTION, SOLUTION INTRAMUSCULAR; INTRAVENOUS; SUBCUTANEOUS
Status: DISCONTINUED | OUTPATIENT
Start: 2023-03-14 | End: 2023-03-19 | Stop reason: HOSPADM

## 2023-03-14 RX ORDER — NALOXONE HYDROCHLORIDE 0.4 MG/ML
0.4 INJECTION, SOLUTION INTRAMUSCULAR; INTRAVENOUS; SUBCUTANEOUS
Status: DISCONTINUED | OUTPATIENT
Start: 2023-03-14 | End: 2023-03-19 | Stop reason: HOSPADM

## 2023-03-14 RX ORDER — DOCUSATE SODIUM 100 MG/1
100 CAPSULE, LIQUID FILLED ORAL 2 TIMES DAILY PRN
Status: DISCONTINUED | OUTPATIENT
Start: 2023-03-14 | End: 2023-03-19 | Stop reason: HOSPADM

## 2023-03-14 RX ORDER — SENNOSIDES 8.6 MG
1 TABLET ORAL 2 TIMES DAILY PRN
Status: DISCONTINUED | OUTPATIENT
Start: 2023-03-14 | End: 2023-03-16

## 2023-03-14 RX ORDER — ONDANSETRON 2 MG/ML
4 INJECTION INTRAMUSCULAR; INTRAVENOUS EVERY 6 HOURS PRN
Status: DISCONTINUED | OUTPATIENT
Start: 2023-03-14 | End: 2023-03-16

## 2023-03-14 RX ADMIN — HEPARIN SODIUM 1950 UNITS/HR: 10000 INJECTION, SOLUTION INTRAVENOUS at 16:37

## 2023-03-14 RX ADMIN — HYDROMORPHONE HYDROCHLORIDE 1 MG: 2 TABLET ORAL at 08:53

## 2023-03-14 RX ADMIN — DOCUSATE SODIUM 100 MG: 100 CAPSULE, LIQUID FILLED ORAL at 20:46

## 2023-03-14 RX ADMIN — CARVEDILOL 50 MG: 25 TABLET, FILM COATED ORAL at 08:58

## 2023-03-14 RX ADMIN — ACETAMINOPHEN 650 MG: 325 TABLET ORAL at 10:17

## 2023-03-14 RX ADMIN — HYDROMORPHONE HYDROCHLORIDE 1 MG: 2 TABLET ORAL at 20:46

## 2023-03-14 RX ADMIN — Medication 50 MCG: at 08:54

## 2023-03-14 RX ADMIN — Medication 1 CAPSULE: at 08:59

## 2023-03-14 RX ADMIN — CARVEDILOL 50 MG: 25 TABLET, FILM COATED ORAL at 19:01

## 2023-03-14 RX ADMIN — HEPARIN SODIUM 1800 UNITS/HR: 10000 INJECTION, SOLUTION INTRAVENOUS at 04:10

## 2023-03-14 RX ADMIN — CINACALCET 90 MG: 30 TABLET ORAL at 08:54

## 2023-03-14 RX ADMIN — SEVELAMER CARBONATE 3200 MG: 800 TABLET, FILM COATED ORAL at 08:54

## 2023-03-14 RX ADMIN — ASPIRIN 81 MG: 81 TABLET, COATED ORAL at 08:53

## 2023-03-14 RX ADMIN — BUMETANIDE 2 MG: 2 TABLET ORAL at 08:54

## 2023-03-14 RX ADMIN — ATORVASTATIN CALCIUM 10 MG: 10 TABLET, FILM COATED ORAL at 20:33

## 2023-03-14 RX ADMIN — HYDRALAZINE HYDROCHLORIDE 100 MG: 50 TABLET, FILM COATED ORAL at 16:27

## 2023-03-14 RX ADMIN — ONDANSETRON 4 MG: 2 INJECTION INTRAMUSCULAR; INTRAVENOUS at 14:37

## 2023-03-14 RX ADMIN — SEVELAMER CARBONATE 3200 MG: 800 TABLET, FILM COATED ORAL at 12:40

## 2023-03-14 RX ADMIN — HYDRALAZINE HYDROCHLORIDE 100 MG: 50 TABLET, FILM COATED ORAL at 20:33

## 2023-03-14 RX ADMIN — CALCIUM ACETATE 1334 MG: 667 CAPSULE ORAL at 12:40

## 2023-03-14 RX ADMIN — HYDRALAZINE HYDROCHLORIDE 100 MG: 50 TABLET, FILM COATED ORAL at 08:58

## 2023-03-14 RX ADMIN — SEVELAMER CARBONATE 3200 MG: 800 TABLET, FILM COATED ORAL at 19:01

## 2023-03-14 RX ADMIN — CALCIUM ACETATE 1334 MG: 667 CAPSULE ORAL at 19:01

## 2023-03-14 RX ADMIN — HYDROMORPHONE HYDROCHLORIDE 1 MG: 2 TABLET ORAL at 16:39

## 2023-03-14 RX ADMIN — CALCIUM ACETATE 1334 MG: 667 CAPSULE ORAL at 08:59

## 2023-03-14 RX ADMIN — AMLODIPINE BESYLATE 10 MG: 10 TABLET ORAL at 08:53

## 2023-03-14 RX ADMIN — CLONIDINE HYDROCHLORIDE 0.1 MG: 0.1 TABLET ORAL at 09:04

## 2023-03-14 ASSESSMENT — ACTIVITIES OF DAILY LIVING (ADL)
ADLS_ACUITY_SCORE: 18
ADLS_ACUITY_SCORE: 35
ADLS_ACUITY_SCORE: 18
ADLS_ACUITY_SCORE: 35
ADLS_ACUITY_SCORE: 18
CONCENTRATING,_REMEMBERING_OR_MAKING_DECISIONS_DIFFICULTY: NO
ADLS_ACUITY_SCORE: 18
WEAR_GLASSES_OR_BLIND: NO
TOILETING_ISSUES: NO
CHANGE_IN_FUNCTIONAL_STATUS_SINCE_ONSET_OF_CURRENT_ILLNESS/INJURY: NO
FALL_HISTORY_WITHIN_LAST_SIX_MONTHS: NO
DOING_ERRANDS_INDEPENDENTLY_DIFFICULTY: NO
DRESSING/BATHING_DIFFICULTY: NO
ADLS_ACUITY_SCORE: 35
ADLS_ACUITY_SCORE: 35
DIFFICULTY_EATING/SWALLOWING: NO
ADLS_ACUITY_SCORE: 23
ADLS_ACUITY_SCORE: 35
WALKING_OR_CLIMBING_STAIRS_DIFFICULTY: NO

## 2023-03-14 NOTE — PLAN OF CARE
"[facility] @ 10 AM     Orientations: A&Ox4   Vitals/Pain: VSS, RA, pain 6-7/10 RUE and LUE (fistula)   Tele: NSR  Lines/Drains: PIV R hand infusing (heparin 1950u/hr)  Skin/Wounds: RUE swelling and fistula weeping (ABD in place and ACE wrap on), LUE new fistula placed (gauze over to reduce pain/rubbing).   GI/: No BM since 3/9 per pt (BM med requested from MD, see MD notified note), no voiding today (dialysis). Nauseous x1 after eating lunch, zofran x1 effective.   Labs: Abnormal/Trends: BUN (53.9), Crt (13.09), GFR (5), Hgb (8.9), hematocrit (27.6)  Electrolyte Replacement- N/A  Ambulation/Assist: SB assist (needs assistance w/ RUE)  Sleep Quality: napped today  Plan: Per nephrology note, new fistula L elbow and continue dialysis schedule (tomorrow/MWF). Keep RUE wrapped per comfort and elevated to decrease swelling. Bedbath and scrubs in room.    Per nephrology (Nara), all labs should be drawn during dialysis tomorrow (PTT could not get drawn to day due to pt refusal of using R arm due to swelling and pain, and L arm fistula). MD notified to change all lab orders for today/tomorrow to \"get during dialysis\". (see MD notified note)  "

## 2023-03-14 NOTE — PROVIDER NOTIFICATION
"MD Notification    Notified Person: MD    Notified Person Name: Jesus Manuel     Notification Date/Time: 1600 3/14/23    Notification Interaction: [employer] messaging     Purpose of Notification: No BM     Orders Received:    Comments:    RN to MD: \"Pt also hasn't had BM since 3/9 per pt, can we get PRN/scheduled BM meds? \"  "

## 2023-03-14 NOTE — CONSULTS
Dialysis in am unless discharged. He runs at the [facility]) dialysis unit.     See dictation. Confirmation # [id number].

## 2023-03-14 NOTE — ED NOTES
Pt complaining of a sudden sharp, shooting/stabbing pain in his upper left chest that extends down to about mid-point on his ABD.  Pt rates it about a 6.5/10 and endorses nausea and lightheadedness.  EKG obtained, and hospitalist paged about his condition.

## 2023-03-14 NOTE — PROVIDER NOTIFICATION
"MD Notification    Notified Person: MD    Notified Person Name: Jesus Manuel     Notification Date/Time: 3/14/23 1600    Notification Interaction: Vocera messaging    Purpose of Notification: Lab draw issue (RUE swelling and pain, LUE fistula)     Orders Received: Per nephrology (Nara) \" have all labs drawn in dialysis tomorrow, have them change to \"drawn in dialysis\" and remind dialysis/lab in AM.\"     Comments:    RN to MD: \"Pt has R arm wrapped w/ ACE wrap to reduce swelling and severely painful. Pt has PTT lab draw and he refused on his R arm due to pain and swelling and lab cannot draw from L arm because his new dialysis fistula. I don't know what else to do for lab draw.\"    MD to RN: \"Can we still draw from right arm despite the ace wrap?\"    RN to MD: \"Pt refused R arm draw due to pain and swelling. Nurses tried in ED twice, pt stated, and couldn't draw from R arm so pt refused trying again. Looking for next options \"    MD to RN: \"Can you please reach out to nephrology regarding the access issue. They should be able to guide us on what to do in a dialysis patient with these extreme circumstances.\"    Per nephrology (Nara), all labs should be drawn during dialysis tomorrow (PTT could not get drawn to day due to pt refusal of using R arm due to swelling and pain, and L arm fistula). MD notified to change all lab orders for today/tomorrow to \"get during dialysis\". (see MD notified note)  "

## 2023-03-14 NOTE — CONSULTS
Consult Date: 03/14/2023    RENAL CONSULTATION    REQUESTING PHYSICIAN:  Adenike Garcia M.D.     CONSULTANT:  Yosvany Bains M.D.     REASON FOR CONSULTATION:  End-stage renal disease, on hemodialysis.    HISTORY OF PRESENT ILLNESS:  This is a 27-year-old dialysis patient who presents with right sided neck, chest and upper extremity pain associated with a DVT of the right internal jugular vein.  He has a complicated vascular access history.  He had a right arm fistula placed in 03/2021, which subsequently failed and was ligated.  This ligation was done on 03/09/2023.  At that time, he had creation of a left arm fistula and a tunneled dialysis catheter placed in the right internal jugular vein.  He presented with right arm pain and swelling.  An ultrasound showed a near occlusive right internal jugular vein DVT.  He was put on both heparin IV and started on apixaban for outpatient anticoagulation in the future.  Vascular Surgery has seen him and is managing his access issues.  His last hemodialysis was 03/13/2023.  He runs with Dr. Barrett in the [facility] ([facility]  He says they typically take 2 kilograms of fluid off, and he runs as an outpatient for 4 hours, most recently using a CVC.    PAST MEDICAL HISTORY:  Includes end-stage renal disease due to focal segmental glomerulosclerosis on dialysis.  He also has hypertension.      MEDICATIONS:  Prior to admission medications included:   1.  Cinacalcet.  2.  PhosLo.  3.  Renvela.  4.  Vitamin D.     For his blood pressure has been on:   1.  Amlodipine.   2.  Clonidine.   3.  Carvedilol.   4.  Hydralazine.  5.  Bumex.     Currently, he is on apixaban and IV heparin.    PAST SURGICAL HISTORY:  Includes left ankle surgery, right arm fistula creation with subsequent ligation, right CVC, and left arm fistula.    PHYSICAL EXAMINATION:    GENERAL:  He is alert.  He is resting comfortably in bed.  He is 110 kilograms with an estimated dry  weight of 110.5.  VITAL SIGNS:  His pulse is 87, blood pressure 166/86.  HEENT:  Shows no trauma.  The eyes show pupils, round and reactive to light.  Mouth shows good dentition.  NECK:  Tender on the right side.  He has a right CVC in place with a clean dressing and no tenderness to palpation at the exit site.  LUNGS:  Clear anterior breath sounds.  HEART:  Regular rhythm, normal S1, S2, no murmur.  EXTREMITIES:  Lower extremities show no edema.  GASTROINTESTINAL:  Abdomen is obese.  The right upper extremity shows edema throughout the whole arm.  There is tenderness to palpation and a mass effect at the surgical incision site in the right upper extremity.  This is consistent with a hematoma.  The left upper extremity is of normal caliber without edema.    LABORATORY DATA:  Today show potassium 4.6, bicarbonate 23, creatinine 13.09, hemoglobin is 8.9, calcium 7.9.  An ultrasound of the neck showed an occlusive thrombus in the right IJ vein.  The right arm fistula was clotted.    IMPRESSION:   1.  End-stage renal disease, on dialysis.  We will use his CVC to do dialysis tomorrow on his usually scheduled day.  We will run him with the right CVC at 400 for 3 hours and no additional heparin.  He will be on a 2k, 138 sodium, 38 bicarbonate bath, and we will aim for 2 liters ultrafiltration.  We will give him both epo and Hectorol on dialysis.  2.  Hypertension.  We will continue his blood pressure medications.  3.  Metabolic bone disease associated with end-stage renal disease.  He has been continued on cinacalcet, PhosLo, Renvela and vitamin D.    Yosvany Bains MD        D: 2023   T: 2023   MT: ANTHONY    Name:     STEFAN RIOS  MRN:      [id number]        Account:      [id number]   :      1995           Consult Date: 2023     Document: [id number]

## 2023-03-14 NOTE — CONSULTS
Vascular Surgery Consult    Taylor Valiente MRN# [id number]   YOB: 1995 Age: 27 year old      Date of Admission:  3/13/2023        Consult for:    Consulting physician/team: Medicine        Assessment:    27 year old male with PMH of ESRD who is s/p placement of right tunneled HD catheter, ligation of RUE AV fistula and creation of left brachiobasilic AV fistula on 3/9. He presents with significant RUE swelling. US duplex shows right  internal jugular thrombus and occluded/ ligated right AV fistula         Plan:        Symptoms 2/2 to superficial thrombophlebitis of venous outflow after AV fistula ligation . Recommend conservative management with symptom control. Agree with anticoagulation for internal jugular thrombus            History of Present Illness:    Taylor Valiente is a 27 year old male with PMH of ESRD who is s/p placement of right tunneled HD catheter, ligation of RUE AV fistula and creation of left brachiobasilic AV fistula on 3/9. He presents with significant RUE swelling. Denies any weakness or bleeding.   US duplex shows right  internal jugular thrombus and occluded/ ligated right AV fistula     Past Medical History:  Past Medical History:   Diagnosis Date     Adrenal adenoma, left      Anemia      Benign essential hypertension 07/28/2017     CKD (chronic kidney disease) stage 5, GFR less than 15 ml/min (H)     FSGS     Depression      Focal glomerular sclerosis 07/28/2017     HLD (hyperlipidemia)      LVH (left ventricular hypertrophy) due to hypertensive disease 07/14/2017     Noncompliance      Obesity, unspecified      OD (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/2021     Stress-induced cardiomyopathy      Suicide attempt (H) 2019       Past Surgical History:  Past Surgical History:   Procedure Laterality Date     ARTHROSCOPY ANKLE Left 2/24/2021    Procedure: Left ankle arthroscopy and debridement/micro fracture;  Surgeon: Mirza Nelson MD;  Location: [facility] OR     BIOPSY   2017    renal- [facility]     CREATE FISTULA ARTERIOVENOUS UPPER EXTREMITY Right 3/4/2021    Procedure: RIGHT proximal radial  to CEPHALIC ARTERIOVENOUS FISTULA;  Surgeon: Henrik Moran MD;  Location: [facility] OR     CREATE FISTULA ARTERIOVENOUS UPPER EXTREMITY Left 3/9/2023    Procedure: CREATION OF FIRST STAGE LEFT BRACHIOBASILIC ARTERIOVENOUS FISTULA;  Surgeon: Henrik Moran MD;  Location: [facility] OR     IR CVC TUNNEL PLACEMENT > 5 YRS OF AGE  6/17/2021     IR CVC TUNNEL PLACEMENT > 5 YRS OF AGE  3/9/2023     IR CVC TUNNEL REMOVAL RIGHT  12/3/2021     LIGATE FISTULA ARTERIOVENOUS UPPER EXTREMITY Right 3/9/2023    Procedure: LIGATION OF RIGHT ARM ARTERIOVENOUS FISTULA;  Surgeon: Henrik Moran MD;  Location: [facility] OR     NO HISTORY OF SURGERY       REVISION FISTULA ARTERIOVENOUS UPPER EXTREMITY Right 8/26/2021    Procedure: Second stage RIGHT BRACHIOCEPHALIC transposition ARTERIOVENOUS FISTULA;  Surgeon: Henrik Moran MD;  Location: [facility] OR       Allergies:     Allergies   Allergen Reactions     Benadryl [Diphenhydramine] Itching       Medications:  [COMPLETED] apixaban ANTICOAGULANT (ELIQUIS) tablet 10 mg  [COMPLETED] oxyCODONE (ROXICODONE) tablet 10 mg    acetaminophen (TYLENOL) 325 MG tablet, Take 2 tablets (650 mg) by mouth every 4 hours as needed for mild pain  amLODIPine (NORVASC) 10 MG tablet, Take 10 mg by mouth daily   aspirin 81 MG EC tablet, Take 81 mg by mouth daily  atorvastatin (LIPITOR) 10 MG tablet, TAKE ONE TABLET BY MOUTH EVERY NIGHT AT BEDTIME  bumetanide (BUMEX) 2 MG tablet, Take 2 mg by mouth daily   calcium acetate (CALPHRON) 667 MG TABS tablet, Take 1,334 mg by mouth 3 times daily (with meals)  carvedilol (COREG) 25 MG tablet, TAKE TWO TABLETS BY MOUTH TWICE A DAY WITH A MEAL Strength: 25 mg  cinacalcet (SENSIPAR) 90 MG tablet, Take 90 mg by mouth daily  cloNIDine (CATAPRES) 0.1 MG tablet, Take 0.1 mg by mouth At Bedtime On dialysis days (Mon, Wed, Fri)  cloNIDine (CATAPRES)  0.1 MG tablet, Take 0.1 mg by mouth Twice daily on non dialysis days (Tue, Thurs, Sat, Sun)  hydrALAZINE (APRESOLINE) 100 MG tablet, TAKE ONE TABLET BY MOUTH THREE TIMES A DAY  multivitamin RENAL (RENAVITE RX/NEPHROVITE) 1 MG tablet, Take 1 tablet by mouth daily   nitroGLYcerin (NITROSTAT) 0.3 MG sublingual tablet, For chest pain place 1 tablet under the tongue every 5 minutes for 3 doses. If symptoms persist 5 minutes after 1st dose call 911.  senna-docusate (SENOKOT-S/PERICOLACE) 8.6-50 MG tablet, Take 1-2 tablets by mouth 2 times daily  sevelamer carbonate (RENVELA) 800 MG tablet, Take 1,600 mg by mouth Take with snacks or supplements  sevelamer HCl (RENAGEL) 800 MG tablet, Take 3,200 mg by mouth 3 times daily (with meals)  vitamin D3 (CHOLECALCIFEROL) 2000 units (50 mcg) tablet, Take 50 mcg by mouth daily  ACE/ARB/ARNI NOT PRESCRIBED (INTENTIONAL), Please choose reason not prescribed from choices below. (Patient not taking: Reported on 3/7/2023)  Apixaban Starter Pack (ELIQUIS DVT/PE STARTER PACK) 5 MG TBPK, Take 10 mg by mouth 2 times daily for 7 days, THEN 5 mg 2 times daily for 23 days.        Social History:  Social History     Socioeconomic History     Marital status: Single     Spouse name: Not on file     Number of children: 0     Years of education: Not on file     Highest education level: Not on file   Occupational History     Occupation:      Occupation: kitchen     Comment: [employer]   Tobacco Use     Smoking status: Never     Smokeless tobacco: Never   Vaping Use     Vaping Use: Never used   Substance and Sexual Activity     Alcohol use: No     Drug use: Yes     Types: Marijuana     Comment: occ     Sexual activity: Not Currently     Partners: Female   Other Topics Concern     Parent/sibling w/ CABG, MI or angioplasty before 65F 55M? Not Asked   Social History Narrative     Not on file     Social Determinants of Health     Financial Resource Strain: Medium Risk     Difficulty  of Paying Living Expenses: Somewhat hard   Food Insecurity: Food Insecurity Present     Worried About Running Out of Food in the Last Year: Sometimes true     Ran Out of Food in the Last Year: Sometimes true   Transportation Needs: No Transportation Needs     Lack of Transportation (Medical): No     Lack of Transportation (Non-Medical): No   Physical Activity: Not on file   Stress: Not on file   Social Connections: Not on file   Intimate Partner Violence: Not on file   Housing Stability: Not on file       Family History:  Family History   Problem Relation Age of Onset     Blood Disease Mother         has hep b     Diabetes Mother         gestionanal diabetes     Hypertension Mother      Obesity Father      Hypertension Father      Hypertension Maternal Grandmother      Diabetes Maternal Grandmother      Hypertension Paternal Grandmother      Hypertension Paternal Grandfather      Coronary Artery Disease Maternal Uncle      Cancer No family hx of        ROS:      The remainder of the complete ROS was negative unless noted in the HPI.    Exam:  BP (!) 157/91   Pulse 101   Temp 98.9  F (37.2  C) (Oral)   Resp 28   Ht 1.829 m (6')   Wt 110 kg (242 lb 8.1 oz)   SpO2 99%   BMI 32.89 kg/m    General:  Alert and oriented with appropriate responses to questions, in NAD.  HEENT: NC/AT, sclera anicteric  Neck: Supple, no JVD  Resp: NLB RA   Cardiac: regular rate and rhythm, no murmur.  Extremities: Significant RUE edema, 5/5 strength, 2+ radialpulses. Palpable L brachiobasilic fistula thrill   Skin: Warm and dry, no jaundice or rash  Neuro: Cn II-XII intact, moves all extremities equally    Labs:  Most Recent CBC:   Recent Labs   Lab Test 03/13/23  1013   WBC 9.3   RBC 3.21*   HGB 9.5*   HCT 29.3*   MCV 91   MCH 29.6   MCHC 32.4   RDW 13.4   *     Most Recent BMP:   Recent Labs   Lab Test 03/13/23  1013 03/07/23  1222 12/12/22  0559   *   < > 143   POTASSIUM 4.3   < > 5.6*   CHLORIDE 93*   < > 101   CO2 24    < > 23   BUN 35.7*   < > 95.6*   CR 8.81*   < > 15.45*   *   < > 108*   MAG  --   --  2.3    < > = values in this interval not displayed.           Imaging:  Recent Results (from the past 24 hour(s))   US Upper Extremity Venous Duplex Right    Narrative    EXAM: US UPPER EXTREMITY VENOUS DUPLEX RIGHT  LOCATION: [facility]  DATE/TIME: 3/12/2023 9:36 PM    INDICATION: Right upper arm pain, swelling, concern for DVT  COMPARISON: Fluoroscopic images during dialysis catheter placement 03/09/2023, ultrasound 02/21/2023  TECHNIQUE: Venous Duplex ultrasound of the right upper extremity with (when possible) and without compression, augmentation, and duplex. Color flow and spectral Doppler with waveform analysis performed.    FINDINGS: Ultrasound includes evaluation of the internal jugular vein, innominate vein, subclavian vein, axillary vein, and brachial vein. The superficial cephalic and basilic veins were also evaluated where seen.     RIGHT: Near occlusive thrombus noted within the internal jugular vein. No additional acute deep vein thrombus visualized. No superficial thrombophlebitis. Clotted dialysis fistula noted in the right arm and shoulder.      Impression    IMPRESSION:  1.  Near occlusive deep vein thrombus in the right internal jugular vein.  2.  Clotted dialysis fistula partially visualized in the right shoulder and upper arm.       XR Chest 2 Views    Narrative    CHEST TWO VIEWS   3/13/2023 10:40 AM     HISTORY: Shortness of breath.    COMPARISON: Chest x-ray on 12/12/2022.      Impression    IMPRESSION: AP and lateral views of the chest were obtained. Right IJ  central catheter tip projects over the high right atrium.  Cardiomediastinal silhouette is within normal limits. No suspicious  focal pulmonary opacities. No significant pleural effusion or  pneumothorax.     YONI HARDEN MD         SYSTEM ID:  [id number]       Assessment/ Plan: See above.     Maria C Jewell  MD     Fellow    Pt reviewed with Dr. Givens    Vascular surgery attending staff note: I have seen and examined the patient in the emergency department.  I reviewed the chart as well.  Patient is a 27-year-old male who underwent ligation of a large radiocephalic arteriovenous fistula on the ninth of this month and at the same time underwent creation of a left upper extremity for stage brachiobasilic arteriovenous fistula.  He presented with increasing pain and swelling of the right upper extremity.  Clinical picture is consistent with thrombophlebitis of the large aneurysmal arteriovenous fistula due to the thrombus in the ligated segment.  He also has trailing thrombus at the level of the access site of the dialysis catheter.  We will initiate him on anticoagulation and conservative treatment for the thrombophlebitis of the right upper extremity arteriovenous aneurysm fistula.  All of this was explained to the patient and he is agreeable with the plan.  He can dialyze with the internal jugular catheter that was placed on the right side.    CLAUDE GIVENS M.D.

## 2023-03-15 ENCOUNTER — APPOINTMENT (OUTPATIENT)
Dept: CT IMAGING | Facility: CLINIC | Age: 28
DRG: 987 | End: 2023-03-15
Payer: MEDICARE

## 2023-03-15 ENCOUNTER — APPOINTMENT (OUTPATIENT)
Dept: ULTRASOUND IMAGING | Facility: CLINIC | Age: 28
DRG: 987 | End: 2023-03-15
Payer: MEDICARE

## 2023-03-15 ENCOUNTER — APPOINTMENT (OUTPATIENT)
Dept: MRI IMAGING | Facility: CLINIC | Age: 28
DRG: 987 | End: 2023-03-15
Payer: MEDICARE

## 2023-03-15 ENCOUNTER — APPOINTMENT (OUTPATIENT)
Dept: CT IMAGING | Facility: CLINIC | Age: 28
DRG: 987 | End: 2023-03-15
Attending: HOSPITALIST
Payer: MEDICARE

## 2023-03-15 LAB
ANION GAP SERPL CALCULATED.3IONS-SCNC: 17 MMOL/L (ref 7–15)
APTT PPP: 47 SECONDS (ref 22–38)
APTT PPP: 49 SECONDS (ref 22–38)
BUN SERPL-MCNC: 72.3 MG/DL (ref 6–20)
CALCIUM SERPL-MCNC: 7.6 MG/DL (ref 8.6–10)
CHLORIDE SERPL-SCNC: 92 MMOL/L (ref 98–107)
CREAT SERPL-MCNC: 16.4 MG/DL (ref 0.67–1.17)
DEPRECATED HCO3 PLAS-SCNC: 24 MMOL/L (ref 22–29)
ERYTHROCYTE [DISTWIDTH] IN BLOOD BY AUTOMATED COUNT: 13.3 % (ref 10–15)
GFR SERPL CREATININE-BSD FRML MDRD: 4 ML/MIN/1.73M2
GLUCOSE SERPL-MCNC: 128 MG/DL (ref 70–99)
HCT VFR BLD AUTO: 25.6 % (ref 40–53)
HGB BLD-MCNC: 8.4 G/DL (ref 13.3–17.7)
INR PPP: 1.4 (ref 0.85–1.15)
MCH RBC QN AUTO: 30.3 PG (ref 26.5–33)
MCHC RBC AUTO-ENTMCNC: 32.8 G/DL (ref 31.5–36.5)
MCV RBC AUTO: 92 FL (ref 78–100)
PLATELET # BLD AUTO: 172 10E3/UL (ref 150–450)
POTASSIUM SERPL-SCNC: 4.6 MMOL/L (ref 3.4–5.3)
RBC # BLD AUTO: 2.77 10E6/UL (ref 4.4–5.9)
SODIUM SERPL-SCNC: 133 MMOL/L (ref 136–145)
WBC # BLD AUTO: 8.4 10E3/UL (ref 4–11)

## 2023-03-15 PROCEDURE — 634N000001 HC RX 634: Performed by: INTERNAL MEDICINE

## 2023-03-15 PROCEDURE — 250N000011 HC RX IP 250 OP 636: Performed by: HOSPITALIST

## 2023-03-15 PROCEDURE — 70496 CT ANGIOGRAPHY HEAD: CPT | Mod: MG

## 2023-03-15 PROCEDURE — 85610 PROTHROMBIN TIME: CPT | Performed by: HOSPITALIST

## 2023-03-15 PROCEDURE — 85730 THROMBOPLASTIN TIME PARTIAL: CPT | Performed by: HOSPITALIST

## 2023-03-15 PROCEDURE — 5A1D70Z PERFORMANCE OF URINARY FILTRATION, INTERMITTENT, LESS THAN 6 HOURS PER DAY: ICD-10-PCS | Performed by: INTERNAL MEDICINE

## 2023-03-15 PROCEDURE — 258N000003 HC RX IP 258 OP 636: Performed by: INTERNAL MEDICINE

## 2023-03-15 PROCEDURE — 85027 COMPLETE CBC AUTOMATED: CPT | Performed by: HOSPITALIST

## 2023-03-15 PROCEDURE — 250N000011 HC RX IP 250 OP 636: Performed by: EMERGENCY MEDICINE

## 2023-03-15 PROCEDURE — 250N000009 HC RX 250: Performed by: HOSPITALIST

## 2023-03-15 PROCEDURE — G1010 CDSM STANSON: HCPCS

## 2023-03-15 PROCEDURE — 82310 ASSAY OF CALCIUM: CPT | Performed by: HOSPITALIST

## 2023-03-15 PROCEDURE — 70498 CT ANGIOGRAPHY NECK: CPT | Mod: MG

## 2023-03-15 PROCEDURE — 250N000011 HC RX IP 250 OP 636: Performed by: INTERNAL MEDICINE

## 2023-03-15 PROCEDURE — 99207 PR APP CREDIT; MD BILLING SHARED VISIT: CPT

## 2023-03-15 PROCEDURE — 250N000013 HC RX MED GY IP 250 OP 250 PS 637: Performed by: HOSPITALIST

## 2023-03-15 PROCEDURE — 99233 SBSQ HOSP IP/OBS HIGH 50: CPT | Performed by: HOSPITALIST

## 2023-03-15 PROCEDURE — 90937 HEMODIALYSIS REPEATED EVAL: CPT

## 2023-03-15 PROCEDURE — 90935 HEMODIALYSIS ONE EVALUATION: CPT | Performed by: INTERNAL MEDICINE

## 2023-03-15 PROCEDURE — 250N000013 HC RX MED GY IP 250 OP 250 PS 637: Performed by: STUDENT IN AN ORGANIZED HEALTH CARE EDUCATION/TRAINING PROGRAM

## 2023-03-15 PROCEDURE — 36415 COLL VENOUS BLD VENIPUNCTURE: CPT | Performed by: HOSPITALIST

## 2023-03-15 PROCEDURE — 93990 DOPPLER FLOW TESTING: CPT

## 2023-03-15 PROCEDURE — 120N000001 HC R&B MED SURG/OB

## 2023-03-15 PROCEDURE — 70450 CT HEAD/BRAIN W/O DYE: CPT

## 2023-03-15 RX ORDER — IOPAMIDOL 755 MG/ML
125 INJECTION, SOLUTION INTRAVASCULAR ONCE
Status: COMPLETED | OUTPATIENT
Start: 2023-03-15 | End: 2023-03-15

## 2023-03-15 RX ORDER — IOPAMIDOL 755 MG/ML
120 INJECTION, SOLUTION INTRAVASCULAR ONCE
Status: COMPLETED | OUTPATIENT
Start: 2023-03-15 | End: 2023-03-15

## 2023-03-15 RX ORDER — DOXERCALCIFEROL 4 UG/2ML
4 INJECTION INTRAVENOUS
Status: COMPLETED | OUTPATIENT
Start: 2023-03-15 | End: 2023-03-15

## 2023-03-15 RX ADMIN — CARVEDILOL 50 MG: 25 TABLET, FILM COATED ORAL at 10:20

## 2023-03-15 RX ADMIN — ACETAMINOPHEN 650 MG: 325 TABLET ORAL at 03:41

## 2023-03-15 RX ADMIN — HYDROMORPHONE HYDROCHLORIDE 1 MG: 2 TABLET ORAL at 06:23

## 2023-03-15 RX ADMIN — ACETAMINOPHEN 650 MG: 325 TABLET ORAL at 18:46

## 2023-03-15 RX ADMIN — HEPARIN SODIUM 1950 UNITS/HR: 10000 INJECTION, SOLUTION INTRAVENOUS at 06:24

## 2023-03-15 RX ADMIN — Medication 1 CAPSULE: at 12:17

## 2023-03-15 RX ADMIN — HYDRALAZINE HYDROCHLORIDE 100 MG: 50 TABLET, FILM COATED ORAL at 20:16

## 2023-03-15 RX ADMIN — SODIUM CHLORIDE 300 ML: 9 INJECTION, SOLUTION INTRAVENOUS at 10:39

## 2023-03-15 RX ADMIN — DOXERCALCIFEROL 4 MCG: 4 INJECTION, SOLUTION INTRAVENOUS at 10:44

## 2023-03-15 RX ADMIN — CALCIUM ACETATE 1334 MG: 667 CAPSULE ORAL at 13:39

## 2023-03-15 RX ADMIN — IOPAMIDOL 75 ML: 755 INJECTION, SOLUTION INTRAVENOUS at 01:31

## 2023-03-15 RX ADMIN — HEPARIN SODIUM 1900 UNITS: 1000 INJECTION INTRAVENOUS; SUBCUTANEOUS at 10:43

## 2023-03-15 RX ADMIN — HYDROMORPHONE HYDROCHLORIDE 1 MG: 2 TABLET ORAL at 18:07

## 2023-03-15 RX ADMIN — AMLODIPINE BESYLATE 10 MG: 10 TABLET ORAL at 12:17

## 2023-03-15 RX ADMIN — Medication 50 MCG: at 12:17

## 2023-03-15 RX ADMIN — ACETAMINOPHEN 650 MG: 325 TABLET ORAL at 07:52

## 2023-03-15 RX ADMIN — HEPARIN SODIUM 1900 UNITS: 1000 INJECTION INTRAVENOUS; SUBCUTANEOUS at 10:40

## 2023-03-15 RX ADMIN — ONDANSETRON 4 MG: 2 INJECTION INTRAMUSCULAR; INTRAVENOUS at 19:35

## 2023-03-15 RX ADMIN — CINACALCET 90 MG: 30 TABLET ORAL at 12:16

## 2023-03-15 RX ADMIN — HYDROMORPHONE HYDROCHLORIDE 1 MG: 2 TABLET ORAL at 10:21

## 2023-03-15 RX ADMIN — ATORVASTATIN CALCIUM 10 MG: 10 TABLET, FILM COATED ORAL at 21:19

## 2023-03-15 RX ADMIN — DOCUSATE SODIUM 100 MG: 100 CAPSULE, LIQUID FILLED ORAL at 13:39

## 2023-03-15 RX ADMIN — SODIUM CHLORIDE 250 ML: 9 INJECTION, SOLUTION INTRAVENOUS at 10:39

## 2023-03-15 RX ADMIN — HYDRALAZINE HYDROCHLORIDE 100 MG: 50 TABLET, FILM COATED ORAL at 12:17

## 2023-03-15 RX ADMIN — SODIUM CHLORIDE 100 ML: 900 INJECTION INTRAVENOUS at 01:32

## 2023-03-15 RX ADMIN — SEVELAMER CARBONATE 3200 MG: 800 TABLET, FILM COATED ORAL at 13:39

## 2023-03-15 RX ADMIN — ACETAMINOPHEN 650 MG: 325 TABLET ORAL at 12:16

## 2023-03-15 RX ADMIN — CARVEDILOL 50 MG: 25 TABLET, FILM COATED ORAL at 18:46

## 2023-03-15 RX ADMIN — HEPARIN SODIUM 2100 UNITS/HR: 10000 INJECTION, SOLUTION INTRAVENOUS at 17:10

## 2023-03-15 RX ADMIN — CLONIDINE HYDROCHLORIDE 0.1 MG: 0.1 TABLET ORAL at 21:19

## 2023-03-15 RX ADMIN — SEVELAMER CARBONATE 3200 MG: 800 TABLET, FILM COATED ORAL at 18:46

## 2023-03-15 RX ADMIN — EPOETIN ALFA-EPBX 4000 UNITS: 4000 INJECTION, SOLUTION INTRAVENOUS; SUBCUTANEOUS at 10:37

## 2023-03-15 RX ADMIN — ASPIRIN 81 MG: 81 TABLET, COATED ORAL at 12:17

## 2023-03-15 RX ADMIN — CALCIUM ACETATE 1334 MG: 667 CAPSULE ORAL at 18:46

## 2023-03-15 RX ADMIN — HYDROMORPHONE HYDROCHLORIDE 1 MG: 2 TABLET ORAL at 02:09

## 2023-03-15 RX ADMIN — BUMETANIDE 2 MG: 2 TABLET ORAL at 12:16

## 2023-03-15 ASSESSMENT — ACTIVITIES OF DAILY LIVING (ADL)
ADLS_ACUITY_SCORE: 23

## 2023-03-15 NOTE — PROGRESS NOTES
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  The patient had a ligation of his right arm fistula on 3/9/2023 but since then has started develop pain and swelling to the right arm.  Upper extremity ultrasound on 3/12 was obtained and showed a near occlusive DVT in the right internal jugular vein.  He was sent home from the emergency department with a prescription for Eliquis, which he has been unable to fill.  He returns to the emergency department today with chest and neck pain while undergoing dialysis.  He is noted to have significant right arm swelling. Given increasing pain and swelling of the R arm as well as his difficulty filling Eliquis, he will be admitted and initiated on heparin drip with vascular surgery consult.   Plan  -Vascular surgery consult: input appreciated. Heparin gtt initiated in the ED, continue for now.  - Patient found to have hematoma in left AVF seen on US. Plan is for OR on 3/16 for exploration of hematoma.   - continue supportive care for thrombophlebitis with pain control and arm elevation.      End-stage renal disease on hemodialysis  Renal disease thought to be secondary to hypertension. He reports his renal function significantly declined after getting COVID in 2020 and ultimately progressed to ESRD. Completed a partial dialysis run today  (3/13). Volume status and electrolytes stable. Current access is tunneled dialysis catheter.   - Nephrology consult   -Continue prior to admission Cinacalcet, sevelamer, cholecalciferol    Blurry vision  Diplopia  -- Developed last night 3/14/22  --Stroke evaluation done which was found to be negative.  --We will need ophthalmology evaluation if symptoms persist.     Hypertension  Hyperlipidemia  -Continue prior to admission amlodipine, carvedilol, clonidine, hydralazine  -Continue prior to admission aspirin and atorvastatin     Chronic anemia and Thrombocytopenia  Hgb is 9.5 and platelet count is 141. Hgb is slightly lower than baseline of 10-11.   - Monitor CBC      Mild troponin elevation  Troponin is elevated to 68, and stable on re-check. Pt has chronically elevated troponin 2/2 ESRD.   - Cardiac monitoring      Hx of Adrenal adenoma  Noted        Diet: Renal Diet (non-dialysis)  NPO for Medical/Clinical Reasons Except for: Meds    DVT Prophylaxis: Pneumatic Compression Devices  Hurt Catheter: Not present  Lines: PRESENT      CVC Double Lumen Right Internal jugular Non - valved (open ended);Tunneled-Site Assessment: WDL      Cardiac Monitoring: ACTIVE order. Indication: troponin elevation  Code Status: Full Code      Clinically Significant Risk Factors                        # Obesity: Estimated body mass index is 32.89 kg/m  as calculated from the following:    Height as of this encounter: 1.829 m (6').    Weight as of this encounter: 110 kg (242 lb 8.1 oz)., PRESENT ON ADMISSION         Disposition Plan      Expected Discharge Date: 03/16/2023                  Franny Conrad MD  Hospitalist Service  [facility]  Securely message with [employer] (more info)  Text page via [employer] Paging/Directory   ______________________________________________________________________    Interval History   Patient had an RRT overnight for diplopia and blurry vision.  He was evaluated for stroke symptoms and  now was ruled out.    Physical Exam   Vital Signs: Temp: 99.1  F (37.3  C) Temp src: Oral BP: (!) 156/90 Pulse: 92   Resp: 28 SpO2: 99 % O2 Device: None (Room air) Oxygen Delivery: 2 LPM  Weight: 242 lbs 8.1 oz    General Appearance: Well appearing for stated age.  Respiratory: CTAB, no rales or ronchi  Cardiovascular: S1, S2 normal, no murmurs  GI: non-tender on palpation, BS present      Medical Decision Making       55 MINUTES SPENT BY ME on the date of service doing chart review, history, exam, documentation & further activities per the note.      Data     I have personally reviewed the following data over the past 24 hrs:    8.4  \   8.4 (L)   / 172     133 (L) 92 (L) 72.3 (H) /  128 (H)   4.6 24 16.40 (H) \       INR:  1.40 (H) PTT:  49 (H)   D-dimer:  N/A Fibrinogen:  N/A       Imaging results reviewed over the past 24 hrs:   Recent Results (from the past 24 hour(s))   CT Head w/o Contrast    Narrative    EXAM: CTA HEAD NECK W CONTRAST, CT HEAD W/O CONTRAST  LOCATION: [facility]  DATE/TIME: 3/15/2023 1:40 AM    INDICATION: Diplopia.  COMPARISON: None.  CONTRAST: 75 mL Isovue 370  TECHNIQUE: Head and neck CT angiogram with IV contrast. Noncontrast head CT followed by axial helical CT images of the head and neck vessels obtained during the arterial phase of intravenous contrast administration. Axial 2D reconstructed images and   multiplanar 3D MIP reconstructed images of the head and neck vessels were performed by the technologist. Dose reduction techniques were used. All stenosis measurements made according to NASCET criteria unless otherwise specified.    FINDINGS:   NONCONTRAST HEAD CT:   INTRACRANIAL CONTENTS: No intracranial hemorrhage, extraaxial collection, or mass effect.  No CT evidence of acute infarct. Normal parenchymal attenuation. Normal ventricles and sulci.     VISUALIZED ORBITS/SINUSES/MASTOIDS: No intraorbital abnormality. Opacification of a majority of the right  maxillary sinus with soft tissue extending through the anterior mid to the middle nasal cavity and antrochoanal polyp. No middle ear or mastoid   effusion.    BONES/SOFT TISSUES: No acute abnormality.    HEAD CTA:  ANTERIOR CIRCULATION: No stenosis/occlusion, aneurysm, or high flow vascular malformation. Standard [place] anatomy.    POSTERIOR CIRCULATION: No stenosis/occlusion, aneurysm, or high flow vascular malformation. Balanced vertebral arteries supply a normal basilar artery.     DURAL VENOUS SINUSES: Expected enhancement of the major dural venous sinuses.    NECK CTA:  RIGHT CAROTID: No measurable stenosis or dissection.    LEFT CAROTID: No measurable stenosis or dissection.    VERTEBRAL ARTERIES: No focal stenosis or dissection. Balanced vertebral arteries.    AORTIC ARCH: Classic aortic arch anatomy with no significant stenosis at the origin of the great vessels.    NONVASCULAR STRUCTURES: Unremarkable.      Impression    IMPRESSION:   HEAD CT:  1.  No acute intracranial abnormality.    2.  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 This could be further evaluated with sinus CT on a nonemergent basis if indicated.    HEAD CTA:   1.  Normal CTA [place].    NECK CTA:  1.  Normal neck CTA.     CT head and CTA findings were discussed with Edwardo Tabor at 0148, 3/15/2023.   CTA Head Neck w Contrast    Narrative    EXAM: CTA HEAD NECK W CONTRAST, CT HEAD W/O CONTRAST  LOCATION: [facility]  DATE/TIME: 3/15/2023 1:40 AM    INDICATION: Diplopia.  COMPARISON: None.  CONTRAST: 75 mL Isovue 370  TECHNIQUE: Head and neck CT angiogram with IV contrast. Noncontrast head CT followed by axial helical CT images of the head and neck vessels obtained during the arterial phase of intravenous contrast administration. Axial 2D reconstructed images and   multiplanar 3D MIP reconstructed images of the head and neck vessels were performed by the  technologist. Dose reduction techniques were used. All stenosis measurements made according to NASCET criteria unless otherwise specified.    FINDINGS:   NONCONTRAST HEAD CT:   INTRACRANIAL CONTENTS: No intracranial hemorrhage, extraaxial collection, or mass effect.  No CT evidence of acute infarct. Normal parenchymal attenuation. Normal ventricles and sulci.     VISUALIZED ORBITS/SINUSES/MASTOIDS: No intraorbital abnormality. Opacification of a majority of the right maxillary sinus with soft tissue extending through the anterior mid to the middle nasal cavity and antrochoanal polyp. No middle ear or mastoid   effusion.    BONES/SOFT TISSUES: No acute abnormality.    HEAD CTA:  ANTERIOR CIRCULATION: No stenosis/occlusion, aneurysm, or high flow vascular malformation. Standard [place] anatomy.    POSTERIOR CIRCULATION: No stenosis/occlusion, aneurysm, or high flow vascular malformation. Balanced vertebral arteries supply a normal basilar artery.     DURAL VENOUS SINUSES: Expected enhancement of the major dural venous sinuses.    NECK CTA:  RIGHT CAROTID: No measurable stenosis or dissection.    LEFT CAROTID: No measurable stenosis or dissection.    VERTEBRAL ARTERIES: No focal stenosis or dissection. Balanced vertebral arteries.    AORTIC ARCH: Classic aortic arch anatomy with no significant stenosis at the origin of the great vessels.    NONVASCULAR STRUCTURES: Unremarkable.      Impression    IMPRESSION:   HEAD CT:  1.  No acute intracranial abnormality.    2.  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 This could be further evaluated with sinus CT on a nonemergent basis if indicated.    HEAD CTA:   1.  Normal CTA [place].    NECK CTA:  1.  Normal neck CTA.     CT head and CTA findings were discussed with Edwardo Tabor at 0148, 3/15/2023.   MR Brain w/o Contrast    Narrative    EXAM: MR BRAIN WITHOUT CONTRAST  LOCATION: [facility]  DATE/TIME: 03/15/2023, 3:19 AM    INDICATION: New onset diplopia and blurred vision.  COMPARISON: CTA head neck dated 03/15/2023.  TECHNIQUE: Routine multiplanar multisequence head MRI without intravenous contrast.    FINDINGS:  INTRACRANIAL CONTENTS: No acute or subacute infarct. No mass, acute hemorrhage, or extra-axial fluid collections. Normal brain parenchymal signal. Normal ventricles and sulci. Normal position of the cerebellar tonsils.     SELLA: No abnormality accounting for technique.    OSSEOUS STRUCTURES/SOFT TISSUES: Normal marrow signal. The major intracranial vascular flow-voids are maintained.     ORBITS: No definite orbital abnormality accounting for technique.     SINUSES/MASTOIDS: T2 fluid-filled signal opacification of the right maxillary sinus with associated polypoid soft tissue extending into the right middle nasal cavity. No middle ear or mastoid effusion.       Impression    IMPRESSION:  1.  No acute intracranial process.  2.  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 Follow-up ENT consultation is recommended.       US Ext Arterial Venous Dialys Acs Graft    Narrative    ULTRASOUND EXTREMITY ARTERIAL VENOUS DIALYSIS ACCESS GRAFT March 15,  2023 at 1338 hours    HISTORY: 27-year-old patient with history of right arm AV fistula  ligation and first stage creation of a left brachiobasilic AV fistula  performed March 9, 2023.    COMPARISON: None.    TECHNIQUE: Color Doppler and spectral waveform analysis obtained in  the inflow brachial artery as well as outflow basilic vein.    FINDINGS: Inflow brachial artery ranges from 5.5-6.2 mm with systolic  velocities ranging from 147-159 cm/second. The anastomosis is 800/579  cm/second. A rounded somewhat echogenic collection is noted near the  anastomosis measuring 3.1 x 4 x 3 cm, with associated extrinsic  compression of the fistulized basilic vein. Diameter near the  extrinsic compression  is 2.3 mm. Remaining outflow vein ranges from  6-6.2 mm. Total blood flow volume is 893 mL/minute.      Impression    IMPRESSION:  1. Patent left upper arm brachiobasilic fistula.  2. However, echogenic collection near the AV anastomosis, likely a  hematoma with associated extrinsic compression on the basilic vein  just beyond the anastomosis, diminished to 2.3 mm.  3. Collection near the AV anastomosis is 3.1 x 4 x 3 cm, presumably a  hematoma, with associated mass effect on the outflow basilic vein.    PINEDA CAVAZOS MD         SYSTEM ID:  [id number]

## 2023-03-15 NOTE — PROGRESS NOTES
Assessment and Plan:   ESRD: HD today 3h, 400 BFR with R CVC, no additional heparin (pt on IV heparin infusion and Eliquis). 2L UF, 2K 38 HCO3 and 138 Na. EPO and hectorol on the run.     LAF with ? Hematoma at incision site. Per Vasc surgery.     On phoslo 2 tid, sensipar, renvela.              Interval History:   HT: on amlodipine, bumex, coreg, clonidine, hydralazine.      Anemia: Hgb 8.4. On EPO.   R IJV DVT associated with CVC.             Review of Systems:   C/O swelling and pain in RUE. No sx on dialysis.           Medications:       - MEDICATION INSTRUCTIONS for Dialysis Patients -   Does not apply See Admin Instructions     sodium chloride 0.9%  250 mL Intravenous Once in dialysis/CRRT     sodium chloride 0.9%  300 mL Hemodialysis Machine Once     amLODIPine  10 mg Oral Daily     aspirin  81 mg Oral Daily     atorvastatin  10 mg Oral At Bedtime     bumetanide  2 mg Oral Daily     calcium acetate  1,334 mg Oral TID w/meals     carvedilol  50 mg Oral BID w/meals     cinacalcet  90 mg Oral Daily     cloNIDine  0.1 mg Oral Once per day on Mon Wed Fri     cloNIDine  0.1 mg Oral Once per day on Sun Tue Thu Sat     doxercalciferol  4 mcg Intravenous Once in dialysis/CRRT     epoetin melanie-epbx  4,000 Units Intravenous Once in dialysis/CRRT     sodium chloride (PF) 0.9%  10 mL Intracatheter Once in dialysis/CRRT    Followed by     heparin  1.3-2.6 mL Intracatheter Once in dialysis/CRRT     sodium chloride (PF) 0.9%  10 mL Intracatheter Once in dialysis/CRRT    Followed by     heparin  1.3-2.6 mL Intracatheter Once in dialysis/CRRT     hydrALAZINE  100 mg Oral TID     multivitamin RENAL  1 capsule Oral Daily     - MEDICATION INSTRUCTIONS -   Does not apply Once     sevelamer carbonate  3,200 mg Oral TID w/meals     sodium chloride (PF)  3 mL Intracatheter Q8H     sodium chloride (PF)  9 mL Intracatheter During Dialysis/CRRT (from stock)     sodium chloride (PF)  9 mL Intracatheter During Dialysis/CRRT (from  stock)     vitamin D3  50 mcg Oral Daily       heparin 1,950 Units/hr (03/15/23 0624)     - MEDICATION INSTRUCTIONS -       Current active medications and PTA medications reviewed, see medication list for details.            Physical Exam:   Vitals were reviewed  Patient Vitals for the past 24 hrs:   BP Temp Temp src Pulse Resp SpO2   03/15/23 0900 (!) 217/116 -- -- 111 -- 95 %   03/15/23 0848 (!) 200/105 -- -- 86 25 93 %   03/15/23 0840 (!) 212/111 96.8  F (36  C) Oral 84 16 95 %   03/15/23 0759 (!) 174/93 99.6  F (37.6  C) Oral 90 16 95 %   03/15/23 0340 -- 98.5  F (36.9  C) Oral -- -- --   03/15/23 0200 -- -- -- -- -- 98 %   03/15/23 0145 (!) 163/81 -- -- 85 16 100 %   03/15/23 0130 (!) 173/78 -- -- 96 16 100 %   03/15/23 0115 (!) 154/80 -- -- 89 16 100 %   23 2022 139/82 98.9  F (37.2  C) Oral 90 18 97 %   23 1627 (!) 143/80 -- -- -- -- --   23 1008 129/84 98.4  F (36.9  C) Oral 87 -- 99 %       Temp:  [96.8  F (36  C)-99.6  F (37.6  C)] 96.8  F (36  C)  Pulse:  [] 111  Resp:  [16-25] 25  BP: (129-217)/() 217/116  Cuff Mean (mmHg):  [93] 93  SpO2:  [93 %-100 %] 95 %    Temperatures:  Current - Temp: 96.8  F (36  C); Max - Temp  Av.4  F (36.9  C)  Min: 96.8  F (36  C)  Max: 99.6  F (37.6  C)  Respiration range: Resp  Av.6  Min: 16  Max: 25  Pulse range: Pulse  Av.9  Min: 84  Max: 111  Blood pressure range: Systolic (24hrs), Av , Min:129 , Max:217   ; Diastolic (24hrs), Av, Min:78, Max:116    Pulse oximetry range: SpO2  Av.2 %  Min: 93 %  Max: 100 %    No intake/output data recorded.      Intake/Output Summary (Last 24 hours) at 3/15/2023 0959  Last data filed at 3/15/2023 0754  Gross per 24 hour   Intake 250 ml   Output --   Net 250 ml       Alert and responsive  RUE wrapped for entire length  R CVC with no redness or tenderness  LAF with large indurated mass at incision site: ? Hematoma, + bruit and thrill  LE no edema  Lungs with clear BS  Cor RRR nl S1  S2 no MM       Wt Readings from Last 4 Encounters:   03/13/23 110 kg (242 lb 8.1 oz)   03/12/23 108.9 kg (240 lb)   03/09/23 112 kg (247 lb)   03/07/23 112 kg (247 lb)          Data:          Lab Results   Component Value Date     03/14/2023     03/13/2023     03/09/2023     06/19/2021     06/18/2021     06/17/2021    Lab Results   Component Value Date    CHLORIDE 92 03/14/2023    CHLORIDE 93 03/13/2023    CHLORIDE 96 03/09/2023    CHLORIDE 102 06/20/2022    CHLORIDE 104 03/03/2022    CHLORIDE 104 12/26/2021    CHLORIDE 105 06/19/2021    CHLORIDE 108 06/18/2021    CHLORIDE 107 06/17/2021    Lab Results   Component Value Date    BUN 53.9 03/14/2023    BUN 35.7 03/13/2023    BUN 48.6 03/09/2023    BUN 35 06/20/2022    BUN 48 03/03/2022    BUN 91 12/26/2021    BUN 58 06/19/2021    BUN 95 06/18/2021     06/17/2021      Lab Results   Component Value Date    POTASSIUM 4.6 03/14/2023    POTASSIUM 4.3 03/13/2023    POTASSIUM 4.5 03/09/2023    POTASSIUM 3.9 06/20/2022    POTASSIUM 5.3 03/03/2022    POTASSIUM 5.2 12/26/2021    POTASSIUM 3.8 06/19/2021    POTASSIUM 3.8 06/18/2021    POTASSIUM 3.9 06/17/2021    Lab Results   Component Value Date    CO2 23 03/14/2023    CO2 24 03/13/2023    CO2 28 03/09/2023    CO2 25 06/20/2022    CO2 28 03/03/2022    CO2 24 12/26/2021    CO2 27 06/19/2021    CO2 23 06/18/2021    CO2 17 06/17/2021    Lab Results   Component Value Date    CR 13.09 03/14/2023    CR 8.81 03/13/2023    CR 9.91 03/09/2023    CR 8.93 06/19/2021    CR 11.50 06/18/2021    CR 15.80 06/17/2021        Recent Labs   Lab Test 03/15/23  0900 03/14/23  0743 03/13/23  1013   WBC 8.4 10.3 9.3   HGB 8.4* 8.9* 9.5*   HCT 25.6* 27.6* 29.3*   MCV 92 92 91    151 141*     Recent Labs   Lab Test 03/13/23  1013 06/20/22  1147 11/29/21  0650   AST 25 9 37   ALT 9* 26 34   ALKPHOS 131* 123 73   BILITOTAL 0.9 0.3 0.4       Recent Labs   Lab Test 12/12/22  0559 11/27/20  0221  10/04/19  0344   MAG 2.3 2.2 1.9     Recent Labs   Lab Test 06/18/21  0740 06/17/21  0655 04/05/21  1545   PHOS 7.2* 8.2* 6.1*     Recent Labs   Lab Test 03/14/23  0743 03/13/23  1013 03/09/23  0726   BUBBA 7.9* 9.3 9.3       Lab Results   Component Value Date    BUBBA 7.9 (L) 03/14/2023     Lab Results   Component Value Date    WBC 8.4 03/15/2023    HGB 8.4 (L) 03/15/2023    HCT 25.6 (L) 03/15/2023    MCV 92 03/15/2023     03/15/2023     Lab Results   Component Value Date     (L) 03/14/2023    POTASSIUM 4.6 03/14/2023    CHLORIDE 92 (L) 03/14/2023    CO2 23 03/14/2023     (H) 03/14/2023     Lab Results   Component Value Date    BUN 53.9 (H) 03/14/2023    CR 13.09 (H) 03/14/2023     Lab Results   Component Value Date    MAG 2.3 12/12/2022     Lab Results   Component Value Date    PHOS 7.2 (H) 06/18/2021       Creatinine   Date Value Ref Range Status   03/14/2023 13.09 (H) 0.67 - 1.17 mg/dL Final   03/13/2023 8.81 (H) 0.67 - 1.17 mg/dL Final   03/09/2023 9.91 (H) 0.67 - 1.17 mg/dL Final   03/07/2023 12.20 (H) 0.67 - 1.17 mg/dL Final   12/12/2022 15.45 (H) 0.67 - 1.17 mg/dL Final   10/29/2022 10.38 (H) 0.67 - 1.17 mg/dL Final   06/19/2021 8.93 (H) 0.66 - 1.25 mg/dL Final   06/18/2021 11.50 (H) 0.66 - 1.25 mg/dL Final   06/17/2021 15.80 (H) 0.66 - 1.25 mg/dL Final   06/16/2021 15.80 (H) 0.66 - 1.25 mg/dL Final   06/15/2021 16.10 (H) 0.66 - 1.25 mg/dL Final   04/05/2021 8.23 (H) 0.66 - 1.25 mg/dL Final     Comment:     Critical Value called to and read back by  DR ALFONSO BRADLEY AT 1840 ON 04 05 2021          Attestation:  I have reviewed today's vital signs, notes, medications, labs and imaging.  Seen during dialysis.     Yosvany Bains MD

## 2023-03-15 NOTE — PROVIDER NOTIFICATION
MD Notification    Notified Person: MD    Notified Person Name: tyra murrieta     Notification Date/Time: 3/14/2023 2031     Notification Interaction: paged    Purpose of Notification: can we get some bowel meds ordered prn? pt has not had bm since 3/9, not passing gas, bowel sounds hypoactive    Orders Received: PRN senna and PRN colace ordered    Comments:

## 2023-03-15 NOTE — PLAN OF CARE
Goal Outcome Evaluation:    COGNITION/MENTATION: A/O x 4   NEURO/CMS: Significant edema to RUE.   CARDIAC/TELE: NSR  RESPIRATORY: On RA, LS clear.   GI: BS hypoactive, pt reports not passing gas, last BM 3/9; PRN colace admin, no BM yet   : Minimal output per pt usually urinates 1x/day. On hemodialysis M,W,F.   PAIN: Moderate-severe BUE pain managed w/prn tylenol and PO dilaudid   SKIN: RUE swelling   DRAINS/LINES: SULY limb alert for new fistula, RYAN w/DVT, R wrist PIV infusing heparin gtt at 1,950 units/ hr.   ACTIVITY: Up SBA   DIET: Renal diet       C/o double vision, and then blurred vision, neuros otherwise intact; Hospitalist paged, RRT called. CT w/o contrast and MRI of brain complete.   Per previous MD notifications, MD and Nephrologist ok'd to draw PTT and morning labs from hemodialysis CVC during Dialysis today.

## 2023-03-15 NOTE — PLAN OF CARE
Orientations: A&Ox4   Vitals/Pain: VSS, RA, pain 6-7/10 RUE and LUE (fistula). Pt reports fever/chills feeling in extremities, temp 99.4 (tylenol given)  Tele: NSR  Lines/Drains: PIV R hand infusing (heparin 2100u/hr)  Skin/Wounds: RUE swelling and fistula weeping (ABD in place and ACE wrap on), LUE fistula   GI/: BM today(Colace given x1), no voiding today (dialysis). Reg diet and tolerating well (NPO @ midnight)  Labs: Abnormal/Trends: BUN (72.3), Crt (16.40), GFR (4), Hgb (8.4), hematocrit (25.6)  Electrolyte Replacement- N/A  Ambulation/Assist: SB assist (needs assistance w/ RUE)  Sleep Quality: napped on/off today, did not get much sleep overnight   Plan: Dialysis today (2L off, tolerated well, HTN). Keep RUE wrapped per comfort and elevated to decrease swelling, LUE elevated per pt and comfort (increased pain today). Pt still having R blurred vision, L double vision (better than last night but not by much). NPO @ midnight for L arm procedure tomorrow.

## 2023-03-15 NOTE — H&P (VIEW-ONLY)
[facility]    Stroke Telephone Note    I was called by Franny Conrad on 03/15/23 regarding patient Taylor Valiente.     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     IP stroke code called on 3/15 for new acute onset MONOCULAR diplopia.     Symptoms noted at 0030. Verified with house NP that diplopia is present in EACH eye when he closes the opposite eye.     Last /82.     NCCT: Unremarkable, reassuring against ICH   CTA H/N: No high grade stenosis     Stroke Code Data (for stroke code without tele)  Stroke code activated 03/15/23   0120   Stroke provider first response  03/15/23   0125            Last known normal 03/15/23   0025        Time of discovery   (or onset of symptoms) 03/15/23   0030   Head CT read by Stroke Neuro Dr/Provider 03/15/23       Was stroke code de-escalated?                Intravenous Thrombolysis  Not given due to:   - minor/isolated/quickly resolving symptoms  - stroke mimic: monocular diplopia suggestive of stroke mimic  - DOAC dose within 48 hours or INR > 1.7    Endovascular Treatment  Not initiated due to absence of proximal vessel occlusion    Impression/Recommendations  # Stroke mimic vs much less likely small stroke. Monocular diplopia is much more likely to be a problem associated with anterior structures of eye such as the lens rather than a brain problem. NCCT/CTA H/N reassuring against acute process. TNK not offered given higher suspicion for stroke mimic, the fact that pt is on heparin gtt, and mild symptoms.   - Rec MRI brain w/o non-urgently  - Stroke Neurology signing off - please page/call if MRI is positive for new acute/subacute ischemic lesion     My recommendations are based on the information provided over the phone by Taylor Valiente's in-person  "providers. They are not intended to replace the clinical judgment of his in-person providers. I was not requested to personally see or examine the patient at this time.    The Stroke Staff is Dr. Isaac.    Johnny Espinoza MD  Vascular Neurology Fellow    To page me or covering stroke neurology team member, click here: AMCOM  Choose \"On Call\" tab at top, then select \"NEUROLOGY/ALL SITES\" from middle drop-down box, press Enter, then look for \"stroke\" or \"telestroke\" for your site.      "

## 2023-03-15 NOTE — PROVIDER NOTIFICATION
"MD Notification    Notified Person: MD    Notified Person Name: Franny Souza MD    Notification Date/Time: 3/15/23 (0829-7255)    Notification Interaction: [employer] messaging    Purpose of Notification: Access for Lab draws     Orders Received:    Comments:   RN to MD @ 6453: \"I called them this morning too so they said they can draw them. Pt refused R arm draw this AM when lab came, I think we may need central line placement after dialysis in order to draw labs daily if remaining on heparin drip.\"    MD to RN @ 4743: \"Ok... sounds good. I will see what his plan is after dialysis. Vascular needs to tell us. We may be able to switch him to a DOAC? If he is not a candidate for that and he stays, then we will plan for a pick line\"    RN to MD @ 6620: \"Any other thoughts on lab access? Pt has next PTT draw at 7pm tonight so just want to plan accordingly.\"    MD to GALLITO @ 6934: \"K... hadn't the chance to work on it but will do shortly \"    RN to MD@ 5183: \"Any change in lab access? Or DOAC?\"     -----No response from MD, message read.   "

## 2023-03-15 NOTE — PROGRESS NOTES
Patient had US of his left AVF for suspicion of hematoma. Patient's imaging findings revealed a 30x40 mm hematoma with compression of basilic outflow vein. Discussed findings with Dr. Moran, the patient will be going to the OR tomorrow, 3/16 for exploration of the hematoma. NPO at midnight, heparin can be stopped on call to OR. Dr. Moran will round on the patient following his clinic. Patient can continue on heparin gtt however please hold all other anticoagulation at this time.     VASCULAR SURGICAL STAFF:  As noted above.  He now has a 4 cm hematoma in the left antecubital crease which is slightly extrinsically compressing his AV fistula.  Plan trip to the operating room tomorrow to evacuate this hematoma.  Please do not start oral anticoagulation.  Heparin may be turned off on-call to the OR tomorrow.  Surgery is tentatively scheduled for around 11:00.  N.p.o. after midnight except for meds.  I discussed the procedure with Taylor.    Edwardo Moran MD

## 2023-03-15 NOTE — PROVIDER NOTIFICATION
MD Notification    Notified Person: MD    Notified Person Name: Armando Penny     Notification Date/Time: 3/15/2023 0100     Notification Interaction: paged    Purpose of Notification: new onset of double vision, no headache, other neuros intact     Orders Received: call RRT    Comments:

## 2023-03-15 NOTE — CONSULTS
[facility]    Stroke Telephone Note    I was called by Franny Conrad on 03/15/23 regarding patient Taylor Valiente.     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     IP stroke code called on 3/15 for new acute onset MONOCULAR diplopia.     Symptoms noted at 0030. Verified with house NP that diplopia is present in EACH eye when he closes the opposite eye.     Last /82.     NCCT: Unremarkable, reassuring against ICH   CTA H/N: No high grade stenosis     Stroke Code Data (for stroke code without tele)  Stroke code activated 03/15/23   0120   Stroke provider first response  03/15/23   0125            Last known normal 03/15/23   0025        Time of discovery   (or onset of symptoms) 03/15/23   0030   Head CT read by Stroke Neuro Dr/Provider 03/15/23       Was stroke code de-escalated?                Intravenous Thrombolysis  Not given due to:   - minor/isolated/quickly resolving symptoms  - stroke mimic: monocular diplopia suggestive of stroke mimic  - DOAC dose within 48 hours or INR > 1.7    Endovascular Treatment  Not initiated due to absence of proximal vessel occlusion    Impression/Recommendations  # Stroke mimic vs much less likely small stroke. Monocular diplopia is much more likely to be a problem associated with anterior structures of eye such as the lens rather than a brain problem. NCCT/CTA H/N reassuring against acute process. TNK not offered given higher suspicion for stroke mimic, the fact that pt is on heparin gtt, and mild symptoms.   - Rec MRI brain w/o non-urgently  - Stroke Neurology signing off - please page/call if MRI is positive for new acute/subacute ischemic lesion     My recommendations are based on the information provided over the phone by Taylor Valiente's in-person  "providers. They are not intended to replace the clinical judgment of his in-person providers. I was not requested to personally see or examine the patient at this time.    The Stroke Staff is Dr. Isaac.    Johnny Espinoza MD  Vascular Neurology Fellow    To page me or covering stroke neurology team member, click here: AMCOM  Choose \"On Call\" tab at top, then select \"NEUROLOGY/ALL SITES\" from middle drop-down box, press Enter, then look for \"stroke\" or \"telestroke\" for your site.      "

## 2023-03-15 NOTE — PROGRESS NOTES
Potassium   Date Value Ref Range Status   03/15/2023 4.6 3.4 - 5.3 mmol/L Final   06/20/2022 3.9 3.4 - 5.3 mmol/L Final   06/19/2021 3.8 3.4 - 5.3 mmol/L Final     Hemoglobin   Date Value Ref Range Status   03/15/2023 8.4 (L) 13.3 - 17.7 g/dL Final   06/18/2021 9.1 (L) 13.3 - 17.7 g/dL Final     Creatinine   Date Value Ref Range Status   03/15/2023 16.40 (H) 0.67 - 1.17 mg/dL Final   06/19/2021 8.93 (H) 0.66 - 1.25 mg/dL Final     Urea Nitrogen   Date Value Ref Range Status   03/15/2023 72.3 (H) 6.0 - 20.0 mg/dL Final   06/20/2022 35 (H) 7 - 30 mg/dL Final   06/19/2021 58 (H) 7 - 30 mg/dL Final     Sodium   Date Value Ref Range Status   03/15/2023 133 (L) 136 - 145 mmol/L Final   06/19/2021 139 133 - 144 mmol/L Final     INR   Date Value Ref Range Status   03/15/2023 1.40 (H) 0.85 - 1.15 Final   02/09/2021 1.23 (H) 0.86 - 1.14 Final       DIALYSIS PROCEDURE NOTE  Hepatitis status of previous patient on machine log was checked and verified ok to use with this patients hepatitis status.  Patient dialyzed for 3 hrs. on a K2 bath with a net fluid removal of  2L.  A BFR of 350-400 ml/min was obtained via a CVC.      The treatment plan was discussed with Dr. Bains during the treatment.    Total heparin received during the treatment: 0 units.     Line flushed, clamped and capped with heparin 1:1000 1.9 mL (1900 units) per lumen    Meds  given: epogen, venofer, hectorol   Complications: none      Person educated: pre-tx. Knowledge base substantial. Barriers to learning: none. Educated on procedure via verbal mode. Patient verbalized understanding. Pt prefers verbal education style.     ICEBOAT? Timeout performed pre-treatment  I: Patient was identified using 2 identifiers  C:  Consent Signed Yes  E: Equipment preventative maintenance is current and dialysis delivery system OK to use  B: Hepatitis B Surface Antigen: negative; Draw Date: 3/13/23      Hepatitis B Surface Antibody: immune; Draw Date: 3/13/23  O: Dialysis  orders present and complete prior to treatment  A: Vascular access verified and assessed prior to treatment  T: Treatment was performed at a clinically appropriate time  ?: Patient was allowed to ask questions and address concerns prior to treatment  See Adult Hemodialysis flowsheet in EPIC for further details and post assessment.  Machine water alarm in place and functioning. Transducer pods intact and checked every 15min.   Pt returned via bed.  Chlorine/Chloramine water system checked every 4 hours.  Outpatient Dialysis at [facility]    Patient repositioned every 2 hours during the treatment.  Post treatment report given to Hernan Crespo RN regarding 2L of fluid removed, last BP of 199/105, and patient pain rating of 6/10.

## 2023-03-15 NOTE — CODE/RAPID RESPONSE
[facility]  House HENRIQUE RRT Note  3/15/2023   Time called: 0102  RRT called for: New onset double vision  Code status: Full Code    Assessment & Plan    Taylor Valiente is a 27 year old male with past medical history significant for ESRD on HD, hypertension, depression, hyperlipidemia admitted on 3/13/2023 with right arm pain and swelling following ligation of fistula on 3/09/2023. US on 3/12 revealed near occlusive DVT in right internal jugular vein. Patient went home on Eliquis but was unable to fill this prescription and returned to the ED after having chest and neck pain during dialysis. Patient was started on heparin and admitted to the hospital.    I was paged to the bedside to evaluate Mr. Taylor Valiente for an acute episode of diplopia that patient stated began at 0045. Patient noticed the double vision when he was trying to read the captions on his hospital room television. Patient then noticed that even looking at his phone his vision was blurred.    Upon my arrival the patient was A/Ox4, he was supine in bed and appeared to be in no acute distress. Patient was vitally stable on RA. Patient had GCS of 15. Patient stated that he was having double vision which he first noticed at 0045. Patient also stated that his vision was much more blurry than previously. Initially patient's double vision was monocular and he endorsed it persisting in both eyes but he described it as worse in the left eye where the right eye was more blurry. No other focal neurological deficits were noted on my exam and strength was equal bilaterally. Patient's pupils were equal, round, and reactive. Oculomotor function was intact. Given new onset and recent thrombus I paged a code stroke to have the assistance of neuro-critical care team.    CT/CTA head and neck was completed and no LVO or areas of stenosis were identified and code stroke was de-escalated. MRI head with and without contrast was placed per  neurology recommendation. Unable to obtain labs as patient has bilateral fistulas and otherwise limited areas of access. Labs will be obtained in dialysis tomorrow per Dr. Bains' note.      Diagnosis:  -- Binocular diplopia, worse in left eye and blurred vision of both eyes  Possibly benign, continue to monitor. Symptoms seem to be somewhat vague or intermittent per patient. Routine MRI brain for further investigation.    Plan:  -- CT/CTA head and neck  -- PTT, INR, CBC, BMP  -- MRI brain w/wo contrast    At the conclusion of this RRT patient was hemodynamically stable and will remain on current unit    Interval History     Mr. 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 Patient was placed on heparin infusion on 3/15/2023. Patient developed blurred vision and diplopia on the morning of 3/15/2023.    His history is significant for:  Past Medical History:   Diagnosis Date     Adrenal adenoma, left      Anemia      Benign essential hypertension 07/28/2017     CKD (chronic kidney disease) stage 5, GFR less than 15 ml/min (H)     FSGS     Depression      Focal glomerular sclerosis 07/28/2017     HLD (hyperlipidemia)      LVH (left ventricular hypertrophy) due to hypertensive disease 07/14/2017     Noncompliance      Obesity, unspecified      OD (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/2021     Stress-induced cardiomyopathy      Suicide attempt (H) 2019     Past Surgical History:   Procedure Laterality Date     ARTHROSCOPY ANKLE Left 2/24/2021    Procedure: Left ankle arthroscopy and debridement/micro fracture;  Surgeon: Mirza Nelson MD;  Location: [facility] OR     BIOPSY  2017    renal- [facility]     CREATE FISTULA ARTERIOVENOUS UPPER EXTREMITY Right 3/4/2021    Procedure: RIGHT proximal radial  to  CEPHALIC ARTERIOVENOUS FISTULA;  Surgeon: Henrik Moran MD;  Location: [facility] OR     CREATE FISTULA ARTERIOVENOUS UPPER EXTREMITY Left 3/9/2023    Procedure: CREATION OF FIRST STAGE LEFT BRACHIOBASILIC ARTERIOVENOUS FISTULA;  Surgeon: Henrik Moran MD;  Location: [facility] OR     IR CVC TUNNEL PLACEMENT > 5 YRS OF AGE  6/17/2021     IR CVC TUNNEL PLACEMENT > 5 YRS OF AGE  3/9/2023     IR CVC TUNNEL REMOVAL RIGHT  12/3/2021     LIGATE FISTULA ARTERIOVENOUS UPPER EXTREMITY Right 3/9/2023    Procedure: LIGATION OF RIGHT ARM ARTERIOVENOUS FISTULA;  Surgeon: Henrik Moran MD;  Location: [facility] OR     NO HISTORY OF SURGERY       REVISION FISTULA ARTERIOVENOUS UPPER EXTREMITY Right 8/26/2021    Procedure: Second stage RIGHT BRACHIOCEPHALIC transposition ARTERIOVENOUS FISTULA;  Surgeon: Henrik Moran MD;  Location:  OR       Review of Systems   The 10 point Review of Systems is negative other than noted in the HPI or here.     Allergies   Allergies   Allergen Reactions     Benadryl [Diphenhydramine] Itching       Physical Exam   Physical Exam  HENT:      Head: Normocephalic.      Nose: Nose normal.      Mouth/Throat:      Mouth: Mucous membranes are moist.   Eyes:      Pupils: Pupils are equal, round, and reactive to light.   Cardiovascular:      Rate and Rhythm: Normal rate and regular rhythm.      Pulses: Normal pulses.      Heart sounds: Normal heart sounds.   Pulmonary:      Effort: Pulmonary effort is normal.      Breath sounds: Normal breath sounds.   Abdominal:      Palpations: Abdomen is soft.   Musculoskeletal:         General: Normal range of motion.      Cervical back: Normal range of motion.      Right lower leg: Edema present.      Left lower leg: Edema present.   Skin:     General: Skin is warm and dry.      Capillary Refill: Capillary refill takes less than 2 seconds.   Neurological:      General: No focal deficit present.      Mental Status: He is alert and oriented to person, place, and time.       GCS: GCS eye subscore is 4. GCS verbal subscore is 5. GCS motor subscore is 6.      Comments: Diplopia, initially described as monocular but later stated that only present with left eye open.   Psychiatric:         Mood and Affect: Mood normal.         Vital Signs with Ranges:  Temp:  [98.4  F (36.9  C)-98.9  F (37.2  C)] 98.9  F (37.2  C)  Pulse:  [84-96] 90  Resp:  [8-20] 18  BP: (129-173)/(80-96) 139/82  SpO2:  [97 %-99 %] 97 %  No intake/output data recorded.    Data   Results for orders placed or performed during the hospital encounter of 03/13/23 (from the past 24 hour(s))   Partial thromboplastin time   Result Value Ref Range    aPTT 58 (H) 22 - 38 Seconds   Basic metabolic panel   Result Value Ref Range    Sodium 133 (L) 136 - 145 mmol/L    Potassium 4.6 3.4 - 5.3 mmol/L    Chloride 92 (L) 98 - 107 mmol/L    Carbon Dioxide (CO2) 23 22 - 29 mmol/L    Anion Gap 18 (H) 7 - 15 mmol/L    Urea Nitrogen 53.9 (H) 6.0 - 20.0 mg/dL    Creatinine 13.09 (H) 0.67 - 1.17 mg/dL    Calcium 7.9 (L) 8.6 - 10.0 mg/dL    Glucose 102 (H) 70 - 99 mg/dL    GFR Estimate 5 (L) >60 mL/min/1.73m2   CBC with platelets   Result Value Ref Range    WBC Count 10.3 4.0 - 11.0 10e3/uL    RBC Count 3.00 (L) 4.40 - 5.90 10e6/uL    Hemoglobin 8.9 (L) 13.3 - 17.7 g/dL    Hematocrit 27.6 (L) 40.0 - 53.0 %    MCV 92 78 - 100 fL    MCH 29.7 26.5 - 33.0 pg    MCHC 32.2 31.5 - 36.5 g/dL    RDW 13.4 10.0 - 15.0 %    Platelet Count 151 150 - 450 10e3/uL   CT Head w/o Contrast    Narrative    EXAM: CTA HEAD NECK W CONTRAST, CT HEAD W/O CONTRAST  LOCATION: [facility]  DATE/TIME: 3/15/2023 1:40 AM    INDICATION: Diplopia.  COMPARISON: None.  CONTRAST: 75 mL Isovue 370  TECHNIQUE: Head and neck CT angiogram with IV contrast. Noncontrast head CT followed by axial helical CT images of the head and neck vessels obtained during the arterial phase of intravenous contrast administration. Axial 2D reconstructed images and   multiplanar  3D MIP reconstructed images of the head and neck vessels were performed by the technologist. Dose reduction techniques were used. All stenosis measurements made according to NASCET criteria unless otherwise specified.    FINDINGS:   NONCONTRAST HEAD CT:   INTRACRANIAL CONTENTS: No intracranial hemorrhage, extraaxial collection, or mass effect.  No CT evidence of acute infarct. Normal parenchymal attenuation. Normal ventricles and sulci.     VISUALIZED ORBITS/SINUSES/MASTOIDS: No intraorbital abnormality. Opacification of a majority of the right maxillary sinus with soft tissue extending through the anterior mid to the middle nasal cavity and antrochoanal polyp. No middle ear or mastoid   effusion.    BONES/SOFT TISSUES: No acute abnormality.    HEAD CTA:  ANTERIOR CIRCULATION: No stenosis/occlusion, aneurysm, or high flow vascular malformation. Standard [place] anatomy.    POSTERIOR CIRCULATION: No stenosis/occlusion, aneurysm, or high flow vascular malformation. Balanced vertebral arteries supply a normal basilar artery.     DURAL VENOUS SINUSES: Expected enhancement of the major dural venous sinuses.    NECK CTA:  RIGHT CAROTID: No measurable stenosis or dissection.    LEFT CAROTID: No measurable stenosis or dissection.    VERTEBRAL ARTERIES: No focal stenosis or dissection. Balanced vertebral arteries.    AORTIC ARCH: Classic aortic arch anatomy with no significant stenosis at the origin of the great vessels.    NONVASCULAR STRUCTURES: Unremarkable.      Impression    IMPRESSION:   HEAD CT:  1.  No acute intracranial abnormality.    2.  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 This could be further evaluated with sinus CT on a nonemergent basis if indicated.    HEAD CTA:   1.  Normal CTA [place].    NECK CTA:  1.  Normal neck CTA.     CT head and CTA findings were discussed with Edwardo Tabor at 0148, 3/15/2023.   CTA Head Neck w Contrast    Narrative    EXAM:  CTA HEAD NECK W CONTRAST, CT HEAD W/O CONTRAST  LOCATION: [facility]  DATE/TIME: 3/15/2023 1:40 AM    INDICATION: Diplopia.  COMPARISON: None.  CONTRAST: 75 mL Isovue 370  TECHNIQUE: Head and neck CT angiogram with IV contrast. Noncontrast head CT followed by axial helical CT images of the head and neck vessels obtained during the arterial phase of intravenous contrast administration. Axial 2D reconstructed images and   multiplanar 3D MIP reconstructed images of the head and neck vessels were performed by the technologist. Dose reduction techniques were used. All stenosis measurements made according to NASCET criteria unless otherwise specified.    FINDINGS:   NONCONTRAST HEAD CT:   INTRACRANIAL CONTENTS: No intracranial hemorrhage, extraaxial collection, or mass effect.  No CT evidence of acute infarct. Normal parenchymal attenuation. Normal ventricles and sulci.     VISUALIZED ORBITS/SINUSES/MASTOIDS: No intraorbital abnormality. Opacification of a majority of the right maxillary sinus with soft tissue extending through the anterior mid to the middle nasal cavity and antrochoanal polyp. No middle ear or mastoid   effusion.    BONES/SOFT TISSUES: No acute abnormality.    HEAD CTA:  ANTERIOR CIRCULATION: No stenosis/occlusion, aneurysm, or high flow vascular malformation. Standard [place] anatomy.    POSTERIOR CIRCULATION: No stenosis/occlusion, aneurysm, or high flow vascular malformation. Balanced vertebral arteries supply a normal basilar artery.     DURAL VENOUS SINUSES: Expected enhancement of the major dural venous sinuses.    NECK CTA:  RIGHT CAROTID: No measurable stenosis or dissection.    LEFT CAROTID: No measurable stenosis or dissection.    VERTEBRAL ARTERIES: No focal stenosis or dissection. Balanced vertebral arteries.    AORTIC ARCH: Classic aortic arch anatomy with no significant stenosis at the origin of the great vessels.    NONVASCULAR STRUCTURES: Unremarkable.       Impression    IMPRESSION:   HEAD CT:  1.  No acute intracranial abnormality.    2.  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 This could be further evaluated with sinus CT on a nonemergent basis if indicated.    HEAD CTA:   1.  Normal CTA [place].    NECK CTA:  1.  Normal neck CTA.     CT head and CTA findings were discussed with Edwardo Tabor at 0148, 3/15/2023.     COVID-19 PCR Results    COVID-19 PCR Results 6/15/21 8/10/21 8/24/21 11/29/21 12/26/21 10/28/22 12/12/22   SARS-CoV-2 Virus Specimen Source Nasopharyngeal         SARS-CoV-2 PCR Result NEGATIVE         SARS CoV2 PCR  Negative Negative Negative Negative Negative Negative      Comments are available for some flowsheets but are not being displayed.         COVID-19 Antibody Results, Testing for Immunity    COVID-19 Antibody Results, Testing for Immunity   No data to display.           Time Spent on this Encounter   I spent 45 minutes on the unit/floor managing the care of Taylor Valiente. 100% of my time was spent at the bedside counseling the patient and/or coordinating care regarding services listed in this note.    ROSA Linares, CNP  Hospitalist - House [facility]  Text me on the [employer] ary for a textback  Text page with web based paging for a callback

## 2023-03-15 NOTE — PROGRESS NOTES
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  The patient had a ligation of his right arm fistula on 3/9/2023 but since then has started develop pain and swelling to the right arm.  Upper extremity ultrasound on 3/12 was obtained and showed a near occlusive DVT in the right internal jugular vein.  He was sent home from the emergency department with a prescription for Eliquis, which he has been unable to fill.  He returns to the emergency department today with chest and neck pain while undergoing dialysis.  He is noted to have significant right arm swelling. Given increasing pain and swelling of the R arm as well as his difficulty filling Eliquis, he will be admitted and initiated on heparin drip with vascular surgery consult.   -Started on IV heparin in the emergency department, continue  -Vascular surgery consult: input appreciated.   -Pain control as needed     End-stage renal disease on hemodialysis  Renal disease thought to be secondary to hypertension. He reports his renal function significantly declined after getting COVID in 2020 and ultimately progressed to ESRD. Completed a partial dialysis run today (3/13). Volume status and electrolytes stable. Current access is tunneled dialysis catheter.   - Nephrology consult   -Continue prior to admission Cinacalcet, sevelamer,  cholecalciferol     Hypertension  Hyperlipidemia  -Continue prior to admission amlodipine, carvedilol, clonidine, hydralazine  -Continue prior to admission aspirin and atorvastatin     Chronic anemia and Thrombocytopenia  Hgb is 9.5 and platelet count is 141. Hgb is slightly lower than baseline of 10-11.   - Monitor CBC      Mild troponin elevation  Troponin is elevated to 68, and stable on re-check. Pt has chronically elevated troponin 2/2 ESRD.   - Cardiac monitoring      Hx of Adrenal adenoma  Noted        Diet: Renal Diet (non-dialysis)    DVT Prophylaxis: Pneumatic Compression Devices  Hurt Catheter: Not present  Lines: PRESENT             Cardiac Monitoring: ACTIVE order. Indication: troponin elevation  Code Status: Full Code      Clinically Significant Risk Factors          # Hypocalcemia: Lowest Ca = 7.9 mg/dL in last 2 days, will monitor and replace as appropriate               # Obesity: Estimated body mass index is 32.89 kg/m  as calculated from the following:    Height as of this encounter: 1.829 m (6').    Weight as of this encounter: 110 kg (242 lb 8.1 oz)., PRESENT ON ADMISSION         Disposition Plan      Expected Discharge Date: 03/16/2023                  Franny Conrad MD  Hospitalist Service  [facility]  Securely message with [employer] (more info)  Text page via [employer] Paging/Directory   ______________________________________________________________________    Interval History   Patient complaining of arm pain.     Physical Exam   Vital Signs: Temp: 98.9  F (37.2  C) Temp src: Oral BP: 139/82 Pulse: 90   Resp: 18 SpO2: 97 % O2 Device: None (Room air)    Weight: 242 lbs 8.1 oz    General Appearance: Well appearing for stated age.  Respiratory: CTAB, no rales or ronchi  Cardiovascular: S1, S2 normal, no murmurs  GI: non-tender on palpation, BS present      Medical Decision Making       55 MINUTES SPENT BY ME on the date of service doing chart review, history, exam,  documentation & further activities per the note.      Data     I have personally reviewed the following data over the past 24 hrs:    10.3  \   8.9 (L)   / 151     133 (L) 92 (L) 53.9 (H) /  102 (H)   4.6 23 13.09 (H) \       Trop: 74 (H) BNP: N/A       INR:  N/A PTT:  58 (H)   D-dimer:  N/A Fibrinogen:  N/A       Imaging results reviewed over the past 24 hrs:   No results found for this or any previous visit (from the past 24 hour(s)).

## 2023-03-15 NOTE — PROGRESS NOTES
VASCULAR SURGERY PROGRESS NOTE    Subjective:  Patient resting in bed. The patient reports RUE pain with any movement, reports edema has improved in his RUE. Patient reports increase in edema and bruising around the LUE fistula with tenderness.     Objective:  Intake/Output Summary (Last 24 hours) at 3/15/2023 0809  Last data filed at 3/15/2023 0754  Gross per 24 hour   Intake 250 ml   Output --   Net 250 ml     PHYSICAL EXAM:  BP (!) 174/93 (BP Location: Right leg)   Pulse 90   Temp 99.6  F (37.6  C) (Oral)   Resp 16   Ht 1.829 m (6')   Wt 110 kg (242 lb 8.1 oz)   SpO2 95%   BMI 32.89 kg/m    Patient is alert and oriented x4  RUE remains significantly edematous, however has improved in 24 hour interval, current 2-3+ edema  Palpable thrill of the left fistula  Ecchymosis/increase in edema/hematoma of the LUE fistula noted-patient endorses tenderness with light palpation    2+ radial pulses present bilaterally    ASSESSMENT:  Mr. Taylor Valiente is a pleasant 27 year old male who was admitted on 3/13/2023 for DVT of the R internal jugular, who underwent recent ligation of R AVF on 3/9 and new first stage left brachiobasilic AV fistula.     Of note, patient underwent RRT overnight for binocular diplopia and blurred vision, patient underwent imaging and patient hemodynamically stable and no acute findings on imaging    PLAN:  -do not compress LUE to control edema   -will plan on ultrasound of his left AVF given physical exam findings  -Continue to elevate the RUE, continue wrapping of the RUE extremity to help control and alleviate edema  -patient can continue current plan of care at this time  -appreciate all teams involved in Mr. Vazquez's care    Discussed pt history, exam, assessment and plan with Dr. Moran of the vascular surgery service, who is in agreement with the above.    Zaina Welsh NP  VASCULAR SURGERY

## 2023-03-16 ENCOUNTER — ANESTHESIA EVENT (OUTPATIENT)
Dept: SURGERY | Facility: CLINIC | Age: 28
DRG: 987 | End: 2023-03-16
Payer: MEDICARE

## 2023-03-16 ENCOUNTER — APPOINTMENT (OUTPATIENT)
Dept: SURGERY | Facility: PHYSICIAN GROUP | Age: 28
End: 2023-03-16
Payer: MEDICARE

## 2023-03-16 ENCOUNTER — ANESTHESIA (OUTPATIENT)
Dept: SURGERY | Facility: CLINIC | Age: 28
DRG: 987 | End: 2023-03-16
Payer: MEDICARE

## 2023-03-16 LAB
APTT PPP: 81 SECONDS (ref 22–38)
ERYTHROCYTE [DISTWIDTH] IN BLOOD BY AUTOMATED COUNT: 13.3 % (ref 10–15)
HCT VFR BLD AUTO: 25.9 % (ref 40–53)
HGB BLD-MCNC: 8.4 G/DL (ref 13.3–17.7)
MCH RBC QN AUTO: 29.8 PG (ref 26.5–33)
MCHC RBC AUTO-ENTMCNC: 32.4 G/DL (ref 31.5–36.5)
MCV RBC AUTO: 92 FL (ref 78–100)
PLATELET # BLD AUTO: 199 10E3/UL (ref 150–450)
RBC # BLD AUTO: 2.82 10E6/UL (ref 4.4–5.9)
WBC # BLD AUTO: 10 10E3/UL (ref 4–11)

## 2023-03-16 PROCEDURE — 250N000013 HC RX MED GY IP 250 OP 250 PS 637: Performed by: HOSPITALIST

## 2023-03-16 PROCEDURE — 360N000075 HC SURGERY LEVEL 2, PER MIN: Performed by: SURGERY

## 2023-03-16 PROCEDURE — 85730 THROMBOPLASTIN TIME PARTIAL: CPT | Performed by: STUDENT IN AN ORGANIZED HEALTH CARE EDUCATION/TRAINING PROGRAM

## 2023-03-16 PROCEDURE — 250N000011 HC RX IP 250 OP 636: Performed by: SURGERY

## 2023-03-16 PROCEDURE — 85027 COMPLETE CBC AUTOMATED: CPT | Performed by: EMERGENCY MEDICINE

## 2023-03-16 PROCEDURE — 999N000141 HC STATISTIC PRE-PROCEDURE NURSING ASSESSMENT: Performed by: SURGERY

## 2023-03-16 PROCEDURE — 258N000003 HC RX IP 258 OP 636: Performed by: ANESTHESIOLOGY

## 2023-03-16 PROCEDURE — 0JCF0ZZ EXTIRPATION OF MATTER FROM LEFT UPPER ARM SUBCUTANEOUS TISSUE AND FASCIA, OPEN APPROACH: ICD-10-PCS | Performed by: SURGERY

## 2023-03-16 PROCEDURE — 10140 I&D HMTMA SEROMA/FLUID COLLJ: CPT | Mod: 78 | Performed by: SURGERY

## 2023-03-16 PROCEDURE — 99233 SBSQ HOSP IP/OBS HIGH 50: CPT | Performed by: STUDENT IN AN ORGANIZED HEALTH CARE EDUCATION/TRAINING PROGRAM

## 2023-03-16 PROCEDURE — 250N000013 HC RX MED GY IP 250 OP 250 PS 637: Performed by: PHYSICIAN ASSISTANT

## 2023-03-16 PROCEDURE — 250N000011 HC RX IP 250 OP 636: Performed by: ANESTHESIOLOGY

## 2023-03-16 PROCEDURE — 710N000009 HC RECOVERY PHASE 1, LEVEL 1, PER MIN: Performed by: SURGERY

## 2023-03-16 PROCEDURE — 120N000001 HC R&B MED SURG/OB

## 2023-03-16 PROCEDURE — 272N000001 HC OR GENERAL SUPPLY STERILE: Performed by: SURGERY

## 2023-03-16 PROCEDURE — 99232 SBSQ HOSP IP/OBS MODERATE 35: CPT | Performed by: INTERNAL MEDICINE

## 2023-03-16 PROCEDURE — 36415 COLL VENOUS BLD VENIPUNCTURE: CPT | Performed by: EMERGENCY MEDICINE

## 2023-03-16 PROCEDURE — 250N000013 HC RX MED GY IP 250 OP 250 PS 637: Performed by: STUDENT IN AN ORGANIZED HEALTH CARE EDUCATION/TRAINING PROGRAM

## 2023-03-16 PROCEDURE — 250N000025 HC SEVOFLURANE, PER MIN: Performed by: SURGERY

## 2023-03-16 PROCEDURE — 250N000011 HC RX IP 250 OP 636: Performed by: EMERGENCY MEDICINE

## 2023-03-16 PROCEDURE — 99024 POSTOP FOLLOW-UP VISIT: CPT

## 2023-03-16 PROCEDURE — 250N000009 HC RX 250: Performed by: ANESTHESIOLOGY

## 2023-03-16 PROCEDURE — 370N000017 HC ANESTHESIA TECHNICAL FEE, PER MIN: Performed by: SURGERY

## 2023-03-16 RX ORDER — ONDANSETRON 2 MG/ML
4 INJECTION INTRAMUSCULAR; INTRAVENOUS EVERY 30 MIN PRN
Status: DISCONTINUED | OUTPATIENT
Start: 2023-03-16 | End: 2023-03-16 | Stop reason: HOSPADM

## 2023-03-16 RX ORDER — FENTANYL CITRATE 50 UG/ML
INJECTION, SOLUTION INTRAMUSCULAR; INTRAVENOUS PRN
Status: DISCONTINUED | OUTPATIENT
Start: 2023-03-16 | End: 2023-03-16

## 2023-03-16 RX ORDER — LIDOCAINE HYDROCHLORIDE 20 MG/ML
INJECTION, SOLUTION INFILTRATION; PERINEURAL PRN
Status: DISCONTINUED | OUTPATIENT
Start: 2023-03-16 | End: 2023-03-16

## 2023-03-16 RX ORDER — ONDANSETRON 2 MG/ML
4 INJECTION INTRAMUSCULAR; INTRAVENOUS EVERY 6 HOURS PRN
Status: DISCONTINUED | OUTPATIENT
Start: 2023-03-16 | End: 2023-03-19 | Stop reason: HOSPADM

## 2023-03-16 RX ORDER — SODIUM CHLORIDE, SODIUM LACTATE, POTASSIUM CHLORIDE, CALCIUM CHLORIDE 600; 310; 30; 20 MG/100ML; MG/100ML; MG/100ML; MG/100ML
INJECTION, SOLUTION INTRAVENOUS CONTINUOUS
Status: DISCONTINUED | OUTPATIENT
Start: 2023-03-16 | End: 2023-03-16 | Stop reason: HOSPADM

## 2023-03-16 RX ORDER — BUPIVACAINE HYDROCHLORIDE 2.5 MG/ML
INJECTION, SOLUTION EPIDURAL; INFILTRATION; INTRACAUDAL
Status: DISCONTINUED
Start: 2023-03-16 | End: 2023-03-16 | Stop reason: HOSPADM

## 2023-03-16 RX ORDER — SODIUM CHLORIDE 9 MG/ML
INJECTION, SOLUTION INTRAVENOUS CONTINUOUS
Status: DISCONTINUED | OUTPATIENT
Start: 2023-03-16 | End: 2023-03-16 | Stop reason: HOSPADM

## 2023-03-16 RX ORDER — PROCHLORPERAZINE MALEATE 10 MG
10 TABLET ORAL EVERY 6 HOURS PRN
Status: DISCONTINUED | OUTPATIENT
Start: 2023-03-16 | End: 2023-03-19 | Stop reason: HOSPADM

## 2023-03-16 RX ORDER — HYDROMORPHONE HCL IN WATER/PF 6 MG/30 ML
0.4 PATIENT CONTROLLED ANALGESIA SYRINGE INTRAVENOUS
Status: DISCONTINUED | OUTPATIENT
Start: 2023-03-16 | End: 2023-03-19 | Stop reason: HOSPADM

## 2023-03-16 RX ORDER — ACETAMINOPHEN 325 MG/1
650 TABLET ORAL EVERY 4 HOURS PRN
Status: DISCONTINUED | OUTPATIENT
Start: 2023-03-19 | End: 2023-03-19 | Stop reason: HOSPADM

## 2023-03-16 RX ORDER — ONDANSETRON 4 MG/1
4 TABLET, ORALLY DISINTEGRATING ORAL EVERY 6 HOURS PRN
Status: DISCONTINUED | OUTPATIENT
Start: 2023-03-16 | End: 2023-03-19 | Stop reason: HOSPADM

## 2023-03-16 RX ORDER — FENTANYL CITRATE 50 UG/ML
50 INJECTION, SOLUTION INTRAMUSCULAR; INTRAVENOUS EVERY 5 MIN PRN
Status: DISCONTINUED | OUTPATIENT
Start: 2023-03-16 | End: 2023-03-16 | Stop reason: HOSPADM

## 2023-03-16 RX ORDER — ONDANSETRON 4 MG/1
4 TABLET, ORALLY DISINTEGRATING ORAL EVERY 30 MIN PRN
Status: DISCONTINUED | OUTPATIENT
Start: 2023-03-16 | End: 2023-03-16 | Stop reason: HOSPADM

## 2023-03-16 RX ORDER — HYDRALAZINE HYDROCHLORIDE 20 MG/ML
2.5-5 INJECTION INTRAMUSCULAR; INTRAVENOUS EVERY 10 MIN PRN
Status: DISCONTINUED | OUTPATIENT
Start: 2023-03-16 | End: 2023-03-16 | Stop reason: HOSPADM

## 2023-03-16 RX ORDER — HYDROMORPHONE HCL IN WATER/PF 6 MG/30 ML
0.2 PATIENT CONTROLLED ANALGESIA SYRINGE INTRAVENOUS EVERY 5 MIN PRN
Status: DISCONTINUED | OUTPATIENT
Start: 2023-03-16 | End: 2023-03-16 | Stop reason: HOSPADM

## 2023-03-16 RX ORDER — PROPOFOL 10 MG/ML
INJECTION, EMULSION INTRAVENOUS PRN
Status: DISCONTINUED | OUTPATIENT
Start: 2023-03-16 | End: 2023-03-16

## 2023-03-16 RX ORDER — CEFAZOLIN SODIUM 500 MG/2.2ML
500 INJECTION, POWDER, FOR SOLUTION INTRAMUSCULAR; INTRAVENOUS SEE ADMIN INSTRUCTIONS
Status: DISCONTINUED | OUTPATIENT
Start: 2023-03-16 | End: 2023-03-16 | Stop reason: HOSPADM

## 2023-03-16 RX ORDER — OXYCODONE HYDROCHLORIDE 5 MG/1
5 TABLET ORAL EVERY 4 HOURS PRN
Status: DISCONTINUED | OUTPATIENT
Start: 2023-03-16 | End: 2023-03-19 | Stop reason: HOSPADM

## 2023-03-16 RX ORDER — LABETALOL HYDROCHLORIDE 5 MG/ML
10 INJECTION, SOLUTION INTRAVENOUS
Status: DISCONTINUED | OUTPATIENT
Start: 2023-03-16 | End: 2023-03-16 | Stop reason: HOSPADM

## 2023-03-16 RX ORDER — AMOXICILLIN 250 MG
1 CAPSULE ORAL 2 TIMES DAILY
Status: DISCONTINUED | OUTPATIENT
Start: 2023-03-16 | End: 2023-03-19 | Stop reason: HOSPADM

## 2023-03-16 RX ORDER — ACETAMINOPHEN 325 MG/1
975 TABLET ORAL EVERY 8 HOURS
Status: COMPLETED | OUTPATIENT
Start: 2023-03-16 | End: 2023-03-19

## 2023-03-16 RX ORDER — HYDROMORPHONE HCL IN WATER/PF 6 MG/30 ML
0.4 PATIENT CONTROLLED ANALGESIA SYRINGE INTRAVENOUS EVERY 5 MIN PRN
Status: DISCONTINUED | OUTPATIENT
Start: 2023-03-16 | End: 2023-03-16 | Stop reason: HOSPADM

## 2023-03-16 RX ORDER — HYDROMORPHONE HCL IN WATER/PF 6 MG/30 ML
0.2 PATIENT CONTROLLED ANALGESIA SYRINGE INTRAVENOUS
Status: DISCONTINUED | OUTPATIENT
Start: 2023-03-16 | End: 2023-03-19 | Stop reason: HOSPADM

## 2023-03-16 RX ORDER — OXYCODONE HYDROCHLORIDE 5 MG/1
10 TABLET ORAL EVERY 4 HOURS PRN
Status: DISCONTINUED | OUTPATIENT
Start: 2023-03-16 | End: 2023-03-19 | Stop reason: HOSPADM

## 2023-03-16 RX ORDER — FENTANYL CITRATE 50 UG/ML
25 INJECTION, SOLUTION INTRAMUSCULAR; INTRAVENOUS EVERY 5 MIN PRN
Status: DISCONTINUED | OUTPATIENT
Start: 2023-03-16 | End: 2023-03-16 | Stop reason: HOSPADM

## 2023-03-16 RX ORDER — BUPIVACAINE HYDROCHLORIDE 2.5 MG/ML
INJECTION, SOLUTION INFILTRATION; PERINEURAL PRN
Status: DISCONTINUED | OUTPATIENT
Start: 2023-03-16 | End: 2023-03-16 | Stop reason: HOSPADM

## 2023-03-16 RX ADMIN — ATORVASTATIN CALCIUM 10 MG: 10 TABLET, FILM COATED ORAL at 20:20

## 2023-03-16 RX ADMIN — CARVEDILOL 50 MG: 25 TABLET, FILM COATED ORAL at 08:15

## 2023-03-16 RX ADMIN — FENTANYL CITRATE 50 MCG: 50 INJECTION, SOLUTION INTRAMUSCULAR; INTRAVENOUS at 13:30

## 2023-03-16 RX ADMIN — LIDOCAINE HYDROCHLORIDE 100 MG: 20 INJECTION, SOLUTION INFILTRATION; PERINEURAL at 11:41

## 2023-03-16 RX ADMIN — PHENYLEPHRINE HYDROCHLORIDE 150 MCG: 10 INJECTION INTRAVENOUS at 12:28

## 2023-03-16 RX ADMIN — BUMETANIDE 2 MG: 2 TABLET ORAL at 08:15

## 2023-03-16 RX ADMIN — PROPOFOL 40 MG: 10 INJECTION, EMULSION INTRAVENOUS at 11:56

## 2023-03-16 RX ADMIN — SENNOSIDES AND DOCUSATE SODIUM 1 TABLET: 50; 8.6 TABLET ORAL at 20:20

## 2023-03-16 RX ADMIN — FENTANYL CITRATE 50 MCG: 50 INJECTION, SOLUTION INTRAMUSCULAR; INTRAVENOUS at 11:56

## 2023-03-16 RX ADMIN — ACETAMINOPHEN 650 MG: 325 TABLET ORAL at 08:19

## 2023-03-16 RX ADMIN — OXYCODONE HYDROCHLORIDE 5 MG: 5 TABLET ORAL at 22:14

## 2023-03-16 RX ADMIN — PHENYLEPHRINE HYDROCHLORIDE 100 MCG: 10 INJECTION INTRAVENOUS at 12:13

## 2023-03-16 RX ADMIN — PROPOFOL 300 MG: 10 INJECTION, EMULSION INTRAVENOUS at 11:41

## 2023-03-16 RX ADMIN — CINACALCET 90 MG: 30 TABLET ORAL at 08:16

## 2023-03-16 RX ADMIN — HYDRALAZINE HYDROCHLORIDE 100 MG: 50 TABLET, FILM COATED ORAL at 08:16

## 2023-03-16 RX ADMIN — PROPOFOL 40 MG: 10 INJECTION, EMULSION INTRAVENOUS at 12:43

## 2023-03-16 RX ADMIN — CARVEDILOL 50 MG: 25 TABLET, FILM COATED ORAL at 16:44

## 2023-03-16 RX ADMIN — ACETAMINOPHEN 975 MG: 325 TABLET ORAL at 22:14

## 2023-03-16 RX ADMIN — HYDROMORPHONE HYDROCHLORIDE 1 MG: 2 TABLET ORAL at 03:31

## 2023-03-16 RX ADMIN — CEFAZOLIN 500 MG: 225 INJECTION, POWDER, FOR SOLUTION INTRAMUSCULAR; INTRAVENOUS at 11:33

## 2023-03-16 RX ADMIN — CALCIUM ACETATE 1334 MG: 667 CAPSULE ORAL at 16:44

## 2023-03-16 RX ADMIN — SEVELAMER CARBONATE 3200 MG: 800 TABLET, FILM COATED ORAL at 16:44

## 2023-03-16 RX ADMIN — ACETAMINOPHEN 650 MG: 325 TABLET ORAL at 03:31

## 2023-03-16 RX ADMIN — ACETAMINOPHEN 975 MG: 325 TABLET ORAL at 15:19

## 2023-03-16 RX ADMIN — HEPARIN SODIUM 2400 UNITS/HR: 10000 INJECTION, SOLUTION INTRAVENOUS at 03:31

## 2023-03-16 RX ADMIN — Medication 50 MCG: at 08:16

## 2023-03-16 RX ADMIN — Medication 1 CAPSULE: at 08:16

## 2023-03-16 RX ADMIN — FENTANYL CITRATE 50 MCG: 50 INJECTION, SOLUTION INTRAMUSCULAR; INTRAVENOUS at 11:41

## 2023-03-16 RX ADMIN — AMLODIPINE BESYLATE 10 MG: 10 TABLET ORAL at 08:15

## 2023-03-16 RX ADMIN — DOCUSATE SODIUM 100 MG: 100 CAPSULE, LIQUID FILLED ORAL at 14:23

## 2023-03-16 RX ADMIN — PHENYLEPHRINE HYDROCHLORIDE 100 MCG: 10 INJECTION INTRAVENOUS at 12:09

## 2023-03-16 RX ADMIN — PHENYLEPHRINE HYDROCHLORIDE 150 MCG: 10 INJECTION INTRAVENOUS at 12:31

## 2023-03-16 RX ADMIN — PHENYLEPHRINE HYDROCHLORIDE 100 MCG: 10 INJECTION INTRAVENOUS at 12:19

## 2023-03-16 RX ADMIN — SODIUM CHLORIDE: 9 INJECTION, SOLUTION INTRAVENOUS at 10:50

## 2023-03-16 RX ADMIN — PHENYLEPHRINE HYDROCHLORIDE 100 MCG: 10 INJECTION INTRAVENOUS at 12:23

## 2023-03-16 RX ADMIN — HYDRALAZINE HYDROCHLORIDE 100 MG: 50 TABLET, FILM COATED ORAL at 20:20

## 2023-03-16 RX ADMIN — HYDROMORPHONE HYDROCHLORIDE 1 MG: 2 TABLET ORAL at 08:16

## 2023-03-16 RX ADMIN — CLONIDINE HYDROCHLORIDE 0.1 MG: 0.1 TABLET ORAL at 08:19

## 2023-03-16 RX ADMIN — FENTANYL CITRATE 25 MCG: 50 INJECTION, SOLUTION INTRAMUSCULAR; INTRAVENOUS at 12:45

## 2023-03-16 ASSESSMENT — ACTIVITIES OF DAILY LIVING (ADL)
ADLS_ACUITY_SCORE: 25
ADLS_ACUITY_SCORE: 23
DEPENDENT_IADLS:: INDEPENDENT
ADLS_ACUITY_SCORE: 25
ADLS_ACUITY_SCORE: 23
ADLS_ACUITY_SCORE: 26
ADLS_ACUITY_SCORE: 23
ADLS_ACUITY_SCORE: 25
ADLS_ACUITY_SCORE: 23
ADLS_ACUITY_SCORE: 25
ADLS_ACUITY_SCORE: 23

## 2023-03-16 ASSESSMENT — ENCOUNTER SYMPTOMS: DYSRHYTHMIAS: 1

## 2023-03-16 ASSESSMENT — LIFESTYLE VARIABLES: TOBACCO_USE: 0

## 2023-03-16 NOTE — PROVIDER NOTIFICATION
"MD Notification    Notified Person: MD    Notified Person Name: Eladio Chester     Notification Date/Time: 1526 3/16/23    Notification Interaction: [place]     Purpose of Notification: Numbness/tingling     Orders Received: Dean call-back stated OR procedure would not cause these effects and is not concerned that this is from procedure.   Writer also notified hospitalist that will meet with pt this evening. Tingling sensation happened last night as well.     Comments:    RN to MD: \"[facility] 3324 FYI pt having \"chills and numbness and tingling in extremities\" pulses good, temp 99.1, Tylenol given. Dressing intact and loosened top of ACE wrap slightly. Hernan Crespo RN #[id number]\"  "

## 2023-03-16 NOTE — CONSULTS
Care Management Initial Consult    General Information  Assessment completed with: Patient,         Primary Care Provider verified and updated as needed: Yes   Readmission within the last 30 days:        Reason for Consult: discharge planning  Advance Care Planning:            Communication Assessment  Patient's communication style: spoken language (English or Bilingual)    Hearing Difficulty or Deaf: no   Wear Glasses or Blind: no    Cognitive  Cognitive/Neuro/Behavioral: WDL  Level of Consciousness: alert  Arousal Level: opens eyes spontaneously  Orientation: oriented x 4        Speech: clear, spontaneous    Living Environment:   People in home: parent(s)     Current living Arrangements: house      Able to return to prior arrangements: yes       Family/Social Support:  Care provided by: self  Provides care for: no one  Marital Status: Single  Parent(s)          Description of Support System: Supportive    Support Assessment: Adequate family and caregiver support    Current Resources:   Patient receiving home care services: No     Community Resources: Dialysis Services  Equipment currently used at home: none  Supplies currently used at home:      Employment/Financial:  Employment Status: disabled        Financial Concerns:             Lifestyle & Psychosocial Needs:  Social Determinants of Health     Tobacco Use: Low Risk      Smoking Tobacco Use: Never     Smokeless Tobacco Use: Never     Passive Exposure: Not on file   Alcohol Use: Not At Risk     Frequency of Alcohol Consumption: Never     Average Number of Drinks: Patient refused     Frequency of Binge Drinking: Never   Financial Resource Strain: Medium Risk     Difficulty of Paying Living Expenses: Somewhat hard   Food Insecurity: Food Insecurity Present     Worried About Running Out of Food in the Last Year: Sometimes true     Ran Out of Food in the Last Year: Sometimes true   Transportation Needs: No Transportation Needs     Lack of Transportation (Medical):  No     Lack of Transportation (Non-Medical): No   Physical Activity: Not on file   Stress: Not on file   Social Connections: Not on file   Intimate Partner Violence: Not on file   Depression: Not at risk     PHQ-2 Score: 0   Housing Stability: Not on file       Functional Status:  Prior to admission patient needed assistance:   Dependent ADLs:: Independent  Dependent IADLs:: Independent       Mental Health Status:  Mental Health Status: No Current Concerns       Chemical Dependency Status:  Chemical Dependency Status: No Current Concerns             Values/Beliefs:  Spiritual, Cultural Beliefs, Buddhism Practices, Values that affect care: no               Additional Information:  Met with patient in room, introduced self and role in discharge planning.    Patient stating he lives with his parents and is independent with ADLs and     Patient was adm  with right arm pain and swelling after recent ligation of the fistula . US showed DVT in the right internal jugular vein.  He was sent home from the ED with a prescription for Eliquis but unable to fill due to pharmacy being closed.. Patient was adm as IP and started on Heparin.     Patient stating he has chronic Kidney disease and is on the transplant list with the [facility] since 2021..   Dialysis Unit is in [facility]  ([facility]    Plan for discharge when medically stable.      Tony Fisher RN [phone]

## 2023-03-16 NOTE — ANESTHESIA POSTPROCEDURE EVALUATION
Patient: Taylor Valiente    Procedure: Procedure(s):  EVACUATION OF LEFT ARM HEMATOMA       Anesthesia Type:  General    Note:     Postop Pain Control: Uneventful            Sign Out: Well controlled pain   PONV: No   Neuro/Psych: Uneventful            Sign Out: Acceptable/Baseline neuro status   Airway/Respiratory: Uneventful            Sign Out: Acceptable/Baseline resp. status   CV/Hemodynamics: Uneventful            Sign Out: Acceptable CV status; No obvious hypovolemia; No obvious fluid overload   Other NRE: NONE   DID A NON-ROUTINE EVENT OCCUR? No           Last vitals:  Vitals Value Taken Time   /103 03/16/23 1330   Temp 36.4  C (97.6  F) 03/16/23 1302   Pulse 76 03/16/23 1334   Resp 6 03/16/23 1334   SpO2 100 % 03/16/23 1334   Vitals shown include unvalidated device data.    Electronically Signed By: Arlene Choudhary MD  March 16, 2023  1:35 PM

## 2023-03-16 NOTE — PROGRESS NOTES
VASCULAR SURGERY PROGRESS NOTE    Subjective:  Patient resting comfortably in bed. Edema in the right arm noticeably improved, patient reports significantly improved pain in that arm as well. Predominant source of pain right now is around the patient's left AVF incision where the hematoma is. CMS intact.     Objective:  Intake/Output Summary (Last 24 hours) at 3/16/2023 0724  Last data filed at 3/16/2023 0552  Gross per 24 hour   Intake 490 ml   Output 2000 ml   Net -1510 ml     PHYSICAL EXAM:  BP (!) 150/70 (BP Location: Right leg)   Pulse 92   Temp 99.7  F (37.6  C) (Oral)   Resp 20   Ht 1.829 m (6')   Wt 110 kg (242 lb 8.1 oz)   SpO2 97%   BMI 32.89 kg/m    General: The patient is alert and oriented. Appropriate. No acute distress  Psych: pleasant affect, answers questions appropriately  Skin: Color appropriate for race, warm, dry.  Respiratory: The patient does not require supplemental oxygen. Breathing unlabored  GI:  Abdomen soft, nontender to light palpation.  Extremities: 2+ radial pulses noted bilaterally, 2+ edema in the R upper arm with 1+ edema in the forearm. Left AVF has excellent thrill noted, hematoma and tenderness noted near AVF incision. Patient has difficulty bending left arm secondary to pain however 5/5 strength noted in L arm as well.      ASSESSMENT:  Mr. Taylor Valiente is a pleasant 27 year old male who was admitted on 3/13/2023 for DVT of the R internal jugular, who underwent recent ligation of R AVF on 3/9 and new first stage left brachiobasilic AV fistula, noted to have hematoma of the L AVF.    PLAN:  -OR today-1130 time  -NPO  -heparin to be stopped on-call  -continue to elevate the right arm as able with compression  -appreciate all teams involved in Mr. Valiente's care  -do not compress LUE to control edema    Zaina Welsh, NP  VASCULAR SURGERY

## 2023-03-16 NOTE — PLAN OF CARE
"Orientations: A&Ox4   Vitals/Pain: VSS, RA, pain 6-7/10 RUE and LUE (fistula). (both arms elevated)  Tele: NSR  Lines/Drains: PIV R hand SL  Skin/Wounds: RUE swelling (dressing in place and ACE wrap on), LUE fistula (gauze)    GI/: BM yesterday, per pt request Colace given x1, no voiding today (dialysis). Reg diet (gets N/V intermittent \"from hospital food\", mo brought in food for dinner)  Labs: Abnormal/Trends: Hgb (8.4), hematocrit (25.9)  Electrolyte Replacement- N/A  Ambulation/Assist: SB assist (needs assistance w/ RUE)  Sleep Quality: napped on/off today  Plan: Evac. of L hematoma today (dressing in place), pulses palpable. RUE ACE wrapped, LUE gauze in place from OR. Pt complained of numbness and tingling in all extremities at 1530, pt stated no double vision but blurred vision in both eyes still. New scrub bottoms in pt room if desired. Dialysis tomorrow (MWF). Leonila called today, no plan to discharge by tomorrow and given update. Per CV surgery note, holding heparin until tomorrow after vascular rounds. CC consult today.       "

## 2023-03-16 NOTE — PROGRESS NOTES
[facility]    Short Progress Note    Taylor Valiente underwent left arm hematoma evacuation today following creation of left AV fistula 3/9. Will hold heparin and oral anticoagulation until vascular team evaluates surgical field tomorrow morning. Dressing care and analgesia ordered. Other cares per primary team.    Charlotte Garrido PA-C

## 2023-03-16 NOTE — OP NOTE
Procedure Date: 03/16/2023    PREOPERATIVE DIAGNOSES:  One week status post creation of first stage left brachiobasilic AV fistula, now with a hematoma of the left antecubital incision.    POSTOPERATIVE DIAGNOSES:  One week status post creation of first stage left brachiobasilic arteriovenous fistula, now with a hematoma of the left antecubital incision.    PROCEDURE PERFORMED:  Evacuation of left arm hematoma.    SURGEON:  Edwardo Moran MD    ASSISTANT:  Charlotte Garrido PA-C.  Her assistance in this case was critical for her expertise in exposure and suctioning.    ANESTHESIA:  LMA general anesthesia.    ESTIMATED BLOOD LOSS:  5 mL.    INDICATIONS FOR PROCEDURE:  This patient is a 27-year-old gentleman who is 1 week status post ligation of an aneurysmal right upper arm AV fistula and creation of a first stage left brachiobasilic AV fistula.  He was readmitted 3 days after that surgery with a swollen and painful right arm secondary to thrombosis of the ligated right upper arm AV fistula.  He also had a tunneled dialysis catheter in the right internal jugular vein, which also was beginning to thrombose. He was systemically heparinized.  Over the course of the past few days, he has developed a 4 cm hematoma at the left antecubital crease over our surgical site.  This is beginning to extrinsically compress on the newly created fistula.  I am recommending evacuation of the hematoma.    OPERATIVE FINDINGS:  Upon incising the old skin incision just above the antecubital crease, we evacuated the organized hematoma.  No surgical bleeding site was identified.  The fistula remained intact with excellent flow.  The AV anastomosis was intact.  There was some mild oozing coming diffusely from raw surfaces.  This was locally controlled.  The incision was then reclosed in layers and interrupted nylon skin sutures.    DESCRIPTION OF PROCEDURE:  After the informed consent was obtained, the patient was brought to the operating room  and placed on the table in a supine position.  LMA general anesthesia was achieved without incident.  The left upper extremity was prepped and draped in the usual sterile fashion.  Timeout was called, and we verified the patient's identity, the operative site, and the proposed procedure.  I began by re-incising the transverse incision 1 fingerbreadth above the antecubital crease.  After dividing the subdermal interrupted Vicryl sutures, we immediately encountered the hematoma, which was milked out of the wound.  Following this, I could visualize the fistula, which was the median cubital vein sewn end-to-side into the brachial artery at this level.  There were no technical difficulties with the AV anastomosis.  There is a palpable thrill within the fistula.  We debrided away some organized hematoma side wall.  There was some mild diffuse oozing coming from raw surfaces, which was controlled with pinpoint electrocautery and placement of Surgicel gauze.  Gentle compression was held over the area for about 5 or 10 minutes.  Following this, there was excellent hemostasis.  The wound was gently irrigated with saline.  Closure then proceeded utilizing interrupted 3-0 Vicryl to reapproximate the deep subcutaneous layer.  The subdermal layer was closed with interrupted 3-0 Vicryl.  Skin was closed with interrupted 3-0 nylon vertical mattress sutures.  A sterile dressing was applied.  Final sponge and needle count were reported as correct.  We also rewrapped the right upper extremity from the wrist to the upper arm using Kerlix and a wide Ace wrap.  The patient tolerated the procedure without incident.  He was returned extubated and hemodynamically stable to the recovery room.    Henrik Moran MD        D: 2023   T: 2023   MT: [id number]    Name:     STEFAN RIOS  MRN:      [id number]        Account:        [id number]   :      1995           Procedure Date: 2023     Document: [id number]

## 2023-03-16 NOTE — PLAN OF CARE
A+Ox4 VSS ex hypertensive. On room air. Tele NSR. Lung sounds clear. RUE has 3+ edema, ABD on fistula site. L arm fistula has thrill and bruit. Heparin gtt at 2400 units/hr.  No void, on dialysis. No BM overnight. Up SBA. NPO for procedure. Tylenol and dilaudid for pain.

## 2023-03-16 NOTE — ANESTHESIA PREPROCEDURE EVALUATION
Anesthesia Pre-Procedure Evaluation    Patient: Taylor Valiente   MRN: [id number] : 1995        Procedure : Procedure(s):  EVACUATION OF LEFT ARM HEMATOMA          Past Medical History:   Diagnosis Date     Adrenal adenoma, left      Anemia      Benign essential hypertension 2017     CKD (chronic kidney disease) stage 5, GFR less than 15 ml/min (H)     FSGS     Depression      Focal glomerular sclerosis 2017     HLD (hyperlipidemia)      LVH (left ventricular hypertrophy) due to hypertensive disease 2017     Noncompliance      Obesity, unspecified      OD (osteochondritis dissecans) 2021     Stress-induced cardiomyopathy      Suicide attempt (H) 2019      Past Surgical History:   Procedure Laterality Date     ARTHROSCOPY ANKLE Left 2021    Procedure: Left ankle arthroscopy and debridement/micro fracture;  Surgeon: Mirza Nelson MD;  Location: [facility] OR     BIOPSY  2017    [facility]     CREATE FISTULA ARTERIOVENOUS UPPER EXTREMITY Right 3/4/2021    Procedure: RIGHT proximal radial  to CEPHALIC ARTERIOVENOUS FISTULA;  Surgeon: Henrik Moran MD;  Location:  OR     CREATE FISTULA ARTERIOVENOUS UPPER EXTREMITY Left 3/9/2023    Procedure: CREATION OF FIRST STAGE LEFT BRACHIOBASILIC ARTERIOVENOUS FISTULA;  Surgeon: Henrik Moran MD;  Location: [facility] OR     IR CVC TUNNEL PLACEMENT > 5 YRS OF AGE  2021     IR CVC TUNNEL PLACEMENT > 5 YRS OF AGE  3/9/2023     IR CVC TUNNEL REMOVAL RIGHT  12/3/2021     LIGATE FISTULA ARTERIOVENOUS UPPER EXTREMITY Right 3/9/2023    Procedure: LIGATION OF RIGHT ARM ARTERIOVENOUS FISTULA;  Surgeon: Henrik Moran MD;  Location:  OR     NO HISTORY OF SURGERY       REVISION FISTULA ARTERIOVENOUS UPPER EXTREMITY Right 2021    Procedure: Second stage RIGHT BRACHIOCEPHALIC transposition ARTERIOVENOUS FISTULA;  Surgeon: Henrik Moran MD;  Location: [facility] OR      Allergies   Allergen Reactions     Benadryl  [Diphenhydramine] Itching      Social History     Tobacco Use     Smoking status: Never     Smokeless tobacco: Never   Substance Use Topics     Alcohol use: No      Wt Readings from Last 1 Encounters:   03/13/23 110 kg (242 lb 8.1 oz)        Anesthesia Evaluation   Pt has had prior anesthetic.     No history of anesthetic complications       ROS/MED HX  ENT/Pulmonary:  - neg pulmonary ROS  (-) tobacco use and sleep apnea   Neurologic:  - neg neurologic ROS     Cardiovascular:     (+) Dyslipidemia hypertension-----dysrhythmias, Long QT, Previous cardiac testing   Echo: Date: Results:    Stress Test: Date: 10/29/23 Results:  Procedure  Stress Echo Complete. Contrast Optison.  ______________________________________________________________________________  Interpretation Summary  Probably normal stress echocardiogram with no wall motion abnormalities seen  on the post exercise images. The sensitivity of the test for detection of  ischemia is limited since the target heart rate was not achieved.  ______________________________________________________________________________  Stress  RPP 26,640.  The patient exercised 9:19.  There was a borderline hypertensive BP response to exercise.  Exercise was stopped due to fatigue.  The patient exhibited no chest pain during exercise.  A low workload was achieved.  Target Heart Rate was not achieved due to fatigue.  The Duke treadmill score was low risk ( >5 Duke score).  The EKG portion of this stress test was negative for inducible ischemia (see  echo results below).  At target heart rate with Dobutamine left ventricular size decreases.  At target heart rate with Dobutamine, global left ventricular function  augments.  The visual ejection fraction is 65-70%.  Normal left ventricular function and wall motion at rest and post-stress.     Baseline  The patient is in normal sinus rhythm.  Early repolarization abnormalities are noted.  Normal left ventricular function and wall motion  at rest.  The visual ejection fraction is estimated at 55-60%.  ECG Reviewed: Date: Results:    Cath: Date: Results:      METS/Exercise Tolerance:     Hematologic:     (+) anemia,     Musculoskeletal:       GI/Hepatic:    (-) GERD   Renal/Genitourinary:     (+) renal disease, type: ESRD, Pt requires dialysis,     Endo:     (+) Obesity,     Psychiatric/Substance Use:     (+) psychiatric history depression     Infectious Disease:       Malignancy:       Other:            Physical Exam    Airway        Mallampati: II   TM distance: > 3 FB   Neck ROM: full   Mouth opening: > 3 cm    Respiratory Devices and Support         Dental    unable to assess        Cardiovascular   cardiovascular exam normal          Pulmonary   pulmonary exam normal                OUTSIDE LABS:  CBC:   Lab Results   Component Value Date    WBC 10.0 03/16/2023    WBC 8.4 03/15/2023    HGB 8.4 (L) 03/16/2023    HGB 8.4 (L) 03/15/2023    HCT 25.9 (L) 03/16/2023    HCT 25.6 (L) 03/15/2023     03/16/2023     03/15/2023     BMP:   Lab Results   Component Value Date     (L) 03/15/2023     (L) 03/14/2023    POTASSIUM 4.6 03/15/2023    POTASSIUM 4.6 03/14/2023    CHLORIDE 92 (L) 03/15/2023    CHLORIDE 92 (L) 03/14/2023    CO2 24 03/15/2023    CO2 23 03/14/2023    BUN 72.3 (H) 03/15/2023    BUN 53.9 (H) 03/14/2023    CR 16.40 (H) 03/15/2023    CR 13.09 (H) 03/14/2023     (H) 03/15/2023     (H) 03/14/2023     COAGS:   Lab Results   Component Value Date    PTT 81 (H) 03/16/2023    INR 1.40 (H) 03/15/2023     POC:   Lab Results   Component Value Date     (H) 02/24/2021     HEPATIC:   Lab Results   Component Value Date    ALBUMIN 3.6 03/13/2023    PROTTOTAL 6.9 03/13/2023    ALT 9 (L) 03/13/2023    AST 25 03/13/2023    ALKPHOS 131 (H) 03/13/2023    BILITOTAL 0.9 03/13/2023     OTHER:   Lab Results   Component Value Date    LACT 0.9 03/13/2023    A1C 5.1 08/26/2021    BUBBA 7.6 (L) 03/15/2023    PHOS 7.2 (H)  06/18/2021    MAG 2.3 12/12/2022    LIPASE 304 11/29/2021    TSH 1.67 03/03/2022    T4 1.26 07/15/2017    CRP 7.9 11/29/2020       Anesthesia Plan    ASA Status:  3   NPO Status:  NPO Appropriate    Anesthesia Type: General.     - Airway: LMA   Induction: Intravenous, Propofol.   Maintenance: Balanced.        Consents    Anesthesia Plan(s) and associated risks, benefits, and realistic alternatives discussed. Questions answered and patient/representative(s) expressed understanding.    - Discussed:     - Discussed with:  Patient         Postoperative Care    Pain management: IV analgesics.   PONV prophylaxis: Ondansetron (or other 5HT-3), Background Propofol Infusion     Comments:                Arlene Choudhary MD

## 2023-03-16 NOTE — ANESTHESIA PROCEDURE NOTES
Airway       Patient location during procedure: OR  Staff -        Anesthesiologist:  Arlene Choudhary MD       CRNA: Rick Choi APRN CRNA       Other Anesthesia Staff: Netta Rowley RN       Performed By: SRNAIndications and Patient Condition       Indications for airway management: stephanie-procedural       Induction type:intravenous       Mask difficulty assessment: 0 - not attempted    Final Airway Details       Final airway type: supraglottic airway    Supraglottic Airway Details        Type: LMA       Brand: Ambu AuraGain       LMA size: 5    Post intubation assessment        Placement verified by: capnometry, equal breath sounds and chest rise        Number of attempts at approach: 1       Number of other approaches attempted: 0       Secured with: pink tape       Ease of procedure: easy       Dentition: Intact and Unchanged       None

## 2023-03-16 NOTE — PROGRESS NOTES
Assessment and Plan:   ESRD: HD yesterday using R CVC with 2 L UF. Orders placed for dialysis in am.     LAF with ? Hematoma at incision site. Per Vasc surgery, in OR today for exploration.       On phoslo 2 tid, sensipar, renvela.              Interval History:   Hypertension: amlodipine, bumex, coreg, clonidine, hydralazine.   Anemia: EPO  RIJV DVT assoc with CVC: R arm edema.            27 year old male who was admitted on 3/13/2023 for DVT of the R internal jugular, who underwent recent ligation of R AVF on 3/9 and new first stage left brachiobasilic AV fistula, noted to have hematoma of the L AVF.           Review of Systems:   In OR. Pain and swelling in RUE improving.           Medications:       [Auto Hold] - MEDICATION INSTRUCTIONS for Dialysis Patients -   Does not apply See Admin Instructions     [Auto Hold] amLODIPine  10 mg Oral Daily     [Held by provider] aspirin  81 mg Oral Daily     [Auto Hold] atorvastatin  10 mg Oral At Bedtime     [Auto Hold] bumetanide  2 mg Oral Daily     [Auto Hold] calcium acetate  1,334 mg Oral TID w/meals     [Auto Hold] carvedilol  50 mg Oral BID w/meals     ceFAZolin  500 mg Intravenous See Admin Instructions     [Auto Hold] cinacalcet  90 mg Oral Daily     [Auto Hold] cloNIDine  0.1 mg Oral Once per day on Mon Wed Fri     [Auto Hold] cloNIDine  0.1 mg Oral Once per day on Sun Tue Thu Sat     [Auto Hold] hydrALAZINE  100 mg Oral TID     [Auto Hold] multivitamin RENAL  1 capsule Oral Daily     [Auto Hold] sevelamer carbonate  3,200 mg Oral TID w/meals     [Auto Hold] sodium chloride (PF)  3 mL Intracatheter Q8H     [Auto Hold] vitamin D3  50 mcg Oral Daily       heparin 2,400 Units/hr (03/16/23 0331)     - MEDICATION INSTRUCTIONS -       sodium chloride 10 mL/hr at 03/16/23 1050     Current active medications and PTA medications reviewed, see medication list for details.            Physical Exam:   Vitals were reviewed  Patient Vitals for the past 24 hrs:   BP  Temp Temp src Pulse Resp SpO2   23 1114 (!) 143/79 98.4  F (36.9  C) Temporal 83 18 97 %   23 0700 (!) 142/84 99.3  F (37.4  C) Oral 93 18 97 %   23 0320 (!) 150/70 99.7  F (37.6  C) Oral -- 20 97 %   03/15/23 1900 (!) 160/87 99.8  F (37.7  C) Oral 92 28 100 %   03/15/23 1847 -- 99.4  F (37.4  C) Oral -- -- --   03/15/23 1806 (!) 151/96 -- -- 90 -- --   03/15/23 1530 (!) 156/90 99.1  F (37.3  C) Oral 92 28 99 %       Temp:  [98.4  F (36.9  C)-99.8  F (37.7  C)] 98.4  F (36.9  C)  Pulse:  [83-93] 83  Resp:  [18-28] 18  BP: (142-160)/(70-96) 143/79  SpO2:  [97 %-100 %] 97 %    Temperatures:  Current - Temp: 98.4  F (36.9  C); Max - Temp  Av.3  F (37.4  C)  Min: 98.4  F (36.9  C)  Max: 99.8  F (37.7  C)  Respiration range: Resp  Av.4  Min: 18  Max: 28  Pulse range: Pulse  Av  Min: 83  Max: 93  Blood pressure range: Systolic (24hrs), Av , Min:142 , Max:160   ; Diastolic (24hrs), Av, Min:70, Max:96    Pulse oximetry range: SpO2  Av %  Min: 97 %  Max: 100 %    I/O last 3 completed shifts:  In: 490 [P.O.:480; I.V.:10]  Out:  [Other:]      Intake/Output Summary (Last 24 hours) at 3/16/2023 1243  Last data filed at 3/16/2023 0552  Gross per 24 hour   Intake 240 ml   Output --   Net 240 ml              Wt Readings from Last 4 Encounters:   23 110 kg (242 lb 8.1 oz)   23 108.9 kg (240 lb)   23 112 kg (247 lb)   23 112 kg (247 lb)          Data:          Lab Results   Component Value Date     03/15/2023     2023     2023     2021     2021     2021    Lab Results   Component Value Date    CHLORIDE 92 03/15/2023    CHLORIDE 92 2023    CHLORIDE 93 2023    CHLORIDE 102 2022    CHLORIDE 104 2022    CHLORIDE 104 2021    CHLORIDE 105 2021    CHLORIDE 108 2021    CHLORIDE 107 2021    Lab Results   Component Value Date    BUN 72.3 03/15/2023     BUN 53.9 03/14/2023    BUN 35.7 03/13/2023    BUN 35 06/20/2022    BUN 48 03/03/2022    BUN 91 12/26/2021    BUN 58 06/19/2021    BUN 95 06/18/2021     06/17/2021        Lab Results   Component Value Date    POTASSIUM 4.6 03/15/2023    POTASSIUM 4.6 03/14/2023    POTASSIUM 4.3 03/13/2023    POTASSIUM 3.9 06/20/2022    POTASSIUM 5.3 03/03/2022    POTASSIUM 5.2 12/26/2021    POTASSIUM 3.8 06/19/2021    POTASSIUM 3.8 06/18/2021    POTASSIUM 3.9 06/17/2021    Lab Results   Component Value Date    CO2 24 03/15/2023    CO2 23 03/14/2023    CO2 24 03/13/2023    CO2 25 06/20/2022    CO2 28 03/03/2022    CO2 24 12/26/2021    CO2 27 06/19/2021    CO2 23 06/18/2021    CO2 17 06/17/2021    Lab Results   Component Value Date    CR 16.40 03/15/2023    CR 13.09 03/14/2023    CR 8.81 03/13/2023    CR 8.93 06/19/2021    CR 11.50 06/18/2021    CR 15.80 06/17/2021        Recent Labs   Lab Test 03/16/23  0542 03/15/23  0900 03/14/23  0743   WBC 10.0 8.4 10.3   HGB 8.4* 8.4* 8.9*   HCT 25.9* 25.6* 27.6*   MCV 92 92 92    172 151     Recent Labs   Lab Test 03/13/23  1013 06/20/22  1147 11/29/21  0650   AST 25 9 37   ALT 9* 26 34   ALKPHOS 131* 123 73   BILITOTAL 0.9 0.3 0.4       Recent Labs   Lab Test 12/12/22  0559 11/27/20  0221 10/04/19  0344   MAG 2.3 2.2 1.9     Recent Labs   Lab Test 06/18/21  0740 06/17/21  0655 04/05/21  1545   PHOS 7.2* 8.2* 6.1*     Recent Labs   Lab Test 03/15/23  0900 03/14/23  0743 03/13/23  1013   BUBBA 7.6* 7.9* 9.3       Lab Results   Component Value Date    BUBBA 7.6 (L) 03/15/2023     Lab Results   Component Value Date    WBC 10.0 03/16/2023    HGB 8.4 (L) 03/16/2023    HCT 25.9 (L) 03/16/2023    MCV 92 03/16/2023     03/16/2023     Lab Results   Component Value Date     (L) 03/15/2023    POTASSIUM 4.6 03/15/2023    CHLORIDE 92 (L) 03/15/2023    CO2 24 03/15/2023     (H) 03/15/2023     Lab Results   Component Value Date    BUN 72.3 (H) 03/15/2023    CR 16.40 (H) 03/15/2023      Lab Results   Component Value Date    MAG 2.3 12/12/2022     Lab Results   Component Value Date    PHOS 7.2 (H) 06/18/2021       Creatinine   Date Value Ref Range Status   03/15/2023 16.40 (H) 0.67 - 1.17 mg/dL Final   03/14/2023 13.09 (H) 0.67 - 1.17 mg/dL Final   03/13/2023 8.81 (H) 0.67 - 1.17 mg/dL Final   03/09/2023 9.91 (H) 0.67 - 1.17 mg/dL Final   03/07/2023 12.20 (H) 0.67 - 1.17 mg/dL Final   12/12/2022 15.45 (H) 0.67 - 1.17 mg/dL Final   06/19/2021 8.93 (H) 0.66 - 1.25 mg/dL Final   06/18/2021 11.50 (H) 0.66 - 1.25 mg/dL Final   06/17/2021 15.80 (H) 0.66 - 1.25 mg/dL Final   06/16/2021 15.80 (H) 0.66 - 1.25 mg/dL Final   06/15/2021 16.10 (H) 0.66 - 1.25 mg/dL Final   04/05/2021 8.23 (H) 0.66 - 1.25 mg/dL Final     Comment:     Critical Value called to and read back by  DR ALFONSO BRADLEY AT 1840 ON 04 05 2021          Attestation:  I have reviewed today's vital signs, notes, medications, labs and imaging.     Yosvany Bains MD

## 2023-03-16 NOTE — ANESTHESIA CARE TRANSFER NOTE
Patient: Taylor Valiente    Procedure: Procedure(s):  EVACUATION OF LEFT ARM HEMATOMA       Diagnosis: ESRD (end stage renal disease) on dialysis (H) [N18.6, Z99.2]  Hematoma of skin [T14.8XXA]  Diagnosis Additional Information: No value filed.    Anesthesia Type:   General     Note:    Oropharynx: oropharynx clear of all foreign objects and spontaneously breathing  Level of Consciousness: awake  Oxygen Supplementation: face mask  Level of Supplemental Oxygen (L/min / FiO2): 6  Independent Airway: airway patency satisfactory and stable  Dentition: dentition unchanged  Vital Signs Stable: post-procedure vital signs reviewed and stable  Report to RN Given: handoff report given  Patient transferred to: PACU    Handoff Report: Identifed the Patient, Identified the Reponsible Provider, Reviewed the pertinent medical history, Discussed the surgical course, Reviewed Intra-OP anesthesia mangement and issues during anesthesia, Set expectations for post-procedure period and Allowed opportunity for questions and acknowledgement of understanding      Vitals:  Vitals Value Taken Time   /68 03/16/23 1302   Temp 37.0    Pulse 78 03/16/23 1305   Resp 14 03/16/23 1305   SpO2 100 % 03/16/23 1305   Vitals shown include unvalidated device data.    Electronically Signed By: ROSA Holguin CRNA  March 16, 2023  1:06 PM

## 2023-03-16 NOTE — BRIEF OP NOTE
[facility]    Brief Operative Note    Pre-operative diagnosis: ESRD (end stage renal disease) on dialysis (H) [N18.6, Z99.2]  Hematoma of skin [T14.8XXA]  Post-operative diagnosis Same as pre-operative diagnosis    Procedure: Procedure(s):  EVACUATION OF LEFT ARM HEMATOMA  Surgeon: Surgeon(s) and Role:     * Henrik Moran MD - Primary     * Charlotte Garrido PA-C - Assisting  Anesthesia: General   Estimated Blood Loss: 5 mL from 3/16/2023 11:33 AM to 3/16/2023 12:59 PM  Drains: None  Specimens: * No specimens in log *  Findings:   Small hematoma present which was evacuated. Two small areas of mild diffuse oozing controlled with ties and cautery. Field with good hemostasis following this. Small piece of surgicel left in place. Incision closed with 3-0 vicryl and nylon sutures.  Complications: None.  Implants: * No implants in log *

## 2023-03-16 NOTE — PROGRESS NOTES
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  The patient had a ligation of his right arm fistula on 3/9/2023 but since then has started develop pain and swelling to the right arm.  Upper extremity ultrasound on 3/12 was obtained and showed a near occlusive DVT in the right internal jugular vein.  He was sent home from the emergency department with a prescription for Eliquis, which he has been unable to fill.  He returns to the emergency department today with chest and neck pain while undergoing dialysis.  He is noted to have significant right arm swelling. Given increasing pain and swelling of the R arm as well as his difficulty filling Eliquis, he will be admitted and initiated on heparin drip with vascular surgery consult.   Plan  -Vascular surgery consuledt: input appreciated. Heparin gtt initiated in the ED, continue for now -> Evacuation of left arm hematoma completed 03/16  - continue supportive care for thrombophlebitis with pain control and arm elevation.      End-stage renal disease on hemodialysis  Renal disease thought to be secondary to hypertension. He reports his renal function significantly declined after getting COVID in 2020 and ultimately progressed to ESRD. Completed a partial dialysis run today (3/13). Volume status and  electrolytes stable. Current access is tunneled dialysis catheter.   - Nephrology consulted  -Continue prior to admission Cinacalcet, sevelamer, cholecalciferol    Blurry vision  Diplopia  -- Developed last night 3/14/22  --Stroke evaluation done which was found to be negative.  --We will need ophthalmology evaluation if symptoms persist.     Hypertension  Hyperlipidemia  -Continue prior to admission amlodipine, carvedilol, clonidine, hydralazine  -Continue prior to admission aspirin and atorvastatin     Chronic anemia and Thrombocytopenia  Hgb is 9.5 and platelet count is 141. Hgb is slightly lower than baseline of 10-11.   - Monitor CBC      Mild troponin elevation  Troponin is elevated to 68, and stable on re-check. Pt has chronically elevated troponin 2/2 ESRD.   - Cardiac monitoring      Hx of Adrenal adenoma  Noted        Diet: Renal Diet (dialysis)    DVT Prophylaxis: Pneumatic Compression Devices  Hurt Catheter: Not present  Lines: PRESENT      CVC Double Lumen Right Internal jugular Non - valved (open ended);Tunneled-Site Assessment: WDL      Cardiac Monitoring: None  Code Status: Full Code      Clinically Significant Risk Factors                        # Obesity: Estimated body mass index is 32.89 kg/m  as calculated from the following:    Height as of this encounter: 1.829 m (6').    Weight as of this encounter: 110 kg (242 lb 8.1 oz)., PRESENT ON ADMISSION         Disposition Plan      Expected Discharge Date: 03/17/2023,  3:00 PM    Destination: home with family            Prashant Ross MD  Hospitalist Service  [facility]  Securely message with [email or web address] (more info)  Text page via [employer] Paging/Directory   ______________________________________________________________________    Interval History      Doing well post op  No CP/SOB  Pain in LUE controlled  No fevers  No nausea / vomiting  Had some chills post op that has resolved  No new complaints, hoping for discharge in coming  days    Physical Exam   Vital Signs: Temp: 99.1  F (37.3  C) Temp src: Axillary BP: (!) 144/80 Pulse: 82   Resp: 16 SpO2: 98 % O2 Device: Nasal cannula Oxygen Delivery: 1 LPM  Weight: 242 lbs 8.1 oz    General Appearance: Well appearing for stated age.  Respiratory: CTAB, no rales or ronchi  Cardiovascular: S1, S2 normal, no murmurs  GI: non-tender on palpation, BS present      Medical Decision Making       50 MINUTES SPENT BY ME on the date of service doing chart review, history, exam, documentation & further activities per the note.      Data     I have personally reviewed the following data over the past 24 hrs:    10.0  \   8.4 (L)   / 199     N/A N/A N/A /  N/A   N/A N/A N/A \       INR:  N/A PTT:  81 (H)   D-dimer:  N/A Fibrinogen:  N/A       Imaging results reviewed over the past 24 hrs:   No results found for this or any previous visit (from the past 24 hour(s)).

## 2023-03-17 ENCOUNTER — APPOINTMENT (OUTPATIENT)
Dept: GENERAL RADIOLOGY | Facility: CLINIC | Age: 28
DRG: 987 | End: 2023-03-17
Attending: STUDENT IN AN ORGANIZED HEALTH CARE EDUCATION/TRAINING PROGRAM
Payer: MEDICARE

## 2023-03-17 LAB
ANION GAP SERPL CALCULATED.3IONS-SCNC: 18 MMOL/L (ref 7–15)
APTT PPP: 52 SECONDS (ref 22–38)
BUN SERPL-MCNC: 59.9 MG/DL (ref 6–20)
CALCIUM SERPL-MCNC: 7.9 MG/DL (ref 8.6–10)
CHLORIDE SERPL-SCNC: 94 MMOL/L (ref 98–107)
CREAT SERPL-MCNC: 14.37 MG/DL (ref 0.67–1.17)
DEPRECATED HCO3 PLAS-SCNC: 24 MMOL/L (ref 22–29)
ERYTHROCYTE [DISTWIDTH] IN BLOOD BY AUTOMATED COUNT: 13.5 % (ref 10–15)
GFR SERPL CREATININE-BSD FRML MDRD: 4 ML/MIN/1.73M2
GLUCOSE BLDC GLUCOMTR-MCNC: 132 MG/DL (ref 70–99)
GLUCOSE SERPL-MCNC: 92 MG/DL (ref 70–99)
HCT VFR BLD AUTO: 27.3 % (ref 40–53)
HGB BLD-MCNC: 8.5 G/DL (ref 13.3–17.7)
MCH RBC QN AUTO: 28.6 PG (ref 26.5–33)
MCHC RBC AUTO-ENTMCNC: 31.1 G/DL (ref 31.5–36.5)
MCV RBC AUTO: 92 FL (ref 78–100)
PLATELET # BLD AUTO: 248 10E3/UL (ref 150–450)
POTASSIUM SERPL-SCNC: 5.5 MMOL/L (ref 3.4–5.3)
RBC # BLD AUTO: 2.97 10E6/UL (ref 4.4–5.9)
SODIUM SERPL-SCNC: 136 MMOL/L (ref 136–145)
WBC # BLD AUTO: 10.7 10E3/UL (ref 4–11)

## 2023-03-17 PROCEDURE — 99024 POSTOP FOLLOW-UP VISIT: CPT

## 2023-03-17 PROCEDURE — 80048 BASIC METABOLIC PNL TOTAL CA: CPT | Performed by: STUDENT IN AN ORGANIZED HEALTH CARE EDUCATION/TRAINING PROGRAM

## 2023-03-17 PROCEDURE — 93010 ELECTROCARDIOGRAM REPORT: CPT | Performed by: INTERNAL MEDICINE

## 2023-03-17 PROCEDURE — 120N000001 HC R&B MED SURG/OB

## 2023-03-17 PROCEDURE — 250N000013 HC RX MED GY IP 250 OP 250 PS 637: Performed by: PHYSICIAN ASSISTANT

## 2023-03-17 PROCEDURE — 90935 HEMODIALYSIS ONE EVALUATION: CPT | Performed by: INTERNAL MEDICINE

## 2023-03-17 PROCEDURE — 99233 SBSQ HOSP IP/OBS HIGH 50: CPT | Performed by: STUDENT IN AN ORGANIZED HEALTH CARE EDUCATION/TRAINING PROGRAM

## 2023-03-17 PROCEDURE — 85730 THROMBOPLASTIN TIME PARTIAL: CPT | Performed by: STUDENT IN AN ORGANIZED HEALTH CARE EDUCATION/TRAINING PROGRAM

## 2023-03-17 PROCEDURE — 93005 ELECTROCARDIOGRAM TRACING: CPT

## 2023-03-17 PROCEDURE — 250N000011 HC RX IP 250 OP 636: Performed by: STUDENT IN AN ORGANIZED HEALTH CARE EDUCATION/TRAINING PROGRAM

## 2023-03-17 PROCEDURE — 258N000003 HC RX IP 258 OP 636: Performed by: INTERNAL MEDICINE

## 2023-03-17 PROCEDURE — 250N000011 HC RX IP 250 OP 636: Performed by: PHYSICIAN ASSISTANT

## 2023-03-17 PROCEDURE — 71045 X-RAY EXAM CHEST 1 VIEW: CPT

## 2023-03-17 PROCEDURE — 634N000001 HC RX 634: Performed by: INTERNAL MEDICINE

## 2023-03-17 PROCEDURE — 36415 COLL VENOUS BLD VENIPUNCTURE: CPT | Performed by: STUDENT IN AN ORGANIZED HEALTH CARE EDUCATION/TRAINING PROGRAM

## 2023-03-17 PROCEDURE — 250N000011 HC RX IP 250 OP 636: Performed by: INTERNAL MEDICINE

## 2023-03-17 PROCEDURE — 85027 COMPLETE CBC AUTOMATED: CPT | Performed by: STUDENT IN AN ORGANIZED HEALTH CARE EDUCATION/TRAINING PROGRAM

## 2023-03-17 PROCEDURE — 90937 HEMODIALYSIS REPEATED EVAL: CPT

## 2023-03-17 RX ORDER — HEPARIN SODIUM 10000 [USP'U]/100ML
0-5000 INJECTION, SOLUTION INTRAVENOUS CONTINUOUS
Status: DISPENSED | OUTPATIENT
Start: 2023-03-17 | End: 2023-03-18

## 2023-03-17 RX ADMIN — HYDRALAZINE HYDROCHLORIDE 100 MG: 50 TABLET, FILM COATED ORAL at 19:58

## 2023-03-17 RX ADMIN — ACETAMINOPHEN 975 MG: 325 TABLET ORAL at 08:02

## 2023-03-17 RX ADMIN — HEPARIN SODIUM 1800 UNITS/HR: 10000 INJECTION, SOLUTION INTRAVENOUS at 12:24

## 2023-03-17 RX ADMIN — Medication 1 CAPSULE: at 12:13

## 2023-03-17 RX ADMIN — IRON SUCROSE 100 MG: 20 INJECTION, SOLUTION INTRAVENOUS at 10:31

## 2023-03-17 RX ADMIN — CARVEDILOL 50 MG: 25 TABLET, FILM COATED ORAL at 18:51

## 2023-03-17 RX ADMIN — Medication 50 MCG: at 12:13

## 2023-03-17 RX ADMIN — BUMETANIDE 2 MG: 2 TABLET ORAL at 12:13

## 2023-03-17 RX ADMIN — ONDANSETRON 4 MG: 2 INJECTION INTRAMUSCULAR; INTRAVENOUS at 13:44

## 2023-03-17 RX ADMIN — SENNOSIDES AND DOCUSATE SODIUM 1 TABLET: 50; 8.6 TABLET ORAL at 12:13

## 2023-03-17 RX ADMIN — SODIUM CHLORIDE 300 ML: 9 INJECTION, SOLUTION INTRAVENOUS at 10:25

## 2023-03-17 RX ADMIN — CALCIUM ACETATE 1334 MG: 667 CAPSULE ORAL at 13:39

## 2023-03-17 RX ADMIN — HYDRALAZINE HYDROCHLORIDE 100 MG: 50 TABLET, FILM COATED ORAL at 12:13

## 2023-03-17 RX ADMIN — EPOETIN ALFA-EPBX 4000 UNITS: 4000 INJECTION, SOLUTION INTRAVENOUS; SUBCUTANEOUS at 10:30

## 2023-03-17 RX ADMIN — ACETAMINOPHEN 975 MG: 325 TABLET ORAL at 21:48

## 2023-03-17 RX ADMIN — OXYCODONE HYDROCHLORIDE 10 MG: 5 TABLET ORAL at 21:49

## 2023-03-17 RX ADMIN — CLONIDINE HYDROCHLORIDE 0.1 MG: 0.1 TABLET ORAL at 21:56

## 2023-03-17 RX ADMIN — ONDANSETRON 4 MG: 2 INJECTION INTRAMUSCULAR; INTRAVENOUS at 08:02

## 2023-03-17 RX ADMIN — CALCIUM ACETATE 1334 MG: 667 CAPSULE ORAL at 18:51

## 2023-03-17 RX ADMIN — OXYCODONE HYDROCHLORIDE 10 MG: 5 TABLET ORAL at 08:03

## 2023-03-17 RX ADMIN — HEPARIN SODIUM 1900 UNITS: 1000 INJECTION INTRAVENOUS; SUBCUTANEOUS at 10:28

## 2023-03-17 RX ADMIN — CINACALCET 90 MG: 30 TABLET ORAL at 12:13

## 2023-03-17 RX ADMIN — CARVEDILOL 50 MG: 25 TABLET, FILM COATED ORAL at 12:13

## 2023-03-17 RX ADMIN — ATORVASTATIN CALCIUM 10 MG: 10 TABLET, FILM COATED ORAL at 21:49

## 2023-03-17 RX ADMIN — HYDROMORPHONE HYDROCHLORIDE 0.2 MG: 0.2 INJECTION, SOLUTION INTRAMUSCULAR; INTRAVENOUS; SUBCUTANEOUS at 00:05

## 2023-03-17 RX ADMIN — SEVELAMER CARBONATE 3200 MG: 800 TABLET, FILM COATED ORAL at 13:39

## 2023-03-17 RX ADMIN — SENNOSIDES AND DOCUSATE SODIUM 1 TABLET: 50; 8.6 TABLET ORAL at 19:58

## 2023-03-17 RX ADMIN — HEPARIN SODIUM 1800 UNITS/HR: 10000 INJECTION, SOLUTION INTRAVENOUS at 17:40

## 2023-03-17 RX ADMIN — ACETAMINOPHEN 975 MG: 325 TABLET ORAL at 13:39

## 2023-03-17 RX ADMIN — SODIUM CHLORIDE 250 ML: 9 INJECTION, SOLUTION INTRAVENOUS at 10:25

## 2023-03-17 RX ADMIN — HEPARIN SODIUM 1900 UNITS: 1000 INJECTION INTRAVENOUS; SUBCUTANEOUS at 10:27

## 2023-03-17 RX ADMIN — SEVELAMER CARBONATE 3200 MG: 800 TABLET, FILM COATED ORAL at 18:51

## 2023-03-17 RX ADMIN — AMLODIPINE BESYLATE 10 MG: 10 TABLET ORAL at 12:13

## 2023-03-17 RX ADMIN — HYDRALAZINE HYDROCHLORIDE 100 MG: 50 TABLET, FILM COATED ORAL at 13:39

## 2023-03-17 ASSESSMENT — ACTIVITIES OF DAILY LIVING (ADL)
ADLS_ACUITY_SCORE: 26

## 2023-03-17 NOTE — PLAN OF CARE
Goal Outcome Evaluation:  Patient alert A1 due to pain and assist needs with right arm. Pain in right arm 7/10 circulation wnl. Dressing wnl does not seem too tight. Medicated with dilaudid 0.2mg after earlier oxy not effective, able to sleep rest after dose with pain in control. Resp status stable, 99% on RA. VSS Tele SR.Decision to restart heparin today after seen by vascular. Dialysis run today      Plan of Care Reviewed With: patient

## 2023-03-17 NOTE — PROGRESS NOTES
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  The patient had a ligation of his right arm fistula on 3/9/2023 but since then has started develop pain and swelling to the right arm.  Upper extremity ultrasound on 3/12 was obtained and showed a near occlusive DVT in the right internal jugular vein.  He was sent home from the emergency department with a prescription for Eliquis, which he has been unable to fill.  He returns to the emergency department today with chest and neck pain while undergoing dialysis.  He is noted to have significant right arm swelling. Given increasing pain and swelling of the R arm as well as his difficulty filling Eliquis, he will be admitted and initiated on heparin drip with vascular surgery consult.   Plan:  -Vascular surgery consuledt: input appreciated. Heparin gtt initiated in the ED, continue for now -> Evacuation of left arm hematoma completed 03/16  - continue supportive care for thrombophlebitis with pain control and arm elevation.      End-stage renal disease on hemodialysis  Renal disease thought to be secondary to hypertension. He reports his renal function significantly declined after getting COVID in 2020 and ultimately progressed to ESRD. Completed a partial dialysis run today (3/13). Volume status and  electrolytes stable. Current access is tunneled dialysis catheter.   - Nephrology consulted  -Continue prior to admission Cinacalcet, sevelamer, cholecalciferol    Blurry vision  Diplopia  --Developed last night 3/14/22  --Stroke evaluation done which was found to be negative.  --We will need ophthalmology evaluation if symptoms persist.     Hypertension  Hyperlipidemia  -Continue prior to admission amlodipine, carvedilol, clonidine, hydralazine  -Continue prior to admission aspirin and atorvastatin     Chronic anemia and Thrombocytopenia  Hgb is 9.5 and platelet count is 141. Hgb is slightly lower than baseline of 10-11.   - Monitor CBC      Mild troponin elevation  Troponin is elevated to 68, and stable on re-check. Pt has chronically elevated troponin 2/2 ESRD.   - Cardiac monitoring      Hx of Adrenal adenoma  Noted        Diet: Renal Diet (dialysis)    DVT Prophylaxis: Pneumatic Compression Devices  Hurt Catheter: Not present  Lines: PRESENT      CVC Double Lumen Right Internal jugular Non - valved (open ended);Tunneled-Site Assessment: WDL  Hemodialysis Vascular Access Arteriovenous fistula Right Arm-Site Assessment: Edematous      Cardiac Monitoring: None  Code Status: Full Code      Clinically Significant Risk Factors        # Hyperkalemia: Highest K = 5.5 mmol/L in last 2 days, will monitor as appropriate                 # Obesity: Estimated body mass index is 33.07 kg/m  as calculated from the following:    Height as of this encounter: 1.829 m (6').    Weight as of this encounter: 110.6 kg (243 lb 13.3 oz).          Disposition Plan      Expected Discharge Date: 03/20/2023,  3:00 PM    Destination: home with family  Discharge Comments: 3/17: hep gtt          Prashant Ross MD  Hospitalist Service  [facility]  Securely message with [employer] (more info)  Text page via [facility] Paging/Directory   ______________________________________________________________________    Interval History       No acute events overnigt  Reporting of new mild left sided chest discomfort / pressure with some degree of dyspnea. No cough.  Pain in LUE controlled  No fevers otherwise  Some nausea but no vomiting      Physical Exam   Vital Signs: Temp: 99.2  F (37.3  C) Temp src: Oral BP: (!) 164/83 Pulse: 93   Resp: 12 SpO2: 99 % O2 Device: None (Room air)    Weight: 243 lbs 13.26 oz    General Appearance: Well appearing for stated age.  Respiratory: CTAB, no rales or ronchi  Cardiovascular: S1, S2 normal, no murmurs  GI: non-tender on palpation, BS present  PSYCH: normal mood and affect  EXT: RUE wrapped. AV fistula in LUE      Medical Decision Making       50 MINUTES SPENT BY ME on the date of service doing chart review, history, exam, documentation & further activities per the note.      Data     I have personally reviewed the following data over the past 24 hrs:    10.7  \   8.5 (L)   / 248     136 94 (L) 59.9 (H) /  92   5.5 (H) 24 14.37 (H) \       Imaging results reviewed over the past 24 hrs:   No results found for this or any previous visit (from the past 24 hour(s)).

## 2023-03-17 NOTE — PROGRESS NOTES
VASCULAR SURGERY PROGRESS NOTE    Subjective:  Patient resting comfortably in bed. Reports overall tenderness of his right arm. Left arm is easier to move, slightly tender along incision line-mildly tender. Patient reports 5-7/10 pain in the right arm, well controlled with pain medication. Denies numbness or tingling bilaterally.    Objective:  Intake/Output Summary (Last 24 hours) at 3/17/2023 0853  Last data filed at 3/16/2023 1256  Gross per 24 hour   Intake 200 ml   Output 5 ml   Net 195 ml     PHYSICAL EXAM:  BP (!) 222/121   Pulse 97   Temp 99  F (37.2  C) (Oral)   Resp 17   Ht 1.829 m (6')   Wt 110.6 kg (243 lb 13.3 oz)   SpO2 94%   BMI 33.07 kg/m    VSS-on RA  2-3+ edema of the right arm, predominately in the upper right arm. 2+ radial pulse.   2+ radial pulse in the left arm, slight edema by the left fistula incision laterally-soft, non-tender. Excellent thrill noted.   Denies shortness of breath/chest pain      ASSESSMENT:  Mr. Taylor Valiente is a pleasant 27 year old male who was admitted on 3/13/2023 for DVT of the R internal jugular, who underwent recent ligation of R AVF on 3/9 and new first stage left brachiobasilic AV fistula, noted to have hematoma of the L AVF, now POD #1 evacuation of left arm hematoma.     PLAN:  -can restart heparin  -rewrapped RUE arm and elevated, continue to encourage elevation, rewrap arm as needed  -continue all other medications  -HD today    Discussed pt history, exam, assessment and plan with Dr. Miller of the vascular surgery service, who is in agreement with the above.    Zaina Welsh NP  VASCULAR SURGERY     VASCULAR SURGICAL STAFF:  As noted above.  He is resting comfortably visiting with multiple family members.  His right arm feels improved today with some decreased swelling and no more numbness in his fingertips.  His left antecubital incision is less tender compared to preoperatively.    On exam, the right arm is less edematous.  The left  antecubital incision has mild incisional swelling.  Palpable radial pulses bilaterally.    ASSESSMENT:  1.  Status post ligation of aneurysmal proximal radial artery to cephalic vein AV fistula with subsequent thrombophlebitis of this ligated fistula.  Status postplacement of a right IJ tunneled catheter with near occlusive thrombus noted in the right internal jugular vein.    2.  Status post creation of first stage left brachiobasilic AV fistula with moderate incisional hematoma that developed after he was systemically anticoagulated for problem #1 above.  Status post evacuation of that hematoma yesterday with no surgical source identified.    PLAN:  I reviewed all the above with Taylor and his mom.  The heparin drip has been restarted.  Continue right arm Ace wrap compression and elevation.  Hopefully the left antecubital hematoma does not reaccumulate.  The left arm could also be elevated when possible.  Would hold off on moving the right IJ tunneled catheter to the left side at this time if the right arm swelling responds to the above-noted measures of compression, elevation, and anticoagulation.    I will be out of town for the next 5 days, but the vascular team will continue to follow closely.    Edwardo Moran MD              '

## 2023-03-17 NOTE — PROGRESS NOTES
Assessment and Plan:   ESRD: HD today, 3h, R  BFR, 2L UF. No heparin. 2K 35 HCO3 138 Na.   Plan to run MWF.   He is on vit D, phoslo and sensipar.     Access problems: RADHA ligated, R arm swollen in association with RIJV thrombus. LAF re-operated on for evacuation of hematoma. Was on heparin and eliquis but on hold after surgery. Will re-start heparin bridge and eliquis.             Interval History:   HT: BP high this am as not given morning meds pre dialysis.      Anemia: EPO and venofer on the run.               Review of Systems:   C/O R arm swelling and pain.           Medications:       - MEDICATION INSTRUCTIONS for Dialysis Patients -   Does not apply See Admin Instructions     sodium chloride 0.9%  250 mL Intravenous Once in dialysis/CRRT     sodium chloride 0.9%  300 mL Hemodialysis Machine Once     acetaminophen  975 mg Oral Q8H     amLODIPine  10 mg Oral Daily     [Held by provider] aspirin  81 mg Oral Daily     atorvastatin  10 mg Oral At Bedtime     bumetanide  2 mg Oral Daily     calcium acetate  1,334 mg Oral TID w/meals     carvedilol  50 mg Oral BID w/meals     cinacalcet  90 mg Oral Daily     cloNIDine  0.1 mg Oral Once per day on Mon Wed Fri     cloNIDine  0.1 mg Oral Once per day on Sun Tue Thu Sat     epoetin melanie-epbx  4,000 Units Intravenous Once in dialysis/CRRT     sodium chloride (PF) 0.9%  10 mL Intracatheter Once in dialysis/CRRT    Followed by     heparin  1.3-2.6 mL Intracatheter Once in dialysis/CRRT     sodium chloride (PF) 0.9%  10 mL Intracatheter Once in dialysis/CRRT    Followed by     heparin  1.3-2.6 mL Intracatheter Once in dialysis/CRRT     hydrALAZINE  100 mg Oral TID     iron sucrose  100 mg Intravenous Once in dialysis/CRRT     multivitamin RENAL  1 capsule Oral Daily     - MEDICATION INSTRUCTIONS -   Does not apply Once     senna-docusate  1 tablet Oral BID     sevelamer carbonate  3,200 mg Oral TID w/meals     sodium chloride (PF)  3 mL Intracatheter Q8H      sodium chloride (PF)  9 mL Intracatheter During Dialysis/CRRT (from stock)     sodium chloride (PF)  9 mL Intracatheter During Dialysis/CRRT (from stock)     vitamin D3  50 mcg Oral Daily       - MEDICATION INSTRUCTIONS -       Current active medications and PTA medications reviewed, see medication list for details.            Physical Exam:   Vitals were reviewed  Patient Vitals for the past 24 hrs:   BP Temp Temp src Pulse Resp SpO2 Weight   23 0900 (!) 173/116 -- -- 88 21 96 % --   23 0845 (!) 222/121 -- -- 97 17 94 % --   23 0830 (!) 214/110 -- -- 98 17 96 % --   23 0815 (!) 167/100 99  F (37.2  C) Oral 92 15 94 % --   23 0500 -- -- -- -- -- -- 110.6 kg (243 lb 13.3 oz)   23 0155 -- -- -- 99 16 100 % --   23 0051 -- -- -- 99 14 96 % --   23 0031 -- -- -- -- 14 98 % --   23 0011 (!) 163/86 -- -- 96 18 96 % --   23 2310 -- -- -- -- 18 -- --   23 2214 -- -- -- -- 18 -- --   23 2000 135/80 98.8  F (37.1  C) Oral 88 22 96 % --   23 1601 (!) 144/80 -- -- 82 16 98 % --   23 1519 -- 99.1  F (37.3  C) -- -- -- -- --   23 1400 126/73 98.6  F (37  C) Axillary 76 -- -- --   23 1340 (!) 142/79 97.9  F (36.6  C) Temporal 77 11 99 % --   23 1335 -- -- -- 76 10 100 % --   23 1330 (!) 148/103 -- -- 80 10 100 % --   23 1329 -- -- -- 73 11 100 % --   23 1315 (!) 150/71 -- -- 80 13 100 % --   23 1302 138/68 97.6  F (36.4  C) Temporal 79 13 100 % --   23 1114 (!) 143/79 98.4  F (36.9  C) Temporal 83 18 97 % --       Temp:  [97.6  F (36.4  C)-99.1  F (37.3  C)] 99  F (37.2  C)  Pulse:  [73-99] 88  Resp:  [10-22] 21  BP: (126-222)/() 173/116  SpO2:  [94 %-100 %] 96 %    Temperatures:  Current - Temp: 99  F (37.2  C); Max - Temp  Av.5  F (36.9  C)  Min: 97.6  F (36.4  C)  Max: 99.1  F (37.3  C)  Respiration range: Resp  Avg: 15.4  Min: 10  Max: 22  Pulse range: Pulse  Av.1  Min: 73   Max: 99  Blood pressure range: Systolic (24hrs), Av , Min:126 , Max:222   ; Diastolic (24hrs), Av, Min:68, Max:121    Pulse oximetry range: SpO2  Av.6 %  Min: 94 %  Max: 100 %    I/O last 3 completed shifts:  In: 200 [I.V.:200]  Out: 5 [Blood:5]      Intake/Output Summary (Last 24 hours) at 3/17/2023 0915  Last data filed at 3/16/2023 1256  Gross per 24 hour   Intake 200 ml   Output 5 ml   Net 195 ml       Alert and responsive  Lungs with clear ant BS  Cor RRR nl S1 S2 no M  RUE edematous, LAF + bruit, wound clean  R CVC no redness or tenderness  LE no edema     Wt Readings from Last 4 Encounters:   23 110.6 kg (243 lb 13.3 oz)   23 108.9 kg (240 lb)   23 112 kg (247 lb)   23 112 kg (247 lb)          Data:          Lab Results   Component Value Date     2023     03/15/2023     2023     2021     2021     2021    Lab Results   Component Value Date    CHLORIDE 94 2023    CHLORIDE 92 03/15/2023    CHLORIDE 92 2023    CHLORIDE 102 2022    CHLORIDE 104 2022    CHLORIDE 104 2021    CHLORIDE 105 2021    CHLORIDE 108 2021    CHLORIDE 107 2021    Lab Results   Component Value Date    BUN 59.9 2023    BUN 72.3 03/15/2023    BUN 53.9 2023    BUN 35 2022    BUN 48 2022    BUN 91 2021    BUN 58 2021    BUN 95 2021     2021      Lab Results   Component Value Date    POTASSIUM 5.5 2023    POTASSIUM 4.6 03/15/2023    POTASSIUM 4.6 2023    POTASSIUM 3.9 2022    POTASSIUM 5.3 2022    POTASSIUM 5.2 2021    POTASSIUM 3.8 2021    POTASSIUM 3.8 2021    POTASSIUM 3.9 2021    Lab Results   Component Value Date    CO2 24 2023    CO2 24 03/15/2023    CO2 23 2023    CO2 25 2022    CO2 28 2022    CO2 24 2021    CO2 27 2021    CO2 23 2021    CO2 17  06/17/2021    Lab Results   Component Value Date    CR 14.37 03/17/2023    CR 16.40 03/15/2023    CR 13.09 03/14/2023    CR 8.93 06/19/2021    CR 11.50 06/18/2021    CR 15.80 06/17/2021        Recent Labs   Lab Test 03/17/23  0533 03/16/23  0542 03/15/23  0900   WBC 10.7 10.0 8.4   HGB 8.5* 8.4* 8.4*   HCT 27.3* 25.9* 25.6*   MCV 92 92 92    199 172     Recent Labs   Lab Test 03/13/23  1013 06/20/22  1147 11/29/21  0650   AST 25 9 37   ALT 9* 26 34   ALKPHOS 131* 123 73   BILITOTAL 0.9 0.3 0.4       Recent Labs   Lab Test 12/12/22  0559 11/27/20  0221 10/04/19  0344   MAG 2.3 2.2 1.9     Recent Labs   Lab Test 06/18/21  0740 06/17/21  0655 04/05/21  1545   PHOS 7.2* 8.2* 6.1*     Recent Labs   Lab Test 03/17/23  0533 03/15/23  0900 03/14/23  0743   BUBBA 7.9* 7.6* 7.9*       Lab Results   Component Value Date    BUBBA 7.9 (L) 03/17/2023     Lab Results   Component Value Date    WBC 10.7 03/17/2023    HGB 8.5 (L) 03/17/2023    HCT 27.3 (L) 03/17/2023    MCV 92 03/17/2023     03/17/2023     Lab Results   Component Value Date     03/17/2023    POTASSIUM 5.5 (H) 03/17/2023    CHLORIDE 94 (L) 03/17/2023    CO2 24 03/17/2023    GLC 92 03/17/2023     Lab Results   Component Value Date    BUN 59.9 (H) 03/17/2023    CR 14.37 (H) 03/17/2023     Lab Results   Component Value Date    MAG 2.3 12/12/2022     Lab Results   Component Value Date    PHOS 7.2 (H) 06/18/2021       Creatinine   Date Value Ref Range Status   03/17/2023 14.37 (H) 0.67 - 1.17 mg/dL Final   03/15/2023 16.40 (H) 0.67 - 1.17 mg/dL Final   03/14/2023 13.09 (H) 0.67 - 1.17 mg/dL Final   03/13/2023 8.81 (H) 0.67 - 1.17 mg/dL Final   03/09/2023 9.91 (H) 0.67 - 1.17 mg/dL Final   03/07/2023 12.20 (H) 0.67 - 1.17 mg/dL Final   06/19/2021 8.93 (H) 0.66 - 1.25 mg/dL Final   06/18/2021 11.50 (H) 0.66 - 1.25 mg/dL Final   06/17/2021 15.80 (H) 0.66 - 1.25 mg/dL Final   06/16/2021 15.80 (H) 0.66 - 1.25 mg/dL Final   06/15/2021 16.10 (H) 0.66 - 1.25 mg/dL  Final   04/05/2021 8.23 (H) 0.66 - 1.25 mg/dL Final     Comment:     Critical Value called to and read back by  DR ALFONSO BRADLEY AT 1840 ON 04 05 2021          Attestation:  I have reviewed today's vital signs, notes, medications, labs and imaging.  Seen on dialysis.     Yosvany Bains MD

## 2023-03-17 NOTE — PLAN OF CARE
Patient A&Ox4. A1 d/t RUE pain. Gave tylenol and oxycodone for RUE pain 8/10. Redressed RUE as patient complained it was too tight. VSS on RA. Patient went down to Dialysis @ 0800. Heparin restarted @ 1800 unit(s)/hr. Next PTT draw at 1830. Gave IV Zofran for nausea x2.

## 2023-03-17 NOTE — PROVIDER NOTIFICATION
MD Notification    Notified Person: MD    Notified Person Name: Dr Rizvimonika    Notification Date/Time: 3/17/23 2365    Notification Interaction:vocprosper paged    Purpose of Notification: pt still having some left sided chest pain, SOB and light headedness. BG check was 132. %. HR 95. /95.     Orders Received: continue to monitor    Comments:

## 2023-03-17 NOTE — PROGRESS NOTES
Potassium   Date Value Ref Range Status   03/17/2023 5.5 (H) 3.4 - 5.3 mmol/L Final   06/20/2022 3.9 3.4 - 5.3 mmol/L Final   06/19/2021 3.8 3.4 - 5.3 mmol/L Final     Hemoglobin   Date Value Ref Range Status   03/17/2023 8.5 (L) 13.3 - 17.7 g/dL Final   06/18/2021 9.1 (L) 13.3 - 17.7 g/dL Final     Creatinine   Date Value Ref Range Status   03/17/2023 14.37 (H) 0.67 - 1.17 mg/dL Final   06/19/2021 8.93 (H) 0.66 - 1.25 mg/dL Final     Urea Nitrogen   Date Value Ref Range Status   03/17/2023 59.9 (H) 6.0 - 20.0 mg/dL Final   06/20/2022 35 (H) 7 - 30 mg/dL Final   06/19/2021 58 (H) 7 - 30 mg/dL Final     Sodium   Date Value Ref Range Status   03/17/2023 136 136 - 145 mmol/L Final   06/19/2021 139 133 - 144 mmol/L Final     INR   Date Value Ref Range Status   03/15/2023 1.40 (H) 0.85 - 1.15 Final   02/09/2021 1.23 (H) 0.86 - 1.14 Final       DIALYSIS PROCEDURE NOTE  Hepatitis status of previous patient on machine log was checked and verified ok to use with this patients hepatitis status.  Patient dialyzed for 3 hrs. on a K2 bath with a net fluid removal of  2L.  A BFR of 400 ml/min was obtained via a RIJ catheter.     The treatment plan was discussed with Dr. Bains during the treatment.    Total heparin received during the treatment: 0 units.   Line flushed, clamped and capped with heparin 1:1000 1.9 mL (1900 units) per lumen    Meds  given: EPO 4,000units And Venofer 1000mg IV   Complications: NONE      Person educated: . Knowledge base substantial. Barriers to learning: none. Educated on access care via verbal mode. Patient verbalized understanding. Pt prefers veral education style.     ICEBOAT? Timeout performed pre-treatment  I: Patient was identified using 2 identifiers  C:  Consent Signed Yes  E: Equipment preventative maintenance is current and dialysis delivery system OK to use  B: Hepatitis B Surface Antigen: neg; Draw Date: 3/13/2023      Hepatitis B Surface Antibody: immune; Draw Date: 3/13/23  O: Dialysis  orders present and complete prior to treatment  A: Vascular access verified and assessed prior to treatment  T: Treatment was performed at a clinically appropriate time  ?: Patient was allowed to ask questions and address concerns prior to treatment  See Adult Hemodialysis flowsheet in EPIC for further details and post assessment.  Machine water alarm in place and functioning. Transducer pods intact and checked every 15min.   Pt returned via bed.  Chlorine/Chloramine water system checked every 4 hours.    Patient repositioned every 2 hours during the treatment.  Post treatment report given to BILL Isabel RN regarding 2L of fluid removed, last BP of 195/111, and patient pain rating of 0/10.

## 2023-03-17 NOTE — PLAN OF CARE
Goal Outcome Evaluation:      Plan of Care Reviewed With: patient        Patient A&Ox4. A1 d/t RUE pain. Declined pain meds this evening. Does have some left sided chest pain and SOB. EKG and CXR done. MD aware. Monitoring. Using 2 L O2 for comfort as needed. Feels light headed at rest intermittent. /95. HR 95. NSR. VSS on RA. Had dialysis this am. Per pt doesn't normally feel like this after. Heparin restarted @ 1800 unit(s)/hr. Next PTT draw at 1830.

## 2023-03-17 NOTE — PLAN OF CARE
[id number]: VSS. Tele NSR. A&O x4. LUE dressing CDI. Right arm edema, ACE wrap in place. CMS intact. Patient reports intermittent tingling in hands, MD previously notified. C/o right arm pain, oxycodone and tylenol given. RUE elevated on pillows. Right internal jugular dialysis access intact. Plan for dialysis tomorrow. Up with assist of 1. Heparin on hold until seen by vascular in AM.

## 2023-03-18 LAB
ANION GAP SERPL CALCULATED.3IONS-SCNC: 15 MMOL/L (ref 7–15)
APTT PPP: 47 SECONDS (ref 22–38)
APTT PPP: 53 SECONDS (ref 22–38)
APTT PPP: 56 SECONDS (ref 22–38)
BUN SERPL-MCNC: 48.3 MG/DL (ref 6–20)
CALCIUM SERPL-MCNC: 8.4 MG/DL (ref 8.6–10)
CHLORIDE SERPL-SCNC: 94 MMOL/L (ref 98–107)
CREAT SERPL-MCNC: 11.9 MG/DL (ref 0.67–1.17)
DEPRECATED HCO3 PLAS-SCNC: 29 MMOL/L (ref 22–29)
ERYTHROCYTE [DISTWIDTH] IN BLOOD BY AUTOMATED COUNT: 13.6 % (ref 10–15)
GFR SERPL CREATININE-BSD FRML MDRD: 5 ML/MIN/1.73M2
GLUCOSE SERPL-MCNC: 132 MG/DL (ref 70–99)
HCT VFR BLD AUTO: 26.9 % (ref 40–53)
HGB BLD-MCNC: 8.6 G/DL (ref 13.3–17.7)
MCH RBC QN AUTO: 29.3 PG (ref 26.5–33)
MCHC RBC AUTO-ENTMCNC: 32 G/DL (ref 31.5–36.5)
MCV RBC AUTO: 92 FL (ref 78–100)
PLATELET # BLD AUTO: 245 10E3/UL (ref 150–450)
POTASSIUM SERPL-SCNC: 4.2 MMOL/L (ref 3.4–5.3)
RBC # BLD AUTO: 2.94 10E6/UL (ref 4.4–5.9)
SODIUM SERPL-SCNC: 138 MMOL/L (ref 136–145)
WBC # BLD AUTO: 11.3 10E3/UL (ref 4–11)

## 2023-03-18 PROCEDURE — 250N000013 HC RX MED GY IP 250 OP 250 PS 637: Performed by: PHYSICIAN ASSISTANT

## 2023-03-18 PROCEDURE — 85730 THROMBOPLASTIN TIME PARTIAL: CPT | Performed by: SURGERY

## 2023-03-18 PROCEDURE — 99233 SBSQ HOSP IP/OBS HIGH 50: CPT | Performed by: STUDENT IN AN ORGANIZED HEALTH CARE EDUCATION/TRAINING PROGRAM

## 2023-03-18 PROCEDURE — 250N000013 HC RX MED GY IP 250 OP 250 PS 637: Performed by: INTERNAL MEDICINE

## 2023-03-18 PROCEDURE — 250N000011 HC RX IP 250 OP 636: Performed by: STUDENT IN AN ORGANIZED HEALTH CARE EDUCATION/TRAINING PROGRAM

## 2023-03-18 PROCEDURE — 99232 SBSQ HOSP IP/OBS MODERATE 35: CPT | Performed by: INTERNAL MEDICINE

## 2023-03-18 PROCEDURE — 250N000013 HC RX MED GY IP 250 OP 250 PS 637: Performed by: STUDENT IN AN ORGANIZED HEALTH CARE EDUCATION/TRAINING PROGRAM

## 2023-03-18 PROCEDURE — 80048 BASIC METABOLIC PNL TOTAL CA: CPT | Performed by: STUDENT IN AN ORGANIZED HEALTH CARE EDUCATION/TRAINING PROGRAM

## 2023-03-18 PROCEDURE — 36415 COLL VENOUS BLD VENIPUNCTURE: CPT | Performed by: STUDENT IN AN ORGANIZED HEALTH CARE EDUCATION/TRAINING PROGRAM

## 2023-03-18 PROCEDURE — 120N000001 HC R&B MED SURG/OB

## 2023-03-18 PROCEDURE — 250N000011 HC RX IP 250 OP 636: Performed by: PHYSICIAN ASSISTANT

## 2023-03-18 PROCEDURE — 36415 COLL VENOUS BLD VENIPUNCTURE: CPT | Performed by: SURGERY

## 2023-03-18 PROCEDURE — 85027 COMPLETE CBC AUTOMATED: CPT | Performed by: STUDENT IN AN ORGANIZED HEALTH CARE EDUCATION/TRAINING PROGRAM

## 2023-03-18 PROCEDURE — 85730 THROMBOPLASTIN TIME PARTIAL: CPT | Performed by: STUDENT IN AN ORGANIZED HEALTH CARE EDUCATION/TRAINING PROGRAM

## 2023-03-18 RX ORDER — LABETALOL HYDROCHLORIDE 5 MG/ML
10 INJECTION, SOLUTION INTRAVENOUS EVERY 4 HOURS PRN
Status: DISCONTINUED | OUTPATIENT
Start: 2023-03-18 | End: 2023-03-19 | Stop reason: HOSPADM

## 2023-03-18 RX ORDER — CLONIDINE HYDROCHLORIDE 0.1 MG/1
0.2 TABLET ORAL 2 TIMES DAILY
Status: DISCONTINUED | OUTPATIENT
Start: 2023-03-18 | End: 2023-03-19 | Stop reason: HOSPADM

## 2023-03-18 RX ORDER — HYDRALAZINE HYDROCHLORIDE 20 MG/ML
10 INJECTION INTRAMUSCULAR; INTRAVENOUS EVERY 4 HOURS PRN
Status: DISCONTINUED | OUTPATIENT
Start: 2023-03-18 | End: 2023-03-19 | Stop reason: HOSPADM

## 2023-03-18 RX ADMIN — HYDRALAZINE HYDROCHLORIDE 100 MG: 50 TABLET, FILM COATED ORAL at 13:44

## 2023-03-18 RX ADMIN — CARVEDILOL 50 MG: 25 TABLET, FILM COATED ORAL at 18:11

## 2023-03-18 RX ADMIN — SEVELAMER CARBONATE 3200 MG: 800 TABLET, FILM COATED ORAL at 13:43

## 2023-03-18 RX ADMIN — ACETAMINOPHEN 975 MG: 325 TABLET ORAL at 13:44

## 2023-03-18 RX ADMIN — CALCIUM ACETATE 1334 MG: 667 CAPSULE ORAL at 09:10

## 2023-03-18 RX ADMIN — HYDRALAZINE HYDROCHLORIDE 10 MG: 20 INJECTION INTRAMUSCULAR; INTRAVENOUS at 09:30

## 2023-03-18 RX ADMIN — BUMETANIDE 2 MG: 2 TABLET ORAL at 09:10

## 2023-03-18 RX ADMIN — HEPARIN SODIUM 2100 UNITS/HR: 10000 INJECTION, SOLUTION INTRAVENOUS at 05:09

## 2023-03-18 RX ADMIN — SEVELAMER CARBONATE 3200 MG: 800 TABLET, FILM COATED ORAL at 09:09

## 2023-03-18 RX ADMIN — AMLODIPINE BESYLATE 10 MG: 10 TABLET ORAL at 09:10

## 2023-03-18 RX ADMIN — HYDRALAZINE HYDROCHLORIDE 100 MG: 50 TABLET, FILM COATED ORAL at 19:40

## 2023-03-18 RX ADMIN — CALCIUM ACETATE 1334 MG: 667 CAPSULE ORAL at 13:42

## 2023-03-18 RX ADMIN — SENNOSIDES AND DOCUSATE SODIUM 1 TABLET: 50; 8.6 TABLET ORAL at 19:40

## 2023-03-18 RX ADMIN — CLONIDINE HYDROCHLORIDE 0.2 MG: 0.1 TABLET ORAL at 21:13

## 2023-03-18 RX ADMIN — ATORVASTATIN CALCIUM 10 MG: 10 TABLET, FILM COATED ORAL at 21:13

## 2023-03-18 RX ADMIN — ACETAMINOPHEN 975 MG: 325 TABLET ORAL at 21:13

## 2023-03-18 RX ADMIN — ACETAMINOPHEN 975 MG: 325 TABLET ORAL at 05:45

## 2023-03-18 RX ADMIN — HEPARIN SODIUM 2250 UNITS/HR: 10000 INJECTION, SOLUTION INTRAVENOUS at 17:44

## 2023-03-18 RX ADMIN — SENNOSIDES AND DOCUSATE SODIUM 1 TABLET: 50; 8.6 TABLET ORAL at 09:10

## 2023-03-18 RX ADMIN — Medication 1 CAPSULE: at 09:10

## 2023-03-18 RX ADMIN — APIXABAN 10 MG: 5 TABLET, FILM COATED ORAL at 21:09

## 2023-03-18 RX ADMIN — ONDANSETRON 4 MG: 4 TABLET, ORALLY DISINTEGRATING ORAL at 13:06

## 2023-03-18 RX ADMIN — CLONIDINE HYDROCHLORIDE 0.1 MG: 0.1 TABLET ORAL at 09:10

## 2023-03-18 RX ADMIN — Medication 50 MCG: at 09:10

## 2023-03-18 RX ADMIN — PROCHLORPERAZINE EDISYLATE 10 MG: 5 INJECTION INTRAMUSCULAR; INTRAVENOUS at 16:12

## 2023-03-18 RX ADMIN — CINACALCET 90 MG: 30 TABLET ORAL at 09:10

## 2023-03-18 RX ADMIN — CARVEDILOL 50 MG: 25 TABLET, FILM COATED ORAL at 09:10

## 2023-03-18 RX ADMIN — HYDRALAZINE HYDROCHLORIDE 100 MG: 50 TABLET, FILM COATED ORAL at 09:09

## 2023-03-18 ASSESSMENT — ACTIVITIES OF DAILY LIVING (ADL)
ADLS_ACUITY_SCORE: 26

## 2023-03-18 NOTE — PLAN OF CARE
Goal Outcome Evaluation:       [id number]  Orientation: A0x4  Vitals/Tele: VSS on RA, Tele NSR, denies pain, CMS intact   IV Access/drains: PIV infusion heparin gtt 2100 unit, recheck @0800 morning   Pain management: oxycodone   Diet:Regular  Mobility:  SBA  GI/: Continent   Wound/Skin: REU acewrap, elevated with pillows, L incision, CDI

## 2023-03-18 NOTE — PROGRESS NOTES
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  The patient had a ligation of his right arm fistula on 3/9/2023 but since then has started develop pain and swelling to the right arm.  Upper extremity ultrasound on 3/12 was obtained and showed a near occlusive DVT in the right internal jugular vein.  He was sent home from the emergency department with a prescription for Eliquis, which he has been unable to fill.  He returns to the emergency department today with chest and neck pain while undergoing dialysis.  He is noted to have significant right arm swelling. Given increasing pain and swelling of the R arm as well as his difficulty filling Eliquis, he will be admitted and initiated on heparin drip with vascular surgery consult.   Plan:  -Vascular surgery consuled: input appreciated. Heparin gtt initiated in the ED, continue for now -> Evacuation of left arm hematoma completed 03/16  - continue supportive care for thrombophlebitis with pain control and arm elevation.   - Continue PTA Eliquis     End-stage renal disease on hemodialysis  Renal disease thought to be secondary to hypertension. He reports his renal function significantly declined after getting COVID in 2020 and ultimately progressed to ESRD. Completed a partial dialysis run today (3/13).  Volume status and electrolytes stable. Current access is tunneled dialysis catheter.   - Nephrology consulted  - Continue prior to admission Cinacalcet, sevelamer, cholecalciferol    Blurry vision  Diplopia  --Developed last night 3/14/22  --Stroke evaluation done which was found to be negative.     Hypertension  Hyperlipidemia  -Continue prior to admission amlodipine, carvedilol, clonidine, hydralazine  -Continue prior to admission aspirin and atorvastatin  -SBP in 200s today, will hold off discharge and monitor for now     Chronic anemia and Thrombocytopenia  Hgb is 9.5 and platelet count is 141. Hgb is slightly lower than baseline of 10-11.   - Monitor CBC      Mild troponin elevation  Troponin is elevated to 68, and stable on re-check. Pt has chronically elevated troponin 2/2 ESRD.   - Cardiac monitoring      Hx of Adrenal adenoma  Noted        Diet: Renal Diet (dialysis)    DVT Prophylaxis: Pneumatic Compression Devices  Hurt Catheter: Not present  Lines: PRESENT      Hemodialysis Vascular Access Arteriovenous fistula Right Arm-Site Assessment: Unable to visulaize      Cardiac Monitoring: None  Code Status: Full Code      Clinically Significant Risk Factors        # Hyperkalemia: Highest K = 5.5 mmol/L in last 2 days, will monitor as appropriate                 # Obesity: Estimated body mass index is 33.07 kg/m  as calculated from the following:    Height as of this encounter: 1.829 m (6').    Weight as of this encounter: 110.6 kg (243 lb 13.3 oz).          Disposition Plan      Expected Discharge Date: 03/21/2023,  3:00 PM    Destination: home with family  Discharge Comments: 3/17: hep gtt          Prashant Ross MD  Hospitalist Service  [facility]  Securely message with [email or web address] (more info)  Text page via [employer] Paging/Directory   ______________________________________________________________________    Interval History      No acute events overnigt  CP/SOB improved today, mostly  resolved  SBP in 200s earlier today  Pain in LUE controlled  No fevers   Some nausea but no vomiting  No other complaints    Physical Exam   Vital Signs: Temp: 99.3  F (37.4  C) Temp src: Oral BP: (!) 173/90 Pulse: 92   Resp: 16 SpO2: 97 % O2 Device: None (Room air)    Weight: 243 lbs 13.26 oz    General Appearance: Well appearing for stated age.  Respiratory: CTAB, no rales or ronchi  Cardiovascular: S1, S2 normal, no murmurs  GI: non-tender on palpation, BS present  PSYCH: normal mood and affect  EXT: RUE wrapped. AV fistula in LUE      Medical Decision Making       50 MINUTES SPENT BY ME on the date of service doing chart review, history, exam, documentation & further activities per the note.      Data     I have personally reviewed the following data over the past 24 hrs:    11.3 (H)  \   8.6 (L)   / 245     138 94 (L) 48.3 (H) /  132 (H)   4.2 29 11.90 (H) \       INR:  N/A PTT:  53 (H)   D-dimer:  N/A Fibrinogen:  N/A       Imaging results reviewed over the past 24 hrs:   Recent Results (from the past 24 hour(s))   XR Chest Port 1 View    Narrative    EXAM: XR CHEST PORT 1 VIEW  LOCATION: [facility]  DATE/TIME: 3/17/2023 4:30 PM    INDICATION: SOB.  COMPARISON: Chest x-ray on 12/12/2022.      Impression    IMPRESSION: Single AP view of the chest was obtained. Right central catheter tip projects over mid right atrium. Cardiomediastinal silhouette is within normal limits. No suspicious focal pulmonary opacities. No significant pleural effusion or   pneumothorax.

## 2023-03-18 NOTE — PROVIDER NOTIFICATION
MD notification    Notified person: Prashant Ross MD    Notification date/time: 3/18/23      Platform:  [employer]    Message: 324 Taylor's BP is very high, 215/112, I gave him all his BP meds for the morning, is there something else you'd like me to do?      Order received:  Hydralazine injection 10 mg

## 2023-03-18 NOTE — PLAN OF CARE
FOCUS/GOAL  Medical management, Medication management, Pain management, Mobility, Skin integrity, Safety management, Bowel management, and Bladder management.    ASSESSMENT, INTERVENTIONS AND CONTINUING PLAN FOR GOAL:    Pt is alert and oriented x 4. BP at the beginning of shift was 215/112, provider notified and hydralazine IV was administered, BP then went down to 179/103. Pain: Denied. Denied fever/chills, nausea and vomiting, numbness or tingling, shortness of breath. IV Access/drains: heparin infusing. Wound/Skin: RUE covered with ace wrap and elevated, dressing CDI. Left arm incision site WDL, dressing changed this shift. Call light within reach, able to make needs known. Pt is continent of bowel and bladder, last bm 3/18. Renal diet, thin liquids. Will continue with plan of care.    Update:  Pt reported nausea prior to eating his lunch, prn zofran administered. Pt then had emesis x1 after he ate his meal. Offered prn compazine but pt refused stating he no longer feels nauseous.

## 2023-03-18 NOTE — PROGRESS NOTES
Vascular Surgery Progress Note    27 year old male who was admitted on 3/13/2023 for DVT of the R internal jugular, who underwent recent ligation of R AVF on 3/9 and new first stage left brachiobasilic AV fistula, noted to have hematoma of the L AVF, now POD #2evacuation of left arm hematoma.       Reports improvement in R arm swelling. No issues with LUE    On exam  BP (!) 158/102 (BP Location: Left leg)   Pulse 89   Temp 99.9  F (37.7  C) (Oral)   Resp 12   Ht 1.829 m (6')   Wt 110.6 kg (243 lb 13.3 oz)   SpO2 98%   BMI 33.07 kg/m    Gen AOx 3  Resp: NLB  Extremities:   R arm swelling improving, warm well perfused R hand   L radial pulse palpable, L Av fistula thrill palpable, swelling around incision site +  Incision C.D. Seroma or small hematoma without any compromise to fistula flow based on exam     A/P  Continue cares. Incisional Seroma or small hematoma without any compromise to fistula flow based on exam     Vascular surgery will follow

## 2023-03-18 NOTE — PROGRESS NOTES
Assessment and Plan:   ESRD: HD yesterday, 2L UF. R CVC. Labs show normal lytes, Cr 11.90 (improved).   Next dialysis run is Monday. We will use R CVC until LAF ready for cannulation.     Pt can discharge on oral eliquis and run on Monday at his outpatient unit ([facility]).               Interval History:   Vascular access complications: using R CVC, on eliquis for internal jugular DVT. Vasc Surg following.     Anemia: Hgb stable.   Hypertension:  He is on hydralazine 100 mg tid, clonidine 0.1 mg per day, coreg 50 mg bid and bumex 2 mg per day. Also amlodipine 10 mg per day.  Will increase clonidine dose.              Review of Systems:   Feels well. Less swellng and pain in RUE. LUE feels ok.           Medications:       - MEDICATION INSTRUCTIONS for Dialysis Patients -   Does not apply See Admin Instructions     acetaminophen  975 mg Oral Q8H     amLODIPine  10 mg Oral Daily     [Held by provider] aspirin  81 mg Oral Daily     atorvastatin  10 mg Oral At Bedtime     bumetanide  2 mg Oral Daily     calcium acetate  1,334 mg Oral TID w/meals     carvedilol  50 mg Oral BID w/meals     cinacalcet  90 mg Oral Daily     cloNIDine  0.2 mg Oral BID     hydrALAZINE  100 mg Oral TID     multivitamin RENAL  1 capsule Oral Daily     senna-docusate  1 tablet Oral BID     sevelamer carbonate  3,200 mg Oral TID w/meals     sodium chloride (PF)  3 mL Intracatheter Q8H     vitamin D3  50 mcg Oral Daily       heparin 2,250 Units/hr (03/18/23 1118)     - MEDICATION INSTRUCTIONS -       Current active medications and PTA medications reviewed, see medication list for details.            Physical Exam:   Vitals were reviewed  Patient Vitals for the past 24 hrs:   BP Temp Temp src Pulse Resp SpO2   03/18/23 1124 (!) 168/97 99.3  F (37.4  C) Oral 92 16 97 %   03/18/23 1021 (!) 158/102 -- -- -- -- --   03/18/23 0941 (!) 179/103 -- -- -- -- --   03/18/23 0919 (!) 215/112 -- -- -- -- --   03/18/23 0820 (!) 192/95 99.9  F  (37.7  C) Oral 89 12 98 %   23 0017 (!) 154/80 99.3  F (37.4  C) Oral 91 -- 95 %   23 2156 (!) 182/89 -- -- -- -- --   23 1953 (!) 181/86 99.9  F (37.7  C) Oral 90 16 95 %   23 1647 (!) 164/95 -- -- 94 18 100 %   23 1441 (!) 164/83 -- -- -- -- --   23 1339 (!) 156/96 -- -- -- -- --   23 1319 (!) 188/99 99.2  F (37.3  C) Oral 93 12 99 %       Temp:  [99.2  F (37.3  C)-99.9  F (37.7  C)] 99.3  F (37.4  C)  Pulse:  [89-94] 92  Resp:  [12-18] 16  BP: (154-215)/() 168/97  SpO2:  [95 %-100 %] 97 %    Temperatures:  Current - Temp: 99.3  F (37.4  C); Max - Temp  Av.5  F (37.5  C)  Min: 99.2  F (37.3  C)  Max: 99.9  F (37.7  C)  Respiration range: Resp  Av.8  Min: 12  Max: 18  Pulse range: Pulse  Av.5  Min: 89  Max: 94  Blood pressure range: Systolic (24hrs), Av , Min:154 , Max:215   ; Diastolic (24hrs), Av, Min:80, Max:112    Pulse oximetry range: SpO2  Av.3 %  Min: 95 %  Max: 100 %    I/O last 3 completed shifts:  In: -   Out:  [Other:]    No intake or output data in the 24 hours ending 23 1201    Alert and responsive  RUE wrapped, less edematous  LUE + thrill over fistula  LE no edema  R CVC no redness or tenderness       Wt Readings from Last 4 Encounters:   23 110.6 kg (243 lb 13.3 oz)   23 108.9 kg (240 lb)   23 112 kg (247 lb)   23 112 kg (247 lb)          Data:          Lab Results   Component Value Date     2023     2023     03/15/2023     2021     2021     2021    Lab Results   Component Value Date    CHLORIDE 94 2023    CHLORIDE 94 2023    CHLORIDE 92 03/15/2023    CHLORIDE 102 2022    CHLORIDE 104 2022    CHLORIDE 104 2021    CHLORIDE 105 2021    CHLORIDE 108 2021    CHLORIDE 107 2021    Lab Results   Component Value Date    BUN 48.3 2023    BUN 59.9 2023    BUN 72.3  03/15/2023    BUN 35 06/20/2022    BUN 48 03/03/2022    BUN 91 12/26/2021    BUN 58 06/19/2021    BUN 95 06/18/2021     06/17/2021      Lab Results   Component Value Date    POTASSIUM 4.2 03/18/2023    POTASSIUM 5.5 03/17/2023    POTASSIUM 4.6 03/15/2023    POTASSIUM 3.9 06/20/2022    POTASSIUM 5.3 03/03/2022    POTASSIUM 5.2 12/26/2021    POTASSIUM 3.8 06/19/2021    POTASSIUM 3.8 06/18/2021    POTASSIUM 3.9 06/17/2021    Lab Results   Component Value Date    CO2 29 03/18/2023    CO2 24 03/17/2023    CO2 24 03/15/2023    CO2 25 06/20/2022    CO2 28 03/03/2022    CO2 24 12/26/2021    CO2 27 06/19/2021    CO2 23 06/18/2021    CO2 17 06/17/2021    Lab Results   Component Value Date    CR 11.90 03/18/2023    CR 14.37 03/17/2023    CR 16.40 03/15/2023    CR 8.93 06/19/2021    CR 11.50 06/18/2021    CR 15.80 06/17/2021        Recent Labs   Lab Test 03/18/23  1015 03/17/23  0533 03/16/23  0542   WBC 11.3* 10.7 10.0   HGB 8.6* 8.5* 8.4*   HCT 26.9* 27.3* 25.9*   MCV 92 92 92    248 199     Recent Labs   Lab Test 03/13/23  1013 06/20/22  1147 11/29/21  0650   AST 25 9 37   ALT 9* 26 34   ALKPHOS 131* 123 73   BILITOTAL 0.9 0.3 0.4       Recent Labs   Lab Test 12/12/22  0559 11/27/20  0221 10/04/19  0344   MAG 2.3 2.2 1.9     Recent Labs   Lab Test 06/18/21  0740 06/17/21  0655 04/05/21  1545   PHOS 7.2* 8.2* 6.1*     Recent Labs   Lab Test 03/18/23  1015 03/17/23  0533 03/15/23  0900   BUBBA 8.4* 7.9* 7.6*       Lab Results   Component Value Date    BUBBA 8.4 (L) 03/18/2023     Lab Results   Component Value Date    WBC 11.3 (H) 03/18/2023    HGB 8.6 (L) 03/18/2023    HCT 26.9 (L) 03/18/2023    MCV 92 03/18/2023     03/18/2023     Lab Results   Component Value Date     03/18/2023    POTASSIUM 4.2 03/18/2023    CHLORIDE 94 (L) 03/18/2023    CO2 29 03/18/2023     (H) 03/18/2023     Lab Results   Component Value Date    BUN 48.3 (H) 03/18/2023    CR 11.90 (H) 03/18/2023     Lab Results   Component  Value Date    MAG 2.3 12/12/2022     Lab Results   Component Value Date    PHOS 7.2 (H) 06/18/2021       Creatinine   Date Value Ref Range Status   03/18/2023 11.90 (H) 0.67 - 1.17 mg/dL Final   03/17/2023 14.37 (H) 0.67 - 1.17 mg/dL Final   03/15/2023 16.40 (H) 0.67 - 1.17 mg/dL Final   03/14/2023 13.09 (H) 0.67 - 1.17 mg/dL Final   03/13/2023 8.81 (H) 0.67 - 1.17 mg/dL Final   03/09/2023 9.91 (H) 0.67 - 1.17 mg/dL Final   06/19/2021 8.93 (H) 0.66 - 1.25 mg/dL Final   06/18/2021 11.50 (H) 0.66 - 1.25 mg/dL Final   06/17/2021 15.80 (H) 0.66 - 1.25 mg/dL Final   06/16/2021 15.80 (H) 0.66 - 1.25 mg/dL Final   06/15/2021 16.10 (H) 0.66 - 1.25 mg/dL Final   04/05/2021 8.23 (H) 0.66 - 1.25 mg/dL Final     Comment:     Critical Value called to and read back by  DR ALFONSO BRADLEY AT 1840 ON 04 05 2021          Attestation:  I have reviewed today's vital signs, notes, medications, labs and imaging.     Yosvany Bains MD

## 2023-03-19 VITALS
TEMPERATURE: 98.9 F | BODY MASS INDEX: 33.03 KG/M2 | RESPIRATION RATE: 16 BRPM | HEIGHT: 72 IN | WEIGHT: 243.83 LBS | SYSTOLIC BLOOD PRESSURE: 166 MMHG | OXYGEN SATURATION: 96 % | HEART RATE: 98 BPM | DIASTOLIC BLOOD PRESSURE: 95 MMHG

## 2023-03-19 LAB
ANION GAP SERPL CALCULATED.3IONS-SCNC: 14 MMOL/L (ref 7–15)
BUN SERPL-MCNC: 65 MG/DL (ref 6–20)
CALCIUM SERPL-MCNC: 8.5 MG/DL (ref 8.6–10)
CHLORIDE SERPL-SCNC: 94 MMOL/L (ref 98–107)
CREAT SERPL-MCNC: 14.26 MG/DL (ref 0.67–1.17)
DEPRECATED HCO3 PLAS-SCNC: 28 MMOL/L (ref 22–29)
ERYTHROCYTE [DISTWIDTH] IN BLOOD BY AUTOMATED COUNT: 13.7 % (ref 10–15)
GFR SERPL CREATININE-BSD FRML MDRD: 4 ML/MIN/1.73M2
GLUCOSE SERPL-MCNC: 106 MG/DL (ref 70–99)
HCT VFR BLD AUTO: 27.3 % (ref 40–53)
HGB BLD-MCNC: 8.7 G/DL (ref 13.3–17.7)
MCH RBC QN AUTO: 29.4 PG (ref 26.5–33)
MCHC RBC AUTO-ENTMCNC: 31.9 G/DL (ref 31.5–36.5)
MCV RBC AUTO: 92 FL (ref 78–100)
PLATELET # BLD AUTO: 294 10E3/UL (ref 150–450)
POTASSIUM SERPL-SCNC: 5.1 MMOL/L (ref 3.4–5.3)
RBC # BLD AUTO: 2.96 10E6/UL (ref 4.4–5.9)
SODIUM SERPL-SCNC: 136 MMOL/L (ref 136–145)
WBC # BLD AUTO: 12.8 10E3/UL (ref 4–11)

## 2023-03-19 PROCEDURE — 250N000013 HC RX MED GY IP 250 OP 250 PS 637: Performed by: STUDENT IN AN ORGANIZED HEALTH CARE EDUCATION/TRAINING PROGRAM

## 2023-03-19 PROCEDURE — 85014 HEMATOCRIT: CPT | Performed by: STUDENT IN AN ORGANIZED HEALTH CARE EDUCATION/TRAINING PROGRAM

## 2023-03-19 PROCEDURE — 250N000013 HC RX MED GY IP 250 OP 250 PS 637: Performed by: PHYSICIAN ASSISTANT

## 2023-03-19 PROCEDURE — 80048 BASIC METABOLIC PNL TOTAL CA: CPT | Performed by: STUDENT IN AN ORGANIZED HEALTH CARE EDUCATION/TRAINING PROGRAM

## 2023-03-19 PROCEDURE — 250N000013 HC RX MED GY IP 250 OP 250 PS 637: Performed by: INTERNAL MEDICINE

## 2023-03-19 PROCEDURE — 99239 HOSP IP/OBS DSCHRG MGMT >30: CPT | Performed by: STUDENT IN AN ORGANIZED HEALTH CARE EDUCATION/TRAINING PROGRAM

## 2023-03-19 PROCEDURE — 36415 COLL VENOUS BLD VENIPUNCTURE: CPT | Performed by: STUDENT IN AN ORGANIZED HEALTH CARE EDUCATION/TRAINING PROGRAM

## 2023-03-19 RX ORDER — LISINOPRIL 10 MG/1
10 TABLET ORAL DAILY
Status: DISCONTINUED | OUTPATIENT
Start: 2023-03-19 | End: 2023-03-19

## 2023-03-19 RX ORDER — APIXABAN 5 MG (74)
KIT ORAL
Qty: 74 EACH | Refills: 0 | Status: SHIPPED | OUTPATIENT
Start: 2023-03-19 | End: 2023-04-17

## 2023-03-19 RX ADMIN — CINACALCET 90 MG: 30 TABLET ORAL at 09:02

## 2023-03-19 RX ADMIN — SEVELAMER CARBONATE 3200 MG: 800 TABLET, FILM COATED ORAL at 09:03

## 2023-03-19 RX ADMIN — CLONIDINE HYDROCHLORIDE 0.2 MG: 0.1 TABLET ORAL at 09:03

## 2023-03-19 RX ADMIN — BUMETANIDE 2 MG: 2 TABLET ORAL at 09:03

## 2023-03-19 RX ADMIN — APIXABAN 10 MG: 5 TABLET, FILM COATED ORAL at 09:03

## 2023-03-19 RX ADMIN — AMLODIPINE BESYLATE 10 MG: 10 TABLET ORAL at 09:03

## 2023-03-19 RX ADMIN — Medication 50 MCG: at 09:03

## 2023-03-19 RX ADMIN — Medication 1 CAPSULE: at 09:02

## 2023-03-19 RX ADMIN — ACETAMINOPHEN 650 MG: 325 TABLET ORAL at 06:34

## 2023-03-19 RX ADMIN — SENNOSIDES AND DOCUSATE SODIUM 1 TABLET: 50; 8.6 TABLET ORAL at 09:03

## 2023-03-19 RX ADMIN — CARVEDILOL 50 MG: 25 TABLET, FILM COATED ORAL at 09:02

## 2023-03-19 RX ADMIN — CALCIUM ACETATE 1334 MG: 667 CAPSULE ORAL at 09:03

## 2023-03-19 RX ADMIN — ASPIRIN 81 MG: 81 TABLET, COATED ORAL at 09:03

## 2023-03-19 RX ADMIN — HYDRALAZINE HYDROCHLORIDE 100 MG: 50 TABLET, FILM COATED ORAL at 09:03

## 2023-03-19 ASSESSMENT — ACTIVITIES OF DAILY LIVING (ADL)
ADLS_ACUITY_SCORE: 26

## 2023-03-19 NOTE — PLAN OF CARE
Goal Outcome Evaluation:       AOX 4  CV [id number] Soft syst murmur; TLM= SR-ST. BP remains elevated: 160/90 on RLL.  Pulm: LS mostly clear with good air movement  GI: Rounded, soft, active BS; pt states had BM today. C/O nausea @ 1600 w/near wretching. Compazine given IV. Pt c/o of feeling strange all afternoon from the compazine. Did not eat supper. No emesis.  : No voiding. Hemodialysis 3x/wk MWF  Integument: Intact with bruising. R arm wrapped with pressure dressing (kerlix/ace) over bulging fistula. 4x4 drssng to new fistula L arm CDI.  Comfort: denies discomfort.  PyschSoc: Pleasant, calm. Family called & visited. Brought pt's pet (small dog) in for brief visit which pt stated was comforting.  Plan: Heparin gtt discontinued tonight; Eliquis begun. Pt plans to be discharged tomorrow.

## 2023-03-19 NOTE — PLAN OF CARE
Shift Summary      Neuro: A/Ox4. Follows commands and uses his call light for assistance when needed.     Cardiac: SR 80's.     Respiratory: LS clear.     GI: Continent of bowel and bladder. Denied nausea.     Musculoskeletal: Moves self in bed.     Pain: Complaint of pain to his right arm. PRN Tylenol given, see the MAR.     Skin: No new skin concerns.     Lines: PIV to right hand.

## 2023-03-19 NOTE — PLAN OF CARE
Discharge Note    Patient discharged to home via private vehicle  accompanied by friend.  IV: Discontinued  Prescriptions filled and given to patient/family.   Belongings reviewed and sent with patient.   Home medications returned to patient: NA  Equipment sent with: N/A.   patient verbalizes understanding of discharge instructions. AVS given to patient.

## 2023-03-19 NOTE — DISCHARGE SUMMARY
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  No follow up labs or test are needed.             Unresulted Labs Ordered in the Past 30 Days of this Admission     No orders found from 2/11/2023 to 3/14/2023.        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  The patient had a ligation of his right arm fistula on 3/9/2023 but since then has started develop pain and swelling to the right arm.  Upper extremity ultrasound on 3/12 was obtained and showed a near occlusive DVT in the right internal jugular vein.  He was sent home from the emergency department with a prescription for Eliquis, which he has been unable to fill.  He returns to the emergency  department today with chest and neck pain while undergoing dialysis.  He is noted to have significant right arm swelling. Given increasing pain and swelling of the R arm as well as his difficulty filling Eliquis, he will be admitted and initiated on heparin drip with vascular surgery consult.   Plan:  -Vascular surgery consuled: input appreciated -> Evacuation of left arm hematoma completed 03/16  - continue supportive care for thrombophlebitis with pain control and arm elevation.   - Continue Eliquis     End-stage renal disease on hemodialysis  Renal disease thought to be secondary to hypertension. He reports his renal function significantly declined after getting COVID in 2020 and ultimately progressed to ESRD. Completed a partial dialysis run today (3/13). Volume status and electrolytes stable. Current access is tunneled dialysis catheter.   - Nephrology consulted  - Continue prior to admission Cinacalcet, sevelamer, cholecalciferol    Blurry vision  Diplopia  --Developed last night 3/14/22  --Stroke evaluation done which was found to be negative.     Hypertension  Hyperlipidemia  -Continue prior to admission amlodipine, carvedilol, clonidine, hydralazine  -Continue prior to admission aspirin and atorvastatin  -SBP in 200s today, will hold off discharge and monitor for now     Chronic anemia and Thrombocytopenia  Hgb is 9.5 and platelet count is 141. Hgb is slightly lower than baseline of 10-11.      Mild troponin elevation  Troponin is elevated to 68, and stable on re-check. Pt has chronically elevated troponin 2/2 ESRD.     Hx of Adrenal adenoma  Noted       Consultations This Hospital Stay   PHARMACY IP CONSULT  PHARMACY IP CONSULT  NEPHROLOGY IP CONSULT  VASCULAR SURGERY IP CONSULT  CARE MANAGEMENT / SOCIAL WORK IP CONSULT  PHARMACY IP CONSULT  PHARMACY IP CONSULT    Code Status   Full Code    Time Spent on this Encounter   I, Prashant Ross MD, personally saw the patient today and spent greater than 30 minutes  discharging this patient.       Prashant Ross MD  [facility]  [address] [place] [address]  Phone: [phone]  ______________________________________________________________________    Physical Exam   Vital Signs: Temp: 98.9  F (37.2  C) Temp src: Oral BP: (!) 166/95 Pulse: 98   Resp: 16 SpO2: 96 % O2 Device: None (Room air)    Weight: 243 lbs 13.26 oz  ----------------------------------------------------------------------------------------       Primary Care Physician   Priyank Lawrence    Discharge Orders      Medication Therapy Management Referral      Reason for your hospital stay    You had right arm pain and swelling and was evaluated by our Vascular surgery team.     Follow-up and recommended labs and tests     Follow up with primary care provider, Priyank Lawrence, within 7 days for hospital follow- up.  No follow up labs or test are needed.     Activity    Your activity upon discharge: activity as tolerated     Diet    Follow this diet upon discharge: Orders Placed This Encounter      Renal Diet (dialysis)       Significant Results and Procedures   Most Recent 3 CBC's:Recent Labs   Lab Test 03/19/23  0506 03/18/23  1015 03/17/23  0533   WBC 12.8* 11.3* 10.7   HGB 8.7* 8.6* 8.5*   MCV 92 92 92    245 248     Most Recent 3 BMP's:Recent Labs   Lab Test 03/19/23  0506 03/18/23  1015 03/17/23  1700 03/17/23  0533    138  --  136   POTASSIUM 5.1 4.2  --  5.5*   CHLORIDE 94* 94*  --  94*   CO2 28 29  --  24   BUN 65.0* 48.3*  --  59.9*   CR 14.26* 11.90*  --  14.37*   ANIONGAP 14 15  --  18*   BUBBA 8.5* 8.4*  --  7.9*   * 132* 132* 92     Most Recent 2 LFT's:Recent Labs   Lab Test 03/13/23  1013 06/20/22  1147   AST 25 9   ALT 9* 26   ALKPHOS 131* 123   BILITOTAL 0.9 0.3     Most Recent 3 INR's:Recent Labs   Lab Test 03/15/23  0900 02/09/21  1055 07/18/17  0910   INR 1.40* 1.23* 1.04     Most Recent 3 Troponin's:Recent Labs   Lab Test 11/28/20  0606 09/09/20  2344  09/09/20  1827 10/04/19  0148 07/14/17  1158   TROPI <0.015 0.084* 0.083*   < >  --    TROPONIN  --   --   --   --  0.06    < > = values in this interval not displayed.     Most Recent 3 BNP's:No lab results found.,   Results for orders placed or performed during the hospital encounter of 03/13/23   XR Chest 2 Views    Narrative    CHEST TWO VIEWS   3/13/2023 10:40 AM     HISTORY: Shortness of breath.    COMPARISON: Chest x-ray on 12/12/2022.      Impression    IMPRESSION: AP and lateral views of the chest were obtained. Right IJ  central catheter tip projects over the high right atrium.  Cardiomediastinal silhouette is within normal limits. No suspicious  focal pulmonary opacities. No significant pleural effusion or  pneumothorax.     YONI HARDEN MD         SYSTEM ID:  [id number]   CT Head w/o Contrast    Narrative    EXAM: CTA HEAD NECK W CONTRAST, CT HEAD W/O CONTRAST  LOCATION: [facility]  DATE/TIME: 3/15/2023 1:40 AM    INDICATION: Diplopia.  COMPARISON: None.  CONTRAST: 75 mL Isovue 370  TECHNIQUE: Head and neck CT angiogram with IV contrast. Noncontrast head CT followed by axial helical CT images of the head and neck vessels obtained during the arterial phase of intravenous contrast administration. Axial 2D reconstructed images and   multiplanar 3D MIP reconstructed images of the head and neck vessels were performed by the technologist. Dose reduction techniques were used. All stenosis measurements made according to NASCET criteria unless otherwise specified.    FINDINGS:   NONCONTRAST HEAD CT:   INTRACRANIAL CONTENTS: No intracranial hemorrhage, extraaxial collection, or mass effect.  No CT evidence of acute infarct. Normal parenchymal attenuation. Normal ventricles and sulci.     VISUALIZED ORBITS/SINUSES/MASTOIDS: No intraorbital abnormality. Opacification of a majority of the right maxillary sinus with soft tissue extending through the anterior mid to the middle nasal cavity  and antrochoanal polyp. No middle ear or mastoid   effusion.    BONES/SOFT TISSUES: No acute abnormality.    HEAD CTA:  ANTERIOR CIRCULATION: No stenosis/occlusion, aneurysm, or high flow vascular malformation. Standard [place] anatomy.    POSTERIOR CIRCULATION: No stenosis/occlusion, aneurysm, or high flow vascular malformation. Balanced vertebral arteries supply a normal basilar artery.     DURAL VENOUS SINUSES: Expected enhancement of the major dural venous sinuses.    NECK CTA:  RIGHT CAROTID: No measurable stenosis or dissection.    LEFT CAROTID: No measurable stenosis or dissection.    VERTEBRAL ARTERIES: No focal stenosis or dissection. Balanced vertebral arteries.    AORTIC ARCH: Classic aortic arch anatomy with no significant stenosis at the origin of the great vessels.    NONVASCULAR STRUCTURES: Unremarkable.      Impression    IMPRESSION:   HEAD CT:  1.  No acute intracranial abnormality.    2.  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 This could be further evaluated with sinus CT on a nonemergent basis if indicated.    HEAD CTA:   1.  Normal CTA [place].    NECK CTA:  1.  Normal neck CTA.     CT head and CTA findings were discussed with Edwardo Tabor at 0148, 3/15/2023.   CTA Head Neck w Contrast    Narrative    EXAM: CTA HEAD NECK W CONTRAST, CT HEAD W/O CONTRAST  LOCATION: [facility]  DATE/TIME: 3/15/2023 1:40 AM    INDICATION: Diplopia.  COMPARISON: None.  CONTRAST: 75 mL Isovue 370  TECHNIQUE: Head and neck CT angiogram with IV contrast. Noncontrast head CT followed by axial helical CT images of the head and neck vessels obtained during the arterial phase of intravenous contrast administration. Axial 2D reconstructed images and   multiplanar 3D MIP reconstructed images of the head and neck vessels were performed by the technologist. Dose reduction techniques were used. All stenosis measurements made according to NASCET  criteria unless otherwise specified.    FINDINGS:   NONCONTRAST HEAD CT:   INTRACRANIAL CONTENTS: No intracranial hemorrhage, extraaxial collection, or mass effect.  No CT evidence of acute infarct. Normal parenchymal attenuation. Normal ventricles and sulci.     VISUALIZED ORBITS/SINUSES/MASTOIDS: No intraorbital abnormality. Opacification of a majority of the right maxillary sinus with soft tissue extending through the anterior mid to the middle nasal cavity and antrochoanal polyp. No middle ear or mastoid   effusion.    BONES/SOFT TISSUES: No acute abnormality.    HEAD CTA:  ANTERIOR CIRCULATION: No stenosis/occlusion, aneurysm, or high flow vascular malformation. Standard [place] anatomy.    POSTERIOR CIRCULATION: No stenosis/occlusion, aneurysm, or high flow vascular malformation. Balanced vertebral arteries supply a normal basilar artery.     DURAL VENOUS SINUSES: Expected enhancement of the major dural venous sinuses.    NECK CTA:  RIGHT CAROTID: No measurable stenosis or dissection.    LEFT CAROTID: No measurable stenosis or dissection.    VERTEBRAL ARTERIES: No focal stenosis or dissection. Balanced vertebral arteries.    AORTIC ARCH: Classic aortic arch anatomy with no significant stenosis at the origin of the great vessels.    NONVASCULAR STRUCTURES: Unremarkable.      Impression    IMPRESSION:   HEAD CT:  1.  No acute intracranial abnormality.    2.  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 This could be further evaluated with sinus CT on a nonemergent basis if indicated.    HEAD CTA:   1.  Normal CTA [place].    NECK CTA:  1.  Normal neck CTA.     CT head and CTA findings were discussed with Edwardo Tabor at 0148, 3/15/2023.   MR Brain w/o Contrast    Narrative    EXAM: MR BRAIN WITHOUT CONTRAST  LOCATION: [facility]  DATE/TIME: 03/15/2023, 3:19 AM    INDICATION: New onset diplopia and blurred vision.  COMPARISON: CTA head  neck dated 03/15/2023.  TECHNIQUE: Routine multiplanar multisequence head MRI without intravenous contrast.    FINDINGS:  INTRACRANIAL CONTENTS: No acute or subacute infarct. No mass, acute hemorrhage, or extra-axial fluid collections. Normal brain parenchymal signal. Normal ventricles and sulci. Normal position of the cerebellar tonsils.     SELLA: No abnormality accounting for technique.    OSSEOUS STRUCTURES/SOFT TISSUES: Normal marrow signal. The major intracranial vascular flow-voids are maintained.     ORBITS: No definite orbital abnormality accounting for technique.     SINUSES/MASTOIDS: T2 fluid-filled signal opacification of the right maxillary sinus with associated polypoid soft tissue extending into the right middle nasal cavity. No middle ear or mastoid effusion.       Impression    IMPRESSION:  1.  No acute intracranial process.  2.  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 Follow-up ENT consultation is recommended.       US Ext Arterial Venous Dialys Acs Graft    Narrative    ULTRASOUND EXTREMITY ARTERIAL VENOUS DIALYSIS ACCESS GRAFT March 15,  2023 at 1338 hours    HISTORY: 27-year-old patient with history of right arm AV fistula  ligation and first stage creation of a left brachiobasilic AV fistula  performed March 9, 2023.    COMPARISON: None.    TECHNIQUE: Color Doppler and spectral waveform analysis obtained in  the inflow brachial artery as well as outflow basilic vein.    FINDINGS: Inflow brachial artery ranges from 5.5-6.2 mm with systolic  velocities ranging from 147-159 cm/second. The anastomosis is 800/579  cm/second. A rounded somewhat echogenic collection is noted near the  anastomosis measuring 3.1 x 4 x 3 cm, with associated extrinsic  compression of the fistulized basilic vein. Diameter near the  extrinsic compression is 2.3 mm. Remaining outflow vein ranges from  6-6.2 mm. Total blood flow volume is 893 mL/minute.      Impression     IMPRESSION:  1. Patent left upper arm brachiobasilic fistula.  2. However, echogenic collection near the AV anastomosis, likely a  hematoma with associated extrinsic compression on the basilic vein  just beyond the anastomosis, diminished to 2.3 mm.  3. Collection near the AV anastomosis is 3.1 x 4 x 3 cm, presumably a  hematoma, with associated mass effect on the outflow basilic vein.    PINEDA CAVAZOS MD         SYSTEM ID:  [id number]   XR Chest Port 1 View    Narrative    EXAM: XR CHEST PORT 1 VIEW  LOCATION: [facility]  DATE/TIME: 3/17/2023 4:30 PM    INDICATION: SOB.  COMPARISON: Chest x-ray on 12/12/2022.      Impression    IMPRESSION: Single AP view of the chest was obtained. Right central catheter tip projects over mid right atrium. Cardiomediastinal silhouette is within normal limits. No suspicious focal pulmonary opacities. No significant pleural effusion or   pneumothorax.       Discharge Medications   Current Discharge Medication List      CONTINUE these medications which have CHANGED    Details   Apixaban Starter Pack (ELIQUIS DVT/PE STARTER PACK) 5 MG TBPK Take 10 mg by mouth 2 times daily for 6 days, THEN 5 mg 2 times daily for 23 days.  Qty: 74 each, Refills: 0    Associated Diagnoses: Acute deep vein thrombosis (DVT) of non-extremity vein; ESRD (end stage renal disease) on dialysis (H); Hematoma of skin         CONTINUE these medications which have NOT CHANGED    Details   acetaminophen (TYLENOL) 325 MG tablet Take 2 tablets (650 mg) by mouth every 4 hours as needed for mild pain  Qty: 50 tablet, Refills: 0    Associated Diagnoses: ESRD (end stage renal disease) on dialysis (H)      amLODIPine (NORVASC) 10 MG tablet Take 10 mg by mouth daily       aspirin 81 MG EC tablet Take 81 mg by mouth daily      atorvastatin (LIPITOR) 10 MG tablet TAKE ONE TABLET BY MOUTH EVERY NIGHT AT BEDTIME  Qty: 90 tablet, Refills: 1    Associated Diagnoses: Acute renal failure, unspecified acute  renal failure type (H)      bumetanide (BUMEX) 2 MG tablet Take 2 mg by mouth daily       calcium acetate (CALPHRON) 667 MG TABS tablet Take 1,334 mg by mouth 3 times daily (with meals)      carvedilol (COREG) 25 MG tablet TAKE TWO TABLETS BY MOUTH TWICE A DAY WITH A MEAL Strength: 25 mg  Qty: 180 tablet, Refills: 0    Associated Diagnoses: Acute kidney injury (H)      cinacalcet (SENSIPAR) 90 MG tablet Take 90 mg by mouth daily      !! cloNIDine (CATAPRES) 0.1 MG tablet Take 0.1 mg by mouth At Bedtime On dialysis days (Mon, Wed, Fri)      !! cloNIDine (CATAPRES) 0.1 MG tablet Take 0.1 mg by mouth Twice daily on non dialysis days (Tue, Thurs, Sat, Sun)    Associated Diagnoses: Acute kidney injury (H)      hydrALAZINE (APRESOLINE) 100 MG tablet TAKE ONE TABLET BY MOUTH THREE TIMES A DAY  Qty: 360 tablet, Refills: 1    Associated Diagnoses: Acute kidney injury (H)      multivitamin RENAL (RENAVITE RX/NEPHROVITE) 1 MG tablet Take 1 tablet by mouth daily       nitroGLYcerin (NITROSTAT) 0.3 MG sublingual tablet For chest pain place 1 tablet under the tongue every 5 minutes for 3 doses. If symptoms persist 5 minutes after 1st dose call 911.  Qty: 5 tablet, Refills: 0    Comments: Ok to adjust to supplied dose  Associated Diagnoses: Other chest pain      senna-docusate (SENOKOT-S/PERICOLACE) 8.6-50 MG tablet Take 1-2 tablets by mouth 2 times daily  Qty: 30 tablet, Refills: 0    Associated Diagnoses: ESRD (end stage renal disease) on dialysis (H)      sevelamer carbonate (RENVELA) 800 MG tablet Take 1,600 mg by mouth Take with snacks or supplements      sevelamer HCl (RENAGEL) 800 MG tablet Take 3,200 mg by mouth 3 times daily (with meals)      vitamin D3 (CHOLECALCIFEROL) 2000 units (50 mcg) tablet Take 50 mcg by mouth daily      ACE/ARB/ARNI NOT PRESCRIBED (INTENTIONAL) Please choose reason not prescribed from choices below.    Associated Diagnoses: CKD (chronic kidney disease) stage 5, GFR less than 15 ml/min (H)        !! - Potential duplicate medications found. Please discuss with provider.        Allergies   Allergies   Allergen Reactions     Benadryl [Diphenhydramine] Itching

## 2023-03-19 NOTE — PROGRESS NOTES
Vascular Surgery Progress Note    27 year old male who was admitted on 3/13/2023 for DVT of the R internal jugular, who underwent recent ligation of R AVF on 3/9 and new first stage left brachiobasilic AV fistula, noted to have hematoma of the L AVF, now POD #3evacuation of left arm hematoma.       Reports improvement in R arm swelling. No issues with LUE    On exam  BP (!) 166/95 (BP Location: Right leg)   Pulse 98   Temp 98.9  F (37.2  C) (Oral)   Resp 16   Ht 1.829 m (6')   Wt 110.6 kg (243 lb 13.3 oz)   SpO2 96%   BMI 33.07 kg/m    Gen AOx 3  Resp: NLB  Extremities:   R arm swelling improving, warm well perfused R hand   L radial pulse palpable, L Av fistula thrill palpable, swelling around incision site +  Incision C.D. Seroma or small hematoma without any compromise to fistula flow based on exam . Unchanged from my exam on 3/18    A/P  Continue cares. Incisional Seroma or small hematoma without any compromise to fistula flow based on exam     Vascular surgery will follow

## 2023-03-20 ENCOUNTER — TELEPHONE (OUTPATIENT)
Dept: OTHER | Facility: CLINIC | Age: 28
End: 2023-03-20
Payer: MEDICARE

## 2023-03-20 DIAGNOSIS — T82.898A OTHER SPECIFIED COMPLICATION OF VASCULAR PROSTHETIC DEVICES, IMPLANTS AND GRAFTS, INITIAL ENCOUNTER (H): ICD-10-CM

## 2023-03-20 DIAGNOSIS — I77.0 AVF (ARTERIOVENOUS FISTULA) (H): Primary | ICD-10-CM

## 2023-03-20 NOTE — TELEPHONE ENCOUNTER
Discussed with patient about progress since discharge via telephone.     Patient reports continued swelling of the R arm, however has not worsened since discharged. Is continuing to elevate arm and wrap as needed.    Patient reports slight swelling near the left fistula incision, however is not ecchymosis, is not increasing in size, and is non-tender. Discussed with patient about when to come in for evaluation of the left arm fistula and potential next steps, in addition to setting up post-operative visit with the patient. Patient is in agreement of plan.

## 2023-03-21 ENCOUNTER — LAB (OUTPATIENT)
Dept: LAB | Facility: CLINIC | Age: 28
End: 2023-03-21
Payer: MEDICARE

## 2023-03-21 ENCOUNTER — TELEPHONE (OUTPATIENT)
Dept: OTHER | Facility: CLINIC | Age: 28
End: 2023-03-21

## 2023-03-21 ENCOUNTER — ALLIED HEALTH/NURSE VISIT (OUTPATIENT)
Dept: TRANSPLANT | Facility: CLINIC | Age: 28
End: 2023-03-21
Attending: NURSE PRACTITIONER
Payer: MEDICARE

## 2023-03-21 VITALS
HEART RATE: 76 BPM | OXYGEN SATURATION: 99 % | BODY MASS INDEX: 34.54 KG/M2 | DIASTOLIC BLOOD PRESSURE: 66 MMHG | WEIGHT: 246.7 LBS | SYSTOLIC BLOOD PRESSURE: 101 MMHG | HEIGHT: 71 IN

## 2023-03-21 DIAGNOSIS — Z76.82 ORGAN TRANSPLANT CANDIDATE: ICD-10-CM

## 2023-03-21 DIAGNOSIS — I82.90 ACUTE DEEP VEIN THROMBOSIS (DVT) OF NON-EXTREMITY VEIN: ICD-10-CM

## 2023-03-21 DIAGNOSIS — N18.6 ESRD (END STAGE RENAL DISEASE) (H): ICD-10-CM

## 2023-03-21 DIAGNOSIS — Z86.718 HISTORY OF DEEP VENOUS THROMBOSIS: ICD-10-CM

## 2023-03-21 DIAGNOSIS — Z01.818 PRE-TRANSPLANT EVALUATION FOR KIDNEY TRANSPLANT: Primary | ICD-10-CM

## 2023-03-21 DIAGNOSIS — Z86.718 HISTORY OF DEEP VENOUS THROMBOSIS: Primary | ICD-10-CM

## 2023-03-21 LAB
ATRIAL RATE - MUSE: 96 BPM
D DIMER PPP FEU-MCNC: 2.41 UG/ML FEU (ref 0–0.5)
DIASTOLIC BLOOD PRESSURE - MUSE: NORMAL MMHG
INTERPRETATION ECG - MUSE: NORMAL
P AXIS - MUSE: 52 DEGREES
PR INTERVAL - MUSE: 170 MS
QRS DURATION - MUSE: 94 MS
QT - MUSE: 400 MS
QTC - MUSE: 505 MS
R AXIS - MUSE: 51 DEGREES
SYSTOLIC BLOOD PRESSURE - MUSE: NORMAL MMHG
T AXIS - MUSE: 46 DEGREES
VENTRICULAR RATE- MUSE: 96 BPM

## 2023-03-21 PROCEDURE — 85379 FIBRIN DEGRADATION QUANT: CPT | Performed by: PHYSICIAN ASSISTANT

## 2023-03-21 PROCEDURE — 99215 OFFICE O/P EST HI 40 MIN: CPT | Mod: 24 | Performed by: SURGERY

## 2023-03-21 PROCEDURE — 36415 COLL VENOUS BLD VENIPUNCTURE: CPT | Performed by: PATHOLOGY

## 2023-03-21 PROCEDURE — G0463 HOSPITAL OUTPT CLINIC VISIT: HCPCS | Performed by: SURGERY

## 2023-03-21 NOTE — PROGRESS NOTES
Patient Name: Taylor Valiente  : 1995  Age: 27 year old  MRN: [id number]  Date of Initial Social Work Evaluation:  2021    Patient on kidney transplant wait list inactive.  Met with Taylor today to update psychosocial assessment.      Presenting Information   Living Situation: in [place], [place] with his parents, sister and grandfather.  If not local, plans for short term stay:  N/A  Previous Functional Status: Independent  Cultural/Language/Spiritual Considerations: None indicated at this time.    Support System  Primary Support Person His mother, Chevy  Other support:  His sister  Plan for support in immediate post-transplant period: Chevy    Health Care Directive  Decision Maker: Self  Alternate Decision Maker: Parents  Health Care Directive: Provided education and Declined completing    Mental Health/Coping:   History of Mental Health: Taylor indicated he believes his depression and anxiety is under control. Taylor did complete six months of therapy and he signed ROSALIA.  Writer reviewed therapy notes and talked to his therapist JOHNATHAN Dobbs, [facility], who indicated six months was completed.  History of Chemical Health: Taylor indicated he continues to use medical marijuana.  He will provide his card.  Current status: Independent  Coping: Taylor indicated when under stress he will listen to music, smoke marijuana or talk to friends.  Services Needed/Recommended: None indicated at this time.    Financial   Income: SSDI  Impact of transplant on income: Discussed he may no longer be eligible for disability one year post successful transplant.  Insurance and medication coverage: Medicare and [place]  Financial concerns: None indicated at this time.  Resources needed: None indicated at this time.    Assessment and recommendations and plan:  Taylor indicated he started dialysis 2021.  He indicated he is prepared for transplant.  Taylor appears to be an appropriate  transplant candidate at this time.  Reviewed transplant education (Medicare, rehabilitation, donor issues, community/financial resources, and psych/family adjustment) as well as psychosocial risks of transplant. Provided patient with a copy of post-transplant informational sheet that includes information on potential costs of medications, Medicare ESRD, post-transplant lodging, etc. Patient seemed to process information well. Appeared well informed, motivated, and able to follow post transplant requirements. Behavior was appropriate during interview. Has adequate income and insurance coverage. Adequate social support. No major contraindications noted for transplant. At this time, patient appears to understand the risks and benefits of transplant.

## 2023-03-21 NOTE — TELEPHONE ENCOUNTER
Spoke with patient and scheduled the d-dimer lab on 4/4/23 and scheduled the new consult on 4/6/23 with My Ayala PA-C.

## 2023-03-21 NOTE — LETTER
3/21/2023         RE: Taylor Valiente  [address] [place] [address]        Dear Colleague,    Thank you for referring your patient, Taylor Valiente, to the [facility]. Please see a copy of my visit note below.    Transplant Surgery Consult Note     Medical record number: [id number]  YOB: 1995,   Consult requested  for evaluation of kidney transplant candidacy.    Assessment and Recommendations:Mr. Valiente appears to be a good candidate for kidney transplantation and has a fair understanding of the risks and benefits of this approach to the management of renal failure. The following issues should be addressed prior to finalizing his transplant candidacy:     Transplant order: Mr. Valiente has End stage renal failure due to focal segmental glomerulosclerosis (FSGS) whose condition is not expected to resolve, is expected to progress, and is expected to continue to develop related comorbid conditions.  Recommend he be considered as a candidate for kidney transplant.  Cardiology consult for cardiac risk stratification to be ordered: Yes  CT abdomen and pelvis without contrast to be ordered for assessment of vascular targets: No  Transplant listing labs ordered to include HLA, ABOx2, Cr, etc.  Dietician consult ordered: Yes  Social work consult ordered: Yes  Imaging reports reviewed:  CT A/P 6/2021  IMPRESSION:   1.  No renal calculi or hydronephrosis.  2.  Small right renal cyst.  3.  Diffuse bladder wall thickening can be seen with cystitis or hypertrophy.  4.  Left adrenal adenoma.  Radiology images reviewed: CT images from 6/2021 shows B EIA's suitable for transplant  Recipient suitable to move forward with work up of living donors:  Yes   Send Dr. Marin a note about October incident and EKG changes (in ED with chest pain relieved with nitroglycerin and with EKG changes). Stress echo was non-diagnostic since he did not reach target heart rate. Will  need to determine with Dr. Marin if the pt needs further work up  On eliquis due to clot around HD catheter. Recommended to continue x 1 month. Would wait to activate until he has completed this course (as long as his other work up is completed)  Appreciate SW input on compliance contract and post transplant support. Medical literacy seems limited    The majority of our visit was spent in counselling, discussing the medical and surgical risks of kidney transplantation. We discussed approximate wait time and how that is influenced by issues such as blood type and sensitization (PRA) and access to a living donor. I contrasted potential waiting time for living vs  donor kidneys from  normal (0-85%) or higher (%) kidney donor profile index (KDPI) donors and their associated outcomes. I would not recommend this individual to consider kidneys from high KDPI donors. The reason for this decision is best summarized as: improved long term graft survival. Potential surgical complications of kidney transplantation include bleeding, superficial or deep wound complications (infection, hernia, lymphocele), ureteral anastomotic failure (leak or stenosis), graft thrombosis, need for reoperation and other issues such as cardiac complications, pneumonia, deep venous thrombosis, pulmonary embolism, post transplant diabetes and death. The potential for recurrent disease or need for retransplantation was also addressed. We discussed the possible need for ureteral stent (and subsequent removal), and the utility of protocol biopsy and laboratory studies to evaluate for rejection or recurrent disease. We discussed the risk of graft rejection, our center's average graft and patient survival rates, immunosuppression protocols, as well as the potential opportunity to participate in clinical trials.  We also discussed the average length of stay, recovery process, and posttransplant lab and monitoring protocol.  I emphasized the need  for strict immunosuppression medication adherence and the potential for complications of immunosuppression such as skin cancer or lymphoma, as well as a very low but not zero risk of donor-derived disease transmission risks (infection, cancer). Mr. Valiente asked good questions and his candidacy will be reviewed at our Multidisciplinary Selection Committee. Thank you for the opportunity to participate in Mr. Valiente's care.      Total time: 60 minutes        Katrina Albrecht MD FACS  Assistant Professor of Surgery  Director, Living Kidney Donor Program.    ---------------------------------------------------------------------------------------------------    HPI: Mr. Valiente has End stage renal failure due to focal segmental glomerulosclerosis (FSGS). The patient is non-diabetic.       The patient is on dialysis.    Has potential kidney donors:  Yes .  Interested in participation in paired exchange if a donor is willing: Doesn't know     The patient has the following pertinent history:       No    Yes  Dialysis:    []      [x] via:  HD catheter     Blood Transfusion                  [x]      []  Number of units:   Most recently:  Pregnancy:    [x]      [] Number:       Previous Transplant:  [x]      [] Details:    Cancer    [x]      [] Comment:   Kidney stones   [x]      [] Comment:      Recurrent infections  [x]      []  Type:                  Bladder dysfunction  [x]      [] Cause:    Claudication   [x]      [] Distance:    Previous Amputation  [x]      [] Cause:     Chronic anticoagulation  []      [x] Indication: Clot around HD catheter  Rastafarian  [x]      []      Past Medical History:   Diagnosis Date     Adrenal adenoma, left      Anemia      Benign essential hypertension 07/28/2017     CKD (chronic kidney disease) stage 5, GFR less than 15 ml/min (H)     FSGS     Depression      Focal glomerular sclerosis 07/28/2017     HLD (hyperlipidemia)      LVH (left ventricular hypertrophy) due  to hypertensive disease 07/14/2017     Noncompliance      Obesity, unspecified      OD (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/2021     Stress-induced cardiomyopathy      Suicide attempt (H) 2019     Past Surgical History:   Procedure Laterality Date     ARTHROSCOPY ANKLE Left 2/24/2021    Procedure: Left ankle arthroscopy and debridement/micro fracture;  Surgeon: Mirza Nelson MD;  Location: [facility] OR     BIOPSY  2017    renal- [facility]     CREATE FISTULA ARTERIOVENOUS UPPER EXTREMITY Right 3/4/2021    Procedure: RIGHT proximal radial  to CEPHALIC ARTERIOVENOUS FISTULA;  Surgeon: Henrik Moran MD;  Location: [facility] OR     CREATE FISTULA ARTERIOVENOUS UPPER EXTREMITY Left 3/9/2023    Procedure: CREATION OF FIRST STAGE LEFT BRACHIOBASILIC ARTERIOVENOUS FISTULA;  Surgeon: Henrik Moran MD;  Location: [facility] OR     IR CVC TUNNEL PLACEMENT > 5 YRS OF AGE  6/17/2021     IR CVC TUNNEL PLACEMENT > 5 YRS OF AGE  3/9/2023     IR CVC TUNNEL REMOVAL RIGHT  12/3/2021     IRRIGATION AND DEBRIDEMENT UPPER EXTREMITY, COMBINED Left 3/16/2023    Procedure: EVACUATION OF LEFT ARM HEMATOMA;  Surgeon: Henrik Moran MD;  Location: [facility]     LIGATE FISTULA ARTERIOVENOUS UPPER EXTREMITY Right 3/9/2023    Procedure: LIGATION OF RIGHT ARM ARTERIOVENOUS FISTULA;  Surgeon: Henrik Moran MD;  Location: [facility] OR     NO HISTORY OF SURGERY       REVISION FISTULA ARTERIOVENOUS UPPER EXTREMITY Right 8/26/2021    Procedure: Second stage RIGHT BRACHIOCEPHALIC transposition ARTERIOVENOUS FISTULA;  Surgeon: Henrik Moran MD;  Location: [facility] OR     Family History   Problem Relation Age of Onset     Blood Disease Mother         has hep b     Diabetes Mother         gestionanal diabetes     Hypertension Mother      Obesity Father      Hypertension Father      Hypertension Maternal Grandmother      Diabetes Maternal Grandmother      Hypertension Paternal Grandmother      Hypertension Paternal Grandfather      Coronary Artery  Disease Maternal Uncle      Cancer No family hx of      Social History     Socioeconomic History     Marital status: Single     Spouse name: Not on file     Number of children: 0     Years of education: Not on file     Highest education level: Not on file   Occupational History     Occupation:      Occupation: kitchen     Comment: [employer]   Tobacco Use     Smoking status: Never     Smokeless tobacco: Never   Vaping Use     Vaping Use: Never used   Substance and Sexual Activity     Alcohol use: No     Drug use: Yes     Types: Marijuana     Comment: occ     Sexual activity: Not Currently     Partners: Female   Other Topics Concern     Parent/sibling w/ CABG, MI or angioplasty before 65F 55M? Not Asked   Social History Narrative     Not on file     Social Determinants of Health     Financial Resource Strain: Medium Risk     Difficulty of Paying Living Expenses: Somewhat hard   Food Insecurity: Food Insecurity Present     Worried About Running Out of Food in the Last Year: Sometimes true     Ran Out of Food in the Last Year: Sometimes true   Transportation Needs: No Transportation Needs     Lack of Transportation (Medical): No     Lack of Transportation (Non-Medical): No   Physical Activity: Not on file   Stress: Not on file   Social Connections: Not on file   Intimate Partner Violence: Not on file   Housing Stability: Not on file       ROS:   CONSTITUTIONAL:  No fevers or chills  EYES: negative for icterus  ENT:  negative for hearing loss, tinnitus and sore throat  RESPIRATORY:  negative for cough, sputum, dyspnea  CARDIOVASCULAR:  negative for chest pain Fatigue  GASTROINTESTINAL:  negative for nausea, vomiting, diarrhea or constipation  GENITOURINARY:  negative for incontinence, dysuria, bladder emptying problems  HEME:  No easy bruising  INTEGUMENT:  negative for rash and pruritus  NEURO:  Negative for headache, seizure disorder  Allergies:   Allergies   Allergen Reactions     Benadryl  [Diphenhydramine] Itching     Medications:  Prescription Medications as of 3/21/2023       Rx Number Disp Refills Start End Last Dispensed Date Next Fill Date Owning Pharmacy    acetaminophen (TYLENOL) 325 MG tablet  50 tablet 0 3/9/2023    [facility] [address] [place] [address]    Sig: Take 2 tablets (650 mg) by mouth every 4 hours as needed for mild pain    Class: E-Prescribe    Route: Oral    amLODIPine (NORVASC) 10 MG tablet            Sig: Take 10 mg by mouth daily     Class: Historical    Route: Oral    Apixaban Starter Pack (ELIQUIS DVT/PE STARTER PACK) 5 MG TBPK  74 each 0 3/19/2023 4/17/2023   [place] [address]    Sig: Take 10 mg by mouth 2 times daily for 6 days, THEN 5 mg 2 times daily for 23 days.    Class: E-Prescribe    Route: Oral    aspirin 81 MG EC tablet            Sig: Take 81 mg by mouth daily    Class: Historical    Route: Oral    atorvastatin (LIPITOR) 10 MG tablet  90 tablet 1 6/1/2022    [facility], [place] - [address]    Sig: TAKE ONE TABLET BY MOUTH EVERY NIGHT AT BEDTIME    Class: E-Prescribe    bumetanide (BUMEX) 2 MG tablet    7/21/2021    [facility], [place] - [address]    Sig: Take 2 mg by mouth daily     Class: Historical    Route: Oral    calcium acetate (CALPHRON) 667 MG TABS tablet    11/23/2022        Sig: Take 1,334 mg by mouth 3 times daily (with meals)    Class: Historical    Route: Oral    carvedilol (COREG) 25 MG tablet  180 tablet 0 1/4/2023    [facility], [place] - [address]    Sig: TAKE TWO TABLETS BY MOUTH TWICE A DAY WITH A MEAL Strength: 25 mg    Class: E-Prescribe    Earliest Fill Date: 1/4/2023    cinacalcet (SENSIPAR) 90 MG tablet    11/10/2021    [facility], [place] - [address]    Sig: Take 90 mg by mouth daily    Class: Historical    Route: Oral     cloNIDine (CATAPRES) 0.1 MG tablet            Sig: Take 0.1 mg by mouth At Bedtime On dialysis days (Mon, Wed, Fri)    Class: Historical    Route: Oral    cloNIDine (CATAPRES) 0.1 MG tablet    7/1/2021    [facility] [id number] [place] [address]    Sig: Take 0.1 mg by mouth Twice daily on non dialysis days (Tue, Thurs, Sat, Sun)    Class: Historical    Route: Oral    hydrALAZINE (APRESOLINE) 100 MG tablet  360 tablet 1 12/15/2021    [place], [place] - [id number] [place] [address]    Sig: TAKE ONE TABLET BY MOUTH THREE TIMES A DAY    Class: E-Prescribe    medical cannabis (Patient's own supply)            Sig: See Admin Instructions (The purpose of this order is to document that the patient reports taking medical cannabis.  This is not a prescription, and is not used to certify that the patient has a qualifying medical condition.)    Class: Historical    multivitamin RENAL (RENAVITE RX/NEPHROVITE) 1 MG tablet    6/28/2021    [facility] [id number] [place] [address]    Sig: Take 1 tablet by mouth daily     Class: Historical    Route: Oral    nitroGLYcerin (NITROSTAT) 0.3 MG sublingual tablet  5 tablet 0 10/29/2022    [place], [place] - [address] [facility]    Sig: For chest pain place 1 tablet under the tongue every 5 minutes for 3 doses. If symptoms persist 5 minutes after 1st dose call 911.    Class: E-Prescribe    Notes to Pharmacy: Ok to adjust to supplied dose    senna-docusate (SENOKOT-S/PERICOLACE) 8.6-50 MG tablet  30 tablet 0 3/9/2023    [facility] [place]  [place], [place] - [address] [facility]    Sig: Take 1-2 tablets by mouth 2 times daily    Class: E-Prescribe    Route: Oral    sevelamer carbonate (RENVELA) 800 MG tablet            Sig: Take 1,600 mg by mouth Take with snacks or supplements    Class: Historical    Route: Oral    sevelamer HCl (RENAGEL) 800 MG tablet            Sig: Take 3,200 mg  "by mouth 3 times daily (with meals)    Class: Historical    Route: Oral    vitamin D3 (CHOLECALCIFEROL) 2000 units (50 mcg) tablet            Sig: Take 50 mcg by mouth daily    Class: Historical    Route: Oral    ACE/ARB/ARNI NOT PRESCRIBED (INTENTIONAL)    12/28/2021    [facility] [place], [place] - [address]    Sig: Please choose reason not prescribed from choices below.    Class: No Print Out        Exam:     /66   Pulse 76   Ht 1.797 m (5' 10.75\")   Wt 111.9 kg (246 lb 11.2 oz)   SpO2 99%   BMI 34.65 kg/m    Appearance: in no apparent distress.   Skin: normal  Eyes:  no redness or discharge.  Sclera anicteric  Head and Neck: Normal, no rashes or jaundice  Respiratory: easy respirations, no audible wheezing.  Abdomen: rounded, obese and protuberant, No distention and Surgical scars consistent with history   Extremities: femoral 2+/2+, Edema, none  Neuro: without deficit   Psychiatric: Normal mood and affect    Diagnostics:   Recent Results (from the past 672 hour(s))   BASIC METABOLIC PANEL    Collection Time: 03/07/23 12:22 PM   Result Value Ref Range    Sodium 143 136 - 145 mmol/L    Potassium 5.0 3.4 - 5.3 mmol/L    Chloride 98 98 - 107 mmol/L    Carbon Dioxide (CO2) 24 22 - 29 mmol/L    Anion Gap 21 (H) 7 - 15 mmol/L    Urea Nitrogen 64.1 (H) 6.0 - 20.0 mg/dL    Creatinine 12.20 (H) 0.67 - 1.17 mg/dL    Calcium 9.0 8.6 - 10.0 mg/dL    Glucose 109 (H) 70 - 99 mg/dL    GFR Estimate 5 (L) >60 mL/min/1.73m2   CBC with platelets    Collection Time: 03/07/23 12:22 PM   Result Value Ref Range    WBC Count 8.2 4.0 - 11.0 10e3/uL    RBC Count 3.70 (L) 4.40 - 5.90 10e6/uL    Hemoglobin 11.2 (L) 13.3 - 17.7 g/dL    Hematocrit 35.8 (L) 40.0 - 53.0 %    MCV 97 78 - 100 fL    MCH 30.3 26.5 - 33.0 pg    MCHC 31.3 (L) 31.5 - 36.5 g/dL    RDW 13.9 10.0 - 15.0 %    Platelet Count 213 150 - 450 10e3/uL   HLA Anita Class I, Single Antigen    Collection Time: 03/07/23 12:22 PM   Result Value " Ref Range    SA 1 TEST METHOD SA EDTA FCS     SA 1 CELL Class I     SA1 HI RISK SHAUNA None     SA1 MOD RISK SHAUNA B:57 58     SA 1  COMMENTS        HLA PRA Test performed by modified testing procedure that may also include pretreatment of serum. Pretreatment may be the addition of fetal calf serum, EDTA, and/or adsorption.  High-risk, MFI > 3,000.  Mod-risk, -3,000.   HLA Shauna Class II, Single Antigen    Collection Time: 03/07/23 12:22 PM   Result Value Ref Range    SA 2 TEST METHOD SA EDTA FCS     SA 2 CELL Class II     SA2 HI RISK SHAUNA None     SA2 MOD RISK SHAUNA [id number]     SA 2 COMMENTS        HLA PRA Test performed by modified testing procedure that may also include pretreatment of serum. Pretreatment may be the addition of fetal calf serum, EDTA, and/or adsorption.  High-risk, MFI > 3,000.  Mod-risk, -3,000.   HLA Shauna, CPRA    Collection Time: 03/07/23 12:22 PM   Result Value Ref Range    PROTOCOL CUTOFF Plan A, 500 mfi cumulative     UNOS CPRA 57     UNACCEPTABLE ANTIGENS B:8 48 57 58 DQ:7 9    Basic metabolic panel    Collection Time: 03/09/23  7:26 AM   Result Value Ref Range    Sodium 139 136 - 145 mmol/L    Potassium 4.5 3.4 - 5.3 mmol/L    Chloride 96 (L) 98 - 107 mmol/L    Carbon Dioxide (CO2) 28 22 - 29 mmol/L    Anion Gap 15 7 - 15 mmol/L    Urea Nitrogen 48.6 (H) 6.0 - 20.0 mg/dL    Creatinine 9.91 (H) 0.67 - 1.17 mg/dL    Calcium 9.3 8.6 - 10.0 mg/dL    Glucose 104 (H) 70 - 99 mg/dL    GFR Estimate 7 (L) >60 mL/min/1.73m2   Comprehensive metabolic panel    Collection Time: 03/13/23 10:13 AM   Result Value Ref Range    Sodium 133 (L) 136 - 145 mmol/L    Potassium 4.3 3.4 - 5.3 mmol/L    Chloride 93 (L) 98 - 107 mmol/L    Carbon Dioxide (CO2) 24 22 - 29 mmol/L    Anion Gap 16 (H) 7 - 15 mmol/L    Urea Nitrogen 35.7 (H) 6.0 - 20.0 mg/dL    Creatinine 8.81 (H) 0.67 - 1.17 mg/dL    Calcium 9.3 8.6 - 10.0 mg/dL    Glucose 105 (H) 70 - 99 mg/dL    Alkaline Phosphatase 131 (H) 40 - 129 U/L    AST  25 10 - 50 U/L    ALT 9 (L) 10 - 50 U/L    Protein Total 6.9 6.4 - 8.3 g/dL    Albumin 3.6 3.5 - 5.2 g/dL    Bilirubin Total 0.9 <=1.2 mg/dL    GFR Estimate 8 (L) >60 mL/min/1.73m2   Lactic acid whole blood    Collection Time: 03/13/23 10:13 AM   Result Value Ref Range    Lactic Acid 0.9 0.7 - 2.0 mmol/L   Troponin T, High Sensitivity    Collection Time: 03/13/23 10:13 AM   Result Value Ref Range    Troponin T, High Sensitivity 68 (H) <=22 ng/L   CBC with platelets and differential    Collection Time: 03/13/23 10:13 AM   Result Value Ref Range    WBC Count 9.3 4.0 - 11.0 10e3/uL    RBC Count 3.21 (L) 4.40 - 5.90 10e6/uL    Hemoglobin 9.5 (L) 13.3 - 17.7 g/dL    Hematocrit 29.3 (L) 40.0 - 53.0 %    MCV 91 78 - 100 fL    MCH 29.6 26.5 - 33.0 pg    MCHC 32.4 31.5 - 36.5 g/dL    RDW 13.4 10.0 - 15.0 %    Platelet Count 141 (L) 150 - 450 10e3/uL    % Neutrophils 67 %    % Lymphocytes 13 %    % Monocytes 16 %    % Eosinophils 4 %    % Basophils 0 %    % Immature Granulocytes 0 %    NRBCs per 100 WBC 0 <1 /100    Absolute Neutrophils 6.2 1.6 - 8.3 10e3/uL    Absolute Lymphocytes 1.2 0.8 - 5.3 10e3/uL    Absolute Monocytes 1.5 (H) 0.0 - 1.3 10e3/uL    Absolute Eosinophils 0.4 0.0 - 0.7 10e3/uL    Absolute Basophils 0.0 0.0 - 0.2 10e3/uL    Absolute Immature Granulocytes 0.0 <=0.4 10e3/uL    Absolute NRBCs 0.0 10e3/uL   Extra Red Top Tube    Collection Time: 03/13/23 10:13 AM   Result Value Ref Range    Hold Specimen JIC    Extra Blue Top Tube    Collection Time: 03/13/23 10:14 AM   Result Value Ref Range    Hold Specimen JIC    EKG 12-lead, tracing only    Collection Time: 03/13/23 12:06 PM   Result Value Ref Range    Systolic Blood Pressure  mmHg    Diastolic Blood Pressure  mmHg    Ventricular Rate 88 BPM    Atrial Rate 88 BPM    LA Interval 172 ms    QRS Duration 86 ms     ms    QTc 452 ms    P Axis 34 degrees    R AXIS 30 degrees    T Axis 136 degrees    Interpretation ECG       Sinus rhythm  T wave abnormality,  consider lateral ischemia  Abnormal ECG  When compared with ECG of 12-DEC-2022 05:53,  Nonspecific T wave abnormality now evident in Inferior leads  Inverted T waves have replaced nonspecific T wave abnormality in Lateral leads  QT has shortened  Confirmed by GENERATED REPORT, COMPUTER ([id number]),  MECHELLE CORONA ([id number]) on 3/13/2023 2:10:13 PM     Troponin T, High Sensitivity    Collection Time: 03/13/23 12:25 PM   Result Value Ref Range    Troponin T, High Sensitivity 68 (H) <=22 ng/L   Hepatitis B surface antigen    Collection Time: 03/13/23 12:25 PM   Result Value Ref Range    Hepatitis B Surface Antigen Nonreactive Nonreactive   Hepatitis B Surface Antibody    Collection Time: 03/13/23 12:25 PM   Result Value Ref Range    Hepatitis B Surface Antibody Instrument Value 73.85 <8.00 m[IU]/mL    Hepatitis B Surface Antibody Reactive    Partial thromboplastin time    Collection Time: 03/13/23  3:24 PM   Result Value Ref Range    aPTT 47 (H) 22 - 38 Seconds   Partial thromboplastin time    Collection Time: 03/13/23  8:48 PM   Result Value Ref Range    aPTT 93 (H) 22 - 38 Seconds   EKG 12-lead, tracing only    Collection Time: 03/13/23 10:17 PM   Result Value Ref Range    Systolic Blood Pressure  mmHg    Diastolic Blood Pressure  mmHg    Ventricular Rate 95 BPM    Atrial Rate 95 BPM    MN Interval 168 ms    QRS Duration 84 ms     ms    QTc 475 ms    P Axis 80 degrees    R AXIS 57 degrees    T Axis 157 degrees    Interpretation ECG       Sinus rhythm  ST & T wave abnormality, consider inferior ischemia  Prolonged QT  Abnormal ECG  When compared with ECG of 13-MAR-2023 12:06,  No significant change was found  Confirmed by GENERATED REPORT, COMPUTER ([id number]),  DANIELA JOINER ([id number]) on 3/13/2023 10:26:13 PM     Troponin T, High Sensitivity    Collection Time: 03/13/23 10:44 PM   Result Value Ref Range    Troponin T, High Sensitivity 74 (H) <=22 ng/L   Partial thromboplastin time    Collection Time: 03/14/23  7:43  AM   Result Value Ref Range    aPTT 58 (H) 22 - 38 Seconds   Basic metabolic panel    Collection Time: 03/14/23  7:43 AM   Result Value Ref Range    Sodium 133 (L) 136 - 145 mmol/L    Potassium 4.6 3.4 - 5.3 mmol/L    Chloride 92 (L) 98 - 107 mmol/L    Carbon Dioxide (CO2) 23 22 - 29 mmol/L    Anion Gap 18 (H) 7 - 15 mmol/L    Urea Nitrogen 53.9 (H) 6.0 - 20.0 mg/dL    Creatinine 13.09 (H) 0.67 - 1.17 mg/dL    Calcium 7.9 (L) 8.6 - 10.0 mg/dL    Glucose 102 (H) 70 - 99 mg/dL    GFR Estimate 5 (L) >60 mL/min/1.73m2   CBC with platelets    Collection Time: 03/14/23  7:43 AM   Result Value Ref Range    WBC Count 10.3 4.0 - 11.0 10e3/uL    RBC Count 3.00 (L) 4.40 - 5.90 10e6/uL    Hemoglobin 8.9 (L) 13.3 - 17.7 g/dL    Hematocrit 27.6 (L) 40.0 - 53.0 %    MCV 92 78 - 100 fL    MCH 29.7 26.5 - 33.0 pg    MCHC 32.2 31.5 - 36.5 g/dL    RDW 13.4 10.0 - 15.0 %    Platelet Count 151 150 - 450 10e3/uL   Basic metabolic panel    Collection Time: 03/15/23  9:00 AM   Result Value Ref Range    Sodium 133 (L) 136 - 145 mmol/L    Potassium 4.6 3.4 - 5.3 mmol/L    Chloride 92 (L) 98 - 107 mmol/L    Carbon Dioxide (CO2) 24 22 - 29 mmol/L    Anion Gap 17 (H) 7 - 15 mmol/L    Urea Nitrogen 72.3 (H) 6.0 - 20.0 mg/dL    Creatinine 16.40 (H) 0.67 - 1.17 mg/dL    Calcium 7.6 (L) 8.6 - 10.0 mg/dL    Glucose 128 (H) 70 - 99 mg/dL    GFR Estimate 4 (L) >60 mL/min/1.73m2   CBC with platelets    Collection Time: 03/15/23  9:00 AM   Result Value Ref Range    WBC Count 8.4 4.0 - 11.0 10e3/uL    RBC Count 2.77 (L) 4.40 - 5.90 10e6/uL    Hemoglobin 8.4 (L) 13.3 - 17.7 g/dL    Hematocrit 25.6 (L) 40.0 - 53.0 %    MCV 92 78 - 100 fL    MCH 30.3 26.5 - 33.0 pg    MCHC 32.8 31.5 - 36.5 g/dL    RDW 13.3 10.0 - 15.0 %    Platelet Count 172 150 - 450 10e3/uL   INR    Collection Time: 03/15/23  9:00 AM   Result Value Ref Range    INR 1.40 (H) 0.85 - 1.15   Partial thromboplastin time    Collection Time: 03/15/23  9:00 AM   Result Value Ref Range    aPTT 49  (H) 22 - 38 Seconds   Partial thromboplastin time    Collection Time: 03/15/23 11:12 PM   Result Value Ref Range    aPTT 47 (H) 22 - 38 Seconds   Partial thromboplastin time    Collection Time: 03/16/23  5:42 AM   Result Value Ref Range    aPTT 81 (H) 22 - 38 Seconds   CBC with platelets    Collection Time: 03/16/23  5:42 AM   Result Value Ref Range    WBC Count 10.0 4.0 - 11.0 10e3/uL    RBC Count 2.82 (L) 4.40 - 5.90 10e6/uL    Hemoglobin 8.4 (L) 13.3 - 17.7 g/dL    Hematocrit 25.9 (L) 40.0 - 53.0 %    MCV 92 78 - 100 fL    MCH 29.8 26.5 - 33.0 pg    MCHC 32.4 31.5 - 36.5 g/dL    RDW 13.3 10.0 - 15.0 %    Platelet Count 199 150 - 450 10e3/uL   CBC with platelets    Collection Time: 03/17/23  5:33 AM   Result Value Ref Range    WBC Count 10.7 4.0 - 11.0 10e3/uL    RBC Count 2.97 (L) 4.40 - 5.90 10e6/uL    Hemoglobin 8.5 (L) 13.3 - 17.7 g/dL    Hematocrit 27.3 (L) 40.0 - 53.0 %    MCV 92 78 - 100 fL    MCH 28.6 26.5 - 33.0 pg    MCHC 31.1 (L) 31.5 - 36.5 g/dL    RDW 13.5 10.0 - 15.0 %    Platelet Count 248 150 - 450 10e3/uL   Basic metabolic panel    Collection Time: 03/17/23  5:33 AM   Result Value Ref Range    Sodium 136 136 - 145 mmol/L    Potassium 5.5 (H) 3.4 - 5.3 mmol/L    Chloride 94 (L) 98 - 107 mmol/L    Carbon Dioxide (CO2) 24 22 - 29 mmol/L    Anion Gap 18 (H) 7 - 15 mmol/L    Urea Nitrogen 59.9 (H) 6.0 - 20.0 mg/dL    Creatinine 14.37 (H) 0.67 - 1.17 mg/dL    Calcium 7.9 (L) 8.6 - 10.0 mg/dL    Glucose 92 70 - 99 mg/dL    GFR Estimate 4 (L) >60 mL/min/1.73m2   EKG 12-lead, tracing only    Collection Time: 03/17/23  3:25 PM   Result Value Ref Range    Systolic Blood Pressure  mmHg    Diastolic Blood Pressure  mmHg    Ventricular Rate 96 BPM    Atrial Rate 96 BPM    TN Interval 170 ms    QRS Duration 94 ms     ms    QTc 505 ms    P Axis 52 degrees    R AXIS 51 degrees    T Axis 46 degrees    Interpretation ECG       Sinus rhythm  Nonspecific T wave abnormality  Prolonged QT  Abnormal ECG  When  compared with ECG of 13-MAR-2023 22:17,  There have been no significant changes  Confirmed by MD CABRERA DEMOS [id number]),  Lopez Renteria ([id number]) on 3/21/2023 8:40:04 AM     Glucose by meter    Collection Time: 03/17/23  5:00 PM   Result Value Ref Range    GLUCOSE BY METER POCT 132 (H) 70 - 99 mg/dL   Partial thromboplastin time    Collection Time: 03/17/23  6:30 PM   Result Value Ref Range    aPTT 52 (H) 22 - 38 Seconds   Partial thromboplastin time    Collection Time: 03/18/23  1:34 AM   Result Value Ref Range    aPTT 56 (H) 22 - 38 Seconds   CBC with platelets    Collection Time: 03/18/23 10:15 AM   Result Value Ref Range    WBC Count 11.3 (H) 4.0 - 11.0 10e3/uL    RBC Count 2.94 (L) 4.40 - 5.90 10e6/uL    Hemoglobin 8.6 (L) 13.3 - 17.7 g/dL    Hematocrit 26.9 (L) 40.0 - 53.0 %    MCV 92 78 - 100 fL    MCH 29.3 26.5 - 33.0 pg    MCHC 32.0 31.5 - 36.5 g/dL    RDW 13.6 10.0 - 15.0 %    Platelet Count 245 150 - 450 10e3/uL   Basic metabolic panel    Collection Time: 03/18/23 10:15 AM   Result Value Ref Range    Sodium 138 136 - 145 mmol/L    Potassium 4.2 3.4 - 5.3 mmol/L    Chloride 94 (L) 98 - 107 mmol/L    Carbon Dioxide (CO2) 29 22 - 29 mmol/L    Anion Gap 15 7 - 15 mmol/L    Urea Nitrogen 48.3 (H) 6.0 - 20.0 mg/dL    Creatinine 11.90 (H) 0.67 - 1.17 mg/dL    Calcium 8.4 (L) 8.6 - 10.0 mg/dL    Glucose 132 (H) 70 - 99 mg/dL    GFR Estimate 5 (L) >60 mL/min/1.73m2   Partial thromboplastin time    Collection Time: 03/18/23 10:15 AM   Result Value Ref Range    aPTT 53 (H) 22 - 38 Seconds   Partial thromboplastin time    Collection Time: 03/18/23  5:35 PM   Result Value Ref Range    aPTT 47 (H) 22 - 38 Seconds   CBC with platelets    Collection Time: 03/19/23  5:06 AM   Result Value Ref Range    WBC Count 12.8 (H) 4.0 - 11.0 10e3/uL    RBC Count 2.96 (L) 4.40 - 5.90 10e6/uL    Hemoglobin 8.7 (L) 13.3 - 17.7 g/dL    Hematocrit 27.3 (L) 40.0 - 53.0 %    MCV 92 78 - 100 fL    MCH 29.4 26.5 - 33.0 pg    MCHC 31.9  31.5 - 36.5 g/dL    RDW 13.7 10.0 - 15.0 %    Platelet Count 294 150 - 450 10e3/uL   Basic metabolic panel    Collection Time: 03/19/23  5:06 AM   Result Value Ref Range    Sodium 136 136 - 145 mmol/L    Potassium 5.1 3.4 - 5.3 mmol/L    Chloride 94 (L) 98 - 107 mmol/L    Carbon Dioxide (CO2) 28 22 - 29 mmol/L    Anion Gap 14 7 - 15 mmol/L    Urea Nitrogen 65.0 (H) 6.0 - 20.0 mg/dL    Creatinine 14.26 (H) 0.67 - 1.17 mg/dL    Calcium 8.5 (L) 8.6 - 10.0 mg/dL    Glucose 106 (H) 70 - 99 mg/dL    GFR Estimate 4 (L) >60 mL/min/1.73m2   D dimer quantitative    Collection Time: 03/21/23  9:35 AM   Result Value Ref Range    D-Dimer Quantitative 2.41 (H) 0.00 - 0.50 ug/mL FEU     [facility] cPRA   Date Value Ref Range Status   02/09/2021 46  Final     [facility] CPRA   Date Value Ref Range Status   03/07/2023 57  Final       Again, thank you for allowing me to participate in the care of your patient.        Sincerely,        Katrina Albrecht MD, MD

## 2023-03-21 NOTE — PROGRESS NOTES
Transplant Surgery Consult Note     Medical record number: [id number]  YOB: 1995,   Consult requested  for evaluation of kidney transplant candidacy.    Assessment and Recommendations:Mr. Valiente appears to be a good candidate for kidney transplantation and has a fair understanding of the risks and benefits of this approach to the management of renal failure. The following issues should be addressed prior to finalizing his transplant candidacy:     Transplant order: Mr. Valiente has End stage renal failure due to focal segmental glomerulosclerosis (FSGS) whose condition is not expected to resolve, is expected to progress, and is expected to continue to develop related comorbid conditions.  Recommend he be considered as a candidate for kidney transplant.  Cardiology consult for cardiac risk stratification to be ordered: Yes  CT abdomen and pelvis without contrast to be ordered for assessment of vascular targets: No  Transplant listing labs ordered to include HLA, ABOx2, Cr, etc.  Dietician consult ordered: Yes  Social work consult ordered: Yes  Imaging reports reviewed:  CT A/P 6/2021  IMPRESSION:   1.  No renal calculi or hydronephrosis.  2.  Small right renal cyst.  3.  Diffuse bladder wall thickening can be seen with cystitis or hypertrophy.  4.  Left adrenal adenoma.  Radiology images reviewed: CT images from 6/2021 shows B EIA's suitable for transplant  Recipient suitable to move forward with work up of living donors:  Yes   Send Dr. Marin a note about October incident and EKG changes (in ED with chest pain relieved with nitroglycerin and with EKG changes). Stress echo was non-diagnostic since he did not reach target heart rate. Will need to determine with Dr. Marin if the pt needs further work up  On eliquis due to clot around HD catheter. Recommended to continue x 1 month. Would wait to activate until he has completed this course (as long as his other work up is completed)  Appreciate SW input  on compliance contract and post transplant support. Medical literacy seems limited    The majority of our visit was spent in counselling, discussing the medical and surgical risks of kidney transplantation. We discussed approximate wait time and how that is influenced by issues such as blood type and sensitization (PRA) and access to a living donor. I contrasted potential waiting time for living vs  donor kidneys from  normal (0-85%) or higher (%) kidney donor profile index (KDPI) donors and their associated outcomes. I would not recommend this individual to consider kidneys from high KDPI donors. The reason for this decision is best summarized as: improved long term graft survival. Potential surgical complications of kidney transplantation include bleeding, superficial or deep wound complications (infection, hernia, lymphocele), ureteral anastomotic failure (leak or stenosis), graft thrombosis, need for reoperation and other issues such as cardiac complications, pneumonia, deep venous thrombosis, pulmonary embolism, post transplant diabetes and death. The potential for recurrent disease or need for retransplantation was also addressed. We discussed the possible need for ureteral stent (and subsequent removal), and the utility of protocol biopsy and laboratory studies to evaluate for rejection or recurrent disease. We discussed the risk of graft rejection, our center's average graft and patient survival rates, immunosuppression protocols, as well as the potential opportunity to participate in clinical trials.  We also discussed the average length of stay, recovery process, and posttransplant lab and monitoring protocol.  I emphasized the need for strict immunosuppression medication adherence and the potential for complications of immunosuppression such as skin cancer or lymphoma, as well as a very low but not zero risk of donor-derived disease transmission risks (infection, cancer). Mr. Valiente  asked good questions and his candidacy will be reviewed at our Multidisciplinary Selection Committee. Thank you for the opportunity to participate in Mr. Valiente's care.      Total time: 60 minutes        Katrina Albrecht MD FACS  Assistant Professor of Surgery  Director, Living Kidney Donor Program.    ---------------------------------------------------------------------------------------------------    HPI: Mr. Valiente has End stage renal failure due to focal segmental glomerulosclerosis (FSGS). The patient is non-diabetic.       The patient is on dialysis.    Has potential kidney donors:  Yes .  Interested in participation in paired exchange if a donor is willing: Doesn't know     The patient has the following pertinent history:       No    Yes  Dialysis:    []      [x] via:  HD catheter     Blood Transfusion                  [x]      []  Number of units:   Most recently:  Pregnancy:    [x]      [] Number:       Previous Transplant:  [x]      [] Details:    Cancer    [x]      [] Comment:   Kidney stones   [x]      [] Comment:      Recurrent infections  [x]      []  Type:                  Bladder dysfunction  [x]      [] Cause:    Claudication   [x]      [] Distance:    Previous Amputation  [x]      [] Cause:     Chronic anticoagulation  []      [x] Indication: Clot around HD catheter  Episcopalian  [x]      []      Past Medical History:   Diagnosis Date     Adrenal adenoma, left      Anemia      Benign essential hypertension 07/28/2017     CKD (chronic kidney disease) stage 5, GFR less than 15 ml/min (H)     FSGS     Depression      Focal glomerular sclerosis 07/28/2017     HLD (hyperlipidemia)      LVH (left ventricular hypertrophy) due to hypertensive disease 07/14/2017     Noncompliance      Obesity, unspecified      OD (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/2021     Stress-induced cardiomyopathy      Suicide attempt (H) 2019     Past Surgical History:   Procedure Laterality Date     ARTHROSCOPY  ANKLE Left 2/24/2021    Procedure: Left ankle arthroscopy and debridement/micro fracture;  Surgeon: Mirza Nelson MD;  Location: [facility]     BIOPSY  2017    renal- [facility]     CREATE FISTULA ARTERIOVENOUS UPPER EXTREMITY Right 3/4/2021    Procedure: RIGHT proximal radial  to CEPHALIC ARTERIOVENOUS FISTULA;  Surgeon: Henrik Moran MD;  Location: [facility] OR     CREATE FISTULA ARTERIOVENOUS UPPER EXTREMITY Left 3/9/2023    Procedure: CREATION OF FIRST STAGE LEFT BRACHIOBASILIC ARTERIOVENOUS FISTULA;  Surgeon: Henrik Moran MD;  Location: [facility] OR     IR CVC TUNNEL PLACEMENT > 5 YRS OF AGE  6/17/2021     IR CVC TUNNEL PLACEMENT > 5 YRS OF AGE  3/9/2023     IR CVC TUNNEL REMOVAL RIGHT  12/3/2021     IRRIGATION AND DEBRIDEMENT UPPER EXTREMITY, COMBINED Left 3/16/2023    Procedure: EVACUATION OF LEFT ARM HEMATOMA;  Surgeon: Henrik Moran MD;  Location: [facility]     LIGATE FISTULA ARTERIOVENOUS UPPER EXTREMITY Right 3/9/2023    Procedure: LIGATION OF RIGHT ARM ARTERIOVENOUS FISTULA;  Surgeon: Henrik Moran MD;  Location: [facility]     NO HISTORY OF SURGERY       REVISION FISTULA ARTERIOVENOUS UPPER EXTREMITY Right 8/26/2021    Procedure: Second stage RIGHT BRACHIOCEPHALIC transposition ARTERIOVENOUS FISTULA;  Surgeon: Henrik Moran MD;  Location: [facility] OR     Family History   Problem Relation Age of Onset     Blood Disease Mother         has hep b     Diabetes Mother         gestionanal diabetes     Hypertension Mother      Obesity Father      Hypertension Father      Hypertension Maternal Grandmother      Diabetes Maternal Grandmother      Hypertension Paternal Grandmother      Hypertension Paternal Grandfather      Coronary Artery Disease Maternal Uncle      Cancer No family hx of      Social History     Socioeconomic History     Marital status: Single     Spouse name: Not on file     Number of children: 0     Years of education: Not on file     Highest education level: Not on file    Occupational History     Occupation:      Occupation: kitchen     Comment: [employer]   Tobacco Use     Smoking status: Never     Smokeless tobacco: Never   Vaping Use     Vaping Use: Never used   Substance and Sexual Activity     Alcohol use: No     Drug use: Yes     Types: Marijuana     Comment: occ     Sexual activity: Not Currently     Partners: Female   Other Topics Concern     Parent/sibling w/ CABG, MI or angioplasty before 65F 55M? Not Asked   Social History Narrative     Not on file     Social Determinants of Health     Financial Resource Strain: Medium Risk     Difficulty of Paying Living Expenses: Somewhat hard   Food Insecurity: Food Insecurity Present     Worried About Running Out of Food in the Last Year: Sometimes true     Ran Out of Food in the Last Year: Sometimes true   Transportation Needs: No Transportation Needs     Lack of Transportation (Medical): No     Lack of Transportation (Non-Medical): No   Physical Activity: Not on file   Stress: Not on file   Social Connections: Not on file   Intimate Partner Violence: Not on file   Housing Stability: Not on file       ROS:   CONSTITUTIONAL:  No fevers or chills  EYES: negative for icterus  ENT:  negative for hearing loss, tinnitus and sore throat  RESPIRATORY:  negative for cough, sputum, dyspnea  CARDIOVASCULAR:  negative for chest pain Fatigue  GASTROINTESTINAL:  negative for nausea, vomiting, diarrhea or constipation  GENITOURINARY:  negative for incontinence, dysuria, bladder emptying problems  HEME:  No easy bruising  INTEGUMENT:  negative for rash and pruritus  NEURO:  Negative for headache, seizure disorder  Allergies:   Allergies   Allergen Reactions     Benadryl [Diphenhydramine] Itching     Medications:  Prescription Medications as of 3/21/2023       Rx Number Disp Refills Start End Last Dispensed Date Next Fill Date Owning Pharmacy    acetaminophen (TYLENOL) 325 MG tablet  50 tablet 0 3/9/2023    [facility]  [facility] [address] [address]    Sig: Take 2 tablets (650 mg) by mouth every 4 hours as needed for mild pain    Class: E-Prescribe    Route: Oral    amLODIPine (NORVASC) 10 MG tablet            Sig: Take 10 mg by mouth daily     Class: Historical    Route: Oral    Apixaban Starter Pack (ELIQUIS DVT/PE STARTER PACK) 5 MG TBPK  74 each 0 3/19/2023 4/17/2023   [facility] [address] [address]    Sig: Take 10 mg by mouth 2 times daily for 6 days, THEN 5 mg 2 times daily for 23 days.    Class: E-Prescribe    Route: Oral    aspirin 81 MG EC tablet            Sig: Take 81 mg by mouth daily    Class: Historical    Route: Oral    atorvastatin (LIPITOR) 10 MG tablet  90 tablet 1 6/1/2022    [facility], [place] - [address]    Sig: TAKE ONE TABLET BY MOUTH EVERY NIGHT AT BEDTIME    Class: E-Prescribe    bumetanide (BUMEX) 2 MG tablet    7/21/2021    [facility], [place] - [address]    Sig: Take 2 mg by mouth daily     Class: Historical    Route: Oral    calcium acetate (CALPHRON) 667 MG TABS tablet    11/23/2022        Sig: Take 1,334 mg by mouth 3 times daily (with meals)    Class: Historical    Route: Oral    carvedilol (COREG) 25 MG tablet  180 tablet 0 1/4/2023    [facility], [place] - [address]    Sig: TAKE TWO TABLETS BY MOUTH TWICE A DAY WITH A MEAL Strength: 25 mg    Class: E-Prescribe    Earliest Fill Date: 1/4/2023    cinacalcet (SENSIPAR) 90 MG tablet    11/10/2021    [facility], [place] - [address]    Sig: Take 90 mg by mouth daily    Class: Historical    Route: Oral    cloNIDine (CATAPRES) 0.1 MG tablet            Sig: Take 0.1 mg by mouth At Bedtime On dialysis days (Mon, Wed, Fri)    Class: Historical    Route: Oral    cloNIDine (CATAPRES) 0.1 MG tablet    7/1/2021    [facility], [place] - [address]    Sig: Take 0.1 mg by mouth Twice daily on non dialysis days (Tue, Thurs, Sat, Sun)    Class: Historical    Route: Oral    hydrALAZINE (APRESOLINE) 100 MG tablet  360 tablet 1 12/15/2021    [place], [place] - [address]    Sig: TAKE ONE TABLET BY MOUTH THREE TIMES A DAY    Class: E-Prescribe    medical cannabis (Patient's own supply)            Sig: See Admin Instructions (The purpose of this order is to document that the patient reports taking medical cannabis.  This is not a prescription, and is not used to certify that the patient has a qualifying medical condition.)    Class: Historical    multivitamin RENAL (RENAVITE RX/NEPHROVITE) 1 MG tablet    6/28/2021    [facility] [address]    Sig: Take 1 tablet by mouth daily     Class: Historical    Route: Oral    nitroGLYcerin (NITROSTAT) 0.3 MG sublingual tablet  5 tablet 0 10/29/2022    [place], [place] - [address] [facility]    Sig: For chest pain place 1 tablet under the tongue every 5 minutes for 3 doses. If symptoms persist 5 minutes after 1st dose call 911.    Class: E-Prescribe    Notes to Pharmacy: Ok to adjust to supplied dose    senna-docusate (SENOKOT-S/PERICOLACE) 8.6-50 MG tablet  30 tablet 0 3/9/2023    [facility] [address] [facility]    Sig: Take 1-2 tablets by mouth 2 times daily    Class: E-Prescribe    Route: Oral    sevelamer carbonate (RENVELA) 800 MG tablet            Sig: Take 1,600 mg by mouth Take with snacks or supplements    Class: Historical    Route: Oral    sevelamer HCl (RENAGEL) 800 MG tablet            Sig: Take 3,200 mg by mouth 3 times daily (with meals)    Class: Historical    Route: Oral    vitamin D3 (CHOLECALCIFEROL) 2000 units (50 mcg) tablet            Sig: Take 50 mcg by mouth daily    Class: Historical    Route: Oral    ACE/ARB/ARNI NOT PRESCRIBED (INTENTIONAL)     "12/28/2021    [place], [place] - [address]    Sig: Please choose reason not prescribed from choices below.    Class: No Print Out        Exam:     /66   Pulse 76   Ht 1.797 m (5' 10.75\")   Wt 111.9 kg (246 lb 11.2 oz)   SpO2 99%   BMI 34.65 kg/m    Appearance: in no apparent distress.   Skin: normal  Eyes:  no redness or discharge.  Sclera anicteric  Head and Neck: Normal, no rashes or jaundice  Respiratory: easy respirations, no audible wheezing.  Abdomen: rounded, obese and protuberant, No distention and Surgical scars consistent with history   Extremities: femoral 2+/2+, Edema, none  Neuro: without deficit   Psychiatric: Normal mood and affect    Diagnostics:   Recent Results (from the past 672 hour(s))   BASIC METABOLIC PANEL    Collection Time: 03/07/23 12:22 PM   Result Value Ref Range    Sodium 143 136 - 145 mmol/L    Potassium 5.0 3.4 - 5.3 mmol/L    Chloride 98 98 - 107 mmol/L    Carbon Dioxide (CO2) 24 22 - 29 mmol/L    Anion Gap 21 (H) 7 - 15 mmol/L    Urea Nitrogen 64.1 (H) 6.0 - 20.0 mg/dL    Creatinine 12.20 (H) 0.67 - 1.17 mg/dL    Calcium 9.0 8.6 - 10.0 mg/dL    Glucose 109 (H) 70 - 99 mg/dL    GFR Estimate 5 (L) >60 mL/min/1.73m2   CBC with platelets    Collection Time: 03/07/23 12:22 PM   Result Value Ref Range    WBC Count 8.2 4.0 - 11.0 10e3/uL    RBC Count 3.70 (L) 4.40 - 5.90 10e6/uL    Hemoglobin 11.2 (L) 13.3 - 17.7 g/dL    Hematocrit 35.8 (L) 40.0 - 53.0 %    MCV 97 78 - 100 fL    MCH 30.3 26.5 - 33.0 pg    MCHC 31.3 (L) 31.5 - 36.5 g/dL    RDW 13.9 10.0 - 15.0 %    Platelet Count 213 150 - 450 10e3/uL   HLA Shauna Class I, Single Antigen    Collection Time: 03/07/23 12:22 PM   Result Value Ref Range    SA 1 TEST METHOD SA EDTA FCS     SA 1 CELL Class I     SA1 HI RISK SHAUNA None     SA1 MOD RISK SHAUNA B:57 58     SA 1  COMMENTS        HLA PRA Test performed by modified testing procedure that may also include pretreatment of serum. Pretreatment may " be the addition of fetal calf serum, EDTA, and/or adsorption.  High-risk, MFI > 3,000.  Mod-risk, -3,000.   HLA Shauna Class II, Single Antigen    Collection Time: 03/07/23 12:22 PM   Result Value Ref Range    SA 2 TEST METHOD SA EDTA FCS     SA 2 CELL Class II     SA2 HI RISK SHAUNA None     SA2 MOD RISK SHAUNA [id number]:[id number]     SA 2 COMMENTS        HLA PRA Test performed by modified testing procedure that may also include pretreatment of serum. Pretreatment may be the addition of fetal calf serum, EDTA, and/or adsorption.  High-risk, MFI > 3,000.  Mod-risk, -3,000.   HLA Shauna, CPRA    Collection Time: 03/07/23 12:22 PM   Result Value Ref Range    PROTOCOL CUTOFF Plan A, 500 mfi cumulative     UNOS CPRA 57     UNACCEPTABLE ANTIGENS B:8 48 57 58 DQ:7 9    Basic metabolic panel    Collection Time: 03/09/23  7:26 AM   Result Value Ref Range    Sodium 139 136 - 145 mmol/L    Potassium 4.5 3.4 - 5.3 mmol/L    Chloride 96 (L) 98 - 107 mmol/L    Carbon Dioxide (CO2) 28 22 - 29 mmol/L    Anion Gap 15 7 - 15 mmol/L    Urea Nitrogen 48.6 (H) 6.0 - 20.0 mg/dL    Creatinine 9.91 (H) 0.67 - 1.17 mg/dL    Calcium 9.3 8.6 - 10.0 mg/dL    Glucose 104 (H) 70 - 99 mg/dL    GFR Estimate 7 (L) >60 mL/min/1.73m2   Comprehensive metabolic panel    Collection Time: 03/13/23 10:13 AM   Result Value Ref Range    Sodium 133 (L) 136 - 145 mmol/L    Potassium 4.3 3.4 - 5.3 mmol/L    Chloride 93 (L) 98 - 107 mmol/L    Carbon Dioxide (CO2) 24 22 - 29 mmol/L    Anion Gap 16 (H) 7 - 15 mmol/L    Urea Nitrogen 35.7 (H) 6.0 - 20.0 mg/dL    Creatinine 8.81 (H) 0.67 - 1.17 mg/dL    Calcium 9.3 8.6 - 10.0 mg/dL    Glucose 105 (H) 70 - 99 mg/dL    Alkaline Phosphatase 131 (H) 40 - 129 U/L    AST 25 10 - 50 U/L    ALT 9 (L) 10 - 50 U/L    Protein Total 6.9 6.4 - 8.3 g/dL    Albumin 3.6 3.5 - 5.2 g/dL    Bilirubin Total 0.9 <=1.2 mg/dL    GFR Estimate 8 (L) >60 mL/min/1.73m2   Lactic acid whole blood    Collection Time: 03/13/23 10:13 AM   Result Value  Ref Range    Lactic Acid 0.9 0.7 - 2.0 mmol/L   Troponin T, High Sensitivity    Collection Time: 03/13/23 10:13 AM   Result Value Ref Range    Troponin T, High Sensitivity 68 (H) <=22 ng/L   CBC with platelets and differential    Collection Time: 03/13/23 10:13 AM   Result Value Ref Range    WBC Count 9.3 4.0 - 11.0 10e3/uL    RBC Count 3.21 (L) 4.40 - 5.90 10e6/uL    Hemoglobin 9.5 (L) 13.3 - 17.7 g/dL    Hematocrit 29.3 (L) 40.0 - 53.0 %    MCV 91 78 - 100 fL    MCH 29.6 26.5 - 33.0 pg    MCHC 32.4 31.5 - 36.5 g/dL    RDW 13.4 10.0 - 15.0 %    Platelet Count 141 (L) 150 - 450 10e3/uL    % Neutrophils 67 %    % Lymphocytes 13 %    % Monocytes 16 %    % Eosinophils 4 %    % Basophils 0 %    % Immature Granulocytes 0 %    NRBCs per 100 WBC 0 <1 /100    Absolute Neutrophils 6.2 1.6 - 8.3 10e3/uL    Absolute Lymphocytes 1.2 0.8 - 5.3 10e3/uL    Absolute Monocytes 1.5 (H) 0.0 - 1.3 10e3/uL    Absolute Eosinophils 0.4 0.0 - 0.7 10e3/uL    Absolute Basophils 0.0 0.0 - 0.2 10e3/uL    Absolute Immature Granulocytes 0.0 <=0.4 10e3/uL    Absolute NRBCs 0.0 10e3/uL   Extra Red Top Tube    Collection Time: 03/13/23 10:13 AM   Result Value Ref Range    Hold Specimen JIC    Extra Blue Top Tube    Collection Time: 03/13/23 10:14 AM   Result Value Ref Range    Hold Specimen JI    EKG 12-lead, tracing only    Collection Time: 03/13/23 12:06 PM   Result Value Ref Range    Systolic Blood Pressure  mmHg    Diastolic Blood Pressure  mmHg    Ventricular Rate 88 BPM    Atrial Rate 88 BPM    MI Interval 172 ms    QRS Duration 86 ms     ms    QTc 452 ms    P Axis 34 degrees    R AXIS 30 degrees    T Axis 136 degrees    Interpretation ECG       Sinus rhythm  T wave abnormality, consider lateral ischemia  Abnormal ECG  When compared with ECG of 12-DEC-2022 05:53,  Nonspecific T wave abnormality now evident in Inferior leads  Inverted T waves have replaced nonspecific T wave abnormality in Lateral leads  QT has shortened  Confirmed by  GENERATED REPORT, COMPUTER ([id number]),  MECHELLE CORONA ([id number]) on 3/13/2023 2:10:13 PM     Troponin T, High Sensitivity    Collection Time: 03/13/23 12:25 PM   Result Value Ref Range    Troponin T, High Sensitivity 68 (H) <=22 ng/L   Hepatitis B surface antigen    Collection Time: 03/13/23 12:25 PM   Result Value Ref Range    Hepatitis B Surface Antigen Nonreactive Nonreactive   Hepatitis B Surface Antibody    Collection Time: 03/13/23 12:25 PM   Result Value Ref Range    Hepatitis B Surface Antibody Instrument Value 73.85 <8.00 m[IU]/mL    Hepatitis B Surface Antibody Reactive    Partial thromboplastin time    Collection Time: 03/13/23  3:24 PM   Result Value Ref Range    aPTT 47 (H) 22 - 38 Seconds   Partial thromboplastin time    Collection Time: 03/13/23  8:48 PM   Result Value Ref Range    aPTT 93 (H) 22 - 38 Seconds   EKG 12-lead, tracing only    Collection Time: 03/13/23 10:17 PM   Result Value Ref Range    Systolic Blood Pressure  mmHg    Diastolic Blood Pressure  mmHg    Ventricular Rate 95 BPM    Atrial Rate 95 BPM    ME Interval 168 ms    QRS Duration 84 ms     ms    QTc 475 ms    P Axis 80 degrees    R AXIS 57 degrees    T Axis 157 degrees    Interpretation ECG       Sinus rhythm  ST & T wave abnormality, consider inferior ischemia  Prolonged QT  Abnormal ECG  When compared with ECG of 13-MAR-2023 12:06,  No significant change was found  Confirmed by GENERATED REPORT, COMPUTER ([id number]),  DANIELA JOINER ([id number]) on 3/13/2023 10:26:13 PM     Troponin T, High Sensitivity    Collection Time: 03/13/23 10:44 PM   Result Value Ref Range    Troponin T, High Sensitivity 74 (H) <=22 ng/L   Partial thromboplastin time    Collection Time: 03/14/23  7:43 AM   Result Value Ref Range    aPTT 58 (H) 22 - 38 Seconds   Basic metabolic panel    Collection Time: 03/14/23  7:43 AM   Result Value Ref Range    Sodium 133 (L) 136 - 145 mmol/L    Potassium 4.6 3.4 - 5.3 mmol/L    Chloride 92 (L) 98 - 107 mmol/L    Carbon  Dioxide (CO2) 23 22 - 29 mmol/L    Anion Gap 18 (H) 7 - 15 mmol/L    Urea Nitrogen 53.9 (H) 6.0 - 20.0 mg/dL    Creatinine 13.09 (H) 0.67 - 1.17 mg/dL    Calcium 7.9 (L) 8.6 - 10.0 mg/dL    Glucose 102 (H) 70 - 99 mg/dL    GFR Estimate 5 (L) >60 mL/min/1.73m2   CBC with platelets    Collection Time: 03/14/23  7:43 AM   Result Value Ref Range    WBC Count 10.3 4.0 - 11.0 10e3/uL    RBC Count 3.00 (L) 4.40 - 5.90 10e6/uL    Hemoglobin 8.9 (L) 13.3 - 17.7 g/dL    Hematocrit 27.6 (L) 40.0 - 53.0 %    MCV 92 78 - 100 fL    MCH 29.7 26.5 - 33.0 pg    MCHC 32.2 31.5 - 36.5 g/dL    RDW 13.4 10.0 - 15.0 %    Platelet Count 151 150 - 450 10e3/uL   Basic metabolic panel    Collection Time: 03/15/23  9:00 AM   Result Value Ref Range    Sodium 133 (L) 136 - 145 mmol/L    Potassium 4.6 3.4 - 5.3 mmol/L    Chloride 92 (L) 98 - 107 mmol/L    Carbon Dioxide (CO2) 24 22 - 29 mmol/L    Anion Gap 17 (H) 7 - 15 mmol/L    Urea Nitrogen 72.3 (H) 6.0 - 20.0 mg/dL    Creatinine 16.40 (H) 0.67 - 1.17 mg/dL    Calcium 7.6 (L) 8.6 - 10.0 mg/dL    Glucose 128 (H) 70 - 99 mg/dL    GFR Estimate 4 (L) >60 mL/min/1.73m2   CBC with platelets    Collection Time: 03/15/23  9:00 AM   Result Value Ref Range    WBC Count 8.4 4.0 - 11.0 10e3/uL    RBC Count 2.77 (L) 4.40 - 5.90 10e6/uL    Hemoglobin 8.4 (L) 13.3 - 17.7 g/dL    Hematocrit 25.6 (L) 40.0 - 53.0 %    MCV 92 78 - 100 fL    MCH 30.3 26.5 - 33.0 pg    MCHC 32.8 31.5 - 36.5 g/dL    RDW 13.3 10.0 - 15.0 %    Platelet Count 172 150 - 450 10e3/uL   INR    Collection Time: 03/15/23  9:00 AM   Result Value Ref Range    INR 1.40 (H) 0.85 - 1.15   Partial thromboplastin time    Collection Time: 03/15/23  9:00 AM   Result Value Ref Range    aPTT 49 (H) 22 - 38 Seconds   Partial thromboplastin time    Collection Time: 03/15/23 11:12 PM   Result Value Ref Range    aPTT 47 (H) 22 - 38 Seconds   Partial thromboplastin time    Collection Time: 03/16/23  5:42 AM   Result Value Ref Range    aPTT 81 (H) 22 - 38  Seconds   CBC with platelets    Collection Time: 03/16/23  5:42 AM   Result Value Ref Range    WBC Count 10.0 4.0 - 11.0 10e3/uL    RBC Count 2.82 (L) 4.40 - 5.90 10e6/uL    Hemoglobin 8.4 (L) 13.3 - 17.7 g/dL    Hematocrit 25.9 (L) 40.0 - 53.0 %    MCV 92 78 - 100 fL    MCH 29.8 26.5 - 33.0 pg    MCHC 32.4 31.5 - 36.5 g/dL    RDW 13.3 10.0 - 15.0 %    Platelet Count 199 150 - 450 10e3/uL   CBC with platelets    Collection Time: 03/17/23  5:33 AM   Result Value Ref Range    WBC Count 10.7 4.0 - 11.0 10e3/uL    RBC Count 2.97 (L) 4.40 - 5.90 10e6/uL    Hemoglobin 8.5 (L) 13.3 - 17.7 g/dL    Hematocrit 27.3 (L) 40.0 - 53.0 %    MCV 92 78 - 100 fL    MCH 28.6 26.5 - 33.0 pg    MCHC 31.1 (L) 31.5 - 36.5 g/dL    RDW 13.5 10.0 - 15.0 %    Platelet Count 248 150 - 450 10e3/uL   Basic metabolic panel    Collection Time: 03/17/23  5:33 AM   Result Value Ref Range    Sodium 136 136 - 145 mmol/L    Potassium 5.5 (H) 3.4 - 5.3 mmol/L    Chloride 94 (L) 98 - 107 mmol/L    Carbon Dioxide (CO2) 24 22 - 29 mmol/L    Anion Gap 18 (H) 7 - 15 mmol/L    Urea Nitrogen 59.9 (H) 6.0 - 20.0 mg/dL    Creatinine 14.37 (H) 0.67 - 1.17 mg/dL    Calcium 7.9 (L) 8.6 - 10.0 mg/dL    Glucose 92 70 - 99 mg/dL    GFR Estimate 4 (L) >60 mL/min/1.73m2   EKG 12-lead, tracing only    Collection Time: 03/17/23  3:25 PM   Result Value Ref Range    Systolic Blood Pressure  mmHg    Diastolic Blood Pressure  mmHg    Ventricular Rate 96 BPM    Atrial Rate 96 BPM    VA Interval 170 ms    QRS Duration 94 ms     ms    QTc 505 ms    P Axis 52 degrees    R AXIS 51 degrees    T Axis 46 degrees    Interpretation ECG       Sinus rhythm  Nonspecific T wave abnormality  Prolonged QT  Abnormal ECG  When compared with ECG of 13-MAR-2023 22:17,  There have been no significant changes  Confirmed by MD RICK, YOSEF ([id number]),  Lopez Renteria ([id number]) on 3/21/2023 8:40:04 AM     Glucose by meter    Collection Time: 03/17/23  5:00 PM   Result Value Ref Range     GLUCOSE BY METER POCT 132 (H) 70 - 99 mg/dL   Partial thromboplastin time    Collection Time: 03/17/23  6:30 PM   Result Value Ref Range    aPTT 52 (H) 22 - 38 Seconds   Partial thromboplastin time    Collection Time: 03/18/23  1:34 AM   Result Value Ref Range    aPTT 56 (H) 22 - 38 Seconds   CBC with platelets    Collection Time: 03/18/23 10:15 AM   Result Value Ref Range    WBC Count 11.3 (H) 4.0 - 11.0 10e3/uL    RBC Count 2.94 (L) 4.40 - 5.90 10e6/uL    Hemoglobin 8.6 (L) 13.3 - 17.7 g/dL    Hematocrit 26.9 (L) 40.0 - 53.0 %    MCV 92 78 - 100 fL    MCH 29.3 26.5 - 33.0 pg    MCHC 32.0 31.5 - 36.5 g/dL    RDW 13.6 10.0 - 15.0 %    Platelet Count 245 150 - 450 10e3/uL   Basic metabolic panel    Collection Time: 03/18/23 10:15 AM   Result Value Ref Range    Sodium 138 136 - 145 mmol/L    Potassium 4.2 3.4 - 5.3 mmol/L    Chloride 94 (L) 98 - 107 mmol/L    Carbon Dioxide (CO2) 29 22 - 29 mmol/L    Anion Gap 15 7 - 15 mmol/L    Urea Nitrogen 48.3 (H) 6.0 - 20.0 mg/dL    Creatinine 11.90 (H) 0.67 - 1.17 mg/dL    Calcium 8.4 (L) 8.6 - 10.0 mg/dL    Glucose 132 (H) 70 - 99 mg/dL    GFR Estimate 5 (L) >60 mL/min/1.73m2   Partial thromboplastin time    Collection Time: 03/18/23 10:15 AM   Result Value Ref Range    aPTT 53 (H) 22 - 38 Seconds   Partial thromboplastin time    Collection Time: 03/18/23  5:35 PM   Result Value Ref Range    aPTT 47 (H) 22 - 38 Seconds   CBC with platelets    Collection Time: 03/19/23  5:06 AM   Result Value Ref Range    WBC Count 12.8 (H) 4.0 - 11.0 10e3/uL    RBC Count 2.96 (L) 4.40 - 5.90 10e6/uL    Hemoglobin 8.7 (L) 13.3 - 17.7 g/dL    Hematocrit 27.3 (L) 40.0 - 53.0 %    MCV 92 78 - 100 fL    MCH 29.4 26.5 - 33.0 pg    MCHC 31.9 31.5 - 36.5 g/dL    RDW 13.7 10.0 - 15.0 %    Platelet Count 294 150 - 450 10e3/uL   Basic metabolic panel    Collection Time: 03/19/23  5:06 AM   Result Value Ref Range    Sodium 136 136 - 145 mmol/L    Potassium 5.1 3.4 - 5.3 mmol/L    Chloride 94 (L) 98 - 107  mmol/L    Carbon Dioxide (CO2) 28 22 - 29 mmol/L    Anion Gap 14 7 - 15 mmol/L    Urea Nitrogen 65.0 (H) 6.0 - 20.0 mg/dL    Creatinine 14.26 (H) 0.67 - 1.17 mg/dL    Calcium 8.5 (L) 8.6 - 10.0 mg/dL    Glucose 106 (H) 70 - 99 mg/dL    GFR Estimate 4 (L) >60 mL/min/1.73m2   D dimer quantitative    Collection Time: 03/21/23  9:35 AM   Result Value Ref Range    D-Dimer Quantitative 2.41 (H) 0.00 - 0.50 ug/mL FEU     UNOS cPRA   Date Value Ref Range Status   02/09/2021 46  Final     UNOS CPRA   Date Value Ref Range Status   03/07/2023 57  Final

## 2023-03-21 NOTE — TELEPHONE ENCOUNTER
----- Message from Zaina Welsh NP sent at 3/21/2023  7:06 AM CDT -----  Pt had R internal jugular DVT has no follow-up, was started on anticoagulation in the hospital last week. Could he be put on My Ayala's schedule or Vascular Medicine to be sometime within the next few weeks?  Thank you!  Zaina     Routing to  to coordinate new consult d-dimer lab and in clinic OV with My Ayala PA-C within 1 week.     Appt note: new pt, hx of right internal jugular DVT, on Eliquis. (d-dimer to be scheduled prior).     TUNG Colon, RN  [facility]  Office:  [phone] Fax: [phone]

## 2023-03-22 ENCOUNTER — PATIENT OUTREACH (OUTPATIENT)
Dept: FAMILY MEDICINE | Facility: CLINIC | Age: 28
End: 2023-03-22
Payer: MEDICARE

## 2023-03-22 NOTE — TELEPHONE ENCOUNTER
[facility]     Who is the name of the provider?:  Dr Moran / Zaina Welsh       What is the location you see this provider at?:  [place]       Reason for call:  Pt's Left Fistula is feeling harder since patient was discharged on 03/19/23. Ranjana is wondering if patient should have an Ultrasound done and/or be seen sooner than 03/29/23?      Contact name: Ranjana Martinez [facility])       Contact number: [phone]      Additional Information: There's also a 03/21/23 encounter

## 2023-03-22 NOTE — TELEPHONE ENCOUNTER
Patient is scheduled for follow up with Zaina Welsh NP on Wednesday 3/29/23.  Routing to Zaina to advise if she would like imaging prior.    Shabnam RUBY, RN    [facility]  Vascular [facility]  Office: [phone]  Fax: [phone]

## 2023-03-22 NOTE — TELEPHONE ENCOUNTER
Discussed with Zaina HERNÁNDEZ.   AVF US ordered stat to be completed as soon as possible.   Will call with any critical findings otherwise patient will follow up as scheduled on Wednesday with Zaina.    Routing back to scheduling to call patient and/or dialysis to schedule US.     Shabnam RUBY, RN    [facility]  [facility]  Office: [phone]  Fax: [phone]

## 2023-03-22 NOTE — TELEPHONE ENCOUNTER
"GALLITO PAL left message x 2 for patient to return call to complete hospital discharge - and schedule hosp f/u appt     Hospital/TCU/ED for chronic condition Discharge Protocol    \"Hi, my name is Sultana Urbina RN, a registered nurse, and I am calling from [facility].  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  What is/are the follow-up recommendations?  Pt. Response: follow up with Dr. Lawrence within 7 days     \"Has an appointment with your primary care provider been scheduled?\"   No (schedule appointment)  RN scheduled next available hosp f/u with pcp 4/10/2023 @ 10:40  \"When you see the provider, I would recommend that you bring your medications with you.\"    Medications    \"Tell me what changed about your medicines when you discharged?\"               Changes to chronic meds?    eliquis added     \"What questions do you have about your medications?\"   No questions at this time       New diagnoses of heart failure, COPD, diabetes, or MI?    No    Post Discharge Medication Reconciliation Status: discharge medications reconciled, continue medications without change.    Was MTM referral placed (*Make sure to put transitions as reason for " "referral)?  Appt already scheduled with MTM on 3/27/2023     Call Summary    \"What questions or concerns do you have about your recent visit and your follow-up care?\"     none    \"If you have questions or things don't continue to improve, we encourage you contact us through the main clinic number (give number).  Even if the clinic is not open, triage nurses are available 24/7 to help you.     We would like you to know that our clinic has extended hours (provide information).  We also have urgent care (provide details on closest location and hours/contact info)\"      \"Thank you for your time and take care!\"    Sultana Urbina, Registered Nurse, PAL (Patient Advocate Liaison)   [facility]   [phone]          "

## 2023-03-23 ENCOUNTER — HOSPITAL ENCOUNTER (OUTPATIENT)
Dept: ULTRASOUND IMAGING | Facility: CLINIC | Age: 28
Discharge: HOME | End: 2023-03-23
Payer: MEDICARE

## 2023-03-23 ENCOUNTER — DOCUMENTATION ONLY (OUTPATIENT)
Dept: LAB | Facility: CLINIC | Age: 28
End: 2023-03-23

## 2023-03-23 DIAGNOSIS — T82.898A OTHER SPECIFIED COMPLICATION OF VASCULAR PROSTHETIC DEVICES, IMPLANTS AND GRAFTS, INITIAL ENCOUNTER (H): ICD-10-CM

## 2023-03-23 DIAGNOSIS — I77.0 AVF (ARTERIOVENOUS FISTULA) (H): ICD-10-CM

## 2023-03-23 PROCEDURE — 93990 DOPPLER FLOW TESTING: CPT

## 2023-03-23 NOTE — PROGRESS NOTES
Pt has a lab only coming up and is requesting a D-Dimer.  Please review and place future orders.   Questions or concerns, please have your care team contact pt directly.    Thank You  Ramila MORGAN

## 2023-03-23 NOTE — TELEPHONE ENCOUNTER
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 Tunneled dialysis catheter placement 3/9/23. Patient developed a hematoma of the left antecubital incision and had a recent hospitalization from 3/13/23-3/19/23.     Patient is currently here at [facility] for his left AVF US and is reporting that he needs his right AVF looked at and not his left arm. He states the concerns for the right arm are that his skin is darkening, its more tender to the touch and the aneurysms appear to be getting larger. He denies any concern of his left AVF. I attempted to call Ranjana at his dialysis today but they are closed as it is a non-dialysis day. Zaina Welsh NP has left for the day so the decision was made by the patient to have the ultrasound completed of the right arm due to the several concerns he has. He is scheduled to see Zaina Welsh on 3/29/23 for his hospital follow up and at that time will have both arms evaluated. I discussed with patient I will have Zaina review his US from today in the morning and we will call him with any immediate concerns. He is in agreement.     Shabnam RUBY RN    [facility]  Vascular [facility]  Office: [phone]  Fax: [phone]

## 2023-03-24 NOTE — TELEPHONE ENCOUNTER
[facility]    Who is the name of the provider? Dr Moran / Zaina Welsh    What is the location you see this provider at/preferred location? [place]    Person calling / Facility: [facility]    Phone number:  [phone]    Nurse call back needed:  Y    Reason for call:  PT LM on our voicemail that his vein has swollen a lot more       Pharmacy location:  N/A    Outside Imaging: N/A    Can we leave a detailed message on this number?  Y    Additional Info:

## 2023-03-26 ENCOUNTER — HEALTH MAINTENANCE LETTER (OUTPATIENT)
Age: 28
End: 2023-03-26

## 2023-03-28 DIAGNOSIS — Z99.2 ESRD (END STAGE RENAL DISEASE) ON DIALYSIS (H): ICD-10-CM

## 2023-03-28 DIAGNOSIS — N18.6 ESRD (END STAGE RENAL DISEASE) ON DIALYSIS (H): ICD-10-CM

## 2023-03-29 ENCOUNTER — OFFICE VISIT (OUTPATIENT)
Dept: OTHER | Facility: CLINIC | Age: 28
End: 2023-03-29
Payer: MEDICARE

## 2023-03-29 VITALS — SYSTOLIC BLOOD PRESSURE: 159 MMHG | DIASTOLIC BLOOD PRESSURE: 123 MMHG | OXYGEN SATURATION: 99 % | HEART RATE: 91 BPM

## 2023-03-29 DIAGNOSIS — I77.0 AVF (ARTERIOVENOUS FISTULA) (H): Primary | ICD-10-CM

## 2023-03-29 DIAGNOSIS — T82.898S OTHER SPECIFIED COMPLICATION OF VASCULAR PROSTHETIC DEVICES, IMPLANTS AND GRAFTS, SEQUELA: ICD-10-CM

## 2023-03-29 PROCEDURE — 99024 POSTOP FOLLOW-UP VISIT: CPT

## 2023-03-29 PROCEDURE — G0463 HOSPITAL OUTPT CLINIC VISIT: HCPCS

## 2023-03-29 RX ORDER — AMOXICILLIN 250 MG
1-2 CAPSULE ORAL 2 TIMES DAILY
Qty: 30 TABLET | Refills: 0 | OUTPATIENT
Start: 2023-03-29

## 2023-03-29 NOTE — PROGRESS NOTES
VASCULAR SURGERY PROGRESS NOTE    LOCATION: [facility]ascular [facility]    Taylor Valiente  Medical Record #:  [id number]  YOB: 1995  Age:  27 year old     Date of Service: 3/29/2023    PRIMARY CARE PROVIDER: Priyank Lawrence    Reason for visit:  2 week post-operative visit    Mr. Taylor Valiente is a 27 year old male who is status post creation of a right brachiocephalic AV fistula on 3/4/2021. 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 He subsequently underwent ligation of the right upper arm AV fistula with creation of a first stage left brachiobasilic AV fistula on 3/09/2023. Discharged without incident however was admitted at  on 3/13 for DVT of the R internal jugular with increased edema and significant pain of his right upper extremity. Subsequently started on heparin for anticoagulation. Noted the day after of having a hematoma of the L AVF, which was then evacuated on 3/16. Discharged home on 3/19.     Taylor comes into clinic today for regular post-operative check. He is alert and oriented x4. Reports minimal pain of his right arm, 5/5 strength. Of note, RUE edema significantly improved, full ROM noted. 2+ radial pulse on the right    L AVF has minimal edema noted, non-tender. Excellent thrill noted. 2+ radial pulse, denies numbness, tenderness, or weakness in LUE. 5/5 strength. Incisions bilaterally are well healed.     He has been compliant with his Eliquis.   /123 P 91  The patient has no complaints today, continues with workup for renal transplantation.     RECOMMENDATION/RISKS: Discussed findings with patient over examination, removed sutures in place without incident. Pleased with how recovery is going given the patient's subsequent hospitalization. Taylor should continue to elevate the right arm and use compression to continue on improving the edema. Arranged for him to be seen by Vascular Medicine for management of the R internal jugular DVT, his appointment  is next week with [facility]. Continue on Eliquis. Discussed with Taylor to continue vein building exercises of the left arm, education done with the patient. Follow-up in 4 weeks with ultrasound of the L AVF and appointment with Dr. Moran.     If the patient were to have any questions or concerns in regards to his fistula, discussed that he can always call the [facility] and number provided.     REVIEW OF SYSTEMS:    A 12 point ROS was reviewed and is negative except for what is listed above in HPI.    PHH:    Past Medical History:   Diagnosis Date     Adrenal adenoma, left      Anemia      Benign essential hypertension 07/28/2017     CKD (chronic kidney disease) stage 5, GFR less than 15 ml/min (H)     FSGS     Depression      Focal glomerular sclerosis 07/28/2017     HLD (hyperlipidemia)      LVH (left ventricular hypertrophy) due to hypertensive disease 07/14/2017     Noncompliance      Obesity, unspecified      OD (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/2021     Stress-induced cardiomyopathy      Suicide attempt (H) 2019          Past Surgical History:   Procedure Laterality Date     ARTHROSCOPY ANKLE Left 2/24/2021    Procedure: Left ankle arthroscopy and debridement/micro fracture;  Surgeon: Mirza Nelson MD;  [facility]     [facility]     CREATE FISTULA ARTERIOVENOUS UPPER EXTREMITY Right 3/4/2021    Procedure: RIGHT proximal radial  to CEPHALIC ARTERIOVENOUS FISTULA;  Surgeon: Henrik Moran MD;  Location: [facility] OR     CREATE FISTULA ARTERIOVENOUS UPPER EXTREMITY Left 3/9/2023    Procedure: CREATION OF FIRST STAGE LEFT BRACHIOBASILIC ARTERIOVENOUS FISTULA;  Surgeon: Henrik Moran MD;  Location: [facility] OR     IR CVC TUNNEL PLACEMENT > 5 YRS OF AGE  6/17/2021     IR CVC TUNNEL PLACEMENT > 5 YRS OF AGE  3/9/2023     IR CVC TUNNEL REMOVAL RIGHT  12/3/2021     IRRIGATION AND DEBRIDEMENT UPPER EXTREMITY, COMBINED Left 3/16/2023    Procedure: EVACUATION OF LEFT ARM  HEMATOMA;  Surgeon: Henrik Moran MD;  Location: [facility] OR     LIGATE FISTULA ARTERIOVENOUS UPPER EXTREMITY Right 3/9/2023    Procedure: LIGATION OF RIGHT ARM ARTERIOVENOUS FISTULA;  Surgeon: Henrik Moran MD;  Location: [facility] OR     NO HISTORY OF SURGERY       REVISION FISTULA ARTERIOVENOUS UPPER EXTREMITY Right 8/26/2021    Procedure: Second stage RIGHT BRACHIOCEPHALIC transposition ARTERIOVENOUS FISTULA;  Surgeon: Henrik Moran MD;  Location: [facility] OR       ALLERGIES:  Benadryl [diphenhydramine]    MEDS:    Current Outpatient Medications:      acetaminophen (TYLENOL) 325 MG tablet, Take 2 tablets (650 mg) by mouth every 4 hours as needed for mild pain, Disp: 50 tablet, Rfl: 0     amLODIPine (NORVASC) 10 MG tablet, Take 10 mg by mouth daily , Disp: , Rfl:      Apixaban Starter Pack (ELIQUIS DVT/PE STARTER PACK) 5 MG TBPK, Take 10 mg by mouth 2 times daily for 6 days, THEN 5 mg 2 times daily for 23 days., Disp: 74 each, Rfl: 0     aspirin 81 MG EC tablet, Take 81 mg by mouth daily, Disp: , Rfl:      atorvastatin (LIPITOR) 10 MG tablet, TAKE ONE TABLET BY MOUTH EVERY NIGHT AT BEDTIME, Disp: 90 tablet, Rfl: 1     bumetanide (BUMEX) 2 MG tablet, Take 2 mg by mouth daily , Disp: , Rfl:      calcium acetate (CALPHRON) 667 MG TABS tablet, Take 1,334 mg by mouth 3 times daily (with meals), Disp: , Rfl:      carvedilol (COREG) 25 MG tablet, TAKE TWO TABLETS BY MOUTH TWICE A DAY WITH A MEAL Strength: 25 mg, Disp: 180 tablet, Rfl: 0     cinacalcet (SENSIPAR) 90 MG tablet, Take 90 mg by mouth daily, Disp: , Rfl:      cloNIDine (CATAPRES) 0.1 MG tablet, Take 0.1 mg by mouth At Bedtime On dialysis days (Mon, Wed, Fri), Disp: , Rfl:      cloNIDine (CATAPRES) 0.1 MG tablet, Take 0.1 mg by mouth Twice daily on non dialysis days (Tue, Thurs, Sat, Sun), Disp: , Rfl:      hydrALAZINE (APRESOLINE) 100 MG tablet, TAKE ONE TABLET BY MOUTH THREE TIMES A DAY, Disp: 360 tablet, Rfl: 1     medical cannabis (Patient's own supply),  See Admin Instructions (The purpose of this order is to document that the patient reports taking medical cannabis.  This is not a prescription, and is not used to certify that the patient has a qualifying medical condition.), Disp: , Rfl:      multivitamin RENAL (RENAVITE RX/NEPHROVITE) 1 MG tablet, Take 1 tablet by mouth daily , Disp: , Rfl:      nitroGLYcerin (NITROSTAT) 0.3 MG sublingual tablet, For chest pain place 1 tablet under the tongue every 5 minutes for 3 doses. If symptoms persist 5 minutes after 1st dose call 911., Disp: 5 tablet, Rfl: 0     senna-docusate (SENOKOT-S/PERICOLACE) 8.6-50 MG tablet, Take 1-2 tablets by mouth 2 times daily, Disp: 30 tablet, Rfl: 0     sevelamer HCl (RENAGEL) 800 MG tablet, Take 3,200 mg by mouth 3 times daily (with meals), Disp: , Rfl:      vitamin D3 (CHOLECALCIFEROL) 2000 units (50 mcg) tablet, Take 50 mcg by mouth daily, Disp: , Rfl:      ACE/ARB/ARNI NOT PRESCRIBED (INTENTIONAL), Please choose reason not prescribed from choices below. (Patient not taking: Reported on 3/7/2023), Disp: , Rfl:      sevelamer carbonate (RENVELA) 800 MG tablet, Take 1,600 mg by mouth Take with snacks or supplements (Patient not taking: Reported on 3/29/2023), Disp: , Rfl:     SOCIAL HABITS:    History   Smoking Status     Never   Smokeless Tobacco     Never     Social History    Substance and Sexual Activity      Alcohol use: No      History   Drug Use     Types: Marijuana     Comment: occ       FAMILY HISTORY:    Family History   Problem Relation Age of Onset     Blood Disease Mother         has hep b     Diabetes Mother         gestionanal diabetes     Hypertension Mother      Obesity Father      Hypertension Father      Hypertension Maternal Grandmother      Diabetes Maternal Grandmother      Hypertension Paternal Grandmother      Hypertension Paternal Grandfather      Coronary Artery Disease Maternal Uncle      Cancer No family hx of        PE:  BP (!) 159/123 (BP Location: Right arm,  Patient Position: Chair, Cuff Size: Adult Regular)   Pulse 91   SpO2 99%   Wt Readings from Last 1 Encounters:   03/21/23 246 lb 11.2 oz (111.9 kg)     There is no height or weight on file to calculate BMI.    EXAM:  GENERAL: well-developed 27 year old male who appears his stated age  CARDIAC: normal   CHEST/LUNG: normal respiratory effort   MUSCULOSKELETAL: grossly normal and both lower extremities are intact, no lower extremity edema  NEUROLOGIC: focally intact, alert and oriented x 3  PSYCH: appropriate affect  VASCULAR:       DIAGNOSTIC STUDIES:     Images:  MR Brain w/o Contrast    Result Date: 3/15/2023  EXAM: MR BRAIN WITHOUT CONTRAST LOCATION: [facility] DATE/TIME: 03/15/2023, 3:19 AM INDICATION: New onset diplopia and blurred vision. COMPARISON: CTA head neck dated 03/15/2023. TECHNIQUE: Routine multiplanar multisequence head MRI without intravenous contrast. FINDINGS: INTRACRANIAL CONTENTS: No acute or subacute infarct. No mass, acute hemorrhage, or extra-axial fluid collections. Normal brain parenchymal signal. Normal ventricles and sulci. Normal position of the cerebellar tonsils. SELLA: No abnormality accounting for technique. OSSEOUS STRUCTURES/SOFT TISSUES: Normal marrow signal. The major intracranial vascular flow-voids are maintained. ORBITS: No definite orbital abnormality accounting for technique. SINUSES/MASTOIDS: T2 fluid-filled signal opacification of the right maxillary sinus with associated polypoid soft tissue extending into the right middle nasal cavity. No middle ear or mastoid effusion.     IMPRESSION: 1.  No acute intracranial process. 2.  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 Follow-up ENT consultation is recommended.     XR Chest 2 Views    Result Date: 3/13/2023  CHEST TWO VIEWS   3/13/2023 10:40 AM HISTORY: Shortness of breath. COMPARISON: Chest x-ray on 12/12/2022.     IMPRESSION: AP and lateral  views of the chest were obtained. Right IJ central catheter tip projects over the high right atrium. Cardiomediastinal silhouette is within normal limits. No suspicious focal pulmonary opacities. No significant pleural effusion or pneumothorax. YONI HARDEN MD   SYSTEM ID:  [id number]    US Ext Arterial Venous Dialys Acs Graft    Result Date: 3/23/2023  US EXTREMITY ARTERIAL VENOUS DIALYSIS ACCESS GRAFT  3/23/2023 2:05 PM CLINICAL HISTORY/INDICATION: Creation of a left brachiobasilic AV fistula 3/9/23; AVF (arteriovenous fistula) (H); Other specified complication of vascular prosthetic devices, implants and grafts, initial encounter (H). COMPARISON: 2/8/2023 TECHNIQUE: Grayscale, color-flow, and spectral waveform analysis with velocities were performed of the dialysis access circuit. FINDINGS: The arterial inflow is patent with diameters ranging between 6.5 and 7.3 millimeters. Right upper extremity AV fistula is occluded. The AV fistula is significantly aneurysmal measuring up to 3 cm.     IMPRESSION: Occluded aneurysmal right AV fistula. ARNEL HICKS DO   SYSTEM ID:  [id number]     Ext Arterial Venous Dialys Acs Graft    Result Date: 3/15/2023  ULTRASOUND EXTREMITY ARTERIAL VENOUS DIALYSIS ACCESS GRAFT March 15, 2023 at 1338 hours HISTORY: 27-year-old patient with history of right arm AV fistula ligation and first stage creation of a left brachiobasilic AV fistula performed March 9, 2023. COMPARISON: None. TECHNIQUE: Color Doppler and spectral waveform analysis obtained in the inflow brachial artery as well as outflow basilic vein. FINDINGS: Inflow brachial artery ranges from 5.5-6.2 mm with systolic velocities ranging from 147-159 cm/second. The anastomosis is 800/579 cm/second. A rounded somewhat echogenic collection is noted near the anastomosis measuring 3.1 x 4 x 3 cm, with associated extrinsic compression of the fistulized basilic vein. Diameter near the extrinsic compression is 2.3 mm. Remaining  outflow vein ranges from 6-6.2 mm. Total blood flow volume is 893 mL/minute.     IMPRESSION: 1. Patent left upper arm brachiobasilic fistula. 2. However, echogenic collection near the AV anastomosis, likely a hematoma with associated extrinsic compression on the basilic vein just beyond the anastomosis, diminished to 2.3 mm. 3. Collection near the AV anastomosis is 3.1 x 4 x 3 cm, presumably a hematoma, with associated mass effect on the outflow basilic vein. PINEDA CAVAZOS MD   SYSTEM ID:  [id number]    US Upper Extremity Venous Duplex Right    Result Date: 3/12/2023  EXAM: US UPPER EXTREMITY VENOUS DUPLEX RIGHT LOCATION: [facility] DATE/TIME: 3/12/2023 9:36 PM INDICATION: Right upper arm pain, swelling, concern for DVT COMPARISON: Fluoroscopic images during dialysis catheter placement 03/09/2023, ultrasound 02/21/2023 TECHNIQUE: Venous Duplex ultrasound of the right upper extremity with (when possible) and without compression, augmentation, and duplex. Color flow and spectral Doppler with waveform analysis performed. FINDINGS: Ultrasound includes evaluation of the internal jugular vein, innominate vein, subclavian vein, axillary vein, and brachial vein. The superficial cephalic and basilic veins were also evaluated where seen. RIGHT: Near occlusive thrombus noted within the internal jugular vein. No additional acute deep vein thrombus visualized. No superficial thrombophlebitis. Clotted dialysis fistula noted in the right arm and shoulder.     IMPRESSION: 1.  Near occlusive deep vein thrombus in the right internal jugular vein. 2.  Clotted dialysis fistula partially visualized in the right shoulder and upper arm.     XR Chest Port 1 View    Result Date: 3/17/2023  EXAM: XR CHEST PORT 1 VIEW LOCATION: [facility] DATE/TIME: 3/17/2023 4:30 PM INDICATION: SOB. COMPARISON: Chest x-ray on 12/12/2022.     IMPRESSION: Single AP view of the chest was obtained. Right  central catheter tip projects over mid right atrium. Cardiomediastinal silhouette is within normal limits. No suspicious focal pulmonary opacities. No significant pleural effusion or pneumothorax.    IR CVC Tunnel Placement > 5 Yrs of Age    Result Date: 3/10/2023  IR CVC TUNNEL PLACEMENT > FIVE YEARS OF AGE 3/9/2023 8:58 AM CLINICAL HISTORY/INDICATION: Aneurysm of arteriovenous dialysis fistula, initial encounter (H). PROCEDURES PERFORMED: Tunneled catheter placement MODERATE SEDATION: Versed 1 mg IV; Fentanyl 50 mcg IV. During the time out, immediately prior to the administration of medications, the patient was reassessed for adequacy to receive conscious sedation. Under physician supervision, Versed and fentanyl were administered for moderate sedation. Pulse oximetry, heart rate and blood pressure were continuously monitored by an independent trained observer. The physician spent 11 minutes of face-to-face sedation time with the patient. CONTRAST: None FLUORO: 0.3 minutes IMAGES/DOSE: 3.88 mGy CONSENT: Following a discussion of the risks, benefits, indications and alternatives to treatment, appropriate informed consent was obtained. TIMEOUT: A timeout was performed per universal protocol policy to ensure correct patient, site, and procedure to be performed. CENTRAL LINE STATEMENT: The patient was brought to the interventional radiology suite and placed supine on the table. The patients right neck and anterior chest region were prepped and draped in a sterile fashion for tunneled line placement. The procedure was performed using maximal Sterile Barrier Technique Utilized: Cap AND mask AND sterile gown AND sterile gloves AND sterile full body drape AND hand hygiene AND skin preparation 2% chlorhexidine for cutaneous antisepsis (or acceptable alternative antiseptics). Sterile Ultrasound Technique Utilized ?Sterile gel AND sterile probe covers. PROCEDURE AND FINDINGS: This central line was performed in accordance  with the central line bundle with the exception of vein selection. Following a discussion of the risks, benefits, indications and alternatives to treatment, appropriate informed consent was obtained. The patient was brought to the interventional radiology suite and placed supine on the table. The patients right neck and anterior chest region were prepped and draped in a sterile fashion for tunneled line placement. A timeout was performed per universal protocol policy to ensure correct patient, site, and procedure to be performed. Ultrasound was utilized to evaluate the veins of the right neck and a permanent record of the image was obtained which demonstrates the internal jugular vein to be patent. Under direct ultrasound guidance, 1% Lidocaine was infiltrated and access was gained easily into the low lateral aspect of the internal jugular vein utilizing micropuncture technique. The wire was advanced easily under fluoroscopic guidance and the tract was serially dilated and a peel away sheath placed. A subcutaneous tunnel was then created over the anterior/superior chest wall using local anesthesia and blunt dissection. Under fluoroscopic guidance, a 14.5 [place] dual lumen 23 cm cuffed catheter was then pulled through the tunnel and was advanced through the peel away sheath. A final placement film demonstrates the catheter tip at the cavoatrial junction with the patient supine. All of the ports flush and aspirate without difficulty following placement. The catheter was secured in position. The small incision at the base of the neck was closed uneventfully. A sterile dressing was applied. Throughout the procedure, the patient was monitored by a radiology nurse for cardiac rhythm which remained stable. The patient tolerated the procedure well and left the interventional radiology suite in stable condition.     IMPRESSION: Uneventful placement of a right internal jugular vein 14.5 [place] dual lumen 23 cm tunneled  palindrome catheter, as described using ultrasound and fluoroscopic guidance. This catheter is indicated to be use for hemodialysis or pheresis. ARNEL HICKS DO   SYSTEM ID:  [id number]    CTA Head Neck w Contrast    Result Date: 3/15/2023  EXAM: CTA HEAD NECK W CONTRAST, CT HEAD W/O CONTRAST LOCATION: [facility] DATE/TIME: 3/15/2023 1:40 AM INDICATION: Diplopia. COMPARISON: None. CONTRAST: 75 mL Isovue 370 TECHNIQUE: Head and neck CT angiogram with IV contrast. Noncontrast head CT followed by axial helical CT images of the head and neck vessels obtained during the arterial phase of intravenous contrast administration. Axial 2D reconstructed images and multiplanar 3D MIP reconstructed images of the head and neck vessels were performed by the technologist. Dose reduction techniques were used. All stenosis measurements made according to NASCET criteria unless otherwise specified. FINDINGS: NONCONTRAST HEAD CT: INTRACRANIAL CONTENTS: No intracranial hemorrhage, extraaxial collection, or mass effect.  No CT evidence of acute infarct. Normal parenchymal attenuation. Normal ventricles and sulci. VISUALIZED ORBITS/SINUSES/MASTOIDS: No intraorbital abnormality. Opacification of a majority of the right maxillary sinus with soft tissue extending through the anterior mid to the middle nasal cavity and antrochoanal polyp. No middle ear or mastoid effusion. BONES/SOFT TISSUES: No acute abnormality. HEAD CTA: ANTERIOR CIRCULATION: No stenosis/occlusion, aneurysm, or high flow vascular malformation. Standard [place] anatomy. POSTERIOR CIRCULATION: No stenosis/occlusion, aneurysm, or high flow vascular malformation. Balanced vertebral arteries supply a normal basilar artery. DURAL VENOUS SINUSES: Expected enhancement of the major dural venous sinuses. NECK CTA: RIGHT CAROTID: No measurable stenosis or dissection. LEFT CAROTID: No measurable stenosis or dissection. VERTEBRAL ARTERIES: No focal  stenosis or dissection. Balanced vertebral arteries. AORTIC ARCH: Classic aortic arch anatomy with no significant stenosis at the origin of the great vessels. NONVASCULAR STRUCTURES: Unremarkable.     IMPRESSION: HEAD CT: 1.  No acute intracranial abnormality. 2.  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 This could be further evaluated with sinus CT on a nonemergent basis if indicated. HEAD CTA: 1.  Normal CTA [place]. NECK CTA: 1.  Normal neck CTA. CT head and CTA findings were discussed with Edwardo Tabor at 0148, 3/15/2023.    CT Head w/o Contrast    Result Date: 3/15/2023  EXAM: CTA HEAD NECK W CONTRAST, CT HEAD W/O CONTRAST LOCATION: [facility] DATE/TIME: 3/15/2023 1:40 AM INDICATION: Diplopia. COMPARISON: None. CONTRAST: 75 mL Isovue 370 TECHNIQUE: Head and neck CT angiogram with IV contrast. Noncontrast head CT followed by axial helical CT images of the head and neck vessels obtained during the arterial phase of intravenous contrast administration. Axial 2D reconstructed images and multiplanar 3D MIP reconstructed images of the head and neck vessels were performed by the technologist. Dose reduction techniques were used. All stenosis measurements made according to NASCET criteria unless otherwise specified. FINDINGS: NONCONTRAST HEAD CT: INTRACRANIAL CONTENTS: No intracranial hemorrhage, extraaxial collection, or mass effect.  No CT evidence of acute infarct. Normal parenchymal attenuation. Normal ventricles and sulci. VISUALIZED ORBITS/SINUSES/MASTOIDS: No intraorbital abnormality. Opacification of a majority of the right maxillary sinus with soft tissue extending through the anterior mid to the middle nasal cavity and antrochoanal polyp. No middle ear or mastoid effusion. BONES/SOFT TISSUES: No acute abnormality. HEAD CTA: ANTERIOR CIRCULATION: No stenosis/occlusion, aneurysm, or high flow vascular malformation. Standard [place]  anatomy. POSTERIOR CIRCULATION: No stenosis/occlusion, aneurysm, or high flow vascular malformation. Balanced vertebral arteries supply a normal basilar artery. DURAL VENOUS SINUSES: Expected enhancement of the major dural venous sinuses. NECK CTA: RIGHT CAROTID: No measurable stenosis or dissection. LEFT CAROTID: No measurable stenosis or dissection. VERTEBRAL ARTERIES: No focal stenosis or dissection. Balanced vertebral arteries. AORTIC ARCH: Classic aortic arch anatomy with no significant stenosis at the origin of the great vessels. NONVASCULAR STRUCTURES: Unremarkable.     IMPRESSION: HEAD CT: 1.  No acute intracranial abnormality. 2.  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 This could be further evaluated with sinus CT on a nonemergent basis if indicated. HEAD CTA: 1.  Normal CTA [place]. NECK CTA: 1.  Normal neck CTA. CT head and CTA findings were discussed with Edwardo Tabor at 0148, 3/15/2023.      LABS:      Sodium   Date Value Ref Range Status   03/19/2023 136 136 - 145 mmol/L Final   03/18/2023 138 136 - 145 mmol/L Final   03/17/2023 136 136 - 145 mmol/L Final   06/19/2021 139 133 - 144 mmol/L Final   06/18/2021 138 133 - 144 mmol/L Final   06/17/2021 138 133 - 144 mmol/L Final     Urea Nitrogen   Date Value Ref Range Status   03/19/2023 65.0 (H) 6.0 - 20.0 mg/dL Final   03/18/2023 48.3 (H) 6.0 - 20.0 mg/dL Final   03/17/2023 59.9 (H) 6.0 - 20.0 mg/dL Final   06/20/2022 35 (H) 7 - 30 mg/dL Final   03/03/2022 48 (H) 7 - 30 mg/dL Final   12/26/2021 91 (H) 7 - 30 mg/dL Final   06/19/2021 58 (H) 7 - 30 mg/dL Final   06/18/2021 95 (H) 7 - 30 mg/dL Final   06/17/2021 147 (H) 7 - 30 mg/dL Final     Hemoglobin   Date Value Ref Range Status   03/19/2023 8.7 (L) 13.3 - 17.7 g/dL Final   03/18/2023 8.6 (L) 13.3 - 17.7 g/dL Final   03/17/2023 8.5 (L) 13.3 - 17.7 g/dL Final   06/18/2021 9.1 (L) 13.3 - 17.7 g/dL Final   06/17/2021 8.9 (L) 13.3 - 17.7 g/dL Final   06/15/2021  9.3 (L) 13.3 - 17.7 g/dL Final     Platelet Count   Date Value Ref Range Status   03/19/2023 294 150 - 450 10e3/uL Final   03/18/2023 245 150 - 450 10e3/uL Final   03/17/2023 248 150 - 450 10e3/uL Final   06/18/2021 203 150 - 450 10e9/L Final   06/17/2021 213 150 - 450 10e9/L Final   06/15/2021 252 150 - 450 10e9/L Final     INR   Date Value Ref Range Status   03/15/2023 1.40 (H) 0.85 - 1.15 Final   02/09/2021 1.23 (H) 0.86 - 1.14 Final   07/18/2017 1.04 0.86 - 1.14 Final       30 minutes spent on the day of encounter doing chart review, history and exam, documentation, and further activities as noted.     Zaina Welsh, NP  VASCULAR SURGERY

## 2023-03-29 NOTE — PROGRESS NOTES
Patient is here to discuss follow up    BP (!) 159/123 (BP Location: Right arm, Patient Position: Chair, Cuff Size: Adult Regular)   Pulse 91   SpO2 99%     Questions patient would like addressed today are: N/A.    Refills are needed: No    Has homecare services and agency name:  [facility]uan[facility] MUKHERJEE

## 2023-03-30 ENCOUNTER — TELEPHONE (OUTPATIENT)
Dept: OTHER | Facility: CLINIC | Age: 28
End: 2023-03-30
Payer: MEDICARE

## 2023-03-30 NOTE — TELEPHONE ENCOUNTER
Left voicemail with instructions for patient to call back to schedule their appointment(s)    March 30, 2023 , 10:26 AM

## 2023-04-03 ENCOUNTER — DOCUMENTATION ONLY (OUTPATIENT)
Dept: LAB | Facility: CLINIC | Age: 28
End: 2023-04-03
Payer: MEDICARE

## 2023-04-03 NOTE — PROGRESS NOTES
Pt has a lab only requesting a D-Dimer. Currently no labs for this. Please review.    Questions or concerns, please have your care team contact pt directly.    Thank You  Ramila MORGAN II  [facility]

## 2023-04-04 ENCOUNTER — VIRTUAL VISIT (OUTPATIENT)
Dept: PHARMACY | Facility: CLINIC | Age: 28
End: 2023-04-04
Payer: MEDICARE

## 2023-04-04 ENCOUNTER — LAB (OUTPATIENT)
Dept: LAB | Facility: CLINIC | Age: 28
End: 2023-04-04
Payer: MEDICARE

## 2023-04-04 DIAGNOSIS — R25.2 CRAMP OF LIMB: ICD-10-CM

## 2023-04-04 DIAGNOSIS — N18.6 ESRD (END STAGE RENAL DISEASE) ON DIALYSIS (H): ICD-10-CM

## 2023-04-04 DIAGNOSIS — I82.90 ACUTE DEEP VEIN THROMBOSIS (DVT) OF NON-EXTREMITY VEIN: Primary | ICD-10-CM

## 2023-04-04 DIAGNOSIS — I10 ESSENTIAL HYPERTENSION: ICD-10-CM

## 2023-04-04 DIAGNOSIS — Z86.718 HISTORY OF DEEP VENOUS THROMBOSIS: ICD-10-CM

## 2023-04-04 DIAGNOSIS — Z99.2 ESRD (END STAGE RENAL DISEASE) ON DIALYSIS (H): ICD-10-CM

## 2023-04-04 DIAGNOSIS — Z86.718 HISTORY OF DEEP VENOUS THROMBOSIS: Primary | ICD-10-CM

## 2023-04-04 DIAGNOSIS — E78.5 HYPERLIPIDEMIA, UNSPECIFIED HYPERLIPIDEMIA TYPE: ICD-10-CM

## 2023-04-04 LAB — D DIMER PPP FEU-MCNC: 1.35 UG/ML FEU (ref 0–0.5)

## 2023-04-04 PROCEDURE — 99207 PR NO CHARGE LOS: CPT | Performed by: PHARMACIST

## 2023-04-04 PROCEDURE — 36415 COLL VENOUS BLD VENIPUNCTURE: CPT

## 2023-04-04 PROCEDURE — 85379 FIBRIN DEGRADATION QUANT: CPT

## 2023-04-04 NOTE — TELEPHONE ENCOUNTER
LM asking patient to call to schedule and stated this is our 2nd and final attempt to reach out but that patient should feel free to call our clinic at any point in the future to schedule. I also made a note in his 04/06/23 appointment with My Ayala PA-C to get scheduled for his AVF follow up.

## 2023-04-04 NOTE — PROGRESS NOTES
Medication Therapy Management (MTM) Encounter    ASSESSMENT:                            Medication Adherence/Access: No issues identified    DVT of R internal Jugular: Stable.     ESRD: Calcium levels have been low per our last labs. I do not have any labs since discharge, though. Recommended patient discuss Cinacalcet dose with txp team if they remain low.     Hypertension: BP not at goal <140/90. Managed by dialysis team. Gave several general recommendations to bring back to the team: I recommend switching amlodipine to nifedipine for better efficacy. Patient states he was on clonidine in the past at higher doses. I also recommended he take his Amlodipine in the evening to improve efficacy.     Hyperlipidemia: Stable.     Appetite/pain/cramping during dialysis: Stable.    PLAN:                            1. Amlodipine works better if taken in the evening.   2. Nifedipine may work better for you compared to Amlodipine. I recommend discussing with your dialysis team.   3. Follow-up up with dialysis concerning your low calcium levels and Cinacalcet dose.     Follow-up: as needed.     SUBJECTIVE/OBJECTIVE:                          Taylor Valiente is a 27 year old male called for a transitions of care visit. He was discharged from [facility] on 3/19 for DVT.      Reason for visit: JASPER.    Allergies/ADRs: Reviewed in chart  Past Medical History: Reviewed in chart  Tobacco: He reports that he has never smoked. He has never used smokeless tobacco.  Alcohol: not currently using    Medication Adherence/Access: no issues reported. Takes meds 3 TIMES DAILY.      DVT of R internal Jugular: Pt is taking Eliquis 5mg twice daily, Aspirin 81mg daily. Denies gastrointestinal bleed sx    ESRD: Pt is taking Sevelamer 4000mg 3 TIMES DAILY , Calcium Acetate 1334mg 3 TIMES DAILY, Renalvite once daily, Sensipar 90mg daily, Vitamin D3 50mcg daily. MWF dialysis. Follows a low phosphorus diet per patient.   Lab Results    Component Value Date    BUBBA 8.5 (L) 03/19/2023    BUBBA 8.4 (L) 03/18/2023    BUBBA 7.9 (L) 03/17/2023     Hypertension: Current medications include Amlodipine 10mg every morning, Hydralazine 100mg 3 TIMES DAILY, Clonidine 0.1mg twice daily, but just 0.1mg every evening on dialysis days, Carvedilol 25mg twice daily, Bumex 2mg daily (Pt is still making urine). Patient does not self-monitor blood pressure.  Patient reports no current medication side effects.  BP Readings from Last 3 Encounters:   03/29/23 (!) 159/123   03/21/23 101/66   03/19/23 (!) 166/95     Hyperlipidemia: Current therapy includes Atorvastatin 10mg daily.  Patient reports no significant myalgias or other side effects.  The ASCVD Risk score (Carla CAMILO, et al., 2019) failed to calculate for the following reasons:    The 2019 ASCVD risk score is only valid for ages 40 to 79  Recent Labs   Lab Test 12/26/21  1623 07/03/19  1529   CHOL 112 145   HDL 41 34*   LDL 59 86   TRIG 60 127     Appetite/pain/cramping during dialysis: Pt is taking Medical Marijuana uses joints for these symptoms, it is effective.     Today's Vitals: There were no vitals taken for this visit.  ----------------  Post Discharge Medication Reconciliation Status: discharge medications reconciled and changed, per note/orders.    I spent 20 minutes with this patient today. I offer these suggestions for consideration by dialysis team outside of system. A copy of the visit note was provided to the patient's provider(s).    A summary of these recommendations was sent via [employer].    Joaquin Wadsworth, PharmD  MTM Pharmacist    Phone: [phone]     Telemedicine Visit Details  Type of service:  Telephone visit  Start Time: 1:07 PM  End Time: 1:26 PM     Medication Therapy Recommendations  ESRD (end stage renal disease) on dialysis (H)    Current Medication: cinacalcet (SENSIPAR) 90 MG tablet   Rationale: Dose too high - Dosage too high - Safety   Recommendation: Referral to Service    Status:  Patient Agreed - Adherence/Education         Essential hypertension    Current Medication: amLODIPine (NORVASC) 10 MG tablet   Rationale: Incorrect administration - Dosage too low - Effectiveness   Recommendation: Change Administration Time   Status: Patient Agreed - Adherence/Education          Current Medication: amLODIPine (NORVASC) 10 MG tablet   Rationale: More effective medication available - Ineffective medication - Effectiveness   Recommendation: Referral to Service    Status: Patient Agreed - Adherence/Education

## 2023-04-04 NOTE — PATIENT INSTRUCTIONS
"Recommendations from today's MTM visit:                                                       1. Amlodipine works better if taken in the evening.   2. Nifedipine may work better for you compared to Amlodipine. I recommend discussing with your dialysis team.   3. Follow-up up with dialysis concerning your low calcium levels and Cinacalcet dose.     Follow-up: as needed    It was great speaking with you today.  I value your experience and would be very thankful for your time in providing feedback in our clinic survey. In the next few days, you may receive an email or text message from [employer] with a link to a survey related to your  clinical pharmacist.\"     To schedule another MTM appointment, please call the clinic directly or you may call the MTM scheduling line at [phone] or toll-free at [phone].     My Clinical Pharmacist's contact information:                                                      Please feel free to contact me with any questions or concerns you have.      Joaquin Wadsworth, PharmD  MTM Pharmacist    Phone: [phone]     "

## 2023-04-06 ENCOUNTER — OFFICE VISIT (OUTPATIENT)
Dept: OTHER | Facility: CLINIC | Age: 28
End: 2023-04-06
Attending: PHYSICIAN ASSISTANT
Payer: MEDICARE

## 2023-04-06 VITALS
DIASTOLIC BLOOD PRESSURE: 106 MMHG | HEIGHT: 71 IN | HEART RATE: 79 BPM | SYSTOLIC BLOOD PRESSURE: 156 MMHG | OXYGEN SATURATION: 98 % | BODY MASS INDEX: 33.85 KG/M2 | WEIGHT: 241.8 LBS

## 2023-04-06 DIAGNOSIS — I82.C11 ACUTE THROMBOSIS OF RIGHT INTERNAL JUGULAR VEIN (H): Primary | ICD-10-CM

## 2023-04-06 PROCEDURE — G0463 HOSPITAL OUTPT CLINIC VISIT: HCPCS

## 2023-04-06 PROCEDURE — G0463 HOSPITAL OUTPT CLINIC VISIT: HCPCS | Performed by: PHYSICIAN ASSISTANT

## 2023-04-06 PROCEDURE — 99204 OFFICE O/P NEW MOD 45 MIN: CPT | Performed by: PHYSICIAN ASSISTANT

## 2023-04-06 NOTE — PROGRESS NOTES
"Patient is here to discuss consult    BP (!) 156/106 (BP Location: Right arm, Patient Position: Chair, Cuff Size: Adult Regular)   Pulse 79   Ht 5' 11\" (1.803 m)   Wt 241 lb 12.8 oz (109.7 kg)   SpO2 98%   BMI 33.72 kg/m      Questions patient would like addressed today are: N/A.    Refills are needed: No    Has homecare services and agency name:  Juanita MUKHERJEE    "

## 2023-04-06 NOTE — PROGRESS NOTES
[facility] INITIAL VASCULAR MEDICINE CONSULT      PRIMARY HEALTH CARE PROVIDER:  Priyank Lawrence      REFERRING HEALTH CARE PROVIDER;  Zaina Welsh CNP      REASON FOR CONSULT:  Right internal jugular DVT      HPI: Taylor Valiente is a 27 year old non-smoking male with a history of ESRD secondary to hypertension/FSGS. He was initiated on dialysis in 2021. His right radial artery to cephalic vein AV fistula developed severe aneurysmal degeneration of the fistula and high fistula output. This was subsequently ligated on 3/9/23, during which time a tunneled right CVC catheter was placed as well as creation of first stage of left brachiobasilic AV fistula.     On 3/12/23 he presented to the ER with right arm swelling and pain. Venous duplex demonstrated a near occlusive deep vein thrombus in the right internal jugular vein. He was started on Eliquis and sent home. At dialysis the next day he developed increased neck pain, chest pain and leg pain. He was sent back to the ED. He admitted to not being able to get Eliquis and he was admitted to the hospital for IV heparin. After starting heparin, he developed a small hematoma near his new left AV fistula that needed to be evacuated on 3/16/23.     He is now doing well and continues on Eliquis. The swelling and pain in his right upper extremity is slowly improving. He now presents to our clinic to determine a plan for duration of anticoagulation.     He has no personal or family history of blood clots.     PAST MEDICAL HISTORY  Past Medical History:   Diagnosis Date     Adrenal adenoma, left      Anemia      Benign essential hypertension 07/28/2017     CKD (chronic kidney disease) stage 5, GFR less than 15 ml/min (H)     FSGS     Depression      Focal glomerular sclerosis 07/28/2017     HLD (hyperlipidemia)      LVH (left ventricular hypertrophy) due to hypertensive disease 07/14/2017     Noncompliance      Obesity, unspecified      OD  (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/2021     Stress-induced cardiomyopathy      Suicide attempt (H) 2019       CURRENT MEDICATIONS  ACE/ARB/ARNI NOT PRESCRIBED (INTENTIONAL), Please choose reason not prescribed from choices below.  acetaminophen (TYLENOL) 325 MG tablet, Take 2 tablets (650 mg) by mouth every 4 hours as needed for mild pain  amLODIPine (NORVASC) 10 MG tablet, Take 10 mg by mouth daily   Apixaban Starter Pack (ELIQUIS DVT/PE STARTER PACK) 5 MG TBPK, Take 10 mg by mouth 2 times daily for 6 days, THEN 5 mg 2 times daily for 23 days.  aspirin 81 MG EC tablet, Take 81 mg by mouth daily  atorvastatin (LIPITOR) 10 MG tablet, TAKE ONE TABLET BY MOUTH EVERY NIGHT AT BEDTIME  bumetanide (BUMEX) 2 MG tablet, Take 2 mg by mouth daily   calcium acetate (CALPHRON) 667 MG TABS tablet, Take 1,334 mg by mouth 3 times daily (with meals)  carvedilol (COREG) 25 MG tablet, TAKE TWO TABLETS BY MOUTH TWICE A DAY WITH A MEAL Strength: 25 mg  cinacalcet (SENSIPAR) 90 MG tablet, Take 90 mg by mouth daily  cloNIDine (CATAPRES) 0.1 MG tablet, Take 0.1 mg by mouth At Bedtime On dialysis days (Mon, Wed, Fri)  cloNIDine (CATAPRES) 0.1 MG tablet, Take 0.1 mg by mouth Twice daily on non dialysis days (Tue, Thurs, Sat, Sun)  hydrALAZINE (APRESOLINE) 100 MG tablet, TAKE ONE TABLET BY MOUTH THREE TIMES A DAY  medical cannabis (Patient's own supply), See Admin Instructions (The purpose of this order is to document that the patient reports taking medical cannabis.  This is not a prescription, and is not used to certify that the patient has a qualifying medical condition.)  multivitamin RENAL (RENAVITE RX/NEPHROVITE) 1 MG tablet, Take 1 tablet by mouth daily   nitroGLYcerin (NITROSTAT) 0.3 MG sublingual tablet, For chest pain place 1 tablet under the tongue every 5 minutes for 3 doses. If symptoms persist 5 minutes after 1st dose call 911.  sevelamer HCl (RENAGEL) 800 MG tablet, Take 4,000 mg by mouth 3 times daily (with meals) And 1600mg  with snacks.  vitamin D3 (CHOLECALCIFEROL) 2000 units (50 mcg) tablet, Take 50 mcg by mouth daily    No current facility-administered medications on file prior to visit.      PAST SURGICAL HISTORY:  Past Surgical History:   Procedure Laterality Date     ARTHROSCOPY ANKLE Left 2/24/2021    Procedure: Left ankle arthroscopy and debridement/micro fracture;  Surgeon: Mirza Nelson MD;  [facility]     [facility]     CREATE FISTULA ARTERIOVENOUS UPPER EXTREMITY Right 3/4/2021    Procedure: RIGHT proximal radial  to CEPHALIC ARTERIOVENOUS FISTULA;  Surgeon: Henrik Moran MD;  Location: [facility] OR     CREATE FISTULA ARTERIOVENOUS UPPER EXTREMITY Left 3/9/2023    Procedure: CREATION OF FIRST STAGE LEFT BRACHIOBASILIC ARTERIOVENOUS FISTULA;  Surgeon: Henrik Moran MD;  Location: [facility] OR     IR CVC TUNNEL PLACEMENT > 5 YRS OF AGE  6/17/2021     IR CVC TUNNEL PLACEMENT > 5 YRS OF AGE  3/9/2023     IR CVC TUNNEL REMOVAL RIGHT  12/3/2021     IRRIGATION AND DEBRIDEMENT UPPER EXTREMITY, COMBINED Left 3/16/2023    Procedure: EVACUATION OF LEFT ARM HEMATOMA;  Surgeon: Henrik Moran MD;  Location: [facility] OR     LIGATE FISTULA ARTERIOVENOUS UPPER EXTREMITY Right 3/9/2023    Procedure: LIGATION OF RIGHT ARM ARTERIOVENOUS FISTULA;  Surgeon: Henrik Moran MD;  Location: [facility] OR     NO HISTORY OF SURGERY       REVISION FISTULA ARTERIOVENOUS UPPER EXTREMITY Right 8/26/2021    Procedure: Second stage RIGHT BRACHIOCEPHALIC transposition ARTERIOVENOUS FISTULA;  Surgeon: Henrik Moran MD;  Location: [facility] OR       ALLERGIES     Allergies   Allergen Reactions     Benadryl [Diphenhydramine] Itching       FAMILY HISTORY  Family History   Problem Relation Age of Onset     Blood Disease Mother         has hep b     Diabetes Mother         gestionanal diabetes     Hypertension Mother      Obesity Father      Hypertension Father      Hypertension Maternal Grandmother      Diabetes Maternal  Grandmother      Hypertension Paternal Grandmother      Hypertension Paternal Grandfather      Coronary Artery Disease Maternal Uncle      Cancer No family hx of        SOCIAL HISTORY  Social History     Socioeconomic History     Marital status: Single     Spouse name: Not on file     Number of children: 0     Years of education: Not on file     Highest education level: Not on file   Occupational History     Occupation:      Occupation: kitchen     Comment: [email or web address]   Tobacco Use     Smoking status: Never     Smokeless tobacco: Never   Vaping Use     Vaping status: Never Used   Substance and Sexual Activity     Alcohol use: No     Drug use: Yes     Types: Marijuana     Comment: occ     Sexual activity: Not Currently     Partners: Female   Other Topics Concern     Parent/sibling w/ CABG, MI or angioplasty before 65F 55M? Not Asked   Social History Narrative     Not on file     Social Determinants of Health     Financial Resource Strain: Medium Risk (12/28/2021)    Overall Financial Resource Strain (CARDIA)      Difficulty of Paying Living Expenses: Somewhat hard   Food Insecurity: Food Insecurity Present (12/28/2021)    Hunger Vital Sign      Worried About Running Out of Food in the Last Year: Sometimes true      Ran Out of Food in the Last Year: Sometimes true   Transportation Needs: No Transportation Needs (12/28/2021)    PRAPARE - Transportation      Lack of Transportation (Medical): No      Lack of Transportation (Non-Medical): No   Physical Activity: Inactive (12/28/2021)    Exercise Vital Sign      Days of Exercise per Week: 0 days      Minutes of Exercise per Session: 30 min   Stress: Stress Concern Present (12/28/2021)    [employer] [employer] - Occupational Stress Questionnaire      Feeling of Stress : To some extent   Social Connections: Moderately Isolated (12/28/2021)    Social Connection and Isolation Panel [NHANES]      Frequency of Communication with Friends  "and Family: Once a week      Frequency of Social Gatherings with Friends and Family: More than three times a week      Attends Adventism Services: 1 to 4 times per year      Active Member of Clubs or Organizations: No      Attends Club or Organization Meetings: Not on file      Marital Status: Never    Intimate Partner Violence: Not on file   Housing Stability: High Risk (12/28/2021)    Housing Stability Vital Sign      Unable to Pay for Housing in the Last Year: Yes      Number of Places Lived in the Last Year: 1      Unstable Housing in the Last Year: No       ROS:   General: No change in weight, sleep or appetite.  Normal energy.  No fever or chills  Eyes: Negative for vision changes or eye problems  ENT: No problems with ears, nose or throat.  No difficulty swallowing.  Resp: No coughing, wheezing or shortness of breath  CV: No chest pains or palpitations  GI: No nausea, vomiting,  heartburn, abdominal pain, diarrhea, constipation or change in bowel habits  : No urinary frequency or dysuria, bladder or kidney problems  Musculoskeletal: No significant muscle or joint pains  Neurologic: No headaches, numbness, tingling, weakness, problems with balance or coordination  Psychiatric: No problems with anxiety, depression or mental health  Heme/immune/allergy: No history of bleeding or clotting problems or anemia.  No allergies or immune system problems  Endocrine: No history of thyroid disease, diabetes or other endocrine disorders  Skin: No rashes,worrisome lesions or skin problems  Vascular:  No claudication, lifestyle limiting or otherwise; no ischemic rest pain; no non-healing ulcers. No weakness, No loss of sensation    EXAM:  BP (!) 156/106 (BP Location: Right arm, Patient Position: Chair, Cuff Size: Adult Regular)   Pulse 79   Ht 5' 11\" (1.803 m)   Wt 241 lb 12.8 oz (109.7 kg)   SpO2 98%   BMI 33.72 kg/m    In general, the patient is a pleasant male in no apparent distress.    HEENT: NC/AT.  " PERRLA.  EOMI.  Sclerae white, not injected.  Nares clear.  Pharynx without erythema or exudate.  Dentition intact.    Neck: Carotids +2/2 bilaterally without bruits. Right tunneled internal jugular catheter.   Heart: RRR. Normal S1, S2 splits physiologically. No murmur, rub, click, or gallop.   Lungs: CTA.  No ronchi, wheezes, rales.    Abdomen: Soft, nontender, nondistended.  Extremities: No clubbing, cyanosis, or edema. Right upper extremity with aneurysmal old AV fistula. Mild edema noted in upper arm. Palpable thrill in LUE fistula. Incisions are well healed. No wounds.       Labs:  LIPID RESULTS:  Lab Results   Component Value Date    CHOL 112 12/26/2021    CHOL 145 07/03/2019    HDL 41 12/26/2021    HDL 34 (L) 07/03/2019    LDL 59 12/26/2021    LDL 86 07/03/2019    TRIG 60 12/26/2021    TRIG 127 07/03/2019       LIVER ENZYME RESULTS:  Lab Results   Component Value Date    AST 25 03/13/2023    AST 6 02/09/2021    ALT 9 (L) 03/13/2023    ALT 19 02/09/2021       CBC RESULTS:  Lab Results   Component Value Date    WBC 12.8 (H) 03/19/2023    WBC 6.8 06/18/2021    RBC 2.96 (L) 03/19/2023    RBC 3.35 (L) 06/18/2021    HGB 8.7 (L) 03/19/2023    HGB 9.1 (L) 06/18/2021    HCT 27.3 (L) 03/19/2023    HCT 28.1 (L) 06/18/2021    MCV 92 03/19/2023    MCV 84 06/18/2021    MCH 29.4 03/19/2023    MCH 27.2 06/18/2021    MCHC 31.9 03/19/2023    MCHC 32.4 06/18/2021    RDW 13.7 03/19/2023    RDW 13.4 06/18/2021     03/19/2023     06/18/2021       BMP RESULTS:  Lab Results   Component Value Date     03/19/2023     06/19/2021    POTASSIUM 5.1 03/19/2023    POTASSIUM 3.9 06/20/2022    POTASSIUM 3.8 06/19/2021    CHLORIDE 94 (L) 03/19/2023    CHLORIDE 102 06/20/2022    CHLORIDE 105 06/19/2021    CO2 28 03/19/2023    CO2 25 06/20/2022    CO2 27 06/19/2021    ANIONGAP 14 03/19/2023    ANIONGAP 9 06/20/2022    ANIONGAP 7 06/19/2021     (H) 03/19/2023     (H) 03/17/2023     (H) 06/20/2022     GLC 99 06/19/2021    BUN 65.0 (H) 03/19/2023    BUN 35 (H) 06/20/2022    BUN 58 (H) 06/19/2021    CR 14.26 (H) 03/19/2023    CR 8.93 (H) 06/19/2021    GFRESTIMATED 4 (L) 03/19/2023    GFRESTIMATED 7 (L) 06/09/2022    GFRESTIMATED 7 (L) 06/19/2021    GFRESTBLACK 9 (L) 06/19/2021    BUBBA 8.5 (L) 03/19/2023    BUBBA 8.6 06/19/2021        A1C RESULTS:  Lab Results   Component Value Date    A1C 5.1 08/26/2021    A1C 5.0 03/04/2021       THYROID RESULTS:  Lab Results   Component Value Date    TSH 1.67 03/03/2022    TSH 4.88 (H) 07/14/2017       Procedures:   EXAM: US UPPER EXTREMITY VENOUS DUPLEX RIGHT  LOCATION: [facility]  DATE/TIME: 3/12/2023 9:36 PM     INDICATION: Right upper arm pain, swelling, concern for DVT  COMPARISON: Fluoroscopic images during dialysis catheter placement 03/09/2023, ultrasound 02/21/2023  TECHNIQUE: Venous Duplex ultrasound of the right upper extremity with (when possible) and without compression, augmentation, and duplex. Color flow and spectral Doppler with waveform analysis performed.     FINDINGS: Ultrasound includes evaluation of the internal jugular vein, innominate vein, subclavian vein, axillary vein, and brachial vein. The superficial cephalic and basilic veins were also evaluated where seen.      RIGHT: Near occlusive thrombus noted within the internal jugular vein. No additional acute deep vein thrombus visualized. No superficial thrombophlebitis. Clotted dialysis fistula noted in the right arm and shoulder.                                                                      IMPRESSION:  1.  Near occlusive deep vein thrombus in the right internal jugular vein.  2.  Clotted dialysis fistula partially visualized in the right shoulder and upper arm.      Assessment and Plan:   Right internal jugular vein DVT, catheter associated    -Tunneled HD catheter was placed in the right internal jugular vein on 3/9/23, which was at the same time his right radial artery to  cephalic vein AV fistula was ligated and a new left brachiobasilic AV fistula was created.   -Started on IV heparin and transitioned over to Eliquis in hospital.   -Edema and pain improving some.   -No issue with using tunneled HD catheter.    Recommendations:   -Continue Eliquis. Typically in patients with ESRD would treat patients with Warfarin over Eliquis as there are not sufficient studies out there to show efficacy of Eliquis in patient's with ESRD. However, as he is tolerating this and his symptoms are improving, continue for now.   -Will repeat a right upper extremity venous duplex to re-evaluate the right internal jugular DVT in 2 weeks.   -Ideally, his hemodialysis catheter should be removed. However, as this is not possible presently, he should remain on anticoagulation until his tunneled hemodialysis catheter can be removed.   -It is noted that he reports he will be put on the transplant list once he is able to come off anticoagulation.         My Ayala PA-C      45 minutes spent on the date of the encounter doing chart review, history and exam, documentation, and further activities as noted above.

## 2023-04-06 NOTE — PATIENT INSTRUCTIONS
Get ultrasound of right arm/neck  in 2 weeks. Follow-up after.     2. Continue Eliquis as long as you have the catheter in.     3. Call us with any questions or concerns ([phone]).

## 2023-04-19 ENCOUNTER — HOSPITAL ENCOUNTER (EMERGENCY)
Facility: CLINIC | Age: 28
Discharge: HOME | End: 2023-04-19
Attending: PHYSICIAN ASSISTANT | Admitting: PHYSICIAN ASSISTANT
Payer: MEDICARE

## 2023-04-19 ENCOUNTER — LAB REQUISITION (OUTPATIENT)
Dept: LAB | Facility: CLINIC | Age: 28
End: 2023-04-19

## 2023-04-19 VITALS
DIASTOLIC BLOOD PRESSURE: 127 MMHG | SYSTOLIC BLOOD PRESSURE: 173 MMHG | TEMPERATURE: 98.8 F | HEART RATE: 84 BPM | OXYGEN SATURATION: 92 % | RESPIRATION RATE: 20 BRPM

## 2023-04-19 DIAGNOSIS — R94.31 PROLONGED Q-T INTERVAL ON ECG: ICD-10-CM

## 2023-04-19 DIAGNOSIS — N18.6 ESRD (END STAGE RENAL DISEASE) ON DIALYSIS (H): ICD-10-CM

## 2023-04-19 DIAGNOSIS — R04.0 EPISTAXIS: ICD-10-CM

## 2023-04-19 DIAGNOSIS — N18.6 END STAGE RENAL DISEASE (H): ICD-10-CM

## 2023-04-19 DIAGNOSIS — Z99.2 ESRD (END STAGE RENAL DISEASE) ON DIALYSIS (H): ICD-10-CM

## 2023-04-19 LAB
ANION GAP SERPL CALCULATED.3IONS-SCNC: 15 MMOL/L (ref 7–15)
BASOPHILS # BLD AUTO: 0.1 10E3/UL (ref 0–0.2)
BASOPHILS NFR BLD AUTO: 1 %
BUN SERPL-MCNC: 28.5 MG/DL (ref 6–20)
CALCIUM SERPL-MCNC: 9 MG/DL (ref 8.6–10)
CHLORIDE SERPL-SCNC: 94 MMOL/L (ref 98–107)
CREAT SERPL-MCNC: 5.92 MG/DL (ref 0.67–1.17)
DEPRECATED HCO3 PLAS-SCNC: 26 MMOL/L (ref 22–29)
EOSINOPHIL # BLD AUTO: 0.5 10E3/UL (ref 0–0.7)
EOSINOPHIL NFR BLD AUTO: 6 %
ERYTHROCYTE [DISTWIDTH] IN BLOOD BY AUTOMATED COUNT: 14.2 % (ref 10–15)
ERYTHROCYTE [DISTWIDTH] IN BLOOD BY AUTOMATED COUNT: 14.3 % (ref 10–15)
GFR SERPL CREATININE-BSD FRML MDRD: 13 ML/MIN/1.73M2
GLUCOSE SERPL-MCNC: 103 MG/DL (ref 70–99)
HCT VFR BLD AUTO: 21.4 % (ref 40–53)
HCT VFR BLD AUTO: 26.9 % (ref 40–53)
HGB BLD-MCNC: 6.7 G/DL (ref 13.3–17.7)
HGB BLD-MCNC: 8.7 G/DL (ref 13.3–17.7)
IMM GRANULOCYTES # BLD: 0.1 10E3/UL
IMM GRANULOCYTES NFR BLD: 1 %
LYMPHOCYTES # BLD AUTO: 2.3 10E3/UL (ref 0.8–5.3)
LYMPHOCYTES NFR BLD AUTO: 25 %
MAGNESIUM SERPL-MCNC: 1.8 MG/DL (ref 1.7–2.3)
MCH RBC QN AUTO: 27.7 PG (ref 26.5–33)
MCH RBC QN AUTO: 27.8 PG (ref 26.5–33)
MCHC RBC AUTO-ENTMCNC: 31.3 G/DL (ref 31.5–36.5)
MCHC RBC AUTO-ENTMCNC: 32.3 G/DL (ref 31.5–36.5)
MCV RBC AUTO: 86 FL (ref 78–100)
MCV RBC AUTO: 89 FL (ref 78–100)
MONOCYTES # BLD AUTO: 0.8 10E3/UL (ref 0–1.3)
MONOCYTES NFR BLD AUTO: 9 %
NEUTROPHILS # BLD AUTO: 5.5 10E3/UL (ref 1.6–8.3)
NEUTROPHILS NFR BLD AUTO: 58 %
NRBC # BLD AUTO: 0 10E3/UL
NRBC BLD AUTO-RTO: 0 /100
PLAT MORPH BLD: NORMAL
PLATELET # BLD AUTO: 163 10E3/UL (ref 150–450)
PLATELET # BLD AUTO: 188 10E3/UL (ref 150–450)
POTASSIUM SERPL-SCNC: 3.7 MMOL/L (ref 3.4–5.3)
RBC # BLD AUTO: 2.41 10E6/UL (ref 4.4–5.9)
RBC # BLD AUTO: 3.14 10E6/UL (ref 4.4–5.9)
RBC MORPH BLD: NORMAL
SODIUM SERPL-SCNC: 135 MMOL/L (ref 136–145)
WBC # BLD AUTO: 9.2 10E3/UL (ref 4–11)
WBC # BLD AUTO: 9.4 10E3/UL (ref 4–11)

## 2023-04-19 PROCEDURE — 85025 COMPLETE CBC W/AUTO DIFF WBC: CPT | Performed by: EMERGENCY MEDICINE

## 2023-04-19 PROCEDURE — 80048 BASIC METABOLIC PNL TOTAL CA: CPT | Performed by: EMERGENCY MEDICINE

## 2023-04-19 PROCEDURE — 93005 ELECTROCARDIOGRAM TRACING: CPT

## 2023-04-19 PROCEDURE — 99284 EMERGENCY DEPT VISIT MOD MDM: CPT

## 2023-04-19 PROCEDURE — 83735 ASSAY OF MAGNESIUM: CPT | Performed by: PHYSICIAN ASSISTANT

## 2023-04-19 PROCEDURE — 85027 COMPLETE CBC AUTOMATED: CPT | Performed by: INTERNAL MEDICINE

## 2023-04-19 PROCEDURE — 36415 COLL VENOUS BLD VENIPUNCTURE: CPT | Performed by: EMERGENCY MEDICINE

## 2023-04-19 ASSESSMENT — ACTIVITIES OF DAILY LIVING (ADL)
ADLS_ACUITY_SCORE: 33
ADLS_ACUITY_SCORE: 35

## 2023-04-19 ASSESSMENT — ENCOUNTER SYMPTOMS
BLOOD IN STOOL: 0
SHORTNESS OF BREATH: 0
FATIGUE: 1
LIGHT-HEADEDNESS: 1

## 2023-04-19 NOTE — ED PROVIDER NOTES
History     Chief Complaint:  Epistaxis and Abnormal Labs       The history is provided by the patient.      Taylor Valiente is a 27 year old male, anticoagulated on Eliquis, with a history of ESRD on dialysis MWF, hypertension, and internal jugular thrombosis who presents with low hemoglobin from dialysis. He had a nosebleed after a full run, last active one hour prior to my visit. Reports some lightheadedness and fatigue. Denies chest pain, shortness of breath. Denies hemoptysis or blood in stool. He is eating and drinking normally.     Independent Historian:   None - Patient Only    Review of External Notes:   None     ROS:  Review of Systems   Constitutional: Positive for fatigue.   HENT: Positive for nosebleeds.    Respiratory: Negative for shortness of breath.    Cardiovascular: Negative for chest pain.   Gastrointestinal: Negative for blood in stool.   Neurological: Positive for light-headedness.   All other systems reviewed and are negative.    Allergies:  Benadryl [Diphenhydramine]     Medications:    Norvasc   Aspirin 81 mg   Lipitor  Bumex  Calphron  Coreg  Sensipar   Catapres  Apresoline  Vitamin D     Past Medical History:    Adrenal adenoma, left  Anemia   Hypertension   CKD, stage 5  Depression   Hyperlipidemia   Obesity  Stress-induced cardiomyopathy  Osteochondritis dissecans  Focal glomular sclerosis  Suicide attempt  DVT     Past Surgical History:    Arthroscopy ankle  Renal biopsy   Create fistula upper extremity, bilateral   CVC tunnel placement  Irrigation and debridement upper extremity, left  Ligate fistula arteriovenous, right upper extremity     Family History:    Mother- blood disease, diabetes mellitus, hypertension   Father- obesity, hypertension     Social History:  The patient presents to the ED with his aunt and uncle via private vehicle.  PCP: Priyank Lawrence     Physical Exam     Patient Vitals for the past 24 hrs:   BP Temp Temp src Pulse Resp SpO2   04/19/23 1630 (!) 184/133 --  -- 79 -- 99 %   04/19/23 1615 (!) 185/133 -- -- 79 -- 92 %   04/19/23 1600 (!) 146/138 -- -- -- -- --   04/19/23 1112 (!) 159/106 98.8  F (37.1  C) Oral 85 20 98 %        Physical Exam  Constitutional: Pleasant. Cooperative.   Eyes: Pupils equally round and reactive  HENT: Head is normal in appearance. Oropharynx is normal with moist mucus membranes. Right naris with dried blood present.   Cardiovascular: Regular rate and rhythm and without murmurs.  Respiratory: Normal respiratory effort, lungs are clear bilaterally.  Musculoskeletal: No asymmetry of the lower extremities.  Skin: Normal, without rash.  Neurologic: Cranial nerves grossly intact, normal cognition, no focal deficits. Alert and oriented x 3.   Psychiatric: Normal affect.  Nursing notes and vital signs reviewed.      Emergency Department Course   ECG:  ECG results from 04/19/23   EKG 12-lead, tracing only     Value    Systolic Blood Pressure     Diastolic Blood Pressure     Ventricular Rate 79    Atrial Rate 79    CT Interval 158    QRS Duration 82        QTc 550    P Axis 46    R AXIS 45    T Axis 105    Interpretation ECG      Sinus rhythm  T wave abnormality, consider lateral ischemia  Abnormal ECG  When compared with ECG of 17-MAR-2023 15:25,  Inverted T waves have replaced nonspecific T wave abnormality in Lateral leads       Laboratory:  Labs Ordered and Resulted from Time of ED Arrival to Time of ED Departure   BASIC METABOLIC PANEL - Abnormal       Result Value    Sodium 135 (*)     Potassium 3.7      Chloride 94 (*)     Carbon Dioxide (CO2) 26      Anion Gap 15      Urea Nitrogen 28.5 (*)     Creatinine 5.92 (*)     Calcium 9.0      Glucose 103 (*)     GFR Estimate 13 (*)    CBC WITH PLATELETS AND DIFFERENTIAL - Abnormal    WBC Count 9.2      RBC Count 3.14 (*)     Hemoglobin 8.7 (*)     Hematocrit 26.9 (*)     MCV 86      MCH 27.7      MCHC 32.3      RDW 14.2      Platelet Count       MAGNESIUM - Normal    Magnesium 1.8        Emergency  Department Course & Assessments:  PSS-3    Date and Time Over the past 2 weeks have you felt down, depressed, or hopeless? Over the past 2 weeks have you had thoughts of killing yourself? Have you ever attempted to kill yourself? When did this last happen? User   04/19/23 1117 no no yes more than 6 months ago SC                Item Assessment   Suicidal Ideation None     Interventions:  Medications - No data to display     Independent Interpretation (X-rays, CTs, rhythm strip):  None    Assessments/Consultations/Discussion of Management or Tests:  ED Course as of 04/19/23 1720   Wed Apr 19, 2023   1501 I obtained the history and examined the patient as noted above.    1559 I rechecked and updated the patient. He blew out a small clot.       Social Determinants of Health affecting care:   None    Disposition:  The patient was discharged to home.     Impression & Plan    Medical Decision Making:  Taylor Valiente is a 27 year old male with complex medical history including ESRD on dialysis (just completed run today), HTN, thrombosis on eliquis, who presents to the ED for evaluation of epistaxis as well as concern for anemia as per labs drawn at dialysis. See HPI as above for additional details. On my evaluation, patient no longer has epistaxis. He is able to blow out a small clot without any recurrent bleeding. I rechecked CBC and HGB returns reassuring at 8.7 (up from 6.7 noted on quick draw from dialysis machine). ECG obtained prior to my evaluation, likely secondary to anemia. Patient is without CP or dyspnea with reassuring vital signs. ECG shows some changes, however most are consistent with prior ECG. Patient's QT is prolonged. Looking through old ECGs, he does intermittently have prolonged QTc. He has had no medication changes. Electrolytes were checked, no marked changes noted that would be etiology of QT prolongation. Will defer on any further intervention at this time as patient is asymptomatic, and this  is not definitively new for patient. He will f/u closely with PCP as needed. Discussed reasons to return. All questions answered. Patient discharged to home in stable condition.    Diagnosis:    ICD-10-CM    1. Epistaxis  R04.0       2. Prolonged Q-T interval on ECG  R94.31       3. ESRD (end stage renal disease) on dialysis (H)  N18.6     Z99.2            Discharge Medications:  New Prescriptions    No medications on file        Scribe Disclosure:  I, Terrie Clay, am serving as a scribe at 3:12 PM on 4/19/2023 to document services personally performed by Jun Nieves PA-C based on my observations and the provider's statements to me.     4/19/2023   Jun Nieves PA-C     This record was created at least in part using electronic voice recognition software, so please excuse any typographical errors.       Jun Nieves PA-C  04/19/23 2211

## 2023-04-19 NOTE — DISCHARGE INSTRUCTIONS
Purchase Afrin (oxymetazoline).   With recurrent nosebleed, spray 1 shot of Afrin up both nostrils and apply a clamp. Keep this clamp in place for at least 20 minutes without removing. The pressure must remain constant.  If you continue to bleed despite this, return to the emergency department.

## 2023-04-19 NOTE — ED TRIAGE NOTES
Pt brought by EMS from  Dialysis, pt was started on eliquis a  Month ago for a clot in the right side of his neck. Pt started having throbbing pain in right nostril yesterday, pt began having a right sided nosebleed today while receiving dialysis. Hgb checked and found to be 6.7, pt endorses feeling dizzy that started during dialysis, pt advised that his BP dropped during treatment. Previous fistula on right arm, pt has BP's taken on rt forearm, new fistula in left arm that is not yet active, pt receiving treatment through right chest port.      Triage Assessment     Row Name 04/19/23 1114       Triage Assessment (Adult)    Airway WDL WDL       Respiratory WDL    Respiratory WDL WDL       Skin Circulation/Temperature WDL    Skin Circulation/Temperature WDL WDL       Cardiac WDL    Cardiac WDL WDL       Peripheral/Neurovascular WDL    Peripheral Neurovascular WDL WDL       Cognitive/Neuro/Behavioral WDL    Cognitive/Neuro/Behavioral WDL WDL

## 2023-04-20 LAB
ATRIAL RATE - MUSE: 79 BPM
DIASTOLIC BLOOD PRESSURE - MUSE: NORMAL MMHG
INTERPRETATION ECG - MUSE: NORMAL
P AXIS - MUSE: 46 DEGREES
PR INTERVAL - MUSE: 158 MS
QRS DURATION - MUSE: 82 MS
QT - MUSE: 480 MS
QTC - MUSE: 550 MS
R AXIS - MUSE: 45 DEGREES
SYSTOLIC BLOOD PRESSURE - MUSE: NORMAL MMHG
T AXIS - MUSE: 105 DEGREES
VENTRICULAR RATE- MUSE: 79 BPM

## 2023-04-20 NOTE — ANESTHESIA POSTPROCEDURE EVALUATION
Patient: Taylor Valiente    Procedure(s):  RIGHT proximal radial  to CEPHALIC ARTERIOVENOUS FISTULA    Diagnosis:End stage renal disease (H) [N18.6]  Diagnosis Additional Information: No value filed.    Anesthesia Type:  General    Note:     Postop Pain Control: Uneventful            Sign Out: Well controlled pain   PONV: No   Neuro/Psych: Uneventful            Sign Out: Acceptable/Baseline neuro status   Airway/Respiratory: Uneventful            Sign Out: Acceptable/Baseline resp. status   CV/Hemodynamics: Uneventful            Sign Out: Acceptable CV status   Other NRE: NONE   DID A NON-ROUTINE EVENT OCCUR? No         Last vitals:  Vitals:    03/04/21 1130 03/04/21 1145 03/04/21 1159   BP: 128/81 (!) 130/109 128/82   Pulse: 70 74 56   Resp: 12 13 12   Temp:  35.9  C (96.6  F)    SpO2: 98% 100% 100%       Last vitals prior to Anesthesia Care Transfer:  CRNA VITALS  3/4/2021 1020 - 3/4/2021 1120      3/4/2021             Pulse:  62    SpO2:  100 %    Resp Rate (set):  10          Electronically Signed By: Phoebe Ledesma MD  March 4, 2021  3:42 PM  
No PMH, NKDA. Was at park on Monday. Minuscule lac noted on R hamstring. Per pt no falls or trauma was done to area. No pain meds. No fevers. No n/v/d. Pt awake, alert, interacting appropriately. Pt coloring appropriate, brisk capillary refill noted, easy WOB noted.

## 2023-04-21 ENCOUNTER — HOSPITAL ENCOUNTER (OUTPATIENT)
Dept: ULTRASOUND IMAGING | Facility: CLINIC | Age: 28
Discharge: HOME | End: 2023-04-21
Attending: PHYSICIAN ASSISTANT
Payer: MEDICARE

## 2023-04-21 ENCOUNTER — OFFICE VISIT (OUTPATIENT)
Dept: OTHER | Facility: CLINIC | Age: 28
End: 2023-04-21
Attending: PHYSICIAN ASSISTANT
Payer: MEDICARE

## 2023-04-21 VITALS
OXYGEN SATURATION: 100 % | DIASTOLIC BLOOD PRESSURE: 123 MMHG | HEART RATE: 87 BPM | WEIGHT: 235.8 LBS | BODY MASS INDEX: 32.89 KG/M2 | SYSTOLIC BLOOD PRESSURE: 163 MMHG

## 2023-04-21 DIAGNOSIS — I82.C11 ACUTE THROMBOSIS OF RIGHT INTERNAL JUGULAR VEIN (H): ICD-10-CM

## 2023-04-21 DIAGNOSIS — I82.C11 ACUTE THROMBOSIS OF RIGHT INTERNAL JUGULAR VEIN (H): Primary | ICD-10-CM

## 2023-04-21 PROCEDURE — 93971 EXTREMITY STUDY: CPT | Mod: RT

## 2023-04-21 PROCEDURE — 99214 OFFICE O/P EST MOD 30 MIN: CPT | Performed by: PHYSICIAN ASSISTANT

## 2023-04-21 PROCEDURE — G0463 HOSPITAL OUTPT CLINIC VISIT: HCPCS | Mod: 25

## 2023-04-21 NOTE — PROGRESS NOTES
[facility]  VASCULAR MEDICINE FOLLOW-UP VISIT      PRIMARY HEALTH CARE PROVIDER:  Priyank Lawrence    REASON FOR VISIT:  Follow-up right internal jugular DVT      HPI: Taylor Valiente is a 27 year old non-smoking male with a history of ESRD secondary to hypertension/FSGS. He was initiated on dialysis in 2021. His right radial artery to cephalic vein AV fistula developed severe aneurysmal degeneration of the fistula and high fistula output. This was subsequently ligated on 3/9/23, during which time a tunneled right CVC catheter was placed as well as creation of first stage of left brachiobasilic AV fistula.      On 3/12/23 he presented to the ER with right arm swelling and pain. Venous duplex demonstrated a near occlusive deep vein thrombus in the right internal jugular vein. He was started on Eliquis and sent home. At dialysis the next day he developed increased neck pain, chest pain and leg pain. He was sent back to the ED. He admitted to not being able to get Eliquis and he was admitted to the hospital for IV heparin. After starting heparin, he developed a small hematoma near his new left AV fistula that needed to be evacuated on 3/16/23.      He is now doing well and continues on Eliquis. The swelling and pain in his right upper extremity has improved/nearly resolved. He now presents to review repeat US results. He was in the ED 2 days ago for epistaxis and anemia at HD that ultimately resolved. He missed his follow-up with PASQUALE Moran because of this.      He has no personal or family history of blood clots.       PAST MEDICAL HISTORY  Past Medical History:   Diagnosis Date     Adrenal adenoma, left      Anemia      Benign essential hypertension 07/28/2017     CKD (chronic kidney disease) stage 5, GFR less than 15 ml/min (H)     FSGS     Depression      Focal glomerular sclerosis 07/28/2017     HLD (hyperlipidemia)      LVH (left ventricular hypertrophy) due to hypertensive disease 07/14/2017      Noncompliance      Obesity, unspecified      OD (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/2021     Stress-induced cardiomyopathy      Suicide attempt (H) 2019       PAST SURGICAL HISTORY:  Past Surgical History:   Procedure Laterality Date     ARTHROSCOPY ANKLE Left 2/24/2021    Procedure: Left ankle arthroscopy and debridement/micro fracture;  Surgeon: Mirza Nelson MD;  Location: [facility] OR     BIOPSY  2017    renal- [facility]     CREATE FISTULA ARTERIOVENOUS UPPER EXTREMITY Right 3/4/2021    Procedure: RIGHT proximal radial  to CEPHALIC ARTERIOVENOUS FISTULA;  Surgeon: Henrik Moran MD;  Location: [facility] OR     CREATE FISTULA ARTERIOVENOUS UPPER EXTREMITY Left 3/9/2023    Procedure: CREATION OF FIRST STAGE LEFT BRACHIOBASILIC ARTERIOVENOUS FISTULA;  Surgeon: Henrik Moran MD;  Location: [facility] OR     IR CVC TUNNEL PLACEMENT > 5 YRS OF AGE  6/17/2021     IR CVC TUNNEL PLACEMENT > 5 YRS OF AGE  3/9/2023     IR CVC TUNNEL REMOVAL RIGHT  12/3/2021     IRRIGATION AND DEBRIDEMENT UPPER EXTREMITY, COMBINED Left 3/16/2023    Procedure: EVACUATION OF LEFT ARM HEMATOMA;  Surgeon: Henrik Moran MD;  Location: [facility] OR     LIGATE FISTULA ARTERIOVENOUS UPPER EXTREMITY Right 3/9/2023    Procedure: LIGATION OF RIGHT ARM ARTERIOVENOUS FISTULA;  Surgeon: Henrik Moran MD;  Location: [facility] OR     NO HISTORY OF SURGERY       REVISION FISTULA ARTERIOVENOUS UPPER EXTREMITY Right 8/26/2021    Procedure: Second stage RIGHT BRACHIOCEPHALIC transposition ARTERIOVENOUS FISTULA;  Surgeon: Henrik Moran MD;  Location: [facility] OR         CURRENT MEDICATIONS  ACE/ARB/ARNI NOT PRESCRIBED (INTENTIONAL), Please choose reason not prescribed from choices below.  acetaminophen (TYLENOL) 325 MG tablet, Take 2 tablets (650 mg) by mouth every 4 hours as needed for mild pain  amLODIPine (NORVASC) 10 MG tablet, Take 10 mg by mouth daily   aspirin 81 MG EC tablet, Take 81 mg by mouth daily  atorvastatin (LIPITOR) 10 MG tablet, TAKE  ONE TABLET BY MOUTH EVERY NIGHT AT BEDTIME  bumetanide (BUMEX) 2 MG tablet, Take 2 mg by mouth daily   calcium acetate (CALPHRON) 667 MG TABS tablet, Take 1,334 mg by mouth 3 times daily (with meals)  carvedilol (COREG) 25 MG tablet, TAKE TWO TABLETS BY MOUTH TWICE A DAY WITH A MEAL Strength: 25 mg  cinacalcet (SENSIPAR) 90 MG tablet, Take 90 mg by mouth daily  cloNIDine (CATAPRES) 0.1 MG tablet, Take 0.1 mg by mouth At Bedtime On dialysis days (Mon, Wed, Fri)  cloNIDine (CATAPRES) 0.1 MG tablet, Take 0.1 mg by mouth Twice daily on non dialysis days (Tue, Thurs, Sat, Sun)  hydrALAZINE (APRESOLINE) 100 MG tablet, TAKE ONE TABLET BY MOUTH THREE TIMES A DAY  medical cannabis (Patient's own supply), See Admin Instructions (The purpose of this order is to document that the patient reports taking medical cannabis.  This is not a prescription, and is not used to certify that the patient has a qualifying medical condition.)  multivitamin RENAL (RENAVITE RX/NEPHROVITE) 1 MG tablet, Take 1 tablet by mouth daily   nitroGLYcerin (NITROSTAT) 0.3 MG sublingual tablet, For chest pain place 1 tablet under the tongue every 5 minutes for 3 doses. If symptoms persist 5 minutes after 1st dose call 911.  sevelamer HCl (RENAGEL) 800 MG tablet, Take 4,000 mg by mouth 3 times daily (with meals) And 1600mg with snacks.  vitamin D3 (CHOLECALCIFEROL) 2000 units (50 mcg) tablet, Take 50 mcg by mouth daily    No current facility-administered medications on file prior to visit.      ALLERGIES     Allergies   Allergen Reactions     Benadryl [Diphenhydramine] Itching       FAMILY HISTORY  Family History   Problem Relation Age of Onset     Blood Disease Mother         has hep b     Diabetes Mother         gestionanal diabetes     Hypertension Mother      Obesity Father      Hypertension Father      Hypertension Maternal Grandmother      Diabetes Maternal Grandmother      Hypertension Paternal Grandmother      Hypertension Paternal Grandfather       Coronary Artery Disease Maternal Uncle      Cancer No family hx of        SOCIAL HISTORY  Social History     Socioeconomic History     Marital status: Single     Spouse name: Not on file     Number of children: 0     Years of education: Not on file     Highest education level: Not on file   Occupational History     Occupation:      Occupation: kitchen     Comment: [employer]   Tobacco Use     Smoking status: Never     Smokeless tobacco: Never   Vaping Use     Vaping status: Never Used   Substance and Sexual Activity     Alcohol use: No     Drug use: Yes     Types: Marijuana     Comment: occ         ROS:   Complete ROS negative other than what is stated in HPI.     EXAM:  BP (!) 163/123 (BP Location: Right arm, Patient Position: Chair, Cuff Size: Adult Regular)   Pulse 87   Wt 235 lb 12.8 oz (107 kg)   SpO2 100%   BMI 32.89 kg/m    In general, the patient is a pleasant male in no apparent distress.    HEENT: NC/AT.  PERRLA.  EOMI.  Sclerae white, not injected.    Neck: No adenopathy.  Right internal jugular tunneled HD catheter.  Heart: RRR. Normal S1, S2 splits physiologically. No murmur, rub, click, or gallop.   Lungs: CTA.  No ronchi, wheezes, rales.    Abdomen: Soft, nontender, nondistended.   Extremities: No clubbing, cyanosis. Has aneurysms in RUE from old AV fistula. Right arm is soft with no significant edema. Palpable thrill in LUE fistula. Incisions are well healed. No wounds.    Labs:  LIPID RESULTS:  Lab Results   Component Value Date    CHOL 112 12/26/2021    CHOL 145 07/03/2019    HDL 41 12/26/2021    HDL 34 (L) 07/03/2019    LDL 59 12/26/2021    LDL 86 07/03/2019    TRIG 60 12/26/2021    TRIG 127 07/03/2019       LIVER ENZYME RESULTS:  Lab Results   Component Value Date    AST 25 03/13/2023    AST 6 02/09/2021    ALT 9 (L) 03/13/2023    ALT 19 02/09/2021       CBC RESULTS:  Lab Results   Component Value Date    WBC 9.2 04/19/2023    WBC 6.8 06/18/2021    RBC 3.14 (L)  04/19/2023    RBC 3.35 (L) 06/18/2021    HGB 8.7 (L) 04/19/2023    HGB 9.1 (L) 06/18/2021    HCT 26.9 (L) 04/19/2023    HCT 28.1 (L) 06/18/2021    MCV 86 04/19/2023    MCV 84 06/18/2021    MCH 27.7 04/19/2023    MCH 27.2 06/18/2021    MCHC 32.3 04/19/2023    MCHC 32.4 06/18/2021    RDW 14.2 04/19/2023    RDW 13.4 06/18/2021     04/19/2023     06/18/2021       BMP RESULTS:  Lab Results   Component Value Date     (L) 04/19/2023     06/19/2021    POTASSIUM 3.7 04/19/2023    POTASSIUM 3.9 06/20/2022    POTASSIUM 3.8 06/19/2021    CHLORIDE 94 (L) 04/19/2023    CHLORIDE 102 06/20/2022    CHLORIDE 105 06/19/2021    CO2 26 04/19/2023    CO2 25 06/20/2022    CO2 27 06/19/2021    ANIONGAP 15 04/19/2023    ANIONGAP 9 06/20/2022    ANIONGAP 7 06/19/2021     (H) 04/19/2023     (H) 03/17/2023     (H) 06/20/2022    GLC 99 06/19/2021    BUN 28.5 (H) 04/19/2023    BUN 35 (H) 06/20/2022    BUN 58 (H) 06/19/2021    CR 5.92 (H) 04/19/2023    CR 8.93 (H) 06/19/2021    GFRESTIMATED 13 (L) 04/19/2023    GFRESTIMATED 7 (L) 06/09/2022    GFRESTIMATED 7 (L) 06/19/2021    GFRESTBLACK 9 (L) 06/19/2021    BUBBA 9.0 04/19/2023    BUBBA 8.6 06/19/2021        A1C RESULTS:  Lab Results   Component Value Date    A1C 5.1 08/26/2021    A1C 5.0 03/04/2021       THYROID RESULTS:  Lab Results   Component Value Date    TSH 1.67 03/03/2022    TSH 4.88 (H) 07/14/2017         Procedures:   EXAM: US UPPER EXTREMITY VENOUS DUPLEX RIGHT  LOCATION: [facility]  DATE/TIME: 3/12/2023 9:36 PM     INDICATION: Right upper arm pain, swelling, concern for DVT  COMPARISON: Fluoroscopic images during dialysis catheter placement 03/09/2023, ultrasound 02/21/2023  TECHNIQUE: Venous Duplex ultrasound of the right upper extremity with (when possible) and without compression, augmentation, and duplex. Color flow and spectral Doppler with waveform analysis performed.     FINDINGS: Ultrasound includes  evaluation of the internal jugular vein, innominate vein, subclavian vein, axillary vein, and brachial vein. The superficial cephalic and basilic veins were also evaluated where seen.      RIGHT: Near occlusive thrombus noted within the internal jugular vein. No additional acute deep vein thrombus visualized. No superficial thrombophlebitis. Clotted dialysis fistula noted in the right arm and shoulder.                                                                      IMPRESSION:  1.  Near occlusive deep vein thrombus in the right internal jugular vein.  2.  Clotted dialysis fistula partially visualized in the right shoulder and upper arm.        VENOUS DUPLEX ULTRASOUND RIGHT UPPER EXTREMITY  4/21/2023 1:23 PM     CLINICAL HISTORY/INDICATION:  Right IJ DVT, has tunneled CVC catheter  that needs to stay in. Follow-up. Acute thrombosis of right internal  jugular vein (H). History of right upper extremity AVF status post  right cephalic vein ligation.      COMPARISON:  Venous duplex 3/12/2023     TECHNIQUE:   Grayscale, color-flow, and spectral waveform analysis were performed  of the deep veins of the right upper extremity     FINDINGS:  There is chronic-appearing partially occlusive thrombus in  the right internal jugular vein. The previous duplex demonstrated  occlusive right IJ DVT.     _______ vein demonstrates normal color and spectral Doppler flow and  normal spontaneous respiratory variation. The right axillary, as well  as the brachial, radial, and ulnar veins demonstrate normal color and  spectral Doppler flow and normal compressibility.     The right cephalic vein is occluded, related to the previous cephalic  vein ligation.     The right basilic vein is patent.     The contralateral left internal jugular vein is patent.                                                                      IMPRESSION:   Chronic right internal jugular vein.     No evidence of new, acute right upper extremity DVT.     Right  cephalic vein is occluded related to previous ligation. Basilic  vein remains patent.      **It is noted that worksheet revealed now occlusive right internal jugular DVT (previously it was nearly occlusive). This differs from official read.       Assessment and Plan:   Right internal jugular vein DVT, catheter associated     -Tunneled HD catheter was placed in the right internal jugular vein on 3/9/23, which was at the same time his right radial artery to cephalic vein AV fistula was ligated and a new left brachiobasilic AV fistula was created.   -Started on IV heparin and transitioned over to Eliquis in hospital.   -Edema and pain continuing to improve, thrombus in right internal jugular now occlusive (previously nearly occlusive) on worksheet, but official read states the opposite?  -No issue with using tunneled HD catheter.  -Recently in ED for nosebleed and anemia. On Aspirin 81 mg daily - states he has been on this for a very long time and doesn't know why he was originally started on this - as well as Eliquis now.      Recommendations:   -Continue Eliquis. Typically in patients with ESRD would treat patients with Warfarin over Eliquis as there are not sufficient studies out there to show efficacy of Eliquis in patient's with ESRD. However, as he is tolerating this and his symptoms are improving, continue for now.   -Hold aspirin 81 mg daily for now while on Eliquis due to recent bleeding and anemia.   -Will repeat a right upper extremity venous duplex to re-evaluate the right internal jugular DVT in 2 weeks. He was advised to call us if he develops any increased swelling or pain in his right arm and we will get imaging sooner.   -Ideally, his hemodialysis catheter should be removed. However, as this is not preferred presently due to new creation of AV fistula in left upper extremity, he should remain on anticoagulation until his tunneled hemodialysis catheter can be removed.   -It is noted that he reports he  will be put on the transplant list once he is able to come off anticoagulation.   -He missed his appointment with Dr. Moran 2 days ago due to being in ED for bleeding. Will have him get scheduled to be seen next week. Discussed imaging results today with Vascular Surgical fellow and NP.         My Ayala PA-C      30 minutes spent on the date of the encounter doing chart review, history and exam, documentation, and further activities as noted above.

## 2023-04-21 NOTE — PATIENT INSTRUCTIONS
Follow-up with us in 2 weeks with a repeat ultrasound to re-evaluate the DVT.    2. Please call us if you develop any increased swelling or pain in your right arm/neck.     3. Follow-up with Dr. Moran.     4. Continue Eliquis. Hold aspirin while on this.     5. Call us with any questions or concerns ([phone]).

## 2023-04-25 ENCOUNTER — HOSPITAL ENCOUNTER (OUTPATIENT)
Dept: ULTRASOUND IMAGING | Facility: CLINIC | Age: 28
Discharge: HOME | End: 2023-04-25
Payer: MEDICARE

## 2023-04-25 DIAGNOSIS — I77.0 AVF (ARTERIOVENOUS FISTULA) (H): ICD-10-CM

## 2023-04-25 DIAGNOSIS — T82.898S OTHER SPECIFIED COMPLICATION OF VASCULAR PROSTHETIC DEVICES, IMPLANTS AND GRAFTS, SEQUELA: ICD-10-CM

## 2023-04-25 PROCEDURE — 93990 DOPPLER FLOW TESTING: CPT

## 2023-05-02 ENCOUNTER — HOSPITAL ENCOUNTER (OUTPATIENT)
Dept: ULTRASOUND IMAGING | Facility: CLINIC | Age: 28
Discharge: HOME | End: 2023-05-02
Attending: PHYSICIAN ASSISTANT
Payer: MEDICARE

## 2023-05-02 ENCOUNTER — OFFICE VISIT (OUTPATIENT)
Dept: OTHER | Facility: CLINIC | Age: 28
End: 2023-05-02
Payer: MEDICARE

## 2023-05-02 VITALS
SYSTOLIC BLOOD PRESSURE: 161 MMHG | OXYGEN SATURATION: 100 % | HEIGHT: 71 IN | WEIGHT: 234.4 LBS | HEART RATE: 78 BPM | DIASTOLIC BLOOD PRESSURE: 92 MMHG | BODY MASS INDEX: 32.81 KG/M2

## 2023-05-02 DIAGNOSIS — I82.C11 ACUTE THROMBOSIS OF RIGHT INTERNAL JUGULAR VEIN (H): ICD-10-CM

## 2023-05-02 DIAGNOSIS — I82.C11 ACUTE THROMBOSIS OF RIGHT INTERNAL JUGULAR VEIN (H): Primary | ICD-10-CM

## 2023-05-02 PROCEDURE — G0463 HOSPITAL OUTPT CLINIC VISIT: HCPCS | Mod: 25

## 2023-05-02 PROCEDURE — 93971 EXTREMITY STUDY: CPT | Mod: RT

## 2023-05-02 PROCEDURE — 99214 OFFICE O/P EST MOD 30 MIN: CPT | Performed by: PHYSICIAN ASSISTANT

## 2023-05-02 NOTE — PROGRESS NOTES
"Patient is here to discuss follow up    BP (!) 161/92 (BP Location: Right arm, Patient Position: Chair, Cuff Size: Adult Large)   Pulse 78   Ht 5' 11\" (1.803 m)   Wt 234 lb 6.4 oz (106.3 kg)   SpO2 100%   BMI 32.69 kg/m      Questions patient would like addressed today are: N/A.    Refills are needed: No    Has homecare services and agency name:  Juanita MUKHERJEE    "

## 2023-05-02 NOTE — PROGRESS NOTES
[facility]  VASCULAR MEDICINE FOLLOW-UP VISIT      PRIMARY HEALTH CARE PROVIDER:  Priyank Lawrence    REASON FOR VISIT:  Follow-up right internal jugular DVT      HPI: Taylor Valiente is a 27 year old non-smoking male with a history of ESRD secondary to hypertension/FSGS. He was initiated on dialysis in 2021. His right radial artery to cephalic vein AV fistula developed severe aneurysmal degeneration of the fistula and high fistula output. This was subsequently ligated on 3/9/23, during which time a tunneled right CVC catheter was placed as well as creation of first stage of left brachiobasilic AV fistula.      On 3/12/23 he presented to the ER with right arm swelling and pain. Venous duplex demonstrated a near occlusive deep vein thrombus in the right internal jugular vein. He was started on Eliquis and sent home. At dialysis the next day he developed increased neck pain, chest pain and leg pain. He was sent back to the ED. He admitted to not being able to get Eliquis and he was admitted to the hospital for IV heparin. After starting heparin, he developed a small hematoma near his new left AV fistula that needed to be evacuated on 3/16/23.      He is now doing well and continues on Eliquis. The swelling and pain in his right upper extremity has improved/nearly resolved. He now presents to review repeat US results. He was in the ED 2 weeks ago for epistaxis and anemia at HD that ultimately resolved. He missed his follow-up with PASQUALE Moran because of this. It was rescheduled but he again missed that appointment due to hypotension and vomiting after dialysis.      He has no personal or family history of blood clots. His tunneled HD catheter is functioning well. He has a palpable thrill in his new left AV fistula.       PAST MEDICAL HISTORY  Past Medical History:   Diagnosis Date     Adrenal adenoma, left      Anemia      Benign essential hypertension 07/28/2017     CKD (chronic kidney disease) stage  5, GFR less than 15 ml/min (H)     FSGS     Depression      Focal glomerular sclerosis 07/28/2017     HLD (hyperlipidemia)      LVH (left ventricular hypertrophy) due to hypertensive disease 07/14/2017     Noncompliance      Obesity, unspecified      OD (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/2021     Stress-induced cardiomyopathy      Suicide attempt (H) 2019       PAST SURGICAL HISTORY:  Past Surgical History:   Procedure Laterality Date     ARTHROSCOPY ANKLE Left 2/24/2021    Procedure: Left ankle arthroscopy and debridement/micro fracture;  Surgeon: Mirza Nelson MD;  [facility]     [facility]     CREATE FISTULA ARTERIOVENOUS UPPER EXTREMITY Right 3/4/2021    Procedure: RIGHT proximal radial  to CEPHALIC ARTERIOVENOUS FISTULA;  Surgeon: Henrik Moran MD;  Location: [facility] OR     CREATE FISTULA ARTERIOVENOUS UPPER EXTREMITY Left 3/9/2023    Procedure: CREATION OF FIRST STAGE LEFT BRACHIOBASILIC ARTERIOVENOUS FISTULA;  Surgeon: Henrik Moran MD;  Location: [facility] OR     IR CVC TUNNEL PLACEMENT > 5 YRS OF AGE  6/17/2021     IR CVC TUNNEL PLACEMENT > 5 YRS OF AGE  3/9/2023     IR CVC TUNNEL REMOVAL RIGHT  12/3/2021     IRRIGATION AND DEBRIDEMENT UPPER EXTREMITY, COMBINED Left 3/16/2023    Procedure: EVACUATION OF LEFT ARM HEMATOMA;  Surgeon: Henrik Moran MD;  Location: [facility] OR     LIGATE FISTULA ARTERIOVENOUS UPPER EXTREMITY Right 3/9/2023    Procedure: LIGATION OF RIGHT ARM ARTERIOVENOUS FISTULA;  Surgeon: Henrik Moran MD;  Location: [facility] OR     NO HISTORY OF SURGERY       REVISION FISTULA ARTERIOVENOUS UPPER EXTREMITY Right 8/26/2021    Procedure: Second stage RIGHT BRACHIOCEPHALIC transposition ARTERIOVENOUS FISTULA;  Surgeon: Henrik Moran MD;  Location: [facility] OR         CURRENT MEDICATIONS  ACE/ARB/ARNI NOT PRESCRIBED (INTENTIONAL), Please choose reason not prescribed from choices below.  acetaminophen (TYLENOL) 325 MG tablet, Take 2 tablets (650 mg) by  mouth every 4 hours as needed for mild pain  amLODIPine (NORVASC) 10 MG tablet, Take 10 mg by mouth daily   apixaban ANTICOAGULANT (ELIQUIS) 5 MG tablet, Take 1 tablet (5 mg) by mouth 2 times daily  aspirin 81 MG EC tablet, Take 81 mg by mouth daily  atorvastatin (LIPITOR) 10 MG tablet, TAKE ONE TABLET BY MOUTH EVERY NIGHT AT BEDTIME  bumetanide (BUMEX) 2 MG tablet, Take 2 mg by mouth daily   calcium acetate (CALPHRON) 667 MG TABS tablet, Take 1,334 mg by mouth 3 times daily (with meals)  carvedilol (COREG) 25 MG tablet, TAKE TWO TABLETS BY MOUTH TWICE A DAY WITH A MEAL Strength: 25 mg  cinacalcet (SENSIPAR) 90 MG tablet, Take 90 mg by mouth daily  cloNIDine (CATAPRES) 0.1 MG tablet, Take 0.1 mg by mouth At Bedtime On dialysis days (Mon, Wed, Fri)  cloNIDine (CATAPRES) 0.1 MG tablet, Take 0.1 mg by mouth Twice daily on non dialysis days (Tue, Thurs, Sat, Sun)  hydrALAZINE (APRESOLINE) 100 MG tablet, TAKE ONE TABLET BY MOUTH THREE TIMES A DAY  medical cannabis (Patient's own supply), See Admin Instructions (The purpose of this order is to document that the patient reports taking medical cannabis.  This is not a prescription, and is not used to certify that the patient has a qualifying medical condition.)  multivitamin RENAL (RENAVITE RX/NEPHROVITE) 1 MG tablet, Take 1 tablet by mouth daily   nitroGLYcerin (NITROSTAT) 0.3 MG sublingual tablet, For chest pain place 1 tablet under the tongue every 5 minutes for 3 doses. If symptoms persist 5 minutes after 1st dose call 911.  sevelamer HCl (RENAGEL) 800 MG tablet, Take 4,000 mg by mouth 3 times daily (with meals) And 1600mg with snacks.  vitamin D3 (CHOLECALCIFEROL) 2000 units (50 mcg) tablet, Take 50 mcg by mouth daily    No current facility-administered medications on file prior to visit.      ALLERGIES     Allergies   Allergen Reactions     Benadryl [Diphenhydramine] Itching       FAMILY HISTORY  Family History   Problem Relation Age of Onset     Blood Disease Mother   "       has hep b     Diabetes Mother         gestionanal diabetes     Hypertension Mother      Obesity Father      Hypertension Father      Hypertension Maternal Grandmother      Diabetes Maternal Grandmother      Hypertension Paternal Grandmother      Hypertension Paternal Grandfather      Coronary Artery Disease Maternal Uncle      Cancer No family hx of        SOCIAL HISTORY  Social History     Socioeconomic History     Marital status: Single     Spouse name: Not on file     Number of children: 0     Years of education: Not on file     Highest education level: Not on file   Occupational History     Occupation:      Occupation: kitchen     Comment: [employer]   Tobacco Use     Smoking status: Never     Smokeless tobacco: Never   Vaping Use     Vaping status: Never Used   Substance and Sexual Activity     Alcohol use: No     Drug use: Yes     Types: Marijuana     Comment: occ         ROS:   Complete ROS negative other than what is stated in HPI.     EXAM:  BP (!) 161/92 (BP Location: Right arm, Patient Position: Chair, Cuff Size: Adult Large)   Pulse 78   Ht 5' 11\" (1.803 m)   Wt 234 lb 6.4 oz (106.3 kg)   SpO2 100%   BMI 32.69 kg/m    In general, the patient is a pleasant male in no apparent distress.    HEENT: NC/AT.  PERRLA.  EOMI.  Sclerae white, not injected.    Neck: No adenopathy.  Right internal jugular tunneled HD catheter.  Heart: RRR. Normal S1, S2 splits physiologically. No murmur, rub, click, or gallop.   Lungs: CTA.  No ronchi, wheezes, rales.    Abdomen: Soft, nontender, nondistended.   Extremities: No clubbing, cyanosis. Has aneurysms in RUE from old AV fistula. Right arm is soft with no significant edema. Palpable thrill in LUE fistula. Incisions are well healed. No wounds.    Labs:  LIPID RESULTS:  Lab Results   Component Value Date    CHOL 112 12/26/2021    CHOL 145 07/03/2019    HDL 41 12/26/2021    HDL 34 (L) 07/03/2019    LDL 59 12/26/2021    LDL 86 07/03/2019    " TRIG 60 12/26/2021    TRIG 127 07/03/2019       LIVER ENZYME RESULTS:  Lab Results   Component Value Date    AST 25 03/13/2023    AST 6 02/09/2021    ALT 9 (L) 03/13/2023    ALT 19 02/09/2021       CBC RESULTS:  Lab Results   Component Value Date    WBC 9.2 04/19/2023    WBC 6.8 06/18/2021    RBC 3.14 (L) 04/19/2023    RBC 3.35 (L) 06/18/2021    HGB 8.7 (L) 04/19/2023    HGB 9.1 (L) 06/18/2021    HCT 26.9 (L) 04/19/2023    HCT 28.1 (L) 06/18/2021    MCV 86 04/19/2023    MCV 84 06/18/2021    MCH 27.7 04/19/2023    MCH 27.2 06/18/2021    MCHC 32.3 04/19/2023    MCHC 32.4 06/18/2021    RDW 14.2 04/19/2023    RDW 13.4 06/18/2021     04/19/2023     06/18/2021       BMP RESULTS:  Lab Results   Component Value Date     (L) 04/19/2023     06/19/2021    POTASSIUM 3.7 04/19/2023    POTASSIUM 3.9 06/20/2022    POTASSIUM 3.8 06/19/2021    CHLORIDE 94 (L) 04/19/2023    CHLORIDE 102 06/20/2022    CHLORIDE 105 06/19/2021    CO2 26 04/19/2023    CO2 25 06/20/2022    CO2 27 06/19/2021    ANIONGAP 15 04/19/2023    ANIONGAP 9 06/20/2022    ANIONGAP 7 06/19/2021     (H) 04/19/2023     (H) 03/17/2023     (H) 06/20/2022    GLC 99 06/19/2021    BUN 28.5 (H) 04/19/2023    BUN 35 (H) 06/20/2022    BUN 58 (H) 06/19/2021    CR 5.92 (H) 04/19/2023    CR 8.93 (H) 06/19/2021    GFRESTIMATED 13 (L) 04/19/2023    GFRESTIMATED 7 (L) 06/09/2022    GFRESTIMATED 7 (L) 06/19/2021    GFRESTBLACK 9 (L) 06/19/2021    BUBBA 9.0 04/19/2023    BUBBA 8.6 06/19/2021        A1C RESULTS:  Lab Results   Component Value Date    A1C 5.1 08/26/2021    A1C 5.0 03/04/2021       THYROID RESULTS:  Lab Results   Component Value Date    TSH 1.67 03/03/2022    TSH 4.88 (H) 07/14/2017         Procedures:   US UPPER EXTREMITY VENOUS DUPLEX RIGHT  5/2/2023 11:00 AM      HISTORY: Follow up right IJ DVT; Acute thrombosis of right internal  jugular vein (H).     COMPARISON: April 21, 2023     TECHNIQUE: Color Doppler and spectral  waveform analysis obtained  throughout the deep and superficial veins of the right upper  extremity.     FINDINGS: Proximal right internal jugular vein is patent, though the  lower more caudal internal jugular vein remains occluded. Minimal, if  any, internal blood flow in that segment. The subclavian, axillary,  and brachial veins are patent. Basilic vein is patent. Cephalic vein  remains occluded and dilated, history of AV fistula ligation.                                                                      IMPRESSION:  1. Persistent occlusive thrombus in the right internal jugular vein  corresponding to persistent DVT.  2. Cephalic vein remains occluded and dilated, related to prior AV  fistula ligation.      US EXTREMITY ARTERIAL VENOUS DIALYSIS ACCESS GRAFT 4/25/2023 3:24 PM     HISTORY: 27-year-old patient with AV fistula for hemodialysis, request  made for surveillance evaluation. Patient had stage I AV fistula  creation on March 9, 2023.     TECHNIQUE: Color Doppler and spectral waveform analysis obtained  through the inflow brachial artery as well as the outflow basilic  vein.     FINDINGS: The brachial artery is 5.1-6.5 mm. The anastomosis is  264/154 cm/second. Total blood flow volume is 2283 mL/min. Diameters  in the outflow vein are labeled as 5.1 mm in the fistulized vein  shortly beyond the anastomosis, 8 mm with tourniquet. Moving more  centrally, diameter is 6.1 mm with tourniquet. 8.1 mm without  specification of tourniquet noted in the mid upper arm and 8.2 mm in  the most central visualized vein.                                                                      IMPRESSION: Patent left upper arm AV fistula with good blood flow  Volume.      EXAM: US UPPER EXTREMITY VENOUS DUPLEX RIGHT  LOCATION: [facility]  DATE/TIME: 3/12/2023 9:36 PM     INDICATION: Right upper arm pain, swelling, concern for DVT  COMPARISON: Fluoroscopic images during dialysis catheter placement  03/09/2023, ultrasound 02/21/2023  TECHNIQUE: Venous Duplex ultrasound of the right upper extremity with (when possible) and without compression, augmentation, and duplex. Color flow and spectral Doppler with waveform analysis performed.     FINDINGS: Ultrasound includes evaluation of the internal jugular vein, innominate vein, subclavian vein, axillary vein, and brachial vein. The superficial cephalic and basilic veins were also evaluated where seen.      RIGHT: Near occlusive thrombus noted within the internal jugular vein. No additional acute deep vein thrombus visualized. No superficial thrombophlebitis. Clotted dialysis fistula noted in the right arm and shoulder.                                                                      IMPRESSION:  1.  Near occlusive deep vein thrombus in the right internal jugular vein.  2.  Clotted dialysis fistula partially visualized in the right shoulder and upper arm.        VENOUS DUPLEX ULTRASOUND RIGHT UPPER EXTREMITY  4/21/2023 1:23 PM     CLINICAL HISTORY/INDICATION:  Right IJ DVT, has tunneled CVC catheter  that needs to stay in. Follow-up. Acute thrombosis of right internal  jugular vein (H). History of right upper extremity AVF status post  right cephalic vein ligation.      COMPARISON:  Venous duplex 3/12/2023     TECHNIQUE:   Grayscale, color-flow, and spectral waveform analysis were performed  of the deep veins of the right upper extremity     FINDINGS:  There is chronic-appearing partially occlusive thrombus in  the right internal jugular vein. The previous duplex demonstrated  occlusive right IJ DVT.     _______ vein demonstrates normal color and spectral Doppler flow and  normal spontaneous respiratory variation. The right axillary, as well  as the brachial, radial, and ulnar veins demonstrate normal color and  spectral Doppler flow and normal compressibility.     The right cephalic vein is occluded, related to the previous cephalic  vein ligation.     The right  basilic vein is patent.     The contralateral left internal jugular vein is patent.                                                                      IMPRESSION:   Chronic right internal jugular vein.     No evidence of new, acute right upper extremity DVT.     Right cephalic vein is occluded related to previous ligation. Basilic  vein remains patent.        Assessment and Plan:   Right internal jugular vein DVT, catheter associated     -Tunneled HD catheter was placed in the right internal jugular vein on 3/9/23, which was at the same time his right radial artery to cephalic vein AV fistula was ligated and a new left brachiobasilic AV fistula was created.   -Started on IV heparin and transitioned over to Eliquis in hospital.   -Edema and pain continuing to improve, thrombus in right internal jugular stable/unchanged  -No issue with using tunneled HD catheter.  -Recently in ED for nosebleed and anemia. On Aspirin 81 mg daily - states he has been on this for a very long time and doesn't know why he was originally started on this - as well as Eliquis now.      Recommendations:   -Continue Eliquis. Typically in patients with ESRD would treat patients with Warfarin over Eliquis as there are not sufficient studies out there to show efficacy of Eliquis in patient's with ESRD. However, as he is tolerating this and his symptoms are improving, continue for now.   -Hold aspirin 81 mg daily for now while on Eliquis due to recent bleeding and anemia.   -Will repeat a right upper extremity venous duplex to re-evaluate the right internal jugular DVT in 4 weeks. He was advised to call us if he develops any increased swelling or pain in his right arm and we will get imaging sooner.   -Ideally, his hemodialysis catheter should be removed. However, as this is not preferred presently due to new creation of AV fistula in left upper extremity, he should remain on anticoagulation until his tunneled hemodialysis catheter can be  removed.   -It is noted that he reports he will be put on the transplant list once he is able to come off anticoagulation.   -He missed his appointment with Dr. Moran on 2 occasions now. Will have him get scheduled to be seen next week.         My Ayala PA-C      30 minutes spent on the date of the encounter doing chart review, history and exam, documentation, and further activities as noted above.

## 2023-05-03 ENCOUNTER — TELEPHONE (OUTPATIENT)
Dept: OTHER | Facility: CLINIC | Age: 28
End: 2023-05-03
Payer: MEDICARE

## 2023-05-03 NOTE — TELEPHONE ENCOUNTER
Patient needs to be scheduled for RUE venous US and in person follow up with My Ayala in 1 month.    Appointment note: 1 month follow up for right internal jugular DVT. RUE US prior.    Patient also see's Dr. Moran for his fistula and has missed his last 2 appointments with him. His most recent follow up was scheduled on 4/26/23 and was cancelled. Please reschedule patient for his follow up with Dr. Moran and use same appt note as last scheduled appt. Patient did complete his AVF US already.     Shabnam RUBY, RN    [facility]  [facility]  Office: [phone]  Fax: [phone]

## 2023-05-05 ENCOUNTER — PATIENT OUTREACH (OUTPATIENT)
Dept: CARDIOLOGY | Facility: CLINIC | Age: 28
End: 2023-05-05
Payer: MEDICARE

## 2023-05-05 DIAGNOSIS — R07.9 CHEST PAIN: ICD-10-CM

## 2023-05-05 DIAGNOSIS — I11.9 LVH (LEFT VENTRICULAR HYPERTROPHY) DUE TO HYPERTENSIVE DISEASE: Primary | ICD-10-CM

## 2023-05-05 DIAGNOSIS — I51.81 STRESS-INDUCED CARDIOMYOPATHY: ICD-10-CM

## 2023-05-05 NOTE — PROGRESS NOTES
Attempted to call and speak to patient; voicemail box was full. Tried the other mobile # which was his mother's. Spoke to mother regarding recommendation for lexiscan and follow-up with Dr Marin. Gave numbers to scheduling. She verbalized understanding and will discuss this with the patient.           RE: Cards clearance  Received: Today  Enrique Marin MD Nelson, Laura, RN  Cc: Nate Berman, BRIAN Malagon,     Thanks for the heads up     Nate, please see my last addendum     Thanks!           Previous Messages       ----- Message -----   From: Vesna Chaney, RN   Sent: 5/4/2023   2:56 PM CDT   To: Enrique Marin MD; Nate Berman LPN   Subject: Cards clearance                                   Hey Dr Marin,     Dr Albrecht wanted me to reach out to you regarding Leesaitram.  Below is her concern, does he require any further workup??     Send Dr. Marin a note about October incident and EKG changes (in ED with chest pain relieved with nitroglycerin and with EKG changes). Stress echo was non-diagnostic since he did not reach target heart rate. Will need to determine with Dr. Marin if the pt needs further work up       Thanks!   Vesna             Addendum 5/5/23:     Notified that patient has had several episodes of chest pain since last visit in ED     Stress echo 10/29/22, negative, but decreased sensitivity due to lower heart rate achieved     Has history of mild non-obstructive CAD     Plan:     1.  Ellen nuc stress      2.  Virtual video visit followup after final results available (late May/early June) to discuss clinical progress and transplant pre op eval

## 2023-05-17 ENCOUNTER — OFFICE VISIT (OUTPATIENT)
Dept: OTHER | Facility: CLINIC | Age: 28
End: 2023-05-17
Attending: SURGERY
Payer: MEDICARE

## 2023-05-17 VITALS — SYSTOLIC BLOOD PRESSURE: 164 MMHG | HEART RATE: 80 BPM | DIASTOLIC BLOOD PRESSURE: 113 MMHG

## 2023-05-17 DIAGNOSIS — Z09 SURGICAL FOLLOW-UP CARE: Primary | ICD-10-CM

## 2023-05-17 DIAGNOSIS — Z86.718 HISTORY OF DEEP VENOUS THROMBOSIS: ICD-10-CM

## 2023-05-17 DIAGNOSIS — Z99.2 ESRD (END STAGE RENAL DISEASE) ON DIALYSIS (H): ICD-10-CM

## 2023-05-17 DIAGNOSIS — N18.6 ESRD (END STAGE RENAL DISEASE) ON DIALYSIS (H): ICD-10-CM

## 2023-05-17 PROCEDURE — G0463 HOSPITAL OUTPT CLINIC VISIT: HCPCS

## 2023-05-17 PROCEDURE — 99024 POSTOP FOLLOW-UP VISIT: CPT | Performed by: SURGERY

## 2023-05-17 RX ORDER — CLONIDINE 0.2 MG/24H
1 PATCH, EXTENDED RELEASE TRANSDERMAL
Status: ON HOLD | COMMUNITY
Start: 2023-04-05 | End: 2023-12-06

## 2023-05-17 NOTE — PROGRESS NOTES
Taylor Valiente is status post a failed right upper arm AV fistula.  9 weeks ago I took him to surgery and ligated his right arm AV fistula and created a for stage left brachiobasilic AV fistula.  He has a right IJ tunneled dialysis catheter.  He developed occlusive thrombus of his right internal jugular vein and was anticoagulated.  He subsequently developed a left arm antecubital hematoma related to that anticoagulation.  I performed evacuation of the left arm hematoma 1 week postoperatively.  He recovered from that.  He remains on Eliquis for DVT anticoagulation.    6-week postprocedural ultrasound demonstrated excellent maturation of the left basilic vein with very good graft flow.  I have been trying to get him scheduled for a second stage left brachiobasilic AV fistula creation.  Scheduling this procedure has been problematic due to multiple other medical issues.  He has recovered from those and presents today to again try scheduling creation of a second stage left upper arm AV fistula.    At today's visit he notes that his right upper arm edema has significantly improved.  He continues to dialyze via his tunneled right IJ catheter.  He had no left arm complaints.    Exam:  2+ palpable left radial pulse.  Excellent thrill in the median cubital vein.  No left arm edema.  Improved right arm edema.    Imaging:  I reviewed the left arm AV fistula ultrasound performed on 4/25/2023 showing excellent vein maturation and calculated outflow volume of 2283 mL/min.    ASSESSMENT:  9 weeks status post for stage left brachiobasilic AV fistula with excellent maturation of the basilic vein.    RECOMMENDATION:  He is ready for creation of a second stage left brachiobasilic AV fistula.  I reviewed the specifics of that procedure.  He will continue to perform his vein building exercises.  Given his prior history of hematomas I will hold his Eliquis for 2 days preoperatively.  He will continue to avoid left arm  venipunctures.  Creation of a second stage left brachiobasilic AV fistula will be scheduled under general anesthesia as an outpatient at [facility] in a timely manner.    Edwardo Moran MD

## 2023-05-17 NOTE — PROGRESS NOTES
[facility]        Patient is here for a  follow up  to discuss AV fistula.    Pt is currently taking Aspirin, Statin and Eliquis.    BP (!) 164/113 (BP Location: Right arm, Patient Position: Chair, Cuff Size: Adult Regular)   Pulse 80     The provider has been notified that the patient has no concerns.     Questions patient would like addressed today are: N/A.    Refills are needed: N/A    Has homecare services and agency name:  Juanita Gonzalez MA

## 2023-05-19 ENCOUNTER — TELEPHONE (OUTPATIENT)
Dept: OTHER | Facility: CLINIC | Age: 28
End: 2023-05-19

## 2023-05-19 NOTE — TELEPHONE ENCOUNTER
JULIETA for patient informing him that his surgery is scheduled on Tuesday, 5/30/23 @ 7:30am with a check-in time of 5:30am at [facility] Desk.    Asked patient to call to review this information.    Patient will need to be scheduled for a post-op appointment.

## 2023-05-19 NOTE — TELEPHONE ENCOUNTER
CASE REQUEST RECEIVED ON 5/19/23 FOR: SECOND-STAGE LEFT BRACHIAL BASILIC AV FISTULA    CASE ID: [id number]    Spoke with patient.  He does dialysis on M, W, F and he would prefer surgery on a T or Th.  There are no Tuesdays or Thursdays to avoid.    Informed him I will be working on getting his surgery scheduled and will get back to him.    Informed patient to read the [employer] message from Dr. Moran's nurse and to respond that he received it.

## 2023-05-23 ENCOUNTER — TELEPHONE (OUTPATIENT)
Dept: OTHER | Facility: CLINIC | Age: 28
End: 2023-05-23
Payer: MEDICARE

## 2023-05-23 NOTE — TELEPHONE ENCOUNTER
Spoke with patient and reviewed his surgery date/time/check-in information with him.    Scheduled patient for a post-op appointment on 6/13/23 with Zaina Welsh NP.

## 2023-05-23 NOTE — TELEPHONE ENCOUNTER
[facility]    Who is the name of the provider?:  Lucy    What is the location you see this provider at/preferred location?: [place]  Person calling / Facility: Taylor Valiente  Phone number:  [phone]  Nurse call back needed:  yes    Reason for call:     Patient is scheduled for surgery (second stage left brachial-basilic AV fistula) on 5/30/23 with Dr. Moran.    His surgery form indicates H&P by My Ayala PA-C on 5/2/23.      Patient needs to know if he needs to adjust any medication prior to surgery.  He is aware he is to continue ASA and to hold Eliquis 2 days prior to surgery, but unsure about other medication.    Pharmacy location:     Outside Imaging:     Can we leave a detailed message on this number?  yes

## 2023-05-24 NOTE — TELEPHONE ENCOUNTER
Medications reviewed. He should take all of his usual medications that he takes in the morning with a small sip of water.     My Ayala PA-C

## 2023-05-24 NOTE — TELEPHONE ENCOUNTER
Patient is scheduled for second stage left brachial to basilic AVF with Dr. Moran. Patient saw My Ayala on 5/2/23 and that visit is being used as the pre-op history and physical. Patient has been instructed on blood thinner holds but needs to be advised on other meds. Routing to My to advise if possible on this.    Shabnam RUBY, RN    [facility]  Vascular [facility]  Office: [phone]  Fax: [phone]

## 2023-05-25 NOTE — TELEPHONE ENCOUNTER
I called patient and LVM stating the below.  Amulytehart message also sent.    Shabnam RUBY, GALLITO    [facility]  [facility]  Office: [phone]  Fax: [phone]

## 2023-05-29 ENCOUNTER — ANESTHESIA EVENT (OUTPATIENT)
Dept: SURGERY | Facility: CLINIC | Age: 28
End: 2023-05-29
Payer: MEDICARE

## 2023-05-29 NOTE — ANESTHESIA PREPROCEDURE EVALUATION
Anesthesia Pre-Procedure Evaluation    Patient: Taylor Valiente   MRN: [id number] : 1995        Procedure : Procedure(s):  SECOND STAGE LEFT BRACHIAL TO BASILIC ARTERIOVENOUS FISTULA          Past Medical History:   Diagnosis Date     Adrenal adenoma, left      Anemia      Benign essential hypertension 2017     CKD (chronic kidney disease) stage 5, GFR less than 15 ml/min (H)     FSGS     Depression      Focal glomerular sclerosis 2017     HLD (hyperlipidemia)      LVH (left ventricular hypertrophy) due to hypertensive disease 2017     Noncompliance      Obesity, unspecified      OD (osteochondritis dissecans) 2021     Stress-induced cardiomyopathy      Suicide attempt (H) 2019      Past Surgical History:   Procedure Laterality Date     ARTHROSCOPY ANKLE Left 2021    Procedure: Left ankle arthroscopy and debridement/micro fracture;  Surgeon: Mirza Nelson MD;  [facility]     [facility]     CREATE FISTULA ARTERIOVENOUS UPPER EXTREMITY Right 3/4/2021    Procedure: RIGHT proximal radial  to CEPHALIC ARTERIOVENOUS FISTULA;  Surgeon: Henrik Moran MD;  Location:  OR     CREATE FISTULA ARTERIOVENOUS UPPER EXTREMITY Left 3/9/2023    Procedure: CREATION OF FIRST STAGE LEFT BRACHIOBASILIC ARTERIOVENOUS FISTULA;  Surgeon: Henrik Moran MD;  Location: [facility] OR     IR CVC TUNNEL PLACEMENT > 5 YRS OF AGE  2021     IR CVC TUNNEL PLACEMENT > 5 YRS OF AGE  3/9/2023     IR CVC TUNNEL REMOVAL RIGHT  12/3/2021     IRRIGATION AND DEBRIDEMENT UPPER EXTREMITY, COMBINED Left 3/16/2023    Procedure: EVACUATION OF LEFT ARM HEMATOMA;  Surgeon: Henrik Moran MD;  Location: [facility] OR     LIGATE FISTULA ARTERIOVENOUS UPPER EXTREMITY Right 3/9/2023    Procedure: LIGATION OF RIGHT ARM ARTERIOVENOUS FISTULA;  Surgeon: Henrik Moran MD;  Location: [facility] OR     NO HISTORY OF SURGERY       REVISION FISTULA ARTERIOVENOUS UPPER EXTREMITY Right  8/26/2021    Procedure: Second stage RIGHT BRACHIOCEPHALIC transposition ARTERIOVENOUS FISTULA;  Surgeon: Henrik Moran MD;  Location: [facility] OR      Allergies   Allergen Reactions     Benadryl [Diphenhydramine] Itching      Social History     Tobacco Use     Smoking status: Never     Smokeless tobacco: Never   Vaping Use     Vaping status: Never Used   Substance Use Topics     Alcohol use: No      Wt Readings from Last 1 Encounters:   05/02/23 106.3 kg (234 lb 6.4 oz)        Anesthesia Evaluation   Pt has had prior anesthetic.     No history of anesthetic complications       ROS/MED HX  ENT/Pulmonary:  - neg pulmonary ROS     Neurologic:  - neg neurologic ROS     Cardiovascular: Comment: LVH (left ventricular hypertrophy) due to hypertensive disease    10/22 stress echo  Interpretation Summary  Probably normal stress echocardiogram with no wall motion abnormalities seen  on the post exercise images. The sensitivity of the test for detection of  ischemia is limited since the target heart rate was not achieved.  ______________________________________________________________________________  Stress  RPP 26,640.  The patient exercised 9:19.  There was a borderline hypertensive BP response to exercise.  Exercise was stopped due to fatigue.  The patient exhibited no chest pain during exercise.  A low workload was achieved.  Target Heart Rate was not achieved due to fatigue.  The Duke treadmill score was low risk ( >5 Duke score).  The EKG portion of this stress test was negative for inducible ischemia (see  echo results below).  At target heart rate with Dobutamine left ventricular size decreases.  At target heart rate with Dobutamine, global left ventricular function  augments.  The visual ejection fraction is 65-70%.  Normal left ventricular function and wall motion at rest and post-stress.     Baseline  The patient is in normal sinus rhythm.  Early repolarization abnormalities are noted.  Normal left ventricular  "function and wall motion at rest.  The visual ejection fraction is estimated at 55-60%.    8/22 cardiac echo  Interpretation Summary     There is mild to moderate concentric left ventricular hypertrophy.  The visual ejection fraction is 60-65%.  Compared to prior study, there is no significant change.  ______________________________________________________________________________  Left Ventricle  The left ventricle is normal in size. There is mild to moderate concentric  left ventricular hypertrophy. The visual ejection fraction is 60-65%. Left  ventricular diastolic function is abnormal. No regional wall motion  abnormalities noted.     Right Ventricle  The right ventricle is normal in structure, function and size.     Atria  The left atrium is mildly dilated. Right atrial size is normal. There is no  color Doppler evidence of an atrial shunt.     Mitral Valve  The mitral valve is normal in structure and function.     Tricuspid Valve  The tricuspid valve is normal in structure and function. Right ventricle  systolic pressure estimate normal. There is trace to mild tricuspid  regurgitation.     Aortic Valve  The aortic valve is normal in structure and function.     Vessels  Normal size aorta. The inferior vena cava is normal.     Pericardium  The pericardium appears normal.     Rhythm  Sinus rhythm was noted.    (+) Dyslipidemia -----Taking blood thinners     METS/Exercise Tolerance:     Hematologic: Comments: Dr. Moran, \"9 weeks ago I took him to surgery and ligated his right arm AV fistula and created a for stage left brachiobasilic AV fistula.  He has a right IJ tunneled dialysis catheter.  He developed occlusive thrombus of his right internal jugular vein and was anticoagulated.  He subsequently developed a left arm antecubital hematoma related to that anticoagulation.  I performed evacuation of the left arm hematoma 1 week postoperatively.  He recovered from that.  He remains on Eliquis for DVT " "anticoagulation.\"    (+) anemia,     Musculoskeletal:       GI/Hepatic:  - neg GI/hepatic ROS     Renal/Genitourinary: Comment: Adrenal adenoma, left    (+) renal disease, type: ESRD,     Endo:     (+) Obesity,     Psychiatric/Substance Use: Comment: H/o suicide attempt    (+) psychiatric history depression     Infectious Disease:       Malignancy:       Other:            Physical Exam    Airway  airway exam normal      Mallampati: II   TM distance: > 3 FB   Neck ROM: full   Mouth opening: > 3 cm    Respiratory Devices and Support         Dental       (+) Minor Abnormalities - some fillings, tiny chips      Cardiovascular   cardiovascular exam normal          Pulmonary   pulmonary exam normal                OUTSIDE LABS:  CBC:   Lab Results   Component Value Date    WBC 9.2 04/19/2023    WBC 9.4 04/19/2023    HGB 8.7 (L) 04/19/2023    HGB 6.7 (LL) 04/19/2023    HCT 26.9 (L) 04/19/2023    HCT 21.4 (L) 04/19/2023     04/19/2023     04/19/2023     BMP:   Lab Results   Component Value Date     (L) 04/19/2023     03/19/2023    POTASSIUM 3.7 04/19/2023    POTASSIUM 5.1 03/19/2023    CHLORIDE 94 (L) 04/19/2023    CHLORIDE 94 (L) 03/19/2023    CO2 26 04/19/2023    CO2 28 03/19/2023    BUN 28.5 (H) 04/19/2023    BUN 65.0 (H) 03/19/2023    CR 5.92 (H) 04/19/2023    CR 14.26 (H) 03/19/2023     (H) 04/19/2023     (H) 03/19/2023     COAGS:   Lab Results   Component Value Date    PTT 47 (H) 03/18/2023    INR 1.40 (H) 03/15/2023     POC:   Lab Results   Component Value Date     (H) 02/24/2021     HEPATIC:   Lab Results   Component Value Date    ALBUMIN 3.6 03/13/2023    PROTTOTAL 6.9 03/13/2023    ALT 9 (L) 03/13/2023    AST 25 03/13/2023    ALKPHOS 131 (H) 03/13/2023    BILITOTAL 0.9 03/13/2023     OTHER:   Lab Results   Component Value Date    LACT 0.9 03/13/2023    A1C 5.1 08/26/2021    BUBBA 9.0 04/19/2023    PHOS 7.2 (H) 06/18/2021    MAG 1.8 04/19/2023    LIPASE 304 11/29/2021    " TSH 1.67 03/03/2022    T4 1.26 07/15/2017    CRP 7.9 11/29/2020       Anesthesia Plan    ASA Status:  3      Anesthesia Type: General.     - Airway: ETT   Induction: Intravenous, Propofol.   Maintenance: Balanced.   Techniques and Equipment:     - Airway: Video-Laryngoscope         Consents    Anesthesia Plan(s) and associated risks, benefits, and realistic alternatives discussed. Questions answered and patient/representative(s) expressed understanding.    - Discussed:     - Discussed with:  Patient         Postoperative Care    Pain management: IV analgesics, Multi-modal analgesia.   PONV prophylaxis: Ondansetron (or other 5HT-3)     Comments:           H&P reviewed: Unable to attach H&P to encounter due to EHR limitations. H&P Update: appropriate H&P reviewed, patient examined. No interval changes since H&P (within 30 days).         Kevin Figueroa MD

## 2023-05-30 ENCOUNTER — HOSPITAL ENCOUNTER (OUTPATIENT)
Facility: CLINIC | Age: 28
Discharge: HOME | End: 2023-05-30
Attending: SURGERY | Admitting: SURGERY
Payer: MEDICARE

## 2023-05-30 ENCOUNTER — TELEPHONE (OUTPATIENT)
Dept: OTHER | Facility: CLINIC | Age: 28
End: 2023-05-30

## 2023-05-30 ENCOUNTER — ANESTHESIA (OUTPATIENT)
Dept: SURGERY | Facility: CLINIC | Age: 28
End: 2023-05-30
Payer: MEDICARE

## 2023-05-30 ENCOUNTER — APPOINTMENT (OUTPATIENT)
Dept: SURGERY | Facility: PHYSICIAN GROUP | Age: 28
End: 2023-05-30
Payer: MEDICARE

## 2023-05-30 VITALS
TEMPERATURE: 98.1 F | DIASTOLIC BLOOD PRESSURE: 90 MMHG | HEART RATE: 73 BPM | HEIGHT: 72 IN | WEIGHT: 232.9 LBS | BODY MASS INDEX: 31.54 KG/M2 | RESPIRATION RATE: 16 BRPM | OXYGEN SATURATION: 99 % | SYSTOLIC BLOOD PRESSURE: 160 MMHG

## 2023-05-30 DIAGNOSIS — N18.5 CKD (CHRONIC KIDNEY DISEASE) STAGE 5, GFR LESS THAN 15 ML/MIN (H): Primary | ICD-10-CM

## 2023-05-30 DIAGNOSIS — Z99.2 ESRD (END STAGE RENAL DISEASE) ON DIALYSIS (H): ICD-10-CM

## 2023-05-30 DIAGNOSIS — N18.6 ESRD (END STAGE RENAL DISEASE) ON DIALYSIS (H): ICD-10-CM

## 2023-05-30 LAB
ANION GAP SERPL CALCULATED.3IONS-SCNC: 19 MMOL/L (ref 7–15)
BUN SERPL-MCNC: 53.7 MG/DL (ref 6–20)
CALCIUM SERPL-MCNC: 9 MG/DL (ref 8.6–10)
CHLORIDE SERPL-SCNC: 98 MMOL/L (ref 98–107)
CREAT SERPL-MCNC: 11.17 MG/DL (ref 0.67–1.17)
DEPRECATED HCO3 PLAS-SCNC: 18 MMOL/L (ref 22–29)
GFR SERPL CREATININE-BSD FRML MDRD: 6 ML/MIN/1.73M2
GLUCOSE SERPL-MCNC: 111 MG/DL (ref 70–99)
HBA1C MFR BLD: 4.4 %
POTASSIUM SERPL-SCNC: 4.3 MMOL/L (ref 3.4–5.3)
SODIUM SERPL-SCNC: 135 MMOL/L (ref 136–145)

## 2023-05-30 PROCEDURE — 83036 HEMOGLOBIN GLYCOSYLATED A1C: CPT | Performed by: SURGERY

## 2023-05-30 PROCEDURE — 36832 AV FISTULA REVISION OPEN: CPT | Mod: AS | Performed by: PHYSICIAN ASSISTANT

## 2023-05-30 PROCEDURE — 93005 ELECTROCARDIOGRAM TRACING: CPT

## 2023-05-30 PROCEDURE — 82310 ASSAY OF CALCIUM: CPT | Performed by: SURGERY

## 2023-05-30 PROCEDURE — 272N000001 HC OR GENERAL SUPPLY STERILE: Performed by: SURGERY

## 2023-05-30 PROCEDURE — 93010 ELECTROCARDIOGRAM REPORT: CPT | Performed by: INTERNAL MEDICINE

## 2023-05-30 PROCEDURE — 250N000025 HC SEVOFLURANE, PER MIN: Performed by: SURGERY

## 2023-05-30 PROCEDURE — 250N000009 HC RX 250: Performed by: SURGERY

## 2023-05-30 PROCEDURE — 999N000054 HC STATISTIC EKG NON-CHARGEABLE

## 2023-05-30 PROCEDURE — 360N000076 HC SURGERY LEVEL 3, PER MIN: Performed by: SURGERY

## 2023-05-30 PROCEDURE — 250N000011 HC RX IP 250 OP 636: Performed by: NURSE ANESTHETIST, CERTIFIED REGISTERED

## 2023-05-30 PROCEDURE — 250N000009 HC RX 250: Performed by: NURSE ANESTHETIST, CERTIFIED REGISTERED

## 2023-05-30 PROCEDURE — 258N000003 HC RX IP 258 OP 636: Performed by: NURSE ANESTHETIST, CERTIFIED REGISTERED

## 2023-05-30 PROCEDURE — 250N000013 HC RX MED GY IP 250 OP 250 PS 637: Performed by: PHYSICIAN ASSISTANT

## 2023-05-30 PROCEDURE — 250N000011 HC RX IP 250 OP 636: Performed by: SURGERY

## 2023-05-30 PROCEDURE — 258N000003 HC RX IP 258 OP 636: Performed by: SURGERY

## 2023-05-30 PROCEDURE — 999N000141 HC STATISTIC PRE-PROCEDURE NURSING ASSESSMENT: Performed by: SURGERY

## 2023-05-30 PROCEDURE — 370N000017 HC ANESTHESIA TECHNICAL FEE, PER MIN: Performed by: SURGERY

## 2023-05-30 PROCEDURE — 710N000012 HC RECOVERY PHASE 2, PER MINUTE: Performed by: SURGERY

## 2023-05-30 PROCEDURE — 710N000009 HC RECOVERY PHASE 1, LEVEL 1, PER MIN: Performed by: SURGERY

## 2023-05-30 PROCEDURE — 36832 AV FISTULA REVISION OPEN: CPT | Mod: 58 | Performed by: SURGERY

## 2023-05-30 RX ORDER — HYDROCODONE BITARTRATE AND ACETAMINOPHEN 5; 325 MG/1; MG/1
1-2 TABLET ORAL EVERY 4 HOURS PRN
Qty: 10 TABLET | Refills: 0 | Status: SHIPPED | OUTPATIENT
Start: 2023-05-30 | End: 2023-05-30

## 2023-05-30 RX ORDER — PROPOFOL 10 MG/ML
INJECTION, EMULSION INTRAVENOUS PRN
Status: DISCONTINUED | OUTPATIENT
Start: 2023-05-30 | End: 2023-05-30

## 2023-05-30 RX ORDER — FENTANYL CITRATE 50 UG/ML
INJECTION, SOLUTION INTRAMUSCULAR; INTRAVENOUS PRN
Status: DISCONTINUED | OUTPATIENT
Start: 2023-05-30 | End: 2023-05-30

## 2023-05-30 RX ORDER — SODIUM CHLORIDE 9 MG/ML
INJECTION, SOLUTION INTRAVENOUS CONTINUOUS PRN
Status: DISCONTINUED | OUTPATIENT
Start: 2023-05-30 | End: 2023-05-30

## 2023-05-30 RX ORDER — ONDANSETRON 4 MG/1
4 TABLET, ORALLY DISINTEGRATING ORAL EVERY 30 MIN PRN
Status: DISCONTINUED | OUTPATIENT
Start: 2023-05-30 | End: 2023-05-30 | Stop reason: HOSPADM

## 2023-05-30 RX ORDER — BUPIVACAINE HYDROCHLORIDE 2.5 MG/ML
INJECTION, SOLUTION INFILTRATION; PERINEURAL PRN
Status: DISCONTINUED | OUTPATIENT
Start: 2023-05-30 | End: 2023-05-30 | Stop reason: HOSPADM

## 2023-05-30 RX ORDER — HYDROCODONE BITARTRATE AND ACETAMINOPHEN 5; 325 MG/1; MG/1
1 TABLET ORAL
Status: COMPLETED | OUTPATIENT
Start: 2023-05-30 | End: 2023-05-30

## 2023-05-30 RX ORDER — FENTANYL CITRATE 0.05 MG/ML
25 INJECTION, SOLUTION INTRAMUSCULAR; INTRAVENOUS EVERY 5 MIN PRN
Status: DISCONTINUED | OUTPATIENT
Start: 2023-05-30 | End: 2023-05-30 | Stop reason: HOSPADM

## 2023-05-30 RX ORDER — HYDROMORPHONE HCL IN WATER/PF 6 MG/30 ML
0.4 PATIENT CONTROLLED ANALGESIA SYRINGE INTRAVENOUS EVERY 5 MIN PRN
Status: DISCONTINUED | OUTPATIENT
Start: 2023-05-30 | End: 2023-05-30 | Stop reason: HOSPADM

## 2023-05-30 RX ORDER — CEFAZOLIN SODIUM 500 MG/2.2ML
500 INJECTION, POWDER, FOR SOLUTION INTRAMUSCULAR; INTRAVENOUS
Status: COMPLETED | OUTPATIENT
Start: 2023-05-30 | End: 2023-05-30

## 2023-05-30 RX ORDER — LIDOCAINE HYDROCHLORIDE 20 MG/ML
INJECTION, SOLUTION INFILTRATION; PERINEURAL PRN
Status: DISCONTINUED | OUTPATIENT
Start: 2023-05-30 | End: 2023-05-30

## 2023-05-30 RX ORDER — SODIUM CHLORIDE 9 MG/ML
INJECTION, SOLUTION INTRAVENOUS CONTINUOUS
Status: DISCONTINUED | OUTPATIENT
Start: 2023-05-30 | End: 2023-05-30 | Stop reason: HOSPADM

## 2023-05-30 RX ORDER — AMOXICILLIN 250 MG
1-2 CAPSULE ORAL 2 TIMES DAILY PRN
Qty: 10 TABLET | Refills: 0 | Status: SHIPPED | OUTPATIENT
Start: 2023-05-30 | End: 2023-05-30

## 2023-05-30 RX ORDER — HYDROCODONE BITARTRATE AND ACETAMINOPHEN 5; 325 MG/1; MG/1
1-2 TABLET ORAL EVERY 4 HOURS PRN
Qty: 10 TABLET | Refills: 0 | Status: SHIPPED | OUTPATIENT
Start: 2023-05-30 | End: 2023-07-13

## 2023-05-30 RX ORDER — AMOXICILLIN 250 MG
1-2 CAPSULE ORAL 2 TIMES DAILY PRN
Qty: 10 TABLET | Refills: 0 | Status: SHIPPED | OUTPATIENT
Start: 2023-05-30 | End: 2023-07-13

## 2023-05-30 RX ORDER — MAGNESIUM HYDROXIDE 1200 MG/15ML
LIQUID ORAL PRN
Status: DISCONTINUED | OUTPATIENT
Start: 2023-05-30 | End: 2023-05-30 | Stop reason: HOSPADM

## 2023-05-30 RX ORDER — ONDANSETRON 2 MG/ML
4 INJECTION INTRAMUSCULAR; INTRAVENOUS EVERY 30 MIN PRN
Status: DISCONTINUED | OUTPATIENT
Start: 2023-05-30 | End: 2023-05-30 | Stop reason: HOSPADM

## 2023-05-30 RX ORDER — HEPARIN SODIUM 1000 [USP'U]/ML
INJECTION, SOLUTION INTRAVENOUS; SUBCUTANEOUS PRN
Status: DISCONTINUED | OUTPATIENT
Start: 2023-05-30 | End: 2023-05-30 | Stop reason: HOSPADM

## 2023-05-30 RX ORDER — FENTANYL CITRATE 0.05 MG/ML
50 INJECTION, SOLUTION INTRAMUSCULAR; INTRAVENOUS EVERY 5 MIN PRN
Status: DISCONTINUED | OUTPATIENT
Start: 2023-05-30 | End: 2023-05-30 | Stop reason: HOSPADM

## 2023-05-30 RX ORDER — ONDANSETRON 2 MG/ML
INJECTION INTRAMUSCULAR; INTRAVENOUS PRN
Status: DISCONTINUED | OUTPATIENT
Start: 2023-05-30 | End: 2023-05-30

## 2023-05-30 RX ORDER — HEPARIN SODIUM 1000 [USP'U]/ML
INJECTION, SOLUTION INTRAVENOUS; SUBCUTANEOUS PRN
Status: DISCONTINUED | OUTPATIENT
Start: 2023-05-30 | End: 2023-05-30

## 2023-05-30 RX ORDER — SODIUM CHLORIDE, SODIUM LACTATE, POTASSIUM CHLORIDE, CALCIUM CHLORIDE 600; 310; 30; 20 MG/100ML; MG/100ML; MG/100ML; MG/100ML
INJECTION, SOLUTION INTRAVENOUS CONTINUOUS
Status: DISCONTINUED | OUTPATIENT
Start: 2023-05-30 | End: 2023-05-30 | Stop reason: HOSPADM

## 2023-05-30 RX ORDER — DEXMEDETOMIDINE HYDROCHLORIDE 4 UG/ML
INJECTION, SOLUTION INTRAVENOUS PRN
Status: DISCONTINUED | OUTPATIENT
Start: 2023-05-30 | End: 2023-05-30

## 2023-05-30 RX ORDER — NEOSTIGMINE METHYLSULFATE 1 MG/ML
VIAL (ML) INJECTION PRN
Status: DISCONTINUED | OUTPATIENT
Start: 2023-05-30 | End: 2023-05-30

## 2023-05-30 RX ORDER — HYDROMORPHONE HCL IN WATER/PF 6 MG/30 ML
0.2 PATIENT CONTROLLED ANALGESIA SYRINGE INTRAVENOUS EVERY 5 MIN PRN
Status: DISCONTINUED | OUTPATIENT
Start: 2023-05-30 | End: 2023-05-30 | Stop reason: HOSPADM

## 2023-05-30 RX ORDER — GLYCOPYRROLATE 0.2 MG/ML
INJECTION, SOLUTION INTRAMUSCULAR; INTRAVENOUS PRN
Status: DISCONTINUED | OUTPATIENT
Start: 2023-05-30 | End: 2023-05-30

## 2023-05-30 RX ADMIN — GLYCOPYRROLATE 0.8 MG: 0.2 INJECTION, SOLUTION INTRAMUSCULAR; INTRAVENOUS at 12:00

## 2023-05-30 RX ADMIN — DEXMEDETOMIDINE HYDROCHLORIDE 8 MCG: 200 INJECTION INTRAVENOUS at 07:52

## 2023-05-30 RX ADMIN — FENTANYL CITRATE 50 MCG: 50 INJECTION, SOLUTION INTRAMUSCULAR; INTRAVENOUS at 10:23

## 2023-05-30 RX ADMIN — PROPOFOL 200 MG: 10 INJECTION, EMULSION INTRAVENOUS at 07:42

## 2023-05-30 RX ADMIN — ROCURONIUM BROMIDE 20 MG: 50 INJECTION, SOLUTION INTRAVENOUS at 09:14

## 2023-05-30 RX ADMIN — LIDOCAINE HYDROCHLORIDE 60 MG: 20 INJECTION, SOLUTION INFILTRATION; PERINEURAL at 07:42

## 2023-05-30 RX ADMIN — NEOSTIGMINE METHYLSULFATE 4 MG: 1 INJECTION, SOLUTION INTRAVENOUS at 12:00

## 2023-05-30 RX ADMIN — HYDROCODONE BITARTRATE AND ACETAMINOPHEN 1 TABLET: 5; 325 TABLET ORAL at 13:14

## 2023-05-30 RX ADMIN — PROPOFOL 80 MG: 10 INJECTION, EMULSION INTRAVENOUS at 09:13

## 2023-05-30 RX ADMIN — HEPARIN SODIUM 5000 UNITS: 1000 INJECTION, SOLUTION INTRAVENOUS; SUBCUTANEOUS at 10:29

## 2023-05-30 RX ADMIN — SODIUM CHLORIDE: 9 INJECTION, SOLUTION INTRAVENOUS at 07:39

## 2023-05-30 RX ADMIN — CEFAZOLIN 500 MG: 225 INJECTION, POWDER, FOR SOLUTION INTRAMUSCULAR; INTRAVENOUS at 07:59

## 2023-05-30 RX ADMIN — PROPOFOL 20 MG: 10 INJECTION, EMULSION INTRAVENOUS at 09:25

## 2023-05-30 RX ADMIN — FENTANYL CITRATE 25 MCG: 50 INJECTION, SOLUTION INTRAMUSCULAR; INTRAVENOUS at 11:59

## 2023-05-30 RX ADMIN — ROCURONIUM BROMIDE 10 MG: 50 INJECTION, SOLUTION INTRAVENOUS at 10:08

## 2023-05-30 RX ADMIN — PROPOFOL 20 MG: 10 INJECTION, EMULSION INTRAVENOUS at 10:08

## 2023-05-30 RX ADMIN — FENTANYL CITRATE 50 MCG: 50 INJECTION, SOLUTION INTRAMUSCULAR; INTRAVENOUS at 08:49

## 2023-05-30 RX ADMIN — FENTANYL CITRATE 50 MCG: 50 INJECTION, SOLUTION INTRAMUSCULAR; INTRAVENOUS at 07:40

## 2023-05-30 RX ADMIN — PHENYLEPHRINE HYDROCHLORIDE 50 MCG: 10 INJECTION INTRAVENOUS at 09:51

## 2023-05-30 RX ADMIN — ROCURONIUM BROMIDE 30 MG: 50 INJECTION, SOLUTION INTRAVENOUS at 07:44

## 2023-05-30 RX ADMIN — ROCURONIUM BROMIDE 20 MG: 50 INJECTION, SOLUTION INTRAVENOUS at 08:13

## 2023-05-30 RX ADMIN — FENTANYL CITRATE 50 MCG: 50 INJECTION, SOLUTION INTRAMUSCULAR; INTRAVENOUS at 08:38

## 2023-05-30 RX ADMIN — ROCURONIUM BROMIDE 10 MG: 50 INJECTION, SOLUTION INTRAVENOUS at 11:21

## 2023-05-30 RX ADMIN — ROCURONIUM BROMIDE 10 MG: 50 INJECTION, SOLUTION INTRAVENOUS at 11:00

## 2023-05-30 RX ADMIN — FENTANYL CITRATE 25 MCG: 50 INJECTION, SOLUTION INTRAMUSCULAR; INTRAVENOUS at 12:02

## 2023-05-30 RX ADMIN — ONDANSETRON 4 MG: 2 INJECTION INTRAMUSCULAR; INTRAVENOUS at 11:29

## 2023-05-30 RX ADMIN — PROPOFOL 50 MG: 10 INJECTION, EMULSION INTRAVENOUS at 07:44

## 2023-05-30 RX ADMIN — DEXMEDETOMIDINE HYDROCHLORIDE 12 MCG: 200 INJECTION INTRAVENOUS at 08:05

## 2023-05-30 RX ADMIN — FENTANYL CITRATE 50 MCG: 50 INJECTION, SOLUTION INTRAMUSCULAR; INTRAVENOUS at 07:46

## 2023-05-30 RX ADMIN — PHENYLEPHRINE HYDROCHLORIDE 50 MCG: 10 INJECTION INTRAVENOUS at 09:41

## 2023-05-30 ASSESSMENT — ACTIVITIES OF DAILY LIVING (ADL)
ADLS_ACUITY_SCORE: 35

## 2023-05-30 NOTE — ANESTHESIA PROCEDURE NOTES
Airway       Patient location during procedure: OR       Procedure Start/Stop Times: 5/30/2023 7:48 AM  Staff -        Performed By: CRNA  Consent for Airway        Urgency: elective  Indications and Patient Condition       Indications for airway management: stephanie-procedural       Induction type:intravenous       Mask difficulty assessment: 1 - vent by mask    Final Airway Details       Final airway type: endotracheal airway       Successful airway: ETT - single and Oral  Endotracheal Airway Details        ETT size (mm): 8.0       Cuffed: yes       Successful intubation technique: video laryngoscopy       VL Blade Size: Glidescope 4       Grade View of Cords: 1       Adjucts: stylet       Position: Right       Measured from: lips       Secured at (cm): 24       Bite block used: None    Post intubation assessment        Placement verified by: capnometry, equal breath sounds and chest rise        Number of attempts at approach: 1       Secured with: commercial tube watson and pink tape       Ease of procedure: easy       Dentition: Intact and Unchanged    Medication(s) Administered   Medication Administration Time: 5/30/2023 7:48 AM

## 2023-05-30 NOTE — OR NURSING
AOX3-VSS-O2 sats >92% RA- Good PO intake, pain 6/10 tolerable per pt- Pt and responsible adult verbalize understanding of discharge instructions, denies questions. Up in W/C - transported to door for discharge to home.

## 2023-05-30 NOTE — OP NOTE
Procedure Date: 05/30/2023    PREOPERATIVE DIAGNOSIS:  End-stage renal disease.    POSTOPERATIVE DIAGNOSIS:  End-stage renal disease.    PROCEDURE PERFORMED:  Creation of second stage left brachiobasilic transposition AV fistula.    SURGEON:  Henrik Moran MD.    ASSISTANT:  Des Lee PA-C.  Mr. Lee's assistance in this case was critical for his expertise with suctioning and exposure.  He participated in the creation of the arteriovenous anastomosis and helped with closure of the incisions.    ANESTHESIA:  General endotracheal anesthesia.    ESTIMATED BLOOD LOSS:  25 mL.    INDICATIONS FOR PROCEDURE:  This patient is a 27-year-old gentleman who is status post a failed left upper extremity AV access.  He has undergone creation of a first stage left brachiobasilic AV fistula and post-procedure ultrasound has demonstrated nice maturation of the basilic vein.  He presents at this time for creation of a second stage transposition brachiobasilic AV fistula.    OPERATIVE FINDINGS:  We exposed the original AV anastomosis, which was the median cubital vein anastomosed to the brachial artery just above the antecubital crease.  The basilic vein throughout the upper arm was an excellent structure, ranging between 6-8 mm in diameter.  It was fully mobilized up to its confluence with the brachial vein and the vein was transposed anteriorly and re-anastomosed to the brachial artery.  Post-procedure, we had an excellent thrill along the course of the transposed basilic vein and a 2+ palpable left radial pulse at the wrist.    OPERATIVE DESCRIPTION:  After informed consent was obtained, the patient was brought to the operating room and placed on the table in a supine position.  General endotracheal anesthesia was achieved without incident.  I utilized a portable ultrasound to map and mario the course of the basilic vein.  The left upper extremity was prepped and draped in the usual sterile fashion.  Timeout was called, and we  verified the patient's identity, the operative site, and the proposed procedure.  I began by re-incising the transverse incision just above the antecubital crease.  Dissection proceeded sharply downward to expose the median cubital vein and its anastomosis to the brachial artery at that level.  A profunda spoon clamp was placed across the median cubital vein at that level and the vein was ligated and divided.  I oversewed the venous cuff on the brachial artery with a double running layer of 5-0 Prolene and the clamp was removed.  I could palpate a nice pulse in the brachial artery distal to that ligation.  Next, the skin overlying the pre-marked course of the basilic vein on the medial aspect of the left upper arm was locally anesthetized with 0.25% Marcaine.  I made 2 long skin incisions medially directly over the basilic vein, leaving a roughly 4 cm skin bridge at the central humeral level.  Dissection proceeded sharply downward to expose the basilic vein throughout its length.  Multiple venous side branches were divided between silk ties.  Cutaneous nerves of the forearm were identified and carefully avoided during this basilic vein mobilization.  Once the basilic vein was fully mobilized from the antecubital crease to its confluence with the brachial vein up near the axilla, I exposed the brachial artery through our basilic vein incision at the distal humeral level.  Dissection proceeded sharply downward to expose the brachial artery at this level for 3 cm, and proximal and distal control was achieved with vessel loops.  A Salma-Wick tunneler was used to create a subcutaneous tunnel anteriorly connecting the brachial artery exposure to the mobilized vein up near the axilla.  The patient was systemically heparinized with 5000 units of intravenous heparin.  After a 3-minute circulation time, the basilic vein was ligated at the antecubital crease and divided.  The basilic vein was then hooked up to our Salma-Wick  tunneler and gently drawn through the anterior subcutaneous tunnel, taking great care to avoid any twisting or kinking of the basilic vein.  Proximal and distal control was achieved on the exposed brachial artery.  A 6 mm brachial arteriotomy was made.  I incised a thin rim of the brachial artery wall. The artery would readily admit a 3 mm dilator proximally and distally.  Heparinized saline was instilled up and down the artery and it was re-occluded with the vessel loops.  Our transposed basilic vein was cut to length in a spatulated manner.  I proceeded with an end-to-side transposed basilic vein to brachial artery anastomosis using running 7-0 Prolene suture.  This anastomosis was subsequently secured and flow was restored up the transposed basilic vein.  Immediately noted was an excellent thrill throughout the course of the transposed basilic vein.  Distal control of the brachial artery was released and flow was restored back down the forearm.  Hemostasis for all incisions was assured.  Closure of all incisions then proceeded using interrupted absorbable suture in layers.  Great care was taken to avoid any fascial impingement on the transposed basilic vein.  Skin for all 3 incisions was closed with 4-0 Monocryl running subcuticular sutures.  Steri-Strips and sterile dressings were applied.  Final sponge and needle count were reported as correct.  The patient tolerated the procedure without incident.  He was transported extubated and hemodynamically stable to the recovery room.  He is noted to have an easily palpable left radial pulse at the wrist and a palpable thrill along the course of the transposed basilic vein.    Henrik Moran MD        D: 2023   T: 2023   MT: jose    Name:     STEFAN RIOS  MRN:      [id number]        Account:        [id number]   :      1995           Procedure Date: 2023     Document: [id number]

## 2023-05-30 NOTE — ANESTHESIA POSTPROCEDURE EVALUATION
Patient: Taylor Valiente    Procedure: Procedure(s):  SECOND STAGE LEFT BRACHIAL TO BASILIC ARTERIOVENOUS FISTULA       Anesthesia Type:  General    Note:  Disposition: Outpatient   Postop Pain Control: Uneventful            Sign Out: Well controlled pain   PONV: No   Neuro/Psych: Uneventful            Sign Out: Acceptable/Baseline neuro status   Airway/Respiratory: Uneventful            Sign Out: Acceptable/Baseline resp. status   CV/Hemodynamics: Uneventful            Sign Out: Acceptable CV status; No obvious hypovolemia; No obvious fluid overload   Other NRE: NONE   DID A NON-ROUTINE EVENT OCCUR? No           Last vitals:  Vitals Value Taken Time   /86 05/30/23 1330   Temp 36.5  C (97.7  F) 05/30/23 1330   Pulse 72 05/30/23 1330   Resp 13 05/30/23 1330   SpO2 98 % 05/30/23 1326   Vitals shown include unvalidated device data.    Electronically Signed By: Kevin Figueroa MD  May 30, 2023  2:37 PM

## 2023-05-30 NOTE — TELEPHONE ENCOUNTER
[facility]    Who is the name of the provider?:  Dr Moran   What is the location you see this provider at/preferred location?: [place]  Person calling / Facility: Radha Valiente(Patient's Mother)  Phone number:  [phone]   Nurse call back needed:  N/A     Reason for call:  Radha(Pt Mother) called regarding his prescription that was sent to the wrong [facility]'s location. When they went to pick it up they were told it was sent to a location in [facility]. Pt states he is in a lot of pain and is hoping to be able to get the medication sent to the correct pharmacy today. Provided the correct location below.    Pharmacy location:  [address] [address]  Outside Imaging: n/a  Can we leave a detailed message on this number?  yes

## 2023-05-30 NOTE — TELEPHONE ENCOUNTER
Second attempt made to speak with pharmacy staff at [facility] and was on hold for greater than 10 minutes.    Reached out to [facility] to determine if medication had been picked up by family member and that person had not yet gone to the pharmacy to check.    Meka Vazquez, BSN, RN-[facility]

## 2023-05-30 NOTE — BRIEF OP NOTE
[facility]    Brief Operative Note    Pre-operative diagnosis: ESRD (end stage renal disease) on dialysis (H) [N18.6, Z99.2]  Post-operative diagnosis End Stage Renal Disease    Procedure: Procedure(s):  SECOND STAGE LEFT BRACHIAL TO BASILIC ARTERIOVENOUS FISTULA     Surgeon: Surgeon(s) and Role:     * Henrik Moran MD - Primary     * Des Lee PA-C - Assisting     Anesthesia: General   Estimated Blood Loss: 25 mL from 5/30/2023  7:39 AM to 5/30/2023 12:24 PM      Drains: None  Specimens: * No specimens in log *  Findings:   None.  Complications: None.  Implants: * No implants in log *

## 2023-05-30 NOTE — DISCHARGE INSTRUCTIONS
Same Day Surgery Discharge Instructions for  Sedation and General Anesthesia     It's not unusual to feel dizzy, light-headed or faint for up to 24 hours after surgery or while taking pain medication.  If you have these symptoms: sit for a few minutes before standing and have someone assist you when you get up to walk or use the bathroom.    You should rest and relax for the next 24 hours. We recommend you make arrangements to have an adult stay with you for at least 24 hours after your discharge.  Avoid hazardous and strenuous activity.    DO NOT DRIVE any vehicle or operate mechanical equipment for 24 hours following the end of your surgery.  Even though you may feel normal, your reactions may be affected by the medication you have received.    Do not drink alcoholic beverages for 24 hours following surgery.     Slowly progress to your regular diet as you feel able. It's not unusual to feel nauseated and/or vomit after receiving anesthesia.  If you develop these symptoms, drink clear liquids (apple juice, ginger ale, broth, 7-up, etc. ) until you feel better.  If your nausea and vomiting persists for 24 hours, please notify your surgeon.      All narcotic pain medications, along with inactivity and anesthesia, can cause constipation. Drinking plenty of liquids and increasing fiber intake will help.    For any questions of a medical nature, call your surgeon.    Do not make important decisions for 24 hours.    If you had general anesthesia, you may have a sore throat for a couple of days related to the breathing tube used during surgery.  You may use Cepacol lozenges to help with this discomfort.  If it worsens or if you develop a fever, contact your surgeon.     If you feel your pain is not well managed with the pain medications prescribed by your surgeon, please contact your surgeon's office to let them know so they can address your concerns.            [facility] - Vascular Surgery  Discharge  Instructions: Post-Operative AV Fistula      ACTIVITY  Take frequent, short walks and increase your activity gradually.    Avoid strenuous physical activity or heavy lifting greater than 15 lbs. for 3 weeks.  You may climb stairs.  You may drive without restrictions when you are not using any prescription pain medication and feel comfortable in a car.  You may return to work/school when you are comfortable without any prescription pain medication.    WOUND CARE  You may remove your outer dressing or Band-Aids 48-72 hours after the surgery.  You may shower prior to this with the site covered.  Pat your incisions dry and leave them open to air.    Do not soak your incisions in a tub or pool for 2 weeks.   Do not apply any lotions, creams, or ointments to your incision(s).  A ridge under your incision(s) is normal and will gradually resolve.  Per Dr. Moran, elevate your left arm on a couple pillows, and avoid the strengthening exercises for 2 weeks.     DIET  Start with liquids, then gradually resume your regular diet as tolerated.   Drink plenty of liquids to stay hydrated.    PAIN  Expect some tenderness and discomfort at the incision site(s).  Use the prescribed pain medication at your discretion.  Expect gradual resolution of your pain over several days.  You may take ibuprofen with food (unless you have been told not to) instead of or in addition to your prescribed pain medication.  If you are taking Norco or Percocet, do not take any additional acetaminophen/APAP/Tylenol.  Do not drink alcohol or drive while you are taking pain medications.  You may apply ice to your incisions in 20 minute intervals as needed for the next 48 hours.  After that time, consider switching to heat if you prefer.    EXPECTATIONS  Pain medications can cause constipation.  Limit use when possible.  Take over the counter stool softener/stimulant, such as Colace or Senna, 1-2 times a day with plenty of water.  You may take a mild over the  counter laxative, such as Miralax or a suppository, as needed.    You may discontinue these medications once you are having regular bowel movements and/or are no longer taking your narcotic pain medication.  Resume your aspirin or other anticoagulation whenever you would take your next dose (this evening or tomorrow am).      FOLLOW UP  Please call our office ([phone]) to make a 2 week post op visit with Dr. Moran.  This is to check on your progress and answer any questions you may have.    If you would like to be seen earlier or have any concerns, please call us at [phone] and ask to speak with our nurse.  We are located at [address] [place] [address].    CALL OUR OFFICE [phone] IF YOU HAVE:   Chills or fever above 101 F.  Increased redness, warmth, or drainage at your incisions.  Significant bleeding.  Pain not relieved by your pain medication or rest.  Increasing pain after the first 48 hours.  Any other concerns or questions.    Revised April 2018       **If you have questions or concerns about your procedure,   call Dr. Moran at [phone]**

## 2023-05-30 NOTE — TELEPHONE ENCOUNTER
Per review of medication list:          Contacted [facility] in [place] and [facility] stating the above.  Requested call back ASAP.    Returned call to Radha and discussed the above as well.  She stated she will send a family member to see if the prescription is available.    Will follow up again with Radha once I receive a call back from [facility].    Meka Vazquez, LEAHN, RN-[facility]

## 2023-05-30 NOTE — OR NURSING
Patient reports tightness to left hand. Cap refill 3 seconds, Jose Lee at bedside to assess. Will elevate arm on another pillow.     Pt dressed, up in recliner and transported to Phase 2.

## 2023-05-30 NOTE — ANESTHESIA CARE TRANSFER NOTE
Patient: Taylor Valiente    Procedure: Procedure(s):  SECOND STAGE LEFT BRACHIAL TO BASILIC ARTERIOVENOUS FISTULA       Diagnosis: ESRD (end stage renal disease) on dialysis (H) [N18.6, Z99.2]  Diagnosis Additional Information: No value filed.    Anesthesia Type:   General     Note:    Oropharynx: oropharynx clear of all foreign objects and spontaneously breathing  Level of Consciousness: awake  Oxygen Supplementation: face mask  Level of Supplemental Oxygen (L/min / FiO2): 8  Independent Airway: airway patency satisfactory and stable  Dentition: dentition unchanged  Vital Signs Stable: post-procedure vital signs reviewed and stable  Report to RN Given: handoff report given  Patient transferred to: PACU  Comments: Suctioned, spont resp ,lifts head  > 5 sec. Extubated with immediate exchange, to PACU, report to RN.  Handoff Report: Identifed the Patient, Identified the Reponsible Provider, Reviewed the pertinent medical history, Discussed the surgical course, Reviewed Intra-OP anesthesia mangement and issues during anesthesia, Set expectations for post-procedure period and Allowed opportunity for questions and acknowledgement of understanding      Vitals:  Vitals Value Taken Time   /88 05/30/23 1230   Temp 36.4  C (97.5  F) 05/30/23 1226   Pulse 66 05/30/23 1233   Resp 20 05/30/23 1233   SpO2 100 % 05/30/23 1233   Vitals shown include unvalidated device data.    Electronically Signed By: ROSA Lema CRNA  May 30, 2023  12:34 PM

## 2023-05-30 NOTE — OR NURSING
Notified by discharge pharmacy that patient's insurance isn't contracted with [employer]. Notified mom and she requests prescription sent to [facility] on [address] in [place]. Notified [address].    Dr. Moran at bedside to assess patient. Patient should elevate arm, resume arm exercises in two weeks. He may resume Eliquis tomorrow, and ASA today.

## 2023-05-31 LAB
ATRIAL RATE - MUSE: 79 BPM
DIASTOLIC BLOOD PRESSURE - MUSE: NORMAL MMHG
INTERPRETATION ECG - MUSE: NORMAL
P AXIS - MUSE: 38 DEGREES
PR INTERVAL - MUSE: 170 MS
QRS DURATION - MUSE: 76 MS
QT - MUSE: 414 MS
QTC - MUSE: 474 MS
R AXIS - MUSE: 26 DEGREES
SYSTOLIC BLOOD PRESSURE - MUSE: NORMAL MMHG
T AXIS - MUSE: 96 DEGREES
VENTRICULAR RATE- MUSE: 79 BPM

## 2023-06-06 ENCOUNTER — OFFICE VISIT (OUTPATIENT)
Dept: OTHER | Facility: CLINIC | Age: 28
End: 2023-06-06
Attending: SURGERY
Payer: MEDICARE

## 2023-06-06 ENCOUNTER — HOSPITAL ENCOUNTER (OUTPATIENT)
Dept: ULTRASOUND IMAGING | Facility: CLINIC | Age: 28
Discharge: HOME | End: 2023-06-06
Attending: PHYSICIAN ASSISTANT
Payer: MEDICARE

## 2023-06-06 VITALS
BODY MASS INDEX: 32.11 KG/M2 | SYSTOLIC BLOOD PRESSURE: 170 MMHG | DIASTOLIC BLOOD PRESSURE: 122 MMHG | HEIGHT: 71 IN | WEIGHT: 229.4 LBS | OXYGEN SATURATION: 100 % | HEART RATE: 82 BPM

## 2023-06-06 DIAGNOSIS — I82.C11 ACUTE THROMBOSIS OF RIGHT INTERNAL JUGULAR VEIN (H): ICD-10-CM

## 2023-06-06 DIAGNOSIS — I82.C11 ACUTE THROMBOSIS OF RIGHT INTERNAL JUGULAR VEIN (H): Primary | ICD-10-CM

## 2023-06-06 PROCEDURE — 99213 OFFICE O/P EST LOW 20 MIN: CPT | Performed by: PHYSICIAN ASSISTANT

## 2023-06-06 PROCEDURE — 93971 EXTREMITY STUDY: CPT | Mod: RT

## 2023-06-06 PROCEDURE — G0463 HOSPITAL OUTPT CLINIC VISIT: HCPCS | Mod: 25

## 2023-06-06 NOTE — PROGRESS NOTES
"Patient is here to discuss follow up    BP (!) 170/122 (BP Location: Right arm, Patient Position: Chair, Cuff Size: Adult Regular)   Pulse 82   Ht 5' 11\" (1.803 m)   Wt 229 lb 6.4 oz (104.1 kg)   SpO2 100%   BMI 31.99 kg/m      Questions patient would like addressed today are: N/A.    Refills are needed: No    Has homecare services and agency name:  Juanita MUKHERJEE    "

## 2023-06-06 NOTE — PROGRESS NOTES
[facility]  VASCULAR MEDICINE FOLLOW-UP VISIT      PRIMARY HEALTH CARE PROVIDER:  Priyank Lawrence    REASON FOR VISIT:  Follow-up right internal jugular DVT      HPI: Taylor Valiente is a 27 year old non-smoking male with a history of ESRD secondary to hypertension/FSGS. He was initiated on dialysis in 2021. His right radial artery to cephalic vein AV fistula developed severe aneurysmal degeneration of the fistula and high fistula output. This was subsequently ligated on 3/9/23, during which time a tunneled right CVC catheter was placed as well as creation of first stage of left brachiobasilic AV fistula.      On 3/12/23 he presented to the ER with right arm swelling and pain. Venous duplex demonstrated a near occlusive deep vein thrombus in the right internal jugular vein. He was started on Eliquis and sent home. At dialysis the next day he developed increased neck pain, chest pain and leg pain. He was sent back to the ED. He admitted to not being able to get Eliquis and he was admitted to the hospital for IV heparin. After starting heparin, he developed a small hematoma near his new left AV fistula that needed to be evacuated on 3/16/23.     On 5/30/23 he underwent 2nd stage of his left brachial to basilic AVF. He did hold his Eliquis for 2 days prior to surgery.      He is now doing well and continues on Eliquis as his HD catheter can't be removed yet (needs HD access still while new LUE AV fistula is maturing). The swelling and pain in his right upper extremity has improved/nearly resolved. We are following him with surveillance ultrasounds to assure no progression of thrombus with catheter staying in. He now presents to review repeat US results.      He has no personal or family history of blood clots. His tunneled HD catheter is functioning well. He has a palpable thrill in his new left AV fistula.       PAST MEDICAL HISTORY  Past Medical History:   Diagnosis Date     Adrenal adenoma, left       Anemia      Benign essential hypertension 07/28/2017     CKD (chronic kidney disease) stage 5, GFR less than 15 ml/min (H)     FSGS     Depression      Focal glomerular sclerosis 07/28/2017     HLD (hyperlipidemia)      LVH (left ventricular hypertrophy) due to hypertensive disease 07/14/2017     Noncompliance      Obesity, unspecified      OD (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/2021     Stress-induced cardiomyopathy      Suicide attempt (H) 2019       PAST SURGICAL HISTORY:  Past Surgical History:   Procedure Laterality Date     ARTHROSCOPY ANKLE Left 02/24/2021    Procedure: Left ankle arthroscopy and debridement/micro fracture;  Surgeon: Mirza Nelson MD;  [facility]     [facility]     CREATE FISTULA ARTERIOVENOUS UPPER EXTREMITY Right 03/04/2021    Procedure: RIGHT proximal radial  to CEPHALIC ARTERIOVENOUS FISTULA;  Surgeon: Henrik Moran MD;  Location: [facility] OR     CREATE FISTULA ARTERIOVENOUS UPPER EXTREMITY Left 03/09/2023    Procedure: CREATION OF FIRST STAGE LEFT BRACHIOBASILIC ARTERIOVENOUS FISTULA;  Surgeon: Henrik Moran MD;  Location: [facility] OR     CREATE FISTULA ARTERIOVENOUS UPPER EXTREMITY Left 5/30/2023    Procedure: SECOND STAGE LEFT BRACHIAL TO BASILIC ARTERIOVENOUS FISTULA;  Surgeon: Henrik Moran MD;  Location: [facility] OR     IR CVC TUNNEL PLACEMENT > 5 YRS OF AGE  06/17/2021     IR CVC TUNNEL PLACEMENT > 5 YRS OF AGE  03/09/2023     IR CVC TUNNEL REMOVAL RIGHT  12/03/2021     IRRIGATION AND DEBRIDEMENT UPPER EXTREMITY, COMBINED Left 03/16/2023    Procedure: EVACUATION OF LEFT ARM HEMATOMA;  Surgeon: Henrik Moran MD;  Location: [facility]     LIGATE FISTULA ARTERIOVENOUS UPPER EXTREMITY Right 03/09/2023    Procedure: LIGATION OF RIGHT ARM ARTERIOVENOUS FISTULA;  Surgeon: Henrik Moran MD;  Location: [facility]     REVISION FISTULA ARTERIOVENOUS UPPER EXTREMITY Right 08/26/2021    Procedure: Second stage RIGHT BRACHIOCEPHALIC  transposition ARTERIOVENOUS FISTULA;  Surgeon: Henrik Moran MD;  Location: [facility] OR         CURRENT MEDICATIONS  ACE/ARB/ARNI NOT PRESCRIBED (INTENTIONAL), Please choose reason not prescribed from choices below.  acetaminophen (TYLENOL) 325 MG tablet, Take 2 tablets (650 mg) by mouth every 4 hours as needed for mild pain  amLODIPine (NORVASC) 10 MG tablet, Take 10 mg by mouth daily   apixaban ANTICOAGULANT (ELIQUIS) 5 MG tablet, Take 1 tablet (5 mg) by mouth 2 times daily  aspirin 81 MG EC tablet, Take 81 mg by mouth daily  atorvastatin (LIPITOR) 10 MG tablet, TAKE ONE TABLET BY MOUTH EVERY NIGHT AT BEDTIME  bumetanide (BUMEX) 2 MG tablet, Take 2 mg by mouth daily   calcium acetate (CALPHRON) 667 MG TABS tablet, Take 1,334 mg by mouth 3 times daily (with meals)  carvedilol (COREG) 25 MG tablet, TAKE TWO TABLETS BY MOUTH TWICE A DAY WITH A MEAL Strength: 25 mg  cinacalcet (SENSIPAR) 90 MG tablet, Take 90 mg by mouth daily  cloNIDine (CATAPRES-TTS2) 0.2 MG/24HR WK patch, Place 1 patch onto the skin  hydrALAZINE (APRESOLINE) 100 MG tablet, TAKE ONE TABLET BY MOUTH THREE TIMES A DAY  HYDROcodone-acetaminophen (NORCO) 5-325 MG tablet, Take 1-2 tablets by mouth every 4 hours as needed for moderate to severe pain  medical cannabis (Patient's own supply), See Admin Instructions (The purpose of this order is to document that the patient reports taking medical cannabis.  This is not a prescription, and is not used to certify that the patient has a qualifying medical condition.)  multivitamin RENAL (RENAVITE RX/NEPHROVITE) 1 MG tablet, Take 1 tablet by mouth daily   nitroGLYcerin (NITROSTAT) 0.3 MG sublingual tablet, For chest pain place 1 tablet under the tongue every 5 minutes for 3 doses. If symptoms persist 5 minutes after 1st dose call 911.  senna-docusate (SENOKOT-S/PERICOLACE) 8.6-50 MG tablet, Take 1-2 tablets by mouth 2 times daily as needed for constipation (While on oral opioids.)  sevelamer HCl (RENAGEL) 800 MG  "tablet, Take 4,000 mg by mouth 3 times daily (with meals) And 1600mg with snacks.  vitamin D3 (CHOLECALCIFEROL) 2000 units (50 mcg) tablet, Take 50 mcg by mouth daily    No current facility-administered medications on file prior to visit.      ALLERGIES     Allergies   Allergen Reactions     Benadryl [Diphenhydramine] Itching       FAMILY HISTORY  Family History   Problem Relation Age of Onset     Blood Disease Mother         has hep b     Diabetes Mother         gestionanal diabetes     Hypertension Mother      Obesity Father      Hypertension Father      Hypertension Maternal Grandmother      Diabetes Maternal Grandmother      Hypertension Paternal Grandmother      Hypertension Paternal Grandfather      Coronary Artery Disease Maternal Uncle      Cancer No family hx of        SOCIAL HISTORY  Social History     Socioeconomic History     Marital status: Single     Spouse name: Not on file     Number of children: 0     Years of education: Not on file     Highest education level: Not on file   Occupational History     Occupation:      Occupation: kitchen     Comment: [employer]   Tobacco Use     Smoking status: Never     Smokeless tobacco: Never   Vaping Use     Vaping status: Never Used   Substance and Sexual Activity     Alcohol use: No     Drug use: Yes     Types: Marijuana     Comment: occ         ROS:   Complete ROS negative other than what is stated in HPI.     EXAM:  BP (!) 170/122 (BP Location: Right arm, Patient Position: Chair, Cuff Size: Adult Regular)   Pulse 82   Ht 5' 11\" (1.803 m)   Wt 229 lb 6.4 oz (104.1 kg)   SpO2 100%   BMI 31.99 kg/m    In general, the patient is a pleasant male in no apparent distress.    HEENT: NC/AT.  PERRLA.  EOMI.  Sclerae white, not injected.    Neck: No adenopathy.  Right internal jugular tunneled HD catheter.  Heart: RRR. Normal S1, S2 splits physiologically. No murmur, rub, click, or gallop.   Lungs: CTA.  No ronchi, wheezes, rales.    Abdomen: " Soft, nontender, nondistended.   Extremities: No clubbing, cyanosis. Has aneurysms in RUE from old AV fistula. Right arm is soft with no significant edema. Palpable thrill in LUE fistula. Incisions are well healed. No wounds.    Labs:  LIPID RESULTS:  Lab Results   Component Value Date    CHOL 112 12/26/2021    CHOL 145 07/03/2019    HDL 41 12/26/2021    HDL 34 (L) 07/03/2019    LDL 59 12/26/2021    LDL 86 07/03/2019    TRIG 60 12/26/2021    TRIG 127 07/03/2019       LIVER ENZYME RESULTS:  Lab Results   Component Value Date    AST 25 03/13/2023    AST 6 02/09/2021    ALT 9 (L) 03/13/2023    ALT 19 02/09/2021       CBC RESULTS:  Lab Results   Component Value Date    WBC 9.2 04/19/2023    WBC 6.8 06/18/2021    RBC 3.14 (L) 04/19/2023    RBC 3.35 (L) 06/18/2021    HGB 8.7 (L) 04/19/2023    HGB 9.1 (L) 06/18/2021    HCT 26.9 (L) 04/19/2023    HCT 28.1 (L) 06/18/2021    MCV 86 04/19/2023    MCV 84 06/18/2021    MCH 27.7 04/19/2023    MCH 27.2 06/18/2021    MCHC 32.3 04/19/2023    MCHC 32.4 06/18/2021    RDW 14.2 04/19/2023    RDW 13.4 06/18/2021     04/19/2023     06/18/2021       BMP RESULTS:  Lab Results   Component Value Date     (L) 05/30/2023     06/19/2021    POTASSIUM 4.3 05/30/2023    POTASSIUM 3.9 06/20/2022    POTASSIUM 3.8 06/19/2021    CHLORIDE 98 05/30/2023    CHLORIDE 102 06/20/2022    CHLORIDE 105 06/19/2021    CO2 18 (L) 05/30/2023    CO2 25 06/20/2022    CO2 27 06/19/2021    ANIONGAP 19 (H) 05/30/2023    ANIONGAP 9 06/20/2022    ANIONGAP 7 06/19/2021     (H) 05/30/2023     (H) 03/17/2023     (H) 06/20/2022    GLC 99 06/19/2021    BUN 53.7 (H) 05/30/2023    BUN 35 (H) 06/20/2022    BUN 58 (H) 06/19/2021    CR 11.17 (H) 05/30/2023    CR 8.93 (H) 06/19/2021    GFRESTIMATED 6 (L) 05/30/2023    GFRESTIMATED 7 (L) 06/09/2022    GFRESTIMATED 7 (L) 06/19/2021    GFRESTBLACK 9 (L) 06/19/2021    BUBBA 9.0 05/30/2023    BUBBA 8.6 06/19/2021        A1C RESULTS:  Lab Results    Component Value Date    A1C 4.4 05/30/2023    A1C 5.0 03/04/2021       THYROID RESULTS:  Lab Results   Component Value Date    TSH 1.67 03/03/2022    TSH 4.88 (H) 07/14/2017         Procedures:   US UPPER EXTREMITY VENOUS DUPLEX RIGHT  6/6/2023 10:52 AM      HISTORY: Follow up on right IJ DVT. Needs tunneled HD to stay in.;  Acute thrombosis of right internal jugular vein (H)     COMPARISON: None.     TECHNIQUE: Examination of the upper extremity veins was performed with  graded compression and 2-D ultrasound and color doppler spectral  waveform analysis.      FINDINGS:  There is partially occlusive thrombus in the lower right  internal jugular vein. The remaining deep veins of the right upper  extremity are patent.     There is superficial venous thrombus in the right cephalic vein.                                                                      IMPRESSION: No significant change in DVT in the right internal jugular  vein and superficial venous thrombus in the right cephalic vein.         US UPPER EXTREMITY VENOUS DUPLEX RIGHT  5/2/2023 11:00 AM      HISTORY: Follow up right IJ DVT; Acute thrombosis of right internal  jugular vein (H).     COMPARISON: April 21, 2023     TECHNIQUE: Color Doppler and spectral waveform analysis obtained  throughout the deep and superficial veins of the right upper  extremity.     FINDINGS: Proximal right internal jugular vein is patent, though the  lower more caudal internal jugular vein remains occluded. Minimal, if  any, internal blood flow in that segment. The subclavian, axillary,  and brachial veins are patent. Basilic vein is patent. Cephalic vein  remains occluded and dilated, history of AV fistula ligation.                                                                      IMPRESSION:  1. Persistent occlusive thrombus in the right internal jugular vein  corresponding to persistent DVT.  2. Cephalic vein remains occluded and dilated, related to prior AV  fistula  ligation.      US EXTREMITY ARTERIAL VENOUS DIALYSIS ACCESS GRAFT 4/25/2023 3:24 PM     HISTORY: 27-year-old patient with AV fistula for hemodialysis, request  made for surveillance evaluation. Patient had stage I AV fistula  creation on March 9, 2023.     TECHNIQUE: Color Doppler and spectral waveform analysis obtained  through the inflow brachial artery as well as the outflow basilic  vein.     FINDINGS: The brachial artery is 5.1-6.5 mm. The anastomosis is  264/154 cm/second. Total blood flow volume is 2283 mL/min. Diameters  in the outflow vein are labeled as 5.1 mm in the fistulized vein  shortly beyond the anastomosis, 8 mm with tourniquet. Moving more  centrally, diameter is 6.1 mm with tourniquet. 8.1 mm without  specification of tourniquet noted in the mid upper arm and 8.2 mm in  the most central visualized vein.                                                                      IMPRESSION: Patent left upper arm AV fistula with good blood flow  Volume.      EXAM: US UPPER EXTREMITY VENOUS DUPLEX RIGHT  LOCATION: [facility]  DATE/TIME: 3/12/2023 9:36 PM     INDICATION: Right upper arm pain, swelling, concern for DVT  COMPARISON: Fluoroscopic images during dialysis catheter placement 03/09/2023, ultrasound 02/21/2023  TECHNIQUE: Venous Duplex ultrasound of the right upper extremity with (when possible) and without compression, augmentation, and duplex. Color flow and spectral Doppler with waveform analysis performed.     FINDINGS: Ultrasound includes evaluation of the internal jugular vein, innominate vein, subclavian vein, axillary vein, and brachial vein. The superficial cephalic and basilic veins were also evaluated where seen.      RIGHT: Near occlusive thrombus noted within the internal jugular vein. No additional acute deep vein thrombus visualized. No superficial thrombophlebitis. Clotted dialysis fistula noted in the right arm and  shoulder.                                                                      IMPRESSION:  1.  Near occlusive deep vein thrombus in the right internal jugular vein.  2.  Clotted dialysis fistula partially visualized in the right shoulder and upper arm.        VENOUS DUPLEX ULTRASOUND RIGHT UPPER EXTREMITY  4/21/2023 1:23 PM     CLINICAL HISTORY/INDICATION:  Right IJ DVT, has tunneled CVC catheter  that needs to stay in. Follow-up. Acute thrombosis of right internal  jugular vein (H). History of right upper extremity AVF status post  right cephalic vein ligation.      COMPARISON:  Venous duplex 3/12/2023     TECHNIQUE:   Grayscale, color-flow, and spectral waveform analysis were performed  of the deep veins of the right upper extremity     FINDINGS:  There is chronic-appearing partially occlusive thrombus in  the right internal jugular vein. The previous duplex demonstrated  occlusive right IJ DVT.     _______ vein demonstrates normal color and spectral Doppler flow and  normal spontaneous respiratory variation. The right axillary, as well  as the brachial, radial, and ulnar veins demonstrate normal color and  spectral Doppler flow and normal compressibility.     The right cephalic vein is occluded, related to the previous cephalic  vein ligation.     The right basilic vein is patent.     The contralateral left internal jugular vein is patent.                                                                      IMPRESSION:   Chronic right internal jugular vein.     No evidence of new, acute right upper extremity DVT.     Right cephalic vein is occluded related to previous ligation. Basilic  vein remains patent.        Assessment and Plan:   Right internal jugular vein DVT, catheter associated     -Tunneled HD catheter was placed in the right internal jugular vein on 3/9/23, which was at the same time his right radial artery to cephalic vein AV fistula was ligated and a new left brachiobasilic AV fistula was created.    -Started on IV heparin and transitioned over to Eliquis in hospital.   -Edema and pain continuing to improve, thrombus in right internal jugular stable/unchanged  -No issue with using tunneled HD catheter.  -Recently in ED for nosebleed and anemia. Had been on Aspirin 81 mg daily as well as Eliquis. Told to hold aspirin while on Eliquis with improvement in epistaxis.      Recommendations:   -Continue Eliquis. Typically in patients with ESRD would treat patients with Warfarin over Eliquis as there are not sufficient studies out there to show efficacy of Eliquis in patient's with ESRD. However, as he is tolerating this and his symptoms are improving, continue for now.   -Holding aspirin 81 mg daily for now while on Eliquis due to recent bleeding and anemia.   -Will repeat a right upper extremity venous duplex to re-evaluate the right internal jugular DVT in 4 weeks. He was advised to call us if he develops any increased swelling or pain in his right arm and we will get imaging sooner.   -Ideally, his hemodialysis catheter should be removed. However, as this is not preferred presently due to new creation of AV fistula in left upper extremity, he should remain on anticoagulation until his tunneled hemodialysis catheter can be removed.   -It is noted that he reports he will be put on the transplant list once he is able to come off anticoagulation.           My Ayala PA-C      25 minutes spent on the date of the encounter doing chart review, history and exam, documentation, and further activities as noted above.

## 2023-06-13 ENCOUNTER — OFFICE VISIT (OUTPATIENT)
Dept: OTHER | Facility: CLINIC | Age: 28
End: 2023-06-13
Payer: MEDICARE

## 2023-06-13 VITALS — HEART RATE: 106 BPM | SYSTOLIC BLOOD PRESSURE: 181 MMHG | DIASTOLIC BLOOD PRESSURE: 139 MMHG

## 2023-06-13 DIAGNOSIS — Z99.2 ESRD (END STAGE RENAL DISEASE) ON DIALYSIS (H): Primary | ICD-10-CM

## 2023-06-13 DIAGNOSIS — N18.6 ESRD (END STAGE RENAL DISEASE) ON DIALYSIS (H): Primary | ICD-10-CM

## 2023-06-13 DIAGNOSIS — T82.898S OTHER SPECIFIED COMPLICATION OF VASCULAR PROSTHETIC DEVICES, IMPLANTS AND GRAFTS, SEQUELA: ICD-10-CM

## 2023-06-13 PROCEDURE — G0463 HOSPITAL OUTPT CLINIC VISIT: HCPCS

## 2023-06-13 PROCEDURE — 99024 POSTOP FOLLOW-UP VISIT: CPT

## 2023-06-13 NOTE — PROGRESS NOTES
[facility]        Patient is here for a  follow up.     Pt is currently taking Aspirin, Statin and Eliquis.    BP (!) 181/139 (BP Location: Other (Comment), Patient Position: Chair, Cuff Size: Adult Regular)   Pulse 106     The provider has been notified that the patient has no concerns.     Questions patient would like addressed today are: N/A.    Refills are needed: N/A    Has homecare services and agency name:  Juanita Gonzalez MA

## 2023-06-13 NOTE — PROGRESS NOTES
VASCULAR SURGERY PROGRESS NOTE    [facility]    Taylor Valiente  Medical Record #:  [id number]  YOB: 1995  Age:  27 year old     Date of Service: 6/13/2023    PRIMARY CARE PROVIDER: Priyank Lawrence    Reason for visit:  Post-op    Mr. Taylor Valiente is a 27 year old male who is status post second stage left brachial to basilic arteriovenous fistula on 05/30/2023.     Today he is alert and oriented x4   Excellent thrill noted of the left fistula  2+ radial pulse bilaterally, 5/5 strength bilaterally  Numbness over left posterior arm, however improving  Incisions are well healed  R arm edema significantly improved    Recent history:  He has a failed right upper arm AV fistula which was ligated in March of 2023 in addition to creation of left brachiobasilic AV fistula. Right internal jugular dialysis tunneled catheter. Developed occlusive thrombus of his right internal jugular vein and was anticoagulated. Developed a left arm antecubital hematoma related to anticoagulation. Evacuation of left arm hematoma in April 2023.      RECOMMENDATION/RISKS: Follow-up in 4 weeks with ultrasound of the left fistula. Continue vein building exercises and continue on current medications. Avoid left arm venipunctures.     REVIEW OF SYSTEMS:    A 12 point ROS was reviewed and is negative except for what is listed above in HPI.    PHH:    Past Medical History:   Diagnosis Date     Adrenal adenoma, left      Anemia      Benign essential hypertension 07/28/2017     CKD (chronic kidney disease) stage 5, GFR less than 15 ml/min (H)     FSGS     Depression      Focal glomerular sclerosis 07/28/2017     HLD (hyperlipidemia)      LVH (left ventricular hypertrophy) due to hypertensive disease 07/14/2017     Noncompliance      Obesity, unspecified      OD (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/2021     Stress-induced cardiomyopathy      Suicide attempt (H) 2019          Past Surgical History:   Procedure  Laterality Date     ARTHROSCOPY ANKLE Left 02/24/2021    Procedure: Left ankle arthroscopy and debridement/micro fracture;  Surgeon: Mirza Nelson MD;  Location: [facility]     BIOPSY  2017    renal- [facility]     CREATE FISTULA ARTERIOVENOUS UPPER EXTREMITY Right 03/04/2021    Procedure: RIGHT proximal radial  to CEPHALIC ARTERIOVENOUS FISTULA;  Surgeon: Henrik Moran MD;  Location: [facility] OR     CREATE FISTULA ARTERIOVENOUS UPPER EXTREMITY Left 03/09/2023    Procedure: CREATION OF FIRST STAGE LEFT BRACHIOBASILIC ARTERIOVENOUS FISTULA;  Surgeon: Henrik Moran MD;  Location: [facility]     CREATE FISTULA ARTERIOVENOUS UPPER EXTREMITY Left 5/30/2023    Procedure: SECOND STAGE LEFT BRACHIAL TO BASILIC ARTERIOVENOUS FISTULA;  Surgeon: Henrik Moran MD;  Location: [facility]     IR CVC TUNNEL PLACEMENT > 5 YRS OF AGE  06/17/2021     IR CVC TUNNEL PLACEMENT > 5 YRS OF AGE  03/09/2023     IR CVC TUNNEL REMOVAL RIGHT  12/03/2021     IRRIGATION AND DEBRIDEMENT UPPER EXTREMITY, COMBINED Left 03/16/2023    Procedure: EVACUATION OF LEFT ARM HEMATOMA;  Surgeon: Henrik Moran MD;  Location: [facility]     LIGATE FISTULA ARTERIOVENOUS UPPER EXTREMITY Right 03/09/2023    Procedure: LIGATION OF RIGHT ARM ARTERIOVENOUS FISTULA;  Surgeon: Henrik Moran MD;  Location: [facility]     REVISION FISTULA ARTERIOVENOUS UPPER EXTREMITY Right 08/26/2021    Procedure: Second stage RIGHT BRACHIOCEPHALIC transposition ARTERIOVENOUS FISTULA;  Surgeon: Henrik Moran MD;  Location: [facility]       ALLERGIES:  Benadryl [diphenhydramine]    MEDS:    Current Outpatient Medications:      ACE/ARB/ARNI NOT PRESCRIBED (INTENTIONAL), Please choose reason not prescribed from choices below., Disp: , Rfl:      acetaminophen (TYLENOL) 325 MG tablet, Take 2 tablets (650 mg) by mouth every 4 hours as needed for mild pain, Disp: 50 tablet, Rfl: 0     amLODIPine (NORVASC) 10 MG tablet, Take 10 mg by mouth daily , Disp: , Rfl:      apixaban  ANTICOAGULANT (ELIQUIS) 5 MG tablet, Take 1 tablet (5 mg) by mouth 2 times daily, Disp: 180 tablet, Rfl: 1     aspirin 81 MG EC tablet, Take 81 mg by mouth daily, Disp: , Rfl:      atorvastatin (LIPITOR) 10 MG tablet, TAKE ONE TABLET BY MOUTH EVERY NIGHT AT BEDTIME, Disp: 90 tablet, Rfl: 1     bumetanide (BUMEX) 2 MG tablet, Take 2 mg by mouth daily , Disp: , Rfl:      calcium acetate (CALPHRON) 667 MG TABS tablet, Take 1,334 mg by mouth 3 times daily (with meals), Disp: , Rfl:      carvedilol (COREG) 25 MG tablet, TAKE TWO TABLETS BY MOUTH TWICE A DAY WITH A MEAL Strength: 25 mg, Disp: 180 tablet, Rfl: 0     cinacalcet (SENSIPAR) 90 MG tablet, Take 90 mg by mouth daily, Disp: , Rfl:      cloNIDine (CATAPRES-TTS2) 0.2 MG/24HR WK patch, Place 1 patch onto the skin, Disp: , Rfl:      hydrALAZINE (APRESOLINE) 100 MG tablet, TAKE ONE TABLET BY MOUTH THREE TIMES A DAY, Disp: 360 tablet, Rfl: 1     HYDROcodone-acetaminophen (NORCO) 5-325 MG tablet, Take 1-2 tablets by mouth every 4 hours as needed for moderate to severe pain, Disp: 10 tablet, Rfl: 0     medical cannabis (Patient's own supply), See Admin Instructions (The purpose of this order is to document that the patient reports taking medical cannabis.  This is not a prescription, and is not used to certify that the patient has a qualifying medical condition.), Disp: , Rfl:      multivitamin RENAL (RENAVITE RX/NEPHROVITE) 1 MG tablet, Take 1 tablet by mouth daily , Disp: , Rfl:      nitroGLYcerin (NITROSTAT) 0.3 MG sublingual tablet, For chest pain place 1 tablet under the tongue every 5 minutes for 3 doses. If symptoms persist 5 minutes after 1st dose call 911., Disp: 5 tablet, Rfl: 0     senna-docusate (SENOKOT-S/PERICOLACE) 8.6-50 MG tablet, Take 1-2 tablets by mouth 2 times daily as needed for constipation (While on oral opioids.), Disp: 10 tablet, Rfl: 0     sevelamer HCl (RENAGEL) 800 MG tablet, Take 4,000 mg by mouth 3 times daily (with meals) And 1600mg with  snacks., Disp: , Rfl:      vitamin D3 (CHOLECALCIFEROL) 2000 units (50 mcg) tablet, Take 50 mcg by mouth daily, Disp: , Rfl:     SOCIAL HABITS:    History   Smoking Status     Never   Smokeless Tobacco     Never     Social History    Substance and Sexual Activity      Alcohol use: No      History   Drug Use     Types: Marijuana     Comment: occ       FAMILY HISTORY:    Family History   Problem Relation Age of Onset     Blood Disease Mother         has hep b     Diabetes Mother         gestionanal diabetes     Hypertension Mother      Obesity Father      Hypertension Father      Hypertension Maternal Grandmother      Diabetes Maternal Grandmother      Hypertension Paternal Grandmother      Hypertension Paternal Grandfather      Coronary Artery Disease Maternal Uncle      Cancer No family hx of        PE:  BP (!) 181/139 (BP Location: Other (Comment), Patient Position: Chair, Cuff Size: Adult Regular)   Pulse 106   Wt Readings from Last 1 Encounters:   06/06/23 229 lb 6.4 oz (104.1 kg)     There is no height or weight on file to calculate BMI.    EXAM:  GENERAL: well-developed 27 year old male who appears his stated age  CARDIAC: normal   CHEST/LUNG: normal respiratory effort   MUSCULOSKELETAL: grossly normal and both lower extremities are intact, no lower extremity edema  NEUROLOGIC: focally intact, alert and oriented x 3  PSYCH: appropriate affect  VASCULAR:       DIAGNOSTIC STUDIES:     Images:  US Upper Extremity Venous Duplex Right    Result Date: 6/6/2023  US UPPER EXTREMITY VENOUS DUPLEX RIGHT  6/6/2023 10:52 AM HISTORY: Follow up on right IJ DVT. Needs tunneled HD to stay in.; Acute thrombosis of right internal jugular vein (H) COMPARISON: None. TECHNIQUE: Examination of the upper extremity veins was performed with graded compression and 2-D ultrasound and color doppler spectral waveform analysis. FINDINGS:  There is partially occlusive thrombus in the lower right internal jugular vein. The remaining deep  veins of the right upper extremity are patent. There is superficial venous thrombus in the right cephalic vein.     IMPRESSION: No significant change in DVT in the right internal jugular vein and superficial venous thrombus in the right cephalic vein. RODNEY MALONEY MD   SYSTEM ID:  [id number]    LABS:      Sodium   Date Value Ref Range Status   05/30/2023 135 (L) 136 - 145 mmol/L Final   04/19/2023 135 (L) 136 - 145 mmol/L Final   03/19/2023 136 136 - 145 mmol/L Final   06/19/2021 139 133 - 144 mmol/L Final   06/18/2021 138 133 - 144 mmol/L Final   06/17/2021 138 133 - 144 mmol/L Final     Urea Nitrogen   Date Value Ref Range Status   05/30/2023 53.7 (H) 6.0 - 20.0 mg/dL Final   04/19/2023 28.5 (H) 6.0 - 20.0 mg/dL Final   03/19/2023 65.0 (H) 6.0 - 20.0 mg/dL Final   06/20/2022 35 (H) 7 - 30 mg/dL Final   03/03/2022 48 (H) 7 - 30 mg/dL Final   12/26/2021 91 (H) 7 - 30 mg/dL Final   06/19/2021 58 (H) 7 - 30 mg/dL Final   06/18/2021 95 (H) 7 - 30 mg/dL Final   06/17/2021 147 (H) 7 - 30 mg/dL Final     Hemoglobin   Date Value Ref Range Status   04/19/2023 8.7 (L) 13.3 - 17.7 g/dL Final   04/19/2023 6.7 (LL) 13.3 - 17.7 g/dL Final   03/19/2023 8.7 (L) 13.3 - 17.7 g/dL Final   06/18/2021 9.1 (L) 13.3 - 17.7 g/dL Final   06/17/2021 8.9 (L) 13.3 - 17.7 g/dL Final   06/15/2021 9.3 (L) 13.3 - 17.7 g/dL Final     Platelet Count   Date Value Ref Range Status   04/19/2023 163 150 - 450 10e3/uL Final   04/19/2023 188 150 - 450 10e3/uL Final   03/19/2023 294 150 - 450 10e3/uL Final   06/18/2021 203 150 - 450 10e9/L Final   06/17/2021 213 150 - 450 10e9/L Final   06/15/2021 252 150 - 450 10e9/L Final     INR   Date Value Ref Range Status   03/15/2023 1.40 (H) 0.85 - 1.15 Final   02/09/2021 1.23 (H) 0.86 - 1.14 Final   07/18/2017 1.04 0.86 - 1.14 Final       20 minutes spent on the day of encounter doing chart review, history and exam, documentation, and further activities as noted.       Zaina Welsh, NP  VASCULAR SURGERY

## 2023-06-15 ENCOUNTER — TELEPHONE (OUTPATIENT)
Dept: TRANSPLANT | Facility: CLINIC | Age: 28
End: 2023-06-15

## 2023-06-15 NOTE — TELEPHONE ENCOUNTER
[facility] Call Center    Phone Message    May a detailed message be left on voicemail: yes     Reason for Call: Other: Patient calling in regards to a message he recieved in regards to scheduling a Stess Test. I do not see an order in the chart at all and imaging cannot schedule without one. Please let us know once that order is in the chart and we can assist the patient in scheduling. Thank you,     Action Taken: Message routed to:  [facility] ([facility]): Vesna Chaney RNCC    Travel Screening: Not Applicable

## 2023-06-21 ENCOUNTER — HOSPITAL ENCOUNTER (OUTPATIENT)
Dept: CARDIOLOGY | Facility: CLINIC | Age: 28
Discharge: HOME | End: 2023-06-21
Attending: INTERNAL MEDICINE
Payer: MEDICARE

## 2023-06-21 ENCOUNTER — HOSPITAL ENCOUNTER (OUTPATIENT)
Dept: NUCLEAR MEDICINE | Facility: CLINIC | Age: 28
Setting detail: NUCLEAR MEDICINE
Discharge: HOME | End: 2023-06-21
Attending: INTERNAL MEDICINE
Payer: MEDICARE

## 2023-06-21 DIAGNOSIS — R07.9 CHEST PAIN: ICD-10-CM

## 2023-06-21 DIAGNOSIS — I51.81 STRESS-INDUCED CARDIOMYOPATHY: ICD-10-CM

## 2023-06-21 DIAGNOSIS — I11.9 LVH (LEFT VENTRICULAR HYPERTROPHY) DUE TO HYPERTENSIVE DISEASE: ICD-10-CM

## 2023-06-21 LAB
CV STRESS MAX HR HE: 101
RATE PRESSURE PRODUCT: NORMAL
STRESS ECHO BASELINE DIASTOLIC HE: 98
STRESS ECHO BASELINE HR: 80 BPM
STRESS ECHO BASELINE SYSTOLIC BP: 188
STRESS ECHO CALCULATED PERCENT HR: 52 %
STRESS ECHO LAST STRESS DIASTOLIC BP: 74
STRESS ECHO LAST STRESS SYSTOLIC BP: 171
STRESS ECHO TARGET HR: 194

## 2023-06-21 PROCEDURE — G1010 CDSM STANSON: HCPCS

## 2023-06-21 PROCEDURE — 343N000001 HC RX 343: Performed by: INTERNAL MEDICINE

## 2023-06-21 PROCEDURE — 93017 CV STRESS TEST TRACING ONLY: CPT

## 2023-06-21 PROCEDURE — 93016 CV STRESS TEST SUPVJ ONLY: CPT | Performed by: STUDENT IN AN ORGANIZED HEALTH CARE EDUCATION/TRAINING PROGRAM

## 2023-06-21 PROCEDURE — 250N000011 HC RX IP 250 OP 636: Performed by: INTERNAL MEDICINE

## 2023-06-21 PROCEDURE — G1010 CDSM STANSON: HCPCS | Performed by: INTERNAL MEDICINE

## 2023-06-21 PROCEDURE — A9502 TC99M TETROFOSMIN: HCPCS | Performed by: INTERNAL MEDICINE

## 2023-06-21 PROCEDURE — 78452 HT MUSCLE IMAGE SPECT MULT: CPT | Mod: 26 | Performed by: INTERNAL MEDICINE

## 2023-06-21 PROCEDURE — 93018 CV STRESS TEST I&R ONLY: CPT | Performed by: INTERNAL MEDICINE

## 2023-06-21 RX ORDER — REGADENOSON 0.08 MG/ML
0.4 INJECTION, SOLUTION INTRAVENOUS ONCE
Status: COMPLETED | OUTPATIENT
Start: 2023-06-21 | End: 2023-06-21

## 2023-06-21 RX ORDER — ACYCLOVIR 200 MG/1
0-1 CAPSULE ORAL
Status: DISCONTINUED | OUTPATIENT
Start: 2023-06-21 | End: 2023-06-22 | Stop reason: HOSPADM

## 2023-06-21 RX ORDER — ALBUTEROL SULFATE 90 UG/1
2 AEROSOL, METERED RESPIRATORY (INHALATION) EVERY 5 MIN PRN
Status: DISCONTINUED | OUTPATIENT
Start: 2023-06-21 | End: 2023-06-22 | Stop reason: HOSPADM

## 2023-06-21 RX ORDER — AMINOPHYLLINE 25 MG/ML
50-100 INJECTION, SOLUTION INTRAVENOUS
Status: DISCONTINUED | OUTPATIENT
Start: 2023-06-21 | End: 2023-06-22 | Stop reason: HOSPADM

## 2023-06-21 RX ORDER — CAFFEINE CITRATE 20 MG/ML
60 SOLUTION INTRAVENOUS
Status: DISCONTINUED | OUTPATIENT
Start: 2023-06-21 | End: 2023-06-22 | Stop reason: HOSPADM

## 2023-06-21 RX ADMIN — TETROFOSMIN 39.6 MILLICURIE: 1.38 INJECTION, POWDER, LYOPHILIZED, FOR SOLUTION INTRAVENOUS at 13:36

## 2023-06-21 RX ADMIN — REGADENOSON 0.4 MG: 0.08 INJECTION, SOLUTION INTRAVENOUS at 13:31

## 2023-06-21 RX ADMIN — TETROFOSMIN 10.2 MILLICURIE: 1.38 INJECTION, POWDER, LYOPHILIZED, FOR SOLUTION INTRAVENOUS at 12:32

## 2023-06-21 NOTE — PROGRESS NOTES
Pt here for Lexiscan nuclear stress test. Medication and side effects reviewed with patient. Lung sounds clear to auscultation bilaterally. Denied caffeine use. Patient tolerated Lexiscan dose without any adverse reactions. Pt did have an episode of emesis but reported feeling back to normal afterward. VSS. Monitored post injection and then taken to nuclear medicine for follow up imaging.

## 2023-06-28 ENCOUNTER — TELEPHONE (OUTPATIENT)
Dept: CARDIOLOGY | Facility: CLINIC | Age: 28
End: 2023-06-28

## 2023-06-28 ENCOUNTER — VIRTUAL VISIT (OUTPATIENT)
Dept: CARDIOLOGY | Facility: CLINIC | Age: 28
End: 2023-06-28
Attending: INTERNAL MEDICINE
Payer: MEDICARE

## 2023-06-28 DIAGNOSIS — E78.5 DYSLIPIDEMIA: Primary | ICD-10-CM

## 2023-06-28 PROCEDURE — 99215 OFFICE O/P EST HI 40 MIN: CPT | Mod: VID | Performed by: INTERNAL MEDICINE

## 2023-06-28 NOTE — NURSING NOTE
Is the patient currently in the state of [place]? YES    Visit mode:VIDEO    If the visit is dropped, the patient can be reconnected by: VIDEO VISIT: Text to cell phone: [phone]    Will anyone else be joining the visit? NO      How would you like to obtain your AVS? MyChart    Are changes needed to the allergy or medication list? NO     Patient declined individual allergy and medication review by support staff because they deny any changes and state that all information remains accurate since last reviewed/verified.     Reason for visit: RECHECK (Pt states vitals taken today at dialysis, does not remember exact reading)    Bienvenido Pelayo, CHIO/CMA

## 2023-06-28 NOTE — LETTER
2023      RE: Taylor Valiente  [address] [place] [address]       Dear Colleague,    Thank you for the opportunity to participate in the care of your patient, Taylor Valiente, at the [facility] [place] [facility]. Please see a copy of my visit note below.    Service Date: 2023    Priyank Lawrence MD  [address] [place] [address]     RE:  Taylor Valiente   MRN:  [id number]   :  1995       Dear Dr. Lawrence:    It was a pleasure participating in the care of your patient, Taylor Valiente .  As you know, he is a 27 year old year old person who I met over a virtual visit today for a prior cardiomyopathy, nonobstructive coronary disease, hypertension, hyperlipidemia.    Past medical history is significant for the followin.  Hyperlipidemia.  2.  Chronic renal insufficiency, thought secondary to hypertension and/or FSGS currently on hemodialysis.  3.  Depression with suicide attempt .  4.  Adrenal adenoma with possible Cushing disease.  5.  COVID-19 positive  with secondary anosmia, and loss of taste, along with diarrhea and nausea.  6.  Morbid obesity.  7.  Noncompliance.  8.  Marijuana abuse.     His cardiac history is significant for mild transient cardiomyopathy.  In 2017.  His echo revealed an ejection fraction of 40% -45% without significant valvular pathology.  He says his blood pressures were in the 200s at that time and possibly secondary to an adrenal adenoma.  After blood pressure control was improved, the ejection fraction has resolved and normalized as of echo  and most recent echo 2022.     When I last saw him 2021, we ordered coronary CTA, which was performed on 2022 with the following results:     Left main, normal.  LAD, less than 25% stenosis in midportion.  Circumflex normal.  RCA small, nondominant, normal.       Calcium score      Left main 0.  LAD  62.  Circumflex 3.  RCA 0.    I last saw him 8/30/2022, and at that time he was doing adequately, he presents today for continuing care.    Since the last visit he had a brief visit to the emergency department, when he was driving down in [place] and he felt an episode of chest pain.  It lasted for less than 2 hours and it was not clear if it got better with nitroglycerin.  Work-up was negative and he was discharged home.    As result we ordered a pharmacologic nuclear stress test which was unremarkable.  Fortunately he has not had any recurrent symptoms whatsoever.    From a functional standpoint he is able to walk for about 20 minutes having to stop secondary to generalized fatigue.  He denies any chest pain or shortness of breath.  He can climb a flight of stairs in his home without any limitation or symptoms.    He has had a new diagnosis of a right internal jugular DVT and has been placed on Eliquis.  This is post problems further measuring blood pressures, as his left arm fistula has not yet matured and they do not want to take blood pressures there, his right arm is swollen from the right IJ DVT.    The patient otherwise denies gross chest pain, shortness of breath, PND, orthopnea, edema, palpitations, syncope or near syncope.    10 Point Review of Systems: Remarkable for some residual swelling in his right upper extremity due to right IJ DVT    MEDICATIONS:    1.  Amlodipine 10 mg a day  2.  Apixaban 5 mg twice daily  3.  Aspirin 81 mg a day  4.  Atorvastatin 10 mg a day  5.  Bumex 2 mg a day  6.  Carvedilol 25 mg twice daily  7.  Sensipar  8.  Hydralazine 100 mg 3 times a day  9.  Clonidine 0.2 mg patch    PHYSICAL EXAM:    The patient's blood pressures have not been accurately measured or followed due to his left upper extremity fistula maturing, and recent right IJ DVT causing right upper extremity swelling  General:  Patient appears comfortable, well groomed  Psych:  Patient is alert and oriented X  3  Eyes:  No gross erythema, exudate  Respiratory:  Patient is breathing comfortably without gross cough    The remainder of the comprehensive physical exam was deferred secondary to the COVID-19 pandemic and secondary to virtual visit restrictions.    LABS:    5/30/2023: Potassium 4.3, GFR 6  4/19/2023 hemoglobin 8.7    Dobutamine stress echo 10/29/2022 reveals normal resting LV systolic function ejection fraction 55 to 60% without significant valvular pathology identified, no sick inducible ischemia identified    Echocardiogram 8/3/2022 reveals an ejection fraction of 60 to 65% without significant valvular pathology    Pharmacologic nuclear stress test 6/21/2023 reveals no evidence for stress-induced ischemia.    EKG 5/30/2023 reveals normal sinus rhythm at a rate of 79 bpm, T wave inversions in 1 and aVL, less prominent in V4 through V6 compared to prior EKGs recently    IMPRESSION:    Taylor is a 27-year-old gentleman who has several active cardiac issues:     1.  History of prior mild transient cardiomyopathy of unclear etiology, possibly hypertensive     This was discovered 07/14/2017 with ejection fraction on echo of 40% -45%.     Since he achieved improved blood pressure control, his ejection fraction normalized and latest echo 08/03/2022 reveals an ejection fraction in the 60-65% range.     He is currently asymptomatic, and appears stable.  Continue to follow     2.  Nonobstructive coronary artery disease.     Coronary CTA 06/09/2022 revealed only less than 25% stenosis in the mid portion of the LAD with the other coronary territories being normal.  He is currently asymptomatic.  We will continue to follow.    3.  Hypertension    It is unclear what level his blood pressures are running at currently, due to the fact that measurements from the brachial artery are not being performed due to his maturing fistula in his left arm, and the ongoing swelling in his right arm from his recent right internal jugular  DVT.    The right forearm blood pressure measurements may be overestimated, and consideration for taking leg blood pressures as additional data to help manage blood pressure control could be prudent.    4.  Hyperlipidemia.     His goal LDL less than 70 has been achieved as of lipid profile 12/26/2021 when his LDL was 59.    He is due for fasting lipid profile     5.  Pre-kidney transplant workup.     Considering his relatively unremarkable echo with normal LV systolic function without significant valvular pathology in conjunction with his coronary CTA 06/09/2022 revealed only less than 25% stenosis in the mid portion of the LAD with the other coronary territories being normal.      He did have a brief episode of chest discomfort in May.  Fortunately he has not had any recurrent symptoms, and his pharmacologic nuclear stress test 6/21/2023 does not reveal any evidence for significant inducible ischemia.    No further work-up would currently be indicated from a perioperative standpoint.      PLAN:    1.  The patient is once again approved for his procedure at somewhat increased but acceptable perioperative risk for event    2.  If he does not receive his transplant within the next year, then follow-up visit with echo and pharmacologic nuclear stress test prior may be indicated at that time.    3.  He should have a fasting lipid profile drawn at dialysis in the near future as well.  Goal LDL less than 70 due to history of mild nonobstructive coronary disease.    4.  Consider taking leg blood pressures in the next few weeks for blood pressure management.    Once his fistula in the left arm matures properly, then taking blood pressures in this arm could be considered as well.    If blood pressures continue to be significantly elevated above goal of 125 mmHg, adding an ACE inhibitor, if approved by his nephrologist might be considered.    Once again, it was a pleasure participating in the care of your patient, Taylor  "Steffanie .  Please feel free to contact me at any time if there are any questions regarding his care in the future.      Sincerely,          Enrique Marin M.D.  Cardiovascular Division  [facility]      The patient has been notified of following:     \"This video visit will be conducted via a call between you and your physician/provider. We have found that certain health care needs can be provided without the need for an in-person physical exam.  This service lets us provide the care you need with a video conversation.  If a prescription is necessary we can send it directly to your pharmacy.  If lab work is needed we can place an order for that and you can then stop by our lab to have the test done at a later time.    Video visits are billed at different rates depending on your insurance coverage.  Please reach out to your insurance provider with any questions.    If during the course of the call the physician/provider feels a video visit is not appropriate, you will not be charged for this service.\"    Patient has given verbal consent for video visit? Yes    How would you like to obtain your AVS? Mail    Video-Visit Details    Type of service:  Video Visit    Video Start Time:1205pm    Video End Time:1219pm    Total visit time including video visit, chart review, charting, coordination of care =46min    Originating Location (pt. Location):patient home      Distant Location (provider location):   Off site home office    Platform used for Video Visit: Jazmine    [facility]#:[id number]      Please do not hesitate to contact me if you have any questions/concerns.     Sincerely,     Enrique Marin MD    "

## 2023-06-28 NOTE — PROGRESS NOTES
Service Date: 2023    Priyank Lawrence MD  [address] [place] [address]     RE:  Taylor Valiente   MRN:  [id number]   :  1995       Dear Dr. Lawrence:    It was a pleasure participating in the care of your patient, Taylor Valiente .  As you know, he is a 27 year old year old person who I met over a virtual visit today for a prior cardiomyopathy, nonobstructive coronary disease, hypertension, hyperlipidemia.    Past medical history is significant for the followin.  Hyperlipidemia.  2.  Chronic renal insufficiency, thought secondary to hypertension and/or FSGS currently on hemodialysis.  3.  Depression with suicide attempt .  4.  Adrenal adenoma with possible Cushing disease.  5.  COVID-19 positive  with secondary anosmia, and loss of taste, along with diarrhea and nausea.  6.  Morbid obesity.  7.  Noncompliance.  8.  Marijuana abuse.     His cardiac history is significant for mild transient cardiomyopathy.  In 2017.  His echo revealed an ejection fraction of 40% -45% without significant valvular pathology.  He says his blood pressures were in the 200s at that time and possibly secondary to an adrenal adenoma.  After blood pressure control was improved, the ejection fraction has resolved and normalized as of echo  and most recent echo 2022.     When I last saw him 2021, we ordered coronary CTA, which was performed on 2022 with the following results:     Left main, normal.  LAD, less than 25% stenosis in midportion.  Circumflex normal.  RCA small, nondominant, normal.       Calcium score      Left main 0.  LAD 62.  Circumflex 3.  RCA 0.    I last saw him 2022, and at that time he was doing adequately, he presents today for continuing care.    Since the last visit he had a brief visit to the emergency department, when he was driving down in [place] and he felt an episode of chest pain.  It lasted for less than 2 hours and it was not clear if it got  better with nitroglycerin.  Work-up was negative and he was discharged home.    As result we ordered a pharmacologic nuclear stress test which was unremarkable.  Fortunately he has not had any recurrent symptoms whatsoever.    From a functional standpoint he is able to walk for about 20 minutes having to stop secondary to generalized fatigue.  He denies any chest pain or shortness of breath.  He can climb a flight of stairs in his home without any limitation or symptoms.    He has had a new diagnosis of a right internal jugular DVT and has been placed on Eliquis.  This is post problems further measuring blood pressures, as his left arm fistula has not yet matured and they do not want to take blood pressures there, his right arm is swollen from the right IJ DVT.    The patient otherwise denies gross chest pain, shortness of breath, PND, orthopnea, edema, palpitations, syncope or near syncope.    10 Point Review of Systems: Remarkable for some residual swelling in his right upper extremity due to right IJ DVT    MEDICATIONS:    1.  Amlodipine 10 mg a day  2.  Apixaban 5 mg twice daily  3.  Aspirin 81 mg a day  4.  Atorvastatin 10 mg a day  5.  Bumex 2 mg a day  6.  Carvedilol 25 mg twice daily  7.  Sensipar  8.  Hydralazine 100 mg 3 times a day  9.  Clonidine 0.2 mg patch    PHYSICAL EXAM:    The patient's blood pressures have not been accurately measured or followed due to his left upper extremity fistula maturing, and recent right IJ DVT causing right upper extremity swelling  General:  Patient appears comfortable, well groomed  Psych:  Patient is alert and oriented X 3  Eyes:  No gross erythema, exudate  Respiratory:  Patient is breathing comfortably without gross cough    The remainder of the comprehensive physical exam was deferred secondary to the COVID-19 pandemic and secondary to virtual visit restrictions.    LABS:    5/30/2023: Potassium 4.3, GFR 6  4/19/2023 hemoglobin 8.7    Dobutamine stress echo  10/29/2022 reveals normal resting LV systolic function ejection fraction 55 to 60% without significant valvular pathology identified, no sick inducible ischemia identified    Echocardiogram 8/3/2022 reveals an ejection fraction of 60 to 65% without significant valvular pathology    Pharmacologic nuclear stress test 6/21/2023 reveals no evidence for stress-induced ischemia.    EKG 5/30/2023 reveals normal sinus rhythm at a rate of 79 bpm, T wave inversions in 1 and aVL, less prominent in V4 through V6 compared to prior EKGs recently    IMPRESSION:    Taylor is a 27-year-old gentleman who has several active cardiac issues:     1.  History of prior mild transient cardiomyopathy of unclear etiology, possibly hypertensive     This was discovered 07/14/2017 with ejection fraction on echo of 40% -45%.     Since he achieved improved blood pressure control, his ejection fraction normalized and latest echo 08/03/2022 reveals an ejection fraction in the 60-65% range.     He is currently asymptomatic, and appears stable.  Continue to follow     2.  Nonobstructive coronary artery disease.     Coronary CTA 06/09/2022 revealed only less than 25% stenosis in the mid portion of the LAD with the other coronary territories being normal.  He is currently asymptomatic.  We will continue to follow.    3.  Hypertension    It is unclear what level his blood pressures are running at currently, due to the fact that measurements from the brachial artery are not being performed due to his maturing fistula in his left arm, and the ongoing swelling in his right arm from his recent right internal jugular DVT.    The right forearm blood pressure measurements may be overestimated, and consideration for taking leg blood pressures as additional data to help manage blood pressure control could be prudent.    4.  Hyperlipidemia.     His goal LDL less than 70 has been achieved as of lipid profile 12/26/2021 when his LDL was 59.    He is due for  "fasting lipid profile     5.  Pre-kidney transplant workup.     Considering his relatively unremarkable echo with normal LV systolic function without significant valvular pathology in conjunction with his coronary CTA 06/09/2022 revealed only less than 25% stenosis in the mid portion of the LAD with the other coronary territories being normal.      He did have a brief episode of chest discomfort in May.  Fortunately he has not had any recurrent symptoms, and his pharmacologic nuclear stress test 6/21/2023 does not reveal any evidence for significant inducible ischemia.    No further work-up would currently be indicated from a perioperative standpoint.      PLAN:    1.  The patient is once again approved for his procedure at somewhat increased but acceptable perioperative risk for event    2.  If he does not receive his transplant within the next year, then follow-up visit with echo and pharmacologic nuclear stress test prior may be indicated at that time.    3.  He should have a fasting lipid profile drawn at dialysis in the near future as well.  Goal LDL less than 70 due to history of mild nonobstructive coronary disease.    4.  Consider taking leg blood pressures in the next few weeks for blood pressure management.    Once his fistula in the left arm matures properly, then taking blood pressures in this arm could be considered as well.    If blood pressures continue to be significantly elevated above goal of 125 mmHg, adding an ACE inhibitor, if approved by his nephrologist might be considered.    Once again, it was a pleasure participating in the care of your patient, Taylor Valiente .  Please feel free to contact me at any time if there are any questions regarding his care in the future.      Sincerely,          Enrique Marin M.D.  Cardiovascular Division  [facility]      The patient has been notified of following:     \"This video visit will be conducted via a call between you and your " "physician/provider. We have found that certain health care needs can be provided without the need for an in-person physical exam.  This service lets us provide the care you need with a video conversation.  If a prescription is necessary we can send it directly to your pharmacy.  If lab work is needed we can place an order for that and you can then stop by our lab to have the test done at a later time.    Video visits are billed at different rates depending on your insurance coverage.  Please reach out to your insurance provider with any questions.    If during the course of the call the physician/provider feels a video visit is not appropriate, you will not be charged for this service.\"    Patient has given verbal consent for video visit? Yes    How would you like to obtain your AVS? Mail    Video-Visit Details    Type of service:  Video Visit    Video Start Time:1205pm    Video End Time:1219pm    Total visit time including video visit, chart review, charting, coordination of care =46min    Originating Location (pt. Location):patient home      Distant Location (provider location):   Off site home office    Platform used for Video Visit: Jazmine ARCHER#:[id number]  "

## 2023-06-28 NOTE — PATIENT INSTRUCTIONS
June 28, 2023    Cardiology Provider You Saw During Your Visit: Dr. Marin      Medication Changes: None      Follow Up:   -Please complete fasting lab work next week to check your lipids/cholesterol  -Dr. Marin has cleared you for your transplant from a cardiovascular perspective      Follow the American Heart Association Diet and Lifestyle Recommendations:  -Limit saturated fat, trans fat, sodium, red meat, sweets and sugar-sweetened beverages. If you choose to eat red meat, compare labels and select the leanest cuts available.  -Aim for at least 150 minutes of moderate physical activity or 75 minutes of vigorous physical activity - or an equal combination of both - each week.      To Reach Us:  -During business hours: [phone], press option # 1 to schedule an appointment or to leave a message for your care team.     -After hours, weekends or holidays: [phone], press option #4 and ask to speak to the on-call cardiologist. Inform them you are a patient at the [facility].        **If you have a cardiac device, please make sure you schedule an in-person device check just prior to your cardiology provider appointments**        Nate Berman LPN  Cardiology Care Coordinator - General Cardiology  [facility]

## 2023-06-29 ENCOUNTER — TELEPHONE (OUTPATIENT)
Dept: TRANSPLANT | Facility: CLINIC | Age: 28
End: 2023-06-29
Payer: MEDICARE

## 2023-06-29 DIAGNOSIS — Z76.82 ORGAN TRANSPLANT CANDIDATE: ICD-10-CM

## 2023-06-29 DIAGNOSIS — N18.6 ESRD (END STAGE RENAL DISEASE) (H): ICD-10-CM

## 2023-06-29 NOTE — TELEPHONE ENCOUNTER
Called pt regarding plan for active status. Pt had completed compliance contract and received clearance from therapy. Cardiology has just cleared him. Pt will need to see nephrology prior to committee review. Will follow up after neph visit. Pt verbalized understanding of information and has no further questions. Encouraged to reach out if questions arise.

## 2023-07-10 DIAGNOSIS — I12.9 RENAL HYPERTENSION: Primary | ICD-10-CM

## 2023-07-10 RX ORDER — LISINOPRIL 20 MG/1
20 TABLET ORAL EVERY EVENING
Qty: 90 TABLET | Refills: 3 | Status: ON HOLD | OUTPATIENT
Start: 2023-07-10 | End: 2023-12-08

## 2023-07-12 ENCOUNTER — OFFICE VISIT (OUTPATIENT)
Dept: OTHER | Facility: CLINIC | Age: 28
End: 2023-07-12
Payer: MEDICARE

## 2023-07-12 ENCOUNTER — HOSPITAL ENCOUNTER (OUTPATIENT)
Dept: ULTRASOUND IMAGING | Facility: CLINIC | Age: 28
Discharge: HOME | End: 2023-07-12
Payer: MEDICARE

## 2023-07-12 VITALS — DIASTOLIC BLOOD PRESSURE: 101 MMHG | SYSTOLIC BLOOD PRESSURE: 163 MMHG | HEART RATE: 79 BPM

## 2023-07-12 DIAGNOSIS — Z09 SURGICAL FOLLOW-UP CARE: Primary | ICD-10-CM

## 2023-07-12 DIAGNOSIS — T82.898S OTHER SPECIFIED COMPLICATION OF VASCULAR PROSTHETIC DEVICES, IMPLANTS AND GRAFTS, SEQUELA: ICD-10-CM

## 2023-07-12 PROCEDURE — 99283 EMERGENCY DEPT VISIT LOW MDM: CPT

## 2023-07-12 PROCEDURE — 99024 POSTOP FOLLOW-UP VISIT: CPT | Performed by: SURGERY

## 2023-07-12 PROCEDURE — 93990 DOPPLER FLOW TESTING: CPT

## 2023-07-12 PROCEDURE — G0463 HOSPITAL OUTPT CLINIC VISIT: HCPCS

## 2023-07-12 NOTE — PROGRESS NOTES
[facility]        Patient is here for a  follow up  to discuss AV fistula.     Pt is currently taking Aspirin, Statin and Eliquis.    BP (!) 163/101 (Patient Position: Chair, Cuff Size: Adult Regular)   Pulse 79     The provider has been notified that the patient has no concerns.     Questions patient would like addressed today are: N/A.    Refills are needed: N/A    Has homecare services and agency name:  Juanita Gonzalez MA

## 2023-07-12 NOTE — NURSING NOTE
Per Dr. Moran, fistula may now be accessed.  Contacted [facility] and spoke with GALLITO Hurtado.  Genesis verbalized understanding that the patient's fistula could now be accessed.  Will send progress notes via fax once completed.    Meka Vazquez, BSN, RN-[facility] [place]

## 2023-07-13 ENCOUNTER — APPOINTMENT (OUTPATIENT)
Dept: GENERAL RADIOLOGY | Facility: CLINIC | Age: 28
End: 2023-07-13
Attending: EMERGENCY MEDICINE
Payer: MEDICARE

## 2023-07-13 ENCOUNTER — OFFICE VISIT (OUTPATIENT)
Dept: TRANSPLANT | Facility: CLINIC | Age: 28
End: 2023-07-13
Attending: PHYSICIAN ASSISTANT
Payer: MEDICARE

## 2023-07-13 ENCOUNTER — HOSPITAL ENCOUNTER (EMERGENCY)
Facility: CLINIC | Age: 28
Discharge: HOME | End: 2023-07-13
Attending: EMERGENCY MEDICINE | Admitting: EMERGENCY MEDICINE
Payer: MEDICARE

## 2023-07-13 VITALS
SYSTOLIC BLOOD PRESSURE: 129 MMHG | WEIGHT: 229.7 LBS | HEIGHT: 71 IN | HEART RATE: 77 BPM | DIASTOLIC BLOOD PRESSURE: 73 MMHG | OXYGEN SATURATION: 97 % | BODY MASS INDEX: 32.16 KG/M2

## 2023-07-13 VITALS
DIASTOLIC BLOOD PRESSURE: 94 MMHG | HEART RATE: 88 BPM | OXYGEN SATURATION: 100 % | SYSTOLIC BLOOD PRESSURE: 170 MMHG | RESPIRATION RATE: 26 BRPM | TEMPERATURE: 97.4 F

## 2023-07-13 DIAGNOSIS — Z76.82 ORGAN TRANSPLANT CANDIDATE: ICD-10-CM

## 2023-07-13 DIAGNOSIS — N18.6 ESRD (END STAGE RENAL DISEASE) (H): ICD-10-CM

## 2023-07-13 DIAGNOSIS — M25.522 LEFT ELBOW PAIN: ICD-10-CM

## 2023-07-13 PROCEDURE — 250N000013 HC RX MED GY IP 250 OP 250 PS 637: Performed by: EMERGENCY MEDICINE

## 2023-07-13 PROCEDURE — 99204 OFFICE O/P NEW MOD 45 MIN: CPT | Mod: 24 | Performed by: NURSE PRACTITIONER

## 2023-07-13 PROCEDURE — G0463 HOSPITAL OUTPT CLINIC VISIT: HCPCS | Performed by: NURSE PRACTITIONER

## 2023-07-13 PROCEDURE — 73080 X-RAY EXAM OF ELBOW: CPT | Mod: LT

## 2023-07-13 RX ORDER — ACETAMINOPHEN 500 MG
1000 TABLET ORAL ONCE
Status: COMPLETED | OUTPATIENT
Start: 2023-07-13 | End: 2023-07-13

## 2023-07-13 RX ADMIN — ACETAMINOPHEN 1000 MG: 500 TABLET, FILM COATED ORAL at 02:32

## 2023-07-13 ASSESSMENT — ACTIVITIES OF DAILY LIVING (ADL)
ADLS_ACUITY_SCORE: 33
ADLS_ACUITY_SCORE: 35

## 2023-07-13 NOTE — PROGRESS NOTES
TRANSPLANT NEPHROLOGY RECIPIENT EVALUATION NOTE    Assessment and Plan:  # CKD likely from long standing HTN and secondary FSGS: with prior antihypertensive medication non adherence. On dialysis since 6/2021.  When ready, he would likely benefit from a kidney transplant.       # Cardiac Risk: Cleared by cardiology 6/28/23.   -coronary CTA 06/09/2022 revealed only less than 25% stenosis in the mid portion of the LAD with the other coronary territories being normal.   -pharmacologic nuclear stress test 6/21/2023 negative for inducible ischemia.       # PAD Screening: no further imaging recommended per surgery visit 3/23.  CT images from 6/2021 shows B EIA's suitable for transplant.       #R internal jugular DVT: 3/2023. Stable on Eliquis. Developed after R fistula was ligated and CVC catheter placed 3/9/23 as well as creation of L AV fistula. Clot found 3/12. Per vascular, remail on anticoagulation until tunneled HD catheter can be removed. Fistula cleared for usage 7/12/23.   -Hx of hematoma near fistula when on IV heparin.      # Adrenal adenomas:  He saw an outside endocrinologist 3/22 - cleared for sx and can follow-up as needed.      # Noncompliance, depression: with a suicide attempt in 2019. Completed 6 months of therapy. No longer seeing therapist at this time.  Reports depression stable at this time with PRN therapy sessions.      #HTN: moderate control on meds. Systolic average 150-160s.      # Marijuana use: 4-5x  weekly to cope with stress.     #BMI: 32        # Health Maintenance: Dental: Not up to date.        Reason for Visit:  Taylor Valiente is a 27 year old male with CKD from focal segmental glomerulosclerosis (FSGS), who presents for kidney transplant wait list evaluation.    History of Present Illness:  Taylor Valiente is a 25-year-old gentleman of [place] decent who presents with CKD likely secondary to long standing hypertension and secondary FSGS.  He initially presented the summer of  2017 with VANESSA (creatinine 2.5), hypertensive urgency, nephrotic proteinuria with negative serologic work up and had a kidney biopsy that showed features of chronic hypertensive changes and secondary FSGS, although greater than 80% foot effacement was noted.  He was hospitalized more recently in 9/2020 with VANESSA on CKD (creatinine in the mid 8s), hypertensive urgency and noted to be noncompliance with antihypertensives. He had a renal ultrasound that showed prominent diffuse increased parenchymal echogenicity throughout both kidneys, representing a significant change compared to 2017.  Of note, a 5/2019 CT noted two adrenal nodules, saw endocrinology who cleared for surgery and patient can follow as needed. He was hospitalized again in 12/2020 with COVID-19 and his GFR dropped to 10.     On HD using R chest catheter. Fistula LUE maturing - Was cleared yesterday. Will try using it tomorrow and anticipate pulling line soon.  Eliquis expected to end after HD line pulled.      Last therapy session was in March. No longer in therapy right now but will reach out if needed.     BP systolic average 150-160s           Kidney Disease Hx:             Kidney Disease Dx: likely secondary to long standing hypertension and secondary FSGS       Biopsy Proven: Yes; 2017         On Dialysis: Yes, Date initiated: June 2021       Primary Nephrologist: Dr. Hooper/Jennifer Manzanares CNP        H/o Kidney Stones: No       H/o Recurrent/Frequent UTI: No         Diabetic Hx: None           Cardiac/Vascular Disease Risk Factors:        Cardiac Risk Factors: Hypertension and CKD       Known CAD: No       Known PAD/Caludication Symptoms: No       Known Heart Failure: No       Arrhythmia: No       Pulmonary Hypertension: No       Valvular Disease: No       Other: None         Volume Status/Weight:        Volume status: Euvolemic       Weight:  Acceptable BMI       BMI: Body mass index is 32.49 kg/m .         Functional Capacity/Frailty:        Walks 2  days per week.        Fatigue/Decreased Energy: [x] No [] Yes    Chest Pain or SOB with Exertion: [x] No [] Yes    Significant Weight Change: [x] No [] Yes    Nausea, Vomiting or Diarrhea: [] No [x] Yes Vomits 2 times per month usually over weekend when not dialyzed for 2 days.    Fever, Sweats or Chills:  [x] No [] Yes    Leg Swelling [x] No [] Yes        History of Cancer: None    COVID Vaccination Up To Date: Yes    Potential Living Kidney Donors: Yes    Review of Systems:  A comprehensive review of systems was obtained and negative, except as noted in the HPI or PMH.    Past Medical History:   Medical record was reviewed and PMH was discussed with patient and noted below.  Past Medical History:   Diagnosis Date    Adrenal adenoma, left     Anemia     Benign essential hypertension 07/28/2017    CKD (chronic kidney disease) stage 5, GFR less than 15 ml/min (H)     FSGS    Depression     Focal glomerular sclerosis 07/28/2017    HLD (hyperlipidemia)     LVH (left ventricular hypertrophy) due to hypertensive disease 07/14/2017    Noncompliance     Obesity, unspecified     OD (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/2021    Stress-induced cardiomyopathy     Suicide attempt (H) 2019       Past Social History:   Past Surgical History:   Procedure Laterality Date    ARTHROSCOPY ANKLE Left 02/24/2021    Procedure: Left ankle arthroscopy and debridement/micro fracture;  Surgeon: Mirza Nelson MD;  Location: [facility] OR    BIOPSY  2017    renal- [facility]    CREATE FISTULA ARTERIOVENOUS UPPER EXTREMITY Right 03/04/2021    Procedure: RIGHT proximal radial  to CEPHALIC ARTERIOVENOUS FISTULA;  Surgeon: Henrik Moran MD;  Location:  OR    CREATE FISTULA ARTERIOVENOUS UPPER EXTREMITY Left 03/09/2023    Procedure: CREATION OF FIRST STAGE LEFT BRACHIOBASILIC ARTERIOVENOUS FISTULA;  Surgeon: Henrik Moran MD;  Location:  OR    CREATE FISTULA ARTERIOVENOUS UPPER EXTREMITY Left 5/30/2023    Procedure: SECOND  STAGE LEFT BRACHIAL TO BASILIC ARTERIOVENOUS FISTULA;  Surgeon: Henrik Moran MD;  Location: [facility] OR    IR CVC TUNNEL PLACEMENT > 5 YRS OF AGE  06/17/2021    IR CVC TUNNEL PLACEMENT > 5 YRS OF AGE  03/09/2023    IR CVC TUNNEL REMOVAL RIGHT  12/03/2021    IRRIGATION AND DEBRIDEMENT UPPER EXTREMITY, COMBINED Left 03/16/2023    Procedure: EVACUATION OF LEFT ARM HEMATOMA;  Surgeon: Henrik Moran MD;  Location: [facility] OR    LIGATE FISTULA ARTERIOVENOUS UPPER EXTREMITY Right 03/09/2023    Procedure: LIGATION OF RIGHT ARM ARTERIOVENOUS FISTULA;  Surgeon: Henrik Moran MD;  Location: [facility] OR    REVISION FISTULA ARTERIOVENOUS UPPER EXTREMITY Right 08/26/2021    Procedure: Second stage RIGHT BRACHIOCEPHALIC transposition ARTERIOVENOUS FISTULA;  Surgeon: Henrik Moran MD;  Location: [facility] OR     Personal history of bleeding or anesthesia problems: Yes; hematoma by fistula when on IV heparin.     Family History:  Family History   Problem Relation Age of Onset    Blood Disease Mother         has hep b    Diabetes Mother         gestionanal diabetes    Hypertension Mother     Obesity Father     Hypertension Father     Hypertension Maternal Grandmother     Diabetes Maternal Grandmother     Hypertension Paternal Grandmother     Hypertension Paternal Grandfather     Coronary Artery Disease Maternal Uncle     Cancer No family hx of        Personal History:   Social History     Socioeconomic History    Marital status: Single     Spouse name: Not on file    Number of children: 0    Years of education: Not on file    Highest education level: Not on file   Occupational History    Occupation:     Occupation: kitchen     Comment: [employer]   Tobacco Use    Smoking status: Never    Smokeless tobacco: Never   Vaping Use    Vaping Use: Never used   Substance and Sexual Activity    Alcohol use: No    Drug use: Yes     Types: Marijuana     Comment: occ    Sexual activity: Not Currently     Partners: Female    Other Topics Concern    Parent/sibling w/ CABG, MI or angioplasty before 65F 55M? Not Asked   Social History Narrative    Not on file     Social Determinants of Health     Financial Resource Strain: Medium Risk (12/28/2021)    Overall Financial Resource Strain (CARDIA)     Difficulty of Paying Living Expenses: Somewhat hard   Food Insecurity: Food Insecurity Present (12/28/2021)    Hunger Vital Sign     Worried About Running Out of Food in the Last Year: Sometimes true     Ran Out of Food in the Last Year: Sometimes true   Transportation Needs: No Transportation Needs (12/28/2021)    PRAPARE - Transportation     Lack of Transportation (Medical): No     Lack of Transportation (Non-Medical): No   Physical Activity: Inactive (12/28/2021)    Exercise Vital Sign     Days of Exercise per Week: 0 days     Minutes of Exercise per Session: 30 min   Stress: Stress Concern Present (12/28/2021)    [place] [place] [employer] - Occupational Stress Questionnaire     Feeling of Stress : To some extent   Social Connections: Moderately Isolated (12/28/2021)    Social Connection and Isolation Panel [NHANES]     Frequency of Communication with Friends and Family: Once a week     Frequency of Social Gatherings with Friends and Family: More than three times a week     Attends Nondenominational Services: 1 to 4 times per year     Active Member of Clubs or Organizations: No     Attends Club or Organization Meetings: Not on file     Marital Status: Never    Intimate Partner Violence: Not on file   Housing Stability: High Risk (12/28/2021)    Housing Stability Vital Sign     Unable to Pay for Housing in the Last Year: Yes     Number of Places Lived in the Last Year: 1     Unstable Housing in the Last Year: No       Allergies:  Allergies   Allergen Reactions    Benadryl [Diphenhydramine] Itching       Medications:  Current Outpatient Medications   Medication Sig    ACE/ARB/ARNI NOT PRESCRIBED (INTENTIONAL) Please choose reason  not prescribed from choices below.    acetaminophen (TYLENOL) 325 MG tablet Take 2 tablets (650 mg) by mouth every 4 hours as needed for mild pain    amLODIPine (NORVASC) 10 MG tablet Take 10 mg by mouth daily     apixaban ANTICOAGULANT (ELIQUIS) 5 MG tablet Take 1 tablet (5 mg) by mouth 2 times daily    aspirin 81 MG EC tablet Take 81 mg by mouth daily    atorvastatin (LIPITOR) 10 MG tablet TAKE ONE TABLET BY MOUTH EVERY NIGHT AT BEDTIME    bumetanide (BUMEX) 2 MG tablet Take 2 mg by mouth daily     calcium acetate (CALPHRON) 667 MG TABS tablet Take 1,334 mg by mouth 3 times daily (with meals)    carvedilol (COREG) 25 MG tablet TAKE TWO TABLETS BY MOUTH TWICE A DAY WITH A MEAL Strength: 25 mg    cinacalcet (SENSIPAR) 90 MG tablet Take 90 mg by mouth daily    cloNIDine (CATAPRES-TTS2) 0.2 MG/24HR WK patch Place 1 patch onto the skin    hydrALAZINE (APRESOLINE) 100 MG tablet TAKE ONE TABLET BY MOUTH THREE TIMES A DAY    HYDROcodone-acetaminophen (NORCO) 5-325 MG tablet Take 1-2 tablets by mouth every 4 hours as needed for moderate to severe pain    lisinopril (ZESTRIL) 20 MG tablet Take 1 tablet (20 mg) by mouth every evening    medical cannabis (Patient's own supply) See Admin Instructions (The purpose of this order is to document that the patient reports taking medical cannabis.  This is not a prescription, and is not used to certify that the patient has a qualifying medical condition.)    multivitamin RENAL (RENAVITE RX/NEPHROVITE) 1 MG tablet Take 1 tablet by mouth daily     nitroGLYcerin (NITROSTAT) 0.3 MG sublingual tablet For chest pain place 1 tablet under the tongue every 5 minutes for 3 doses. If symptoms persist 5 minutes after 1st dose call 911.    senna-docusate (SENOKOT-S/PERICOLACE) 8.6-50 MG tablet Take 1-2 tablets by mouth 2 times daily as needed for constipation (While on oral opioids.)    sevelamer HCl (RENAGEL) 800 MG tablet Take 4,000 mg by mouth 3 times daily (with meals) And 1600mg with snacks.  "   vitamin D3 (CHOLECALCIFEROL) 2000 units (50 mcg) tablet Take 50 mcg by mouth daily     No current facility-administered medications for this visit.       Vitals:  /73   Pulse 77   Ht 1.791 m (5' 10.5\")   Wt 104.2 kg (229 lb 11.2 oz)   SpO2 97%   BMI 32.49 kg/m      Exam:  GENERAL APPEARANCE: alert and no distress  HENT: mouth without ulcers or lesions  LYMPHATICS: no cervical or supraclavicular nodes  RESP: lungs clear to auscultation - no rales, rhonchi or wheezes  CV: regular rhythm, normal rate, no rub, no murmur  EDEMA: no LE edema bilaterally  ABDOMEN: soft, nondistended, nontender, bowel sounds normal  MS: extremities normal - no gross deformities noted, no evidence of inflammation in joints, no muscle tenderness  SKIN: no rash    Results:   No results found for this or any previous visit (from the past 336 hour(s)).      "

## 2023-07-13 NOTE — ED TRIAGE NOTES
Patient has pain from left elbow to left thumb. Patient has an US check up today to assess left arm fistula, went home and took a nap and woke and had the left arm pain. Describes it as aching and states it's worse with bending elbow. Patient has a high volume fistula. Good thrill to fistula and +radial pulse.

## 2023-07-13 NOTE — ED PROVIDER NOTES
History     Chief Complaint:  Arm Pain      HPI   Taylor Valiente is a 27 year old male anticoagulated on Eliquis with a history of ESRD on dialysis, hypertension who presents for evaluation of left arm pain. Taylor reports that around 2000 yesterday he started to develop left elbow pain that is made worse with bending his arm. He does note that earlier yesterday morning, he had an ultrasound on his left upper arm and was told that his fistula was good for his dialysis. During evaluation, Taylor denies any significant neck pain, weakness, paresthesias, chest pain, dyspnea, or other symptoms. He denies that he had any abnormal twisting or lifting that could have precipitated this pain. Of note, Taylor usually attends dialysis MWF. He has not taken anything for analgesia.    Independent Historian:   None - Patient Only    Review of External Notes:   I reviewed the patient's vascular surgery office visit from 7/12.      Medications:    Cholecalciferol   Carvedilol   Renvela  Cinacalcet   Bumex  Clonidine   Mycophenolate  Amlodipine   Eliquis   Aspirin 81 mg   Calcium acetate   Hydralazine   Norco  Lisinopril     Past Medical History:    ESRD on dialysis   Hyperlipidemia  Obesity  Vitamin D deficiency   Heart failure  Hypertension   Anemia  Focal segmental glomerulosclerosis   Adrenal adenoma   Depression   LVH  Osteochondritis dissecans  Cardiomyopathy  Renovascular hypertension   Prolonged QT  DVT of left internal jugular     Past Surgical History:    Left ankle arthroscopy  Renal biopsy  Create fistula, RUE x3  CVC tunnel placement x2  CVC tunnel removal   I&D, LUE  Ligate fistula, RUE  Revision fistula, RUE     Physical Exam     Patient Vitals for the past 24 hrs:   BP Temp Temp src Pulse Resp SpO2   07/13/23 0245 (!) 170/94 -- -- -- -- --   07/12/23 2159 (!) 175/115 97.4  F (36.3  C) Temporal 88 26 100 %        Physical Exam  Nursing note and vitals reviewed.  Constitutional: Well nourished.   Eyes:  Conjunctiva normal.  Pupils are equal, round, and reactive to light.   ENT: Nose normal. Mucous membranes pink and moist.    Neck: Normal range of motion. No c-spine tenderness  CVS: Normal rate, regular rhythm.  Normal heart sounds. 2/2 radial pulses   Pulmonary: Lungs clear to auscultation bilaterally.   GI: Abdomen soft. Nontender, nondistended. No rigidity or guarding.    MSK: No calf tenderness or swelling. Mild tenderness over left lateral epicondyle, full active ROM; no overlying warmth/erythema. No L. Shoulder/wrist tenderness  Neuro: Alert. Follows simple commands. Sensation intact x 4.   Skin: Skin is warm and dry. No rash noted. L. AV fistula with palpable thrill  Psychiatric: Flat affect    Emergency Department Course   Imaging:  Elbow  XR, G/E 3 views, left   Final Result   IMPRESSION: No acute fracture or dislocation. No joint effusion. Old ossific fragment along the lateral epicondyle.         Report per radiology    Procedures   none    Emergency Department Course & Assessments:    Interventions:  Medications   acetaminophen (TYLENOL) tablet 1,000 mg (1,000 mg Oral $Given 7/13/23 0232)        Assessments:  0224 I obtained history and examined the patient as noted above.   0425 I rechecked and updated the patient. At this time, the patient was deemed safe to discharge home and he agreed to the plan of care.    Consultations/Discussion of Management or Tests:  None        Social Determinants of Health affecting care:   Stress/Adjustment Disorders    Disposition:  The patient was discharged to home.     Impression & Plan    CMS Diagnoses: None    Medical Decision Making:  Patient is a 27-year-old male presenting with predominately complaints of atraumatic arm pain.  He is mildly hypertensive on arrival though overall nontoxic and repeat blood pressure improved after analgesia.  He denies any chest pain/dyspnea and I doubt cardiopulmonary etiology to explain his presentation.  He hard a reassuring  arteriogram showing patent AV fistula. Clinically doubt DVT and patient is already anticoagulated. He has reproducible mild tenderness to his lateral epicondyle.  No evidence to suggest septic joint.  X-ray without focal fracture or dislocation.  I did discuss with patient stronger suspicion for more lateral epicondylitis.  I did recommend Tylenol for analgesia which patient did receive during his time in the ED and he did report some symptom improvement.  I discussed ice to the area as well.  He will be provided an orthopedic referral should pain persist as we discussed additional treatment modalities may be warranted.  Planning close PCP follow-up as well, return precautions given.    Diagnosis:    ICD-10-CM    1. Left elbow pain  M25.522            Discharge Medications:  New Prescriptions    No medications on file      Scribe Disclosure:  I, Cindy Reid, am serving as a scribe at 2:45 AM on 7/13/2023 to document services personally performed by Renay Shannon DO based on my observations and the provider's statements to me.      Renay Shannon DO  07/13/23 [id number]

## 2023-07-13 NOTE — LETTER
7/13/2023         RE: Taylor Valiente  [address] [place] [address]        Dear Colleague,    Thank you for referring your patient, Taylor Valiente, to the [facility]. Please see a copy of my visit note below.    TRANSPLANT NEPHROLOGY RECIPIENT EVALUATION NOTE    Assessment and Plan:  # CKD likely from long standing HTN and secondary FSGS: with prior antihypertensive medication nonadherence and possible contributing adrenal adenomas. Will complete genetic renal panel as his 2017 kidney biopsy had some features of primary FSGS. Nearing dialysis with a GFR of 10. When ready, he would likely benefit from a kidney transplant.       -need records from [facility] regarding adrenal adenoma     # Cardiac Risk: Cleared by cardiology 6/28/23.   -coronary CTA 06/09/2022 revealed only less than 25% stenosis in the mid portion of the LAD with the other coronary territories being normal.   -pharmacologic nuclear stress test 6/21/2023 negative for inducible ischemia.       # PAD Screening: no further imaging recommended per surgery visit 3/23.  CT images from 6/2021 shows B EIA's suitable for transplant.       #R internal jugular DVT: 3/2023. Stable on Eliquis. Developed after R fistula was ligated and CVC catheter placed 3/9/23 as well as creation of L AV fistula. Clot found 3/12. Per vascular, remail on anticoagulation until tunneled HD catheter can be removed. Fistula cleared for usage 7/12/23.   -Hx of hematoma near fistula when on IV heparin.      # Adrenal adenomas: with suspicion for Cushings. He reportedly saw an outside endocrinologist and was told he makes too much cortisol although these records as not available today. Will need endocrine here for continued management.        # Noncompliance, depression: with a suicide attempt in 2019 (this was unknown during nephrology visit, later discovered by social work).    Completed 6 months of therapy. No longer seeing therapist at  this time.  Reports depression stable at this time with PRN therapy sessions.      #HTN: moderate control on meds. Systolic average 150-160s.      # Marijuana use: 4-5x  weekly to cope with stress.     #BMI: 32        # Health Maintenance: Dental: Not up to date.        Reason for Visit:  Taylor Valiente is a 27 year old male with CKD from focal segmental glomerulosclerosis (FSGS), who presents for kidney transplant wait list evaluation.    History of Present Illness:  Taylor Valiente is a 25-year-old gentleman of [place] decent who presents with CKD likely secondary to long standing hypertension and secondary FSGS.  He initially presented the summer of 2017 with VANESSA (creatinine 2.5), hypertensive urgency, nephrotic proteinuria with negative serologic work up and had a kidney biopsy that showed features of chronic hypertensive changes and secondary FSGS, although greater than 80% foot effacement was noted.  He followed with Dr. Hooper every 6 months, but had non-adherence issues with blood pressure medications.  He was hospitalized more recently in 9/2020 with VANESSA on CKD (creatinine in the mid 8s), hypertensive urgency and noted to be noncompliance with antihypertensives. He had a renal ultrasound that showed prominent diffuse increased parenchymal echogenicity throughout both kidneys, representing a significant change compared to 2017.  Of note, a 5/2019 CT noted two adrenal nodules, and he reportedly saw an outside endocrinologist who told him he makes too much cortisol. He was hospitalized again in 12/2020 with COVID-19 and his GFR dropped to 10.     On HD using R chest catheter. Fistula LUE maturing - Was cleared yesterday. Will try using it tomorrow and anticipate pulling line soon.  Eliquis expected to end after HD line pulled.      Last therapy session was in March. No longer in therapy right now but will reach out if needed.     BP systolic average 150-160s           Kidney Disease Hx:              Kidney Disease Dx: likely secondary to long standing hypertension and secondary FSGS       Biopsy Proven: Yes; 2017         On Dialysis: Yes, Date initiated: June 2021       Primary Nephrologist: Dr. Hooper/Jennifer Manzanares CNP        H/o Kidney Stones: No       H/o Recurrent/Frequent UTI: No         Diabetic Hx: None           Cardiac/Vascular Disease Risk Factors:        Cardiac Risk Factors: Hypertension and CKD       Known CAD: No       Known PAD/Caludication Symptoms: No       Known Heart Failure: No       Arrhythmia: No       Pulmonary Hypertension: No       Valvular Disease: No       Other: None         Volume Status/Weight:        Volume status: Euvolemic       Weight:  Acceptable BMI       BMI: Body mass index is 32.49 kg/m .         Functional Capacity/Frailty:        Walks 2 days per week.        Fatigue/Decreased Energy: [x] No [] Yes    Chest Pain or SOB with Exertion: [x] No [] Yes    Significant Weight Change: [x] No [] Yes    Nausea, Vomiting or Diarrhea: [] No [x] Yes Vomits 2 times per month usually over weekend when not dialyzed for 2 days.    Fever, Sweats or Chills:  [x] No [] Yes    Leg Swelling [x] No [] Yes        History of Cancer: None    COVID Vaccination Up To Date: Yes    Potential Living Kidney Donors: Yes    Review of Systems:  A comprehensive review of systems was obtained and negative, except as noted in the HPI or PMH.    Past Medical History:   Medical record was reviewed and PMH was discussed with patient and noted below.  Past Medical History:   Diagnosis Date     Adrenal adenoma, left      Anemia      Benign essential hypertension 07/28/2017     CKD (chronic kidney disease) stage 5, GFR less than 15 ml/min (H)     FSGS     Depression      Focal glomerular sclerosis 07/28/2017     HLD (hyperlipidemia)      LVH (left ventricular hypertrophy) due to hypertensive disease 07/14/2017     Noncompliance      Obesity, unspecified      OD (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/2021      Stress-induced cardiomyopathy      Suicide attempt (H) 2019       Past Social History:   Past Surgical History:   Procedure Laterality Date     ARTHROSCOPY ANKLE Left 02/24/2021    Procedure: Left ankle arthroscopy and debridement/micro fracture;  Surgeon: Mirza Nelson MD;  Location: [facility] OR     BIOPSY  2017    renal- [facility]     CREATE FISTULA ARTERIOVENOUS UPPER EXTREMITY Right 03/04/2021    Procedure: RIGHT proximal radial  to CEPHALIC ARTERIOVENOUS FISTULA;  Surgeon: Henrik Moran MD;  Location: [facility] OR     CREATE FISTULA ARTERIOVENOUS UPPER EXTREMITY Left 03/09/2023    Procedure: CREATION OF FIRST STAGE LEFT BRACHIOBASILIC ARTERIOVENOUS FISTULA;  Surgeon: Henrik Moran MD;  Location: [facility] OR     CREATE FISTULA ARTERIOVENOUS UPPER EXTREMITY Left 5/30/2023    Procedure: SECOND STAGE LEFT BRACHIAL TO BASILIC ARTERIOVENOUS FISTULA;  Surgeon: Henrik Moran MD;  Location: [facility] OR     IR CVC TUNNEL PLACEMENT > 5 YRS OF AGE  06/17/2021     IR CVC TUNNEL PLACEMENT > 5 YRS OF AGE  03/09/2023     IR CVC TUNNEL REMOVAL RIGHT  12/03/2021     IRRIGATION AND DEBRIDEMENT UPPER EXTREMITY, COMBINED Left 03/16/2023    Procedure: EVACUATION OF LEFT ARM HEMATOMA;  Surgeon: Henrik Moran MD;  Location: [facility] OR     LIGATE FISTULA ARTERIOVENOUS UPPER EXTREMITY Right 03/09/2023    Procedure: LIGATION OF RIGHT ARM ARTERIOVENOUS FISTULA;  Surgeon: Henrik Moran MD;  Location: [facility] OR     REVISION FISTULA ARTERIOVENOUS UPPER EXTREMITY Right 08/26/2021    Procedure: Second stage RIGHT BRACHIOCEPHALIC transposition ARTERIOVENOUS FISTULA;  Surgeon: Henrik Moran MD;  Location: [facility] OR     Personal history of bleeding or anesthesia problems: Yes; hematoma by fistula when on IV heparin.     Family History:  Family History   Problem Relation Age of Onset     Blood Disease Mother         has hep b     Diabetes Mother         gestionanal diabetes     Hypertension Mother      Obesity Father       Hypertension Father      Hypertension Maternal Grandmother      Diabetes Maternal Grandmother      Hypertension Paternal Grandmother      Hypertension Paternal Grandfather      Coronary Artery Disease Maternal Uncle      Cancer No family hx of        Personal History:   Social History     Socioeconomic History     Marital status: Single     Spouse name: Not on file     Number of children: 0     Years of education: Not on file     Highest education level: Not on file   Occupational History     Occupation:      Occupation: kitchen     Comment: [employer]   Tobacco Use     Smoking status: Never     Smokeless tobacco: Never   Vaping Use     Vaping Use: Never used   Substance and Sexual Activity     Alcohol use: No     Drug use: Yes     Types: Marijuana     Comment: occ     Sexual activity: Not Currently     Partners: Female   Other Topics Concern     Parent/sibling w/ CABG, MI or angioplasty before 65F 55M? Not Asked   Social History Narrative     Not on file     Social Determinants of Health     Financial Resource Strain: Medium Risk (12/28/2021)    Overall Financial Resource Strain (CARDIA)      Difficulty of Paying Living Expenses: Somewhat hard   Food Insecurity: Food Insecurity Present (12/28/2021)    Hunger Vital Sign      Worried About Running Out of Food in the Last Year: Sometimes true      Ran Out of Food in the Last Year: Sometimes true   Transportation Needs: No Transportation Needs (12/28/2021)    PRAPARE - Transportation      Lack of Transportation (Medical): No      Lack of Transportation (Non-Medical): No   Physical Activity: Inactive (12/28/2021)    Exercise Vital Sign      Days of Exercise per Week: 0 days      Minutes of Exercise per Session: 30 min   Stress: Stress Concern Present (12/28/2021)    [employer] - Occupational Stress Questionnaire      Feeling of Stress : To some extent   Social Connections: Moderately Isolated (12/28/2021)    Social  Connection and Isolation Panel [NHANES]      Frequency of Communication with Friends and Family: Once a week      Frequency of Social Gatherings with Friends and Family: More than three times a week      Attends Temple Services: 1 to 4 times per year      Active Member of Clubs or Organizations: No      Attends Club or Organization Meetings: Not on file      Marital Status: Never    Intimate Partner Violence: Not on file   Housing Stability: High Risk (12/28/2021)    Housing Stability Vital Sign      Unable to Pay for Housing in the Last Year: Yes      Number of Places Lived in the Last Year: 1      Unstable Housing in the Last Year: No       Allergies:  Allergies   Allergen Reactions     Benadryl [Diphenhydramine] Itching       Medications:  Current Outpatient Medications   Medication Sig     ACE/ARB/ARNI NOT PRESCRIBED (INTENTIONAL) Please choose reason not prescribed from choices below.     acetaminophen (TYLENOL) 325 MG tablet Take 2 tablets (650 mg) by mouth every 4 hours as needed for mild pain     amLODIPine (NORVASC) 10 MG tablet Take 10 mg by mouth daily      apixaban ANTICOAGULANT (ELIQUIS) 5 MG tablet Take 1 tablet (5 mg) by mouth 2 times daily     aspirin 81 MG EC tablet Take 81 mg by mouth daily     atorvastatin (LIPITOR) 10 MG tablet TAKE ONE TABLET BY MOUTH EVERY NIGHT AT BEDTIME     bumetanide (BUMEX) 2 MG tablet Take 2 mg by mouth daily      calcium acetate (CALPHRON) 667 MG TABS tablet Take 1,334 mg by mouth 3 times daily (with meals)     carvedilol (COREG) 25 MG tablet TAKE TWO TABLETS BY MOUTH TWICE A DAY WITH A MEAL Strength: 25 mg     cinacalcet (SENSIPAR) 90 MG tablet Take 90 mg by mouth daily     cloNIDine (CATAPRES-TTS2) 0.2 MG/24HR WK patch Place 1 patch onto the skin     hydrALAZINE (APRESOLINE) 100 MG tablet TAKE ONE TABLET BY MOUTH THREE TIMES A DAY     HYDROcodone-acetaminophen (NORCO) 5-325 MG tablet Take 1-2 tablets by mouth every 4 hours as needed for moderate to severe  "pain     lisinopril (ZESTRIL) 20 MG tablet Take 1 tablet (20 mg) by mouth every evening     medical cannabis (Patient's own supply) See Admin Instructions (The purpose of this order is to document that the patient reports taking medical cannabis.  This is not a prescription, and is not used to certify that the patient has a qualifying medical condition.)     multivitamin RENAL (RENAVITE RX/NEPHROVITE) 1 MG tablet Take 1 tablet by mouth daily      nitroGLYcerin (NITROSTAT) 0.3 MG sublingual tablet For chest pain place 1 tablet under the tongue every 5 minutes for 3 doses. If symptoms persist 5 minutes after 1st dose call 911.     senna-docusate (SENOKOT-S/PERICOLACE) 8.6-50 MG tablet Take 1-2 tablets by mouth 2 times daily as needed for constipation (While on oral opioids.)     sevelamer HCl (RENAGEL) 800 MG tablet Take 4,000 mg by mouth 3 times daily (with meals) And 1600mg with snacks.     vitamin D3 (CHOLECALCIFEROL) 2000 units (50 mcg) tablet Take 50 mcg by mouth daily     No current facility-administered medications for this visit.       Vitals:  /73   Pulse 77   Ht 1.791 m (5' 10.5\")   Wt 104.2 kg (229 lb 11.2 oz)   SpO2 97%   BMI 32.49 kg/m      Exam:  GENERAL APPEARANCE: alert and no distress  HENT: mouth without ulcers or lesions  LYMPHATICS: no cervical or supraclavicular nodes  RESP: lungs clear to auscultation - no rales, rhonchi or wheezes  CV: regular rhythm, normal rate, no rub, no murmur  EDEMA: no LE edema bilaterally  ABDOMEN: soft, nondistended, nontender, bowel sounds normal  MS: extremities normal - no gross deformities noted, no evidence of inflammation in joints, no muscle tenderness  SKIN: no rash    Results:   No results found for this or any previous visit (from the past 336 hour(s)).        Again, thank you for allowing me to participate in the care of your patient.        Sincerely,        ROSA Tracy CNP    "

## 2023-07-20 ENCOUNTER — NURSE TRIAGE (OUTPATIENT)
Dept: NURSING | Facility: CLINIC | Age: 28
End: 2023-07-20
Payer: MEDICARE

## 2023-07-20 ENCOUNTER — HOSPITAL ENCOUNTER (EMERGENCY)
Facility: CLINIC | Age: 28
Discharge: HOME | End: 2023-07-21
Attending: EMERGENCY MEDICINE | Admitting: EMERGENCY MEDICINE
Payer: MEDICARE

## 2023-07-20 DIAGNOSIS — R19.5 DARK STOOLS: ICD-10-CM

## 2023-07-20 DIAGNOSIS — E87.5 HYPERKALEMIA: ICD-10-CM

## 2023-07-20 DIAGNOSIS — Z99.2 ESRD (END STAGE RENAL DISEASE) ON DIALYSIS (H): ICD-10-CM

## 2023-07-20 DIAGNOSIS — N18.6 ESRD (END STAGE RENAL DISEASE) ON DIALYSIS (H): ICD-10-CM

## 2023-07-20 DIAGNOSIS — Z79.01 LONG TERM CURRENT USE OF ANTICOAGULANT THERAPY: ICD-10-CM

## 2023-07-20 LAB
ABO/RH(D): NORMAL
ALBUMIN SERPL BCG-MCNC: 3.6 G/DL (ref 3.5–5.2)
ALP SERPL-CCNC: 117 U/L (ref 40–129)
ALT SERPL W P-5'-P-CCNC: 12 U/L (ref 0–70)
ANION GAP SERPL CALCULATED.3IONS-SCNC: 19 MMOL/L (ref 7–15)
ANTIBODY SCREEN: NEGATIVE
AST SERPL W P-5'-P-CCNC: 14 U/L (ref 0–45)
BASOPHILS # BLD AUTO: 0 10E3/UL (ref 0–0.2)
BASOPHILS NFR BLD AUTO: 0 %
BILIRUB SERPL-MCNC: 0.3 MG/DL
BUN SERPL-MCNC: 82.3 MG/DL (ref 6–20)
CALCIUM SERPL-MCNC: 9.6 MG/DL (ref 8.6–10)
CHLORIDE SERPL-SCNC: 101 MMOL/L (ref 98–107)
CREAT SERPL-MCNC: 13.55 MG/DL (ref 0.67–1.17)
DEPRECATED HCO3 PLAS-SCNC: 17 MMOL/L (ref 22–29)
EOSINOPHIL # BLD AUTO: 0.2 10E3/UL (ref 0–0.7)
EOSINOPHIL NFR BLD AUTO: 2 %
ERYTHROCYTE [DISTWIDTH] IN BLOOD BY AUTOMATED COUNT: 18.2 % (ref 10–15)
GFR SERPL CREATININE-BSD FRML MDRD: 5 ML/MIN/1.73M2
GLUCOSE BLDC GLUCOMTR-MCNC: 103 MG/DL (ref 70–99)
GLUCOSE BLDC GLUCOMTR-MCNC: 179 MG/DL (ref 70–99)
GLUCOSE SERPL-MCNC: 126 MG/DL (ref 70–99)
HCT VFR BLD AUTO: 41.4 % (ref 40–53)
HEMOCCULT STL QL: NEGATIVE
HGB BLD-MCNC: 12.8 G/DL (ref 13.3–17.7)
HOLD SPECIMEN: NORMAL
HOLD SPECIMEN: NORMAL
IMM GRANULOCYTES # BLD: 0.1 10E3/UL
IMM GRANULOCYTES NFR BLD: 1 %
LYMPHOCYTES # BLD AUTO: 1.6 10E3/UL (ref 0.8–5.3)
LYMPHOCYTES NFR BLD AUTO: 13 %
MCH RBC QN AUTO: 27.4 PG (ref 26.5–33)
MCHC RBC AUTO-ENTMCNC: 30.9 G/DL (ref 31.5–36.5)
MCV RBC AUTO: 89 FL (ref 78–100)
MONOCYTES # BLD AUTO: 1.3 10E3/UL (ref 0–1.3)
MONOCYTES NFR BLD AUTO: 11 %
NEUTROPHILS # BLD AUTO: 9.1 10E3/UL (ref 1.6–8.3)
NEUTROPHILS NFR BLD AUTO: 73 %
NRBC # BLD AUTO: 0 10E3/UL
NRBC BLD AUTO-RTO: 0 /100
PLATELET # BLD AUTO: 225 10E3/UL (ref 150–450)
POTASSIUM SERPL-SCNC: 5.5 MMOL/L (ref 3.4–5.3)
PROT SERPL-MCNC: 7.6 G/DL (ref 6.4–8.3)
RBC # BLD AUTO: 4.68 10E6/UL (ref 4.4–5.9)
SODIUM SERPL-SCNC: 137 MMOL/L (ref 136–145)
SPECIMEN EXPIRATION DATE: NORMAL
WBC # BLD AUTO: 12.3 10E3/UL (ref 4–11)

## 2023-07-20 PROCEDURE — 258N000001 HC RX 258: Performed by: EMERGENCY MEDICINE

## 2023-07-20 PROCEDURE — 250N000013 HC RX MED GY IP 250 OP 250 PS 637: Performed by: EMERGENCY MEDICINE

## 2023-07-20 PROCEDURE — 96361 HYDRATE IV INFUSION ADD-ON: CPT

## 2023-07-20 PROCEDURE — 80053 COMPREHEN METABOLIC PANEL: CPT | Performed by: EMERGENCY MEDICINE

## 2023-07-20 PROCEDURE — 82962 GLUCOSE BLOOD TEST: CPT

## 2023-07-20 PROCEDURE — 96374 THER/PROPH/DIAG INJ IV PUSH: CPT

## 2023-07-20 PROCEDURE — 99284 EMERGENCY DEPT VISIT MOD MDM: CPT | Mod: 25

## 2023-07-20 PROCEDURE — 86901 BLOOD TYPING SEROLOGIC RH(D): CPT | Performed by: EMERGENCY MEDICINE

## 2023-07-20 PROCEDURE — 82272 OCCULT BLD FECES 1-3 TESTS: CPT | Performed by: EMERGENCY MEDICINE

## 2023-07-20 PROCEDURE — 85025 COMPLETE CBC W/AUTO DIFF WBC: CPT | Performed by: EMERGENCY MEDICINE

## 2023-07-20 PROCEDURE — 36415 COLL VENOUS BLD VENIPUNCTURE: CPT | Performed by: EMERGENCY MEDICINE

## 2023-07-20 PROCEDURE — 86850 RBC ANTIBODY SCREEN: CPT | Performed by: EMERGENCY MEDICINE

## 2023-07-20 PROCEDURE — 93005 ELECTROCARDIOGRAM TRACING: CPT

## 2023-07-20 RX ORDER — DEXTROSE MONOHYDRATE 25 G/50ML
25 INJECTION, SOLUTION INTRAVENOUS ONCE
Status: COMPLETED | OUTPATIENT
Start: 2023-07-20 | End: 2023-07-20

## 2023-07-20 RX ADMIN — DEXTROSE MONOHYDRATE 25 G: 25 INJECTION, SOLUTION INTRAVENOUS at 23:24

## 2023-07-20 RX ADMIN — SODIUM CHLORIDE 10 UNITS: 9 INJECTION, SOLUTION INTRAVENOUS at 23:30

## 2023-07-20 RX ADMIN — DEXTROSE MONOHYDRATE 300 ML: 100 INJECTION, SOLUTION INTRAVENOUS at 23:32

## 2023-07-20 ASSESSMENT — ACTIVITIES OF DAILY LIVING (ADL)
ADLS_ACUITY_SCORE: 35
ADLS_ACUITY_SCORE: 35

## 2023-07-21 VITALS
HEART RATE: 95 BPM | DIASTOLIC BLOOD PRESSURE: 107 MMHG | RESPIRATION RATE: 18 BRPM | OXYGEN SATURATION: 98 % | SYSTOLIC BLOOD PRESSURE: 157 MMHG | TEMPERATURE: 97.4 F

## 2023-07-21 LAB
ATRIAL RATE - MUSE: 83 BPM
DIASTOLIC BLOOD PRESSURE - MUSE: NORMAL MMHG
GLUCOSE BLDC GLUCOMTR-MCNC: 68 MG/DL (ref 70–99)
GLUCOSE BLDC GLUCOMTR-MCNC: 79 MG/DL (ref 70–99)
GLUCOSE BLDC GLUCOMTR-MCNC: 81 MG/DL (ref 70–99)
INTERPRETATION ECG - MUSE: NORMAL
P AXIS - MUSE: 53 DEGREES
POTASSIUM SERPL-SCNC: 4.8 MMOL/L (ref 3.4–5.3)
PR INTERVAL - MUSE: 168 MS
QRS DURATION - MUSE: 88 MS
QT - MUSE: 408 MS
QTC - MUSE: 479 MS
R AXIS - MUSE: 47 DEGREES
SYSTOLIC BLOOD PRESSURE - MUSE: NORMAL MMHG
T AXIS - MUSE: 75 DEGREES
VENTRICULAR RATE- MUSE: 83 BPM

## 2023-07-21 PROCEDURE — 84132 ASSAY OF SERUM POTASSIUM: CPT | Performed by: EMERGENCY MEDICINE

## 2023-07-21 PROCEDURE — 96361 HYDRATE IV INFUSION ADD-ON: CPT

## 2023-07-21 PROCEDURE — 36415 COLL VENOUS BLD VENIPUNCTURE: CPT | Performed by: EMERGENCY MEDICINE

## 2023-07-21 PROCEDURE — 82962 GLUCOSE BLOOD TEST: CPT

## 2023-07-21 ASSESSMENT — ACTIVITIES OF DAILY LIVING (ADL): ADLS_ACUITY_SCORE: 35

## 2023-07-21 NOTE — ED PROVIDER NOTES
History     Chief Complaint:  Dark stool    The history is provided by the patient.      Taylor Valiente is a 27 year old male with history of HTN, focal glomerular sclerosis, and ESRD on dialysis who presents with loose dark stool. The patient states that he started experiencing diarrhea last Saturday and began having black stool 2-3 days ago. He denies noticing any bright red blood in his stool. The patient notes that he has also been experiencing slight upper abdominal pain over the last few days. He adds that he has been feeling generally weak, his skin feels warm and he is sweating more than normal recently. The patient is on Apixaban and last took it this morning at 0900. The patient states that his last run of dialysis was on Wednesday (yesterday). He has not missed any runs recently and is typically dialyzed on MWF. Taylor still makes a small amount of urine. Of note, the patient had a blood clot in March. The patient denies a history of GI bleeding. The patient denies any previous abdominal surgeries. He stopped taking Aspirin recently as instructed by his providers. He has not been on any courses of antibiotics recently. No recent abnormal food exposures. He denies any alcohol use. He currently lives with several family members who are feeling fine.    Independent Historian:   None - Patient Only    Review of External Notes:   I personally performed electronic chart review, I see that his hemoglobin was 8.7 on April 19, the most recent comparison.  I also see a phone call encounter from earlier today, he reported 6 days of black diarrhea at that time.      Medications:    Mycophenolate  Sevelamer  Dronabinol  Amlodipine  Apixaban  Aspirin 81 MG  Atorvastatin  Bumetanide  Calcium acetate  Carvedilol  Cinacalet  Clonidine  Hydralazine  Lisinopril    Past Medical History:    Adrenal; adenoma, left  Renovascular HTN  ESRD on dialysis   Depression  Focal glomerular sclerosis  Hyperlipidemia  LVH due to  hypertensive diease  Obesity  Acute renal failure with acute tubular necrosis  Anemia of chronic renal failure  Osteochondritis dissecans  Stress-induced cardiomyopathy  Suicide attempt   Acute deep vein thrombosis (DVT) of non-extremity vein  Renovascular hypertension   COVID-19 infection   Hypo-osmolality and hyponatremia  Vitamin D deficiency  Heart failure    Past Surgical History:    Arthroscopy ankle, left  Create fistula arteriovenous upper extremity, left, multiple   Create fistula arteriovenous upper extremity, right  IR CVC tunnel replacement  IR CVC tunnel removal   Irrigation and debridement upper extremity, left  Ligate fistula arteriovenous upper extremity, right  Revision fistula arteriovenous upper extremity, right    Physical Exam     Patient Vitals for the past 24 hrs:   BP Temp Temp src Pulse Resp SpO2   07/21/23 0152 -- -- -- -- 18 --   07/21/23 0147 -- -- -- -- -- 98 %   07/21/23 0146 -- -- -- -- -- 97 %   07/21/23 0145 (!) 157/107 -- -- 95 -- 94 %   07/21/23 0137 -- -- -- -- -- 97 %   07/21/23 0136 -- -- -- -- -- 96 %   07/21/23 0130 (!) 164/105 -- -- 85 -- --   07/20/23 2312 (!) 160/106 -- -- -- -- 99 %   07/20/23 2159 (!) 147/94 -- -- 84 -- 98 %   07/20/23 2144 (!) 155/95 -- -- 87 -- 99 %   07/20/23 2129 (!) 149/106 -- -- 91 -- 98 %   07/20/23 2114 (!) 151/93 -- -- 87 -- 96 %   07/20/23 2059 (!) 154/101 -- -- 88 -- 98 %   07/20/23 2056 (!) 164/98 -- -- 95 -- 98 %   07/20/23 2041 (!) 204/152 97.4  F (36.3  C) Temporal 100 20 99 %      Physical Exam  General: Nontoxic-appearing male semirecumbent in room 12  HENT: mucous membranes moist  CV: rate as above, regular rhythm, right chest dialysis catheter in place, left arm fistula with palpable thrill  Resp: normal effort, speaks in full phrases, no stridor, no cough observed  GI: abdomen soft and nontender, no guarding, negative Arriola's  Rectal: external exam of anus: normal  Normal rectal tone  No palpable internal masses or  tenderness  Moderate amount of loose stool, medium shade of brown, no black or red  MSK: no bony tenderness  Skin: appropriately warm and dry  Neuro: alert, clear speech, oriented  Psych: cooperative    Emergency Department Course   EC  ECG taken at 2245, ECG read at 2245  Normal sinus rhythm  Nonspecific T wave abnormality  No peaked T waves   Rate 83 bpm. WI interval 168 ms. QRS duration 88 ms. QT/QTc 408/479 ms. P-R-T axes 53 47 75.     Laboratory:  Labs Ordered and Resulted from Time of ED Arrival to Time of ED Departure   COMPREHENSIVE METABOLIC PANEL - Abnormal       Result Value    Sodium 137      Potassium 5.5 (*)     Chloride 101      Carbon Dioxide (CO2) 17 (*)     Anion Gap 19 (*)     Urea Nitrogen 82.3 (*)     Creatinine 13.55 (*)     Calcium 9.6      Glucose 126 (*)     Alkaline Phosphatase 117      AST 14      ALT 12      Protein Total 7.6      Albumin 3.6      Bilirubin Total 0.3      GFR Estimate 5 (*)    CBC WITH PLATELETS AND DIFFERENTIAL - Abnormal    WBC Count 12.3 (*)     RBC Count 4.68      Hemoglobin 12.8 (*)     Hematocrit 41.4      MCV 89      MCH 27.4      MCHC 30.9 (*)     RDW 18.2 (*)     Platelet Count 225      % Neutrophils 73      % Lymphocytes 13      % Monocytes 11      % Eosinophils 2      % Basophils 0      % Immature Granulocytes 1      NRBCs per 100 WBC 0      Absolute Neutrophils 9.1 (*)     Absolute Lymphocytes 1.6      Absolute Monocytes 1.3      Absolute Eosinophils 0.2      Absolute Basophils 0.0      Absolute Immature Granulocytes 0.1      Absolute NRBCs 0.0     GLUCOSE BY METER - Abnormal    GLUCOSE BY METER POCT 103 (*)    GLUCOSE BY METER - Abnormal    GLUCOSE BY METER POCT 179 (*)    GLUCOSE BY METER - Abnormal    GLUCOSE BY METER POCT 68 (*)    OCCULT BLOOD STOOL - Normal    Occult Blood Negative     POTASSIUM - Normal    Potassium 4.8     GLUCOSE BY METER - Normal    GLUCOSE BY METER POCT 79     GLUCOSE BY METER - Normal    GLUCOSE BY METER POCT 81     TYPE AND  SCREEN, ADULT    ABO/RH(D) A POS      Antibody Screen Negative      SPECIMEN EXPIRATION DATE [id number]     ABO/RH TYPE AND SCREEN        Emergency Department Course & Assessments:       Interventions:  Medications   dextrose 10% BOLUS (0 mLs Intravenous Stopped 7/21/23 0137)   dextrose 50 % injection 25 g (25 g Intravenous $Given 7/20/23 2324)   insulin regular 1 unit/mL injection 10 Units (10 Units Intravenous $Given 7/20/23 2330)        Independent Interpretation (X-rays, CTs, rhythm strip):  None    Assessments/Consultations/Discussion of Management or Tests:  ED Course as of 07/21/23 1434   Thu Jul 20, 2023 2125 I obtained history and examined the patient as noted above.   2132 I performed a rectal exam on the patient.   2332 I rechecked and reevaluated the patient.    Fri Jul 21, 2023   0026 I rechecked and reevaluated the patient again.    0106 I rechecked and reevaluated the patient again. He is comfortable with discharge at this time.      Social Determinants of Health affecting care:   Access to care    Disposition:  The patient was discharged to home.     Impression & Plan    CMS Diagnoses: None    Medical Decision Making:  Given his comorbidities and anticoagulant use, I was concerned that his black stool might represent an upper GI bleed.  He is also been taking some Pepto-Bismol, though thinks that the dark started before the Pepto-Bismol.  He does describe multiple days of loose stool initially of brown color.  At this time, his stool is in fact definitively brown, and his occult stool test is negative.  No signs of active GI bleeding on exam.  Interestingly, his hemoglobin is actually 4 g higher than when it was last checked in April, quite high in light of his dialysis dependence.  He has a completely benign abdominal exam I do not think he requires emergent imaging.  Acknowledge the diagnostic uncertainty with the patient.  He did not have any diarrhea here, nothing upon wish to send stool  samples, this can be reconsidered pending his clinical course.  He was found to have mild hyperkalemia, for which I felt it was most appropriate to treat with insulin and dextrose and repeat potassium level is improved.  He has dialysis scheduled for 6 AM on July 21, just a few hours from now, and I think that he is most appropriately discharged home to seek this dialysis rather than pursue middle of the night phone consultation with nephrology, as he would not qualify for emergent dialysis anyway.  He feels well at this time and is comfortable with the plan for discharge home.  I do not recommend any immediate change to his medications.    Diagnosis:    ICD-10-CM    1. Dark stools  R19.5       2. Long term current use of anticoagulant therapy  Z79.01       3. ESRD (end stage renal disease) on dialysis (H)  N18.6     Z99.2       4. Hyperkalemia  E87.5            Scribe Disclosure:  I, Terrie Deal, am serving as a scribe at 11:38 PM on 7/20/2023 to document services personally performed by Joe Palacios MD based on my observations and the provider's statements to me.     Scribe Disclosure:  I, Polina Gonsales, am serving as a scribe at 1:18 AM on 7/21/2023 to document services personally performed by Joe Palacios MD based on my observations and the provider's statements to me.     7/20/2023   Joe Palacios MD Reitsema, Jeffrey Alan, MD  07/21/23 8271

## 2023-07-21 NOTE — ED TRIAGE NOTES
Presents to triage with c/o diarrhea that began last weekend. Patient noticed on Wednesday that stool began to turn black. Patient has used pepto but stated stools were black prior to starting that medication. Also endorses mid upper abdominal pain. Dialysis MWF, last run yesterday.

## 2023-07-21 NOTE — TELEPHONE ENCOUNTER
Nurse Triage SBAR    Is this a 2nd Level Triage? NO    Situation: Pt called with concerns for diarrhea.    Background: Pt states this has been ongoing since Saturday.    Assessment: Pt's stool went from yellow to brown and now black. He is on Eliquis. He goes about 6 times a day. He feels a little weaker than usual. He tried Pepto Bismol with no relief. Black stool was p/t Pepto Bismol. Denies dizziness or fever.    Protocol Recommended Disposition:   Go to ED Now    Recommendation:Pt aware of closest ER to him.     Reason for Disposition   Black or tarry bowel movements (Exception: chronic-unchanged black-grey bowel movements AND is taking iron pills or Pepto-bismol)    Additional Information   Negative: Shock suspected (e.g., cold/pale/clammy skin, too weak to stand, low BP, rapid pulse)   Negative: Difficult to awaken or acting confused (e.g., disoriented, slurred speech)   Negative: Sounds like a life-threatening emergency to the triager   Negative: [1] SEVERE abdominal pain (e.g., excruciating) AND [2] present > 1 hour   Negative: [1] SEVERE abdominal pain AND [2] age > 60 years    Protocols used: Diarrhea-A-  Jade Pitts RN  [facility] Nurse Advisor   7/20/2023  7:59 PM

## 2023-07-21 NOTE — DISCHARGE INSTRUCTIONS
Fortunately, there are no signs of serious bleeding or other dangerous cause of your symptoms at this time.  Your potassium was slightly high but improved with treatment and is best addressed by getting your dialysis per routine later this morning.  No new medications are needed.

## 2023-07-27 ENCOUNTER — APPOINTMENT (OUTPATIENT)
Dept: GENERAL RADIOLOGY | Facility: CLINIC | Age: 28
DRG: 557 | End: 2023-07-27
Payer: MEDICARE

## 2023-07-27 ENCOUNTER — HOSPITAL ENCOUNTER (INPATIENT)
Facility: CLINIC | Age: 28
LOS: 5 days | Discharge: HOME | DRG: 557 | End: 2023-08-02
Admitting: INTERNAL MEDICINE
Payer: MEDICARE

## 2023-07-27 ENCOUNTER — HOSPITAL ENCOUNTER (EMERGENCY)
Facility: CLINIC | Age: 28
Discharge: HOME | DRG: 557 | End: 2023-07-27
Attending: EMERGENCY MEDICINE | Admitting: EMERGENCY MEDICINE
Payer: MEDICARE

## 2023-07-27 ENCOUNTER — APPOINTMENT (OUTPATIENT)
Dept: ULTRASOUND IMAGING | Facility: CLINIC | Age: 28
DRG: 557 | End: 2023-07-27
Attending: EMERGENCY MEDICINE
Payer: MEDICARE

## 2023-07-27 ENCOUNTER — TELEPHONE (OUTPATIENT)
Dept: OTHER | Facility: CLINIC | Age: 28
End: 2023-07-27

## 2023-07-27 VITALS
DIASTOLIC BLOOD PRESSURE: 109 MMHG | SYSTOLIC BLOOD PRESSURE: 177 MMHG | HEIGHT: 70 IN | RESPIRATION RATE: 18 BRPM | HEART RATE: 81 BPM | BODY MASS INDEX: 32.96 KG/M2 | OXYGEN SATURATION: 100 % | TEMPERATURE: 98.2 F

## 2023-07-27 DIAGNOSIS — D72.829 LEUKOCYTOSIS, UNSPECIFIED TYPE: ICD-10-CM

## 2023-07-27 DIAGNOSIS — R70.0 ELEVATED ERYTHROCYTE SEDIMENTATION RATE: ICD-10-CM

## 2023-07-27 DIAGNOSIS — Z99.2 ESRD NEEDING DIALYSIS (H): Primary | ICD-10-CM

## 2023-07-27 DIAGNOSIS — N18.6 ESRD NEEDING DIALYSIS (H): Primary | ICD-10-CM

## 2023-07-27 DIAGNOSIS — M25.512 ACUTE PAIN OF LEFT SHOULDER: ICD-10-CM

## 2023-07-27 DIAGNOSIS — N18.6 ESRD (END STAGE RENAL DISEASE) ON DIALYSIS (H): ICD-10-CM

## 2023-07-27 DIAGNOSIS — E87.5 HYPERKALEMIA: ICD-10-CM

## 2023-07-27 DIAGNOSIS — R94.31 PROLONGED Q-T INTERVAL ON ECG: ICD-10-CM

## 2023-07-27 DIAGNOSIS — Z99.2 ESRD (END STAGE RENAL DISEASE) ON DIALYSIS (H): ICD-10-CM

## 2023-07-27 DIAGNOSIS — M79.622 PAIN OF LEFT UPPER ARM: ICD-10-CM

## 2023-07-27 LAB
ALBUMIN SERPL BCG-MCNC: 3.8 G/DL (ref 3.5–5.2)
ALBUMIN SERPL BCG-MCNC: 3.8 G/DL (ref 3.5–5.2)
ALP SERPL-CCNC: 77 U/L (ref 40–129)
ALP SERPL-CCNC: 78 U/L (ref 40–129)
ALT SERPL W P-5'-P-CCNC: 8 U/L (ref 0–70)
ALT SERPL W P-5'-P-CCNC: 8 U/L (ref 0–70)
ANION GAP SERPL CALCULATED.3IONS-SCNC: 17 MMOL/L (ref 7–15)
ANION GAP SERPL CALCULATED.3IONS-SCNC: 18 MMOL/L (ref 7–15)
AST SERPL W P-5'-P-CCNC: 7 U/L (ref 0–45)
AST SERPL W P-5'-P-CCNC: 8 U/L (ref 0–45)
ATRIAL RATE - MUSE: 87 BPM
BASOPHILS # BLD AUTO: 0 10E3/UL (ref 0–0.2)
BASOPHILS # BLD AUTO: 0.1 10E3/UL (ref 0–0.2)
BASOPHILS NFR BLD AUTO: 0 %
BASOPHILS NFR BLD AUTO: 1 %
BILIRUB SERPL-MCNC: 0.3 MG/DL
BILIRUB SERPL-MCNC: 0.3 MG/DL
BUN SERPL-MCNC: 43.8 MG/DL (ref 6–20)
BUN SERPL-MCNC: 53 MG/DL (ref 6–20)
CALCIUM SERPL-MCNC: 9.5 MG/DL (ref 8.6–10)
CALCIUM SERPL-MCNC: 9.6 MG/DL (ref 8.6–10)
CHLORIDE SERPL-SCNC: 98 MMOL/L (ref 98–107)
CHLORIDE SERPL-SCNC: 99 MMOL/L (ref 98–107)
CREAT SERPL-MCNC: 10.09 MG/DL (ref 0.67–1.17)
CREAT SERPL-MCNC: 11.74 MG/DL (ref 0.67–1.17)
CRP SERPL-MCNC: 78.43 MG/L
DEPRECATED HCO3 PLAS-SCNC: 19 MMOL/L (ref 22–29)
DEPRECATED HCO3 PLAS-SCNC: 21 MMOL/L (ref 22–29)
DIASTOLIC BLOOD PRESSURE - MUSE: NORMAL MMHG
EOSINOPHIL # BLD AUTO: 0.3 10E3/UL (ref 0–0.7)
EOSINOPHIL # BLD AUTO: 0.4 10E3/UL (ref 0–0.7)
EOSINOPHIL NFR BLD AUTO: 2 %
EOSINOPHIL NFR BLD AUTO: 3 %
ERYTHROCYTE [DISTWIDTH] IN BLOOD BY AUTOMATED COUNT: 17.5 % (ref 10–15)
ERYTHROCYTE [DISTWIDTH] IN BLOOD BY AUTOMATED COUNT: 18 % (ref 10–15)
ERYTHROCYTE [SEDIMENTATION RATE] IN BLOOD BY WESTERGREN METHOD: 55 MM/HR (ref 0–15)
GFR SERPL CREATININE-BSD FRML MDRD: 6 ML/MIN/1.73M2
GFR SERPL CREATININE-BSD FRML MDRD: 7 ML/MIN/1.73M2
GLUCOSE SERPL-MCNC: 97 MG/DL (ref 70–99)
GLUCOSE SERPL-MCNC: 98 MG/DL (ref 70–99)
HCT VFR BLD AUTO: 35.3 % (ref 40–53)
HCT VFR BLD AUTO: 35.5 % (ref 40–53)
HGB BLD-MCNC: 10.6 G/DL (ref 13.3–17.7)
HGB BLD-MCNC: 10.8 G/DL (ref 13.3–17.7)
IMM GRANULOCYTES # BLD: 0.1 10E3/UL
IMM GRANULOCYTES # BLD: 0.1 10E3/UL
IMM GRANULOCYTES NFR BLD: 1 %
IMM GRANULOCYTES NFR BLD: 1 %
INTERPRETATION ECG - MUSE: NORMAL
LYMPHOCYTES # BLD AUTO: 1.8 10E3/UL (ref 0.8–5.3)
LYMPHOCYTES # BLD AUTO: 2.2 10E3/UL (ref 0.8–5.3)
LYMPHOCYTES NFR BLD AUTO: 14 %
LYMPHOCYTES NFR BLD AUTO: 16 %
MAGNESIUM SERPL-MCNC: 2.2 MG/DL (ref 1.7–2.3)
MCH RBC QN AUTO: 26.6 PG (ref 26.5–33)
MCH RBC QN AUTO: 26.8 PG (ref 26.5–33)
MCHC RBC AUTO-ENTMCNC: 30 G/DL (ref 31.5–36.5)
MCHC RBC AUTO-ENTMCNC: 30.4 G/DL (ref 31.5–36.5)
MCV RBC AUTO: 88 FL (ref 78–100)
MCV RBC AUTO: 89 FL (ref 78–100)
MONOCYTES # BLD AUTO: 0.9 10E3/UL (ref 0–1.3)
MONOCYTES # BLD AUTO: 1.2 10E3/UL (ref 0–1.3)
MONOCYTES NFR BLD AUTO: 7 %
MONOCYTES NFR BLD AUTO: 9 %
NEUTROPHILS # BLD AUTO: 9.6 10E3/UL (ref 1.6–8.3)
NEUTROPHILS # BLD AUTO: 9.7 10E3/UL (ref 1.6–8.3)
NEUTROPHILS NFR BLD AUTO: 70 %
NEUTROPHILS NFR BLD AUTO: 76 %
NRBC # BLD AUTO: 0 10E3/UL
NRBC # BLD AUTO: 0 10E3/UL
NRBC BLD AUTO-RTO: 0 /100
NRBC BLD AUTO-RTO: 0 /100
P AXIS - MUSE: 44 DEGREES
PLATELET # BLD AUTO: 219 10E3/UL (ref 150–450)
PLATELET # BLD AUTO: 242 10E3/UL (ref 150–450)
POTASSIUM SERPL-SCNC: 5.3 MMOL/L (ref 3.4–5.3)
POTASSIUM SERPL-SCNC: 5.7 MMOL/L (ref 3.4–5.3)
PR INTERVAL - MUSE: 158 MS
PROCALCITONIN SERPL IA-MCNC: 2.18 NG/ML
PROT SERPL-MCNC: 7.3 G/DL (ref 6.4–8.3)
PROT SERPL-MCNC: 7.5 G/DL (ref 6.4–8.3)
QRS DURATION - MUSE: 82 MS
QT - MUSE: 388 MS
QTC - MUSE: 466 MS
R AXIS - MUSE: 52 DEGREES
RBC # BLD AUTO: 3.99 10E6/UL (ref 4.4–5.9)
RBC # BLD AUTO: 4.03 10E6/UL (ref 4.4–5.9)
SODIUM SERPL-SCNC: 135 MMOL/L (ref 136–145)
SODIUM SERPL-SCNC: 137 MMOL/L (ref 136–145)
SYSTOLIC BLOOD PRESSURE - MUSE: NORMAL MMHG
T AXIS - MUSE: 86 DEGREES
TROPONIN T SERPL HS-MCNC: 64 NG/L
VENTRICULAR RATE- MUSE: 87 BPM
WBC # BLD AUTO: 12.7 10E3/UL (ref 4–11)
WBC # BLD AUTO: 13.6 10E3/UL (ref 4–11)

## 2023-07-27 PROCEDURE — 250N000013 HC RX MED GY IP 250 OP 250 PS 637: Performed by: EMERGENCY MEDICINE

## 2023-07-27 PROCEDURE — 84145 PROCALCITONIN (PCT): CPT

## 2023-07-27 PROCEDURE — 80053 COMPREHEN METABOLIC PANEL: CPT | Performed by: EMERGENCY MEDICINE

## 2023-07-27 PROCEDURE — 93990 DOPPLER FLOW TESTING: CPT

## 2023-07-27 PROCEDURE — 85025 COMPLETE CBC W/AUTO DIFF WBC: CPT

## 2023-07-27 PROCEDURE — 85025 COMPLETE CBC W/AUTO DIFF WBC: CPT | Performed by: EMERGENCY MEDICINE

## 2023-07-27 PROCEDURE — 96375 TX/PRO/DX INJ NEW DRUG ADDON: CPT

## 2023-07-27 PROCEDURE — 96374 THER/PROPH/DIAG INJ IV PUSH: CPT

## 2023-07-27 PROCEDURE — 93005 ELECTROCARDIOGRAM TRACING: CPT

## 2023-07-27 PROCEDURE — 83735 ASSAY OF MAGNESIUM: CPT

## 2023-07-27 PROCEDURE — 93971 EXTREMITY STUDY: CPT | Mod: LT

## 2023-07-27 PROCEDURE — 36415 COLL VENOUS BLD VENIPUNCTURE: CPT | Performed by: EMERGENCY MEDICINE

## 2023-07-27 PROCEDURE — 85652 RBC SED RATE AUTOMATED: CPT

## 2023-07-27 PROCEDURE — 73030 X-RAY EXAM OF SHOULDER: CPT | Mod: LT

## 2023-07-27 PROCEDURE — 36415 COLL VENOUS BLD VENIPUNCTURE: CPT

## 2023-07-27 PROCEDURE — 86140 C-REACTIVE PROTEIN: CPT

## 2023-07-27 PROCEDURE — 99284 EMERGENCY DEPT VISIT MOD MDM: CPT | Mod: 25

## 2023-07-27 PROCEDURE — 99285 EMERGENCY DEPT VISIT HI MDM: CPT | Mod: 25

## 2023-07-27 PROCEDURE — 84484 ASSAY OF TROPONIN QUANT: CPT

## 2023-07-27 PROCEDURE — 84155 ASSAY OF PROTEIN SERUM: CPT

## 2023-07-27 PROCEDURE — 250N000011 HC RX IP 250 OP 636

## 2023-07-27 RX ORDER — CEFTRIAXONE 2 G/1
2 INJECTION, POWDER, FOR SOLUTION INTRAMUSCULAR; INTRAVENOUS ONCE
Status: COMPLETED | OUTPATIENT
Start: 2023-07-27 | End: 2023-07-28

## 2023-07-27 RX ORDER — HYDROMORPHONE HYDROCHLORIDE 1 MG/ML
0.5 INJECTION, SOLUTION INTRAMUSCULAR; INTRAVENOUS; SUBCUTANEOUS
Status: DISCONTINUED | OUTPATIENT
Start: 2023-07-27 | End: 2023-07-28

## 2023-07-27 RX ORDER — OXYCODONE HYDROCHLORIDE 5 MG/1
10 TABLET ORAL ONCE
Status: COMPLETED | OUTPATIENT
Start: 2023-07-27 | End: 2023-07-27

## 2023-07-27 RX ORDER — OXYCODONE AND ACETAMINOPHEN 5; 325 MG/1; MG/1
2 TABLET ORAL EVERY 6 HOURS PRN
Qty: 12 TABLET | Refills: 0 | Status: ON HOLD | OUTPATIENT
Start: 2023-07-27 | End: 2023-07-28

## 2023-07-27 RX ADMIN — OXYCODONE HYDROCHLORIDE 10 MG: 5 TABLET ORAL at 07:23

## 2023-07-27 RX ADMIN — HYDROMORPHONE HYDROCHLORIDE 0.5 MG: 1 INJECTION, SOLUTION INTRAMUSCULAR; INTRAVENOUS; SUBCUTANEOUS at 22:09

## 2023-07-27 ASSESSMENT — ACTIVITIES OF DAILY LIVING (ADL)
ADLS_ACUITY_SCORE: 35
ADLS_ACUITY_SCORE: 33
ADLS_ACUITY_SCORE: 35
ADLS_ACUITY_SCORE: 35

## 2023-07-27 NOTE — ED TRIAGE NOTES
Patient here with left arm pain. He stated the lower part of his left arm has been hurting for 2 weeks . No he has upper left arm pain which started 2 days ago. He has a fistula in his left upper arm for dialysis     Triage Assessment       Row Name 07/27/23 0607       Respiratory WDL    Respiratory WDL WDL       Skin Circulation/Temperature WDL    Skin Circulation/Temperature WDL WDL       Cardiac WDL    Cardiac WDL WDL       Peripheral/Neurovascular WDL    Peripheral Neurovascular WDL WDL       Cognitive/Neuro/Behavioral WDL    Cognitive/Neuro/Behavioral WDL WDL

## 2023-07-27 NOTE — DISCHARGE INSTRUCTIONS
"You came to the ER for arm pain.  If you develop fevers or redness or swelling or worsening pain, please return to the ER.  Please reach out to your vascular surgeon regarding the small area of narrowing/\"stenosis\" of the fistula site.  "

## 2023-07-27 NOTE — TELEPHONE ENCOUNTER
[facility]    Who is the name of the provider?:  Dean    What is the location you see this provider at/preferred location?: [place]  Person calling / Facility: Taylor Mijaresal  Phone number:  [phone]  Nurse call back needed:  NO     Reason for call:  Patient called from  ED and wanted to inform [facility] / Dr Moran that a narrowing of his fistula was observed and he is asking to be checked once discharged.    Pharmacy location:  n/a  Outside Imaging: n/a   Can we leave a detailed message on this number?  YES

## 2023-07-27 NOTE — ED PROVIDER NOTES
History     Chief Complaint:  Arm Pain       HPI   Taylor Valiente is a 27 year old male with h/o ESRD on HD who presents with left upper arm pain.  Patient had had some pain from the left elbow to the left wrist over the past few weeks which has slowly improved.  Over the past few days, he has had pain in the left side/back of the arm and at the fistula site.  He reports that 3 days ago, during dialysis, the fistula site infiltrated.  He has had increasing pain in that region since then.  No fever or numbness or weakness in the left arm.  No falls or other trauma to the left arm.  At dialysis yesterday, they used dialysis catheter in his chest rather than AV fistula due to recent infiltration and pain.    Medications:    oxyCODONE-acetaminophen (PERCOCET) 5-325 MG tablet  ACE/ARB/ARNI NOT PRESCRIBED (INTENTIONAL)  acetaminophen (TYLENOL) 325 MG tablet  amLODIPine (NORVASC) 10 MG tablet  apixaban ANTICOAGULANT (ELIQUIS) 5 MG tablet  aspirin 81 MG EC tablet  atorvastatin (LIPITOR) 10 MG tablet  bumetanide (BUMEX) 2 MG tablet  calcium acetate (CALPHRON) 667 MG TABS tablet  carvedilol (COREG) 25 MG tablet  cinacalcet (SENSIPAR) 90 MG tablet  cloNIDine (CATAPRES-TTS2) 0.2 MG/24HR WK patch  hydrALAZINE (APRESOLINE) 100 MG tablet  lisinopril (ZESTRIL) 20 MG tablet  medical cannabis (Patient's own supply)  multivitamin RENAL (RENAVITE RX/NEPHROVITE) 1 MG tablet  nitroGLYcerin (NITROSTAT) 0.3 MG sublingual tablet  sevelamer HCl (RENAGEL) 800 MG tablet  vitamin D3 (CHOLECALCIFEROL) 2000 units (50 mcg) tablet        Past Medical History:    Past Medical History:   Diagnosis Date    Adrenal adenoma, left     Anemia     Benign essential hypertension 07/28/2017    CKD (chronic kidney disease) stage 5, GFR less than 15 ml/min (H)     Depression     Focal glomerular sclerosis 07/28/2017    HLD (hyperlipidemia)     LVH (left ventricular hypertrophy) due to hypertensive disease 07/14/2017    Noncompliance     Obesity,  unspecified     OD (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/2021    Stress-induced cardiomyopathy     Suicide attempt (H) 2019       Past Surgical History:    Past Surgical History:   Procedure Laterality Date    ARTHROSCOPY ANKLE Left 02/24/2021    Procedure: Left ankle arthroscopy and debridement/micro fracture;  Surgeon: Mirza Nelson MD;  Location: [facility] OR    BIOPSY  2017    renal- [facility]    CREATE FISTULA ARTERIOVENOUS UPPER EXTREMITY Right 03/04/2021    Procedure: RIGHT proximal radial  to CEPHALIC ARTERIOVENOUS FISTULA;  Surgeon: Henrik Moran MD;  Location: [facility] OR    CREATE FISTULA ARTERIOVENOUS UPPER EXTREMITY Left 03/09/2023    Procedure: CREATION OF FIRST STAGE LEFT BRACHIOBASILIC ARTERIOVENOUS FISTULA;  Surgeon: Henrik Moran MD;  Location: [facility] OR    CREATE FISTULA ARTERIOVENOUS UPPER EXTREMITY Left 5/30/2023    Procedure: SECOND STAGE LEFT BRACHIAL TO BASILIC ARTERIOVENOUS FISTULA;  Surgeon: Henrik Moran MD;  Location: [facility] OR    IR CVC TUNNEL PLACEMENT > 5 YRS OF AGE  06/17/2021    IR CVC TUNNEL PLACEMENT > 5 YRS OF AGE  03/09/2023    IR CVC TUNNEL REMOVAL RIGHT  12/03/2021    IRRIGATION AND DEBRIDEMENT UPPER EXTREMITY, COMBINED Left 03/16/2023    Procedure: EVACUATION OF LEFT ARM HEMATOMA;  Surgeon: Henrik Moran MD;  Location: [facility] OR    LIGATE FISTULA ARTERIOVENOUS UPPER EXTREMITY Right 03/09/2023    Procedure: LIGATION OF RIGHT ARM ARTERIOVENOUS FISTULA;  Surgeon: Henrik Moran MD;  Location: [facility] OR    REVISION FISTULA ARTERIOVENOUS UPPER EXTREMITY Right 08/26/2021    Procedure: Second stage RIGHT BRACHIOCEPHALIC transposition ARTERIOVENOUS FISTULA;  Surgeon: Henrik Moran MD;  Location: [facility] OR        Physical Exam   Patient Vitals for the past 24 hrs:   BP Temp Pulse Resp SpO2 Height   07/27/23 0928 (!) 177/109 -- 81 -- -- --   07/27/23 0806 -- -- -- -- 100 % --   07/27/23 0800 (!) 163/97 -- 82 -- -- --   07/27/23 0607 (!) 171/92 98.2  F (36.8  C) 85 18  "97 % 1.778 m (5' 10\")        Physical Exam  VS: Reviewed per above  HENT: Mucous membranes moist  EYES: sclera anicteric  CV: Rate as noted, regular rhythm.   RESP: Effort normal.  GI: no tenderness/rebound/guarding, not distended.  NEURO: Alert, moving all extremities, station intact light touch in the left upper extremity distally.  5 out of 5 strength in left upper extremity.  MSK: No deformity of the extremities.  Left upper arm AV fistula with palpable thrill.  There is some contusion around the AV fistula site but no redness or warmth.  SKIN: Warm and dry    Emergency Department Course       Imaging:  US Upper Extremity Venous Duplex Left   Preliminary Result   IMPRESSION: No deep venous thrombosis in the left upper extremity.      US Ext Arterial Venous Dialys Acs Graft   Final Result   IMPRESSION: Patent left brachial artery to basilic vein fistula.   Ultrasound suggests a focal stenosis just past the arteriovenous   anastomosis as above.      RODNEY MALONEY MD            SYSTEM ID:  [id number]         Report per radiology    Laboratory:  Labs Ordered and Resulted from Time of ED Arrival to Time of ED Departure   COMPREHENSIVE METABOLIC PANEL - Abnormal       Result Value    Sodium 137      Potassium 5.3      Chloride 99      Carbon Dioxide (CO2) 21 (*)     Anion Gap 17 (*)     Urea Nitrogen 43.8 (*)     Creatinine 10.09 (*)     Calcium 9.5      Glucose 98      Alkaline Phosphatase 78      AST 7      ALT 8      Protein Total 7.3      Albumin 3.8      Bilirubin Total 0.3      GFR Estimate 7 (*)    CBC WITH PLATELETS AND DIFFERENTIAL - Abnormal    WBC Count 13.6 (*)     RBC Count 3.99 (*)     Hemoglobin 10.6 (*)     Hematocrit 35.3 (*)     MCV 89      MCH 26.6      MCHC 30.0 (*)     RDW 17.5 (*)     Platelet Count 219      % Neutrophils 70      % Lymphocytes 16      % Monocytes 9      % Eosinophils 3      % Basophils 1      % Immature Granulocytes 1      NRBCs per 100 WBC 0      Absolute Neutrophils 9.6 (*)  "    Absolute Lymphocytes 2.2      Absolute Monocytes 1.2      Absolute Eosinophils 0.4      Absolute Basophils 0.1      Absolute Immature Granulocytes 0.1      Absolute NRBCs 0.0            Emergency Department Course & Assessments:       Interventions:  Medications   oxyCODONE (ROXICODONE) tablet 10 mg (10 mg Oral $Given 7/27/23 0723)        Disposition:  The patient was discharged to home.     Impression & Plan      Medical Decision Making:  Patient presents to the ER for evaluation of left upper arm pain.  He has had some pain in this arm over the past few weeks but the upper arm pain started about 3 days ago after he reports infiltration around the AV fistula site during dialysis.  No signs of limb ischemia or compartment syndrome or skin or soft tissue infection on exam.  Ultrasound does not show DVT.  There is focal stenosis of the AV fistula but no occlusion or signs of pseudoaneurysm.  At this juncture, no emergent etiology identified.  Possible he is suffering from pain related to infiltration/contusion.  Few pills of Percocet prescribed for breakthrough pain control.  Recommended vascular surgery follow-up regarding focal area of stenosis in the AV fistula that was identified.  Return precautions discussed prior to discharge.      Diagnosis:    ICD-10-CM    1. Pain of left upper arm  M79.622            Discharge Medications:  Discharge Medication List as of 7/27/2023  9:24 AM        START taking these medications    Details   oxyCODONE-acetaminophen (PERCOCET) 5-325 MG tablet Take 2 tablets by mouth every 6 hours as needed for pain, Disp-12 tablet, R-0, E-Prescribe                7/27/2023   Lm Suh MD Lindenbaum, Elan, MD  07/27/23 2565

## 2023-07-27 NOTE — ED NOTES
Pt received education regarding controlled medication administration in emergency department. Pt verbalizes understanding that they are unable to drive or operate heavy machinery after receiving any controlled medications.

## 2023-07-28 ENCOUNTER — APPOINTMENT (OUTPATIENT)
Dept: GENERAL RADIOLOGY | Facility: CLINIC | Age: 28
DRG: 557 | End: 2023-07-28
Attending: STUDENT IN AN ORGANIZED HEALTH CARE EDUCATION/TRAINING PROGRAM
Payer: MEDICARE

## 2023-07-28 PROBLEM — E87.5 HYPERKALEMIA: Status: ACTIVE | Noted: 2021-11-29

## 2023-07-28 PROBLEM — R70.0 ELEVATED ERYTHROCYTE SEDIMENTATION RATE: Status: ACTIVE | Noted: 2023-07-28

## 2023-07-28 PROBLEM — M25.512 ACUTE PAIN OF LEFT SHOULDER: Status: ACTIVE | Noted: 2023-07-28

## 2023-07-28 PROBLEM — D72.829 LEUKOCYTOSIS, UNSPECIFIED TYPE: Status: ACTIVE | Noted: 2023-07-28

## 2023-07-28 LAB
ANION GAP SERPL CALCULATED.3IONS-SCNC: 18 MMOL/L (ref 7–15)
BUN SERPL-MCNC: 57.4 MG/DL (ref 6–20)
CALCIUM SERPL-MCNC: 9.7 MG/DL (ref 8.6–10)
CHLORIDE SERPL-SCNC: 99 MMOL/L (ref 98–107)
CREAT SERPL-MCNC: 12.84 MG/DL (ref 0.67–1.17)
CRP SERPL-MCNC: 127.64 MG/L
DEPRECATED HCO3 PLAS-SCNC: 19 MMOL/L (ref 22–29)
ERYTHROCYTE [DISTWIDTH] IN BLOOD BY AUTOMATED COUNT: 17.9 % (ref 10–15)
ERYTHROCYTE [SEDIMENTATION RATE] IN BLOOD BY WESTERGREN METHOD: 63 MM/HR (ref 0–15)
GFR SERPL CREATININE-BSD FRML MDRD: 5 ML/MIN/1.73M2
GLUCOSE BLDC GLUCOMTR-MCNC: 92 MG/DL (ref 70–99)
GLUCOSE SERPL-MCNC: 100 MG/DL (ref 70–99)
HCT VFR BLD AUTO: 35.5 % (ref 40–53)
HGB BLD-MCNC: 10.6 G/DL (ref 13.3–17.7)
LACTATE SERPL-SCNC: 1.8 MMOL/L (ref 0.7–2)
MCH RBC QN AUTO: 26.8 PG (ref 26.5–33)
MCHC RBC AUTO-ENTMCNC: 29.9 G/DL (ref 31.5–36.5)
MCV RBC AUTO: 90 FL (ref 78–100)
PLATELET # BLD AUTO: 230 10E3/UL (ref 150–450)
POTASSIUM SERPL-SCNC: 5 MMOL/L (ref 3.4–5.3)
POTASSIUM SERPL-SCNC: 5.2 MMOL/L (ref 3.4–5.3)
POTASSIUM SERPL-SCNC: 5.2 MMOL/L (ref 3.4–5.3)
POTASSIUM SERPL-SCNC: 5.4 MMOL/L (ref 3.4–5.3)
RBC # BLD AUTO: 3.96 10E6/UL (ref 4.4–5.9)
SARS-COV-2 RNA RESP QL NAA+PROBE: NEGATIVE
SODIUM SERPL-SCNC: 136 MMOL/L (ref 136–145)
TROPONIN T SERPL HS-MCNC: 64 NG/L
URATE SERPL-MCNC: 6.5 MG/DL (ref 3.4–7)
WBC # BLD AUTO: 10.3 10E3/UL (ref 4–11)

## 2023-07-28 PROCEDURE — 96376 TX/PRO/DX INJ SAME DRUG ADON: CPT

## 2023-07-28 PROCEDURE — 250N000011 HC RX IP 250 OP 636: Mod: JZ

## 2023-07-28 PROCEDURE — 258N000003 HC RX IP 258 OP 636

## 2023-07-28 PROCEDURE — 5A1D70Z PERFORMANCE OF URINARY FILTRATION, INTERMITTENT, LESS THAN 6 HOURS PER DAY: ICD-10-PCS | Performed by: INTERNAL MEDICINE

## 2023-07-28 PROCEDURE — 96367 TX/PROPH/DG ADDL SEQ IV INF: CPT

## 2023-07-28 PROCEDURE — 82962 GLUCOSE BLOOD TEST: CPT

## 2023-07-28 PROCEDURE — 250N000011 HC RX IP 250 OP 636: Performed by: INTERNAL MEDICINE

## 2023-07-28 PROCEDURE — 250N000011 HC RX IP 250 OP 636: Mod: JZ | Performed by: INTERNAL MEDICINE

## 2023-07-28 PROCEDURE — 99207 PR NO BILLABLE SERVICE THIS VISIT: CPT | Performed by: HOSPITALIST

## 2023-07-28 PROCEDURE — 84484 ASSAY OF TROPONIN QUANT: CPT

## 2023-07-28 PROCEDURE — 250N000011 HC RX IP 250 OP 636: Mod: JZ | Performed by: HOSPITALIST

## 2023-07-28 PROCEDURE — 99223 1ST HOSP IP/OBS HIGH 75: CPT | Mod: A1 | Performed by: INTERNAL MEDICINE

## 2023-07-28 PROCEDURE — 90937 HEMODIALYSIS REPEATED EVAL: CPT

## 2023-07-28 PROCEDURE — 250N000013 HC RX MED GY IP 250 OP 250 PS 637: Performed by: EMERGENCY MEDICINE

## 2023-07-28 PROCEDURE — 86140 C-REACTIVE PROTEIN: CPT | Performed by: STUDENT IN AN ORGANIZED HEALTH CARE EDUCATION/TRAINING PROGRAM

## 2023-07-28 PROCEDURE — 83605 ASSAY OF LACTIC ACID: CPT | Performed by: HOSPITALIST

## 2023-07-28 PROCEDURE — 36415 COLL VENOUS BLD VENIPUNCTURE: CPT

## 2023-07-28 PROCEDURE — 258N000003 HC RX IP 258 OP 636: Performed by: INTERNAL MEDICINE

## 2023-07-28 PROCEDURE — G0378 HOSPITAL OBSERVATION PER HR: HCPCS

## 2023-07-28 PROCEDURE — 84132 ASSAY OF SERUM POTASSIUM: CPT

## 2023-07-28 PROCEDURE — 85652 RBC SED RATE AUTOMATED: CPT | Performed by: STUDENT IN AN ORGANIZED HEALTH CARE EDUCATION/TRAINING PROGRAM

## 2023-07-28 PROCEDURE — 84132 ASSAY OF SERUM POTASSIUM: CPT | Performed by: INTERNAL MEDICINE

## 2023-07-28 PROCEDURE — 87077 CULTURE AEROBIC IDENTIFY: CPT

## 2023-07-28 PROCEDURE — 77002 NEEDLE LOCALIZATION BY XRAY: CPT

## 2023-07-28 PROCEDURE — 87635 SARS-COV-2 COVID-19 AMP PRB: CPT | Performed by: INTERNAL MEDICINE

## 2023-07-28 PROCEDURE — 90935 HEMODIALYSIS ONE EVALUATION: CPT | Performed by: INTERNAL MEDICINE

## 2023-07-28 PROCEDURE — 99222 1ST HOSP IP/OBS MODERATE 55: CPT | Mod: 25 | Performed by: INTERNAL MEDICINE

## 2023-07-28 PROCEDURE — 0RJK3ZZ INSPECTION OF LEFT SHOULDER JOINT, PERCUTANEOUS APPROACH: ICD-10-PCS | Performed by: RADIOLOGY

## 2023-07-28 PROCEDURE — 84550 ASSAY OF BLOOD/URIC ACID: CPT | Performed by: INTERNAL MEDICINE

## 2023-07-28 PROCEDURE — 96365 THER/PROPH/DIAG IV INF INIT: CPT

## 2023-07-28 PROCEDURE — 87149 DNA/RNA DIRECT PROBE: CPT

## 2023-07-28 PROCEDURE — 120N000001 HC R&B MED SURG/OB

## 2023-07-28 PROCEDURE — 250N000013 HC RX MED GY IP 250 OP 250 PS 637: Performed by: INTERNAL MEDICINE

## 2023-07-28 PROCEDURE — 85027 COMPLETE CBC AUTOMATED: CPT | Performed by: INTERNAL MEDICINE

## 2023-07-28 PROCEDURE — 250N000013 HC RX MED GY IP 250 OP 250 PS 637: Performed by: HOSPITALIST

## 2023-07-28 PROCEDURE — 634N000001 HC RX 634: Mod: JZ | Performed by: INTERNAL MEDICINE

## 2023-07-28 PROCEDURE — 36415 COLL VENOUS BLD VENIPUNCTURE: CPT | Performed by: HOSPITALIST

## 2023-07-28 PROCEDURE — 36415 COLL VENOUS BLD VENIPUNCTURE: CPT | Performed by: INTERNAL MEDICINE

## 2023-07-28 PROCEDURE — 250N000009 HC RX 250: Performed by: HOSPITALIST

## 2023-07-28 RX ORDER — SODIUM POLYSTYRENE SULFONATE 15 G/60ML
30 SUSPENSION ORAL; RECTAL ONCE
Status: COMPLETED | OUTPATIENT
Start: 2023-07-28 | End: 2023-07-28

## 2023-07-28 RX ORDER — NALOXONE HYDROCHLORIDE 0.4 MG/ML
0.4 INJECTION, SOLUTION INTRAMUSCULAR; INTRAVENOUS; SUBCUTANEOUS
Status: DISCONTINUED | OUTPATIENT
Start: 2023-07-28 | End: 2023-08-02 | Stop reason: HOSPADM

## 2023-07-28 RX ORDER — PROCHLORPERAZINE 25 MG
25 SUPPOSITORY, RECTAL RECTAL EVERY 12 HOURS PRN
Status: DISCONTINUED | OUTPATIENT
Start: 2023-07-28 | End: 2023-08-02 | Stop reason: HOSPADM

## 2023-07-28 RX ORDER — NALOXONE HYDROCHLORIDE 0.4 MG/ML
0.2 INJECTION, SOLUTION INTRAMUSCULAR; INTRAVENOUS; SUBCUTANEOUS
Status: DISCONTINUED | OUTPATIENT
Start: 2023-07-28 | End: 2023-08-02 | Stop reason: HOSPADM

## 2023-07-28 RX ORDER — OXYCODONE HYDROCHLORIDE 5 MG/1
5 TABLET ORAL EVERY 4 HOURS PRN
Status: DISCONTINUED | OUTPATIENT
Start: 2023-07-28 | End: 2023-08-02 | Stop reason: HOSPADM

## 2023-07-28 RX ORDER — CINACALCET 30 MG/1
90 TABLET, FILM COATED ORAL DAILY
Status: DISCONTINUED | OUTPATIENT
Start: 2023-07-28 | End: 2023-08-02 | Stop reason: HOSPADM

## 2023-07-28 RX ORDER — LIDOCAINE 40 MG/G
CREAM TOPICAL
Status: DISCONTINUED | OUTPATIENT
Start: 2023-07-28 | End: 2023-08-02 | Stop reason: HOSPADM

## 2023-07-28 RX ORDER — CARVEDILOL 25 MG/1
25 TABLET ORAL 2 TIMES DAILY WITH MEALS
Status: DISCONTINUED | OUTPATIENT
Start: 2023-07-28 | End: 2023-08-02 | Stop reason: HOSPADM

## 2023-07-28 RX ORDER — SEVELAMER HYDROCHLORIDE 800 MG/1
4000 TABLET, FILM COATED ORAL
Status: DISCONTINUED | OUTPATIENT
Start: 2023-07-28 | End: 2023-07-28

## 2023-07-28 RX ORDER — AMOXICILLIN 250 MG
1 CAPSULE ORAL 2 TIMES DAILY PRN
Status: DISCONTINUED | OUTPATIENT
Start: 2023-07-28 | End: 2023-08-02 | Stop reason: HOSPADM

## 2023-07-28 RX ORDER — POLYETHYLENE GLYCOL 3350 17 G/17G
17 POWDER, FOR SOLUTION ORAL DAILY PRN
Status: DISCONTINUED | OUTPATIENT
Start: 2023-07-28 | End: 2023-08-02 | Stop reason: HOSPADM

## 2023-07-28 RX ORDER — ACETAMINOPHEN 325 MG/1
650 TABLET ORAL EVERY 6 HOURS PRN
Status: DISCONTINUED | OUTPATIENT
Start: 2023-07-28 | End: 2023-08-02 | Stop reason: HOSPADM

## 2023-07-28 RX ORDER — LISINOPRIL 20 MG/1
20 TABLET ORAL EVERY EVENING
Status: DISCONTINUED | OUTPATIENT
Start: 2023-07-28 | End: 2023-08-02 | Stop reason: HOSPADM

## 2023-07-28 RX ORDER — SEVELAMER CARBONATE 800 MG/1
4000 TABLET, FILM COATED ORAL
Status: DISCONTINUED | OUTPATIENT
Start: 2023-07-28 | End: 2023-08-02 | Stop reason: HOSPADM

## 2023-07-28 RX ORDER — ALBUMIN (HUMAN) 12.5 G/50ML
50 SOLUTION INTRAVENOUS
Status: DISCONTINUED | OUTPATIENT
Start: 2023-07-28 | End: 2023-07-28

## 2023-07-28 RX ORDER — AMOXICILLIN 250 MG
2 CAPSULE ORAL 2 TIMES DAILY PRN
Status: DISCONTINUED | OUTPATIENT
Start: 2023-07-28 | End: 2023-08-02 | Stop reason: HOSPADM

## 2023-07-28 RX ORDER — LIDOCAINE HYDROCHLORIDE 10 MG/ML
30 INJECTION, SOLUTION EPIDURAL; INFILTRATION; INTRACAUDAL; PERINEURAL ONCE
Status: COMPLETED | OUTPATIENT
Start: 2023-07-28 | End: 2023-07-28

## 2023-07-28 RX ORDER — CEFTRIAXONE 2 G/1
2 INJECTION, POWDER, FOR SOLUTION INTRAMUSCULAR; INTRAVENOUS EVERY 24 HOURS
Status: DISCONTINUED | OUTPATIENT
Start: 2023-07-29 | End: 2023-07-29

## 2023-07-28 RX ORDER — NICOTINE POLACRILEX 4 MG
15-30 LOZENGE BUCCAL
Status: DISCONTINUED | OUTPATIENT
Start: 2023-07-28 | End: 2023-08-02 | Stop reason: HOSPADM

## 2023-07-28 RX ORDER — HEPARIN SODIUM 1000 [USP'U]/ML
500 INJECTION, SOLUTION INTRAVENOUS; SUBCUTANEOUS CONTINUOUS
Status: DISCONTINUED | OUTPATIENT
Start: 2023-07-28 | End: 2023-07-28

## 2023-07-28 RX ORDER — HYDRALAZINE HYDROCHLORIDE 50 MG/1
100 TABLET, FILM COATED ORAL 3 TIMES DAILY
Status: DISCONTINUED | OUTPATIENT
Start: 2023-07-28 | End: 2023-08-02 | Stop reason: HOSPADM

## 2023-07-28 RX ORDER — HYDRALAZINE HYDROCHLORIDE 20 MG/ML
10 INJECTION INTRAMUSCULAR; INTRAVENOUS EVERY 4 HOURS PRN
Status: DISCONTINUED | OUTPATIENT
Start: 2023-07-28 | End: 2023-08-02 | Stop reason: HOSPADM

## 2023-07-28 RX ORDER — HYDROMORPHONE HCL IN WATER/PF 6 MG/30 ML
0.4 PATIENT CONTROLLED ANALGESIA SYRINGE INTRAVENOUS
Status: DISCONTINUED | OUTPATIENT
Start: 2023-07-28 | End: 2023-08-02 | Stop reason: HOSPADM

## 2023-07-28 RX ORDER — AMLODIPINE BESYLATE 10 MG/1
10 TABLET ORAL DAILY
Status: DISCONTINUED | OUTPATIENT
Start: 2023-07-28 | End: 2023-08-02 | Stop reason: HOSPADM

## 2023-07-28 RX ORDER — BUMETANIDE 2 MG/1
2 TABLET ORAL DAILY
Status: DISCONTINUED | OUTPATIENT
Start: 2023-07-28 | End: 2023-08-02 | Stop reason: HOSPADM

## 2023-07-28 RX ORDER — PROCHLORPERAZINE MALEATE 10 MG
10 TABLET ORAL EVERY 6 HOURS PRN
Status: DISCONTINUED | OUTPATIENT
Start: 2023-07-28 | End: 2023-08-02 | Stop reason: HOSPADM

## 2023-07-28 RX ORDER — ACETAMINOPHEN 650 MG/1
650 SUPPOSITORY RECTAL EVERY 6 HOURS PRN
Status: DISCONTINUED | OUTPATIENT
Start: 2023-07-28 | End: 2023-08-02 | Stop reason: HOSPADM

## 2023-07-28 RX ORDER — ATORVASTATIN CALCIUM 10 MG/1
10 TABLET, FILM COATED ORAL AT BEDTIME
Status: DISCONTINUED | OUTPATIENT
Start: 2023-07-28 | End: 2023-08-02 | Stop reason: HOSPADM

## 2023-07-28 RX ORDER — LANOLIN ALCOHOL/MO/W.PET/CERES
3 CREAM (GRAM) TOPICAL
Status: DISCONTINUED | OUTPATIENT
Start: 2023-07-28 | End: 2023-08-02 | Stop reason: HOSPADM

## 2023-07-28 RX ORDER — B COMPLEX, C NO.20/FOLIC ACID 1 MG
1 CAPSULE ORAL DAILY
Status: DISCONTINUED | OUTPATIENT
Start: 2023-07-28 | End: 2023-08-02 | Stop reason: HOSPADM

## 2023-07-28 RX ORDER — CALCIUM ACETATE 667 MG/1
1334 CAPSULE ORAL
Status: DISCONTINUED | OUTPATIENT
Start: 2023-07-28 | End: 2023-08-02 | Stop reason: HOSPADM

## 2023-07-28 RX ORDER — BISACODYL 10 MG
10 SUPPOSITORY, RECTAL RECTAL DAILY PRN
Status: DISCONTINUED | OUTPATIENT
Start: 2023-07-28 | End: 2023-08-02 | Stop reason: HOSPADM

## 2023-07-28 RX ORDER — HYDROMORPHONE HCL IN WATER/PF 6 MG/30 ML
0.2 PATIENT CONTROLLED ANALGESIA SYRINGE INTRAVENOUS
Status: DISCONTINUED | OUTPATIENT
Start: 2023-07-28 | End: 2023-08-02 | Stop reason: HOSPADM

## 2023-07-28 RX ORDER — CHOLECALCIFEROL (VITAMIN D3) 50 MCG
50 TABLET ORAL DAILY
Status: DISCONTINUED | OUTPATIENT
Start: 2023-07-28 | End: 2023-08-02 | Stop reason: HOSPADM

## 2023-07-28 RX ORDER — DEXTROSE MONOHYDRATE 25 G/50ML
25-50 INJECTION, SOLUTION INTRAVENOUS
Status: DISCONTINUED | OUTPATIENT
Start: 2023-07-28 | End: 2023-08-02 | Stop reason: HOSPADM

## 2023-07-28 RX ADMIN — CEFTRIAXONE SODIUM 2 G: 2 INJECTION, POWDER, FOR SOLUTION INTRAMUSCULAR; INTRAVENOUS at 00:02

## 2023-07-28 RX ADMIN — SODIUM POLYSTYRENE SULFONATE 30 G: 15 SUSPENSION ORAL; RECTAL at 04:08

## 2023-07-28 RX ADMIN — APIXABAN 5 MG: 5 TABLET, FILM COATED ORAL at 20:17

## 2023-07-28 RX ADMIN — HYDRALAZINE HYDROCHLORIDE 100 MG: 50 TABLET, FILM COATED ORAL at 16:49

## 2023-07-28 RX ADMIN — LIDOCAINE HYDROCHLORIDE 8 ML: 10 INJECTION, SOLUTION EPIDURAL; INFILTRATION; INTRACAUDAL; PERINEURAL at 16:14

## 2023-07-28 RX ADMIN — BUMETANIDE 2 MG: 2 TABLET ORAL at 08:53

## 2023-07-28 RX ADMIN — SODIUM CHLORIDE 250 ML: 9 INJECTION, SOLUTION INTRAVENOUS at 13:37

## 2023-07-28 RX ADMIN — HYDRALAZINE HYDROCHLORIDE 100 MG: 50 TABLET, FILM COATED ORAL at 08:52

## 2023-07-28 RX ADMIN — HYDROMORPHONE HYDROCHLORIDE 0.4 MG: 0.2 INJECTION, SOLUTION INTRAMUSCULAR; INTRAVENOUS; SUBCUTANEOUS at 04:07

## 2023-07-28 RX ADMIN — ACETAMINOPHEN 650 MG: 325 TABLET, FILM COATED ORAL at 18:08

## 2023-07-28 RX ADMIN — VANCOMYCIN HYDROCHLORIDE 2500 MG: 10 INJECTION, POWDER, LYOPHILIZED, FOR SOLUTION INTRAVENOUS at 00:38

## 2023-07-28 RX ADMIN — SEVELAMER CARBONATE 4000 MG: 800 TABLET, FILM COATED ORAL at 08:52

## 2023-07-28 RX ADMIN — ATORVASTATIN CALCIUM 10 MG: 10 TABLET, FILM COATED ORAL at 22:28

## 2023-07-28 RX ADMIN — Medication 50 MCG: at 08:52

## 2023-07-28 RX ADMIN — LISINOPRIL 20 MG: 20 TABLET ORAL at 20:17

## 2023-07-28 RX ADMIN — CARVEDILOL 25 MG: 25 TABLET, FILM COATED ORAL at 08:53

## 2023-07-28 RX ADMIN — HYDRALAZINE HYDROCHLORIDE 100 MG: 50 TABLET, FILM COATED ORAL at 20:17

## 2023-07-28 RX ADMIN — HEPARIN SODIUM 1900 UNITS: 1000 INJECTION INTRAVENOUS; SUBCUTANEOUS at 14:24

## 2023-07-28 RX ADMIN — Medication 1 CAPSULE: at 08:52

## 2023-07-28 RX ADMIN — HYDROMORPHONE HYDROCHLORIDE 0.4 MG: 0.2 INJECTION, SOLUTION INTRAMUSCULAR; INTRAVENOUS; SUBCUTANEOUS at 08:57

## 2023-07-28 RX ADMIN — HYDRALAZINE HYDROCHLORIDE 10 MG: 20 INJECTION INTRAMUSCULAR; INTRAVENOUS at 17:25

## 2023-07-28 RX ADMIN — AMLODIPINE BESYLATE 10 MG: 10 TABLET ORAL at 08:53

## 2023-07-28 RX ADMIN — SEVELAMER CARBONATE 4000 MG: 800 TABLET, FILM COATED ORAL at 17:17

## 2023-07-28 RX ADMIN — CARVEDILOL 25 MG: 25 TABLET, FILM COATED ORAL at 17:17

## 2023-07-28 RX ADMIN — CALCIUM ACETATE 1334 MG: 667 CAPSULE ORAL at 17:17

## 2023-07-28 RX ADMIN — EPOETIN ALFA-EPBX 4000 UNITS: 4000 INJECTION, SOLUTION INTRAVENOUS; SUBCUTANEOUS at 13:42

## 2023-07-28 RX ADMIN — CINACALCET 90 MG: 30 TABLET ORAL at 08:52

## 2023-07-28 RX ADMIN — CALCIUM ACETATE 1334 MG: 667 CAPSULE ORAL at 08:52

## 2023-07-28 RX ADMIN — SODIUM CHLORIDE 300 ML: 9 INJECTION, SOLUTION INTRAVENOUS at 13:36

## 2023-07-28 ASSESSMENT — ACTIVITIES OF DAILY LIVING (ADL)
ADLS_ACUITY_SCORE: 31
ADLS_ACUITY_SCORE: 35
ADLS_ACUITY_SCORE: 31
ADLS_ACUITY_SCORE: 35
ADLS_ACUITY_SCORE: 31
ADLS_ACUITY_SCORE: 31

## 2023-07-28 NOTE — TELEPHONE ENCOUNTER
Attempted to call patient back and no answer.  Per chart review vascular surgery has already consulted on patient.  Dr. Moran is not on call and therefore would not be seeing patient in the ED.    Shabnam RUBY, RN    [facility]  Office: [phone]  Fax: [phone]

## 2023-07-28 NOTE — UTILIZATION REVIEW
Admission Status; Secondary Review Determination       Under the authority of the Utilization Management Committee, the utilization review process indicated a secondary review on the above patient. The review outcome is based on review of the medical records, discussions with staff, and applying clinical experience noted on the date of the review.     (x) Inpatient Status Appropriate - This patient's medical care is consistent with medical management for inpatient care and reasonable inpatient medical practice.     RATIONALE FOR DETERMINATION   27-year-old male with ESRD on MWF dialysis presented to the ED with left shoulder pain worsening over 2 days. He was noted to be hyperkalemic. Inflammatory markers were elevated, raising suspicion for possible septic arthritis. He received ceftriaxone and vancomycin in the ED. Orthopedics evaluated him and ordered IR left shoulder aspiration. Shoulder X-ray showed no acute fractures or dislocations. Ultrasound revealed a patent left brachial artery to basilic vein fistula with a focal stenosis near the arteriovenous anastomosis. Vascular surgery noted a hemodynamically insignificant stenosis and suggested no intervention. Nephrology is following the patient, and dialysis is planned for 7/28 via the right CVC. The patient's medications are on hold, awaiting the results of the shoulder aspiration before resuming Eliquis.   Hospitalization allows for close monitoring, timely orthopedic and nephrology consultations, and possible IR intervention. If treated as an outpatient or in short stay, there is a risk of delayed diagnosis and treatment of the suspected septic arthritis, leading to potential progression of infection, joint damage, and systemic complications.   Patient setting to ensure patient safety and optimize outcomes.  Dr. Collazo  notified   The expected length of stay at the time of admission was more than 2 nights because of the severity of illness, intensity of  service provided, and risk for adverse outcome. Inpatient admission is appropriate.         This document was produced using voice recognition software       The information on this document is developed by the utilization review team in order for the business office to ensure compliance. This only denotes the appropriateness of proper admission status and does not reflect the quality of care rendered.   The definitions of Inpatient Status and Observation Status used in making the determination above are those provided in the CMS Coverage Manual, Chapter 1 and Chapter 6, section 70.4.   Sincerely,   DANIEL LANDRUM MD   System Medical Director   Utilization Management   [facility].

## 2023-07-28 NOTE — PROGRESS NOTES
Partially met PRIMARY DIAGNOSIS: ACUTE PAIN  OUTPATIENT/OBSERVATION GOALS TO BE MET BEFORE DISCHARGE:  1. Pain Status: Improved but still requiring IV narcotics.    2. Return to near baseline physical activity: Yes    3. Cleared for discharge by consultants (if involved): No    Discharge Planner Nurse   Safe discharge environment identified: No  Barriers to discharge: Yes       Entered by: NAYAN KIRK RN 07/28/2023 5:46 AM     Please review provider order for any additional goals.   Nurse to notify provider when observation goals have been met and patient is ready for discharge.

## 2023-07-28 NOTE — PROGRESS NOTES
Potassium   Date Value Ref Range Status   07/28/2023 5.0 3.4 - 5.3 mmol/L Final   06/20/2022 3.9 3.4 - 5.3 mmol/L Final   06/19/2021 3.8 3.4 - 5.3 mmol/L Final     Hemoglobin   Date Value Ref Range Status   07/28/2023 10.6 (L) 13.3 - 17.7 g/dL Final   06/18/2021 9.1 (L) 13.3 - 17.7 g/dL Final     Creatinine   Date Value Ref Range Status   07/28/2023 12.84 (H) 0.67 - 1.17 mg/dL Final   06/19/2021 8.93 (H) 0.66 - 1.25 mg/dL Final     Urea Nitrogen   Date Value Ref Range Status   07/28/2023 57.4 (H) 6.0 - 20.0 mg/dL Final   06/20/2022 35 (H) 7 - 30 mg/dL Final   06/19/2021 58 (H) 7 - 30 mg/dL Final     Sodium   Date Value Ref Range Status   07/28/2023 136 136 - 145 mmol/L Final   06/19/2021 139 133 - 144 mmol/L Final     INR   Date Value Ref Range Status   03/15/2023 1.40 (H) 0.85 - 1.15 Final   02/09/2021 1.23 (H) 0.86 - 1.14 Final       DIALYSIS PROCEDURE NOTE  Hepatitis status of previous patient on machine log was checked and verified ok to use with this patients hepatitis status.  Patient dialyzed for 3.5 hrs. on a K2 bath with a net fluid removal of  1.7L.  A BFR of 400 ml/min was obtained via a CVC.      The treatment plan was discussed with Dr. Bains during the treatment.    Total heparin received during the treatment: 0 units.   Line flushed, clamped and capped with heparin 1:1000 1.9 mL (1900 units) per lumen    Meds  given: Epo   Complications: Cramping requiring Min UF.       Person educated: patient. Knowledge base adequate. Barriers to learning: none. Educated on procedure via verbal mode. Patient/family verbalized understanding. Pt prefers verbal education style.     ICEBOAT? Timeout performed pre-treatment  I: Patient was identified using 2 identifiers  C:  Consent Signed Yes  E: Equipment preventative maintenance is current and dialysis delivery system OK to use  B: Hepatitis B Surface Antigen: Neg; Draw Date: 3/13/23      Hepatitis B Surface Antibody: IMM; Draw Date: 3/13/23  O: Dialysis orders  present and complete prior to treatment  A: Vascular access verified and assessed prior to treatment  T: Treatment was performed at a clinically appropriate time  ?: Patient was allowed to ask questions and address concerns prior to treatment  See Adult Hemodialysis flowsheet in [facility] for further details and post assessment.  Machine water alarm in place and functioning. Transducer pods intact and checked every 15min.   Pt returned via stretcher.  Chlorine/Chloramine water system checked every 4 hours.  Outpatient Dialysis at [facility]    Patient repositioned every 2 hours during the treatment.  Post treatment report given to lópez Hicks RN regarding 1.7L of fluid removed, last BP of 170/109, and patient pain rating of 0/10.

## 2023-07-28 NOTE — PHARMACY-ADMISSION MEDICATION HISTORY
Pharmacist Admission Medication History    Admission medication history is complete. The information provided in this note is only as accurate as the sources available at the time of the update.    Medication reconciliation/reorder completed by provider prior to medication history? Yes    Information Source(s): Patient and CareEverywhere/SureScripts via in-person    Pertinent Information: unsure of compliance due to last fill dates    Changes made to PTA medication list:  Added: None  Deleted: Percocet  Changed: holding ASA while on Eliquis    Medication Affordability:  Not including over the counter (OTC) medications, was there a time in the past 3 months when you did not take your medications as prescribed because of cost?: No    Allergies reviewed with patient and updates made in EHR: yes    Medication History Completed By: Maurice Campos [facility] 7/28/2023 9:10 AM    Prior to Admission medications    Medication Sig Last Dose Taking? Auth Provider Long Term End Date   acetaminophen (TYLENOL) 325 MG tablet Take 2 tablets (650 mg) by mouth every 4 hours as needed for mild pain Unknown at prn Yes Winter Mercado MD     amLODIPine (NORVASC) 10 MG tablet Take 10 mg by mouth daily  Past Week Yes Unknown, Entered By History No    apixaban ANTICOAGULANT (ELIQUIS) 5 MG tablet Take 1 tablet (5 mg) by mouth 2 times daily 7/27/2023 Yes My Ayala PA-C     atorvastatin (LIPITOR) 10 MG tablet TAKE ONE TABLET BY MOUTH EVERY NIGHT AT BEDTIME Past Week Yes Priyank Lawrence MD Yes    bumetanide (BUMEX) 2 MG tablet Take 2 mg by mouth daily  7/27/2023 Yes Reported, Patient Yes    calcium acetate (CALPHRON) 667 MG TABS tablet Take 1,334 mg by mouth 3 times daily (with meals) 7/27/2023 Yes Reported, Patient     carvedilol (COREG) 25 MG tablet TAKE TWO TABLETS BY MOUTH TWICE A DAY WITH A MEAL Strength: 25 mg 7/27/2023 Yes Ranjana Stone PA-C Yes    cinacalcet (SENSIPAR) 90 MG tablet Take 90 mg by mouth daily 7/27/2023 Yes  Reported, Patient Yes    cloNIDine (CATAPRES-TTS2) 0.2 MG/24HR WK patch Place 1 patch onto the skin 7/25/2023 Yes Reported, Patient     hydrALAZINE (APRESOLINE) 100 MG tablet TAKE ONE TABLET BY MOUTH THREE TIMES A DAY 7/27/2023 Yes Ranjana Stone PA-C Yes    lisinopril (ZESTRIL) 20 MG tablet Take 1 tablet (20 mg) by mouth every evening 7/27/2023 Yes Senia Barrett MD Yes    medical cannabis (Patient's own supply) See Admin Instructions (The purpose of this order is to document that the patient reports taking medical cannabis.  This is not a prescription, and is not used to certify that the patient has a qualifying medical condition.) Unknown at prn Yes Reported, Patient     multivitamin RENAL (RENAVITE RX/NEPHROVITE) 1 MG tablet Take 1 tablet by mouth daily  7/27/2023 Yes Reported, Patient     nitroGLYcerin (NITROSTAT) 0.3 MG sublingual tablet For chest pain place 1 tablet under the tongue every 5 minutes for 3 doses. If symptoms persist 5 minutes after 1st dose call 911. Unknown at prn Yes Jing Aguila PA-C Yes    sevelamer HCl (RENAGEL) 800 MG tablet Take 4,000 mg by mouth 3 times daily (with meals) And 1600mg with snacks. 7/27/2023 Yes Unknown, Entered By History     vitamin D3 (CHOLECALCIFEROL) 2000 units (50 mcg) tablet Take 50 mcg by mouth daily 7/27/2023 Yes Unknown, Entered By History     aspirin 81 MG EC tablet Take 81 mg by mouth daily hold while on eliquis  Unknown, Entered By History

## 2023-07-28 NOTE — PLAN OF CARE
Goal Outcome Evaluation:  Orientation/Cognitive: A&Ox4  Observation Goals (Met/ Not Met): Not met  Mobility Level/Assist Equipment: Indep  Fall Risk (Y/N): N  Behavior Concerns: None, pleasant  Pain Management: SULY/shoulder pain managed with iv dilaudid  Tele/VS/O2: SR, VSS except HTN  ABNL Lab/BG:K 5.4, Cr 11.74, WBC 12.7 Troponin 64  Diet: Renal  Bowel/Bladder: Continent, on HD  Skin Concerns: Bruising to SULY  Drains/Devices:R PIV, R HD port, LUE AV fistula  Tests/Procedures for next shift: Nephrology, Orthopedic surgery & Vascular surgery consults  Anticipated DC date & active delays: TBD  Patient Stated Goal for Today:Pain management

## 2023-07-28 NOTE — PROGRESS NOTES
Brief, no charge note. Patient admitted by my colleague Dr. Gleason today.    Taylor Valiente is a 27 year old male with PMH of ESRD (on MWF dialysis), HTN, Right internal jugular DVT (on Eliquis) admitted on 7/27/2023. He presented to ED for evaluation of left shoulder pain, worsening over the past 2 days, also noted hyperkalemic in the setting of ESRD; elevated white count and inflammatory markers raising suspicion for possible septic arthritis.    - Remains afebrile, WBC 13.6--> 10.3; CRP 78, ESR 55, pro Zeus 2.18  - got a dose of ceftriaxone and vancomycin in ED, holding further antibiotics until orthopedics evaluation  - pending orthopedics, nephrology and vascular surgery consultation  - K 5.7---> 5.4---5.0  - serial trops with no significant trend 64-- 64    O/E:  -has painful range of motion on left shoulder  - LUE fistula with thrill  - noted Right CVC dialysis catheter    Reviewed shoulder Xray: No evidence of acute fracture or dislocation. Joint spaces are preserved. Soft tissues grossly unremarkable.     US UE venous noted no DVT  US UE arterial:   IMPRESSION: Patent left brachial artery to basilic vein fistula. Ultrasound suggests a focal stenosis just past the arteriovenous anastomosis.    - Evaluated by orthopedics, ordered for IR left shoulder aspiration with labs for crystals, cell count, Gram stain, culture, to trained ESR/CRP; orthopedics plan for no surgical intervention at this time  - vascular surgery evaluated (signed off) for LUE fistula infiltration, noted hemodynamically insignificant stenosis, per vascular surgery-- more related to tortuosity instead of a true stenosis and suggested no vascular surgery intervention  - holding off on PTA Eliquis, can probably resume after IR aspiration of left shoulder if okay with ortho  - Nephrology following, plan for dialysis 7/28 through right CVC  - rest care plan per H&P by Dr. Gleason    Addendum 1800  - patient febrile to 101.6 F  - had attempted IR  guided aspiration of left shoulder joint but no fluid was aspirated  - Orthopedics suggested to resume antibiotics; will restart Rocephin; defer further imaging/procedure to orthopedics  - will follow Blood culture  - will also consult Infectious Disease    Care plan discussed with patient, nursing and orthopedics

## 2023-07-28 NOTE — ED NOTES
Vancomycin stopped at this time as pt started developing itching to scalp, face, neck and back. No hives noted, denies difficulty breathing or scratchy throat. MD Gleason made aware.

## 2023-07-28 NOTE — PROVIDER NOTIFICATION
MD Notification    Notified Person: MD    Notified Person Name: Luis Collazo     Notification Date/Time:7/28/23 1712    Notification Interaction:[employer] messaging     Purpose of Notification:Pt BP is elevated was taken multiple times last 2 BP were 180/114, and 172/111. Please advise thank you.     Orders Received:pending    Comments:

## 2023-07-28 NOTE — ED NOTES
[facility]  ED Nurse Handoff Report    ED Chief complaint: Shoulder Pain      ED Diagnosis:   Final diagnoses:   None       Code Status:  Admitting MD to assess.      Allergies:   Allergies   Allergen Reactions    Benadryl [Diphenhydramine] Itching       Patient Story:   Pt with hx of hemodialysis and recent infiltration of fistula in left arm presents to ED with left arm and shoulder pain. Pt was seen earlier today and prescribed oxycodone, states last took at 1300 and it is not helping     Focused Assessment:  Patient is alert and oriented x 4, calm and cooperative. VS are stable, skin is warm and dry, respirations are even and non-labored on room air. Patient denied chest pain, shortness of breath, dizziness, and nausea. Patient complains of left arm and left upper shoulder pain.       Treatments and/or interventions provided:   Medications   HYDROmorphone (PF) (DILAUDID) injection 0.5 mg (0.5 mg Intravenous $Given 7/27/23 4239)       Patient's response to treatments and/or interventions: Pain improved to 7/10    To be done/followed up on inpatient unit:  Monitor    Does this patient have any cognitive concerns?:  None    Activity level - Baseline/Home:  Independent  Activity Level - Current:   Independent    Patient's Preferred language: English   Needed?: No    Isolation: None  Infection: Not Applicable  Patient tested for COVID 19 prior to admission: NO  Bariatric?: No    Vital Signs:   Vitals:    07/27/23 1935   BP: (!) 185/135   Pulse: 92   Resp: 16   Temp: 97.7  F (36.5  C)   TempSrc: Temporal   SpO2: 98%       Cardiac Rhythm:     Was the PSS-3 completed:   Yes  What interventions are required if any?               Family Comments: No family present at this time.    OBS brochure/video discussed/provided to patient/family: Yes              Name of person given brochure if not patient: NA              Relationship to patient: NA    For the majority of the shift this patient's  behavior was Green.   Behavioral interventions performed were No family present at this time.  .    ED NURSE PHONE NUMBER: [phone]

## 2023-07-28 NOTE — PROVIDER NOTIFICATION
MD Notification    Notified Person: MD    Notified Person Name:Luis Collazo    Notification Date/Time:1758 7/28/23    Notification Interaction: [employer] messaging    Purpose of Notification:Notifying about the conversation with Shani Hernandez PA-C Orthopedics provider. She recommends the Hospitalist put in an order for antibiotics.    Orders Received: Going to restart antibiotics.     Comments:

## 2023-07-28 NOTE — H&P
[facility]    History and Physical - Hospitalist Service       Date of Admission:  7/27/2023    Assessment & Plan      Taylor Valiente is a 27 year old male admitted on 7/27/2023. He presents to the emergency department for evaluation of left shoulder pain, worsening over the past 2 days.  Found to be hyperkalemic in the setting of end-stage renal disease, elevated white count and inflammatory markers raising suspicion for possible septic arthritis.    Left shoulder pain: No fevers or chills, but reports having felt hot and flushed last week.  Currently with leukocytosis, CRP elevated at 78.43, sed rate of 55, procalcitonin elevated in the 2 range, but can be elevated with end-stage renal disease.  No significant joint swelling, though pain with even passive movement of left upper extremity involving shoulder joint.  -Orthopedic surgery consulted for arthrocentesis of left shoulder  -Patient received ceftriaxone and a partial dose of vancomycin in the emergency department.  Holding additional anti-infectives while awaiting fluid analysis  -Add on uric acid.  Patient with no prior history of gout, but does have end-stage renal disease  -Blood cultures x2 pending  -Acetaminophen, oxycodone, IV Dilaudid available as needed for pain control.  -If arthrocentesis unrevealing, consider MRI for rotator cuff injury, but will defer to orthopedic team    End-stage renal disease: History of FSGS.  Dialyzes Monday Wednesday Friday.  Still makes some urine  Hyperkalemia:  -Nephrology consulted for dialysis  -Kayexalate 30 g x 1  -Continue Bumex 2 mg daily  -Continue PhosLo 3 times daily with meals  -Prior to admission Cinacalcet    Left upper extremity fistula stenosis: Adjacent to arteriovenous anastomosis  -Vascular surgery consulted in case there is a plan for fistulogram or intervention while Eliquis is held; could also be followed as an outpatient as it appears that patient has not had  "difficulty with dialysis this most recent run    Hypertension:  -Continue hydralazine 100 mg 3 times daily, carvedilol 25 mg twice daily, amlodipine 10 mg daily   -resume clonidine when reconciled by pharmacy    Right IJ DVT: Associated with dialysis catheter.  -Prior to admission Eliquis on hold for this morning awaiting orthopedic surgery evaluation.  Plan to resume following evaluation and possible arthrocentesis.       Diet: Combination Diet Regular Diet Adult; Renal Diet (dialysis)    DVT Prophylaxis: Pneumatic Compression Devices  Hurt Catheter: Not present  Lines: PRESENT     tunneled dialysis catheter right anterior chest wall         Cardiac Monitoring: ACTIVE order. Indication: Electrolyte Imbalance (24 hours)- Magnesium <1.3 mg/ml; Potassium < =2.8 or > 5.5 mg/ml  Code Status: Full Code      Clinically Significant Risk Factors Present on Admission        # Hyperkalemia: Highest K = 5.7 mmol/L in last 2 days, will monitor as appropriate        # Drug Induced Coagulation Defect: home medication list includes an anticoagulant medication  # Drug Induced Platelet Defect: home medication list includes an antiplatelet medication   # Hypertension: Noted on problem list      # Obesity: Estimated body mass index is 32.69 kg/m  as calculated from the following:    Height as of an earlier encounter on 7/27/23: 1.778 m (5' 10\").    Weight as of this encounter: 103.3 kg (227 lb 12.8 oz).            Disposition Plan      Expected Discharge Date: 07/29/2023        Discharge Comments: orthopedi surgery consult  vascular surg consult  Neprhrology  pt is dialysis pt          Brian Gleason MD  Hospitalist Service  [facility]  Securely message with [employer] (more info)  Text page via [facility] Paging/Directory     ______________________________________________________________________    Chief Complaint   Left shoulder pain    History is obtained from the patient, chart review, discussion with Carolin" Michelle, physician assistant in the emergency department..    History of Present Illness   Taylor Valiente is a 27 year old male who presents to the emergency department for evaluation of left shoulder pain.    A little over 1 week ago, patient had diarrhea for approximately 2 days.  Had some complaints of left elbow pain 7/13/2023 for which she was seen in the emergency department and discharged; felt to possibly represent lateral epicondylitis.  A day or so after this, he had complaints of feeling hot, was actually seen 7/20/2023 with complaints of dark stools and his feeling hot was mentioned to provider. no reported fever or shaking chills.  Dark stools resolved, as did patient's hot sensation.    Was seen 7/27 AM in the emergency department complaining of pain in his left shoulder.  He described a recent issue with his fistula site where his fistula catheter needle was essentially placed through the venous wall and pulled back.  He has had multiple ultrasounds of his arm which demonstrate no fluid collection, arterial ultrasound did demonstrate some stenosis for which she has requested follow-up with his vascular surgeon.  Still able to dialyze without difficulty on his most recent run, however.  Discharged with pain medications for possible contusion.    Patient returned to the emergency department this evening 7/27 with complaints of left shoulder persisting, not helped by Percocet prescriptions.  He has pain with palpation of his left shoulder joint as well as any attempt at range of motion including passive range of motion by provider.  No appreciable effusion of shoulder joint.  To a lesser extent, he has some AC joint tenderness on the left, but this is actually difficult to isolate.  He has an elevated white count and inflammatory markers as well as procalcitonin, but is afebrile and reports no fevers at home, no shaking chills.  He is at risk for bacteremia with his indwelling catheter for dialysis.       With inflammatory markers, patient was initiated on broad-spectrum antibiotics in the emergency department.  Admission was requested.    Met with patient.  Would be reasonable to pursue arthrocentesis for crystal and Gram stain, culture, but would hold anti-infectives as he is not systemically ill at this time.  Discussed with patient as well as ER staff.  He received his ceftriaxone as well as some of his vancomycin prior to this.    We will hold his Eliquis for a.m. while awaiting orthopedic evaluation as well as vascular surgery evaluation for stenosis of fistula.      Past Medical History    Past Medical History:   Diagnosis Date    Adrenal adenoma, left     Anemia     Benign essential hypertension 07/28/2017    CKD (chronic kidney disease) stage 5, GFR less than 15 ml/min (H)     FSGS    Depression     Focal glomerular sclerosis 07/28/2017    HLD (hyperlipidemia)     LVH (left ventricular hypertrophy) due to hypertensive disease 07/14/2017    Noncompliance     Obesity, unspecified     OD (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/2021    Stress-induced cardiomyopathy     Suicide attempt (H) 2019       Past Surgical History   Past Surgical History:   Procedure Laterality Date    ARTHROSCOPY ANKLE Left 02/24/2021    Procedure: Left ankle arthroscopy and debridement/micro fracture;  Surgeon: Mirza Nelson MD;  Location: [facility] OR    BIOPSY  2017    renal- [facility]    CREATE FISTULA ARTERIOVENOUS UPPER EXTREMITY Right 03/04/2021    Procedure: RIGHT proximal radial  to CEPHALIC ARTERIOVENOUS FISTULA;  Surgeon: Henrik Moran MD;  Location:  OR    CREATE FISTULA ARTERIOVENOUS UPPER EXTREMITY Left 03/09/2023    Procedure: CREATION OF FIRST STAGE LEFT BRACHIOBASILIC ARTERIOVENOUS FISTULA;  Surgeon: Henrik Moran MD;  Location:  OR    CREATE FISTULA ARTERIOVENOUS UPPER EXTREMITY Left 5/30/2023    Procedure: SECOND STAGE LEFT BRACHIAL TO BASILIC ARTERIOVENOUS FISTULA;  Surgeon: Henrik Moran  MD Gurvinder;  Location: [facility] OR    IR CVC TUNNEL PLACEMENT > 5 YRS OF AGE  06/17/2021    IR CVC TUNNEL PLACEMENT > 5 YRS OF AGE  03/09/2023    IR CVC TUNNEL REMOVAL RIGHT  12/03/2021    IRRIGATION AND DEBRIDEMENT UPPER EXTREMITY, COMBINED Left 03/16/2023    Procedure: EVACUATION OF LEFT ARM HEMATOMA;  Surgeon: Henrik Moran MD;  Location: [facility] OR    LIGATE FISTULA ARTERIOVENOUS UPPER EXTREMITY Right 03/09/2023    Procedure: LIGATION OF RIGHT ARM ARTERIOVENOUS FISTULA;  Surgeon: Henrik Moran MD;  Location: [facility] OR    REVISION FISTULA ARTERIOVENOUS UPPER EXTREMITY Right 08/26/2021    Procedure: Second stage RIGHT BRACHIOCEPHALIC transposition ARTERIOVENOUS FISTULA;  Surgeon: Henrik Moran MD;  Location: [facility] OR       Prior to Admission Medications   Prior to Admission Medications   Prescriptions Last Dose Informant Patient Reported? Taking?   ACE/ARB/ARNI NOT PRESCRIBED (INTENTIONAL)   No No   Sig: Please choose reason not prescribed from choices below.   acetaminophen (TYLENOL) 325 MG tablet   No No   Sig: Take 2 tablets (650 mg) by mouth every 4 hours as needed for mild pain   amLODIPine (NORVASC) 10 MG tablet  Self Yes No   Sig: Take 10 mg by mouth daily    apixaban ANTICOAGULANT (ELIQUIS) 5 MG tablet   No No   Sig: Take 1 tablet (5 mg) by mouth 2 times daily   aspirin 81 MG EC tablet  Self Yes No   Sig: Take 81 mg by mouth daily   atorvastatin (LIPITOR) 10 MG tablet   No No   Sig: TAKE ONE TABLET BY MOUTH EVERY NIGHT AT BEDTIME   bumetanide (BUMEX) 2 MG tablet  Self Yes No   Sig: Take 2 mg by mouth daily    calcium acetate (CALPHRON) 667 MG TABS tablet   Yes No   Sig: Take 1,334 mg by mouth 3 times daily (with meals)   carvedilol (COREG) 25 MG tablet   No No   Sig: TAKE TWO TABLETS BY MOUTH TWICE A DAY WITH A MEAL Strength: 25 mg   cinacalcet (SENSIPAR) 90 MG tablet   Yes No   Sig: Take 90 mg by mouth daily   cloNIDine (CATAPRES-TTS2) 0.2 MG/24HR WK patch   Yes No   Sig: Place 1 patch onto the skin    hydrALAZINE (APRESOLINE) 100 MG tablet   No No   Sig: TAKE ONE TABLET BY MOUTH THREE TIMES A DAY   lisinopril (ZESTRIL) 20 MG tablet   No No   Sig: Take 1 tablet (20 mg) by mouth every evening   medical cannabis (Patient's own supply)   Yes No   Sig: See Admin Instructions (The purpose of this order is to document that the patient reports taking medical cannabis.  This is not a prescription, and is not used to certify that the patient has a qualifying medical condition.)   multivitamin RENAL (RENAVITE RX/NEPHROVITE) 1 MG tablet  Self Yes No   Sig: Take 1 tablet by mouth daily    nitroGLYcerin (NITROSTAT) 0.3 MG sublingual tablet   No No   Sig: For chest pain place 1 tablet under the tongue every 5 minutes for 3 doses. If symptoms persist 5 minutes after 1st dose call 911.   oxyCODONE-acetaminophen (PERCOCET) 5-325 MG tablet   No No   Sig: Take 2 tablets by mouth every 6 hours as needed for pain   sevelamer HCl (RENAGEL) 800 MG tablet  Self Yes No   Sig: Take 4,000 mg by mouth 3 times daily (with meals) And 1600mg with snacks.   vitamin D3 (CHOLECALCIFEROL) 2000 units (50 mcg) tablet  Self Yes No   Sig: Take 50 mcg by mouth daily      Facility-Administered Medications: None           Physical Exam   Vital Signs: Temp: 98.7  F (37.1  C) Temp src: Oral BP: (!) 150/105 Pulse: 88   Resp: 18 SpO2: 100 % O2 Device: None (Room air)    Weight: 227 lbs 12.8 oz    General Appearance: Obese 27-year-old male resting comfortably in bed.  He does not appear toxic.  Eyes: No scleral icterus or injection  HEENT: Normocephalic and atraumatic.  Dentition intact with no oral lesions or inflammation.  Fetid breath.  Respiratory: Breath sounds are clear bilaterally to auscultation.  Cardiovascular: Regular rate and rhythm.  Distant heart sounds.  Left upper extremity fistula with palpable thrill, bruit present.  GI: Obese, soft, generally nontender palpation.  No palpable mass.  Lymph/Hematologic: Patient with some chronic pitting  edema of bilateral lower extremities  Skin: No rash or erythema, no bruising appreciated on evaluation of left upper extremity.  Musculoskeletal: No appreciable effusion of left shoulder, though tenderness to palpation along joint.  Pain with passive range of motion of left shoulder.  Not able to isolate left AC tenderness described by patient with ease.  No erythema overlying.  No evidence of gout in other joints including toes, elbows.  Psychiatric: Normal affect, pleasant.    Medical Decision Making       65 MINUTES SPENT BY ME on the date of service doing chart review, history, exam, documentation & further activities per the note.      Data     I have personally reviewed the following data over the past 24 hrs:    12.7 (H)  \   10.8 (L)   / 242     135 (L) 98 53.0 (H) /  97   5.4 (H) 19 (L) 11.74 (H) \     ALT: 8 AST: 8 AP: 77 TBILI: 0.3   ALB: 3.8 TOT PROTEIN: 7.5 LIPASE: N/A     Trop: 64 (H) BNP: N/A     Procal: 2.18 (H) CRP: 78.43 (H) Lactic Acid: N/A         Imaging results reviewed over the past 24 hrs:   Recent Results (from the past 24 hour(s))   US Ext Arterial Venous Dialys Acs Graft    Narrative    US EXTREMITY ARTERIAL VENOUS DIALYSIS ACCESS GRAFT  7/27/2023 8:10 AM     HISTORY:  Left brachial artery to basilic vein fistula. Arm pain.     COMPARISON: 7/12/2023    FINDINGS: Color Doppler and spectral waveform analysis performed.    The left brachial artery to basilic vein fistula is patent. The  outflow volume is measured to be 2895 mL/m. Ultrasound suggests a  focal stenosis in the outflow vein just past the arteriovenous  anastomosis where the luminal diameter measures 2.0 mm and peak  systolic velocity equals 721 cm/s. The remaining outflow vein is  patent with diameters ranging between 7.6 to 15 mm.      Impression    IMPRESSION: Patent left brachial artery to basilic vein fistula.  Ultrasound suggests a focal stenosis just past the arteriovenous  anastomosis as above.    RODNEY MALONEY MD          SYSTEM ID:  [id number]   US Upper Extremity Venous Duplex Left    Narrative    VENOUS DUPLEX ULTRASOUND LEFT UPPER EXTREMITY  7/27/2023 8:11 AM    CLINICAL HISTORY:  Upper arm pain.    TECHNIQUE: Venous Duplex ultrasound of the left upper extremity with  (when possible) and without compression, augmentation, and duplex.  Color flow and spectral Doppler with waveform analysis performed.    COMPARISON: None.    FINDINGS: Ultrasound includes evaluation of the internal jugular vein,  innominate vein, subclavian vein, axillary vein, and brachial vein.  The superficial cephalic and basilic veins were also evaluated where  seen.     LEFT: No deep venous thrombosis. No superficial thrombophlebitis.       Impression    IMPRESSION: No deep venous thrombosis in the left upper extremity.    ANASTASIA HAMMOND MD         SYSTEM ID:  [id number]   XR Shoulder Left G/E 3 Views    Narrative    EXAM: XR SHOULDER LEFT G/E 3 VIEWS  LOCATION: [facility]  DATE: 7/27/2023    INDICATION: Severe left shoulder pain rated 10 out of 10.  COMPARISON: None.      Impression    IMPRESSION:     No evidence of acute fracture or dislocation. Joint spaces are preserved. Soft tissues grossly unremarkable.    Partially visualized catheter projecting over the right chest.

## 2023-07-28 NOTE — CONSULTS
[facility]    Orthopedic Consultation    Taylor Valiente MRN# [id number]   Age: 27 year old YOB: 1995     Date of Admission:  7/27/2023    Reason for consult: Left shoulder pain, elevated inflammatory markers, setting of dialysis with line        Requesting physician: Brian Gleason MD [facility]        Level of consult: Consult, follow and place orders           Assessment and Plan:   Assessment:   1. Left shoulder pain in setting of left upper extremity fistula       Plan:   The patient's history and clinical/diagnostic findings were reviewed with the on-call orthopedic trauma surgeon, Dr. Joe Ballard:    -IR left shoulder aspirate ordered with labs: crystals, cell count, gram stain, aerobic/anaerbic cultures   - Trending ESR and CRP     We will monitor results of aspirate and exam daily. No surgical plans at this time. We will provide updates with any changes to this plan.     Please contact orthopedic trauma team if any questions or concerns arise in the meantime.           Chief Complaint:   Left shoulder pain, dialysis with line          History of Present Illness:   Taylor Valiente is a 27 year old male with history of ESRD on dialysis, DVT (anticoagulated on apixaban), and cardiomyopathy who presents with left shoulder pain. He was seen twice in the emergency department yesterday regarding left upper extremity pain. Initially, evaluation of fistula was completed with venous and arterial US showed no dvt and patent fistula. He returned due to worsening pain and was admitted for further work-up. There is report of infiltrated fistula 3 days ago. Concern for infection, and work-up initiated from primary team including blood cultures.           Past Medical History:     Past Medical History:   Diagnosis Date     Adrenal adenoma, left      Anemia      Benign essential hypertension 07/28/2017     CKD (chronic kidney disease) stage 5, GFR less than 15 ml/min  (H)     FSGS     Depression      Focal glomerular sclerosis 07/28/2017     HLD (hyperlipidemia)      LVH (left ventricular hypertrophy) due to hypertensive disease 07/14/2017     Noncompliance      Obesity, unspecified      OD (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/2021     Stress-induced cardiomyopathy      Suicide attempt (H) 2019             Past Surgical History:     Past Surgical History:   Procedure Laterality Date     ARTHROSCOPY ANKLE Left 02/24/2021    Procedure: Left ankle arthroscopy and debridement/micro fracture;  Surgeon: Mirza Nelson MD;  [facility]     [facility]     CREATE FISTULA ARTERIOVENOUS UPPER EXTREMITY Right 03/04/2021    Procedure: RIGHT proximal radial  to CEPHALIC ARTERIOVENOUS FISTULA;  Surgeon: Henrik Moran MD;  Location: [facility] OR     CREATE FISTULA ARTERIOVENOUS UPPER EXTREMITY Left 03/09/2023    Procedure: CREATION OF FIRST STAGE LEFT BRACHIOBASILIC ARTERIOVENOUS FISTULA;  Surgeon: Henrik Moran MD;  Location: [facility] OR     CREATE FISTULA ARTERIOVENOUS UPPER EXTREMITY Left 5/30/2023    Procedure: SECOND STAGE LEFT BRACHIAL TO BASILIC ARTERIOVENOUS FISTULA;  Surgeon: Henrik Moran MD;  Location: [facility] OR     IR CVC TUNNEL PLACEMENT > 5 YRS OF AGE  06/17/2021     IR CVC TUNNEL PLACEMENT > 5 YRS OF AGE  03/09/2023     IR CVC TUNNEL REMOVAL RIGHT  12/03/2021     IRRIGATION AND DEBRIDEMENT UPPER EXTREMITY, COMBINED Left 03/16/2023    Procedure: EVACUATION OF LEFT ARM HEMATOMA;  Surgeon: Henrik Moran MD;  Location: [facility] OR     LIGATE FISTULA ARTERIOVENOUS UPPER EXTREMITY Right 03/09/2023    Procedure: LIGATION OF RIGHT ARM ARTERIOVENOUS FISTULA;  Surgeon: Henrik Moran MD;  Location: [facility] OR     REVISION FISTULA ARTERIOVENOUS UPPER EXTREMITY Right 08/26/2021    Procedure: Second stage RIGHT BRACHIOCEPHALIC transposition ARTERIOVENOUS FISTULA;  Surgeon: Henrik Moran MD;  Location: [facility] OR             Social History:     Social  History     Tobacco Use     Smoking status: Never     Smokeless tobacco: Never   Substance Use Topics     Alcohol use: No             Family History:     Family History   Problem Relation Age of Onset     Blood Disease Mother         has hep b     Diabetes Mother         gestionanal diabetes     Hypertension Mother      Obesity Father      Hypertension Father      Hypertension Maternal Grandmother      Diabetes Maternal Grandmother      Hypertension Paternal Grandmother      Hypertension Paternal Grandfather      Coronary Artery Disease Maternal Uncle      Cancer No family hx of              Immunizations:     VACCINE/DOSE   Diptheria   DPT   DTAP   HBIG   Hepatitis A   Hepatitis B   HIB   Influenza   Measles   Meningococcal   MMR   Mumps   Pneumococcal   Polio   Rubella   Small Pox   TDAP   Varicella   Zoster             Allergies:     Allergies   Allergen Reactions     Benadryl [Diphenhydramine] Itching     Vancomycin Itching             Medications:     Current Facility-Administered Medications   Medication     acetaminophen (TYLENOL) tablet 650 mg    Or     acetaminophen (TYLENOL) Suppository 650 mg     amLODIPine (NORVASC) tablet 10 mg     [Held by provider] apixaban ANTICOAGULANT (ELIQUIS) tablet 5 mg     atorvastatin (LIPITOR) tablet 10 mg     bisacodyl (DULCOLAX) suppository 10 mg     bumetanide (BUMEX) tablet 2 mg     calcium acetate (PHOSLO) capsule 1,334 mg     carvedilol (COREG) tablet 25 mg     cinacalcet (SENSIPAR) tablet 90 mg     glucose gel 15-30 g    Or     dextrose 50 % injection 25-50 mL    Or     glucagon injection 1 mg     hydrALAZINE (APRESOLINE) tablet 100 mg     HYDROmorphone (DILAUDID) injection 0.2 mg     HYDROmorphone (DILAUDID) injection 0.4 mg     lidocaine (LMX4) cream     lidocaine 1 % 0.1-1 mL     melatonin tablet 3 mg     multivitamin RENAL (TRIPHROCAPS) capsule 1 capsule     naloxone (NARCAN) injection 0.2 mg    Or     naloxone (NARCAN) injection 0.4 mg    Or     naloxone (NARCAN)  injection 0.2 mg    Or     naloxone (NARCAN) injection 0.4 mg     oxyCODONE (ROXICODONE) tablet 5 mg     oxyCODONE IR (ROXICODONE) half-tab 2.5 mg     polyethylene glycol (MIRALAX) Packet 17 g     prochlorperazine (COMPAZINE) injection 10 mg    Or     prochlorperazine (COMPAZINE) tablet 10 mg    Or     prochlorperazine (COMPAZINE) suppository 25 mg     senna-docusate (SENOKOT-S/PERICOLACE) 8.6-50 MG per tablet 1 tablet    Or     senna-docusate (SENOKOT-S/PERICOLACE) 8.6-50 MG per tablet 2 tablet     sevelamer carbonate (RENVELA) tablet 4,000 mg     sodium chloride (PF) 0.9% PF flush 3 mL     sodium chloride (PF) 0.9% PF flush 3 mL     vitamin D3 (CHOLECALCIFEROL) tablet 50 mcg             Review of Systems:   CV: NEGATIVE for chest pain, palpitations or peripheral edema  C: NEGATIVE for fever, chills, change in weight  E/M: NEGATIVE for ear, mouth and throat problems  R: NEGATIVE for significant cough or SOB          Physical Exam:   All vitals have been reviewed  Patient Vitals for the past 24 hrs:   BP Temp Temp src Pulse Resp SpO2 Weight   07/28/23 0808 (!) 182/119 99.9  F (37.7  C) Oral 88 18 100 % --   07/28/23 0454 -- -- -- -- -- -- 103.3 kg (227 lb 12.8 oz)   07/28/23 0344 (!) 150/105 98.7  F (37.1  C) Oral 88 18 100 % --   07/28/23 0328 -- -- -- -- 17 -- --   07/28/23 0323 (!) 157/99 -- -- 101 -- -- --   07/28/23 0322 -- -- -- 88 17 -- --   07/27/23 1935 (!) 185/135 97.7  F (36.5  C) Temporal 92 16 98 % --       Intake/Output Summary (Last 24 hours) at 7/28/2023 1017  Last data filed at 7/28/2023 0038  Gross per 24 hour   Intake 100 ml   Output --   Net 100 ml       Constitutional: Pleasant, alert, appropriate, following commands.  HEENT: Head atraumatic normocephalic. Pupils equal round and reactive.  Respiratory: Unlabored breathing no audible wheeze  Cardiovascular: Regular rate and rhythm per pulses.  Skin: No rashes, no cyanosis, no edema.  Musculoskeletal: Skin intact, no open wounds. No warmth to  touch. Flexion, extension, supination, pronation intact at elbow. Refuses ROM at shoulder due to pain. No tenderness of left forearm. ROM intact through wrist and hand. SILT along radial, ulnar and median nerve distributions. Radial pulse intact. Tenderness of the shoulder through distal humerus.             Data:   All laboratory data reviewed  Results for orders placed or performed during the hospital encounter of 07/27/23   XR Shoulder Left G/E 3 Views     Status: None    Narrative    EXAM: XR SHOULDER LEFT G/E 3 VIEWS  LOCATION: [facility]  DATE: 7/27/2023    INDICATION: Severe left shoulder pain rated 10 out of 10.  COMPARISON: None.      Impression    IMPRESSION:     No evidence of acute fracture or dislocation. Joint spaces are preserved. Soft tissues grossly unremarkable.    Partially visualized catheter projecting over the right chest.   Troponin T, High Sensitivity     Status: Abnormal   Result Value Ref Range    Troponin T, High Sensitivity 64 (H) <=22 ng/L   Comprehensive metabolic panel     Status: Abnormal   Result Value Ref Range    Sodium 135 (L) 136 - 145 mmol/L    Potassium 5.7 (H) 3.4 - 5.3 mmol/L    Chloride 98 98 - 107 mmol/L    Carbon Dioxide (CO2) 19 (L) 22 - 29 mmol/L    Anion Gap 18 (H) 7 - 15 mmol/L    Urea Nitrogen 53.0 (H) 6.0 - 20.0 mg/dL    Creatinine 11.74 (H) 0.67 - 1.17 mg/dL    Calcium 9.6 8.6 - 10.0 mg/dL    Glucose 97 70 - 99 mg/dL    Alkaline Phosphatase 77 40 - 129 U/L    AST 8 0 - 45 U/L    ALT 8 0 - 70 U/L    Protein Total 7.5 6.4 - 8.3 g/dL    Albumin 3.8 3.5 - 5.2 g/dL    Bilirubin Total 0.3 <=1.2 mg/dL    GFR Estimate 6 (L) >60 mL/min/1.73m2   Erythrocyte sedimentation rate auto     Status: Abnormal   Result Value Ref Range    Erythrocyte Sedimentation Rate 55 (H) 0 - 15 mm/hr   CRP inflammation     Status: Abnormal   Result Value Ref Range    CRP Inflammation 78.43 (H) <5.00 mg/L   Procalcitonin     Status: Abnormal   Result Value Ref Range     Procalcitonin 2.18 (H) <0.05 ng/mL   CBC with platelets and differential     Status: Abnormal   Result Value Ref Range    WBC Count 12.7 (H) 4.0 - 11.0 10e3/uL    RBC Count 4.03 (L) 4.40 - 5.90 10e6/uL    Hemoglobin 10.8 (L) 13.3 - 17.7 g/dL    Hematocrit 35.5 (L) 40.0 - 53.0 %    MCV 88 78 - 100 fL    MCH 26.8 26.5 - 33.0 pg    MCHC 30.4 (L) 31.5 - 36.5 g/dL    RDW 18.0 (H) 10.0 - 15.0 %    Platelet Count 242 150 - 450 10e3/uL    % Neutrophils 76 %    % Lymphocytes 14 %    % Monocytes 7 %    % Eosinophils 2 %    % Basophils 0 %    % Immature Granulocytes 1 %    NRBCs per 100 WBC 0 <1 /100    Absolute Neutrophils 9.7 (H) 1.6 - 8.3 10e3/uL    Absolute Lymphocytes 1.8 0.8 - 5.3 10e3/uL    Absolute Monocytes 0.9 0.0 - 1.3 10e3/uL    Absolute Eosinophils 0.3 0.0 - 0.7 10e3/uL    Absolute Basophils 0.0 0.0 - 0.2 10e3/uL    Absolute Immature Granulocytes 0.1 <=0.4 10e3/uL    Absolute NRBCs 0.0 10e3/uL   Potassium     Status: Abnormal   Result Value Ref Range    Potassium 5.4 (H) 3.4 - 5.3 mmol/L   Magnesium     Status: Normal   Result Value Ref Range    Magnesium 2.2 1.7 - 2.3 mg/dL   Troponin T, High Sensitivity     Status: Abnormal   Result Value Ref Range    Troponin T, High Sensitivity 64 (H) <=22 ng/L   Uric acid     Status: Normal   Result Value Ref Range    Uric Acid 6.5 3.4 - 7.0 mg/dL   Asymptomatic COVID-19 Virus (Coronavirus) by PCR Nasopharyngeal     Status: Normal    Specimen: Nasopharyngeal; Swab   Result Value Ref Range    SARS CoV2 PCR Negative Negative    Narrative    Testing was performed using the Xpert Xpress SARS-CoV-2 Assay on the Cepheid Gene-Xpert Instrument Systems. Additional information about this Emergency Use Authorization (EUA) assay can be found via the Lab Guide. This test should be ordered for the detection of SARS-CoV-2 in individuals who meet SARS-CoV-2 clinical and/or epidemiological criteria as well as from individuals without symptoms or other reasons to suspect COVID-19. Test  performance for asymptomatic patients has only been established in anterior nasal swab specimens. This test is for in vitro diagnostic use under the FDA EUA for laboratories certified under CLIA to perform high complexity testing. This test has not been FDA cleared or approved. A negative result does not rule out the presence of PCR inhibitors in the specimen or target RNA concentration below the limit of detection for the assay. The possibility of a false negative should be considered if the patient's recent exposure or clinical presentation suggests COVID-19. This test was validated by the [facility]. This laboratory is certified under the Clinical Laboratory Improvement Amendments (CLIA) as qualified to perform high complexity laboratory testing.     Basic metabolic panel     Status: Abnormal   Result Value Ref Range    Sodium 136 136 - 145 mmol/L    Potassium 5.2 3.4 - 5.3 mmol/L    Chloride 99 98 - 107 mmol/L    Carbon Dioxide (CO2) 19 (L) 22 - 29 mmol/L    Anion Gap 18 (H) 7 - 15 mmol/L    Urea Nitrogen 57.4 (H) 6.0 - 20.0 mg/dL    Creatinine 12.84 (H) 0.67 - 1.17 mg/dL    Calcium 9.7 8.6 - 10.0 mg/dL    Glucose 100 (H) 70 - 99 mg/dL    GFR Estimate 5 (L) >60 mL/min/1.73m2   CBC with platelets     Status: Abnormal   Result Value Ref Range    WBC Count 10.3 4.0 - 11.0 10e3/uL    RBC Count 3.96 (L) 4.40 - 5.90 10e6/uL    Hemoglobin 10.6 (L) 13.3 - 17.7 g/dL    Hematocrit 35.5 (L) 40.0 - 53.0 %    MCV 90 78 - 100 fL    MCH 26.8 26.5 - 33.0 pg    MCHC 29.9 (L) 31.5 - 36.5 g/dL    RDW 17.9 (H) 10.0 - 15.0 %    Platelet Count 230 150 - 450 10e3/uL   Potassium     Status: Normal   Result Value Ref Range    Potassium 5.2 3.4 - 5.3 mmol/L   Glucose by meter     Status: Normal   Result Value Ref Range    GLUCOSE BY METER POCT 92 70 - 99 mg/dL   EKG 12 lead     Status: None   Result Value Ref Range    Systolic Blood Pressure  mmHg    Diastolic Blood Pressure  mmHg    Ventricular Rate  87 BPM    Atrial Rate 87 BPM    KY Interval 158 ms    QRS Duration 82 ms     ms    QTc 466 ms    P Axis 44 degrees    R AXIS 52 degrees    T Axis 86 degrees    Interpretation ECG       Sinus rhythm  Nonspecific T wave abnormality  Prolonged QT  Abnormal ECG  When compared with ECG of 20-JUL-2023 22:45,  No significant change was found  Confirmed by GENERATED REPORT, COMPUTER ([id number]),  DANIELA JOINER ([id number]) on 7/27/2023 11:15:53 PM     CBC with platelets + differential     Status: Abnormal    Narrative    The following orders were created for panel order CBC with platelets + differential.  Procedure                               Abnormality         Status                     ---------                               -----------         ------                     CBC with platelets and d...[[id number]]  Abnormal            Final result                 Please view results for these tests on the individual orders.          Attestation:  I have reviewed today's vital signs, notes, medications, labs and imaging with Dr. Joe Ballard.  Amount of time performed on this consult: 50 minutes.    Shani Hernandez PA-C  [facility]

## 2023-07-28 NOTE — CONSULTS
VASCULAR SURGERY INPATIENT CONSULTATION / Initial In-Patient visit    VASCULAR SURGEON: Dr. Moran    LOCATION: [facility]    Taylor Valiente  Medical Record #:  [id number]  YOB: 1995  Age:  27 year old     Date of Service: 7/27/2023    PRIMARY CARE PROVIDER: Priyank Lawrence    Reason for consultation:  L fistula stenosis    IMPRESSION:  Taylor is a 27 year old male who underwent LUE fistula creation a few months ago with Dr. Moran. Started to use his fistula this past Monday, in which it was infiltrated. Switched to tunneled catheter due to pain. On examination Taylor has an excellent thrill. Bruising noted around access site, however soft to touch and has not expanded per patient.     Imaging shows excellent flow. Hemodynamically insignificant stenosis, more related to tortuosity instead of a true stenosis    RECOMMENDATION:  Continue current medications. No vascular surgery intervention needed given findings on ultrasound. Stenosis is not flow limiting and hemodynamically insignificant. Vascular surgery will sign off.     PHH:    Past Medical History:   Diagnosis Date    Adrenal adenoma, left     Anemia     Benign essential hypertension 07/28/2017    CKD (chronic kidney disease) stage 5, GFR less than 15 ml/min (H)     FSGS    Depression     Focal glomerular sclerosis 07/28/2017    HLD (hyperlipidemia)     LVH (left ventricular hypertrophy) due to hypertensive disease 07/14/2017    Noncompliance     Obesity, unspecified     OD (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/2021    Stress-induced cardiomyopathy     Suicide attempt (H) 2019         Past Surgical History:   Procedure Laterality Date    ARTHROSCOPY ANKLE Left 02/24/2021    Procedure: Left ankle arthroscopy and debridement/micro fracture;  Surgeon: Mirza Nelson MD;  [facility]    [facility]    CREATE FISTULA ARTERIOVENOUS UPPER EXTREMITY Right 03/04/2021    Procedure: RIGHT proximal  radial  to CEPHALIC ARTERIOVENOUS FISTULA;  Surgeon: Henrik Moran MD;  Location: [facility] OR    CREATE FISTULA ARTERIOVENOUS UPPER EXTREMITY Left 03/09/2023    Procedure: CREATION OF FIRST STAGE LEFT BRACHIOBASILIC ARTERIOVENOUS FISTULA;  Surgeon: Henrik Moran MD;  Location: [facility] OR    CREATE FISTULA ARTERIOVENOUS UPPER EXTREMITY Left 5/30/2023    Procedure: SECOND STAGE LEFT BRACHIAL TO BASILIC ARTERIOVENOUS FISTULA;  Surgeon: Henrik Moran MD;  Location: [facility] OR    IR CVC TUNNEL PLACEMENT > 5 YRS OF AGE  06/17/2021    IR CVC TUNNEL PLACEMENT > 5 YRS OF AGE  03/09/2023    IR CVC TUNNEL REMOVAL RIGHT  12/03/2021    IRRIGATION AND DEBRIDEMENT UPPER EXTREMITY, COMBINED Left 03/16/2023    Procedure: EVACUATION OF LEFT ARM HEMATOMA;  Surgeon: Henrik Moran MD;  Location: [facility]    LIGATE FISTULA ARTERIOVENOUS UPPER EXTREMITY Right 03/09/2023    Procedure: LIGATION OF RIGHT ARM ARTERIOVENOUS FISTULA;  Surgeon: Henrik Moran MD;  Location: [facility]    REVISION FISTULA ARTERIOVENOUS UPPER EXTREMITY Right 08/26/2021    Procedure: Second stage RIGHT BRACHIOCEPHALIC transposition ARTERIOVENOUS FISTULA;  Surgeon: Henrik Moran MD;  Location: [facility] OR        ALLERGIES:     Allergies   Allergen Reactions    Benadryl [Diphenhydramine] Itching    Vancomycin Itching        MEDS:    Current Facility-Administered Medications:     acetaminophen (TYLENOL) tablet 650 mg, 650 mg, Oral, Q6H PRN **OR** acetaminophen (TYLENOL) Suppository 650 mg, 650 mg, Rectal, Q6H PRN, Brian Gleason MD    amLODIPine (NORVASC) tablet 10 mg, 10 mg, Oral, Daily, Brian Gleason MD, 10 mg at 07/28/23 0853    [Held by provider] apixaban ANTICOAGULANT (ELIQUIS) tablet 5 mg, 5 mg, Oral, BID, Brian Gleason MD    atorvastatin (LIPITOR) tablet 10 mg, 10 mg, Oral, At Bedtime, Brian Gleason MD    bisacodyl (DULCOLAX) suppository 10 mg, 10 mg, Rectal, Daily PRN, Brian Gleason MD    bumetanide (BUMEX) tablet 2 mg, 2 mg, Oral,  Daily, Brian Gleason MD, 2 mg at 07/28/23 0853    calcium acetate (PHOSLO) capsule 1,334 mg, 1,334 mg, Oral, TID w/meals, Brian Gleason MD, 1,334 mg at 07/28/23 0852    carvedilol (COREG) tablet 25 mg, 25 mg, Oral, BID w/meals, Brian Gleason MD, 25 mg at 07/28/23 0853    cinacalcet (SENSIPAR) tablet 90 mg, 90 mg, Oral, Daily, Brian Gleason MD, 90 mg at 07/28/23 0852    glucose gel 15-30 g, 15-30 g, Oral, Q15 Min PRN **OR** dextrose 50 % injection 25-50 mL, 25-50 mL, Intravenous, Q15 Min PRN **OR** glucagon injection 1 mg, 1 mg, Subcutaneous, Q15 Min PRN, Brian Gleason MD    hydrALAZINE (APRESOLINE) tablet 100 mg, 100 mg, Oral, TID, Brian Gleason MD, 100 mg at 07/28/23 0852    HYDROmorphone (DILAUDID) injection 0.2 mg, 0.2 mg, Intravenous, Q2H PRN, Brian Gleason MD    HYDROmorphone (DILAUDID) injection 0.4 mg, 0.4 mg, Intravenous, Q2H PRN, Brian Gleason MD, 0.4 mg at 07/28/23 0857    lidocaine (LMX4) cream, , Topical, Q1H PRN, Brian Gleason MD    lidocaine 1 % 0.1-1 mL, 0.1-1 mL, Other, Q1H PRN, Brian Gleason MD    melatonin tablet 3 mg, 3 mg, Oral, At Bedtime PRN, Brian Gleason MD    multivitamin RENAL (TRIPHROCAPS) capsule 1 capsule, 1 capsule, Oral, Daily, Brian Gleason MD, 1 capsule at 07/28/23 0852    naloxone (NARCAN) injection 0.2 mg, 0.2 mg, Intravenous, Q2 Min PRN **OR** naloxone (NARCAN) injection 0.4 mg, 0.4 mg, Intravenous, Q2 Min PRN **OR** naloxone (NARCAN) injection 0.2 mg, 0.2 mg, Intramuscular, Q2 Min PRN **OR** naloxone (NARCAN) injection 0.4 mg, 0.4 mg, Intramuscular, Q2 Min PRN, Brian Gleason MD    oxyCODONE (ROXICODONE) tablet 5 mg, 5 mg, Oral, Q4H PRN, Brian Gleason MD    oxyCODONE IR (ROXICODONE) half-tab 2.5 mg, 2.5 mg, Oral, Q4H PRN, Brian Gleason MD    polyethylene glycol (MIRALAX) Packet 17 g, 17 g, Oral, Daily PRN, Brian Gleason MD    prochlorperazine (COMPAZINE) injection 10 mg, 10 mg, Intravenous, Q6H PRN  **OR** prochlorperazine (COMPAZINE) tablet 10 mg, 10 mg, Oral, Q6H PRN **OR** prochlorperazine (COMPAZINE) suppository 25 mg, 25 mg, Rectal, Q12H PRN, Brian Gleason MD    senna-docusate (SENOKOT-S/PERICOLACE) 8.6-50 MG per tablet 1 tablet, 1 tablet, Oral, BID PRN **OR** senna-docusate (SENOKOT-S/PERICOLACE) 8.6-50 MG per tablet 2 tablet, 2 tablet, Oral, BID PRN, Brian Gleason MD    sevelamer carbonate (RENVELA) tablet 4,000 mg, 4,000 mg, Oral, TID w/meals, TaxCorina combs MD, 4,000 mg at 07/28/23 0852    sodium chloride (PF) 0.9% PF flush 3 mL, 3 mL, Intracatheter, Q8H, Brian Gleason MD, 3 mL at 07/28/23 0409    sodium chloride (PF) 0.9% PF flush 3 mL, 3 mL, Intracatheter, q1 min prn, Brian Gleason MD    vitamin D3 (CHOLECALCIFEROL) tablet 50 mcg, 50 mcg, Oral, Daily, Brian Gleason MD, 50 mcg at 07/28/23 0852     SOCIAL HABITS:    Tobacco Use      Smoking status: Never      Smokeless tobacco: Never     Social History    Substance and Sexual Activity      Alcohol use: No       History   Drug Use    Types: Marijuana     Comment: occ        FAMILY HISTORY:    Family History   Problem Relation Age of Onset    Blood Disease Mother         has hep b    Diabetes Mother         gestionanal diabetes    Hypertension Mother     Obesity Father     Hypertension Father     Hypertension Maternal Grandmother     Diabetes Maternal Grandmother     Hypertension Paternal Grandmother     Hypertension Paternal Grandfather     Coronary Artery Disease Maternal Uncle     Cancer No family hx of        REVIEW OF SYSTEMS:    A 12 point ROS was reviewed and except for what is listed in the HPI above, all others are negative    PE:    Vital signs:  Temp: 99.9  F (37.7  C) Temp src: Oral BP: (!) 182/119 Pulse: 88   Resp: 18 SpO2: 100 % O2 Device: None (Room air)     Weight: 103.3 kg (227 lb 12.8 oz)  Estimated body mass index is 32.69 kg/m  as calculated from the following:    Height as of an earlier encounter on  "7/27/23: 1.778 m (5' 10\").    Weight as of this encounter: 103.3 kg (227 lb 12.8 oz).       Wt Readings from Last 1 Encounters:   07/28/23 103.3 kg (227 lb 12.8 oz)     Body mass index is 32.69 kg/m .      DIAGNOSTIC STUDIES:     Images:  XR Shoulder Left G/E 3 Views    Result Date: 7/27/2023  EXAM: XR SHOULDER LEFT G/E 3 VIEWS LOCATION: [facility] DATE: 7/27/2023 INDICATION: Severe left shoulder pain rated 10 out of 10. COMPARISON: None.     IMPRESSION: No evidence of acute fracture or dislocation. Joint spaces are preserved. Soft tissues grossly unremarkable. Partially visualized catheter projecting over the right chest.    US Upper Extremity Venous Duplex Left    Result Date: 7/27/2023  VENOUS DUPLEX ULTRASOUND LEFT UPPER EXTREMITY  7/27/2023 8:11 AM CLINICAL HISTORY:  Upper arm pain. TECHNIQUE: Venous Duplex ultrasound of the left upper extremity with (when possible) and without compression, augmentation, and duplex. Color flow and spectral Doppler with waveform analysis performed. COMPARISON: None. FINDINGS: Ultrasound includes evaluation of the internal jugular vein, innominate vein, subclavian vein, axillary vein, and brachial vein. The superficial cephalic and basilic veins were also evaluated where seen. LEFT: No deep venous thrombosis. No superficial thrombophlebitis.     IMPRESSION: No deep venous thrombosis in the left upper extremity. ANASTASIA HAMMOND MD   SYSTEM ID:  [id number]    US Ext Arterial Venous Dialys Acs Graft    Result Date: 7/27/2023  US EXTREMITY ARTERIAL VENOUS DIALYSIS ACCESS GRAFT  7/27/2023 8:10 AM HISTORY:  Left brachial artery to basilic vein fistula. Arm pain. COMPARISON: 7/12/2023 FINDINGS: Color Doppler and spectral waveform analysis performed. The left brachial artery to basilic vein fistula is patent. The outflow volume is measured to be 2895 mL/m. Ultrasound suggests a focal stenosis in the outflow vein just past the arteriovenous anastomosis where the " luminal diameter measures 2.0 mm and peak systolic velocity equals 721 cm/s. The remaining outflow vein is patent with diameters ranging between 7.6 to 15 mm.     IMPRESSION: Patent left brachial artery to basilic vein fistula. Ultrasound suggests a focal stenosis just past the arteriovenous anastomosis as above. RODNEY MALONEY MD   SYSTEM ID:  [id number]    Elbow  XR, G/E 3 views, left    Result Date: 7/13/2023  EXAM: XR ELBOW LEFT G/E 3 VIEWS LOCATION: [facility] DATE: 7/13/2023 INDICATION: l elbow pain COMPARISON: None.     IMPRESSION: No acute fracture or dislocation. No joint effusion. Old ossific fragment along the lateral epicondyle.    US Ext Arterial Venous Dialys Acs Graft    Result Date: 7/12/2023  US EXTREMITY ARTERIAL VENOUS DIALYSIS ACCESS GRAFT 7/12/2023 1:41 PM HISTORY: 27-year-old patient with history of left brachial to basilic AV fistula on May 30. May 30 was stage II, initial surgery for first stage was March 9, 2023. COMPARISON: April 25, 2023. TECHNIQUE: Color Doppler and spectral waveform analysis obtained throughout the inflow brachial artery as well as outflow basilic vein. FINDINGS: Inflow brachial artery ranges from 5.3-7.5 mm. AV anastomosis is patent and 509/337 cm/second. Tortuosity noted in the basilic vein just beyond the anastomosis measuring 4.9 mm and 754/496 cm/second. Remaining outflow diameters range from 7.6-10.8 mm. Total blood flow volume is 3079 mL/minute.     IMPRESSION: Patent left upper arm AV fistula, tortuous just beyond the AV anastomosis and measuring 4.9 mm. However, good blood flow volume of 3079 mL/minute PINEDA CAVAZOS MD   SYSTEM ID:  [id number]    LABS:      Sodium   Date Value Ref Range Status   07/28/2023 136 136 - 145 mmol/L Final   07/27/2023 135 (L) 136 - 145 mmol/L Final   07/27/2023 137 136 - 145 mmol/L Final   06/19/2021 139 133 - 144 mmol/L Final   06/18/2021 138 133 - 144 mmol/L Final   06/17/2021 138 133 - 144 mmol/L Final      Urea Nitrogen   Date Value Ref Range Status   07/28/2023 57.4 (H) 6.0 - 20.0 mg/dL Final   07/27/2023 53.0 (H) 6.0 - 20.0 mg/dL Final   07/27/2023 43.8 (H) 6.0 - 20.0 mg/dL Final   06/20/2022 35 (H) 7 - 30 mg/dL Final   03/03/2022 48 (H) 7 - 30 mg/dL Final   12/26/2021 91 (H) 7 - 30 mg/dL Final   06/19/2021 58 (H) 7 - 30 mg/dL Final   06/18/2021 95 (H) 7 - 30 mg/dL Final   06/17/2021 147 (H) 7 - 30 mg/dL Final     Hemoglobin   Date Value Ref Range Status   07/28/2023 10.6 (L) 13.3 - 17.7 g/dL Final   07/27/2023 10.8 (L) 13.3 - 17.7 g/dL Final   07/27/2023 10.6 (L) 13.3 - 17.7 g/dL Final   06/18/2021 9.1 (L) 13.3 - 17.7 g/dL Final   06/17/2021 8.9 (L) 13.3 - 17.7 g/dL Final   06/15/2021 9.3 (L) 13.3 - 17.7 g/dL Final     Platelet Count   Date Value Ref Range Status   07/28/2023 230 150 - 450 10e3/uL Final   07/27/2023 242 150 - 450 10e3/uL Final   07/27/2023 219 150 - 450 10e3/uL Final   06/18/2021 203 150 - 450 10e9/L Final   06/17/2021 213 150 - 450 10e9/L Final   06/15/2021 252 150 - 450 10e9/L Final     INR   Date Value Ref Range Status   03/15/2023 1.40 (H) 0.85 - 1.15 Final   02/09/2021 1.23 (H) 0.86 - 1.14 Final   07/18/2017 1.04 0.86 - 1.14 Final     35 min spent on the date of the encounter in chart review, patient visit, review of tests, documentation and/or discussion with other providers about the issues documented above    Zaina Welsh NP  [facility] Vascular Surgery

## 2023-07-28 NOTE — PROGRESS NOTES
Orientation/Cognitive: A&O X4  Observation Goals (Met/ Not Met): Inpatient   Mobility Level/Assist Equipment: IND  Fall Risk (Y/N): N  Behavior Concerns: green  Pain Management: PRN Tylenol X1  Tele/VS/O2: Sinus rhythm with T wave inversion, VSS X elevated BP, O2 RA  ABNL Lab/BG: creatinine 12.84, GFR 5, .64  Diet: Renal  Bowel/Bladder: Continent   Skin Concerns: brusing to SULY  Drains/Devices: R PIV, R HD port, LUE AV fistula  Tests/Procedures for next shift: Nephrology, orthopedic surgery, and vascular consults  Anticipated DC date & active delays: TBD

## 2023-07-28 NOTE — PROVIDER NOTIFICATION
MD Notification    Notified Person: MD    Notified Person Name: Shani Hernandez PA-C     Notification Date/Time:1750 7/28/23     Notification Interaction:Phone call    Purpose of Notification:The Pt is running a fever of 101.6. Paged about restarting antibiotics     Orders Received:Recommends Hospitalist restart antibiotics     Comments:

## 2023-07-28 NOTE — ED TRIAGE NOTES
Pt with hx of hemodialysis and recent infiltration of fistula in left arm presents to ED with left arm and shoulder pain. Pt was seen earlier today and prescribed oxycodone, states last took at 1300 and it is not helping     Triage Assessment       Row Name 07/27/23 1910       Triage Assessment (Adult)    Airway WDL WDL       Respiratory WDL    Respiratory WDL WDL       Skin Circulation/Temperature WDL    Skin Circulation/Temperature WDL WDL       Cardiac WDL    Cardiac WDL WDL       Peripheral/Neurovascular WDL    Peripheral Neurovascular WDL WDL       Cognitive/Neuro/Behavioral WDL    Cognitive/Neuro/Behavioral WDL WDL

## 2023-07-28 NOTE — PROCEDURES
[facility]    Procedure: Fluoroscopic-Guided Left Shoulder Joint Aspiration    Date/Time: 7/28/2023 4:27 PM    Performed by: Adam Oshea PA-C  Authorized by: Adam Oshea PA-C      UNIVERSAL PROTOCOL   Site Marked: Yes  Prior Images Obtained and Reviewed:  Yes  Required items: Required blood products, implants, devices and special equipment available    Patient identity confirmed:  Verbally with patient  NA - No sedation, light sedation, or local anesthesia  Confirmation Checklist:  Patient's identity using two indicators, relevant allergies, procedure was appropriate and matched the consent or emergent situation and correct equipment/implants were available  Time out: Immediately prior to the procedure a time out was called    Universal Protocol: the Joint Commission Universal Protocol was followed    Preparation: Patient was prepped and draped in usual sterile fashion    ESBL (mL):  0     ANESTHESIA    Anesthesia: Local infiltration  Local Anesthetic:  Lidocaine 1% without epinephrine  Anesthetic Total (mL):  8      SEDATION    Patient Sedated: No    See dictated procedure note for full details.    PROCEDURE  Describe Procedure: Attempted Fluoroscopic-Guided Left Shoulder Joint Aspiration  Specimen: none, no fluid was able to be aspirated  Patient Tolerance:  Patient tolerated the procedure well with no immediate complications  Length of time physician/provider present for 1:1 monitoring during sedation: 0

## 2023-07-28 NOTE — PROGRESS NOTES
Partially met PRIMARY DIAGNOSIS: ACUTE PAIN  OUTPATIENT/OBSERVATION GOALS TO BE MET BEFORE DISCHARGE:  1. Pain Status: Improved but still requiring IV narcotics.    2. Return to near baseline physical activity: Yes    3. Cleared for discharge by consultants (if involved): No    Discharge Planner Nurse   Safe discharge environment identified: No  Barriers to discharge: Yes       Entered by: Niru Barnes RN 07/28/2023 12:11 PM     Please review provider order for any additional goals.   Nurse to notify provider when observation goals have been met and patient is ready for discharge.

## 2023-07-28 NOTE — PLAN OF CARE
Goal Outcome Evaluation:  Orientation/Cognitive: A&Ox4  Observation Goals (Met/ Not Met): Not met  Mobility Level/Assist Equipment: Indep  Fall Risk (Y/N): N  Behavior Concerns: None, pleasant  Pain Management: SULY/shoulder pain managed with iv dilaudid  Tele/VS/O2: SR, VSS except HTN  ABNL Lab/BG:K 5.4, Cr 11.74, WBC 12.7 Troponin 64; BG 92  Diet: Renal  Bowel/Bladder: Continent, on HD  Skin Concerns: Bruising to SULY  Drains/Devices:R PIV, R HD port, LUE AV fistula  Tests/Procedures for next shift: Nephrology, Orthopedic surgery & Vascular surgery consults  Anticipated DC date & active delays: TBD  Patient Stated Goal for Today:Pain management    Patient currently at dialysis

## 2023-07-28 NOTE — PROGRESS NOTES
Partially met PRIMARY DIAGNOSIS: ACUTE PAIN  OUTPATIENT/OBSERVATION GOALS TO BE MET BEFORE DISCHARGE:  1. Pain Status: Improved but still requiring IV narcotics.    2. Return to near baseline physical activity: Yes    3. Cleared for discharge by consultants (if involved): No    Discharge Planner Nurse   Safe discharge environment identified: No  Barriers to discharge: Yes       Entered by: Niru Barnes RN 07/28/2023 9:02 AM     Please review provider order for any additional goals.   Nurse to notify provider when observation goals have been met and patient is ready for discharge.

## 2023-07-28 NOTE — ED PROVIDER NOTES
History   Chief Complaint:  Shoulder Pain    The history is provided by the patient.      Taylor Valiente is a 27 year old male with history of ESRD on dialysis, DVT (anticoagulated on apixaban), and cardiomyopathy who presents with left shoulder pain. He was seen in the emergency department earlier today by Dr. Suh for left upper arm pain due to his fistula being infiltrated during dialysis (7/24). Patient says that the pain has progressed to 10/10 and radiates from his shoulder down to his elbow.  Shoulder pain also radiates into his left upper chest. He took 2 oxycodone at 1400, but says that it is not alleviating the pain. He expresses feeling weakness and myalgias throughout today. Denies fevers.  No diaphoresis, nausea, vomiting, abdominal pain.    Independent Historian:   None - Patient Only    Review of External Notes:   Patient was seen earlier today and reported that 3 days ago, during dialysis, his fistula site infiltrated. LUE Venous US showed no deep venous thrombosis in the left upper extremity. Arterial US showed a patent left brachial artery to basilic vein fistula. Ultrasound suggests a focal stenosis just past the arteriovenous anastomosis as above.     Medications:    Mycophenolate  Sevelamer  Dronabinol  Amlodipine  Apixaban  Aspirin   Atorvastatin  Bumetanide  Carvedilol  Cinacalet  Clonidine  Hydralazine  Lisinopril    Past Medical History:    Adrenal; adenoma, left  Renovascular hypertension   ESRD on dialysis   Depression  Focal glomerular sclerosis  Hyperlipidemia  LVH due to hypertensive diease  Obesity  Acute renal failure with acute tubular necrosis  Anemia of chronic renal failure  Osteochondritis dissecans  Stress-induced cardiomyopathy  Suicide attempt   Acute deep vein thrombosis (DVT) of non-extremity vein  Hypo-osmolality and hyponatremia  Heart failure    Past Surgical History:    Arthroscopy ankle, left  Create fistula arteriovenous upper extremity, left, multiple    Create fistula arteriovenous upper extremity, right  IR CVC tunnel replacement  IR CVC tunnel removal   Irrigation and debridement upper extremity, left  Ligate fistula arteriovenous upper extremity, right  Revision fistula arteriovenous upper extremity, right    Physical Exam     Patient Vitals for the past 24 hrs:   BP Temp Temp src Pulse Resp SpO2   07/27/23 1935 (!) 185/135 97.7  F (36.5  C) Temporal 92 16 98 %     Physical Exam  General: Nontoxic appearing adult male sitting on gurney.  HENT:   Head: Atraumatic.  Mouth/Throat: Oropharynx clear and moist.  Eyes: Conjunctive and EOM normal. PERRLA.  Neck: Normal ROM. No rigidity.  CV: Regular rate and rhythm. Normal S1, S2. Cap refill intact LUE and intact radial pulse.  Resp: Lungs clear to auscultation bilaterally. Normal respiratory effort. No cough observed.  GI: Abdomen soft, non distended and nontender. No rebound or guarding.  MSK: Patient has exquisite tenderness over the left shoulder, worse with any movement. Patient has a left upper extremity AV fistula with palpable thrill. Surrounding contusion. No erythema or warmth.   Skin: Warm and dry. See above.  Neuro: Awake, alert, oriented x 3. Sensation intact to light touch LUE.  Psych: Normal mood and affect.      Emergency Department Course   ECG  ECG results from 07/27/23   EKG 12 lead     Value    Systolic Blood Pressure     Diastolic Blood Pressure     Ventricular Rate 87    Atrial Rate 87    VA Interval 158    QRS Duration 82        QTc 466    P Axis 44    R AXIS 52    T Axis 86    Interpretation ECG      Sinus rhythm  Nonspecific T wave abnormality  Prolonged QT  Abnormal ECG  When compared with ECG of 20-JUL-2023 22:45,  No significant change was found  Confirmed by GENERATED REPORT, COMPUTER ([id number]),  DANIELA JOINER ([id number]) on 7/27/2023 11:15:53 PM           Imaging:  XR Shoulder Left G/E 3 Views   Final Result   IMPRESSION:       No evidence of acute fracture or dislocation. Joint spaces  are preserved. Soft tissues grossly unremarkable.      Partially visualized catheter projecting over the right chest.         Report per radiology    Laboratory:  Labs Ordered and Resulted from Time of ED Arrival to Time of ED Departure   TROPONIN T, HIGH SENSITIVITY - Abnormal       Result Value    Troponin T, High Sensitivity 64 (*)    COMPREHENSIVE METABOLIC PANEL - Abnormal    Sodium 135 (*)     Potassium 5.7 (*)     Chloride 98      Carbon Dioxide (CO2) 19 (*)     Anion Gap 18 (*)     Urea Nitrogen 53.0 (*)     Creatinine 11.74 (*)     Calcium 9.6      Glucose 97      Alkaline Phosphatase 77      AST 8      ALT 8      Protein Total 7.5      Albumin 3.8      Bilirubin Total 0.3      GFR Estimate 6 (*)    ERYTHROCYTE SEDIMENTATION RATE AUTO - Abnormal    Erythrocyte Sedimentation Rate 55 (*)    CRP INFLAMMATION - Abnormal    CRP Inflammation 78.43 (*)    PROCALCITONIN - Abnormal    Procalcitonin 2.18 (*)    CBC WITH PLATELETS AND DIFFERENTIAL - Abnormal    WBC Count 12.7 (*)     RBC Count 4.03 (*)     Hemoglobin 10.8 (*)     Hematocrit 35.5 (*)     MCV 88      MCH 26.8      MCHC 30.4 (*)     RDW 18.0 (*)     Platelet Count 242      % Neutrophils 76      % Lymphocytes 14      % Monocytes 7      % Eosinophils 2      % Basophils 0      % Immature Granulocytes 1      NRBCs per 100 WBC 0      Absolute Neutrophils 9.7 (*)     Absolute Lymphocytes 1.8      Absolute Monocytes 0.9      Absolute Eosinophils 0.3      Absolute Basophils 0.0      Absolute Immature Granulocytes 0.1      Absolute NRBCs 0.0     POTASSIUM - Abnormal    Potassium 5.4 (*)    TROPONIN T, HIGH SENSITIVITY - Abnormal    Troponin T, High Sensitivity 64 (*)    MAGNESIUM - Normal    Magnesium 2.2     BLOOD CULTURE   BLOOD CULTURE        Emergency Department Course & Assessments:    Interventions:  Medications   HYDROmorphone (PF) (DILAUDID) injection 0.5 mg (0.5 mg Intravenous $Given 7/27/23 2200)   vancomycin (VANCOCIN) 2,500 mg in 0.9% NaCl 500 mL  intermittent infusion (2,500 mg Intravenous $New Bag 7/28/23 0038)   cefTRIAXone (ROCEPHIN) 2 g vial to attach to  ml bag for ADULTS or NS 50 ml bag for PEDS (0 g Intravenous Stopped 7/28/23 0038)     Assessments:  2143 I obtained history and examined the patient as noted above.  2335    I discussed results with the patient and plan for admission.  0100    I rechecked the patient and Dr. Gleason did a full history, examination and assessment.    Independent Interpretation (X-rays, CTs, rhythm strip):  ECG with some QT prolongation    Consultations/Discussion of Management or Tests:   2330    I staffed the patient with Dr. Crews. I paged out to Nephrology. I consulted with the pharmacist and we will initiate Vanc/Rocephin for possible septic joint (will get blood cultures first).     0015 I spoke to Dr. Fonseca of Nephrology, who confirmed Nephrology can see the patient tomorrow and he can get dialysis if needed. I then asked the [facility] to page out to a hospitalist.    0030  I discussed the patient's case with Dr. Gleason of the hospitalist service, who accepted him for Observation admission and will see the patient here in the ED.    Social Determinants of Health affecting care:   None    Disposition:  The patient was admitted to the hospital under the care of Dr. Gleason.    Impression & Plan    Medical Decision Making:  Taylor Valiente is a 27 year old male with history of ESRD on dialysis, DVT (anticoagulated on apixaban), and cardiomyopathy who presented for the second time to the ED today with worsening left upper extremity pain.  Patient is a dialysis patient and reportedly had infiltration of his fistula 3 days ago.  He was seen earlier today in the emergency department where LUE Venous US showed no deep venous thrombosis in the left upper extremity. Arterial US showed a patent left brachial artery to basilic vein fistula. Ultrasound suggests a focal stenosis just past the arteriovenous anastomosis as above.      Patient returned here because his pain shot up to a 10/10 and was into his left shoulder and upper chest.  He was given IV pain medication.  Laboratory studies obtained and showed him to have a leukocytosis, elevated procalcitonin and elevated inflammatory markers.  Patient had exquisite pain with any range of motion of the left shoulder, concerning for potential joint infection.  Blood cultures were drawn and IV antibiotics were initiated after I discussed the case and staffed with attending provider Dr. Crews (see separate APC supervisory note).    Patient's metabolic panel showed him to have hyperkalemia at 5.7.  This is rechecked and still high at 5.4.  Magnesium normal.  Creatinine 11.74 and GFR 6.  Nephrology was consulted and can see the patient tomorrow and potentially dialyze him.    With the pain radiating into the patient's chest, I did obtain EKG and troponin.  EKG showed some nonspecific T wave changes and QT prolongation.  Initial troponin elevated at 64, but this is likely due to his end-stage renal disease.  Repeat troponin flat.    Ultimately, I did discuss the patient's case with Dr. Gleason of the hospitalist service, who accepted him for observation admission and will come see the patient here in the ED.      Diagnosis:    ICD-10-CM    1. ESRD (end stage renal disease) on dialysis (H)  N18.6     Z99.2       2. Hyperkalemia  E87.5       3. Acute pain of left shoulder  M25.512       4. Leukocytosis, unspecified type  D72.829       5. Elevated erythrocyte sedimentation rate  R70.0       6. Prolonged Q-T interval on ECG  R94.31            Scribe Disclosure:  DAI, Cruz Patel & Cheryl Dill, am serving as a scribe at 10:06 PM on 7/27/2023 to document services personally performed by Carolin Martinez PA-C based on my observations and the provider's statements to me.     7/27/2023   Carolin Martinez PA-C Dewing, Jennifer C, PA-C  07/28/23 0041       Carolin Martinez PA-C  07/28/23  0107

## 2023-07-28 NOTE — CONSULTS
Assessment and Plan:     End-stage renal disease: He typically runs with Dr. Barrett at [facility] dialysis unit ([place]).  He will get dialysis today.  He has a left upper arm fistula which that they have started to use but had an infiltrate earlier in the week.  We will use his right CVC.  Orders are reviewed and placed.  We will use a 140 sodium to potassium and 38 bicarbonate bath.  He will get people on the run.  We will use low-dose heparin protocol.    For metabolic bone disease he is on PhosLo , vitamin D and Sensipar.  He also takes Renvela.            Interval History:   Left shoulder pain.  It is unclear whether this is associated with his vascular access or not.  His vascular access looks good other than some ecchymosis.         Hypertension: He is on amlodipine, Bumex, carvedilol, hydralazine    Right internal jugular vein DVT associated with dialysis catheter.  He has been on Eliquis.           Review of Systems:   He has been doing well on dialysis other than his access issues.  They are just beginning to start to use his left upper arm fistula but has had an infiltrate.  He complains mostly of left shoulder pain which is worse with movement and radiates down the outside of the arm.          Medications:      amLODIPine  10 mg Oral Daily    [Held by provider] apixaban ANTICOAGULANT  5 mg Oral BID    atorvastatin  10 mg Oral At Bedtime    bumetanide  2 mg Oral Daily    calcium acetate  1,334 mg Oral TID w/meals    carvedilol  25 mg Oral BID w/meals    cinacalcet  90 mg Oral Daily    hydrALAZINE  100 mg Oral TID    multivitamin RENAL  1 capsule Oral Daily    sevelamer carbonate  4,000 mg Oral TID w/meals    sodium chloride (PF)  3 mL Intracatheter Q8H    vitamin D3  50 mcg Oral Daily       Current active medications and PTA medications reviewed, see medication list for details.            Physical Exam:   Vitals were reviewed  Patient Vitals for the past 24 hrs:   BP Temp Temp src  Pulse Resp SpO2 Weight   23 0808 (!) 182/119 99.9  F (37.7  C) Oral 88 18 100 % --   23 0454 -- -- -- -- -- -- 103.3 kg (227 lb 12.8 oz)   23 0344 (!) 150/105 98.7  F (37.1  C) Oral 88 18 100 % --   23 0328 -- -- -- -- 17 -- --   23 0323 (!) 157/99 -- -- 101 -- -- --   23 0322 -- -- -- 88 17 -- --   23 1935 (!) 185/135 97.7  F (36.5  C) Temporal 92 16 98 % --       Temp:  [97.7  F (36.5  C)-99.9  F (37.7  C)] 99.9  F (37.7  C)  Pulse:  [] 88  Resp:  [16-18] 18  BP: (150-185)/() 182/119  SpO2:  [98 %-100 %] 100 %    Temperatures:  Current - Temp: 99.9  F (37.7  C); Max - Temp  Av.8  F (37.1  C)  Min: 97.7  F (36.5  C)  Max: 99.9  F (37.7  C)  Respiration range: Resp  Av.2  Min: 16  Max: 18  Pulse range: Pulse  Av.4  Min: 88  Max: 101  Blood pressure range: Systolic (24hrs), Av , Min:150 , Max:185   ; Diastolic (24hrs), Av, Min:99, Max:135    Pulse oximetry range: SpO2  Av.3 %  Min: 98 %  Max: 100 %    I/O last 3 completed shifts:  In: 100 [IV Piggyback:100]  Out: -       Intake/Output Summary (Last 24 hours) at 2023 0957  Last data filed at 2023 0038  Gross per 24 hour   Intake 100 ml   Output --   Net 100 ml       Alert and responsive resting comfortably in bed  Head shows no trauma the eyes show pupils round and reactive to light  Neck is supple  Right CVC in the internal jugular vein with no redness or tenderness  No swelling of either hand or forearm.  The left upper arm has a fistula in place with a nice vein with a good thrill  Lungs show clear breath sounds  Cardiac exam regular rhythm normal S1-S2 no murmur rub or gallop  No lower extremity edema       Wt Readings from Last 4 Encounters:   23 103.3 kg (227 lb 12.8 oz)   23 104.2 kg (229 lb 11.2 oz)   23 104.1 kg (229 lb 6.4 oz)   23 105.6 kg (232 lb 14.4 oz)          Data:          Lab Results   Component Value Date     2023    NA  135 07/27/2023     07/27/2023     06/19/2021     06/18/2021     06/17/2021    Lab Results   Component Value Date    CHLORIDE 99 07/28/2023    CHLORIDE 98 07/27/2023    CHLORIDE 99 07/27/2023    CHLORIDE 102 06/20/2022    CHLORIDE 104 03/03/2022    CHLORIDE 104 12/26/2021    CHLORIDE 105 06/19/2021    CHLORIDE 108 06/18/2021    CHLORIDE 107 06/17/2021    Lab Results   Component Value Date    BUN 57.4 07/28/2023    BUN 53.0 07/27/2023    BUN 43.8 07/27/2023    BUN 35 06/20/2022    BUN 48 03/03/2022    BUN 91 12/26/2021    BUN 58 06/19/2021    BUN 95 06/18/2021     06/17/2021      Lab Results   Component Value Date    POTASSIUM 5.2 07/28/2023    POTASSIUM 5.2 07/28/2023    POTASSIUM 5.4 07/28/2023    POTASSIUM 3.9 06/20/2022    POTASSIUM 5.3 03/03/2022    POTASSIUM 5.2 12/26/2021    POTASSIUM 3.8 06/19/2021    POTASSIUM 3.8 06/18/2021    POTASSIUM 3.9 06/17/2021    Lab Results   Component Value Date    CO2 19 07/28/2023    CO2 19 07/27/2023    CO2 21 07/27/2023    CO2 25 06/20/2022    CO2 28 03/03/2022    CO2 24 12/26/2021    CO2 27 06/19/2021    CO2 23 06/18/2021    CO2 17 06/17/2021    Lab Results   Component Value Date    CR 12.84 07/28/2023    CR 11.74 07/27/2023    CR 10.09 07/27/2023    CR 8.93 06/19/2021    CR 11.50 06/18/2021    CR 15.80 06/17/2021        Recent Labs   Lab Test 07/28/23  0645 07/27/23  2208 07/27/23  0700   WBC 10.3 12.7* 13.6*   HGB 10.6* 10.8* 10.6*   HCT 35.5* 35.5* 35.3*   MCV 90 88 89    242 219     Recent Labs   Lab Test 07/27/23 2208 07/27/23  0700 07/20/23  2044   AST 8 7 14   ALT 8 8 12   ALKPHOS 77 78 117   BILITOTAL 0.3 0.3 0.3       Recent Labs   Lab Test 07/27/23 2208 04/19/23  1546 12/12/22  0559   MAG 2.2 1.8 2.3     Recent Labs   Lab Test 06/18/21  0740 06/17/21  0655 04/05/21  1545   PHOS 7.2* 8.2* 6.1*     Recent Labs   Lab Test 07/28/23  0645 07/27/23 2208 07/27/23  0700   BUBBA 9.7 9.6 9.5       Lab Results   Component Value Date    BUBBA  9.7 07/28/2023     Lab Results   Component Value Date    WBC 10.3 07/28/2023    HGB 10.6 (L) 07/28/2023    HCT 35.5 (L) 07/28/2023    MCV 90 07/28/2023     07/28/2023     Lab Results   Component Value Date     07/28/2023    POTASSIUM 5.2 07/28/2023    POTASSIUM 5.2 07/28/2023    CHLORIDE 99 07/28/2023    CO2 19 (L) 07/28/2023    GLC 92 07/28/2023     Lab Results   Component Value Date    BUN 57.4 (H) 07/28/2023    CR 12.84 (H) 07/28/2023     Lab Results   Component Value Date    MAG 2.2 07/27/2023     Lab Results   Component Value Date    PHOS 7.2 (H) 06/18/2021       Creatinine   Date Value Ref Range Status   07/28/2023 12.84 (H) 0.67 - 1.17 mg/dL Final   07/27/2023 11.74 (H) 0.67 - 1.17 mg/dL Final   07/27/2023 10.09 (H) 0.67 - 1.17 mg/dL Final   07/20/2023 13.55 (H) 0.67 - 1.17 mg/dL Final   05/30/2023 11.17 (H) 0.67 - 1.17 mg/dL Final   04/19/2023 5.92 (H) 0.67 - 1.17 mg/dL Final   06/19/2021 8.93 (H) 0.66 - 1.25 mg/dL Final   06/18/2021 11.50 (H) 0.66 - 1.25 mg/dL Final   06/17/2021 15.80 (H) 0.66 - 1.25 mg/dL Final   06/16/2021 15.80 (H) 0.66 - 1.25 mg/dL Final   06/15/2021 16.10 (H) 0.66 - 1.25 mg/dL Final   04/05/2021 8.23 (H) 0.66 - 1.25 mg/dL Final     Comment:     Critical Value called to and read back by  DR ALFONSO BRADLEY AT 1840 ON 04 05 2021          Attestation:  I have reviewed today's vital signs, notes, medications, labs and imaging.  Seen on dialysis.     Yosvany Bains MD

## 2023-07-28 NOTE — PROGRESS NOTES
.RECEIVING UNIT ED HANDOFF REVIEW    ED Nurse Handoff Report was reviewed by: NAYAN KIRK RN on July 28, 2023 at 2:27 AM

## 2023-07-28 NOTE — TELEPHONE ENCOUNTER
Spoke to patient's mother regarding scheduling;  Patient has been admitted into the hospital 07/27/23 due to are swelling and worsening.  Patient would like to know if Dr Moran can see him in the ER.    Patient is scheduled for an in-clinic visit with Dr Moran 08/02/23.    Routing back to nurses to advise and inform Dr Moran if unaware.

## 2023-07-29 ENCOUNTER — APPOINTMENT (OUTPATIENT)
Dept: MRI IMAGING | Facility: CLINIC | Age: 28
DRG: 557 | End: 2023-07-29
Attending: ORTHOPAEDIC SURGERY
Payer: MEDICARE

## 2023-07-29 DIAGNOSIS — I10 BENIGN ESSENTIAL HYPERTENSION: Primary | ICD-10-CM

## 2023-07-29 PROBLEM — T82.7XXA: Status: ACTIVE | Noted: 2023-07-29

## 2023-07-29 PROBLEM — A41.1: Status: ACTIVE | Noted: 2023-07-29

## 2023-07-29 PROBLEM — M75.52 BURSITIS OF LEFT SHOULDER: Status: ACTIVE | Noted: 2023-07-29

## 2023-07-29 PROBLEM — M25.512 ACUTE PAIN OF LEFT SHOULDER: Chronic | Status: ACTIVE | Noted: 2023-07-28

## 2023-07-29 LAB
ANION GAP SERPL CALCULATED.3IONS-SCNC: 16 MMOL/L (ref 7–15)
BASOPHILS # BLD AUTO: 0 10E3/UL (ref 0–0.2)
BASOPHILS NFR BLD AUTO: 0 %
BUN SERPL-MCNC: 33 MG/DL (ref 6–20)
CALCIUM SERPL-MCNC: 8.8 MG/DL (ref 8.6–10)
CHLORIDE SERPL-SCNC: 97 MMOL/L (ref 98–107)
CREAT SERPL-MCNC: 8.93 MG/DL (ref 0.67–1.17)
CRP SERPL-MCNC: 111.18 MG/L
DEPRECATED HCO3 PLAS-SCNC: 27 MMOL/L (ref 22–29)
ENTEROCOCCUS FAECALIS: NOT DETECTED
ENTEROCOCCUS FAECIUM: NOT DETECTED
EOSINOPHIL # BLD AUTO: 0.3 10E3/UL (ref 0–0.7)
EOSINOPHIL NFR BLD AUTO: 3 %
ERYTHROCYTE [DISTWIDTH] IN BLOOD BY AUTOMATED COUNT: 17.7 % (ref 10–15)
ERYTHROCYTE [SEDIMENTATION RATE] IN BLOOD BY WESTERGREN METHOD: 58 MM/HR (ref 0–15)
GFR SERPL CREATININE-BSD FRML MDRD: 8 ML/MIN/1.73M2
GLUCOSE SERPL-MCNC: 94 MG/DL (ref 70–99)
HCT VFR BLD AUTO: 35.8 % (ref 40–53)
HGB BLD-MCNC: 10.8 G/DL (ref 13.3–17.7)
IMM GRANULOCYTES # BLD: 0.1 10E3/UL
IMM GRANULOCYTES NFR BLD: 1 %
LISTERIA SPECIES (DETECTED/NOT DETECTED): NOT DETECTED
LYMPHOCYTES # BLD AUTO: 2.1 10E3/UL (ref 0.8–5.3)
LYMPHOCYTES NFR BLD AUTO: 22 %
MCH RBC QN AUTO: 26.5 PG (ref 26.5–33)
MCHC RBC AUTO-ENTMCNC: 30.2 G/DL (ref 31.5–36.5)
MCV RBC AUTO: 88 FL (ref 78–100)
MONOCYTES # BLD AUTO: 1 10E3/UL (ref 0–1.3)
MONOCYTES NFR BLD AUTO: 10 %
NEUTROPHILS # BLD AUTO: 6.2 10E3/UL (ref 1.6–8.3)
NEUTROPHILS NFR BLD AUTO: 64 %
NRBC # BLD AUTO: 0 10E3/UL
NRBC BLD AUTO-RTO: 0 /100
PLATELET # BLD AUTO: 214 10E3/UL (ref 150–450)
POTASSIUM SERPL-SCNC: 4.4 MMOL/L (ref 3.4–5.3)
RBC # BLD AUTO: 4.08 10E6/UL (ref 4.4–5.9)
SODIUM SERPL-SCNC: 140 MMOL/L (ref 136–145)
STAPHYLOCOCCUS AUREUS: NOT DETECTED
STAPHYLOCOCCUS EPIDERMIDIS: NOT DETECTED
STAPHYLOCOCCUS LUGDUNENSIS: DETECTED
STREPTOCOCCUS AGALACTIAE: NOT DETECTED
STREPTOCOCCUS ANGINOSUS GROUP: NOT DETECTED
STREPTOCOCCUS PNEUMONIAE: NOT DETECTED
STREPTOCOCCUS PYOGENES: NOT DETECTED
STREPTOCOCCUS SPECIES: NOT DETECTED
WBC # BLD AUTO: 9.5 10E3/UL (ref 4–11)

## 2023-07-29 PROCEDURE — 80048 BASIC METABOLIC PNL TOTAL CA: CPT | Performed by: HOSPITALIST

## 2023-07-29 PROCEDURE — 85025 COMPLETE CBC W/AUTO DIFF WBC: CPT | Performed by: HOSPITALIST

## 2023-07-29 PROCEDURE — 36415 COLL VENOUS BLD VENIPUNCTURE: CPT | Performed by: HOSPITALIST

## 2023-07-29 PROCEDURE — 250N000013 HC RX MED GY IP 250 OP 250 PS 637: Performed by: EMERGENCY MEDICINE

## 2023-07-29 PROCEDURE — 250N000013 HC RX MED GY IP 250 OP 250 PS 637: Performed by: HOSPITALIST

## 2023-07-29 PROCEDURE — 87040 BLOOD CULTURE FOR BACTERIA: CPT | Performed by: HOSPITALIST

## 2023-07-29 PROCEDURE — 86140 C-REACTIVE PROTEIN: CPT | Performed by: STUDENT IN AN ORGANIZED HEALTH CARE EDUCATION/TRAINING PROGRAM

## 2023-07-29 PROCEDURE — 250N000011 HC RX IP 250 OP 636: Mod: JZ | Performed by: INTERNAL MEDICINE

## 2023-07-29 PROCEDURE — 258N000003 HC RX IP 258 OP 636: Performed by: INTERNAL MEDICINE

## 2023-07-29 PROCEDURE — 250N000013 HC RX MED GY IP 250 OP 250 PS 637: Performed by: INTERNAL MEDICINE

## 2023-07-29 PROCEDURE — 250N000011 HC RX IP 250 OP 636: Mod: JZ | Performed by: HOSPITALIST

## 2023-07-29 PROCEDURE — 120N000001 HC R&B MED SURG/OB

## 2023-07-29 PROCEDURE — 73221 MRI JOINT UPR EXTREM W/O DYE: CPT | Mod: LT,MA

## 2023-07-29 PROCEDURE — 99232 SBSQ HOSP IP/OBS MODERATE 35: CPT | Performed by: HOSPITALIST

## 2023-07-29 PROCEDURE — 85652 RBC SED RATE AUTOMATED: CPT | Performed by: STUDENT IN AN ORGANIZED HEALTH CARE EDUCATION/TRAINING PROGRAM

## 2023-07-29 RX ORDER — CLONIDINE 0.2 MG/24H
1 PATCH, EXTENDED RELEASE TRANSDERMAL WEEKLY
Status: DISCONTINUED | OUTPATIENT
Start: 2023-07-29 | End: 2023-08-02 | Stop reason: HOSPADM

## 2023-07-29 RX ORDER — ONDANSETRON 2 MG/ML
4 INJECTION INTRAMUSCULAR; INTRAVENOUS EVERY 6 HOURS PRN
Status: DISCONTINUED | OUTPATIENT
Start: 2023-07-29 | End: 2023-08-02 | Stop reason: HOSPADM

## 2023-07-29 RX ORDER — CEFAZOLIN SODIUM 2 G/100ML
2 INJECTION, SOLUTION INTRAVENOUS EVERY 24 HOURS
Status: DISCONTINUED | OUTPATIENT
Start: 2023-07-29 | End: 2023-08-02 | Stop reason: HOSPADM

## 2023-07-29 RX ORDER — ONDANSETRON 4 MG/1
4 TABLET, ORALLY DISINTEGRATING ORAL EVERY 6 HOURS PRN
Status: DISCONTINUED | OUTPATIENT
Start: 2023-07-29 | End: 2023-08-02 | Stop reason: HOSPADM

## 2023-07-29 RX ADMIN — HYDRALAZINE HYDROCHLORIDE 100 MG: 50 TABLET, FILM COATED ORAL at 08:33

## 2023-07-29 RX ADMIN — ATORVASTATIN CALCIUM 10 MG: 10 TABLET, FILM COATED ORAL at 21:37

## 2023-07-29 RX ADMIN — CARVEDILOL 25 MG: 25 TABLET, FILM COATED ORAL at 08:33

## 2023-07-29 RX ADMIN — CEFTRIAXONE SODIUM 2 G: 2 INJECTION, POWDER, FOR SOLUTION INTRAMUSCULAR; INTRAVENOUS at 01:02

## 2023-07-29 RX ADMIN — CALCIUM ACETATE 1334 MG: 667 CAPSULE ORAL at 18:47

## 2023-07-29 RX ADMIN — Medication 1 CAPSULE: at 13:00

## 2023-07-29 RX ADMIN — APIXABAN 5 MG: 5 TABLET, FILM COATED ORAL at 19:22

## 2023-07-29 RX ADMIN — BUMETANIDE 2 MG: 2 TABLET ORAL at 08:33

## 2023-07-29 RX ADMIN — AMLODIPINE BESYLATE 10 MG: 10 TABLET ORAL at 08:33

## 2023-07-29 RX ADMIN — LISINOPRIL 20 MG: 20 TABLET ORAL at 19:22

## 2023-07-29 RX ADMIN — APIXABAN 5 MG: 5 TABLET, FILM COATED ORAL at 13:00

## 2023-07-29 RX ADMIN — DAPTOMYCIN 800 MG: 500 INJECTION, POWDER, LYOPHILIZED, FOR SOLUTION INTRAVENOUS at 18:47

## 2023-07-29 RX ADMIN — CINACALCET 90 MG: 30 TABLET ORAL at 13:00

## 2023-07-29 RX ADMIN — HYDRALAZINE HYDROCHLORIDE 100 MG: 50 TABLET, FILM COATED ORAL at 19:22

## 2023-07-29 RX ADMIN — CARVEDILOL 25 MG: 25 TABLET, FILM COATED ORAL at 18:48

## 2023-07-29 RX ADMIN — HYDROMORPHONE HYDROCHLORIDE 0.4 MG: 0.2 INJECTION, SOLUTION INTRAMUSCULAR; INTRAVENOUS; SUBCUTANEOUS at 01:43

## 2023-07-29 RX ADMIN — CLONIDINE 1 PATCH: 0.2 PATCH TRANSDERMAL at 12:50

## 2023-07-29 RX ADMIN — ACETAMINOPHEN 650 MG: 325 TABLET, FILM COATED ORAL at 01:43

## 2023-07-29 RX ADMIN — SEVELAMER CARBONATE 4000 MG: 800 TABLET, FILM COATED ORAL at 13:00

## 2023-07-29 RX ADMIN — CALCIUM ACETATE 1334 MG: 667 CAPSULE ORAL at 13:00

## 2023-07-29 RX ADMIN — CEFAZOLIN SODIUM 2 G: 2 INJECTION, SOLUTION INTRAVENOUS at 19:24

## 2023-07-29 RX ADMIN — PROCHLORPERAZINE EDISYLATE 10 MG: 5 INJECTION INTRAMUSCULAR; INTRAVENOUS at 15:32

## 2023-07-29 RX ADMIN — SEVELAMER CARBONATE 4000 MG: 800 TABLET, FILM COATED ORAL at 18:47

## 2023-07-29 RX ADMIN — Medication 50 MCG: at 13:00

## 2023-07-29 RX ADMIN — HYDRALAZINE HYDROCHLORIDE 100 MG: 50 TABLET, FILM COATED ORAL at 13:00

## 2023-07-29 ASSESSMENT — ACTIVITIES OF DAILY LIVING (ADL)
ADLS_ACUITY_SCORE: 31
ADLS_ACUITY_SCORE: 18
DRESSING/BATHING_DIFFICULTY: NO
TOILETING_ISSUES: NO
ADLS_ACUITY_SCORE: 31
DIFFICULTY_EATING/SWALLOWING: NO
ADLS_ACUITY_SCORE: 18
WALKING_OR_CLIMBING_STAIRS_DIFFICULTY: NO
ADLS_ACUITY_SCORE: 31
CONCENTRATING,_REMEMBERING_OR_MAKING_DECISIONS_DIFFICULTY: NO
CHANGE_IN_FUNCTIONAL_STATUS_SINCE_ONSET_OF_CURRENT_ILLNESS/INJURY: NO
ADLS_ACUITY_SCORE: 18
ADLS_ACUITY_SCORE: 18
WEAR_GLASSES_OR_BLIND: NO
ADLS_ACUITY_SCORE: 31
DOING_ERRANDS_INDEPENDENTLY_DIFFICULTY: NO
ADLS_ACUITY_SCORE: 18
ADLS_ACUITY_SCORE: 31
ADLS_ACUITY_SCORE: 31
ADLS_ACUITY_SCORE: 18
FALL_HISTORY_WITHIN_LAST_SIX_MONTHS: NO

## 2023-07-29 NOTE — PROGRESS NOTES
Orientation/Cognitive: A&O X4  Observation Goals (Met/ Not Met): Inpatient   Mobility Level/Assist Equipment: IND  Fall Risk (Y/N): N  Behavior Concerns: green  Pain Management: PRN Tylenol X1  Tele/VS/O2: no tele, VSS X elevated BP, O2 RA  ABNL Lab/BG: creatinine 8.93, GFR 8, .18  Diet: Renal  Bowel/Bladder: Continent   Skin Concerns: brusing to SULY  Drains/Devices: R PIV, R HD port, LUE AV fistula  Tests/Procedures for next shift: Nephrology, and vascular consults  Anticipated DC date & active delays: TBD

## 2023-07-29 NOTE — PROVIDER NOTIFICATION
MD Notification    Notified Person: MD    Notified Person Name:Rosy Reis    Notification Date/Time:7/29/23@0204    Notification Interaction:paged via [employer] web    Purpose of Notification:Microbiology called again with critical result-Rapid PPR on +ve blood culture came back positive for staph lugdundensis, any new orders?    Orders Received:No new orders    Comments:

## 2023-07-29 NOTE — PROGRESS NOTES
[facility]  Hospitalist Progress Note        Luis Collazo MD   07/29/2023        Interval History:        - Reports some improvement in left shoulder pain with slightly better range of motion  - spiked temperature of 101.6; leukocytosis improved, WBC 12.7--> 9.5; CRP trended up 78-- 127-- 111  - had attempted aspiration by IR but no fluid was drawn  - blood cultures from 7/28 growing GPC in clusters, verigene panel with Staphylococcus lugdunensis   - after discussion with orthopedics, continued on Rocephin; ID consulted  - will repeat blood cultures 7/10  - MRI left shoulder 7/29 noted findings consistent with subacromial/subdeltoid bursitis, no joint effusion and no findings to suggest septic arthritis.  - got dialyzed on 7/28/23         Assessment and Plan:        Taylor Valiente is a 27 year old male with PMH of ESRD (on MWF dialysis), HTN, Right internal jugular DVT (on Eliquis) admitted on 7/27/2023. He presented to ED for evaluation of left shoulder pain, worsening over the past 2 days, also noted hyperkalemic in the setting of ESRD; elevated white count and inflammatory markers raising suspicion for possible septic arthritis.    Left shoulder pain, likely septic bursitis  Gram-positive bacteremia  - afebrile on presentation but then spiked temp of 101.6 F; on presentation WBC 13.6, CRP 78, ESR 55, pro calcitonin 2.18   - was empirically given vancomycin and Rocephin in ED  - shoulder Xray 7/27: No evidence of acute fracture or dislocation. Joint spaces are preserved. Soft tissues grossly unremarkable.   - WBC 12.7--> 9.5; CRP trended up 78-- 127-- 111  - had attempted aspiration by IR on 7/28 but no fluid was drawn  - MRI left shoulder 7/29 noted findings consistent with subacromial/subdeltoid bursitis, no joint effusion and no findings to suggest septic arthritis.  - blood cultures from 7/28 growing GPC in clusters, verigene panel with Staphylococcus lugdunensis     - after  discussion with orthopedics, continued on Rocephin; ID consulted  - will repeat blood cultures 7/10    ESRD on hemodialysis History of FSGS.  Dialyzes Monday Wednesday Friday.  Still makes some urine  Hyperkalemia  - K 5.7---> 5.4---5.0  - nephrology following for dialysis, got dialyzed on 7/28/23  - Continue PTA Bumex 2 mg daily, PhosLo 3 times daily with meals, Cinacalcet    Elevated troponin, likely type II non-STEMI versus chronic elevation in the setting of ESRD  - serial trops with no significant trend 64-- 64; denies any chest pain; monitor clinically    Hypertension:  - Continue PTA hydralazine 100 mg 3 times daily, carvedilol 25 mg twice daily, amlodipine 10 mg daily   - will resume PTA clonidine patch (7/29; usually changes every Tuesday but the patch was removed in ED)  - hydralazine IV PRN for SBP>180     Right IJ DVT: Associated with dialysis catheter  - continue with PTA Eliquis    Left upper extremity fistula stenosis: Adjacent to arteriovenous anastomosis  - US UE venous noted no DVT  - US UE arterial noted patent left brachial artery to basilic vein fistula. Ultrasound suggests a focal stenosis just past the arteriovenous anastomosis  - vascular surgery evaluated (signed off) noted hemodynamically insignificant stenosis, per vascular surgery-- more related to tortuosity instead of a true stenosis and suggested no vascular surgery intervention    DVT Prophylaxis: Eliquis  Lines: PRESENT     tunneled dialysis catheter right anterior chest wall    Diet: Combination Diet Regular Diet Adult; Renal Diet (dialysis)    Disposition:   - likely 2-3 days pending clinical improvement; antibiotic plans/intervention for likely septic bursitis per ID/orthopedics    Clinically Significant Risk Factors Present on Admission        # Hyperkalemia: Highest K = 5.7 mmol/L in last 2 days, will monitor as appropriate        # Drug Induced Coagulation Defect: home medication list includes an anticoagulant medication  #  "Drug Induced Platelet Defect: home medication list includes an antiplatelet medication   # Hypertension: Noted on problem list            # Obesity: Estimated body mass index is 32.01 kg/m  as calculated from the following:    Height as of an earlier encounter on 7/27/23: 1.778 m (5' 10\").    Weight as of this encounter: 101.2 kg (223 lb 1.7 oz).               Page Me (7 am to 6 pm)    High Medical Complexity              Physical Exam:      Blood pressure (!) 162/106, pulse 100, temperature 99.5  F (37.5  C), temperature source Oral, resp. rate 18, weight 101.2 kg (223 lb 1.7 oz), SpO2 98 %.  Vitals:    07/28/23 0454 07/29/23 0701   Weight: 103.3 kg (227 lb 12.8 oz) 101.2 kg (223 lb 1.7 oz)     Vital Signs with Ranges  Temp:  [98.1  F (36.7  C)-101.6  F (38.7  C)] 99.5  F (37.5  C)  Pulse:  [] 100  Resp:  [12-25] 18  BP: (157-182)/() 162/106  SpO2:  [93 %-100 %] 98 %  I/O's Last 24 hours  I/O last 3 completed shifts:  In: 2149 [P.O.:420; Other:1729]  Out: 2129 [Other:2129]    Constitutional: Alert, awake and oriented X 3; resting comfortably in no apparent distress       Oral cavity: Moist mucosa   Cardiovascular: Normal s1 s2, regular rate and rhythm, no murmur   Lungs: B/l clear to auscultation, no wheezes or crepitations   Abdomen: Soft, nt, nd, no guarding, rigidity or rebound; BS +   LE : Mild b/l edema +   Musculoskeletal/Neuro Power 5/5 in all extremities; has decreased range of motion and tenderness left shoulder joint; LUE fistula with thrill   Psychiatry: normal mood and affect  noted Right CVC dialysis catheter                Medications:         - MEDICATION INSTRUCTIONS for Dialysis Patients -   Does not apply See Admin Instructions    amLODIPine  10 mg Oral Daily    apixaban ANTICOAGULANT  5 mg Oral BID    atorvastatin  10 mg Oral At Bedtime    bumetanide  2 mg Oral Daily    calcium acetate  1,334 mg Oral TID w/meals    carvedilol  25 mg Oral BID w/meals    cefTRIAXone  2 g Intravenous " Q24H    cinacalcet  90 mg Oral Daily    hydrALAZINE  100 mg Oral TID    lisinopril  20 mg Oral QPM    multivitamin RENAL  1 capsule Oral Daily    sevelamer carbonate  4,000 mg Oral TID w/meals    sodium chloride (PF)  3 mL Intracatheter Q8H    vitamin D3  50 mcg Oral Daily     PRN Meds: acetaminophen **OR** acetaminophen, bisacodyl, glucose **OR** dextrose **OR** glucagon, hydrALAZINE, HYDROmorphone, HYDROmorphone, lidocaine 4%, lidocaine (buffered or not buffered), melatonin, naloxone **OR** naloxone **OR** naloxone **OR** naloxone, oxyCODONE, oxyCODONE IR, polyethylene glycol, prochlorperazine **OR** prochlorperazine **OR** prochlorperazine, senna-docusate **OR** senna-docusate, sodium chloride (PF)         Data:      All new lab and imaging data was reviewed.   Recent Labs   Lab Test 07/29/23  0642 07/28/23  0645 07/27/23 2208 03/16/23  0542 03/15/23  0900 06/15/21  1634 02/09/21  1055 07/19/17  1820 07/18/17  0910   WBC 9.5 10.3 12.7*   < > 8.4   < > 8.9   < >  --    HGB 10.8* 10.6* 10.8*   < > 8.4*   < > 10.5*   < >  --    MCV 88 90 88   < > 92   < > 85   < >  --     230 242   < > 172   < > 307   < >  --    INR  --   --   --   --  1.40*  --  1.23*  --  1.04    < > = values in this interval not displayed.      Recent Labs   Lab Test 07/29/23  0642 07/28/23  1035 07/28/23  0810 07/28/23  0645 07/28/23  0001 07/27/23 2208     --   --  136  --  135*   POTASSIUM 4.4 5.0  --  5.2  5.2   < > 5.7*   CHLORIDE 97*  --   --  99  --  98   CO2 27  --   --  19*  --  19*   BUN 33.0*  --   --  57.4*  --  53.0*   CR 8.93*  --   --  12.84*  --  11.74*   ANIONGAP 16*  --   --  18*  --  18*   BUBBA 8.8  --   --  9.7  --  9.6   GLC 94  --  92 100*  --  97    < > = values in this interval not displayed.     Recent Labs   Lab Test 11/28/20  0606 09/09/20  2344 09/09/20  1827 10/04/19  0148 07/14/17  1158   TROPI <0.015 0.084* 0.083*   < >  --    TROPONIN  --   --   --   --  0.06    < > = values in this interval not  displayed.

## 2023-07-29 NOTE — PLAN OF CARE
"Goal Outcome Evaluation:      Plan of Care Reviewed With: patient    Overall Patient Progress: no change     Summary:Patient here with right shoulder pain, admitted for suspected septic arthritis      Orientation/Cognitive: A&Ox4  Observation Goals (Met/ Not Met): N/A-Inpatient status  Mobility Level/Assist Equipment: Ind  Fall Risk (Y/N): N  Behavior Concerns: None  Pain Management: Lt shoulder pain managed with iv dilaudid, per pt, 'the other meds don't work\"  Tele/VS/O2:Tele- SR w inverted T waves, VSS except elevated B.Ps  ABNL Lab/BG:+VE blood cultures, see notes and results for details  Diet: NPO  Bowel/Bladder: Continent, no voids, per pt he rarely voids, still needs urine sample for UA  Skin Concerns: Bruising to SULY  Drains/Devices:R PIV,LUE AV fistula, rt chest dialysis catheter  Tests/Procedures for next shift: Possible OR today for trial aspiration of synovial fluid as IR aspiration unsuccessful, Orthopedic surgery & Vascular surgery, nephrology all following  Anticipated DC date & active delays: TBD  Patient Stated Goal for Today:Pain management      "

## 2023-07-29 NOTE — CONSULTS
"Note INFECTIOUS DISEASES CONSULTATION NOTE  Covering for Maxime Orona & Pennie     Date 2023     Name /  Taylor Valiente   1995     MRN [id number]     Thank you for asking us to see Davidlelo Steffanie for infectious diseases evaluation  REQUESTING PROVIDER Dr Collazo  REASON FOR CONSULT left shoulder pain, likely septic arthritis        CHIEF COMPLAINT    Fever / left shoulder pain    HPI    28 yo on hemodialysis w concern for LEFT shoulder infection    A few months ago creation of AV fistula for dialysis   Started to use AV fistula <> \"infiltrated\" <> switched to using tunneled cath for dialysis     H & P  left shoulder pain, worsening over the past 2 days.  Found to be hyperkalemic in the setting of end-stage renal disease, elevated white count and inflammatory markers raising suspicion for possible septic arthritis      ortho consult  ESRD on dialysis, DVT (anticoagulated on apixaban), and cardiomyopathy who presents with left shoulder pain. He was seen twice in the emergency department yesterday regarding left upper extremity pain. Initially, evaluation of fistula was completed with venous and arterial US showed no dvt and patent fistula. He returned due to worsening pain and was admitted for further work-up. There is report of infiltrated fistula 3 days ago     ROS  Pain left shoulder  Tenderness left shoulder  Fever  No other new joint pain / back pain  No new headache / stiff neck  No other skin sores    Rest of 15 pt ROS negative    OTHER HISTORY  PFSH  Past Medical History:   Diagnosis Date     Adrenal adenoma, left      Anemia      Benign essential hypertension 2017     CKD (chronic kidney disease) stage 5, GFR less than 15 ml/min (H)     FSGS     Depression      Focal glomerular sclerosis 2017     HLD (hyperlipidemia)      LVH (left ventricular hypertrophy) due to hypertensive disease 2017     Noncompliance      Obesity, unspecified      OD " (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/2021     Stress-induced cardiomyopathy      Suicide attempt (H) 2019     Past Surgical History:   Procedure Laterality Date     ARTHROSCOPY ANKLE Left 02/24/2021    Procedure: Left ankle arthroscopy and debridement/micro fracture;  Surgeon: Mirza Nelson MD;  Location: [facility] OR     BIOPSY  2017    renal- [facility]     CREATE FISTULA ARTERIOVENOUS UPPER EXTREMITY Right 03/04/2021    Procedure: RIGHT proximal radial  to CEPHALIC ARTERIOVENOUS FISTULA;  Surgeon: Henrik Moran MD;  Location: [facility] OR     CREATE FISTULA ARTERIOVENOUS UPPER EXTREMITY Left 03/09/2023    Procedure: CREATION OF FIRST STAGE LEFT BRACHIOBASILIC ARTERIOVENOUS FISTULA;  Surgeon: Henrik Moran MD;  Location: [facility] OR     CREATE FISTULA ARTERIOVENOUS UPPER EXTREMITY Left 5/30/2023    Procedure: SECOND STAGE LEFT BRACHIAL TO BASILIC ARTERIOVENOUS FISTULA;  Surgeon: Henrik Moran MD;  Location: [facility] OR     IR CVC TUNNEL PLACEMENT > 5 YRS OF AGE  06/17/2021     IR CVC TUNNEL PLACEMENT > 5 YRS OF AGE  03/09/2023     IR CVC TUNNEL REMOVAL RIGHT  12/03/2021     IRRIGATION AND DEBRIDEMENT UPPER EXTREMITY, COMBINED Left 03/16/2023    Procedure: EVACUATION OF LEFT ARM HEMATOMA;  Surgeon: Henrik Moran MD;  Location: [facility] OR     LIGATE FISTULA ARTERIOVENOUS UPPER EXTREMITY Right 03/09/2023    Procedure: LIGATION OF RIGHT ARM ARTERIOVENOUS FISTULA;  Surgeon: Henrik Moarn MD;  Location: [facility] OR     REVISION FISTULA ARTERIOVENOUS UPPER EXTREMITY Right 08/26/2021    Procedure: Second stage RIGHT BRACHIOCEPHALIC transposition ARTERIOVENOUS FISTULA;  Surgeon: Henrik Moran MD;  Location: [facility] OR      Problem (# of Occurrences) Relation (Name,Age of Onset)    Blood Disease (1) Mother: has hep b    Diabetes (2) Mother: gestionanal diabetes, Maternal Grandmother    Hypertension (5) Mother, Father, Maternal Grandmother, Paternal Grandmother, Paternal Grandfather    Obesity (1) Father     Coronary Artery Disease (1) Maternal Uncle           Negative family history of: Cancer          Family History   Problem Relation Age of Onset     Blood Disease Mother         has hep b     Diabetes Mother         gestionanal diabetes     Hypertension Mother      Obesity Father      Hypertension Father      Hypertension Maternal Grandmother      Diabetes Maternal Grandmother      Hypertension Paternal Grandmother      Hypertension Paternal Grandfather      Coronary Artery Disease Maternal Uncle      Cancer No family hx of      Social History     Socioeconomic History     Marital status: Single     Number of children: 0   Occupational History     Occupation:      Occupation: kitchen     Comment: [employer]   Tobacco Use     Smoking status: Never     Smokeless tobacco: Never   Vaping Use     Vaping Use: Never used   Substance and Sexual Activity     Alcohol use: No     Drug use: Yes     Types: Marijuana     Comment: occ     Sexual activity: Not Currently     Partners: Female     Social Determinants of Health     Financial Resource Strain: Medium Risk (12/28/2021)    Overall Financial Resource Strain (CARDIA)      Difficulty of Paying Living Expenses: Somewhat hard   Food Insecurity: Food Insecurity Present (12/28/2021)    Hunger Vital Sign      Worried About Running Out of Food in the Last Year: Sometimes true      Ran Out of Food in the Last Year: Sometimes true   Transportation Needs: No Transportation Needs (12/28/2021)    PRAPARE - Transportation      Lack of Transportation (Medical): No      Lack of Transportation (Non-Medical): No   Physical Activity: Inactive (12/28/2021)    Exercise Vital Sign      Days of Exercise per Week: 0 days      Minutes of Exercise per Session: 30 min   Stress: Stress Concern Present (12/28/2021)    [employer] [employer] - Occupational Stress Questionnaire      Feeling of Stress : To some extent   Social Connections: Moderately Isolated  (12/28/2021)    Social Connection and Isolation Panel [NHANES]      Frequency of Communication with Friends and Family: Once a week      Frequency of Social Gatherings with Friends and Family: More than three times a week      Attends Jainism Services: 1 to 4 times per year      Active Member of Clubs or Organizations: No      Marital Status: Never    Housing Stability: High Risk (12/28/2021)    Housing Stability Vital Sign      Unable to Pay for Housing in the Last Year: Yes      Number of Places Lived in the Last Year: 1      Unstable Housing in the Last Year: No     Allergies   Allergen Reactions     Benadryl [Diphenhydramine] Itching     Vancomycin Itching     Immunization History   Administered Date(s) Administered     COVID-19 Bivalent 12+ (Pfizer) 11/04/2022     COVID-19 MONOVALENT 12+ (Pfizer) 04/23/2021, 05/12/2021, 12/28/2021     DTAP (<7y) 04/01/1998, 12/07/2000     HEPA 08/01/2000     HIB (PRP-T) 1996, 1996, 01/13/1997, 04/01/1998     HPV 09/25/2020, 02/15/2021, 07/01/2021     HPV9 09/25/2020, 02/15/2021, 07/01/2021     HepB 1996, 04/16/1997, 08/13/2008     HepB-Dialysis 1996, 1996, 04/16/1997     Historical DTP/aP 1996, 1996, 01/13/1997, 04/01/1998     Influenza (IIV3) PF 02/20/2007, 03/05/2008, 11/12/2008     Influenza Vaccine >6 months (Alfuria,Fluzone) 11/26/2018, 10/26/2020, 10/25/2021     MMR 12/05/1997, 12/07/2000     OPV, trivalent, live 1996, 1996, 04/01/1998     Pneumococcal 23 valent 07/01/2021     Pneumococcal, Unspecified 07/01/2021     Poliovirus, inactivated (IPV) 12/07/2000     TD,PF 7+ (Tenivac) 08/13/2008     TDAP Vaccine (Adacel) 11/26/2018     Typhoid IM 08/08/2000     Varicella 08/13/2008     Yellow Fever 08/01/2000     EXAM  BP (!) 180/124 (BP Location: Right arm, Patient Position: Semi-Vance's, Cuff Size: Adult Regular)   Pulse 98   Temp 98.9  F (37.2  C) (Oral)   Resp 18   Wt 101.2 kg (223 lb 1.7 oz)   SpO2 98%   " BMI 32.01 kg/m      genl   In bed  NPO / watching TV     lungs   clear     CV   II/VI systolic murmur     skin   No peripheral stigmata of infective endocarditis      neuro   Normal converation  Supple neck     arms   \"old\" AV fistula R arm  \"new\" AV fistula L upper arm (+) thrill / not red     L shoulder   Tender anterior GH joint and anterolateral proximal humerus area     abd   Soft / not tender         DATA  Images  MRI shoulder  1.  Findings consistent with subacromial/subdeltoid bursitis.  2.  No joint effusion. No findings to suggest septic arthritis.    Microbiology test results  7/28 Blood cultures S lugdunensis  7/29 Blood cultures sent    LABS  Recent Labs   Lab Test 07/29/23  0642 07/28/23  0645 07/27/23  2208 07/27/23  0700   WBC 9.5 10.3 12.7* 13.6*   AST  --   --  8 7   ALT  --   --  8 8   BILITOTAL  --   --  0.3 0.3   ALKPHOS  --   --  77 78          ASSESSMENT   MED DECISION MAKING  Patient Active Problem List   Diagnosis Code     Acute pain of left shoulder M25.512     Leukocytosis, unspecified type D72.829     Sepsis - Coag Neg Staph (H) A41.1     AV fistula infection (H) T82.7XXA     Bursitis of left shoulder M75.52     S lugdunensis - acts clinically just like S aureus - pathogenic  Likely related to recent incident with L arm AV fistula infiltrating  Has bursitis clinically and by MRI left shoulder - ? Septic  No clue to endocarditis or other foci / focus Staph (joints / spine / heart valves)  PCAD R chest may need to come out ....    Cefazolin (superior to ceftriaxone for meth-suscept staph species)  One dose daptomycin (to cover for ? MRSA pending MICs of blood isolate)           Johnny Lockett MD  Covering for Haritha Swartz & [employer]  Infectious Disease service  Current Meds Reviewed  Current Facility-Administered Medications   Medication     - MEDICATION INSTRUCTIONS for Dialysis Patients -     acetaminophen (TYLENOL) tablet 650 mg    Or     acetaminophen (TYLENOL) Suppository 650 mg "     amLODIPine (NORVASC) tablet 10 mg     apixaban ANTICOAGULANT (ELIQUIS) tablet 5 mg     atorvastatin (LIPITOR) tablet 10 mg     bisacodyl (DULCOLAX) suppository 10 mg     bumetanide (BUMEX) tablet 2 mg     calcium acetate (PHOSLO) capsule 1,334 mg     carvedilol (COREG) tablet 25 mg     cefTRIAXone (ROCEPHIN) 2 g vial to attach to  ml bag for ADULTS or NS 50 ml bag for PEDS     cinacalcet (SENSIPAR) tablet 90 mg     glucose gel 15-30 g    Or     dextrose 50 % injection 25-50 mL    Or     glucagon injection 1 mg     hydrALAZINE (APRESOLINE) injection 10 mg     hydrALAZINE (APRESOLINE) tablet 100 mg     HYDROmorphone (DILAUDID) injection 0.2 mg     HYDROmorphone (DILAUDID) injection 0.4 mg     lidocaine (LMX4) cream     lidocaine 1 % 0.1-1 mL     lisinopril (ZESTRIL) tablet 20 mg     melatonin tablet 3 mg     multivitamin RENAL (TRIPHROCAPS) capsule 1 capsule     naloxone (NARCAN) injection 0.2 mg    Or     naloxone (NARCAN) injection 0.4 mg    Or     naloxone (NARCAN) injection 0.2 mg    Or     naloxone (NARCAN) injection 0.4 mg     oxyCODONE (ROXICODONE) tablet 5 mg     oxyCODONE IR (ROXICODONE) half-tab 2.5 mg     polyethylene glycol (MIRALAX) Packet 17 g     prochlorperazine (COMPAZINE) injection 10 mg    Or     prochlorperazine (COMPAZINE) tablet 10 mg    Or     prochlorperazine (COMPAZINE) suppository 25 mg     senna-docusate (SENOKOT-S/PERICOLACE) 8.6-50 MG per tablet 1 tablet    Or     senna-docusate (SENOKOT-S/PERICOLACE) 8.6-50 MG per tablet 2 tablet     sevelamer carbonate (RENVELA) tablet 4,000 mg     sodium chloride (PF) 0.9% PF flush 3 mL     sodium chloride (PF) 0.9% PF flush 3 mL     vitamin D3 (CHOLECALCIFEROL) tablet 50 mcg

## 2023-07-29 NOTE — PROGRESS NOTES
Orthopedic Surgery  Taylor Valiente  07/29/2023     Admit Date:  7/27/2023  Left shoulder pain, in setting of fistula recent infiltration, and gram-positive bacteremia   MRI not suggestive of septic arthritis, no effusion appreciated       Patient resting comfortably in bed.    Pain controlled. Improvement in left shoulder pain today   Tolerating oral intake.    Denies nausea or vomiting.  Denies chest pain or shortness of breath.    Temp:  [98.1  F (36.7  C)-101.6  F (38.7  C)] 98.9  F (37.2  C)  Pulse:  [] 99  Resp:  [12-24] 18  BP: (157-182)/() 166/108  SpO2:  [97 %-100 %] 98 %      Minimal erythema of the surrounding skin.   Ecchymosis near fistula site  Increased mobility of shoulder today 30-45 degrees flexion/abduction   Left upper extremity is NVI.  Able to flex, extend, abduct, and adduct digits.  Digits are warm and well-perfused.  Sensation intact to light touch.    Labs:  Recent Labs   Lab Test 07/29/23  0642 07/28/23  0645 07/27/23  2208   WBC 9.5 10.3 12.7*   HGB 10.8* 10.6* 10.8*    230 242     Recent Labs   Lab Test 03/15/23  0900 02/09/21  1055 07/18/17  0910   INR 1.40* 1.23* 1.04     Recent Labs   Lab Test 11/29/20  0627 11/28/20  0606 11/27/20  0221   CRP 7.9 7.4 13.0*     MRI results for left shoulder:   1.  Findings consistent with subacromial/subdeltoid bursitis.  2.  No joint effusion. No findings to suggest septic arthritis.        PLAN:    No immediate surgical plans at this time. Dr. Ballard reviewed MRI and states overall quiet shoulder, he appreciates small fluid collection subdeltoid, no significant reactive edema in surrounding bone noted. No need for urgent surgical intervention.  - Continue IV abx for gram-positive bacteremia   - If persisting shoulder pain, or worsening in symptoms/failure to improve on abx then we will order repeat MRI to evaluate if the small fluid collection is enlarging or will require surgical intervention   - We will continue to monitor  this progression by daily exams and labs         Shani Hernandez PA-C  [facility]         Shani Hernandez PA-C  [facility]

## 2023-07-29 NOTE — PROVIDER NOTIFICATION
MD Notification    Notified Person: MD    Notified Person Name:Luis Collazo    Notification Date/Time: 1631 7/29/23    Notification Interaction:[employer] messaging    Purpose of Notification:SS Rm 3297 Pt CT. Pt is requesting PRN medication be changed to Zofran. Gave Compazine at 1532 Pt is reporting dizziness, light headiness and mind racing.     Orders Received: Pending    Comments:

## 2023-07-29 NOTE — PROVIDER NOTIFICATION
MD Notification    Notified Person: MD    Notified Person Name:Rosy Reis    Notification Date/Time:7/29/23@0026    Notification Interaction:Paged via [employer] web(not available on [employer])    Purpose of Notification:Micro biology lab called with critical result-2 sets of blood cultures both came back with Gram +ve cocci in clusters, any new orders?    Orders Received:No new orders    Comments:

## 2023-07-29 NOTE — PLAN OF CARE
Goal Outcome Evaluation:    Orientation/Cognitive: A&Ox4  Observation Goals (Met/ Not Met): Inpatient status  Mobility Level/Assist Equipment: Ind  Fall Risk (Y/N): No  Behavior Concerns: None  Pain Management: Denies pain  Tele/VS/O2: Tele: SR w inverted T waves, VSS except elevated B.Ps  ABNL Lab/BG: Positive blood cultures, see notes and results for details  Diet: Renal diet  Bowel/Bladder: Continent, no voids, per pt he rarely voids  Skin Concerns: Bruising to SULY  Drains/Devices:R PIV,LUE AV fistula, rt chest dialysis catheter  Tests/Procedures for next shift: Orthopedic surgery & Vascular surgery, nephrology consult  Anticipated DC date & active delays: TBD  Patient Stated Goal for Today:

## 2023-07-30 LAB
BASOPHILS # BLD AUTO: 0.1 10E3/UL (ref 0–0.2)
BASOPHILS NFR BLD AUTO: 1 %
CRP SERPL-MCNC: 81.01 MG/L
EOSINOPHIL # BLD AUTO: 0.3 10E3/UL (ref 0–0.7)
EOSINOPHIL NFR BLD AUTO: 3 %
ERYTHROCYTE [DISTWIDTH] IN BLOOD BY AUTOMATED COUNT: 17.5 % (ref 10–15)
ERYTHROCYTE [SEDIMENTATION RATE] IN BLOOD BY WESTERGREN METHOD: 62 MM/HR (ref 0–15)
GLUCOSE BLDC GLUCOMTR-MCNC: 103 MG/DL (ref 70–99)
HCT VFR BLD AUTO: 35.9 % (ref 40–53)
HGB BLD-MCNC: 10.8 G/DL (ref 13.3–17.7)
IMM GRANULOCYTES # BLD: 0 10E3/UL
IMM GRANULOCYTES NFR BLD: 0 %
LYMPHOCYTES # BLD AUTO: 1.9 10E3/UL (ref 0.8–5.3)
LYMPHOCYTES NFR BLD AUTO: 20 %
MCH RBC QN AUTO: 26.3 PG (ref 26.5–33)
MCHC RBC AUTO-ENTMCNC: 30.1 G/DL (ref 31.5–36.5)
MCV RBC AUTO: 88 FL (ref 78–100)
MONOCYTES # BLD AUTO: 0.8 10E3/UL (ref 0–1.3)
MONOCYTES NFR BLD AUTO: 9 %
NEUTROPHILS # BLD AUTO: 6.6 10E3/UL (ref 1.6–8.3)
NEUTROPHILS NFR BLD AUTO: 67 %
NRBC # BLD AUTO: 0 10E3/UL
NRBC BLD AUTO-RTO: 0 /100
PLATELET # BLD AUTO: 243 10E3/UL (ref 150–450)
RBC # BLD AUTO: 4.1 10E6/UL (ref 4.4–5.9)
WBC # BLD AUTO: 9.7 10E3/UL (ref 4–11)

## 2023-07-30 PROCEDURE — 250N000013 HC RX MED GY IP 250 OP 250 PS 637: Performed by: EMERGENCY MEDICINE

## 2023-07-30 PROCEDURE — 250N000011 HC RX IP 250 OP 636: Mod: JZ | Performed by: INTERNAL MEDICINE

## 2023-07-30 PROCEDURE — 87040 BLOOD CULTURE FOR BACTERIA: CPT | Performed by: INTERNAL MEDICINE

## 2023-07-30 PROCEDURE — 85652 RBC SED RATE AUTOMATED: CPT | Performed by: STUDENT IN AN ORGANIZED HEALTH CARE EDUCATION/TRAINING PROGRAM

## 2023-07-30 PROCEDURE — 120N000001 HC R&B MED SURG/OB

## 2023-07-30 PROCEDURE — 250N000013 HC RX MED GY IP 250 OP 250 PS 637: Performed by: INTERNAL MEDICINE

## 2023-07-30 PROCEDURE — 85014 HEMATOCRIT: CPT | Performed by: HOSPITALIST

## 2023-07-30 PROCEDURE — 99232 SBSQ HOSP IP/OBS MODERATE 35: CPT | Performed by: INTERNAL MEDICINE

## 2023-07-30 PROCEDURE — 86140 C-REACTIVE PROTEIN: CPT | Performed by: STUDENT IN AN ORGANIZED HEALTH CARE EDUCATION/TRAINING PROGRAM

## 2023-07-30 PROCEDURE — 250N000013 HC RX MED GY IP 250 OP 250 PS 637: Performed by: HOSPITALIST

## 2023-07-30 PROCEDURE — 36415 COLL VENOUS BLD VENIPUNCTURE: CPT | Performed by: INTERNAL MEDICINE

## 2023-07-30 PROCEDURE — 99232 SBSQ HOSP IP/OBS MODERATE 35: CPT | Performed by: HOSPITALIST

## 2023-07-30 RX ADMIN — CEFAZOLIN SODIUM 2 G: 2 INJECTION, SOLUTION INTRAVENOUS at 22:05

## 2023-07-30 RX ADMIN — SEVELAMER CARBONATE 4000 MG: 800 TABLET, FILM COATED ORAL at 12:59

## 2023-07-30 RX ADMIN — CARVEDILOL 25 MG: 25 TABLET, FILM COATED ORAL at 08:33

## 2023-07-30 RX ADMIN — Medication 1 CAPSULE: at 08:33

## 2023-07-30 RX ADMIN — SEVELAMER CARBONATE 4000 MG: 800 TABLET, FILM COATED ORAL at 18:07

## 2023-07-30 RX ADMIN — BUMETANIDE 2 MG: 2 TABLET ORAL at 08:33

## 2023-07-30 RX ADMIN — CARVEDILOL 25 MG: 25 TABLET, FILM COATED ORAL at 18:07

## 2023-07-30 RX ADMIN — CALCIUM ACETATE 1334 MG: 667 CAPSULE ORAL at 08:33

## 2023-07-30 RX ADMIN — CINACALCET 90 MG: 30 TABLET ORAL at 08:32

## 2023-07-30 RX ADMIN — CALCIUM ACETATE 1334 MG: 667 CAPSULE ORAL at 12:59

## 2023-07-30 RX ADMIN — SEVELAMER CARBONATE 4000 MG: 800 TABLET, FILM COATED ORAL at 08:32

## 2023-07-30 RX ADMIN — ATORVASTATIN CALCIUM 10 MG: 10 TABLET, FILM COATED ORAL at 21:24

## 2023-07-30 RX ADMIN — HYDRALAZINE HYDROCHLORIDE 100 MG: 50 TABLET, FILM COATED ORAL at 08:33

## 2023-07-30 RX ADMIN — Medication 50 MCG: at 08:33

## 2023-07-30 RX ADMIN — APIXABAN 5 MG: 5 TABLET, FILM COATED ORAL at 08:33

## 2023-07-30 RX ADMIN — AMLODIPINE BESYLATE 10 MG: 10 TABLET ORAL at 08:33

## 2023-07-30 RX ADMIN — HYDRALAZINE HYDROCHLORIDE 100 MG: 50 TABLET, FILM COATED ORAL at 13:00

## 2023-07-30 RX ADMIN — APIXABAN 5 MG: 5 TABLET, FILM COATED ORAL at 21:24

## 2023-07-30 RX ADMIN — HYDRALAZINE HYDROCHLORIDE 100 MG: 50 TABLET, FILM COATED ORAL at 21:23

## 2023-07-30 RX ADMIN — CALCIUM ACETATE 1334 MG: 667 CAPSULE ORAL at 18:07

## 2023-07-30 RX ADMIN — LISINOPRIL 20 MG: 20 TABLET ORAL at 21:24

## 2023-07-30 ASSESSMENT — ACTIVITIES OF DAILY LIVING (ADL)
ADLS_ACUITY_SCORE: 18

## 2023-07-30 NOTE — PROGRESS NOTES
Orthopedic Surgery  Taylor Valiente  07/30/2023     Admit Date:  7/27/2023  Left shoulder pain, in setting of fistula recent infiltration, and gram-positive bacteremia   MRI not suggestive of septic arthritis, no effusion appreciated       Patient resting comfortably in bed.    Pain controlled. Improvement in left shoulder pain continues today. Ranges pain free, forward flexion 160 degrees, abduction slightly > 90 degrees.   Tolerating oral intake.    Denies nausea or vomiting.  Denies chest pain or shortness of breath.    Temp:  [97  F (36.1  C)-99.1  F (37.3  C)] 99.1  F (37.3  C)  Pulse:  [] 96  Resp:  [17-18] 18  BP: (156-174)/() 161/93  SpO2:  [99 %-100 %] 100 %      Minimal erythema of the surrounding skin.   Ecchymosis near fistula site  Increased mobility of shoulder today 160 degrees flexion/>90 abduction   Left upper extremity is NVI.  Able to flex, extend, abduct, and adduct digits.  Digits are warm and well-perfused.  Sensation intact to light touch.    Labs:  Recent Labs   Lab Test 07/30/23  0548 07/29/23  0642 07/28/23  0645   WBC 9.7 9.5 10.3   HGB 10.8* 10.8* 10.6*    214 230       Recent Labs   Lab Test 03/15/23  0900 02/09/21  1055 07/18/17  0910   INR 1.40* 1.23* 1.04       Recent Labs   Lab Test 11/29/20  0627 11/28/20  0606 11/27/20  0221   CRP 7.9 7.4 13.0*       MRI results for left shoulder:   1.  Findings consistent with subacromial/subdeltoid bursitis.  2.  No joint effusion. No findings to suggest septic arthritis.        PLAN:    No immediate surgical plans at this time. Dr. Ballard reviewed MRI and states overall quiet shoulder, he appreciates small fluid collection subdeltoid, no significant reactive edema in surrounding bone noted. No need for urgent surgical intervention.  - Continue IV abx for gram-positive bacteremia   - If persisting shoulder pain, or worsening in symptoms/failure to improve on abx then we will order repeat MRI to evaluate if the small fluid  collection is enlarging or will require surgical intervention   - We will continue to monitor this progression by daily exams and labs until stable             Shani Hernandez PA-C  [facility]

## 2023-07-30 NOTE — PROGRESS NOTES
Shift [id number] Goal Outcomes     Orientation/Cognitive: A+O x 4  Observation Goals (Met/ Not Met): n/a d/t IP status   Mobility Level/Assist Equipment: IND  Fall Risk (Y/N): N  Behavior Concerns: Green  Pain Management: Denied. PRNs Acetaminophen, Oxycodone and Dilaudid are available.   Tele/VS/O2: VSS on RA ex HTN, asymptomatic. Tele is SR w/inverted Twaves  ABNL Lab/BG: See lab results  Diet: Renal  Bowel/Bladder: Continent   Skin Concerns: Brusing to LUE  Drains/Devices: R PIV SL, R HD port, LUE AV fistula  Tests/Procedures for next shift: Nephrology, vascular, orthopedic, infecton disease consulted, see notes.   Anticipated DC date & active delays: TBD

## 2023-07-30 NOTE — PROGRESS NOTES
Nephrology Progress Note  07/30/2023         Assessment & Recommendations:   1 ESRD secondary to FSGS-on hemodialysis, Monday Wednesday Friday, [facility] under my care    2 vascular issues-has failed fistula on the right arm.  Has right IJ dialysis catheter in place with right IJ DVT on Eliquis  Has  a new left upper extremity AV fistula ( Dr Moran)  which has hemodynamically insignificant stenosis.  Has been accessed with 2 needle only once.  Had immediate infiltration last Monday    3 Staph Lugudunesis bacteremia -deemed pathogenic per ID. ?  Related to recent left arm AV fistula infiltration but has no signs of localized infection on examination.    4 shoulder pain-no joint effusion or signs of septic arthritis on MRI.  Consistent with bursitis.    5 hypertension-renal hypertension.  Unreliable given he does not have proper site to message from.  On amlodipine, clonidine patch, Bumex, Coreg, hydralazine, lisinopril    6 BMP-on Renvela, Cinacalcet, vitamin D    7 anemia in ESRD-on Mircera    Discussion/recommendations-  -blood cultures remain negative from 7/29.  Unclear if dialysis catheter has to be removed at this time, but may be problematic given his fistula has not been reliably used.  -We will try to access again tomorrow with 17-gauge needles x2.       Senia Barrett MD  [facility] - Nephrology   [phone]      Interval History :   Seen / examined.   No acute issues overnight.  Afebrile.  Blood pressure on the higher side.  No nausea or vomiting.      Physical Exam:   I/O last 3 completed shifts:  In: 280 [P.O.:280]  Out: -   BP (!) 161/93 (BP Location: Right arm, Patient Position: Semi-Vance's, Cuff Size: Adult Regular)   Pulse 96   Temp 99.1  F (37.3  C) (Oral)   Resp 18   Wt 101.2 kg (223 lb 1.7 oz)   SpO2 100%   BMI 32.01 kg/m      GENERAL APPEARANCE: alert and no distress  EYES:  no scleral icterus, pupils equal  PULM: lungs clear to auscultation, equal air movement, no  cyanosis or clubbing  CV: regular rhythm, normal rate, no rub     -JVP -     -edema -   GI: soft, non tender,   NEURO: mentation intact and speech normal, no asterixis   Access - Rt internal jugular TDC, Lt FA AVF - no redness, swelling     Labs:   All labs reviewed by me  Electrolytes/Renal -   Recent Labs   Lab Test 07/30/23  0555 07/29/23  0642 07/28/23  1035 07/28/23  0810 07/28/23  0645 07/28/23  0001 07/27/23  2208 05/30/23  0623 04/19/23  1546 03/07/23  1222 12/12/22  0559 06/19/21  0659 06/18/21  0740 06/17/21  0655 06/15/21  1634 04/05/21  1545   NA  --  140  --   --  136  --  135*   < > 135*   < > 143   < > 138 138   < > 137   POTASSIUM  --  4.4 5.0  --  5.2  5.2   < > 5.7*   < > 3.7   < > 5.6*   < > 3.8 3.9   < > 4.2   CHLORIDE  --  97*  --   --  99  --  98   < > 94*   < > 101   < > 108 107   < > 108   CO2  --  27  --   --  19*  --  19*   < > 26   < > 23   < > 23 17*   < > 29   BUN  --  33.0*  --   --  57.4*  --  53.0*   < > 28.5*   < > 95.6*   < > 95* 147*   < > 84*   CR  --  8.93*  --   --  12.84*  --  11.74*   < > 5.92*   < > 15.45*   < > 11.50* 15.80*   < > 8.23*   * 94  --  92 100*  --  97   < > 103*   < > 108*   < > 106* 99   < > 105*   BUBBA  --  8.8  --   --  9.7  --  9.6   < > 9.0   < > 8.9   < > 8.4* 8.2*   < > 9.1   MAG  --   --   --   --   --   --  2.2  --  1.8  --  2.3  --   --   --   --   --    PHOS  --   --   --   --   --   --   --   --   --   --   --   --  7.2* 8.2*  --  6.1*    < > = values in this interval not displayed.       CBC -   Recent Labs   Lab Test 07/30/23  0548 07/29/23  0642 07/28/23  0645   WBC 9.7 9.5 10.3   HGB 10.8* 10.8* 10.6*    214 230       LFTs -   Recent Labs   Lab Test 07/27/23  2208 07/27/23  0700 07/20/23  2044   ALKPHOS 77 78 117   BILITOTAL 0.3 0.3 0.3   ALT 8 8 12   AST 8 7 14   PROTTOTAL 7.5 7.3 7.6   ALBUMIN 3.8 3.8 3.6       Iron Panel -   Recent Labs   Lab Test 09/11/20  0634 07/15/17  0635   IRON 33* 38   IRONSAT 18 15   CARI 325 341        Current Medications:   - MEDICATION INSTRUCTIONS for Dialysis Patients -   Does not apply See Admin Instructions    amLODIPine  10 mg Oral Daily    apixaban ANTICOAGULANT  5 mg Oral BID    atorvastatin  10 mg Oral At Bedtime    bumetanide  2 mg Oral Daily    calcium acetate  1,334 mg Oral TID w/meals    carvedilol  25 mg Oral BID w/meals    ceFAZolin  2 g Intravenous Q24H    cinacalcet  90 mg Oral Daily    cloNIDine  1 patch Transdermal Weekly    And    cloNIDine   Transdermal Q8H ARNOL    hydrALAZINE  100 mg Oral TID    lisinopril  20 mg Oral QPM    multivitamin RENAL  1 capsule Oral Daily    sevelamer carbonate  4,000 mg Oral TID w/meals    sodium chloride (PF)  3 mL Intracatheter Q8H    vitamin D3  50 mcg Oral Daily       Senia Barrett MD

## 2023-07-30 NOTE — PROGRESS NOTES
[facility]  Hospitalist Progress Note        Luis Collazo MD   07/30/2023        Interval History:        - Fever trended down, afebrile since 7/29; CRP trending down---> 81; still with some left shoulder pain but reports improvement with improving ROM  - blood cultures from 7/28 growing Staphylococcus lugdunensis ; subsequent cultures from 7/29 and 7/30 with no growth so far  - evaluated by ID, started on cefazolin and given one dose of Daptomycin  - orthopedics following, no immediate surgical plans at this time         Assessment and Plan:        Taylor Valiente is a 27 year old male with PMH of ESRD (on MWF dialysis), HTN, Right internal jugular DVT (on Eliquis) admitted on 7/27/2023. He presented to ED for evaluation of left shoulder pain, worsening over the past 2 days, also noted hyperkalemic in the setting of ESRD; elevated white count and inflammatory markers raising suspicion for possible septic arthritis.    Left shoulder pain, likely septic bursitis  Gram-positive bacteremia with Staphylococcus lugdunensis  - afebrile on presentation but then spiked temp of 101.6 F; on presentation WBC 13.6, CRP 78, ESR 55, pro calcitonin 2.18   - was empirically given vancomycin and Rocephin in ED  - shoulder Xray 7/27: No evidence of acute fracture or dislocation. Joint spaces are preserved. Soft tissues grossly unremarkable.   - WBC 12.7--> 9.5; CRP trended up 78-- 127-- 111  - had attempted aspiration by IR on 7/28 but no fluid was drawn  - MRI left shoulder 7/29 noted findings consistent with subacromial/subdeltoid bursitis, no joint effusion and no findings to suggest septic arthritis    - Fever trended down, afebrile since 7/29; CRP trending down---> 81; still with some left shoulder pain but reports improvement with improving ROM  - blood cultures from 7/28 growing Staphylococcus lugdunensis ; subsequent cultures from 7/29 and 7/30 with no growth so far  - evaluated by ID, abx  switched to Cefazolin (7/29) and given one dose of Daptomycin  - orthopedics following, no immediate surgical plans at this time    ESRD on hemodialysis History of FSGS.  Dialyzes Monday Wednesday Friday.  Still makes some urine  Hyperkalemia  - K 5.7---> 5.4---5.0  - nephrology following for dialysis, got dialyzed on 7/28/23  - Continue PTA Bumex 2 mg daily, PhosLo 3 times daily with meals, Cinacalcet    Elevated troponin, likely type II non-STEMI versus chronic elevation in the setting of ESRD  - serial trops with no significant trend 64-- 64; denies any chest pain; monitor clinically    Hypertension:  - Continue PTA hydralazine 100 mg 3 times daily, carvedilol 25 mg twice daily, amlodipine 10 mg daily   - resumed PTA clonidine patch (7/29; usually changes every Tuesday but the patch was removed in ED)  - hydralazine IV PRN for SBP>180     Right IJ DVT: Associated with dialysis catheter  - continue with PTA Eliquis    Left upper extremity fistula stenosis: Adjacent to arteriovenous anastomosis  - per nephrology had AV fistula infiltration during dialysis on 7/24/23  - US UE venous noted no DVT  - US UE arterial noted patent left brachial artery to basilic vein fistula. Ultrasound suggests a focal stenosis just past the arteriovenous anastomosis  - vascular surgery evaluated (signed off) noted hemodynamically insignificant stenosis, per vascular surgery-- more related to tortuosity instead of a true stenosis and suggested no vascular surgery intervention    DVT Prophylaxis: Eliquis  Lines: PRESENT     tunneled dialysis catheter right anterior chest wall    Diet: Combination Diet Regular Diet Adult; Renal Diet (dialysis)    Disposition:   - likely 2-3 days pending clinical improvement; antibiotic plans/intervention for likely septic bursitis per ID/orthopedics    Clinically Significant Risk Factors Present on Admission                        # Hypertension: Noted on problem list              # Obesity: Estimated  "body mass index is 32.01 kg/m  as calculated from the following:    Height as of an earlier encounter on 7/27/23: 1.778 m (5' 10\").    Weight as of this encounter: 101.2 kg (223 lb 1.7 oz).  , PRESENT ON ADMISSION             Page Me (7 am to 6 pm)              Physical Exam:      Blood pressure (!) 156/108, pulse 89, temperature 98  F (36.7  C), temperature source Oral, resp. rate 17, weight 101.2 kg (223 lb 1.7 oz), SpO2 100 %.  Vitals:    07/28/23 0454 07/29/23 0701   Weight: 103.3 kg (227 lb 12.8 oz) 101.2 kg (223 lb 1.7 oz)     Vital Signs with Ranges  Temp:  [97  F (36.1  C)-99  F (37.2  C)] 98  F (36.7  C)  Pulse:  [] 89  Resp:  [17-18] 17  BP: (156-180)/(108-124) 156/108  SpO2:  [98 %-100 %] 100 %  I/O's Last 24 hours  I/O last 3 completed shifts:  In: 280 [P.O.:280]  Out: -     Constitutional: Alert, awake and oriented; resting comfortably in no apparent distress       Oral cavity: Moist mucosa   Cardiovascular: Normal s1 s2, regular rate and rhythm, no murmur   Lungs: B/l clear to auscultation, no wheezes or crepitations   Abdomen: Soft, nt, nd, no guarding, rigidity or rebound; BS +   LE : Mild b/l edema +   Musculoskeletal/Neuro Power 5/5 in all extremities; still with some tenderness left shoulder joint, ROM improving; LUE fistula with thrill   Psychiatry: normal mood and affect  noted Right CVC dialysis catheter                Medications:         - MEDICATION INSTRUCTIONS for Dialysis Patients -   Does not apply See Admin Instructions    amLODIPine  10 mg Oral Daily    apixaban ANTICOAGULANT  5 mg Oral BID    atorvastatin  10 mg Oral At Bedtime    bumetanide  2 mg Oral Daily    calcium acetate  1,334 mg Oral TID w/meals    carvedilol  25 mg Oral BID w/meals    ceFAZolin  2 g Intravenous Q24H    cinacalcet  90 mg Oral Daily    cloNIDine  1 patch Transdermal Weekly    And    cloNIDine   Transdermal Q8H ARNOL    hydrALAZINE  100 mg Oral TID    lisinopril  20 mg Oral QPM    multivitamin RENAL  1 " capsule Oral Daily    sevelamer carbonate  4,000 mg Oral TID w/meals    sodium chloride (PF)  3 mL Intracatheter Q8H    vitamin D3  50 mcg Oral Daily     PRN Meds: acetaminophen **OR** acetaminophen, bisacodyl, glucose **OR** dextrose **OR** glucagon, hydrALAZINE, HYDROmorphone, HYDROmorphone, lidocaine 4%, lidocaine (buffered or not buffered), melatonin, naloxone **OR** naloxone **OR** naloxone **OR** naloxone, ondansetron **OR** ondansetron, oxyCODONE, oxyCODONE IR, polyethylene glycol, prochlorperazine **OR** prochlorperazine **OR** prochlorperazine, senna-docusate **OR** senna-docusate, sodium chloride (PF)         Data:      All new lab and imaging data was reviewed.   Recent Labs   Lab Test 07/30/23  0548 07/29/23  0642 07/28/23  0645 03/16/23  0542 03/15/23  0900 06/15/21  1634 02/09/21  1055 07/19/17  1820 07/18/17  0910   WBC 9.7 9.5 10.3   < > 8.4   < > 8.9   < >  --    HGB 10.8* 10.8* 10.6*   < > 8.4*   < > 10.5*   < >  --    MCV 88 88 90   < > 92   < > 85   < >  --     214 230   < > 172   < > 307   < >  --    INR  --   --   --   --  1.40*  --  1.23*  --  1.04    < > = values in this interval not displayed.        Recent Labs   Lab Test 07/30/23  0555 07/29/23  0642 07/28/23  1035 07/28/23  0810 07/28/23  0645 07/28/23  0001 07/27/23  2208   NA  --  140  --   --  136  --  135*   POTASSIUM  --  4.4 5.0  --  5.2  5.2   < > 5.7*   CHLORIDE  --  97*  --   --  99  --  98   CO2  --  27  --   --  19*  --  19*   BUN  --  33.0*  --   --  57.4*  --  53.0*   CR  --  8.93*  --   --  12.84*  --  11.74*   ANIONGAP  --  16*  --   --  18*  --  18*   BUBBA  --  8.8  --   --  9.7  --  9.6   * 94  --  92 100*  --  97    < > = values in this interval not displayed.       Recent Labs   Lab Test 11/28/20  0606 09/09/20  2344 09/09/20  1827 10/04/19  0148 07/14/17  1158   TROPI <0.015 0.084* 0.083*   < >  --    TROPONIN  --   --   --   --  0.06    < > = values in this interval not displayed.

## 2023-07-31 LAB
BACTERIA BLD CULT: ABNORMAL
CRP SERPL-MCNC: 45.06 MG/L
ERYTHROCYTE [SEDIMENTATION RATE] IN BLOOD BY WESTERGREN METHOD: 59 MM/HR (ref 0–15)

## 2023-07-31 PROCEDURE — 85652 RBC SED RATE AUTOMATED: CPT | Performed by: STUDENT IN AN ORGANIZED HEALTH CARE EDUCATION/TRAINING PROGRAM

## 2023-07-31 PROCEDURE — 86140 C-REACTIVE PROTEIN: CPT | Performed by: STUDENT IN AN ORGANIZED HEALTH CARE EDUCATION/TRAINING PROGRAM

## 2023-07-31 PROCEDURE — 250N000013 HC RX MED GY IP 250 OP 250 PS 637: Performed by: EMERGENCY MEDICINE

## 2023-07-31 PROCEDURE — 250N000011 HC RX IP 250 OP 636: Performed by: INTERNAL MEDICINE

## 2023-07-31 PROCEDURE — 120N000001 HC R&B MED SURG/OB

## 2023-07-31 PROCEDURE — 90935 HEMODIALYSIS ONE EVALUATION: CPT | Performed by: INTERNAL MEDICINE

## 2023-07-31 PROCEDURE — 250N000013 HC RX MED GY IP 250 OP 250 PS 637: Performed by: INTERNAL MEDICINE

## 2023-07-31 PROCEDURE — 250N000013 HC RX MED GY IP 250 OP 250 PS 637: Performed by: HOSPITALIST

## 2023-07-31 PROCEDURE — 36415 COLL VENOUS BLD VENIPUNCTURE: CPT | Performed by: INTERNAL MEDICINE

## 2023-07-31 PROCEDURE — 87040 BLOOD CULTURE FOR BACTERIA: CPT | Performed by: INTERNAL MEDICINE

## 2023-07-31 PROCEDURE — 99233 SBSQ HOSP IP/OBS HIGH 50: CPT | Performed by: SPECIALIST

## 2023-07-31 PROCEDURE — 99232 SBSQ HOSP IP/OBS MODERATE 35: CPT | Performed by: HOSPITALIST

## 2023-07-31 PROCEDURE — 250N000011 HC RX IP 250 OP 636: Mod: JZ | Performed by: INTERNAL MEDICINE

## 2023-07-31 PROCEDURE — 90935 HEMODIALYSIS ONE EVALUATION: CPT

## 2023-07-31 RX ORDER — HEPARIN SODIUM 1000 [USP'U]/ML
500 INJECTION, SOLUTION INTRAVENOUS; SUBCUTANEOUS CONTINUOUS
Status: DISCONTINUED | OUTPATIENT
Start: 2023-07-31 | End: 2023-07-31

## 2023-07-31 RX ADMIN — CARVEDILOL 25 MG: 25 TABLET, FILM COATED ORAL at 07:57

## 2023-07-31 RX ADMIN — LISINOPRIL 20 MG: 20 TABLET ORAL at 20:28

## 2023-07-31 RX ADMIN — Medication 1 CAPSULE: at 07:56

## 2023-07-31 RX ADMIN — HEPARIN SODIUM 500 UNITS/HR: 1000 INJECTION INTRAVENOUS; SUBCUTANEOUS at 08:18

## 2023-07-31 RX ADMIN — CARVEDILOL 25 MG: 25 TABLET, FILM COATED ORAL at 18:44

## 2023-07-31 RX ADMIN — CALCIUM ACETATE 1334 MG: 667 CAPSULE ORAL at 18:44

## 2023-07-31 RX ADMIN — ATORVASTATIN CALCIUM 10 MG: 10 TABLET, FILM COATED ORAL at 21:53

## 2023-07-31 RX ADMIN — HYDRALAZINE HYDROCHLORIDE 100 MG: 50 TABLET, FILM COATED ORAL at 14:06

## 2023-07-31 RX ADMIN — SEVELAMER CARBONATE 4000 MG: 800 TABLET, FILM COATED ORAL at 14:06

## 2023-07-31 RX ADMIN — HEPARIN SODIUM 500 UNITS: 1000 INJECTION INTRAVENOUS; SUBCUTANEOUS at 08:18

## 2023-07-31 RX ADMIN — CALCIUM ACETATE 1334 MG: 667 CAPSULE ORAL at 14:06

## 2023-07-31 RX ADMIN — APIXABAN 5 MG: 5 TABLET, FILM COATED ORAL at 20:28

## 2023-07-31 RX ADMIN — HYDRALAZINE HYDROCHLORIDE 100 MG: 50 TABLET, FILM COATED ORAL at 07:56

## 2023-07-31 RX ADMIN — CINACALCET 90 MG: 30 TABLET ORAL at 07:56

## 2023-07-31 RX ADMIN — HYDRALAZINE HYDROCHLORIDE 100 MG: 50 TABLET, FILM COATED ORAL at 20:28

## 2023-07-31 RX ADMIN — BUMETANIDE 2 MG: 2 TABLET ORAL at 09:00

## 2023-07-31 RX ADMIN — SENNOSIDES AND DOCUSATE SODIUM 2 TABLET: 50; 8.6 TABLET ORAL at 20:32

## 2023-07-31 RX ADMIN — APIXABAN 5 MG: 5 TABLET, FILM COATED ORAL at 07:57

## 2023-07-31 RX ADMIN — SEVELAMER CARBONATE 4000 MG: 800 TABLET, FILM COATED ORAL at 18:45

## 2023-07-31 RX ADMIN — Medication 50 MCG: at 07:56

## 2023-07-31 RX ADMIN — CEFAZOLIN SODIUM 2 G: 2 INJECTION, SOLUTION INTRAVENOUS at 21:54

## 2023-07-31 RX ADMIN — AMLODIPINE BESYLATE 10 MG: 10 TABLET ORAL at 07:57

## 2023-07-31 ASSESSMENT — ACTIVITIES OF DAILY LIVING (ADL)
ADLS_ACUITY_SCORE: 18

## 2023-07-31 NOTE — PLAN OF CARE
Goal Outcome Evaluation:    Orientation: A&Ox4     Vitals/Tele: VSS on Ra except elevated BPs     IV Access/drains: R PIV,LUE AV fistula, rt chest dialysis catheter    Diet: renal     Mobility: independent     GI/: continent pt rarely voids per pt    Wound/Skin: Bruising SULY    Consults:  Orthopedic surgery & Vascular surgery, nephrology consult    Discharge Plan: TBD       See Flow sheets for assessment

## 2023-07-31 NOTE — PROGRESS NOTES
Orthopedic Surgery  Taylor Valiente  07/31/2023     Admit Date:  7/27/2023    Left subdeltoid/subacromial bursitis  Recent left upper arm fistula infiltration  Staph tyronunensis bacteremia     Back from dialysis. Patient resting comfortably in bed.    Left shoulder pain is reportedly much improved from the time of admission.  Notes continued increase in left shoulder ROM with less pain.   Denies LUE numbness and tingling.  Denies fever and chills.   Tolerating PO intake.   Hypertensive, afebrile. No tachycardia.  No acute events overnight.    Temp:  [97.8  F (36.6  C)-98.4  F (36.9  C)] 97.8  F (36.6  C)  Pulse:  [80-92] 87  Resp:  [16-27] 17  BP: (155-188)/() 186/122  SpO2:  [94 %-100 %] 99 %    Alert and oriented.  Left upper extremity: Fistula with overlying bandage. Visible skin otherwise intact. No apparent erythema. Mild swelling to the left deltoid region. No distinct palpable fluid collections or joint effusion. Tender diffusely over the deltoid, the posterior aspect being more tender than the mid or anterior aspects. Nontender over the anterior and posterior GH joints, AC joint, biceps, and triceps. Passive shoulder forward flexion to 170 degrees with no pain, abduction to 100 degrees with mild lateral shoulder discomfort. Able to fully range the elbow, wrist, and digits without issue. 5/5  strength. Radial pulse 2+. Capillary refill <2 seconds. SILT A/M/U/R nerve distributions.    Labs:  Recent Labs   Lab Test 07/30/23  0548 07/29/23  0642 07/28/23  0645   WBC 9.7 9.5 10.3   HGB 10.8* 10.8* 10.6*    214 230     Recent Labs   Lab Test 03/15/23  0900 02/09/21  1055 07/18/17  0910   INR 1.40* 1.23* 1.04     Recent Labs   Lab Test 11/29/20  0627 11/28/20  0606 11/27/20  0221   CRP 7.9 7.4 13.0*     Left shoulder MRI without contrast dated 7/29/23:  1.  Findings consistent with subacromial/subdeltoid bursitis.  2.  No joint effusion. No findings to suggest septic arthritis.    PLAN:  -I  spoke with the patient's hospitalist as well as Dr. Casper (ID) and Dr. Ballard (ortho trauma surgeon). The patient reports that his left shoulder pain is progressively improving and he has improved active and passive ROM without pain. Today's CRP is pending, but previously was down trending over the past couple of days. At this time, we will continue with monitoring the patient's response to antibiotics. No plans for orthopedic surgical intervention at this time.  -Continue IV antibiotics per ID.   -Orthopedics will check in again tomorrow for a repeat exam.    Megan Morales PA-C  [facility]

## 2023-07-31 NOTE — PLAN OF CARE
Orthopedic Chart Check    I attempted to stop by the patient's room at 1035 but he was gone, presumably undergoing dialysis. Will plan to check in later today versus tomorrow morning for a recheck of his left shoulder. Repeat CRP and ESR not drawn yet today, but CRP had been down trending the past few days. Please contact ortho trauma team with any interval questions or concerns.    Megan Morales PA-C  [facility]

## 2023-07-31 NOTE — PLAN OF CARE
Goal Outcome Evaluation:     Orientation/Cognitive: A&Ox4  Observation Goals (Met/ Not Met): Inpatient status  Mobility Level/Assist Equipment: Ind  Fall Risk (Y/N): No  Behavior Concerns: None  Pain Management: L shoulder tenderness (pain has decreased from admission)  Tele/VS/O2: Tele: Sinus Tach w T-waves inverted, VSS except elevated B.Ps  ABNL Lab/BG:See lab results   Diet: Renal diet  Bowel/Bladder: Continent; dialysis today per pt he rarely voids  Skin Concerns: Bruising to SULY  Drains/Devices:R PIV, LUE AV fistula, rt chest port  Tests/Procedures for next shift: Orthopedic surgery & Vascular surgery, nephrology consult  Anticipated DC date & active delays: TBD  Patient Stated Goal for Today:

## 2023-07-31 NOTE — PROGRESS NOTES
Potassium   Date Value Ref Range Status   07/29/2023 4.4 3.4 - 5.3 mmol/L Final   06/20/2022 3.9 3.4 - 5.3 mmol/L Final   06/19/2021 3.8 3.4 - 5.3 mmol/L Final     Hemoglobin   Date Value Ref Range Status   07/30/2023 10.8 (L) 13.3 - 17.7 g/dL Final   06/18/2021 9.1 (L) 13.3 - 17.7 g/dL Final     Creatinine   Date Value Ref Range Status   07/29/2023 8.93 (H) 0.67 - 1.17 mg/dL Final   06/19/2021 8.93 (H) 0.66 - 1.25 mg/dL Final     Urea Nitrogen   Date Value Ref Range Status   07/29/2023 33.0 (H) 6.0 - 20.0 mg/dL Final   06/20/2022 35 (H) 7 - 30 mg/dL Final   06/19/2021 58 (H) 7 - 30 mg/dL Final     Sodium   Date Value Ref Range Status   07/29/2023 140 136 - 145 mmol/L Final   06/19/2021 139 133 - 144 mmol/L Final     INR   Date Value Ref Range Status   03/15/2023 1.40 (H) 0.85 - 1.15 Final   02/09/2021 1.23 (H) 0.86 - 1.14 Final       DIALYSIS PROCEDURE NOTE  Hepatitis status of previous patient on machine log was checked and verified ok to use with this patients hepatitis status.  Patient dialyzed for 4 hrs. on a K3 bath with a net fluid removal of  2L.  A BFR of 250 ml/min was obtained via a LUE AVF.      The treatment plan was discussed with Dr. Alba during the treatment.    Total heparin received during the treatment: 2000 units.      Meds  given: None  Complications: UF goal reduced from 3 to 2L due to cramping      Person educated: patient. Knowledge base adequate. Barriers to learning: none. Educated on procedure via verbal mode. Patient/family verbalized understanding. Pt prefers verbal education style.      ICEBOAT? Timeout performed pre-treatment  I: Patient was identified using 2 identifiers  C:  Consent Signed Yes  E: Equipment preventative maintenance is current and dialysis delivery system OK to use  B: Hepatitis B Surface Antigen: Neg; Draw Date: 3/13/23      Hepatitis B Surface Antibody: IMM; Draw Date: 3/13/23  O: Dialysis orders present and complete prior to treatment  A: Vascular access verified  and assessed prior to treatment  T: Treatment was performed at a clinically appropriate time  ?: Patient was allowed to ask questions and address concerns prior to treatment  See Adult Hemodialysis flowsheet in EPIC for further details and post assessment.  Machine water alarm in place and functioning. Transducer pods intact and checked every 15min.   Pt returned via stretcher.  Chlorine/Chloramine water system checked every 4 hours.  Outpatient Dialysis at [facility]     Patient repositioned every 2 hours during the treatment.  Post treatment report given to Vale Rogers RN  regarding 2L of fluid removed, last BP of 186/122, and patient pain rating of 0/10.

## 2023-07-31 NOTE — PROGRESS NOTES
[facility]     Renal Progress Note       SHORTHAND KEY FOR MY NOTES:  c = with, s = without, p = after, a = before, x = except, asx = asymptomatic, tx = transplant or treatment, sx = symptoms or symptomatic, cx = canceled or culture, rxn = reaction, yday = yesterday, nl = normal, abx = antibiotics, fxn = function, dx = diagnosis, dz = disease, m/h = melena/hematochezia, c/d/l/ha = cramping/dizziness/lightheadedness/headache, d/c = discharge or diarrhea/constipation, f/c/n/v = fevers/chills/nausea/vomiting, cp/sob = chest pain/shortness of breath, tbv = total body volume, rxn = reaction, tdc = tunneled dialysis catheter, pta = prior to admission, hd = hemodialysis, pd = peritoneal dialysis, hhd = home hemodialysis, edw = estimated dry wt         Assessment/Plan:     ESKD.  Pt is dialysing today per a MWF schedule.  No issues c the run expected.  Using 17g x 2 for the first time in a while.  Per pt, it was attempted as an outpt but he infiltrated.  A.  HD today c 17g needles.  B.  If things go well today and Weds, then we will plan to pull the TDC.    2.  Staph lugudunesis bacteremia.  Pt is on abx and is currently asx.  A.  Continue abx at proper renal dose.    3.  HTN.  Pt's BP is on the high side.  He is on multiple meds and is pulling fluid today.  A.  Challenge EDW.  B.  Continue same meds/doses for now.  C.  Once he's dry, then we will adjust meds, starting c maximizing the lisin.    4.  Anemia.  Hb is stable in the 10s.  A.  Follow hb, clinically.        Interval History:     Pt is feeling ok.  He is not having any f/c/n/v.  No cp/sob.            Medications and Allergies:      - MEDICATION INSTRUCTIONS for Dialysis Patients -   Does not apply See Admin Instructions    sodium chloride 0.9%  250 mL Intravenous Once in dialysis/CRRT    sodium chloride 0.9%  300 mL Hemodialysis Machine Once    amLODIPine  10 mg Oral Daily    apixaban ANTICOAGULANT  5 mg Oral BID    atorvastatin  10 mg  Oral At Bedtime    bumetanide  2 mg Oral Daily    calcium acetate  1,334 mg Oral TID w/meals    carvedilol  25 mg Oral BID w/meals    ceFAZolin  2 g Intravenous Q24H    cinacalcet  90 mg Oral Daily    cloNIDine  1 patch Transdermal Weekly    And    cloNIDine   Transdermal Q8H ARNOL    hydrALAZINE  100 mg Oral TID    lisinopril  20 mg Oral QPM    multivitamin RENAL  1 capsule Oral Daily    sevelamer carbonate  4,000 mg Oral TID w/meals    sodium chloride (PF)  3 mL Intracatheter Q8H    vitamin D3  50 mcg Oral Daily     Allergies   Allergen Reactions    Benadryl [Diphenhydramine] Itching    Vancomycin Itching          Physical Exam:     Vitals were reviewed     , Blood pressure (!) 186/121, pulse 86, temperature 97.8  F (36.6  C), temperature source Oral, resp. rate 17, weight 102.4 kg (225 lb 11.2 oz), SpO2 100 %.  Wt Readings from Last 3 Encounters:   07/31/23 102.4 kg (225 lb 11.2 oz)   07/13/23 104.2 kg (229 lb 11.2 oz)   06/06/23 104.1 kg (229 lb 6.4 oz)     Intake/Output Summary (Last 24 hours) at 7/31/2023 0844  Last data filed at 7/30/2023 1000  Gross per 24 hour   Intake 280 ml   Output --   Net 280 ml     GENERAL APPEARANCE: pleasant, NAD, alert  HEENT:  eyes/ears/nose/neck grossly nl  RESP: CTA B c good efforts  CV: RRR, nl S1/S2   ABDOMEN: o/s/nt/nd  EXTREMITIES/SKIN: no ble edema  ACCESS:  RIJ TDC - site ok, RADHA - clotted, LAF c good thrill/bruit    Pt seen on HD.  -3L UF goal.  Using 17g needles c . Stable run so far.         Data:     CBC RESULTS:     Recent Labs   Lab 07/30/23  0548 07/29/23  0642 07/28/23  0645 07/27/23  2208 07/27/23  0700   WBC 9.7 9.5 10.3 12.7* 13.6*   RBC 4.10* 4.08* 3.96* 4.03* 3.99*   HGB 10.8* 10.8* 10.6* 10.8* 10.6*   HCT 35.9* 35.8* 35.5* 35.5* 35.3*    214 230 242 219     Basic Metabolic Panel:  Recent Labs   Lab 07/30/23  0555 07/29/23  0642 07/28/23  1035 07/28/23  0810 07/28/23  0645 07/28/23  0001 07/27/23  2208 07/27/23  0700   NA  --  140  --   --  136  --   135* 137   POTASSIUM  --  4.4 5.0  --  5.2  5.2 5.4* 5.7* 5.3   CHLORIDE  --  97*  --   --  99  --  98 99   CO2  --  27  --   --  19*  --  19* 21*   BUN  --  33.0*  --   --  57.4*  --  53.0* 43.8*   CR  --  8.93*  --   --  12.84*  --  11.74* 10.09*   * 94  --  92 100*  --  97 98   BUBBA  --  8.8  --   --  9.7  --  9.6 9.5     INRNo lab results found in last 7 days.   Attestation:   I have reviewed today's relevant vital signs, notes, medications, labs and imaging.    Balta Alba MD  [facility] - Nephrology  [phone]

## 2023-07-31 NOTE — PROGRESS NOTES
[facility]    Infectious Disease Progress Note    Date of Service : 07/31/2023       Assessment;  27 YM with ESRDon HD with indwelling dialysis catheter , hx of DVT who has been admitted with left shoulder pain and positive blood cxs for staphylococcus lugdunensis. MRI of the shoulder shows findings consistent with subacromial/subdeltoid bursitis which may be septic. No joint effusion to suggest septic arthritis and blood cxs have cleared, no surgical plans at this time, shoulder is feeling better    -Staphylococcus lugdunensis bacteremia high grade  -Left shoulder pain with concern for ubacromial/subdeltoid bursitis which may be septic in the setting of bacteremia  -ESRd on HD  -HTN  -Hx of R internal jugular DVT  LUE fistula stenosis    Recommendations:  Continue Cefazolin 2 grams daily  HD catheter removal should be considered with high grade bacteremia  Follow blood cxs for clearance  Close clinical follow up for shoulder bursitis , seems to be improving  Will need IV antibiotics at discharge for bacteremia, cefazolin can be dosed with dialysis after discharge  Discussed with the orthopedic service    Roxane Casper MD    Interval History   Patient was seen and examined, chart reviewed   Resting, feels better, had dialysis today through LUE fistula. Right sided dialysis catheter is in place, left shoulder feels better, able to move it, tolerating antibiotics without side effects    Physical Exam   Temp: 97.8  F (36.6  C) Temp src: Oral BP: (!) 186/122 Pulse: 87   Resp: 17 SpO2: 99 % O2 Device: None (Room air)    Vitals:    07/28/23 0454 07/29/23 0701 07/31/23 0620   Weight: 103.3 kg (227 lb 12.8 oz) 101.2 kg (223 lb 1.7 oz) 102.4 kg (225 lb 11.2 oz)     Vital Signs with Ranges  Temp:  [97.8  F (36.6  C)-98.4  F (36.9  C)] 97.8  F (36.6  C)  Pulse:  [80-92] 87  Resp:  [16-27] 17  BP: (155-188)/() 186/122  SpO2:  [94 %-100 %] 99 %    Constitutional: Awake, alert, cooperative, no  apparent distress  Lungs: Clear to auscultation bilaterally, no crackles or wheezing  Abdomen: Normal bowel sounds, soft, non-distended, non-tender  Skin: LUE fistula without tenderness  MS : Able to move left shoulder, decreased tenderness, r sided dialysis catheter in place    Other:    Medications      - MEDICATION INSTRUCTIONS for Dialysis Patients -   Does not apply See Admin Instructions    amLODIPine  10 mg Oral Daily    apixaban ANTICOAGULANT  5 mg Oral BID    atorvastatin  10 mg Oral At Bedtime    bumetanide  2 mg Oral Daily    calcium acetate  1,334 mg Oral TID w/meals    carvedilol  25 mg Oral BID w/meals    ceFAZolin  2 g Intravenous Q24H    cinacalcet  90 mg Oral Daily    cloNIDine  1 patch Transdermal Weekly    And    cloNIDine   Transdermal Q8H ARNOL    hydrALAZINE  100 mg Oral TID    lisinopril  20 mg Oral QPM    multivitamin RENAL  1 capsule Oral Daily    sevelamer carbonate  4,000 mg Oral TID w/meals    sodium chloride (PF)  3 mL Intracatheter Q8H    vitamin D3  50 mcg Oral Daily       Data   All microbiology laboratory data reviewed.  Recent Labs   Lab Test 07/30/23  0548 07/29/23  0642 07/28/23  0645   WBC 9.7 9.5 10.3   HGB 10.8* 10.8* 10.6*   HCT 35.9* 35.8* 35.5*   MCV 88 88 90    214 230     Recent Labs   Lab Test 07/29/23  0642 07/28/23  0645 07/27/23  2208   CR 8.93* 12.84* 11.74*     Recent Labs   Lab Test 07/30/23  0548   SED 62*     Microbiology  7/28 blood cx both sets 4/4 blood cx bottles positive  Peripheral Blood   0 Result Notes  Culture Positive on the 1st day of incubation Abnormal       Staphylococcus lugdunensis Panic    2 of 2 bottles        Resulting Agency: [facility]     Susceptibility     Staphylococcus lugdunensis     KOLBY     Clindamycin <=0.25 ug/mL Susceptible     Erythromycin <=0.25 ug/mL Susceptible     Gentamicin <=0.5 ug/mL Susceptible     Oxacillin 1 ug/mL Susceptible 1     Tetracycline <=1 ug/mL Susceptible     Trimethoprim/Sulfamethoxazole <=0.5/9.5 u...  Susceptible     Vancomycin <=0.5 ug/mL Susceptible               Blood cx 7/29, 7/30 no growth so far    Imaging  EXAM: MR SHOULDER LEFT W/O CONTRAST  LOCATION: [facility]  DATE: 7/29/2023     INDICATION: acute left shoulder pain, concern for septic arthritis but no fluid aspirated on attempt; eval for joint effusion inflammation, other acute inflammatory process  COMPARISON: Radiograph 07/27/2023  TECHNIQUE: Unenhanced.     FINDINGS:     ROTATOR CUFF:  -Supraspinatus: No tendon tear, tendinopathy or fatty atrophy.  -Infraspinatus: No tendon tear, tendinopathy or fatty atrophy.  -Subscapularis: No tendon tear, tendinopathy or fatty atrophy.  -Teres minor: No tendon tear, tendinopathy or fatty atrophy.     CORACOACROMIAL ARCH:  -Morphology: Type II acromion. No subacromial spur. Subacromial and subcoracoid space are normal.   -Bursa: Small volume fluid in the subacromial/subdeltoid bursa.     ACROMIOCLAVICULAR JOINT:   -Normal.      LONG HEAD OF BICEPS TENDON:   -No tendinopathy or tear. No tenosynovitis or subluxation.     GLENOHUMERAL JOINT:   -Labrum: No labral tear. No paralabral cyst.   -Cartilage: Normal.   -Joint space: No effusion or synovitis.  -Glenohumeral ligaments and capsule: No pericapsular inflammation.     BONES:   -No fracture or concerning marrow replacing lesion.     SOFT TISSUES:   -Mild edema in the deep aspect of the deltoid adjacent to the bursa. Normal deltoid muscle bulk. Normal visualized chest wall and axilla.                                                                      IMPRESSION:  1.  Findings consistent with subacromial/subdeltoid bursitis.  2.  No joint effusion. No findings to suggest septic arthritis.

## 2023-07-31 NOTE — PROGRESS NOTES
[facility]  Hospitalist Progress Note        Luis Collazo MD   07/31/2023        Interval History:        - Afebrile since 7/29; continues on IV Cefazolin; blood cultures from 7/29 and 7/30 remain no growth  - still with some left shoulder tenderness but improved ROM  - getting dialysis 7/31 through the AV fistula (used first time after it got infiltrated with dialysis PTA), nephrology plans to remove tunneled dialysis catheter if if no further issues with next dialysis  - awaiting final antibiotic recommendations from ID for discharge         Assessment and Plan:        Taylor Valiente is a 27 year old male with PMH of ESRD (on MWF dialysis), HTN, Right internal jugular DVT (on Eliquis) admitted on 7/27/2023. He presented to ED for evaluation of left shoulder pain, worsening over the past 2 days, also noted hyperkalemic in the setting of ESRD; elevated white count and inflammatory markers raising suspicion for possible septic arthritis.    Left shoulder pain, likely septic bursitis  Bacteremia with Staphylococcus lugdunensis  - afebrile on presentation but then spiked temp of 101.6 F; on presentation WBC 13.6, CRP 78, ESR 55, pro calcitonin 2.18   - was empirically given vancomycin and Rocephin in ED  - shoulder Xray 7/27: No evidence of acute fracture or dislocation. Joint spaces are preserved. Soft tissues grossly unremarkable.   - WBC 12.7--> 9.5; CRP trended up 78-- 127-- 111  - had attempted aspiration by IR on 7/28 but no fluid was drawn  - MRI left shoulder 7/29 noted findings consistent with subacromial/subdeltoid bursitis, no joint effusion and no findings to suggest septic arthritis    - Fever trended down, afebrile since 7/29; CRP trending down---> 81; still with some left shoulder pain but reports improvement with improving ROM  - blood cultures from 7/28 growing Staphylococcus lugdunensis ; subsequent cultures from 7/29 and 7/30 with no growth so far  - evaluated by ID, abx  switched to Cefazolin (7/29) and given one dose of Daptomycin  - orthopedics following, no immediate surgical plans at this time but ID suggest that he might need surgical debridement    ESRD on hemodialysis History of FSGS.  Dialyzes Monday Wednesday Friday.  Still makes some urine  Hyperkalemia  - K 5.7---> 5.4---5.0  - nephrology following for dialysis, last dialyzed on 7/31/23  - Continue PTA Bumex 2 mg daily, PhosLo 3 times daily with meals, Cinacalcet    Elevated troponin, likely type II non-STEMI versus chronic elevation in the setting of ESRD  - serial trops with no significant trend 64-- 64; denies any chest pain; monitor clinically    Hypertension:  - Continue PTA hydralazine 100 mg 3 times daily, carvedilol 25 mg twice daily, amlodipine 10 mg daily   - resumed PTA clonidine patch (7/29; usually changes every Tuesday but the patch was removed in ED)  - hydralazine IV PRN for SBP>180     Right IJ DVT: Associated with dialysis catheter  - continue with PTA Eliquis    Left upper extremity fistula stenosis: Adjacent to arteriovenous anastomosis  - per nephrology had AV fistula infiltration during dialysis on 7/24/23  - US UE venous noted no DVT  - US UE arterial noted patent left brachial artery to basilic vein fistula. Ultrasound suggests a focal stenosis just past the arteriovenous anastomosis  - vascular surgery evaluated (signed off) noted hemodynamically insignificant stenosis, per vascular surgery-- more related to tortuosity instead of a true stenosis and suggested no vascular surgery intervention    - he is getting dialysis 7/31 through the AV fistula (used first time after it got infiltrated with dialysis PTA), nephrology plans to remove tunneled dialysis catheter if if no further issues with next dialysis    DVT Prophylaxis: Eliquis  Lines: PRESENT     tunneled dialysis catheter right anterior chest wall    Diet: Combination Diet Regular Diet Adult; Renal Diet (dialysis)    Disposition:   - likely  "2-3 days pending antibiotic plans from ID and consideration of surgical debridement by orthopedics for septic bursitis with bacteremia     Clinically Significant Risk Factors Present on Admission                        # Hypertension: Noted on problem list              # Obesity: Estimated body mass index is 32.38 kg/m  as calculated from the following:    Height as of an earlier encounter on 7/27/23: 1.778 m (5' 10\").    Weight as of this encounter: 102.4 kg (225 lb 11.2 oz).  , PRESENT ON ADMISSION             Page Me (7 am to 6 pm)    Care plan discussed with patient and orthopedics              Physical Exam:      Blood pressure (!) 164/103, pulse 80, temperature 97.8  F (36.6  C), temperature source Oral, resp. rate 18, weight 102.4 kg (225 lb 11.2 oz), SpO2 99 %.  Vitals:    07/28/23 0454 07/29/23 0701 07/31/23 0620   Weight: 103.3 kg (227 lb 12.8 oz) 101.2 kg (223 lb 1.7 oz) 102.4 kg (225 lb 11.2 oz)     Vital Signs with Ranges  Temp:  [97.8  F (36.6  C)-98.4  F (36.9  C)] 97.8  F (36.6  C)  Pulse:  [80-88] 80  Resp:  [16-18] 18  BP: (155-164)/() 164/103  SpO2:  [97 %-99 %] 99 %  I/O's Last 24 hours  I/O last 3 completed shifts:  In: 560 [P.O.:560]  Out: -     Constitutional: Alert, awake and oriented; resting comfortably in no apparent distress       Oral cavity: Moist mucosa   Cardiovascular: Normal s1 s2, regular rate and rhythm, no murmur   Lungs: B/l clear to auscultation, no wheezes or crepitations   Abdomen: Soft, nt, nd, no guarding, rigidity or rebound; BS +   LE : Mild b/l edema +   Musculoskeletal/Neuro Power 5/5 in all extremities; still with some tenderness left shoulder joint, ROM improving; LUE fistula with thrill   Psychiatry: normal mood and affect  noted Right CVC dialysis catheter                Medications:         - MEDICATION INSTRUCTIONS for Dialysis Patients -   Does not apply See Admin Instructions    sodium chloride 0.9%  250 mL Intravenous Once in dialysis/CRRT    sodium " chloride 0.9%  300 mL Hemodialysis Machine Once    amLODIPine  10 mg Oral Daily    apixaban ANTICOAGULANT  5 mg Oral BID    atorvastatin  10 mg Oral At Bedtime    bumetanide  2 mg Oral Daily    calcium acetate  1,334 mg Oral TID w/meals    carvedilol  25 mg Oral BID w/meals    ceFAZolin  2 g Intravenous Q24H    cinacalcet  90 mg Oral Daily    cloNIDine  1 patch Transdermal Weekly    And    cloNIDine   Transdermal Q8H ARNOL    heparin  500 Units Hemodialysis Machine OR IV Push Once in dialysis/CRRT    hydrALAZINE  100 mg Oral TID    lisinopril  20 mg Oral QPM    multivitamin RENAL  1 capsule Oral Daily    sevelamer carbonate  4,000 mg Oral TID w/meals    sodium chloride (PF)  3 mL Intracatheter Q8H    vitamin D3  50 mcg Oral Daily     PRN Meds: sodium chloride 0.9%, acetaminophen **OR** acetaminophen, bisacodyl, glucose **OR** dextrose **OR** glucagon, hydrALAZINE, HYDROmorphone, HYDROmorphone, lidocaine 4%, lidocaine (buffered or not buffered), melatonin, naloxone **OR** naloxone **OR** naloxone **OR** naloxone, ondansetron **OR** ondansetron, oxyCODONE, oxyCODONE IR, polyethylene glycol, prochlorperazine **OR** prochlorperazine **OR** prochlorperazine, senna-docusate **OR** senna-docusate, sodium chloride (PF)         Data:      All new lab and imaging data was reviewed.   Recent Labs   Lab Test 07/30/23  0548 07/29/23  0642 07/28/23  0645 03/16/23  0542 03/15/23  0900 06/15/21  1634 02/09/21  1055 07/19/17  1820 07/18/17  0910   WBC 9.7 9.5 10.3   < > 8.4   < > 8.9   < >  --    HGB 10.8* 10.8* 10.6*   < > 8.4*   < > 10.5*   < >  --    MCV 88 88 90   < > 92   < > 85   < >  --     214 230   < > 172   < > 307   < >  --    INR  --   --   --   --  1.40*  --  1.23*  --  1.04    < > = values in this interval not displayed.        Recent Labs   Lab Test 07/30/23  0555 07/29/23  0642 07/28/23  1035 07/28/23  0810 07/28/23  0645 07/28/23  0001 07/27/23  2208   NA  --  140  --   --  136  --  135*   POTASSIUM  --  4.4  5.0  --  5.2  5.2   < > 5.7*   CHLORIDE  --  97*  --   --  99  --  98   CO2  --  27  --   --  19*  --  19*   BUN  --  33.0*  --   --  57.4*  --  53.0*   CR  --  8.93*  --   --  12.84*  --  11.74*   ANIONGAP  --  16*  --   --  18*  --  18*   BUBBA  --  8.8  --   --  9.7  --  9.6   * 94  --  92 100*  --  97    < > = values in this interval not displayed.       Recent Labs   Lab Test 11/28/20  0606 09/09/20  2344 09/09/20  1827 10/04/19  0148 07/14/17  1158   TROPI <0.015 0.084* 0.083*   < >  --    TROPONIN  --   --   --   --  0.06    < > = values in this interval not displayed.

## 2023-08-01 LAB
CRP SERPL-MCNC: 32.89 MG/L
ERYTHROCYTE [SEDIMENTATION RATE] IN BLOOD BY WESTERGREN METHOD: 62 MM/HR (ref 0–15)

## 2023-08-01 PROCEDURE — 250N000011 HC RX IP 250 OP 636: Mod: JZ | Performed by: INTERNAL MEDICINE

## 2023-08-01 PROCEDURE — 85652 RBC SED RATE AUTOMATED: CPT | Performed by: STUDENT IN AN ORGANIZED HEALTH CARE EDUCATION/TRAINING PROGRAM

## 2023-08-01 PROCEDURE — 250N000013 HC RX MED GY IP 250 OP 250 PS 637: Performed by: HOSPITALIST

## 2023-08-01 PROCEDURE — 36415 COLL VENOUS BLD VENIPUNCTURE: CPT | Performed by: INTERNAL MEDICINE

## 2023-08-01 PROCEDURE — 99232 SBSQ HOSP IP/OBS MODERATE 35: CPT | Performed by: HOSPITALIST

## 2023-08-01 PROCEDURE — 99232 SBSQ HOSP IP/OBS MODERATE 35: CPT | Performed by: INTERNAL MEDICINE

## 2023-08-01 PROCEDURE — 120N000001 HC R&B MED SURG/OB

## 2023-08-01 PROCEDURE — 250N000013 HC RX MED GY IP 250 OP 250 PS 637: Performed by: INTERNAL MEDICINE

## 2023-08-01 PROCEDURE — 86140 C-REACTIVE PROTEIN: CPT | Performed by: STUDENT IN AN ORGANIZED HEALTH CARE EDUCATION/TRAINING PROGRAM

## 2023-08-01 PROCEDURE — 87040 BLOOD CULTURE FOR BACTERIA: CPT | Performed by: INTERNAL MEDICINE

## 2023-08-01 PROCEDURE — 250N000013 HC RX MED GY IP 250 OP 250 PS 637: Performed by: EMERGENCY MEDICINE

## 2023-08-01 PROCEDURE — 99232 SBSQ HOSP IP/OBS MODERATE 35: CPT | Performed by: SPECIALIST

## 2023-08-01 RX ADMIN — CINACALCET 90 MG: 30 TABLET ORAL at 07:47

## 2023-08-01 RX ADMIN — SEVELAMER CARBONATE 4000 MG: 800 TABLET, FILM COATED ORAL at 07:47

## 2023-08-01 RX ADMIN — AMLODIPINE BESYLATE 10 MG: 10 TABLET ORAL at 07:48

## 2023-08-01 RX ADMIN — BUMETANIDE 2 MG: 2 TABLET ORAL at 07:47

## 2023-08-01 RX ADMIN — APIXABAN 5 MG: 5 TABLET, FILM COATED ORAL at 07:48

## 2023-08-01 RX ADMIN — CARVEDILOL 25 MG: 25 TABLET, FILM COATED ORAL at 18:39

## 2023-08-01 RX ADMIN — HYDRALAZINE HYDROCHLORIDE 100 MG: 50 TABLET, FILM COATED ORAL at 14:26

## 2023-08-01 RX ADMIN — Medication 1 CAPSULE: at 07:47

## 2023-08-01 RX ADMIN — CALCIUM ACETATE 1334 MG: 667 CAPSULE ORAL at 18:39

## 2023-08-01 RX ADMIN — CARVEDILOL 25 MG: 25 TABLET, FILM COATED ORAL at 07:48

## 2023-08-01 RX ADMIN — CALCIUM ACETATE 1334 MG: 667 CAPSULE ORAL at 12:45

## 2023-08-01 RX ADMIN — HYDRALAZINE HYDROCHLORIDE 100 MG: 50 TABLET, FILM COATED ORAL at 07:47

## 2023-08-01 RX ADMIN — CALCIUM ACETATE 1334 MG: 667 CAPSULE ORAL at 07:48

## 2023-08-01 RX ADMIN — CEFAZOLIN SODIUM 2 G: 2 INJECTION, SOLUTION INTRAVENOUS at 23:33

## 2023-08-01 RX ADMIN — APIXABAN 5 MG: 5 TABLET, FILM COATED ORAL at 20:33

## 2023-08-01 RX ADMIN — SEVELAMER CARBONATE 4000 MG: 800 TABLET, FILM COATED ORAL at 12:45

## 2023-08-01 RX ADMIN — HYDRALAZINE HYDROCHLORIDE 100 MG: 50 TABLET, FILM COATED ORAL at 20:33

## 2023-08-01 RX ADMIN — LISINOPRIL 20 MG: 20 TABLET ORAL at 20:33

## 2023-08-01 RX ADMIN — Medication 50 MCG: at 07:47

## 2023-08-01 RX ADMIN — ATORVASTATIN CALCIUM 10 MG: 10 TABLET, FILM COATED ORAL at 22:55

## 2023-08-01 RX ADMIN — SEVELAMER CARBONATE 4000 MG: 800 TABLET, FILM COATED ORAL at 18:40

## 2023-08-01 ASSESSMENT — ACTIVITIES OF DAILY LIVING (ADL)
ADLS_ACUITY_SCORE: 18

## 2023-08-01 NOTE — PLAN OF CARE
Goal Outcome Evaluation:  Orientation/Cognitive: A&Ox4  Observation Goals (Met/ Not Met): IP  Mobility Level/Assist Equipment: Indep  Fall Risk (Y/N): N  Behavior Concerns: None, pleasant  Pain Management: Denies   Tele/VS/O2: SR, VSS except HTN  ABNL Lab/BG:AM labs  Diet: Renal  Bowel/Bladder: Continent, on HD  Skin Concerns: Bruising to SULY  Drains/Devices:R PIV, R HD port, LUE AV fistula  Tests/Procedures for next shift: Nephrology & Orthopedic surgery following  Anticipated DC date & active delays: TBD  Patient Stated Goal for Today:None

## 2023-08-01 NOTE — PROGRESS NOTES
[facility]    Infectious Disease Progress Note    Date of Service : 08/01/2023     Assessment;  27 YM with ESRDon HD with indwelling dialysis catheter , hx of DVT who has been admitted with left shoulder pain and positive blood cxs for staphylococcus lugdunensis. MRI of the shoulder shows findings consistent with subacromial/subdeltoid bursitis which may be septic. No joint effusion to suggest septic arthritis and blood cxs have cleared, no surgical plans at this time, shoulder is feeling better     -Staphylococcus lugdunensis bacteremia high grade  -Left shoulder pain with concern for ubacromial/subdeltoid bursitis which may be septic in the setting of bacteremia  -ESRd on HD  -HTN  -Hx of R internal jugular DVT  LUE fistula stenosis     Recommendations:  Continue Cefazolin 2 grams daily while in hospital  HD catheter removal should be considered with high grade bacteremia  Blood cxs have cleared  Shoulder seems to be improving  Will need IV antibiotics at discharge for bacteremia, cefazolin can be dosed with dialysis after discharge 2g W dialysis on Monday, Wednesday and 3 grams on Friday until 8/12    Roxane Casper MD    Interval History   Afebrile, overall improving, CRP declining, blood cxs negative now, tolerating antibiotics without side effects    Physical Exam   Temp: 99.2  F (37.3  C) Temp src: Oral BP: (!) 161/110 Pulse: 84   Resp: 18 SpO2: 98 % O2 Device: None (Room air)    Vitals:    07/29/23 0701 07/31/23 0620 08/01/23 0609   Weight: 101.2 kg (223 lb 1.7 oz) 102.4 kg (225 lb 11.2 oz) 100.7 kg (221 lb 14.4 oz)     Vital Signs with Ranges  Temp:  [98.1  F (36.7  C)-99.2  F (37.3  C)] 99.2  F (37.3  C)  Pulse:  [83-92] 84  Resp:  [17-27] 18  BP: (145-188)/() 161/110  SpO2:  [94 %-100 %] 98 %       Constitutional: Awake, alert, cooperative, no apparent distress  Lungs: Clear to auscultation bilaterally, no crackles or wheezing  Abdomen: Normal bowel sounds, soft, non-distended,  non-tender  Skin: LUE fistula without tenderness  MS : Able to move left shoulder, decreased tenderness, r sided dialysis catheter in place    Other:    Medications      - MEDICATION INSTRUCTIONS for Dialysis Patients -   Does not apply See Admin Instructions    amLODIPine  10 mg Oral Daily    apixaban ANTICOAGULANT  5 mg Oral BID    atorvastatin  10 mg Oral At Bedtime    bumetanide  2 mg Oral Daily    calcium acetate  1,334 mg Oral TID w/meals    carvedilol  25 mg Oral BID w/meals    ceFAZolin  2 g Intravenous Q24H    cinacalcet  90 mg Oral Daily    cloNIDine  1 patch Transdermal Weekly    And    cloNIDine   Transdermal Q8H Count includes the [facility]    hydrALAZINE  100 mg Oral TID    lisinopril  20 mg Oral QPM    multivitamin RENAL  1 capsule Oral Daily    sevelamer carbonate  4,000 mg Oral TID w/meals    sodium chloride (PF)  3 mL Intracatheter Q8H    vitamin D3  50 mcg Oral Daily       Data   All microbiology laboratory data reviewed.  Recent Labs   Lab Test 07/30/23  0548 07/29/23  0642 07/28/23  0645   WBC 9.7 9.5 10.3   HGB 10.8* 10.8* 10.6*   HCT 35.9* 35.8* 35.5*   MCV 88 88 90    214 230     Recent Labs   Lab Test 07/29/23  0642 07/28/23  0645 07/27/23  2208   CR 8.93* 12.84* 11.74*     Recent Labs   Lab Test 08/01/23  0704   SED 62*

## 2023-08-01 NOTE — PROGRESS NOTES
Orthopedic Surgery  Taylor Valiente  08/01/2023     Admit Date:  7/27/2023    Left subdeltoid/subacromial bursitis  Recent left upper arm fistula infiltration  Staph lugdunensis bacteremia     Patient resting comfortably in bed.    Left shoulder pain is reportedly much improved from the time of admission.   Denies pain today in the left shoulder.   Able to comfortably range the left shoulder.  Denies LUE numbness and tingling.  Denies fever and chills.   Tolerating PO intake.   Hypertensive at baseline, afebrile. No tachycardia.  No acute events overnight.    Temp:  [98  F (36.7  C)-99.2  F (37.3  C)] 98  F (36.7  C)  Pulse:  [83-92] 84  Resp:  [17-27] 18  BP: (145-186)/() 161/110  SpO2:  [94 %-100 %] 99 %    Alert and oriented.  Left upper extremity: Fistula with overlying bandage. Visible skin otherwise intact. No erythema. No apparent swelling to the left deltoid region. No distinct palpable fluid collections or joint effusion. Nontender over the entirety of the shoulder today. Active left shoulder forward flexion to 180 degrees, abduction to 130 degrees without pain. Able to fully range the elbow, wrist, and digits without issue. 5/5  strength. Radial pulse 2+. Capillary refill <2 seconds. SILT A/M/U/R nerve distributions.    Labs:  Recent Labs   Lab Test 07/30/23  0548 07/29/23  0642 07/28/23  0645   WBC 9.7 9.5 10.3   HGB 10.8* 10.8* 10.6*    214 230       Recent Labs   Lab Test 03/15/23  0900 02/09/21  1055 07/18/17  0910   INR 1.40* 1.23* 1.04       Recent Labs   Lab Test 11/29/20  0627 11/28/20  0606 11/27/20  0221   CRP 7.9 7.4 13.0*       Left shoulder MRI without contrast dated 7/29/23:  1.  Findings consistent with subacromial/subdeltoid bursitis.  2.  No joint effusion. No findings to suggest septic arthritis.    PLAN:  -Patient reports resolution of left shoulder pain today. Exam is very reassuring. CRP down trending. Afebrile and not tachycardic. No plans for orthopedic surgical  intervention at this time.  -Continue IV antibiotics per ID.   -Orthopedics will sign off this hospitalization, but please contact us if there are any changes or any questions/concerns arise.     Megan Morales PA-C  [facility]

## 2023-08-01 NOTE — PROGRESS NOTES
Orientation/Cognitive: AOX4.  Observation Goals (Met/ Not Met): Inpt  Mobility Level/Assist Equipment: Ind  Fall Risk (Y/N): No  Behavior Concerns: none  Pain Management: Denies  Tele/VS/O2: VSS on RA ex elevated BP. Tele- SR  ABNL Lab/BG: Creat- 8.93 CRP infl- 45.6 Hgb- 10.8 Sed rate- 59. Blood culture result pending.  Diet: Renal diet  Bowel/Bladder: Continent. On hemodialysis.  Skin Concerns: SULY bruised.  Drains/Devices: Left upper arm fistula dressing CDI. R PIV SL. R chest dialysis catheter.   Tests/Procedures for next shift: AM labs. Nephrology, ID, Orthopedic following.  Anticipated DC date & active delays: TBD  Patient Stated Goal for Today: none

## 2023-08-01 NOTE — PROGRESS NOTES
[facility]     Renal Progress Note       SHORTHAND KEY FOR MY NOTES:  c = with, s = without, p = after, a = before, x = except, asx = asymptomatic, tx = transplant or treatment, sx = symptoms or symptomatic, cx = canceled or culture, rxn = reaction, yday = yesterday, nl = normal, abx = antibiotics, fxn = function, dx = diagnosis, dz = disease, m/h = melena/hematochezia, c/d/l/ha = cramping/dizziness/lightheadedness/headache, d/c = discharge or diarrhea/constipation, f/c/n/v = fevers/chills/nausea/vomiting, cp/sob = chest pain/shortness of breath, tbv = total body volume, rxn = reaction, tdc = tunneled dialysis catheter, pta = prior to admission, hd = hemodialysis, pd = peritoneal dialysis, hhd = home hemodialysis, edw = estimated dry wt         Assessment/Plan:     ESKD.  Pt is due for HD tmrw.  He successfully used the LAF yday c 2 x 17g needles. As a result, we can remove the TDC tmrw.  We will ask IR to do it p HD so that we can ensure the arm access works.  A.  HD tmrw.  Orders placed.  B.  Consult IR to pull TDC p HD tmrw.    2.  Staph lugudunesis bacteremia.  Pt is on abx and is currently asx.  A.  Continue abx at proper renal dose.  B.  D/w Dr. Casper re pulling the line tmrw.    3.  HTN.  Pt's BP is still high but better.  He is on multiple meds and we will keep pulling fluid as able.  Yday's note re cramping reviewed.    A.  Keep challenging EDW.  If he cramps again tmrw, then that will be his new EDW.  B.  Continue same meds/doses for now.  C.  Once he's dry, then we will adjust meds, starting c maximizing the lisin.    4.  Anemia.  Hb is stable in the 10s.  A.  Follow hb, clinically.        Interval History:     Pt is doing fine and has no complaints.  He doesn't recall having any issues c the run yday, but RN's note mentions decreasing UF goal bc of cramping.          Medications and Allergies:      - MEDICATION INSTRUCTIONS for Dialysis Patients -   Does not apply See Admin  Instructions    amLODIPine  10 mg Oral Daily    apixaban ANTICOAGULANT  5 mg Oral BID    atorvastatin  10 mg Oral At Bedtime    bumetanide  2 mg Oral Daily    calcium acetate  1,334 mg Oral TID w/meals    carvedilol  25 mg Oral BID w/meals    ceFAZolin  2 g Intravenous Q24H    cinacalcet  90 mg Oral Daily    cloNIDine  1 patch Transdermal Weekly    And    cloNIDine   Transdermal Q8H [facility]    hydrALAZINE  100 mg Oral TID    lisinopril  20 mg Oral QPM    multivitamin RENAL  1 capsule Oral Daily    sevelamer carbonate  4,000 mg Oral TID w/meals    sodium chloride (PF)  3 mL Intracatheter Q8H    vitamin D3  50 mcg Oral Daily     Allergies   Allergen Reactions    Benadryl [Diphenhydramine] Itching    Vancomycin Itching          Physical Exam:     Vitals were reviewed     , Blood pressure (!) 161/110, pulse 84, temperature 98  F (36.7  C), temperature source Oral, resp. rate 18, weight 100.7 kg (221 lb 14.4 oz), SpO2 99 %.  Wt Readings from Last 3 Encounters:   08/01/23 100.7 kg (221 lb 14.4 oz)   07/13/23 104.2 kg (229 lb 11.2 oz)   06/06/23 104.1 kg (229 lb 6.4 oz)     Intake/Output Summary (Last 24 hours) at 8/1/2023 1428  Last data filed at 8/1/2023 1414  Gross per 24 hour   Intake 720 ml   Output --   Net 720 ml     GENERAL APPEARANCE: pleasant, NAD, alert  RESP: CTA B c good efforts  CV: RRR, nl S1/S2   ABDOMEN: o/s/nt/nd  EXTREMITIES/SKIN: no ble edema  ACCESS:  RIJ TDC - site ok, RADHA - clotted, LAF c good thrill/bruit         Data:     CBC RESULTS:     Recent Labs   Lab 07/30/23  0548 07/29/23  0642 07/28/23  0645 07/27/23  2208 07/27/23  0700   WBC 9.7 9.5 10.3 12.7* 13.6*   RBC 4.10* 4.08* 3.96* 4.03* 3.99*   HGB 10.8* 10.8* 10.6* 10.8* 10.6*   HCT 35.9* 35.8* 35.5* 35.5* 35.3*    214 230 242 219     Basic Metabolic Panel:  Recent Labs   Lab 07/30/23  0555 07/29/23  0642 07/28/23  1035 07/28/23  0810 07/28/23  0645 07/28/23  0001 07/27/23  2208 07/27/23  0700   NA  --  140  --   --  136  --  135* 137    POTASSIUM  --  4.4 5.0  --  5.2  5.2 5.4* 5.7* 5.3   CHLORIDE  --  97*  --   --  99  --  98 99   CO2  --  27  --   --  19*  --  19* 21*   BUN  --  33.0*  --   --  57.4*  --  53.0* 43.8*   CR  --  8.93*  --   --  12.84*  --  11.74* 10.09*   * 94  --  92 100*  --  97 98   BUBBA  --  8.8  --   --  9.7  --  9.6 9.5     INRNo lab results found in last 7 days.   Attestation:   I have reviewed today's relevant vital signs, notes, medications, labs and imaging.    Balta Alba MD  [facility] - Nephrology  [phone]

## 2023-08-01 NOTE — PROGRESS NOTES
Orientation/Cognitive: A/Ox4  Observation Goals (Met/ Not Met): IP  Mobility Level/Assist Equipment: ind  Fall Risk (Y/N): no  Behavior Concerns: green  Pain Management: denies  Tele/VS/O2: VSS RA x HTN, tele SR  ABNL Lab/BG: CRP 32.89, sed rate 62  Diet: renal diet  Bowel/Bladder: continent, on HD  Skin Concerns: old AV fistula in R arm  Drains/Devices: L arm AV fistula (functioning, bruit & thrill), HD port R chest  Tests/Procedures for next shift: transfer to , HD 8/2  Anticipated DC date & active delays: 8/2 following HD and IV abx  Patient Stated Goal for Today: rest, shower  Short-Term Goals: monitor edema, monitor electrolytes  Additional Info: L shoulder swelling. Pt able to discharge 8/2/23 following HD. HD team may give IV abx and discharge pt. IR consult to remove HD port following HD 8/2.

## 2023-08-01 NOTE — PROGRESS NOTES
[facility]  Hospitalist Progress Note        Luis Collazo MD   08/01/2023        Interval History:        - ID suggest need for IV antibiotics on discharge and likely to dose Ancef with dialysis (2 g W dialysis on Monday, Wednesday and 3 grams on Friday until 8/12)  - orthopedics anticipate no need for surgical intervention at this time and have signed off  - CRP trending down from peak 127----> 32  - subsequent blood cultures since 7/29 with no growth so far  - dialyzed will through AV fistula 7/31; nephrology plans to remove tunneled dialysis catheter if no further issues with next dialysis         Assessment and Plan:        Taylor Valiente is a 27 year old male with PMH of ESRD (on MWF dialysis), HTN, Right internal jugular DVT (on Eliquis) admitted on 7/27/2023. He presented to ED for evaluation of left shoulder pain, worsening over the past 2 days, also noted hyperkalemic in the setting of ESRD; elevated white count and inflammatory markers raising suspicion for possible septic arthritis.    Left shoulder pain, likely septic bursitis  Bacteremia with Staphylococcus lugdunensis  - afebrile on presentation but then spiked temp of 101.6 F; on presentation WBC 13.6, CRP 78, ESR 55, pro calcitonin 2.18   - was empirically given vancomycin and Rocephin in ED  - shoulder Xray 7/27: No evidence of acute fracture or dislocation. Joint spaces are preserved. Soft tissues grossly unremarkable.   - WBC 12.7--> 9.5; CRP trended up 78-- 127-- 111  - had attempted aspiration by IR on 7/28 but no fluid was drawn  - MRI left shoulder 7/29 noted findings consistent with subacromial/subdeltoid bursitis, no joint effusion and no findings to suggest septic arthritis    - Fever trended down, afebrile since 7/29; CRP trending down from peak 127---> 81--32; left shoulder pain much improved along with improving ROM  - blood cultures from 7/28 growing Staphylococcus lugdunensis ; subsequent cultures from  7/29 and 7/30 with no growth so far  - evaluated by ID, abx switched to Cefazolin (7/29) and given one dose of Daptomycin  - ID suggest need for IV antibiotics on discharge and likely to dose Ancef with dialysis (2 g W dialysis on Monday, Wednesday and 3 grams on Friday until 8/12)  - orthopedics anticipate no need for surgical intervention at this time and have signed off    ESRD on hemodialysis History of FSGS.  Dialyzes Monday Wednesday Friday.  Still makes some urine  Hyperkalemia  - K 5.7---> 5.4---5.0  - nephrology following for dialysis, last dialyzed on 7/31/23  - Continue PTA Bumex 2 mg daily, PhosLo 3 times daily with meals, Cinacalcet    Elevated troponin, likely type II non-STEMI versus chronic elevation in the setting of ESRD  - serial trops with no significant trend 64-- 64; denies any chest pain; monitor clinically    Hypertension:  - Continue PTA hydralazine 100 mg 3 times daily, carvedilol 25 mg twice daily, amlodipine 10 mg daily   - resumed PTA clonidine patch (7/29; usually changes every Tuesday but the patch was removed in ED)  - hydralazine IV PRN for SBP>180     Right IJ DVT: Associated with dialysis catheter  - continue with PTA Eliquis    Left upper extremity fistula stenosis: Adjacent to arteriovenous anastomosis  - per nephrology had AV fistula infiltration during dialysis on 7/24/23  - US UE venous noted no DVT  - US UE arterial noted patent left brachial artery to basilic vein fistula. Ultrasound suggests a focal stenosis just past the arteriovenous anastomosis  - vascular surgery evaluated (signed off) noted hemodynamically insignificant stenosis, per vascular surgery-- more related to tortuosity instead of a true stenosis and suggested no vascular surgery intervention    - Successfully dialyzed will through AV fistula 7/31 unit(s)(sed first time after it got infiltrated with dialysis PTA); nephrology plans to remove tunneled dialysis catheter if no further issues with next  "dialysis    DVT Prophylaxis: Eliquis  Lines: PRESENT     tunneled dialysis catheter right anterior chest wall    Diet: Combination Diet Regular Diet Adult; Renal Diet (dialysis)    Disposition:   - likely 8/2 after dialysis and removal of tunneled dialysis catheter if no further issues with next dialysis  -will need IV antibiotics per ID with dialysis as noted above  -orthopedics have signed off    Clinically Significant Risk Factors Present on Admission                        # Hypertension: Noted on problem list              # Obesity: Estimated body mass index is 31.84 kg/m  as calculated from the following:    Height as of an earlier encounter on 7/27/23: 1.778 m (5' 10\").    Weight as of this encounter: 100.7 kg (221 lb 14.4 oz).  , PRESENT ON ADMISSION             Page Me (7 am to 6 pm)    Care plan discussed with patient and infectious disease along with nursing              Physical Exam:      Blood pressure (!) 147/94, pulse 85, temperature 99.2  F (37.3  C), temperature source Oral, resp. rate 18, weight 100.7 kg (221 lb 14.4 oz), SpO2 98 %.  Vitals:    07/29/23 0701 07/31/23 0620 08/01/23 0609   Weight: 101.2 kg (223 lb 1.7 oz) 102.4 kg (225 lb 11.2 oz) 100.7 kg (221 lb 14.4 oz)     Vital Signs with Ranges  Temp:  [98.1  F (36.7  C)-99.2  F (37.3  C)] 99.2  F (37.3  C)  Pulse:  [80-92] 85  Resp:  [17-27] 18  BP: (145-188)/() 147/94  SpO2:  [94 %-100 %] 98 %  I/O's Last 24 hours  I/O last 3 completed shifts:  In: 440 [P.O.:440]  Out: 2000 [Other:2000]    Constitutional: Alert, awake and oriented; resting comfortably in no apparent distress       Oral cavity: Moist mucosa   Cardiovascular: Normal s1 s2, regular rate and rhythm, no murmur   Lungs: B/l clear to auscultation, no wheezes or crepitations   Abdomen: Soft, nt, nd, no guarding, rigidity or rebound; BS +   LE : No edema    Musculoskeletal/Neuro Power 5/5 in all extremities; much improved tenderness left shoulder joint, ROM improving; LUE " fistula with thrill   Psychiatry: normal mood and affect  noted Right CVC dialysis catheter                Medications:         - MEDICATION INSTRUCTIONS for Dialysis Patients -   Does not apply See Admin Instructions    amLODIPine  10 mg Oral Daily    apixaban ANTICOAGULANT  5 mg Oral BID    atorvastatin  10 mg Oral At Bedtime    bumetanide  2 mg Oral Daily    calcium acetate  1,334 mg Oral TID w/meals    carvedilol  25 mg Oral BID w/meals    ceFAZolin  2 g Intravenous Q24H    cinacalcet  90 mg Oral Daily    cloNIDine  1 patch Transdermal Weekly    And    cloNIDine   Transdermal Q8H ARNOL    hydrALAZINE  100 mg Oral TID    lisinopril  20 mg Oral QPM    multivitamin RENAL  1 capsule Oral Daily    sevelamer carbonate  4,000 mg Oral TID w/meals    sodium chloride (PF)  3 mL Intracatheter Q8H    vitamin D3  50 mcg Oral Daily     PRN Meds: acetaminophen **OR** acetaminophen, bisacodyl, glucose **OR** dextrose **OR** glucagon, hydrALAZINE, HYDROmorphone, HYDROmorphone, lidocaine 4%, lidocaine (buffered or not buffered), melatonin, naloxone **OR** naloxone **OR** naloxone **OR** naloxone, ondansetron **OR** ondansetron, oxyCODONE, oxyCODONE IR, polyethylene glycol, prochlorperazine **OR** prochlorperazine **OR** prochlorperazine, senna-docusate **OR** senna-docusate, sodium chloride (PF)         Data:      All new lab and imaging data was reviewed.   Recent Labs   Lab Test 07/30/23  0548 07/29/23  0642 07/28/23  0645 03/16/23  0542 03/15/23  0900 06/15/21  1634 02/09/21  1055 07/19/17  1820 07/18/17  0910   WBC 9.7 9.5 10.3   < > 8.4   < > 8.9   < >  --    HGB 10.8* 10.8* 10.6*   < > 8.4*   < > 10.5*   < >  --    MCV 88 88 90   < > 92   < > 85   < >  --     214 230   < > 172   < > 307   < >  --    INR  --   --   --   --  1.40*  --  1.23*  --  1.04    < > = values in this interval not displayed.        Recent Labs   Lab Test 07/30/23  0555 07/29/23  0642 07/28/23  1035 07/28/23  0810 07/28/23  0645 07/28/23  0001  07/27/23  2208   NA  --  140  --   --  136  --  135*   POTASSIUM  --  4.4 5.0  --  5.2  5.2   < > 5.7*   CHLORIDE  --  97*  --   --  99  --  98   CO2  --  27  --   --  19*  --  19*   BUN  --  33.0*  --   --  57.4*  --  53.0*   CR  --  8.93*  --   --  12.84*  --  11.74*   ANIONGAP  --  16*  --   --  18*  --  18*   BUBBA  --  8.8  --   --  9.7  --  9.6   * 94  --  92 100*  --  97    < > = values in this interval not displayed.       Recent Labs   Lab Test 11/28/20  0606 09/09/20  2344 09/09/20  1827 10/04/19  0148 07/14/17  1158   TROPI <0.015 0.084* 0.083*   < >  --    TROPONIN  --   --   --   --  0.06    < > = values in this interval not displayed.

## 2023-08-02 ENCOUNTER — APPOINTMENT (OUTPATIENT)
Dept: INTERVENTIONAL RADIOLOGY/VASCULAR | Facility: CLINIC | Age: 28
DRG: 557 | End: 2023-08-02
Attending: PHYSICIAN ASSISTANT
Payer: MEDICARE

## 2023-08-02 VITALS
SYSTOLIC BLOOD PRESSURE: 154 MMHG | TEMPERATURE: 98.8 F | BODY MASS INDEX: 31.71 KG/M2 | OXYGEN SATURATION: 98 % | DIASTOLIC BLOOD PRESSURE: 108 MMHG | RESPIRATION RATE: 18 BRPM | HEART RATE: 80 BPM | WEIGHT: 221 LBS

## 2023-08-02 LAB
ALBUMIN SERPL BCG-MCNC: 3.3 G/DL (ref 3.5–5.2)
ANION GAP SERPL CALCULATED.3IONS-SCNC: 19 MMOL/L (ref 7–15)
BUN SERPL-MCNC: 59.2 MG/DL (ref 6–20)
CALCIUM SERPL-MCNC: 8.7 MG/DL (ref 8.6–10)
CHLORIDE SERPL-SCNC: 95 MMOL/L (ref 98–107)
CREAT SERPL-MCNC: 13.84 MG/DL (ref 0.67–1.17)
CRP SERPL-MCNC: 26.47 MG/L
DEPRECATED HCO3 PLAS-SCNC: 21 MMOL/L (ref 22–29)
ERYTHROCYTE [DISTWIDTH] IN BLOOD BY AUTOMATED COUNT: 16.8 % (ref 10–15)
ERYTHROCYTE [SEDIMENTATION RATE] IN BLOOD BY WESTERGREN METHOD: 63 MM/HR (ref 0–15)
GFR SERPL CREATININE-BSD FRML MDRD: 5 ML/MIN/1.73M2
GLUCOSE SERPL-MCNC: 103 MG/DL (ref 70–99)
HCT VFR BLD AUTO: 34 % (ref 40–53)
HGB BLD-MCNC: 10.3 G/DL (ref 13.3–17.7)
MCH RBC QN AUTO: 27.1 PG (ref 26.5–33)
MCHC RBC AUTO-ENTMCNC: 30.3 G/DL (ref 31.5–36.5)
MCV RBC AUTO: 90 FL (ref 78–100)
PHOSPHATE SERPL-MCNC: 5.2 MG/DL (ref 2.5–4.5)
PLATELET # BLD AUTO: 212 10E3/UL (ref 150–450)
POTASSIUM SERPL-SCNC: 5.4 MMOL/L (ref 3.4–5.3)
RBC # BLD AUTO: 3.8 10E6/UL (ref 4.4–5.9)
SODIUM SERPL-SCNC: 135 MMOL/L (ref 136–145)
WBC # BLD AUTO: 9.9 10E3/UL (ref 4–11)

## 2023-08-02 PROCEDURE — 99239 HOSP IP/OBS DSCHRG MGMT >30: CPT | Performed by: HOSPITALIST

## 2023-08-02 PROCEDURE — 86140 C-REACTIVE PROTEIN: CPT | Performed by: STUDENT IN AN ORGANIZED HEALTH CARE EDUCATION/TRAINING PROGRAM

## 2023-08-02 PROCEDURE — 85027 COMPLETE CBC AUTOMATED: CPT | Performed by: INTERNAL MEDICINE

## 2023-08-02 PROCEDURE — 90935 HEMODIALYSIS ONE EVALUATION: CPT | Performed by: INTERNAL MEDICINE

## 2023-08-02 PROCEDURE — 36415 COLL VENOUS BLD VENIPUNCTURE: CPT | Performed by: STUDENT IN AN ORGANIZED HEALTH CARE EDUCATION/TRAINING PROGRAM

## 2023-08-02 PROCEDURE — 36589 REMOVAL TUNNELED CV CATH: CPT

## 2023-08-02 PROCEDURE — 0JPT0XZ REMOVAL OF TUNNELED VASCULAR ACCESS DEVICE FROM TRUNK SUBCUTANEOUS TISSUE AND FASCIA, OPEN APPROACH: ICD-10-PCS | Performed by: RADIOLOGY

## 2023-08-02 PROCEDURE — 90935 HEMODIALYSIS ONE EVALUATION: CPT

## 2023-08-02 PROCEDURE — 80069 RENAL FUNCTION PANEL: CPT | Performed by: INTERNAL MEDICINE

## 2023-08-02 PROCEDURE — 250N000009 HC RX 250: Performed by: RADIOLOGY

## 2023-08-02 PROCEDURE — 02PY33Z REMOVAL OF INFUSION DEVICE FROM GREAT VESSEL, PERCUTANEOUS APPROACH: ICD-10-PCS | Performed by: RADIOLOGY

## 2023-08-02 PROCEDURE — 85652 RBC SED RATE AUTOMATED: CPT | Performed by: STUDENT IN AN ORGANIZED HEALTH CARE EDUCATION/TRAINING PROGRAM

## 2023-08-02 PROCEDURE — 250N000013 HC RX MED GY IP 250 OP 250 PS 637: Performed by: INTERNAL MEDICINE

## 2023-08-02 PROCEDURE — 99232 SBSQ HOSP IP/OBS MODERATE 35: CPT | Performed by: SPECIALIST

## 2023-08-02 PROCEDURE — 250N000013 HC RX MED GY IP 250 OP 250 PS 637: Performed by: EMERGENCY MEDICINE

## 2023-08-02 RX ORDER — ALBUMIN (HUMAN) 12.5 G/50ML
50 SOLUTION INTRAVENOUS
Status: DISCONTINUED | OUTPATIENT
Start: 2023-08-02 | End: 2023-08-02 | Stop reason: HOSPADM

## 2023-08-02 RX ADMIN — CALCIUM ACETATE 1334 MG: 667 CAPSULE ORAL at 12:52

## 2023-08-02 RX ADMIN — Medication 50 MCG: at 12:59

## 2023-08-02 RX ADMIN — LIDOCAINE HYDROCHLORIDE 10 ML: 10; .005 INJECTION, SOLUTION EPIDURAL; INFILTRATION; INTRACAUDAL; PERINEURAL at 12:22

## 2023-08-02 RX ADMIN — CINACALCET 90 MG: 30 TABLET ORAL at 13:05

## 2023-08-02 RX ADMIN — SEVELAMER CARBONATE 4000 MG: 800 TABLET, FILM COATED ORAL at 12:52

## 2023-08-02 RX ADMIN — BUMETANIDE 2 MG: 2 TABLET ORAL at 12:51

## 2023-08-02 RX ADMIN — HYDRALAZINE HYDROCHLORIDE 100 MG: 50 TABLET, FILM COATED ORAL at 13:06

## 2023-08-02 RX ADMIN — Medication 1 CAPSULE: at 12:51

## 2023-08-02 RX ADMIN — AMLODIPINE BESYLATE 10 MG: 10 TABLET ORAL at 12:51

## 2023-08-02 ASSESSMENT — ACTIVITIES OF DAILY LIVING (ADL)
ADLS_ACUITY_SCORE: 18

## 2023-08-02 NOTE — DISCHARGE SUMMARY
[facility]  Discharge Summary        Taylor Valiente MRN# [id number]   YOB: 1995 Age: 27 year old     Date of Admission:  7/27/2023  Date of Discharge:  8/2/2023  Admitting Physician:  Luis Collazo MD  Discharge Physician: Luis Collazo MD  Discharging Service: Hospitalist     Primary Provider: Priyank Lawrence  Primary Care Physician Phone Number: [phone]         Discharge Diagnoses/Problem Oriented Hospital Course (Providers):    Taylor Valiente was admitted on 7/27/2023 by Luis Collazo MD and I would refer you to their history and physical.  The following problems were addressed during his hospitalization:    Taylor Valiente is a 27 year old male with PMH of ESRD (on MWF dialysis), HTN, Right internal jugular DVT (on Eliquis) admitted on 7/27/2023. He presented to ED for evaluation of left shoulder pain, worsening over the past 2 days, also noted hyperkalemic in the setting of ESRD; elevated white count and inflammatory markers raising suspicion for possible septic arthritis.     Left shoulder pain, likely septic bursitis  Bacteremia with Staphylococcus lugdunensis  - afebrile on presentation but then spiked temp of 101.6 F; on presentation WBC 13.6, CRP 78, ESR 55, pro calcitonin 2.18   - was empirically given vancomycin and Rocephin in ED  - shoulder Xray 7/27: No evidence of acute fracture or dislocation. Joint spaces are preserved. Soft tissues grossly unremarkable.   - WBC 12.7--> 9.5; CRP peak 127  - had attempted aspiration by IR on 7/28 but no fluid was drawn  - MRI left shoulder 7/29 noted findings consistent with subacromial/subdeltoid bursitis, no joint effusion and no findings to suggest septic arthritis     - Fever trended down, afebrile since 7/29; CRP trending down from peak 127---> 81--32--26; left shoulder pain much improved along with improving ROM  - blood cultures from 7/28 growing Staphylococcus lugdunensis ; subsequent cultures  from 7/29 and 7/30 with no growth so far  - evaluated by ID, abx switched to Cefazolin (7/29) and given one dose of Daptomycin  - ID suggest need for IV antibiotics on discharge and to dose Ancef with dialysis (2 g W dialysis on Monday, Wednesday and 3 grams on Friday until 8/12/23) ; nephrology to order this with dialysis (communicated to Dr Alba)  - orthopedics anticipate no need for surgical intervention at this time and have signed off     ESRD on hemodialysis History of FSGS.  Dialyzes Monday Wednesday Friday.  Still makes some urine  Hyperkalemia  - K 5.7---> 5.4---5.0  - nephrology following for dialysis, last dialyzed on 8/2/23  - Continue PTA Bumex 2 mg daily, PhosLo 3 times daily with meals, Cinacalcet, Sevelamer     Elevated troponin, likely type II non-STEMI versus chronic elevation in the setting of ESRD  - serial trops with no significant trend 64-- 64; denies any chest pain; monitor clinically     Hypertension:  - Continue PTA hydralazine 100 mg 3 times daily, carvedilol 25 mg twice daily, amlodipine 10 mg daily   - resumed PTA clonidine patch (7/29; usually changes every Tuesday but the patch was removed in ED)  - hydralazine IV PRN for SBP>180     Right IJ DVT: Associated with dialysis catheter  - has been on Eliquis since 3/2023; was continued in hospital  - right internal jugular now removed by IR (8/2/23) as AVF access working  - discussed with vascular surgery, no need for further anticoagulation at this time; Eliquis discontinued     Left upper extremity fistula stenosis: Adjacent to arteriovenous anastomosis  - per nephrology had AV fistula infiltration during dialysis on 7/24/23  - US UE venous noted no DVT  - US UE arterial noted patent left brachial artery to basilic vein fistula. Ultrasound suggests a focal stenosis just past the arteriovenous anastomosis  - vascular surgery evaluated (signed off) noted hemodynamically insignificant stenosis, per vascular surgery-- more related to  "tortuosity instead of a true stenosis and suggested no vascular surgery intervention  - Successfully dialyzed through AV fistula 7/31 and 8/2/23 (after it got infiltrated with dialysis PTA); - right internal jugular now removed by IR (8/2/23) as AVF access working     Disposition:   - 8/2 home   - will need IV antibiotics per ID with dialysis as noted above    Clinically Significant Risk Factors        # Hyperkalemia: Highest K = 5.4 mmol/L in last 2 days, will monitor as appropriate        # Anion Gap Metabolic Acidosis: Highest Anion Gap = 19 mmol/L in last 2 days, will monitor and treat as appropriate  # Hypoalbuminemia: Lowest albumin = 3.3 g/dL at 8/2/2023  5:39 AM, will monitor as appropriate           # Hypertension: Noted on problem list            # Obesity: Estimated body mass index is 31.84 kg/m  as calculated from the following:    Height as of an earlier encounter on 7/27/23: 1.778 m (5' 10\").    Weight as of this encounter: 100.7 kg (221 lb 14.4 oz).                      Brief Hospital Stay Summary Sent Home With Patient in AVS:        Reason for your hospital stay      You were admitted for evaluation of left shoulder pain due to septic   bursitis. You have been started on IV antibiotics which you will continue   with dialysis until 8/12/23.                Pending Results:        Unresulted Labs Ordered in the Past 30 Days of this Admission       Date and Time Order Name Status Description    8/1/2023 12:01 AM Blood Culture Peripheral Blood Preliminary     7/31/2023 12:02 AM Blood Culture Hand, Right Preliminary     7/30/2023 12:01 AM Blood Culture Arm, Right Preliminary     7/29/2023  5:18 PM Blood Culture Arm, Right Preliminary     7/29/2023  7:54 AM Blood Culture Arm, Right Preliminary     7/29/2023  7:54 AM Blood Culture Wrist, Right Preliminary               Discharge Instructions and Follow-Up:      Follow-up Appointments     Follow-up and recommended labs and tests       Follow up with primary " care provider, Priyank Lawrence, within 7 days for   hospital follow- up.  The following labs/tests are recommended: repeat CBC   and BMP.      To continue IV Ancef with dialysis until 8/12/23 (Nephrology to order with   dialysis).    Continue dialysis 3 times a week as a scheduled.                Discharge Disposition:      Discharged to home        Discharge Medications:        Current Discharge Medication List        CONTINUE these medications which have NOT CHANGED    Details   acetaminophen (TYLENOL) 325 MG tablet Take 2 tablets (650 mg) by mouth every 4 hours as needed for mild pain  Qty: 50 tablet, Refills: 0    Associated Diagnoses: ESRD (end stage renal disease) on dialysis (H)      amLODIPine (NORVASC) 10 MG tablet Take 10 mg by mouth daily       atorvastatin (LIPITOR) 10 MG tablet TAKE ONE TABLET BY MOUTH EVERY NIGHT AT BEDTIME  Qty: 90 tablet, Refills: 1    Associated Diagnoses: Acute renal failure, unspecified acute renal failure type (H)      bumetanide (BUMEX) 2 MG tablet Take 2 mg by mouth daily       calcium acetate (CALPHRON) 667 MG TABS tablet Take 1,334 mg by mouth 3 times daily (with meals)      carvedilol (COREG) 25 MG tablet TAKE TWO TABLETS BY MOUTH TWICE A DAY WITH A MEAL Strength: 25 mg  Qty: 180 tablet, Refills: 0    Associated Diagnoses: Acute kidney injury (H)      cinacalcet (SENSIPAR) 90 MG tablet Take 90 mg by mouth daily      cloNIDine (CATAPRES-TTS2) 0.2 MG/24HR WK patch Place 1 patch onto the skin      hydrALAZINE (APRESOLINE) 100 MG tablet TAKE ONE TABLET BY MOUTH THREE TIMES A DAY  Qty: 360 tablet, Refills: 1    Associated Diagnoses: Acute kidney injury (H)      lisinopril (ZESTRIL) 20 MG tablet Take 1 tablet (20 mg) by mouth every evening  Qty: 90 tablet, Refills: 3    Associated Diagnoses: Renal hypertension      medical cannabis (Patient's own supply) See Admin Instructions (The purpose of this order is to document that the patient reports taking medical cannabis.  This is not a  prescription, and is not used to certify that the patient has a qualifying medical condition.)      multivitamin RENAL (RENAVITE RX/NEPHROVITE) 1 MG tablet Take 1 tablet by mouth daily       nitroGLYcerin (NITROSTAT) 0.3 MG sublingual tablet For chest pain place 1 tablet under the tongue every 5 minutes for 3 doses. If symptoms persist 5 minutes after 1st dose call 911.  Qty: 5 tablet, Refills: 0    Comments: Ok to adjust to supplied dose  Associated Diagnoses: Other chest pain      sevelamer HCl (RENAGEL) 800 MG tablet Take 4,000 mg by mouth 3 times daily (with meals) And 1600mg with snacks.      vitamin D3 (CHOLECALCIFEROL) 2000 units (50 mcg) tablet Take 50 mcg by mouth daily      aspirin 81 MG EC tablet Take 81 mg by mouth daily           STOP taking these medications       apixaban ANTICOAGULANT (ELIQUIS) 5 MG tablet Comments:   Reason for Stopping:                 Allergies:         Allergies   Allergen Reactions    Benadryl [Diphenhydramine] Itching    Vancomycin Itching           Consultations This Hospital Stay:      Consultation during this admission received from infectious disease, nephrology, orthopedics, interventional radiology and vascular surgery        Condition and Physical on Discharge:        Discharge condition: Stable   Vitals: Blood pressure (!) 154/108, pulse 80, temperature 98.8  F (37.1  C), temperature source Oral, resp. rate 18, weight 100.7 kg (221 lb 14.4 oz), SpO2 98 %.     Constitutional: Alert, awake and oriented; resting comfortably in no apparent distress         Oral cavity: Moist mucosa   Cardiovascular: Normal s1 s2, regular rate and rhythm, no murmur   Lungs: B/l clear to auscultation, no wheezes or crepitations   Abdomen: Soft, nt, nd, no guarding, rigidity or rebound; BS +   LE : No edema    Musculoskeletal/Neuro Power 5/5 in all extremities; much improved tenderness left shoulder joint, ROM improving; LUE fistula with thrill   Psychiatry: normal mood and affect  Right CVC  dialysis catheter-- removed             Discharge Time:      Greater than 30 minutes.        Image Results From This Hospital Stay (For Non-EPIC Providers):        Results for orders placed or performed during the hospital encounter of 07/27/23   XR Shoulder Left G/E 3 Views    Narrative    EXAM: XR SHOULDER LEFT G/E 3 VIEWS  LOCATION: [facility]  DATE: 7/27/2023    INDICATION: Severe left shoulder pain rated 10 out of 10.  COMPARISON: None.      Impression    IMPRESSION:     No evidence of acute fracture or dislocation. Joint spaces are preserved. Soft tissues grossly unremarkable.    Partially visualized catheter projecting over the right chest.   XR Joint Aspiration Major Left    Narrative    EXAM: XR JOINT ASPIRATION MAJOR LEFT  LOCATION: [facility]  DATE/TIME: 7/28/2023 4:34 PM    INDICATION: left shoulder pain, concern for infection, ESRD on  dialysis fistula LUE    HISTORY: 27 year-old male presenting today for a fluoroscopic-guided  left shoulder joint aspiration, as ordered by the referring provider.  The patient has a history of left shoulder pain, presenting to the ED  and subsequently admitted to [facility] 7/27/2023.  Patient is ESRD on dialysis with fistular LUE, history of right  internal jugular DVT on Eliquis (last dose this am, being held for  aspiration today). Ortho saw patient with concern for possible septic  arthritis given pain and elevated WBC and inflammatory markers.     PROCEDURE: Prior to the procedure, the patient's pain history and  appropriate radiographic reports were reviewed. The procedure and its  risks were discussed with the patient and informed consent was  obtained.    The patient was placed in a supine position on the procedure table.  The left shoulder joint was localized under fluoroscopy. Next, the  skin overlying the joint was prepped and draped in sterile fashion.  The site was marked with a sterile  marker. A pre-procedural pause was  performed. A small amount of 1% lidocaine was injected into the local  soft tissues. Under fluoroscopic control, a 22-gauge needle was placed  into the left shoulder joint. Aspiration of the joint was then  attempted, however, there was no return of fluid. Needle tip placement  was adjusted to two other locations within the joint to attempt  aspiration without successful aspiration of fluid. The needle was  removed and a dressing was applied. The patient tolerated the  procedure well without apparent complication.     SPECIMEN: none, no joint fluid was able to be aspirated    ESTIMATED BLOOD LOSS: Minimal    DAP: 4.76 uGym2  FLUOROSCOPY TIME: < 0.1 minutes  IMAGES OBTAINED: 3    MEDICATIONS: 8mL local 1% lidocaine      Impression    IMPRESSION:   1.  Attempted fluoroscopic-guided left shoulder joint aspiration.    FLYNN REED PA-C         SYSTEM ID:  [id number]   MR Shoulder Left w/o Contrast    Narrative    EXAM: MR SHOULDER LEFT W/O CONTRAST  LOCATION: [facility]  DATE: 7/29/2023    INDICATION: acute left shoulder pain, concern for septic arthritis but no fluid aspirated on attempt; eval for joint effusion inflammation, other acute inflammatory process  COMPARISON: Radiograph 07/27/2023  TECHNIQUE: Unenhanced.    FINDINGS:    ROTATOR CUFF:  -Supraspinatus: No tendon tear, tendinopathy or fatty atrophy.  -Infraspinatus: No tendon tear, tendinopathy or fatty atrophy.  -Subscapularis: No tendon tear, tendinopathy or fatty atrophy.  -Teres minor: No tendon tear, tendinopathy or fatty atrophy.    CORACOACROMIAL ARCH:  -Morphology: Type II acromion. No subacromial spur. Subacromial and subcoracoid space are normal.   -Bursa: Small volume fluid in the subacromial/subdeltoid bursa.    ACROMIOCLAVICULAR JOINT:   -Normal.     LONG HEAD OF BICEPS TENDON:   -No tendinopathy or tear. No tenosynovitis or subluxation.    GLENOHUMERAL JOINT:   -Labrum: No labral  tear. No paralabral cyst.   -Cartilage: Normal.   -Joint space: No effusion or synovitis.  -Glenohumeral ligaments and capsule: No pericapsular inflammation.    BONES:   -No fracture or concerning marrow replacing lesion.    SOFT TISSUES:   -Mild edema in the deep aspect of the deltoid adjacent to the bursa. Normal deltoid muscle bulk. Normal visualized chest wall and axilla.      Impression    IMPRESSION:  1.  Findings consistent with subacromial/subdeltoid bursitis.  2.  No joint effusion. No findings to suggest septic arthritis.           Most Recent Lab Results In EPIC (For Non-EPIC Providers):    Most Recent 3 CBC's:  Recent Labs   Lab Test 08/02/23  0539 07/30/23  0548 07/29/23  0642   WBC 9.9 9.7 9.5   HGB 10.3* 10.8* 10.8*   MCV 90 88 88    243 214      Most Recent 3 BMP's:  Recent Labs   Lab Test 08/02/23  0539 07/30/23  0555 07/29/23  0642 07/28/23  1035 07/28/23  0810 07/28/23  0645   *  --  140  --   --  136   POTASSIUM 5.4*  --  4.4 5.0  --  5.2  5.2   CHLORIDE 95*  --  97*  --   --  99   CO2 21*  --  27  --   --  19*   BUN 59.2*  --  33.0*  --   --  57.4*   CR 13.84*  --  8.93*  --   --  12.84*   ANIONGAP 19*  --  16*  --   --  18*   BUBBA 8.7  --  8.8  --   --  9.7   * 103* 94  --    < > 100*    < > = values in this interval not displayed.     Most Recent 3 Troponin's:  Recent Labs   Lab Test 11/28/20  0606 09/09/20  2344 09/09/20  1827 10/04/19  0148 07/14/17  1158   TROPI <0.015 0.084* 0.083*   < >  --    TROPONIN  --   --   --   --  0.06    < > = values in this interval not displayed.     Most Recent 3 INR's:  Recent Labs   Lab Test 03/15/23  0900 02/09/21  1055 07/18/17  0910   INR 1.40* 1.23* 1.04     Most Recent 2 LFT's:  Recent Labs   Lab Test 07/27/23  2208 07/27/23  0700   AST 8 7   ALT 8 8   ALKPHOS 77 78   BILITOTAL 0.3 0.3     Most Recent Cholesterol Panel:  Recent Labs   Lab Test 12/26/21  1623   CHOL 112   LDL 59   HDL 41   TRIG 60     Most Recent 6 Bacteria Isolates  From Any Culture (See EPIC Reports for Culture Details):No lab results found.  Most Recent TSH, T4 and HgbA1c:   Recent Labs   Lab Test 05/30/23  0623 03/03/22  0906 03/04/21  0627 07/15/17  0635   TSH  --  1.67  --   --    T4  --   --   --  1.26   A1C 4.4  --    < >  --     < > = values in this interval not displayed.

## 2023-08-02 NOTE — PROGRESS NOTES
[facility]    Infectious Disease Progress Note    Date of Service : 08/02/2023       Assessment;  27 YM with ESRDon HD with indwelling dialysis catheter , hx of DVT who has been admitted with left shoulder pain and positive blood cxs for staphylococcus lugdunensis. MRI of the shoulder shows findings consistent with subacromial/subdeltoid bursitis which may be septic. No joint effusion to suggest septic arthritis and blood cxs have cleared, no surgical plans at this time, shoulder is feeling better     -Staphylococcus lugdunensis bacteremia high grade  -Left shoulder pain with concern for ubacromial/subdeltoid bursitis which may be septic in the setting of bacteremia  -ESRd on HD  -HTN  -Hx of R internal jugular DVT  LUE fistula stenosis     Recommendations:  Continue Cefazolin 2 grams daily while in hospital  Discussed with Nephrology. HD catheter planned for removal today after dialysis  Blood cxs have cleared  Shoulder seems to be improving  Will need IV antibiotics at discharge for bacteremia, cefazolin can be dosed with dialysis after discharge 2g W dialysis on Monday, Wednesday and 3 grams on Friday until 8/12. Will be set up at [facility] by Nephrology    Recommendations were discussed with the hospitalist service  Discharge is planned today, ID will sign off    Roxane Casper MD    Interval History   Doing well, no new complaints    Physical Exam   Temp: 97.9  F (36.6  C) Temp src: Oral BP: (!) 189/121 Pulse: 86   Resp: 20 SpO2: 99 % O2 Device: None (Room air)    Vitals:    07/29/23 0701 07/31/23 0620 08/01/23 0609   Weight: 101.2 kg (223 lb 1.7 oz) 102.4 kg (225 lb 11.2 oz) 100.7 kg (221 lb 14.4 oz)     Vital Signs with Ranges  Temp:  [97.7  F (36.5  C)-98.5  F (36.9  C)] 97.9  F (36.6  C)  Pulse:  [75-88] 86  Resp:  [15-20] 20  BP: (146-189)/() 189/121  SpO2:  [98 %-100 %] 99 %    Constitutional: Awake, alert, cooperative, no apparent distress  Lungs: Clear to auscultation  bilaterally, no crackles or wheezing  Abdomen: Normal bowel sounds, soft, non-distended, non-tender  Skin: LUE fistula without tenderness  MS : Able to move left shoulder, decreased tenderness, r sided dialysis catheter in place    Other:    Medications    - MEDICATION INSTRUCTIONS -        - MEDICATION INSTRUCTIONS for Dialysis Patients -   Does not apply See Admin Instructions    sodium chloride 0.9%  250 mL Intravenous Once in dialysis/CRRT    sodium chloride 0.9%  300 mL Hemodialysis Machine Once    amLODIPine  10 mg Oral Daily    apixaban ANTICOAGULANT  5 mg Oral BID    atorvastatin  10 mg Oral At Bedtime    bumetanide  2 mg Oral Daily    calcium acetate  1,334 mg Oral TID w/meals    carvedilol  25 mg Oral BID w/meals    ceFAZolin  2 g Intravenous Q24H    cinacalcet  90 mg Oral Daily    cloNIDine  1 patch Transdermal Weekly    And    cloNIDine   Transdermal Q8H ARNOL    hydrALAZINE  100 mg Oral TID    lisinopril  20 mg Oral QPM    multivitamin RENAL  1 capsule Oral Daily    - MEDICATION INSTRUCTIONS -   Does not apply Once    sevelamer carbonate  4,000 mg Oral TID w/meals    sodium chloride (PF)  3 mL Intracatheter Q8H    vitamin D3  50 mcg Oral Daily       Data   All microbiology laboratory data reviewed.  Recent Labs   Lab Test 08/02/23  0539 07/30/23  0548 07/29/23  0642   WBC 9.9 9.7 9.5   HGB 10.3* 10.8* 10.8*   HCT 34.0* 35.9* 35.8*   MCV 90 88 88    243 214     Recent Labs   Lab Test 08/02/23  0539 07/29/23  0642 07/28/23  0645   CR 13.84* 8.93* 12.84*     Recent Labs   Lab Test 08/02/23  0539   SED 63*

## 2023-08-02 NOTE — CONSULTS
Interventional Radiology Note  Inpatient - [facility]  8/2/2023    O:  BP (!) 177/122   Pulse 81   Temp 98.4  F (36.9  C) (Core)   Resp 10   Wt 100.7 kg (221 lb 14.4 oz)   SpO2 100%   BMI 31.84 kg/m      IMAGING:  Narrative & Impression   IR CVC TUNNEL PLACEMENT > FIVE YEARS OF AGE 3/9/2023 8:58 AM      CLINICAL HISTORY/INDICATION: Aneurysm of arteriovenous dialysis  fistula, initial encounter (H).     PROCEDURES PERFORMED: Tunneled catheter placement     MODERATE SEDATION: Versed 1 mg IV; Fentanyl 50 mcg IV. During the time  out, immediately prior to the administration of medications, the  patient was reassessed for adequacy to receive conscious sedation.  Under physician supervision, Versed and fentanyl were administered for  moderate sedation. Pulse oximetry, heart rate and blood pressure were  continuously monitored by an independent trained observer. The  physician spent 11 minutes of face-to-face sedation time with the  patient.     CONTRAST: None     FLUORO: 0.3 minutes     IMAGES/DOSE: 3.88 mGy     CONSENT: Following a discussion of the risks, benefits, indications  and alternatives to treatment, appropriate informed consent was  obtained.      TIMEOUT: A timeout was performed per universal protocol policy to  ensure correct patient, site, and procedure to be performed.      CENTRAL LINE STATEMENT: The patient was brought to the interventional  radiology suite and placed supine on the table. The patients right  neck and anterior chest region were prepped and draped in a sterile  fashion for tunneled line placement. The procedure was performed using  maximal Sterile Barrier Technique Utilized: Cap AND mask AND sterile  gown AND sterile gloves AND sterile full body drape AND hand hygiene  AND skin preparation 2% chlorhexidine for cutaneous antisepsis (or  acceptable alternative antiseptics). Sterile Ultrasound Technique  Utilized ?Sterile gel AND sterile probe covers.      PROCEDURE AND  FINDINGS:   This central line was performed in accordance with the central line  bundle with the exception of vein selection. Following a discussion of  the risks, benefits, indications and alternatives to treatment,  appropriate informed consent was obtained. The patient was brought to  the interventional radiology suite and placed supine on the table. The  patients right neck and anterior chest region were prepped and draped  in a sterile fashion for tunneled line placement. A timeout was  performed per universal protocol policy to ensure correct patient,  site, and procedure to be performed.      Ultrasound was utilized to evaluate the veins of the right neck and a  permanent record of the image was obtained which demonstrates the  internal jugular vein to be patent. Under direct ultrasound guidance,  1% Lidocaine was infiltrated and access was gained easily into the low  lateral aspect of the internal jugular vein utilizing micropuncture  technique. The wire was advanced easily under fluoroscopic guidance  and the tract was serially dilated and a peel away sheath placed. A  subcutaneous tunnel was then created over the anterior/superior chest  wall using local anesthesia and blunt dissection. Under fluoroscopic  guidance, a 14.5 Yi dual lumen 23 cm cuffed catheter was then  pulled through the tunnel and was advanced through the peel away  sheath. A final placement film demonstrates the catheter tip at the  cavoatrial junction with the patient supine. All of the ports flush  and aspirate without difficulty following placement. The catheter was  secured in position. The small incision at the base of the neck was  closed uneventfully. A sterile dressing was applied.      Throughout the procedure, the patient was monitored by a radiology  nurse for cardiac rhythm which remained stable. The patient tolerated  the procedure well and left the interventional radiology suite in  stable condition.                                                                       IMPRESSION:  Uneventful placement of a right internal jugular vein 14.5 [place] dual  lumen 23 cm tunneled palindrome catheter, as described using  ultrasound and fluoroscopic guidance. This catheter is indicated to be  use for hemodialysis or pheresis.     ARNEL HICKS,         A/P:    -IR to remove tunneled dialysis catheter today following dialysis      Debby Lloyd PA-C  Interventional Radiology  [address] desk phone *[phone]

## 2023-08-02 NOTE — PROGRESS NOTES
Orientation/Cognitive: A&OX2  Observation Goals (Met/ Not Met): Not Met  Mobility Level/Assist Equipment: IND  Fall Risk (Y/N): NO  Behavior Concerns: None  Pain Management: Denies  Tele/VS/O2: VSS On RA  ABNL Lab/BG: Refer to Result  Diet: Renal   Bowel/Bladder: Continent  Skin Concerns: Rash on rash arm  Drains/Devices: Fistular  Tests/Procedures for next shift: Dialysis  Anticipated DC date & active delays: TBD  Patient Stated Goal for Today: Go Home.

## 2023-08-02 NOTE — PLAN OF CARE
Goal Outcome Evaluation:         Patient has been discharged August 2, 2023 2:22 PM. Discharge instructions and education reviewed, medication schedule and follow up appts discussed. Pt had no questions. All belongings sent with pt.   PIV removed prior to departure. Sister for transport

## 2023-08-02 NOTE — IR NOTE
Tunneled CVC line removed in IR. No complications. Sent back to short stay 5508, report given to bedside RN.    10 mL Lidocaine with Epinephrine used.

## 2023-08-02 NOTE — PROGRESS NOTES
[facility]     Renal Progress Note       SHORTHAND KEY FOR MY NOTES:  c = with, s = without, p = after, a = before, x = except, asx = asymptomatic, tx = transplant or treatment, sx = symptoms or symptomatic, cx = canceled or culture, rxn = reaction, yday = yesterday, nl = normal, abx = antibiotics, fxn = function, dx = diagnosis, dz = disease, m/h = melena/hematochezia, c/d/l/ha = cramping/dizziness/lightheadedness/headache, d/c = discharge or diarrhea/constipation, f/c/n/v = fevers/chills/nausea/vomiting, cp/sob = chest pain/shortness of breath, tbv = total body volume, rxn = reaction, tdc = tunneled dialysis catheter, pta = prior to admission, hd = hemodialysis, pd = peritoneal dialysis, hhd = home hemodialysis, edw = estimated dry wt         Assessment/Plan:     ESKD c hyperkalemia.  Pt is running per a MW schedule.  He is at his dry wt.  His LAF works.  A.  Next HD on Fri.  B.  IR to pull TDC today.  C.  Will continue to use 17g needles.  D.  Standing wt post-HD today.    2.  Staph lugudunesis bacteremia.  Pt is on abx and is currently asx.  A.  Cefazolin 2-2-3g qHD until 8/12. .  B.  D/w Dr. Casper re pulling the line today.  Will ask IR to help.    3.  HTN.  Pt's BP is still high but was down to the 140s yday.  He is now dry so we will focus on adjusting meds.  He hasn't taken his AM meds, yet, so will see how it impacts his BP.  A.  Continue same meds/doses.  B.  Check BP later today.  If it's still high post-meds, then we will increase lisin to 20 mg bid.    4.  Anemia.  Hb is stable in the 10s.  A.  Follow hb, clinically.    5.  FEN.  K is a bit high today.  A.  Renal diet.        Interval History:     Pt cramped today so the UF goal was reduced to ~2L.  He is still making some urine.  Fistula worked well again today.  No cp/sob/abd pain.  No f/c/n/v.          Medications and Allergies:      - MEDICATION INSTRUCTIONS for Dialysis Patients -   Does not apply See Admin Instructions     amLODIPine  10 mg Oral Daily    apixaban ANTICOAGULANT  5 mg Oral BID    atorvastatin  10 mg Oral At Bedtime    bumetanide  2 mg Oral Daily    calcium acetate  1,334 mg Oral TID w/meals    carvedilol  25 mg Oral BID w/meals    ceFAZolin  2 g Intravenous Q24H    cinacalcet  90 mg Oral Daily    cloNIDine  1 patch Transdermal Weekly    And    cloNIDine   Transdermal Q8H ARNOL    hydrALAZINE  100 mg Oral TID    lisinopril  20 mg Oral QPM    multivitamin RENAL  1 capsule Oral Daily    sevelamer carbonate  4,000 mg Oral TID w/meals    sodium chloride (PF)  3 mL Intracatheter Q8H    vitamin D3  50 mcg Oral Daily     Allergies   Allergen Reactions    Benadryl [Diphenhydramine] Itching    Vancomycin Itching          Physical Exam:     Vitals were reviewed     , Blood pressure (!) 182/122, pulse 82, temperature 97.9  F (36.6  C), temperature source Oral, resp. rate 21, weight 100.7 kg (221 lb 14.4 oz), SpO2 100 %.  Wt Readings from Last 3 Encounters:   08/01/23 100.7 kg (221 lb 14.4 oz)   07/13/23 104.2 kg (229 lb 11.2 oz)   06/06/23 104.1 kg (229 lb 6.4 oz)     Intake/Output Summary (Last 24 hours) at 8/2/2023 1148  Last data filed at 8/1/2023 1414  Gross per 24 hour   Intake 240 ml   Output --   Net 240 ml     GENERAL APPEARANCE: pleasant, NAD, alert  RESP: CTA B c good efforts  CV: RRR, nl S1/S2   ABDOMEN: o/s/nt/nd  EXTREMITIES/SKIN: no ble edema  ACCESS:  RIJ TDC - site ok, RADHA - clotted, LAF c good thrill/bruit    Pt seen on HD.  Goal reduced due to cramping.  LAF working well c 17g needles.         Data:     CBC RESULTS:     Recent Labs   Lab 08/02/23  0539 07/30/23  0548 07/29/23  0642 07/28/23  0645 07/27/23  2208 07/27/23  0700   WBC 9.9 9.7 9.5 10.3 12.7* 13.6*   RBC 3.80* 4.10* 4.08* 3.96* 4.03* 3.99*   HGB 10.3* 10.8* 10.8* 10.6* 10.8* 10.6*   HCT 34.0* 35.9* 35.8* 35.5* 35.5* 35.3*    243 214 230 242 219     Basic Metabolic Panel:  Recent Labs   Lab 08/02/23  0539 07/30/23  0555 07/29/23  0642  07/28/23  1035 07/28/23  0810 07/28/23  0645 07/28/23  0001 07/27/23  2208 07/27/23  0700   *  --  140  --   --  136  --  135* 137   POTASSIUM 5.4*  --  4.4 5.0  --  5.2  5.2 5.4* 5.7* 5.3   CHLORIDE 95*  --  97*  --   --  99  --  98 99   CO2 21*  --  27  --   --  19*  --  19* 21*   BUN 59.2*  --  33.0*  --   --  57.4*  --  53.0* 43.8*   CR 13.84*  --  8.93*  --   --  12.84*  --  11.74* 10.09*   * 103* 94  --  92 100*  --  97 98   BUBBA 8.7  --  8.8  --   --  9.7  --  9.6 9.5     INRNo lab results found in last 7 days.   Attestation:   I have reviewed today's relevant vital signs, notes, medications, labs and imaging.    Balta Alba MD  [facility] - Nephrology  [phone]

## 2023-08-02 NOTE — PROGRESS NOTES
Patient seen and examined in dialysis    - Dialyzed well through AV fistula and right internal jugular tunneled dialysis catheter removed by IR on 8/2/23  -IV antibiotics with dialysis on discharge until 8/12 per ID recommendations    Discharge home today    Care plan discussed with patient, nursing, ID and also discussed with Dr. Miller regarding Eliquis recommendations since cathter now removed    Please see discharge summary for details

## 2023-08-02 NOTE — PROGRESS NOTES
Potassium   Date Value Ref Range Status   08/02/2023 5.4 (H) 3.4 - 5.3 mmol/L Final   06/20/2022 3.9 3.4 - 5.3 mmol/L Final   06/19/2021 3.8 3.4 - 5.3 mmol/L Final     Hemoglobin   Date Value Ref Range Status   08/02/2023 10.3 (L) 13.3 - 17.7 g/dL Final   06/18/2021 9.1 (L) 13.3 - 17.7 g/dL Final     Creatinine   Date Value Ref Range Status   08/02/2023 13.84 (H) 0.67 - 1.17 mg/dL Final   06/19/2021 8.93 (H) 0.66 - 1.25 mg/dL Final     Urea Nitrogen   Date Value Ref Range Status   08/02/2023 59.2 (H) 6.0 - 20.0 mg/dL Final   06/20/2022 35 (H) 7 - 30 mg/dL Final   06/19/2021 58 (H) 7 - 30 mg/dL Final     Sodium   Date Value Ref Range Status   08/02/2023 135 (L) 136 - 145 mmol/L Final   06/19/2021 139 133 - 144 mmol/L Final     INR   Date Value Ref Range Status   03/15/2023 1.40 (H) 0.85 - 1.15 Final   02/09/2021 1.23 (H) 0.86 - 1.14 Final       DIALYSIS PROCEDURE NOTE  Hepatitis status of previous patient on machine log was checked and verified ok to use with this patients hepatitis status.  Patient dialyzed for 3.5 hrs. on a K2 bath with a net fluid removal of  1.7L.  A BFR of 250 ml/min was obtained via a LUE AVF.      The treatment plan was discussed with Dr. Alba during the treatment.    Total heparin received during the treatment: 0 units.      Meds  given: None    Complications: UF goal reduced from 3 to 1.7L due to cramping      Person educated: patient. Knowledge base adequate. Barriers to learning: none. Educated on procedure via verbal mode. Patient/family verbalized understanding. Pt prefers verbal education style.      ICEBOAT? Timeout performed pre-treatment  I: Patient was identified using 2 identifiers  C:  Consent Signed Yes  E: Equipment preventative maintenance is current and dialysis delivery system OK to use  B: Hepatitis B Surface Antigen: Neg; Draw Date: 3/13/23      Hepatitis B Surface Antibody: IMM; Draw Date: 3/13/23  O: Dialysis orders present and complete prior to treatment  A: Vascular  access verified and assessed prior to treatment  T: Treatment was performed at a clinically appropriate time  ?: Patient was allowed to ask questions and address concerns prior to treatment  See Adult Hemodialysis flowsheet in EPIC for further details and post assessment.  Machine water alarm in place and functioning. Transducer pods intact and checked every 15min.   Pt returned via stretcher.  Chlorine/Chloramine water system checked every 4 hours.  Outpatient Dialysis at [facility]     Patient repositioned every 2 hours during the treatment.  Post treatment report given to Judy CONTI  regarding 1.7L of fluid removed, last BP of 177/122 , and patient pain rating of 0/10.

## 2023-08-03 LAB
BACTERIA BLD CULT: NO GROWTH
BACTERIA BLD CULT: NO GROWTH

## 2023-08-04 DIAGNOSIS — Z99.2 ESRD (END STAGE RENAL DISEASE) ON DIALYSIS (H): Primary | ICD-10-CM

## 2023-08-04 DIAGNOSIS — I12.9 RENAL HYPERTENSION: ICD-10-CM

## 2023-08-04 DIAGNOSIS — N17.9 ACUTE KIDNEY INJURY (H): ICD-10-CM

## 2023-08-04 DIAGNOSIS — N18.6 ESRD (END STAGE RENAL DISEASE) ON DIALYSIS (H): Primary | ICD-10-CM

## 2023-08-04 LAB
BACTERIA BLD CULT: NO GROWTH
BACTERIA BLD CULT: NO GROWTH

## 2023-08-04 RX ORDER — VIT B COMP NO.3/FOLIC/C/BIOTIN 1 MG-60 MG
1 TABLET ORAL DAILY
Qty: 90 TABLET | Refills: 3 | Status: ON HOLD | OUTPATIENT
Start: 2023-08-04 | End: 2023-12-08

## 2023-08-04 RX ORDER — CARVEDILOL 25 MG/1
TABLET ORAL
Qty: 180 TABLET | Refills: 3 | Status: SHIPPED | OUTPATIENT
Start: 2023-08-04 | End: 2024-02-15

## 2023-08-04 RX ORDER — HYDRALAZINE HYDROCHLORIDE 100 MG/1
100 TABLET, FILM COATED ORAL 3 TIMES DAILY
Qty: 360 TABLET | Refills: 2 | Status: ON HOLD | OUTPATIENT
Start: 2023-08-04 | End: 2023-12-08

## 2023-08-05 LAB — BACTERIA BLD CULT: NO GROWTH

## 2023-08-06 LAB — BACTERIA BLD CULT: NO GROWTH

## 2023-08-07 ENCOUNTER — TELEPHONE (OUTPATIENT)
Dept: ENDOCRINOLOGY | Facility: CLINIC | Age: 28
End: 2023-08-07
Payer: MEDICARE

## 2023-08-07 ENCOUNTER — TELEPHONE (OUTPATIENT)
Dept: TRANSPLANT | Facility: CLINIC | Age: 28
End: 2023-08-07
Payer: MEDICARE

## 2023-08-07 NOTE — TELEPHONE ENCOUNTER
Patient call:     Appointment type: return endocrine   Provider: Jordin   Return date: first available   Speciality phone number: [phone]  Additional appointment(s) needed:   Additional notes: LVM and sent Sophia Quigley on 8/7/2023 at 5:19 PM

## 2023-08-07 NOTE — TELEPHONE ENCOUNTER
Returned call- pt has dental visit and is on waitlist to see the patient. As soon as cancellation occurs they will be scheduled. Pt verbalized understanding of information and has no further questions. Encouraged to reach out if questions arise.

## 2023-08-07 NOTE — TELEPHONE ENCOUNTER
Patient's mother wanting to talk with coordinator regarding getting son back on the active list and would like to be called today if possible

## 2023-08-08 NOTE — PROGRESS NOTES
Taylor Valiente is status post failed right upper extremity AV access.  The right arm fistula was subsequently ligated.  He had a right IJ tunneled dialysis catheter.  He developed occlusive thrombus of his right internal jugular vein and was anticoagulated with Eliquis.  He is followed by vascular medicine for this condition.  He had significant right arm edema related to that.  The right arm edema has largely resolved.  He is now 6 weeks status post creation of a second stage left brachiobasilic AV fistula.  He returns today for a post procedure left AV fistula ultrasound to assess fistula maturation.    At today's visit he was in good spirits and without complaints.  He had no upper extremity concerns.    Exam:  Well-developed male alert and oriented x3.  Blood pressure right arm 163/101 with a pulse of 79.  Minimal right arm edema significantly improved compared to prior presentations.  Well-healed left arm incisions.  No left arm edema.  Excellent thrill in the left basilic vein.  The transposed left basilic vein is readily visible.  2+ palpable radial pulses bilaterally.  5/5 strength bilaterally.    Imaging:  US EXTREMITY ARTERIAL VENOUS DIALYSIS ACCESS GRAFT 7/12/2023 1:41 PM     HISTORY: 27-year-old patient with history of left brachial to basilic  AV fistula on May 30. May 30 was stage II, initial surgery for first  stage was March 9, 2023.     COMPARISON: April 25, 2023.     TECHNIQUE: Color Doppler and spectral waveform analysis obtained  throughout the inflow brachial artery as well as outflow basilic vein.     FINDINGS: Inflow brachial artery ranges from 5.3-7.5 mm. AV  anastomosis is patent and 509/337 cm/second. Tortuosity noted in the  basilic vein just beyond the anastomosis measuring 4.9 mm and 754/496  cm/second. Remaining outflow diameters range from 7.6-10.8 mm. Total  blood flow volume is 3079 mL/minute.                                                                       IMPRESSION:  Patent left upper arm AV fistula, tortuous just beyond the AV  anastomosis and measuring 4.9 mm. However, good blood flow volume of  3079 mL/minute     PINEDA CAVAZOS MD      ASSESSMENT:  6 weeks status post creation of second stage left brachiobasilic AV fistula clinically doing well with excellent fistula maturation.  The vein would appear readily accessible.    PLAN:  I reviewed all the above with [place].  We will allow his dialysis center in [place] to attempt access of the fistula.  Assuming successful access, they can arrange for removal of his tunneled right IJ dialysis catheter.  He remains on Eliquis for the right IJ occlusive thrombus.  Once the catheter is successfully removed he could be restudied for consideration of discontinuing the Eliquis.  I have no vascular surgical concerns.  Follow-up will be with me on an as-needed basis.    Edwardo Moran MD

## 2023-08-10 ENCOUNTER — PATIENT OUTREACH (OUTPATIENT)
Dept: CARE COORDINATION | Facility: CLINIC | Age: 28
End: 2023-08-10
Payer: MEDICARE

## 2023-08-10 NOTE — PROGRESS NOTES
"Clinic Care Coordination Contact    Situation: Patient chart reviewed by care coordinator.    Background: Patient triggered on \"At Risk Patients Not Enrolled in Care Coordination\" N report. Chart reviewed to determine if patient would benefit from clinic care coordination outreach/enrollment.    Assessment: Clinical Data: Care Coordinator Outreach  Outreach attempted x 1.  Left message on patient's voicemail with call back information and requested return call.    Plan: Care Coordinator will send care coordination introduction letter with care coordinator contact information and explanation of care coordination services via [employer]. Care Coordinator will do no further outreaches at this time.    Jade Turcios, RN Care Coordinator  [facility] [place]  Email: [email or web address]  Phone: [phone]   "

## 2023-08-10 NOTE — LETTER
[facility] CARE COORDINATION  [id number]  [facility] [address]     August 10, 2023    Taylor Valiente  [address] [place] [address]      Dear Taylor,    I am a  clinic care coordinator who works with Priyank Lawrence MD with the [facility]. I recently tried to call and was unable to reach you. Below is a description of clinic care coordination and how I can further assist you.       The clinic care coordination team is made up of a registered nurse, , financial resource worker and community health worker who understand the health care system. The goal of clinic care coordination is to help you manage your health and improve access to the health care system. Our team works alongside your provider to assist you in determining your health and social needs. We can help you obtain health care and community resources, providing you with necessary information and education. We can work with you through any barriers and develop a care plan that helps coordinate and strengthen the communication between you and your care team.  Our services are voluntary and are offered without charge to you personally.    Please feel free to contact me with any questions or concerns regarding care coordination and what we can offer.      We are focused on providing you with the highest-quality healthcare experience possible.    Sincerely,     Jade Turcios RN Care Coordinator  [facility] [place]  Email: [email or web address]  Phone: [phone]

## 2023-08-29 ENCOUNTER — TELEPHONE (OUTPATIENT)
Dept: ENDOCRINOLOGY | Facility: CLINIC | Age: 28
End: 2023-08-29
Payer: MEDICARE

## 2023-08-29 NOTE — TELEPHONE ENCOUNTER
Patient call:     Appointment type: return endocrine   Provider: Jordin   Return date:first available   Speciality phone number: [phone]  Additional appointment(s) needed:   Additional notes: LVM and sent myc x2 (FINAL ATTMEMPT)   Katya Quigley on 8/29/2023 at 5:05 PM

## 2023-09-28 DIAGNOSIS — N18.6 ESRD (END STAGE RENAL DISEASE) (H): Primary | ICD-10-CM

## 2023-09-28 DIAGNOSIS — Z76.82 ORGAN TRANSPLANT CANDIDATE: ICD-10-CM

## 2023-09-29 ENCOUNTER — LAB (OUTPATIENT)
Dept: LAB | Facility: CLINIC | Age: 28
End: 2023-09-29
Payer: MEDICARE

## 2023-09-29 DIAGNOSIS — N18.6 ESRD (END STAGE RENAL DISEASE) (H): ICD-10-CM

## 2023-09-29 DIAGNOSIS — Z76.82 ORGAN TRANSPLANT CANDIDATE: ICD-10-CM

## 2023-09-29 PROCEDURE — 36415 COLL VENOUS BLD VENIPUNCTURE: CPT

## 2023-09-29 PROCEDURE — 86832 HLA CLASS I HIGH DEFIN QUAL: CPT

## 2023-09-29 PROCEDURE — 86833 HLA CLASS II HIGH DEFIN QUAL: CPT

## 2023-10-04 ENCOUNTER — DOCUMENTATION ONLY (OUTPATIENT)
Dept: TRANSPLANT | Facility: CLINIC | Age: 28
End: 2023-10-04

## 2023-10-04 ENCOUNTER — COMMITTEE REVIEW (OUTPATIENT)
Dept: TRANSPLANT | Facility: CLINIC | Age: 28
End: 2023-10-04
Payer: MEDICARE

## 2023-10-04 ENCOUNTER — TELEPHONE (OUTPATIENT)
Dept: TRANSPLANT | Facility: CLINIC | Age: 28
End: 2023-10-04
Payer: MEDICARE

## 2023-10-04 NOTE — TELEPHONE ENCOUNTER
Called pt regarding committee this afternoon, pt was approved for active status and will require insurance clearance. Will follow up with pt once clearance is acquired. Pt verbalized understanding of information and has no further questions. Encouraged to reach out if questions arise.

## 2023-10-04 NOTE — COMMITTEE REVIEW
Abdominal Committee Review Note     Evaluation Date: 1/26/2021  Committee Review Date: 10/4/2023    Organ being evaluated for: Kidney    Transplant Phase: Waitlist  Transplant Status: Inactive    Transplant Coordinator: Vesna Chaney  Transplant Surgeon:       Referring Physician: Jer Hooper    Primary Diagnosis:   Secondary Diagnosis:     Committee Review Members:  Nephrology Fausto Whyte MD, Carmen Venegas PA-C, Denton Talavera, APRN CNP, Adam Simons MD   Nutrition Ankita Armando, RD   Pharmacist Ana Valle, [facility]   Pharmacy Carlos Dudley, [facility], DANIELA CHEN RN    - Clinical Gunjan Neha Beard, [facility], Flor Vasquez, [facility]   Transplant MARTINE VYAS, RN, Naila Wren, RN, Kamla Miller, RN, Flor Rose, RN, Vesna Yuan, NP, Vesna Chaney, RN, Yves Fountain, RN, Renay White, RN, Cate Thomson, RN   Transplant Surgery Katrina Albrecht MD, MD       Transplant Eligibility: Irreversible chronic kidney disease treated w/dialysis or expected need for dialysis    Committee Review Decision: Make Active      Committee Chair Katrina Albrecht MD verbally attested to the committee's decision.    Committee Discussion Details: Reviewed the following:     -Cleared by cards 6/28/23 - coronary CTA 6/9/22 revealed only less than 25% stenosis in the mid portion of the LAD with the other coronary territories being normal. 6/2023 stress test negative     -PAD- reviewed and vessels suitable     -R IJ DVT-  3/2023 Developed after R fistula was ligated and CVC catheter placed 3/9/23 as well as creation of L AV fistula. Clot found 3/12 and vascular saw pt and was recommended anticoagulation until tunneled HD catheter can be removed. Fistula cleared for usage 7/12/23- pt is now off of anticoagulation as catheter was removed    -Adrenal adenoma- seen by endocrine 3/2022 Two nodular regions at the left adrenal have very low density suggesting adenomas- was seen on  2019 imaging and re-imaged 6/2021- described as hypodense and englarged appearing at 14mm and has been unchanged from previous study. There is no concern of pheochromocytoma. Pt is clinically obese, but with no peripheral wasting and not pronounced abdominal obesity- has been dx with HTN in 2017  at which time aldosterone access was ruled out. He has no muscle weakness, triglycerides and glucose are normal and therefore no clinical reason to suspect cortisol excess.     -Noncompliance- suicide attempt 2019. Seen by neuropsych in 2022 which they suggested a health psychologist. Completed 6 months of therapy and compliance contract, no longer seeing therapist at this time. Reports depression is stable at this time and has been showing up to appts and completing items needed for pre transplant workup.     -Marijuana use- 3-4x weekly to cope with stress  varies on stress level- sometimes he can go without    -Was just admitted for septic bursitis in August after dialysis infiltrated fistula- ID put him on IV antibiotics and has now completed    -Health maintenance is up to date: just completed dental visit     Committee determined that pt is cleared to be active on the kidney transplant waiting list.

## 2023-10-10 LAB
PROTOCOL CUTOFF: NORMAL
SA 1 CELL: NORMAL
SA 1 TEST METHOD: NORMAL
SA 2 CELL: NORMAL
SA 2 TEST METHOD: NORMAL
SA1 HI RISK ABY: NORMAL
SA1 MOD RISK ABY: NORMAL
SA2 HI RISK ABY: NORMAL
SA2 MOD RISK ABY: NORMAL
UNACCEPTABLE ANTIGENS: NORMAL
UNOS CPRA: 57
ZZZSA 1  COMMENTS: NORMAL
ZZZSA 2 COMMENTS: NORMAL

## 2023-10-24 ENCOUNTER — TELEPHONE (OUTPATIENT)
Dept: OTHER | Facility: CLINIC | Age: 28
End: 2023-10-24
Payer: MEDICARE

## 2023-10-24 DIAGNOSIS — T82.898S OTHER SPECIFIED COMPLICATION OF VASCULAR PROSTHETIC DEVICES, IMPLANTS AND GRAFTS, SEQUELA: Primary | ICD-10-CM

## 2023-10-24 NOTE — PROGRESS NOTES
Taylor Valiente is a 28-year-old gentleman who is status post failed right upper extremity AV access.  He had a right IJ tunneled dialysis catheter.  He developed occlusive thrombus of his right internal jugular vein and was anticoagulated with Eliquis.  He had a significant amount of right arm edema that ultimately resolved with compression and time.  He underwent a two-stage left brachiobasilic AV fistula.  Fistula ultrasound on 7/12/2023 demonstrated adequate maturation and he was cleared for attempted access of the fistula.    He was hospitalized on 7/27/2023 with left shoulder pain and bacteremia felt to be a likely septic bursitis.  He has recovered from that event.  His right IJ tunneled dialysis catheter has been discontinued and he is now off Eliquis.  His dialysis center has been accessing his left brachiobasilic fistula without problems.  He has noted intermittent numbness of his left fourth and fifth fingers sometimes at rest and sometimes during dialysis.  More recently his numbness is involving more of his left hand.  On Monday he had an episode of left hand pain during dialysis.  He dialyzed today and had no pain but continued issues with numbness.  He denies wounds to his left hand.  Sometimes his hand feels cooler during dialysis.    Exam:  Well-developed male alert and oriented x3.  Blood pressure right arm 134/90 with a pulse of 70.  Easily palpable thrill in left upper arm AV fistula.  Left hand is warm and pink.  No open wounds.  1+ palpable left radial pulse at the wrist which significantly augments with occlusion of his fistula.    Imaging:  US EXTREMITY ARTERIAL VENOUS DIALYSIS ACCESS GRAFT  10/25/2023 2:08 PM     CLINICAL HISTORY/INDICATION: History of second stage left  brachiobasilic transposition AVF on 5/20/23; left hand pain; Other  specified complication of vascular prosthetic devices, implants and  grafts, sequela.     COMPARISON: 7/27/2023     TECHNIQUE: Grayscale, color-flow,  and spectral waveform analysis with  velocities were performed of the dialysis access circuit.     FINDINGS: The arterial inflow is patent with diameters ranging between  5.4 and 7.4 millimeters. The arterial anastomosis is patent. The  fistula is patent.     Total outflow volume is 2034 mL/min.                                                                      IMPRESSION: Patent arteriovenous fistula.      ASSESSMENT:  5 months status post second stage left brachiobasilic AV fistula now with complaints of intermittent numbness and occasional left hand pain during dialysis.  AV fistula ultrasound today demonstrates an excellent calculated outflow volume of 2034 mL/min and he has a palpable left radial pulse with no wounds on any left fingers.  He is not a diabetic.  I doubt a dialysis access steal syndrome although it remains a possibility.    PLAN:  I reviewed all the above with Eliud.  He informs me that they have found a potential kidney donor for him and he hopes to know something in the next few months.  I discussed the concept of dialysis access steal syndrome and the potential treatment for this condition.  He admits that his primary issue is that of numbness rather than pain.  He has no open wounds.  I have encouraged him to resume his vein building exercises.  He will pay close attention to his symptoms.  I will see him back again in early January for a repeat left arm AV fistula ultrasound.  If he has progression of symptoms prior to that he is to contact our office and we will arrange for a left upper extremity angiogram and evaluation for a dialysis access steal syndrome.    All of his questions were answered and he verbalizes full understanding to the above and complete agreement with this management plan.  Total length of this encounter was 20 minutes with time spent reviewing studies, interviewing and examining the patient, answering questions, and coordinating a treatment plan.    Edwardo Moran,  MD

## 2023-10-24 NOTE — TELEPHONE ENCOUNTER
Pt is s/p creation of second stage left brachiobasilic transposition AVF on 5/30/23 with Dr. Moran.    Returned call to Sonia at [facility].  Discussed that the dialysis runs are going well.  However patient mentioned during treatment yesterday that his hand had become painful while on dialysis.  Sonia stated that the patient had mentioned that his left hand had been going numb over the last few weeks.  Sonia reports the left hand is cooler than the left.    Will get AVF US and in clinic visit with Dr. Moran to discuss further.    Routing to scheduling to contact patient to coordinate the following within one week:  AVF US  In clinic visit with Dr. Dean Vazquez, LEAHN, RN-[facility] [place]

## 2023-10-24 NOTE — TELEPHONE ENCOUNTER
[facility]    Who is the name of the provider?:  CAYLA LOCKWOOD   What is the location you see this provider at/preferred location?: [place]  Person calling / Facility: Sonia Keen  Phone number:  [phone]  Nurse call back needed:  YES     Reason for call:  Patient describes numbness and pain in arm and hand. Access hand cool to touch.    Pharmacy location:   n/a  Outside Imaging: n/a   Can we leave a detailed message on this number?  YES

## 2023-10-24 NOTE — TELEPHONE ENCOUNTER
Future Appointments   Date Time Provider Department Center   10/25/2023  1:30 PM [facility]   10/25/2023  3:40 PM Henrik Moran MD [facility]

## 2023-10-25 ENCOUNTER — HOSPITAL ENCOUNTER (OUTPATIENT)
Dept: ULTRASOUND IMAGING | Facility: CLINIC | Age: 28
Discharge: HOME | End: 2023-10-25
Attending: SURGERY
Payer: MEDICARE

## 2023-10-25 ENCOUNTER — OFFICE VISIT (OUTPATIENT)
Dept: OTHER | Facility: CLINIC | Age: 28
End: 2023-10-25
Attending: SURGERY
Payer: MEDICARE

## 2023-10-25 VITALS — DIASTOLIC BLOOD PRESSURE: 90 MMHG | HEART RATE: 70 BPM | SYSTOLIC BLOOD PRESSURE: 134 MMHG

## 2023-10-25 DIAGNOSIS — T82.898A OTHER SPECIFIED COMPLICATION OF VASCULAR PROSTHETIC DEVICES, IMPLANTS AND GRAFTS, INITIAL ENCOUNTER (H): ICD-10-CM

## 2023-10-25 DIAGNOSIS — T82.9XXA COMPLICATION OF AV DIALYSIS FISTULA, INITIAL ENCOUNTER: Primary | ICD-10-CM

## 2023-10-25 DIAGNOSIS — T82.898S OTHER SPECIFIED COMPLICATION OF VASCULAR PROSTHETIC DEVICES, IMPLANTS AND GRAFTS, SEQUELA: ICD-10-CM

## 2023-10-25 PROCEDURE — 93990 DOPPLER FLOW TESTING: CPT

## 2023-10-25 PROCEDURE — G0463 HOSPITAL OUTPT CLINIC VISIT: HCPCS | Performed by: SURGERY

## 2023-10-25 PROCEDURE — 99213 OFFICE O/P EST LOW 20 MIN: CPT | Performed by: SURGERY

## 2023-10-25 NOTE — PATIENT INSTRUCTIONS
"[facility]  [id number]  [place] [address]  Phone: [phone]  Fax: [phone]      CLINIC DISCHARGE INSTRUCTIONS    Patient:  Taylor Valiente  Provider seen in clinic: Dr. Edwardo Moran    REASON FOR VISIT:    Follow up fistula; left hand numbness and pain    TESTING/LABS COMPLETED:  AVF ultrasound    RECOMMENDATIONS:  The fistula ultrasound looked good  Sometimes fistulas can \"steal\" blood away from your hand and sores can develop on your fingertips  A fistulogram would need to be done to further evaluate if the fistula is \"stealing\" too much blood away from your hand  Call us at [phone] or send a [employer] message to Dr. Moran if the numbness or pain gets worse.  Follow up in 3 months for a repeat ultrasound of your fistula and visit with Dr. Moran.  We will send a reminder letter in the mail to schedule this, or you may also call to schedule.      Due to licensure requirements, you must be in the [facility] for phone or video appointments.  If you had a positive experience, please indicate on your patient satisfaction survey form that [facility] will be sending you.  This may also be completed as a phone call.  If you have any billing related questions please call the [facility] Business office at [phone]. The clinic staff does not handle billing related matters.  Please allow 2 business days for any medication refills to be completed.  Thank you for choosing [facility] for your vascular care.  Please call us if you have any questions or concerns.       "

## 2023-10-25 NOTE — PROGRESS NOTES
[facility]        Patient is here for a  follow up.      Pt is currently taking Aspirin and Statin.    BP (!) 134/90 (BP Location: Right arm, Patient Position: Chair, Cuff Size: Adult Regular)   Pulse 70     The provider has been notified that the patient has no concerns.     Questions patient would like addressed today are: N/A.    Refills are needed: N/A    Has homecare services and agency name:  Juanita Gonzalez MA

## 2023-10-30 ENCOUNTER — TELEPHONE (OUTPATIENT)
Dept: TRANSPLANT | Facility: CLINIC | Age: 28
End: 2023-10-30
Payer: MEDICARE

## 2023-10-30 ENCOUNTER — DOCUMENTATION ONLY (OUTPATIENT)
Dept: TRANSPLANT | Facility: CLINIC | Age: 28
End: 2023-10-30
Payer: MEDICARE

## 2023-10-30 NOTE — TELEPHONE ENCOUNTER
Called patient to inform them of status change to ACTIVE  on 10/30/23. Status change letter and PRA orders to be mailed. Patient is in agreement with listing ACTIVE status.      Discussed:   - Pt is eligible for  donor offers and pt can receive calls 24 hours a day, 7 days a week, and on call staff need to be able to reach pt or one of emergency contacts within 30 minutes or they might skip over to next recipient on list.   -Please make sure your phone is charged at all times and your voicemail is not full   -Your transplant on call coordinator will give you directions about when and where you will need to come to the hospital for transplant  -The transplant coordinator will call you to discuss your current health status, the offer, and plans to move forward.  Some organ offer calls will require you to come to the hospital immediately; some may not be as time sensitive.  It may be possible for you to come to the hospital and, for various reasons, the transplant could be cancelled.  This is often called a  dry run .  - Reviewed the right to accept or decline offer, without penalty  - Expected length of surgical procedure is about 3-4 hours, expected inpatient length of stay post transplant is 3-4 days, and potential to stay locally post transplant. Offered resources for lodging in the area  - Verified contact information as documented in Epic. Confirmed emergency contact information  -Instructed patient about importance of having PRAs drawn every 3 months via: (Mailers/[facility]). Encouraged them to set reminder in their preferred calendar to stay on top of their quarterly labs  -Reminded pt to stay up on health maintenance and to call with any updates on their health status, insurance or contact information.   -Reminded pt to inform RN[facility] as soon as possible if they test positive for COVID.     -Provided name and contact information for additional questions or concerns.  Pt expressed excellent understanding of  all and was in good agreement with the plan.

## 2023-12-02 DIAGNOSIS — Z76.82 AWAITING ORGAN TRANSPLANT: Primary | ICD-10-CM

## 2023-12-03 ENCOUNTER — ORGAN (OUTPATIENT)
Dept: TRANSPLANT | Facility: CLINIC | Age: 28
End: 2023-12-03
Payer: MEDICARE

## 2023-12-03 ENCOUNTER — APPOINTMENT (OUTPATIENT)
Dept: LAB | Facility: CLINIC | Age: 28
End: 2023-12-03
Payer: MEDICARE

## 2023-12-03 DIAGNOSIS — Z76.82 AWAITING ORGAN TRANSPLANT: Primary | ICD-10-CM

## 2023-12-03 PROCEDURE — 36415 COLL VENOUS BLD VENIPUNCTURE: CPT

## 2023-12-03 PROCEDURE — 86833 HLA CLASS II HIGH DEFIN QUAL: CPT | Performed by: SURGERY

## 2023-12-03 PROCEDURE — 86832 HLA CLASS I HIGH DEFIN QUAL: CPT | Performed by: SURGERY

## 2023-12-03 PROCEDURE — 86825 HLA X-MATH NON-CYTOTOXIC: CPT | Mod: 59 | Performed by: SURGERY

## 2023-12-03 PROCEDURE — 86825 HLA X-MATH NON-CYTOTOXIC: CPT | Performed by: SURGERY

## 2023-12-03 NOTE — TELEPHONE ENCOUNTER
There are three types of donors - living donors, brain dead donors, and DCD donors.  Living donation would be like if your family member donated an organ to you.  Brain death donors are  donors who have no brain stem reflexes whose bodies are being kept alive with life support.  And then there is your type of offer - DCD donation.    DCD stands for donation after cardiac death.  It is when the potential donor had a brain injury but did not progress to brain death, so the family is choosing to withdraw life support.  There is a chance that the donor may not pass in time suitable for donation and transplant, but without moving forward, we have no way of knowing for sure.    So the important part for you to know about this is kidneys from DCD donors have the same long term outcomes as kidneys from brain dead donors.  Sometimes they may take longer to come to full power after transplant. This is called 'delayed graft function' and may result in the need for some dialysis treatments for the first few days or weeks.     Damián Barnes RN on 12/3/2023 at 1:06 PM

## 2023-12-04 ENCOUNTER — APPOINTMENT (OUTPATIENT)
Dept: GENERAL RADIOLOGY | Facility: CLINIC | Age: 28
DRG: 652 | End: 2023-12-04
Attending: STUDENT IN AN ORGANIZED HEALTH CARE EDUCATION/TRAINING PROGRAM
Payer: MEDICARE

## 2023-12-04 ENCOUNTER — ANESTHESIA EVENT (OUTPATIENT)
Dept: SURGERY | Facility: CLINIC | Age: 28
DRG: 652 | End: 2023-12-04
Payer: MEDICARE

## 2023-12-04 ENCOUNTER — HOSPITAL ENCOUNTER (INPATIENT)
Facility: CLINIC | Age: 28
Setting detail: SURGERY ADMIT
End: 2023-12-04
Attending: SURGERY | Admitting: SURGERY
Payer: COMMERCIAL

## 2023-12-04 ENCOUNTER — HOSPITAL ENCOUNTER (INPATIENT)
Facility: CLINIC | Age: 28
LOS: 3 days | Discharge: HOME | DRG: 652 | End: 2023-12-08
Attending: SURGERY | Admitting: SURGERY
Payer: MEDICARE

## 2023-12-04 DIAGNOSIS — N18.5 CKD (CHRONIC KIDNEY DISEASE) STAGE 5, GFR LESS THAN 15 ML/MIN (H): ICD-10-CM

## 2023-12-04 DIAGNOSIS — Z76.82 KIDNEY TRANSPLANT CANDIDATE: Primary | ICD-10-CM

## 2023-12-04 DIAGNOSIS — Z99.2 ESRD (END STAGE RENAL DISEASE) ON DIALYSIS (H): ICD-10-CM

## 2023-12-04 DIAGNOSIS — N17.9 ACUTE KIDNEY INJURY (H): ICD-10-CM

## 2023-12-04 DIAGNOSIS — N18.5 ANEMIA OF CHRONIC RENAL FAILURE, STAGE 5 (H): ICD-10-CM

## 2023-12-04 DIAGNOSIS — Z94.0 KIDNEY TRANSPLANT RECIPIENT: ICD-10-CM

## 2023-12-04 DIAGNOSIS — N18.6 ESRD (END STAGE RENAL DISEASE) ON DIALYSIS (H): ICD-10-CM

## 2023-12-04 DIAGNOSIS — D63.1 ANEMIA OF CHRONIC RENAL FAILURE, STAGE 5 (H): ICD-10-CM

## 2023-12-04 PROBLEM — Z94.9 TRANSPLANT: Status: ACTIVE | Noted: 2023-12-04

## 2023-12-04 LAB
ABO/RH(D): NORMAL
ANTIBODY SCREEN: NEGATIVE
PROTOCOL CUTOFF: NORMAL
SA 1 CELL: NORMAL
SA 1 TEST METHOD: NORMAL
SA 2 CELL: NORMAL
SA 2 TEST METHOD: NORMAL
SA1 HI RISK ABY: NORMAL
SA1 MOD RISK ABY: NORMAL
SA2 HI RISK ABY: NORMAL
SA2 MOD RISK ABY: NORMAL
SPECIMEN EXPIRATION DATE: NORMAL
UNACCEPTABLE ANTIGENS: NORMAL
UNOS CPRA: 57
ZZZSA 1  COMMENTS: NORMAL
ZZZSA 2 COMMENTS: NORMAL

## 2023-12-04 PROCEDURE — 71046 X-RAY EXAM CHEST 2 VIEWS: CPT | Mod: 26 | Performed by: RADIOLOGY

## 2023-12-04 PROCEDURE — 87637 SARSCOV2&INF A&B&RSV AMP PRB: CPT | Performed by: STUDENT IN AN ORGANIZED HEALTH CARE EDUCATION/TRAINING PROGRAM

## 2023-12-04 PROCEDURE — 999N000248 HC STATISTIC IV INSERT WITH US BY RN

## 2023-12-04 PROCEDURE — 93005 ELECTROCARDIOGRAM TRACING: CPT

## 2023-12-04 PROCEDURE — 71046 X-RAY EXAM CHEST 2 VIEWS: CPT

## 2023-12-04 RX ORDER — CARVEDILOL 25 MG/1
50 TABLET ORAL 2 TIMES DAILY WITH MEALS
Status: DISCONTINUED | OUTPATIENT
Start: 2023-12-05 | End: 2023-12-08 | Stop reason: HOSPADM

## 2023-12-04 RX ORDER — AMLODIPINE BESYLATE 10 MG/1
10 TABLET ORAL DAILY
Status: DISCONTINUED | OUTPATIENT
Start: 2023-12-05 | End: 2023-12-08 | Stop reason: HOSPADM

## 2023-12-04 RX ORDER — VIT B COMP NO.3/FOLIC/C/BIOTIN 1 MG-60 MG
1 TABLET ORAL DAILY
Status: DISCONTINUED | OUTPATIENT
Start: 2023-12-05 | End: 2023-12-06

## 2023-12-04 RX ORDER — ACETAMINOPHEN 325 MG/1
975 TABLET ORAL ONCE
Qty: 3 TABLET | Refills: 0 | Status: COMPLETED | OUTPATIENT
Start: 2023-12-05 | End: 2023-12-05

## 2023-12-04 RX ORDER — ATORVASTATIN CALCIUM 10 MG/1
10 TABLET, FILM COATED ORAL AT BEDTIME
Status: DISCONTINUED | OUTPATIENT
Start: 2023-12-05 | End: 2023-12-05

## 2023-12-04 RX ORDER — CEFUROXIME SODIUM 1.5 G/16ML
1.5 INJECTION, POWDER, FOR SOLUTION INTRAVENOUS ONCE
Qty: 16.667 ML | Refills: 0 | Status: COMPLETED | OUTPATIENT
Start: 2023-12-05 | End: 2023-12-05

## 2023-12-04 RX ORDER — ASPIRIN 81 MG/1
81 TABLET, CHEWABLE ORAL DAILY
Status: DISCONTINUED | OUTPATIENT
Start: 2023-12-05 | End: 2023-12-08 | Stop reason: HOSPADM

## 2023-12-04 RX ORDER — CEFUROXIME SODIUM 1.5 G/16ML
1.5 INJECTION, POWDER, FOR SOLUTION INTRAVENOUS SEE ADMIN INSTRUCTIONS
Status: DISCONTINUED | OUTPATIENT
Start: 2023-12-05 | End: 2023-12-05 | Stop reason: HOSPADM

## 2023-12-04 RX ORDER — LIDOCAINE 40 MG/G
CREAM TOPICAL
Status: DISCONTINUED | OUTPATIENT
Start: 2023-12-04 | End: 2023-12-05 | Stop reason: HOSPADM

## 2023-12-04 NOTE — TELEPHONE ENCOUNTER
Organ Offer Encounter Information    Organ Offer Information  Organ offer date & time: 12/3/2023  1:06 PM  Coordinator/Fellow/Attending name: Damián Barnes RN   Organ(s):  Organ UNOS ID Match Run ID Comment Organ Laterality   Kidney [id number] WIUW, back up         Recent infections?: No      New medications?: No Recent pregnancy?: No     Angicoagulation medications?: No Recent vaccinations?: Yes (Comment: flu shot --not sure when.)     Recent blood transfusions?: No Recent hospitalizations?: No   Has your insurance changed in the last 6-12 months?: Neg    Patient last dialyzed: 12/4/2023  7:00 AM  Dialysis type: Hemo  Discussed organ offer with: Patient  Patient/Caregiver name: Taylor  Discussed risk category with Patient/Other: DCD  Understood donor criteria, verbalized understanding  Patient/Other asked to speak to a surgeon?: No  Discussed program-specific outcomes: Asked questions regarding SRTR, verbalized understanding  Right to decline organ offer without penalty, Patient/Other: Aware of option to decline without penalty  Organ offer decision status Patient/Other: Accepted Offer  Organ disposition: Case Cancelled - Other (specify)  Additional Comments: 12/3/2023 4:36 PM  Kidney: import   MD: Ambrocio  [facility] Contact: Alma Delia [phone]  VXM Results: VXM compatible with no DSA per Dr Velasco.   XM Plan (FXM must be done with serum no older than 10 days from transplant): FXM blood requested from OPO ETA 1730.   Is this an ABO A2 donor to ABO B Recipient: NO  (In the event of A2 to B transplant, Anti-A titers need to be collected at the time of the crossmatch or admission - whichever is first.  Use Me[employer] .A2toB for instructions.)  Plan (Admission, NPO, Donor OR): Called pt with a back up DCD kidney offer. DCD donation criteria explained and pt verbalized understanding. Pt stated he will accept offer if he becomes primary. Pt advised to drop off FXM blood at [facility]. Pt's ETA 1530. FXM  order placed. [facility] requested donor blood from [facility]. Donor blood ETA 1730. [facility] Nicola, Immunology Sarina, and Dr Bergman notified.   - - -   COVID Screening  In the past month, have you:  Or anyone close to you had a positive COVID test or suspected to have COVID: no  Had any COVID symptoms (Fever, Cough, Short of Breath, Loss of Taste/Smell, Rash): no    Damián Barnes RN on 12/3/2023 at 4:48 PM  _ _ _       12/3/2023 8:44 PM:   Called Taylor, introduced myself and provided my contact information if he were to have any questions. FXM currently in process, I will call him if it come back + and case is cancelled. Otherwise, coordinator coming on at 0700 will call him to introduce themselves/provide contact information. Per Dr Bergman, NPO @ 0900, patient aware. No further questions at this time.  Vesna Presley RN  Transplant Coordinator    12/3/2023 10:10 PM:   FXM negative. Dr Bergman updated.   Vesna Presley RN  Transplant Coordinator    12/4/2023 7:01 AM:   Called patient to give him my contact information, he is currently in dialysis. Plan to have him NPO after lunch as donor OR is at 1300 and at a 4hr drive away. Told him FXM was negative and he is still ranked #3 and is first backup. Told him I won't likely have an update until around [phone] but he should call/text anytime with questions. He verbalized understanding.   Amy Chaney RN  Transplant Coordinator    Donor OR Time: 1300  Procuring MD:   Contact in the OR:   Organs Being Procured: Full Donor  Expected Delays:   Flush Solution: UW  Biopsy:   Pump:   Special Requests (Special blood tubes, nodes, waivers):   MD for Visualization:   Transportation Details: Pending acceptance    12/4/2023 4:30 PM:   Both Kidneys were placed ahead, notified Dr. Bergman, called Taylor and let him know we won't be able to move forward with these kidneys and he may resume normal life, he has no questions at this time. Notified [facility] @  [employer] via text.   Amy Chaney RN  Transplant Coordinator    Attestation I have discussed all of the above with the Patient/Legal Guardian/Caregiver regarding this organ offer.: Yes  Coordinator/Fellow/Attending name: Damián Barnes RN

## 2023-12-05 ENCOUNTER — DOCUMENTATION ONLY (OUTPATIENT)
Dept: TRANSPLANT | Facility: CLINIC | Age: 28
End: 2023-12-05

## 2023-12-05 ENCOUNTER — ANESTHESIA (OUTPATIENT)
Dept: SURGERY | Facility: CLINIC | Age: 28
DRG: 652 | End: 2023-12-05
Payer: MEDICARE

## 2023-12-05 ENCOUNTER — APPOINTMENT (OUTPATIENT)
Dept: ULTRASOUND IMAGING | Facility: CLINIC | Age: 28
DRG: 652 | End: 2023-12-05
Attending: STUDENT IN AN ORGANIZED HEALTH CARE EDUCATION/TRAINING PROGRAM
Payer: MEDICARE

## 2023-12-05 ENCOUNTER — APPOINTMENT (OUTPATIENT)
Dept: GENERAL RADIOLOGY | Facility: CLINIC | Age: 28
DRG: 652 | End: 2023-12-05
Attending: STUDENT IN AN ORGANIZED HEALTH CARE EDUCATION/TRAINING PROGRAM
Payer: MEDICARE

## 2023-12-05 PROBLEM — R73.03 PRE-DIABETES: Status: ACTIVE | Noted: 2023-12-05

## 2023-12-05 PROBLEM — Z79.01 ANTICOAGULATED: Status: ACTIVE | Noted: 2023-12-05

## 2023-12-05 PROBLEM — Z76.82 KIDNEY TRANSPLANT CANDIDATE: Status: ACTIVE | Noted: 2023-12-04

## 2023-12-05 LAB
ALBUMIN SERPL BCG-MCNC: 4.5 G/DL (ref 3.5–5.2)
ALP SERPL-CCNC: 138 U/L (ref 40–150)
ALT SERPL W P-5'-P-CCNC: 20 U/L (ref 0–70)
ANION GAP SERPL CALCULATED.3IONS-SCNC: 18 MMOL/L (ref 7–15)
ANION GAP SERPL CALCULATED.3IONS-SCNC: 19 MMOL/L (ref 7–15)
APTT PPP: 30 SECONDS (ref 22–38)
AST SERPL W P-5'-P-CCNC: 13 U/L (ref 0–45)
ATRIAL RATE - MUSE: 70 BPM
BASOPHILS # BLD AUTO: 0.1 10E3/UL (ref 0–0.2)
BASOPHILS NFR BLD AUTO: 1 %
BILIRUB SERPL-MCNC: 0.5 MG/DL
BUN SERPL-MCNC: 61 MG/DL (ref 6–20)
BUN SERPL-MCNC: 64.2 MG/DL (ref 6–20)
CALCIUM SERPL-MCNC: 6.7 MG/DL (ref 8.6–10)
CALCIUM SERPL-MCNC: 8.4 MG/DL (ref 8.6–10)
CHLORIDE SERPL-SCNC: 92 MMOL/L (ref 98–107)
CHLORIDE SERPL-SCNC: 94 MMOL/L (ref 98–107)
CHOLEST SERPL-MCNC: 139 MG/DL
CMV DNA SPEC NAA+PROBE-ACNC: NOT DETECTED IU/ML
CMV IGG SERPL IA-ACNC: >10 U/ML
CMV IGG SERPL IA-ACNC: ABNORMAL
CREAT SERPL-MCNC: 11.6 MG/DL (ref 0.67–1.17)
CREAT SERPL-MCNC: 11.8 MG/DL (ref 0.67–1.17)
DEPRECATED HCO3 PLAS-SCNC: 19 MMOL/L (ref 22–29)
DEPRECATED HCO3 PLAS-SCNC: 25 MMOL/L (ref 22–29)
DIASTOLIC BLOOD PRESSURE - MUSE: NORMAL MMHG
EBV VCA IGG SER IA-ACNC: >750 U/ML
EBV VCA IGG SER IA-ACNC: POSITIVE
EGFRCR SERPLBLD CKD-EPI 2021: 5 ML/MIN/1.73M2
EGFRCR SERPLBLD CKD-EPI 2021: 6 ML/MIN/1.73M2
EOSINOPHIL # BLD AUTO: 0.6 10E3/UL (ref 0–0.7)
EOSINOPHIL NFR BLD AUTO: 6 %
ERYTHROCYTE [DISTWIDTH] IN BLOOD BY AUTOMATED COUNT: 16 % (ref 10–15)
ERYTHROCYTE [DISTWIDTH] IN BLOOD BY AUTOMATED COUNT: 16.1 % (ref 10–15)
FLUAV RNA SPEC QL NAA+PROBE: NEGATIVE
FLUBV RNA RESP QL NAA+PROBE: NEGATIVE
GLUCOSE BLDC GLUCOMTR-MCNC: 102 MG/DL (ref 70–99)
GLUCOSE BLDC GLUCOMTR-MCNC: 144 MG/DL (ref 70–99)
GLUCOSE BLDC GLUCOMTR-MCNC: 164 MG/DL (ref 70–99)
GLUCOSE BLDC GLUCOMTR-MCNC: 193 MG/DL (ref 70–99)
GLUCOSE BLDC GLUCOMTR-MCNC: 205 MG/DL (ref 70–99)
GLUCOSE BLDC GLUCOMTR-MCNC: 206 MG/DL (ref 70–99)
GLUCOSE BLDC GLUCOMTR-MCNC: 208 MG/DL (ref 70–99)
GLUCOSE SERPL-MCNC: 138 MG/DL (ref 70–99)
GLUCOSE SERPL-MCNC: 96 MG/DL (ref 70–99)
HBA1C MFR BLD: 5.9 %
HBV CORE AB SERPL QL IA: NONREACTIVE
HBV SURFACE AB SERPL IA-ACNC: 65.99 M[IU]/ML
HBV SURFACE AB SERPL IA-ACNC: REACTIVE M[IU]/ML
HBV SURFACE AG SERPL QL IA: NONREACTIVE
HCT VFR BLD AUTO: 34.8 % (ref 40–53)
HCT VFR BLD AUTO: 37.8 % (ref 40–53)
HCV AB SERPL QL IA: NONREACTIVE
HDLC SERPL-MCNC: 35 MG/DL
HGB BLD-MCNC: 10.4 G/DL (ref 13.3–17.7)
HGB BLD-MCNC: 11.3 G/DL (ref 13.3–17.7)
HGB BLD-MCNC: 12 G/DL (ref 13.3–17.7)
HGB BLD-MCNC: 12.8 G/DL (ref 13.3–17.7)
HIV 1+2 AB+HIV1 P24 AG SERPL QL IA: NONREACTIVE
IMM GRANULOCYTES # BLD: 0 10E3/UL
IMM GRANULOCYTES NFR BLD: 0 %
INR PPP: 1.07 (ref 0.85–1.15)
INTERPRETATION ECG - MUSE: NORMAL
LDLC SERPL CALC-MCNC: 66 MG/DL
LYMPHOCYTES # BLD AUTO: 2.8 10E3/UL (ref 0.8–5.3)
LYMPHOCYTES NFR BLD AUTO: 29 %
MAGNESIUM SERPL-MCNC: 1.9 MG/DL (ref 1.7–2.3)
MCH RBC QN AUTO: 28.4 PG (ref 26.5–33)
MCH RBC QN AUTO: 28.5 PG (ref 26.5–33)
MCHC RBC AUTO-ENTMCNC: 31.7 G/DL (ref 31.5–36.5)
MCHC RBC AUTO-ENTMCNC: 32.5 G/DL (ref 31.5–36.5)
MCV RBC AUTO: 88 FL (ref 78–100)
MCV RBC AUTO: 90 FL (ref 78–100)
MONOCYTES # BLD AUTO: 0.8 10E3/UL (ref 0–1.3)
MONOCYTES NFR BLD AUTO: 9 %
NEUTROPHILS # BLD AUTO: 5.3 10E3/UL (ref 1.6–8.3)
NEUTROPHILS NFR BLD AUTO: 55 %
NONHDLC SERPL-MCNC: 104 MG/DL
NRBC # BLD AUTO: 0 10E3/UL
NRBC BLD AUTO-RTO: 0 /100
P AXIS - MUSE: 40 DEGREES
PHOSPHATE SERPL-MCNC: 5.5 MG/DL (ref 2.5–4.5)
PLATELET # BLD AUTO: 140 10E3/UL (ref 150–450)
PLATELET # BLD AUTO: 159 10E3/UL (ref 150–450)
POTASSIUM SERPL-SCNC: 2.2 MMOL/L (ref 3.4–5.3)
POTASSIUM SERPL-SCNC: 5 MMOL/L (ref 3.4–5.3)
POTASSIUM SERPL-SCNC: 5.5 MMOL/L (ref 3.4–5.3)
POTASSIUM SERPL-SCNC: 6.1 MMOL/L (ref 3.4–5.3)
PR INTERVAL - MUSE: 174 MS
PROT SERPL-MCNC: 7.7 G/DL (ref 6.4–8.3)
QRS DURATION - MUSE: 98 MS
QT - MUSE: 512 MS
QTC - MUSE: 552 MS
R AXIS - MUSE: 53 DEGREES
RBC # BLD AUTO: 3.96 10E6/UL (ref 4.4–5.9)
RBC # BLD AUTO: 4.22 10E6/UL (ref 4.4–5.9)
RSV RNA SPEC NAA+PROBE: NEGATIVE
SARS-COV-2 RNA RESP QL NAA+PROBE: NEGATIVE
SODIUM SERPL-SCNC: 131 MMOL/L (ref 135–145)
SODIUM SERPL-SCNC: 136 MMOL/L (ref 135–145)
SYSTOLIC BLOOD PRESSURE - MUSE: NORMAL MMHG
T AXIS - MUSE: 78 DEGREES
TRIGL SERPL-MCNC: 190 MG/DL
VENTRICULAR RATE- MUSE: 70 BPM
WBC # BLD AUTO: 9.3 10E3/UL (ref 4–11)
WBC # BLD AUTO: 9.7 10E3/UL (ref 4–11)

## 2023-12-05 PROCEDURE — 250N000025 HC SEVOFLURANE, PER MIN: Performed by: SURGERY

## 2023-12-05 PROCEDURE — 250N000012 HC RX MED GY IP 250 OP 636 PS 637: Performed by: STUDENT IN AN ORGANIZED HEALTH CARE EDUCATION/TRAINING PROGRAM

## 2023-12-05 PROCEDURE — 250N000009 HC RX 250

## 2023-12-05 PROCEDURE — 258N000002 HC RX IP 258 OP 250

## 2023-12-05 PROCEDURE — 999N000141 HC STATISTIC PRE-PROCEDURE NURSING ASSESSMENT: Performed by: SURGERY

## 2023-12-05 PROCEDURE — 80053 COMPREHEN METABOLIC PANEL: CPT | Performed by: STUDENT IN AN ORGANIZED HEALTH CARE EDUCATION/TRAINING PROGRAM

## 2023-12-05 PROCEDURE — 85610 PROTHROMBIN TIME: CPT | Performed by: STUDENT IN AN ORGANIZED HEALTH CARE EDUCATION/TRAINING PROGRAM

## 2023-12-05 PROCEDURE — 85730 THROMBOPLASTIN TIME PARTIAL: CPT | Performed by: STUDENT IN AN ORGANIZED HEALTH CARE EDUCATION/TRAINING PROGRAM

## 2023-12-05 PROCEDURE — 370N000017 HC ANESTHESIA TECHNICAL FEE, PER MIN: Performed by: SURGERY

## 2023-12-05 PROCEDURE — 86790 VIRUS ANTIBODY NOS: CPT | Performed by: STUDENT IN AN ORGANIZED HEALTH CARE EDUCATION/TRAINING PROGRAM

## 2023-12-05 PROCEDURE — 258N000003 HC RX IP 258 OP 636: Performed by: STUDENT IN AN ORGANIZED HEALTH CARE EDUCATION/TRAINING PROGRAM

## 2023-12-05 PROCEDURE — 84100 ASSAY OF PHOSPHORUS: CPT | Performed by: STUDENT IN AN ORGANIZED HEALTH CARE EDUCATION/TRAINING PROGRAM

## 2023-12-05 PROCEDURE — 76776 US EXAM K TRANSPL W/DOPPLER: CPT

## 2023-12-05 PROCEDURE — 76776 US EXAM K TRANSPL W/DOPPLER: CPT | Mod: 26 | Performed by: RADIOLOGY

## 2023-12-05 PROCEDURE — 83735 ASSAY OF MAGNESIUM: CPT | Performed by: STUDENT IN AN ORGANIZED HEALTH CARE EDUCATION/TRAINING PROGRAM

## 2023-12-05 PROCEDURE — 250N000011 HC RX IP 250 OP 636: Performed by: ANESTHESIOLOGY

## 2023-12-05 PROCEDURE — 250N000013 HC RX MED GY IP 250 OP 250 PS 637: Performed by: STUDENT IN AN ORGANIZED HEALTH CARE EDUCATION/TRAINING PROGRAM

## 2023-12-05 PROCEDURE — 258N000001 HC RX 258

## 2023-12-05 PROCEDURE — 250N000011 HC RX IP 250 OP 636: Performed by: STUDENT IN AN ORGANIZED HEALTH CARE EDUCATION/TRAINING PROGRAM

## 2023-12-05 PROCEDURE — 87340 HEPATITIS B SURFACE AG IA: CPT | Performed by: STUDENT IN AN ORGANIZED HEALTH CARE EDUCATION/TRAINING PROGRAM

## 2023-12-05 PROCEDURE — 250N000013 HC RX MED GY IP 250 OP 250 PS 637

## 2023-12-05 PROCEDURE — 272N000001 HC OR GENERAL SUPPLY STERILE: Performed by: SURGERY

## 2023-12-05 PROCEDURE — 250N000011 HC RX IP 250 OP 636: Performed by: SURGERY

## 2023-12-05 PROCEDURE — 99223 1ST HOSP IP/OBS HIGH 75: CPT | Mod: 25 | Performed by: INTERNAL MEDICINE

## 2023-12-05 PROCEDURE — 250N000012 HC RX MED GY IP 250 OP 636 PS 637

## 2023-12-05 PROCEDURE — 86803 HEPATITIS C AB TEST: CPT | Performed by: STUDENT IN AN ORGANIZED HEALTH CARE EDUCATION/TRAINING PROGRAM

## 2023-12-05 PROCEDURE — 82962 GLUCOSE BLOOD TEST: CPT

## 2023-12-05 PROCEDURE — 87389 HIV-1 AG W/HIV-1&-2 AB AG IA: CPT | Performed by: STUDENT IN AN ORGANIZED HEALTH CARE EDUCATION/TRAINING PROGRAM

## 2023-12-05 PROCEDURE — 812N000003 HC ACQUISITION KIDNEY CADAVER

## 2023-12-05 PROCEDURE — 710N000010 HC RECOVERY PHASE 1, LEVEL 2, PER MIN: Performed by: SURGERY

## 2023-12-05 PROCEDURE — 84132 ASSAY OF SERUM POTASSIUM: CPT | Performed by: STUDENT IN AN ORGANIZED HEALTH CARE EDUCATION/TRAINING PROGRAM

## 2023-12-05 PROCEDURE — 86665 EPSTEIN-BARR CAPSID VCA: CPT | Performed by: STUDENT IN AN ORGANIZED HEALTH CARE EDUCATION/TRAINING PROGRAM

## 2023-12-05 PROCEDURE — 120N000011 HC R&B TRANSPLANT UMMC

## 2023-12-05 PROCEDURE — C2617 STENT, NON-COR, TEM W/O DEL: HCPCS | Performed by: SURGERY

## 2023-12-05 PROCEDURE — 86706 HEP B SURFACE ANTIBODY: CPT | Performed by: STUDENT IN AN ORGANIZED HEALTH CARE EDUCATION/TRAINING PROGRAM

## 2023-12-05 PROCEDURE — 250N000011 HC RX IP 250 OP 636: Mod: JZ

## 2023-12-05 PROCEDURE — 258N000003 HC RX IP 258 OP 636

## 2023-12-05 PROCEDURE — 999N000065 XR CHEST PORT 1 VIEW

## 2023-12-05 PROCEDURE — 87516 HEPATITIS B DNA AMP PROBE: CPT | Performed by: STUDENT IN AN ORGANIZED HEALTH CARE EDUCATION/TRAINING PROGRAM

## 2023-12-05 PROCEDURE — 83036 HEMOGLOBIN GLYCOSYLATED A1C: CPT | Performed by: STUDENT IN AN ORGANIZED HEALTH CARE EDUCATION/TRAINING PROGRAM

## 2023-12-05 PROCEDURE — 0TY00Z0 TRANSPLANTATION OF RIGHT KIDNEY, ALLOGENEIC, OPEN APPROACH: ICD-10-PCS | Performed by: STUDENT IN AN ORGANIZED HEALTH CARE EDUCATION/TRAINING PROGRAM

## 2023-12-05 PROCEDURE — 86900 BLOOD TYPING SEROLOGIC ABO: CPT | Performed by: STUDENT IN AN ORGANIZED HEALTH CARE EDUCATION/TRAINING PROGRAM

## 2023-12-05 PROCEDURE — 36415 COLL VENOUS BLD VENIPUNCTURE: CPT | Performed by: STUDENT IN AN ORGANIZED HEALTH CARE EDUCATION/TRAINING PROGRAM

## 2023-12-05 PROCEDURE — 250N000009 HC RX 250: Performed by: ANESTHESIOLOGY

## 2023-12-05 PROCEDURE — 85027 COMPLETE CBC AUTOMATED: CPT | Performed by: STUDENT IN AN ORGANIZED HEALTH CARE EDUCATION/TRAINING PROGRAM

## 2023-12-05 PROCEDURE — 250N000011 HC RX IP 250 OP 636: Performed by: NURSE ANESTHETIST, CERTIFIED REGISTERED

## 2023-12-05 PROCEDURE — 85025 COMPLETE CBC W/AUTO DIFF WBC: CPT | Performed by: STUDENT IN AN ORGANIZED HEALTH CARE EDUCATION/TRAINING PROGRAM

## 2023-12-05 PROCEDURE — 93010 ELECTROCARDIOGRAM REPORT: CPT | Performed by: INTERNAL MEDICINE

## 2023-12-05 PROCEDURE — 250N000009 HC RX 250: Performed by: STUDENT IN AN ORGANIZED HEALTH CARE EDUCATION/TRAINING PROGRAM

## 2023-12-05 PROCEDURE — 85018 HEMOGLOBIN: CPT | Performed by: STUDENT IN AN ORGANIZED HEALTH CARE EDUCATION/TRAINING PROGRAM

## 2023-12-05 PROCEDURE — 80061 LIPID PANEL: CPT | Performed by: STUDENT IN AN ORGANIZED HEALTH CARE EDUCATION/TRAINING PROGRAM

## 2023-12-05 PROCEDURE — 258N000003 HC RX IP 258 OP 636: Performed by: SURGERY

## 2023-12-05 PROCEDURE — 86644 CMV ANTIBODY: CPT | Performed by: STUDENT IN AN ORGANIZED HEALTH CARE EDUCATION/TRAINING PROGRAM

## 2023-12-05 PROCEDURE — 93005 ELECTROCARDIOGRAM TRACING: CPT

## 2023-12-05 PROCEDURE — 360N000077 HC SURGERY LEVEL 4, PER MIN: Performed by: SURGERY

## 2023-12-05 PROCEDURE — 71045 X-RAY EXAM CHEST 1 VIEW: CPT | Mod: 26 | Performed by: RADIOLOGY

## 2023-12-05 PROCEDURE — 250N000011 HC RX IP 250 OP 636: Mod: JZ | Performed by: STUDENT IN AN ORGANIZED HEALTH CARE EDUCATION/TRAINING PROGRAM

## 2023-12-05 PROCEDURE — 86704 HEP B CORE ANTIBODY TOTAL: CPT | Performed by: STUDENT IN AN ORGANIZED HEALTH CARE EDUCATION/TRAINING PROGRAM

## 2023-12-05 DEVICE — SOF-FLEX DOUBLE PIGTAIL URETERAL STENT SET
Type: IMPLANTABLE DEVICE | Site: URETER | Status: NON-FUNCTIONAL
Brand: SOF-FLEX
Removed: 2024-01-23

## 2023-12-05 RX ORDER — MANNITOL 20 G/100ML
INJECTION, SOLUTION INTRAVENOUS PRN
Status: DISCONTINUED | OUTPATIENT
Start: 2023-12-05 | End: 2023-12-05

## 2023-12-05 RX ORDER — HEPARIN SODIUM 1000 [USP'U]/ML
INJECTION, SOLUTION INTRAVENOUS; SUBCUTANEOUS PRN
Status: DISCONTINUED | OUTPATIENT
Start: 2023-12-05 | End: 2023-12-05

## 2023-12-05 RX ORDER — NALOXONE HYDROCHLORIDE 0.4 MG/ML
0.4 INJECTION, SOLUTION INTRAMUSCULAR; INTRAVENOUS; SUBCUTANEOUS
Status: DISCONTINUED | OUTPATIENT
Start: 2023-12-05 | End: 2023-12-08 | Stop reason: HOSPADM

## 2023-12-05 RX ORDER — CALCIUM GLUCONATE 20 MG/ML
1 INJECTION, SOLUTION INTRAVENOUS ONCE
Status: COMPLETED | OUTPATIENT
Start: 2023-12-05 | End: 2023-12-05

## 2023-12-05 RX ORDER — SODIUM CHLORIDE, SODIUM LACTATE, POTASSIUM CHLORIDE, CALCIUM CHLORIDE 600; 310; 30; 20 MG/100ML; MG/100ML; MG/100ML; MG/100ML
INJECTION, SOLUTION INTRAVENOUS CONTINUOUS PRN
Status: DISCONTINUED | OUTPATIENT
Start: 2023-12-05 | End: 2023-12-05

## 2023-12-05 RX ORDER — FENTANYL CITRATE 50 UG/ML
50 INJECTION, SOLUTION INTRAMUSCULAR; INTRAVENOUS EVERY 5 MIN PRN
Status: DISCONTINUED | OUTPATIENT
Start: 2023-12-05 | End: 2023-12-05 | Stop reason: HOSPADM

## 2023-12-05 RX ORDER — NALOXONE HYDROCHLORIDE 0.4 MG/ML
0.2 INJECTION, SOLUTION INTRAMUSCULAR; INTRAVENOUS; SUBCUTANEOUS
Status: DISCONTINUED | OUTPATIENT
Start: 2023-12-05 | End: 2023-12-08 | Stop reason: HOSPADM

## 2023-12-05 RX ORDER — ONDANSETRON 4 MG/1
4 TABLET, ORALLY DISINTEGRATING ORAL EVERY 30 MIN PRN
Status: DISCONTINUED | OUTPATIENT
Start: 2023-12-05 | End: 2023-12-05 | Stop reason: HOSPADM

## 2023-12-05 RX ORDER — SODIUM CHLORIDE 9 MG/ML
1000 INJECTION, SOLUTION INTRAVENOUS CONTINUOUS PRN
Status: DISCONTINUED | OUTPATIENT
Start: 2023-12-05 | End: 2023-12-05 | Stop reason: HOSPADM

## 2023-12-05 RX ORDER — DEXAMETHASONE SODIUM PHOSPHATE 4 MG/ML
INJECTION, SOLUTION INTRA-ARTICULAR; INTRALESIONAL; INTRAMUSCULAR; INTRAVENOUS; SOFT TISSUE PRN
Status: DISCONTINUED | OUTPATIENT
Start: 2023-12-05 | End: 2023-12-05

## 2023-12-05 RX ORDER — DEXTROSE MONOHYDRATE 25 G/50ML
25-50 INJECTION, SOLUTION INTRAVENOUS
Status: DISCONTINUED | OUTPATIENT
Start: 2023-12-05 | End: 2023-12-08 | Stop reason: HOSPADM

## 2023-12-05 RX ORDER — NICOTINE POLACRILEX 4 MG
15-30 LOZENGE BUCCAL
Status: DISCONTINUED | OUTPATIENT
Start: 2023-12-05 | End: 2023-12-08 | Stop reason: HOSPADM

## 2023-12-05 RX ORDER — ONDANSETRON 2 MG/ML
4 INJECTION INTRAMUSCULAR; INTRAVENOUS EVERY 30 MIN PRN
Status: DISCONTINUED | OUTPATIENT
Start: 2023-12-05 | End: 2023-12-05 | Stop reason: HOSPADM

## 2023-12-05 RX ORDER — HYDROMORPHONE HCL IN WATER/PF 6 MG/30 ML
0.4 PATIENT CONTROLLED ANALGESIA SYRINGE INTRAVENOUS
Status: DISCONTINUED | OUTPATIENT
Start: 2023-12-05 | End: 2023-12-06

## 2023-12-05 RX ORDER — FENTANYL CITRATE 50 UG/ML
INJECTION, SOLUTION INTRAMUSCULAR; INTRAVENOUS PRN
Status: DISCONTINUED | OUTPATIENT
Start: 2023-12-05 | End: 2023-12-05

## 2023-12-05 RX ORDER — HYDROMORPHONE HCL IN WATER/PF 6 MG/30 ML
0.2 PATIENT CONTROLLED ANALGESIA SYRINGE INTRAVENOUS
Status: DISCONTINUED | OUTPATIENT
Start: 2023-12-05 | End: 2023-12-06

## 2023-12-05 RX ORDER — PROCHLORPERAZINE MALEATE 10 MG
10 TABLET ORAL EVERY 6 HOURS PRN
Status: DISCONTINUED | OUTPATIENT
Start: 2023-12-05 | End: 2023-12-08 | Stop reason: HOSPADM

## 2023-12-05 RX ORDER — SODIUM CHLORIDE, SODIUM LACTATE, POTASSIUM CHLORIDE, CALCIUM CHLORIDE 600; 310; 30; 20 MG/100ML; MG/100ML; MG/100ML; MG/100ML
INJECTION, SOLUTION INTRAVENOUS CONTINUOUS
Status: DISCONTINUED | OUTPATIENT
Start: 2023-12-05 | End: 2023-12-05 | Stop reason: HOSPADM

## 2023-12-05 RX ORDER — FENTANYL CITRATE 50 UG/ML
25 INJECTION, SOLUTION INTRAMUSCULAR; INTRAVENOUS EVERY 5 MIN PRN
Status: DISCONTINUED | OUTPATIENT
Start: 2023-12-05 | End: 2023-12-05 | Stop reason: HOSPADM

## 2023-12-05 RX ORDER — OXYCODONE HYDROCHLORIDE 5 MG/1
5 TABLET ORAL EVERY 4 HOURS PRN
Status: DISCONTINUED | OUTPATIENT
Start: 2023-12-05 | End: 2023-12-07

## 2023-12-05 RX ORDER — SODIUM CHLORIDE 9 MG/ML
1000 INJECTION, SOLUTION INTRAVENOUS CONTINUOUS PRN
Status: DISCONTINUED | OUTPATIENT
Start: 2023-12-05 | End: 2023-12-06

## 2023-12-05 RX ORDER — SULFAMETHOXAZOLE AND TRIMETHOPRIM 400; 80 MG/1; MG/1
1 TABLET ORAL DAILY
Status: DISCONTINUED | OUTPATIENT
Start: 2023-12-05 | End: 2023-12-06

## 2023-12-05 RX ORDER — DEXTROSE MONOHYDRATE 25 G/50ML
25 INJECTION, SOLUTION INTRAVENOUS ONCE
Status: COMPLETED | OUTPATIENT
Start: 2023-12-05 | End: 2023-12-05

## 2023-12-05 RX ORDER — MAGNESIUM OXIDE 400 MG/1
400 TABLET ORAL
Status: DISCONTINUED | OUTPATIENT
Start: 2023-12-07 | End: 2023-12-08 | Stop reason: HOSPADM

## 2023-12-05 RX ORDER — FUROSEMIDE 10 MG/ML
80 INJECTION INTRAMUSCULAR; INTRAVENOUS ONCE
Status: COMPLETED | OUTPATIENT
Start: 2023-12-05 | End: 2023-12-05

## 2023-12-05 RX ORDER — ONDANSETRON 2 MG/ML
INJECTION INTRAMUSCULAR; INTRAVENOUS PRN
Status: DISCONTINUED | OUTPATIENT
Start: 2023-12-05 | End: 2023-12-05

## 2023-12-05 RX ORDER — BISACODYL 10 MG
10 SUPPOSITORY, RECTAL RECTAL DAILY PRN
Status: DISCONTINUED | OUTPATIENT
Start: 2023-12-05 | End: 2023-12-08 | Stop reason: HOSPADM

## 2023-12-05 RX ORDER — FUROSEMIDE 10 MG/ML
INJECTION INTRAMUSCULAR; INTRAVENOUS PRN
Status: DISCONTINUED | OUTPATIENT
Start: 2023-12-05 | End: 2023-12-05

## 2023-12-05 RX ORDER — HYDROMORPHONE HCL IN WATER/PF 6 MG/30 ML
0.4 PATIENT CONTROLLED ANALGESIA SYRINGE INTRAVENOUS EVERY 5 MIN PRN
Status: DISCONTINUED | OUTPATIENT
Start: 2023-12-05 | End: 2023-12-05 | Stop reason: HOSPADM

## 2023-12-05 RX ORDER — SODIUM CHLORIDE, SODIUM GLUCONATE, SODIUM ACETATE, POTASSIUM CHLORIDE AND MAGNESIUM CHLORIDE 526; 502; 368; 37; 30 MG/100ML; MG/100ML; MG/100ML; MG/100ML; MG/100ML
INJECTION, SOLUTION INTRAVENOUS CONTINUOUS PRN
Status: DISCONTINUED | OUTPATIENT
Start: 2023-12-05 | End: 2023-12-05

## 2023-12-05 RX ORDER — ATORVASTATIN CALCIUM 10 MG/1
10 TABLET, FILM COATED ORAL DAILY
Status: DISCONTINUED | OUTPATIENT
Start: 2023-12-06 | End: 2023-12-08 | Stop reason: HOSPADM

## 2023-12-05 RX ORDER — TACROLIMUS 1 MG/1
3 CAPSULE ORAL
Status: DISCONTINUED | OUTPATIENT
Start: 2023-12-06 | End: 2023-12-08

## 2023-12-05 RX ORDER — ACETAMINOPHEN 325 MG/1
975 TABLET ORAL EVERY 8 HOURS
Status: COMPLETED | OUTPATIENT
Start: 2023-12-05 | End: 2023-12-08

## 2023-12-05 RX ORDER — OXYCODONE HYDROCHLORIDE 10 MG/1
10 TABLET ORAL EVERY 4 HOURS PRN
Status: DISCONTINUED | OUTPATIENT
Start: 2023-12-05 | End: 2023-12-07

## 2023-12-05 RX ORDER — ACETAMINOPHEN 325 MG/1
650 TABLET ORAL EVERY 4 HOURS PRN
Status: DISCONTINUED | OUTPATIENT
Start: 2023-12-08 | End: 2023-12-08 | Stop reason: HOSPADM

## 2023-12-05 RX ORDER — AMOXICILLIN 250 MG
1 CAPSULE ORAL 2 TIMES DAILY
Status: DISCONTINUED | OUTPATIENT
Start: 2023-12-05 | End: 2023-12-08 | Stop reason: HOSPADM

## 2023-12-05 RX ORDER — LIDOCAINE HYDROCHLORIDE 20 MG/ML
INJECTION, SOLUTION INFILTRATION; PERINEURAL PRN
Status: DISCONTINUED | OUTPATIENT
Start: 2023-12-05 | End: 2023-12-05

## 2023-12-05 RX ORDER — SODIUM CHLORIDE 450 MG/100ML
INJECTION, SOLUTION INTRAVENOUS CONTINUOUS PRN
Status: DISCONTINUED | OUTPATIENT
Start: 2023-12-05 | End: 2023-12-06

## 2023-12-05 RX ORDER — POLYETHYLENE GLYCOL 3350 17 G/17G
17 POWDER, FOR SOLUTION ORAL DAILY
Status: DISCONTINUED | OUTPATIENT
Start: 2023-12-06 | End: 2023-12-08 | Stop reason: HOSPADM

## 2023-12-05 RX ORDER — DEXMEDETOMIDINE HYDROCHLORIDE 4 UG/ML
INJECTION, SOLUTION INTRAVENOUS PRN
Status: DISCONTINUED | OUTPATIENT
Start: 2023-12-05 | End: 2023-12-05

## 2023-12-05 RX ORDER — SODIUM CHLORIDE 450 MG/100ML
INJECTION, SOLUTION INTRAVENOUS CONTINUOUS PRN
Status: DISCONTINUED | OUTPATIENT
Start: 2023-12-05 | End: 2023-12-05 | Stop reason: HOSPADM

## 2023-12-05 RX ORDER — PROPOFOL 10 MG/ML
INJECTION, EMULSION INTRAVENOUS PRN
Status: DISCONTINUED | OUTPATIENT
Start: 2023-12-05 | End: 2023-12-05

## 2023-12-05 RX ORDER — HYDROMORPHONE HCL IN WATER/PF 6 MG/30 ML
0.2 PATIENT CONTROLLED ANALGESIA SYRINGE INTRAVENOUS EVERY 5 MIN PRN
Status: DISCONTINUED | OUTPATIENT
Start: 2023-12-05 | End: 2023-12-05 | Stop reason: HOSPADM

## 2023-12-05 RX ORDER — MYCOPHENOLATE MOFETIL 250 MG/1
1000 CAPSULE ORAL
Status: DISCONTINUED | OUTPATIENT
Start: 2023-12-05 | End: 2023-12-07

## 2023-12-05 RX ORDER — MAGNESIUM SULFATE 1 G/100ML
1 INJECTION INTRAVENOUS ONCE
Status: COMPLETED | OUTPATIENT
Start: 2023-12-05 | End: 2023-12-05

## 2023-12-05 RX ORDER — VALGANCICLOVIR 450 MG/1
450 TABLET, FILM COATED ORAL
Status: DISCONTINUED | OUTPATIENT
Start: 2023-12-07 | End: 2023-12-07

## 2023-12-05 RX ADMIN — FENTANYL CITRATE 100 MCG: 50 INJECTION INTRAMUSCULAR; INTRAVENOUS at 07:07

## 2023-12-05 RX ADMIN — MIDAZOLAM 1 MG: 1 INJECTION INTRAMUSCULAR; INTRAVENOUS at 06:57

## 2023-12-05 RX ADMIN — DEXMEDETOMIDINE 12 MCG: 100 INJECTION, SOLUTION, CONCENTRATE INTRAVENOUS at 12:22

## 2023-12-05 RX ADMIN — LIDOCAINE HYDROCHLORIDE 100 MG: 20 INJECTION, SOLUTION INFILTRATION; PERINEURAL at 07:06

## 2023-12-05 RX ADMIN — SODIUM CHLORIDE 1000 ML: 4.5 INJECTION, SOLUTION INTRAVENOUS at 22:00

## 2023-12-05 RX ADMIN — CEFUROXIME 1.5 G: 1.5 INJECTION, POWDER, FOR SOLUTION INTRAVENOUS at 11:22

## 2023-12-05 RX ADMIN — Medication 10 MG: at 14:08

## 2023-12-05 RX ADMIN — Medication 20 MG: at 09:17

## 2023-12-05 RX ADMIN — ACETAMINOPHEN 975 MG: 325 TABLET, FILM COATED ORAL at 06:23

## 2023-12-05 RX ADMIN — SUGAMMADEX 200 MG: 100 INJECTION, SOLUTION INTRAVENOUS at 12:12

## 2023-12-05 RX ADMIN — SODIUM CHLORIDE 10 UNITS: 9 INJECTION, SOLUTION INTRAVENOUS at 15:55

## 2023-12-05 RX ADMIN — CARVEDILOL 50 MG: 25 TABLET, FILM COATED ORAL at 18:52

## 2023-12-05 RX ADMIN — MYCOPHENOLATE MOFETIL 1000 MG: 250 CAPSULE ORAL at 19:04

## 2023-12-05 RX ADMIN — HYDROMORPHONE HYDROCHLORIDE 0.4 MG: 0.2 INJECTION, SOLUTION INTRAMUSCULAR; INTRAVENOUS; SUBCUTANEOUS at 13:39

## 2023-12-05 RX ADMIN — FUROSEMIDE 80 MG: 10 INJECTION, SOLUTION INTRAMUSCULAR; INTRAVENOUS at 16:00

## 2023-12-05 RX ADMIN — ACETAMINOPHEN 975 MG: 325 TABLET, FILM COATED ORAL at 19:56

## 2023-12-05 RX ADMIN — OXYCODONE HYDROCHLORIDE 5 MG: 5 TABLET ORAL at 22:11

## 2023-12-05 RX ADMIN — Medication 30 MG: at 08:33

## 2023-12-05 RX ADMIN — SODIUM CHLORIDE 1000 ML: 9 INJECTION, SOLUTION INTRAVENOUS at 21:04

## 2023-12-05 RX ADMIN — Medication 10 MG: at 13:44

## 2023-12-05 RX ADMIN — Medication 30 MG: at 07:27

## 2023-12-05 RX ADMIN — HEPARIN SODIUM 1000 UNITS: 1000 INJECTION INTRAVENOUS; SUBCUTANEOUS at 10:01

## 2023-12-05 RX ADMIN — HYDROMORPHONE HYDROCHLORIDE 0.4 MG: 0.2 INJECTION, SOLUTION INTRAMUSCULAR; INTRAVENOUS; SUBCUTANEOUS at 13:14

## 2023-12-05 RX ADMIN — HYDROMORPHONE HYDROCHLORIDE 0.4 MG: 0.2 INJECTION, SOLUTION INTRAMUSCULAR; INTRAVENOUS; SUBCUTANEOUS at 12:51

## 2023-12-05 RX ADMIN — ANTI-THYMOCYTE GLOBULIN (RABBIT) 150 MG: 5 INJECTION, POWDER, LYOPHILIZED, FOR SOLUTION INTRAVENOUS at 07:49

## 2023-12-05 RX ADMIN — FENTANYL CITRATE 50 MCG: 50 INJECTION INTRAMUSCULAR; INTRAVENOUS at 09:05

## 2023-12-05 RX ADMIN — Medication 50 MG: at 07:07

## 2023-12-05 RX ADMIN — SODIUM CHLORIDE 1000 ML: 9 INJECTION, SOLUTION INTRAVENOUS at 17:50

## 2023-12-05 RX ADMIN — ONDANSETRON 4 MG: 2 INJECTION INTRAMUSCULAR; INTRAVENOUS at 12:00

## 2023-12-05 RX ADMIN — DEXTROSE MONOHYDRATE 25 G: 25 INJECTION, SOLUTION INTRAVENOUS at 15:54

## 2023-12-05 RX ADMIN — OXYCODONE HYDROCHLORIDE 5 MG: 5 TABLET ORAL at 21:21

## 2023-12-05 RX ADMIN — SODIUM CHLORIDE 500 MG: 9 INJECTION, SOLUTION INTRAVENOUS at 07:31

## 2023-12-05 RX ADMIN — CEFUROXIME 1.5 G: 1.5 INJECTION, POWDER, FOR SOLUTION INTRAVENOUS at 07:26

## 2023-12-05 RX ADMIN — HYDROMORPHONE HYDROCHLORIDE 0.4 MG: 0.2 INJECTION, SOLUTION INTRAMUSCULAR; INTRAVENOUS; SUBCUTANEOUS at 13:28

## 2023-12-05 RX ADMIN — DEXTROSE AND SODIUM CHLORIDE: 5; 900 INJECTION, SOLUTION INTRAVENOUS at 13:12

## 2023-12-05 RX ADMIN — DEXMEDETOMIDINE 16 MCG: 100 INJECTION, SOLUTION, CONCENTRATE INTRAVENOUS at 12:04

## 2023-12-05 RX ADMIN — DEXAMETHASONE SODIUM PHOSPHATE 4 MG: 4 INJECTION, SOLUTION INTRA-ARTICULAR; INTRALESIONAL; INTRAMUSCULAR; INTRAVENOUS; SOFT TISSUE at 07:34

## 2023-12-05 RX ADMIN — CALCIUM GLUCONATE 1 G: 20 INJECTION, SOLUTION INTRAVENOUS at 16:06

## 2023-12-05 RX ADMIN — SODIUM CHLORIDE, SODIUM GLUCONATE, SODIUM ACETATE, POTASSIUM CHLORIDE AND MAGNESIUM CHLORIDE: 526; 502; 368; 37; 30 INJECTION, SOLUTION INTRAVENOUS at 06:57

## 2023-12-05 RX ADMIN — HYDROMORPHONE HYDROCHLORIDE 0.4 MG: 0.2 INJECTION, SOLUTION INTRAMUSCULAR; INTRAVENOUS; SUBCUTANEOUS at 23:44

## 2023-12-05 RX ADMIN — SODIUM CHLORIDE 1000 ML: 4.5 INJECTION, SOLUTION INTRAVENOUS at 17:51

## 2023-12-05 RX ADMIN — DEXTROSE MONOHYDRATE 300 ML: 100 INJECTION, SOLUTION INTRAVENOUS at 15:55

## 2023-12-05 RX ADMIN — Medication 30 MG: at 10:10

## 2023-12-05 RX ADMIN — FENTANYL CITRATE 100 MCG: 50 INJECTION INTRAMUSCULAR; INTRAVENOUS at 08:15

## 2023-12-05 RX ADMIN — SENNOSIDES AND DOCUSATE SODIUM 1 TABLET: 8.6; 5 TABLET ORAL at 19:57

## 2023-12-05 RX ADMIN — Medication 20 MG: at 08:01

## 2023-12-05 RX ADMIN — DEXTROSE AND SODIUM CHLORIDE: 5; 900 INJECTION, SOLUTION INTRAVENOUS at 17:48

## 2023-12-05 RX ADMIN — FENTANYL CITRATE 50 MCG: 50 INJECTION, SOLUTION INTRAMUSCULAR; INTRAVENOUS at 12:49

## 2023-12-05 RX ADMIN — PROPOFOL 200 MG: 10 INJECTION, EMULSION INTRAVENOUS at 07:06

## 2023-12-05 RX ADMIN — FENTANYL CITRATE 50 MCG: 50 INJECTION INTRAMUSCULAR; INTRAVENOUS at 09:57

## 2023-12-05 RX ADMIN — MANNITOL 50 G: 20 INJECTION, SOLUTION INTRAVENOUS at 10:41

## 2023-12-05 RX ADMIN — SODIUM CHLORIDE 1000 ML: 9 INJECTION, SOLUTION INTRAVENOUS at 13:15

## 2023-12-05 RX ADMIN — HYDROMORPHONE HYDROCHLORIDE 0.5 MG: 1 INJECTION, SOLUTION INTRAMUSCULAR; INTRAVENOUS; SUBCUTANEOUS at 12:07

## 2023-12-05 RX ADMIN — Medication 10 MG: at 13:56

## 2023-12-05 RX ADMIN — FUROSEMIDE 80 MG: 10 INJECTION, SOLUTION INTRAVENOUS at 10:38

## 2023-12-05 RX ADMIN — DEXMEDETOMIDINE 12 MCG: 100 INJECTION, SOLUTION, CONCENTRATE INTRAVENOUS at 12:15

## 2023-12-05 RX ADMIN — FENTANYL CITRATE 50 MCG: 50 INJECTION INTRAMUSCULAR; INTRAVENOUS at 08:50

## 2023-12-05 RX ADMIN — HYDROMORPHONE HYDROCHLORIDE 0.2 MG: 0.2 INJECTION, SOLUTION INTRAMUSCULAR; INTRAVENOUS; SUBCUTANEOUS at 20:26

## 2023-12-05 RX ADMIN — SODIUM BICARBONATE 50 MEQ: 84 INJECTION INTRAVENOUS at 15:54

## 2023-12-05 RX ADMIN — FENTANYL CITRATE 50 MCG: 50 INJECTION, SOLUTION INTRAMUSCULAR; INTRAVENOUS at 12:43

## 2023-12-05 RX ADMIN — MAGNESIUM SULFATE IN DEXTROSE 1 G: 10 INJECTION, SOLUTION INTRAVENOUS at 21:15

## 2023-12-05 RX ADMIN — MIDAZOLAM 1 MG: 1 INJECTION INTRAMUSCULAR; INTRAVENOUS at 07:05

## 2023-12-05 RX ADMIN — FENTANYL CITRATE 50 MCG: 50 INJECTION INTRAMUSCULAR; INTRAVENOUS at 10:50

## 2023-12-05 RX ADMIN — HYDROMORPHONE HYDROCHLORIDE 0.2 MG: 0.2 INJECTION, SOLUTION INTRAMUSCULAR; INTRAVENOUS; SUBCUTANEOUS at 18:15

## 2023-12-05 RX ADMIN — MYCOPHENOLATE MOFETIL 1000 MG: 500 INJECTION, POWDER, LYOPHILIZED, FOR SOLUTION INTRAVENOUS at 07:47

## 2023-12-05 RX ADMIN — SULFAMETHOXAZOLE AND TRIMETHOPRIM 1 TABLET: 400; 80 TABLET ORAL at 19:57

## 2023-12-05 RX ADMIN — HYDROMORPHONE HYDROCHLORIDE 0.4 MG: 0.2 INJECTION, SOLUTION INTRAMUSCULAR; INTRAVENOUS; SUBCUTANEOUS at 13:03

## 2023-12-05 ASSESSMENT — ACTIVITIES OF DAILY LIVING (ADL)
ADLS_ACUITY_SCORE: 18
ADLS_ACUITY_SCORE: 18
ADLS_ACUITY_SCORE: 23
ADLS_ACUITY_SCORE: 18
ADLS_ACUITY_SCORE: 23
ADLS_ACUITY_SCORE: 18

## 2023-12-05 NOTE — ANESTHESIA PROCEDURE NOTES
Central Line/PA Catheter Placement    Pre-Procedure   Staff -        Anesthesiologist:  Contreras Suh MD       Resident/Fellow: Kevin Das MD       CRNA: Kallei Santiago APRN CRNA       Performed By: resident       Location: OR       Pre-Anesthestic Checklist: patient identified, IV checked, site marked, risks and benefits discussed, informed consent, monitors and equipment checked, pre-op evaluation and at physician/surgeon's request  Timeout:       Correct Patient: Yes        Correct Procedure: Yes        Correct Site: Yes        Correct Position: Yes        Correct Laterality: Yes   Line Placement:   This line was placed Post Induction    Procedure   Procedure: central line       Diagnosis: Intraoperative pressors and monitoring       Laterality: left       Insertion Site: internal jugular.       Patient Position: Trendelenburg  Sterile Prep        All elements of maximal sterile barrier technique followed       Patient Prep/Sterile Barriers: draped, hand hygiene, gloves , hat , mask , draped, gown, sterile gel and probe cover       Skin prep: Chloraprep  Insertion/Injection        Technique: ultrasound guided and Seldinger Technique        1. Ultrasound was used to evaluate the access site.       2. Vein evaluated via ultrasound for patency/adequacy.       3. Using real-time ultrasound the needle/catheter was observed entering the artery/vein.       5. The visualized structures were anatomically normal.       6. There were no apparent abnormal pathologic findings.       Type: CVC       Catheter Size: 7 Fr       Catheter Length: 20       Number of Lumens: triple lumen  Narrative         Secured by: suture       Tegaderm and Biopatch dressing used.       Complications: None apparent,        blood aspirated from all lumens,        All lumens flushed: Yes       Verification method: Placement to be verified post-op

## 2023-12-05 NOTE — ANESTHESIA POSTPROCEDURE EVALUATION
Patient: Taylor Valiente    Procedure: Procedure(s):  Transplant kidney recipient  donor, ureteral stent placement       Anesthesia Type:  General    Note:  Disposition: Inpatient   Postop Pain Control: Uneventful            Sign Out: ONGOING pain issues   PONV: No   Neuro/Psych: Uneventful            Sign Out: Acceptable/Baseline neuro status   Airway/Respiratory: Uneventful            Sign Out: Acceptable/Baseline resp. status; O2 supplementation               Oxygen: Nasal Cannula   CV/Hemodynamics: Uneventful            Sign Out: Acceptable CV status; No obvious hypovolemia; No obvious fluid overload   Other NRE: NONE   DID A NON-ROUTINE EVENT OCCUR? No           Last vitals:  Vitals Value Taken Time   /81 23 1415   Temp 36.1  C (97  F) 23 1400   Pulse 80 23 1417   Resp 12 23 1400   SpO2 98 % 23 1417   Vitals shown include unfiled device data.    Electronically Signed By: Kevin Das MD  2023  2:18 PM

## 2023-12-05 NOTE — OP NOTE
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 Left kidney transplant,  Donation after Brain Death, Right  iliac fossa, without vascular reconstruction. A J-J ureteral stent was placed.  2. Kidney allograft preparation on Back Table      Surgeon:  TIMUR HORTON    Fellow/Assistant:  Trino Cutler    Anesthesia:  General    Specimen:  None    Drains:  Chicho-Epps drain    Urine output:  no    Estimated blood loss:  50ml     Indication: The patient has End Stage renal failure due to focal segmental glomerulosclerosis (FSGS) and received an organ offer for a Donation after Brain Death kidney allograft. After discussing the risks and benefits of proceeding, the patient agreed to proceed with surgery and provided informed consent.  Findings: Integrity of recipient artery: good  Intraoperative Events: none    Final ABO/Crossmatch verification: After the donor organ arrived to the operating room and prior to anastomosis, I participated in the transplant pre-verification upon organ receipt timeout by visually verifying the donor ID, organ and laterality, donor blood type, recipient unique identifier, recipient blood type, and that the donor and recipient are blood type compatible. The crossmatch was done prospectively; the T cell flow crossmatch result was negative and B cell flow crossmatch result was negative prior to anastomosis.  The patient received Thymoglobulin, Cellcept, and Solumedrol on induction.    Donor Organ Information:   Donor UNOS ID:  [id number]    Donor arterial clamp on:  12/4/2023  2:22 PM    Total ischemic time:  1222 min    Cold ischemic time:  1,180 min    Warm ischemic time:  42 min     Back Table Details:   Procedure:  Bench preparation of the kidney allograft for transplantation without vascular reconstruction    Surgeon:   TIMUR HORTON    Faculty Co-Surgeon:  TIMUR HORTON    Fellow/Assistant:  He    Donor arrival to recipient room:  12/5/2023  7:30 AM    Graft injury:  Capsule tear    Graft biopsy:  N/a    Organ received on:  Ice    Pump resistance:  N/a    Pump flow:  N/a    Arterial anatomy:  3 arteries, 2 superior on a single cuff. 1 inferior    Donor arterial quality:  good    Venous anatomy:  single    Ureteral anatomy:  single    Any reconstruction:  no    Artery:  no    Vein:  no     Complications: None.      Back Table Preparation:  The donor kidney was received and inspected. It had been flushed with UW. The graft was prepared on the back table by removing perinephric fat and ligating venous tributaries and lymphatics. The ureter was also cleaned of excess tissue. If required, reconstruction was performed as detailed above. The kidney was stored in iced cold preservation solution until ready for transplantation. Faculty was present for the critical portions of the procedure.    Operative Procedure:   Arterial anastomosis start:  12/5/2023 10:02 AM    Arterial unclamp:  12/5/2023 10:44 AM    Extra vessels used:   no      The patient was brought to the operating room, placed in a supine position, and a time out was performed. Sequential compression devices were placed on both lower extremities and general endotracheal anesthesia was induced.  The patient was given IV antibiotics and a Hurt catheter. A central line was placed by Anesthesia service. The abdomen was then shaved, prepped, and draped in the usual sterile fashion.  An incision was made in the left lower quadrant and carried down through the subcutaneous tissue and the abdominal wall fascia. If encountered, the epigastric vessels were ligated in continuity, divided and secured with surgical clips. The right iliac artery and vein were exposed. The retractor system was placed and the lymphatics overlying the vessels were serially ligated and divided.      The patient was heparinized. We applied atraumatic vascular clamps and the donor kidney was brought to the operative field. We made a venotomy and the renal vein was anastomosed to the recipient right iliac vein in an end-to-side fashion. For the superior 2 arteries on a single cuff, an arteriotomy was made and the donor renal artery was anastomosed to the recipient right external iliac artery  in an end to side fashion. For the other artery, an anasotmosis was performed in a similiary fashion. The patient was simultaneously loaded with IV mannitol, Lasix and volume. The renal artery was protected and the clamps were removed. After several cardiac cycles, we opened the renal artery and the kidney was reperfused and was firm and pink .    The transplant ureter was managed by creating a liche  anastomosis with absorbable suture. A stent was placed across the anastomosis. The kidney did not make urine prior to implantation.    Hemostasis was obtained, the anastomoses inspected, and the kidney placed in the iliac fossa. After placement, the vessel lay was inspected and found to be acceptable. The kidney position was right retroperitoneal. The field was irrigated with antibiotic solution. A drain was placed. The retractor was removed and the abdominal wall fascia reapproximated. Subcutaneous tissues were irrigated and hemostasis obtained.  The skin was reapproximated with staples and a dry dressing was applied.   All needle, sponge and instrument counts were correct x 2. The patient was awakened, extubated, and transferred to PACU for post-op monitoring. Faculty was present for key portions of the procedure.    I was present during the key portions of the procedure, and I was immediately available for the entire procedure. There was no qualified resident available to assist with the entire procedure. The fellow noted above participated as the first assistant in all parts of the dictated procedure and was the primary in  the opening and closure with assistance from me as needed.

## 2023-12-05 NOTE — PROGRESS NOTES
Admitted/transferred from: home   Time of arrival on unit 2300   2 RN full  skin assessment completed by Jade ROSE, and Kasia NELSON   Skin assessment finding: scarring from old dialysis line on Right chest, Left AVF. Dry skin. No other skin issues  Interventions/actions: no interventions at this time     Will continue to monitor.

## 2023-12-05 NOTE — ANESTHESIA PREPROCEDURE EVALUATION
Anesthesia Pre-Procedure Evaluation    Patient: Taylor Valiente   MRN: [id number] : 1995        Procedure : Procedure(s):  Transplant kidney recipient  donor          Past Medical History:   Diagnosis Date    Adrenal adenoma, left     Anemia     Benign essential hypertension 2017    Bursitis of left shoulder 2023    CKD (chronic kidney disease) stage 5, GFR less than 15 ml/min (H)     FSGS    Depression     Focal glomerular sclerosis 2017    HLD (hyperlipidemia)     LVH (left ventricular hypertrophy) due to hypertensive disease 2017    Noncompliance     Obesity, unspecified     OD (osteochondritis dissecans) 2021    Stress-induced cardiomyopathy     Suicide attempt (H) 2019      Past Surgical History:   Procedure Laterality Date    ARTHROSCOPY ANKLE Left 2021    Procedure: Left ankle arthroscopy and debridement/micro fracture;  Surgeon: Mirza Nelson MD;  [facility]    [facility]    CREATE FISTULA ARTERIOVENOUS UPPER EXTREMITY Right 2021    Procedure: RIGHT proximal radial  to CEPHALIC ARTERIOVENOUS FISTULA;  Surgeon: Henrik Moran MD;  Location:  OR    CREATE FISTULA ARTERIOVENOUS UPPER EXTREMITY Left 2023    Procedure: CREATION OF FIRST STAGE LEFT BRACHIOBASILIC ARTERIOVENOUS FISTULA;  Surgeon: Henrik Moran MD;  Location: [facility] OR    CREATE FISTULA ARTERIOVENOUS UPPER EXTREMITY Left 2023    Procedure: SECOND STAGE LEFT BRACHIAL TO BASILIC ARTERIOVENOUS FISTULA;  Surgeon: Henrik Moran MD;  Location: [facility] OR    IR CVC TUNNEL PLACEMENT > 5 YRS OF AGE  2021    IR CVC TUNNEL PLACEMENT > 5 YRS OF AGE  2023    IR CVC TUNNEL REMOVAL RIGHT  2021    IR CVC TUNNEL REMOVAL RIGHT  2023    IRRIGATION AND DEBRIDEMENT UPPER EXTREMITY, COMBINED Left 2023    Procedure: EVACUATION OF LEFT ARM HEMATOMA;  Surgeon: Henrik Moran MD;  Location: [facility]    LIGATE FISTULA  ARTERIOVENOUS UPPER EXTREMITY Right 03/09/2023    Procedure: LIGATION OF RIGHT ARM ARTERIOVENOUS FISTULA;  Surgeon: Henrik Moran MD;  Location: [facility]    REVISION FISTULA ARTERIOVENOUS UPPER EXTREMITY Right 08/26/2021    Procedure: Second stage RIGHT BRACHIOCEPHALIC transposition ARTERIOVENOUS FISTULA;  Surgeon: Henrik Moran MD;  Location: [facility] OR      Allergies   Allergen Reactions    Benadryl [Diphenhydramine] Itching    Vancomycin Itching      Social History     Tobacco Use    Smoking status: Never    Smokeless tobacco: Never   Substance Use Topics    Alcohol use: No      Wt Readings from Last 1 Encounters:   08/02/23 100.2 kg (221 lb)        Anesthesia Evaluation   Pt has had prior anesthetic. Type: General.    No history of anesthetic complications (Prior anesthesia with SGA and most recently ETT with G1V,  VL with glidescope 4 blade)       ROS/MED HX  ENT/Pulmonary:  - neg pulmonary ROS     Neurologic:  - neg neurologic ROS     Cardiovascular: Comment: LV Hypertrophy  Stress induced CM    (+) Dyslipidemia hypertension- -   -  - -                                 Previous cardiac testing   Echo: Date: Results:    Stress Test:  Date: 6/21/23 Results:     The nuclear stress test is negative for inducible myocardial ischemia or infarction.     LVEDv 246ml. LVESv 124ml. LV EF 50%.     There is no prior study for comparison.    ECG Reviewed:  Date: 7/27/23 Results:  NSR  Non-specific T-wave abnormality   Qt interval - 466  Cath:  Date: Results:      METS/Exercise Tolerance:     Hematologic: Comments: Had a prior right IJ tunneled dialysis catheter and developed occlusive thrombus of his right internal jugular vein now s/p anticoagulation with Eliquis.    (+) History of blood clots,     anemia,          Musculoskeletal: Comment: L shoulder pain      GI/Hepatic:  - neg GI/hepatic ROS     Renal/Genitourinary: Comment: Hyperkalemia    (+) renal disease, type: ESRD, Pt requires dialysis, type: Hemodialysis,           Endo:     (+)               Obesity,       Psychiatric/Substance Use:     (+)     Recreational drug usage: Cannabis.    Infectious Disease:       Malignancy:       Other:            Physical Exam    Airway        Mallampati: II   TM distance: > 3 FB   Neck ROM: full   Mouth opening: > 3 cm    Respiratory Devices and Support         Dental       (+) Minor Abnormalities - some fillings, tiny chips      Cardiovascular          Rhythm and rate: regular     Pulmonary   pulmonary exam normal                OUTSIDE LABS:  CBC:   Lab Results   Component Value Date    WBC 9.9 08/02/2023    WBC 9.7 07/30/2023    HGB 10.3 (L) 08/02/2023    HGB 10.8 (L) 07/30/2023    HCT 34.0 (L) 08/02/2023    HCT 35.9 (L) 07/30/2023     08/02/2023     07/30/2023     BMP:   Lab Results   Component Value Date     (L) 08/02/2023     07/29/2023    POTASSIUM 5.4 (H) 08/02/2023    POTASSIUM 4.4 07/29/2023    CHLORIDE 95 (L) 08/02/2023    CHLORIDE 97 (L) 07/29/2023    CO2 21 (L) 08/02/2023    CO2 27 07/29/2023    BUN 59.2 (H) 08/02/2023    BUN 33.0 (H) 07/29/2023    CR 13.84 (H) 08/02/2023    CR 8.93 (H) 07/29/2023     (H) 08/02/2023     (H) 07/30/2023     COAGS:   Lab Results   Component Value Date    PTT 47 (H) 03/18/2023    INR 1.40 (H) 03/15/2023     POC:   Lab Results   Component Value Date     (H) 02/24/2021     HEPATIC:   Lab Results   Component Value Date    ALBUMIN 3.3 (L) 08/02/2023    PROTTOTAL 7.5 07/27/2023    ALT 8 07/27/2023    AST 8 07/27/2023    ALKPHOS 77 07/27/2023    BILITOTAL 0.3 07/27/2023     OTHER:   Lab Results   Component Value Date    LACT 1.8 07/28/2023    A1C 4.4 05/30/2023    BUBBA 8.7 08/02/2023    PHOS 5.2 (H) 08/02/2023    MAG 2.2 07/27/2023    LIPASE 304 11/29/2021    TSH 1.67 03/03/2022    T4 1.26 07/15/2017    CRP 7.9 11/29/2020    SED 63 (H) 08/02/2023       Anesthesia Plan    ASA Status:  3       Anesthesia Type: General.     - Airway: ETT   Induction:  Intravenous, Propofol.   Maintenance: Balanced.   Techniques and Equipment:     - Lines/Monitors: 2nd IV, Central Line, CVP, BIS     - Blood: T&S     Consents    Anesthesia Plan(s) and associated risks, benefits, and realistic alternatives discussed. Questions answered and patient/representative(s) expressed understanding.     - Discussed:     - Discussed with:  Patient      - Extended Intubation/Ventilatory Support Discussed: No.      - Patient is DNR/DNI Status: No          Postoperative Care    Pain management: IV analgesics, Oral pain medications, Multi-modal analgesia.   PONV prophylaxis: Ondansetron (or other 5HT-3), Dexamethasone or Solumedrol     Comments:               LANCE PHILLIPS MD    I have reviewed the pertinent notes and labs in the chart from the past 30 days and (re)examined the patient.  Any updates or changes from those notes are reflected in this note.

## 2023-12-05 NOTE — ANESTHESIA PROCEDURE NOTES
Airway       Patient location during procedure: OR       Procedure Start/Stop Times: 12/5/2023 7:10 AM  Staff -        CRNA: Kallie Santiago APRN CRNA       Performed By: CRNA  Consent for Airway        Urgency: elective  Indications and Patient Condition       Indications for airway management: stephanie-procedural       Induction type:intravenous       Mask difficulty assessment: 2 - vent by mask + OA or adjuvant +/- NMBA    Final Airway Details       Final airway type: endotracheal airway       Successful airway: ETT - single and Oral  Endotracheal Airway Details        ETT size (mm): 8.0       Cuffed: yes       Successful intubation technique: direct laryngoscopy       DL Blade Type: Gentile 2       Grade View of Cords: 1       Adjucts: stylet       Position: Right       Measured from: gums/teeth       Secured at (cm): 25    Post intubation assessment        Placement verified by: capnometry, equal breath sounds and chest rise        Number of attempts at approach: 1       Secured with: tape       Ease of procedure: easy       Dentition: Intact and Unchanged    Medication(s) Administered   Medication Administration Time: 12/5/2023 7:10 AM

## 2023-12-05 NOTE — TELEPHONE ENCOUNTER
"TRANSPLANT OR REPORT    Organ: Kidney  Laterality (if known): Left  Organ Location: Import    UNOS ID: [id number]  Donor OR Time: 1300  Expected/Actual Cross Clamp Time: 1400  Expected Organ Arrival Time: 2200    Surgeon: Dr. Jimenez  Time in OR: 0630 12/5  Time in 3C (N/A for EMELY): 0530 12/5    Recipient Details  Admission ETA: 2100  Unit: 7A  Isolation: None  Latex Allergy: No  : N/A  Diagnosis: ESRD    Liver Transplants  Bypass/Perfusion: N/A  Cellsaver: N/A  Hemodialysis: N/A  ~ \"RENAL STAFF TEACHING SERVICE MEDICINE\" : Remind EMELY Fellow to discuss with nephrology on call.  ~ CRRT Resource Nurse: N/A  (Telephone Number for CRRT [phone]. When prompted, the caller should say  CRRT Resource 1\")    Kidney/Panc Transplants  XM Status (Need to wait for XM?): Already complete    Liver or KP/PA Recipients - Vessel Banking:  Donor has positive serologies for HIV/HCV/HBV: N/A  Donor has risk criteria for HIV/HCV/HBV: N/A      Transplant Coordinator Contact Info: Amy [phone] until [address]      Vessel Bank Information  Transplant hospitals must not store a donor s extra vessels if the donor has tested positive for any of the following:   - HIV by antibody, antigen, or nucleic acid test (LILIYA)   - Hepatitis B surface antigen (HBsAg)   - Hepatitis B (HBV) by LILIYA   - Hepatitis C (HCV) by antibody or LILIYA     Extra vessels from donors that do not test positive for HIV, HBV, or HCV as above may be stored    "

## 2023-12-05 NOTE — PLAN OF CARE
VS: BP (!) 143/81 (Cuff Size: Adult Regular)   Pulse 70   Temp 97.5  F (36.4  C) (Oral)   Resp 16   Wt 105.1 kg (231 lb 12.8 oz)   SpO2 100%   BMI 33.26 kg/m      Cares: 6842 - 0505    Neuro: Aox4   VS: VSS    Cardiac: WDL   Respiratory: WDL on RA   GI/: oliguric (pt on HD)   Skin: intact   Diet: NPO   Labs: creatinine: 11.60, EKG - NSR, CXR: unremarkable  LDA: R PIV SL, Left AVF  Mobility: ind.   Pain/Nausea: denies pain or nausea    PRN medications: none given   Plan of Care: pt was sent to the OR to get DDKT

## 2023-12-05 NOTE — TELEPHONE ENCOUNTER
Organ Offer Encounter Information    Organ Offer Information  Organ offer date & time: 12/3/2023  1:06 PM  Coordinator/Fellow/Attending name: Damián Barnes RN   Organ(s):  Organ UNOS ID Match Run ID Comment Organ Laterality   Kidney [id number] WIUW, back up         Recent infections?: No      New medications?: No Recent pregnancy?: No     Angicoagulation medications?: No Recent vaccinations?: Yes (Comment: flu shot --not sure when.)     Recent blood transfusions?: No Recent hospitalizations?: No   Has your insurance changed in the last 6-12 months?: Neg    Patient last dialyzed: 12/4/2023  7:00 AM  Dialysis type: Hemo  Discussed organ offer with: Patient  Patient/Caregiver name: Taylor  Patient/Other asked to speak to a surgeon?: No  Discussed program-specific outcomes: Asked questions regarding SRTR, verbalized understanding  Right to decline organ offer without penalty, Patient/Other: Aware of option to decline without penalty  Organ offer decision status Patient/Other: Accepted Offer  Organ disposition: Transplanted  Additional Comments: 12/3/2023 4:36 PM  Kidney: import   MD: Ambrocio  OPO Contact: Alma Delia [phone]  VXM Results: VXM compatible with no DSA per Dr Velasco.   XM Plan (FXM must be done with serum no older than 10 days from transplant): FXM blood requested from OPO ETA 1730.   Is this an ABO A2 donor to ABO B Recipient: NO  Plan (Admission, NPO, Donor OR): Called pt with a back up DCD kidney offer. DCD donation criteria explained and pt verbalized understanding. Pt stated he will accept offer if he becomes primary. Pt advised to drop off FXM blood at [facility]. Pt's ETA 1530. FXM order placed. Scour Prevention Thais requested donor blood from [facility]. Donor blood ETA 1730. [facility] Nicola, Immunology Sarina, and Dr Bergman notified.   - - -   COVID Screening  In the past month, have you:  Or anyone close to you had a positive COVID test or suspected to have COVID: no  Had any  COVID symptoms (Fever, Cough, Short of Breath, Loss of Taste/Smell, Rash): no    Damián Barnes RN on 12/3/2023 at 4:48 PM  _ _ _       12/3/2023 8:44 PM:   Called Davidam, introduced myself and provided my contact information if he were to have any questions. FXM currently in process, I will call him if it come back + and case is cancelled. Otherwise, coordinator coming on at 0700 will call him to introduce themselves/provide contact information. Per Dr Bergman, NPO @ 0900, patient aware. No further questions at this time. Donor progressed to brain death, lists re-run and pt is in the same rank.  Vesna Presley RN  Transplant Coordinator    12/3/2023 10:10 PM:   FXM negative. Dr Bergman updated.   Vesna Presley RN  Transplant Coordinator    12/4/2023 7:01 AM:   Called patient to give him my contact information, he is currently in dialysis. Plan to have him NPO after lunch as donor OR is at 1300 and at a 4hr drive away. Told him FXM was negative and he is still ranked #3 and is first backup. Told him I won't likely have an update until around 0490-5201 but he should call/text anytime with questions. He verbalized understanding.   Amy Chaney RN  Transplant Coordinator    12/4/2023 4:30 PM:   Both Kidneys were placed ahead, notified Dr. Bergman, called Leesalizabetham and let him know we won't be able to move forward with these kidneys and he may resume normal life, he has no questions at this time. Notified OUC @ [facility] via text.   Amy Chaney RN  Transplant Coordinator    12/4/2023 6:23 PM:   Became primary for the left kidney @ 1755 after previously accepting center declined d/t a vasculature/recipient issue. Notified Dr. Bergman. He would like to accept and get kidney driven here for 0630 OR in the morning.   Amy Chaney RN  Transplant Coordinator    Admissions: 1838 - [facility], ETA 2100  Unit: 1825 - [facility], no bed available  Update Provider Entering Orders (XM Plan & COVID Testing):  Diallo GAFFNEY,  MARI [ Msg Id [id number] ] @ 1840  Immunology: FXM already done, notified Sarina  Inpatient Lab (COVID Testing [phone], Option 2): Will order STAT upon admission  Book OR: 1840 - Megan, booked for 0630  Vessel Storage Confirmation (PA/TAMIKO/EMELY): N/A  Blood Bank: [facility], ETA 2200  TransNet/ABO Verification: Printed @ 1835, confirmed w Megan  Add Organ: Left Kidney Added @ 1825    Attestation I have discussed all of the above with the Patient/Legal Guardian/Caregiver regarding this organ offer.: Yes  Coordinator/Fellow/Attending name: Damián Barnes RN

## 2023-12-05 NOTE — ANESTHESIA CARE TRANSFER NOTE
Patient: Taylor Valiente    Procedure: Procedure(s):  Transplant kidney recipient  donor, ureteral stent placement       Diagnosis: End stage renal disease (H) [N18.6]  Diagnosis Additional Information: No value filed.    Anesthesia Type:   General     Note:    Oropharynx: oropharynx clear of all foreign objects and spontaneously breathing  Level of Consciousness: awake  Oxygen Supplementation: face mask  Level of Supplemental Oxygen (L/min / FiO2): 6  Independent Airway: airway patency satisfactory and stable  Dentition: dentition unchanged  Vital Signs Stable: post-procedure vital signs reviewed and stable  Report to RN Given: handoff report given  Patient transferred to: PACU    Handoff Report: Identifed the Patient, Identified the Reponsible Provider, Reviewed the pertinent medical history, Discussed the surgical course, Reviewed Intra-OP anesthesia mangement and issues during anesthesia, Set expectations for post-procedure period and Allowed opportunity for questions and acknowledgement of understanding    Vitals:  Vitals Value Taken Time   /75 23 1230   Temp     Pulse 79 23 1235   Resp 12 23 1235   SpO2 100 % 23 1235   Vitals shown include unfiled device data.    Electronically Signed By: ROSA Lester CRNA  2023  12:36 PM

## 2023-12-05 NOTE — PROGRESS NOTES
Transplant Surgery Progress Note  Post Op Check    2023    Taylor Valiente is a 28 year old male POD#0 s/p Procedure(s):  Transplant kidney recipient  donor, ureteral stent placement for Pre-Op Diagnosis Codes:     * End stage renal disease (H) [N18.6]    Pt reports their pain is controlled with current regimen. Denies nausea, SOB, chest pain, or dizziness. Patient is not passing flatus or having bowel movements and Is voiding via urinary catheter.     BP (!) 140/77   Pulse 83   Temp 97  F (36.1  C)   Resp 15   Wt 105.1 kg (231 lb 12.8 oz)   SpO2 100%   BMI 33.26 kg/m      Gen: A&O x4, NAD   Chest: breathing non-labored on room air  Abdomen: soft, appropriately-tender near surgical site, non-distended, x1 CAMDEN with moderate sanguinous output  Incision: clean, dry, intact covered by dressings with moderate strikethrough  Extremities: warm and well perfused  Devices: x1 CAMDEN with moderate sanguinous output    A/P: Potassium from PACU BMP resulting at 6.1. EKG without hyperkalemic changes. Shifted.  - 1 g Calcium administered for cardioprotection  - Insulin & Dextrose  - Bicarb push  - 80 mg IV Lasix     Continue with q4h Hb/K checks. Please page primary team with questions.     Bernardo Gan MD  PGY-1 Surgery

## 2023-12-05 NOTE — PROGRESS NOTES
Patient removed from OS waitlist after  donor kidney transplant. OS ID [id number]    Donor Has Risk Criteria for Transmission of HIV/HCV/HBV: no  Recipient Notified of Risk Criteria: no

## 2023-12-05 NOTE — H&P
[facility]    History and Physical - Transplant surgery Service    Assessment & Plan: Surgery   Taylor Valiente is a 28 year old male admitted for preoperative kidney transplant evaluation. 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     NPO  Preop labs     Diet: NPO per Anesthesia Guidelines for Procedure/Surgery Except for: Meds  DVT Prophylaxis: defer to day team  Hurt Catheter: Not present  Lines: None     Drains: None     Cardiac Monitoring: None  Code Status:  Full code    Clinically Significant Risk Factors Present on Admission                # Drug Induced Platelet Defect: home medication list includes an antiplatelet medication   # Hypertension: Noted on problem list                 Disposition Plan      Expected Discharge Date: 12/06/2023                 The patient's care was discussed with the Chief Resident/Fellow.    Yosvany Lemons MD  [facility]  Non-urgent messages: Securely message with [employer] (more info)  Text page via [facility] Paging/Directory     ______________________________________________________________________    Chief Complaint   ESRD    History is obtained from the patient    History of Present Illness   Taylor Valiente is a 28 year old male admitted for preoperative kidney transplant evaluation. 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 He denies fevers, chills, chest pain, sob, nausea,  vomiting, recent sick contacts, vision changes, hearing changes, sores, sore throat, dizziness, and lightheadedness.       Past Medical History    Past Medical History:   Diagnosis Date    Adrenal adenoma, left     Anemia     Benign essential hypertension 07/28/2017    Bursitis of left shoulder 7/29/2023    CKD (chronic kidney disease) stage 5, GFR less than 15 ml/min (H)     FSGS    Depression     Focal glomerular sclerosis 07/28/2017    HLD (hyperlipidemia)     LVH (left ventricular hypertrophy) due to hypertensive disease 07/14/2017    Noncompliance     Obesity, unspecified     OD (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/2021    Stress-induced cardiomyopathy     Suicide attempt (H) 2019       Past Surgical History   Past Surgical History:   Procedure Laterality Date    ARTHROSCOPY ANKLE Left 02/24/2021    Procedure: Left ankle arthroscopy and debridement/micro fracture;  Surgeon: Mirza Nelson MD;  [facility]    [facility]    CREATE FISTULA ARTERIOVENOUS UPPER EXTREMITY Right 03/04/2021    Procedure: RIGHT proximal radial  to CEPHALIC ARTERIOVENOUS FISTULA;  Surgeon: Henrik Moran MD;  Location: [facility] OR    CREATE FISTULA ARTERIOVENOUS UPPER EXTREMITY Left 03/09/2023    Procedure: CREATION OF FIRST STAGE LEFT BRACHIOBASILIC ARTERIOVENOUS FISTULA;  Surgeon: Henrik Moran MD;  Location: [facility] OR    CREATE FISTULA ARTERIOVENOUS UPPER EXTREMITY Left 5/30/2023    Procedure: SECOND STAGE LEFT BRACHIAL TO BASILIC ARTERIOVENOUS FISTULA;  Surgeon: Henrik Moran MD;  Location: [facility] OR    IR CVC TUNNEL PLACEMENT > 5 YRS OF AGE  06/17/2021    IR CVC TUNNEL PLACEMENT > 5 YRS OF AGE  03/09/2023    IR CVC TUNNEL REMOVAL RIGHT  12/03/2021    IR CVC TUNNEL REMOVAL RIGHT  8/2/2023    IRRIGATION AND DEBRIDEMENT UPPER EXTREMITY, COMBINED Left 03/16/2023    Procedure: EVACUATION OF LEFT ARM HEMATOMA;  Surgeon: Henrik Moran MD;  Location: [facility] OR    LIGATE FISTULA ARTERIOVENOUS  UPPER EXTREMITY Right 03/09/2023    Procedure: LIGATION OF RIGHT ARM ARTERIOVENOUS FISTULA;  Surgeon: Henrik Moran MD;  Location: [facility] OR    REVISION FISTULA ARTERIOVENOUS UPPER EXTREMITY Right 08/26/2021    Procedure: Second stage RIGHT BRACHIOCEPHALIC transposition ARTERIOVENOUS FISTULA;  Surgeon: Henrik Moran MD;  Location: [facility] OR       Prior to Admission Medications   Prior to Admission Medications   Prescriptions Last Dose Informant Patient Reported? Taking?   acetaminophen (TYLENOL) 325 MG tablet  Self No No   Sig: Take 2 tablets (650 mg) by mouth every 4 hours as needed for mild pain   amLODIPine (NORVASC) 10 MG tablet  Self Yes No   Sig: Take 10 mg by mouth daily    aspirin 81 MG EC tablet  Self Yes No   Sig: Take 81 mg by mouth daily   atorvastatin (LIPITOR) 10 MG tablet  Self No No   Sig: TAKE ONE TABLET BY MOUTH EVERY NIGHT AT BEDTIME   bumetanide (BUMEX) 2 MG tablet  Self Yes No   Sig: Take 2 mg by mouth daily    calcium acetate (CALPHRON) 667 MG TABS tablet  Self Yes No   Sig: Take 1,334 mg by mouth 3 times daily (with meals)   carvedilol (COREG) 25 MG tablet   No No   Sig: TAKE TWO TABLETS BY MOUTH TWICE A DAY WITH A MEAL Strength: 25 mg   cinacalcet (SENSIPAR) 90 MG tablet  Self Yes No   Sig: Take 90 mg by mouth daily   cloNIDine (CATAPRES-TTS2) 0.2 MG/24HR WK patch  Self Yes No   Sig: Place 1 patch onto the skin   hydrALAZINE (APRESOLINE) 100 MG tablet   No No   Sig: Take 1 tablet (100 mg) by mouth 3 times daily   lisinopril (ZESTRIL) 20 MG tablet  Self No No   Sig: Take 1 tablet (20 mg) by mouth every evening   medical cannabis (Patient's own supply)  Self Yes No   Sig: See Admin Instructions (The purpose of this order is to document that the patient reports taking medical cannabis.  This is not a prescription, and is not used to certify that the patient has a qualifying medical condition.)   multivitamin RENAL (RENAVITE RX/NEPHROVITE) 1 tablet tablet   No No   Sig: Take 1 tablet by mouth  daily   nitroGLYcerin (NITROSTAT) 0.3 MG sublingual tablet  Self No No   Sig: For chest pain place 1 tablet under the tongue every 5 minutes for 3 doses. If symptoms persist 5 minutes after 1st dose call 911.   sevelamer HCl (RENAGEL) 800 MG tablet  Self Yes No   Sig: Take 4,000 mg by mouth 3 times daily (with meals) And 1600mg with snacks.   vitamin D3 (CHOLECALCIFEROL) 2000 units (50 mcg) tablet  Self Yes No   Sig: Take 50 mcg by mouth daily      Facility-Administered Medications: None        Social History   I have reviewed this patient's social history and updated it with pertinent information if needed.  Social History     Tobacco Use    Smoking status: Never    Smokeless tobacco: Never   Vaping Use    Vaping Use: Never used   Substance Use Topics    Alcohol use: No    Drug use: Yes     Types: Marijuana     Comment: occ         Family History   I have reviewed this patient's family history and updated it with pertinent information if needed.  Family History   Problem Relation Age of Onset    Blood Disease Mother         has hep b    Diabetes Mother         gestionanal diabetes    Hypertension Mother     Obesity Father     Hypertension Father     Hypertension Maternal Grandmother     Diabetes Maternal Grandmother     Hypertension Paternal Grandmother     Hypertension Paternal Grandfather     Coronary Artery Disease Maternal Uncle     Cancer No family hx of          Allergies   Allergies   Allergen Reactions    Benadryl [Diphenhydramine] Itching    Vancomycin Itching        Physical Exam   Vital Signs: Temp: 98.6  F (37  C) Temp src: Oral BP: 135/75 Pulse: 74   Resp: 18 SpO2: 99 % O2 Device: None (Room air)    Weight: 231 lbs 12.8 ozNo intake or output data in the 24 hours ending 12/04/23 1902  General Appearance: NAD, resting comfortably  Respiratory: NLB on RA  Cardiovascular: RRR to PP  GI: abdomen soft, nontender, nondistended  Skin: wwp, no rashes on exposed skin           Data     I have personally reviewed  the following data over the past 24 hrs:    9.7  \   12.0 (L)   / 159     N/A N/A N/A /  N/A   N/A N/A N/A \     TSH: N/A T4: N/A A1C: 5.9 (H)       Imaging results reviewed over the past 24 hrs:   Recent Results (from the past 24 hour(s))   XR Chest 2 Views    Impression    RESIDENT PRELIMINARY INTERPRETATION  Impression: Clear chest.   I have reviewed history, examined patient and discussed plan with the fellow/resident/HENRIQUE.    I concur with the findings in this note.    Time spent on admission activities: 45 minutes.

## 2023-12-06 LAB
ALBUMIN MFR UR ELPH: 79.7 MG/DL
ANION GAP SERPL CALCULATED.3IONS-SCNC: 16 MMOL/L (ref 7–15)
BASOPHILS # BLD AUTO: 0 10E3/UL (ref 0–0.2)
BASOPHILS NFR BLD AUTO: 0 %
BUN SERPL-MCNC: 50.3 MG/DL (ref 6–20)
CALCIUM SERPL-MCNC: 7.9 MG/DL (ref 8.6–10)
CELL TYPE ALLO: NORMAL
CELL TYPE AUTO: NORMAL
CHANNELSHIFTALLOB1: -69
CHANNELSHIFTALLOB2: -69
CHANNELSHIFTALLOT1: -59
CHANNELSHIFTALLOT2: -63
CHANNELSHIFTAUTOB1: -70
CHANNELSHIFTAUTOB2: -63
CHANNELSHIFTAUTOT1: -53
CHANNELSHIFTAUTOT2: -53
CHLORIDE SERPL-SCNC: 98 MMOL/L (ref 98–107)
CREAT SERPL-MCNC: 6.95 MG/DL (ref 0.67–1.17)
CREAT UR-MCNC: 147 MG/DL
CROSSMATCHDATEALLO: NORMAL
CROSSMATCHDATEAUTO: NORMAL
DEPRECATED HCO3 PLAS-SCNC: 24 MMOL/L (ref 22–29)
DONOR ALLO: NORMAL
DONOR AUTO: NORMAL
DONORCELLDATE ALLO: NORMAL
DONORCELLDATE AUTO: NORMAL
EGFRCR SERPLBLD CKD-EPI 2021: 10 ML/MIN/1.73M2
EOSINOPHIL # BLD AUTO: 0 10E3/UL (ref 0–0.7)
EOSINOPHIL NFR BLD AUTO: 0 %
ERYTHROCYTE [DISTWIDTH] IN BLOOD BY AUTOMATED COUNT: 16.3 % (ref 10–15)
GLUCOSE BLDC GLUCOMTR-MCNC: 131 MG/DL (ref 70–99)
GLUCOSE BLDC GLUCOMTR-MCNC: 138 MG/DL (ref 70–99)
GLUCOSE BLDC GLUCOMTR-MCNC: 181 MG/DL (ref 70–99)
GLUCOSE SERPL-MCNC: 130 MG/DL (ref 70–99)
HCT VFR BLD AUTO: 36.8 % (ref 40–53)
HGB BLD-MCNC: 11.7 G/DL (ref 13.3–17.7)
HGB BLD-MCNC: 11.7 G/DL (ref 13.3–17.7)
HGB BLD-MCNC: 11.9 G/DL (ref 13.3–17.7)
HGB BLD-MCNC: 12.1 G/DL (ref 13.3–17.7)
HGB BLD-MCNC: 12.2 G/DL (ref 13.3–17.7)
IMM GRANULOCYTES # BLD: 0 10E3/UL
IMM GRANULOCYTES NFR BLD: 0 %
LYMPHOCYTES # BLD AUTO: 0.3 10E3/UL (ref 0.8–5.3)
LYMPHOCYTES NFR BLD AUTO: 3 %
MAGNESIUM SERPL-MCNC: 2.2 MG/DL (ref 1.7–2.3)
MCH RBC QN AUTO: 28.8 PG (ref 26.5–33)
MCHC RBC AUTO-ENTMCNC: 31.8 G/DL (ref 31.5–36.5)
MCV RBC AUTO: 91 FL (ref 78–100)
MONOCYTES # BLD AUTO: 1 10E3/UL (ref 0–1.3)
MONOCYTES NFR BLD AUTO: 11 %
NEUTROPHILS # BLD AUTO: 8 10E3/UL (ref 1.6–8.3)
NEUTROPHILS NFR BLD AUTO: 86 %
NRBC # BLD AUTO: 0 10E3/UL
NRBC BLD AUTO-RTO: 0 /100
PHOSPHATE SERPL-MCNC: 8.6 MG/DL (ref 2.5–4.5)
PLATELET # BLD AUTO: 182 10E3/UL (ref 150–450)
POS CUT OFF ALLO B: >69
POS CUT OFF ALLO T: >53
POS CUT OFF AUTO B: >69
POS CUT OFF AUTO T: >53
POTASSIUM SERPL-SCNC: 4.4 MMOL/L (ref 3.4–5.3)
POTASSIUM SERPL-SCNC: 4.6 MMOL/L (ref 3.4–5.3)
POTASSIUM SERPL-SCNC: 4.7 MMOL/L (ref 3.4–5.3)
POTASSIUM SERPL-SCNC: 4.7 MMOL/L (ref 3.4–5.3)
PROT/CREAT 24H UR: 0.54 MG/MG CR (ref 0–0.2)
RBC # BLD AUTO: 4.06 10E6/UL (ref 4.4–5.9)
RESULT ALLO B1: NORMAL
RESULT ALLO B2: NORMAL
RESULT ALLO T1: NORMAL
RESULT ALLO T2: NORMAL
RESULT AUTO B1: NORMAL
RESULT AUTO B2: NORMAL
RESULT AUTO T1: NORMAL
RESULT AUTO T2: NORMAL
SERUM DATE ALLO B1: NORMAL
SERUM DATE ALLO B2: NORMAL
SERUM DATE ALLO T1: NORMAL
SERUM DATE ALLO T2: NORMAL
SERUM DATE AUTO B1: NORMAL
SERUM DATE AUTO B2: NORMAL
SERUM DATE AUTO T1: NORMAL
SERUM DATE AUTO T2: NORMAL
SODIUM SERPL-SCNC: 138 MMOL/L (ref 135–145)
TESTMETHODALLO: NORMAL
TESTMETHODAUTO: NORMAL
TREATMENT ALLO B1: NORMAL
TREATMENT ALLO B2: NORMAL
TREATMENT ALLO T1: NORMAL
TREATMENT ALLO T2: NORMAL
TREATMENT AUTO B1: NORMAL
TREATMENT AUTO B2: NORMAL
TREATMENT AUTO T1: NORMAL
TREATMENT AUTO T2: NORMAL
WBC # BLD AUTO: 9.4 10E3/UL (ref 4–11)
ZZZCOMMENT ALLOB2: NORMAL

## 2023-12-06 PROCEDURE — 250N000012 HC RX MED GY IP 250 OP 636 PS 637: Performed by: STUDENT IN AN ORGANIZED HEALTH CARE EDUCATION/TRAINING PROGRAM

## 2023-12-06 PROCEDURE — 250N000013 HC RX MED GY IP 250 OP 250 PS 637: Performed by: STUDENT IN AN ORGANIZED HEALTH CARE EDUCATION/TRAINING PROGRAM

## 2023-12-06 PROCEDURE — 85025 COMPLETE CBC W/AUTO DIFF WBC: CPT | Performed by: STUDENT IN AN ORGANIZED HEALTH CARE EDUCATION/TRAINING PROGRAM

## 2023-12-06 PROCEDURE — 80048 BASIC METABOLIC PNL TOTAL CA: CPT | Performed by: STUDENT IN AN ORGANIZED HEALTH CARE EDUCATION/TRAINING PROGRAM

## 2023-12-06 PROCEDURE — 85018 HEMOGLOBIN: CPT | Performed by: STUDENT IN AN ORGANIZED HEALTH CARE EDUCATION/TRAINING PROGRAM

## 2023-12-06 PROCEDURE — 36592 COLLECT BLOOD FROM PICC: CPT | Performed by: STUDENT IN AN ORGANIZED HEALTH CARE EDUCATION/TRAINING PROGRAM

## 2023-12-06 PROCEDURE — 258N000003 HC RX IP 258 OP 636: Performed by: PHYSICIAN ASSISTANT

## 2023-12-06 PROCEDURE — 258N000003 HC RX IP 258 OP 636

## 2023-12-06 PROCEDURE — 82310 ASSAY OF CALCIUM: CPT | Performed by: STUDENT IN AN ORGANIZED HEALTH CARE EDUCATION/TRAINING PROGRAM

## 2023-12-06 PROCEDURE — 250N000011 HC RX IP 250 OP 636: Mod: JZ

## 2023-12-06 PROCEDURE — 84132 ASSAY OF SERUM POTASSIUM: CPT | Performed by: STUDENT IN AN ORGANIZED HEALTH CARE EDUCATION/TRAINING PROGRAM

## 2023-12-06 PROCEDURE — 120N000011 HC R&B TRANSPLANT UMMC

## 2023-12-06 PROCEDURE — 250N000011 HC RX IP 250 OP 636: Mod: JZ | Performed by: PHYSICIAN ASSISTANT

## 2023-12-06 PROCEDURE — 99233 SBSQ HOSP IP/OBS HIGH 50: CPT | Mod: 24 | Performed by: INTERNAL MEDICINE

## 2023-12-06 PROCEDURE — 250N000013 HC RX MED GY IP 250 OP 250 PS 637

## 2023-12-06 PROCEDURE — 84100 ASSAY OF PHOSPHORUS: CPT | Performed by: STUDENT IN AN ORGANIZED HEALTH CARE EDUCATION/TRAINING PROGRAM

## 2023-12-06 PROCEDURE — 84156 ASSAY OF PROTEIN URINE: CPT | Performed by: SURGERY

## 2023-12-06 PROCEDURE — 36415 COLL VENOUS BLD VENIPUNCTURE: CPT | Performed by: STUDENT IN AN ORGANIZED HEALTH CARE EDUCATION/TRAINING PROGRAM

## 2023-12-06 PROCEDURE — 83735 ASSAY OF MAGNESIUM: CPT | Performed by: STUDENT IN AN ORGANIZED HEALTH CARE EDUCATION/TRAINING PROGRAM

## 2023-12-06 PROCEDURE — 250N000013 HC RX MED GY IP 250 OP 250 PS 637: Performed by: PHYSICIAN ASSISTANT

## 2023-12-06 RX ORDER — HEPARIN SODIUM (PORCINE) LOCK FLUSH IV SOLN 100 UNIT/ML 100 UNIT/ML
5 SOLUTION INTRAVENOUS
Status: CANCELLED | OUTPATIENT
Start: 2023-12-10

## 2023-12-06 RX ORDER — DIPHENHYDRAMINE HYDROCHLORIDE 50 MG/ML
50 INJECTION INTRAMUSCULAR; INTRAVENOUS
Status: CANCELLED
Start: 2023-12-10

## 2023-12-06 RX ORDER — PREDNISONE 5 MG/1
5 TABLET ORAL DAILY
Status: DISCONTINUED | OUTPATIENT
Start: 2024-01-02 | End: 2023-12-08 | Stop reason: HOSPADM

## 2023-12-06 RX ORDER — PREDNISONE 10 MG/1
10 TABLET ORAL DAILY
Status: DISCONTINUED | OUTPATIENT
Start: 2023-12-26 | End: 2023-12-08 | Stop reason: HOSPADM

## 2023-12-06 RX ORDER — SODIUM CHLORIDE 9 MG/ML
INJECTION, SOLUTION INTRAVENOUS CONTINUOUS
Status: DISCONTINUED | OUTPATIENT
Start: 2023-12-06 | End: 2023-12-07

## 2023-12-06 RX ORDER — METHOCARBAMOL 500 MG/1
500 TABLET, FILM COATED ORAL 4 TIMES DAILY
Status: DISCONTINUED | OUTPATIENT
Start: 2023-12-06 | End: 2023-12-08 | Stop reason: HOSPADM

## 2023-12-06 RX ORDER — ALBUTEROL SULFATE 0.83 MG/ML
2.5 SOLUTION RESPIRATORY (INHALATION)
Status: CANCELLED | OUTPATIENT
Start: 2023-12-10

## 2023-12-06 RX ORDER — HYDROMORPHONE HCL IN WATER/PF 6 MG/30 ML
0.4 PATIENT CONTROLLED ANALGESIA SYRINGE INTRAVENOUS
Status: DISCONTINUED | OUTPATIENT
Start: 2023-12-06 | End: 2023-12-07

## 2023-12-06 RX ORDER — METHYLPREDNISOLONE SODIUM SUCCINATE 125 MG/2ML
100 INJECTION, POWDER, LYOPHILIZED, FOR SOLUTION INTRAMUSCULAR; INTRAVENOUS ONCE
Status: COMPLETED | OUTPATIENT
Start: 2023-12-07 | End: 2023-12-07

## 2023-12-06 RX ORDER — PREDNISONE 20 MG/1
20 TABLET ORAL DAILY
Status: DISCONTINUED | OUTPATIENT
Start: 2023-12-12 | End: 2023-12-08 | Stop reason: HOSPADM

## 2023-12-06 RX ORDER — PANTOPRAZOLE SODIUM 40 MG/1
40 TABLET, DELAYED RELEASE ORAL ONCE
Status: COMPLETED | OUTPATIENT
Start: 2023-12-06 | End: 2023-12-06

## 2023-12-06 RX ORDER — DEXTROSE MONOHYDRATE 25 G/50ML
25-50 INJECTION, SOLUTION INTRAVENOUS
Status: DISCONTINUED | OUTPATIENT
Start: 2023-12-06 | End: 2023-12-06

## 2023-12-06 RX ORDER — LIDOCAINE 4 G/G
1-2 PATCH TOPICAL
Status: DISCONTINUED | OUTPATIENT
Start: 2023-12-06 | End: 2023-12-08 | Stop reason: HOSPADM

## 2023-12-06 RX ORDER — EPINEPHRINE 1 MG/ML
0.3 INJECTION, SOLUTION, CONCENTRATE INTRAVENOUS EVERY 5 MIN PRN
Status: CANCELLED | OUTPATIENT
Start: 2023-12-10

## 2023-12-06 RX ORDER — SULFAMETHOXAZOLE AND TRIMETHOPRIM 400; 80 MG/1; MG/1
1 TABLET ORAL
Status: DISCONTINUED | OUTPATIENT
Start: 2023-12-08 | End: 2023-12-08

## 2023-12-06 RX ORDER — HEPARIN SODIUM,PORCINE 10 UNIT/ML
5-20 VIAL (ML) INTRAVENOUS DAILY PRN
Status: CANCELLED | OUTPATIENT
Start: 2023-12-10

## 2023-12-06 RX ORDER — ALBUTEROL SULFATE 90 UG/1
1-2 AEROSOL, METERED RESPIRATORY (INHALATION)
Status: CANCELLED
Start: 2023-12-10

## 2023-12-06 RX ORDER — MULTIPLE VITAMINS W/ MINERALS TAB 9MG-400MCG
1 TAB ORAL DAILY
Status: DISCONTINUED | OUTPATIENT
Start: 2023-12-07 | End: 2023-12-08

## 2023-12-06 RX ORDER — PREDNISONE 20 MG/1
40 TABLET ORAL DAILY
Status: DISCONTINUED | OUTPATIENT
Start: 2023-12-10 | End: 2023-12-08 | Stop reason: HOSPADM

## 2023-12-06 RX ORDER — MEPERIDINE HYDROCHLORIDE 25 MG/ML
25 INJECTION INTRAMUSCULAR; INTRAVENOUS; SUBCUTANEOUS EVERY 30 MIN PRN
Status: CANCELLED | OUTPATIENT
Start: 2023-12-10

## 2023-12-06 RX ORDER — CLONIDINE 0.3 MG/24H
1 PATCH, EXTENDED RELEASE TRANSDERMAL WEEKLY
Status: ON HOLD | COMMUNITY
End: 2023-12-08

## 2023-12-06 RX ORDER — HYDROMORPHONE HCL IN WATER/PF 6 MG/30 ML
0.2 PATIENT CONTROLLED ANALGESIA SYRINGE INTRAVENOUS
Status: DISCONTINUED | OUTPATIENT
Start: 2023-12-06 | End: 2023-12-07

## 2023-12-06 RX ORDER — NICOTINE POLACRILEX 4 MG
15-30 LOZENGE BUCCAL
Status: DISCONTINUED | OUTPATIENT
Start: 2023-12-06 | End: 2023-12-06

## 2023-12-06 RX ORDER — METHYLPREDNISOLONE SODIUM SUCCINATE 125 MG/2ML
125 INJECTION, POWDER, LYOPHILIZED, FOR SOLUTION INTRAMUSCULAR; INTRAVENOUS
Status: CANCELLED
Start: 2023-12-10

## 2023-12-06 RX ADMIN — SODIUM CHLORIDE 1000 ML: 9 INJECTION, SOLUTION INTRAVENOUS at 12:18

## 2023-12-06 RX ADMIN — OXYCODONE HYDROCHLORIDE 10 MG: 10 TABLET ORAL at 10:31

## 2023-12-06 RX ADMIN — CARVEDILOL 50 MG: 25 TABLET, FILM COATED ORAL at 18:31

## 2023-12-06 RX ADMIN — SODIUM CHLORIDE 1000 ML: 9 INJECTION, SOLUTION INTRAVENOUS at 02:01

## 2023-12-06 RX ADMIN — METHOCARBAMOL 500 MG: 500 TABLET ORAL at 22:37

## 2023-12-06 RX ADMIN — HYDROMORPHONE HYDROCHLORIDE 0.2 MG: 0.2 INJECTION, SOLUTION INTRAMUSCULAR; INTRAVENOUS; SUBCUTANEOUS at 11:30

## 2023-12-06 RX ADMIN — ACETAMINOPHEN 975 MG: 325 TABLET, FILM COATED ORAL at 10:31

## 2023-12-06 RX ADMIN — BISACODYL 10 MG: 10 SUPPOSITORY RECTAL at 18:31

## 2023-12-06 RX ADMIN — Medication 1 TABLET: at 08:00

## 2023-12-06 RX ADMIN — LIDOCAINE 2 PATCH: 4 PATCH TOPICAL at 19:35

## 2023-12-06 RX ADMIN — SENNOSIDES AND DOCUSATE SODIUM 1 TABLET: 8.6; 5 TABLET ORAL at 08:00

## 2023-12-06 RX ADMIN — METHOCARBAMOL 500 MG: 500 TABLET ORAL at 12:49

## 2023-12-06 RX ADMIN — ASPIRIN 81 MG CHEWABLE TABLET 81 MG: 81 TABLET CHEWABLE at 08:00

## 2023-12-06 RX ADMIN — AMLODIPINE BESYLATE 10 MG: 10 TABLET ORAL at 08:00

## 2023-12-06 RX ADMIN — HYDROMORPHONE HYDROCHLORIDE 0.2 MG: 0.2 INJECTION, SOLUTION INTRAMUSCULAR; INTRAVENOUS; SUBCUTANEOUS at 12:22

## 2023-12-06 RX ADMIN — OXYCODONE HYDROCHLORIDE 10 MG: 10 TABLET ORAL at 23:41

## 2023-12-06 RX ADMIN — SODIUM CHLORIDE 20 MG: 9 INJECTION, SOLUTION INTRAVENOUS at 16:08

## 2023-12-06 RX ADMIN — PANTOPRAZOLE SODIUM 40 MG: 40 TABLET, DELAYED RELEASE ORAL at 00:20

## 2023-12-06 RX ADMIN — MYCOPHENOLATE MOFETIL 1000 MG: 250 CAPSULE ORAL at 08:01

## 2023-12-06 RX ADMIN — ACETAMINOPHEN 975 MG: 325 TABLET, FILM COATED ORAL at 02:06

## 2023-12-06 RX ADMIN — CARVEDILOL 50 MG: 25 TABLET, FILM COATED ORAL at 08:00

## 2023-12-06 RX ADMIN — HYDROMORPHONE HYDROCHLORIDE 0.2 MG: 0.2 INJECTION, SOLUTION INTRAMUSCULAR; INTRAVENOUS; SUBCUTANEOUS at 19:35

## 2023-12-06 RX ADMIN — ACETAMINOPHEN 975 MG: 325 TABLET, FILM COATED ORAL at 18:31

## 2023-12-06 RX ADMIN — OXYCODONE HYDROCHLORIDE 10 MG: 10 TABLET ORAL at 14:11

## 2023-12-06 RX ADMIN — OXYCODONE HYDROCHLORIDE 10 MG: 10 TABLET ORAL at 06:08

## 2023-12-06 RX ADMIN — SODIUM CHLORIDE: 9 INJECTION, SOLUTION INTRAVENOUS at 14:41

## 2023-12-06 RX ADMIN — POLYETHYLENE GLYCOL 3350 17 G: 17 POWDER, FOR SOLUTION ORAL at 08:00

## 2023-12-06 RX ADMIN — TACROLIMUS 3 MG: 1 CAPSULE ORAL at 08:00

## 2023-12-06 RX ADMIN — SENNOSIDES AND DOCUSATE SODIUM 1 TABLET: 8.6; 5 TABLET ORAL at 19:35

## 2023-12-06 RX ADMIN — SULFAMETHOXAZOLE AND TRIMETHOPRIM 1 TABLET: 400; 80 TABLET ORAL at 08:00

## 2023-12-06 RX ADMIN — MAGNESIUM HYDROXIDE 30 ML: 400 SUSPENSION ORAL at 11:30

## 2023-12-06 RX ADMIN — METHOCARBAMOL 500 MG: 500 TABLET ORAL at 18:41

## 2023-12-06 RX ADMIN — OXYCODONE HYDROCHLORIDE 10 MG: 10 TABLET ORAL at 02:05

## 2023-12-06 RX ADMIN — ATORVASTATIN CALCIUM 10 MG: 10 TABLET, FILM COATED ORAL at 08:00

## 2023-12-06 RX ADMIN — TACROLIMUS 3 MG: 1 CAPSULE ORAL at 18:31

## 2023-12-06 RX ADMIN — SODIUM CHLORIDE: 9 INJECTION, SOLUTION INTRAVENOUS at 18:54

## 2023-12-06 RX ADMIN — METHYLPREDNISOLONE SODIUM SUCCINATE 250 MG: 125 INJECTION, POWDER, LYOPHILIZED, FOR SOLUTION INTRAMUSCULAR; INTRAVENOUS at 15:08

## 2023-12-06 RX ADMIN — DEXTROSE AND SODIUM CHLORIDE: 5; 900 INJECTION, SOLUTION INTRAVENOUS at 01:03

## 2023-12-06 RX ADMIN — MYCOPHENOLATE MOFETIL 1000 MG: 250 CAPSULE ORAL at 18:31

## 2023-12-06 ASSESSMENT — ACTIVITIES OF DAILY LIVING (ADL)
ADLS_ACUITY_SCORE: 23
ADLS_ACUITY_SCORE: 23
ADLS_ACUITY_SCORE: 22
ADLS_ACUITY_SCORE: 23

## 2023-12-06 NOTE — PHARMACY-ADMISSION MEDICATION HISTORY
Pharmacy Intern Admission Medication History    Admission medication history is complete. The information provided in this note is only as accurate as the sources available at the time of the update.    Information Source(s): Patient and CareEverywhere/SureScripts via in-person    Pertinent Information:   Patient uses clonidine 0.3 mg/24 hour patch which he currently has on; Changes weekly  Patient has nitroglycerin tablets at home but has never used    Changes made to PTA medication list:  Added: None  Deleted: None  Changed: Clonidine patch changed from 0.2 mg/24 hours to 0.3 mg/24 hours    Medication Affordability: N/A       Allergies reviewed with patient and updates made in EHR:  Assessed by RN on 12/5/2023    Medication History Completed By: Martha Heath 12/6/2023 11:18 AM    Prior to Admission medications    Medication Sig Last Dose Taking? Auth Provider Long Term End Date   acetaminophen (TYLENOL) 325 MG tablet Take 2 tablets (650 mg) by mouth every 4 hours as needed for mild pain Past Week Yes Winter Mercado MD     amLODIPine (NORVASC) 10 MG tablet Take 10 mg by mouth daily  12/4/2023 Yes Unknown, Entered By History No    aspirin 81 MG EC tablet Take 81 mg by mouth daily 12/4/2023 Yes Unknown, Entered By History     atorvastatin (LIPITOR) 10 MG tablet TAKE ONE TABLET BY MOUTH EVERY NIGHT AT BEDTIME 12/4/2023 Yes Priyank Lawrence MD Yes    bumetanide (BUMEX) 2 MG tablet Take 2 mg by mouth daily  12/3/2023 Yes Reported, Patient Yes    calcium acetate (CALPHRON) 667 MG TABS tablet Take 1,334 mg by mouth 3 times daily (with meals) 12/4/2023 Yes Reported, Patient     carvedilol (COREG) 25 MG tablet TAKE TWO TABLETS BY MOUTH TWICE A DAY WITH A MEAL Strength: 25 mg 12/4/2023 Yes Senia Barrett MD Yes    cinacalcet (SENSIPAR) 90 MG tablet Take 90 mg by mouth daily 12/4/2023 Yes Reported, Patient Yes    cloNIDine (CATAPRES-TTS3) 0.3 MG/24HR WK patch Place 1 patch onto the skin once a week  Yes Unknown, Entered  By History     hydrALAZINE (APRESOLINE) 100 MG tablet Take 1 tablet (100 mg) by mouth 3 times daily 12/4/2023 Yes Senia Barrett MD Yes    lisinopril (ZESTRIL) 20 MG tablet Take 1 tablet (20 mg) by mouth every evening  Patient taking differently: Take 20 mg by mouth 2 times daily 12/4/2023 Yes Senia Barrett MD Yes    medical cannabis (Patient's own supply) See Admin Instructions (The purpose of this order is to document that the patient reports taking medical cannabis.  This is not a prescription, and is not used to certify that the patient has a qualifying medical condition.)  Yes Reported, Patient     multivitamin RENAL (RENAVITE RX/NEPHROVITE) 1 tablet tablet Take 1 tablet by mouth daily  Patient taking differently: Take 1 tablet by mouth daily After hemodialysis 12/4/2023 Yes Senia Barrett MD     sevelamer HCl (RENAGEL) 800 MG tablet Take 4,000 mg by mouth 3 times daily (with meals) And 1600mg with snacks. 12/4/2023 Yes Unknown, Entered By History     vitamin D3 (CHOLECALCIFEROL) 2000 units (50 mcg) tablet Take 50 mcg by mouth daily 12/4/2023 Yes Unknown, Entered By History     nitroGLYcerin (NITROSTAT) 0.3 MG sublingual tablet For chest pain place 1 tablet under the tongue every 5 minutes for 3 doses. If symptoms persist 5 minutes after 1st dose call 911.   Jing Aguila PA-C Yes

## 2023-12-06 NOTE — PROVIDER NOTIFICATION
[id number] KARLA Valiente   pt is reporting a stomach ache. Could we get tums ordered for pt? Thanks, Jade ROSE [phone]    On call ordered Protonix

## 2023-12-06 NOTE — PLAN OF CARE
VS: /66 (BP Location: Right arm, Cuff Size: Adult Large)   Pulse 77   Temp 98.9  F (37.2  C) (Oral)   Resp 13   Wt 105.1 kg (231 lb 12.8 oz)   SpO2 94%   BMI 33.26 kg/m      Cares: 1900 - 0730     Neuro: Aox4   Cardiac: WDL  Respiratory: WDL on RA, denies SOB.   CVP: 6-10  GI/: griffiths in place - -800cc/hr, not passing any gas yet but pt feels like something is coming  Skin: RLQ hockey stick abdominal incision - dressing w/ moderate drainage --> dressing reinforced   Diet: advanced diet to regular. Pt tolerated some saltine crackers well   Labs: hgb: 11.9, K: 4.4  BG: daily (131)  LDA: griffiths, Left internal jugular - infusing D5NS @ 75mL/hr, Right PIV SL, Right CAMDEN -  bloody output  Mobility: SBA/Ax1  Pain/Nausea: abdominal pain, nausea w/ 25mL emesis   PRN medications: oxy x3, dilaudid x2, protonix x1  Plan of Care: continue with current POC and update MD with any changes

## 2023-12-06 NOTE — PROGRESS NOTES
[facility]   Transplant Nephrology Progress Note  Date of Admission:  12/4/2023  Today's Date: 12/06/2023    Recommendations:    - Continue immunosuppressants per protocol   - monitor UPC     Assessment & Plan    # DDKT: Trending down              - Baseline Creatinine: ~ TBD              - Proteinuria: stable 0.5              - Date DSA Last Checked: Dec/2023      Latest DSA: no              - BK Viremia: no              - Kidney Tx Biopsy: No             - Double J stent placed at time of kidney transplant.      # Immunosuppression: Tacrolimus immediate release (goal 8-10),Mycophenolate 1000 mg po bid ,tapering dose of methyl prednisolone.  - Induction - intermediate intensity               - Patient is in an immunosuppressed state and will continue to monitor for efficacy and toxicity of immunosuppression medications.              - Changes: none     # Infection Prophylaxis:      - PJP: Sulfa/TMP (Bactrim)   - CMV: R +/D -:Valganciclovir (Valcyte)       # Hypertension: Controlled;   Goal BP: < 150/90  - Volume status: Mildly Hypervolemic   EDW ~ 101 kg  - Changes:  Continue PTA Amlodipine 10 mg ,Carvedilol 50 mg po bid.     # Anemia in Chronic Renal Disease: Hgb: Stable ,normal      BRIE: No              - Iron studies: Unknown     # Electrolytes:   - Potassium; level: high        - Magnesium; level: normal       On supplement:no  - Bicarbonate; level: low    On supplement: yes     # Mineral Bone Disorder:               - Secondary renal hyperparathyroidism; PTH level: Moderately   elevated                                              On treatment: Cinacalcet  - Vitamin D; level: Not checked recently        On supplement: Yes  - Calcium; level: Normal        On supplement: No  - Phosphorus; level: High        On supplement: No        # Transplant History:  Etiology of Kidney Failure: Focal segmental glomerulosclerosis (FSGS)  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    Seen and discussed with Dr. Dion Middleton MD   Pager: [phone]    This patient has been seen and evaluated by me, Stephanie Lynn MD.  I have reviewed the note and agree with plan of care as documented by the fellow.    Stephanie Lynn MD     Interval History   Mr. Valiente's creatinine is 6.95 (12/06 0631); Trend down.  Robust amount of urine output.  Vitals: Stable  no events overnight.  no chest pain or shortness of breath.  no leg swelling.  no nausea and vomiting.  no fever, sweats or chills.      Review of Systems   4 point ROS was obtained and negative except as noted in the Interval History.    MEDICATIONS:   acetaminophen  975 mg Oral Q8H    amLODIPine  10 mg Oral Daily    aspirin  81 mg Oral Daily    atorvastatin  10 mg Oral Daily    basiliximab (SIMULECT) 20 mg in sodium chloride 0.9 % 50 mL infusion  20 mg Intravenous Once    [START ON 12/10/2023] basiliximab (SIMULECT) 20 mg in sodium chloride 0.9 % 50 mL infusion  20 mg Intravenous Once    carvedilol  50 mg Oral BID w/meals    insulin aspart  1-7 Units Subcutaneous TID AC    insulin aspart  1-5 Units Subcutaneous At Bedtime    lidocaine  1-2 patch Transdermal Q24h    [START ON 12/7/2023] magnesium oxide  400 mg Oral Daily with lunch    methocarbamol  500 mg Oral 4x Daily    [START ON 12/7/2023] methylPREDNISolone  100 mg Intravenous Once    [START ON 12/7/2023] multivitamin w/minerals  1 tablet Oral Daily    mycophenolate  1,000 mg Oral BID IS    polyethylene glycol  17 g Oral Daily    [START ON 12/8/2023] predniSONE  60 mg Oral Daily    Followed by    [START ON 12/10/2023] predniSONE  40 mg Oral Daily     Followed by    [START ON 2023] predniSONE  20 mg Oral Daily    Followed by    [START ON 2023] predniSONE  15 mg Oral Daily    Followed by    [START ON 2023] predniSONE  10 mg Oral Daily    Followed by    [START ON 2024] predniSONE  5 mg Oral Daily    senna-docusate  1 tablet Oral BID    sodium chloride (PF)  10 mL Intracatheter Q8H    [START ON 2023] sulfamethoxazole-trimethoprim  1 tablet Oral Once per day on     tacrolimus  3 mg Oral BID IS    [START ON 2023] valGANciclovir  450 mg Oral Once per day on       sodium chloride 100 mL/hr at 23 1441       Physical Exam   Temp  Av.4  F (36.9  C)  Min: 97  F (36.1  C)  Max: 99.3  F (37.4  C)      Pulse  Av.1  Min: 66  Max: 91 Resp  Avg: 15  Min: 10  Max: 20  SpO2  Av.7 %  Min: 93 %  Max: 100 %    CVP (mmHg): 9 mmHgBP 139/83 (BP Location: Right arm)   Pulse 86   Temp 99.3  F (37.4  C) (Oral)   Resp 12   Wt 104.4 kg (230 lb 1.6 oz)   SpO2 98%   BMI 33.02 kg/m     Date 23 0700 - 23 0659   Shift 0643-4889 0878-6017 1321-9507 24 Hour Total   INTAKE   Shift Total(mL/kg)       OUTPUT   Urine 620   620   Drains 60   60   Shift Total(mL/kg) 680(6.52)   680(6.52)   Weight (kg) 104.37 104.37 104.37 104.37      Admit Weight: 105.1 kg (231 lb 12.8 oz)     GENERAL APPEARANCE: alert and no distress  HENT: mouth without ulcers or lesions  RESP: lungs clear to auscultation - no rales, rhonchi or wheezes  CV: regular rhythm, normal rate, no rub, no murmur  EDEMA: no LE edema bilaterally  ABDOMEN: soft, nondistended, nontender, bowel sounds normal  MS: extremities normal - no gross deformities noted, no evidence of inflammation in joints, no muscle tenderness  SKIN: no rash    Data   All labs reviewed by me.  CMP  Recent Labs   Lab 23  1332 23  0920 23  0631 23  0315 23  2153 23  2115 23  2007 23  1831 23  1407 23  1247  12/05/23  0543 12/05/23  0007   NA  --   --  138  --   --   --   --   --   --  131*  --  136   POTASSIUM 4.7 4.6 4.4  4.4 4.4   < >  --   --   --    < > 6.1*  --  5.0   CHLORIDE  --   --  98  --   --   --   --   --   --  94*  --  92*   CO2  --   --  24  --   --   --   --   --   --  19*  --  25   ANIONGAP  --   --  16*  --   --   --   --   --   --  18*  --  19*   GLC  --   --  130*  --   --  164* 206* 205*   < > 138*   < > 96   BUN  --   --  50.3*  --   --   --   --   --   --  64.2*  --  61.0*   CR  --   --  6.95*  --   --   --   --   --   --  11.80*  --  11.60*   GFRESTIMATED  --   --  10*  --   --   --   --   --   --  5*  --  6*   BUBBA  --   --  7.9*  --   --   --   --   --   --  6.7*  --  8.4*   MAG  --   --  2.2  --   --   --   --   --   --  1.9  --   --    PHOS  --   --  8.6*  --   --   --   --   --   --  5.5*  --   --    PROTTOTAL  --   --   --   --   --   --   --   --   --   --   --  7.7   ALBUMIN  --   --   --   --   --   --   --   --   --   --   --  4.5   BILITOTAL  --   --   --   --   --   --   --   --   --   --   --  0.5   ALKPHOS  --   --   --   --   --   --   --   --   --   --   --  138   AST  --   --   --   --   --   --   --   --   --   --   --  13   ALT  --   --   --   --   --   --   --   --   --   --   --  20    < > = values in this interval not displayed.     CBC  Recent Labs   Lab 12/06/23  1332 12/06/23  0920 12/06/23  0631 12/06/23  0315 12/05/23  1716 12/05/23  1247 12/05/23  0007   HGB 12.1* 12.2* 11.7*  11.7* 11.9*   < > 11.3* 12.0*   WBC  --   --  9.4  --   --  9.3 9.7   RBC  --   --  4.06*  --   --  3.96* 4.22*   HCT  --   --  36.8*  --   --  34.8* 37.8*   MCV  --   --  91  --   --  88 90   MCH  --   --  28.8  --   --  28.5 28.4   MCHC  --   --  31.8  --   --  32.5 31.7   RDW  --   --  16.3*  --   --  16.1* 16.0*   PLT  --   --  182  --   --  140* 159    < > = values in this interval not displayed.     INR  Recent Labs   Lab 12/05/23  0007   INR 1.07   PTT 30     ABGNo lab results found in  last 7 days.   Urine Studies  Recent Labs   Lab Test 06/15/21  2220 02/09/21  1103 09/09/20  1305 07/14/17  0948   COLOR Light Yellow Yellow Light Yellow Yellow   APPEARANCE Clear Slightly Cloudy Clear Clear   URINEGLC 50* Negative 50* Negative   URINEBILI Negative Negative Negative Negative   URINEKETONE Negative Negative Negative Negative   SG 1.015 1.013 1.015 1.025   UBLD Trace* Negative Small* Moderate*   URINEPH 6.5 5.0 6.0 5.5   PROTEIN 600* >499* 300* >=300*   UROBILINOGEN  --   --   --  0.2   NITRITE Negative Negative Negative Negative   LEUKEST Negative Trace* Negative Negative   RBCU 1 2 1 O - 2   WBCU 3 4 3 2-5*     Recent Labs   Lab Test 07/03/19  1529 07/17/17  0930 07/14/17  1735   UTPG 2.71* 1.94* 2.31*     PTH  Recent Labs   Lab Test 09/11/20  0634   PTHI 440*     Iron Studies  Recent Labs   Lab Test 09/11/20  0634 07/15/17  0635   IRON 33* 38   * 247   IRONSAT 18 15   CARI 325 341       IMAGING:  All imaging studies reviewed by me.

## 2023-12-06 NOTE — PROGRESS NOTES
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

## 2023-12-06 NOTE — CONSULTS
[facility]  Transplant Nephrology Consult  Date of Admission:  12/4/2023  Today's Date: 12/05/2023  Requesting physician: Shi Jimenez MD    Recommendations:  - Potassium check - q 4hr  - If repeat K >6.0 ,short run HD  - Urine protein creatinine ratio ,daily as inpatient and weekly as out patient  - Continue immunosuppressants per protocol    Assessment & Plan   # DDKT: Last checked at time of transplant   - Baseline Creatinine: ~ TBD   - Proteinuria: Not checked recently   - Date DSA Last Checked: Dec/2023      Latest DSA: no   - BK Viremia: no   - Kidney Tx Biopsy: No             - Double J stent placed at time of kidney transplant.     # Immunosuppression: Tacrolimus immediate release (goal 8-10),Mycophenolate 1000 mg po bid ,tapering dose of methyl prednisolone.  : Induction - intermediate intensity ,One dose ATG and basiliximab.   - Patient is in an immunosuppressed state and will continue to monitor for efficacy and toxicity of immunosuppression medications.   - Changes: none    # Infection Prophylaxis:     - PJP: Sulfa/TMP (Bactrim) 480 mg po thrice per week.  - CMV: R +/D - pending,Valganciclovir (Valcyte) 450 mg po daily for three months.    # Hypertension: Controlled;  Goal BP: < 150/90  - Volume status: Mildly Hypervolemic   EDW ~ 101 kg  - Changes:  Continue PTA Amlodipine 10 mg ,Carvedilol 50 mg po bid.    # Anemia in Chronic Renal Disease: Hgb: Stable ,normal      BRIE: No              - Iron studies: Unknown    # Electrolytes:   - Potassium; level: high        - Magnesium; level: normal       On supplement:no  - Bicarbonate; level: low    On supplement: yes    # Mineral Bone Disorder:    - Secondary renal hyperparathyroidism; PTH level: Moderately   elevated                                              On treatment: Cinacalcet  - Vitamin D; level: Not checked recently        On supplement: Yes  - Calcium; level: Normal        On supplement: No  -  Phosphorus; level: High        On supplement: No      # Transplant History:  Etiology of Kidney Failure: Focal segmental glomerulosclerosis (FSGS)  Tx: DDKT  Transplant: 12/5/2023 (Kidney)  Crossmatch at time of Tx: negative  DSA at time of Tx: Yes  Significant changes in immunosuppression: None  Significant transplant-related complications: None    Recommendations were communicated to the primary team via this note.    Seen and discussed with Dr. Dion Middleton MD  Pager: [phone]    This patient has been seen and evaluated by me, Stephanie Lynn MD.  I have reviewed the note and agree with plan of care as documented by the fellow.    Stephanie Lynn MD    REASON FOR CONSULT   DDKT    History of Present Illness   Taylor Valiente is a 28 year old male with PMH of ESRD 2/2 FGS via LUE fistula, adrenal adenoma on the L, HTN, depression c/b previous hx of suicide attempts, left ventricular hypertrophy ,obesity, and osteochondritis dissecans.  S/P DDKTX on 11/5/2023.Was anuric before transplant.Now producing urine over a Liter over few hours.Appropriate pain at surgical site.No fever ,chills ,chest pain ,cough or Shortness of breathing.    Review of Systems    The 10 point Review of Systems is negative other than noted in the HPI or here.     Past Medical History    I have reviewed this patient's medical history and updated it with pertinent information if needed.   Past Medical History:   Diagnosis Date    Adrenal adenoma, left     Anemia     Benign essential hypertension 07/28/2017    Bursitis of left shoulder 07/29/2023    Chronic deep vein thrombosis (DVT) of internal jugular vein (H) 03/2023    Right IJ, catheter-related    CKD (chronic kidney disease) stage 5, GFR less than 15 ml/min (H) 2021    FSGS    Depression     Focal glomerular sclerosis 07/28/2017    HLD (hyperlipidemia)     LVH (left ventricular hypertrophy) due to hypertensive disease 07/14/2017    Noncompliance     Obesity, unspecified      OD (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/2021    Prolonged QT interval     Stress-induced cardiomyopathy     Suicide attempt (H) 2019       Past Surgical History   I have reviewed this patient's surgical history and updated it with pertinent information if needed.  Past Surgical History:   Procedure Laterality Date    ARTHROSCOPY ANKLE Left 02/24/2021    Procedure: Left ankle arthroscopy and debridement/micro fracture;  Surgeon: Mirza Nelson MD;  Location: [facility] OR    BIOPSY  2017    renal- [facility]    CREATE FISTULA ARTERIOVENOUS UPPER EXTREMITY Right 03/04/2021    Procedure: RIGHT proximal radial  to CEPHALIC ARTERIOVENOUS FISTULA;  Surgeon: Henrik Moran MD;  Location: [facility] OR    CREATE FISTULA ARTERIOVENOUS UPPER EXTREMITY Left 03/09/2023    Procedure: CREATION OF FIRST STAGE LEFT BRACHIOBASILIC ARTERIOVENOUS FISTULA;  Surgeon: Henrik Moran MD;  Location: [facility] OR    CREATE FISTULA ARTERIOVENOUS UPPER EXTREMITY Left 5/30/2023    Procedure: SECOND STAGE LEFT BRACHIAL TO BASILIC ARTERIOVENOUS FISTULA;  Surgeon: Henrik Moran MD;  Location: [facility] OR    IR CVC TUNNEL PLACEMENT > 5 YRS OF AGE  06/17/2021    IR CVC TUNNEL PLACEMENT > 5 YRS OF AGE  03/09/2023    IR CVC TUNNEL REMOVAL RIGHT  12/03/2021    IR CVC TUNNEL REMOVAL RIGHT  8/2/2023    IRRIGATION AND DEBRIDEMENT UPPER EXTREMITY, COMBINED Left 03/16/2023    Procedure: EVACUATION OF LEFT ARM HEMATOMA;  Surgeon: Henrik Moran MD;  Location: [facility] OR    LIGATE FISTULA ARTERIOVENOUS UPPER EXTREMITY Right 03/09/2023    Procedure: LIGATION OF RIGHT ARM ARTERIOVENOUS FISTULA;  Surgeon: Henrik Moran MD;  Location: [facility] OR    REVISION FISTULA ARTERIOVENOUS UPPER EXTREMITY Right 08/26/2021    Procedure: Second stage RIGHT BRACHIOCEPHALIC transposition ARTERIOVENOUS FISTULA;  Surgeon: Henrik Moran MD;  Location: [facility] OR       Family History   I have reviewed this patient's family history and updated it with pertinent information  if needed.   Family History   Problem Relation Age of Onset    Blood Disease Mother         has hep b    Diabetes Mother         gestionanal diabetes    Hypertension Mother     Obesity Father     Hypertension Father     Hypertension Maternal Grandmother     Diabetes Maternal Grandmother     Hypertension Paternal Grandmother     Hypertension Paternal Grandfather     Coronary Artery Disease Maternal Uncle     Cancer No family hx of        Social History   I have reviewed this patient's social history and updated it with pertinent information if needed. Taylor Valiente  reports that he has never smoked. He has never used smokeless tobacco. He reports current drug use. Drug: Marijuana. He reports that he does not drink alcohol.    Allergies   Allergies   Allergen Reactions    Benadryl [Diphenhydramine] Itching    Vancomycin Itching     Prior to Admission Medications    acetaminophen  975 mg Oral Q8H    amLODIPine  10 mg Oral Daily    aspirin  81 mg Oral Daily    [START ON 2023] atorvastatin  10 mg Oral Daily    carvedilol  50 mg Oral BID w/meals    [START ON 2023] magnesium oxide  400 mg Oral Daily with lunch    magnesium sulfate  1 g Intravenous Once    multivitamin RENAL  1 tablet Oral Daily    mycophenolate  1,000 mg Oral BID IS    [START ON 2023] polyethylene glycol  17 g Oral Daily    senna-docusate  1 tablet Oral BID    sodium chloride (PF)  10 mL Intracatheter Q8H    sulfamethoxazole-trimethoprim  1 tablet Oral Daily    [START ON 2023] tacrolimus  3 mg Oral BID IS    [START ON 2023] valGANciclovir  450 mg Oral Once per day on       dextrose 5% and 0.9% NaCl 75 mL/hr at 23    sodium chloride 275 mL/hr at 23    sodium chloride 225 mL/hr at 23       Physical Exam   Temp  Av.9  F (36.6  C)  Min: 97  F (36.1  C)  Max: 99  F (37.2  C)      Pulse  Av.4  Min: 70  Max: 86 Resp  Avg: 15  Min: 10  Max: 18  SpO2  Av %  Min: 96 %  Max:  100 %    CVP (mmHg): 10 mmHgBP (!) 149/92   Pulse 86   Temp 99  F (37.2  C)   Resp 17   Wt 105.1 kg (231 lb 12.8 oz)   SpO2 100%   BMI 33.26 kg/m     Date 12/05/23 0700 - 12/06/23 0659   Shift 7031-5597 7254-9993 1796-1689 24 Hour Total   INTAKE   P.O.  400  400   I.V. 900   900   Shift Total(mL/kg) 900(8.56) 400(3.8)  1300(12.36)   OUTPUT   Urine 450 4175  4625   Drains  160  160   Blood 50   50   Shift Total(mL/kg) 500(4.76) 4335(41.23)  4835(45.99)   Weight (kg) 105.14 105.14 105.14 105.14      Admit Weight: 105.1 kg (231 lb 12.8 oz)     GENERAL APPEARANCE: alert and no distress  HENT: mouth without ulcers or lesions  RESP: lungs clear to auscultation - no rales, rhonchi or wheezes  CV: regular rhythm, normal rate, no rub, no murmur  EDEMA: no LE edema bilaterally  ABDOMEN: soft, nondistended, nontender, bowel sounds normal  MS: extremities normal - no gross deformities noted, no evidence of inflammation in joints, no muscle tenderness  SKIN: no rash    Data   CMP  Recent Labs   Lab 12/05/23  1831 12/05/23  1736 12/05/23  1716 12/05/23  1640 12/05/23  1407 12/05/23  1247 12/05/23  0543 12/05/23  0007   NA  --   --   --   --   --  131*  --  136   POTASSIUM  --   --  5.5*  --   --  6.1*  --  5.0   CHLORIDE  --   --   --   --   --  94*  --  92*   CO2  --   --   --   --   --  19*  --  25   ANIONGAP  --   --   --   --   --  18*  --  19*   * 193*  --  208* 144* 138*   < > 96   BUN  --   --   --   --   --  64.2*  --  61.0*   CR  --   --   --   --   --  11.80*  --  11.60*   GFRESTIMATED  --   --   --   --   --  5*  --  6*   BUBBA  --   --   --   --   --  6.7*  --  8.4*   MAG  --   --   --   --   --  1.9  --   --    PHOS  --   --   --   --   --  5.5*  --   --    PROTTOTAL  --   --   --   --   --   --   --  7.7   ALBUMIN  --   --   --   --   --   --   --  4.5   BILITOTAL  --   --   --   --   --   --   --  0.5   ALKPHOS  --   --   --   --   --   --   --  138   AST  --   --   --   --   --   --   --  13   ALT  --    --   --   --   --   --   --  20    < > = values in this interval not displayed.     CBC  Recent Labs   Lab 12/05/23  1716 12/05/23  1247 12/05/23  0007   HGB 12.8* 11.3* 12.0*   WBC  --  9.3 9.7   RBC  --  3.96* 4.22*   HCT  --  34.8* 37.8*   MCV  --  88 90   MCH  --  28.5 28.4   MCHC  --  32.5 31.7   RDW  --  16.1* 16.0*   PLT  --  140* 159     INR  Recent Labs   Lab 12/05/23  0007   INR 1.07   PTT 30     ABGNo lab results found in last 7 days.   Urine Studies  Recent Labs   Lab Test 06/15/21  2220 02/09/21  1103 09/09/20  1305 07/14/17  0948   COLOR Light Yellow Yellow Light Yellow Yellow   APPEARANCE Clear Slightly Cloudy Clear Clear   URINEGLC 50* Negative 50* Negative   URINEBILI Negative Negative Negative Negative   URINEKETONE Negative Negative Negative Negative   SG 1.015 1.013 1.015 1.025   UBLD Trace* Negative Small* Moderate*   URINEPH 6.5 5.0 6.0 5.5   PROTEIN 600* >499* 300* >=300*   UROBILINOGEN  --   --   --  0.2   NITRITE Negative Negative Negative Negative   LEUKEST Negative Trace* Negative Negative   RBCU 1 2 1 O - 2   WBCU 3 4 3 2-5*     Recent Labs   Lab Test 07/03/19  1529 07/17/17  0930 07/14/17  1735   UTPG 2.71* 1.94* 2.31*     PTH  Recent Labs   Lab Test 09/11/20  0634   PTHI 440*     Iron Studies  Recent Labs   Lab Test 09/11/20  0634 07/15/17  0635   IRON 33* 38   * 247   IRONSAT 18 15   CARI 325 341       IMAGING:  All imaging studies reviewed by me.

## 2023-12-06 NOTE — PLAN OF CARE
Goal Outcome Evaluation:      Plan of Care Reviewed With: patient, family    Overall Patient Progress: improvingOverall Patient Progress: improving    Outcome Evaluation: Post Transplant Patient- Discharge Home with support

## 2023-12-06 NOTE — PROGRESS NOTES
"CLINICAL NUTRITION SERVICES - ASSESSMENT NOTE     Nutrition Prescription    RECOMMENDATIONS FOR MDs/PROVIDERS TO ORDER:  None at this time.    Malnutrition Status:    Patient does not meet two of the established criteria necessary for diagnosing malnutrition    Recommendations already ordered by Registered Dietitian (RD):  Ensure Enlive BID    Future/Additional Recommendations:  Monitor appetite/intake, wt trends, pertinent labs, bowel movements.     REASON FOR ASSESSMENT  Taylor Valiente is a/an 28 year old male assessed by the dietitian for Provider Order - Post Op Kidney Transplant - Assess and educate post SOT     CLINICAL HISTORY  Admitted for preoperative kidney transplant evaluation. 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 Received DDKT on 12/5.    NUTRITION HISTORY  Pt reports he had good appetite PTA, and usual intake was meals BID- breakfast (toast with eggs) and mid-afternoon meal (rice with chicken beaulieu or pork chop). Reports this morning he had some cheerios, otherwise appetite has been low. Discussed increased protein needs, pt agreeable to having vanilla Ensure Enlive BID.    CURRENT NUTRITION ORDERS  Diet: Regular  Intake/Tolerance: Per RN note today, pt tolerated some saltine crackers well    LABS  Na 138 (WNL)  K 4.4 (WNL), Mg 2.2 (WNL)  Phos 8.6 (H)  BUN 50.3 (H), Cr 6.95 (H)  Glucose POCT 102-208 (H)    MEDICATIONS  Nephrovite  Mycophenolate  Miralax  Senokot  Bactrim  Tacrolimus  D5 @ 75 mL/hr -> provides additional 306 kcals/day    ANTHROPOMETRICS  Height: 177.8 cm (5' 10\")  Most Recent Weight: 105.1 kg (231 lb 12.8 oz)    IBW: 75.5 kg  BMI: Obesity Grade I BMI 30-34.9  Weight History:   Wt Readings from Last 15 Encounters: "   12/04/23 105.1 kg (231 lb 12.8 oz)   08/02/23 100.2 kg (221 lb)   07/13/23 104.2 kg (229 lb 11.2 oz)   06/06/23 104.1 kg (229 lb 6.4 oz)   05/30/23 105.6 kg (232 lb 14.4 oz)   05/02/23 106.3 kg (234 lb 6.4 oz)   04/21/23 107 kg (235 lb 12.8 oz)   04/06/23 109.7 kg (241 lb 12.8 oz)   03/21/23 111.9 kg (246 lb 11.2 oz)   03/17/23 110.6 kg (243 lb 13.3 oz)   03/12/23 108.9 kg (240 lb)   03/09/23 112 kg (247 lb)   03/07/23 112 kg (247 lb)   10/29/22 111.3 kg (245 lb 6.4 oz)   03/02/22 109.6 kg (241 lb 9.6 oz)   6.1% wt loss in 9 months    Dosing Weight: 83 kg (adjusted based on most recent wt of 105.1 kg and IBW of 75.5 kg)    ASSESSED NUTRITION NEEDS  Estimated Energy Needs: 2075 - 2490 kcals/day (25 - 30 kcals/kg )  Justification: Maintenance, Obesity  Estimated Protein Needs: 108 - 166 grams protein/day (1.3 - 2 grams of pro/kg)  Justification: Increased needs and Post-op  Estimated Fluid Needs: UOP + 500 mL  Justification: Maintenance    PHYSICAL FINDINGS  See malnutrition section below.     MALNUTRITION  % Intake: Decreased intake does not meet criteria  % Weight Loss: Weight loss does not meet criteria  Subcutaneous Fat Loss: None observed  Muscle Loss: None observed  Fluid Accumulation/Edema: None noted  Malnutrition Diagnosis: Patient does not meet two of the established criteria necessary for diagnosing malnutrition    NUTRITION DIAGNOSIS  Increased nutrient needs (protein) related to s/p kidney txp as evidenced by assessed nutrition needs of 1.3-2.0 g/kg protein daily.      INTERVENTIONS  Implementation  Nutrition Education: Post-txp nutrition education- discussed increased protein needs 6-8 weeks post-op, food safety, long-term heart healthy nutrition. Provided handouts: Guide to your diet after transplant and Food safety booklet.   Medical food supplement therapy - Ensure Enlive BID    Goals  Patient to consume % of nutritionally adequate meal trays TID, or the equivalent with supplements/snacks.      Monitoring/Evaluation  Progress toward goals will be monitored and evaluated per protocol.    JARAD Perez, RD, LD  7A/7B (beds 219-229) RD pager: [phone]  Weekend/Holiday RD pager: [phone]

## 2023-12-06 NOTE — PLAN OF CARE
BP (!) 147/98   Pulse 84   Temp 98.1  F (36.7  C) (Oral)   Resp 16   Wt 105.1 kg (231 lb 12.8 oz)   SpO2 100%   BMI 33.26 kg/m      Shift: 1717-4906  Isolation Status: none  VS: stable on room air, afebrile  Neuro: Aox4  Behaviors: calm, cooperative, occasionally restless  BG: hourly until 2000 d/t potassium shifting   Labs: K+=5.5, Hgb = 12.8  Respiratory: WDL  Cardiac: WDLx prolonged QT segment  Pain/Nausea: 7/10 pain, intermittent nausea  PRN: IV dilaudid given once, aromatherapy and sea-bands   Diet: Clears   IV Access: Right PIVx2, left triple lumen internal jugular    Infusion(s): D5LR@75, Replacement 1 and 2, D10 @75mL/hr (300 mL ordered)  Lines/Drains: griffiths, right CAMDEN to bulb suction with sanguinous output   GI/: Feeling constipated - no BM nor flatus post surgery/ griffiths cath with 400-900mL out per hour. Clear yellow  Skin: RLQ incision with op dressing in place. Drainage marked   Mobility: SBA  Events/Education: K+ shifted  Plan: Continue cares on 7A

## 2023-12-06 NOTE — PLAN OF CARE
Goal Outcome Evaluation:      Plan of Care Reviewed With: patient    Overall Patient Progress: improvingOverall Patient Progress: improving    Outcome Evaluation: See RD note 12/6    JARAD Perez, RD, LD  7A/7B (beds 219-229) RD pager: [phone]  Weekend/Holiday RD pager: [phone]

## 2023-12-06 NOTE — PROVIDER NOTIFICATION
7A [id number] KARLA Valiente   last potassium was 5.5 after getting shifted, potassium is now 2.2. Would you like to get a recheck ? let me know if intervention is needed. Thanks, Jade ROSE [phone]    On call ordered a repeat potassium and EKG

## 2023-12-06 NOTE — PROGRESS NOTES
"Was paged about hypokalemia in this patient.     Assessed patient at bedside. Patient denies chest pain, sob, nausea, vomiting, dizziness, and lightheadedness. RRR to PP. Making reasonable urine, clear, yellow. NLB on RA.     Labs were drawn downstream from mIVF infusion site, likely leading to falsely diluted findings. Ordered repeat potassium, WNL.    Additionally nurse later paged about upset stomach in this patient. Ordered one time dose of PPI.     Yosvany \"Greyson\" Cristo KLINE  General Surgery Resident  Please page on call resident through [facility]  "

## 2023-12-06 NOTE — PROGRESS NOTES
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 Pt was on HD via RIJ with tunneled line due to recent failed RUE fistula and was anuric. Patient is now s/p DDKT with ureteral stent and CAMDEN with Dr. Jimenez on 12/5/23.     Graft:  s/p DDKT: POD 1. Cr 7.0 (from 11.8). UOP 70-150cc/hour, will give 1L bolus. Post-op US:  1. Elevated renal artery peak systolic velocity at the hilum, this may  be secondary to postsurgical edema. Attention on follow-up.  2. Only one renal artery was visualized at the hilum on this exam.     Immunosuppression management:  Induction: via intermediate intensity protocol (cPRA 57)   Thymo 150 mg 12/5 intra-op  Simulect POD 1 & 5  Steroid taper  Maintenance:    - MMF 1000 mg BID   - Tacrolimus 3 mg BID. Goal level 8-10.     Neuro:  Acute post op pain: Tylenol scheduled and Oxycodone prn. Add Robaxin and lidocaine patches today. IV Dilaudid for breakthrough pain today.     Hematology:   Anemia of chronic disease: HGB stable at 12.2 from 12 pre-op.   Thrombocytopenia:  12/5, improved to 182 today, 2/2 Thymo    Cardiorespiratory: Restart PTA ASA 81 mg daily   HTN: Restarted Norvasc 10 mg daily and Coreg 50 mg BID  H/o prolonged QT: 12/5 QTc 476, monitor.   HLD: Resume PTA lipitor 10 mg daily     GI/Nutrition: Regular diet. Bowel regimen: Senokot-S, Miralax     Endocrine:   Steroid induced hyperglycemia: HGBA1C 5.9%. Sliding scale insulin prn.     Fluid/Electrolytes:   MIVF: D5NS@100 ml/hour plus urine replacements. Will discontinue urine replacements and continue NS@ 100 ml/hour.   Hypomagnesemia: Mag ox 400 mg daily to start on POD 2  Hyperkalemia: K 6/1 post-op, shifted and given IVF and Lasix. Now normalized.   Hyponatremia:  Na  Low bicarb: supplement    : Hurt in place due to anastomosis until POD 5    Infectious disease: No acute issues.     Prophylaxis: DVT(mechanical), fall, viral (Valcyte x 3 months, CMV R+D pending, EBV R+/D pending), PJP (Bactrim indefinitely)    Disposition: 7A    HENRIQUE/Fellow/Resident Provider: Deborah Castillo [id number] [id number]    Faculty: Shi Jimenez M.D.  _________________________________________________________________  Transplant History: Admitted 12/4/2023 for kidney transplant.  12/5/2023 (Kidney), Postoperative day: 1     Interval History: History is obtained from the patient  Overnight events: Pain not controlled. Patient has not yet been OOB. Denies SOB or nausea. Family at bedside.    ROS:   A 10-point review of systems was negative except as noted above.    Meds:   acetaminophen  975 mg Oral Q8H    amLODIPine  10 mg Oral Daily    aspirin  81 mg Oral Daily    atorvastatin  10 mg Oral Daily    carvedilol  50 mg Oral BID w/meals    [START ON 12/7/2023] magnesium oxide  400 mg Oral Daily with lunch    [START ON 12/7/2023] multivitamin w/minerals  1 tablet Oral Daily    mycophenolate  1,000 mg Oral BID IS    polyethylene glycol  17 g Oral Daily    senna-docusate  1 tablet Oral BID    sodium chloride (PF)  10 mL Intracatheter Q8H    sodium chloride 0.9%  1,000 mL Intravenous Once    [START ON 12/8/2023] sulfamethoxazole-trimethoprim  1 tablet Oral Once per day on Monday Wednesday Friday    tacrolimus  3 mg Oral BID IS    [START ON 12/7/2023] valGANciclovir  450 mg Oral Once per day on Monday Thursday       Physical Exam:     Admit Weight: 105.1 kg (231 lb 12.8 oz)    Current vitals:   /87 (BP Location: Right arm)   Pulse 90   Temp 99  F (37.2  C) (Oral)   Resp 12   Wt 105.1 kg (231 lb 12.8 oz)   SpO2 96%   BMI 33.26 kg/m          Vital sign ranges:    Temp:  [97  F (36.1  C)-99.1  F (37.3  C)] 99  F (37.2  C)  Pulse:  [66-91] 90  Resp:  [10-20] 12  BP: (115-170)/() 137/87  SpO2:  [93 %-100 %]  96 %  Patient Vitals for the past 24 hrs:   BP Temp Temp src Pulse Resp SpO2   12/06/23 1133 137/87 -- -- 90 12 96 %   12/06/23 1000 (!) 141/80 -- -- 86 -- --   12/06/23 0959 (!) 141/80 99  F (37.2  C) Oral 80 14 99 %   12/06/23 0900 134/81 -- -- 84 -- --   12/06/23 0855 134/81 -- -- 79 11 97 %   12/06/23 0800 -- -- -- 74 -- --   12/06/23 0752 (!) 148/87 -- -- 72 14 97 %   12/06/23 0700 132/76 -- -- 66 16 94 %   12/06/23 0600 115/66 98.9  F (37.2  C) Oral 77 13 94 %   12/06/23 0500 123/68 -- -- 80 15 95 %   12/06/23 0400 (!) 149/84 -- -- 79 18 96 %   12/06/23 0300 139/81 -- -- 79 16 95 %   12/06/23 0200 (!) 151/83 -- -- 81 14 95 %   12/06/23 0100 (!) 154/92 -- -- 82 19 95 %   12/06/23 0000 (!) 146/105 99  F (37.2  C) Oral 91 15 100 %   12/05/23 2301 (!) 159/95 -- -- -- -- --   12/05/23 2300 (!) 163/95 -- -- 80 15 95 %   12/05/23 2208 (!) 153/99 -- -- -- -- --   12/05/23 2200 (!) 170/106 99.1  F (37.3  C) Oral 84 19 95 %   12/05/23 2100 (!) 150/106 -- -- 85 20 93 %   12/05/23 2000 (!) 149/92 99  F (37.2  C) -- 86 17 100 %   12/05/23 1900 (!) 147/98 -- -- 84 16 100 %   12/05/23 1845 (!) 155/90 -- -- 82 -- 98 %   12/05/23 1830 (!) 151/92 -- -- 83 -- 96 %   12/05/23 1815 (!) 151/90 -- -- 86 -- 99 %   12/05/23 1800 (!) 151/96 -- -- 83 -- 100 %   12/05/23 1738 -- 98.1  F (36.7  C) Oral -- 18 98 %   12/05/23 1700 (!) 144/91 -- -- 82 -- 98 %   12/05/23 1645 (!) 150/93 -- -- 80 -- 99 %   12/05/23 1630 (!) 144/97 -- -- 82 -- 100 %   12/05/23 1615 (!) 140/82 -- -- 86 -- 98 %   12/05/23 1600 (!) 142/82 97.4  F (36.3  C) Oral 77 18 99 %   12/05/23 1430 (!) 140/77 -- -- 83 15 100 %   12/05/23 1415 (!) 141/81 -- -- 78 16 100 %   12/05/23 1405 -- -- Oral -- -- --   12/05/23 1400 (!) 141/82 97  F (36.1  C) -- 76 12 100 %   12/05/23 1345 (!) 140/75 -- -- 71 15 100 %   12/05/23 1330 (!) 141/80 -- -- 78 14 96 %   12/05/23 1315 131/77 -- -- 76 12 99 %   12/05/23 1300 130/72 -- -- 73 10 99 %   12/05/23 1245 130/71 -- -- 76 11 100 %    12/05/23 1230 131/75 97.9  F (36.6  C) -- 80 17 100 %     General Appearance: in no apparent distress.   Skin: normal  Heart: regular rate and rhythm  Lungs: NLB on room air  Abdomen: The abdomen is soft and  appropriately tender over the kidney graft. The wound is Healing well, well approximated, without signs of infection, and no drainage.  : griffiths is present. Urine has small gross hematuria.   Extremities: edema: absent   Neurologic: awake, alert, and oriented. Tremor absent.    Data:   CMP  Recent Labs   Lab 12/06/23 0920 12/06/23 0631 12/05/23  2153 12/05/23  2115 12/05/23  1407 12/05/23  1247 12/05/23  0543 12/05/23  0007   NA  --  138  --   --   --  131*  --  136   POTASSIUM 4.6 4.4  4.4   < >  --    < > 6.1*  --  5.0   CHLORIDE  --  98  --   --   --  94*  --  92*   CO2  --  24  --   --   --  19*  --  25   GLC  --  130*  --  164*   < > 138*   < > 96   BUN  --  50.3*  --   --   --  64.2*  --  61.0*   CR  --  6.95*  --   --   --  11.80*  --  11.60*   GFRESTIMATED  --  10*  --   --   --  5*  --  6*   BUBBA  --  7.9*  --   --   --  6.7*  --  8.4*   MAG  --  2.2  --   --   --  1.9  --   --    PHOS  --  8.6*  --   --   --  5.5*  --   --    ALBUMIN  --   --   --   --   --   --   --  4.5   BILITOTAL  --   --   --   --   --   --   --  0.5   ALKPHOS  --   --   --   --   --   --   --  138   AST  --   --   --   --   --   --   --  13   ALT  --   --   --   --   --   --   --  20    < > = values in this interval not displayed.     CBC  Recent Labs   Lab 12/06/23 0920 12/06/23 0631 12/05/23  1716 12/05/23  1247 12/05/23  0007   HGB 12.2* 11.7*  11.7*   < > 11.3* 12.0*   WBC  --  9.4  --  9.3 9.7   PLT  --  182  --  140* 159   A1C  --   --   --   --  5.9*    < > = values in this interval not displayed.     COAGS  Recent Labs   Lab 12/05/23  0007   INR 1.07   PTT 30      Urinalysis  Recent Labs   Lab Test 06/15/21  2220 02/09/21  1103 09/09/20  1305 07/03/19  1529 07/17/17  0930   COLOR Light Yellow Yellow   < >  --    --    APPEARANCE Clear Slightly Cloudy   < >  --   --    URINEGLC 50* Negative   < >  --   --    URINEBILI Negative Negative   < >  --   --    URINEKETONE Negative Negative   < >  --   --    SG 1.015 1.013   < >  --   --    UBLD Trace* Negative   < >  --   --    URINEPH 6.5 5.0   < >  --   --    PROTEIN 600* >499*   < >  --   --    NITRITE Negative Negative   < >  --   --    LEUKEST Negative Trace*   < >  --   --    RBCU 1 2   < >  --   --    WBCU 3 4   < >  --   --    UTPG  --   --   --  2.71* 1.94*    < > = values in this interval not displayed.     Virology:  Hepatitis C Antibody   Date Value Ref Range Status   12/05/2023 Nonreactive Nonreactive Final   02/09/2021 Nonreactive NR^Nonreactive Final     Comment:     Assay performance characteristics have not been established for newborns,   infants, and children

## 2023-12-07 ENCOUNTER — TELEPHONE (OUTPATIENT)
Dept: PHARMACY | Facility: CLINIC | Age: 28
End: 2023-12-07
Payer: MEDICARE

## 2023-12-07 PROBLEM — E83.42 HYPOMAGNESEMIA: Status: ACTIVE | Noted: 2023-12-07

## 2023-12-07 PROBLEM — G89.18 ACUTE POST-OPERATIVE PAIN: Status: ACTIVE | Noted: 2023-12-07

## 2023-12-07 PROBLEM — E87.8 LOW BICARBONATE: Status: ACTIVE | Noted: 2023-12-07

## 2023-12-07 PROBLEM — D63.1 ANEMIA OF CHRONIC RENAL FAILURE: Status: ACTIVE | Noted: 2020-12-21

## 2023-12-07 PROBLEM — N18.9 ANEMIA OF CHRONIC RENAL FAILURE: Status: ACTIVE | Noted: 2020-12-21

## 2023-12-07 LAB
ALBUMIN MFR UR ELPH: 49.2 MG/DL
ANION GAP SERPL CALCULATED.3IONS-SCNC: 11 MMOL/L (ref 7–15)
ATRIAL RATE - MUSE: 76 BPM
ATRIAL RATE - MUSE: 79 BPM
BASOPHILS # BLD AUTO: 0 10E3/UL (ref 0–0.2)
BASOPHILS NFR BLD AUTO: 0 %
BK VIRUS IGG ANTIBODY: ABNORMAL TITER
BUN SERPL-MCNC: 34.5 MG/DL (ref 6–20)
CALCIUM SERPL-MCNC: 9 MG/DL (ref 8.6–10)
CHLORIDE SERPL-SCNC: 107 MMOL/L (ref 98–107)
CREAT SERPL-MCNC: 2.9 MG/DL (ref 0.67–1.17)
CREAT UR-MCNC: 93.8 MG/DL
DEPRECATED HCO3 PLAS-SCNC: 19 MMOL/L (ref 22–29)
DIASTOLIC BLOOD PRESSURE - MUSE: NORMAL MMHG
DIASTOLIC BLOOD PRESSURE - MUSE: NORMAL MMHG
DONOR IDENTIFICATION: NORMAL
DSA COMMENTS: NORMAL
DSA PRESENT: NO
DSA TEST METHOD: NORMAL
EGFRCR SERPLBLD CKD-EPI 2021: 29 ML/MIN/1.73M2
EOSINOPHIL # BLD AUTO: 0 10E3/UL (ref 0–0.7)
EOSINOPHIL NFR BLD AUTO: 0 %
ERYTHROCYTE [DISTWIDTH] IN BLOOD BY AUTOMATED COUNT: 16.2 % (ref 10–15)
GLUCOSE BLDC GLUCOMTR-MCNC: 127 MG/DL (ref 70–99)
GLUCOSE BLDC GLUCOMTR-MCNC: 149 MG/DL (ref 70–99)
GLUCOSE BLDC GLUCOMTR-MCNC: 149 MG/DL (ref 70–99)
GLUCOSE BLDC GLUCOMTR-MCNC: 162 MG/DL (ref 70–99)
GLUCOSE BLDC GLUCOMTR-MCNC: 165 MG/DL (ref 70–99)
GLUCOSE SERPL-MCNC: 110 MG/DL (ref 70–99)
HBV DNA SERPL QL NAA+PROBE: NORMAL
HCT VFR BLD AUTO: 34.5 % (ref 40–53)
HCV RNA SERPL QL NAA+PROBE: NORMAL
HGB BLD-MCNC: 10.5 G/DL (ref 13.3–17.7)
HIV1+2 RNA SERPL QL NAA+PROBE: NORMAL
IMM GRANULOCYTES # BLD: 0.1 10E3/UL
IMM GRANULOCYTES NFR BLD: 1 %
INTERPRETATION ECG - MUSE: NORMAL
INTERPRETATION ECG - MUSE: NORMAL
LYMPHOCYTES # BLD AUTO: 0.4 10E3/UL (ref 0.8–5.3)
LYMPHOCYTES NFR BLD AUTO: 4 %
MAGNESIUM SERPL-MCNC: 2.3 MG/DL (ref 1.7–2.3)
MCH RBC QN AUTO: 28.2 PG (ref 26.5–33)
MCHC RBC AUTO-ENTMCNC: 30.4 G/DL (ref 31.5–36.5)
MCV RBC AUTO: 93 FL (ref 78–100)
MONOCYTES # BLD AUTO: 0.7 10E3/UL (ref 0–1.3)
MONOCYTES NFR BLD AUTO: 7 %
NEUTROPHILS # BLD AUTO: 8.4 10E3/UL (ref 1.6–8.3)
NEUTROPHILS NFR BLD AUTO: 88 %
NRBC # BLD AUTO: 0 10E3/UL
NRBC BLD AUTO-RTO: 0 /100
ORGAN: NORMAL
P AXIS - MUSE: 52 DEGREES
P AXIS - MUSE: 53 DEGREES
PHOSPHATE SERPL-MCNC: 4.2 MG/DL (ref 2.5–4.5)
PLATELET # BLD AUTO: 162 10E3/UL (ref 150–450)
POTASSIUM SERPL-SCNC: 5.5 MMOL/L (ref 3.4–5.3)
PR INTERVAL - MUSE: 168 MS
PR INTERVAL - MUSE: 172 MS
PROT/CREAT 24H UR: 0.52 MG/MG CR (ref 0–0.2)
QRS DURATION - MUSE: 100 MS
QRS DURATION - MUSE: 90 MS
QT - MUSE: 408 MS
QT - MUSE: 498 MS
QTC - MUSE: 467 MS
QTC - MUSE: 560 MS
R AXIS - MUSE: 39 DEGREES
R AXIS - MUSE: 46 DEGREES
RBC # BLD AUTO: 3.72 10E6/UL (ref 4.4–5.9)
SODIUM SERPL-SCNC: 137 MMOL/L (ref 135–145)
SYSTOLIC BLOOD PRESSURE - MUSE: NORMAL MMHG
SYSTOLIC BLOOD PRESSURE - MUSE: NORMAL MMHG
T AXIS - MUSE: 90 DEGREES
T AXIS - MUSE: 91 DEGREES
VENTRICULAR RATE- MUSE: 76 BPM
VENTRICULAR RATE- MUSE: 79 BPM
WBC # BLD AUTO: 9.5 10E3/UL (ref 4–11)

## 2023-12-07 PROCEDURE — 258N000003 HC RX IP 258 OP 636: Performed by: PHYSICIAN ASSISTANT

## 2023-12-07 PROCEDURE — 250N000012 HC RX MED GY IP 250 OP 636 PS 637: Performed by: PHYSICIAN ASSISTANT

## 2023-12-07 PROCEDURE — 84156 ASSAY OF PROTEIN URINE: CPT | Performed by: SURGERY

## 2023-12-07 PROCEDURE — 250N000011 HC RX IP 250 OP 636: Mod: JZ | Performed by: PHYSICIAN ASSISTANT

## 2023-12-07 PROCEDURE — 120N000011 HC R&B TRANSPLANT UMMC

## 2023-12-07 PROCEDURE — 85025 COMPLETE CBC W/AUTO DIFF WBC: CPT | Performed by: STUDENT IN AN ORGANIZED HEALTH CARE EDUCATION/TRAINING PROGRAM

## 2023-12-07 PROCEDURE — 99233 SBSQ HOSP IP/OBS HIGH 50: CPT | Mod: 24 | Performed by: INTERNAL MEDICINE

## 2023-12-07 PROCEDURE — 250N000012 HC RX MED GY IP 250 OP 636 PS 637: Performed by: NURSE PRACTITIONER

## 2023-12-07 PROCEDURE — 250N000012 HC RX MED GY IP 250 OP 636 PS 637: Performed by: STUDENT IN AN ORGANIZED HEALTH CARE EDUCATION/TRAINING PROGRAM

## 2023-12-07 PROCEDURE — 84100 ASSAY OF PHOSPHORUS: CPT | Performed by: STUDENT IN AN ORGANIZED HEALTH CARE EDUCATION/TRAINING PROGRAM

## 2023-12-07 PROCEDURE — 250N000013 HC RX MED GY IP 250 OP 250 PS 637

## 2023-12-07 PROCEDURE — 83735 ASSAY OF MAGNESIUM: CPT | Performed by: STUDENT IN AN ORGANIZED HEALTH CARE EDUCATION/TRAINING PROGRAM

## 2023-12-07 PROCEDURE — 80048 BASIC METABOLIC PNL TOTAL CA: CPT | Performed by: STUDENT IN AN ORGANIZED HEALTH CARE EDUCATION/TRAINING PROGRAM

## 2023-12-07 PROCEDURE — 250N000013 HC RX MED GY IP 250 OP 250 PS 637: Performed by: STUDENT IN AN ORGANIZED HEALTH CARE EDUCATION/TRAINING PROGRAM

## 2023-12-07 PROCEDURE — 36592 COLLECT BLOOD FROM PICC: CPT | Performed by: STUDENT IN AN ORGANIZED HEALTH CARE EDUCATION/TRAINING PROGRAM

## 2023-12-07 PROCEDURE — 250N000013 HC RX MED GY IP 250 OP 250 PS 637: Performed by: PHYSICIAN ASSISTANT

## 2023-12-07 PROCEDURE — 250N000011 HC RX IP 250 OP 636: Mod: JZ | Performed by: STUDENT IN AN ORGANIZED HEALTH CARE EDUCATION/TRAINING PROGRAM

## 2023-12-07 PROCEDURE — 250N000013 HC RX MED GY IP 250 OP 250 PS 637: Performed by: NURSE PRACTITIONER

## 2023-12-07 PROCEDURE — 250N000013 HC RX MED GY IP 250 OP 250 PS 637: Performed by: SURGERY

## 2023-12-07 PROCEDURE — 258N000003 HC RX IP 258 OP 636: Performed by: NURSE PRACTITIONER

## 2023-12-07 RX ORDER — OXYCODONE HYDROCHLORIDE 5 MG/1
5 TABLET ORAL EVERY 4 HOURS PRN
Status: DISCONTINUED | OUTPATIENT
Start: 2023-12-07 | End: 2023-12-08 | Stop reason: HOSPADM

## 2023-12-07 RX ORDER — LABETALOL HYDROCHLORIDE 5 MG/ML
20-80 INJECTION, SOLUTION INTRAVENOUS EVERY 10 MIN PRN
Status: DISCONTINUED | OUTPATIENT
Start: 2023-12-07 | End: 2023-12-08

## 2023-12-07 RX ORDER — VALGANCICLOVIR 450 MG/1
450 TABLET, FILM COATED ORAL DAILY
Status: DISCONTINUED | OUTPATIENT
Start: 2023-12-08 | End: 2023-12-08

## 2023-12-07 RX ORDER — MYCOPHENOLATE MOFETIL 250 MG/1
750 CAPSULE ORAL
Status: DISCONTINUED | OUTPATIENT
Start: 2023-12-07 | End: 2023-12-08 | Stop reason: HOSPADM

## 2023-12-07 RX ORDER — OXYCODONE HYDROCHLORIDE 10 MG/1
10 TABLET ORAL EVERY 4 HOURS PRN
Status: DISCONTINUED | OUTPATIENT
Start: 2023-12-07 | End: 2023-12-08 | Stop reason: HOSPADM

## 2023-12-07 RX ORDER — SODIUM BICARBONATE 650 MG/1
650 TABLET ORAL 2 TIMES DAILY
Status: DISCONTINUED | OUTPATIENT
Start: 2023-12-07 | End: 2023-12-08

## 2023-12-07 RX ORDER — HYDRALAZINE HYDROCHLORIDE 20 MG/ML
10-20 INJECTION INTRAMUSCULAR; INTRAVENOUS EVERY 4 HOURS PRN
Status: DISCONTINUED | OUTPATIENT
Start: 2023-12-07 | End: 2023-12-08

## 2023-12-07 RX ADMIN — MYCOPHENOLATE MOFETIL 1000 MG: 250 CAPSULE ORAL at 08:28

## 2023-12-07 RX ADMIN — LABETALOL HYDROCHLORIDE 40 MG: 5 INJECTION, SOLUTION INTRAVENOUS at 23:17

## 2023-12-07 RX ADMIN — SODIUM BICARBONATE 650 MG: 650 TABLET ORAL at 20:45

## 2023-12-07 RX ADMIN — ACETAMINOPHEN 975 MG: 325 TABLET, FILM COATED ORAL at 18:24

## 2023-12-07 RX ADMIN — HYDRALAZINE HYDROCHLORIDE 20 MG: 20 INJECTION INTRAMUSCULAR; INTRAVENOUS at 23:46

## 2023-12-07 RX ADMIN — LABETALOL HYDROCHLORIDE 20 MG: 5 INJECTION, SOLUTION INTRAVENOUS at 22:57

## 2023-12-07 RX ADMIN — ATORVASTATIN CALCIUM 10 MG: 10 TABLET, FILM COATED ORAL at 08:29

## 2023-12-07 RX ADMIN — METHOCARBAMOL 500 MG: 500 TABLET ORAL at 12:01

## 2023-12-07 RX ADMIN — METHOCARBAMOL 500 MG: 500 TABLET ORAL at 08:29

## 2023-12-07 RX ADMIN — ACETAMINOPHEN 975 MG: 325 TABLET, FILM COATED ORAL at 10:44

## 2023-12-07 RX ADMIN — MAGNESIUM OXIDE TAB 400 MG (241.3 MG ELEMENTAL MG) 400 MG: 400 (241.3 MG) TAB at 12:01

## 2023-12-07 RX ADMIN — AMLODIPINE BESYLATE 10 MG: 10 TABLET ORAL at 08:28

## 2023-12-07 RX ADMIN — TACROLIMUS 3 MG: 1 CAPSULE ORAL at 08:28

## 2023-12-07 RX ADMIN — ASPIRIN 81 MG CHEWABLE TABLET 81 MG: 81 TABLET CHEWABLE at 08:29

## 2023-12-07 RX ADMIN — INSULIN ASPART 1 UNITS: 100 INJECTION, SOLUTION INTRAVENOUS; SUBCUTANEOUS at 18:29

## 2023-12-07 RX ADMIN — CARVEDILOL 50 MG: 25 TABLET, FILM COATED ORAL at 18:24

## 2023-12-07 RX ADMIN — INSULIN ASPART 1 UNITS: 100 INJECTION, SOLUTION INTRAVENOUS; SUBCUTANEOUS at 08:48

## 2023-12-07 RX ADMIN — SODIUM BICARBONATE 650 MG: 650 TABLET ORAL at 10:44

## 2023-12-07 RX ADMIN — SODIUM CHLORIDE: 9 INJECTION, SOLUTION INTRAVENOUS at 04:11

## 2023-12-07 RX ADMIN — OXYCODONE HYDROCHLORIDE 5 MG: 5 TABLET ORAL at 17:09

## 2023-12-07 RX ADMIN — VALGANCICLOVIR 450 MG: 450 TABLET, FILM COATED ORAL at 08:28

## 2023-12-07 RX ADMIN — TACROLIMUS 3 MG: 1 CAPSULE ORAL at 18:24

## 2023-12-07 RX ADMIN — Medication 1 TABLET: at 08:29

## 2023-12-07 RX ADMIN — POLYETHYLENE GLYCOL 3350 17 G: 17 POWDER, FOR SOLUTION ORAL at 08:29

## 2023-12-07 RX ADMIN — SENNOSIDES AND DOCUSATE SODIUM 1 TABLET: 8.6; 5 TABLET ORAL at 20:45

## 2023-12-07 RX ADMIN — LIDOCAINE 2 PATCH: 4 PATCH TOPICAL at 20:45

## 2023-12-07 RX ADMIN — MYCOPHENOLATE MOFETIL 750 MG: 250 CAPSULE ORAL at 18:24

## 2023-12-07 RX ADMIN — METHYLPREDNISOLONE SODIUM SUCCINATE 100 MG: 125 INJECTION, POWDER, FOR SOLUTION INTRAMUSCULAR; INTRAVENOUS at 08:29

## 2023-12-07 RX ADMIN — SENNOSIDES AND DOCUSATE SODIUM 1 TABLET: 8.6; 5 TABLET ORAL at 08:29

## 2023-12-07 RX ADMIN — ACETAMINOPHEN 975 MG: 325 TABLET, FILM COATED ORAL at 01:28

## 2023-12-07 RX ADMIN — CARVEDILOL 50 MG: 25 TABLET, FILM COATED ORAL at 08:28

## 2023-12-07 RX ADMIN — METHOCARBAMOL 500 MG: 500 TABLET ORAL at 20:45

## 2023-12-07 RX ADMIN — LABETALOL HYDROCHLORIDE 20 MG: 5 INJECTION, SOLUTION INTRAVENOUS at 22:34

## 2023-12-07 RX ADMIN — SODIUM CHLORIDE 500 ML: 9 INJECTION, SOLUTION INTRAVENOUS at 14:29

## 2023-12-07 RX ADMIN — METHOCARBAMOL 500 MG: 500 TABLET ORAL at 17:09

## 2023-12-07 ASSESSMENT — ACTIVITIES OF DAILY LIVING (ADL)
ADLS_ACUITY_SCORE: 27
ADLS_ACUITY_SCORE: 27
ADLS_ACUITY_SCORE: 23
ADLS_ACUITY_SCORE: 23
ADLS_ACUITY_SCORE: 27
ADLS_ACUITY_SCORE: 23
ADLS_ACUITY_SCORE: 23
ADLS_ACUITY_SCORE: 27
ADLS_ACUITY_SCORE: 23
ADLS_ACUITY_SCORE: 23
ADLS_ACUITY_SCORE: 27
ADLS_ACUITY_SCORE: 27

## 2023-12-07 NOTE — DISCHARGE SUMMARY
[facility]    Discharge Summary  Transplant Surgery    Date of Admission:  2023  Date of Discharge:  2023  Discharging Provider: ROSA Torres CNP / Timur Jimenez MD    Discharge Diagnoses   Principal Problem:    Kidney transplant recipient  Active Problems:    Anemia of chronic renal failure    Hyperlipidemia    Prolonged Q-T interval on ECG    Essential hypertension    Pre-diabetes    Acute post-operative pain    Hypomagnesemia    Low bicarbonate    Immunosuppressed status (H24)      Procedure/Surgery Information    Procedure date:  23    Preoperative diagnosis:  End Stage renal failure due to focal segmental glomerulosclerosis (FSGS)    Postoperative diagnosis:  Same    Procedure:  1. Left kidney transplant,  Donation after Brain Death, Right  iliac fossa, without vascular reconstruction. A J-J ureteral stent was placed.  2. Kidney allograft preparation on Back Table       Surgeon:  TIMUR JIMENEZ    Fellow/Assistant:  Trino Cutler    Anesthesia:  General    Specimen:  None    Drains:  Chicho-Epps drain     History of Present Illness   Taylor Valiente is a 28 year old male with a history of ESKD secondary to FSGS, anemia, adrenal adenoma, HTN, depression, h/o prolonged QT, LVH 2/2 stress cardiomyopathy, obesity, and osteochondritis dissecans. He was on HD via RIJ with tunneled line due to recent failed RUE fistula and was anuric. Patient is now s/p  donor kidney transplant with ureteral stent with Dr. Jimenez on 23.     Hospital Course   s/p DDKT 23: Cr trended down appropriately post-operative, 1.6 on day of discharge. Good UOP. Post-op US with patent vasculature.    - Hurt in place due to bladder anastomosis until POD#5.   - CAMDEN drain in place. Will be removed in clinic.    Immunosuppressed due to medications:  Induction: via intermediate intensity protocol (cPRA 57) with Thymoglobulin (150mg intra-op  and 50mg on POD#3), basiliximab POD#1 and #5, and steroid pulse with four week taper.   Maintenance:   - Mycophenolate 1000 mg BID   - Tacrolimus goal level 8-10.   Infection prophylaxis: viral (Valcyte x 12 weeks), PJP (Bactrim indefinitely)    Cannabis use: Patient advised of the drug interaction with tacrolimus.    Transplant coordinator: Opal Rashid [phone]  Donor type:  DBD  DSA at time of transplant:  NO  Ureteral stent: YES  CMV:  Donor - / Recipient +  EBV:  Donor + / Recipient +  Thymoglobulin:  200mg, 2mg/kg capped at 100kg    Neuro:  Acute post op pain: Controlled on current regimen of PRN Tylenol, oxycodone, Robaxin, and lidocaine patches.     Hematology:   Anemia of chronic disease: Hgb 10.3, stable.     Cardiorespiratory:   HTN: Discharge on Norvasc 10mg daily, Coreg 50mg BID, and hydralazine 50mg BID.  History of prolonged QTc: 12/5/23 QTc 476ms, much shorter than prior.  HLD: Resume atorvastatin 10 mg daily.     Fluid/Electrolytes:   Hypomagnesemia prophylaxis: Continue Mag-Ox 400 mg daily.  Low bicarb: Continue sodium bicarbonate 650 mg BID.     Discharge Disposition   Discharged to home   Condition at discharge: Stable    Pending Results   These results will be followed up by Transplant team  Unresulted Labs Ordered in the Past 30 Days of this Admission       Date and Time Order Name Status Description    12/7/2023 11:30 PM Tacrolimus by Tandem Mass Spectrometry In process     12/4/2023 11:11 PM EBV Capsid Antibody IgM In process           Primary Care Physician   Priyank Lawrence    Physical Exam   Temp: 98.5  F (36.9  C) Temp src: Oral BP: (!) 150/116 Pulse: 96   Resp: 16 SpO2: 99 % O2 Device: None (Room air)    Vitals:    12/06/23 1439 12/07/23 0900 12/08/23 0100   Weight: 104.4 kg (230 lb 1.6 oz) 105.7 kg (233 lb 1.6 oz) 106.3 kg (234 lb 6.4 oz)     Vital Signs with Ranges  Temp:  [97.7  F (36.5  C)-99.1  F (37.3  C)] 98.5  F (36.9  C)  Pulse:  [73-98] 96  Resp:  [14-18] 16  BP:  (150-170)/() 150/116  SpO2:  [98 %-99 %] 99 %  I/O last 3 completed shifts:  In: 2670 [P.O.:2160; I.V.:510]  Out: 2850 [Urine:2750; Drains:100]    General Appearance: in no apparent distress.   Skin: normal  Heart: Perfused  Lungs: NLB on room air  Abdomen: The abdomen is soft and  appropriately tender over the kidney graft. The wound is Healing well, well approximated, without signs of infection, and no drainage. CAMDEN serosang.  : griffiths is present.   Extremities: edema: absent   Neurologic: awake, alert, and oriented x4. Tremor absent    Consultations This Hospital Stay   NEPHROLOGY KIDNEY/PANCREAS TRANSPLANT ADULT IP CONSULT  NURSING TO CONSULT FOR VASCULAR ACCESS CARE IP CONSULT  SOT MEDICATION HISTORY IP PHARMACY CONSULT  SOCIAL WORK IP CONSULT  PHARMACY IP CONSULT  NUTRITION SERVICES ADULT IP CONSULT  NEPHROLOGY KIDNEY/PANCREAS TRANSPLANT ADULT IP CONSULT    Time Spent on this Encounter   I have spent greater than 30 minutes on this discharge.    Discharge Orders      Reason for your hospital stay    Kidney transplanted     Activity    Your activity upon discharge: Walk at least four times a day, lift no greater than 10 pounds for 6-8 weeks from the time of surgery.  No driving while taking narcotics or 3 weeks after surgery.     Adult [facility] Follow-up and recommended labs and tests    [facility] FOLLOW UP:     1. [facility]: Over the next 2 days you will be seen in the [facility] (ph. [phone], option 3). Basiliximab infusion due on 12/10/23. Your labs will be drawn at the beginning of your appointment. DO NOT take your medications prior to having labs drawn. Please bring all your medications with you from home to take after labs are drawn.     2. Follow up with Dr. Jimenez in Transplant Clinic in 1-2 weeks.     3. Follow up with Transplant Nephrologist as scheduled.     4. Ureteral stent removal in 4-6 weeks, to be scheduled by Transplant Coordinator.  If a  does not contact you for this, please contact your transplant coordinator.     5. Follow up with your primary care provider in ~8 weeks. Patient to schedule.     Remember to always bring an updated medication list to all appointments.        Call your Transplant Coordinator ([phone]) with questions about Transplant Center appointment scheduling.     LABS:     CBC, BMP, magnesium, phosphorus, tacrolimus level to be drawn daily while in ATC, then every Monday and Thursday by home health care nurse if arranged, or at an outpatient lab.     When to contact your care team    WHEN TO CONTACT YOUR  COORDINATOR:     Transplant Coordinator Opal Rashid [phone]     Notify your coordinator if you have pain over your kidney, increased redness or drainage from your incision, fever greater than 100F, decreased urine output or new or increased amount of blood in urine.     Notify your coordinator immediately if you are ever unable to take your immunosuppressive medications for any reason.     If you have URGENT concerns after office hours, please call the hospital switchboard at [phone] and ask to have the organ transplant nurse on-call paged. If you have a life-threatening emergency, go to the nearest emergency room.     Wound care and dressings    Instructions to care for your wound at home: If you have staples in place, they will be removed in 3 weeks after operation. Wash incision daily with soap and water. Do not soak or scrub.     Tubes and drains    You are going home with the following tubes or drains: griffiths catheter (to be removed post-op day 5).  CAMDEN (empty daily and write down amount).     Discharge Instructions    There is a drug interaction between cannabis (marijuana) and tacrolimus. Marijuana use can cause drug level changes. Intermittent use of marijuana is not advised.     Diet    Follow this diet upon discharge: Low potassium diet  Diet recommendations post-transplant: Heart  healthy dietary habits long term (low saturated/trans fat, low sodium). High protein diet x 8 weeks. Practice food safety precautions.      Discharge Medications    Current Discharge Medication List        START taking these medications    Details   Lidocaine (LIDOCARE) 4 % Patch Place 1 patch onto the skin every 24 hours To prevent lidocaine toxicity, patient should be patch free for 12 hrs daily.  Qty: 5 patch, Refills: 0    Associated Diagnoses: Kidney transplant recipient      lidocaine (XYLOCAINE) 2 % external gel Apply topically as needed for moderate pain (Apply to skin around griffiths catheter)  Qty: 4 mL, Refills: 0    Associated Diagnoses: Kidney transplant recipient      magnesium oxide (MAG-OX) 400 MG tablet Take 1 tablet (400 mg) by mouth daily (with lunch)  Qty: 30 tablet, Refills: 11    Comments: Estefany Garcia [phone]  Associated Diagnoses: Kidney transplant candidate      methocarbamol (ROBAXIN) 500 MG tablet Take 1 tablet (500 mg) by mouth 4 times daily as needed for muscle spasms  Qty: 10 tablet, Refills: 0    Associated Diagnoses: Kidney transplant recipient      mycophenolate (GENERIC EQUIVALENT) 250 MG capsule Take 3 capsules (750 mg) by mouth 2 times daily  Qty: 180 capsule, Refills: 11    Associated Diagnoses: Kidney transplant candidate      oxyCODONE (ROXICODONE) 5 MG tablet Take 1 tablet (5 mg) by mouth every 4 hours as needed for moderate to severe pain  Qty: 10 tablet, Refills: 0    Associated Diagnoses: Kidney transplant recipient      polyethylene glycol (MIRALAX) 17 GM/Dose powder Take 17 g by mouth daily as needed for constipation  Qty: 510 g, Refills: 0    Associated Diagnoses: Kidney transplant recipient      predniSONE (DELTASONE) 10 MG tablet Take 6 tablets (60 mg) by mouth daily for 1 day, THEN 4 tablets (40 mg) daily for 2 days, THEN 2 tablets (20 mg) daily for 7 days, THEN 1.5 tablets (15 mg) daily for 7 days, THEN 1 tablet (10 mg) daily for 7 days, THEN 0.5 tablets (5 mg)  daily for 7 days.  Qty: 49 tablet, Refills: 0    Comments: Estefany Pugar [phone]  Associated Diagnoses: Kidney transplant recipient      sennosides (SENOKOT) 8.6 MG tablet Take 2 tablets by mouth 2 times daily Hold for loose stools  Qty: 60 tablet, Refills: 0    Associated Diagnoses: Kidney transplant recipient      sodium bicarbonate 650 MG tablet Take 2 tablets (1,300 mg) by mouth 2 times daily  Qty: 120 tablet, Refills: 11    Associated Diagnoses: Kidney transplant recipient      sulfamethoxazole-trimethoprim (BACTRIM) 400-80 MG tablet Take 1 tablet by mouth daily  Qty: 30 tablet, Refills: 11    Associated Diagnoses: Kidney transplant candidate      !! tacrolimus (GENERIC) 0.5 MG capsule Take 1 capsule (0.5 mg) by mouth 2 times daily Total dose 4.5mg twice daily  Qty: 60 capsule, Refills: 11    Associated Diagnoses: Kidney transplant recipient      !! tacrolimus (GENERIC) 1 MG capsule Take 4 capsules (4 mg) by mouth 2 times daily Total dose 4.5mg twice daily  Qty: 240 capsule, Refills: 11    Associated Diagnoses: Kidney transplant candidate      valGANciclovir (VALCYTE) 450 MG tablet Take 2 tablets (900 mg) by mouth daily  Qty: 60 tablet, Refills: 2    Associated Diagnoses: Kidney transplant candidate       !! - Potential duplicate medications found. Please discuss with provider.        CONTINUE these medications which have CHANGED    Details   acetaminophen (TYLENOL) 325 MG tablet Take 2 tablets (650 mg) by mouth every 4 hours as needed for pain  Qty: 100 tablet, Refills: 0    Associated Diagnoses: ESRD (end stage renal disease) on dialysis (H)      hydrALAZINE (APRESOLINE) 100 MG tablet Take 0.5 tablets (50 mg) by mouth 2 times daily    Associated Diagnoses: Acute kidney injury (H24)           CONTINUE these medications which have NOT CHANGED    Details   amLODIPine (NORVASC) 10 MG tablet Take 10 mg by mouth daily       aspirin 81 MG EC tablet Take 81 mg by mouth daily      atorvastatin (LIPITOR) 10 MG  tablet TAKE ONE TABLET BY MOUTH EVERY NIGHT AT BEDTIME  Qty: 90 tablet, Refills: 1    Associated Diagnoses: Acute renal failure, unspecified acute renal failure type (H24)      carvedilol (COREG) 25 MG tablet TAKE TWO TABLETS BY MOUTH TWICE A DAY WITH A MEAL Strength: 25 mg  Qty: 180 tablet, Refills: 3    Associated Diagnoses: Acute kidney injury (H24)      medical cannabis (Patient's own supply) See Admin Instructions (The purpose of this order is to document that the patient reports taking medical cannabis.  This is not a prescription, and is not used to certify that the patient has a qualifying medical condition.)    Patient was advised on the cannabis-tacrolimus drug interaction.      nitroGLYcerin (NITROSTAT) 0.3 MG sublingual tablet For chest pain place 1 tablet under the tongue every 5 minutes for 3 doses. If symptoms persist 5 minutes after 1st dose call 911.  Qty: 5 tablet, Refills: 0    Comments: Ok to adjust to supplied dose  Associated Diagnoses: Other chest pain           STOP taking these medications       bumetanide (BUMEX) 2 MG tablet Comments:   Reason for Stopping:         calcium acetate (CALPHRON) 667 MG TABS tablet Comments:   Reason for Stopping:         cinacalcet (SENSIPAR) 90 MG tablet Comments:   Reason for Stopping:         cloNIDine (CATAPRES-TTS3) 0.3 MG/24HR WK patch Comments:   Reason for Stopping:         lisinopril (ZESTRIL) 20 MG tablet Comments:   Reason for Stopping:         multivitamin RENAL (RENAVITE RX/NEPHROVITE) 1 tablet tablet Comments:   Reason for Stopping:         sevelamer HCl (RENAGEL) 800 MG tablet Comments:   Reason for Stopping:         vitamin D3 (CHOLECALCIFEROL) 2000 units (50 mcg) tablet Comments:   Reason for Stopping:             Data   Most Recent 3 CBC's:  Recent Labs   Lab Test 12/08/23  0537 12/07/23  0633 12/06/23  1332 12/06/23  0920 12/06/23  0631   WBC 11.9* 9.5  --   --  9.4   HGB 10.3* 10.5* 12.1*   < > 11.7*  11.7*   MCV 91 93  --   --  91   PLT  186 162  --   --  182    < > = values in this interval not displayed.     Most Recent 3 BMP's:  Recent Labs   Lab Test 12/08/23  0537 12/07/23  2214 12/07/23  1805 12/07/23  0847 12/07/23  0633 12/06/23  1642 12/06/23  1332 12/06/23  0920 12/06/23  0631     --   --   --  137  --   --   --  138   POTASSIUM 5.0  --   --   --  5.5*  --  4.7   < > 4.4  4.4   CHLORIDE 113*  --   --   --  107  --   --   --  98   CO2 19*  --   --   --  19*  --   --   --  24   BUN 30.8*  --   --   --  34.5*  --   --   --  50.3*   CR 1.62*  --   --   --  2.90*  --   --   --  6.95*   ANIONGAP 8  --   --   --  11  --   --   --  16*   BUBBA 9.1  --   --   --  9.0  --   --   --  7.9*   * 149* 165*   < > 110*   < >  --   --  130*    < > = values in this interval not displayed.     I have reviewed history, examined patient and discussed plan with the fellow/resident/HENRIQUE.    I concur with the findings in this note.    Time spent on discharge activities: 45 minutes.

## 2023-12-07 NOTE — PROGRESS NOTES
[facility]   Transplant Nephrology Progress Note  Date of Admission:  12/4/2023  Today's Date: 12/07/2023    Recommendations:  - Sodium bicarbonate  650 mg po bid  - Send UPC daily as inpatient and monitor weekly per protocol for early identification of  potential FSGS recurrence   - Continue immunosuppressants per protocol        Assessment & Plan    # DDKT: Trending down              - Baseline Creatinine: ~ TBD              - Proteinuria: Not checked recently              - Date DSA Last Checked: Dec/2023      Latest DSA: no              - BK Viremia: no              - Kidney Tx Biopsy: No             - Double J stent placed at time of kidney transplant.      # Immunosuppression: Tacrolimus immediate release (goal 8-10),Mycophenolate 1000 mg po bid ,tapering dose of methyl prednisolone.  - Induction - intermediate intensity               - Patient is in an immunosuppressed state and will continue to monitor for efficacy and toxicity of immunosuppression medications.              - Changes: none     # Infection Prophylaxis:      - PJP: Sulfa/TMP (Bactrim)   - CMV: R +/D -:Valganciclovir (Valcyte)       # Hypertension: Controlled;   Goal BP: < 150/90  - Volume status: Mildly Hypervolemic   EDW ~ 101 kg  - Changes:  Continue PTA Amlodipine 10 mg ,Carvedilol 50 mg po bid.     # Anemia in Chronic Renal Disease: Hgb: Stable ,normal      BRIE: No              - Iron studies: Unknown     # Electrolytes:   - Potassium; level: high        - Magnesium; level: normal       On supplement:no  - Bicarbonate; level: low    On supplement: yes     # Mineral Bone Disorder:               - Secondary renal hyperparathyroidism; PTH level: Moderately   elevated                                              On treatment: Cinacalcet  - Vitamin D; level: Not checked recently        On supplement: Yes  - Calcium; level: Normal        On supplement: No  - Phosphorus; level: High        On  supplement: No    # FSGS  Kidney biopsy reviewed from 2017. 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 Follow up for a recurrence .  - UPCR daily as inpatient and weekly as outpatient .        # Transplant History:  Etiology of Kidney Failure: Focal segmental glomerulosclerosis (FSGS)  Tx: DDKT  Transplant: 12/5/2023 (Kidney)  Crossmatch at time of Tx: negative  DSA at time of Tx: Yes  Significant changes in immunosuppression: None  Significant transplant-related complications: None        Recommendations were communicated to the primary team via this note.    Seen and discussed with Dr. Dion Middleton MD   Pager: [phone]    This patient has been seen and evaluated by me, Stephanie Lynn MD.  I have reviewed the note and agree with plan of care as documented by the fellow.    Stephanie Lynn MD       Interval History   Mr. Valiente's creatinine is 2.90 (12/07 0633); Trend down.  Robust amount of urine output.  Vitals: Stable  no events overnight.  no chest pain or shortness of breath.  no leg swelling.  no nausea and vomiting.  no fever, sweats or chills.      Review of Systems   4 point ROS was obtained and negative except as noted in the Interval History.    MEDICATIONS:   acetaminophen  975 mg Oral Q8H    amLODIPine  10 mg Oral Daily    aspirin  81 mg Oral Daily    atorvastatin  10 mg Oral Daily    [START ON 12/10/2023] basiliximab (SIMULECT) 20 mg in sodium chloride 0.9 % 50 mL infusion  20 mg Intravenous Once    carvedilol  50 mg Oral BID w/meals    insulin aspart  1-7 Units Subcutaneous TID AC    insulin aspart  1-5 Units Subcutaneous At Bedtime    lidocaine  1-2 patch Transdermal Q24h    magnesium oxide  400 mg Oral Daily with lunch    methocarbamol  500 mg Oral 4x Daily    multivitamin w/minerals  1 tablet Oral Daily    mycophenolate  750 mg Oral BID IS    polyethylene glycol  17 g Oral Daily    [START ON 12/8/2023] predniSONE  60 mg Oral Daily     Followed by    [START ON 12/10/2023] predniSONE  40 mg Oral Daily    Followed by    [START ON 2023] predniSONE  20 mg Oral Daily    Followed by    [START ON 2023] predniSONE  15 mg Oral Daily    Followed by    [START ON 2023] predniSONE  10 mg Oral Daily    Followed by    [START ON 2024] predniSONE  5 mg Oral Daily    senna-docusate  1 tablet Oral BID    sodium bicarbonate  650 mg Oral BID    sodium chloride (PF)  10 mL Intracatheter Q8H    sodium chloride 0.9%  500 mL Intravenous Once    [Held by provider] sulfamethoxazole-trimethoprim  1 tablet Oral Once per day on     tacrolimus  3 mg Oral BID IS    valGANciclovir  450 mg Oral Once per day on            Physical Exam   Temp  Av.4  F (36.9  C)  Min: 97  F (36.1  C)  Max: 99.3  F (37.4  C)      Pulse  Av.1  Min: 66  Max: 91 Resp  Avg: 15  Min: 10  Max: 20  SpO2  Av.7 %  Min: 93 %  Max: 100 %    CVP (mmHg): 9 mmHgBP (!) 154/88   Pulse 80   Temp 98.4  F (36.9  C)   Resp 18   Wt 105.7 kg (233 lb 1.6 oz)   SpO2 95%   BMI 33.45 kg/m     Date 23 0700 - 23 0659   Shift 5606-3242 1476-1297 2077-6445 24 Hour Total   INTAKE   Shift Total(mL/kg)       OUTPUT   Urine 620   620   Drains 60   60   Shift Total(mL/kg) 680(6.52)   680(6.52)   Weight (kg) 104.37 104.37 104.37 104.37      Admit Weight: 105.1 kg (231 lb 12.8 oz)     GENERAL APPEARANCE: alert and no distress  HENT: mouth without ulcers or lesions  RESP: lungs clear to auscultation - no rales, rhonchi or wheezes  CV: regular rhythm, normal rate, no rub, no murmur  EDEMA: no LE edema bilaterally  ABDOMEN: soft, nondistended, nontender, bowel sounds normal  MS: extremities normal - no gross deformities noted, no evidence of inflammation in joints, no muscle tenderness  SKIN: no rash    Data   All labs reviewed by me.  CMP  Recent Labs   Lab 23  1205 23  0847 23  0633 23  0129 23  1642 23  1332  12/06/23  0920 12/06/23  0631 12/05/23  1407 12/05/23  1247 12/05/23  0543 12/05/23  0007   NA  --   --  137  --   --   --   --  138  --  131*  --  136   POTASSIUM  --   --  5.5*  --   --  4.7 4.6 4.4  4.4   < > 6.1*  --  5.0   CHLORIDE  --   --  107  --   --   --   --  98  --  94*  --  92*   CO2  --   --  19*  --   --   --   --  24  --  19*  --  25   ANIONGAP  --   --  11  --   --   --   --  16*  --  18*  --  19*   * 149* 110* 162*   < >  --   --  130*   < > 138*   < > 96   BUN  --   --  34.5*  --   --   --   --  50.3*  --  64.2*  --  61.0*   CR  --   --  2.90*  --   --   --   --  6.95*  --  11.80*  --  11.60*   GFRESTIMATED  --   --  29*  --   --   --   --  10*  --  5*  --  6*   BUBBA  --   --  9.0  --   --   --   --  7.9*  --  6.7*  --  8.4*   MAG  --   --  2.3  --   --   --   --  2.2  --  1.9  --   --    PHOS  --   --  4.2  --   --   --   --  8.6*  --  5.5*  --   --    PROTTOTAL  --   --   --   --   --   --   --   --   --   --   --  7.7   ALBUMIN  --   --   --   --   --   --   --   --   --   --   --  4.5   BILITOTAL  --   --   --   --   --   --   --   --   --   --   --  0.5   ALKPHOS  --   --   --   --   --   --   --   --   --   --   --  138   AST  --   --   --   --   --   --   --   --   --   --   --  13   ALT  --   --   --   --   --   --   --   --   --   --   --  20    < > = values in this interval not displayed.     CBC  Recent Labs   Lab 12/07/23  0633 12/06/23  1332 12/06/23  0920 12/06/23  0631 12/05/23  1716 12/05/23  1247 12/05/23  0007   HGB 10.5* 12.1* 12.2* 11.7*  11.7*   < > 11.3* 12.0*   WBC 9.5  --   --  9.4  --  9.3 9.7   RBC 3.72*  --   --  4.06*  --  3.96* 4.22*   HCT 34.5*  --   --  36.8*  --  34.8* 37.8*   MCV 93  --   --  91  --  88 90   MCH 28.2  --   --  28.8  --  28.5 28.4   MCHC 30.4*  --   --  31.8  --  32.5 31.7   RDW 16.2*  --   --  16.3*  --  16.1* 16.0*     --   --  182  --  140* 159    < > = values in this interval not displayed.     INR  Recent Labs   Lab  12/05/23  0007   INR 1.07   PTT 30     ABGNo lab results found in last 7 days.   Urine Studies  Recent Labs   Lab Test 06/15/21  2220 02/09/21  1103 09/09/20  1305 07/14/17  0948   COLOR Light Yellow Yellow Light Yellow Yellow   APPEARANCE Clear Slightly Cloudy Clear Clear   URINEGLC 50* Negative 50* Negative   URINEBILI Negative Negative Negative Negative   URINEKETONE Negative Negative Negative Negative   SG 1.015 1.013 1.015 1.025   UBLD Trace* Negative Small* Moderate*   URINEPH 6.5 5.0 6.0 5.5   PROTEIN 600* >499* 300* >=300*   UROBILINOGEN  --   --   --  0.2   NITRITE Negative Negative Negative Negative   LEUKEST Negative Trace* Negative Negative   RBCU 1 2 1 O - 2   WBCU 3 4 3 2-5*     Recent Labs   Lab Test 07/03/19  1529 07/17/17  0930 07/14/17  1735   UTPG 2.71* 1.94* 2.31*     PTH  Recent Labs   Lab Test 09/11/20  0634   PTHI 440*     Iron Studies  Recent Labs   Lab Test 09/11/20  0634 07/15/17  0635   IRON 33* 38   * 247   IRONSAT 18 15   CARI 325 341       IMAGING:  All imaging studies reviewed by me.

## 2023-12-07 NOTE — PROGRESS NOTES
Care Management Follow Up    Length of Stay (days): 2    Expected Discharge Date: 12/08/2023     Concerns to be Addressed: discharge planning     Patient plan of care discussed at interdisciplinary rounds: Yes    Anticipated Discharge Disposition: Home, Home Care     Anticipated Discharge Services: None  Anticipated Discharge DME: None    Patient/family educated on Medicare website which has current facility and service quality ratings: no  Education Provided on the Discharge Plan: Yes  Patient/Family in Agreement with the Plan: yes    Referrals Placed by CM/SW: External Care Coordination, Homecare  Private pay costs discussed: Not applicable    Additional Information:  Pt s/p kidney transplant.  Team anticipates possible discharge tomorrow.      Met with pt his mom and grandfather.   Introduced RNCC role.  Reviewed anticipated plan for discharge, transplant labs M/TH, ATC appointments and home care RN services.  Pt and his mom would like home care explored.  Pt only concern is OP cost for medications.  Per pt he met with SW this morning and d/t pt MA no longer being active he will have OP cost for discharge medications.  Pt confirmed that the had a [email or web address] worker that he worked with previously and plans to reach out to them to see if he can go back on MA.  Pt notes no other concerns or questions regarding plan anticipated plan for discharge.    Initiated referral to [facility] hub.    1455: Received notification that [facility] has accepted patient.    Accepting Home Care:  [facility]  Phone: [phone]  Fax: [phone]      Paty Hou, RN BSN, PHN, ACM-RN  7A RN Care Coordinator  Phone: [phone]  Pager [phone]    To contact the weekend RNCC  [place] (0800 - 1630) Saturday and Sunday    Units: 5A,5B,5C [phone]    Units: 6A, [phone]    Units 6B, 6C, 6D [phone]    Units: 7A, 7B, 7C, 7D, [phone]    [facility] [place], [place] ([phone]) Saturday and  Sunday  Units: 5 Ortho, 5MS & WB ED Pager: [phone]  Units: 6MS, 8A & 10 ICU  Pager [phone]    12/7/2023 12:55 PM

## 2023-12-07 NOTE — PROGRESS NOTES
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 Pt was on HD via RIJ with tunneled line due to recent failed RUE fistula and was anuric. Patient is now s/p DDKT with ureteral stent and CAMDEN with Dr. Jimenez on 12/5/23.     Graft:  s/p DDKT: POD 2. Cr 7.->2.9. UOP good. Post-op US:  1. Elevated renal artery peak systolic velocity at the hilum, this may  be secondary to postsurgical edema. Attention on follow-up.  2. Only one renal artery was visualized at the hilum on this exam.     Immunosuppression management:  Induction: via intermediate intensity protocol (cPRA 57)   Thymo 150 mg 12/5 intra-op (1.5mg/kg capped at 100kg)  Simulect POD 1 & 5  Steroid taper  Maintenance:    - MMF 1000 mg BID   - Tacrolimus goal level 8-10.     Neuro:  Acute post op pain: Tylenol scheduled, Oxycodone prn, Robaxin, and lidocaine patches.    Hematology:   Anemia of chronic disease: HGB 10.5, slight drop with IVF.    Cardiorespiratory:   HTN: Restarted Norvasc 10 mg daily and Coreg 50 mg BID  H/o prolonged QT: 12/5 QTc 476, monitor.   HLD: Resume PTA lipitor 10 mg daily     GI/Nutrition: Low K diet. Bowel regimen: Senokot-S, Miralax     Endocrine:   Steroid induced hyperglycemia, pre-diabetes: A1C 5.9% on admit. Sliding scale insulin prn.     Fluid/Electrolytes:   MIVF: Stop  Hypomagnesemia: Mag ox 400 mg daily.  Hyperkalemia: K 5.5, add sodium bicarb and low K diet.  Low bicarb: Start sodium bicarb.    : Hurt in place due to bladder anastomosis until POD 5    Infectious disease: No acute issues.     Prophylaxis: DVT(mechanical), fall, viral (Valcyte x 3 months, CMV R+D pending, EBV R+/D pending), PJP (Bactrim indefinitely, HOLD today for hyperkalemia)    Disposition:  7A    HENRIQUE/Fellow/Resident Provider: Estefany Garcia NP [id number]    Faculty: Shi Jimenez M.D.  _________________________________________________________________    Interval History: History is obtained from the patient  Overnight events: Pain controlled, no nausea, has walked    ROS:   A 10-point review of systems was negative except as noted above.    Meds:   acetaminophen  975 mg Oral Q8H    amLODIPine  10 mg Oral Daily    aspirin  81 mg Oral Daily    atorvastatin  10 mg Oral Daily    [START ON 12/10/2023] basiliximab (SIMULECT) 20 mg in sodium chloride 0.9 % 50 mL infusion  20 mg Intravenous Once    carvedilol  50 mg Oral BID w/meals    insulin aspart  1-7 Units Subcutaneous TID AC    insulin aspart  1-5 Units Subcutaneous At Bedtime    lidocaine  1-2 patch Transdermal Q24h    magnesium oxide  400 mg Oral Daily with lunch    methocarbamol  500 mg Oral 4x Daily    multivitamin w/minerals  1 tablet Oral Daily    mycophenolate  750 mg Oral BID IS    polyethylene glycol  17 g Oral Daily    [START ON 12/8/2023] predniSONE  60 mg Oral Daily    Followed by    [START ON 12/10/2023] predniSONE  40 mg Oral Daily    Followed by    [START ON 12/12/2023] predniSONE  20 mg Oral Daily    Followed by    [START ON 12/19/2023] predniSONE  15 mg Oral Daily    Followed by    [START ON 12/26/2023] predniSONE  10 mg Oral Daily    Followed by    [START ON 1/2/2024] predniSONE  5 mg Oral Daily    senna-docusate  1 tablet Oral BID    sodium bicarbonate  650 mg Oral BID    sodium chloride (PF)  10 mL Intracatheter Q8H    sodium chloride 0.9%  500 mL Intravenous Once    [Held by provider] sulfamethoxazole-trimethoprim  1 tablet Oral Once per day on Monday Wednesday Friday    tacrolimus  3 mg Oral BID IS    [START ON 12/8/2023] valGANciclovir  450 mg Oral Daily       Physical Exam:     Admit Weight: 105.1 kg (231 lb 12.8 oz)    Current vitals:   BP (!) 154/88   Pulse 80   Temp 98.4  F (36.9  C)   Resp 18   Wt 105.7 kg (233 lb 1.6 oz)    SpO2 95%   BMI 33.45 kg/m          Vital sign ranges:    Temp:  [98.1  F (36.7  C)-99.7  F (37.6  C)] 98.4  F (36.9  C)  Pulse:  [] 80  Resp:  [12-20] 18  BP: (139-165)/() 154/88  SpO2:  [95 %-98 %] 95 %  Patient Vitals for the past 24 hrs:   BP Temp Temp src Pulse Resp SpO2 Weight   12/07/23 1057 (!) 154/88 98.4  F (36.9  C) -- 80 18 95 % --   12/07/23 0900 -- -- -- -- -- -- 105.7 kg (233 lb 1.6 oz)   12/07/23 0551 (!) 151/99 -- -- -- -- -- --   12/07/23 0550 (!) 165/98 98.1  F (36.7  C) Oral 82 20 97 % --   12/07/23 0125 (!) 147/92 99.5  F (37.5  C) Oral -- 20 95 % --   12/06/23 2213 (!) 164/100 99.7  F (37.6  C) Oral 105 18 -- --   12/06/23 1714 (!) 144/92 99  F (37.2  C) Oral 90 16 96 % --   12/06/23 1439 139/83 99.3  F (37.4  C) Oral -- 12 98 % 104.4 kg (230 lb 1.6 oz)     General Appearance: in no apparent distress.   Skin: normal  Heart: Perfused  Lungs: NLB on room air  Abdomen: The abdomen is soft and  appropriately tender over the kidney graft. The wound is Healing well, well approximated, without signs of infection, and no drainage.  : griffiths is present. Urine pink  Extremities: edema: absent   Neurologic: awake, alert, and oriented x4. Tremor absent.    Data:   CMP  Recent Labs   Lab 12/07/23  1205 12/07/23  0847 12/07/23  0633 12/06/23  1642 12/06/23  1332 12/06/23  0920 12/06/23  0631 12/05/23  0543 12/05/23  0007   NA  --   --  137  --   --   --  138   < > 136   POTASSIUM  --   --  5.5*  --  4.7   < > 4.4  4.4   < > 5.0   CHLORIDE  --   --  107  --   --   --  98   < > 92*   CO2  --   --  19*  --   --   --  24   < > 25   * 149* 110*   < >  --   --  130*   < > 96   BUN  --   --  34.5*  --   --   --  50.3*   < > 61.0*   CR  --   --  2.90*  --   --   --  6.95*   < > 11.60*   GFRESTIMATED  --   --  29*  --   --   --  10*   < > 6*   BUBBA  --   --  9.0  --   --   --  7.9*   < > 8.4*   MAG  --   --  2.3  --   --   --  2.2   < >  --    PHOS  --   --  4.2  --   --   --  8.6*   < >  --     ALBUMIN  --   --   --   --   --   --   --   --  4.5   BILITOTAL  --   --   --   --   --   --   --   --  0.5   ALKPHOS  --   --   --   --   --   --   --   --  138   AST  --   --   --   --   --   --   --   --  13   ALT  --   --   --   --   --   --   --   --  20    < > = values in this interval not displayed.     CBC  Recent Labs   Lab 12/07/23  0633 12/06/23  1332 12/06/23  0920 12/06/23  0631 12/05/23  1247 12/05/23  0007   HGB 10.5* 12.1*   < > 11.7*  11.7*   < > 12.0*   WBC 9.5  --   --  9.4   < > 9.7     --   --  182   < > 159   A1C  --   --   --   --   --  5.9*    < > = values in this interval not displayed.     COAGS  Recent Labs   Lab 12/05/23  0007   INR 1.07   PTT 30      Urinalysis  Recent Labs   Lab Test 06/15/21  2220 02/09/21  1103 09/09/20  1305 07/03/19  1529 07/17/17  0930   COLOR Light Yellow Yellow   < >  --   --    APPEARANCE Clear Slightly Cloudy   < >  --   --    URINEGLC 50* Negative   < >  --   --    URINEBILI Negative Negative   < >  --   --    URINEKETONE Negative Negative   < >  --   --    SG 1.015 1.013   < >  --   --    UBLD Trace* Negative   < >  --   --    URINEPH 6.5 5.0   < >  --   --    PROTEIN 600* >499*   < >  --   --    NITRITE Negative Negative   < >  --   --    LEUKEST Negative Trace*   < >  --   --    RBCU 1 2   < >  --   --    WBCU 3 4   < >  --   --    UTPG  --   --   --  2.71* 1.94*    < > = values in this interval not displayed.     Virology:  Hepatitis C Antibody   Date Value Ref Range Status   12/05/2023 Nonreactive Nonreactive Final   02/09/2021 Nonreactive NR^Nonreactive Final     Comment:     Assay performance characteristics have not been established for newborns,   infants, and children

## 2023-12-07 NOTE — PLAN OF CARE
VS: BP (!) 151/99   Pulse 82   Temp 98.1  F (36.7  C) (Oral)   Resp 20   Wt 104.4 kg (230 lb 1.6 oz)   SpO2 97%   BMI 33.02 kg/m       Cares: 1900 - 0730     Neuro: Aox4   VS: low grade fever 99.5 --> 98.1 after scheduled tylenol  Cardiac: hypertensive 140-150/90s  Respiratory: WDL on RA, denies SOB.   GI/: griffiths in place w/ good UOP (1.5L overnight), pt had first BM last night post surgery (loose)  Skin: RLQ abdominal incision - dressing C/D/I  Diet: Regular   Labs: pending AM labs   BG: ACHS (181, 164)  LDA: Right PIV SL, left internal jugular - infusing NS @ 100mL/hr, Right CAMDEN - 150mL serosang, griffiths (griffiths leaked once overnight. RN spoke with the on call and both agreed to deflate balloon and re inflate. Re inflated balloon to 15mL and has had no issues since)  Mobility: SBA/Ax1  Pain/Nausea: denies nausea, reports abdominal pain 4-8/10   PRN medications: IV dilaudid x1, oxy 10mg x1  Plan of Care: continue with current POC and update MD with any changes

## 2023-12-07 NOTE — TELEPHONE ENCOUNTER
A pharmacist spent 30 minutes providing medication teaching with Taylor Valiente and his mother Chevy for discharge with a focus on new medications/dose changes.  Also present was his grandfather.  The discharge medication list was reviewed with the patient/family and the following points were discussed, as applicable: Name, description, purpose, dose/strength, duration of medications, common side effects, food/medications to avoid, action to be taken if dose is missed, when to call MD, and how to obtain refills.  The patient and family member (mom Chevy) will be responsible for managing medications. Additionally, the following transplant related education was covered: Purpose of medication card, Timing of medications and day of lab draw considerations , Prescription Insurance , and Discharge process for receiving meds   Patient will  transplant supplies including 7 day pill organizer, thermometer, and BP monitor at the discharge pharmacy along with medications.  Patient will use local pharmacy or other mail order for outpatient medications.   Clinical Pharmacy Consult:                                                      Transplant Specific:   Date of Transplant: 12/5/23  Type of Transplant: kidney  First Transplant: yes  History of rejection: no    Immunosuppression Regimen   TAC 3mg qAM & 3mg qPM, MMF 1000mg qAM & 1000mg qPM, and prednisone taper.  Patient specific goal: 8-10 micrograms/L  Most recent level: N/A  Immunosuppressant Levels: N/A  Pt adherent to lab draws: yes  Scr:   Lab Results   Component Value Date    CR 2.90 12/07/2023    CR 8.93 06/19/2021     Side effects: no side effects    Prophylactic Medications  PJP Prophylactic: Bactrim SS three times per week  Scheduled Discontinue Date: Lifelong    Antifungal: Not needed thus far  Scheduled Discontinue Date: N/A    CMV Prophylactic: Valcyte 450 mg twice weekly   Scheduled Discontinue Date: 3 months Anticipated date 3/7/24    Acid  Reducer: N/A  Scheduled Reviewed Date: N/A    Vascular Prophylactic: Aspirin 81 mg PO daily  Scheduled Discontinue Date: N/A    Blood Pressure Management  Frequency of home Blood Pressure checks: twice daily  Most recent home BP: 154/88 mmHg  Patient Blood pressure goal: <150/90 mmHg  Patient blood pressure at goal:  no  Hospitalizations/ER visits since last assessment: 0    Med rec/DUR performed: yes  Med Rec Discrepancies: no    Reminders:    Bring to first clinic appt: med box, med card, bp monitor, all medications being taken, and lab book.  2.   MTM pharmacist visit on first clinic appt and if ok, again in 3 to 4 months during follow up appt.  3.   Avoid Grapefruit and Grapefruit juice.   4.   Avoid herbal supplements. If wish to take other medications or supplements, call your coordinator.   5.   Keep lab appts.   6.   Can use apps on phone like [employer] to help manage medication lists and reminders.   7.   Make sure you are protecting your skin by wearing long sleeves and applying sunscreen to exposed skin, for any significant time in the sun.     Transplant Coordinator is pOal Rashid RN.    Thank you,  Naomie Chang, PharmD, BCACP  [facility]  [phone]

## 2023-12-07 NOTE — PROGRESS NOTES
Care Management Follow Up    Length of Stay (days): 2    Expected Discharge Date: 12/08/2023     Concerns to be Addressed: all concerns addressed in this encounter     Patient plan of care discussed at interdisciplinary rounds: Yes    Anticipated Discharge Disposition: Home, Home Care     Anticipated Discharge Services: None  Anticipated Discharge DME: None    Patient/family educated on Medicare website which has current facility and service quality ratings: no  Education Provided on the Discharge Plan:    Patient/Family in Agreement with the Plan: yes    Referrals Placed by CM/SW:    Private pay costs discussed: Not applicable    Additional Information:  Writer met with patient to discuss insurance coverage.  Patient only has Medicare listed.  Writer learned patient's MA termed 11/30/2023.  Writer talked to patient and patient indicated he was aware his MA termed and he had already received an insurance card for additional insurance that would start 1/1/2024.  Patient indicated he did not know the name of the insurance but his mother would be bringing his card when she came to visit.  Writer discussed patient will owe 20% not covered under Medicare Part B which does include his anti-rejection medications, clinic visits and physician visits.  Informed patient will be able to arrange a payment plan but will not be able to use [facility] until one year post transplant.  Writer asked patient if he understood he indicated he did.  Encouraged patient to discuss with family to learn if there is any assistance and also to call his local county to learn if there is anyway he could have his MA reinstated for this month.  Patient again indicated understanding.  SW will continue to assist as indicated.    Gunjan Beard, [facility] Inland HospitalSW  7A - Kidney/Pancreas Transplant   [phone]

## 2023-12-07 NOTE — PLAN OF CARE
Vitals: stable room air.   Blood glucose: achs: 130. 138. Sliding scale covered.   Pain/nausea: abdomen pain managed with robaxin, oxy and dilaudid.   Diet: regular. Good appetite.   Lines: PIVR SL. TLIJ infusing NS-100.   : griffiths good OP.   GI: no bm, no gas. Given laxatives x 3.   Drains: CAMDEN R OP good.   Skin: incision OP dressing cdi.   Mobility: a x 1.   Education : Med/lab book updated. MTP started.

## 2023-12-08 VITALS
TEMPERATURE: 98 F | OXYGEN SATURATION: 94 % | RESPIRATION RATE: 16 BRPM | HEART RATE: 96 BPM | DIASTOLIC BLOOD PRESSURE: 96 MMHG | SYSTOLIC BLOOD PRESSURE: 154 MMHG | BODY MASS INDEX: 33.63 KG/M2 | WEIGHT: 234.4 LBS

## 2023-12-08 DIAGNOSIS — Z79.899 ENCOUNTER FOR LONG-TERM CURRENT USE OF MEDICATION: ICD-10-CM

## 2023-12-08 DIAGNOSIS — Z94.0 KIDNEY REPLACED BY TRANSPLANT: Primary | ICD-10-CM

## 2023-12-08 DIAGNOSIS — Z98.890 OTHER SPECIFIED POSTPROCEDURAL STATES: ICD-10-CM

## 2023-12-08 DIAGNOSIS — Z48.298 AFTERCARE FOLLOWING ORGAN TRANSPLANT: ICD-10-CM

## 2023-12-08 DIAGNOSIS — Z20.828 CONTACT WITH AND (SUSPECTED) EXPOSURE TO OTHER VIRAL COMMUNICABLE DISEASES: ICD-10-CM

## 2023-12-08 PROBLEM — Z76.82 KIDNEY TRANSPLANT CANDIDATE: Status: RESOLVED | Noted: 2023-12-04 | Resolved: 2023-12-08

## 2023-12-08 LAB
ALBUMIN MFR UR ELPH: 54 MG/DL
ANION GAP SERPL CALCULATED.3IONS-SCNC: 8 MMOL/L (ref 7–15)
BUN SERPL-MCNC: 30.8 MG/DL (ref 6–20)
CALCIUM SERPL-MCNC: 9.1 MG/DL (ref 8.6–10)
CHLORIDE SERPL-SCNC: 113 MMOL/L (ref 98–107)
CREAT SERPL-MCNC: 1.62 MG/DL (ref 0.67–1.17)
CREAT UR-MCNC: 84.6 MG/DL
DEPRECATED HCO3 PLAS-SCNC: 19 MMOL/L (ref 22–29)
EBV VCA IGM SER IA-ACNC: <10 U/ML
EBV VCA IGM SER IA-ACNC: NORMAL
EGFRCR SERPLBLD CKD-EPI 2021: 59 ML/MIN/1.73M2
ERYTHROCYTE [DISTWIDTH] IN BLOOD BY AUTOMATED COUNT: 16.5 % (ref 10–15)
GLUCOSE BLDC GLUCOMTR-MCNC: 144 MG/DL (ref 70–99)
GLUCOSE SERPL-MCNC: 123 MG/DL (ref 70–99)
HCT VFR BLD AUTO: 33 % (ref 40–53)
HGB BLD-MCNC: 10.3 G/DL (ref 13.3–17.7)
MAGNESIUM SERPL-MCNC: 2 MG/DL (ref 1.7–2.3)
MCH RBC QN AUTO: 28.4 PG (ref 26.5–33)
MCHC RBC AUTO-ENTMCNC: 31.2 G/DL (ref 31.5–36.5)
MCV RBC AUTO: 91 FL (ref 78–100)
PHOSPHATE SERPL-MCNC: 2.2 MG/DL (ref 2.5–4.5)
PLATELET # BLD AUTO: 186 10E3/UL (ref 150–450)
POTASSIUM SERPL-SCNC: 5 MMOL/L (ref 3.4–5.3)
PROT/CREAT 24H UR: 0.64 MG/MG CR (ref 0–0.2)
RBC # BLD AUTO: 3.63 10E6/UL (ref 4.4–5.9)
SODIUM SERPL-SCNC: 140 MMOL/L (ref 135–145)
TACROLIMUS BLD-MCNC: 2.8 UG/L (ref 5–15)
TME LAST DOSE: ABNORMAL H
TME LAST DOSE: ABNORMAL H
WBC # BLD AUTO: 11.9 10E3/UL (ref 4–11)

## 2023-12-08 PROCEDURE — 258N000003 HC RX IP 258 OP 636: Performed by: NURSE PRACTITIONER

## 2023-12-08 PROCEDURE — 250N000009 HC RX 250: Performed by: STUDENT IN AN ORGANIZED HEALTH CARE EDUCATION/TRAINING PROGRAM

## 2023-12-08 PROCEDURE — 250N000012 HC RX MED GY IP 250 OP 636 PS 637: Performed by: STUDENT IN AN ORGANIZED HEALTH CARE EDUCATION/TRAINING PROGRAM

## 2023-12-08 PROCEDURE — 250N000013 HC RX MED GY IP 250 OP 250 PS 637: Performed by: STUDENT IN AN ORGANIZED HEALTH CARE EDUCATION/TRAINING PROGRAM

## 2023-12-08 PROCEDURE — 250N000012 HC RX MED GY IP 250 OP 636 PS 637: Performed by: NURSE PRACTITIONER

## 2023-12-08 PROCEDURE — 80048 BASIC METABOLIC PNL TOTAL CA: CPT | Performed by: STUDENT IN AN ORGANIZED HEALTH CARE EDUCATION/TRAINING PROGRAM

## 2023-12-08 PROCEDURE — 99233 SBSQ HOSP IP/OBS HIGH 50: CPT | Mod: 24 | Performed by: INTERNAL MEDICINE

## 2023-12-08 PROCEDURE — 84156 ASSAY OF PROTEIN URINE: CPT | Performed by: SURGERY

## 2023-12-08 PROCEDURE — 84100 ASSAY OF PHOSPHORUS: CPT | Performed by: STUDENT IN AN ORGANIZED HEALTH CARE EDUCATION/TRAINING PROGRAM

## 2023-12-08 PROCEDURE — 85027 COMPLETE CBC AUTOMATED: CPT | Performed by: NURSE PRACTITIONER

## 2023-12-08 PROCEDURE — 250N000013 HC RX MED GY IP 250 OP 250 PS 637: Performed by: NURSE PRACTITIONER

## 2023-12-08 PROCEDURE — 250N000011 HC RX IP 250 OP 636: Mod: JZ | Performed by: NURSE PRACTITIONER

## 2023-12-08 PROCEDURE — 80197 ASSAY OF TACROLIMUS: CPT | Performed by: STUDENT IN AN ORGANIZED HEALTH CARE EDUCATION/TRAINING PROGRAM

## 2023-12-08 PROCEDURE — 250N000013 HC RX MED GY IP 250 OP 250 PS 637: Performed by: PHYSICIAN ASSISTANT

## 2023-12-08 PROCEDURE — 250N000012 HC RX MED GY IP 250 OP 636 PS 637: Performed by: PHYSICIAN ASSISTANT

## 2023-12-08 PROCEDURE — 250N000013 HC RX MED GY IP 250 OP 250 PS 637: Performed by: SURGERY

## 2023-12-08 PROCEDURE — 250N000013 HC RX MED GY IP 250 OP 250 PS 637

## 2023-12-08 PROCEDURE — 83735 ASSAY OF MAGNESIUM: CPT | Performed by: STUDENT IN AN ORGANIZED HEALTH CARE EDUCATION/TRAINING PROGRAM

## 2023-12-08 PROCEDURE — 36592 COLLECT BLOOD FROM PICC: CPT | Performed by: NURSE PRACTITIONER

## 2023-12-08 RX ORDER — POLYETHYLENE GLYCOL 3350 17 G/17G
17 POWDER, FOR SOLUTION ORAL DAILY PRN
Qty: 510 G | Refills: 0 | Status: SHIPPED | OUTPATIENT
Start: 2023-12-08 | End: 2023-12-27

## 2023-12-08 RX ORDER — HYDRALAZINE HYDROCHLORIDE 25 MG/1
50 TABLET, FILM COATED ORAL 2 TIMES DAILY
Status: DISCONTINUED | OUTPATIENT
Start: 2023-12-08 | End: 2023-12-08 | Stop reason: HOSPADM

## 2023-12-08 RX ORDER — OXYCODONE HYDROCHLORIDE 5 MG/1
5 TABLET ORAL EVERY 4 HOURS PRN
Qty: 10 TABLET | Refills: 0 | Status: SHIPPED | OUTPATIENT
Start: 2023-12-08 | End: 2023-12-15

## 2023-12-08 RX ORDER — LIDOCAINE 4 G/G
1 PATCH TOPICAL EVERY 24 HOURS
Qty: 5 PATCH | Refills: 0 | Status: SHIPPED | OUTPATIENT
Start: 2023-12-08 | End: 2023-12-27

## 2023-12-08 RX ORDER — BISACODYL 10 MG
10 SUPPOSITORY, RECTAL RECTAL ONCE
Status: COMPLETED | OUTPATIENT
Start: 2023-12-08 | End: 2023-12-08

## 2023-12-08 RX ORDER — METHOCARBAMOL 500 MG/1
500 TABLET, FILM COATED ORAL 4 TIMES DAILY PRN
Qty: 10 TABLET | Refills: 0 | Status: SHIPPED | OUTPATIENT
Start: 2023-12-08 | End: 2023-12-27

## 2023-12-08 RX ORDER — SULFAMETHOXAZOLE AND TRIMETHOPRIM 400; 80 MG/1; MG/1
1 TABLET ORAL DAILY
Qty: 30 TABLET | Refills: 11 | Status: SHIPPED | OUTPATIENT
Start: 2023-12-09 | End: 2024-05-03

## 2023-12-08 RX ORDER — FEXOFENADINE HCL 60 MG/1
60 TABLET, FILM COATED ORAL ONCE
Qty: 1 TABLET | Refills: 0 | Status: COMPLETED | OUTPATIENT
Start: 2023-12-08 | End: 2023-12-08

## 2023-12-08 RX ORDER — MYCOPHENOLATE MOFETIL 250 MG/1
750 CAPSULE ORAL 2 TIMES DAILY
Qty: 180 CAPSULE | Refills: 11 | Status: SHIPPED | OUTPATIENT
Start: 2023-12-08 | End: 2023-12-27 | Stop reason: ALTCHOICE

## 2023-12-08 RX ORDER — TACROLIMUS 0.5 MG/1
0.5 CAPSULE ORAL 2 TIMES DAILY
Qty: 60 CAPSULE | Refills: 11 | Status: SHIPPED | OUTPATIENT
Start: 2023-12-08 | End: 2023-12-09

## 2023-12-08 RX ORDER — PREDNISONE 10 MG/1
TABLET ORAL
Qty: 49 TABLET | Refills: 0 | Status: SHIPPED | OUTPATIENT
Start: 2023-12-09 | End: 2024-01-09

## 2023-12-08 RX ORDER — SODIUM BICARBONATE 650 MG/1
1300 TABLET ORAL 2 TIMES DAILY
Qty: 120 TABLET | Refills: 11 | Status: SHIPPED | OUTPATIENT
Start: 2023-12-08 | End: 2023-12-10

## 2023-12-08 RX ORDER — SODIUM BICARBONATE 650 MG/1
1300 TABLET ORAL 2 TIMES DAILY
Status: DISCONTINUED | OUTPATIENT
Start: 2023-12-08 | End: 2023-12-08 | Stop reason: HOSPADM

## 2023-12-08 RX ORDER — SULFAMETHOXAZOLE AND TRIMETHOPRIM 400; 80 MG/1; MG/1
1 TABLET ORAL DAILY
Status: DISCONTINUED | OUTPATIENT
Start: 2023-12-09 | End: 2023-12-08 | Stop reason: HOSPADM

## 2023-12-08 RX ORDER — ACETAMINOPHEN 325 MG/1
650 TABLET ORAL ONCE
Qty: 2 TABLET | Refills: 0 | Status: COMPLETED | OUTPATIENT
Start: 2023-12-08 | End: 2023-12-08

## 2023-12-08 RX ORDER — ACETAMINOPHEN 325 MG/1
650 TABLET ORAL EVERY 4 HOURS PRN
Qty: 100 TABLET | Refills: 0 | Status: SHIPPED | OUTPATIENT
Start: 2023-12-08

## 2023-12-08 RX ORDER — VALGANCICLOVIR 450 MG/1
900 TABLET, FILM COATED ORAL DAILY
Status: DISCONTINUED | OUTPATIENT
Start: 2023-12-09 | End: 2023-12-08 | Stop reason: HOSPADM

## 2023-12-08 RX ORDER — LIDOCAINE HYDROCHLORIDE 20 MG/ML
JELLY TOPICAL EVERY 4 HOURS PRN
Status: DISCONTINUED | OUTPATIENT
Start: 2023-12-08 | End: 2023-12-08 | Stop reason: HOSPADM

## 2023-12-08 RX ORDER — VALGANCICLOVIR 450 MG/1
900 TABLET, FILM COATED ORAL DAILY
Qty: 60 TABLET | Refills: 2 | Status: SHIPPED | OUTPATIENT
Start: 2023-12-09 | End: 2023-12-10

## 2023-12-08 RX ORDER — MAGNESIUM OXIDE 400 MG/1
400 TABLET ORAL
Qty: 30 TABLET | Refills: 11 | Status: SHIPPED | OUTPATIENT
Start: 2023-12-08 | End: 2023-12-14

## 2023-12-08 RX ORDER — SENNOSIDES 8.6 MG
2 TABLET ORAL 2 TIMES DAILY
Qty: 60 TABLET | Refills: 0 | Status: SHIPPED | OUTPATIENT
Start: 2023-12-08 | End: 2023-12-27

## 2023-12-08 RX ORDER — METHYLPREDNISOLONE SODIUM SUCCINATE 125 MG/2ML
100 INJECTION, POWDER, LYOPHILIZED, FOR SOLUTION INTRAMUSCULAR; INTRAVENOUS ONCE
Qty: 2 ML | Refills: 0 | Status: COMPLETED | OUTPATIENT
Start: 2023-12-08 | End: 2023-12-08

## 2023-12-08 RX ORDER — HYDRALAZINE HYDROCHLORIDE 100 MG/1
50 TABLET, FILM COATED ORAL 2 TIMES DAILY
Start: 2023-12-08 | End: 2024-02-15

## 2023-12-08 RX ORDER — TACROLIMUS 1 MG/1
4 CAPSULE ORAL 2 TIMES DAILY
Qty: 240 CAPSULE | Refills: 11 | Status: SHIPPED | OUTPATIENT
Start: 2023-12-08 | End: 2023-12-09

## 2023-12-08 RX ADMIN — POLYETHYLENE GLYCOL 3350 17 G: 17 POWDER, FOR SOLUTION ORAL at 08:40

## 2023-12-08 RX ADMIN — OXYCODONE HYDROCHLORIDE 10 MG: 10 TABLET ORAL at 11:54

## 2023-12-08 RX ADMIN — ACETAMINOPHEN 975 MG: 325 TABLET, FILM COATED ORAL at 02:05

## 2023-12-08 RX ADMIN — SODIUM BICARBONATE 1300 MG: 650 TABLET ORAL at 08:38

## 2023-12-08 RX ADMIN — ACETAMINOPHEN 650 MG: 325 TABLET, FILM COATED ORAL at 16:46

## 2023-12-08 RX ADMIN — MAGNESIUM OXIDE TAB 400 MG (241.3 MG ELEMENTAL MG) 400 MG: 400 (241.3 MG) TAB at 11:48

## 2023-12-08 RX ADMIN — LIDOCAINE HYDROCHLORIDE: 20 JELLY TOPICAL at 08:35

## 2023-12-08 RX ADMIN — AMLODIPINE BESYLATE 10 MG: 10 TABLET ORAL at 08:38

## 2023-12-08 RX ADMIN — HYDRALAZINE HYDROCHLORIDE 50 MG: 25 TABLET, FILM COATED ORAL at 10:55

## 2023-12-08 RX ADMIN — ACETAMINOPHEN 975 MG: 325 TABLET, FILM COATED ORAL at 10:55

## 2023-12-08 RX ADMIN — PREDNISONE 60 MG: 50 TABLET ORAL at 08:38

## 2023-12-08 RX ADMIN — BISACODYL 10 MG: 10 SUPPOSITORY RECTAL at 08:39

## 2023-12-08 RX ADMIN — METHYLPREDNISOLONE SODIUM SUCCINATE 100 MG: 125 INJECTION, POWDER, FOR SOLUTION INTRAMUSCULAR; INTRAVENOUS at 10:55

## 2023-12-08 RX ADMIN — ANTI-THYMOCYTE GLOBULIN (RABBIT) 50 MG: 5 INJECTION, POWDER, LYOPHILIZED, FOR SOLUTION INTRAVENOUS at 11:40

## 2023-12-08 RX ADMIN — ATORVASTATIN CALCIUM 10 MG: 10 TABLET, FILM COATED ORAL at 08:40

## 2023-12-08 RX ADMIN — OXYCODONE HYDROCHLORIDE 5 MG: 5 TABLET ORAL at 02:05

## 2023-12-08 RX ADMIN — METHOCARBAMOL 500 MG: 500 TABLET ORAL at 08:39

## 2023-12-08 RX ADMIN — Medication 1 TABLET: at 08:39

## 2023-12-08 RX ADMIN — OXYCODONE HYDROCHLORIDE 10 MG: 10 TABLET ORAL at 16:46

## 2023-12-08 RX ADMIN — METHOCARBAMOL 500 MG: 500 TABLET ORAL at 11:48

## 2023-12-08 RX ADMIN — METHOCARBAMOL 500 MG: 500 TABLET ORAL at 15:45

## 2023-12-08 RX ADMIN — MYCOPHENOLATE MOFETIL 750 MG: 250 CAPSULE ORAL at 08:37

## 2023-12-08 RX ADMIN — FEXOFENADINE HYDROCHLORIDE 60 MG: 60 TABLET ORAL at 10:55

## 2023-12-08 RX ADMIN — CARVEDILOL 50 MG: 25 TABLET, FILM COATED ORAL at 08:38

## 2023-12-08 RX ADMIN — LIDOCAINE HYDROCHLORIDE: 20 JELLY TOPICAL at 02:39

## 2023-12-08 RX ADMIN — SENNOSIDES AND DOCUSATE SODIUM 1 TABLET: 8.6; 5 TABLET ORAL at 08:46

## 2023-12-08 RX ADMIN — TACROLIMUS 3 MG: 1 CAPSULE ORAL at 08:36

## 2023-12-08 RX ADMIN — VALGANCICLOVIR 450 MG: 450 TABLET, FILM COATED ORAL at 08:37

## 2023-12-08 RX ADMIN — ASPIRIN 81 MG CHEWABLE TABLET 81 MG: 81 TABLET CHEWABLE at 08:39

## 2023-12-08 ASSESSMENT — ACTIVITIES OF DAILY LIVING (ADL)
ADLS_ACUITY_SCORE: 22
ADLS_ACUITY_SCORE: 27
ADLS_ACUITY_SCORE: 22
ADLS_ACUITY_SCORE: 27
ADLS_ACUITY_SCORE: 22

## 2023-12-08 NOTE — PLAN OF CARE
BP (!) 156/94 (BP Location: Right arm)   Pulse 73   Temp 99.1  F (37.3  C) (Oral)   Resp 16   Wt 106.3 kg (234 lb 6.4 oz)   SpO2 99%   BMI 33.63 kg/m      SHIFT: 4079-6317  ISOLATION: NA  VITALS: HTN SBPs > 150 mmHg, PRNs given, team aware. AOVSS on RA.  BG: NA  Recent Labs   Lab 12/07/23  2214 12/07/23  1805 12/07/23  1205 12/07/23  0847 12/07/23  0633 12/07/23  0129   * 165* 127* 149* 110* 162*   NEURO: A&O x4  Diet: Snacks/Supplements Adult: Ensure Enlive; With Meals  3 Gram Potassium (low potassium) Diet    PAIN: No reported nausea. Burning, stinging pain reported in tip of penis r/t griffiths cath. Lidocaine gel ordered to manage.  RESPIRATORY: WDL  CARDIAC: WDL  : Griffiths cath  GI: LBM: 12/06  TUBES: L TL IJ, R PIV, L AV Fistula, R CAMDEN, Griffiths cath  MIVF/GTT/ABX: NA.  ASSIST: Ao1.  SAFETY: NA  SKIN: Abdominal incision stapled and OLGA LIDIA.  LABS:   Recent Labs   Lab 12/08/23  0537 12/07/23  0633 12/06/23  1332 12/06/23  0920 12/06/23  0631 12/05/23  1716 12/05/23  1247   POTASSIUM  --  5.5* 4.7 4.6 4.4  4.4   < > 6.1*   PHOS  --  4.2  --   --  8.6*  --  5.5*   MAG  --  2.3  --   --  2.2  --  1.9   HGB 10.3* 10.5* 12.1* 12.2* 11.7*  11.7*   < > 11.3*   CR  --  2.90*  --   --  6.95*  --  11.80*    < > = values in this interval not displayed.   PLAN: Per Stephanie Fernandes MD:    - Sodium bicarbonate  650 mg po bid  - Send UPC daily as inpatient and monitor weekly per protocol for early identification of  potential FSGS recurrence   - Continue immunosuppressants per protocol

## 2023-12-08 NOTE — PROGRESS NOTES
Care Management Follow Up    Length of Stay (days): 3    Expected Discharge Date: 12/08/2023     Concerns to be Addressed: discharge planning     Patient plan of care discussed at interdisciplinary rounds: Yes    Anticipated Discharge Disposition: Home, Home Care     Anticipated Discharge Services: None  Anticipated Discharge DME: None    Patient/family educated on Medicare website which has current facility and service quality ratings: no  Education Provided on the Discharge Plan: Yes  Patient/Family in Agreement with the Plan: yes    Referrals Placed by CM/SW: External Care Coordination, Homecare  Private pay costs discussed: Not applicable    Additional Information:      Per round today, patient is discharging today.   In basket message sent for ATC appointment.   Appointments scheduled for 7 am on 12/09 and 12/10.    Care Management Discharge Note    Discharge Date: 12/08/2023       Discharge Disposition: Home, Home Care    Discharge Services: None    Discharge DME: None    Discharge Transportation: family or friend will provide    Private pay costs discussed: Not applicable    Does the patient's insurance plan have a 3 day qualifying hospital stay waiver?  Yes     Which insurance plan 3 day waiver is available? ACO REACH    Will the waiver be used for post-acute placement? Undetermined at this time    PAS Confirmation Code:    Patient/family educated on Medicare website which has current facility and service quality ratings: no    Education Provided on the Discharge Plan: Yes  Persons Notified of Discharge Plans: patient  Patient/Family in Agreement with the Plan: yes    Handoff Referral Completed: Yes    Additional Information:  IMM signed  faxed and a copy is left with patient .   Patient agreed on the discharge plan and is notified of his appointments and time. Patient said his mom is coming to pick him up.     Internal referral sent.   Home care order placed.   No further need identified.     Sushma  Manuela RN, PHN, BSN   Float Nurse Care Coordinator  Covering for Unit 7A  Phone [phone]

## 2023-12-08 NOTE — PROGRESS NOTES
[facility]   Transplant Nephrology Progress Note  Date of Admission:  12/4/2023  Today's Date: 12/08/2023    Recommendations:    - Add Hydralazine 50 mg po bid  - Sodium bicarbonate  650 mg po bid  - Send UPC daily as inpatient and monitor weekly per protocol for early identification of  potential FSGS recurrence   - Continue immunosuppressants per protocol        Assessment & Plan    # DDKT: Trending down              - Baseline Creatinine: ~ TBD              - Proteinuria: Not checked recently              - Date DSA Last Checked: Dec/2023      Latest DSA: no              - BK Viremia: no              - Kidney Tx Biopsy: No             - Double J stent placed at time of kidney transplant.      # Immunosuppression: Tacrolimus immediate release (goal 8-10),Mycophenolate 1000 mg po bid ,tapering dose of methyl prednisolone.  - Induction - intermediate intensity               - Patient is in an immunosuppressed state and will continue to monitor for efficacy and toxicity of immunosuppression medications.              - Changes: none     # Infection Prophylaxis:      - PJP: Sulfa/TMP (Bactrim)   - CMV: R +/D -:Valganciclovir (Valcyte)   480 mg po daily for three months.     # Hypertension: Uncontrolled  ;   Goal BP: < 150/90  - Volume status: Mildly Hypervolemic   EDW ~ 101 kg  - Changes:  Continue PTA Amlodipine 10 mg ,Carvedilol 50 mg po bid.  - Add hydralazine 50 mg po bid.     # Anemia in Chronic Renal Disease: Hgb: Stable ,normal      BRIE: No              - Iron studies: Unknown     # Electrolytes:   - Potassium; level: high        - Magnesium; level: normal       On supplement:no  - Bicarbonate; level: low    On supplement: yes     # Mineral Bone Disorder:               - Secondary renal hyperparathyroidism; PTH level: Moderately   elevated                                              On treatment: Cinacalcet  - Vitamin D; level: Not checked recently        On  supplement: Yes  - Calcium; level: Normal        On supplement: No  - Phosphorus; level: High        On supplement: No    # FSGS  Kidney biopsy reviewed from 2017. 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 Follow up for a recurrence .  - UPCR daily as inpatient and weekly as outpatient .        # Transplant History:  Etiology of Kidney Failure: Focal segmental glomerulosclerosis (FSGS)  Tx: DDKT  Transplant: 12/5/2023 (Kidney)  Crossmatch at time of Tx: negative  DSA at time of Tx: Yes  Significant changes in immunosuppression: None  Significant transplant-related complications: None        Recommendations were communicated to the primary team via this note.    Seen and discussed with Dr. Dion Middleton MD   Pager: [phone]      This patient has been seen and evaluated by me, Stephanie Lynn MD.  I have reviewed the note and agree with plan of care as documented by the fellow.    Stephanie Lynn MD     Interval History   Mr. Valiente's creatinine is 2.90 (12/08 0633); Trend down.  Robust amount of urine output.  Vitals: Systolic BP  running in 160'  no events overnight.  no chest pain or shortness of breath.  no leg swelling.  no nausea and vomiting.  no fever, sweats or chills.      Review of Systems   4 point ROS was obtained and negative except as noted in the Interval History.    MEDICATIONS:   amLODIPine  10 mg Oral Daily    aspirin  81 mg Oral Daily    atorvastatin  10 mg Oral Daily    [START ON 12/10/2023] basiliximab (SIMULECT) 20 mg in sodium chloride 0.9 % 50 mL infusion  20 mg Intravenous Once    carvedilol  50 mg Oral BID w/meals    hydrALAZINE  50 mg Oral BID    insulin aspart  1-7 Units Subcutaneous TID AC    insulin aspart  1-5 Units Subcutaneous At Bedtime    lidocaine  1-2 patch Transdermal Q24h    magnesium oxide  400 mg Oral Daily with lunch    methocarbamol  500 mg Oral 4x Daily    mycophenolate  750 mg Oral BID IS    polyethylene glycol  17 g  Oral Daily    predniSONE  60 mg Oral Daily    Followed by    [START ON 12/10/2023] predniSONE  40 mg Oral Daily    Followed by    [START ON 2023] predniSONE  20 mg Oral Daily    Followed by    [START ON 2023] predniSONE  15 mg Oral Daily    Followed by    [START ON 2023] predniSONE  10 mg Oral Daily    Followed by    [START ON 2024] predniSONE  5 mg Oral Daily    senna-docusate  1 tablet Oral BID    sodium bicarbonate  1,300 mg Oral BID    sodium chloride (PF)  10 mL Intracatheter Q8H    [START ON 2023] sulfamethoxazole-trimethoprim  1 tablet Oral Daily    tacrolimus  4.5 mg Oral BID IS    [START ON 2023] valGANciclovir  900 mg Oral Daily           Physical Exam   Temp  Av.4  F (36.9  C)  Min: 97  F (36.1  C)  Max: 99.3  F (37.4  C)      Pulse  Av.1  Min: 66  Max: 91 Resp  Avg: 15  Min: 10  Max: 20  SpO2  Av.7 %  Min: 93 %  Max: 100 %    CVP (mmHg): 9 mmHgBP (!) 154/96 (BP Location: Right arm)   Pulse 96   Temp 98  F (36.7  C) (Oral)   Resp 16   Wt 106.3 kg (234 lb 6.4 oz)   SpO2 94%   BMI 33.63 kg/m     Date 23 0700 - 23 0659   Shift 3361-6798 2179-7225 9668-5499 24 Hour Total   INTAKE   Shift Total(mL/kg)       OUTPUT   Urine 620   620   Drains 60   60   Shift Total(mL/kg) 680(6.52)   680(6.52)   Weight (kg) 104.37 104.37 104.37 104.37      Admit Weight: 105.1 kg (231 lb 12.8 oz)     GENERAL APPEARANCE: alert and no distress  HENT: mouth without ulcers or lesions  RESP: lungs clear to auscultation - no rales, rhonchi or wheezes  CV: regular rhythm, normal rate, no rub, no murmur  EDEMA: no LE edema bilaterally  ABDOMEN: soft, nondistended, nontender, bowel sounds normal  MS: extremities normal - no gross deformities noted, no evidence of inflammation in joints, no muscle tenderness  SKIN: no rash    Data   All labs reviewed by me.  CMP  Recent Labs   Lab 23  1212 23  0537 23  2214 23  1805 23  0847 23  0633  12/06/23  1642 12/06/23  1332 12/06/23  0920 12/06/23  0631 12/05/23  1407 12/05/23  1247 12/05/23  0543 12/05/23  0007   NA  --  140  --   --   --  137  --   --   --  138  --  131*  --  136   POTASSIUM  --  5.0  --   --   --  5.5*  --  4.7 4.6 4.4  4.4   < > 6.1*  --  5.0   CHLORIDE  --  113*  --   --   --  107  --   --   --  98  --  94*  --  92*   CO2  --  19*  --   --   --  19*  --   --   --  24  --  19*  --  25   ANIONGAP  --  8  --   --   --  11  --   --   --  16*  --  18*  --  19*   * 123* 149* 165*   < > 110*   < >  --   --  130*   < > 138*   < > 96   BUN  --  30.8*  --   --   --  34.5*  --   --   --  50.3*  --  64.2*  --  61.0*   CR  --  1.62*  --   --   --  2.90*  --   --   --  6.95*  --  11.80*  --  11.60*   GFRESTIMATED  --  59*  --   --   --  29*  --   --   --  10*  --  5*  --  6*   BUBBA  --  9.1  --   --   --  9.0  --   --   --  7.9*  --  6.7*  --  8.4*   MAG  --  2.0  --   --   --  2.3  --   --   --  2.2  --  1.9  --   --    PHOS  --  2.2*  --   --   --  4.2  --   --   --  8.6*  --  5.5*  --   --    PROTTOTAL  --   --   --   --   --   --   --   --   --   --   --   --   --  7.7   ALBUMIN  --   --   --   --   --   --   --   --   --   --   --   --   --  4.5   BILITOTAL  --   --   --   --   --   --   --   --   --   --   --   --   --  0.5   ALKPHOS  --   --   --   --   --   --   --   --   --   --   --   --   --  138   AST  --   --   --   --   --   --   --   --   --   --   --   --   --  13   ALT  --   --   --   --   --   --   --   --   --   --   --   --   --  20    < > = values in this interval not displayed.     CBC  Recent Labs   Lab 12/08/23  0537 12/07/23  0633 12/06/23  1332 12/06/23  0920 12/06/23  0631 12/05/23  1716 12/05/23  1247   HGB 10.3* 10.5* 12.1* 12.2* 11.7*  11.7*   < > 11.3*   WBC 11.9* 9.5  --   --  9.4  --  9.3   RBC 3.63* 3.72*  --   --  4.06*  --  3.96*   HCT 33.0* 34.5*  --   --  36.8*  --  34.8*   MCV 91 93  --   --  91  --  88   MCH 28.4 28.2  --   --  28.8  --  28.5   MCHC  31.2* 30.4*  --   --  31.8  --  32.5   RDW 16.5* 16.2*  --   --  16.3*  --  16.1*    162  --   --  182  --  140*    < > = values in this interval not displayed.     INR  Recent Labs   Lab 12/05/23  0007   INR 1.07   PTT 30     ABGNo lab results found in last 7 days.   Urine Studies  Recent Labs   Lab Test 06/15/21  2220 02/09/21  1103 09/09/20  1305 07/14/17  0948   COLOR Light Yellow Yellow Light Yellow Yellow   APPEARANCE Clear Slightly Cloudy Clear Clear   URINEGLC 50* Negative 50* Negative   URINEBILI Negative Negative Negative Negative   URINEKETONE Negative Negative Negative Negative   SG 1.015 1.013 1.015 1.025   UBLD Trace* Negative Small* Moderate*   URINEPH 6.5 5.0 6.0 5.5   PROTEIN 600* >499* 300* >=300*   UROBILINOGEN  --   --   --  0.2   NITRITE Negative Negative Negative Negative   LEUKEST Negative Trace* Negative Negative   RBCU 1 2 1 O - 2   WBCU 3 4 3 2-5*     Recent Labs   Lab Test 07/03/19  1529 07/17/17  0930 07/14/17  1735   UTPG 2.71* 1.94* 2.31*     PTH  Recent Labs   Lab Test 09/11/20  0634   PTHI 440*     Iron Studies  Recent Labs   Lab Test 09/11/20  0634 07/15/17  0635   IRON 33* 38   * 247   IRONSAT 18 15   CARI 325 341       IMAGING:  All imaging studies reviewed by me.

## 2023-12-08 NOTE — PLAN OF CARE
BP (!) 167/108 (BP Location: Right arm)   Pulse 98   Temp 98.3  F (36.8  C) (Oral)   Resp 18   Wt 105.7 kg (233 lb 1.6 oz)   SpO2 99%   BMI 33.45 kg/m      Shift: 9076-7083  Isolation Status: none  VS: stable on room air, afebrile  Neuro: Aox4  Behaviors: calm, cooperative  BG: achs   Respiratory: WDL  Cardiac: WDLx prolonged QT segment  Pain/Nausea: 6-8/10 pain, intermittent nausea  PRN: oxycodone, aromatherapy and sea-bands   Diet: Clears   IV Access: Right PIVx1, left triple lumen internal jugular        Infusion(s): none  Lines/Drains: griffiths, right CAMDEN to bulb suction with serosanguinous output   GI/: griffiths with approx 2L output, BM 12/6  Skin: RLQ incision staples, open to air.   Mobility: SBA  Events/Education: specialty pharm done today  Plan: Continue cares on 7A

## 2023-12-08 NOTE — PROGRESS NOTES
CLINICAL NUTRITION SERVICES - BRIEF/DISCHARGE NOTE     Nutrition Prescription    RECOMMENDATIONS FOR MDs/PROVIDERS TO ORDER:  None at this time    Recommendations already ordered by Registered Dietitian (RD):  Continue Ensure Enlive as ordered until discharge    Future/Additional Recommendations:  Minimize diet restrictions as able d/t high calorie/protein needs post-transplant.  Oral supplements as needed to help meet nutritional needs.     High protein food choices with meals to help meet high needs post-transplant over the next 6-8 weeks.     Heart-healthy diet (low saturated fat, low sodium, high fiber) and food safety precautions long term due to immunosuppression regimen post-transplant         EVALUATION OF THE PROGRESS TOWARD GOALS   Diet: 3 g K + Ensure Enlive BID    Intake: % intakes documented in flowsheets. Ordering adequately sized meals per [facility] records.     NEW FINDINGS   Pt reports appetite has still not returned to normal, however is not worse than it was before. Pt does not have questions about post-txp nutrition education. Pt did have 2x unopened Ensure on tray table- encouraged pt to bring them home upon discharge today. Also encouraged pt to consume small/frequent meals if appetite is low, and have good source of protein at each eating time to optimize protein intake.    Patient s discharge needs assessed and discharge planning has been conducted with the multidisciplinary transplant care team including physicians, pharmacy, social work and transplant coordinator.     Monitoring/Evaluation  Progress toward goals will be monitored and evaluated per protocol.    Once discharged, place outpatient nutrition consult via the transplant team if nutrition concerns arise.    JARAD Perez, RD, LD  7A/7B (beds 219-229) RD pager: [phone]  Weekend/Holiday RD pager: [phone]

## 2023-12-08 NOTE — PHARMACY-TRANSPLANT NOTE
Adult Kidney Transplant Post Operative Note    28 year old male s/p  donor kidney transplant on 2023 for Focal segmental glomerular sclerosis.      Planned Immunosuppression Regimen per kidney transplant protocol:  INDUCTION: Intermediate intensity protocol (cPRA 57)  23 - Thymoglobulin 150 mg (1.5 mg/kg); methylprednisolone 500 mg  23 - Basiliximab 20 mg; methylprednisolone 250 mg  23 - Methylprednisolone 100 mg   23 - Thymoglobulin 50 mg (to complete full dose of 2 mg/kg); methylprednisolone 100 mg   12/10/23 - Basiliximab 20 mg    MAINTENANCE:  Tacrolimus with goal trough levels 8-10 mcg/L for first 6 months post-transplant  Mycophenolate mofetil 750 mg BID  Prednisone taper    Opportunistic Pathogen Prophylaxis:   TMP/SMX indefinitely    Valganciclovir for 3 months (CMV D-/R+); Notified of insurance coverage concerns; may consider intensive CMV PCR monitoring with acyclovir 400 mg BID for 1 month to cover HSV    Statin:  Atorvastatin 10 mg daily; Please consider increase to high-intensity statin (atorvastatin 40 mg or higher) based on cardiovascular risk factors    Patient is not enrolled in medication study.    Pharmacy will monitor for medication interactions and immunosuppression levels in conjunction with the team. Medication therapy needs for discharge planning will continue to be addressed throughout the current admission via multidisciplinary rounds and order review. Pharmacy will make recommendations as appropriate.    Martha Heath, PharmD Student

## 2023-12-08 NOTE — PROVIDER NOTIFICATION
7A, Room 7207, ANN Valiente    Pt is reporting worsening burning/stinging pain in his penis over the last hour. Can we get an order for some lidocaine jelly for his griffiths? Thanks!    Lidocaine ordered, applied, and relief noted.

## 2023-12-08 NOTE — PROVIDER NOTIFICATION
7A, Room 7207, KARLA Valiente Kidney Tx      2102 on 12/08/2023  Pt's Clonidine patch has fallen off. Do we want that replaced? Pt's most recent BP was 167/108. Thanks!    2120 on 12/08/2023   I am so sorry, Jam. The patient doesn't have any PRNs ordered for high BP. I'll give those whenever you have a minute to out that order in. Again, so sorry!    2324 on 12/08/2023  Gave 3rd dose of labetalol. Per dosing Orders say to page and let you know! Call or text with concerns!    Noted, PRNs ordered for overnight.

## 2023-12-08 NOTE — PHARMACY-TRANSPLANT NOTE
Solid Organ Transplant Recipient Prior to Discharge Note    28 year old male s/p kidney transplant on 12/5/2023.    INDUCTION:  Intermediate Intensity Protocol  -12/5/23 POD0: Thymoglobulin 150 mg (1.5 mg/kg IV);  mg   -12/6/23 POD1: Basiliximab 20 mg IV;  mg  -12/7/23 POD2:  mg   -12/8/23 POD3: Thymoglobulin 50 mg (0.5 mg/kg IV for Thymoglobulin total 2 mg/kg IV)  -12/10/23 POD5: Basiliximab 20 mg IV       MAINTENANCE:   -Tacrolimus with goal trough levels 8-10 mcg/L for first 6-months post-transplant  -Mycophenolate Mofetil 750 mg by mouth BID  -Prednisone Steroid Taper     Inpatient tacrolimus dose and trough level history:  12/6/23: Initiated tacrolimus at 3 mg by mouth BID  12/8/23: Tac level 2.8 mcg/L (11 hr); Increase 3 mg dose to 4.5 mg by mouth BID    Discharge dose: 4.5 mg by mouth BID      Opportunistic pathogen prophylaxis includes:  - PJP: trimethoprim/sulfamethoxazole 80/400 mg indefinitely, currently dosed at 1 tablet by mouth daily for eCrCl: 83.6 mL/min.  Recommend adjusting doses based on renal function as follows:  eCrCl >30 mL/min: trimethoprim/sulfamethoxazole 80/400 mg daily  eCrCl 10-29 mL/min: trimethoprim/sulfamethoxazole 80/400 mg 3x/week  eCrCl <10 mL/min:  trimethoprim/sulfamethoxazole 80/400 mg 3x/week      - CMV D-/R+: Valganciclovir for 3 months duration tentatively, currently dosed at 900 for eCrCl: 83.6 mL/min (CMV IgG: Donor negative  / Recipient positive; EBV IgG: Donor positive / Recipient positive ).   Recommend adjusting dose based on renal function as follows:   eCrCl >/= 60 mL/min: 900 mg daily  eCrCl 40-59 mL/min: 450 mg daily  eCrCl 25-39 mL/min:  450 mg every other day  eCrCl 10-24 mL/min:  450 mg twice weekly  eCrCl <10 mL/min:  450 mg twice weekly     Valganciclovir for 3 months (CMV D-/R+); Notified of insurance coverage concerns; may consider intensive CMV PCR monitoring with acyclovir 400 mg BID for 1 month to cover HSV       Statin:  Atorvastatin 10  mg daily; Please consider increase to high-intensity statin (atorvastatin 40 mg or higher) based on cardiovascular risk factors    Pharmacy has monitored for medication interactions and immunosuppression levels in conjunction with the multidisciplinary team. In anticipation for discharge, medication therapy needs have been addressed daily throughout the current admission via multidisciplinary rounds and/or discussions, order verification, daily clinical pharmacy review, and communication with prescribers.     Yash Desai, Pharm.D.  PGY1 Pharmacy Resident

## 2023-12-09 ENCOUNTER — TELEPHONE (OUTPATIENT)
Dept: TRANSPLANT | Facility: CLINIC | Age: 28
End: 2023-12-09

## 2023-12-09 ENCOUNTER — INFUSION THERAPY VISIT (OUTPATIENT)
Dept: INFUSION THERAPY | Facility: CLINIC | Age: 28
End: 2023-12-09
Attending: NURSE PRACTITIONER
Payer: MEDICARE

## 2023-12-09 VITALS — OXYGEN SATURATION: 97 % | TEMPERATURE: 98.1 F

## 2023-12-09 DIAGNOSIS — Z79.899 ENCOUNTER FOR LONG-TERM CURRENT USE OF MEDICATION: ICD-10-CM

## 2023-12-09 DIAGNOSIS — Z98.890 OTHER SPECIFIED POSTPROCEDURAL STATES: ICD-10-CM

## 2023-12-09 DIAGNOSIS — Z20.828 CONTACT WITH AND (SUSPECTED) EXPOSURE TO OTHER VIRAL COMMUNICABLE DISEASES: ICD-10-CM

## 2023-12-09 DIAGNOSIS — Z94.0 KIDNEY REPLACED BY TRANSPLANT: ICD-10-CM

## 2023-12-09 DIAGNOSIS — Z48.298 AFTERCARE FOLLOWING ORGAN TRANSPLANT: ICD-10-CM

## 2023-12-09 DIAGNOSIS — Z76.82 KIDNEY TRANSPLANT CANDIDATE: ICD-10-CM

## 2023-12-09 DIAGNOSIS — Z94.0 KIDNEY TRANSPLANT RECIPIENT: ICD-10-CM

## 2023-12-09 LAB
ANION GAP SERPL CALCULATED.3IONS-SCNC: 7 MMOL/L (ref 7–15)
BUN SERPL-MCNC: 35.9 MG/DL (ref 6–20)
CALCIUM SERPL-MCNC: 9.4 MG/DL (ref 8.6–10)
CHLORIDE SERPL-SCNC: 110 MMOL/L (ref 98–107)
CREAT SERPL-MCNC: 1.35 MG/DL (ref 0.67–1.17)
DEPRECATED HCO3 PLAS-SCNC: 19 MMOL/L (ref 22–29)
EGFRCR SERPLBLD CKD-EPI 2021: 74 ML/MIN/1.73M2
ERYTHROCYTE [DISTWIDTH] IN BLOOD BY AUTOMATED COUNT: 16.2 % (ref 10–15)
GLUCOSE SERPL-MCNC: 121 MG/DL (ref 70–99)
HCT VFR BLD AUTO: 34.7 % (ref 40–53)
HGB BLD-MCNC: 10.9 G/DL (ref 13.3–17.7)
MAGNESIUM SERPL-MCNC: 1.8 MG/DL (ref 1.7–2.3)
MCH RBC QN AUTO: 28.3 PG (ref 26.5–33)
MCHC RBC AUTO-ENTMCNC: 31.4 G/DL (ref 31.5–36.5)
MCV RBC AUTO: 90 FL (ref 78–100)
PHOSPHATE SERPL-MCNC: 1.5 MG/DL (ref 2.5–4.5)
PLATELET # BLD AUTO: 225 10E3/UL (ref 150–450)
POTASSIUM SERPL-SCNC: 5 MMOL/L (ref 3.4–5.3)
RBC # BLD AUTO: 3.85 10E6/UL (ref 4.4–5.9)
SODIUM SERPL-SCNC: 136 MMOL/L (ref 135–145)
TACROLIMUS BLD-MCNC: 3.9 UG/L (ref 5–15)
TME LAST DOSE: ABNORMAL H
TME LAST DOSE: ABNORMAL H
WBC # BLD AUTO: 10.5 10E3/UL (ref 4–11)

## 2023-12-09 PROCEDURE — 80197 ASSAY OF TACROLIMUS: CPT

## 2023-12-09 PROCEDURE — 83735 ASSAY OF MAGNESIUM: CPT

## 2023-12-09 PROCEDURE — 84100 ASSAY OF PHOSPHORUS: CPT

## 2023-12-09 PROCEDURE — 80048 BASIC METABOLIC PNL TOTAL CA: CPT

## 2023-12-09 PROCEDURE — 36415 COLL VENOUS BLD VENIPUNCTURE: CPT

## 2023-12-09 PROCEDURE — 85027 COMPLETE CBC AUTOMATED: CPT

## 2023-12-09 RX ORDER — EPINEPHRINE 1 MG/ML
0.3 INJECTION, SOLUTION INTRAMUSCULAR; SUBCUTANEOUS EVERY 5 MIN PRN
Status: CANCELLED | OUTPATIENT
Start: 2023-12-09

## 2023-12-09 RX ORDER — METHYLPREDNISOLONE SODIUM SUCCINATE 125 MG/2ML
125 INJECTION, POWDER, LYOPHILIZED, FOR SOLUTION INTRAMUSCULAR; INTRAVENOUS
Status: CANCELLED
Start: 2023-12-09

## 2023-12-09 RX ORDER — DIPHENHYDRAMINE HYDROCHLORIDE 50 MG/ML
50 INJECTION INTRAMUSCULAR; INTRAVENOUS
Status: CANCELLED
Start: 2023-12-09

## 2023-12-09 RX ORDER — TACROLIMUS 1 MG/1
6 CAPSULE ORAL 2 TIMES DAILY
Qty: 360 CAPSULE | Refills: 11 | Status: SHIPPED | OUTPATIENT
Start: 2023-12-09 | End: 2023-12-10

## 2023-12-09 RX ORDER — HEPARIN SODIUM,PORCINE 10 UNIT/ML
5-20 VIAL (ML) INTRAVENOUS DAILY PRN
Status: CANCELLED | OUTPATIENT
Start: 2023-12-09

## 2023-12-09 RX ORDER — MEPERIDINE HYDROCHLORIDE 25 MG/ML
25 INJECTION INTRAMUSCULAR; INTRAVENOUS; SUBCUTANEOUS EVERY 30 MIN PRN
Status: CANCELLED | OUTPATIENT
Start: 2023-12-09

## 2023-12-09 RX ORDER — ALBUTEROL SULFATE 0.83 MG/ML
2.5 SOLUTION RESPIRATORY (INHALATION)
Status: CANCELLED | OUTPATIENT
Start: 2023-12-09

## 2023-12-09 RX ORDER — ATORVASTATIN CALCIUM 10 MG/1
10 TABLET, FILM COATED ORAL DAILY
Qty: 90 TABLET | Refills: 0 | Status: SHIPPED | OUTPATIENT
Start: 2023-12-09 | End: 2023-12-10

## 2023-12-09 RX ORDER — ALBUTEROL SULFATE 90 UG/1
1-2 AEROSOL, METERED RESPIRATORY (INHALATION)
Status: CANCELLED
Start: 2023-12-09

## 2023-12-09 RX ORDER — HEPARIN SODIUM (PORCINE) LOCK FLUSH IV SOLN 100 UNIT/ML 100 UNIT/ML
5 SOLUTION INTRAVENOUS
Status: CANCELLED | OUTPATIENT
Start: 2023-12-09

## 2023-12-09 RX ORDER — TACROLIMUS 0.5 MG/1
0.5 CAPSULE ORAL 2 TIMES DAILY
Qty: 60 CAPSULE | Refills: 11 | Status: SHIPPED | OUTPATIENT
Start: 2023-12-09 | End: 2023-12-10

## 2023-12-09 NOTE — PATIENT INSTRUCTIONS
Dear Taylor Valiente    Thank you for choosing [facility] ([facility]) for your transplant cares.  The following information is a summary of our appointment as well as important reminders.      Please make sure your phone is available today because your transplant coordinator will call to update you with your anti-rejection drug levels and possibly make changes to your anti-rejection dosages., Please refer to your hospital discharge instructions for details on home care services, future appointments, phone numbers, and diet/activity levels.    If you are a transplant patient and require transplant education, please click on this link: [email or web address].    Instructions:   Lipitor from pharmacy AND over the counter multivitamins. Fill in these medications in med box  DRINK 2-3L non caffeinated fluids.  Eat dairy products to increase phosphorus.    We look forward in seeing you on your next appointment here at [facility] ([facility]).  Please don t hesitate to call us at [phone] to reschedule any of your appointments or to speak with one of the [facility] registered nurses.  It was a pleasure taking care of you today.    Sincerely,    [facility]  [address] [place]  [address]  Phone:  [phone]

## 2023-12-09 NOTE — PROGRESS NOTES
"Taylor Valiente came to [facility] today for a lab and assess following a kidney transplant on 12/5/23.      Discharge date: 12/8/23   Transplant coordinator: Opal Rashid  Phone number patient can be reached at: [phone]      Physical Assessment:  See physical assessment located under \"Document Flowsheets\".  Incision site: C/D/I, no drainage  Lines: CAMDEN drain cdi. Drained 3oz overnight per pt  Hurt: in place. Emptied about 800ml  Urine clarity: pink tinged/light yellow clear non odorous  Hydration: Drank about 2.5 liters .  Nutrition: appetite ok   Last BM: 12/8/23  Pain: 7/10 at incision site   My transplant place videos watched: yes  Labs drawn by [facility] staff Yes    Plan of care for today:   -Labs drawn. PIV in place overnight per provider.  -Medications reviewed and med box set up  -Refill for Lipitor sent to pharmacy via verbal with readback  -Reinforced 2-3L of fluids.    Medication changes: n/a    Medications administered:  Patient self administered morning medications after labs drawn.    Patient education:    The following teaching topics were addressed: Importance of drinking 2L of non-caffeinated fluids daily, Incisional care, Signs/symptoms of infection, Good handwashing, Medications (purposes, doses and times of administration), Phone numbers to call with concenrs (Transplant coordinator, [facility] and [facility]), 7A discharge check list, Plan of care, Drain care, and Hurt cath care   Patient verbalized understanding and all questions answered.    Drug level:  Patient took 4.5 mg of Tacrolimus last evening at 2000.  Care coordinator to follow up with the result.    Face to face time: 120mins  Discharge Plan    Pt will follow up with transplant coordinator  Discharge instructions reviewed with patient: YES  Patient/Representative verbalized understanding, all questions answered: YES    Discharged from unit at 0945 with whom: mother to home.    Zach Garcia, RN, RN   "

## 2023-12-09 NOTE — PLAN OF CARE
DISCHARGE:  D: Patient with orders to discharge home with family.  I: Discharge instructions, medications, & follow ups reviewed with patient; Taylor. Copy of discharge summary given to Taylor. CVC removed. All belongings packed & sent with patient. Medications filled & picked up at discharge pharmacy. Paperwork faxed.   A: Patient in stable condition. AVSS. Taylor and family had no further questions regarding discharge instructions and medications. Patient transferred out by wheelchair & left with transportation at 1730.  P: Plan for follow-up with outpatient clinic tomorrow (12/9) AM for morning labs and continuing education.

## 2023-12-09 NOTE — TELEPHONE ENCOUNTER
ISSUE:   Tacrolimus IR level 3.9 on 12/9, goal 8-10, dose 4.5 mg BID.    PLAN:   Call Patient and confirm this was an accurate 12-hour trough.   Verify Tacrolimus IR dose 4 mg BID.   Confirm no new medications or illness.   Confirm no missed doses.   If accurate trough and accurate dose, increase Tacrolimus IR dose to 6.5 mg BID     Is this more than a 50% increase or decrease in current IS dose: Yes  If YES, justification: POD 4, tac level 3.9    Repeat labs in tomorrow.  *If > 50% change in immunosuppression dose, repeat labs in 1 week.     OUTCOME:   RNCC left VM with dose change instruction.   myC message also sent

## 2023-12-10 ENCOUNTER — TELEPHONE (OUTPATIENT)
Dept: TRANSPLANT | Facility: CLINIC | Age: 28
End: 2023-12-10

## 2023-12-10 ENCOUNTER — INFUSION THERAPY VISIT (OUTPATIENT)
Dept: INFUSION THERAPY | Facility: CLINIC | Age: 28
End: 2023-12-10
Attending: NURSE PRACTITIONER
Payer: MEDICARE

## 2023-12-10 VITALS
OXYGEN SATURATION: 100 % | TEMPERATURE: 98.1 F | WEIGHT: 229.7 LBS | RESPIRATION RATE: 18 BRPM | BODY MASS INDEX: 32.96 KG/M2

## 2023-12-10 DIAGNOSIS — Z76.82 KIDNEY TRANSPLANT CANDIDATE: ICD-10-CM

## 2023-12-10 DIAGNOSIS — D63.1 ANEMIA OF CHRONIC RENAL FAILURE, STAGE 5 (H): ICD-10-CM

## 2023-12-10 DIAGNOSIS — N18.5 CKD (CHRONIC KIDNEY DISEASE) STAGE 5, GFR LESS THAN 15 ML/MIN (H): ICD-10-CM

## 2023-12-10 DIAGNOSIS — Z94.0 KIDNEY TRANSPLANT RECIPIENT: Primary | ICD-10-CM

## 2023-12-10 DIAGNOSIS — N18.5 ANEMIA OF CHRONIC RENAL FAILURE, STAGE 5 (H): ICD-10-CM

## 2023-12-10 DIAGNOSIS — Z94.0 KIDNEY REPLACED BY TRANSPLANT: ICD-10-CM

## 2023-12-10 DIAGNOSIS — E86.0 DEHYDRATION: ICD-10-CM

## 2023-12-10 DIAGNOSIS — Z94.0 KIDNEY TRANSPLANT RECIPIENT: ICD-10-CM

## 2023-12-10 LAB
ANION GAP SERPL CALCULATED.3IONS-SCNC: 9 MMOL/L (ref 7–15)
BASOPHILS # BLD AUTO: 0.1 10E3/UL (ref 0–0.2)
BASOPHILS NFR BLD AUTO: 1 %
BUN SERPL-MCNC: 31.3 MG/DL (ref 6–20)
CALCIUM SERPL-MCNC: 9.4 MG/DL (ref 8.6–10)
CHLORIDE SERPL-SCNC: 109 MMOL/L (ref 98–107)
CREAT SERPL-MCNC: 1.09 MG/DL (ref 0.67–1.17)
DEPRECATED HCO3 PLAS-SCNC: 18 MMOL/L (ref 22–29)
EGFRCR SERPLBLD CKD-EPI 2021: >90 ML/MIN/1.73M2
EOSINOPHIL # BLD AUTO: 0.1 10E3/UL (ref 0–0.7)
EOSINOPHIL NFR BLD AUTO: 1 %
ERYTHROCYTE [DISTWIDTH] IN BLOOD BY AUTOMATED COUNT: 16.3 % (ref 10–15)
FERRITIN SERPL-MCNC: 1743 NG/ML (ref 31–409)
GLUCOSE SERPL-MCNC: 118 MG/DL (ref 70–99)
HCT VFR BLD AUTO: 38.8 % (ref 40–53)
HGB BLD-MCNC: 12.6 G/DL (ref 13.3–17.7)
IMM GRANULOCYTES # BLD: 0.3 10E3/UL
IMM GRANULOCYTES NFR BLD: 3 %
IRON BINDING CAPACITY (ROCHE): 193 UG/DL (ref 240–430)
IRON SATN MFR SERPL: 41 % (ref 15–46)
IRON SERPL-MCNC: 80 UG/DL (ref 61–157)
LYMPHOCYTES # BLD AUTO: 1.8 10E3/UL (ref 0.8–5.3)
LYMPHOCYTES NFR BLD AUTO: 17 %
MAGNESIUM SERPL-MCNC: 1.7 MG/DL (ref 1.7–2.3)
MCH RBC QN AUTO: 28.8 PG (ref 26.5–33)
MCHC RBC AUTO-ENTMCNC: 32.5 G/DL (ref 31.5–36.5)
MCV RBC AUTO: 89 FL (ref 78–100)
MONOCYTES # BLD AUTO: 1.6 10E3/UL (ref 0–1.3)
MONOCYTES NFR BLD AUTO: 15 %
NEUTROPHILS # BLD AUTO: 7.2 10E3/UL (ref 1.6–8.3)
NEUTROPHILS NFR BLD AUTO: 63 %
NRBC # BLD AUTO: 0 10E3/UL
NRBC BLD AUTO-RTO: 0 /100
PHOSPHATE SERPL-MCNC: 1.5 MG/DL (ref 2.5–4.5)
PLATELET # BLD AUTO: 251 10E3/UL (ref 150–450)
POTASSIUM SERPL-SCNC: 4.8 MMOL/L (ref 3.4–5.3)
PTH-INTACT SERPL-MCNC: 318 PG/ML (ref 15–65)
RBC # BLD AUTO: 4.37 10E6/UL (ref 4.4–5.9)
SODIUM SERPL-SCNC: 136 MMOL/L (ref 135–145)
TACROLIMUS BLD-MCNC: 4.8 UG/L (ref 5–15)
TME LAST DOSE: ABNORMAL H
TME LAST DOSE: ABNORMAL H
VIT D+METAB SERPL-MCNC: 21 NG/ML (ref 20–50)
WBC # BLD AUTO: 11 10E3/UL (ref 4–11)

## 2023-12-10 PROCEDURE — 83550 IRON BINDING TEST: CPT

## 2023-12-10 PROCEDURE — 84100 ASSAY OF PHOSPHORUS: CPT

## 2023-12-10 PROCEDURE — 85025 COMPLETE CBC W/AUTO DIFF WBC: CPT

## 2023-12-10 PROCEDURE — 36415 COLL VENOUS BLD VENIPUNCTURE: CPT

## 2023-12-10 PROCEDURE — 250N000011 HC RX IP 250 OP 636: Mod: JZ | Performed by: PHYSICIAN ASSISTANT

## 2023-12-10 PROCEDURE — 83735 ASSAY OF MAGNESIUM: CPT

## 2023-12-10 PROCEDURE — 82306 VITAMIN D 25 HYDROXY: CPT

## 2023-12-10 PROCEDURE — 83970 ASSAY OF PARATHORMONE: CPT

## 2023-12-10 PROCEDURE — 258N000003 HC RX IP 258 OP 636: Performed by: PHYSICIAN ASSISTANT

## 2023-12-10 PROCEDURE — 96365 THER/PROPH/DIAG IV INF INIT: CPT

## 2023-12-10 PROCEDURE — 80048 BASIC METABOLIC PNL TOTAL CA: CPT

## 2023-12-10 PROCEDURE — 258N000003 HC RX IP 258 OP 636: Performed by: NURSE PRACTITIONER

## 2023-12-10 PROCEDURE — 80197 ASSAY OF TACROLIMUS: CPT

## 2023-12-10 PROCEDURE — 82728 ASSAY OF FERRITIN: CPT

## 2023-12-10 RX ORDER — TACROLIMUS 0.5 MG/1
0.5 CAPSULE ORAL 2 TIMES DAILY
Start: 2023-12-10 | End: 2023-12-12

## 2023-12-10 RX ORDER — EPINEPHRINE 1 MG/ML
0.3 INJECTION, SOLUTION INTRAMUSCULAR; SUBCUTANEOUS EVERY 5 MIN PRN
Status: CANCELLED | OUTPATIENT
Start: 2023-12-10

## 2023-12-10 RX ORDER — MEPERIDINE HYDROCHLORIDE 25 MG/ML
25 INJECTION INTRAMUSCULAR; INTRAVENOUS; SUBCUTANEOUS EVERY 30 MIN PRN
Status: CANCELLED | OUTPATIENT
Start: 2023-12-10

## 2023-12-10 RX ORDER — HEPARIN SODIUM (PORCINE) LOCK FLUSH IV SOLN 100 UNIT/ML 100 UNIT/ML
5 SOLUTION INTRAVENOUS
Status: CANCELLED | OUTPATIENT
Start: 2023-12-10

## 2023-12-10 RX ORDER — HEPARIN SODIUM,PORCINE 10 UNIT/ML
5-20 VIAL (ML) INTRAVENOUS DAILY PRN
Status: CANCELLED | OUTPATIENT
Start: 2023-12-10

## 2023-12-10 RX ORDER — DIPHENHYDRAMINE HYDROCHLORIDE 50 MG/ML
50 INJECTION INTRAMUSCULAR; INTRAVENOUS
Status: CANCELLED
Start: 2023-12-10

## 2023-12-10 RX ORDER — ALBUTEROL SULFATE 0.83 MG/ML
2.5 SOLUTION RESPIRATORY (INHALATION)
Status: CANCELLED | OUTPATIENT
Start: 2023-12-10

## 2023-12-10 RX ORDER — ALBUTEROL SULFATE 90 UG/1
1-2 AEROSOL, METERED RESPIRATORY (INHALATION)
Status: CANCELLED
Start: 2023-12-10

## 2023-12-10 RX ORDER — METHYLPREDNISOLONE SODIUM SUCCINATE 125 MG/2ML
125 INJECTION, POWDER, LYOPHILIZED, FOR SOLUTION INTRAMUSCULAR; INTRAVENOUS
Status: CANCELLED
Start: 2023-12-10

## 2023-12-10 RX ORDER — TACROLIMUS 1 MG/1
8 CAPSULE ORAL 2 TIMES DAILY
Start: 2023-12-10 | End: 2023-12-12

## 2023-12-10 RX ORDER — SODIUM BICARBONATE 650 MG/1
1950 TABLET ORAL 2 TIMES DAILY
Qty: 120 TABLET | Refills: 11 | Status: SHIPPED | OUTPATIENT
Start: 2023-12-10 | End: 2023-12-14

## 2023-12-10 RX ORDER — VALGANCICLOVIR 450 MG/1
900 TABLET, FILM COATED ORAL DAILY
Qty: 8 TABLET | Refills: 0 | Status: SHIPPED | OUTPATIENT
Start: 2023-12-10 | End: 2024-02-15

## 2023-12-10 RX ORDER — ATORVASTATIN CALCIUM 10 MG/1
10 TABLET, FILM COATED ORAL DAILY
Qty: 90 TABLET | Refills: 0 | Status: SHIPPED | OUTPATIENT
Start: 2023-12-10 | End: 2024-02-21

## 2023-12-10 RX ADMIN — SODIUM CHLORIDE 1000 ML: 9 INJECTION, SOLUTION INTRAVENOUS at 10:20

## 2023-12-10 RX ADMIN — SODIUM CHLORIDE 20 MG: 9 INJECTION, SOLUTION INTRAVENOUS at 08:43

## 2023-12-10 NOTE — PROGRESS NOTES
"Taylor Valiente came to [facility] today for a lab and assess following a kidney transplant on 12/05/2023.      Discharge date: 12/08/2023  Transplant coordinator: Opal Rashid  Phone number patient can be reached at: [phone] (Cell); [phone] (Mother cell)       Physical Assessment:  See physical assessment located under \"Document Flowsheets\".  Incision site: Abdominal incision to RLQ intact with staples; CAMDEN drain to (R) abdominal region intact; no s/s of infection noted. Incisional care, CAMDEN drain care and s/s of infection reviewed w/ patient/mother who verbalized understanding.  Lines: CAMDEN drain intact to (R) abdominal region; patient reports approximately 140 ml serosanguineous output in the last 24 hours.   Griffiths: Patient arrived to [facility] in discomfort associated w/ griffiths which was intact and draining clear yellow urine; patient stated it felt as if he needed to void; order received from Vesna Yuan NP to discontinue griffiths today (POD 5). Griffiths was discontinued intact. Patient voided numerous times following griffiths removal and was bladder scanned for 76 ml. Vesna Yuan NP was informed. Patient stated he felt relief after griffiths was discontinued and also states he feels he is emptying his bladder appropriately. Patient instructed to call transplant coordinator should he have difficulty voiding and verbalized understanding. Patient also instructed to double void per orders from Vesna Yuan NP; patient agreeable/verbalized understanding.   Urine clarity: Clear yellow w/ no foul odor per patient.  Hydration: Patient reports drinking 4-5 liter in the last 24 hours. Reviewed fluid intake requirements w/ patient who verbalized understanding.  Nutrition: Patient reports appetite is good.   Last BM: Today. Taking Senna PRN and also has Miralax available if needed.   Pain: Patient reports abdominal incision pain for which he is taking Tylenol and Oxydone PRN with effective results.    Blood pressure/heart " rate: 161/105 - 111 (sitting); 140/104 - 129 (standing); asymptomatic; denies dizziness; reported to Vesna Yuan NP.     Labs drawn by [facility] staff: Yes    Plan of care for today:   1. Today's labs, vitals, weight and assessment were reviewed w/ Vesna Yuan NP who gave the following orders:         - Instruct patient to double void.        - Administer 1L IV Normal Saline today (administered over approximately one hour).        - Lipitor prescription e-prescribed to [facility] in [place]. For future doses, please get through primary care provider.        - Take 1 Multivitamin PO daily.        - Return to [facility] tomorrow for LBA.        - Increase Sodium Bicarb to 1950 mg PO BID. (Medication card/box updated).        - Start Phosphorus 250 mg PO BID.  at [facility].        - Discontinue rgiffiths catheter today. (See note above)        - Give Simulect today (per therapy plan).        - Four day supply of Valcyte e-prescribed to [facility] for pickup tomorrow since [facility] will not have remaining quantity of prescribed tablets available until Saturday. (Medication box filled through Wednesday. Patient instructed to fill remaining days once Valcyte tabs obtained from [facility] and patient verbalized understanding).     2. IV Simulect administered over approximately 30 minutes per orders from Sarina Mireles PA-C.    3. Medication review performed. Medications on hospital discharge instructions, medication card and Epic medication list all correlate. Patient reports medication box was filled by RN yesterday. 450 mg Valcyte dose found in morning medication slot instead of 900 mg ordered dose. Error corrected and Vesna Yuan NP informed. Tacrolimus 7 mg found in morning medication box slot instead of 6.5 mg ordered dose. Error explained to patient and corrected. Patient verbalized understanding.       Medication changes: See above.    Medications administered:  Administrations This Visit        basiliximab (SIMULECT) 20 mg in sodium chloride 0.9 % 50 mL infusion       Admin Date  12/10/2023 Action  $New Bag Dose  20 mg Rate  100 mL/hr Route  Intravenous Documented By  Lalo Sánchez RN              sodium chloride 0.9% BOLUS 1,000 mL       Admin Date  12/10/2023 Action  $New Bag Dose  1,000 mL Route  Intravenous Documented By  Lalo Sánchez RN                    Patient education:    The following teaching topics were addressed: Incisional care, Signs/symptoms of infection, Medications (purposes, doses and times of administration), Phone numbers to call with concenrs (Transplant coordinator, Unit 6 and [facility]), and Plan of care   Patient and Mother verbalized understanding and all questions answered.    Drug level:  Patient reports last dose of Tacrolimus was taken last night at 2000. Patient discovered he inadvertently took 7 mg instead of the ordered 6.5 mg dose. Informed Vesna Yuan NP and transplant coordinator on call (My) who will be following up with today's drug level.     Face to face time: 90 minutes  Discharge Plan    Pt will follow up with [facility] tomorrow for LBA.  Discharge instructions reviewed with patient: YES  Patient/Representative verbalized understanding, all questions answered: YES    Discharged from unit at 1215 with mother to home.    Lalo Sánchez RN

## 2023-12-10 NOTE — PATIENT INSTRUCTIONS
Contact Information:    Transplant Coordinator: Opalcodey Rashid  Transplant Office:  [phone]  [facility]:  [phone]  Ask for physician on call for kidney transplant.  Unit 7A (Transplant Unit):  [phone]  [facility]:  [phone]  [facility]:  [phone]    Please double void. Notify coordinator if you have any difficulty emptying your bladder.    2. Push fluids!    3. Please follow up with your primary physician for future Lipitor refills.    4. Please  Lipitor at the [facility] in [place] and place in your 8:00 PM medication slot of your medication box.     5. Please buy a multivitamin and place in your 8:00 AM medication slot of your medication box.    6. Please  Valcyte prescription from the [facility] tomorrow and place in 8:00 AM medication slot of your medication box. Please fill Thursday, Friday and Saturday. (Monday,Tuesday and Wednesday have been filled).     7. Please  remaining original prescription of Valcyte from [facility] once available on Saturday.    8. Please return for labs tomorrow at 7:15 and [facility] at 8:00. Bring everything with you that you brought today.     9. Please increase your Sodium Bicarb to 1950 mg twice daily. (Adjustment already made to your medication box).    10. Please start Phosphorus 250 mg twice daily.  at [facility] in [place] and place in your 8:00 am and 8:00 pm slots of your medication box.

## 2023-12-10 NOTE — TELEPHONE ENCOUNTER
ISSUE:   Tacrolimus IR level 4.8 on 12/10/2023, goal 8-10, dose 6.5 mg BID.    PLAN:   Call Patientand confirm this was an accurate 12-hour trough.   Verify Tacrolimus IR dose 6.5 mg BID.   Confirm no new medications or illness.   Confirm no missed doses.   If accurate trough and accurate dose, increase Tacrolimus IR dose to 8.5 mg BID     Is this more than a 50% increase or decrease in current IS dose: No  If YES, justification: na    Repeat labs in 1 days.  *If > 50% change in immunosuppression dose, repeat labs in 1 week.     OUTCOME:   Spoke with Patient, they confirm accurate trough level and current dose 6.5 mg BID.   Patientconfirmed dose change to 8.5 mg BID.  Patientagreed to repeat labs in 1 days.   Orders sent to preferred pharmacy for dose change and lab for repeat labs.   Patientvoiced understanding of plan.     My Singh RN   Transplant Coordinator  [phone]

## 2023-12-11 ENCOUNTER — OFFICE VISIT (OUTPATIENT)
Dept: INFUSION THERAPY | Facility: CLINIC | Age: 28
End: 2023-12-11
Attending: NURSE PRACTITIONER
Payer: MEDICARE

## 2023-12-11 ENCOUNTER — TELEPHONE (OUTPATIENT)
Dept: PHARMACY | Facility: CLINIC | Age: 28
End: 2023-12-11

## 2023-12-11 ENCOUNTER — TELEPHONE (OUTPATIENT)
Dept: TRANSPLANT | Facility: CLINIC | Age: 28
End: 2023-12-11

## 2023-12-11 ENCOUNTER — LAB (OUTPATIENT)
Dept: LAB | Facility: CLINIC | Age: 28
End: 2023-12-11
Attending: NURSE PRACTITIONER
Payer: MEDICARE

## 2023-12-11 VITALS
WEIGHT: 227.1 LBS | BODY MASS INDEX: 32.59 KG/M2 | RESPIRATION RATE: 16 BRPM | TEMPERATURE: 98.5 F | OXYGEN SATURATION: 100 %

## 2023-12-11 DIAGNOSIS — Z48.298 AFTERCARE FOLLOWING ORGAN TRANSPLANT: ICD-10-CM

## 2023-12-11 DIAGNOSIS — Z94.0 KIDNEY TRANSPLANT RECIPIENT: Primary | ICD-10-CM

## 2023-12-11 DIAGNOSIS — Z94.0 KIDNEY TRANSPLANT RECIPIENT: ICD-10-CM

## 2023-12-11 DIAGNOSIS — Z79.899 ENCOUNTER FOR LONG-TERM CURRENT USE OF MEDICATION: ICD-10-CM

## 2023-12-11 DIAGNOSIS — Z76.82 KIDNEY TRANSPLANT CANDIDATE: ICD-10-CM

## 2023-12-11 DIAGNOSIS — Z98.890 OTHER SPECIFIED POSTPROCEDURAL STATES: ICD-10-CM

## 2023-12-11 DIAGNOSIS — Z20.828 CONTACT WITH AND (SUSPECTED) EXPOSURE TO OTHER VIRAL COMMUNICABLE DISEASES: ICD-10-CM

## 2023-12-11 DIAGNOSIS — Z94.0 KIDNEY REPLACED BY TRANSPLANT: ICD-10-CM

## 2023-12-11 LAB
ALBUMIN UR-MCNC: NEGATIVE MG/DL
ANION GAP SERPL CALCULATED.3IONS-SCNC: 10 MMOL/L (ref 7–15)
APPEARANCE UR: CLEAR
BASOPHILS # BLD AUTO: 0 10E3/UL (ref 0–0.2)
BASOPHILS NFR BLD AUTO: 0 %
BILIRUB UR QL STRIP: NEGATIVE
BUN SERPL-MCNC: 27.3 MG/DL (ref 6–20)
CALCIUM SERPL-MCNC: 9.4 MG/DL (ref 8.6–10)
CHLORIDE SERPL-SCNC: 107 MMOL/L (ref 98–107)
COLOR UR AUTO: ABNORMAL
CREAT SERPL-MCNC: 1.09 MG/DL (ref 0.67–1.17)
DEPRECATED HCO3 PLAS-SCNC: 16 MMOL/L (ref 22–29)
EGFRCR SERPLBLD CKD-EPI 2021: >90 ML/MIN/1.73M2
EOSINOPHIL # BLD AUTO: 0.2 10E3/UL (ref 0–0.7)
EOSINOPHIL NFR BLD AUTO: 1 %
ERYTHROCYTE [DISTWIDTH] IN BLOOD BY AUTOMATED COUNT: 16.1 % (ref 10–15)
GLUCOSE SERPL-MCNC: 124 MG/DL (ref 70–99)
GLUCOSE UR STRIP-MCNC: 200 MG/DL
HCT VFR BLD AUTO: 37.5 % (ref 40–53)
HGB BLD-MCNC: 12.3 G/DL (ref 13.3–17.7)
HGB UR QL STRIP: ABNORMAL
IMM GRANULOCYTES # BLD: 0.4 10E3/UL
IMM GRANULOCYTES NFR BLD: 2 %
KETONES UR STRIP-MCNC: NEGATIVE MG/DL
LEUKOCYTE ESTERASE UR QL STRIP: NEGATIVE
LYMPHOCYTES # BLD AUTO: 1.8 10E3/UL (ref 0.8–5.3)
LYMPHOCYTES NFR BLD AUTO: 11 %
MAGNESIUM SERPL-MCNC: 1.6 MG/DL (ref 1.7–2.3)
MCH RBC QN AUTO: 28.3 PG (ref 26.5–33)
MCHC RBC AUTO-ENTMCNC: 32.8 G/DL (ref 31.5–36.5)
MCV RBC AUTO: 86 FL (ref 78–100)
MONOCYTES # BLD AUTO: 1.9 10E3/UL (ref 0–1.3)
MONOCYTES NFR BLD AUTO: 12 %
MYCOPHENOLATE SERPL LC/MS/MS-MCNC: 1.99 MG/L (ref 1–3.5)
MYCOPHENOLATE-G SERPL LC/MS/MS-MCNC: 39.4 MG/L (ref 30–95)
NEUTROPHILS # BLD AUTO: 11.5 10E3/UL (ref 1.6–8.3)
NEUTROPHILS NFR BLD AUTO: 74 %
NITRATE UR QL: NEGATIVE
NRBC # BLD AUTO: 0 10E3/UL
NRBC BLD AUTO-RTO: 0 /100
PH UR STRIP: 5.5 [PH] (ref 5–7)
PHOSPHATE SERPL-MCNC: 1.4 MG/DL (ref 2.5–4.5)
PLATELET # BLD AUTO: 299 10E3/UL (ref 150–450)
POTASSIUM SERPL-SCNC: 4.7 MMOL/L (ref 3.4–5.3)
RBC # BLD AUTO: 4.35 10E6/UL (ref 4.4–5.9)
RBC URINE: >182 /HPF
SODIUM SERPL-SCNC: 133 MMOL/L (ref 135–145)
SP GR UR STRIP: 1.02 (ref 1–1.03)
TACROLIMUS BLD-MCNC: 6.9 UG/L (ref 5–15)
TME LAST DOSE: NORMAL H
TRANSITIONAL EPI: <1 /HPF
UROBILINOGEN UR STRIP-MCNC: NORMAL MG/DL
WBC # BLD AUTO: 15.8 10E3/UL (ref 4–11)
WBC URINE: 3 /HPF

## 2023-12-11 PROCEDURE — 99000 SPECIMEN HANDLING OFFICE-LAB: CPT | Performed by: PATHOLOGY

## 2023-12-11 PROCEDURE — 99215 OFFICE O/P EST HI 40 MIN: CPT | Mod: 24 | Performed by: NURSE PRACTITIONER

## 2023-12-11 PROCEDURE — 80048 BASIC METABOLIC PNL TOTAL CA: CPT | Performed by: PATHOLOGY

## 2023-12-11 PROCEDURE — 87086 URINE CULTURE/COLONY COUNT: CPT | Performed by: NURSE PRACTITIONER

## 2023-12-11 PROCEDURE — 83735 ASSAY OF MAGNESIUM: CPT | Performed by: PATHOLOGY

## 2023-12-11 PROCEDURE — 84100 ASSAY OF PHOSPHORUS: CPT | Performed by: PATHOLOGY

## 2023-12-11 PROCEDURE — 80197 ASSAY OF TACROLIMUS: CPT | Performed by: INTERNAL MEDICINE

## 2023-12-11 PROCEDURE — 81001 URINALYSIS AUTO W/SCOPE: CPT | Performed by: NURSE PRACTITIONER

## 2023-12-11 PROCEDURE — 80180 DRUG SCRN QUAN MYCOPHENOLATE: CPT | Performed by: INTERNAL MEDICINE

## 2023-12-11 PROCEDURE — 85025 COMPLETE CBC W/AUTO DIFF WBC: CPT | Performed by: PATHOLOGY

## 2023-12-11 PROCEDURE — 36415 COLL VENOUS BLD VENIPUNCTURE: CPT | Performed by: PATHOLOGY

## 2023-12-11 NOTE — TELEPHONE ENCOUNTER
ISSUE:   Tacrolimus IR level 6.9 on 12/11, goal 8-10, dose 8.5 mg BID.    PLAN:   Call Patientand confirm this was an accurate 12-hour trough.   Verify Tacrolimus IR dose 8.5 mg BID.   Confirm no new medications or illness.   Confirm no missed doses.   If accurate trough and accurate dose, increase Tacrolimus IR dose to 9 mg BID     Is this more than a 50% increase or decrease in current IS dose: No  If YES, justification: N/A    Repeat labs in 2 days.  *If > 50% change in immunosuppression dose, repeat labs in 1 week.     OUTCOME:   Left detailed VM and sent [employer] message.

## 2023-12-11 NOTE — LETTER
12/11/2023         RE: Taylor Valiente  [address]   [place] [address]        Dear Colleague,    Thank you for referring your patient, Taylor Valiente, to the [facility]. Please see a copy of my visit note below.    TRANSPLANT EARLY POST TRANSPLANT VISIT    Assessment & Plan  # DDKT: Stable   - Baseline Creatinine: ~ TBD   - Proteinuria: Not checked post transplant   - Date DSA Last Checked: Dec/2023      Latest DSA: No DSA at time of transplant   - BK Viremia: No   - Kidney Tx Biopsy: No   - Transplant Ureteral Stent: Yes    # Immunosuppression: Tacrolimus immediate release (goal 8-10), Mycophenolate mofetil (dose 1000 mg every 12 hours), and Prednisone (dose taper)   - Induction with Recent Transplant:  Intermediate Intensity Protocol   - Continue with intensive monitoring of immunosuppression for efficacy and toxicity.   - Changes: Not at this time    # Infection Prophylaxis:   - PJP: Sulfa/TMP (Bactrim)  - CMV: Valganciclovir (Valcyte)     # Hypertension: Controlled;  Goal BP: < 150/90   - Volume status: Euvolemic   - Changes: Not at this time    # Elevated Blood Glucose: Glucose generally running ~ 120-150   - Management as per  on taper .    # Anemia in Chronic Renal Disease: Hgb: Stable      BRIE: No   - Iron studies: Replete    # Mineral Bone Disorder:    - Secondary renal hyperparathyroidism; PTH level: Moderately elevated (301-600 pg/ml)        On treatment: None  - Vitamin D; level: Not checked recently        On supplement: No  - Calcium; level: Normal        On supplement: No  - Phosphorus; level: Low        On supplement: Yes just started.  Will monitor levels    # Electrolytes:   - Potassium; level: Normal        On supplement: No  - Magnesium; level: Normal        On supplement: Yes  - Bicarbonate; level: Low        On supplement: Yes just increased    - Sodium; level: Low      # Medical Compliance: Yes    # Health Maintenance and  Vaccination Review: Reviewed and up to date    # FSGS  Kidney biopsy reviewed from 2017. 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 Follow up for a recurrence .  - UPCR daily as inpatient and weekly as outpatient .        # Transplant History:  Etiology of Kidney Failure: Focal segmental glomerulosclerosis (FSGS)  Tx: DDKT  Transplant: 12/5/2023 (Kidney)  Crossmatch at time of Tx: negative  DSA at time of Tx: Yes  Significant changes in immunosuppression: None  CMV IgG Ab High Risk Discordance (D+/R-): No  EBV IgG Ab High Risk Discordance (D+/R-): No  Significant transplant-related complications: None    Transplant Office Phone Number: [phone]    Assessment and plan was discussed with the patient and he voiced his understanding and agreement.    Return visit: No follow-ups on file.    Vesna Yuan NP    Chief Complaint  Mr. Valiente is a 28 year old here for kidney transplant.     History of Present Illness     Taylor Valiente is a 28 year old male with PMH of ESRD 2/2 FGS via LUE fistula, adrenal adenoma on the L, HTN, depression c/b previous hx of suicide attempts, left ventricular hypertrophy ,obesity, and osteochondritis dissecans.  S/P DDKTX on 11/5/2023.Was anuric before transplant.Now producing urine over a Liter over few hours.Appropriate pain at surgical site.No fever ,chills ,chest pain ,cough or Shortness of breathing.    No urinary symptoms    Home BP: Not checked    Problem List  Patient Active Problem List   Diagnosis    LVH (left ventricular hypertrophy) due to hypertensive disease    Renovascular hypertension    CKD (chronic kidney disease) stage 5, GFR less than 15 ml/min (H)    Focal glomerular sclerosis    2019 novel coronavirus disease (COVID-19)    Anemia of chronic renal failure    OD (osteochondritis dissecans)    Adrenal adenoma, left    Stress-induced cardiomyopathy    Hyperlipidemia    Prolonged Q-T interval on ECG    Acute renal  failure with acute tubular necrosis superimposed on stage 5 chronic kidney disease, not on chronic dialysis (H)    ESRD needing dialysis (H)    Hyperkalemia    Essential hypertension    ESRD (end stage renal disease) on dialysis (H)    Acute deep vein thrombosis (DVT) of non-extremity vein    Elevated erythrocyte sedimentation rate    Acute pain of left shoulder    Leukocytosis, unspecified type    Sepsis due to coagulase-negative staphylococcal infection (H)    AV fistula infection (H24)    Bursitis of left shoulder    Pre-diabetes    Kidney transplant recipient    Acute post-operative pain    Hypomagnesemia    Low bicarbonate    Immunosuppressed status (H24)    Dehydration       Allergies  Allergies   Allergen Reactions    Benadryl [Diphenhydramine] Itching    Vancomycin Itching       Medications  Current Outpatient Medications   Medication Sig    acetaminophen (TYLENOL) 325 MG tablet Take 2 tablets (650 mg) by mouth every 4 hours as needed for pain    amLODIPine (NORVASC) 10 MG tablet Take 10 mg by mouth daily     aspirin 81 MG EC tablet Take 81 mg by mouth daily    atorvastatin (LIPITOR) 10 MG tablet Take 1 tablet (10 mg) by mouth daily    atorvastatin (LIPITOR) 10 MG tablet TAKE ONE TABLET BY MOUTH EVERY NIGHT AT BEDTIME    carvedilol (COREG) 25 MG tablet TAKE TWO TABLETS BY MOUTH TWICE A DAY WITH A MEAL Strength: 25 mg    hydrALAZINE (APRESOLINE) 100 MG tablet Take 0.5 tablets (50 mg) by mouth 2 times daily    Lidocaine (LIDOCARE) 4 % Patch Place 1 patch onto the skin every 24 hours To prevent lidocaine toxicity, patient should be patch free for 12 hrs daily.    magnesium oxide (MAG-OX) 400 MG tablet Take 1 tablet (400 mg) by mouth daily (with lunch)    medical cannabis (Patient's own supply) See Admin Instructions (The purpose of this order is to document that the patient reports taking medical cannabis.  This is not a prescription, and is not used to certify that the patient has a qualifying medical  condition.)    Patient was advised on the cannabis-tacrolimus drug interaction. (Patient not taking: Reported on 12/10/2023)    methocarbamol (ROBAXIN) 500 MG tablet Take 1 tablet (500 mg) by mouth 4 times daily as needed for muscle spasms    Multiple Vitamin (MULTIVITAMIN ADULT PO) Take 1 tablet by mouth daily    mycophenolate (GENERIC EQUIVALENT) 250 MG capsule Take 3 capsules (750 mg) by mouth 2 times daily    nitroGLYcerin (NITROSTAT) 0.3 MG sublingual tablet For chest pain place 1 tablet under the tongue every 5 minutes for 3 doses. If symptoms persist 5 minutes after 1st dose call 911. (Patient not taking: Reported on 12/10/2023)    oxyCODONE (ROXICODONE) 5 MG tablet Take 1 tablet (5 mg) by mouth every 4 hours as needed for moderate to severe pain    phosphorus tablet 250 mg 250 MG per tablet Take 1 tablet (250 mg) by mouth 2 times daily for 30 days    polyethylene glycol (MIRALAX) 17 GM/Dose powder Take 17 g by mouth daily as needed for constipation (Patient not taking: Reported on 12/10/2023)    predniSONE (DELTASONE) 10 MG tablet Take 6 tablets (60 mg) by mouth daily for 1 day, THEN 4 tablets (40 mg) daily for 2 days, THEN 2 tablets (20 mg) daily for 7 days, THEN 1.5 tablets (15 mg) daily for 7 days, THEN 1 tablet (10 mg) daily for 7 days, THEN 0.5 tablets (5 mg) daily for 7 days.    sennosides (SENOKOT) 8.6 MG tablet Take 2 tablets by mouth 2 times daily Hold for loose stools    sodium bicarbonate 650 MG tablet Take 3 tablets (1,950 mg) by mouth 2 times daily    sulfamethoxazole-trimethoprim (BACTRIM) 400-80 MG tablet Take 1 tablet by mouth daily    tacrolimus (GENERIC) 0.5 MG capsule Take 1 capsule (0.5 mg) by mouth 2 times daily Total dose 8.5mg twice daily Profile Rx: patient will contact pharmacy when needed    tacrolimus (GENERIC) 1 MG capsule Take 8 capsules (8 mg) by mouth 2 times daily Total dose 8.5mg twice daily Profile Rx: patient will contact pharmacy when needed    valGANciclovir (VALCYTE) 450  MG tablet Take 2 tablets (900 mg) by mouth daily for 4 days     No current facility-administered medications for this visit.     There are no discontinued medications.    Physical Exam  Vital Signs: There were no vitals taken for this visit.    GENERAL APPEARANCE: alert and no distress  HENT: mouth without ulcers or lesions  RESP: lungs clear to auscultation - no rales, rhonchi or wheezes  CV: regular rhythm, normal rate, no rub, no murmur  EDEMA: no LE edema bilaterally  ABDOMEN: soft, nondistended, nontender, bowel sounds normal  MS: extremities normal - no gross deformities noted, no evidence of inflammation in joints, no muscle tenderness  SKIN: no rash    Data        Latest Ref Rng & Units 12/11/2023     7:50 AM 12/10/2023     8:03 AM 12/9/2023     7:39 AM   Renal   Sodium 135 - 145 mmol/L 133  136  136    K 3.4 - 5.3 mmol/L 4.7  4.8  5.0    Cl 98 - 107 mmol/L 107  109  110    Cl (external) 98 - 107 mmol/L 107  109  110    CO2 22 - 29 mmol/L 16  18  19    Urea Nitrogen 6.0 - 20.0 mg/dL 27.3  31.3  35.9    Creatinine 0.67 - 1.17 mg/dL 1.09  1.09  1.35    Glucose 70 - 99 mg/dL 124  118  121    Calcium 8.6 - 10.0 mg/dL 9.4  9.4  9.4    Magnesium 1.7 - 2.3 mg/dL 1.6  1.7  1.8          Latest Ref Rng & Units 12/11/2023     7:50 AM 12/10/2023     8:03 AM 12/9/2023     7:39 AM   Bone Health   Phosphorus 2.5 - 4.5 mg/dL 1.4  1.5  1.5    Parathyroid Hormone Intact 15 - 65 pg/mL  318     Vit D Def 20 - 50 ng/mL  21           Latest Ref Rng & Units 12/11/2023     7:50 AM 12/10/2023     8:03 AM 12/9/2023     7:39 AM   Heme   WBC 4.0 - 11.0 10e3/uL 15.8  11.0  10.5    Hgb 13.3 - 17.7 g/dL 12.3  12.6  10.9    Plt 150 - 450 10e3/uL 299  251  225          Latest Ref Rng & Units 12/5/2023    12:07 AM 8/2/2023     5:39 AM 7/27/2023    10:08 PM   Liver   AP 40 - 150 U/L 138   77    TBili <=1.2 mg/dL 0.5   0.3    ALT 0 - 70 U/L 20   8    AST 0 - 45 U/L 13   8    Tot Protein 6.4 - 8.3 g/dL 7.7   7.5    Albumin 3.5 - 5.2 g/dL 4.5   3.3  3.8          Latest Ref Rng & Units 12/5/2023    12:07 AM 5/30/2023     6:23 AM 11/29/2021     6:50 AM   Pancreas   A1C <5.7 % 5.9  4.4     Lipase 73 - 393 U/L   304          Latest Ref Rng & Units 12/10/2023     8:03 AM 9/11/2020     6:34 AM 7/15/2017     6:35 AM   Iron studies   Iron 61 - 157 ug/dL 80  33  38    Iron Saturation Index 15 - 46 %  18  15    Iron Sat Index 15 - 46 % 41      Ferritin 31 - 409 ng/mL 1,743  325  341          Latest Ref Rng & Units 12/5/2023    12:07 AM 6/17/2021    12:35 PM 2/9/2021    10:55 AM   [facility] Virology   EBV CAPSID ANTIBODY IGG No detectable antibody. Positive   >8.0    Hep B Core NR^Nonreactive  Nonreactive  Nonreactive        Recent Labs   Lab Test 12/08/23  0537 12/09/23  0739 12/10/23  0803   DOSTAC  --  12/8/2023 12/9/2023   TACROL 2.8* 3.9* 4.8*              Vesna Yuan, NP

## 2023-12-11 NOTE — PROGRESS NOTES
"Taylor Valiente came to [facility] today for a lab and assess following a kidney transplant on 12/05/2023.       Discharge date: 12/08/2023  Transplant coordinator: Opal Rashid  Phone number patient can be reached at: [phone] (Cell); [phone] (Mother cell)     Physical Assessment:  See physical assessment located under \"Document Flowsheets\".  Incision site: Abdominal incision to RLQ intact with staples; no s/s of infection noted. Incisional care and s/s of infection reviewed w/ patient who verbalized understanding.  Lines: CAMDEN drain to (R) abdominal region intact and draining serosanguinous fluid; 40 ml output since yesterday afternoon; instructed patient to measure/record output so medical team can determine when removal will take place;patient verbalized understanding. Patient arrived to [facility] w/ extreme pain to CAMDEN site rated 8/10; no redness/swelling noted to area; slight warmth noted; patient afebrile; tubing was stripped and demonstrated this to patient. Extreme pain subsided after tubing stripped. All of this reported to Vesna Yuan NP who assessed site.   Griffiths: None; griffiths d/c'd yesterday and patient reports adequate emptying of bladder.   Urine clarity: Clear yellow w/ no foul odor per patient.  Hydration: Patient reports drinking approximately 4 liters in the last 24 hours. Reviewed fluid intake requirements w/ patient who verbalized understanding.  Nutrition: Patient reports appetite is good.   Last BM: 2 small bowel movements this morning; took Senna this morning and also has Miralax available if needed.   Pain: Patient taking Tylenol and Oxycodone for pain to abdominal incision w/ effective results.   Blood pressure/heart rate: 137/90 - 95 (sitting); 125/76 - 105 (standing); denies dizziness.    Labs drawn by [facility] staff: No (labs drawn in lab prior to [facility] appointment)     Plan of care for today:   1. Today's labs, vitals and assessment were reviewed w/ Vesna Yuan NP who gave the " following orders:         - UA/UC today (sent)        - Labs Thursday (Coordinator to let patient know if they should be drawn at the [facility] lab or w/ homecare-dependent on Tacrolimus level today); patient aware.    2. Ensured patient has Valcyte available for  at "[facility] on [address] and knows to fill medication box for Thursday thru Saturday (see note from yesterday).    3. Assisted patient w/ adding Lipitor, Multivitamin and Phosphorus to medication box (patient picked these up at pharmacy yesterday).     4. Completed 7A checklist.     Medication changes: None    Medications administered: None      Patient education:    The following teaching topics were addressed: Incisional care, Signs/symptoms of infection, Good handwashing, Medications (purposes, doses and times of administration), Phone numbers to call with concenrs (Transplant coordinator, [facility] and [facility]), 7A discharge check list, Plan of care, and Drain care   Patient and Mother verbalized understanding and all questions answered.    Drug level:  Patient reports taking 8.5 mg Tacrolimus last night; coordinator to follow up with today's results; patient aware.    Face to face time: 60 minutes  Discharge Plan    Pt will follow up with labs Thursday (see above).  Discharge instructions reviewed with patient: YES  Patient/Representative verbalized understanding, all questions answered: YES    Discharged from unit at 0950 with mother to home.    Lalo Sánchez RN

## 2023-12-11 NOTE — TELEPHONE ENCOUNTER
Taylor Valiente is a post-kidney transplant patient who discharged on 12/8/23. Patient will get medications from local or mail order pharmacy. The patient and mom (Chevy) will be responsible for managing medications.    HEALTH BENEFIT: MEDICARE   ID# [id number] (PART A EFFECTIVE 03/01/2023 , PART B EFFECTIVE 03/01/2023 )   PROCESSING INFO: ID# [id number]# OTHER BIN# [id number]  PT WILL PAY 20% COST OF MEDICATION AT TIME OF SERVICE       PHARMACY BENEFIT: [facility]  PROCESSING INFO: ID# [id number] GRP# [id number] PCN# [id number] BIN# [id number] (EFFECTIVE (05/01/2023)   NO DEDUCTIBLE OR MAX OUT-OF-POCKET DUE TO LOW-INCOME SUBSIDY  COPAY STRUCTURE:  $ 1.45 FOR GENERIC  $ 4.30 FOR BRAND     TEST CLAIM SPECIALTY #28   MYCOPHENOLATE 250MG (#240/30DS) ESTIMATED COST AFTER PART B = $121   PROGRAF 1MG (#120/30DS)---ESTIMATED COST AFTER PART B = $214  TACROLIMUS 1MG (#120/30DS)--- ESTIMATED COST AFTER PART B = $80  CYCLOSPORINE 100MG (#60/30DS)--- ESTIMATED COST AFTER PART B = $54  VALGANCICLOVIR 450MG (#60/30DS)--- PRODUCT NOT COVERED AT THIS LOCATION  VALACYCLOVIR 1GM (#90/30DS)---PRODUCT NOT COVERED AT THIS LOCATION    TEST CLAIM DISCHARGE #27  MYCOPHENOLATE 250MG (#240/30DS)--- ESTIMATED COST AFTER PART B = $121  PROGRAF 1MG (#120/30DS)--- ESTIMATED COST AFTER PART B = $214  TACROLIMUS 1MG (#120/30DS)--- ESTIMATED COST AFTER PART B = $80  CYCLOSPORINE 100MG (#60/30DS)--- ESTIMATED COST AFTER PART B = $54   VALGANCICLOVIR 450MG (#60/30DS)---PRODUCT NOT COVERED AT THIS LOCATION  VALACYCLOVIR 1GM (#90/30DS)--- PRODUCT NOT COVERED AT THIS LOCATION    **CAN PATIENT FILL AT [facility] FOR MEDICATIONS LISTED? PATIENT CAN FILL IMMUNOS (MYCOPHENOLATE, PROGRAF, TACROLIMUS, CYCLOSPORINE) AND WILL PAY %20 COST OF MEDICATION AT TIME OF SERVICE, HOWEVER [facility] PHARMAC[facility] IS NOT CONTRACTED. PATIENT WILL NEED TO FILL ALL MAINTEANCE MEDICATIONS WITH [facility], [facility], [facility], [facility], [facility] IF MAIL ORDER IS  THROUGH [facility] MA[facility]. PATIENTS [place] MEDICAID IS CURRENTLY INACTIVE AT THIS TIME. IF THIS IS INCORRECT, PLEASE HAVE PATIENT REACH OUT TO [place] MEDICAID. ONCE [place] MEDICAID IS REACTIVATED PATIENT WILL HAVE $0    Gunjan Catalan RP    Ftsg Text: Because of the full-thickness nature of the defect, to protect underlying structures, and to avoid distortion, a full-thickness skin graft was planned. After prep and local anesthesia, a template was made of the defect and the graft was harvested.  The graft was defatted and trimmed to fit the defect. It was sutured into place circumferentially and a tie-over Bolster dressing was applied.  The donor site was converted to a fusiform defect, and after hemostasis, was closed in a layered fashion.

## 2023-12-11 NOTE — PROGRESS NOTES
TRANSPLANT EARLY POST TRANSPLANT VISIT    Assessment & Plan   # DDKT: Stable   - Baseline Creatinine: ~ TBD   - Proteinuria: Not checked post transplant   - Date DSA Last Checked: Dec/2023      Latest DSA: No DSA at time of transplant   - BK Viremia: No   - Kidney Tx Biopsy: No   - Transplant Ureteral Stent: Yes    # Immunosuppression: Tacrolimus immediate release (goal 8-10), Mycophenolate mofetil (dose 1000 mg every 12 hours), and Prednisone (dose taper)   - Induction with Recent Transplant:  Intermediate Intensity Protocol   - Continue with intensive monitoring of immunosuppression for efficacy and toxicity.   - Changes: Not at this time    # Infection Prophylaxis:   - PJP: Sulfa/TMP (Bactrim)  - CMV: Valganciclovir (Valcyte)     # Hypertension: Controlled;  Goal BP: < 150/90   - Volume status: Euvolemic   - Changes: Not at this time    # Elevated Blood Glucose: Glucose generally running ~ 120-150   - Management as per  on taper .    # Anemia in Chronic Renal Disease: Hgb: Stable      BRIE: No   - Iron studies: Replete    # Mineral Bone Disorder:    - Secondary renal hyperparathyroidism; PTH level: Moderately elevated (301-600 pg/ml)        On treatment: None  - Vitamin D; level: Not checked recently        On supplement: No  - Calcium; level: Normal        On supplement: No  - Phosphorus; level: Low        On supplement: Yes just started.  Will monitor levels    # Electrolytes:   - Potassium; level: Normal        On supplement: No  - Magnesium; level: Normal        On supplement: Yes  - Bicarbonate; level: Low        On supplement: Yes just increased    - Sodium; level: Low      # Medical Compliance: Yes    # Health Maintenance and Vaccination Review: Reviewed and up to date    # FSGS  Kidney biopsy reviewed from 2017. 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 Follow up for a recurrence .  - UPCR daily as inpatient and weekly as outpatient .        # Transplant  History:  Etiology of Kidney Failure: Focal segmental glomerulosclerosis (FSGS)  Tx: DDKT  Transplant: 12/5/2023 (Kidney)  Crossmatch at time of Tx: negative  DSA at time of Tx: Yes  Significant changes in immunosuppression: None  CMV IgG Ab High Risk Discordance (D+/R-): No  EBV IgG Ab High Risk Discordance (D+/R-): No  Significant transplant-related complications: None    Transplant Office Phone Number: [phone]    Assessment and plan was discussed with the patient and he voiced his understanding and agreement.    Return visit: No follow-ups on file.    Vesna Yuan NP    Chief Complaint   Mr. Valiente is a 28 year old here for kidney transplant.     History of Present Illness      Taylor Valiente is a 28 year old male with PMH of ESRD 2/2 FGS via LUE fistula, adrenal adenoma on the L, HTN, depression c/b previous hx of suicide attempts, left ventricular hypertrophy ,obesity, and osteochondritis dissecans.  S/P DDKTX on 11/5/2023.Was anuric before transplant.Now producing urine over a Liter over few hours.Appropriate pain at surgical site.No fever ,chills ,chest pain ,cough or Shortness of breathing.    No urinary symptoms    Home BP: Not checked    Problem List   Patient Active Problem List   Diagnosis    LVH (left ventricular hypertrophy) due to hypertensive disease    Renovascular hypertension    CKD (chronic kidney disease) stage 5, GFR less than 15 ml/min (H)    Focal glomerular sclerosis    2019 novel coronavirus disease (COVID-19)    Anemia of chronic renal failure    OD (osteochondritis dissecans)    Adrenal adenoma, left    Stress-induced cardiomyopathy    Hyperlipidemia    Prolonged Q-T interval on ECG    Acute renal failure with acute tubular necrosis superimposed on stage 5 chronic kidney disease, not on chronic dialysis (H)    ESRD needing dialysis (H)    Hyperkalemia    Essential hypertension    ESRD (end stage renal disease) on dialysis (H)    Acute deep vein thrombosis (DVT) of  non-extremity vein    Elevated erythrocyte sedimentation rate    Acute pain of left shoulder    Leukocytosis, unspecified type    Sepsis due to coagulase-negative staphylococcal infection (H)    AV fistula infection (H24)    Bursitis of left shoulder    Pre-diabetes    Kidney transplant recipient    Acute post-operative pain    Hypomagnesemia    Low bicarbonate    Immunosuppressed status (H24)    Dehydration       Allergies   Allergies   Allergen Reactions    Benadryl [Diphenhydramine] Itching    Vancomycin Itching       Medications   Current Outpatient Medications   Medication Sig    acetaminophen (TYLENOL) 325 MG tablet Take 2 tablets (650 mg) by mouth every 4 hours as needed for pain    amLODIPine (NORVASC) 10 MG tablet Take 10 mg by mouth daily     aspirin 81 MG EC tablet Take 81 mg by mouth daily    atorvastatin (LIPITOR) 10 MG tablet Take 1 tablet (10 mg) by mouth daily    atorvastatin (LIPITOR) 10 MG tablet TAKE ONE TABLET BY MOUTH EVERY NIGHT AT BEDTIME    carvedilol (COREG) 25 MG tablet TAKE TWO TABLETS BY MOUTH TWICE A DAY WITH A MEAL Strength: 25 mg    hydrALAZINE (APRESOLINE) 100 MG tablet Take 0.5 tablets (50 mg) by mouth 2 times daily    Lidocaine (LIDOCARE) 4 % Patch Place 1 patch onto the skin every 24 hours To prevent lidocaine toxicity, patient should be patch free for 12 hrs daily.    magnesium oxide (MAG-OX) 400 MG tablet Take 1 tablet (400 mg) by mouth daily (with lunch)    medical cannabis (Patient's own supply) See Admin Instructions (The purpose of this order is to document that the patient reports taking medical cannabis.  This is not a prescription, and is not used to certify that the patient has a qualifying medical condition.)    Patient was advised on the cannabis-tacrolimus drug interaction. (Patient not taking: Reported on 12/10/2023)    methocarbamol (ROBAXIN) 500 MG tablet Take 1 tablet (500 mg) by mouth 4 times daily as needed for muscle spasms    Multiple Vitamin (MULTIVITAMIN  ADULT PO) Take 1 tablet by mouth daily    mycophenolate (GENERIC EQUIVALENT) 250 MG capsule Take 3 capsules (750 mg) by mouth 2 times daily    nitroGLYcerin (NITROSTAT) 0.3 MG sublingual tablet For chest pain place 1 tablet under the tongue every 5 minutes for 3 doses. If symptoms persist 5 minutes after 1st dose call 911. (Patient not taking: Reported on 12/10/2023)    oxyCODONE (ROXICODONE) 5 MG tablet Take 1 tablet (5 mg) by mouth every 4 hours as needed for moderate to severe pain    phosphorus tablet 250 mg 250 MG per tablet Take 1 tablet (250 mg) by mouth 2 times daily for 30 days    polyethylene glycol (MIRALAX) 17 GM/Dose powder Take 17 g by mouth daily as needed for constipation (Patient not taking: Reported on 12/10/2023)    predniSONE (DELTASONE) 10 MG tablet Take 6 tablets (60 mg) by mouth daily for 1 day, THEN 4 tablets (40 mg) daily for 2 days, THEN 2 tablets (20 mg) daily for 7 days, THEN 1.5 tablets (15 mg) daily for 7 days, THEN 1 tablet (10 mg) daily for 7 days, THEN 0.5 tablets (5 mg) daily for 7 days.    sennosides (SENOKOT) 8.6 MG tablet Take 2 tablets by mouth 2 times daily Hold for loose stools    sodium bicarbonate 650 MG tablet Take 3 tablets (1,950 mg) by mouth 2 times daily    sulfamethoxazole-trimethoprim (BACTRIM) 400-80 MG tablet Take 1 tablet by mouth daily    tacrolimus (GENERIC) 0.5 MG capsule Take 1 capsule (0.5 mg) by mouth 2 times daily Total dose 8.5mg twice daily Profile Rx: patient will contact pharmacy when needed    tacrolimus (GENERIC) 1 MG capsule Take 8 capsules (8 mg) by mouth 2 times daily Total dose 8.5mg twice daily Profile Rx: patient will contact pharmacy when needed    valGANciclovir (VALCYTE) 450 MG tablet Take 2 tablets (900 mg) by mouth daily for 4 days     No current facility-administered medications for this visit.     There are no discontinued medications.    Physical Exam   Vital Signs: There were no vitals taken for this visit.    GENERAL APPEARANCE: alert  and no distress  HENT: mouth without ulcers or lesions  RESP: lungs clear to auscultation - no rales, rhonchi or wheezes  CV: regular rhythm, normal rate, no rub, no murmur  EDEMA: no LE edema bilaterally  ABDOMEN: soft, nondistended, nontender, bowel sounds normal  MS: extremities normal - no gross deformities noted, no evidence of inflammation in joints, no muscle tenderness  SKIN: no rash    Data         Latest Ref Rng & Units 12/11/2023     7:50 AM 12/10/2023     8:03 AM 12/9/2023     7:39 AM   Renal   Sodium 135 - 145 mmol/L 133  136  136    K 3.4 - 5.3 mmol/L 4.7  4.8  5.0    Cl 98 - 107 mmol/L 107  109  110    Cl (external) 98 - 107 mmol/L 107  109  110    CO2 22 - 29 mmol/L 16  18  19    Urea Nitrogen 6.0 - 20.0 mg/dL 27.3  31.3  35.9    Creatinine 0.67 - 1.17 mg/dL 1.09  1.09  1.35    Glucose 70 - 99 mg/dL 124  118  121    Calcium 8.6 - 10.0 mg/dL 9.4  9.4  9.4    Magnesium 1.7 - 2.3 mg/dL 1.6  1.7  1.8          Latest Ref Rng & Units 12/11/2023     7:50 AM 12/10/2023     8:03 AM 12/9/2023     7:39 AM   Bone Health   Phosphorus 2.5 - 4.5 mg/dL 1.4  1.5  1.5    Parathyroid Hormone Intact 15 - 65 pg/mL  318     Vit D Def 20 - 50 ng/mL  21           Latest Ref Rng & Units 12/11/2023     7:50 AM 12/10/2023     8:03 AM 12/9/2023     7:39 AM   Heme   WBC 4.0 - 11.0 10e3/uL 15.8  11.0  10.5    Hgb 13.3 - 17.7 g/dL 12.3  12.6  10.9    Plt 150 - 450 10e3/uL 299  251  225          Latest Ref Rng & Units 12/5/2023    12:07 AM 8/2/2023     5:39 AM 7/27/2023    10:08 PM   Liver   AP 40 - 150 U/L 138   77    TBili <=1.2 mg/dL 0.5   0.3    ALT 0 - 70 U/L 20   8    AST 0 - 45 U/L 13   8    Tot Protein 6.4 - 8.3 g/dL 7.7   7.5    Albumin 3.5 - 5.2 g/dL 4.5  3.3  3.8          Latest Ref Rng & Units 12/5/2023    12:07 AM 5/30/2023     6:23 AM 11/29/2021     6:50 AM   Pancreas   A1C <5.7 % 5.9  4.4     Lipase 73 - 393 U/L   304          Latest Ref Rng & Units 12/10/2023     8:03 AM 9/11/2020     6:34 AM 7/15/2017     6:35 AM    Iron studies   Iron 61 - 157 ug/dL 80  33  38    Iron Saturation Index 15 - 46 %  18  15    Iron Sat Index 15 - 46 % 41      Ferritin 31 - 409 ng/mL 1,743  325  341          Latest Ref Rng & Units 12/5/2023    12:07 AM 6/17/2021    12:35 PM 2/9/2021    10:55 AM   [facility] Virology   EBV CAPSID ANTIBODY IGG No detectable antibody. Positive   >8.0    Hep B Core NR^Nonreactive  Nonreactive  Nonreactive        Recent Labs   Lab Test 12/08/23  0537 12/09/23  0739 12/10/23  0803   DOSTAC  --  12/8/2023 12/9/2023   TACROL 2.8* 3.9* 4.8*

## 2023-12-12 ENCOUNTER — PATIENT OUTREACH (OUTPATIENT)
Dept: CARE COORDINATION | Facility: CLINIC | Age: 28
End: 2023-12-12
Payer: MEDICARE

## 2023-12-12 DIAGNOSIS — Z94.0 KIDNEY REPLACED BY TRANSPLANT: Primary | ICD-10-CM

## 2023-12-12 LAB — BACTERIA UR CULT: NORMAL

## 2023-12-12 RX ORDER — TACROLIMUS 0.5 MG/1
CAPSULE ORAL
Start: 2023-12-12 | End: 2024-01-25

## 2023-12-12 RX ORDER — TACROLIMUS 1 MG/1
9 CAPSULE ORAL 2 TIMES DAILY
Qty: 540 CAPSULE | Refills: 11 | Status: SHIPPED | OUTPATIENT
Start: 2023-12-12 | End: 2023-12-13

## 2023-12-12 NOTE — TELEPHONE ENCOUNTER
Spoke with Taylor, confirmed he recevied  last evening to increase tacrolimus to 9 mg bid. Will repeat labs again tomorrow at [facility], appt made.     Request for all post-transplant appts sent to S[facility] scheduling team.     CAMDEN with 25 ml output the last 24 hours. Will report output tomorrow as well.

## 2023-12-12 NOTE — LETTER
[facility] CARE COORDINATION  [id number]  [facility] [id number]     December 13, 2023    Taylor Valiente  [address] [place] [address]      Dear Taylor,    I am a clinic care coordinator who works with Priyank Lawrence MD with the [facility]. I recently tried to call and was unable to reach you. Below is a description of clinic care coordination and how I can further assist you.       The clinic care coordination team is made up of a registered nurse, , financial resource worker and community health worker who understand the health care system. The goal of clinic care coordination is to help you manage your health and improve access to the health care system. Our team works alongside your provider to assist you in determining your health and social needs. We can help you obtain health care and community resources, providing you with necessary information and education. We can work with you through any barriers and develop a care plan that helps coordinate and strengthen the communication between you and your care team.  Our services are voluntary and are offered without charge to you personally.    Please feel free to contact me with any questions or concerns regarding care coordination and what we can offer.      We are focused on providing you with the highest-quality healthcare experience possible.    Sincerely,     Jade Turcios RN Care Coordinator  [facility] [place]  Email: [email or web address]  Phone: [phone]

## 2023-12-12 NOTE — PROGRESS NOTES
Clinic Care Coordination Contact  [facility]/Voicemail    Clinical Data: Care Coordinator Outreach    Outreach Documentation Number of Outreach Attempt   12/12/2023   9:18 AM 1       Left message on patient's voicemail with call back information and requested return call.    Plan: Care Coordinator will try to reach patient again in 1-2 business days.    Jade Turcios, RN Care Coordinator  [facility] [place]  Email: [email or web address]  Phone: [phone]

## 2023-12-13 ENCOUNTER — TELEPHONE (OUTPATIENT)
Dept: TRANSPLANT | Facility: CLINIC | Age: 28
End: 2023-12-13

## 2023-12-13 ENCOUNTER — LAB (OUTPATIENT)
Dept: LAB | Facility: CLINIC | Age: 28
End: 2023-12-13
Payer: MEDICARE

## 2023-12-13 DIAGNOSIS — Z79.899 ENCOUNTER FOR LONG-TERM CURRENT USE OF MEDICATION: ICD-10-CM

## 2023-12-13 DIAGNOSIS — Z94.0 KIDNEY REPLACED BY TRANSPLANT: ICD-10-CM

## 2023-12-13 DIAGNOSIS — Z48.298 AFTERCARE FOLLOWING ORGAN TRANSPLANT: ICD-10-CM

## 2023-12-13 DIAGNOSIS — Z20.828 CONTACT WITH AND (SUSPECTED) EXPOSURE TO OTHER VIRAL COMMUNICABLE DISEASES: ICD-10-CM

## 2023-12-13 DIAGNOSIS — Z76.82 KIDNEY TRANSPLANT CANDIDATE: Primary | ICD-10-CM

## 2023-12-13 DIAGNOSIS — Z98.890 OTHER SPECIFIED POSTPROCEDURAL STATES: ICD-10-CM

## 2023-12-13 LAB
ANION GAP SERPL CALCULATED.3IONS-SCNC: 9 MMOL/L (ref 7–15)
BUN SERPL-MCNC: 20.9 MG/DL (ref 6–20)
CALCIUM SERPL-MCNC: 9.7 MG/DL (ref 8.6–10)
CHLORIDE SERPL-SCNC: 108 MMOL/L (ref 98–107)
CREAT SERPL-MCNC: 1.02 MG/DL (ref 0.67–1.17)
DEPRECATED HCO3 PLAS-SCNC: 18 MMOL/L (ref 22–29)
EGFRCR SERPLBLD CKD-EPI 2021: >90 ML/MIN/1.73M2
ERYTHROCYTE [DISTWIDTH] IN BLOOD BY AUTOMATED COUNT: 17.2 % (ref 10–15)
GLUCOSE SERPL-MCNC: 127 MG/DL (ref 70–99)
HCT VFR BLD AUTO: 38.1 % (ref 40–53)
HGB BLD-MCNC: 12.4 G/DL (ref 13.3–17.7)
MCH RBC QN AUTO: 28.4 PG (ref 26.5–33)
MCHC RBC AUTO-ENTMCNC: 32.5 G/DL (ref 31.5–36.5)
MCV RBC AUTO: 87 FL (ref 78–100)
PLATELET # BLD AUTO: 296 10E3/UL (ref 150–450)
POTASSIUM SERPL-SCNC: 4.7 MMOL/L (ref 3.4–5.3)
RBC # BLD AUTO: 4.37 10E6/UL (ref 4.4–5.9)
SODIUM SERPL-SCNC: 135 MMOL/L (ref 135–145)
TACROLIMUS BLD-MCNC: 13 UG/L (ref 5–15)
TME LAST DOSE: NORMAL H
TME LAST DOSE: NORMAL H
WBC # BLD AUTO: 15.3 10E3/UL (ref 4–11)

## 2023-12-13 PROCEDURE — 80197 ASSAY OF TACROLIMUS: CPT | Performed by: INTERNAL MEDICINE

## 2023-12-13 PROCEDURE — 99000 SPECIMEN HANDLING OFFICE-LAB: CPT | Performed by: PATHOLOGY

## 2023-12-13 PROCEDURE — 36415 COLL VENOUS BLD VENIPUNCTURE: CPT | Performed by: PATHOLOGY

## 2023-12-13 PROCEDURE — 80048 BASIC METABOLIC PNL TOTAL CA: CPT | Performed by: PATHOLOGY

## 2023-12-13 PROCEDURE — 85027 COMPLETE CBC AUTOMATED: CPT | Performed by: PATHOLOGY

## 2023-12-13 RX ORDER — TACROLIMUS 1 MG/1
8 CAPSULE ORAL 2 TIMES DAILY
Qty: 480 CAPSULE | Refills: 11 | Status: SHIPPED | OUTPATIENT
Start: 2023-12-13 | End: 2023-12-21

## 2023-12-13 NOTE — TELEPHONE ENCOUNTER
ISSUE:   Tacrolimus IR level 13 on 12/13, goal 8-10, dose 9 mg BID.    PLAN:   Call Patientand confirm this was an accurate 12-hour trough.   Verify Tacrolimus IR dose 9 mg BID.   Confirm no new medications or illness.   Confirm no missed doses.   If accurate trough and accurate dose, decrease Tacrolimus IR dose to 8 mg BID     Is this more than a 50% increase or decrease in current IS dose: No  If YES, justification: N/A    Repeat labs on Thursday or Friday with home care.  *If > 50% change in immunosuppression dose, repeat labs in 1 week.     Meka Rashid RN, BSN  Post-Kidney/Pancreas Transplant Coordinator   [phone]    OUTCOME:   Spoke with Patient, they confirm accurate trough level and current dose 9 mg BID.   Patientconfirmed dose change to 8 mg BID.  Patientagreed to repeat labs in 1 days.   Orders sent to preferred pharmacy for dose change and lab for repeat labs.   Patientvoiced understanding of plan.

## 2023-12-13 NOTE — PROGRESS NOTES
Clinic Care Coordination Contact  [facility]/Voicemail    Clinical Data: Care Coordinator Outreach    Outreach Documentation Number of Outreach Attempt   12/12/2023   9:18 AM 1   12/13/2023   1:32 PM 2       Left message on patient's voicemail with call back information and requested return call.    Plan: Care Coordinator will send care coordination introduction letter with care coordinator contact information and explanation of care coordination services via [employer]. Care Coordinator will do no further outreaches at this time.    Jade Turcios, RN Care Coordinator  [facility] [place]  Email: [email or web address]  Phone: [phone]

## 2023-12-13 NOTE — TELEPHONE ENCOUNTER
Left message and sent [employer] message to patient regarding:  Tacrolimus IR level 13 on 12/13, goal 8-10, dose 9 mg BID.     PLAN:   Call Patientand confirm this was an accurate 12-hour trough.   Verify Tacrolimus IR dose 9 mg BID.   Confirm no new medications or illness.   Confirm no missed doses.   If accurate trough and accurate dose, decrease Tacrolimus IR dose to 8 mg BID

## 2023-12-13 NOTE — TELEPHONE ENCOUNTER
Post Kidney and Pancreas Transplant Team Conference  Date: 12/13/2023  Transplant Coordinator: Meka Rashid     Attendees:  [x]  Dr. Simons [x] Randee Barillas, RN [x] Lauren Muniz LPN     [x]  Dr. Lynn [x] Stefani Manrique, RN [] Kimberly Ayala LPN    [x] Dr. Whyte [x] Meka Rashid RN    [x] Dr. Webber [x] Nilda Duran RN [] Netta Walter RN   [] Dr. Caceres [] Milla Mc, RN    [] Dr. Vuong [] Ankita Quintero, GALLITO    [x]  Dr. Cardoza [] Linda Quintero RN    [] Dr. Jimenez [] Britton Miranda RN    [x] Vesna Yuan, NP [] My Singh RN    [x] Kristi Hyde NP [] Latonya Avila RN        Verbal Plan Read Back:   Continue current treatment plan    Routed to RN Coordinator   Lauren Muniz LPN

## 2023-12-14 ENCOUNTER — TELEPHONE (OUTPATIENT)
Dept: TRANSPLANT | Facility: CLINIC | Age: 28
End: 2023-12-14

## 2023-12-14 ENCOUNTER — LAB REQUISITION (OUTPATIENT)
Dept: LAB | Facility: CLINIC | Age: 28
End: 2023-12-14
Payer: MEDICARE

## 2023-12-14 DIAGNOSIS — Z76.82 KIDNEY TRANSPLANT CANDIDATE: ICD-10-CM

## 2023-12-14 DIAGNOSIS — Z94.0 KIDNEY TRANSPLANT RECIPIENT: ICD-10-CM

## 2023-12-14 DIAGNOSIS — Z94.0 KIDNEY TRANSPLANT STATUS: ICD-10-CM

## 2023-12-14 LAB
ANION GAP SERPL CALCULATED.3IONS-SCNC: 11 MMOL/L (ref 7–15)
BUN SERPL-MCNC: 22.7 MG/DL (ref 6–20)
CALCIUM SERPL-MCNC: 9.3 MG/DL (ref 8.6–10)
CHLORIDE SERPL-SCNC: 107 MMOL/L (ref 98–107)
CREAT SERPL-MCNC: 0.95 MG/DL (ref 0.67–1.17)
DEPRECATED HCO3 PLAS-SCNC: 17 MMOL/L (ref 22–29)
EGFRCR SERPLBLD CKD-EPI 2021: >90 ML/MIN/1.73M2
ERYTHROCYTE [DISTWIDTH] IN BLOOD BY AUTOMATED COUNT: 17.8 % (ref 10–15)
GLUCOSE SERPL-MCNC: 117 MG/DL (ref 70–99)
HCT VFR BLD AUTO: 37.5 % (ref 40–53)
HGB BLD-MCNC: 12.2 G/DL (ref 13.3–17.7)
MAGNESIUM SERPL-MCNC: 1.3 MG/DL (ref 1.7–2.3)
MCH RBC QN AUTO: 28.4 PG (ref 26.5–33)
MCHC RBC AUTO-ENTMCNC: 32.5 G/DL (ref 31.5–36.5)
MCV RBC AUTO: 87 FL (ref 78–100)
PHOSPHATE SERPL-MCNC: 1.2 MG/DL (ref 2.5–4.5)
PLATELET # BLD AUTO: 324 10E3/UL (ref 150–450)
POTASSIUM SERPL-SCNC: 4.5 MMOL/L (ref 3.4–5.3)
RBC # BLD AUTO: 4.29 10E6/UL (ref 4.4–5.9)
SODIUM SERPL-SCNC: 135 MMOL/L (ref 135–145)
TACROLIMUS BLD-MCNC: 11.2 UG/L (ref 5–15)
TME LAST DOSE: NORMAL H
TME LAST DOSE: NORMAL H
WBC # BLD AUTO: 14.7 10E3/UL (ref 4–11)

## 2023-12-14 PROCEDURE — 80197 ASSAY OF TACROLIMUS: CPT | Mod: ORL | Performed by: SURGERY

## 2023-12-14 PROCEDURE — 83735 ASSAY OF MAGNESIUM: CPT | Mod: ORL | Performed by: SURGERY

## 2023-12-14 PROCEDURE — 85027 COMPLETE CBC AUTOMATED: CPT | Mod: ORL | Performed by: SURGERY

## 2023-12-14 PROCEDURE — 80048 BASIC METABOLIC PNL TOTAL CA: CPT | Mod: ORL | Performed by: SURGERY

## 2023-12-14 PROCEDURE — 84100 ASSAY OF PHOSPHORUS: CPT | Mod: ORL | Performed by: SURGERY

## 2023-12-14 RX ORDER — SODIUM BICARBONATE 650 MG/1
1950 TABLET ORAL 3 TIMES DAILY
Qty: 270 TABLET | Refills: 1 | Status: SHIPPED | OUTPATIENT
Start: 2023-12-14 | End: 2024-02-08

## 2023-12-14 RX ORDER — MAGNESIUM OXIDE 400 MG/1
400 TABLET ORAL 2 TIMES DAILY
Qty: 60 TABLET | Refills: 3 | Status: SHIPPED | OUTPATIENT
Start: 2023-12-14 | End: 2023-12-26

## 2023-12-14 NOTE — TELEPHONE ENCOUNTER
Call returned to HHRN Chantel. Verbal orders given. Informed that [facility] is aware of there interaction between bactrim and phosphorus and patient is to continue medications as ordered. Ok'd  HHRN request to cover OLGA LIDIA wound with dry gauze prn for comfort or slight drainage and same with jtube.

## 2023-12-14 NOTE — TELEPHONE ENCOUNTER
Patient Call: General  Route to LPN    Reason for call: Chantel from [facility]   called in regards of patient orders. They are looking to oversee homecare plans. Questions about labs especially with holidays coming up. There are medication interaction baxtrum and phosphorous. More details with call back.     Call back needed? Yes    Return Call Needed  Same as documented in contacts section  When to return call?: Same day: Route High Priority

## 2023-12-14 NOTE — TELEPHONE ENCOUNTER
ISSUE:  Bicarb 17 - on 1950 mg bid   Mag 1.3 - on mag-ox 400 mg daily   Phos 1.2 - on phos 250 mg bid     Reviewed with Dr. Whyte, increase bicarb to 1950 mg tid, mag-ox to 400 mg bid, and phos to 500 mg bid.     Spoke with Taylor, feels well other than constipation. Is taking senna bid. Will add in daily miralax for now and stop if he develops loose stools.     12/12 25 ml   12/13 25 ml   12/14 27 ml     Will bring back to clinic tomorrow.

## 2023-12-15 ENCOUNTER — OFFICE VISIT (OUTPATIENT)
Dept: TRANSPLANT | Facility: CLINIC | Age: 28
End: 2023-12-15
Attending: INTERNAL MEDICINE
Payer: MEDICARE

## 2023-12-15 ENCOUNTER — ANCILLARY PROCEDURE (OUTPATIENT)
Dept: GENERAL RADIOLOGY | Facility: CLINIC | Age: 28
End: 2023-12-15
Attending: NURSE PRACTITIONER
Payer: MEDICARE

## 2023-12-15 VITALS
OXYGEN SATURATION: 100 % | HEART RATE: 89 BPM | SYSTOLIC BLOOD PRESSURE: 126 MMHG | TEMPERATURE: 97.5 F | DIASTOLIC BLOOD PRESSURE: 78 MMHG

## 2023-12-15 DIAGNOSIS — Z76.82 KIDNEY TRANSPLANT CANDIDATE: ICD-10-CM

## 2023-12-15 PROCEDURE — 74018 RADEX ABDOMEN 1 VIEW: CPT | Performed by: RADIOLOGY

## 2023-12-15 PROCEDURE — G0463 HOSPITAL OUTPT CLINIC VISIT: HCPCS | Performed by: NURSE PRACTITIONER

## 2023-12-15 PROCEDURE — 99213 OFFICE O/P EST LOW 20 MIN: CPT | Mod: 24 | Performed by: NURSE PRACTITIONER

## 2023-12-15 NOTE — LETTER
12/15/2023         RE: Taylor Valiente  [address]   [place] [address]        Dear Colleague,    Thank you for referring your patient, Taylor Valiente, to the [facility]. Please see a copy of my visit note below.    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 He was on HD via RIJ with tunneled line due to recent failed RUE fistula and was anuric. Patient is now s/p  donor kidney transplant with ureteral stent with Dr. Jimenez on 23.      Doing well.  No fevers or chills.  No nausea or vomiting    No urinary issues.    States he has been having has pain.  Has not been walking a lot    One good BM yesterday maybe two.      Eating well.  Toni less than 30 for 3 days   Transplant History:    Transplant Type:  DDKT  Donor Type: Donation after Brain Death   Transplant Date:  2023 (Kidney)   Ureteral Stent:  Yes   Crossmatch:  negative   DSA at Tx:  No  Baseline Cr: TBD   DeNovo DSA: No    Acute Rejection Hx:  No    Present Maintenance Immunosuppression:  Tacrolimus and Mycophenolate mofetil    CMV IgG Ab Discordance:  No  EBV IgG Ab Discordance:  No    BK Viremia:  No  EBV Viremia:  No    Transplant Coordinator: Opal Rashid     Transplant Office Phone Number: [phone]     Immunosuppressant Medications       Immunosuppressive Agents Disp Start End     mycophenolate (GENERIC EQUIVALENT) 250 MG capsule    180 capsule 2023     Sig - Route: Take 3 capsules (750 mg) by mouth 2 times daily - Oral    Class: E-Prescribe    Renewals       Renewal requests to authorizing provider (Estefany Garcia, ROSA WANG) <b>prohibited</b>             tacrolimus (GENERIC) 0.5 MG capsule      12/12/2023     Sig: Profile Rx: patient will contact pharmacy when needed    Class: No Print Out    Notes to Pharmacy: TXP DT 12/5/2023 (Kidney) TXP Dischg DT 12/8/2023 DX Kidney replaced by transplant Z94.0 [facility] ([place], [place])     tacrolimus (GENERIC) 1 MG capsule    480 capsule 12/13/2023     Sig - Route: Take 8 capsules (8 mg) by mouth 2 times daily Total dose 8.5mg twice daily Profile Rx: patient will contact pharmacy when needed - Oral    Class: E-Prescribe            Possible Immunosuppression-related side effects:   []             headache  []             vivid dreams  []             irritability  []             cognitive difficuties  []             fine tremor  []             nausea  []             diarrhea  []             neuropathy      []             edema  []             renal calcineurin toxicity  []             hyperkalemia  []             post-transplant diabetes  []             decreased appetite  []             increased appetite  []             other:  []             none    Prescription Medications as of 12/15/2023         Rx Number Disp Refills Start End Last Dispensed Date Next Fill Date Owning Pharmacy    acetaminophen (TYLENOL) 325 MG tablet  100 tablet 0 12/8/2023    [id number] [place], [place] [id number] [place] [address]    Sig: Take 2 tablets (650 mg) by mouth every 4 hours as needed for pain    Class: E-Prescribe    Route: Oral    amLODIPine (NORVASC) 10 MG tablet            Sig: Take 10 mg by mouth daily     Class: Historical    Route: Oral    aspirin 81 MG EC tablet            Sig: Take 81 mg by mouth daily    Class: Historical    Route: Oral    atorvastatin (LIPITOR) 10 MG tablet  90 tablet 0 12/10/2023    [id number] [address] AT [place] [address]    Sig: Take 1 tablet (10 mg) by mouth daily    Class: E-Prescribe    Route: Oral     atorvastatin (LIPITOR) 10 MG tablet  90 tablet 1 6/1/2022    [place], [place] - [address]    Sig: TAKE ONE TABLET BY MOUTH EVERY NIGHT AT BEDTIME    Class: E-Prescribe    carvedilol (COREG) 25 MG tablet  180 tablet 3 8/4/2023    [facility] #[id number] - [place], [place] - [address] AT [place] [address]    Sig: TAKE TWO TABLETS BY MOUTH TWICE A DAY WITH A MEAL Strength: 25 mg    Class: E-Prescribe    Earliest Fill Date: 8/4/2023    hydrALAZINE (APRESOLINE) 100 MG tablet    12/8/2023        Sig: Take 0.5 tablets (50 mg) by mouth 2 times daily    Class: No Print Out    Route: Oral    magnesium oxide (MAG-OX) 400 MG tablet  60 tablet 3 12/14/2023    [facility] #[id number] - [place], [place] - [address] AT [place] [address]    Sig: Take 1 tablet (400 mg) by mouth 2 times daily    Class: E-Prescribe    Route: Oral    medical cannabis (Patient's own supply)    12/8/2023        Sig: See Admin Instructions (The purpose of this order is to document that the patient reports taking medical cannabis.  This is not a prescription, and is not used to certify that the patient has a qualifying medical condition.)    Patient was advised on the cannabis-tacrolimus drug interaction.    Class: Historical    Multiple Vitamin (MULTIVITAMIN ADULT PO)            Sig: Take 1 tablet by mouth daily    Class: Historical    Route: Oral    mycophenolate (GENERIC EQUIVALENT) 250 MG capsule  180 capsule 11 12/8/2023    [facility] [place], [place] - [address] [facility]    Sig: Take 3 capsules (750 mg) by mouth 2 times daily    Class: E-Prescribe    Route: Oral    Renewals       Renewal requests to authorizing provider (Estefany Garcia, ROSA CNP) <b>prohibited</b>            nitroGLYcerin (NITROSTAT) 0.3 MG sublingual tablet  5 tablet 0 10/29/2022    [place], [place] - [address] [facility]    Sig: For chest  pain place 1 tablet under the tongue every 5 minutes for 3 doses. If symptoms persist 5 minutes after 1st dose call 911.    Class: E-Prescribe    Notes to Pharmacy: Ok to adjust to supplied dose    phosphorus tablet 250 mg 250 MG per tablet  120 tablet 0 12/14/2023    [facility] #[id number] [facility], [place] - [address] AT [place] [address]    Sig: Take 2 tablets (500 mg) by mouth 2 times daily    Class: E-Prescribe    Route: Oral    polyethylene glycol (MIRALAX) 17 GM/Dose powder  510 g 0 12/8/2023    [facility] #[id number] [place] [address], [place] - [address] AT [place] [address]    Sig: Take 17 g by mouth daily as needed for constipation    Class: E-Prescribe    Route: Oral    predniSONE (DELTASONE) 10 MG tablet  49 tablet 0 12/9/2023 1/9/2024   [facility] #[id number] [facility], [place] - [address] AT [place] [address]    Sig: Take 6 tablets (60 mg) by mouth daily for 1 day, THEN 4 tablets (40 mg) daily for 2 days, THEN 2 tablets (20 mg) daily for 7 days, THEN 1.5 tablets (15 mg) daily for 7 days, THEN 1 tablet (10 mg) daily for 7 days, THEN 0.5 tablets (5 mg) daily for 7 days.    Class: E-Prescribe    Notes to Pharmacy: Estefany Garcia -[phone]    Route: Oral    sennosides (SENOKOT) 8.6 MG tablet  60 tablet 0 12/8/2023    [facility] #[id number] [facility], [place] - [address] AT [place] [address]    Sig: Take 2 tablets by mouth 2 times daily Hold for loose stools    Class: E-Prescribe    Route: Oral    sodium bicarbonate 650 MG tablet  270 tablet 1 12/14/2023    [facility] #[id number] [facility], [place] - [address] [place] [address]    Sig: Take 3 tablets (1,950 mg) by mouth 3 times daily    Class: E-Prescribe    Route: Oral    sulfamethoxazole-trimethoprim (BACTRIM) 400-80 MG tablet  30 tablet 11 12/9/2023    [facility] #[id number] - [facility] [address] AT [facility]  [place] [address]    Sig: Take 1 tablet by mouth daily    Class: E-Prescribe    Route: Oral    Renewals       Renewal requests to authorizing provider (Estefany Garcia APRN CNP) <b>prohibited</b>            tacrolimus (GENERIC) 0.5 MG capsule    12/12/2023        Sig: Profile Rx: patient will contact pharmacy when needed    Class: No Print Out    Notes to Pharmacy: TXP DT 12/5/2023 (Kidney) TXP Dischg DT 12/8/2023 DX Kidney replaced by transplant Z94.0 TX [facility] ([place], [place])    tacrolimus (GENERIC) 1 MG capsule  480 capsule 11 12/13/2023    [facility] #[id number] [place] [address] - [address] AT [place] [address]    Sig: Take 8 capsules (8 mg) by mouth 2 times daily Total dose 8.5mg twice daily Profile Rx: patient will contact pharmacy when needed    Class: E-Prescribe    Route: Oral    valGANciclovir (VALCYTE) 450 MG tablet  8 tablet 0 12/10/2023 12/15/2023   [facility] [address]    Sig: Take 2 tablets (900 mg) by mouth daily for 4 days    Class: E-Prescribe    Route: Oral    Lidocaine (LIDOCARE) 4 % Patch  5 patch 0 12/8/2023    [facility] [address] [place] [address] [address] [place] [address]    Sig: Place 1 patch onto the skin every 24 hours To prevent lidocaine toxicity, patient should be patch free for 12 hrs daily.    Class: E-Prescribe    Route: Transdermal    methocarbamol (ROBAXIN) 500 MG tablet  10 tablet 0 12/8/2023    [facility] #[id number] [place] [address] - [address] AT [place] [address]    Sig: Take 1 tablet (500 mg) by mouth 4 times daily as needed for muscle spasms    Class: E-Prescribe    Route: Oral    Renewals       Renewal requests to authorizing provider (Estefany Garcia APRN CNP) <b>prohibited</b>                    O:   Temp:  [97.5  F (36.4  C)] 97.5  F (36.4  C)  Pulse:  [89] 89  BP: (126)/(78)  126/78  SpO2:  [100 %] 100 %  General Appearance: in no apparent distress.   Skin: Normal, no rashes or jaundice  Heart: regular rate and rhythm  Lungs: easy respirations, no audible wheezing.  Abdomen: rounded, The wound is dry and intact, without hernia. The abdomen is non-tender. The kidney graft is not tender.  There is no ascites.CAMDEN removed  Extremities: Tremor absent.   Edema: absent.         Latest Ref Rng & Units 12/14/2023     8:15 AM 12/13/2023     7:34 AM 12/11/2023     7:50 AM 12/10/2023     8:03 AM 12/9/2023     7:39 AM   Transplant Immunosuppression Labs   Creat 0.67 - 1.17 mg/dL 0.95  1.02  1.09  1.09  1.35    Urea Nitrogen 6.0 - 20.0 mg/dL 22.7  20.9  27.3  31.3  35.9    WBC 4.0 - 11.0 10e3/uL 14.7  15.3  15.8  11.0  10.5    Neutrophil %   74  63         Chemistries:   Recent Labs   Lab Test 12/14/23  0815   BUN 22.7*   CR 0.95   GFRESTIMATED >90   *     Lab Results   Component Value Date    A1C 5.9 12/05/2023    A1C 5.0 03/04/2021     Recent Labs   Lab Test 12/05/23  0007   ALBUMIN 4.5   BILITOTAL 0.5   ALKPHOS 138   AST 13   ALT 20     Urine Studies:  Recent Labs   Lab Test 12/11/23  0943   COLOR Light Yellow   APPEARANCE Clear   URINEGLC 200*   URINEBILI Negative   URINEKETONE Negative   SG 1.017   UBLD Moderate*   URINEPH 5.5   PROTEIN Negative   NITRITE Negative   LEUKEST Negative   RBCU >182*   WBCU 3     Recent Labs   Lab Test 07/03/19  1529 07/17/17  0930   UTPG 2.71* 1.94*     Hematology:   Recent Labs   Lab Test 12/14/23  0815 12/13/23  0734 12/11/23  0750   HGB 12.2* 12.4* 12.3*    296 299   WBC 14.7* 15.3* 15.8*     Coags:   Recent Labs   Lab Test 12/05/23  0007 03/15/23  0900   INR 1.07 1.40*     HLA antibodies:   SA1 Hi Risk Shauna   Date Value Ref Range Status   02/09/2021 None  Final     SA1 HI RISK SHAUNA   Date Value Ref Range Status   12/03/2023 None  Final     SA1 Mod Risk Shauna   Date Value Ref Range Status   02/09/2021 B:57 58  Final     SA1 MOD RISK SHAUNA   Date Value Ref  Range Status   12/03/2023 B:57 58  Final     SA2 Hi Risk Shauna   Date Value Ref Range Status   02/09/2021 DQA:05  Final     SA2 HI RISK SHAUNA   Date Value Ref Range Status   12/03/2023 None  Final     SA2 Mod Risk Shauna   Date Value Ref Range Status   02/09/2021 DQ:7  Final     SA2 MOD RISK SHAUNA   Date Value Ref Range Status   12/03/2023 DQ:7 9DQA:05  Final       Assessment: Taylor Valiente is doing well s/p DDKT:  Issues we addressed during his visit include:    Plan:    1. Graft function: stable  2. Immunosuppression Management: No change continue same IS  .  Complexity of management:Medium.  Contributing factors:  recent txp  3. Constipation:  xray today.  Likely having gas pain.  Continue stool softeners and should be more mobile.  4 small walks a day :     CAMDEN less than 30 for 3 days.  Pulled and tolerated well.    Followup: as directed    Total Time: 25 min,   Counselling Time: 15 min.          Again, thank you for allowing me to participate in the care of your patient.        Sincerely,        Vesna Yuan NP

## 2023-12-15 NOTE — PROGRESS NOTES
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 He was on HD via RIJ with tunneled line due to recent failed RUE fistula and was anuric. Patient is now s/p  donor kidney transplant with ureteral stent with Dr. Jimenez on 23.      Doing well.  No fevers or chills.  No nausea or vomiting    No urinary issues.    States he has been having has pain.  Has not been walking a lot    One good BM yesterday maybe two.      Eating well.  Toni less than 30 for 3 days   Transplant History:    Transplant Type:  DDKT  Donor Type: Donation after Brain Death   Transplant Date:  2023 (Kidney)   Ureteral Stent:  Yes   Crossmatch:  negative   DSA at Tx:  No  Baseline Cr: TBD   DeNovo DSA: No    Acute Rejection Hx:  No    Present Maintenance Immunosuppression:  Tacrolimus and Mycophenolate mofetil    CMV IgG Ab Discordance:  No  EBV IgG Ab Discordance:  No    BK Viremia:  No  EBV Viremia:  No    Transplant Coordinator: Opal Rashid     Transplant Office Phone Number: [phone]     Immunosuppressant Medications       Immunosuppressive Agents Disp Start End     mycophenolate (GENERIC EQUIVALENT) 250 MG capsule    180 capsule 2023     Sig - Route: Take 3 capsules (750 mg) by mouth 2 times daily - Oral    Class: E-Prescribe    Renewals       Renewal requests to authorizing provider (Estefany Garcia, APRN CNP) <b>prohibited</b>             tacrolimus (GENERIC) 0.5 MG capsule     2023     Sig: Profile Rx: patient will contact pharmacy when needed    Class: No Print Out    Notes to Pharmacy: TXP DT 2023 (Kidney) TXP Dischg DT 2023 DX Kidney replaced by transplant Z94.0 [facility]  ([place], [place])     tacrolimus (GENERIC) 1 MG capsule    480 capsule 12/13/2023     Sig - Route: Take 8 capsules (8 mg) by mouth 2 times daily Total dose 8.5mg twice daily Profile Rx: patient will contact pharmacy when needed - Oral    Class: E-Prescribe            Possible Immunosuppression-related side effects:   []             headache  []             vivid dreams  []             irritability  []             cognitive difficuties  []             fine tremor  []             nausea  []             diarrhea  []             neuropathy      []             edema  []             renal calcineurin toxicity  []             hyperkalemia  []             post-transplant diabetes  []             decreased appetite  []             increased appetite  []             other:  []             none    Prescription Medications as of 12/15/2023         Rx Number Disp Refills Start End Last Dispensed Date Next Fill Date Owning Pharmacy    acetaminophen (TYLENOL) 325 MG tablet  100 tablet 0 12/8/2023    [facility] #[id number] [facility] [address] AT [place] [address]    Sig: Take 2 tablets (650 mg) by mouth every 4 hours as needed for pain    Class: E-Prescribe    Route: Oral    amLODIPine (NORVASC) 10 MG tablet            Sig: Take 10 mg by mouth daily     Class: Historical    Route: Oral    aspirin 81 MG EC tablet            Sig: Take 81 mg by mouth daily    Class: Historical    Route: Oral    atorvastatin (LIPITOR) 10 MG tablet  90 tablet 0 12/10/2023    [facility] #[id number] [facility] [address] AT [place] [address]    Sig: Take 1 tablet (10 mg) by mouth daily    Class: E-Prescribe    Route: Oral    atorvastatin (LIPITOR) 10 MG tablet  90 tablet 1 6/1/2022    [facility] [address] [facility]    Sig: TAKE ONE TABLET BY MOUTH EVERY NIGHT AT BEDTIME    Class: E-Prescribe    carvedilol (COREG) 25 MG tablet  180 tablet 3 8/4/2023     [facility] #[id number] - [place], [place] - [address] AT [place] [address]    Sig: TAKE TWO TABLETS BY MOUTH TWICE A DAY WITH A MEAL Strength: 25 mg    Class: E-Prescribe    Earliest Fill Date: 8/4/2023    hydrALAZINE (APRESOLINE) 100 MG tablet    12/8/2023        Sig: Take 0.5 tablets (50 mg) by mouth 2 times daily    Class: No Print Out    Route: Oral    magnesium oxide (MAG-OX) 400 MG tablet  60 tablet 3 12/14/2023    [facility] #[id number] - [place], [place] - [address] AT [place] [address]    Sig: Take 1 tablet (400 mg) by mouth 2 times daily    Class: E-Prescribe    Route: Oral    medical cannabis (Patient's own supply)    12/8/2023        Sig: See Admin Instructions (The purpose of this order is to document that the patient reports taking medical cannabis.  This is not a prescription, and is not used to certify that the patient has a qualifying medical condition.)    Patient was advised on the cannabis-tacrolimus drug interaction.    Class: Historical    Multiple Vitamin (MULTIVITAMIN ADULT PO)            Sig: Take 1 tablet by mouth daily    Class: Historical    Route: Oral    mycophenolate (GENERIC EQUIVALENT) 250 MG capsule  180 capsule 11 12/8/2023    [facility] [place], [place] - [address] [facility]    Sig: Take 3 capsules (750 mg) by mouth 2 times daily    Class: E-Prescribe    Route: Oral    Renewals       Renewal requests to authorizing provider (Estefany Garcia, ROSA CNP) <b>prohibited</b>            nitroGLYcerin (NITROSTAT) 0.3 MG sublingual tablet  5 tablet 0 10/29/2022    [facility] [place], [place] - [address] [facility]    Sig: For chest pain place 1 tablet under the tongue every 5 minutes for 3 doses. If symptoms persist 5 minutes after 1st dose call 911.    Class: E-Prescribe    Notes to Pharmacy: Ok to adjust to supplied dose    phosphorus tablet 250 mg 250 MG per tablet  120 tablet 0 12/14/2023     [facility] #[id number] [facility], [place] - [address] AT [place] [address]    Sig: Take 2 tablets (500 mg) by mouth 2 times daily    Class: E-Prescribe    Route: Oral    polyethylene glycol (MIRALAX) 17 GM/Dose powder  510 g 0 12/8/2023    [facility] #[id number] [place] [address], [place] - [address] AT [place] [address]    Sig: Take 17 g by mouth daily as needed for constipation    Class: E-Prescribe    Route: Oral    predniSONE (DELTASONE) 10 MG tablet  49 tablet 0 12/9/2023 1/9/2024   [facility] [address] [place] [address], [place] - [address] AT [place] [address]    Sig: Take 6 tablets (60 mg) by mouth daily for 1 day, THEN 4 tablets (40 mg) daily for 2 days, THEN 2 tablets (20 mg) daily for 7 days, THEN 1.5 tablets (15 mg) daily for 7 days, THEN 1 tablet (10 mg) daily for 7 days, THEN 0.5 tablets (5 mg) daily for 7 days.    Class: E-Prescribe    Notes to Pharmacy: Estefany Garcia [phone]    Route: Oral    sennosides (SENOKOT) 8.6 MG tablet  60 tablet 0 12/8/2023    [facility] [address] [place] [address] [address] AT [place] [address]    Sig: Take 2 tablets by mouth 2 times daily Hold for loose stools    Class: E-Prescribe    Route: Oral    sodium bicarbonate 650 MG tablet  270 tablet 1 12/14/2023    [facility] [address] [place] [address] [address] AT [place] [address]    Sig: Take 3 tablets (1,950 mg) by mouth 3 times daily    Class: E-Prescribe    Route: Oral    sulfamethoxazole-trimethoprim (BACTRIM) 400-80 MG tablet  30 tablet 11 12/9/2023    [facility] [address] [place] [address], [place] - [address] AT [place] [address]    Sig: Take 1 tablet by mouth daily    Class: E-Prescribe    Route: Oral    Renewals       Renewal requests to authorizing provider (JoseEstefany guerra APRN CNP) <b>prohibited</b>            tacrolimus (GENERIC) 0.5 MG capsule    12/12/2023        Sig:  Profile Rx: patient will contact pharmacy when needed    Class: No Print Out    Notes to Pharmacy: TXP DT 12/5/2023 (Kidney) TXP Dischg DT 12/8/2023 DX Kidney replaced by transplant Z94.0 TX Center [facility] ([place], [place])    tacrolimus (GENERIC) 1 MG capsule  480 capsule 11 12/13/2023    [facility] ST[facility] #[id number] [facility] [address] AT [place] [address]    Sig: Take 8 capsules (8 mg) by mouth 2 times daily Total dose 8.5mg twice daily Profile Rx: patient will contact pharmacy when needed    Class: E-Prescribe    Route: Oral    valGANciclovir (VALCYTE) 450 MG tablet  8 tablet 0 12/10/2023 12/15/2023   [address]    Sig: Take 2 tablets (900 mg) by mouth daily for 4 days    Class: E-Prescribe    Route: Oral    Lidocaine (LIDOCARE) 4 % Patch  5 patch 0 12/8/2023    [facility] #[id number] [facility] [address] AT [place] [address]    Sig: Place 1 patch onto the skin every 24 hours To prevent lidocaine toxicity, patient should be patch free for 12 hrs daily.    Class: E-Prescribe    Route: Transdermal    methocarbamol (ROBAXIN) 500 MG tablet  10 tablet 0 12/8/2023    [facility] #[id number] [place], [place] - [address] [place] [address]    Sig: Take 1 tablet (500 mg) by mouth 4 times daily as needed for muscle spasms    Class: E-Prescribe    Route: Oral    Renewals       Renewal requests to authorizing provider (Estefany Garcia, ROSA WANG) <b>prohibited</b>                    O:   Temp:  [97.5  F (36.4  C)] 97.5  F (36.4  C)  Pulse:  [89] 89  BP: (126)/(78) 126/78  SpO2:  [100 %] 100 %  General Appearance: in no apparent distress.   Skin: Normal, no rashes or jaundice  Heart: regular rate and rhythm  Lungs: easy respirations, no audible wheezing.  Abdomen: rounded, The wound is dry and intact, without hernia. The  abdomen is non-tender. The kidney graft is not tender.  There is no ascites.CAMDEN removed  Extremities: Tremor absent.   Edema: absent.         Latest Ref Rng & Units 12/14/2023     8:15 AM 12/13/2023     7:34 AM 12/11/2023     7:50 AM 12/10/2023     8:03 AM 12/9/2023     7:39 AM   Transplant Immunosuppression Labs   Creat 0.67 - 1.17 mg/dL 0.95  1.02  1.09  1.09  1.35    Urea Nitrogen 6.0 - 20.0 mg/dL 22.7  20.9  27.3  31.3  35.9    WBC 4.0 - 11.0 10e3/uL 14.7  15.3  15.8  11.0  10.5    Neutrophil %   74  63         Chemistries:   Recent Labs   Lab Test 12/14/23  0815   BUN 22.7*   CR 0.95   GFRESTIMATED >90   *     Lab Results   Component Value Date    A1C 5.9 12/05/2023    A1C 5.0 03/04/2021     Recent Labs   Lab Test 12/05/23  0007   ALBUMIN 4.5   BILITOTAL 0.5   ALKPHOS 138   AST 13   ALT 20     Urine Studies:  Recent Labs   Lab Test 12/11/23  0943   COLOR Light Yellow   APPEARANCE Clear   URINEGLC 200*   URINEBILI Negative   URINEKETONE Negative   SG 1.017   UBLD Moderate*   URINEPH 5.5   PROTEIN Negative   NITRITE Negative   LEUKEST Negative   RBCU >182*   WBCU 3     Recent Labs   Lab Test 07/03/19  1529 07/17/17  0930   UTPG 2.71* 1.94*     Hematology:   Recent Labs   Lab Test 12/14/23  0815 12/13/23  0734 12/11/23  0750   HGB 12.2* 12.4* 12.3*    296 299   WBC 14.7* 15.3* 15.8*     Coags:   Recent Labs   Lab Test 12/05/23  0007 03/15/23  0900   INR 1.07 1.40*     HLA antibodies:   SA1 Hi Risk Shauna   Date Value Ref Range Status   02/09/2021 None  Final     SA1 HI RISK SHAUNA   Date Value Ref Range Status   12/03/2023 None  Final     SA1 Mod Risk Shauna   Date Value Ref Range Status   02/09/2021 B:57 58  Final     SA1 MOD RISK SHAUNA   Date Value Ref Range Status   12/03/2023 B:57 58  Final     SA2 Hi Risk Shauna   Date Value Ref Range Status   02/09/2021 DQA:05  Final     SA2 HI RISK SHAUNA   Date Value Ref Range Status   12/03/2023 None  Final     SA2 Mod Risk Shauna   Date Value Ref Range Status   02/09/2021 DQ:7   Final     SA2 MOD RISK SHAUNA   Date Value Ref Range Status   12/03/2023 DQ:7 9DQA:05  Final       Assessment: Taylor Valiente is doing well s/p DDKT:  Issues we addressed during his visit include:    Plan:    1. Graft function: stable  2. Immunosuppression Management: No change continue same IS  .  Complexity of management:Medium.  Contributing factors:  recent txp  3. Constipation:  xray today.  Likely having gas pain.  Continue stool softeners and should be more mobile.  4 small walks a day :     CAMDEN less than 30 for 3 days.  Pulled and tolerated well.    Followup: as directed    Total Time: 25 min,   Counselling Time: 15 min.

## 2023-12-15 NOTE — TELEPHONE ENCOUNTER
"Contacts on call coordinator with c/o ongoing abdominal pain \"right down the middle\" sharp and intermittent in nature, denies nausea, vomiting, has had a few bowel movements - loose - urinating often, is pushing fluids, denies fever, incision CDI, took tums with mild relief? Passing gas. Eating well.     Fluids and movement advised this evening- has an appt with HENRIQUE for drain removal tomorrow- noted labs done today - he will have HENRIQUE do exam tomorrow and can order further work up at that time.       "

## 2023-12-15 NOTE — NURSING NOTE
"Chief Complaint   Patient presents with    Follow Up     CAMDEN drain removal       Vital signs:  Temp: 97.5  F (36.4  C) Temp src: Oral BP: 126/78 Pulse: 89     SpO2: 100 %          Estimated body mass index is 32.59 kg/m  as calculated from the following:    Height as of 7/27/23: 1.778 m (5' 10\").    Weight as of 12/11/23: 103 kg (227 lb 1.6 oz).      Nick Gudino RN on 12/15/2023 at 10:09 AM    "

## 2023-12-18 ENCOUNTER — TELEPHONE (OUTPATIENT)
Dept: CARDIOLOGY | Facility: CLINIC | Age: 28
End: 2023-12-18

## 2023-12-18 ENCOUNTER — OFFICE VISIT (OUTPATIENT)
Dept: TRANSPLANT | Facility: CLINIC | Age: 28
End: 2023-12-18
Attending: INTERNAL MEDICINE
Payer: MEDICARE

## 2023-12-18 VITALS
TEMPERATURE: 98.5 F | HEART RATE: 89 BPM | WEIGHT: 222.5 LBS | BODY MASS INDEX: 31.93 KG/M2 | SYSTOLIC BLOOD PRESSURE: 119 MMHG | OXYGEN SATURATION: 97 % | DIASTOLIC BLOOD PRESSURE: 75 MMHG

## 2023-12-18 DIAGNOSIS — Z94.0 KIDNEY REPLACED BY TRANSPLANT: ICD-10-CM

## 2023-12-18 PROBLEM — B95.8 STAPHYLOCOCCUS INFECTION: Status: ACTIVE | Noted: 2023-07-27

## 2023-12-18 PROCEDURE — G0463 HOSPITAL OUTPT CLINIC VISIT: HCPCS | Performed by: NURSE PRACTITIONER

## 2023-12-18 PROCEDURE — 99213 OFFICE O/P EST LOW 20 MIN: CPT | Mod: 24 | Performed by: NURSE PRACTITIONER

## 2023-12-18 NOTE — PROGRESS NOTES
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 He was on HD via RIJ with tunneled line due to recent failed RUE fistula and was anuric. Patient is now s/p  donor kidney transplant with ureteral stent with Dr. Jimenez on 23.       Doing well.  No fevers or chills.  No nausea or vomiting     No urinary issues.      Transplant History:    Transplant Type:  DDKT  Donor Type: Donation after Brain Death   Transplant Date:  2023 (Kidney)   Ureteral Stent:  Yes   Crossmatch:  negative   DSA at Tx:  No  Baseline Cr: TBD   DeNovo DSA: No    Acute Rejection Hx:  No    Present Maintenance Immunosuppression:  Tacrolimus and Mycophenolate mofetil    CMV IgG Ab Discordance:  No  EBV IgG Ab Discordance:  No    BK Viremia:  No  EBV Viremia:  No    Transplant Coordinator: Opal Rashid     Transplant Office Phone Number: [phone]     Immunosuppressant Medications       Immunosuppressive Agents Disp Start End     mycophenolate (GENERIC EQUIVALENT) 250 MG capsule    180 capsule 2023     Sig - Route: Take 3 capsules (750 mg) by mouth 2 times daily - Oral    Class: E-Prescribe    Renewals       Renewal requests to authorizing provider (Estefany Garcia, ROSA WANG) <b>prohibited</b>             tacrolimus (GENERIC) 0.5 MG capsule     2023     Sig: Profile Rx: patient will contact pharmacy when needed    Class: No Print Out    Notes to Pharmacy: TXP DT 2023 (Kidney) TXP Dischg DT 2023 DX Kidney replaced by transplant Z94.0 [facility] ([place], [place])     tacrolimus (GENERIC) 1 MG capsule    480 capsule 2023     Sig - Route: Take 8 capsules (8 mg) by mouth 2 times daily Total  dose 8.5mg twice daily Profile Rx: patient will contact pharmacy when needed - Oral    Class: E-Prescribe            Possible Immunosuppression-related side effects:   []             headache  []             vivid dreams  []             irritability  []             cognitive difficuties  []             fine tremor  []             nausea  []             diarrhea  []             neuropathy      []             edema  []             renal calcineurin toxicity  []             hyperkalemia  []             post-transplant diabetes  []             decreased appetite  []             increased appetite  []             other:  []             none    Prescription Medications as of 12/18/2023         Rx Number Disp Refills Start End Last Dispensed Date Next Fill Date Owning Pharmacy    acetaminophen (TYLENOL) 325 MG tablet  100 tablet 0 12/8/2023    [facility] [address] [place] [address], [place] - [address] [facility] [address]    Sig: Take 2 tablets (650 mg) by mouth every 4 hours as needed for pain    Class: E-Prescribe    Route: Oral    amLODIPine (NORVASC) 10 MG tablet            Sig: Take 10 mg by mouth daily     Class: Historical    Route: Oral    aspirin 81 MG EC tablet            Sig: Take 81 mg by mouth daily    Class: Historical    Route: Oral    atorvastatin (LIPITOR) 10 MG tablet  90 tablet 0 12/10/2023    [facility] #[id number] [facility], [place] - [address] AT [facility] [address]    Sig: Take 1 tablet (10 mg) by mouth daily    Class: E-Prescribe    Route: Oral    atorvastatin (LIPITOR) 10 MG tablet  90 tablet 1 6/1/2022    [facility], [place] - [address]    Sig: TAKE ONE TABLET BY MOUTH EVERY NIGHT AT BEDTIME    Class: E-Prescribe    carvedilol (COREG) 25 MG tablet  180 tablet 3 8/4/2023    [facility] #[id number] [facility], [place] - [address] AT [facility] [address]    Sig: TAKE TWO TABLETS BY MOUTH TWICE A DAY WITH A  MEAL Strength: 25 mg    Class: E-Prescribe    Earliest Fill Date: 8/4/2023    hydrALAZINE (APRESOLINE) 100 MG tablet    12/8/2023        Sig: Take 0.5 tablets (50 mg) by mouth 2 times daily    Class: No Print Out    Route: Oral    magnesium oxide (MAG-OX) 400 MG tablet  60 tablet 3 12/14/2023    [id number] - [place], [place] - [address] AT [place] [address]    Sig: Take 1 tablet (400 mg) by mouth 2 times daily    Class: E-Prescribe    Route: Oral    medical cannabis (Patient's own supply)    12/8/2023        Sig: See Admin Instructions (The purpose of this order is to document that the patient reports taking medical cannabis.  This is not a prescription, and is not used to certify that the patient has a qualifying medical condition.)    Patient was advised on the cannabis-tacrolimus drug interaction.    Class: Historical    Multiple Vitamin (MULTIVITAMIN ADULT PO)            Sig: Take 1 tablet by mouth daily    Class: Historical    Route: Oral    mycophenolate (GENERIC EQUIVALENT) 250 MG capsule  180 capsule 11 12/8/2023    [facility] [place], [place] - [address] [place] [address]    Sig: Take 3 capsules (750 mg) by mouth 2 times daily    Class: E-Prescribe    Route: Oral    Renewals       Renewal requests to authorizing provider (Estefany Garcia, ROSA CNP) <b>prohibited</b>            nitroGLYcerin (NITROSTAT) 0.3 MG sublingual tablet  5 tablet 0 10/29/2022    [facility] [place], [place] - [address] [address]    Sig: For chest pain place 1 tablet under the tongue every 5 minutes for 3 doses. If symptoms persist 5 minutes after 1st dose call 911.    Class: E-Prescribe    Notes to Pharmacy: Ok to adjust to supplied dose    phosphorus tablet 250 mg 250 MG per tablet  120 tablet 0 12/14/2023    [id number] [place] [place] - [address] AT [place] [address]    Sig: Take 2 tablets (500 mg) by mouth 2 times daily     Class: E-Prescribe    Route: Oral    polyethylene glycol (MIRALAX) 17 GM/Dose powder  510 g 0 12/8/2023    [facility] #[id number] [facility], [place] - [address] AT [place] [address]    Sig: Take 17 g by mouth daily as needed for constipation    Class: E-Prescribe    Route: Oral    predniSONE (DELTASONE) 10 MG tablet  49 tablet 0 12/9/2023 1/9/2024   [facility] #[id number] [place] [address] [address] AT [place] [address]    Sig: Take 6 tablets (60 mg) by mouth daily for 1 day, THEN 4 tablets (40 mg) daily for 2 days, THEN 2 tablets (20 mg) daily for 7 days, THEN 1.5 tablets (15 mg) daily for 7 days, THEN 1 tablet (10 mg) daily for 7 days, THEN 0.5 tablets (5 mg) daily for 7 days.    Class: E-Prescribe    Notes to Pharmacy: Estefany Garcia -[phone]    Route: Oral    sennosides (SENOKOT) 8.6 MG tablet  60 tablet 0 12/8/2023    [facility] #[id number] [place] [address], [place] - [address] AT [place] [address]    Sig: Take 2 tablets by mouth 2 times daily Hold for loose stools    Class: E-Prescribe    Route: Oral    sodium bicarbonate 650 MG tablet  270 tablet 1 12/14/2023    [facility] #[id number] [place] [address], [place] - [address] AT [place] [address]    Sig: Take 3 tablets (1,950 mg) by mouth 3 times daily    Class: E-Prescribe    Route: Oral    sulfamethoxazole-trimethoprim (BACTRIM) 400-80 MG tablet  30 tablet 11 12/9/2023    [facility] #[id number] [place] [address], [place] - [address] AT [place] [address]    Sig: Take 1 tablet by mouth daily    Class: E-Prescribe    Route: Oral    Renewals       Renewal requests to authorizing provider (Estefany Garcia, ROSA WANG) <b>prohibited</b>            tacrolimus (GENERIC) 0.5 MG capsule    12/12/2023        Sig: Profile Rx: patient will contact pharmacy when needed    Class: No Print Out    Notes to Pharmacy: TXP DT 12/5/2023 (Kidney) TXP Dischg DT 12/8/2023 DX  Kidney replaced by transplant Z94.0 [facility] ([place], [place])    tacrolimus (GENERIC) 1 MG capsule  480 capsule 11 12/13/2023    [facility] #[id number] [facility] [address] AT [place] [address]    Sig: Take 8 capsules (8 mg) by mouth 2 times daily Total dose 8.5mg twice daily Profile Rx: patient will contact pharmacy when needed    Class: E-Prescribe    Route: Oral    Lidocaine (LIDOCARE) 4 % Patch  5 patch 0 12/8/2023    [facility] #[id number] [facility] [address] [place] [address]    Sig: Place 1 patch onto the skin every 24 hours To prevent lidocaine toxicity, patient should be patch free for 12 hrs daily.    Class: E-Prescribe    Route: Transdermal    methocarbamol (ROBAXIN) 500 MG tablet  10 tablet 0 12/8/2023    [facility] #[id number] [facility] [address] AT [place] [address]    Sig: Take 1 tablet (500 mg) by mouth 4 times daily as needed for muscle spasms    Class: E-Prescribe    Route: Oral    Renewals       Renewal requests to authorizing provider (Estefany Garcia, ROSA CNP) <b>prohibited</b>            valGANciclovir (VALCYTE) 450 MG tablet  8 tablet 0 12/10/2023 12/15/2023   [facility] [address]    Sig: Take 2 tablets (900 mg) by mouth daily for 4 days    Class: E-Prescribe    Route: Oral            O:   Temp:  [98.5  F (36.9  C)] 98.5  F (36.9  C)  Pulse:  [89] 89  BP: (119)/(75) 119/75  SpO2:  [97 %] 97 %  General Appearance: in no apparent distress.   Skin: Normal, no rashes or jaundice  Heart: regular rate and rhythm, normal S1 and S2  Lungs: easy respirations, no audible wheezing.  Abdomen: obese, The wound is dry and intact, without hernia. The abdomen is non-tender. The kidney graft is not tender.  There is no ascites.  Extremities: Tremor absent.   Edema: absent.         Latest Ref  Rng & Units 12/14/2023     8:15 AM 12/13/2023     7:34 AM 12/11/2023     7:50 AM 12/10/2023     8:03 AM 12/9/2023     7:39 AM   Transplant Immunosuppression Labs   Creat 0.67 - 1.17 mg/dL 0.95  1.02  1.09  1.09  1.35    Urea Nitrogen 6.0 - 20.0 mg/dL 22.7  20.9  27.3  31.3  35.9    WBC 4.0 - 11.0 10e3/uL 14.7  15.3  15.8  11.0  10.5    Neutrophil %   74  63         Chemistries:   Recent Labs   Lab Test 12/14/23  0815   BUN 22.7*   CR 0.95   GFRESTIMATED >90   *     Lab Results   Component Value Date    A1C 5.9 12/05/2023    A1C 5.0 03/04/2021     Recent Labs   Lab Test 12/05/23  0007   ALBUMIN 4.5   BILITOTAL 0.5   ALKPHOS 138   AST 13   ALT 20     Urine Studies:  Recent Labs   Lab Test 12/11/23  0943   COLOR Light Yellow   APPEARANCE Clear   URINEGLC 200*   URINEBILI Negative   URINEKETONE Negative   SG 1.017   UBLD Moderate*   URINEPH 5.5   PROTEIN Negative   NITRITE Negative   LEUKEST Negative   RBCU >182*   WBCU 3     Recent Labs   Lab Test 07/03/19  1529 07/17/17  0930   UTPG 2.71* 1.94*     Hematology:   Recent Labs   Lab Test 12/14/23  0815 12/13/23  0734 12/11/23  0750   HGB 12.2* 12.4* 12.3*    296 299   WBC 14.7* 15.3* 15.8*     Coags:   Recent Labs   Lab Test 12/05/23  0007 03/15/23  0900   INR 1.07 1.40*     HLA antibodies:   SA1 Hi Risk Shauna   Date Value Ref Range Status   02/09/2021 None  Final     SA1 HI RISK SHAUNA   Date Value Ref Range Status   12/03/2023 None  Final     SA1 Mod Risk Shauna   Date Value Ref Range Status   02/09/2021 B:57 58  Final     SA1 MOD RISK SHAUNA   Date Value Ref Range Status   12/03/2023 B:57 58  Final     SA2 Hi Risk Shauna   Date Value Ref Range Status   02/09/2021 DQA:05  Final     SA2 HI RISK SHAUNA   Date Value Ref Range Status   12/03/2023 None  Final     SA2 Mod Risk Shauna   Date Value Ref Range Status   02/09/2021 DQ:7  Final     SA2 MOD RISK SHAUNA   Date Value Ref Range Status   12/03/2023 DQ:7 9DQA:05  Final       Assessment: Taylor Valiente is doing well s/p DDKT:   Issues we addressed during his visit include:    Plan:    1. Graft function: stable allograft function  2. Immunosuppression Management: No change continue IS  .  Complexity of management:Medium.  Contributing factors:  recent txp  3. Incision:  5 staples removed and slight opening. Steri strips placed.  Remaining staples to be removed week      Followup: as directed    Total Time: 30 min,   Counselling Time: 20 min.

## 2023-12-18 NOTE — LETTER
2023         RE: Taylor Valiente  [address]   [place] [address]        Dear Colleague,    Thank you for referring your patient, Taylor Valiente, to the [facility]. Please see a copy of my visit note below.    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 He was on HD via RIJ with tunneled line due to recent failed RUE fistula and was anuric. Patient is now s/p  donor kidney transplant with ureteral stent with Dr. Jimenez on 23.       Doing well.  No fevers or chills.  No nausea or vomiting     No urinary issues.      Transplant History:    Transplant Type:  DDKT  Donor Type: Donation after Brain Death   Transplant Date:  2023 (Kidney)   Ureteral Stent:  Yes   Crossmatch:  negative   DSA at Tx:  No  Baseline Cr: TBD   DeNovo DSA: No    Acute Rejection Hx:  No    Present Maintenance Immunosuppression:  Tacrolimus and Mycophenolate mofetil    CMV IgG Ab Discordance:  No  EBV IgG Ab Discordance:  No    BK Viremia:  No  EBV Viremia:  No    Transplant Coordinator: Opal Rashid     Transplant Office Phone Number: [phone]     Immunosuppressant Medications       Immunosuppressive Agents Disp Start End     mycophenolate (GENERIC EQUIVALENT) 250 MG capsule    180 capsule 2023     Sig - Route: Take 3 capsules (750 mg) by mouth 2 times daily - Oral    Class: E-Prescribe    Renewals       Renewal requests to authorizing provider (Estefany Garcia, ROSA WANG) <b>prohibited</b>             tacrolimus (GENERIC) 0.5 MG capsule     2023     Sig: Profile Rx: patient will contact pharmacy when needed    Class: No Print Out    Notes to Pharmacy: TXP DT 2023 (Kidney) TXP  Dischg DT 12/8/2023 DX Kidney replaced by transplant Z94.0 TX Center [facility] [place] ([place], [place])     tacrolimus (GENERIC) 1 MG capsule    480 capsule 12/13/2023     Sig - Route: Take 8 capsules (8 mg) by mouth 2 times daily Total dose 8.5mg twice daily Profile Rx: patient will contact pharmacy when needed - Oral    Class: E-Prescribe            Possible Immunosuppression-related side effects:   []             headache  []             vivid dreams  []             irritability  []             cognitive difficuties  []             fine tremor  []             nausea  []             diarrhea  []             neuropathy      []             edema  []             renal calcineurin toxicity  []             hyperkalemia  []             post-transplant diabetes  []             decreased appetite  []             increased appetite  []             other:  []             none    Prescription Medications as of 12/18/2023         Rx Number Disp Refills Start End Last Dispensed Date Next Fill Date Owning Pharmacy    acetaminophen (TYLENOL) 325 MG tablet  100 tablet 0 12/8/2023    [facility] [address] [place] [address] [address] [facility] [place] [address]    Sig: Take 2 tablets (650 mg) by mouth every 4 hours as needed for pain    Class: E-Prescribe    Route: Oral    amLODIPine (NORVASC) 10 MG tablet            Sig: Take 10 mg by mouth daily     Class: Historical    Route: Oral    aspirin 81 MG EC tablet            Sig: Take 81 mg by mouth daily    Class: Historical    Route: Oral    atorvastatin (LIPITOR) 10 MG tablet  90 tablet 0 12/10/2023    [facility] STORE #[id number] [address] AT [place] [address]    Sig: Take 1 tablet (10 mg) by mouth daily    Class: E-Prescribe    Route: Oral    atorvastatin (LIPITOR) 10 MG tablet  90 tablet 1 6/1/2022    [facility] [address] [address] [facility]    Sig: TAKE ONE TABLET  BY MOUTH EVERY NIGHT AT BEDTIME    Class: E-Prescribe    carvedilol (COREG) 25 MG tablet  180 tablet 3 8/4/2023    [facility] #[id number] - [place], [place] - [address] [facility] [address]    Sig: TAKE TWO TABLETS BY MOUTH TWICE A DAY WITH A MEAL Strength: 25 mg    Class: E-Prescribe    Earliest Fill Date: 8/4/2023    hydrALAZINE (APRESOLINE) 100 MG tablet    12/8/2023        Sig: Take 0.5 tablets (50 mg) by mouth 2 times daily    Class: No Print Out    Route: Oral    magnesium oxide (MAG-OX) 400 MG tablet  60 tablet 3 12/14/2023    [facility] #[id number] - [place], [place] - [address] AT [facility] [address]    Sig: Take 1 tablet (400 mg) by mouth 2 times daily    Class: E-Prescribe    Route: Oral    medical cannabis (Patient's own supply)    12/8/2023        Sig: See Admin Instructions (The purpose of this order is to document that the patient reports taking medical cannabis.  This is not a prescription, and is not used to certify that the patient has a qualifying medical condition.)    Patient was advised on the cannabis-tacrolimus drug interaction.    Class: Historical    Multiple Vitamin (MULTIVITAMIN ADULT PO)            Sig: Take 1 tablet by mouth daily    Class: Historical    Route: Oral    mycophenolate (GENERIC EQUIVALENT) 250 MG capsule  180 capsule 11 12/8/2023    [facility] [place], [place] - [address] [facility]    Sig: Take 3 capsules (750 mg) by mouth 2 times daily    Class: E-Prescribe    Route: Oral    Renewals       Renewal requests to authorizing provider (Estefany Garcia, ROSA CNP) <b>prohibited</b>            nitroGLYcerin (NITROSTAT) 0.3 MG sublingual tablet  5 tablet 0 10/29/2022    [place], [place] - [address] [address] [facility] [address]    Sig: For chest pain place 1 tablet under the tongue every 5 minutes for 3 doses. If symptoms persist 5 minutes after 1st dose call 911.    Class: E-Prescribe    Notes to  Pharmacy: Ok to adjust to supplied dose    phosphorus tablet 250 mg 250 MG per tablet  120 tablet 0 12/14/2023    [facility] #[id number], [place] - [address] AT [place] [address]    Sig: Take 2 tablets (500 mg) by mouth 2 times daily    Class: E-Prescribe    Route: Oral    polyethylene glycol (MIRALAX) 17 GM/Dose powder  510 g 0 12/8/2023    [facility] #[id number] [place] [address], [place] - [address] [place] [address]    Sig: Take 17 g by mouth daily as needed for constipation    Class: E-Prescribe    Route: Oral    predniSONE (DELTASONE) 10 MG tablet  49 tablet 0 12/9/2023 1/9/2024   [facility] #[id number] [facility], [place] - [address] AT [place] [address]    Sig: Take 6 tablets (60 mg) by mouth daily for 1 day, THEN 4 tablets (40 mg) daily for 2 days, THEN 2 tablets (20 mg) daily for 7 days, THEN 1.5 tablets (15 mg) daily for 7 days, THEN 1 tablet (10 mg) daily for 7 days, THEN 0.5 tablets (5 mg) daily for 7 days.    Class: E-Prescribe    Notes to Pharmacy: Estefany Garcia [phone]    Route: Oral    sennosides (SENOKOT) 8.6 MG tablet  60 tablet 0 12/8/2023    [facility] #[id number] [facility], [place] - [address] AT [place] [address]    Sig: Take 2 tablets by mouth 2 times daily Hold for loose stools    Class: E-Prescribe    Route: Oral    sodium bicarbonate 650 MG tablet  270 tablet 1 12/14/2023    [facility] #[id number], [place] - [address] AT [place] [address]    Sig: Take 3 tablets (1,950 mg) by mouth 3 times daily    Class: E-Prescribe    Route: Oral    sulfamethoxazole-trimethoprim (BACTRIM) 400-80 MG tablet  30 tablet 11 12/9/2023    [facility] #[id number], [place] - [address] AT [place] [address]    Sig: Take 1 tablet by mouth daily    Class: E-Prescribe    Route: Oral    Renewals       Renewal requests to authorizing provider (Jose  Estefany Laura, APRN CNP) <b>prohibited</b>            tacrolimus (GENERIC) 0.5 MG capsule    12/12/2023        Sig: Profile Rx: patient will contact pharmacy when needed    Class: No Print Out    Notes to Pharmacy: TXP DT 12/5/2023 (Kidney) TXP Dischg DT 12/8/2023 DX Kidney replaced by transplant Z94.0 TX Center [facility] ([place], [place])    tacrolimus (GENERIC) 1 MG capsule  480 capsule 11 12/13/2023    [facility] #[id number] [place] [address] [address] AT [place] [address]    Sig: Take 8 capsules (8 mg) by mouth 2 times daily Total dose 8.5mg twice daily Profile Rx: patient will contact pharmacy when needed    Class: E-Prescribe    Route: Oral    Lidocaine (LIDOCARE) 4 % Patch  5 patch 0 12/8/2023    [facility] [address] [place] [address] [address] AT [place] [address]    Sig: Place 1 patch onto the skin every 24 hours To prevent lidocaine toxicity, patient should be patch free for 12 hrs daily.    Class: E-Prescribe    Route: Transdermal    methocarbamol (ROBAXIN) 500 MG tablet  10 tablet 0 12/8/2023    [facility] [address] [place] [address] [address] [place] [address]    Sig: Take 1 tablet (500 mg) by mouth 4 times daily as needed for muscle spasms    Class: E-Prescribe    Route: Oral    Renewals       Renewal requests to authorizing provider (Estefany Garcia APRN CNP) <b>prohibited</b>            valGANciclovir (VALCYTE) 450 MG tablet  8 tablet 0 12/10/2023 12/15/2023   [facility] [place], [place] - [address] [place] [address] [phone]    Sig: Take 2 tablets (900 mg) by mouth daily for 4 days    Class: E-Prescribe    Route: Oral            O:   Temp:  [98.5  F (36.9  C)] 98.5  F (36.9  C)  Pulse:  [89] 89  BP: (119)/(75) 119/75  SpO2:  [97 %] 97 %  General Appearance: in no apparent distress.   Skin: Normal, no rashes or jaundice  Heart: regular rate and rhythm,  normal S1 and S2  Lungs: easy respirations, no audible wheezing.  Abdomen: obese, The wound is dry and intact, without hernia. The abdomen is non-tender. The kidney graft is not tender.  There is no ascites.  Extremities: Tremor absent.   Edema: absent.         Latest Ref Rng & Units 12/14/2023     8:15 AM 12/13/2023     7:34 AM 12/11/2023     7:50 AM 12/10/2023     8:03 AM 12/9/2023     7:39 AM   Transplant Immunosuppression Labs   Creat 0.67 - 1.17 mg/dL 0.95  1.02  1.09  1.09  1.35    Urea Nitrogen 6.0 - 20.0 mg/dL 22.7  20.9  27.3  31.3  35.9    WBC 4.0 - 11.0 10e3/uL 14.7  15.3  15.8  11.0  10.5    Neutrophil %   74  63         Chemistries:   Recent Labs   Lab Test 12/14/23  0815   BUN 22.7*   CR 0.95   GFRESTIMATED >90   *     Lab Results   Component Value Date    A1C 5.9 12/05/2023    A1C 5.0 03/04/2021     Recent Labs   Lab Test 12/05/23  0007   ALBUMIN 4.5   BILITOTAL 0.5   ALKPHOS 138   AST 13   ALT 20     Urine Studies:  Recent Labs   Lab Test 12/11/23  0943   COLOR Light Yellow   APPEARANCE Clear   URINEGLC 200*   URINEBILI Negative   URINEKETONE Negative   SG 1.017   UBLD Moderate*   URINEPH 5.5   PROTEIN Negative   NITRITE Negative   LEUKEST Negative   RBCU >182*   WBCU 3     Recent Labs   Lab Test 07/03/19  1529 07/17/17  0930   UTPG 2.71* 1.94*     Hematology:   Recent Labs   Lab Test 12/14/23  0815 12/13/23  0734 12/11/23  0750   HGB 12.2* 12.4* 12.3*    296 299   WBC 14.7* 15.3* 15.8*     Coags:   Recent Labs   Lab Test 12/05/23  0007 03/15/23  0900   INR 1.07 1.40*     HLA antibodies:   SA1 Hi Risk Shauna   Date Value Ref Range Status   02/09/2021 None  Final     SA1 HI RISK SHAUNA   Date Value Ref Range Status   12/03/2023 None  Final     SA1 Mod Risk Shauna   Date Value Ref Range Status   02/09/2021 B:57 58  Final     SA1 MOD RISK SHAUNA   Date Value Ref Range Status   12/03/2023 B:57 58  Final     SA2 Hi Risk Shauna   Date Value Ref Range Status   02/09/2021 DQA:05  Final     SA2 HI RISK SHAUNA    Date Value Ref Range Status   12/03/2023 None  Final     SA2 Mod Risk Shauna   Date Value Ref Range Status   02/09/2021 DQ:7  Final     SA2 MOD RISK SHAUNA   Date Value Ref Range Status   12/03/2023 DQ:7 9DQA:05  Final       Assessment: Taylor Valiente is doing well s/p DDKT:  Issues we addressed during his visit include:    Plan:    1. Graft function: stable allograft function  2. Immunosuppression Management: No change continue IS  .  Complexity of management:Medium.  Contributing factors:  recent txp  3. Incision:  5 staples removed and slight opening. Steri strips placed.  Remaining staples to be removed week      Followup: as directed    Total Time: 30 min,   Counselling Time: 20 min.           Again, thank you for allowing me to participate in the care of your patient.        Sincerely,        Vesna Yuan NP

## 2023-12-19 ENCOUNTER — LAB (OUTPATIENT)
Dept: LAB | Facility: CLINIC | Age: 28
End: 2023-12-19
Payer: MEDICARE

## 2023-12-19 DIAGNOSIS — Z20.828 CONTACT WITH AND (SUSPECTED) EXPOSURE TO OTHER VIRAL COMMUNICABLE DISEASES: ICD-10-CM

## 2023-12-19 DIAGNOSIS — Z79.899 ENCOUNTER FOR LONG-TERM CURRENT USE OF MEDICATION: ICD-10-CM

## 2023-12-19 DIAGNOSIS — Z48.298 AFTERCARE FOLLOWING ORGAN TRANSPLANT: ICD-10-CM

## 2023-12-19 DIAGNOSIS — Z94.0 KIDNEY TRANSPLANT RECIPIENT: ICD-10-CM

## 2023-12-19 DIAGNOSIS — Z94.0 KIDNEY REPLACED BY TRANSPLANT: Primary | ICD-10-CM

## 2023-12-19 DIAGNOSIS — Z98.890 OTHER SPECIFIED POSTPROCEDURAL STATES: ICD-10-CM

## 2023-12-19 DIAGNOSIS — Z94.0 KIDNEY REPLACED BY TRANSPLANT: ICD-10-CM

## 2023-12-19 LAB
ANION GAP SERPL CALCULATED.3IONS-SCNC: 14 MMOL/L (ref 7–15)
BUN SERPL-MCNC: 20.9 MG/DL (ref 6–20)
CALCIUM SERPL-MCNC: 9.8 MG/DL (ref 8.6–10)
CHLORIDE SERPL-SCNC: 105 MMOL/L (ref 98–107)
CREAT SERPL-MCNC: 0.94 MG/DL (ref 0.67–1.17)
DEPRECATED HCO3 PLAS-SCNC: 15 MMOL/L (ref 22–29)
EGFRCR SERPLBLD CKD-EPI 2021: >90 ML/MIN/1.73M2
FERRITIN SERPL-MCNC: 1477 NG/ML (ref 31–409)
GLUCOSE SERPL-MCNC: 139 MG/DL (ref 70–99)
IRON BINDING CAPACITY (ROCHE): 209 UG/DL (ref 240–430)
IRON SATN MFR SERPL: 14 % (ref 15–46)
IRON SERPL-MCNC: 30 UG/DL (ref 61–157)
POTASSIUM SERPL-SCNC: 4.7 MMOL/L (ref 3.4–5.3)
PTH-INTACT SERPL-MCNC: 155 PG/ML (ref 15–65)
SODIUM SERPL-SCNC: 134 MMOL/L (ref 135–145)
TACROLIMUS BLD-MCNC: 6.4 UG/L (ref 5–15)
TME LAST DOSE: NORMAL H
TME LAST DOSE: NORMAL H
VIT D+METAB SERPL-MCNC: 26 NG/ML (ref 20–50)

## 2023-12-19 PROCEDURE — 82306 VITAMIN D 25 HYDROXY: CPT | Mod: GZ

## 2023-12-19 PROCEDURE — 83550 IRON BINDING TEST: CPT

## 2023-12-19 PROCEDURE — 83970 ASSAY OF PARATHORMONE: CPT

## 2023-12-19 PROCEDURE — 80197 ASSAY OF TACROLIMUS: CPT

## 2023-12-19 PROCEDURE — 36415 COLL VENOUS BLD VENIPUNCTURE: CPT

## 2023-12-19 PROCEDURE — 87040 BLOOD CULTURE FOR BACTERIA: CPT

## 2023-12-19 PROCEDURE — 82728 ASSAY OF FERRITIN: CPT

## 2023-12-19 PROCEDURE — 83540 ASSAY OF IRON: CPT

## 2023-12-19 PROCEDURE — 80048 BASIC METABOLIC PNL TOTAL CA: CPT

## 2023-12-19 NOTE — TELEPHONE ENCOUNTER
Pt called. Steri-stripes fell off. Site is c/d/I. No bleeding. Care taker applied gauze and paper tape. Writer reassured that this was the correct thing to do. Pt encouraged to call if site becomes red/swollen/bleeding or if patient develops a fever. Pt also encouraged to discuss with primary care team in the am. No further questions or complaints at this time.

## 2023-12-20 ENCOUNTER — TELEPHONE (OUTPATIENT)
Dept: TRANSPLANT | Facility: CLINIC | Age: 28
End: 2023-12-20
Payer: MEDICARE

## 2023-12-20 DIAGNOSIS — Z94.0 KIDNEY REPLACED BY TRANSPLANT: Primary | ICD-10-CM

## 2023-12-20 DIAGNOSIS — Z76.82 KIDNEY TRANSPLANT CANDIDATE: ICD-10-CM

## 2023-12-20 NOTE — TELEPHONE ENCOUNTER
Patient Contacted   Appointment type: ADAL  Provider: CAMERON MON  Return date: 6/4/24  Specialty phone number: [phone]  Additional appointment(s) needed: LAB  Additonal Notes: RESCHEDULED.

## 2023-12-20 NOTE — TELEPHONE ENCOUNTER
ISSUE:   Tacrolimus IR level 6.4 on 12/19, goal 8-10, dose 8 mg BID.    PLAN:   Call Patientand confirm this was an accurate 12-hour trough.   Verify Tacrolimus IR dose 8 mg BID.   Confirm no new medications or illness.   Confirm no missed doses.   If accurate trough and accurate dose, increase Tacrolimus IR dose to 9 mg BID     Is this more than a 50% increase or decrease in current IS dose: No  If YES, justification: N/A    Repeat labs in 1 days.  *If > 50% change in immunosuppression dose, repeat labs in 1 week.     OUTCOME:   Spoke with Patient, they confirm accurate trough level and current dose 8 mg BID.   Patientconfirmed dose change to 9 mg BID.  Patientagreed to repeat labs in 1 days.   Orders sent to preferred pharmacy for dose change and lab for repeat labs.   Patientvoiced understanding of plan.

## 2023-12-20 NOTE — TELEPHONE ENCOUNTER
Post Kidney and Pancreas Transplant Team Conference  Date: 12/20/2023  Transplant Coordinator: Opal Rashid     Attendees:  []  Dr. Simons [] Randee Barillas, GALLITO [] Lauren Muniz LPN     []  Dr. Lynn [] Stefani Manrique, RN [] Kimberly Ayala LPN    [] Dr. Whyte [] Meka Rashid RN    [] Dr. Webber [] Nilda Duran, GALLITO [] Netta Walter RN   [] Dr. Caceres [] Milla Mc, RN    [] Dr. Vuong [] Ankita Quintero RN    []  Dr. Cardoza [] Linda Quintero RN    [] Dr. Jimenez [] Britton Miranda RN    [] Vesna Yuan NP [] My Singh RN    [] Kristi Hyde NP [] Latonya Avila RN        Verbal Plan Read Back:   OK for half normal saline with 75 bicarb IV fluids      Routed to RN Coordinator   Lauren Muniz LPN

## 2023-12-21 ENCOUNTER — OFFICE VISIT (OUTPATIENT)
Dept: TRANSPLANT | Facility: CLINIC | Age: 28
End: 2023-12-21
Attending: NURSE PRACTITIONER
Payer: MEDICARE

## 2023-12-21 ENCOUNTER — TELEPHONE (OUTPATIENT)
Dept: TRANSPLANT | Facility: CLINIC | Age: 28
End: 2023-12-21

## 2023-12-21 ENCOUNTER — LAB (OUTPATIENT)
Dept: LAB | Facility: CLINIC | Age: 28
End: 2023-12-21
Payer: MEDICARE

## 2023-12-21 VITALS
HEART RATE: 77 BPM | SYSTOLIC BLOOD PRESSURE: 116 MMHG | OXYGEN SATURATION: 92 % | TEMPERATURE: 97.7 F | DIASTOLIC BLOOD PRESSURE: 74 MMHG

## 2023-12-21 DIAGNOSIS — Z48.298 AFTERCARE FOLLOWING ORGAN TRANSPLANT: ICD-10-CM

## 2023-12-21 DIAGNOSIS — Z94.0 KIDNEY REPLACED BY TRANSPLANT: ICD-10-CM

## 2023-12-21 DIAGNOSIS — Z98.890 OTHER SPECIFIED POSTPROCEDURAL STATES: ICD-10-CM

## 2023-12-21 DIAGNOSIS — Z20.828 CONTACT WITH AND (SUSPECTED) EXPOSURE TO OTHER VIRAL COMMUNICABLE DISEASES: ICD-10-CM

## 2023-12-21 DIAGNOSIS — Z79.899 ENCOUNTER FOR LONG-TERM CURRENT USE OF MEDICATION: ICD-10-CM

## 2023-12-21 DIAGNOSIS — I10 BENIGN ESSENTIAL HYPERTENSION: ICD-10-CM

## 2023-12-21 LAB
ANION GAP SERPL CALCULATED.3IONS-SCNC: 10 MMOL/L (ref 7–15)
BUN SERPL-MCNC: 20.4 MG/DL (ref 6–20)
CALCIUM SERPL-MCNC: 10.4 MG/DL (ref 8.6–10)
CHLORIDE SERPL-SCNC: 105 MMOL/L (ref 98–107)
CREAT SERPL-MCNC: 0.83 MG/DL (ref 0.67–1.17)
DEPRECATED HCO3 PLAS-SCNC: 19 MMOL/L (ref 22–29)
EGFRCR SERPLBLD CKD-EPI 2021: >90 ML/MIN/1.73M2
ERYTHROCYTE [DISTWIDTH] IN BLOOD BY AUTOMATED COUNT: 18.6 % (ref 10–15)
GLUCOSE SERPL-MCNC: 152 MG/DL (ref 70–99)
HCT VFR BLD AUTO: 38.5 % (ref 40–53)
HGB BLD-MCNC: 12.7 G/DL (ref 13.3–17.7)
MAGNESIUM SERPL-MCNC: 1.5 MG/DL (ref 1.7–2.3)
MCH RBC QN AUTO: 28.8 PG (ref 26.5–33)
MCHC RBC AUTO-ENTMCNC: 33 G/DL (ref 31.5–36.5)
MCV RBC AUTO: 87 FL (ref 78–100)
PHOSPHATE SERPL-MCNC: 1.4 MG/DL (ref 2.5–4.5)
PLATELET # BLD AUTO: 244 10E3/UL (ref 150–450)
POTASSIUM SERPL-SCNC: 4.5 MMOL/L (ref 3.4–5.3)
RBC # BLD AUTO: 4.41 10E6/UL (ref 4.4–5.9)
SODIUM SERPL-SCNC: 134 MMOL/L (ref 135–145)
TACROLIMUS BLD-MCNC: 18.2 UG/L (ref 5–15)
TME LAST DOSE: ABNORMAL H
TME LAST DOSE: ABNORMAL H
WBC # BLD AUTO: 12.5 10E3/UL (ref 4–11)

## 2023-12-21 PROCEDURE — 99213 OFFICE O/P EST LOW 20 MIN: CPT | Mod: 24 | Performed by: NURSE PRACTITIONER

## 2023-12-21 PROCEDURE — 99000 SPECIMEN HANDLING OFFICE-LAB: CPT | Performed by: PATHOLOGY

## 2023-12-21 PROCEDURE — 82088 ASSAY OF ALDOSTERONE: CPT | Performed by: INTERNAL MEDICINE

## 2023-12-21 PROCEDURE — 36415 COLL VENOUS BLD VENIPUNCTURE: CPT | Performed by: PATHOLOGY

## 2023-12-21 PROCEDURE — 83735 ASSAY OF MAGNESIUM: CPT | Performed by: PATHOLOGY

## 2023-12-21 PROCEDURE — 80048 BASIC METABOLIC PNL TOTAL CA: CPT | Performed by: PATHOLOGY

## 2023-12-21 PROCEDURE — G0463 HOSPITAL OUTPT CLINIC VISIT: HCPCS | Performed by: NURSE PRACTITIONER

## 2023-12-21 PROCEDURE — 84100 ASSAY OF PHOSPHORUS: CPT | Performed by: PATHOLOGY

## 2023-12-21 PROCEDURE — 80197 ASSAY OF TACROLIMUS: CPT | Performed by: INTERNAL MEDICINE

## 2023-12-21 PROCEDURE — 85027 COMPLETE CBC AUTOMATED: CPT | Performed by: PATHOLOGY

## 2023-12-21 PROCEDURE — 84244 ASSAY OF RENIN: CPT | Mod: 90 | Performed by: PATHOLOGY

## 2023-12-21 RX ORDER — TACROLIMUS 1 MG/1
9 CAPSULE ORAL 2 TIMES DAILY
Qty: 540 CAPSULE | Refills: 11 | Status: SHIPPED | OUTPATIENT
Start: 2023-12-21 | End: 2024-01-26

## 2023-12-21 NOTE — TELEPHONE ENCOUNTER
Provider Call: General  Route to LPN    Reason for call: Home Health- uable to get his labs this AM-  Stitches out a few days ago  wants to review site and pt having more pain at the incision     Call back needed? Yes    Return Call Needed  Same as documented in contacts section  When to return call?: Same day: Route High Priority

## 2023-12-21 NOTE — NURSING NOTE
"Chief Complaint   Patient presents with    Follow Up     Incision drainage     Vital signs:  Temp: 97.7  F (36.5  C) Temp src: Oral BP: 116/74 Pulse: 77     SpO2: 92 %          Estimated body mass index is 31.93 kg/m  as calculated from the following:    Height as of 7/27/23: 1.778 m (5' 10\").    Weight as of 12/18/23: 100.9 kg (222 lb 8 oz).      Nick Gudino RN on 12/21/2023 at 11:39 AM    "

## 2023-12-21 NOTE — LETTER
2023         RE: Taylor Valiente  [address]   [place] [address]        Dear Colleague,    Thank you for referring your patient, Taylor Valiente, to the [facility]. Please see a copy of my visit note below.    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 He was on HD via RIJ with tunneled line due to recent failed RUE fistula and was anuric. Patient is now s/p  donor kidney transplant with ureteral stent with Dr. Jimenez on 23.         Patient here for wound eval. 5 staples were removed 3 days ago and steris were placed. Now wound more moist and steris are falling off.  No fever or chills. No new pains.       Transplant History:    Transplant Type:  DDKT  Donor Type: Donation after Brain Death   Transplant Date:  2023 (Kidney)   Ureteral Stent:  Yes   Crossmatch:  negative   DSA at Tx:  No  Baseline Cr: TBD   DeNovo DSA: No    Acute Rejection Hx:  No    Present Maintenance Immunosuppression:  Tacrolimus and Mycophenolate mofetil    CMV IgG Ab Discordance:  No  EBV IgG Ab Discordance:  No    BK Viremia:  No  EBV Viremia:  No    Transplant Coordinator: Opal Rashid     Transplant Office Phone Number: [phone]     Immunosuppressant Medications       Immunosuppressive Agents Disp Start End     mycophenolate (GENERIC EQUIVALENT) 250 MG capsule 180 capsule 2023 --    Sig - Route: Take 3 capsules (750 mg) by mouth 2 times daily - Oral    Class: E-Prescribe    Renewals       Renewal requests to authorizing provider (Estefany Garcia, ROSA WANG) <b>prohibited</b>             tacrolimus (GENERIC) 0.5 MG capsule -- 2023 --    Sig: Profile Rx: patient will contact  pharmacy when needed    Class: No Print Out    Notes to Pharmacy: TXP DT 12/5/2023 (Kidney) TXP Dischg DT 12/8/2023 DX Kidney replaced by transplant Z94.0 TX [facility] ([place], [place])     tacrolimus (GENERIC) 1 MG capsule 540 capsule 12/21/2023 --    Sig - Route: Take 9 capsules (9 mg) by mouth 2 times daily Total dose 8.5mg twice daily Profile Rx: patient will contact pharmacy when needed - Oral    Class: E-Prescribe    Notes to Pharmacy: TXP DT 12/5/2023 (Kidney) TXP Dischg DT 12/8/2023 DX Kidney replaced by transplant Z94.0 TX [facility] ([place], [place])            Possible Immunosuppression-related side effects:   []             headache  []             vivid dreams  []             irritability  []             cognitive difficuties  []             fine tremor  []             nausea  []             diarrhea  []             neuropathy      []             edema  []             renal calcineurin toxicity  []             hyperkalemia  []             post-transplant diabetes  []             decreased appetite  []             increased appetite  []             other:  []             none    Prescription Medications as of 12/21/2023         Rx Number Disp Refills Start End Last Dispensed Date Next Fill Date Owning Pharmacy    acetaminophen (TYLENOL) 325 MG tablet  100 tablet 0 12/8/2023 --   [facility] #[id number] [address] AT [place] [address]    Sig: Take 2 tablets (650 mg) by mouth every 4 hours as needed for pain    Class: E-Prescribe    Route: Oral    amLODIPine (NORVASC) 10 MG tablet  -- --  --       Sig: Take 10 mg by mouth daily     Class: Historical    Route: Oral    aspirin 81 MG EC tablet  -- --  --       Sig: Take 81 mg by mouth daily    Class: Historical    Route: Oral    atorvastatin (LIPITOR) 10 MG tablet  90 tablet 0 12/10/2023 --   [facility] #[id number] - [facility]  [facility] [address] [place] [address]    Sig: Take 1 tablet (10 mg) by mouth daily    Class: E-Prescribe    Route: Oral    atorvastatin (LIPITOR) 10 MG tablet  90 tablet 1 6/1/2022 --   [place], [place] - [address]    Sig: TAKE ONE TABLET BY MOUTH EVERY NIGHT AT BEDTIME    Class: E-Prescribe    carvedilol (COREG) 25 MG tablet  180 tablet 3 8/4/2023 --   [id number] [address] [place] [address]    Sig: TAKE TWO TABLETS BY MOUTH TWICE A DAY WITH A MEAL Strength: 25 mg    Class: E-Prescribe    Earliest Fill Date: 8/4/2023    hydrALAZINE (APRESOLINE) 100 MG tablet  -- -- 12/8/2023 --       Sig: Take 0.5 tablets (50 mg) by mouth 2 times daily    Class: No Print Out    Route: Oral    Lidocaine (LIDOCARE) 4 % Patch  5 patch 0 12/8/2023 --   [id number] [address] [address] [address]    Sig: Place 1 patch onto the skin every 24 hours To prevent lidocaine toxicity, patient should be patch free for 12 hrs daily.    Class: E-Prescribe    Route: Transdermal    magnesium oxide (MAG-OX) 400 MG tablet  60 tablet 3 12/14/2023 --   [facility] #[id number] [facility] [address] [place] [address]    Sig: Take 1 tablet (400 mg) by mouth 2 times daily    Class: E-Prescribe    Route: Oral    medical cannabis (Patient's own supply)  -- -- 12/8/2023 --       Sig: See Admin Instructions (The purpose of this order is to document that the patient reports taking medical cannabis.  This is not a prescription, and is not used to certify that the patient has a qualifying medical condition.)    Patient was advised on the cannabis-tacrolimus drug interaction.    Class: Historical    methocarbamol (ROBAXIN) 500 MG tablet  10 tablet 0 12/8/2023 --   [facility] #[id number] [facility] [address] [address] [address]     Sig: Take 1 tablet (500 mg) by mouth 4 times daily as needed for muscle spasms    Class: E-Prescribe    Route: Oral    Renewals       Renewal requests to authorizing provider (Estefany Garcia APRN CNP) <b>prohibited</b>            Multiple Vitamin (MULTIVITAMIN ADULT PO)  -- --  --       Sig: Take 1 tablet by mouth daily    Class: Historical    Route: Oral    mycophenolate (GENERIC EQUIVALENT) 250 MG capsule  180 capsule 11 12/8/2023 --   [facility] [place], [place] - [address] [facility]    Sig: Take 3 capsules (750 mg) by mouth 2 times daily    Class: E-Prescribe    Route: Oral    Renewals       Renewal requests to authorizing provider (Estefany Garcia APRN CNP) <b>prohibited</b>            nitroGLYcerin (NITROSTAT) 0.3 MG sublingual tablet  5 tablet 0 10/29/2022 --   [facility] [place], [place] - [address]    Sig: For chest pain place 1 tablet under the tongue every 5 minutes for 3 doses. If symptoms persist 5 minutes after 1st dose call 911.    Class: E-Prescribe    Notes to Pharmacy: Ok to adjust to supplied dose    phosphorus tablet 250 mg 250 MG per tablet  120 tablet 0 12/14/2023 --   [facility]ORE #[id number] [place], [place] - [address] AT [facility] [address]    Sig: Take 2 tablets (500 mg) by mouth 2 times daily    Class: E-Prescribe    Route: Oral    polyethylene glycol (MIRALAX) 17 GM/Dose powder  510 g 0 12/8/2023 --   [id number] [address] AT [facility] [address]    Sig: Take 17 g by mouth daily as needed for constipation    Class: E-Prescribe    Route: Oral    predniSONE (DELTASONE) 10 MG tablet  49 tablet 0 12/9/2023 1/9/2024   [id number] [place], [place] - [address] AT [facility] [address]    Sig: Take 6 tablets (60 mg) by mouth daily for 1 day, THEN 4 tablets (40 mg) daily for 2 days, THEN 2 tablets (20 mg) daily for 7 days,  THEN 1.5 tablets (15 mg) daily for 7 days, THEN 1 tablet (10 mg) daily for 7 days, THEN 0.5 tablets (5 mg) daily for 7 days.    Class: E-Prescribe    Notes to Pharmacy: Estefany Garcia [phone]    Route: Oral    sennosides (SENOKOT) 8.6 MG tablet  60 tablet 0 12/8/2023 --   [facility] #[id number] [place] [address] [address] [place] [address]    Sig: Take 2 tablets by mouth 2 times daily Hold for loose stools    Class: E-Prescribe    Route: Oral    sodium bicarbonate 650 MG tablet  270 tablet 1 12/14/2023 --   [facility] [address] [place] [address] [address] [place] [address]    Sig: Take 3 tablets (1,950 mg) by mouth 3 times daily    Class: E-Prescribe    Route: Oral    sulfamethoxazole-trimethoprim (BACTRIM) 400-80 MG tablet  30 tablet 11 12/9/2023 --   [facility] #[id number] [place] [address] [address] [place] [address]    Sig: Take 1 tablet by mouth daily    Class: E-Prescribe    Route: Oral    Renewals       Renewal requests to authorizing provider (Estefany Garcia, ROSA WANG) <b>prohibited</b>            tacrolimus (GENERIC) 0.5 MG capsule  -- -- 12/12/2023 --       Sig: Profile Rx: patient will contact pharmacy when needed    Class: No Print Out    Notes to Pharmacy: TXP DT 12/5/2023 (Kidney) TXP Dischg DT 12/8/2023 DX Kidney replaced by transplant Z94.0 TX Center [facility] ([place], [place])    tacrolimus (GENERIC) 1 MG capsule  540 capsule 11 12/21/2023 --   [facility] #[id number] [place] [address] [address] [place] [address]    Sig: Take 9 capsules (9 mg) by mouth 2 times daily Total dose 8.5mg twice daily Profile Rx: patient will contact pharmacy when needed    Class: E-Prescribe    Notes to Pharmacy: TXP DT 12/5/2023 (Kidney) TXP Dischg DT 12/8/2023 DX Kidney replaced by transplant Z94.0 TX Center [facility],  "[facility] ([place], [place])    Route: Oral    valGANciclovir (VALCYTE) 450 MG tablet  8 tablet 0 12/10/2023 12/15/2023   [place], [place] - [address] [place] [address] [phone]    Sig: Take 2 tablets (900 mg) by mouth daily for 4 days    Class: E-Prescribe    Route: Oral            O:   Temp:  [97.7  F (36.5  C)] 97.7  F (36.5  C)  Pulse:  [77] 77  BP: (116)/(74) 116/74  SpO2:  [92 %] 92 %  General Appearance: in no apparent distress.   Skin: Normal, no rashes or jaundice  Abdomen: rounded, The wound is proximal incision with 1.5\" opening with moist yellow drainage below steris.steris removed and wound cleaned. Gauze placed into wound and covered.  Scant slough on edges. No odor or surrounding erythema or induration.  , without hernia. The abdomen is non-tender. The kidney graft is not tender.  There is no ascites.  Extremities: Tremor absent.   Edema: absent.         Latest Ref Rng & Units 12/21/2023    10:52 AM 12/19/2023     9:55 AM 12/14/2023     8:15 AM 12/13/2023     7:34 AM 12/11/2023     7:50 AM   Transplant Immunosuppression Labs   Creat 0.67 - 1.17 mg/dL 0.83  0.94  0.95  1.02  1.09    Urea Nitrogen 6.0 - 20.0 mg/dL 20.4  20.9  22.7  20.9  27.3    WBC 4.0 - 11.0 10e3/uL 12.5   14.7  15.3  15.8    Neutrophil %     74        Chemistries:   Recent Labs   Lab Test 12/21/23  1052   BUN 20.4*   CR 0.83   GFRESTIMATED >90   *     Lab Results   Component Value Date    A1C 5.9 12/05/2023    A1C 5.0 03/04/2021     Recent Labs   Lab Test 12/05/23  0007   ALBUMIN 4.5   BILITOTAL 0.5   ALKPHOS 138   AST 13   ALT 20     Urine Studies:  Recent Labs   Lab Test 12/11/23  0943   COLOR Light Yellow   APPEARANCE Clear   URINEGLC 200*   URINEBILI Negative   URINEKETONE Negative   SG 1.017   UBLD Moderate*   URINEPH 5.5   PROTEIN Negative   NITRITE Negative   LEUKEST Negative   RBCU >182*   WBCU 3     Recent Labs   Lab Test 07/03/19  1529 07/17/17  0930   UTPG 2.71* 1.94*     Hematology: "   Recent Labs   Lab Test 12/21/23  1052 12/14/23  0815 12/13/23  0734   HGB 12.7* 12.2* 12.4*    324 296   WBC 12.5* 14.7* 15.3*     Coags:   Recent Labs   Lab Test 12/05/23  0007 03/15/23  0900   INR 1.07 1.40*     HLA antibodies:   SA1 Hi Risk Shauna   Date Value Ref Range Status   02/09/2021 None  Final     SA1 HI RISK SHAUNA   Date Value Ref Range Status   12/03/2023 None  Final     SA1 Mod Risk Shauna   Date Value Ref Range Status   02/09/2021 B:57 58  Final     SA1 MOD RISK SHAUNA   Date Value Ref Range Status   12/03/2023 B:57 58  Final     SA2 Hi Risk Shauna   Date Value Ref Range Status   02/09/2021 DQA:05  Final     SA2 HI RISK SHAUNA   Date Value Ref Range Status   12/03/2023 None  Final     SA2 Mod Risk Shauna   Date Value Ref Range Status   02/09/2021 DQ:7  Final     SA2 MOD RISK SHAUNA   Date Value Ref Range Status   12/03/2023 DQ:7 9DQA:05  Final       Assessment: Taylor Valiente is doing fairly well s/p DDKT:  Issues we addressed during his visit include:    Plan:    1. Graft function: Cr stable   2. Immunosuppression Management: No change    .  Complexity of management:Medium.  Contributing factors:  incisional wound   3. : Steris removed and shallow opening on incision packed with gauze and covered. Instructed to change daily after showering. Report back increased redness, odor or drainage.   Followup: in 1-2 weeks.     Total Time: 20 min,   Counselling Time: 10 min.    ROSA Tracy        Again, thank you for allowing me to participate in the care of your patient.        Sincerely,        ROSA Tracy CNP

## 2023-12-21 NOTE — PROGRESS NOTES
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 He was on HD via RIJ with tunneled line due to recent failed RUE fistula and was anuric. Patient is now s/p  donor kidney transplant with ureteral stent with Dr. Jimenez on 23.         Patient here for wound eval. 5 staples were removed 3 days ago and steris were placed. Now wound more moist and steris are falling off.  No fever or chills. No new pains.       Transplant History:    Transplant Type:  DDKT  Donor Type: Donation after Brain Death   Transplant Date:  2023 (Kidney)   Ureteral Stent:  Yes   Crossmatch:  negative   DSA at Tx:  No  Baseline Cr: TBD   DeNovo DSA: No    Acute Rejection Hx:  No    Present Maintenance Immunosuppression:  Tacrolimus and Mycophenolate mofetil    CMV IgG Ab Discordance:  No  EBV IgG Ab Discordance:  No    BK Viremia:  No  EBV Viremia:  No    Transplant Coordinator: Opal Rashid     Transplant Office Phone Number: [phone]     Immunosuppressant Medications       Immunosuppressive Agents Disp Start End     mycophenolate (GENERIC EQUIVALENT) 250 MG capsule 180 capsule 2023 --    Sig - Route: Take 3 capsules (750 mg) by mouth 2 times daily - Oral    Class: E-Prescribe    Renewals       Renewal requests to authorizing provider (Estefany Garcia, APRN CNP) <b>prohibited</b>             tacrolimus (GENERIC) 0.5 MG capsule -- 2023 --    Sig: Profile Rx: patient will contact pharmacy when needed    Class: No Print Out    Notes to Pharmacy: TXP DT 2023 (Kidney) TXP Dischg DT 2023 DX Kidney replaced by transplant Z94.0 [facility] ([place], [place])     tacrolimus (GENERIC) 1 MG capsule  540 capsule 12/21/2023 --    Sig - Route: Take 9 capsules (9 mg) by mouth 2 times daily Total dose 8.5mg twice daily Profile Rx: patient will contact pharmacy when needed - Oral    Class: E-Prescribe    Notes to Pharmacy: TXP DT 12/5/2023 (Kidney) TXP Dischg DT 12/8/2023 DX Kidney replaced by transplant Z94.0 TX [facility] ([place], [place])            Possible Immunosuppression-related side effects:   []             headache  []             vivid dreams  []             irritability  []             cognitive difficuties  []             fine tremor  []             nausea  []             diarrhea  []             neuropathy      []             edema  []             renal calcineurin toxicity  []             hyperkalemia  []             post-transplant diabetes  []             decreased appetite  []             increased appetite  []             other:  []             none    Prescription Medications as of 12/21/2023         Rx Number Disp Refills Start End Last Dispensed Date Next Fill Date Owning Pharmacy    acetaminophen (TYLENOL) 325 MG tablet  100 tablet 0 12/8/2023 --   [facility] #[id number] [facility] [address] [place] [address]    Sig: Take 2 tablets (650 mg) by mouth every 4 hours as needed for pain    Class: E-Prescribe    Route: Oral    amLODIPine (NORVASC) 10 MG tablet  -- --  --       Sig: Take 10 mg by mouth daily     Class: Historical    Route: Oral    aspirin 81 MG EC tablet  -- --  --       Sig: Take 81 mg by mouth daily    Class: Historical    Route: Oral    atorvastatin (LIPITOR) 10 MG tablet  90 tablet 0 12/10/2023 --   [facility] #[id number] [facility] [address] [place] [address]    Sig: Take 1 tablet (10 mg) by mouth daily    Class: E-Prescribe    Route: Oral    atorvastatin (LIPITOR) 10 MG tablet  90 tablet 1 6/1/2022 --   [facility] [place], [place] - [address]    Sig: TAKE ONE TABLET BY MOUTH EVERY NIGHT AT BEDTIME    Class: E-Prescribe    carvedilol (COREG) 25 MG tablet  180 tablet 3 8/4/2023 --   [facility] #[id number] [facility], [place] - [address] AT [place] [address]    Sig: TAKE TWO TABLETS BY MOUTH TWICE A DAY WITH A MEAL Strength: 25 mg    Class: E-Prescribe    Earliest Fill Date: 8/4/2023    hydrALAZINE (APRESOLINE) 100 MG tablet  -- -- 12/8/2023 --       Sig: Take 0.5 tablets (50 mg) by mouth 2 times daily    Class: No Print Out    Route: Oral    Lidocaine (LIDOCARE) 4 % Patch  5 patch 0 12/8/2023 --   [facility] #[id number] [facility], [place] - [address] AT [place] [address]    Sig: Place 1 patch onto the skin every 24 hours To prevent lidocaine toxicity, patient should be patch free for 12 hrs daily.    Class: E-Prescribe    Route: Transdermal    magnesium oxide (MAG-OX) 400 MG tablet  60 tablet 3 12/14/2023 --   [facility] #[id number] [facility], [place] - [address] AT [place] [address]    Sig: Take 1 tablet (400 mg) by mouth 2 times daily    Class: E-Prescribe    Route: Oral    medical cannabis (Patient's own supply)  -- -- 12/8/2023 --       Sig: See Admin Instructions (The purpose of this order is to document that the patient reports taking medical cannabis.  This is not a prescription, and is not used to certify that the patient has a qualifying medical condition.)    Patient was advised on the cannabis-tacrolimus drug interaction.    Class: Historical    methocarbamol (ROBAXIN) 500 MG tablet  10 tablet 0 12/8/2023 --   [facility] #[id number] [facility], [place] - [address] AT [place] [address]    Sig: Take 1 tablet (500 mg) by mouth 4 times daily as needed for muscle spasms    Class: E-Prescribe    Route: Oral    Renewals       Renewal requests to authorizing provider (Estefany Garcia, ROSA WANG) <b>prohibited</b>            Multiple Vitamin  (MULTIVITAMIN ADULT PO)  -- --  --       Sig: Take 1 tablet by mouth daily    Class: Historical    Route: Oral    mycophenolate (GENERIC EQUIVALENT) 250 MG capsule  180 capsule 11 12/8/2023 --   [facility] [place], [place] - [address] [facility] [address]    Sig: Take 3 capsules (750 mg) by mouth 2 times daily    Class: E-Prescribe    Route: Oral    Renewals       Renewal requests to authorizing provider (Estefany Garcia, APRN CNP) <b>prohibited</b>            nitroGLYcerin (NITROSTAT) 0.3 MG sublingual tablet  5 tablet 0 10/29/2022 --   [place], [place] - [address]    Sig: For chest pain place 1 tablet under the tongue every 5 minutes for 3 doses. If symptoms persist 5 minutes after 1st dose call 911.    Class: E-Prescribe    Notes to Pharmacy: Ok to adjust to supplied dose    phosphorus tablet 250 mg 250 MG per tablet  120 tablet 0 12/14/2023 --   [facility] #[id number] [facility] [address] [facility] [address]    Sig: Take 2 tablets (500 mg) by mouth 2 times daily    Class: E-Prescribe    Route: Oral    polyethylene glycol (MIRALAX) 17 GM/Dose powder  510 g 0 12/8/2023 --   [facility] #[id number] [place] [address] [address] AT [facility] [address]    Sig: Take 17 g by mouth daily as needed for constipation    Class: E-Prescribe    Route: Oral    predniSONE (DELTASONE) 10 MG tablet  49 tablet 0 12/9/2023 1/9/2024   [facility] #[id number] [place] [address] [address] AT [facility] [address]    Sig: Take 6 tablets (60 mg) by mouth daily for 1 day, THEN 4 tablets (40 mg) daily for 2 days, THEN 2 tablets (20 mg) daily for 7 days, THEN 1.5 tablets (15 mg) daily for 7 days, THEN 1 tablet (10 mg) daily for 7 days, THEN 0.5 tablets (5 mg) daily for 7 days.    Class: E-Prescribe    Notes to Pharmacy: Estefany Garcia -[phone]    Route: Oral    sennosides (SENOKOT) 8.6 MG tablet  60  tablet 0 12/8/2023 --   [id number] [place], [place] - [address] AT [place] [address]    Sig: Take 2 tablets by mouth 2 times daily Hold for loose stools    Class: E-Prescribe    Route: Oral    sodium bicarbonate 650 MG tablet  270 tablet 1 12/14/2023 --   [id number] [place], [place] - [address] AT [place] [address]    Sig: Take 3 tablets (1,950 mg) by mouth 3 times daily    Class: E-Prescribe    Route: Oral    sulfamethoxazole-trimethoprim (BACTRIM) 400-80 MG tablet  30 tablet 11 12/9/2023 --   [facility][id number] [place], [place] - [address] AT [place] [address]    Sig: Take 1 tablet by mouth daily    Class: E-Prescribe    Route: Oral    Renewals       Renewal requests to authorizing provider (Estefany Garcia, ROSA CNP) <b>prohibited</b>            tacrolimus (GENERIC) 0.5 MG capsule  -- -- 12/12/2023 --       Sig: Profile Rx: patient will contact pharmacy when needed    Class: No Print Out    Notes to Pharmacy: TXP DT 12/5/2023 (Kidney) TXP Dischg DT 12/8/2023 DX Kidney replaced by transplant Z94.0 [facility] ([place], [place])    tacrolimus (GENERIC) 1 MG capsule  540 capsule 11 12/21/2023 --   [id number] [place], [place] - [address] AT [place] [address]    Sig: Take 9 capsules (9 mg) by mouth 2 times daily Total dose 8.5mg twice daily Profile Rx: patient will contact pharmacy when needed    Class: E-Prescribe    Notes to Pharmacy: TXP DT 12/5/2023 (Kidney) TXP Dischg DT 12/8/2023 DX Kidney replaced by transplant Z94.0 [facility] ([place], [place])    Route: Oral    valGANciclovir (VALCYTE) 450 MG tablet  8 tablet 0 12/10/2023 12/15/2023   [facility] [place], [place] - [address] [place] [address] [phone]    Sig: Take 2 tablets (900 mg) by mouth daily for 4 days  "   Class: E-Prescribe    Route: Oral            O:   Temp:  [97.7  F (36.5  C)] 97.7  F (36.5  C)  Pulse:  [77] 77  BP: (116)/(74) 116/74  SpO2:  [92 %] 92 %  General Appearance: in no apparent distress.   Skin: Normal, no rashes or jaundice  Abdomen: rounded, The wound is proximal incision with 1.5\" opening with moist yellow drainage below steris.steris removed and wound cleaned. Gauze placed into wound and covered.  Scant slough on edges. No odor or surrounding erythema or induration.  , without hernia. The abdomen is non-tender. The kidney graft is not tender.  There is no ascites.  Extremities: Tremor absent.   Edema: absent.         Latest Ref Rng & Units 12/21/2023    10:52 AM 12/19/2023     9:55 AM 12/14/2023     8:15 AM 12/13/2023     7:34 AM 12/11/2023     7:50 AM   Transplant Immunosuppression Labs   Creat 0.67 - 1.17 mg/dL 0.83  0.94  0.95  1.02  1.09    Urea Nitrogen 6.0 - 20.0 mg/dL 20.4  20.9  22.7  20.9  27.3    WBC 4.0 - 11.0 10e3/uL 12.5   14.7  15.3  15.8    Neutrophil %     74        Chemistries:   Recent Labs   Lab Test 12/21/23  1052   BUN 20.4*   CR 0.83   GFRESTIMATED >90   *     Lab Results   Component Value Date    A1C 5.9 12/05/2023    A1C 5.0 03/04/2021     Recent Labs   Lab Test 12/05/23  0007   ALBUMIN 4.5   BILITOTAL 0.5   ALKPHOS 138   AST 13   ALT 20     Urine Studies:  Recent Labs   Lab Test 12/11/23  0943   COLOR Light Yellow   APPEARANCE Clear   URINEGLC 200*   URINEBILI Negative   URINEKETONE Negative   SG 1.017   UBLD Moderate*   URINEPH 5.5   PROTEIN Negative   NITRITE Negative   LEUKEST Negative   RBCU >182*   WBCU 3     Recent Labs   Lab Test 07/03/19  1529 07/17/17  0930   UTPG 2.71* 1.94*     Hematology:   Recent Labs   Lab Test 12/21/23  1052 12/14/23  0815 12/13/23  0734   HGB 12.7* 12.2* 12.4*    324 296   WBC 12.5* 14.7* 15.3*     Coags:   Recent Labs   Lab Test 12/05/23  0007 03/15/23  0900   INR 1.07 1.40*     HLA antibodies:   SA1 Hi Risk Anita   Date " Value Ref Range Status   02/09/2021 None  Final     SA1 HI RISK SHAUNA   Date Value Ref Range Status   12/03/2023 None  Final     SA1 Mod Risk Shauna   Date Value Ref Range Status   02/09/2021 B:57 58  Final     SA1 MOD RISK SHAUNA   Date Value Ref Range Status   12/03/2023 B:57 58  Final     SA2 Hi Risk Shauna   Date Value Ref Range Status   02/09/2021 DQA:05  Final     SA2 HI RISK SHAUNA   Date Value Ref Range Status   12/03/2023 None  Final     SA2 Mod Risk Shauna   Date Value Ref Range Status   02/09/2021 DQ:7  Final     SA2 MOD RISK SHAUNA   Date Value Ref Range Status   12/03/2023 DQ:7 9DQA:05  Final       Assessment: Taylor Valiente is doing fairly well s/p DDKT:  Issues we addressed during his visit include:    Plan:    1. Graft function: Cr stable   2. Immunosuppression Management: No change    .  Complexity of management:Medium.  Contributing factors:  incisional wound   3. : Steris removed and shallow opening on incision packed with gauze and covered. Instructed to change daily after showering. Report back increased redness, odor or drainage.   Followup: in 1-2 weeks.     Total Time: 20 min,   Counselling Time: 10 min.    ROSA Tracy

## 2023-12-21 NOTE — TELEPHONE ENCOUNTER
Spoke with Taylor, plan to follow up in clinic this AM with surgical APPs.     Home care unable to draw labs. Taylor will stop by [facility] lab before [facility]ENRIQUE appointment for labs, already took his medications so will not be able to get drug level. Home care will come out again tomorrow AM to draw tac level.

## 2023-12-22 ENCOUNTER — LAB (OUTPATIENT)
Dept: LAB | Facility: CLINIC | Age: 28
End: 2023-12-22
Payer: MEDICARE

## 2023-12-22 DIAGNOSIS — Z20.828 CONTACT WITH AND (SUSPECTED) EXPOSURE TO OTHER VIRAL COMMUNICABLE DISEASES: ICD-10-CM

## 2023-12-22 DIAGNOSIS — Z94.0 KIDNEY REPLACED BY TRANSPLANT: ICD-10-CM

## 2023-12-22 DIAGNOSIS — Z48.298 AFTERCARE FOLLOWING ORGAN TRANSPLANT: ICD-10-CM

## 2023-12-22 DIAGNOSIS — Z98.890 OTHER SPECIFIED POSTPROCEDURAL STATES: ICD-10-CM

## 2023-12-22 DIAGNOSIS — E78.5 DYSLIPIDEMIA: ICD-10-CM

## 2023-12-22 DIAGNOSIS — Z79.899 ENCOUNTER FOR LONG-TERM CURRENT USE OF MEDICATION: ICD-10-CM

## 2023-12-22 LAB
ALDOST SERPL-MCNC: 41.3 NG/DL (ref 0–31)
CHOLEST SERPL-MCNC: 163 MG/DL
FASTING STATUS PATIENT QL REPORTED: NO
HDLC SERPL-MCNC: 39 MG/DL
LDLC SERPL CALC-MCNC: 96 MG/DL
NONHDLC SERPL-MCNC: 124 MG/DL
TACROLIMUS BLD-MCNC: 9.5 UG/L (ref 5–15)
TME LAST DOSE: NORMAL H
TME LAST DOSE: NORMAL H
TRIGL SERPL-MCNC: 139 MG/DL

## 2023-12-22 PROCEDURE — 99000 SPECIMEN HANDLING OFFICE-LAB: CPT | Performed by: PATHOLOGY

## 2023-12-22 PROCEDURE — 80061 LIPID PANEL: CPT | Performed by: PATHOLOGY

## 2023-12-22 PROCEDURE — 36415 COLL VENOUS BLD VENIPUNCTURE: CPT | Performed by: PATHOLOGY

## 2023-12-22 PROCEDURE — 80197 ASSAY OF TACROLIMUS: CPT | Performed by: INTERNAL MEDICINE

## 2023-12-24 LAB
BACTERIA BLD CULT: NO GROWTH
RENIN PLAS-CCNC: 4.2 NG/ML/HR

## 2023-12-26 ENCOUNTER — LAB (OUTPATIENT)
Dept: LAB | Facility: CLINIC | Age: 28
End: 2023-12-26
Payer: MEDICARE

## 2023-12-26 DIAGNOSIS — Z79.899 ENCOUNTER FOR LONG-TERM CURRENT USE OF MEDICATION: ICD-10-CM

## 2023-12-26 DIAGNOSIS — Z20.828 CONTACT WITH AND (SUSPECTED) EXPOSURE TO OTHER VIRAL COMMUNICABLE DISEASES: ICD-10-CM

## 2023-12-26 DIAGNOSIS — Z48.298 AFTERCARE FOLLOWING ORGAN TRANSPLANT: ICD-10-CM

## 2023-12-26 DIAGNOSIS — Z76.82 KIDNEY TRANSPLANT CANDIDATE: Primary | ICD-10-CM

## 2023-12-26 DIAGNOSIS — Z98.890 OTHER SPECIFIED POSTPROCEDURAL STATES: ICD-10-CM

## 2023-12-26 DIAGNOSIS — Z94.0 KIDNEY REPLACED BY TRANSPLANT: ICD-10-CM

## 2023-12-26 LAB
ANION GAP SERPL CALCULATED.3IONS-SCNC: 11 MMOL/L (ref 7–15)
BUN SERPL-MCNC: 20.4 MG/DL (ref 6–20)
CALCIUM SERPL-MCNC: 10 MG/DL (ref 8.6–10)
CHLORIDE SERPL-SCNC: 108 MMOL/L (ref 98–107)
CREAT SERPL-MCNC: 0.92 MG/DL (ref 0.67–1.17)
DEPRECATED HCO3 PLAS-SCNC: 19 MMOL/L (ref 22–29)
EGFRCR SERPLBLD CKD-EPI 2021: >90 ML/MIN/1.73M2
ERYTHROCYTE [DISTWIDTH] IN BLOOD BY AUTOMATED COUNT: 18.2 % (ref 10–15)
GLUCOSE SERPL-MCNC: 140 MG/DL (ref 70–99)
HCT VFR BLD AUTO: 39 % (ref 40–53)
HGB BLD-MCNC: 12.1 G/DL (ref 13.3–17.7)
MAGNESIUM SERPL-MCNC: 1.2 MG/DL (ref 1.7–2.3)
MCH RBC QN AUTO: 28.4 PG (ref 26.5–33)
MCHC RBC AUTO-ENTMCNC: 31 G/DL (ref 31.5–36.5)
MCV RBC AUTO: 92 FL (ref 78–100)
MYCOPHENOLATE SERPL LC/MS/MS-MCNC: 1.42 MG/L (ref 1–3.5)
MYCOPHENOLATE-G SERPL LC/MS/MS-MCNC: 15.7 MG/L (ref 30–95)
PHOSPHATE SERPL-MCNC: 1.6 MG/DL (ref 2.5–4.5)
PLATELET # BLD AUTO: 263 10E3/UL (ref 150–450)
POTASSIUM SERPL-SCNC: 4.4 MMOL/L (ref 3.4–5.3)
RBC # BLD AUTO: 4.26 10E6/UL (ref 4.4–5.9)
SODIUM SERPL-SCNC: 138 MMOL/L (ref 135–145)
TACROLIMUS BLD-MCNC: 10.2 UG/L (ref 5–15)
TME LAST DOSE: ABNORMAL H
TME LAST DOSE: ABNORMAL H
TME LAST DOSE: NORMAL H
TME LAST DOSE: NORMAL H
WBC # BLD AUTO: 12.3 10E3/UL (ref 4–11)

## 2023-12-26 PROCEDURE — 83735 ASSAY OF MAGNESIUM: CPT

## 2023-12-26 PROCEDURE — 80180 DRUG SCRN QUAN MYCOPHENOLATE: CPT

## 2023-12-26 PROCEDURE — 85027 COMPLETE CBC AUTOMATED: CPT

## 2023-12-26 PROCEDURE — 36415 COLL VENOUS BLD VENIPUNCTURE: CPT

## 2023-12-26 PROCEDURE — 80197 ASSAY OF TACROLIMUS: CPT

## 2023-12-26 PROCEDURE — 80048 BASIC METABOLIC PNL TOTAL CA: CPT

## 2023-12-26 PROCEDURE — 84100 ASSAY OF PHOSPHORUS: CPT

## 2023-12-26 RX ORDER — MAGNESIUM OXIDE 400 MG/1
800 TABLET ORAL 2 TIMES DAILY
Qty: 180 TABLET | Refills: 3 | Status: SHIPPED | OUTPATIENT
Start: 2023-12-26 | End: 2024-02-15

## 2023-12-26 NOTE — TELEPHONE ENCOUNTER
Left message and sent mychart message to patient regarding:  Please double mag ox to 800mg twice per day

## 2023-12-26 NOTE — TELEPHONE ENCOUNTER
Fausto Whyte MD Poucher, Jessica, RN  Please double mag ox to 800mg twice per day     Task     Please call patient review the above instructions

## 2023-12-27 ENCOUNTER — TELEPHONE (OUTPATIENT)
Dept: TRANSPLANT | Facility: CLINIC | Age: 28
End: 2023-12-27
Payer: MEDICARE

## 2023-12-27 ENCOUNTER — VIRTUAL VISIT (OUTPATIENT)
Dept: PHARMACY | Facility: CLINIC | Age: 28
End: 2023-12-27

## 2023-12-27 DIAGNOSIS — I15.1 HTN, KIDNEY TRANSPLANT RELATED: ICD-10-CM

## 2023-12-27 DIAGNOSIS — Z78.9 TAKES DIETARY SUPPLEMENTS: ICD-10-CM

## 2023-12-27 DIAGNOSIS — E78.5 DYSLIPIDEMIA: ICD-10-CM

## 2023-12-27 DIAGNOSIS — Z94.0 KIDNEY REPLACED BY TRANSPLANT: Primary | ICD-10-CM

## 2023-12-27 DIAGNOSIS — Z94.0 HTN, KIDNEY TRANSPLANT RELATED: ICD-10-CM

## 2023-12-27 PROCEDURE — 99207 PR NO CHARGE LOS: CPT | Performed by: PHARMACIST

## 2023-12-27 RX ORDER — MYCOPHENOLIC ACID 180 MG/1
540 TABLET, DELAYED RELEASE ORAL 2 TIMES DAILY
Qty: 180 TABLET | Refills: 3 | Status: SHIPPED | OUTPATIENT
Start: 2023-12-27 | End: 2024-04-21

## 2023-12-27 NOTE — PATIENT INSTRUCTIONS
"Recommendations from today's MTM visit:                                                       Push dairy, eggs, nuts, meats, samira for phosphorus. Nuts for magnesium.   I will talk to transplant team about your stomach pain after taking medications. We can change Mycophenolate Mofetil to a better tolerated formulation called Mycophenolic acid.     Follow-up: 2/15    It was great speaking with you today.  I value your experience and would be very thankful for your time in providing feedback in our clinic survey. In the next few days, you may receive an email or text message from [employer] with a link to a survey related to your  clinical pharmacist.\"     To schedule another MTM appointment, please call the clinic directly or you may call the MTM scheduling line at [phone] or toll-free at [phone].     My Clinical Pharmacist's contact information:                                                      Please feel free to contact me with any questions or concerns you have.      Joaquin Wadsworth, Luis Felipe  MTM Pharmacist    Phone: [phone]     "

## 2023-12-27 NOTE — TELEPHONE ENCOUNTER
ISSUE: pt c/o severe stomach discomfort after AM and PM doses of MMF    PLAN:  Fausto Whyte MD Griffin, Joaquin Lynn, [facility]  Cc: Opal Rashid, RN  Yes ok to switch to MPA    LPN TASK: please call pt to notify of change in medication from mycophenolate to mycophenolic acid 540 mg BID. Pt should be aware of change, as it was discussed with Joaquin.   Sent to [email or web address]

## 2023-12-27 NOTE — PROGRESS NOTES
Medication Therapy Management (MTM) Encounter    ASSESSMENT:                            Medication Adherence/Access: No issues identified    Kidney Transplant:    Patient having daily stomach aches, which he describes as severe. Likely from Mycophenolate Mofetil, will discuss switching to Myfortic with txp team.     Supplements:   Discussed dietary sources of Magnesium and Phosphorus today.     Hypertension   Stable.    Hyperlipidemia   Stable.     PLAN:                            Txp team...  Patient reports severe stomach ache after morning and evening doses, can we switch him to Myfortic?    Pt to...  Push dairy, eggs, nuts, meats, samira for phosphorus. Nuts for magnesium.     Follow-up: 2/15    SUBJECTIVE/OBJECTIVE:                          Taylor Valiente is a 28 year old male called for a transitions of care visit. He was discharged from [facility] on 12/8 for kidney txp.      Reason for visit: initial post txp.    Allergies/ADRs: Reviewed in chart  Past Medical History: Reviewed in chart  Tobacco: He reports that he has never smoked. He has never used smokeless tobacco.  Alcohol: not currently using  THC/CBD: hasn't used for 2 weeks, but was using it while on dialysis for pain, nausea, pain.     Medication Adherence/Access: Patient uses pill box(es) and uses reminders/alarms. Mother helps him.   Patient takes medications 4 time(s) per day. 8, 12, 76, 8  Per patient, misses medication 0 times per week.   The patient fills medications at [place]: NO, fills medications at [facility].    Kidney Transplant:    Tacrolimus 9 mg twice daily  Mycophenolate Mofetil 750 mg twice daily  Prednisone 15 mg every morning tapering  Pt reports stomach ache after taking medications 10-15 minutes bloating pretty severe per patient.   Transplant date: 12/5/23  Vascular Prophylaxis: Aspirin 81mg daily. Denies gastrointestinal bleed sx.   CMV prophylaxis: CrCl >/=60 mL/minute: Valcyte 900mg daily Treat 3 months post tx   PJP prophylaxis:  Bactrim SS daily  Tx Coordinator: Joel Wilson MD: Dr. Whyte, Using Med Card: Yes  Immunizations: annual flu shot 2022; Pneumovax 23:  2021; Prevnar 13: 2022; TDaP:  2018; Shingrix: unknown, HBV: immunity per last titers, COVID: Primary x 3 and Bivalent x 1    Estimated Creatinine Clearance: 143.6 mL/min (based on SCr of 0.92 mg/dL).    Supplements:   Mag Oxide 800mg twice daily ( 2 hours from MMF)  Phosphorus 500mg twice daily. Appetite has been okay per patient.   Sodium Bicarbonate 1950mg 3x daily .  MVI daily  Lab Results   Component Value Date    MAG 1.2 (L) 12/26/2023    MAG 1.5 (L) 12/21/2023    PHOS 1.6 (L) 12/26/2023    PHOS 1.4 (L) 12/21/2023    CO2 19 (L) 12/26/2023    CO2 19 (L) 12/21/2023     Hypertension   Amlodipine 10mg every evening  Carvedilol 50mg twice daily.  Hydralazine 50mg twice daily.   Patient reports no current medication side effects  Patient self monitors blood pressure.  Home BP monitoring 120-highest 145/80 .     BP Readings from Last 3 Encounters:   12/21/23 116/74   12/18/23 119/75   12/15/23 126/78     Pulse Readings from Last 3 Encounters:   12/21/23 77   12/18/23 89   12/15/23 89     Hyperlipidemia   Atorvastatin 10mg daily  Patient reports no significant myalgias or other side effects.   Recent Labs   Lab Test 12/22/23  0706 12/05/23  0007   CHOL 163 139   HDL 39* 35*   LDL 96 66   TRIG 139 190*     Constipation:   Patient has not been taking any laxatives.   Having soft, non formed stools 2-3 times daily.     Today's Vitals: There were no vitals taken for this visit.  ----------------  Post Discharge Medication Reconciliation Status: discharge medications reconciled and changed, per note/orders.    I spent 20 minutes with this patient today. I offer these suggestions for consideration by Dr. Whyte. A copy of the visit note was provided to the patient's provider(s).    A summary of these recommendations was sent via [employer].    Joaquin Wadsworth, PharmD  MTM  Pharmacist    Phone: [phone]     Telemedicine Visit Details  Type of service:  Telephone visit  Start Time: 8:34 AM  End Time: 8:54 AM     Medication Therapy Recommendations  Kidney replaced by transplant    Current Medication: mycophenolate (GENERIC EQUIVALENT) 250 MG capsule   Rationale: Undesirable effect - Adverse medication event - Safety   Recommendation: Change Medication Formulation  - MYFORTIC PO   Status: Contact Provider - Awaiting Response

## 2023-12-27 NOTE — Clinical Note
1. Patient reports severe stomach ache after morning and evening doses, can we switch him to Myfortic?

## 2023-12-27 NOTE — PROGRESS NOTES
Fausto Whyte MD Griffin, Peter Alberto, [facility]  Cc: Opal Rashid RN  Yes ok to switch to MPA          Previous Messages       ----- Message -----  From: Joaquin Wadsworth [facility]  Sent: 12/27/2023   9:05 AM CST  To: Fausto Whyte MD; Opal Rashid RN      1. Patient reports severe stomach ache after morning and evening doses, can we switch him to Myfortic?

## 2023-12-27 NOTE — TELEPHONE ENCOUNTER
Left message and sent [employer] message regarding:   please call pt to notify of change in medication from mycophenolate to mycophenolic acid 540 mg BID

## 2023-12-27 NOTE — TELEPHONE ENCOUNTER
Post Kidney and Pancreas Transplant Team Conference  Date: 12/27/2023  Transplant Coordinator:  Meka Rashid     Attendees:  [x]  Dr. Simons [x] Randee Barillas, RN [x] Lauren Muniz LPN     [x]  Dr. Lynn [x] Stefani Manrique, RN [] Kimberly Ayala LPN    [x] Dr. Whyte [] Meka Rashid RN    [x] Dr. Webber [x] Nilda Duran, RN [x] Netta Walter RN   [] Dr. Caceres [] Milla Mc, RN    [] Dr. Vuong [] Ankita Quintero RN    []  Dr. Cardoza [] Linda Quintero RN    [] Dr. Jimenez [] Britton Miranda RN    [] Vesna Yuan NP [] My Singh RN    [x] Kristi Hyde NP [] Latonya Avila RN        Verbal Plan Read Back:   Continue current treatment plan    Routed to RN Coordinator   Lauren Muniz LPN

## 2023-12-28 ENCOUNTER — LAB REQUISITION (OUTPATIENT)
Dept: LAB | Facility: CLINIC | Age: 28
End: 2023-12-28
Payer: MEDICARE

## 2023-12-28 ENCOUNTER — TELEPHONE (OUTPATIENT)
Dept: TRANSPLANT | Facility: CLINIC | Age: 28
End: 2023-12-28

## 2023-12-28 DIAGNOSIS — Z48.22 ENCOUNTER FOR AFTERCARE FOLLOWING KIDNEY TRANSPLANT: ICD-10-CM

## 2023-12-28 LAB
ANION GAP SERPL CALCULATED.3IONS-SCNC: 10 MMOL/L (ref 7–15)
BASOPHILS # BLD AUTO: 0.1 10E3/UL (ref 0–0.2)
BASOPHILS NFR BLD AUTO: 1 %
BUN SERPL-MCNC: 25 MG/DL (ref 6–20)
CALCIUM SERPL-MCNC: 9.7 MG/DL (ref 8.6–10)
CHLORIDE SERPL-SCNC: 108 MMOL/L (ref 98–107)
CREAT SERPL-MCNC: 0.85 MG/DL (ref 0.67–1.17)
DEPRECATED HCO3 PLAS-SCNC: 18 MMOL/L (ref 22–29)
EGFRCR SERPLBLD CKD-EPI 2021: >90 ML/MIN/1.73M2
EOSINOPHIL # BLD AUTO: 0.2 10E3/UL (ref 0–0.7)
EOSINOPHIL NFR BLD AUTO: 1 %
ERYTHROCYTE [DISTWIDTH] IN BLOOD BY AUTOMATED COUNT: 18.4 % (ref 10–15)
GLUCOSE SERPL-MCNC: 126 MG/DL (ref 70–99)
HCT VFR BLD AUTO: 37.1 % (ref 40–53)
HGB BLD-MCNC: 12.1 G/DL (ref 13.3–17.7)
IMM GRANULOCYTES # BLD: 0.1 10E3/UL
IMM GRANULOCYTES NFR BLD: 1 %
LYMPHOCYTES # BLD AUTO: 2.5 10E3/UL (ref 0.8–5.3)
LYMPHOCYTES NFR BLD AUTO: 23 %
MAGNESIUM SERPL-MCNC: 1.4 MG/DL (ref 1.7–2.3)
MCH RBC QN AUTO: 28.8 PG (ref 26.5–33)
MCHC RBC AUTO-ENTMCNC: 32.6 G/DL (ref 31.5–36.5)
MCV RBC AUTO: 88 FL (ref 78–100)
MONOCYTES # BLD AUTO: 0.6 10E3/UL (ref 0–1.3)
MONOCYTES NFR BLD AUTO: 5 %
NEUTROPHILS # BLD AUTO: 7.5 10E3/UL (ref 1.6–8.3)
NEUTROPHILS NFR BLD AUTO: 69 %
NRBC # BLD AUTO: 0 10E3/UL
NRBC BLD AUTO-RTO: 0 /100
PHOSPHATE SERPL-MCNC: 1.9 MG/DL (ref 2.5–4.5)
PLATELET # BLD AUTO: 292 10E3/UL (ref 150–450)
POTASSIUM SERPL-SCNC: 4.8 MMOL/L (ref 3.4–5.3)
RBC # BLD AUTO: 4.2 10E6/UL (ref 4.4–5.9)
SODIUM SERPL-SCNC: 136 MMOL/L (ref 135–145)
TACROLIMUS BLD-MCNC: 10.3 UG/L (ref 5–15)
TME LAST DOSE: NORMAL H
TME LAST DOSE: NORMAL H
WBC # BLD AUTO: 10.9 10E3/UL (ref 4–11)

## 2023-12-28 PROCEDURE — 80197 ASSAY OF TACROLIMUS: CPT | Mod: ORL | Performed by: SURGERY

## 2023-12-28 PROCEDURE — 84100 ASSAY OF PHOSPHORUS: CPT | Mod: ORL | Performed by: SURGERY

## 2023-12-28 PROCEDURE — 83735 ASSAY OF MAGNESIUM: CPT | Mod: ORL | Performed by: SURGERY

## 2023-12-28 PROCEDURE — 80048 BASIC METABOLIC PNL TOTAL CA: CPT | Mod: ORL | Performed by: SURGERY

## 2023-12-28 PROCEDURE — 85025 COMPLETE CBC W/AUTO DIFF WBC: CPT | Mod: ORL | Performed by: SURGERY

## 2023-12-28 NOTE — TELEPHONE ENCOUNTER
Please call GREY Malagon [facility]# [phone]  Question on wound care;  Wet or dry packing?

## 2023-12-28 NOTE — TELEPHONE ENCOUNTER
Spoke with Vesna, home care RN, ok for daily wet to dry packing of incision. Vesna says incision is healing well. Has a small open area on the bottom portion of the incision and he is covering with dry gauze. Will bring back to clinic next week if opening is larger.

## 2024-01-02 ENCOUNTER — TELEPHONE (OUTPATIENT)
Dept: TRANSPLANT | Facility: CLINIC | Age: 29
End: 2024-01-02
Payer: MEDICARE

## 2024-01-02 DIAGNOSIS — Z94.0 KIDNEY REPLACED BY TRANSPLANT: Primary | ICD-10-CM

## 2024-01-02 NOTE — TELEPHONE ENCOUNTER
Spoke with Taylor, prefers to discharge from home care and start going to labs twice weekly at [facility] in [place]. Will go tomorrow morning.     Incision healing well. Continues to pack open area but area is becoming smaller. Minimal drainage. Denies fevers or pain.

## 2024-01-02 NOTE — TELEPHONE ENCOUNTER
Patient Call: General  Route to LPN    Reason for call: wife called in regards of patient labs. Home Nurse was supposed to show up for patient's labs this morning but did not. Patient is wondering if they should get labs done tommorrow and if so they new orders. Patient would like a call back in morning.     Call back needed? Yes    Return Call Needed  Same as documented in contacts section  When to return call?: Same day: Route High Priority

## 2024-01-02 NOTE — TELEPHONE ENCOUNTER
Pt calling back  talked with Home Care-Nurse called in ill and did not call pt        Pt calling -[facility] was to come today for lab draw so far has not heard from them since Thurs-  will wait till 830 to take his meds

## 2024-01-03 ENCOUNTER — TELEPHONE (OUTPATIENT)
Dept: TRANSPLANT | Facility: CLINIC | Age: 29
End: 2024-01-03

## 2024-01-03 ENCOUNTER — LAB (OUTPATIENT)
Dept: LAB | Facility: CLINIC | Age: 29
End: 2024-01-03
Payer: MEDICARE

## 2024-01-03 DIAGNOSIS — Z94.0 KIDNEY REPLACED BY TRANSPLANT: ICD-10-CM

## 2024-01-03 DIAGNOSIS — Z20.828 CONTACT WITH AND (SUSPECTED) EXPOSURE TO OTHER VIRAL COMMUNICABLE DISEASES: ICD-10-CM

## 2024-01-03 DIAGNOSIS — Z98.890 OTHER SPECIFIED POSTPROCEDURAL STATES: ICD-10-CM

## 2024-01-03 DIAGNOSIS — Z79.899 ENCOUNTER FOR LONG-TERM CURRENT USE OF MEDICATION: ICD-10-CM

## 2024-01-03 DIAGNOSIS — Z48.298 AFTERCARE FOLLOWING ORGAN TRANSPLANT: ICD-10-CM

## 2024-01-03 LAB
ALBUMIN MFR UR ELPH: 20.7 MG/DL
ANION GAP SERPL CALCULATED.3IONS-SCNC: 9 MMOL/L (ref 7–15)
BUN SERPL-MCNC: 16.5 MG/DL (ref 6–20)
CALCIUM SERPL-MCNC: 9.8 MG/DL (ref 8.6–10)
CHLORIDE SERPL-SCNC: 108 MMOL/L (ref 98–107)
CREAT SERPL-MCNC: 0.82 MG/DL (ref 0.67–1.17)
CREAT UR-MCNC: 127 MG/DL
DEPRECATED HCO3 PLAS-SCNC: 22 MMOL/L (ref 22–29)
EGFRCR SERPLBLD CKD-EPI 2021: >90 ML/MIN/1.73M2
ERYTHROCYTE [DISTWIDTH] IN BLOOD BY AUTOMATED COUNT: 18.6 % (ref 10–15)
GLUCOSE SERPL-MCNC: 170 MG/DL (ref 70–99)
HCT VFR BLD AUTO: 39.8 % (ref 40–53)
HGB BLD-MCNC: 12.6 G/DL (ref 13.3–17.7)
MAGNESIUM SERPL-MCNC: 1.3 MG/DL (ref 1.7–2.3)
MCH RBC QN AUTO: 28.9 PG (ref 26.5–33)
MCHC RBC AUTO-ENTMCNC: 31.7 G/DL (ref 31.5–36.5)
MCV RBC AUTO: 91 FL (ref 78–100)
PHOSPHATE SERPL-MCNC: 1.9 MG/DL (ref 2.5–4.5)
PLATELET # BLD AUTO: 231 10E3/UL (ref 150–450)
POTASSIUM SERPL-SCNC: 4.6 MMOL/L (ref 3.4–5.3)
PROT/CREAT 24H UR: 0.16 MG/MG CR (ref 0–0.2)
RBC # BLD AUTO: 4.36 10E6/UL (ref 4.4–5.9)
SODIUM SERPL-SCNC: 139 MMOL/L (ref 135–145)
TACROLIMUS BLD-MCNC: 9.8 UG/L (ref 5–15)
TME LAST DOSE: NORMAL H
TME LAST DOSE: NORMAL H
WBC # BLD AUTO: 7.7 10E3/UL (ref 4–11)

## 2024-01-03 PROCEDURE — 84100 ASSAY OF PHOSPHORUS: CPT

## 2024-01-03 PROCEDURE — 84156 ASSAY OF PROTEIN URINE: CPT

## 2024-01-03 PROCEDURE — 83735 ASSAY OF MAGNESIUM: CPT

## 2024-01-03 PROCEDURE — 36415 COLL VENOUS BLD VENIPUNCTURE: CPT

## 2024-01-03 PROCEDURE — 80197 ASSAY OF TACROLIMUS: CPT

## 2024-01-03 PROCEDURE — 80048 BASIC METABOLIC PNL TOTAL CA: CPT

## 2024-01-03 PROCEDURE — 85027 COMPLETE CBC AUTOMATED: CPT

## 2024-01-03 NOTE — TELEPHONE ENCOUNTER
Post Kidney [facility] Pancreas Transplant Team Conference  Date: 1/3/2024  Transplant Coordinator: Opal Rashid     Attendees:  [x]  Dr. Simons [x] Randee Barillas, RN [x] Lauren Muniz LPN     [x]  Dr. Lynn [x] Stefani Manrique, RN [] Kimberly Ayala LPN    [x] Dr. Whyte [x] Meka Rashid RN    [] Dr. Webber [x] Nilda Duran RN [] Netta Walter RN   [] Dr. Caceres [] Milla Mc, RN    [] Dr. Vuong [] Ankita Quintero RN    []  Dr. Cardoza [] Linda Quintero RN    [] Dr. Jimenez [] Britton Miranda RN    [x] Vesna Yuan NP [] My Singh RN    [] Kristi Hyde NP [] Latonya Avila RN        Verbal Plan Read Back:   Offer baking soda in place of sodium bicarb    Routed to RN Coordinator   Lauren Muniz LPN     Antibiotic Administration Chonodrocutaneous Helical Advancement Flap Text: The defect edges were debeveled with a #15 scalpel blade.  Given the location of the defect and the proximity to free margins a chondrocutaneous helical advancement flap was deemed most appropriate.  Using a sterile surgical marker, the appropriate advancement flap was drawn incorporating the defect and placing the expected incisions within the relaxed skin tension lines where possible.    The area thus outlined was incised deep to adipose tissue with a #15 scalpel blade.  The skin margins were undermined to an appropriate distance in all directions utilizing iris scissors.

## 2024-01-05 ENCOUNTER — TELEPHONE (OUTPATIENT)
Dept: TRANSPLANT | Facility: CLINIC | Age: 29
End: 2024-01-05

## 2024-01-05 ENCOUNTER — LAB (OUTPATIENT)
Dept: LAB | Facility: CLINIC | Age: 29
End: 2024-01-05
Payer: MEDICARE

## 2024-01-05 ENCOUNTER — TELEPHONE (OUTPATIENT)
Dept: CARDIOLOGY | Facility: CLINIC | Age: 29
End: 2024-01-05

## 2024-01-05 DIAGNOSIS — Z98.890 OTHER SPECIFIED POSTPROCEDURAL STATES: ICD-10-CM

## 2024-01-05 DIAGNOSIS — Z79.899 ENCOUNTER FOR LONG-TERM CURRENT USE OF MEDICATION: ICD-10-CM

## 2024-01-05 DIAGNOSIS — Z20.828 CONTACT WITH AND (SUSPECTED) EXPOSURE TO OTHER VIRAL COMMUNICABLE DISEASES: ICD-10-CM

## 2024-01-05 DIAGNOSIS — Z48.298 AFTERCARE FOLLOWING ORGAN TRANSPLANT: ICD-10-CM

## 2024-01-05 DIAGNOSIS — Z94.0 KIDNEY REPLACED BY TRANSPLANT: ICD-10-CM

## 2024-01-05 LAB
ANION GAP SERPL CALCULATED.3IONS-SCNC: 10 MMOL/L (ref 7–15)
BUN SERPL-MCNC: 24.3 MG/DL (ref 6–20)
CALCIUM SERPL-MCNC: 9.9 MG/DL (ref 8.6–10)
CHLORIDE SERPL-SCNC: 109 MMOL/L (ref 98–107)
CREAT SERPL-MCNC: 0.85 MG/DL (ref 0.67–1.17)
DEPRECATED HCO3 PLAS-SCNC: 20 MMOL/L (ref 22–29)
EGFRCR SERPLBLD CKD-EPI 2021: >90 ML/MIN/1.73M2
ERYTHROCYTE [DISTWIDTH] IN BLOOD BY AUTOMATED COUNT: 18.6 % (ref 10–15)
GLUCOSE SERPL-MCNC: 145 MG/DL (ref 70–99)
HCT VFR BLD AUTO: 41.7 % (ref 40–53)
HGB BLD-MCNC: 13.1 G/DL (ref 13.3–17.7)
MCH RBC QN AUTO: 28.7 PG (ref 26.5–33)
MCHC RBC AUTO-ENTMCNC: 31.4 G/DL (ref 31.5–36.5)
MCV RBC AUTO: 91 FL (ref 78–100)
PLATELET # BLD AUTO: 261 10E3/UL (ref 150–450)
POTASSIUM SERPL-SCNC: 4.7 MMOL/L (ref 3.4–5.3)
RBC # BLD AUTO: 4.56 10E6/UL (ref 4.4–5.9)
SODIUM SERPL-SCNC: 139 MMOL/L (ref 135–145)
TACROLIMUS BLD-MCNC: 9.6 UG/L (ref 5–15)
TME LAST DOSE: NORMAL H
TME LAST DOSE: NORMAL H
WBC # BLD AUTO: 9 10E3/UL (ref 4–11)

## 2024-01-05 PROCEDURE — 80197 ASSAY OF TACROLIMUS: CPT

## 2024-01-05 PROCEDURE — 36415 COLL VENOUS BLD VENIPUNCTURE: CPT

## 2024-01-05 PROCEDURE — 80048 BASIC METABOLIC PNL TOTAL CA: CPT

## 2024-01-05 PROCEDURE — 85027 COMPLETE CBC AUTOMATED: CPT

## 2024-01-05 NOTE — TELEPHONE ENCOUNTER
Provider Call: General  Route to LPN    Reason for call: They are discharging pt from home care today    Call back needed? Yes    Return Call Needed  Same as documented in contacts section  When to return call?: Greater than one day: Route standard priority

## 2024-01-08 ENCOUNTER — LAB (OUTPATIENT)
Dept: LAB | Facility: CLINIC | Age: 29
End: 2024-01-08
Payer: MEDICARE

## 2024-01-08 DIAGNOSIS — Z79.899 ENCOUNTER FOR LONG-TERM CURRENT USE OF MEDICATION: ICD-10-CM

## 2024-01-08 DIAGNOSIS — Z94.0 KIDNEY REPLACED BY TRANSPLANT: ICD-10-CM

## 2024-01-08 DIAGNOSIS — Z98.890 OTHER SPECIFIED POSTPROCEDURAL STATES: ICD-10-CM

## 2024-01-08 DIAGNOSIS — Z48.298 AFTERCARE FOLLOWING ORGAN TRANSPLANT: ICD-10-CM

## 2024-01-08 DIAGNOSIS — Z20.828 CONTACT WITH AND (SUSPECTED) EXPOSURE TO OTHER VIRAL COMMUNICABLE DISEASES: ICD-10-CM

## 2024-01-08 LAB
ANION GAP SERPL CALCULATED.3IONS-SCNC: 12 MMOL/L (ref 7–15)
BUN SERPL-MCNC: 23.2 MG/DL (ref 6–20)
CALCIUM SERPL-MCNC: 9.7 MG/DL (ref 8.6–10)
CHLORIDE SERPL-SCNC: 107 MMOL/L (ref 98–107)
CREAT SERPL-MCNC: 0.84 MG/DL (ref 0.67–1.17)
DEPRECATED HCO3 PLAS-SCNC: 17 MMOL/L (ref 22–29)
EGFRCR SERPLBLD CKD-EPI 2021: >90 ML/MIN/1.73M2
ERYTHROCYTE [DISTWIDTH] IN BLOOD BY AUTOMATED COUNT: 18.4 % (ref 10–15)
GLUCOSE SERPL-MCNC: 135 MG/DL (ref 70–99)
HCT VFR BLD AUTO: 42.4 % (ref 40–53)
HGB BLD-MCNC: 13.4 G/DL (ref 13.3–17.7)
MCH RBC QN AUTO: 29.4 PG (ref 26.5–33)
MCHC RBC AUTO-ENTMCNC: 31.6 G/DL (ref 31.5–36.5)
MCV RBC AUTO: 93 FL (ref 78–100)
MYCOPHENOLATE SERPL LC/MS/MS-MCNC: 8.49 MG/L (ref 1–3.5)
MYCOPHENOLATE-G SERPL LC/MS/MS-MCNC: 16.3 MG/L (ref 30–95)
PLATELET # BLD AUTO: 245 10E3/UL (ref 150–450)
POTASSIUM SERPL-SCNC: 4.5 MMOL/L (ref 3.4–5.3)
RBC # BLD AUTO: 4.56 10E6/UL (ref 4.4–5.9)
SODIUM SERPL-SCNC: 136 MMOL/L (ref 135–145)
TACROLIMUS BLD-MCNC: 10.9 UG/L (ref 5–15)
TME LAST DOSE: ABNORMAL H
TME LAST DOSE: ABNORMAL H
TME LAST DOSE: NORMAL H
TME LAST DOSE: NORMAL H
WBC # BLD AUTO: 9.7 10E3/UL (ref 4–11)

## 2024-01-08 PROCEDURE — 80197 ASSAY OF TACROLIMUS: CPT

## 2024-01-08 PROCEDURE — 36415 COLL VENOUS BLD VENIPUNCTURE: CPT

## 2024-01-08 PROCEDURE — 80048 BASIC METABOLIC PNL TOTAL CA: CPT

## 2024-01-08 PROCEDURE — 85027 COMPLETE CBC AUTOMATED: CPT

## 2024-01-08 PROCEDURE — 80180 DRUG SCRN QUAN MYCOPHENOLATE: CPT

## 2024-01-09 ENCOUNTER — TELEPHONE (OUTPATIENT)
Dept: TRANSPLANT | Facility: CLINIC | Age: 29
End: 2024-01-09
Payer: MEDICARE

## 2024-01-09 DIAGNOSIS — E87.8 LOW BICARBONATE: Primary | ICD-10-CM

## 2024-01-09 RX ORDER — SODIUM BICARBONATE
POWDER (GRAM) MISCELLANEOUS
Start: 2024-01-09 | End: 2024-02-15

## 2024-01-09 NOTE — TELEPHONE ENCOUNTER
ISSUE:  Low bicarb     Confirmed with patient he is taking sodium bicarbonate 1950 mg tid. C/o loose stools but no diarrhea, encouraged daily fiber use. Will add in 1/2 tsp of baking soda mixed with a glass of water daily.

## 2024-01-11 ENCOUNTER — LAB (OUTPATIENT)
Dept: LAB | Facility: CLINIC | Age: 29
End: 2024-01-11
Payer: MEDICARE

## 2024-01-11 DIAGNOSIS — Z98.890 OTHER SPECIFIED POSTPROCEDURAL STATES: ICD-10-CM

## 2024-01-11 DIAGNOSIS — Z94.0 KIDNEY REPLACED BY TRANSPLANT: ICD-10-CM

## 2024-01-11 DIAGNOSIS — Z20.828 CONTACT WITH AND (SUSPECTED) EXPOSURE TO OTHER VIRAL COMMUNICABLE DISEASES: ICD-10-CM

## 2024-01-11 DIAGNOSIS — Z48.298 AFTERCARE FOLLOWING ORGAN TRANSPLANT: ICD-10-CM

## 2024-01-11 DIAGNOSIS — Z94.0 KIDNEY REPLACED BY TRANSPLANT: Primary | ICD-10-CM

## 2024-01-11 DIAGNOSIS — Z79.899 ENCOUNTER FOR LONG-TERM CURRENT USE OF MEDICATION: ICD-10-CM

## 2024-01-11 LAB
ANION GAP SERPL CALCULATED.3IONS-SCNC: 12 MMOL/L (ref 7–15)
BK VIRUS DNA IU/ML, INSTRUMENT (6800): 31 IU/ML
BK VIRUS SPECIMEN TYPE: ABNORMAL
BKV DNA SPEC NAA+PROBE-LOG#: 1.5 {LOG_COPIES}/ML
BUN SERPL-MCNC: 23.7 MG/DL (ref 6–20)
CALCIUM SERPL-MCNC: 9.6 MG/DL (ref 8.6–10)
CHLORIDE SERPL-SCNC: 109 MMOL/L (ref 98–107)
CREAT SERPL-MCNC: 0.78 MG/DL (ref 0.67–1.17)
DEPRECATED HCO3 PLAS-SCNC: 17 MMOL/L (ref 22–29)
EGFRCR SERPLBLD CKD-EPI 2021: >90 ML/MIN/1.73M2
ERYTHROCYTE [DISTWIDTH] IN BLOOD BY AUTOMATED COUNT: 18.1 % (ref 10–15)
GLUCOSE SERPL-MCNC: 123 MG/DL (ref 70–99)
HCT VFR BLD AUTO: 41.2 % (ref 40–53)
HGB BLD-MCNC: 13 G/DL (ref 13.3–17.7)
MAGNESIUM SERPL-MCNC: 1.4 MG/DL (ref 1.7–2.3)
MCH RBC QN AUTO: 28.9 PG (ref 26.5–33)
MCHC RBC AUTO-ENTMCNC: 31.6 G/DL (ref 31.5–36.5)
MCV RBC AUTO: 92 FL (ref 78–100)
PHOSPHATE SERPL-MCNC: 2.1 MG/DL (ref 2.5–4.5)
PLATELET # BLD AUTO: 225 10E3/UL (ref 150–450)
POTASSIUM SERPL-SCNC: 4.4 MMOL/L (ref 3.4–5.3)
RBC # BLD AUTO: 4.5 10E6/UL (ref 4.4–5.9)
SODIUM SERPL-SCNC: 138 MMOL/L (ref 135–145)
TACROLIMUS BLD-MCNC: 9.8 UG/L (ref 5–15)
TME LAST DOSE: NORMAL H
TME LAST DOSE: NORMAL H
WBC # BLD AUTO: 8.1 10E3/UL (ref 4–11)

## 2024-01-11 PROCEDURE — 84100 ASSAY OF PHOSPHORUS: CPT

## 2024-01-11 PROCEDURE — 86828 HLA CLASS I&II ANTIBODY QUAL: CPT | Mod: XU

## 2024-01-11 PROCEDURE — 80048 BASIC METABOLIC PNL TOTAL CA: CPT

## 2024-01-11 PROCEDURE — 86832 HLA CLASS I HIGH DEFIN QUAL: CPT

## 2024-01-11 PROCEDURE — 87799 DETECT AGENT NOS DNA QUANT: CPT

## 2024-01-11 PROCEDURE — 36415 COLL VENOUS BLD VENIPUNCTURE: CPT

## 2024-01-11 PROCEDURE — 85027 COMPLETE CBC AUTOMATED: CPT

## 2024-01-11 PROCEDURE — 86833 HLA CLASS II HIGH DEFIN QUAL: CPT

## 2024-01-11 PROCEDURE — 83735 ASSAY OF MAGNESIUM: CPT

## 2024-01-11 PROCEDURE — 80197 ASSAY OF TACROLIMUS: CPT

## 2024-01-11 PROCEDURE — 87516 HEPATITIS B DNA AMP PROBE: CPT

## 2024-01-12 DIAGNOSIS — Z94.0 KIDNEY REPLACED BY TRANSPLANT: Primary | ICD-10-CM

## 2024-01-12 LAB
HBV DNA SERPL QL NAA+PROBE: NORMAL
HCV RNA SERPL QL NAA+PROBE: NORMAL
HIV1+2 RNA SERPL QL NAA+PROBE: NORMAL

## 2024-01-15 ENCOUNTER — MYC REFILL (OUTPATIENT)
Dept: TRANSPLANT | Facility: CLINIC | Age: 29
End: 2024-01-15

## 2024-01-15 ENCOUNTER — LAB (OUTPATIENT)
Dept: LAB | Facility: CLINIC | Age: 29
End: 2024-01-15
Payer: MEDICARE

## 2024-01-15 DIAGNOSIS — Z94.0 KIDNEY REPLACED BY TRANSPLANT: ICD-10-CM

## 2024-01-15 DIAGNOSIS — Z98.890 OTHER SPECIFIED POSTPROCEDURAL STATES: ICD-10-CM

## 2024-01-15 DIAGNOSIS — Z79.899 ENCOUNTER FOR LONG-TERM CURRENT USE OF MEDICATION: ICD-10-CM

## 2024-01-15 DIAGNOSIS — Z48.298 AFTERCARE FOLLOWING ORGAN TRANSPLANT: ICD-10-CM

## 2024-01-15 DIAGNOSIS — Z76.82 KIDNEY TRANSPLANT CANDIDATE: ICD-10-CM

## 2024-01-15 DIAGNOSIS — Z94.0 KIDNEY TRANSPLANT RECIPIENT: Primary | ICD-10-CM

## 2024-01-15 DIAGNOSIS — Z20.828 CONTACT WITH AND (SUSPECTED) EXPOSURE TO OTHER VIRAL COMMUNICABLE DISEASES: ICD-10-CM

## 2024-01-15 LAB
ANION GAP SERPL CALCULATED.3IONS-SCNC: 11 MMOL/L (ref 7–15)
BUN SERPL-MCNC: 25.6 MG/DL (ref 6–20)
CALCIUM SERPL-MCNC: 9.7 MG/DL (ref 8.6–10)
CHLORIDE SERPL-SCNC: 111 MMOL/L (ref 98–107)
CREAT SERPL-MCNC: 0.94 MG/DL (ref 0.67–1.17)
DEPRECATED HCO3 PLAS-SCNC: 18 MMOL/L (ref 22–29)
EGFRCR SERPLBLD CKD-EPI 2021: >90 ML/MIN/1.73M2
ERYTHROCYTE [DISTWIDTH] IN BLOOD BY AUTOMATED COUNT: 17.7 % (ref 10–15)
GLUCOSE SERPL-MCNC: 111 MG/DL (ref 70–99)
HCT VFR BLD AUTO: 42.3 % (ref 40–53)
HGB BLD-MCNC: 13.2 G/DL (ref 13.3–17.7)
IGG SERPL-MCNC: 647 MG/DL (ref 610–1616)
MCH RBC QN AUTO: 28.9 PG (ref 26.5–33)
MCHC RBC AUTO-ENTMCNC: 31.2 G/DL (ref 31.5–36.5)
MCV RBC AUTO: 93 FL (ref 78–100)
PLATELET # BLD AUTO: 198 10E3/UL (ref 150–450)
POTASSIUM SERPL-SCNC: 4.7 MMOL/L (ref 3.4–5.3)
RBC # BLD AUTO: 4.57 10E6/UL (ref 4.4–5.9)
SODIUM SERPL-SCNC: 140 MMOL/L (ref 135–145)
TACROLIMUS BLD-MCNC: 9.5 UG/L (ref 5–15)
TME LAST DOSE: NORMAL H
TME LAST DOSE: NORMAL H
WBC # BLD AUTO: 8 10E3/UL (ref 4–11)

## 2024-01-15 PROCEDURE — 82784 ASSAY IGA/IGD/IGG/IGM EACH: CPT

## 2024-01-15 PROCEDURE — 80197 ASSAY OF TACROLIMUS: CPT

## 2024-01-15 PROCEDURE — 85027 COMPLETE CBC AUTOMATED: CPT

## 2024-01-15 PROCEDURE — 36415 COLL VENOUS BLD VENIPUNCTURE: CPT

## 2024-01-15 PROCEDURE — 80048 BASIC METABOLIC PNL TOTAL CA: CPT

## 2024-01-18 ENCOUNTER — LAB (OUTPATIENT)
Dept: LAB | Facility: CLINIC | Age: 29
End: 2024-01-18
Payer: MEDICARE

## 2024-01-18 DIAGNOSIS — Z48.298 AFTERCARE FOLLOWING ORGAN TRANSPLANT: ICD-10-CM

## 2024-01-18 DIAGNOSIS — Z98.890 OTHER SPECIFIED POSTPROCEDURAL STATES: ICD-10-CM

## 2024-01-18 DIAGNOSIS — Z20.828 CONTACT WITH AND (SUSPECTED) EXPOSURE TO OTHER VIRAL COMMUNICABLE DISEASES: ICD-10-CM

## 2024-01-18 DIAGNOSIS — Z94.0 KIDNEY REPLACED BY TRANSPLANT: ICD-10-CM

## 2024-01-18 DIAGNOSIS — Z79.899 ENCOUNTER FOR LONG-TERM CURRENT USE OF MEDICATION: ICD-10-CM

## 2024-01-18 LAB
ALBUMIN MFR UR ELPH: 34.9 MG/DL
ANION GAP SERPL CALCULATED.3IONS-SCNC: 8 MMOL/L (ref 7–15)
BUN SERPL-MCNC: 18.4 MG/DL (ref 6–20)
CALCIUM SERPL-MCNC: 10 MG/DL (ref 8.6–10)
CHLORIDE SERPL-SCNC: 105 MMOL/L (ref 98–107)
CREAT SERPL-MCNC: 0.86 MG/DL (ref 0.67–1.17)
CREAT UR-MCNC: 107.2 MG/DL
DEPRECATED HCO3 PLAS-SCNC: 21 MMOL/L (ref 22–29)
EGFRCR SERPLBLD CKD-EPI 2021: >90 ML/MIN/1.73M2
ERYTHROCYTE [DISTWIDTH] IN BLOOD BY AUTOMATED COUNT: 17.3 % (ref 10–15)
GLUCOSE SERPL-MCNC: 123 MG/DL (ref 70–99)
HCT VFR BLD AUTO: 43 % (ref 40–53)
HGB BLD-MCNC: 13.5 G/DL (ref 13.3–17.7)
MAGNESIUM SERPL-MCNC: 1.4 MG/DL (ref 1.7–2.3)
MCH RBC QN AUTO: 29 PG (ref 26.5–33)
MCHC RBC AUTO-ENTMCNC: 31.4 G/DL (ref 31.5–36.5)
MCV RBC AUTO: 92 FL (ref 78–100)
PHOSPHATE SERPL-MCNC: 2.3 MG/DL (ref 2.5–4.5)
PLATELET # BLD AUTO: 210 10E3/UL (ref 150–450)
POTASSIUM SERPL-SCNC: 4.5 MMOL/L (ref 3.4–5.3)
PROT/CREAT 24H UR: 0.33 MG/MG CR (ref 0–0.2)
RBC # BLD AUTO: 4.66 10E6/UL (ref 4.4–5.9)
SODIUM SERPL-SCNC: 134 MMOL/L (ref 135–145)
TACROLIMUS BLD-MCNC: 9.4 UG/L (ref 5–15)
TME LAST DOSE: NORMAL H
TME LAST DOSE: NORMAL H
WBC # BLD AUTO: 7.6 10E3/UL (ref 4–11)

## 2024-01-18 PROCEDURE — 84156 ASSAY OF PROTEIN URINE: CPT

## 2024-01-18 PROCEDURE — 83735 ASSAY OF MAGNESIUM: CPT

## 2024-01-18 PROCEDURE — 85027 COMPLETE CBC AUTOMATED: CPT

## 2024-01-18 PROCEDURE — 84100 ASSAY OF PHOSPHORUS: CPT

## 2024-01-18 PROCEDURE — 80197 ASSAY OF TACROLIMUS: CPT

## 2024-01-18 PROCEDURE — 36415 COLL VENOUS BLD VENIPUNCTURE: CPT

## 2024-01-18 PROCEDURE — 80048 BASIC METABOLIC PNL TOTAL CA: CPT

## 2024-01-21 ENCOUNTER — HOSPITAL ENCOUNTER (OUTPATIENT)
Facility: CLINIC | Age: 29
Setting detail: OBSERVATION
Discharge: HOME | End: 2024-01-23
Attending: EMERGENCY MEDICINE | Admitting: TRANSPLANT SURGERY
Payer: MEDICARE

## 2024-01-21 ENCOUNTER — APPOINTMENT (OUTPATIENT)
Dept: ULTRASOUND IMAGING | Facility: CLINIC | Age: 29
End: 2024-01-21
Attending: EMERGENCY MEDICINE
Payer: MEDICARE

## 2024-01-21 DIAGNOSIS — Z96.0 PRESENCE OF UROGENITAL IMPLANT: ICD-10-CM

## 2024-01-21 DIAGNOSIS — R31.0 GROSS HEMATURIA: Primary | ICD-10-CM

## 2024-01-21 DIAGNOSIS — Z94.0 KIDNEY REPLACED BY TRANSPLANT: ICD-10-CM

## 2024-01-21 PROBLEM — R31.9 HEMATURIA: Status: ACTIVE | Noted: 2024-01-21

## 2024-01-21 LAB
ALBUMIN UR-MCNC: 70 MG/DL
ANION GAP SERPL CALCULATED.3IONS-SCNC: 9 MMOL/L (ref 7–15)
APPEARANCE UR: ABNORMAL
BASOPHILS # BLD AUTO: 0.1 10E3/UL (ref 0–0.2)
BASOPHILS NFR BLD AUTO: 1 %
BILIRUB UR QL STRIP: NEGATIVE
BUN SERPL-MCNC: 24.8 MG/DL (ref 6–20)
CALCIUM SERPL-MCNC: 9.8 MG/DL (ref 8.6–10)
CHLORIDE SERPL-SCNC: 105 MMOL/L (ref 98–107)
COLOR UR AUTO: ABNORMAL
CREAT SERPL-MCNC: 1.13 MG/DL (ref 0.67–1.17)
DEPRECATED HCO3 PLAS-SCNC: 20 MMOL/L (ref 22–29)
EGFRCR SERPLBLD CKD-EPI 2021: >90 ML/MIN/1.73M2
EOSINOPHIL # BLD AUTO: 0.1 10E3/UL (ref 0–0.7)
EOSINOPHIL NFR BLD AUTO: 2 %
ERYTHROCYTE [DISTWIDTH] IN BLOOD BY AUTOMATED COUNT: 16.9 % (ref 10–15)
FLUAV RNA SPEC QL NAA+PROBE: NEGATIVE
FLUBV RNA RESP QL NAA+PROBE: NEGATIVE
GLUCOSE SERPL-MCNC: 115 MG/DL (ref 70–99)
GLUCOSE UR STRIP-MCNC: NEGATIVE MG/DL
HCT VFR BLD AUTO: 38.7 % (ref 40–53)
HGB BLD-MCNC: 12.4 G/DL (ref 13.3–17.7)
HGB UR QL STRIP: ABNORMAL
IMM GRANULOCYTES # BLD: 0.1 10E3/UL
IMM GRANULOCYTES NFR BLD: 1 %
INR PPP: 1.15 (ref 0.85–1.15)
KETONES UR STRIP-MCNC: NEGATIVE MG/DL
LACTATE SERPL-SCNC: 0.7 MMOL/L (ref 0.7–2)
LEUKOCYTE ESTERASE UR QL STRIP: NEGATIVE
LYMPHOCYTES # BLD AUTO: 1.6 10E3/UL (ref 0.8–5.3)
LYMPHOCYTES NFR BLD AUTO: 19 %
MCH RBC QN AUTO: 29.2 PG (ref 26.5–33)
MCHC RBC AUTO-ENTMCNC: 32 G/DL (ref 31.5–36.5)
MCV RBC AUTO: 91 FL (ref 78–100)
MONOCYTES # BLD AUTO: 0.6 10E3/UL (ref 0–1.3)
MONOCYTES NFR BLD AUTO: 7 %
MUCOUS THREADS #/AREA URNS LPF: PRESENT /LPF
NEUTROPHILS # BLD AUTO: 6 10E3/UL (ref 1.6–8.3)
NEUTROPHILS NFR BLD AUTO: 70 %
NITRATE UR QL: NEGATIVE
NRBC # BLD AUTO: 0 10E3/UL
NRBC BLD AUTO-RTO: 0 /100
PH UR STRIP: 6 [PH] (ref 5–7)
PLATELET # BLD AUTO: 210 10E3/UL (ref 150–450)
POTASSIUM SERPL-SCNC: 4.5 MMOL/L (ref 3.4–5.3)
RBC # BLD AUTO: 4.25 10E6/UL (ref 4.4–5.9)
RBC URINE: >182 /HPF
RSV RNA SPEC NAA+PROBE: NEGATIVE
SARS-COV-2 RNA RESP QL NAA+PROBE: NEGATIVE
SODIUM SERPL-SCNC: 134 MMOL/L (ref 135–145)
SP GR UR STRIP: 1.02 (ref 1–1.03)
UROBILINOGEN UR STRIP-MCNC: NORMAL MG/DL
WBC # BLD AUTO: 8.4 10E3/UL (ref 4–11)
WBC URINE: 67 /HPF

## 2024-01-21 PROCEDURE — 83735 ASSAY OF MAGNESIUM: CPT | Performed by: STUDENT IN AN ORGANIZED HEALTH CARE EDUCATION/TRAINING PROGRAM

## 2024-01-21 PROCEDURE — 85004 AUTOMATED DIFF WBC COUNT: CPT | Performed by: EMERGENCY MEDICINE

## 2024-01-21 PROCEDURE — 87637 SARSCOV2&INF A&B&RSV AMP PRB: CPT | Performed by: EMERGENCY MEDICINE

## 2024-01-21 PROCEDURE — 76776 US EXAM K TRANSPL W/DOPPLER: CPT

## 2024-01-21 PROCEDURE — 99285 EMERGENCY DEPT VISIT HI MDM: CPT | Performed by: EMERGENCY MEDICINE

## 2024-01-21 PROCEDURE — 76776 US EXAM K TRANSPL W/DOPPLER: CPT | Mod: 26 | Performed by: RADIOLOGY

## 2024-01-21 PROCEDURE — 85610 PROTHROMBIN TIME: CPT | Performed by: EMERGENCY MEDICINE

## 2024-01-21 PROCEDURE — 36415 COLL VENOUS BLD VENIPUNCTURE: CPT | Performed by: EMERGENCY MEDICINE

## 2024-01-21 PROCEDURE — 80048 BASIC METABOLIC PNL TOTAL CA: CPT | Performed by: EMERGENCY MEDICINE

## 2024-01-21 PROCEDURE — 83605 ASSAY OF LACTIC ACID: CPT | Performed by: EMERGENCY MEDICINE

## 2024-01-21 PROCEDURE — 81001 URINALYSIS AUTO W/SCOPE: CPT | Performed by: EMERGENCY MEDICINE

## 2024-01-21 PROCEDURE — 87086 URINE CULTURE/COLONY COUNT: CPT | Performed by: EMERGENCY MEDICINE

## 2024-01-21 PROCEDURE — G0378 HOSPITAL OBSERVATION PER HR: HCPCS

## 2024-01-21 RX ORDER — AMOXICILLIN 250 MG
1 CAPSULE ORAL 2 TIMES DAILY
Status: DISCONTINUED | OUTPATIENT
Start: 2024-01-21 | End: 2024-01-23 | Stop reason: HOSPADM

## 2024-01-21 RX ORDER — ONDANSETRON 2 MG/ML
4 INJECTION INTRAMUSCULAR; INTRAVENOUS EVERY 6 HOURS PRN
Status: DISCONTINUED | OUTPATIENT
Start: 2024-01-21 | End: 2024-01-23 | Stop reason: HOSPADM

## 2024-01-21 RX ORDER — ONDANSETRON 4 MG/1
4 TABLET, ORALLY DISINTEGRATING ORAL EVERY 6 HOURS PRN
Status: DISCONTINUED | OUTPATIENT
Start: 2024-01-21 | End: 2024-01-23 | Stop reason: HOSPADM

## 2024-01-21 RX ORDER — AMOXICILLIN 250 MG
1 CAPSULE ORAL 2 TIMES DAILY PRN
Status: DISCONTINUED | OUTPATIENT
Start: 2024-01-21 | End: 2024-01-23 | Stop reason: HOSPADM

## 2024-01-21 RX ORDER — ATORVASTATIN CALCIUM 10 MG/1
10 TABLET, FILM COATED ORAL DAILY
Status: DISCONTINUED | OUTPATIENT
Start: 2024-01-22 | End: 2024-01-22

## 2024-01-21 RX ORDER — MAGNESIUM OXIDE 400 MG/1
800 TABLET ORAL 2 TIMES DAILY
Status: DISCONTINUED | OUTPATIENT
Start: 2024-01-21 | End: 2024-01-23 | Stop reason: HOSPADM

## 2024-01-21 RX ORDER — SULFAMETHOXAZOLE AND TRIMETHOPRIM 400; 80 MG/1; MG/1
1 TABLET ORAL DAILY
Status: DISCONTINUED | OUTPATIENT
Start: 2024-01-22 | End: 2024-01-23 | Stop reason: HOSPADM

## 2024-01-21 RX ORDER — LIDOCAINE 40 MG/G
CREAM TOPICAL
Status: DISCONTINUED | OUTPATIENT
Start: 2024-01-21 | End: 2024-01-23 | Stop reason: HOSPADM

## 2024-01-21 RX ORDER — AMOXICILLIN 250 MG
2 CAPSULE ORAL 2 TIMES DAILY PRN
Status: DISCONTINUED | OUTPATIENT
Start: 2024-01-21 | End: 2024-01-23 | Stop reason: HOSPADM

## 2024-01-21 RX ORDER — ACETAMINOPHEN 325 MG/1
650 TABLET ORAL EVERY 4 HOURS PRN
Status: DISCONTINUED | OUTPATIENT
Start: 2024-01-21 | End: 2024-01-23 | Stop reason: HOSPADM

## 2024-01-21 RX ORDER — SODIUM BICARBONATE 650 MG/1
1950 TABLET ORAL 3 TIMES DAILY
Status: DISCONTINUED | OUTPATIENT
Start: 2024-01-22 | End: 2024-01-23 | Stop reason: HOSPADM

## 2024-01-21 RX ORDER — PROCHLORPERAZINE 25 MG
25 SUPPOSITORY, RECTAL RECTAL EVERY 12 HOURS PRN
Status: DISCONTINUED | OUTPATIENT
Start: 2024-01-21 | End: 2024-01-23 | Stop reason: HOSPADM

## 2024-01-21 RX ORDER — OXYCODONE HYDROCHLORIDE 5 MG/1
5 TABLET ORAL EVERY 4 HOURS PRN
Status: DISCONTINUED | OUTPATIENT
Start: 2024-01-21 | End: 2024-01-23 | Stop reason: HOSPADM

## 2024-01-21 RX ORDER — PROCHLORPERAZINE MALEATE 10 MG
10 TABLET ORAL EVERY 6 HOURS PRN
Status: DISCONTINUED | OUTPATIENT
Start: 2024-01-21 | End: 2024-01-23 | Stop reason: HOSPADM

## 2024-01-21 RX ORDER — AMLODIPINE BESYLATE 10 MG/1
10 TABLET ORAL DAILY
Status: DISCONTINUED | OUTPATIENT
Start: 2024-01-22 | End: 2024-01-23 | Stop reason: HOSPADM

## 2024-01-21 RX ORDER — VALGANCICLOVIR 450 MG/1
900 TABLET, FILM COATED ORAL DAILY
Status: DISCONTINUED | OUTPATIENT
Start: 2024-01-22 | End: 2024-01-23 | Stop reason: HOSPADM

## 2024-01-21 RX ORDER — ASPIRIN 81 MG/1
81 TABLET ORAL DAILY
Status: DISCONTINUED | OUTPATIENT
Start: 2024-01-22 | End: 2024-01-23 | Stop reason: HOSPADM

## 2024-01-21 RX ORDER — AMOXICILLIN 250 MG
2 CAPSULE ORAL 2 TIMES DAILY
Status: DISCONTINUED | OUTPATIENT
Start: 2024-01-21 | End: 2024-01-23 | Stop reason: HOSPADM

## 2024-01-21 ASSESSMENT — ACTIVITIES OF DAILY LIVING (ADL): ADLS_ACUITY_SCORE: 35

## 2024-01-22 ENCOUNTER — TELEPHONE (OUTPATIENT)
Dept: TRANSPLANT | Facility: CLINIC | Age: 29
End: 2024-01-22

## 2024-01-22 ENCOUNTER — ANESTHESIA EVENT (OUTPATIENT)
Dept: SURGERY | Facility: CLINIC | Age: 29
End: 2024-01-22
Payer: MEDICARE

## 2024-01-22 DIAGNOSIS — Z94.0 KIDNEY REPLACED BY TRANSPLANT: Primary | ICD-10-CM

## 2024-01-22 LAB
ADV 40+41 DNA STL QL NAA+NON-PROBE: NEGATIVE
ALBUMIN MFR UR ELPH: 33.6 MG/DL
ANION GAP SERPL CALCULATED.3IONS-SCNC: 11 MMOL/L (ref 7–15)
ASTRO TYP 1-8 RNA STL QL NAA+NON-PROBE: NEGATIVE
BUN SERPL-MCNC: 19.9 MG/DL (ref 6–20)
C CAYETANENSIS DNA STL QL NAA+NON-PROBE: NEGATIVE
C DIFF TOX B STL QL: NEGATIVE
CALCIUM SERPL-MCNC: 9.7 MG/DL (ref 8.6–10)
CAMPYLOBACTER DNA SPEC NAA+PROBE: NEGATIVE
CHLORIDE SERPL-SCNC: 108 MMOL/L (ref 98–107)
CREAT SERPL-MCNC: 0.98 MG/DL (ref 0.67–1.17)
CREAT UR-MCNC: 98.1 MG/DL
CRYPTOSP DNA STL QL NAA+NON-PROBE: NEGATIVE
DEPRECATED HCO3 PLAS-SCNC: 17 MMOL/L (ref 22–29)
DONOR IDENTIFICATION: NORMAL
DR53: 668
DSA COMMENTS: NORMAL
DSA PRESENT: YES
DSA TEST METHOD: NORMAL
E COLI O157 DNA STL QL NAA+NON-PROBE: NORMAL
E HISTOLYT DNA STL QL NAA+NON-PROBE: NEGATIVE
EAEC ASTA GENE ISLT QL NAA+PROBE: NEGATIVE
EC STX1+STX2 GENES STL QL NAA+NON-PROBE: NEGATIVE
EGFRCR SERPLBLD CKD-EPI 2021: >90 ML/MIN/1.73M2
EPEC EAE GENE STL QL NAA+NON-PROBE: NEGATIVE
ERYTHROCYTE [DISTWIDTH] IN BLOOD BY AUTOMATED COUNT: 16.8 % (ref 10–15)
ETEC LTA+ST1A+ST1B TOX ST NAA+NON-PROBE: NEGATIVE
G LAMBLIA DNA STL QL NAA+NON-PROBE: NEGATIVE
GLUCOSE SERPL-MCNC: 107 MG/DL (ref 70–99)
GROUP A STREP BY PCR: NOT DETECTED
HCT VFR BLD AUTO: 40 % (ref 40–53)
HGB BLD-MCNC: 12.9 G/DL (ref 13.3–17.7)
INT SUB RESULT: NORMAL
INTERF SUBSTANCE: NORMAL
INTSUB TEST METHOD: NORMAL
MAGNESIUM SERPL-MCNC: 1.4 MG/DL (ref 1.7–2.3)
MAGNESIUM SERPL-MCNC: 1.6 MG/DL (ref 1.7–2.3)
MCH RBC QN AUTO: 29.9 PG (ref 26.5–33)
MCHC RBC AUTO-ENTMCNC: 32.3 G/DL (ref 31.5–36.5)
MCV RBC AUTO: 93 FL (ref 78–100)
NOROVIRUS GI+II RNA STL QL NAA+NON-PROBE: NEGATIVE
ORGAN: NORMAL
P SHIGELLOIDES DNA STL QL NAA+NON-PROBE: NEGATIVE
PHOSPHATE SERPL-MCNC: 2.5 MG/DL (ref 2.5–4.5)
PLATELET # BLD AUTO: 188 10E3/UL (ref 150–450)
POTASSIUM SERPL-SCNC: 4.2 MMOL/L (ref 3.4–5.3)
PROT/CREAT 24H UR: 0.34 MG/MG CR (ref 0–0.2)
RBC # BLD AUTO: 4.32 10E6/UL (ref 4.4–5.9)
RVA RNA STL QL NAA+NON-PROBE: NEGATIVE
SA 1 CELL: NORMAL
SA 1 TEST METHOD: NORMAL
SA 2 CELL: NORMAL
SA 2 TEST METHOD: NORMAL
SA1 HI RISK ABY: NORMAL
SA1 MOD RISK ABY: NORMAL
SA2 HI RISK ABY: NORMAL
SA2 MOD RISK ABY: NORMAL
SALMONELLA SP RPOD STL QL NAA+PROBE: NEGATIVE
SAPO I+II+IV+V RNA STL QL NAA+NON-PROBE: NEGATIVE
SHIGELLA SP+EIEC IPAH ST NAA+NON-PROBE: NEGATIVE
SODIUM SERPL-SCNC: 136 MMOL/L (ref 135–145)
UNACCEPTABLE ANTIGENS: NORMAL
UNOS CPRA: 79
V CHOLERAE DNA SPEC QL NAA+PROBE: NEGATIVE
VIBRIO DNA SPEC NAA+PROBE: NEGATIVE
WBC # BLD AUTO: 7.1 10E3/UL (ref 4–11)
Y ENTEROCOL DNA STL QL NAA+PROBE: NEGATIVE
ZZZINT SUB COMMENTS: NORMAL
ZZZSA 1  COMMENTS: NORMAL
ZZZSA 2 COMMENTS: NORMAL

## 2024-01-22 PROCEDURE — 85027 COMPLETE CBC AUTOMATED: CPT

## 2024-01-22 PROCEDURE — 36415 COLL VENOUS BLD VENIPUNCTURE: CPT

## 2024-01-22 PROCEDURE — 87493 C DIFF AMPLIFIED PROBE: CPT | Mod: XU | Performed by: EMERGENCY MEDICINE

## 2024-01-22 PROCEDURE — 84100 ASSAY OF PHOSPHORUS: CPT | Performed by: TRANSPLANT SURGERY

## 2024-01-22 PROCEDURE — 99223 1ST HOSP IP/OBS HIGH 75: CPT | Mod: 24 | Performed by: INTERNAL MEDICINE

## 2024-01-22 PROCEDURE — 250N000013 HC RX MED GY IP 250 OP 250 PS 637

## 2024-01-22 PROCEDURE — 250N000012 HC RX MED GY IP 250 OP 636 PS 637

## 2024-01-22 PROCEDURE — 80048 BASIC METABOLIC PNL TOTAL CA: CPT

## 2024-01-22 PROCEDURE — 83735 ASSAY OF MAGNESIUM: CPT | Performed by: TRANSPLANT SURGERY

## 2024-01-22 PROCEDURE — G0378 HOSPITAL OBSERVATION PER HR: HCPCS

## 2024-01-22 PROCEDURE — 87799 DETECT AGENT NOS DNA QUANT: CPT | Performed by: INTERNAL MEDICINE

## 2024-01-22 PROCEDURE — 87651 STREP A DNA AMP PROBE: CPT | Performed by: PHYSICIAN ASSISTANT

## 2024-01-22 PROCEDURE — 87507 IADNA-DNA/RNA PROBE TQ 12-25: CPT | Performed by: EMERGENCY MEDICINE

## 2024-01-22 PROCEDURE — 84156 ASSAY OF PROTEIN URINE: CPT | Performed by: INTERNAL MEDICINE

## 2024-01-22 PROCEDURE — 87799 DETECT AGENT NOS DNA QUANT: CPT | Performed by: PHYSICIAN ASSISTANT

## 2024-01-22 RX ORDER — NALOXONE HYDROCHLORIDE 0.4 MG/ML
0.4 INJECTION, SOLUTION INTRAMUSCULAR; INTRAVENOUS; SUBCUTANEOUS
Status: DISCONTINUED | OUTPATIENT
Start: 2024-01-22 | End: 2024-01-23 | Stop reason: HOSPADM

## 2024-01-22 RX ORDER — NALOXONE HYDROCHLORIDE 0.4 MG/ML
0.2 INJECTION, SOLUTION INTRAMUSCULAR; INTRAVENOUS; SUBCUTANEOUS
Status: DISCONTINUED | OUTPATIENT
Start: 2024-01-22 | End: 2024-01-23 | Stop reason: HOSPADM

## 2024-01-22 RX ORDER — HYDRALAZINE HYDROCHLORIDE 25 MG/1
50 TABLET, FILM COATED ORAL 2 TIMES DAILY
Status: DISCONTINUED | OUTPATIENT
Start: 2024-01-22 | End: 2024-01-23 | Stop reason: HOSPADM

## 2024-01-22 RX ORDER — ATORVASTATIN CALCIUM 10 MG/1
10 TABLET, FILM COATED ORAL AT BEDTIME
Status: DISCONTINUED | OUTPATIENT
Start: 2024-01-22 | End: 2024-01-23 | Stop reason: HOSPADM

## 2024-01-22 RX ORDER — HYDRALAZINE HYDROCHLORIDE 100 MG/1
100 TABLET, FILM COATED ORAL 2 TIMES DAILY
Status: DISCONTINUED | OUTPATIENT
Start: 2024-01-22 | End: 2024-01-22

## 2024-01-22 RX ORDER — CARVEDILOL 25 MG/1
25 TABLET ORAL 2 TIMES DAILY
Status: DISCONTINUED | OUTPATIENT
Start: 2024-01-22 | End: 2024-01-23 | Stop reason: HOSPADM

## 2024-01-22 RX ADMIN — VALGANCICLOVIR HYDROCHLORIDE 900 MG: 450 TABLET ORAL at 10:29

## 2024-01-22 RX ADMIN — TACROLIMUS 9 MG: 5 CAPSULE ORAL at 09:11

## 2024-01-22 RX ADMIN — ASPIRIN 81 MG: 81 TABLET ORAL at 08:20

## 2024-01-22 RX ADMIN — SODIUM BICARBONATE 650 MG TABLET 1950 MG: at 13:55

## 2024-01-22 RX ADMIN — HYDRALAZINE HYDROCHLORIDE 50 MG: 25 TABLET, FILM COATED ORAL at 22:53

## 2024-01-22 RX ADMIN — MAGNESIUM OXIDE TAB 400 MG (241.3 MG ELEMENTAL MG) 800 MG: 400 (241.3 MG) TAB at 14:00

## 2024-01-22 RX ADMIN — SODIUM BICARBONATE 650 MG TABLET 1950 MG: at 20:14

## 2024-01-22 RX ADMIN — MYCOPHENILIC ACID 540 MG: 360 TABLET, DELAYED RELEASE ORAL at 08:21

## 2024-01-22 RX ADMIN — SULFAMETHOXAZOLE AND TRIMETHOPRIM 1 TABLET: 400; 80 TABLET ORAL at 08:19

## 2024-01-22 RX ADMIN — ATORVASTATIN CALCIUM 10 MG: 10 TABLET, FILM COATED ORAL at 22:53

## 2024-01-22 RX ADMIN — MAGNESIUM OXIDE TAB 400 MG (241.3 MG ELEMENTAL MG) 800 MG: 400 (241.3 MG) TAB at 20:14

## 2024-01-22 RX ADMIN — DIBASIC SODIUM PHOSPHATE, MONOBASIC POTASSIUM PHOSPHATE AND MONOBASIC SODIUM PHOSPHATE 500 MG: 852; 155; 130 TABLET ORAL at 10:29

## 2024-01-22 RX ADMIN — TACROLIMUS 9 MG: 5 CAPSULE ORAL at 20:53

## 2024-01-22 RX ADMIN — SODIUM BICARBONATE 650 MG TABLET 1950 MG: at 08:17

## 2024-01-22 RX ADMIN — AMLODIPINE BESYLATE 10 MG: 10 TABLET ORAL at 08:19

## 2024-01-22 RX ADMIN — MYCOPHENILIC ACID 540 MG: 360 TABLET, DELAYED RELEASE ORAL at 20:53

## 2024-01-22 RX ADMIN — CARVEDILOL 25 MG: 25 TABLET, FILM COATED ORAL at 22:53

## 2024-01-22 RX ADMIN — DIBASIC SODIUM PHOSPHATE, MONOBASIC POTASSIUM PHOSPHATE AND MONOBASIC SODIUM PHOSPHATE 500 MG: 852; 155; 130 TABLET ORAL at 20:15

## 2024-01-22 ASSESSMENT — ACTIVITIES OF DAILY LIVING (ADL)
ADLS_ACUITY_SCORE: 35
ADLS_ACUITY_SCORE: 31
ADLS_ACUITY_SCORE: 35
ADLS_ACUITY_SCORE: 31
DEPENDENT_IADLS:: INDEPENDENT
ADLS_ACUITY_SCORE: 31
ADLS_ACUITY_SCORE: 33

## 2024-01-22 NOTE — ED PROVIDER NOTES
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 No pain or burning with urination, urinary frequency, or tenderness. He reports at least 3 episodes of higher volume, watery diarrhea today too. He also reports some generalized myalgias. No fevers or N/V. No history of UTI. He is on Bactrim, but no other antibiotics.    Per Epic review, patient was on HD via RIJ with tunneled line due to recent failed RUE fistula and was anuric. Patient is now s/p  donor kidney transplant with ureteral stent with Dr. Jimenez on 23.       Past Medical History  Past Medical History:   Diagnosis Date    Adrenal adenoma, left     Anemia     Benign essential hypertension 2017    Bursitis of left shoulder 2023    Chronic deep vein thrombosis (DVT) of internal jugular vein (H) 2023    Right IJ, catheter-related    CKD (chronic kidney disease) stage 5, GFR less than 15 ml/min (H)     FSGS    Depression     Focal glomerular sclerosis 2017    HLD (hyperlipidemia)     Kidney replaced by transplant 2023    DDKT. Induction w/ Thymoglobulin 2mg/kg and basiliximab.    LVH (left ventricular hypertrophy) due to hypertensive disease 2017    Noncompliance     Obesity, unspecified     OD (osteochondritis dissecans) 2021    Prolonged QT interval     Staphylococcus infection 2023: Blood    Stress-induced  cardiomyopathy     Suicide attempt (H) 2019     Past Surgical History:   Procedure Laterality Date    ARTHROSCOPY ANKLE Left 2021    Procedure: Left ankle arthroscopy and debridement/micro fracture;  Surgeon: Mirza Nelson MD;  Location: [facility] OR    BIOPSY  2017    renal- [facility]    CREATE FISTULA ARTERIOVENOUS UPPER EXTREMITY Right 2021    Procedure: RIGHT proximal radial  to CEPHALIC ARTERIOVENOUS FISTULA;  Surgeon: Henrik Moran MD;  Location: [facility] OR    CREATE FISTULA ARTERIOVENOUS UPPER EXTREMITY Left 2023    Procedure: CREATION OF FIRST STAGE LEFT BRACHIOBASILIC ARTERIOVENOUS FISTULA;  Surgeon: Henrik Moran MD;  Location: [facility] OR    CREATE FISTULA ARTERIOVENOUS UPPER EXTREMITY Left 2023    Procedure: SECOND STAGE LEFT BRACHIAL TO BASILIC ARTERIOVENOUS FISTULA;  Surgeon: Henrik Moran MD;  Location: [facility] OR    CYSTOSCOPY, REMOVE STENT(S), COMBINED N/A 2024    Procedure: CYSTOSCOPY, WITH URETERAL STENT REMOVAL;  Surgeon: Tho Vuong MD;  Location: [facility] OR    IR CVC TUNNEL PLACEMENT > 5 YRS OF AGE  2021    IR CVC TUNNEL PLACEMENT > 5 YRS OF AGE  2023    IR CVC TUNNEL REMOVAL RIGHT  2021    IR CVC TUNNEL REMOVAL RIGHT  2023    IRRIGATION AND DEBRIDEMENT UPPER EXTREMITY, COMBINED Left 2023    Procedure: EVACUATION OF LEFT ARM HEMATOMA;  Surgeon: Henrik Moran MD;  Location: [facility] OR    LIGATE FISTULA ARTERIOVENOUS UPPER EXTREMITY Right 2023    Procedure: LIGATION OF RIGHT ARM ARTERIOVENOUS FISTULA;  Surgeon: Henrik Moran MD;  Location: [facility] OR    REVISION FISTULA ARTERIOVENOUS UPPER EXTREMITY Right 2021    Procedure: Second stage RIGHT BRACHIOCEPHALIC transposition ARTERIOVENOUS FISTULA;  Surgeon: Henrik Moran MD;  Location: [facility] OR    TRANSPLANT KIDNEY RECIPIENT  DONOR Left 2023    Procedure: Transplant kidney recipient  donor, ureteral stent placement;  Surgeon: Tony  MD Shi;  Location: [facility] OR     acetaminophen (TYLENOL) 325 MG tablet  amLODIPine (NORVASC) 10 MG tablet  aspirin 81 MG EC tablet  atorvastatin (LIPITOR) 10 MG tablet  carvedilol (COREG) 25 MG tablet  hydrALAZINE (APRESOLINE) 100 MG tablet  magnesium oxide (MAG-OX) 400 MG tablet  medical cannabis (Patient's own supply)  Multiple Vitamin (MULTIVITAMIN ADULT PO)  mycophenolic acid (GENERIC EQUIVALENT) 180 MG EC tablet  nitroGLYcerin (NITROSTAT) 0.3 MG sublingual tablet  sodium bicarbonate 650 MG tablet  Sodium Bicarbonate, antacid, POWD  sulfamethoxazole-trimethoprim (BACTRIM) 400-80 MG tablet  tacrolimus (GENERIC) 0.5 MG capsule  tacrolimus (GENERIC) 1 MG capsule  valGANciclovir (VALCYTE) 450 MG tablet      Allergies   Allergen Reactions    Benadryl [Diphenhydramine] Itching    Vancomycin Itching     Family History  Family History   Problem Relation Age of Onset    Blood Disease Mother         has hep b    Diabetes Mother         gestionanal diabetes    Hypertension Mother     Obesity Father     Hypertension Father     Hypertension Maternal Grandmother     Diabetes Maternal Grandmother     Hypertension Paternal Grandmother     Hypertension Paternal Grandfather     Coronary Artery Disease Maternal Uncle     Cancer No family hx of      Social History   Social History     Tobacco Use    Smoking status: Never    Smokeless tobacco: Never   Vaping Use    Vaping Use: Never used   Substance Use Topics    Alcohol use: No    Drug use: Yes     Types: Marijuana     Comment: occ         A complete review of systems was performed with pertinent positives and negatives noted in the HPI, and all other systems negative.    Physical Exam   BP: 118/79  Pulse: 79  Temp: 97.7  F (36.5  C)  Resp: 18  Height: 182.9 cm (6')  Weight: 106.1 kg (234 lb)  SpO2: 100 %  Physical Exam  Vitals and nursing note reviewed.   Constitutional:       General: He is not in acute distress.     Appearance: Normal appearance. He is well-developed. He is not  ill-appearing or diaphoretic.   HENT:      Head: Normocephalic and atraumatic.      Nose: Nose normal.      Mouth/Throat:      Mouth: Mucous membranes are moist.   Eyes:      General: No scleral icterus.     Conjunctiva/sclera: Conjunctivae normal.   Cardiovascular:      Rate and Rhythm: Normal rate.   Pulmonary:      Effort: Pulmonary effort is normal. No respiratory distress.      Breath sounds: No stridor.   Abdominal:      General: A surgical scar is present. There is no distension.      Palpations: Abdomen is soft.      Tenderness: There is abdominal tenderness in the right lower quadrant. There is no guarding or rebound.      Comments: Staples C/D/I. Tender with deep palpation of kidney transplant in RLQ. No fluctuance.    Musculoskeletal:         General: No deformity or signs of injury. Normal range of motion.      Cervical back: Normal range of motion and neck supple. No rigidity.   Skin:     General: Skin is warm and dry.      Coloration: Skin is not jaundiced or pale.      Findings: No rash.   Neurological:      General: No focal deficit present.      Mental Status: He is alert and oriented to person, place, and time.   Psychiatric:         Behavior: Behavior normal.         Thought Content: Thought content normal.           ED Course, Procedures, & Data      Procedures                      Results for orders placed or performed during the hospital encounter of 01/21/24   US Renal Transplant with Doppler     Status: None    Narrative    EXAMINATION: US RENAL TRANSPLANT,  1/21/2024 11:29 PM     COMPARISON: 12/5/2023    HISTORY: Hematuria    TECHNIQUE:  Grey-scale, color Doppler and spectral flow analysis.    FINDINGS:  The transplant kidney is located right lower quadrant, and measures  12.8 cm. The visualized portions of the parenchyma are of normal  thickness and echogenicity. No focal lesions. No hydronephrosis. No  perinephric fluid collection.    Renal artery flow:   175 cm/sec peak systolic at hilum.  Resistive index of 0.75  157 cm/sec peak systolic at superior anastomosis. Resistive index of  0.81  104 cm/sec peak systolic at inferior anastomosis. Resistive index of  0.80  Arcuate artery resistive indices (upper to lower): 0.70, 0.75, 0.68    Renal Vein Flow:  32 cm/sec at hilum.   41 cm/sec at anastomosis.    Iliac artery flow:  172 cm/sec peak systolic above anastomosis.  96 cm/sec peak systolic below anastomosis.    Iliac vein flow:  Patent above and below the anastomosis.    Moderately distended bladder is normal in appearance.      Impression    IMPRESSION:  Nonspecific borderline elevation of resistive indices which can be  seen in the setting of acute tubular necrosis or transplant rejection.  No perinephric fluid collections. No hydronephrosis.    I have personally reviewed the examination and initial interpretation  and I agree with the findings.    ZOLTAN LIEBERMAN          SYSTEM ID:  [id number]   Basic metabolic panel     Status: Abnormal   Result Value Ref Range    Sodium 134 (L) 135 - 145 mmol/L    Potassium 4.5 3.4 - 5.3 mmol/L    Chloride 105 98 - 107 mmol/L    Carbon Dioxide (CO2) 20 (L) 22 - 29 mmol/L    Anion Gap 9 7 - 15 mmol/L    Urea Nitrogen 24.8 (H) 6.0 - 20.0 mg/dL    Creatinine 1.13 0.67 - 1.17 mg/dL    GFR Estimate >90 >60 mL/min/1.73m2    Calcium 9.8 8.6 - 10.0 mg/dL    Glucose 115 (H) 70 - 99 mg/dL   UA with Microscopic reflex to Culture     Status: Abnormal    Specimen: Urine, Clean Catch   Result Value Ref Range    Color Urine Orange (A) Colorless, Straw, Light Yellow, Yellow    Appearance Urine Slightly Cloudy (A) Clear    Glucose Urine Negative Negative mg/dL    Bilirubin Urine Negative Negative    Ketones Urine Negative Negative mg/dL    Specific Gravity Urine 1.021 1.003 - 1.035    Blood Urine Large (A) Negative    pH Urine 6.0 5.0 - 7.0    Protein Albumin Urine 70 (A) Negative mg/dL    Urobilinogen Urine Normal Normal, 2.0 mg/dL    Nitrite Urine Negative Negative    Leukocyte  Esterase Urine Negative Negative    Mucus Urine Present (A) None Seen /LPF    RBC Urine >182 (H) <=2 /HPF    WBC Urine 67 (H) <=5 /HPF    Narrative    Urine Culture ordered based on laboratory criteria   Symptomatic Influenza A/B, RSV, & SARS-CoV2 PCR (COVID-19) Nose     Status: Normal    Specimen: Nose; Swab   Result Value Ref Range    Influenza A PCR Negative Negative    Influenza B PCR Negative Negative    RSV PCR Negative Negative    SARS CoV2 PCR Negative Negative    Narrative    Testing was performed using the Xpert Xpress CoV2/Flu/RSV Assay on the [employer] GeneXpert Instrument. This test should be ordered for the detection of SARS-CoV-2, influenza, and RSV viruses in individuals who meet clinical and/or epidemiological criteria. Test performance is unknown in asymptomatic patients. This test is for in vitro diagnostic use under the FDA EUA for laboratories certified under CLIA to perform high or moderate complexity testing. This test has not been FDA cleared or approved. A negative result does not rule out the presence of PCR inhibitors in the specimen or target RNA in concentration below the limit of detection for the assay. If only one viral target is positive but coinfection with multiple targets is suspected, the sample should be re-tested with another FDA cleared, approved, or authorized test, if coinfection would change clinical management. This test was validated by the [facility]. These laboratories are certified under the Clinical Laboratory Improvement Amendments of 1988 (CLIA-88) as qualified to perform high complexity laboratory testing.   Lactic acid whole blood     Status: Normal   Result Value Ref Range    Lactic Acid 0.7 0.7 - 2.0 mmol/L   INR     Status: Normal   Result Value Ref Range    INR 1.15 0.85 - 1.15   CBC with platelets and differential     Status: Abnormal   Result Value Ref Range    WBC Count 8.4 4.0 - 11.0 10e3/uL    RBC Count 4.25 (L) 4.40 - 5.90 10e6/uL     Hemoglobin 12.4 (L) 13.3 - 17.7 g/dL    Hematocrit 38.7 (L) 40.0 - 53.0 %    MCV 91 78 - 100 fL    MCH 29.2 26.5 - 33.0 pg    MCHC 32.0 31.5 - 36.5 g/dL    RDW 16.9 (H) 10.0 - 15.0 %    Platelet Count 210 150 - 450 10e3/uL    % Neutrophils 70 %    % Lymphocytes 19 %    % Monocytes 7 %    % Eosinophils 2 %    % Basophils 1 %    % Immature Granulocytes 1 %    NRBCs per 100 WBC 0 <1 /100    Absolute Neutrophils 6.0 1.6 - 8.3 10e3/uL    Absolute Lymphocytes 1.6 0.8 - 5.3 10e3/uL    Absolute Monocytes 0.6 0.0 - 1.3 10e3/uL    Absolute Eosinophils 0.1 0.0 - 0.7 10e3/uL    Absolute Basophils 0.1 0.0 - 0.2 10e3/uL    Absolute Immature Granulocytes 0.1 <=0.4 10e3/uL    Absolute NRBCs 0.0 10e3/uL   Basic metabolic panel     Status: Abnormal   Result Value Ref Range    Sodium 136 135 - 145 mmol/L    Potassium 4.2 3.4 - 5.3 mmol/L    Chloride 108 (H) 98 - 107 mmol/L    Carbon Dioxide (CO2) 17 (L) 22 - 29 mmol/L    Anion Gap 11 7 - 15 mmol/L    Urea Nitrogen 19.9 6.0 - 20.0 mg/dL    Creatinine 0.98 0.67 - 1.17 mg/dL    GFR Estimate >90 >60 mL/min/1.73m2    Calcium 9.7 8.6 - 10.0 mg/dL    Glucose 107 (H) 70 - 99 mg/dL   CBC with platelets     Status: Abnormal   Result Value Ref Range    WBC Count 7.1 4.0 - 11.0 10e3/uL    RBC Count 4.32 (L) 4.40 - 5.90 10e6/uL    Hemoglobin 12.9 (L) 13.3 - 17.7 g/dL    Hematocrit 40.0 40.0 - 53.0 %    MCV 93 78 - 100 fL    MCH 29.9 26.5 - 33.0 pg    MCHC 32.3 31.5 - 36.5 g/dL    RDW 16.8 (H) 10.0 - 15.0 %    Platelet Count 188 150 - 450 10e3/uL   Protein  random urine     Status: Abnormal   Result Value Ref Range    Total Protein Urine mg/dL 33.6   mg/dL    Total Protein Urine mg/mg Creat 0.34 (H) 0.00 - 0.20 mg/mg Cr    Creatinine Urine mg/dL 98.1 mg/dL   Phosphorus     Status: Normal   Result Value Ref Range    Phosphorus 2.5 2.5 - 4.5 mg/dL   Magnesium     Status: Abnormal   Result Value Ref Range    Magnesium 1.4 (L) 1.7 - 2.3 mg/dL   Magnesium     Status: Abnormal   Result Value Ref Range     Magnesium 1.6 (L) 1.7 - 2.3 mg/dL   Basic metabolic panel     Status: Abnormal   Result Value Ref Range    Sodium 140 135 - 145 mmol/L    Potassium 4.8 3.4 - 5.3 mmol/L    Chloride 112 (H) 98 - 107 mmol/L    Carbon Dioxide (CO2) 19 (L) 22 - 29 mmol/L    Anion Gap 9 7 - 15 mmol/L    Urea Nitrogen 17.6 6.0 - 20.0 mg/dL    Creatinine 0.94 0.67 - 1.17 mg/dL    GFR Estimate >90 >60 mL/min/1.73m2    Calcium 9.8 8.6 - 10.0 mg/dL    Glucose 118 (H) 70 - 99 mg/dL   CBC with platelets     Status: Abnormal   Result Value Ref Range    WBC Count 6.2 4.0 - 11.0 10e3/uL    RBC Count 4.35 (L) 4.40 - 5.90 10e6/uL    Hemoglobin 13.0 (L) 13.3 - 17.7 g/dL    Hematocrit 40.5 40.0 - 53.0 %    MCV 93 78 - 100 fL    MCH 29.9 26.5 - 33.0 pg    MCHC 32.1 31.5 - 36.5 g/dL    RDW 16.8 (H) 10.0 - 15.0 %    Platelet Count 182 150 - 450 10e3/uL   Tacrolimus by Tandem Mass Spectrometry     Status: None   Result Value Ref Range    Tacrolimus by Tandem Mass Spectrometry 14.3 5.0 - 15.0 ug/L    Tacrolimus Last Dose Date      Tacrolimus Last Dose Time      Narrative    This test was developed and its performance characteristics determined by the [facility],  [facility] Laboratory. It has not been cleared or approved by the FDA. The laboratory is regulated under CLIA as qualified to perform high-complexity testing. This test is used for clinical purposes. It should not be regarded as investigational or for research.   Magnesium     Status: Abnormal   Result Value Ref Range    Magnesium 1.5 (L) 1.7 - 2.3 mg/dL   Phosphorus     Status: Normal   Result Value Ref Range    Phosphorus 2.5 2.5 - 4.5 mg/dL   EBV DNA PCR Quantitative Whole Blood     Status: Normal   Result Value Ref Range    EBV DNA Copies/mL Not Detected Not Detected copies/mL    Narrative    The real-time quantitative EBV assay was developed and its performance characteristics determined by the [facility] Laboratory at [facility].  The primers and probes for each analyte are Analyte Specific Reagents (ASRs) manufactured by [employer]. ASRs are used in many laboratory tests necessary for standard medical care and generally do not require U.S. Food and Drug Administration approval. The FDA has determined that such clearance or approval is not necessary. The test is used for clincal purposes. It should not be regarded as investigational or for research. This laboratory is certified under the Clinical Laboratory Improvement Amendments of 1988 (CLIA-88) as qualified to perform high complexity clinical laboratory testing.  The real-time quantitative EBV assay was developed and its performance characteristics determined by the Infect[facility] Diseases Diagnostic Laboratory at [facility]. The primers and probes for each analyte are Analyte Specific Reagents (ASRs) manufactured by [employer]. ASRs are used in many laboratory tests necessary for standard medical care and generally do not require U.S. Food and Drug Administration approval. The FDA has determined that such clearance or approval is not necessary. The test is used for clincal purposes. It should not be regarded as investigational or for research. This laboratory is certified under the Clinical Laboratory Improvement Amendments of 1988 (CLIA-88) as qualified to perform high complexity clinical laboratory testing.   Extra Tube     Status: None    Narrative    The following orders were created for panel order Extra Tube.  Procedure                               Abnormality         Status                     ---------                               -----------         ------                     Extra Green Top (Lithium...[[id number]]                      Final result                 Please view results for these tests on the individual orders.   Extra Green Top (Lithium Heparin) Tube     Status: None   Result Value Ref Range    Hold Specimen [facility]    Glucose by meter     Status: Abnormal   Result Value  "Ref Range    GLUCOSE BY METER POCT 106 (H) 70 - 99 mg/dL   Surgical Pathology Exam     Status: None   Result Value Ref Range    Case Report       Surgical Pathology Report                         Case: [id number]                                  Authorizing Provider:  Tho Vuong MD     Collected:           01/23/2024 01:13 PM          Ordering Location:     [facility] MAIN OR                 Received:            01/23/2024 01:25 PM          Pathologist:           Curly Roberto MD                                                           Specimen:    Explant, Ureteral Stent                                                                    Clinical Information       Procedure:  CYSTOSCOPY, WITH URETERAL STENT REMOVAL  Pre-op Diagnosis: Gross hematuria [R31.0]  Post-op Diagnosis: R31.0 - Gross hematuria [ICD-10-CM]      Gross Description       A(1). Explant, Ureteral Stent:  The specimen is received fresh with proper patient identification labeled \"ureteral stent\".  The specimen consists of a portion of pale green, rubbery tubing, 8.5 cm in length by 0.2 cm in diameter.  There are 9 circular defects on the surface of the specimen that extend into the lumen of the tube. There is no tissue grossly adherent to the specimen.  Gross photography is performed.  The specimen is subjected to a gross examination only.      Performing Labs       The technical component of this testing was completed at [facility]      Case Images     C. difficile Toxin B PCR with reflex to C. difficile Antigen and Toxins A/B EIA     Status: Normal    Specimen: Per Rectum; Stool   Result Value Ref Range    C Difficile Toxin B by PCR Negative Negative    Narrative    The better. Xpert C. difficile Assay, performed on the [employer]  Instrument Systems, is a qualitative in vitro diagnostic test for rapid detection of toxin B gene sequences from unformed (liquid or soft) stool " specimens collected from patients suspected of having Clostridioides difficile infection (CDI). The test utilizes automated real-time polymerase chain reaction (PCR) to detect toxin gene sequences associated with toxin producing C. difficile. The Xpert C. difficile Assay is intended as an aid in the diagnosis of CDI.   Enteric Bacteria and Virus Panel PCR     Status: Normal    Specimen: Per Rectum; Stool   Result Value Ref Range    Campylobacter species Negative Negative    Salmonella species Negative Negative    Vibrio species Negative Negative    Vibrio cholerae Negative Negative    Yersinia enterocolitica Negative Negative    Enteropathogenic E. coli (EPEC) Negative Negative, NA    Shiga-like toxin-producing E. coli (STEC) Negative Negative    Shigella/Enteroinvasive E. coli (EIEC) Negative Negative    Cryptosporidium species Negative Negative    Giardia lamblia Negative Negative    Norovirus Gl/Gll Negative Negative    Rotavirus A Negative Negative    Plesiomonas shigelloides Negative Negative    Enteroaggregative E. coli (EAEC) Negative Negative    Enterotoxigenic E. coli (ETEC) Negative Negative    E. coli O157 NA Negative, NA    Cyclospora cayetanensis Negative Negative    Entamoeba histolytica Negative Negative    Adenovirus F40/41 Negative Negative    Astrovirus Negative Negative    Sapovirus Negative Negative    Narrative    Assay performed using the FDA-cleared FilmArray GI Panel from [employer]  A negative result should not rule out infection in patients with a probability for gastrointestinal infection. The assay does not test for all potential infectious agents of diarrheal disease.  Positive results do not distinguish between a viable or replicating organism and the presence of a nonviable organism or nucleic acid, nor do they exclude the possibility of coinfection by organisms not in the panel.  Results are intended to aid in the diagnosis of illness and are meant to be used in  conjunction with other clinical findings.  This test has been verified and is performed by the Infectious Diseases Diagnostic Laboratory at [facility]. This laboratory is certified under the Clinical Laboratory Improvement Amendments of 1988 (CLIA-88) as qualified to perform high complexity clinical laboratory testing.   Urine Culture     Status: None    Specimen: Urine, Clean Catch   Result Value Ref Range    Culture <10,000 CFU/mL Mixture of Urogenital Harika    Adenovirus, quantitative by PCR     Status: Normal    Specimen: Hand, Right; Blood   Result Value Ref Range    Adenovirus DNA copies/mL Not Detected Not Detected copies/mL    Narrative    The quantitative Adenovirus DNA assay is a real time polymerase chain reaction (PCR) using analyte specific reagents (ASR) maunfactured by [employer]. Analyte Specific Reagents (ASRs) are used in many laboratory tests necessary for standard medical care and generally do not require FDA approval. This test was developed and its performance characteristics determined by [facility] [employer]. It has not been cleared or approved by the [place] Food and Drug Administration.   Group A Streptococcus PCR Throat Swab     Status: Normal    Specimen: Throat; Swab   Result Value Ref Range    Group A strep by PCR Not Detected Not Detected    Narrative    The Xpert Xpress Strep A test, performed on the [employer], is a rapid, qualitative in vitro diagnostic test for the detection of Streptococcus pyogenes (Group A ß-hemolytic Streptococcus, Strep A) in throat swab specimens from patients with signs and symptoms of pharyngitis. The Xpert Xpress Strep A test can be used as an aid in the diagnosis of Group A Streptococcal pharyngitis. The assay is not intended to monitor treatment for Group A Streptococcus infections. The Xpert Xpress Strep A test utilizes an automated real-time polymerase chain reaction (PCR) to detect Streptococcus pyogenes DNA.   Adenovirus,  quantitative by PCR     Status: Normal    Specimen: Urine, Clean Catch   Result Value Ref Range    Adenovirus DNA copies/mL Not Detected Not Detected copies/mL    Narrative    The quantitative Adenovirus DNA assay is a real time polymerase chain reaction (PCR) using analyte specific reagents (ASR) maunfactured by [employer] Analyte Specific Reagents (ASRs) are used in many laboratory tests necessary for standard medical care and generally do not require FDA approval. This test was developed and its performance characteristics determined by [facility] [employer]. It has not been cleared or approved by the US Food and Drug Administration.   Cytomegalovirus DNA by PCR, Quantitative     Status: Normal    Specimen: Arm, Right; Blood   Result Value Ref Range    CMV DNA IU/mL Not Detected Not Detected IU/mL    Narrative    The gary  CMV assay is a FDA-approved in vitro nucleic acid amplification test for the quantification of cytomegalovirus (CMV) DNA in human EDTA plasma using the Roche gary  6800 instrument for automated viral nucleic acid extraction and purification (silica-based capture technique), followed by PCR amplification and real-time detection. Selective amplification of target nucleic acid from the sample is achieved by the use of target virus-specific forward and reverse primers which are selected from highly conserved regions of the CMV DNA polymerase ([id number]) gene.     This test is intended for use as an aid in the management of CMV in transplant patients. In patients receiving anti-CMV therapy, serial DNA measurements can be used to assess viral response to treatment. Titer results are reported in International Units/mL (IU/mL). This assay has received FDA approval for the testing of human EDTA plasma only. The [facility] at [facility] has validated the performance characteristics of the gary  CMV assay for plasma and urine.   Urine Culture     Status: None     Specimen: Urine, Straight Catheter   Result Value Ref Range    Culture No Growth    Anaerobic Bacterial Culture Routine     Status: Abnormal (Preliminary result)    Specimen: Urethra; Foreign Body   Result Value Ref Range    Culture Culture in progress     Culture 1+ Actinomyces species (A)     Narrative    This specimen was not received in an anaerobic transport container, thus the absence or quantity of anaerobic organisms in this culture may not be representative of the anaerobic organisms present in the specimen.       Foreign Body Aerobic Bacterial Culture Routine     Status: Abnormal    Specimen: Urethra; Foreign Body   Result Value Ref Range    Culture Isolated in broth only Staphylococcus epidermidis (A)     Culture Isolated in broth only Actinomyces species (A)    CBC with platelets differential     Status: Abnormal    Narrative    The following orders were created for panel order CBC with platelets differential.  Procedure                               Abnormality         Status                     ---------                               -----------         ------                     CBC with platelets and d...[[id number]]  Abnormal            Final result                 Please view results for these tests on the individual orders.     Medications - No data to display  Labs Ordered and Resulted from Time of ED Arrival to Time of ED Departure   BASIC METABOLIC PANEL - Abnormal       Result Value    Sodium 134 (*)     Potassium 4.5      Chloride 105      Carbon Dioxide (CO2) 20 (*)     Anion Gap 9      Urea Nitrogen 24.8 (*)     Creatinine 1.13      GFR Estimate >90      Calcium 9.8      Glucose 115 (*)    ROUTINE UA WITH MICROSCOPIC REFLEX TO CULTURE - Abnormal    Color Urine Orange (*)     Appearance Urine Slightly Cloudy (*)     Glucose Urine Negative      Bilirubin Urine Negative      Ketones Urine Negative      Specific Gravity Urine 1.021      Blood Urine Large (*)     pH Urine 6.0      Protein Albumin  Urine 70 (*)     Urobilinogen Urine Normal      Nitrite Urine Negative      Leukocyte Esterase Urine Negative      Mucus Urine Present (*)     RBC Urine >182 (*)     WBC Urine 67 (*)    CBC WITH PLATELETS AND DIFFERENTIAL - Abnormal    WBC Count 8.4      RBC Count 4.25 (*)     Hemoglobin 12.4 (*)     Hematocrit 38.7 (*)     MCV 91      MCH 29.2      MCHC 32.0      RDW 16.9 (*)     Platelet Count 210      % Neutrophils 70      % Lymphocytes 19      % Monocytes 7      % Eosinophils 2      % Basophils 1      % Immature Granulocytes 1      NRBCs per 100 WBC 0      Absolute Neutrophils 6.0      Absolute Lymphocytes 1.6      Absolute Monocytes 0.6      Absolute Eosinophils 0.1      Absolute Basophils 0.1      Absolute Immature Granulocytes 0.1      Absolute NRBCs 0.0     INFLUENZA A/B, RSV, & SARS-COV2 PCR - Normal    Influenza A PCR Negative      Influenza B PCR Negative      RSV PCR Negative      SARS CoV2 PCR Negative     LACTIC ACID WHOLE BLOOD - Normal    Lactic Acid 0.7     INR - Normal    INR 1.15       US Renal Transplant with Doppler   Final Result   IMPRESSION:   Nonspecific borderline elevation of resistive indices which can be   seen in the setting of acute tubular necrosis or transplant rejection.   No perinephric fluid collections. No hydronephrosis.      I have personally reviewed the examination and initial interpretation   and I agree with the findings.      ZOLTAN LIEBERMAN,             SYSTEM ID:  [id number]             Critical care was not performed.     Medical Decision Making  The patient's presentation was of high complexity (an acute health issue posing potential threat to life or bodily function).    The patient's evaluation involved:  review of external note(s) from 2 sources (see separate area of note for details)  review of 3+ test result(s) ordered prior to this encounter (see separate area of note for details)  strong consideration of a test (see separate area of note for details) that was  ultimately deferred  ordering and/or review of 3+ test(s) in this encounter (see separate area of note for details)  discussion of management or test interpretation with another health professional (see separate area of note for details)    The patient's management necessitated high risk (a decision regarding hospitalization).    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     Ddx: UTI, transplant rejection, hydronephrosis, viral URI, gastroenteritis, colitis, c diff    Vitals nl. Ordered labs and renal tx ultrasound. Urine does not appear infected but >182 RBCs and WBC 67. Creat up to 1.13 from 0.86. WBC nl. Discussed with transplant surgery fellow who recommended admitting patient on observation to tx surgery service and hold empiric abx coverage.      I have reviewed the nursing notes. I have reviewed the findings, diagnosis, plan and need for follow up with the patient.    Discharge Medication List as of 1/23/2024  4:04 PM          Final diagnoses:   Gross hematuria   I, Luis Enrique Pierce, am serving as a trained medical scribe to document services personally performed by Jhoana Winston MD based on the provider's statements to me on January 21, 2024.  This document has been checked and approved by the attending provider.    IJhoana MD, was physically present and have reviewed and verified the accuracy of this note documented by Luis Enrique Pierce medical scribe.      Jhoana Winston MD  [facility] EMERGENCY DEPARTMENT  1/21/2024     Jhoana Winston MD  01/27/24 [id number]

## 2024-01-22 NOTE — PROGRESS NOTES
OBS Goal Evaluation    -diagnostic tests and consults completed and resulted -Pending AM labs, need stool sample for enteric panel  -vital signs normal or at patient baseline -MET  -adequate pain control on oral analgesics -MET  -returns to baseline functional status -MET  -safe disposition plan has been identified -MET  Nurse to notify provider when observation goals have been met and patient is ready for discharge.     Gladys Mansfield RN on 1/22/2024 at 4:23 AM

## 2024-01-22 NOTE — CONSULTS
Care Management Initial Consult    General Information  Assessment completed with: VM-chart review,    Type of CM/SW Visit: Initial Assessment    Primary Care Provider verified and updated as needed:     Readmission within the last 30 days: no previous admission in last 30 days      Reason for Consult: discharge planning  Advance Care Planning: Advance Care Planning Reviewed: no concerns identified          Communication Assessment  Patient's communication style: spoken language (English or Bilingual)    Hearing Difficulty or Deaf: no        Cognitive  Cognitive/Neuro/Behavioral: WDL                      Living Environment:   People in home: parent(s)     Current living Arrangements: house      Able to return to prior arrangements: yes       Family/Social Support:  Care provided by: self  Provides care for: no one     Parent(s)          Description of Support System: Supportive, Involved    Support Assessment: Adequate family and caregiver support    Current Resources:   Patient receiving home care services: No     Community Resources: None  Equipment currently used at home:    Supplies currently used at home: None    Employment/Financial:  Employment Status: disabled        Financial Concerns:             Does the patient's insurance plan have a 3 day qualifying hospital stay waiver?  No    Lifestyle & Psychosocial Needs:  Social Determinants of Health     Food Insecurity: Food Insecurity Present (12/28/2021)    Hunger Vital Sign     Worried About Running Out of Food in the Last Year: Sometimes true     Ran Out of Food in the Last Year: Sometimes true   Depression: Not at risk (6/28/2023)    PHQ-2     PHQ-2 Score: 0   Housing Stability: High Risk (12/28/2021)    Housing Stability Vital Sign     Unable to Pay for Housing in the Last Year: Yes     Number of Places Lived in the Last Year: 1     Unstable Housing in the Last Year: No   Tobacco Use: Low Risk  (12/27/2023)    Patient History     Smoking Tobacco Use: Never  "    Smokeless Tobacco Use: Never     Passive Exposure: Not on file   Financial Resource Strain: Medium Risk (12/28/2021)    Overall Financial Resource Strain (CARDIA)     Difficulty of Paying Living Expenses: Somewhat hard   Alcohol Use: Not At Risk (12/28/2021)    AUDIT-C     Frequency of Alcohol Consumption: Never     Average Number of Drinks: Patient declined     Frequency of Binge Drinking: Never   Transportation Needs: No Transportation Needs (12/28/2021)    PRAPARE - Transportation     Lack of Transportation (Medical): No     Lack of Transportation (Non-Medical): No   Physical Activity: Inactive (12/28/2021)    Exercise Vital Sign     Days of Exercise per Week: 0 days     Minutes of Exercise per Session: 30 min   Interpersonal Safety: Not on file   Stress: Stress Concern Present (12/28/2021)    [employer] - Occupational Stress Questionnaire     Feeling of Stress : To some extent   Social Connections: Moderately Isolated (12/28/2021)    Social Connection and Isolation Panel [NHANES]     Frequency of Communication with Friends and Family: Once a week     Frequency of Social Gatherings with Friends and Family: More than three times a week     Attends [employer] Services: 1 to 4 times per year     Active Member of Clubs or Organizations: No     Attends Club or Organization Meetings: Not on file     Marital Status: Never        Functional Status:  Prior to admission patient needed assistance:   Dependent ADLs:: Independent  Dependent IADLs:: Independent  Assesssment of Functional Status: At functional baseline    Mental Health Status:  Mental Health Status: No Current Concerns       Chemical Dependency Status:  Chemical Dependency Status: No Current Concerns             Values/Beliefs:  Spiritual, Cultural Beliefs, Adventist Practices, Values that affect care: no               Additional Information:  Per H&P \"Taylor Valiente is a 28 year old male with PMHx significant for " "ESRD 2/2 FSGS now s/p DDKT (12/2023), HTN, stress cardiomyopathy who presents today with 1-day history of diarrhea and red urine. Patient states that he had several episodes of voiding today with increasingly red urine which prompted him to seek evaluation. Accompanied with diarrhea. Denies fevers, chills, headaches, dizziness, SOB, abdominal/flank pain, dysuria, increased pain or burning with urination. Has been tolerating a regular diet at baseline. Has been urinating every day without issues prior to today. Has not missed any immunosuppresions. Has not needed dialysis since transplant. He is being worked up for infectious etiology of his hematuria though his WBC is normal. He has his ureteral stents in place and was scheduled to get them removed today 1/22. The stents are a possible cause of the hematuria. \"    RNCC met with pt at bedside to introduce role and assess for discharge needs r/t elevated readmission risk score. Pt is s/p DDKT 12/2023. Pt reports no changes in address, equipment use, financial, MH, CD concerns. Pt was receiving home care RN services but cancelled and chooses to go to [facility] in [place] for twice weekly transplant labs.     Per pt and provider note, plan for stent removal tomorrow, then discharge home. Pt reports family will transport. No needs noted at this time. Care management will continue to follow should any discharge needs arise.       Thais Larson, RN   Nurse Coordinator    Covering for: Observation  Phone: *[phone]    Social Work and Care Management Department       SEARCHABLE in [facility] - search CARE COORDINATOR       [place] ([phone]) Saturday & Sunday; ([phone]) [facility] Recognized Holidays     Units: 5A, 5B & 5C  Pager: [phone]    Units: 6B, 6C & 6D    Pager: [phone]    Units: 7A, 7B, 7C & 7D    Pager: [phone]    Units: 6A & ICU   Pager: [phone]    Units: 5 Ortho, 5MS & WB ED Pager: [phone]    Units: 6MS, 8A & 10 ICU  Pager " [phone]

## 2024-01-22 NOTE — CONSULTS
[facility]  Transplant Nephrology Consult  Date of Admission:  1/21/2024  Today's Date: 01/22/2024  Requesting physician: Tho Vuong MD    Recommendations:  -Double J stent removal tomorrow 1/23 in OR. Recommend noting if bladder wall cocnerning for hemorrhagic cystitis (would be concerned for BK)  -Send adenovirus PCR blood and urine (ordered)  -add on Mg level (ordered)   -follow-up urine cx  -follow-up tacrolimus level  -new UPCR (ordered)   -collect C.diff  -CMV PCR   -remove abdominal staples as they were placed 12/5     Assessment & Plan   # DDKT: Stable   - Baseline Creatinine: ~ 0.8-0.9   - Proteinuria: Minimal (0.2-0.5 grams). FSGS protocol   - Date DSA Last Checked: Jan/2024      Latest DSA: Low level DSA (<1000 mfi) to dnDSA to DR53, , repeat DSA in 2 weeks   - BK Viremia: Yes, minimally elevated (< 1000), repeat 1/25/24   - Kidney Tx Biopsy: No   - Double J stent placed at time of kidney transplant. Recommend removal 1/23 due to hematuria   -new UPCR (ordered)    -remove abdominal stiches as they were placed 12/5       # Immunosuppression: Tacrolimus immediate release (goal 8-10) and Mycophenolic acid (dose 540 mg every 12 hours)   - Patient is in an immunosuppressed state and will continue to monitor for efficacy and toxicity of immunosuppression medications.   - Changes: No      # Infection Prophylaxis:    - PJP: Sulfa/TMP (Bactrim)   - CMV: Valganciclovir (Valcyte) R +/D -  480 mg po daily for three months.       # Hypertension: controlled  ;   Goal BP: < 140/90 (hospitalization goal)   - Volume status: euvolemia   EDW ~ 101 kg   - Continue PTA Amlodipine 10mg daily, coreg 25mg bid, hydralazine 50mg bid      # Anemia in Chronic Renal Disease: Hgb: Stable ,12.9 BRIE: No              -Iron studies: replete    # Mineral Bone Disorder:    - Secondary renal hyperparathyroidism; PTH level: Moderately elevated (301-600 pg/ml)        On treatment:  Cinacalcet   - Vitamin D; level: Normal        On supplement: Yes   - Calcium; level: Normal        On supplement: No   - Phosphorus; level: Normal        On supplement: No    #hematuria    -UA shows RBC >180,  WBC 67   -BK is minimally detected at 31. Recommend looking at bladder wall tmrw when removing stent to see if it is concerning for hemorrhagic cystitis   Renal US :Nonspecific borderline elevation of resistive indices which can be seen in the setting of acute tubular necrosis or transplant rejection. No perinephric fluid collections. No hydronephrosis.   -Double J stent removal tomorrow 1/23   -follow-up urine cx   -Send adenovirus PCR blood and urine (ordered)      #Diarrhea    -collect C.diff and stool panel    -CMV PCR     # BK viremia:   -Low level, unclear if patient's hematuria is 2/2 BK cystitis.    -If hematuria returns after stent removal would recommend cystoscopy per urology   -Recommend transplant surgery looks at bladder wall tmrw when removing stent to see if it is concerning for hemorrhagic cystitis (possibly 2/2 BK). Would not send BK PCR urine as it is going to be positive due to BK viremia.    -Repeat BK PCR on 1/25. IgG level 647      # Transplant History:  Etiology of Kidney Failure: Focal segmental glomerulosclerosis (FSGS)  Tx: DDKT  Transplant: 12/5/2023 (Kidney)  Crossmatch at time of Tx: negative  Significant changes in immunosuppression: None  Significant transplant-related complications: BK Viremia and DSA    Recommendations were communicated to the primary team verbally.    Seen and discussed with Dr. Isabell Walker MD    Physician Attestation   I, Fausto Whyte MD, personally examined and evaluated this patient.  I discussed the patient with the resident/fellow and care team, and agree with the assessment and plan of care as documented in the note on 01/22/24 .      I personally reviewed vital signs, medications, labs, and imaging.  Fausto Whyte MD  Date of Service  (when I saw the patient): 01/22/24          REASON FOR CONSULT   Hematuria     History of Present Illness   Taylor Valiente is a 28 year old male with PMHx significant for ESRD 2/2 FSGS now s/p DDKT (12/2023), HTN, stress cardiomyopathy who presents today with 1-day history of diarrhea and red urine. Patient states that he had several episodes of voiding today with increasingly red urine which prompted him to seek evaluation. Accompanied with diarrhea. Denies fevers, chills, headaches, dizziness, SOB, abdominal/flank pain, dysuria, increased pain or burning with urination. Has been tolerating a regular diet at baseline. Has been urinating every day without issues prior to today. Has not missed any immunosuppresions. Has not needed dialysis since transplant. He is being worked up for infectious etiology of his hematuria though his WBC is normal. He has his ureteral stents in place and was scheduled to get them removed today 1/22. The stents are a possible cause of the hematuria.     Review of Systems    The 10 point Review of Systems is negative other than noted in the HPI     Past Medical History    I have reviewed this patient's medical history and updated it with pertinent information if needed.   Past Medical History:   Diagnosis Date     Adrenal adenoma, left      Anemia      Benign essential hypertension 07/28/2017     Bursitis of left shoulder 07/29/2023     Chronic deep vein thrombosis (DVT) of internal jugular vein (H) 03/2023    Right IJ, catheter-related     CKD (chronic kidney disease) stage 5, GFR less than 15 ml/min (H) 2021    FSGS     Depression      Focal glomerular sclerosis 07/28/2017     HLD (hyperlipidemia)      Kidney replaced by transplant 12/05/2023    DDKT. Induction w/ Thymoglobulin 2mg/kg and basiliximab.     LVH (left ventricular hypertrophy) due to hypertensive disease 07/14/2017     Noncompliance      Obesity, unspecified      OD (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/2021     Prolonged  QT interval      Staphylococcus infection 2023: Blood     Stress-induced cardiomyopathy      Suicide attempt (H) 2019       Past Surgical History   I have reviewed this patient's surgical history and updated it with pertinent information if needed.  Past Surgical History:   Procedure Laterality Date     ARTHROSCOPY ANKLE Left 2021    Procedure: Left ankle arthroscopy and debridement/micro fracture;  Surgeon: Mirza Nelson MD;  [facility]: [facility]     BIOPSY  2017    [facility]- [facility]     CREATE FISTULA ARTERIOVENOUS UPPER EXTREMITY Right 2021    Procedure: RIGHT proximal radial  to CEPHALIC ARTERIOVENOUS FISTULA;  Surgeon: Henrik Moran MD;  Location: [facility] OR     CREATE FISTULA ARTERIOVENOUS UPPER EXTREMITY Left 2023    Procedure: CREATION OF FIRST STAGE LEFT BRACHIOBASILIC ARTERIOVENOUS FISTULA;  Surgeon: Henrik Moran MD;  [facility]: [facility] OR     CREATE FISTULA ARTERIOVENOUS UPPER EXTREMITY Left 2023    Procedure: SECOND STAGE LEFT BRACHIAL TO BASILIC ARTERIOVENOUS FISTULA;  Surgeon: Henrik Moran MD;  [facility]: [facility]     IR CVC TUNNEL PLACEMENT > 5 YRS OF AGE  2021     IR CVC TUNNEL PLACEMENT > 5 YRS OF AGE  2023     IR CVC TUNNEL REMOVAL RIGHT  2021     IR CVC TUNNEL REMOVAL RIGHT  2023     IRRIGATION AND DEBRIDEMENT UPPER EXTREMITY, COMBINED Left 2023    Procedure: EVACUATION OF LEFT ARM HEMATOMA;  Surgeon: Henrik Moran MD;  Location: [facility]     LIGATE FISTULA ARTERIOVENOUS UPPER EXTREMITY Right 2023    Procedure: LIGATION OF RIGHT ARM ARTERIOVENOUS FISTULA;  Surgeon: Henrik Moran MD;  [facility]: [facility]     REVISION FISTULA ARTERIOVENOUS UPPER EXTREMITY Right 2021    Procedure: Second stage RIGHT BRACHIOCEPHALIC transposition ARTERIOVENOUS FISTULA;  Surgeon: Henrik Moran MD;  [facility]     TRANSPLANT KIDNEY RECIPIENT  DONOR Left 2023    Procedure: Transplant kidney  recipient  donor, ureteral stent placement;  Surgeon: Shi Jimenez MD;  [facility]       Family History   I have reviewed this patient's family history and updated it with pertinent information if needed.   Family History   Problem Relation Age of Onset     Blood Disease Mother         has hep b     Diabetes Mother         gestionanal diabetes     Hypertension Mother      Obesity Father      Hypertension Father      Hypertension Maternal Grandmother      Diabetes Maternal Grandmother      Hypertension Paternal Grandmother      Hypertension Paternal Grandfather      Coronary Artery Disease Maternal Uncle      Cancer No family hx of        Social History   I have reviewed this patient's social history and updated it with pertinent information if needed. Taylor Valiente  reports that he has never smoked. He has never used smokeless tobacco. He reports current drug use. Drug: Marijuana. He reports that he does not drink alcohol.    Allergies   Allergies   Allergen Reactions     Benadryl [Diphenhydramine] Itching     Vancomycin Itching     Prior to Admission Medications     amLODIPine  10 mg Oral Daily     aspirin  81 mg Oral Daily     atorvastatin  10 mg Oral Daily     magnesium oxide  800 mg Oral BID     mycophenolic acid  540 mg Oral BID     phosphorus tablet 250 mg  500 mg Oral BID     senna-docusate  1 tablet Oral BID    Or     senna-docusate  2 tablet Oral BID     sodium bicarbonate  1,950 mg Oral TID     sodium chloride (PF)  3 mL Intracatheter Q8H     sulfamethoxazole-trimethoprim  1 tablet Oral Daily     tacrolimus  9 mg Oral BID     valGANciclovir  900 mg Oral Daily         Physical Exam   Temp  Av.2  F (36.8  C)  Min: 97.7  F (36.5  C)  Max: 98.7  F (37.1  C)      Pulse  Av.6  Min: 74  Max: 89 Resp  Av.2  Min: 16  Max: 18  SpO2  Av.4 %  Min: 97 %  Max: 100 %     BP (!) 143/90   Pulse 81   Temp 98.6  F (37  C)   Resp 18   Ht 1.829 m (6')   Wt 106.1 kg (234 lb)    SpO2 97%   BMI 31.74 kg/m     Date 01/22/24 0700 - 01/23/24 0659   Shift 5022-6775 3152-7329 0135-9521 24 Hour Total   INTAKE   Shift Total(mL/kg)       OUTPUT   Urine 400   400   Shift Total(mL/kg) 400(3.77)   400(3.77)   Weight (kg) 106.14 106.14 106.14 106.14      Admit Weight: 106.1 kg (234 lb)     GENERAL APPEARANCE: alert and no distress  HENT: mouth without ulcers or lesions  RESP: lungs clear to auscultation - no rales, rhonchi or wheezes  CV: regular rhythm, normal rate, no rub, no murmur  EDEMA: no LE edema bilaterally  ABDOMEN: soft, nondistended, nontender, bowel sounds normal  MS: extremities normal - no gross deformities noted, no evidence of inflammation in joints, no muscle tenderness  SKIN: no rash    Data   CMP  Recent Labs   Lab 01/22/24  0703 01/21/24 2230 01/18/24  0836    134* 134*   POTASSIUM 4.2 4.5 4.5   CHLORIDE 108* 105 105   CO2 17* 20* 21*   ANIONGAP 11 9 8   * 115* 123*   BUN 19.9 24.8* 18.4   CR 0.98 1.13 0.86   GFRESTIMATED >90 >90 >90   BUBBA 9.7 9.8 10.0   MAG 1.4*  --  1.4*   PHOS 2.5  --  2.3*     CBC  Recent Labs   Lab 01/22/24  0703 01/21/24 2230 01/18/24  0836   HGB 12.9* 12.4* 13.5   WBC 7.1 8.4 7.6   RBC 4.32* 4.25* 4.66   HCT 40.0 38.7* 43.0   MCV 93 91 92   MCH 29.9 29.2 29.0   MCHC 32.3 32.0 31.4*   RDW 16.8* 16.9* 17.3*    210 210     INR  Recent Labs   Lab 01/21/24 2230   INR 1.15     ABGNo lab results found in last 7 days.   Urine Studies  Recent Labs   Lab Test 01/21/24  2243 12/11/23  0943 06/15/21  2220 02/09/21  1103 09/09/20  1305 07/14/17  0948   COLOR Orange* Light Yellow Light Yellow Yellow   < > Yellow   APPEARANCE Slightly Cloudy* Clear Clear Slightly Cloudy   < > Clear   URINEGLC Negative 200* 50* Negative   < > Negative   URINEBILI Negative Negative Negative Negative   < > Negative   URINEKETONE Negative Negative Negative Negative   < > Negative   SG 1.021 1.017 1.015 1.013   < > 1.025   UBLD Large* Moderate* Trace* Negative   < >  Moderate*   URINEPH 6.0 5.5 6.5 5.0   < > 5.5   PROTEIN 70* Negative 600* >499*   < > >=300*   UROBILINOGEN  --   --   --   --   --  0.2   NITRITE Negative Negative Negative Negative   < > Negative   LEUKEST Negative Negative Negative Trace*   < > Negative   RBCU >182* >182* 1 2   < > O - 2   WBCU 67* 3 3 4   < > 2-5*    < > = values in this interval not displayed.     Recent Labs   Lab Test 07/03/19  1529 07/17/17  0930 07/14/17  1735   UTPG 2.71* 1.94* 2.31*     PTH  Recent Labs   Lab Test 12/19/23  0919 12/10/23  0803 09/11/20  0634   PTHI 155* 318* 440*     Iron Studies  Recent Labs   Lab Test 12/19/23  0955 12/10/23  0803 09/11/20  0634 07/15/17  0635   IRON 30* 80 33* 38   * 193* 183* 247   IRONSAT 14* 41 18 15   CARI 1,477* 1,743* 325 341       IMAGING:  All imaging studies reviewed by me.

## 2024-01-22 NOTE — TELEPHONE ENCOUNTER
Patient reports new onset hematuria that started today.  Denies clots, pain with urination or difficulty with urination, or fever.    States he has also had multiple episodes of diarrhea today and is having RLQ pain as well as mid back pain.    Patient instructed to present to [facility] ED for evaluation.  He V/U    My Singh RN   Transplant Coordinator  [phone]

## 2024-01-22 NOTE — H&P
[facility]    History and Physical - Transplant Surgery Service       Date of Admission:  1/21/2024    Assessment & Plan: Surgery   Taylor Valiente is a 28 year old male admitted on 1/21/2024. He has a PMHx significant for ESRD 2/2 FSGS s/p DDKT (12/5/23) who presents with 1-day history of hematuria and diarrhea. Vitals stable, labs unremkarable at this time. Infectious work-up with urine culture and C.diff pending though no acute sign/symptoms of infection at this time.     - Admit to obs under transplant surgery  - Regular diet as tolerated  - No indication for antibiotics at this time  - Will follow work-up        Diet: Regular Diet Adult    DVT Prophylaxis: Low Risk/Ambulatory with no VTE prophylaxis indicated  Hurt Catheter: Not present  Lines: PRESENT             Drains: None     Cardiac Monitoring: None  Code Status: Full Code      Clinically Significant Risk Factors Present on Admission                # Drug Induced Platelet Defect: home medication list includes an antiplatelet medication   # Hypertension: Noted on problem list                 Disposition Plan      Expected Discharge Date: 01/22/2024                 The patient's care was discussed with the Chief Resident/Fellow.    Anni Lopez MD  General Surgery, PGY-2  [facility]  Text page via [facility] Paging/Directory     ______________________________________________________________________    Chief Complaint   Red urine, Diarrhea    History is obtained from the patient    History of Present Illness   Taylor Valiente is a 28 year old male with PMHx significant for ESRD 2/2 FSGS now s/p DDKT (12/2023), HTN, stress cardiomyopathy who presents today with 1-day history of diarrhea and red urine. Patient states that he had several episodes of voiding today with increasingly red urine which prompted him to seek evaluation. Accompanied with diarrhea. Denies  fevers, chills, headaches, dizziness, SOB, abdominal/flank pain, dysuria, increased pain or burning with urination. Has been tolerating a regular diet at baseline. Has been urinating every day without issues prior to today. Has not missed any immunosuppresions. Has not needed dialysis since transplant.       Past Medical History    Past Medical History:   Diagnosis Date    Adrenal adenoma, left     Anemia     Benign essential hypertension 07/28/2017    Bursitis of left shoulder 07/29/2023    Chronic deep vein thrombosis (DVT) of internal jugular vein (H) 03/2023    Right IJ, catheter-related    CKD (chronic kidney disease) stage 5, GFR less than 15 ml/min (H) 2021    FSGS    Depression     Focal glomerular sclerosis 07/28/2017    HLD (hyperlipidemia)     Kidney replaced by transplant 12/05/2023    DDKT. Induction w/ Thymoglobulin 2mg/kg and basiliximab.    LVH (left ventricular hypertrophy) due to hypertensive disease 07/14/2017    Noncompliance     Obesity, unspecified     OD (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/2021    Prolonged QT interval     Staphylococcus infection 07/27/2023 07/27/23: Blood    Stress-induced cardiomyopathy     Suicide attempt (H) 2019       Past Surgical History   Past Surgical History:   Procedure Laterality Date    ARTHROSCOPY ANKLE Left 02/24/2021    Procedure: Left ankle arthroscopy and debridement/micro fracture;  Surgeon: Mirza Nelson MD;  [facility]    BIOPSY  2017    renal- [facility]    CREATE FISTULA ARTERIOVENOUS UPPER EXTREMITY Right 03/04/2021    Procedure: RIGHT proximal radial  to CEPHALIC ARTERIOVENOUS FISTULA;  Surgeon: Henrik Moran MD;  Location:  OR    CREATE FISTULA ARTERIOVENOUS UPPER EXTREMITY Left 03/09/2023    Procedure: CREATION OF FIRST STAGE LEFT BRACHIOBASILIC ARTERIOVENOUS FISTULA;  Surgeon: Henrik Moran MD;  Location:  OR    CREATE FISTULA ARTERIOVENOUS UPPER EXTREMITY Left 5/30/2023    Procedure: SECOND STAGE LEFT  BRACHIAL TO BASILIC ARTERIOVENOUS FISTULA;  Surgeon: Henrik Moran MD;  Location: [facility] OR    IR CVC TUNNEL PLACEMENT > 5 YRS OF AGE  2021    IR CVC TUNNEL PLACEMENT > 5 YRS OF AGE  2023    IR CVC TUNNEL REMOVAL RIGHT  2021    IR CVC TUNNEL REMOVAL RIGHT  2023    IRRIGATION AND DEBRIDEMENT UPPER EXTREMITY, COMBINED Left 2023    Procedure: EVACUATION OF LEFT ARM HEMATOMA;  Surgeon: Henrik Moran MD;  Location: [facility] OR    LIGATE FISTULA ARTERIOVENOUS UPPER EXTREMITY Right 2023    Procedure: LIGATION OF RIGHT ARM ARTERIOVENOUS FISTULA;  Surgeon: Henrik Moran MD;  [facility]: [facility] OR    REVISION FISTULA ARTERIOVENOUS UPPER EXTREMITY Right 2021    Procedure: Second stage RIGHT BRACHIOCEPHALIC transposition ARTERIOVENOUS FISTULA;  Surgeon: Henrik Moran MD;  [facility]: [facility] OR    TRANSPLANT KIDNEY RECIPIENT  DONOR Left 2023    Procedure: Transplant kidney recipient  donor, ureteral stent placement;  Surgeon: Shi Jimenez MD;  [facility]       Prior to Admission Medications   Prior to Admission Medications   Prescriptions Last Dose Informant Patient Reported? Taking?   Multiple Vitamin (MULTIVITAMIN ADULT PO)   Yes No   Sig: Take 1 tablet by mouth daily   Sodium Bicarbonate, antacid, POWD   No No   Sig: Mix 1/2 teaspoon of baking soda in full glass of water once daily   acetaminophen (TYLENOL) 325 MG tablet   No No   Sig: Take 2 tablets (650 mg) by mouth every 4 hours as needed for pain   amLODIPine (NORVASC) 10 MG tablet  Self, Other Yes No   Sig: Take 10 mg by mouth daily    aspirin 81 MG EC tablet  Self, Other Yes No   Sig: Take 81 mg by mouth daily   atorvastatin (LIPITOR) 10 MG tablet   No No   Sig: Take 1 tablet (10 mg) by mouth daily   carvedilol (COREG) 25 MG tablet  Self, Other No No   Sig: TAKE TWO TABLETS BY MOUTH TWICE A DAY WITH A MEAL Strength: 25 mg   hydrALAZINE (APRESOLINE) 100 MG tablet   No No   Sig: Take 0.5 tablets (50  mg) by mouth 2 times daily   magnesium oxide (MAG-OX) 400 MG tablet   No No   Sig: Take 2 tablets (800 mg) by mouth 2 times daily   medical cannabis (Patient's own supply)   Yes No   Sig: See Admin Instructions (The purpose of this order is to document that the patient reports taking medical cannabis.  This is not a prescription, and is not used to certify that the patient has a qualifying medical condition.)    Patient was advised on the cannabis-tacrolimus drug interaction.   Patient not taking: Reported on 12/27/2023   mycophenolic acid (GENERIC EQUIVALENT) 180 MG EC tablet   No No   Sig: Take 3 tablets (540 mg) by mouth 2 times daily   nitroGLYcerin (NITROSTAT) 0.3 MG sublingual tablet  Self, Other No No   Sig: For chest pain place 1 tablet under the tongue every 5 minutes for 3 doses. If symptoms persist 5 minutes after 1st dose call 911.   Patient not taking: Reported on 12/27/2023   phosphorus tablet 250 mg 250 MG per tablet   No No   Sig: Take 2 tablets (500 mg) by mouth 2 times daily   sodium bicarbonate 650 MG tablet   No No   Sig: Take 3 tablets (1,950 mg) by mouth 3 times daily   sulfamethoxazole-trimethoprim (BACTRIM) 400-80 MG tablet   No No   Sig: Take 1 tablet by mouth daily   tacrolimus (GENERIC) 0.5 MG capsule   No No   Sig: Profile Rx: patient will contact pharmacy when needed   tacrolimus (GENERIC) 1 MG capsule   No No   Sig: Take 9 capsules (9 mg) by mouth 2 times daily Total dose 8.5mg twice daily Profile Rx: patient will contact pharmacy when needed   valGANciclovir (VALCYTE) 450 MG tablet   No No   Sig: Take 2 tablets (900 mg) by mouth daily for 4 days      Facility-Administered Medications: None          Physical Exam   Vital Signs: Temp: 97.8  F (36.6  C) Temp src: Oral BP: 132/84 Pulse: 89   Resp: 17 SpO2: 100 % O2 Device: None (Room air)    Weight: 0 lbs 0 ozNo intake or output data in the 24 hours ending 01/22/24 0011    AAOx3, NAD  NLB on RA  RRR by radial pulse  Abdomen soft, NT,  ND   exam deferred  Previous RUE AVF without issues    Data

## 2024-01-22 NOTE — ANESTHESIA PREPROCEDURE EVALUATION
Anesthesia Pre-Procedure Evaluation    Patient: Taylor Valiente   MRN: [id number] : 1995        Procedure : Procedure(s):  CYSTOSCOPY, WITH URETERAL STENT REMOVAL          Past Medical History:   Diagnosis Date    Adrenal adenoma, left     Anemia     Benign essential hypertension 2017    Bursitis of left shoulder 2023    Chronic deep vein thrombosis (DVT) of internal jugular vein (H) 2023    Right IJ, catheter-related    CKD (chronic kidney disease) stage 5, GFR less than 15 ml/min (H)     FSGS    Depression     Focal glomerular sclerosis 2017    HLD (hyperlipidemia)     Kidney replaced by transplant 2023    DDKT. Induction w/ Thymoglobulin 2mg/kg and basiliximab.    LVH (left ventricular hypertrophy) due to hypertensive disease 2017    Noncompliance     Obesity, unspecified     OD (osteochondritis dissecans) 2021    Prolonged QT interval     Staphylococcus infection 2023: Blood    Stress-induced cardiomyopathy     Suicide attempt (H)       Past Surgical History:   Procedure Laterality Date    ARTHROSCOPY ANKLE Left 2021    Procedure: Left ankle arthroscopy and debridement/micro fracture;  Surgeon: Mirza Nelson MD;  Location[facility]    [facility]    [employer] [employer] [facility] [employer]    CREATE FISTULA ARTERIOVENOUS UPPER EXTREMITY Right 2021    Procedure: RIGHT proximal radial  to CEPHALIC ARTERIOVENOUS FISTULA;  Surgeon: Henrik Moran MD;  Location:  OR    CREATE FISTULA ARTERIOVENOUS UPPER EXTREMITY Left 2023    Procedure: CREATION OF FIRST STAGE LEFT BRACHIOBASILIC ARTERIOVENOUS FISTULA;  Surgeon: Henrik Moran MD;  Location: [facility]    CREATE FISTULA ARTERIOVENOUS UPPER EXTREMITY Left 2023    Procedure: SECOND STAGE LEFT BRACHIAL TO BASILIC ARTERIOVENOUS FISTULA;  Surgeon: Henrik Moran MD;  Location: [facility] OR    IR CVC TUNNEL PLACEMENT > 5 YRS OF AGE  2021    IR CVC TUNNEL PLACEMENT > 5  YRS OF AGE  2023    IR CVC TUNNEL REMOVAL RIGHT  2021    IR CVC TUNNEL REMOVAL RIGHT  2023    IRRIGATION AND DEBRIDEMENT UPPER EXTREMITY, COMBINED Left 2023    Procedure: EVACUATION OF LEFT ARM HEMATOMA;  Surgeon: Henrik Moran MD;  Location: [facility]    LIGATE FISTULA ARTERIOVENOUS UPPER EXTREMITY Right 2023    Procedure: LIGATION OF RIGHT ARM ARTERIOVENOUS FISTULA;  Surgeon: Henrik Moran MD;  Location: [facility]    REVISION FISTULA ARTERIOVENOUS UPPER EXTREMITY Right 2021    Procedure: Second stage RIGHT BRACHIOCEPHALIC transposition ARTERIOVENOUS FISTULA;  Surgeon: Henrik Moran MD;  Location: [facility]    TRANSPLANT KIDNEY RECIPIENT  DONOR Left 2023    Procedure: Transplant kidney recipient  donor, ureteral stent placement;  Surgeon: Shi Jimenez MD;  Location: [facility]      Allergies   Allergen Reactions    Benadryl [Diphenhydramine] Itching    Vancomycin Itching      Social History     Tobacco Use    Smoking status: Never    Smokeless tobacco: Never   Substance Use Topics    Alcohol use: No      Wt Readings from Last 1 Encounters:   24 106.1 kg (234 lb)        Anesthesia Evaluation   Pt has had prior anesthetic. Type: General.    No history of anesthetic complications (Prior anesthesia with SGA and most recently ETT with G1V,  VL with glidescope 4 blade)       ROS/MED HX  ENT/Pulmonary:  - neg pulmonary ROS     Neurologic:  - neg neurologic ROS     Cardiovascular: Comment: LV Hypertrophy  Stress induced CM    (+) Dyslipidemia hypertension- -   -  - -                                 Previous cardiac testing   Echo: Date: Results:    Stress Test:  Date: 23 Results:     The nuclear stress test is negative for inducible myocardial ischemia or infarction.     LVEDv 246ml. LVESv 124ml. LV EF 50%.     There is no prior study for comparison.    ECG Reviewed:  Date: 23 Results:  NSR  Non-specific T-wave abnormality   Qt interval -  466  Cath:  Date: Results:      METS/Exercise Tolerance:     Hematologic: Comments: Had a prior right IJ tunneled dialysis catheter and developed occlusive thrombus of his right internal jugular vein now s/p anticoagulation with Eliquis.    (+) History of blood clots,     anemia,          Musculoskeletal: Comment: L shoulder pain      GI/Hepatic:  - neg GI/hepatic ROS     Renal/Genitourinary: Comment: Hyperkalemia    (+) renal disease, type: ESRD, Pt does not require dialysis,  Pt has history of transplant, date: 12/5/2023,        Endo:     (+)               Obesity,       Psychiatric/Substance Use:     (+)     Recreational drug usage: Cannabis.    Infectious Disease: Comment: Enteric precautions r/o c. dif 1/22/2024      Malignancy:       Other:            Physical Exam    Airway        Mallampati: II   TM distance: > 3 FB   Neck ROM: full   Mouth opening: > 3 cm    Respiratory Devices and Support         Dental       (+) Completely normal teeth      Cardiovascular   cardiovascular exam normal       Rhythm and rate: regular and normal     Pulmonary   pulmonary exam normal        breath sounds clear to auscultation           OUTSIDE LABS:  CBC:   Lab Results   Component Value Date    WBC 7.1 01/22/2024    WBC 8.4 01/21/2024    HGB 12.9 (L) 01/22/2024    HGB 12.4 (L) 01/21/2024    HCT 40.0 01/22/2024    HCT 38.7 (L) 01/21/2024     01/22/2024     01/21/2024     BMP:   Lab Results   Component Value Date     01/22/2024     (L) 01/21/2024    POTASSIUM 4.2 01/22/2024    POTASSIUM 4.5 01/21/2024    CHLORIDE 108 (H) 01/22/2024    CHLORIDE 105 01/21/2024    CO2 17 (L) 01/22/2024    CO2 20 (L) 01/21/2024    BUN 19.9 01/22/2024    BUN 24.8 (H) 01/21/2024    CR 0.98 01/22/2024    CR 1.13 01/21/2024     (H) 01/22/2024     (H) 01/21/2024     COAGS:   Lab Results   Component Value Date    PTT 30 12/05/2023    INR 1.15 01/21/2024     POC:   Lab Results   Component Value Date     (H)  02/24/2021     HEPATIC:   Lab Results   Component Value Date    ALBUMIN 4.5 12/05/2023    PROTTOTAL 7.7 12/05/2023    ALT 20 12/05/2023    AST 13 12/05/2023    ALKPHOS 138 12/05/2023    BILITOTAL 0.5 12/05/2023     OTHER:   Lab Results   Component Value Date    LACT 0.7 01/21/2024    A1C 5.9 (H) 12/05/2023    BUBBA 9.7 01/22/2024    PHOS 2.5 01/22/2024    MAG 1.4 (L) 01/22/2024    LIPASE 304 11/29/2021    TSH 1.67 03/03/2022    T4 1.26 07/15/2017    CRP 7.9 11/29/2020    SED 63 (H) 08/02/2023       Anesthesia Plan    ASA Status:  3    NPO Status:  NPO Appropriate    Anesthesia Type: MAC.     - Reason for MAC: straight local not clinically adequate   Induction: Propofol.   Maintenance: TIVA.   Techniques and Equipment:     - Lines/Monitors: BIS     Consents    Anesthesia Plan(s) and associated risks, benefits, and realistic alternatives discussed. Questions answered and patient/representative(s) expressed understanding.     - Discussed:     - Discussed with:  Patient      - Extended Intubation/Ventilatory Support Discussed: No.      - Patient is DNR/DNI Status: No     Use of blood products discussed: No .     Postoperative Care    Pain management: Oral pain medications, IV analgesics.   PONV prophylaxis: Ondansetron (or other 5HT-3)     Comments:               Lindsey Garcia MD    I have reviewed the pertinent notes and labs in the chart from the past 30 days and (re)examined the patient.  Any updates or changes from those notes are reflected in this note.     # Hyponatremia: Lowest Na = 134 mmol/L in last 30 days, will monitor as appropriate    # Hypomagnesemia: Lowest Mg = 1.4 mg/dL in last 2 days, will replace as needed      # Drug Induced Platelet Defect: home medication list includes an antiplatelet medication  # Obesity: Estimated body mass index is 31.74 kg/m  as calculated from the following:    Height as of this encounter: 1.829 m (6').    Weight as of this encounter: 106.1 kg (234 lb).

## 2024-01-22 NOTE — PROGRESS NOTES
Transplant Surgery  Inpatient Daily Progress Note  01/22/2024    Assessment & Plan: Taylor Valiente is a 28 year old male with PMHx significant for ESRD 2/2 FSGS now s/p DDKT (12/2023), HTN, stress cardiomyopathy who presents today with 1-day history of diarrhea and red urine. Patient states that he had several episodes of voiding today with increasingly red urine which prompted him to seek evaluation. Accompanied with diarrhea. Denies fevers, chills, headaches, dizziness, SOB, abdominal/flank pain, dysuria, increased pain or burning with urination. Has been tolerating a regular diet at baseline. Has been urinating every day without issues prior to today. Has not missed any immunosuppresions. Has not needed dialysis since transplant. He is being worked up for infectious etiology of his hematuria though his WBC is normal. He has his ureteral stents in place and was scheduled to get them removed today 1/22. The stents are a possible cause of the hematuria.    -scheduled for cystoscopy tomorrow at 1230 1/23 for removal of ureteral stents    Graft function:good, stable.  Immunosuppression management: Unchanged.   -Mycophenolic acid 540 BID   -Tacrolimus     Complexity of management:High. Contributing factors:  hematuria  Hematology: no anemia of chronic disease at present  Cardiorespiratory: Stable on norvasc 10mg daily, atorvastatin 10mg daily, aspirin 81mg daily   GI/Nutrition: regular diet  Bowel reg: senna   Endocrine: no diabetes  Fluid/Electrolytes: replace Mg. 800mg BID, replace phos 250mg BID, replace sodium biacarb 1950mg TID  : no griffiths, voiding spontaneously  Infectious disease: labs sent for adenovirus, GAS, BK, CMV and enteric bacterial and viral panel sent to rule out infectious etiology  -stool culture for Cdiff    Prophylaxis: Bactrim PCP Ppx, Valcyte for CMV, ASA  Disposition: admit to 7A     Medical Decision Making: High  Admit [id number] (high level decision making)    HENRIQUE/Fellow/Resident Provider:  Nadine Short MD    Faculty: Tho Vuong M.D., Ph.D.  _________________________________________________________________  Transplant History: Admitted 1/21/2024 for hematuria and diarrhea.  12/5/2023 (Kidney), Postoperative day: 48     Interval History: History is obtained from the patient  Overnight events: patient had 3 episodes of hematuria and 5 episodes of diarrhea yesterday. He denies nausea/vomiting, fevers/chills, abdominal pain at present.    ROS:   A 10-point review of systems was negative except as noted above.    Meds:   amLODIPine  10 mg Oral Daily    aspirin  81 mg Oral Daily    atorvastatin  10 mg Oral Daily    magnesium oxide  800 mg Oral BID    mycophenolic acid  540 mg Oral BID    phosphorus tablet 250 mg  500 mg Oral BID    senna-docusate  1 tablet Oral BID    Or    senna-docusate  2 tablet Oral BID    sodium bicarbonate  1,950 mg Oral TID    sodium chloride (PF)  3 mL Intracatheter Q8H    sulfamethoxazole-trimethoprim  1 tablet Oral Daily    tacrolimus  9 mg Oral BID    valGANciclovir  900 mg Oral Daily       Physical Exam:     Admit Weight: 106.1 kg (234 lb)    Current vitals:   BP (!) 141/79 (BP Location: Right arm)   Pulse 75   Temp 98.4  F (36.9  C) (Oral)   Resp 17   Ht 1.829 m (6')   Wt 106.1 kg (234 lb)   SpO2 100%   BMI 31.74 kg/m           Vital sign ranges:    Temp:  [97.7  F (36.5  C)-98.4  F (36.9  C)] 98.4  F (36.9  C)  Pulse:  [75-89] 75  Resp:  [17-18] 17  BP: (118-141)/(79-84) 141/79  SpO2:  [100 %] 100 %  Patient Vitals for the past 24 hrs:   BP Temp Temp src Pulse Resp SpO2 Height Weight   01/22/24 0509 (!) 141/79 98.4  F (36.9  C) Oral 75 17 100 % -- --   01/22/24 0005 132/84 97.8  F (36.6  C) Oral 89 17 100 % 1.829 m (6') 106.1 kg (234 lb)   01/21/24 2148 118/79 97.7  F (36.5  C) Oral 79 18 100 % -- --     General Appearance: in no apparent distress.   Skin: normal  Heart: regular rate and rhythm per radial pulse  Lungs: unlabored breathing on RA  Abdomen: The abdomen  is rounded, and  non-tender, generalized. he is not tender over the bladder graft. The wound is Healing well.  : griffiths is not present.      Data:   CMP  Recent Labs   Lab 01/22/24  0703 01/21/24  2230 01/18/24  0836    134* 134*   POTASSIUM 4.2 4.5 4.5   CHLORIDE 108* 105 105   CO2 17* 20* 21*   * 115* 123*   BUN 19.9 24.8* 18.4   CR 0.98 1.13 0.86   GFRESTIMATED >90 >90 >90   BUBBA 9.7 9.8 10.0   MAG  --   --  1.4*   PHOS  --   --  2.3*     CBC  Recent Labs   Lab 01/22/24  0703 01/21/24  2230   HGB 12.9* 12.4*   WBC 7.1 8.4    210     COAGS  Recent Labs   Lab 01/21/24  2230   INR 1.15      Urinalysis  Recent Labs   Lab Test 01/21/24  2243 12/11/23  0943 09/09/20  1305 07/03/19  1529 07/17/17  0930   COLOR Orange* Light Yellow   < >  --   --    APPEARANCE Slightly Cloudy* Clear   < >  --   --    URINEGLC Negative 200*   < >  --   --    URINEBILI Negative Negative   < >  --   --    URINEKETONE Negative Negative   < >  --   --    SG 1.021 1.017   < >  --   --    UBLD Large* Moderate*   < >  --   --    URINEPH 6.0 5.5   < >  --   --    PROTEIN 70* Negative   < >  --   --    NITRITE Negative Negative   < >  --   --    LEUKEST Negative Negative   < >  --   --    RBCU >182* >182*   < >  --   --    WBCU 67* 3   < >  --   --    UTPG  --   --   --  2.71* 1.94*    < > = values in this interval not displayed.     Virology:  Hepatitis C Antibody   Date Value Ref Range Status   12/05/2023 Nonreactive Nonreactive Final   02/09/2021 Nonreactive NR^Nonreactive Final     Comment:     Assay performance characteristics have not been established for newborns,   infants, and children

## 2024-01-22 NOTE — UTILIZATION REVIEW
"  [facility] Utilization Review  Admission Status; Secondary Review Determination     Admission Date: 1/21/2024  9:54 PM      Under the authority of the Utilization Management Committee, the utilization review process indicated a secondary review on the above patient.  The review outcome is based on review of the medical records, discussions with staff, and applying clinical experience noted on the date of the review.        (X) Observation Status Appropriate - This patient does not meet hospital inpatient criteria and is placed in observation status. If this patient's primary payer is Medicare and was admitted as an inpatient, Condition Code 44 should be used and patient status changed to \"observation\".   () Observation Status concurrent Review           RATIONALE FOR DETERMINATION   28-year-old male with history of ESRD secondary to FSGS, now status post DDKT, hypertension, stress cardiomyopathy, admitted with diarrhea and red urine, several episodes of voiding with increasing hematuria with diarrhea.  Patient has ureteral stents in place, with scheduled for cystoscopy tomorrow for urine removal of ureteral stents.  Hemoglobin and creatinine have been stable, no leukocytosis.  C. difficile negative.  Complex patient who has ureteral stents, with renal ultrasound showed no fluid collections or hydronephrosis, plan for stent removal tomorrow with cystoscopy, does not meet criteria for inpatient stay, recommend continue observation status      The severity of illness, intensity of service provided, expected LOS make the care appropriate for observation status at this time.        The information on this document is developed by the utilization review team in order for the business office to ensure compliance.  This only denotes the appropriateness of proper admission status and does not reflect the quality of care rendered.         The definitions of Inpatient Status and Observation Status used in making " the determination above are those provided in the CMS Coverage Manual, Chapter 1 and Chapter 6, section 70.4.      Sincerely,       Ewa Jimenez MD  Physician Advisor  Utilization Review-[place]    Phone: [phone]

## 2024-01-22 NOTE — PROVIDER NOTIFICATION
Taylor Valiente patient arrival to OBS bed 5 at this time. [phone] GALLITO Graham I see transfer and obs orders have been plac    Gladys Mansfield RN on 1/22/2024 at 12:01 AM      Guerita KLINE notified

## 2024-01-22 NOTE — ED TRIAGE NOTES
Pt arrives to ED with CO redness in urine, diarrhea and body aches in upper extremities and back. The diarrhea has been all day, pt states the last two times he has urinated the urin has been red. Slight pain on right lower abdomen. Hx of kidney transplant on December 5th of 2023. VSS

## 2024-01-22 NOTE — PROGRESS NOTES
OBS Goal Evaluation    -diagnostic tests and consults completed and resulted -Pending AM labs, need stool sample for enteric panel  -vital signs normal or at patient baseline -MET  -adequate pain control on oral analgesics -MET  -returns to baseline functional status -MET  -safe disposition plan has been identified -MET  Nurse to notify provider when observation goals have been met and patient is ready for discharge.     Gladys Mansfield RN on 1/22/2024 at 12:38 AM

## 2024-01-23 ENCOUNTER — ANESTHESIA (OUTPATIENT)
Dept: SURGERY | Facility: CLINIC | Age: 29
End: 2024-01-23
Payer: MEDICARE

## 2024-01-23 VITALS
SYSTOLIC BLOOD PRESSURE: 129 MMHG | HEIGHT: 72 IN | DIASTOLIC BLOOD PRESSURE: 86 MMHG | HEART RATE: 80 BPM | WEIGHT: 232.1 LBS | RESPIRATION RATE: 16 BRPM | OXYGEN SATURATION: 100 % | TEMPERATURE: 98.9 F | BODY MASS INDEX: 31.44 KG/M2

## 2024-01-23 PROBLEM — R19.7 DIARRHEA: Status: ACTIVE | Noted: 2024-01-23

## 2024-01-23 PROBLEM — Z94.0 KIDNEY TRANSPLANT RECIPIENT: Status: ACTIVE | Noted: 2023-12-05

## 2024-01-23 PROBLEM — D84.9 IMMUNOSUPPRESSED STATUS (H): Status: ACTIVE | Noted: 2023-12-08

## 2024-01-23 LAB
ANION GAP SERPL CALCULATED.3IONS-SCNC: 9 MMOL/L (ref 7–15)
BACTERIA UR CULT: NORMAL
BUN SERPL-MCNC: 17.6 MG/DL (ref 6–20)
CALCIUM SERPL-MCNC: 9.8 MG/DL (ref 8.6–10)
CHLORIDE SERPL-SCNC: 112 MMOL/L (ref 98–107)
CREAT SERPL-MCNC: 0.94 MG/DL (ref 0.67–1.17)
DEPRECATED HCO3 PLAS-SCNC: 19 MMOL/L (ref 22–29)
EGFRCR SERPLBLD CKD-EPI 2021: >90 ML/MIN/1.73M2
ERYTHROCYTE [DISTWIDTH] IN BLOOD BY AUTOMATED COUNT: 16.8 % (ref 10–15)
GLUCOSE BLDC GLUCOMTR-MCNC: 106 MG/DL (ref 70–99)
GLUCOSE SERPL-MCNC: 118 MG/DL (ref 70–99)
HCT VFR BLD AUTO: 40.5 % (ref 40–53)
HGB BLD-MCNC: 13 G/DL (ref 13.3–17.7)
HOLD SPECIMEN: NORMAL
MAGNESIUM SERPL-MCNC: 1.5 MG/DL (ref 1.7–2.3)
MCH RBC QN AUTO: 29.9 PG (ref 26.5–33)
MCHC RBC AUTO-ENTMCNC: 32.1 G/DL (ref 31.5–36.5)
MCV RBC AUTO: 93 FL (ref 78–100)
PHOSPHATE SERPL-MCNC: 2.5 MG/DL (ref 2.5–4.5)
PLATELET # BLD AUTO: 182 10E3/UL (ref 150–450)
POTASSIUM SERPL-SCNC: 4.8 MMOL/L (ref 3.4–5.3)
RBC # BLD AUTO: 4.35 10E6/UL (ref 4.4–5.9)
SODIUM SERPL-SCNC: 140 MMOL/L (ref 135–145)
TACROLIMUS BLD-MCNC: 14.3 UG/L (ref 5–15)
TME LAST DOSE: NORMAL H
TME LAST DOSE: NORMAL H
WBC # BLD AUTO: 6.2 10E3/UL (ref 4–11)

## 2024-01-23 PROCEDURE — 258N000003 HC RX IP 258 OP 636

## 2024-01-23 PROCEDURE — 52310 CYSTOSCOPY AND TREATMENT: CPT | Mod: 58 | Performed by: TRANSPLANT SURGERY

## 2024-01-23 PROCEDURE — 36415 COLL VENOUS BLD VENIPUNCTURE: CPT | Performed by: PHYSICIAN ASSISTANT

## 2024-01-23 PROCEDURE — 370N000017 HC ANESTHESIA TECHNICAL FEE, PER MIN: Performed by: TRANSPLANT SURGERY

## 2024-01-23 PROCEDURE — 710N000010 HC RECOVERY PHASE 1, LEVEL 2, PER MIN: Performed by: TRANSPLANT SURGERY

## 2024-01-23 PROCEDURE — 250N000012 HC RX MED GY IP 250 OP 636 PS 637

## 2024-01-23 PROCEDURE — 82962 GLUCOSE BLOOD TEST: CPT

## 2024-01-23 PROCEDURE — 87086 URINE CULTURE/COLONY COUNT: CPT | Performed by: SURGERY

## 2024-01-23 PROCEDURE — G0378 HOSPITAL OBSERVATION PER HR: HCPCS

## 2024-01-23 PROCEDURE — 360N000075 HC SURGERY LEVEL 2, PER MIN: Performed by: TRANSPLANT SURGERY

## 2024-01-23 PROCEDURE — 87075 CULTR BACTERIA EXCEPT BLOOD: CPT | Mod: XS | Performed by: TRANSPLANT SURGERY

## 2024-01-23 PROCEDURE — 250N000013 HC RX MED GY IP 250 OP 250 PS 637

## 2024-01-23 PROCEDURE — 87799 DETECT AGENT NOS DNA QUANT: CPT | Performed by: INTERNAL MEDICINE

## 2024-01-23 PROCEDURE — 85027 COMPLETE CBC AUTOMATED: CPT

## 2024-01-23 PROCEDURE — 272N000001 HC OR GENERAL SUPPLY STERILE: Performed by: TRANSPLANT SURGERY

## 2024-01-23 PROCEDURE — 99233 SBSQ HOSP IP/OBS HIGH 50: CPT | Mod: 24

## 2024-01-23 PROCEDURE — 80048 BASIC METABOLIC PNL TOTAL CA: CPT

## 2024-01-23 PROCEDURE — 999N000141 HC STATISTIC PRE-PROCEDURE NURSING ASSESSMENT: Performed by: TRANSPLANT SURGERY

## 2024-01-23 PROCEDURE — 83735 ASSAY OF MAGNESIUM: CPT | Performed by: PHYSICIAN ASSISTANT

## 2024-01-23 PROCEDURE — 80197 ASSAY OF TACROLIMUS: CPT | Performed by: PHYSICIAN ASSISTANT

## 2024-01-23 PROCEDURE — 250N000011 HC RX IP 250 OP 636

## 2024-01-23 PROCEDURE — 88300 SURGICAL PATH GROSS: CPT | Mod: 26 | Performed by: PATHOLOGY

## 2024-01-23 PROCEDURE — 36415 COLL VENOUS BLD VENIPUNCTURE: CPT

## 2024-01-23 PROCEDURE — 250N000009 HC RX 250

## 2024-01-23 PROCEDURE — 84100 ASSAY OF PHOSPHORUS: CPT | Performed by: PHYSICIAN ASSISTANT

## 2024-01-23 PROCEDURE — 87076 CULTURE ANAEROBE IDENT EACH: CPT | Performed by: TRANSPLANT SURGERY

## 2024-01-23 PROCEDURE — 250N000009 HC RX 250: Performed by: TRANSPLANT SURGERY

## 2024-01-23 PROCEDURE — 88300 SURGICAL PATH GROSS: CPT | Mod: TC | Performed by: TRANSPLANT SURGERY

## 2024-01-23 PROCEDURE — 87799 DETECT AGENT NOS DNA QUANT: CPT | Mod: 59 | Performed by: PHYSICIAN ASSISTANT

## 2024-01-23 PROCEDURE — 258N000003 HC RX IP 258 OP 636: Performed by: PHYSICIAN ASSISTANT

## 2024-01-23 RX ORDER — SODIUM CHLORIDE, SODIUM LACTATE, POTASSIUM CHLORIDE, CALCIUM CHLORIDE 600; 310; 30; 20 MG/100ML; MG/100ML; MG/100ML; MG/100ML
INJECTION, SOLUTION INTRAVENOUS CONTINUOUS
Status: DISCONTINUED | OUTPATIENT
Start: 2024-01-23 | End: 2024-01-23 | Stop reason: HOSPADM

## 2024-01-23 RX ORDER — LIDOCAINE HYDROCHLORIDE 20 MG/ML
JELLY TOPICAL PRN
Status: DISCONTINUED | OUTPATIENT
Start: 2024-01-23 | End: 2024-01-23 | Stop reason: HOSPADM

## 2024-01-23 RX ORDER — ONDANSETRON 4 MG/1
4 TABLET, ORALLY DISINTEGRATING ORAL EVERY 30 MIN PRN
Status: CANCELLED | OUTPATIENT
Start: 2024-01-23

## 2024-01-23 RX ORDER — FENTANYL CITRATE 50 UG/ML
INJECTION, SOLUTION INTRAMUSCULAR; INTRAVENOUS PRN
Status: DISCONTINUED | OUTPATIENT
Start: 2024-01-23 | End: 2024-01-23

## 2024-01-23 RX ORDER — HYDROMORPHONE HCL IN WATER/PF 6 MG/30 ML
0.2 PATIENT CONTROLLED ANALGESIA SYRINGE INTRAVENOUS EVERY 5 MIN PRN
Status: DISCONTINUED | OUTPATIENT
Start: 2024-01-23 | End: 2024-01-23 | Stop reason: HOSPADM

## 2024-01-23 RX ORDER — CEFAZOLIN SODIUM/WATER 2 G/20 ML
SYRINGE (ML) INTRAVENOUS PRN
Status: DISCONTINUED | OUTPATIENT
Start: 2024-01-23 | End: 2024-01-23

## 2024-01-23 RX ORDER — HYDROMORPHONE HCL IN WATER/PF 6 MG/30 ML
0.4 PATIENT CONTROLLED ANALGESIA SYRINGE INTRAVENOUS EVERY 5 MIN PRN
Status: DISCONTINUED | OUTPATIENT
Start: 2024-01-23 | End: 2024-01-23 | Stop reason: HOSPADM

## 2024-01-23 RX ORDER — ONDANSETRON 4 MG/1
4 TABLET, ORALLY DISINTEGRATING ORAL EVERY 30 MIN PRN
Status: DISCONTINUED | OUTPATIENT
Start: 2024-01-23 | End: 2024-01-23 | Stop reason: HOSPADM

## 2024-01-23 RX ORDER — PROPOFOL 10 MG/ML
INJECTION, EMULSION INTRAVENOUS CONTINUOUS PRN
Status: DISCONTINUED | OUTPATIENT
Start: 2024-01-23 | End: 2024-01-23

## 2024-01-23 RX ORDER — FENTANYL CITRATE 50 UG/ML
25 INJECTION, SOLUTION INTRAMUSCULAR; INTRAVENOUS EVERY 5 MIN PRN
Status: DISCONTINUED | OUTPATIENT
Start: 2024-01-23 | End: 2024-01-23 | Stop reason: HOSPADM

## 2024-01-23 RX ORDER — KETAMINE HYDROCHLORIDE 10 MG/ML
INJECTION INTRAMUSCULAR; INTRAVENOUS PRN
Status: DISCONTINUED | OUTPATIENT
Start: 2024-01-23 | End: 2024-01-23

## 2024-01-23 RX ORDER — SODIUM CHLORIDE, SODIUM LACTATE, POTASSIUM CHLORIDE, CALCIUM CHLORIDE 600; 310; 30; 20 MG/100ML; MG/100ML; MG/100ML; MG/100ML
INJECTION, SOLUTION INTRAVENOUS CONTINUOUS PRN
Status: DISCONTINUED | OUTPATIENT
Start: 2024-01-23 | End: 2024-01-23

## 2024-01-23 RX ORDER — OXYCODONE HYDROCHLORIDE 10 MG/1
10 TABLET ORAL
Status: CANCELLED | OUTPATIENT
Start: 2024-01-23

## 2024-01-23 RX ORDER — HYDRALAZINE HYDROCHLORIDE 20 MG/ML
10 INJECTION INTRAMUSCULAR; INTRAVENOUS EVERY 6 HOURS PRN
Status: DISCONTINUED | OUTPATIENT
Start: 2024-01-23 | End: 2024-01-23

## 2024-01-23 RX ORDER — SODIUM CHLORIDE 9 MG/ML
INJECTION, SOLUTION INTRAVENOUS CONTINUOUS
Status: DISCONTINUED | OUTPATIENT
Start: 2024-01-23 | End: 2024-01-23 | Stop reason: HOSPADM

## 2024-01-23 RX ORDER — LIDOCAINE 40 MG/G
CREAM TOPICAL
Status: DISCONTINUED | OUTPATIENT
Start: 2024-01-23 | End: 2024-01-23 | Stop reason: HOSPADM

## 2024-01-23 RX ORDER — OXYCODONE HYDROCHLORIDE 5 MG/1
5 TABLET ORAL
Status: CANCELLED | OUTPATIENT
Start: 2024-01-23

## 2024-01-23 RX ORDER — HYDRALAZINE HYDROCHLORIDE 20 MG/ML
10 INJECTION INTRAMUSCULAR; INTRAVENOUS EVERY 6 HOURS PRN
Status: DISCONTINUED | OUTPATIENT
Start: 2024-01-23 | End: 2024-01-23 | Stop reason: HOSPADM

## 2024-01-23 RX ORDER — ONDANSETRON 2 MG/ML
4 INJECTION INTRAMUSCULAR; INTRAVENOUS EVERY 30 MIN PRN
Status: CANCELLED | OUTPATIENT
Start: 2024-01-23

## 2024-01-23 RX ORDER — FENTANYL CITRATE 50 UG/ML
50 INJECTION, SOLUTION INTRAMUSCULAR; INTRAVENOUS EVERY 5 MIN PRN
Status: DISCONTINUED | OUTPATIENT
Start: 2024-01-23 | End: 2024-01-23 | Stop reason: HOSPADM

## 2024-01-23 RX ORDER — ONDANSETRON 2 MG/ML
4 INJECTION INTRAMUSCULAR; INTRAVENOUS EVERY 30 MIN PRN
Status: DISCONTINUED | OUTPATIENT
Start: 2024-01-23 | End: 2024-01-23 | Stop reason: HOSPADM

## 2024-01-23 RX ORDER — PROPOFOL 10 MG/ML
INJECTION, EMULSION INTRAVENOUS PRN
Status: DISCONTINUED | OUTPATIENT
Start: 2024-01-23 | End: 2024-01-23

## 2024-01-23 RX ADMIN — DIBASIC SODIUM PHOSPHATE, MONOBASIC POTASSIUM PHOSPHATE AND MONOBASIC SODIUM PHOSPHATE 500 MG: 852; 155; 130 TABLET ORAL at 08:45

## 2024-01-23 RX ADMIN — CARVEDILOL 25 MG: 25 TABLET, FILM COATED ORAL at 08:28

## 2024-01-23 RX ADMIN — HYDRALAZINE HYDROCHLORIDE 50 MG: 25 TABLET, FILM COATED ORAL at 08:28

## 2024-01-23 RX ADMIN — FENTANYL CITRATE 25 MCG: 50 INJECTION INTRAMUSCULAR; INTRAVENOUS at 12:30

## 2024-01-23 RX ADMIN — PROPOFOL 20 MG: 10 INJECTION, EMULSION INTRAVENOUS at 12:56

## 2024-01-23 RX ADMIN — SODIUM BICARBONATE 650 MG TABLET 1950 MG: at 08:28

## 2024-01-23 RX ADMIN — Medication 20 MG: at 12:57

## 2024-01-23 RX ADMIN — TACROLIMUS 9 MG: 5 CAPSULE ORAL at 08:44

## 2024-01-23 RX ADMIN — AMLODIPINE BESYLATE 10 MG: 10 TABLET ORAL at 08:29

## 2024-01-23 RX ADMIN — SULFAMETHOXAZOLE AND TRIMETHOPRIM 1 TABLET: 400; 80 TABLET ORAL at 08:29

## 2024-01-23 RX ADMIN — ASPIRIN 81 MG: 81 TABLET ORAL at 08:33

## 2024-01-23 RX ADMIN — VALGANCICLOVIR HYDROCHLORIDE 900 MG: 450 TABLET ORAL at 08:28

## 2024-01-23 RX ADMIN — PROPOFOL 20 MG: 10 INJECTION, EMULSION INTRAVENOUS at 12:52

## 2024-01-23 RX ADMIN — FENTANYL CITRATE 25 MCG: 50 INJECTION INTRAMUSCULAR; INTRAVENOUS at 12:52

## 2024-01-23 RX ADMIN — FENTANYL CITRATE 50 MCG: 50 INJECTION INTRAMUSCULAR; INTRAVENOUS at 12:57

## 2024-01-23 RX ADMIN — MIDAZOLAM 1 MG: 1 INJECTION INTRAMUSCULAR; INTRAVENOUS at 12:30

## 2024-01-23 RX ADMIN — MYCOPHENILIC ACID 540 MG: 360 TABLET, DELAYED RELEASE ORAL at 08:44

## 2024-01-23 RX ADMIN — Medication 2 G: at 13:08

## 2024-01-23 RX ADMIN — PROPOFOL 20 MG: 10 INJECTION, EMULSION INTRAVENOUS at 12:46

## 2024-01-23 RX ADMIN — SODIUM CHLORIDE: 9 INJECTION, SOLUTION INTRAVENOUS at 09:44

## 2024-01-23 RX ADMIN — SODIUM BICARBONATE 650 MG TABLET 1950 MG: at 14:46

## 2024-01-23 RX ADMIN — MAGNESIUM OXIDE TAB 400 MG (241.3 MG ELEMENTAL MG) 800 MG: 400 (241.3 MG) TAB at 08:29

## 2024-01-23 RX ADMIN — PROPOFOL 10 MG: 10 INJECTION, EMULSION INTRAVENOUS at 12:49

## 2024-01-23 RX ADMIN — PROPOFOL 75 MCG/KG/MIN: 10 INJECTION, EMULSION INTRAVENOUS at 12:39

## 2024-01-23 RX ADMIN — MIDAZOLAM 1 MG: 1 INJECTION INTRAMUSCULAR; INTRAVENOUS at 12:57

## 2024-01-23 RX ADMIN — SODIUM CHLORIDE, POTASSIUM CHLORIDE, SODIUM LACTATE AND CALCIUM CHLORIDE: 600; 310; 30; 20 INJECTION, SOLUTION INTRAVENOUS at 12:30

## 2024-01-23 ASSESSMENT — ACTIVITIES OF DAILY LIVING (ADL)
ADLS_ACUITY_SCORE: 33

## 2024-01-23 NOTE — PROVIDER NOTIFICATION
Time of notification: 1032  Provider notified: [facility]   Patient status: Stable, hypertensive, asymptomatic.   Last set of vitals: BP (!) 163/90 (BP Location: Right arm)   Pulse 69   Temp 98.3  F (36.8  C)   Resp 18   Ht 1.829 m (6')   Wt 105.3 kg (232 lb 1.6 oz)   SpO2 98%   BMI 31.48 kg/m    Orders received: Order for PRN hydralazine with parameters received.       Iker Boyce RN on 1/23/2024 at 10:33 AM

## 2024-01-23 NOTE — PLAN OF CARE
Outpatient/Observation goals to be met before discharge home:    BP (!) 147/91 (BP Location: Right arm)   Pulse 82   Temp 98.5  F (36.9  C) (Oral)   Resp 18   Ht 1.829 m (6')   Wt 106.1 kg (234 lb)   SpO2 98%   BMI 31.74 kg/m      -diagnostic tests and consults completed and resulted: in process  -vital signs normal or at patient baseline: met  -adequate pain control on oral analgesics: met  -returns to baseline functional status: met  -safe disposition plan has been identified: met

## 2024-01-23 NOTE — OP NOTE
Transplant Surgery  Operative Note    Preop dx: Status post  Donor kidney transplant.  Post op dx: same   Procedure: Flexible cystoscopy with stent removal   Surgeon: Tho Vuong M.D., Ph.D.  Assistant: Mike Bergman fellow  Anesthesia: MAC w/ sedation  EBL: 0   Specimens: stent.  Findings: none abnormal. Stent inspected and noted to be intact.   Indication: The patient is status post kidney transplant and the ureteral stent is no longer needed.    Procedure: The patient was brought to the operating room and placed supine on the table.  Sedation was administered and monitored by anesthesia.  Groin was sterilely prepped and draped in the usual fashion. Time out was done. Lido Jet was applied to urethra and a catheterized urine sample was obtained. Antibiotics were administered. A flexible cystoscope was inserted and advanced thru the urethra into the bladder. The stent was visualized and grasped. The cystoscope, grasper and stent were removed en-mass. The stent was visualized to be intact.  The bladder mucosa was normal in appearance. A catheter was reinserted and the bladder drained. The patient tolerated the procedure well and was transferred in stable and satisfactory condition to the PACU.

## 2024-01-23 NOTE — ANESTHESIA CARE TRANSFER NOTE
Patient: Taylor Valiente    Procedure: Procedure(s):  CYSTOSCOPY, WITH URETERAL STENT REMOVAL       Diagnosis: Gross hematuria [R31.0]  Diagnosis Additional Information: No value filed.    Anesthesia Type:   MAC     Note:    Oropharynx: oropharynx clear of all foreign objects  Level of Consciousness: awake and drowsy  Oxygen Supplementation: face mask  Level of Supplemental Oxygen (L/min / FiO2): 4  Independent Airway: airway patency satisfactory and stable  Dentition: dentition unchanged  Vital Signs Stable: post-procedure vital signs reviewed and stable  Report to RN Given: handoff report given  Patient transferred to: PACU    Handoff Report: Identifed the Patient, Identified the Reponsible Provider, Reviewed the pertinent medical history, Discussed the surgical course, Reviewed Intra-OP anesthesia mangement and issues during anesthesia, Set expectations for post-procedure period and Allowed opportunity for questions and acknowledgement of understanding      Vitals:  Vitals Value Taken Time   /70 01/23/24 1323   Temp 36.4  C (97.5  F) 01/23/24 1323   Pulse 75 01/23/24 1323   Resp 16 01/23/24 1323   SpO2 98 % 01/23/24 1328   Vitals shown include unfiled device data.    Electronically Signed By: Lindsey Garcia MD  January 23, 2024  1:29 PM

## 2024-01-23 NOTE — PROVIDER NOTIFICATION
"Provider text paged    \"Pt states he takes hydralazine for blood pressure 2x/day but it isn't in his orders. Also pt would like his Lipitor to be scheduled every evening. Please call unit.\"  "

## 2024-01-23 NOTE — PROVIDER NOTIFICATION
"Provider text paged    \"Pt states that he takes hydralazine for blood pressure twice a day but I don't see it on his med list. Also could you switch the Lipitor to be daily at night time?\"  "

## 2024-01-23 NOTE — PROGRESS NOTES
Outpatient/Observation goals to be met before discharge home:    Vital signs:  Temp: 98.8  F (37.1  C) Temp src: Oral BP: (!) 149/88 Pulse: 77   Resp: 15 SpO2: 100 %     -diagnostic tests and consults completed and resulted: in process   -vital signs normal or at patient baseline: met  -adequate pain control on oral analgesics: met  -returns to baseline functional status: met   -safe disposition plan has been identified: met         Iker Boyce RN on 1/23/2024 at 8:58 AM

## 2024-01-23 NOTE — PROGRESS NOTES
[facility]   Transplant Nephrology Progress Note  Date of Admission:  1/21/2024  Today's Date: 01/23/2024    Recommendations:  - Stop phos supplement (ordered).  - Hold Senna (ordered).  - Follow-up adenovirus PCRs.  - Follow-up CMV PCR.  - Follow-up repeat urine culture.    Assessment & Plan   # DDKT: Stable   - Baseline Creatinine: ~ 0.8-0.9   - Proteinuria: Minimal (0.2-0.5 grams). FSGS protocol   - Date DSA Last Checked: Jan/2024      Latest DSA: Low level DSA (<1000 mfi) to dnDSA to DR53,  , repeat DSA in 2 weeks   - BK Viremia: Yes, minimally elevated (< 1000), repeat 1/25/24   - Kidney Tx Biopsy: No   - Transplant Ureteral Stent: Removed 01/23/24      # Immunosuppression: Tacrolimus immediate release (goal 8-10) and Mycophenolic acid (dose 540 mg every 12 hours)   - Patient is in an immunosuppressed state and will continue to monitor for efficacy and toxicity of immunosuppression medications.   - 01/23/24  Tacrolimus level: 14.3 (~ 9.25 hour trough)   - Changes: tacrolimus adjustments per transplant surgery      # Infection Prophylaxis:    - PJP: Sulfa/TMP (Bactrim)   - CMV: Valganciclovir (Valcyte) R +/D -  900 mg PO daily for three months.     # Hypertension: Controlled;  Goal BP: < 150/90   - Volume status: Euvolemic  EDW ~ 101 kg   - Changes: No. Continue Amlodipine 10mg daily, coreg 25mg bid, hydralazine 50mg bid      # Anemia in Chronic Renal Disease: Hgb: Stable      BRIE: No   - Iron studies: Low iron saturation, but high ferritin    # Mineral Bone Disorder:    - Secondary renal hyperparathyroidism; PTH level: Mildly elevated (151-300 pg/ml)        On treatment: None   - Vitamin D; level: Normal        On supplement: Yes   - Calcium; level: Normal        On supplement: No   - Phosphorus; level: Normal        On supplement:  Stop , phos tabs 500 mg PO BID    # Electrolytes:   - Potassium; level: Normal        On supplement: No  - Magnesium; level: Low         On supplement: Yes, magnesium oxide 800 mg PO BID  - Bicarbonate; level: Low        On supplement: Yes, sodium bicarbonate 1950 mg PO TID  - Sodium; level: Normal    #hematuria    -UA shows RBC >180,  WBC 67   - 1/21/24 UC shows only normal marlen   -BK is minimally detected at 31.    - Renal US :Nonspecific borderline elevation of resistive indices which can be seen in the setting of acute tubular necrosis or transplant rejection. No perinephric fluid collections. No hydronephrosis.   - Double J stent removal 01/23/24.  The bladder mucosa was normal in appearance (per transplant surgery note).   - Follow-up repeat urine cx   - Follow-up adenovirus PCR.      #Diarrhea    - enteric panel and c. Diff PCR negative   - CMV PCR       # BK viremia:   -Low level, unclear if patient's hematuria is 2/2 BK cystitis.    -If hematuria returns after stent removal would recommend cystoscopy per urology   - The bladder mucosa was normal in appearance (per transplant surgery note) when removing ureteral stent.   -Repeat BK PCR on 1/25. 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    Discussed with Dr. Whyte.    ROSA Pompa CNP   Pager: [phone]    Interval History   The ureteral stent was removed today.  The bladder mucosa was normal in appearance (per transplant surgery note).  Mr. Valiente's creatinine is 0.94 (01/23 0607); Stable from 0.98 yesterday.  Estimated Creatinine Clearance: 148.1 mL/min (based on SCr of 0.94 mg/dL).   I/O last 3 completed shifts:  In: -   Out: 400 [Urine:400]   Other significant labs/tests/vitals: SBP 130s - 140s overnight. Afebrile  No acute events  overnight.  No chest pain or shortness of breath.  No leg swelling.  No nausea or vomiting.  No diarrhea.  No fever, sweats or chills.      Review of Systems   4 point ROS was obtained and negative except as noted in the Interval History.    MEDICATIONS:   amLODIPine  10 mg Oral Daily    aspirin  81 mg Oral Daily    atorvastatin  10 mg Oral At Bedtime    carvedilol  25 mg Oral BID    hydrALAZINE  50 mg Oral BID    magnesium oxide  800 mg Oral BID    mycophenolic acid  540 mg Oral BID    phosphorus tablet 250 mg  500 mg Oral BID    senna-docusate  1 tablet Oral BID    Or    senna-docusate  2 tablet Oral BID    sodium bicarbonate  1,950 mg Oral TID    sodium chloride (PF)  3 mL Intracatheter Q8H    sulfamethoxazole-trimethoprim  1 tablet Oral Daily    tacrolimus  9 mg Oral BID    valGANciclovir  900 mg Oral Daily      sodium chloride         Physical Exam   Temp  Av.3  F (36.8  C)  Min: 97.7  F (36.5  C)  Max: 98.8  F (37.1  C)      Pulse  Av.3  Min: 74  Max: 89 Resp  Av.1  Min: 15  Max: 18  SpO2  Av.4 %  Min: 97 %  Max: 100 %     BP (!) 149/88 (Cuff Size: Adult Regular)   Pulse 77   Temp 98.8  F (37.1  C) (Oral)   Resp 15   Ht 1.829 m (6')   Wt 105.3 kg (232 lb 1.6 oz)   SpO2 100%   BMI 31.48 kg/m      Admit Weight: 106.1 kg (234 lb)     GENERAL APPEARANCE: alert and no distress  RESP: lungs clear to auscultation - no rales, rhonchi or wheezes  CV: regular rhythm, normal rate, no rub, no murmur  EDEMA: no LE edema bilaterally  ABDOMEN: soft, nondistended, nontender, bowel sounds normal  MS: extremities normal - no gross deformities noted, no evidence of inflammation in joints, no muscle tenderness  SKIN: no rash  PSYCH: mentation appears normal and affect normal/bright  DIALYSIS ACCESS:  LUE AV fistula +thrill    Data   All labs reviewed by me.  CMP  Recent Labs   Lab 24  0607 24  0703 24  2230 24  0836    136 134* 134*   POTASSIUM 4.8 4.2 4.5 4.5   CHLORIDE  112* 108* 105 105   CO2 19* 17* 20* 21*   ANIONGAP 9 11 9 8   * 107* 115* 123*   BUN 17.6 19.9 24.8* 18.4   CR 0.94 0.98 1.13 0.86   GFRESTIMATED >90 >90 >90 >90   BUBBA 9.8 9.7 9.8 10.0   MAG 1.5* 1.4* 1.6* 1.4*   PHOS 2.5 2.5  --  2.3*     CBC  Recent Labs   Lab 01/23/24  0607 01/22/24  0703 01/21/24  2230 01/18/24  0836   HGB 13.0* 12.9* 12.4* 13.5   WBC 6.2 7.1 8.4 7.6   RBC 4.35* 4.32* 4.25* 4.66   HCT 40.5 40.0 38.7* 43.0   MCV 93 93 91 92   MCH 29.9 29.9 29.2 29.0   MCHC 32.1 32.3 32.0 31.4*   RDW 16.8* 16.8* 16.9* 17.3*    188 210 210     INR  Recent Labs   Lab 01/21/24  2230   INR 1.15     ABGNo lab results found in last 7 days.   Urine Studies  Recent Labs   Lab Test 01/21/24  2243 12/11/23  0943 06/15/21  2220 02/09/21  1103 09/09/20  1305 07/14/17  0948   COLOR Orange* Light Yellow Light Yellow Yellow   < > Yellow   APPEARANCE Slightly Cloudy* Clear Clear Slightly Cloudy   < > Clear   URINEGLC Negative 200* 50* Negative   < > Negative   URINEBILI Negative Negative Negative Negative   < > Negative   URINEKETONE Negative Negative Negative Negative   < > Negative   SG 1.021 1.017 1.015 1.013   < > 1.025   UBLD Large* Moderate* Trace* Negative   < > Moderate*   URINEPH 6.0 5.5 6.5 5.0   < > 5.5   PROTEIN 70* Negative 600* >499*   < > >=300*   UROBILINOGEN  --   --   --   --   --  0.2   NITRITE Negative Negative Negative Negative   < > Negative   LEUKEST Negative Negative Negative Trace*   < > Negative   RBCU >182* >182* 1 2   < > O - 2   WBCU 67* 3 3 4   < > 2-5*    < > = values in this interval not displayed.     Recent Labs   Lab Test 07/03/19  1529 07/17/17  0930 07/14/17  1735   UTPG 2.71* 1.94* 2.31*     PTH  Recent Labs   Lab Test 12/19/23  0919 12/10/23  0803 09/11/20  0634   PTHI 155* 318* 440*     Iron Studies  Recent Labs   Lab Test 12/19/23  0955 12/10/23  0803 09/11/20  0634 07/15/17  0635   IRON 30* 80 33* 38   * 193* 183* 247   IRONSAT 14* 41 18 15   CARI 1,477* 1,743* 325 341        IMAGING:  All imaging studies reviewed by me.

## 2024-01-23 NOTE — ANESTHESIA POSTPROCEDURE EVALUATION
Patient: Taylor Valiente    Procedure: Procedure(s):  CYSTOSCOPY, WITH URETERAL STENT REMOVAL       Anesthesia Type:  MAC    Note:  Disposition: Outpatient   Postop Pain Control: Uneventful            Sign Out: Well controlled pain   PONV: No   Neuro/Psych: Uneventful            Sign Out: Acceptable/Baseline neuro status   Airway/Respiratory: Uneventful            Sign Out: Acceptable/Baseline resp. status   CV/Hemodynamics: Uneventful            Sign Out: Acceptable CV status; No obvious hypovolemia; No obvious fluid overload   Other NRE: NONE   DID A NON-ROUTINE EVENT OCCUR? No           Last vitals:  Vitals:    01/23/24 1205 01/23/24 1323 01/23/24 1334   BP: 124/81 114/70    Pulse: 77 75    Resp: 18 16 12   Temp: 36.7  C (98  F) 36.4  C (97.5  F)    SpO2: 99% 99% 99%       Electronically Signed By: Kandy De La Garza MD  January 23, 2024  1:58 PM

## 2024-01-23 NOTE — PLAN OF CARE
Outpatient/Observation goals to be met before discharge home:    -diagnostic tests and consults completed and resulted: in process  -vital signs normal or at patient baseline: met  -adequate pain control on oral analgesics: met  -returns to baseline functional status: met  -safe disposition plan has been identified: met

## 2024-01-23 NOTE — PROGRESS NOTES
Urine collected and send down to lab PCR urine test. Schedule for stent removal at AM 01/23/2024. Stool sample send to lab for concern for C-dif. Currently passing formed stool. Last was at 1800 today. He is tolerating liquid and solid food with no GI symptoms. .

## 2024-01-23 NOTE — PROGRESS NOTES
Outpatient/Observation goals to be met before discharge home:     Vital signs:  Temp: 98  F (36.7  C) Temp src: Oral BP: 124/81 Pulse: 77   Resp: 15 SpO2: 100 %      -diagnostic tests and consults completed and resulted: in process- in OR for cystoscopy with ureteral stent removal  -vital signs normal or at patient baseline: met  -adequate pain control on oral analgesics: met  -returns to baseline functional status: met   -safe disposition plan has been identified: met

## 2024-01-23 NOTE — DISCHARGE SUMMARY
[facility]    Discharge Summary  Transplant Surgery    Date of Admission:  1/21/2024  Date of Discharge:  1/23/2024  Discharging Provider: Kenya Wade PA-C/ Dr. Vuong  Date of Service (when I saw the patient): 01/23/24    Discharge Diagnoses   Principal Problem:    Hematuria  Active Problems:    Kidney transplant recipient    Immunosuppressed status (H24)    Diarrhea      Procedure/Surgery Information   Procedure: Procedure(s):  CYSTOSCOPY, WITH URETERAL STENT REMOVAL   Surgeon(s): Surgeon(s) and Role:     * Tho Vuong MD - Primary     * Nadine Short MD - Resident - Assisting     * Mike Bergman MD - Fellow - Assisting         History of Present Illness   Taylor Valiente is a 28 year old male with PMHx significant for ESRD 2/2 FSGS now s/p DDKT (12/2023), HTN, stress cardiomyopathy who presents today with 1-day history of diarrhea and red urine. Patient states that he had several episodes of voiding with increasingly red urine which prompted him to seek evaluation. Accompanied with diarrhea. Denies fevers, chills, headaches, dizziness, SOB, abdominal/flank pain, dysuria, increased pain or burning with urination. Has been tolerating a regular diet at baseline. Has been urinating every day without issues prior to today. Has not missed any immunosuppression meds. His ureteral stent was still in place (scheduled for outpatient removal 1/22).     Hospital Course   S/p kidney transplant: Cr stable ~0.9, around baseline.  -Cystoscopy with stent removal completed 1/23. No evidence of hemorrhagic cystitis noted.  Hematuria not present during hospital stay.  Immunosuppression management:   -Mycophenolic acid 540 BID   -Tacrolimus 9 mg BID, goal 8-10. 1/23 level was 14.3, but a 9 hour trough, so no changes were made.  HTN: Norvasc 10mg daily, hydralazine 50 mg BID.  Diarrhea: C diff and enteric panel were both sent and were negative. Hold stool  softeners. Patient states that diarrhea was improving prior to discharge.  Fluid/Electrolytes: replace Mg. 800mg BID, replace phos 250mg BID, replace sodium biacarb 1950mg TID  : no griffiths, voiding spontaneously  Infectious disease: labs sent for adenovirus, CMV, and EBV were pending. Repeat BK due 1/25 (does have a very low level positive BK DNA PCR). C diff and enteric panel negative. Urine culture negative.  Prophylaxis: Bactrim PCP Ppx, Valcyte for CMV, ASA    Transplant coordinator: Opal Rashid [phone]    Discharge Disposition   Discharged to home   Condition at discharge: Stable    Pending Results   These results will be followed up by transplant team  Unresulted Labs Ordered in the Past 30 Days of this Admission       Date and Time Order Name Status Description    1/23/2024  1:14 PM Surgical Pathology Exam In process     1/23/2024  1:14 PM Foreign Body Aerobic Bacterial Culture Routine In process     1/23/2024  1:14 PM Anaerobic Bacterial Culture Routine In process     1/23/2024  1:04 PM Urine Culture In process     1/23/2024  9:09 AM EBV DNA PCR Quantitative Whole Blood In process     1/23/2024  7:55 AM Cytomegalovirus DNA by PCR, Quantitative In process     1/23/2024 12:02 AM Adenovirus, quantitative by PCR In process     1/22/2024  4:39 PM Adenovirus, quantitative by PCR In process     1/22/2024  9:00 AM Adenovirus, quantitative by PCR In process           Primary Care Physician   Priyank Lawrence    Physical Exam   Temp: 97.9  F (36.6  C) Temp src: Oral BP: 131/85 Pulse: 74   Resp: 13 SpO2: 100 % O2 Device: None (Room air) Oxygen Delivery: 5 LPM  Vitals:    01/22/24 0005 01/23/24 0632   Weight: 106.1 kg (234 lb) 105.3 kg (232 lb 1.6 oz)     Vital Signs with Ranges  Temp:  [97.5  F (36.4  C)-98.8  F (37.1  C)] 97.9  F (36.6  C)  Pulse:  [69-85] 74  Resp:  [12-18] 13  BP: (114-163)/() 131/85  SpO2:  [98 %-100 %] 100 %  I/O last 3 completed shifts:  In: 400 [I.V.:400]  Out: 800  [Urine:800]    Constitutional: Awake, alert, cooperative, no apparent distress, and appears stated age.  Eyes: Lids and lashes normal, pupils equal, round, extra ocular muscles intact, sclera clear, conjunctiva normal.  ENT: Normocephalic, without obvious abnormality, atraumatic  Respiratory: Nonlabored resps on RA  Cardiovascular: Perfused  GI: Soft, non-distended, non-tender, staples removed, incision c/d/i  Genitourinary:  Voiding  Skin: No bruising or bleeding, normal skin color, texture  Musculoskeletal: There is no redness, warmth, or swelling of the joints. Full range of motion noted.    Neurologic: Awake, alert, oriented to name, place and time.    Neuropsychiatric: Calm, normal eye contact, alert, normal affect, oriented to self, place, time and situation, memory for past and recent events intact and thought process normal.    Consultations This Hospital Stay   CARE MANAGEMENT / SOCIAL WORK IP CONSULT    Time Spent on this Encounter   I have spent greater than 30 minutes on this discharge.    Discharge Orders   Discharge Medications   Current Discharge Medication List        CONTINUE these medications which have NOT CHANGED    Details   acetaminophen (TYLENOL) 325 MG tablet Take 2 tablets (650 mg) by mouth every 4 hours as needed for pain  Qty: 100 tablet, Refills: 0    Associated Diagnoses: ESRD (end stage renal disease) on dialysis (H)      amLODIPine (NORVASC) 10 MG tablet Take 10 mg by mouth daily       aspirin 81 MG EC tablet Take 81 mg by mouth daily      atorvastatin (LIPITOR) 10 MG tablet Take 1 tablet (10 mg) by mouth daily  Qty: 90 tablet, Refills: 0    Associated Diagnoses: Kidney replaced by transplant      carvedilol (COREG) 25 MG tablet TAKE TWO TABLETS BY MOUTH TWICE A DAY WITH A MEAL Strength: 25 mg  Qty: 180 tablet, Refills: 3    Associated Diagnoses: Acute kidney injury (H24)      hydrALAZINE (APRESOLINE) 100 MG tablet Take 0.5 tablets (50 mg) by mouth 2 times daily    Associated  Diagnoses: Acute kidney injury (H24)      magnesium oxide (MAG-OX) 400 MG tablet Take 2 tablets (800 mg) by mouth 2 times daily  Qty: 180 tablet, Refills: 3    Associated Diagnoses: Kidney transplant candidate      medical cannabis (Patient's own supply) See Admin Instructions (The purpose of this order is to document that the patient reports taking medical cannabis.  This is not a prescription, and is not used to certify that the patient has a qualifying medical condition.)    Patient was advised on the cannabis-tacrolimus drug interaction.      Multiple Vitamin (MULTIVITAMIN ADULT PO) Take 1 tablet by mouth daily      mycophenolic acid (GENERIC EQUIVALENT) 180 MG EC tablet Take 3 tablets (540 mg) by mouth 2 times daily  Qty: 180 tablet, Refills: 3    Associated Diagnoses: Kidney replaced by transplant      nitroGLYcerin (NITROSTAT) 0.3 MG sublingual tablet For chest pain place 1 tablet under the tongue every 5 minutes for 3 doses. If symptoms persist 5 minutes after 1st dose call 911.  Qty: 5 tablet, Refills: 0    Comments: Ok to adjust to supplied dose  Associated Diagnoses: Other chest pain      sodium bicarbonate 650 MG tablet Take 3 tablets (1,950 mg) by mouth 3 times daily  Qty: 270 tablet, Refills: 1    Associated Diagnoses: Kidney transplant candidate; Kidney transplant recipient      Sodium Bicarbonate, antacid, POWD Mix 1/2 teaspoon of baking soda in full glass of water once daily    Associated Diagnoses: Low bicarbonate      sulfamethoxazole-trimethoprim (BACTRIM) 400-80 MG tablet Take 1 tablet by mouth daily  Qty: 30 tablet, Refills: 11    Associated Diagnoses: Kidney transplant candidate      !! tacrolimus (GENERIC) 0.5 MG capsule Profile Rx: patient will contact pharmacy when needed    Comments: TXP DT 12/5/2023 (Kidney) TXP Dischg DT 12/8/2023 DX Kidney replaced by transplant Z94.0 TX [facility] ([place], [place])  Associated Diagnoses: Kidney transplant  recipient      !! tacrolimus (GENERIC) 1 MG capsule Take 9 capsules (9 mg) by mouth 2 times daily Total dose 8.5mg twice daily Profile Rx: patient will contact pharmacy when needed  Qty: 540 capsule, Refills: 11    Comments: TXP DT 12/5/2023 (Kidney) TXP Dischg DT 12/8/2023 DX Kidney replaced by transplant Z94.0 TX Center [facility] [place] ([place], [place])  Associated Diagnoses: Kidney transplant candidate      valGANciclovir (VALCYTE) 450 MG tablet Take 2 tablets (900 mg) by mouth daily for 4 days  Qty: 8 tablet, Refills: 0    Associated Diagnoses: Kidney transplant candidate       !! - Potential duplicate medications found. Please discuss with provider.        STOP taking these medications       phosphorus tablet 250 mg 250 MG per tablet Comments:   Reason for Stopping:                  Reason for your hospital stay    Patient was admitted due to diarrhea, and some limited episodes of hematuria. Hematuria was resolved prior to discharge, and diarrhea appeared to be improving.     Activity    Your activity upon discharge: activity as tolerated, no driving for today     Adult [facility] Follow-up and recommended labs and tests    [facility] FOLLOW UP:     LABS:     CBC, BMP, magnesium, phosphorus, tacrolimus level to be drawn every Monday and Thursday by home health care nurse if arranged, or at an outpatient lab.      BK virus DNA PCR to be drawn Thursday 1/25/2024.    Follow up with Transplant Nephrologist as scheduled.      Resume other follow up as arranged.     When to contact your care team    Call your transplant coordinator at [phone] if you have any of the following: sustained temperature greater than 100.4 or isolated fever spike to 101.5, increased pain over graft or difficulty obtaining or taking your transplant medications.    If it is outside of office hours, please call the hospital switchboard at [phone] and ask to have the transplant surgery  fellow paged for urgent medical questions.     Diet    Follow this diet upon discharge: Regular diet       Data   Most Recent 3 Creatinines:  Recent Labs   Lab Test 01/23/24  0607 01/22/24  0703 01/21/24  2230   CR 0.94 0.98 1.13

## 2024-01-23 NOTE — PROGRESS NOTES
Patient brought to PACU for inpatient MAC eval; patient meets criteria to return to inpatient unit and bypass PACU admission. Anesthesia team provided report to inpatient RN; patient placed on pulse ox, capno monitor, and transported to inpatient bed assignment.

## 2024-01-23 NOTE — PLAN OF CARE
Outpatient/Observation goals to be met before discharge home:    BP (!) 148/90 (BP Location: Right arm)   Pulse 76   Temp 98.3  F (36.8  C) (Oral)   Resp 18   Ht 1.829 m (6')   Wt 106.1 kg (234 lb)   SpO2 99%   BMI 31.74 kg/m      -diagnostic tests and consults completed and resulted: in process  -vital signs normal or at patient baseline: met  -adequate pain control on oral analgesics: met  -returns to baseline functional status: met  -safe disposition plan has been identified: met

## 2024-01-23 NOTE — PROGRESS NOTES
Discharge Note  Received verbal confirmation from primary team that patient is ready to discharge. They will follow up with outstanding labs in clinic. AVS printed and reviewed. Patient verbalizes understanding. PIV removed.    Patient transported home by his parents in private car at ~ 1730.     Iker Boyce RN on 1/23/2024 at 7:30 PM

## 2024-01-24 ENCOUNTER — TELEPHONE (OUTPATIENT)
Dept: TRANSPLANT | Facility: CLINIC | Age: 29
End: 2024-01-24
Payer: MEDICARE

## 2024-01-24 LAB
CMV DNA SPEC NAA+PROBE-ACNC: NOT DETECTED IU/ML
EBV DNA # SPEC NAA+PROBE: NOT DETECTED COPIES/ML
HADV DNA # SPEC NAA+PROBE: NOT DETECTED COPIES/ML
HADV DNA # SPEC NAA+PROBE: NOT DETECTED COPIES/ML

## 2024-01-24 NOTE — TELEPHONE ENCOUNTER
Post Kidney and Pancreas Transplant Team Conference  Date: 1/24/2024  Transplant Coordinator: Meka Rashid     Attendees:  [x]  Dr. Simons [] Randee Barillas, RN [x] Lauren Muniz LPN     [x]  Dr. Lynn [x] Stefani Manrique, RN [] Kimberly Ayala LPN    [x] Dr. Whyte [x] Meka Rashid RN    [x] Dr. Webber [x] Nilda Duran, RN [x] Netta Walter RN   [] Dr. Caceres [] Milla Mc, RN    [] Dr. Vuong [] Ankita Quintero RN    []  Dr. Cardoza [] Linda Quintero RN    [] Dr. Jimenez [] Britton Miranda RN    [x] Vesna Yuan NP [] My Singh RN    [x] Kristi Hyde NP [] Latonya Avila RN        Verbal Plan Read Back:   Continue current treatment plan    Routed to RN Coordinator   Lauren Muniz LPN

## 2024-01-25 ENCOUNTER — MYC REFILL (OUTPATIENT)
Dept: TRANSPLANT | Facility: CLINIC | Age: 29
End: 2024-01-25

## 2024-01-25 ENCOUNTER — LAB (OUTPATIENT)
Dept: LAB | Facility: CLINIC | Age: 29
End: 2024-01-25
Payer: MEDICARE

## 2024-01-25 DIAGNOSIS — Z94.0 KIDNEY REPLACED BY TRANSPLANT: ICD-10-CM

## 2024-01-25 DIAGNOSIS — Z79.899 ENCOUNTER FOR LONG-TERM CURRENT USE OF MEDICATION: ICD-10-CM

## 2024-01-25 DIAGNOSIS — Z48.298 AFTERCARE FOLLOWING ORGAN TRANSPLANT: ICD-10-CM

## 2024-01-25 DIAGNOSIS — Z94.0 KIDNEY TRANSPLANT RECIPIENT: Primary | ICD-10-CM

## 2024-01-25 DIAGNOSIS — Z20.828 CONTACT WITH AND (SUSPECTED) EXPOSURE TO OTHER VIRAL COMMUNICABLE DISEASES: ICD-10-CM

## 2024-01-25 DIAGNOSIS — Z98.890 OTHER SPECIFIED POSTPROCEDURAL STATES: ICD-10-CM

## 2024-01-25 LAB
ALBUMIN MFR UR ELPH: 7.4 MG/DL
ANION GAP SERPL CALCULATED.3IONS-SCNC: 12 MMOL/L (ref 7–15)
BACTERIA UR CULT: NO GROWTH
BK VIRUS DNA IU/ML, INSTRUMENT (6800): 409 IU/ML
BK VIRUS SPECIMEN TYPE: ABNORMAL
BKV DNA SPEC NAA+PROBE-LOG#: 2.6 {LOG_COPIES}/ML
BUN SERPL-MCNC: 20.7 MG/DL (ref 6–20)
CALCIUM SERPL-MCNC: 9.9 MG/DL (ref 8.6–10)
CHLORIDE SERPL-SCNC: 105 MMOL/L (ref 98–107)
CREAT SERPL-MCNC: 0.86 MG/DL (ref 0.67–1.17)
CREAT UR-MCNC: 102.6 MG/DL
DEPRECATED HCO3 PLAS-SCNC: 19 MMOL/L (ref 22–29)
EGFRCR SERPLBLD CKD-EPI 2021: >90 ML/MIN/1.73M2
ERYTHROCYTE [DISTWIDTH] IN BLOOD BY AUTOMATED COUNT: 16.6 % (ref 10–15)
GLUCOSE SERPL-MCNC: 119 MG/DL (ref 70–99)
HCT VFR BLD AUTO: 43.8 % (ref 40–53)
HGB BLD-MCNC: 13.9 G/DL (ref 13.3–17.7)
MAGNESIUM SERPL-MCNC: 1.4 MG/DL (ref 1.7–2.3)
MCH RBC QN AUTO: 29 PG (ref 26.5–33)
MCHC RBC AUTO-ENTMCNC: 31.7 G/DL (ref 31.5–36.5)
MCV RBC AUTO: 91 FL (ref 78–100)
PATH REPORT.COMMENTS IMP SPEC: NORMAL
PATH REPORT.COMMENTS IMP SPEC: NORMAL
PATH REPORT.GROSS SPEC: NORMAL
PATH REPORT.RELEVANT HX SPEC: NORMAL
PHOSPHATE SERPL-MCNC: 2.1 MG/DL (ref 2.5–4.5)
PHOTO IMAGE: NORMAL
PLATELET # BLD AUTO: 218 10E3/UL (ref 150–450)
POTASSIUM SERPL-SCNC: 4.3 MMOL/L (ref 3.4–5.3)
PROT/CREAT 24H UR: 0.07 MG/MG CR (ref 0–0.2)
RBC # BLD AUTO: 4.8 10E6/UL (ref 4.4–5.9)
SODIUM SERPL-SCNC: 136 MMOL/L (ref 135–145)
TACROLIMUS BLD-MCNC: 12.4 UG/L (ref 5–15)
TME LAST DOSE: NORMAL H
TME LAST DOSE: NORMAL H
WBC # BLD AUTO: 9.1 10E3/UL (ref 4–11)

## 2024-01-25 PROCEDURE — 80180 DRUG SCRN QUAN MYCOPHENOLATE: CPT

## 2024-01-25 PROCEDURE — 87799 DETECT AGENT NOS DNA QUANT: CPT

## 2024-01-25 PROCEDURE — 80048 BASIC METABOLIC PNL TOTAL CA: CPT

## 2024-01-25 PROCEDURE — 85027 COMPLETE CBC AUTOMATED: CPT

## 2024-01-25 PROCEDURE — 83735 ASSAY OF MAGNESIUM: CPT

## 2024-01-25 PROCEDURE — 80197 ASSAY OF TACROLIMUS: CPT

## 2024-01-25 PROCEDURE — 36415 COLL VENOUS BLD VENIPUNCTURE: CPT

## 2024-01-25 PROCEDURE — 84100 ASSAY OF PHOSPHORUS: CPT

## 2024-01-25 PROCEDURE — 84156 ASSAY OF PROTEIN URINE: CPT

## 2024-01-26 ENCOUNTER — TELEPHONE (OUTPATIENT)
Dept: TRANSPLANT | Facility: CLINIC | Age: 29
End: 2024-01-26
Payer: MEDICARE

## 2024-01-26 DIAGNOSIS — Z76.82 KIDNEY TRANSPLANT CANDIDATE: ICD-10-CM

## 2024-01-26 DIAGNOSIS — Z94.0 KIDNEY REPLACED BY TRANSPLANT: Primary | ICD-10-CM

## 2024-01-26 LAB
MYCOPHENOLATE SERPL LC/MS/MS-MCNC: <0.25 MG/L (ref 1–3.5)
MYCOPHENOLATE-G SERPL LC/MS/MS-MCNC: 10.6 MG/L (ref 30–95)
TME LAST DOSE: ABNORMAL H
TME LAST DOSE: ABNORMAL H

## 2024-01-26 RX ORDER — TACROLIMUS 1 MG/1
9 CAPSULE ORAL 2 TIMES DAILY
Start: 2024-01-26 | End: 2024-01-26

## 2024-01-26 RX ORDER — TACROLIMUS 1 MG/1
8 CAPSULE ORAL 2 TIMES DAILY
Start: 2024-01-26 | End: 2024-01-30

## 2024-01-26 RX ORDER — TACROLIMUS 0.5 MG/1
0.5 CAPSULE ORAL 2 TIMES DAILY
Qty: 60 CAPSULE | Refills: 11 | Status: SHIPPED | OUTPATIENT
Start: 2024-01-26 | End: 2024-08-27

## 2024-01-26 NOTE — TELEPHONE ENCOUNTER
ISSUE:   copies, up from 31 copies.     Current ISx: Tac goal 8-10,  mg bid. Reduce tac to goal, repeat BK in 2 weeks. IgG 647    ISSUE:   Tacrolimus IR level 12.4 on 1/26, goal 8-10, dose 9 mg BID.    PLAN:   Call Patientand confirm this was an accurate 12-hour trough.   Verify Tacrolimus IR dose 9 mg BID.   Confirm no new medications or illness.   Confirm no missed doses.   If accurate trough and accurate dose, decrease Tacrolimus IR dose to 8 mg BID     Is this more than a 50% increase or decrease in current IS dose: No  If YES, justification: N/A    Repeat labs in 3 days.  *If > 50% change in immunosuppression dose, repeat labs in 1 week.     OUTCOME:   Spoke with Patient, they confirm accurate trough level and current dose 9 mg BID.   Patientconfirmed dose change to 8 mg BID.  Patientagreed to repeat labs in 3 days.   Orders sent to preferred pharmacy for dose change and lab for repeat labs.   Patientvoiced understanding of plan.

## 2024-01-27 LAB
BACTERIA SPEC CULT: ABNORMAL
BACTERIA SPEC CULT: ABNORMAL

## 2024-01-29 ENCOUNTER — TELEPHONE (OUTPATIENT)
Dept: TRANSPLANT | Facility: CLINIC | Age: 29
End: 2024-01-29

## 2024-01-29 ENCOUNTER — LAB (OUTPATIENT)
Dept: LAB | Facility: CLINIC | Age: 29
End: 2024-01-29
Payer: MEDICARE

## 2024-01-29 DIAGNOSIS — Z48.298 AFTERCARE FOLLOWING ORGAN TRANSPLANT: ICD-10-CM

## 2024-01-29 DIAGNOSIS — Z79.899 ENCOUNTER FOR LONG-TERM CURRENT USE OF MEDICATION: ICD-10-CM

## 2024-01-29 DIAGNOSIS — Z98.890 OTHER SPECIFIED POSTPROCEDURAL STATES: ICD-10-CM

## 2024-01-29 DIAGNOSIS — Z20.828 CONTACT WITH AND (SUSPECTED) EXPOSURE TO OTHER VIRAL COMMUNICABLE DISEASES: ICD-10-CM

## 2024-01-29 DIAGNOSIS — Z94.0 KIDNEY REPLACED BY TRANSPLANT: ICD-10-CM

## 2024-01-29 DIAGNOSIS — Z94.0 KIDNEY REPLACED BY TRANSPLANT: Primary | ICD-10-CM

## 2024-01-29 LAB
ANION GAP SERPL CALCULATED.3IONS-SCNC: 10 MMOL/L (ref 7–15)
BUN SERPL-MCNC: 16 MG/DL (ref 6–20)
CALCIUM SERPL-MCNC: 10.2 MG/DL (ref 8.6–10)
CHLORIDE SERPL-SCNC: 102 MMOL/L (ref 98–107)
CREAT SERPL-MCNC: 0.97 MG/DL (ref 0.67–1.17)
DEPRECATED HCO3 PLAS-SCNC: 20 MMOL/L (ref 22–29)
EGFRCR SERPLBLD CKD-EPI 2021: >90 ML/MIN/1.73M2
ERYTHROCYTE [DISTWIDTH] IN BLOOD BY AUTOMATED COUNT: 16.1 % (ref 10–15)
GLUCOSE SERPL-MCNC: 126 MG/DL (ref 70–99)
HCT VFR BLD AUTO: 45.3 % (ref 40–53)
HGB BLD-MCNC: 14.4 G/DL (ref 13.3–17.7)
MCH RBC QN AUTO: 28.9 PG (ref 26.5–33)
MCHC RBC AUTO-ENTMCNC: 31.8 G/DL (ref 31.5–36.5)
MCV RBC AUTO: 91 FL (ref 78–100)
PLATELET # BLD AUTO: 210 10E3/UL (ref 150–450)
POTASSIUM SERPL-SCNC: 4.6 MMOL/L (ref 3.4–5.3)
RBC # BLD AUTO: 4.98 10E6/UL (ref 4.4–5.9)
SODIUM SERPL-SCNC: 132 MMOL/L (ref 135–145)
TACROLIMUS BLD-MCNC: 14.6 UG/L (ref 5–15)
TME LAST DOSE: NORMAL H
TME LAST DOSE: NORMAL H
WBC # BLD AUTO: 8.2 10E3/UL (ref 4–11)

## 2024-01-29 PROCEDURE — 36415 COLL VENOUS BLD VENIPUNCTURE: CPT

## 2024-01-29 PROCEDURE — 85027 COMPLETE CBC AUTOMATED: CPT

## 2024-01-29 PROCEDURE — 80197 ASSAY OF TACROLIMUS: CPT

## 2024-01-29 PROCEDURE — 80048 BASIC METABOLIC PNL TOTAL CA: CPT

## 2024-01-29 NOTE — TELEPHONE ENCOUNTER
ISSUE:   MPA <0.25 (previous level 8)    PLAN:  Fausto Whyte MD Poucher, Jessica, RN  So odd there is this much variation in his MPA levels. Recommend repeating it. Would not increase with BK    OUTCOME:   Spoke w/ pt. Reports he is taking mycophenolic acid 540 mg every 12 hours. Will repeat level on Thursday.

## 2024-01-30 ENCOUNTER — TELEPHONE (OUTPATIENT)
Dept: TRANSPLANT | Facility: CLINIC | Age: 29
End: 2024-01-30
Payer: MEDICARE

## 2024-01-30 DIAGNOSIS — Z76.82 KIDNEY TRANSPLANT CANDIDATE: ICD-10-CM

## 2024-01-30 DIAGNOSIS — R19.7 DIARRHEA: Primary | ICD-10-CM

## 2024-01-30 DIAGNOSIS — Z94.0 KIDNEY REPLACED BY TRANSPLANT: ICD-10-CM

## 2024-01-30 LAB
BACTERIA SPEC CULT: ABNORMAL
BACTERIA SPEC CULT: ABNORMAL

## 2024-01-30 RX ORDER — TACROLIMUS 1 MG/1
7 CAPSULE ORAL 2 TIMES DAILY
Qty: 420 CAPSULE | Refills: 11 | Status: SHIPPED | OUTPATIENT
Start: 2024-01-30 | End: 2024-02-20

## 2024-01-30 NOTE — TELEPHONE ENCOUNTER
Reviewed ureteral stent culture with Dr. Whyte and Dr. Acevedo from [facility].     +for actinomyces, peptoniphilus, and staph epi.     From: Sushil Acevedo MD  Sent: 1/29/2024   4:10 PM CST  To: aFusto Whyte MD; Opal Rashid RN    Hello,    I think it is best that we monitor the patient clinically for recurrent hematuria, new symptoms like dysuria or fever.  The stent grew 3 different pathogens, that is always concerning for contamination.    I would follow the patient closely, repeat UA and Ucx if symptomatic, and go from there.    Actinomyces therapy is usually months of ABx, I would not commit him yet to this long therapy.    Hope that helps.    Thanks,  Sushil Vazquez denies UTI symptoms, no hematuria. Will notify [facility] if he develops symptoms.

## 2024-01-30 NOTE — TELEPHONE ENCOUNTER
ISSUE:  Elevated tacrolimus     Spoke with Taylor, c/o new diarrhea. With recent abx use and hospitalization, will check c diff, enteric panel and CMV. Orders updated.     ISSUE:   Tacrolimus IR level 14.6 on 1/29, goal 8-10, dose 8 mg BID.    PLAN:   Call Patientand confirm this was an accurate 12-hour trough.   Verify Tacrolimus IR dose 8 mg BID.   Confirm no new medications or illness.   Confirm no missed doses.   If accurate trough and accurate dose, decrease Tacrolimus IR dose to 7 mg BID     Is this more than a 50% increase or decrease in current IS dose: No  If YES, justification: N/A    Repeat labs in 3 days.  *If > 50% change in immunosuppression dose, repeat labs in 1 week.     OUTCOME:   Spoke with Patient, they confirm accurate trough level and current dose 8 mg BID.   Patientconfirmed dose change to 7 mg BID.  Patientagreed to repeat labs in 3 days.   Orders sent to preferred pharmacy for dose change and lab for repeat labs.   Patientvoiced understanding of plan.

## 2024-02-01 ENCOUNTER — TELEPHONE (OUTPATIENT)
Dept: TRANSPLANT | Facility: CLINIC | Age: 29
End: 2024-02-01

## 2024-02-01 ENCOUNTER — LAB (OUTPATIENT)
Dept: LAB | Facility: CLINIC | Age: 29
End: 2024-02-01
Payer: MEDICARE

## 2024-02-01 DIAGNOSIS — Z94.0 KIDNEY REPLACED BY TRANSPLANT: ICD-10-CM

## 2024-02-01 DIAGNOSIS — Z48.298 AFTERCARE FOLLOWING ORGAN TRANSPLANT: ICD-10-CM

## 2024-02-01 DIAGNOSIS — Z98.890 OTHER SPECIFIED POSTPROCEDURAL STATES: ICD-10-CM

## 2024-02-01 DIAGNOSIS — Z79.899 ENCOUNTER FOR LONG-TERM CURRENT USE OF MEDICATION: ICD-10-CM

## 2024-02-01 DIAGNOSIS — R19.7 DIARRHEA: ICD-10-CM

## 2024-02-01 DIAGNOSIS — Z20.828 CONTACT WITH AND (SUSPECTED) EXPOSURE TO OTHER VIRAL COMMUNICABLE DISEASES: ICD-10-CM

## 2024-02-01 LAB
ANION GAP SERPL CALCULATED.3IONS-SCNC: 10 MMOL/L (ref 7–15)
BK VIRUS DNA IU/ML, INSTRUMENT (6800): 2750 IU/ML
BK VIRUS SPECIMEN TYPE: ABNORMAL
BKV DNA SPEC NAA+PROBE-LOG#: 3.4 {LOG_COPIES}/ML
BUN SERPL-MCNC: 25.5 MG/DL (ref 6–20)
CALCIUM SERPL-MCNC: 10.3 MG/DL (ref 8.6–10)
CHLORIDE SERPL-SCNC: 109 MMOL/L (ref 98–107)
CMV DNA SPEC NAA+PROBE-ACNC: NOT DETECTED IU/ML
CREAT SERPL-MCNC: 1.03 MG/DL (ref 0.67–1.17)
DEPRECATED HCO3 PLAS-SCNC: 21 MMOL/L (ref 22–29)
EGFRCR SERPLBLD CKD-EPI 2021: >90 ML/MIN/1.73M2
ERYTHROCYTE [DISTWIDTH] IN BLOOD BY AUTOMATED COUNT: 16.3 % (ref 10–15)
GLUCOSE SERPL-MCNC: 136 MG/DL (ref 70–99)
HCT VFR BLD AUTO: 45 % (ref 40–53)
HGB BLD-MCNC: 14.6 G/DL (ref 13.3–17.7)
MCH RBC QN AUTO: 29.7 PG (ref 26.5–33)
MCHC RBC AUTO-ENTMCNC: 32.4 G/DL (ref 31.5–36.5)
MCV RBC AUTO: 92 FL (ref 78–100)
PLATELET # BLD AUTO: 247 10E3/UL (ref 150–450)
POTASSIUM SERPL-SCNC: 4.2 MMOL/L (ref 3.4–5.3)
RBC # BLD AUTO: 4.91 10E6/UL (ref 4.4–5.9)
SODIUM SERPL-SCNC: 140 MMOL/L (ref 135–145)
TACROLIMUS BLD-MCNC: 12.1 UG/L (ref 5–15)
TME LAST DOSE: NORMAL H
TME LAST DOSE: NORMAL H
WBC # BLD AUTO: 9 10E3/UL (ref 4–11)

## 2024-02-01 PROCEDURE — 85027 COMPLETE CBC AUTOMATED: CPT | Performed by: PATHOLOGY

## 2024-02-01 PROCEDURE — 87799 DETECT AGENT NOS DNA QUANT: CPT | Performed by: INTERNAL MEDICINE

## 2024-02-01 PROCEDURE — 99000 SPECIMEN HANDLING OFFICE-LAB: CPT | Performed by: PATHOLOGY

## 2024-02-01 PROCEDURE — 36415 COLL VENOUS BLD VENIPUNCTURE: CPT | Performed by: PATHOLOGY

## 2024-02-01 PROCEDURE — 80180 DRUG SCRN QUAN MYCOPHENOLATE: CPT | Performed by: INTERNAL MEDICINE

## 2024-02-01 PROCEDURE — 80048 BASIC METABOLIC PNL TOTAL CA: CPT | Performed by: INTERNAL MEDICINE

## 2024-02-01 PROCEDURE — 80197 ASSAY OF TACROLIMUS: CPT | Performed by: INTERNAL MEDICINE

## 2024-02-01 NOTE — TELEPHONE ENCOUNTER
ISSUE:   Tacrolimus IR level 12.1 on 2/1, goal 8-10, dose 7 mg BID.    Tac just reduced from 8 mg bid to 7 mg bid the day before lab draw. Stay on same dose for now, repeat level on Monday.

## 2024-02-02 ENCOUNTER — TELEPHONE (OUTPATIENT)
Dept: TRANSPLANT | Facility: CLINIC | Age: 29
End: 2024-02-02
Payer: MEDICARE

## 2024-02-02 DIAGNOSIS — T86.10 COMPLICATION OF KIDNEY TRANSPLANT: ICD-10-CM

## 2024-02-02 DIAGNOSIS — Z94.0 KIDNEY REPLACED BY TRANSPLANT: Primary | ICD-10-CM

## 2024-02-02 DIAGNOSIS — B34.8 BK VIREMIA: ICD-10-CM

## 2024-02-02 DIAGNOSIS — Z76.82 KIDNEY TRANSPLANT CANDIDATE: ICD-10-CM

## 2024-02-02 DIAGNOSIS — R76.8 DONOR SPECIFIC ANTIBODY (DSA) POSITIVE: ICD-10-CM

## 2024-02-02 LAB
MYCOPHENOLATE SERPL LC/MS/MS-MCNC: 6.16 MG/L (ref 1–3.5)
MYCOPHENOLATE-G SERPL LC/MS/MS-MCNC: 28 MG/L (ref 30–95)
TME LAST DOSE: ABNORMAL H
TME LAST DOSE: ABNORMAL H

## 2024-02-02 RX ORDER — HEPARIN SODIUM (PORCINE) LOCK FLUSH IV SOLN 100 UNIT/ML 100 UNIT/ML
5 SOLUTION INTRAVENOUS
Status: CANCELLED | OUTPATIENT
Start: 2024-02-02

## 2024-02-02 RX ORDER — ALBUTEROL SULFATE 90 UG/1
1-2 AEROSOL, METERED RESPIRATORY (INHALATION)
Status: CANCELLED
Start: 2024-02-02

## 2024-02-02 RX ORDER — HEPARIN SODIUM,PORCINE 10 UNIT/ML
5-20 VIAL (ML) INTRAVENOUS DAILY PRN
Status: CANCELLED | OUTPATIENT
Start: 2024-02-02

## 2024-02-02 RX ORDER — MEPERIDINE HYDROCHLORIDE 25 MG/ML
25 INJECTION INTRAMUSCULAR; INTRAVENOUS; SUBCUTANEOUS EVERY 30 MIN PRN
Status: CANCELLED | OUTPATIENT
Start: 2024-02-02

## 2024-02-02 RX ORDER — DIPHENHYDRAMINE HYDROCHLORIDE 50 MG/ML
50 INJECTION INTRAMUSCULAR; INTRAVENOUS
Status: CANCELLED
Start: 2024-02-02

## 2024-02-02 RX ORDER — ACETAMINOPHEN 325 MG/1
650 TABLET ORAL ONCE
Status: CANCELLED
Start: 2024-02-02

## 2024-02-02 RX ORDER — EPINEPHRINE 1 MG/ML
0.3 INJECTION, SOLUTION, CONCENTRATE INTRAVENOUS EVERY 5 MIN PRN
Status: CANCELLED | OUTPATIENT
Start: 2024-02-02

## 2024-02-02 RX ORDER — METHYLPREDNISOLONE SODIUM SUCCINATE 125 MG/2ML
125 INJECTION, POWDER, LYOPHILIZED, FOR SOLUTION INTRAMUSCULAR; INTRAVENOUS
Status: CANCELLED
Start: 2024-02-02

## 2024-02-02 RX ORDER — DIPHENHYDRAMINE HCL 25 MG
25 CAPSULE ORAL ONCE
Status: CANCELLED
Start: 2024-02-02

## 2024-02-02 RX ORDER — ALBUTEROL SULFATE 0.83 MG/ML
2.5 SOLUTION RESPIRATORY (INHALATION)
Status: CANCELLED | OUTPATIENT
Start: 2024-02-02

## 2024-02-02 NOTE — TELEPHONE ENCOUNTER
ISSUE:  BK rising, 2700 copies up from 400.     Current immunosuppression:  -tac goal 8-10  - mg bid     Fausto Whyte MD Poucher, Jessica, RN  BK trending up but slowly. I don't want to decrease IS due to dnDSA. Awaiting him to get IVIG, hopefully

## 2024-02-05 ENCOUNTER — LAB (OUTPATIENT)
Dept: LAB | Facility: CLINIC | Age: 29
End: 2024-02-05
Payer: MEDICARE

## 2024-02-05 DIAGNOSIS — T86.13 KIDNEY TRANSPLANT INFECTION: Primary | ICD-10-CM

## 2024-02-05 DIAGNOSIS — Z79.899 ENCOUNTER FOR LONG-TERM CURRENT USE OF MEDICATION: ICD-10-CM

## 2024-02-05 DIAGNOSIS — Z98.890 OTHER SPECIFIED POSTPROCEDURAL STATES: ICD-10-CM

## 2024-02-05 DIAGNOSIS — Z94.0 KIDNEY REPLACED BY TRANSPLANT: ICD-10-CM

## 2024-02-05 DIAGNOSIS — Z48.298 AFTERCARE FOLLOWING ORGAN TRANSPLANT: ICD-10-CM

## 2024-02-05 DIAGNOSIS — Z20.828 CONTACT WITH AND (SUSPECTED) EXPOSURE TO OTHER VIRAL COMMUNICABLE DISEASES: ICD-10-CM

## 2024-02-05 LAB
ANION GAP SERPL CALCULATED.3IONS-SCNC: 10 MMOL/L (ref 7–15)
BUN SERPL-MCNC: 23.8 MG/DL (ref 6–20)
CALCIUM SERPL-MCNC: 10.2 MG/DL (ref 8.6–10)
CHLORIDE SERPL-SCNC: 107 MMOL/L (ref 98–107)
CREAT SERPL-MCNC: 1.01 MG/DL (ref 0.67–1.17)
DEPRECATED HCO3 PLAS-SCNC: 21 MMOL/L (ref 22–29)
EGFRCR SERPLBLD CKD-EPI 2021: >90 ML/MIN/1.73M2
ERYTHROCYTE [DISTWIDTH] IN BLOOD BY AUTOMATED COUNT: 15.9 % (ref 10–15)
GLUCOSE SERPL-MCNC: 163 MG/DL (ref 70–99)
HCT VFR BLD AUTO: 44.2 % (ref 40–53)
HGB BLD-MCNC: 13.8 G/DL (ref 13.3–17.7)
MAGNESIUM SERPL-MCNC: 1.4 MG/DL (ref 1.7–2.3)
MCH RBC QN AUTO: 29.5 PG (ref 26.5–33)
MCHC RBC AUTO-ENTMCNC: 31.2 G/DL (ref 31.5–36.5)
MCV RBC AUTO: 94 FL (ref 78–100)
PHOSPHATE SERPL-MCNC: 3.1 MG/DL (ref 2.5–4.5)
PLATELET # BLD AUTO: 227 10E3/UL (ref 150–450)
POTASSIUM SERPL-SCNC: 4.9 MMOL/L (ref 3.4–5.3)
RBC # BLD AUTO: 4.68 10E6/UL (ref 4.4–5.9)
SODIUM SERPL-SCNC: 138 MMOL/L (ref 135–145)
TACROLIMUS BLD-MCNC: 10.2 UG/L (ref 5–15)
TME LAST DOSE: NORMAL H
TME LAST DOSE: NORMAL H
WBC # BLD AUTO: 7.7 10E3/UL (ref 4–11)

## 2024-02-05 PROCEDURE — 83735 ASSAY OF MAGNESIUM: CPT

## 2024-02-05 PROCEDURE — 80048 BASIC METABOLIC PNL TOTAL CA: CPT

## 2024-02-05 PROCEDURE — 85027 COMPLETE CBC AUTOMATED: CPT

## 2024-02-05 PROCEDURE — 80197 ASSAY OF TACROLIMUS: CPT

## 2024-02-05 PROCEDURE — 86833 HLA CLASS II HIGH DEFIN QUAL: CPT

## 2024-02-05 PROCEDURE — 86832 HLA CLASS I HIGH DEFIN QUAL: CPT

## 2024-02-05 PROCEDURE — 36415 COLL VENOUS BLD VENIPUNCTURE: CPT

## 2024-02-05 PROCEDURE — 86828 HLA CLASS I&II ANTIBODY QUAL: CPT | Mod: XU

## 2024-02-05 PROCEDURE — 84100 ASSAY OF PHOSPHORUS: CPT

## 2024-02-08 ENCOUNTER — LAB (OUTPATIENT)
Dept: LAB | Facility: CLINIC | Age: 29
End: 2024-02-08
Payer: MEDICARE

## 2024-02-08 ENCOUNTER — VIRTUAL VISIT (OUTPATIENT)
Dept: ADDICTION MEDICINE | Facility: CLINIC | Age: 29
End: 2024-02-08
Payer: MEDICARE

## 2024-02-08 ENCOUNTER — MYC REFILL (OUTPATIENT)
Dept: TRANSPLANT | Facility: CLINIC | Age: 29
End: 2024-02-08

## 2024-02-08 ENCOUNTER — TELEPHONE (OUTPATIENT)
Dept: BEHAVIORAL HEALTH | Facility: CLINIC | Age: 29
End: 2024-02-08

## 2024-02-08 DIAGNOSIS — Z94.0 KIDNEY TRANSPLANT RECIPIENT: Primary | ICD-10-CM

## 2024-02-08 DIAGNOSIS — Z79.899 ENCOUNTER FOR LONG-TERM CURRENT USE OF MEDICATION: ICD-10-CM

## 2024-02-08 DIAGNOSIS — Z48.298 AFTERCARE FOLLOWING ORGAN TRANSPLANT: ICD-10-CM

## 2024-02-08 DIAGNOSIS — Z94.0 KIDNEY REPLACED BY TRANSPLANT: ICD-10-CM

## 2024-02-08 DIAGNOSIS — Z98.890 OTHER SPECIFIED POSTPROCEDURAL STATES: ICD-10-CM

## 2024-02-08 DIAGNOSIS — Z76.82 KIDNEY TRANSPLANT CANDIDATE: ICD-10-CM

## 2024-02-08 DIAGNOSIS — F12.90 CANNABIS USE, UNCOMPLICATED: Primary | ICD-10-CM

## 2024-02-08 DIAGNOSIS — Z20.828 CONTACT WITH AND (SUSPECTED) EXPOSURE TO OTHER VIRAL COMMUNICABLE DISEASES: ICD-10-CM

## 2024-02-08 LAB
ALBUMIN MFR UR ELPH: <6 MG/DL
ANION GAP SERPL CALCULATED.3IONS-SCNC: 10 MMOL/L (ref 7–15)
BK VIRUS DNA IU/ML, INSTRUMENT (6800): 5840 IU/ML
BK VIRUS SPECIMEN TYPE: ABNORMAL
BKV DNA SPEC NAA+PROBE-LOG#: 3.8 {LOG_COPIES}/ML
BUN SERPL-MCNC: 20.8 MG/DL (ref 6–20)
CALCIUM SERPL-MCNC: 10 MG/DL (ref 8.6–10)
CHLORIDE SERPL-SCNC: 107 MMOL/L (ref 98–107)
CREAT SERPL-MCNC: 1.02 MG/DL (ref 0.67–1.17)
CREAT UR-MCNC: 88.8 MG/DL
DEPRECATED HCO3 PLAS-SCNC: 22 MMOL/L (ref 22–29)
EGFRCR SERPLBLD CKD-EPI 2021: >90 ML/MIN/1.73M2
ERYTHROCYTE [DISTWIDTH] IN BLOOD BY AUTOMATED COUNT: 15.2 % (ref 10–15)
GLUCOSE SERPL-MCNC: 137 MG/DL (ref 70–99)
HCT VFR BLD AUTO: 44.4 % (ref 40–53)
HGB BLD-MCNC: 14.1 G/DL (ref 13.3–17.7)
MCH RBC QN AUTO: 29.8 PG (ref 26.5–33)
MCHC RBC AUTO-ENTMCNC: 31.8 G/DL (ref 31.5–36.5)
MCV RBC AUTO: 94 FL (ref 78–100)
MYCOPHENOLATE SERPL LC/MS/MS-MCNC: 1.67 MG/L (ref 1–3.5)
MYCOPHENOLATE-G SERPL LC/MS/MS-MCNC: 18.2 MG/L (ref 30–95)
PLATELET # BLD AUTO: 200 10E3/UL (ref 150–450)
POTASSIUM SERPL-SCNC: 4.4 MMOL/L (ref 3.4–5.3)
PROT/CREAT 24H UR: NORMAL MG/G{CREAT}
RBC # BLD AUTO: 4.73 10E6/UL (ref 4.4–5.9)
SODIUM SERPL-SCNC: 139 MMOL/L (ref 135–145)
TACROLIMUS BLD-MCNC: 9.4 UG/L (ref 5–15)
TME LAST DOSE: ABNORMAL H
TME LAST DOSE: ABNORMAL H
TME LAST DOSE: NORMAL H
TME LAST DOSE: NORMAL H
WBC # BLD AUTO: 6.5 10E3/UL (ref 4–11)

## 2024-02-08 PROCEDURE — 80180 DRUG SCRN QUAN MYCOPHENOLATE: CPT

## 2024-02-08 PROCEDURE — 87799 DETECT AGENT NOS DNA QUANT: CPT

## 2024-02-08 PROCEDURE — 99213 OFFICE O/P EST LOW 20 MIN: CPT | Mod: 95

## 2024-02-08 PROCEDURE — 84156 ASSAY OF PROTEIN URINE: CPT

## 2024-02-08 PROCEDURE — 86828 HLA CLASS I&II ANTIBODY QUAL: CPT | Mod: XU

## 2024-02-08 PROCEDURE — 86833 HLA CLASS II HIGH DEFIN QUAL: CPT

## 2024-02-08 PROCEDURE — 86832 HLA CLASS I HIGH DEFIN QUAL: CPT

## 2024-02-08 PROCEDURE — 80048 BASIC METABOLIC PNL TOTAL CA: CPT

## 2024-02-08 PROCEDURE — 80197 ASSAY OF TACROLIMUS: CPT

## 2024-02-08 PROCEDURE — 36415 COLL VENOUS BLD VENIPUNCTURE: CPT

## 2024-02-08 PROCEDURE — 85027 COMPLETE CBC AUTOMATED: CPT

## 2024-02-08 RX ORDER — SODIUM BICARBONATE 650 MG/1
1950 TABLET ORAL 3 TIMES DAILY
Qty: 270 TABLET | Refills: 1 | Status: SHIPPED | OUTPATIENT
Start: 2024-02-08 | End: 2024-05-05

## 2024-02-08 NOTE — NURSING NOTE
Is the patient currently in the state of [place]? YES    Visit mode:VIDEO    If the visit is dropped, the patient can be reconnected by: VIDEO VISIT: Text to cell phone:   Telephone Information:   Mobile [phone]     Will anyone else be joining the visit? No  (If patient encounters technical issues they should call [phone])    How would you like to obtain your AVS? MyChart    Are changes needed to the allergy or medication list? Yes Pt reported no longer taking medication nitroglycerin 0.3 mg; and Pt stated no changes to allergies    Rooming Documentation: Attendance Guidelines - Care team has reviewed attendance agreement with patient. Patient advised that two failed appointments within 6 months may lead to transfer of current episode of care.      Reason for visit: Consult     FREYA Garcia

## 2024-02-08 NOTE — PROGRESS NOTES
"Virtual Visit Details    Type of service:  Video Visit     Joined the call at 2/8/2024, 10:31:03 am.  Left the call at 2/8/2024, 10:40:57 am.    Originating Location (pt. Location): Home    Distant Location (provider location):  Off-site  Platform used for Video Visit: Armasight        Answers submitted by the patient for this visit:  Patient Health Questionnaire (Submitted on 2/8/2024)  If you checked off any problems, how difficult have these problems made it for you to do your work, take care of things at home, or get along with other people?: Not difficult at all  PHQ9 TOTAL SCORE: 0            SUBJECTIVE                                                      Taylor Valiente is a 28 year old male who presents for  initial visit for addiction consultation and management presents for Medicare SPENSER eval as ordered by [place] [employer] after having an OUI offense with Cannabis use    Visit performed Virtual, via video    HPI:   Taylor Valiente is a 28 year old male with complex medical history of Hypertensive disease, chronic kidney disease, kidney transplant, hemodialysis, DVT use who presents for further evaluation of possible substance use disorder and management options.    Taylor presents today requesting a referral for a Medicare SPENSER eval.  Taylor had completed an evaluation a year ago and started attending treatment as recommended however, had difficulty sustaining due to financial barriers.  Taylor has a medical marijuana waiver for sleep and appetite and was issued an OWI in 2019.  He reports that he only uses on occasion for his medicinal purposes.  He has a history of alcohol use but does not drink at all now that he is on dialysis.            Substance Use History:   \"Have you ever had any history with [...] use?\" And \"When was your last use?  ALCOHOL - 2.5 years ago  CANNABIS - occasion  PRESCRIPTION STIMULANTS (includes Ritalin, Adderall, Vyvanse) - denies  COCAINE/CRACK - denies  METH/AMPHETAMINES " (includes ecstacy, MDMA/amrik) - denies  OPIATES - denies  BENZODIAZEPINES (includes Ativan, Klonopin, Xanax) - denies  KRATOM (mild opioid and stimulant effects) - denies  KETAMINE - denies  HALLUCINOGENS (includes DXM) - denies  BEHAVIORAL (Gambling, Eating d/o, Compulsivity) - denies  History of treatment - denies  NICOTINE  Cigarettes: denies  Chew/snus: denies  Vaping: denies  Past NRT/medication use: denies      Previous withdrawal treatment episodes (e.g. detox): denies  Previous SPENSER treatment programs: Level 1 treatment one years ago, could not continue due to cost barrier  Hospitalizations or overdose: denies  Medical complications from substance use: denies  IV Drug use?: denies  Previous Medication for Addiction Tx: denies  Longest period of full abstinence: denies  Activities that have previously supported abstinence: denies  Current Recovery Activities: denies      Infectious disease screening  Hep C:    Hepatitis C Antibody   Date Value Ref Range Status   12/05/2023 Nonreactive Nonreactive Final   02/09/2021 Nonreactive NR^Nonreactive Final     Comment:     Assay performance characteristics have not been established for newborns,   infants, and children         HIV:    HIV Antigen Antibody Combo   Date Value Ref Range Status   12/05/2023 Nonreactive Nonreactive Final     Comment:     HIV-1 p24 Ag & HIV-1/HIV-2 Ab Not Detected   07/15/2017  NR Final    Nonreactive   HIV-1 p24 Ag & HIV-1/HIV-2 Ab Not Detected             Psychiatric History (per patient report and problem list review)  Past diagnoses - dep  Current or past psychiatrist: none  Current or past therapist:  6 months therapist  Hospitalizations/TMS/ECT - none  Suicide Attempts - 2019  Medication trials -       PHQ-9 scores:      11/16/2021     8:37 AM 3/7/2023    11:02 AM 2/8/2024    10:13 AM   PHQ   PHQ-9 Total Score 1 3 0   Q9: Thoughts of better off dead/self-harm past 2 weeks Not at all Not at all Not at all     NIURKA-7 scores:       6/1/2021     1:00 PM 7/1/2021     9:33 AM 11/16/2021     8:37 AM   NIURKA-7 SCORE   Total Score  1 (minimal anxiety) 1 (minimal anxiety)   Total Score 2 1 1         SOCIAL HISTORY:  Housing status: with parents  Employment status: Disabled  Relationship status: Single  Children:   Legal concerns related to use: OWI  Contact information up to date? yes    3rd Party Involvement  (please obtain ROSALIA if pt would like to include)      Medical History:    Patient Active Problem List    Diagnosis Date Noted    Bursitis of left shoulder 07/29/2023     Priority: High    Kidney transplant infection 02/05/2024     Priority: Medium    BK viremia 02/02/2024     Priority: Medium    Donor specific antibody (DSA) positive 02/02/2024     Priority: Medium    Complication of kidney transplant 02/02/2024     Priority: Medium    Diarrhea 01/23/2024     Priority: Medium    Hematuria 01/21/2024     Priority: Medium    Dehydration 12/10/2023     Priority: Medium    Immunosuppressed status (H24) 12/08/2023     Priority: Medium    Acute post-operative pain 12/07/2023     Priority: Medium    Hypomagnesemia 12/07/2023     Priority: Medium    Low bicarbonate 12/07/2023     Priority: Medium    Pre-diabetes 12/05/2023     Priority: Medium    Kidney transplant recipient 12/05/2023     Priority: Medium    Sepsis due to coagulase-negative staphylococcal infection (H) 07/29/2023     Priority: Medium     S lugdunensis in blood cultures / hemodialysis / left shoulder pain      AV fistula infection (H24) 07/29/2023     Priority: Medium     Initial use AV fistula - infiltrated - 4 days later Staph sepsis and shoulder pain      Elevated erythrocyte sedimentation rate 07/28/2023     Priority: Medium    Acute pain of left shoulder 07/28/2023     Priority: Medium    Leukocytosis, unspecified type 07/28/2023     Priority: Medium    Staphylococcus infection 07/27/2023     Priority: Medium     07/27/23: Blood      Acute deep vein thrombosis (DVT) of non-extremity  vein 03/13/2023     Priority: Medium    Hyperkalemia 11/29/2021     Priority: Medium    Essential hypertension 11/29/2021     Priority: Medium    ESRD (end stage renal disease) on dialysis (H) 11/29/2021     Priority: Medium    Prolonged Q-T interval on ECG 06/15/2021     Priority: Medium    Acute renal failure with acute tubular necrosis superimposed on stage 5 chronic kidney disease, not on chronic dialysis (H) 06/15/2021     Priority: Medium    ESRD needing dialysis (H) 06/15/2021     Priority: Medium    Adrenal adenoma, left      Priority: Medium     Benign adenoma.       Stress-induced cardiomyopathy      Priority: Medium    OD (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/2021     Priority: Medium     Added automatically from request for surgery [id number]      Anemia of chronic renal failure 12/21/2020     Priority: Medium    2019 novel coronavirus disease (COVID-19) 11/27/2020     Priority: Medium    Hyperlipidemia 05/01/2019     Priority: Medium    CKD (chronic kidney disease) stage 5, GFR less than 15 ml/min (H) 07/28/2017     Priority: Medium     Due to HTN      Focal glomerular sclerosis 07/28/2017     Priority: Medium     Due to Hypertension injury.      LVH (left ventricular hypertrophy) due to hypertensive disease 07/14/2017     Priority: Medium    Renovascular hypertension 07/14/2017     Priority: Medium       Past Medical History:   Diagnosis Date    Adrenal adenoma, left     Anemia     Benign essential hypertension 07/28/2017    Bursitis of left shoulder 07/29/2023    Chronic deep vein thrombosis (DVT) of internal jugular vein (H) 03/2023    Right IJ, catheter-related    CKD (chronic kidney disease) stage 5, GFR less than 15 ml/min (H) 2021    FSGS    Depression     Focal glomerular sclerosis 07/28/2017    HLD (hyperlipidemia)     Kidney replaced by transplant 12/05/2023    DDKT. Induction w/ Thymoglobulin 2mg/kg and basiliximab.    LVH (left ventricular hypertrophy) due to hypertensive disease 07/14/2017     Noncompliance     Obesity, unspecified     OD (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/2021    Prolonged QT interval     Staphylococcus infection 07/27/2023 07/27/23: Blood    Stress-induced cardiomyopathy     Suicide attempt (H) 2019       Past Surgical History:   Procedure Laterality Date    ARTHROSCOPY ANKLE Left 02/24/2021    Procedure: Left ankle arthroscopy and debridement/micro fracture;  Surgeon: Mirza Nelson MD;  Location: [facility]    BIOPSY  2017    renal- [facility]    CREATE FISTULA ARTERIOVENOUS UPPER EXTREMITY Right 03/04/2021    Procedure: RIGHT proximal radial  to CEPHALIC ARTERIOVENOUS FISTULA;  Surgeon: Henrik Moran MD;  Location: [facility] OR    CREATE FISTULA ARTERIOVENOUS UPPER EXTREMITY Left 03/09/2023    Procedure: CREATION OF FIRST STAGE LEFT BRACHIOBASILIC ARTERIOVENOUS FISTULA;  Surgeon: Henrik Moran MD;  Location: [facility] OR    CREATE FISTULA ARTERIOVENOUS UPPER EXTREMITY Left 5/30/2023    Procedure: SECOND STAGE LEFT BRACHIAL TO BASILIC ARTERIOVENOUS FISTULA;  Surgeon: Henrik Moran MD;  Location: [facility]    CYSTOSCOPY, REMOVE STENT(S), COMBINED N/A 1/23/2024    Procedure: CYSTOSCOPY, WITH URETERAL STENT REMOVAL;  Surgeon: Tho Vuong MD;  [facility]    IR CVC TUNNEL PLACEMENT > 5 YRS OF AGE  06/17/2021    IR CVC TUNNEL PLACEMENT > 5 YRS OF AGE  03/09/2023    IR CVC TUNNEL REMOVAL RIGHT  12/03/2021    IR CVC TUNNEL REMOVAL RIGHT  8/2/2023    IRRIGATION AND DEBRIDEMENT UPPER EXTREMITY, COMBINED Left 03/16/2023    Procedure: EVACUATION OF LEFT ARM HEMATOMA;  Surgeon: Henrik Moran MD;  [facility]    LIGATE FISTULA ARTERIOVENOUS UPPER EXTREMITY Right 03/09/2023    Procedure: LIGATION OF RIGHT ARM ARTERIOVENOUS FISTULA;  Surgeon: Henrik Moran MD;  Location: [facility]    REVISION FISTULA ARTERIOVENOUS UPPER EXTREMITY Right 08/26/2021    Procedure: Second stage RIGHT BRACHIOCEPHALIC transposition ARTERIOVENOUS FISTULA;  Surgeon: Henrik Moran  MD;  Location: [facility]    TRANSPLANT KIDNEY RECIPIENT  DONOR Left 2023    Procedure: Transplant kidney recipient  donor, ureteral stent placement;  Surgeon: Shi Jimenez MD;  Location:  OR         Family History   Problem Relation Age of Onset    Blood Disease Mother         has hep b    Diabetes Mother         gestionanal diabetes    Hypertension Mother     Obesity Father     Hypertension Father     Hypertension Maternal Grandmother     Diabetes Maternal Grandmother     Hypertension Paternal Grandmother     Hypertension Paternal Grandfather     Coronary Artery Disease Maternal Uncle     Cancer No family hx of          Current Outpatient Medications   Medication Sig Dispense Refill    acetaminophen (TYLENOL) 325 MG tablet Take 2 tablets (650 mg) by mouth every 4 hours as needed for pain 100 tablet 0    amLODIPine (NORVASC) 10 MG tablet Take 10 mg by mouth daily       aspirin 81 MG EC tablet Take 81 mg by mouth daily      atorvastatin (LIPITOR) 10 MG tablet Take 1 tablet (10 mg) by mouth daily 90 tablet 0    carvedilol (COREG) 25 MG tablet TAKE TWO TABLETS BY MOUTH TWICE A DAY WITH A MEAL Strength: 25 mg 180 tablet 3    hydrALAZINE (APRESOLINE) 100 MG tablet Take 0.5 tablets (50 mg) by mouth 2 times daily      magnesium oxide (MAG-OX) 400 MG tablet Take 2 tablets (800 mg) by mouth 2 times daily 180 tablet 3    medical cannabis (Patient's own supply) See Admin Instructions (The purpose of this order is to document that the patient reports taking medical cannabis.  This is not a prescription, and is not used to certify that the patient has a qualifying medical condition.)    Patient was advised on the cannabis-tacrolimus drug interaction.      Multiple Vitamin (MULTIVITAMIN ADULT PO) Take 1 tablet by mouth daily      mycophenolic acid (GENERIC EQUIVALENT) 180 MG EC tablet Take 3 tablets (540 mg) by mouth 2 times daily 180 tablet 3    sodium bicarbonate 650 MG tablet Take 3 tablets (1,950 mg)  "by mouth 3 times daily 270 tablet 1    Sodium Bicarbonate, antacid, POWD Mix 1/2 teaspoon of baking soda in full glass of water once daily      sulfamethoxazole-trimethoprim (BACTRIM) 400-80 MG tablet Take 1 tablet by mouth daily 30 tablet 11    tacrolimus (GENERIC) 0.5 MG capsule Take 1 capsule (0.5 mg) by mouth 2 times daily To be used for dose adjustments. 60 capsule 11    tacrolimus (GENERIC) 1 MG capsule Take 7 capsules (7 mg) by mouth 2 times daily 420 capsule 11    nitroGLYcerin (NITROSTAT) 0.3 MG sublingual tablet For chest pain place 1 tablet under the tongue every 5 minutes for 3 doses. If symptoms persist 5 minutes after 1st dose call 911. (Patient not taking: Reported on 2/8/2024) 5 tablet 0    valGANciclovir (VALCYTE) 450 MG tablet Take 2 tablets (900 mg) by mouth daily for 4 days 8 tablet 0         Allergies   Allergen Reactions    Benadryl [Diphenhydramine] Itching    Vancomycin Itching           OBJECTIVE                                                      EXAM    There were no vitals taken for this visit.    GENERAL: healthy, alert and no distress  EYES: Eyes grossly normal to inspection, PERRL and conjunctivae and sclerae normal  RESP: No respiratory distress  MENTAL STATUS EXAM  Appearance/Behavior: No appearant distress  Speech: Normal  Mood/Affect: normal affect  Insight: Adequate      LAB  Recent UDS Labs (may not contain today's lab data)  No results found for: \"BUP\", \"BZO\", \"BAR\", \"WILTON\", \"MAMP\", \"AMP\", \"MDMA\", \"MTD\", \"ETE928\", \"OXY\", \"PCP\", \"THC\", \"TEMP\", \"SGPOCT\"        Hepatic Function  AST   Date Value Ref Range Status   12/05/2023 13 0 - 45 U/L Final     Comment:     Reference intervals for this test were updated on 6/12/2023 to more accurately reflect our healthy population. There may be differences in the flagging of prior results with similar values performed with this method. Interpretation of those prior results can be made in the context of the updated reference intervals. "   02/09/2021 6 0 - 45 U/L Final     ALT   Date Value Ref Range Status   12/05/2023 20 0 - 70 U/L Final     Comment:     Reference intervals for this test were updated on 6/12/2023 to more accurately reflect our healthy population. There may be differences in the flagging of prior results with similar values performed with this method. Interpretation of those prior results can be made in the context of the updated reference intervals.     02/09/2021 19 0 - 70 U/L Final     Bilirubin Total   Date Value Ref Range Status   12/05/2023 0.5 <=1.2 mg/dL Final   02/09/2021 0.2 0.2 - 1.3 mg/dL Final     Albumin   Date Value Ref Range Status   12/05/2023 4.5 3.5 - 5.2 g/dL Final   06/20/2022 4.0 3.4 - 5.0 g/dL Final   06/18/2021 2.8 (L) 3.4 - 5.0 g/dL Final     INR   Date Value Ref Range Status   01/21/2024 1.15 0.85 - 1.15 Final   02/09/2021 1.23 (H) 0.86 - 1.14 Final       CBC  WBC   Date Value Ref Range Status   06/18/2021 6.8 4.0 - 11.0 10e9/L Final     WBC Count   Date Value Ref Range Status   02/08/2024 6.5 4.0 - 11.0 10e3/uL Final     RBC Count   Date Value Ref Range Status   02/08/2024 4.73 4.40 - 5.90 10e6/uL Final   06/18/2021 3.35 (L) 4.4 - 5.9 10e12/L Final     Hemoglobin   Date Value Ref Range Status   02/08/2024 14.1 13.3 - 17.7 g/dL Final   06/18/2021 9.1 (L) 13.3 - 17.7 g/dL Final     Hematocrit   Date Value Ref Range Status   02/08/2024 44.4 40.0 - 53.0 % Final   06/18/2021 28.1 (L) 40.0 - 53.0 % Final     MCV   Date Value Ref Range Status   02/08/2024 94 78 - 100 fL Final   06/18/2021 84 78 - 100 fl Final     MCH   Date Value Ref Range Status   02/08/2024 29.8 26.5 - 33.0 pg Final   06/18/2021 27.2 26.5 - 33.0 pg Final     MCHC   Date Value Ref Range Status   02/08/2024 31.8 31.5 - 36.5 g/dL Final   06/18/2021 32.4 31.5 - 36.5 g/dL Final     Platelet Count   Date Value Ref Range Status   02/08/2024 200 150 - 450 10e3/uL Final   06/18/2021 203 150 - 450 10e9/L Final     RDW   Date Value Ref Range Status    02/08/2024 15.2 (H) 10.0 - 15.0 % Final   06/18/2021 13.4 10.0 - 15.0 % Final       Today's lab data  Results for orders placed or performed in visit on 02/08/24   Basic metabolic panel     Status: Abnormal   Result Value Ref Range    Sodium 139 135 - 145 mmol/L    Potassium 4.4 3.4 - 5.3 mmol/L    Chloride 107 98 - 107 mmol/L    Carbon Dioxide (CO2) 22 22 - 29 mmol/L    Anion Gap 10 7 - 15 mmol/L    Urea Nitrogen 20.8 (H) 6.0 - 20.0 mg/dL    Creatinine 1.02 0.67 - 1.17 mg/dL    GFR Estimate >90 >60 mL/min/1.73m2    Calcium 10.0 8.6 - 10.0 mg/dL    Glucose 137 (H) 70 - 99 mg/dL   CBC with platelets     Status: Abnormal   Result Value Ref Range    WBC Count 6.5 4.0 - 11.0 10e3/uL    RBC Count 4.73 4.40 - 5.90 10e6/uL    Hemoglobin 14.1 13.3 - 17.7 g/dL    Hematocrit 44.4 40.0 - 53.0 %    MCV 94 78 - 100 fL    MCH 29.8 26.5 - 33.0 pg    MCHC 31.8 31.5 - 36.5 g/dL    RDW 15.2 (H) 10.0 - 15.0 %    Platelet Count 200 150 - 450 10e3/uL   Protein  random urine     Status: None   Result Value Ref Range    Total Protein Urine mg/dL <6.0   mg/dL    Total Protein Urine mg/mg Creat      Creatinine Urine mg/dL 88.8 mg/dL   PRA Donor Specific Antibody     Status: None (In process)    Narrative    The following orders were created for panel order PRA Donor Specific Antibody.  Procedure                               Abnormality         Status                     ---------                               -----------         ------                     PRA Donor Specific Antibody[[id number]]                      In process                   Please view results for these tests on the individual orders.           [facility] Board of Pharmacy Data Base Reviewed;    Consistent with patient reports and Epic records.           A/P                                                      ASSESSMENT/PLAN:  1. Cannabis use, uncomplicated  -occasional use for medicinal purposes  - Adult Mental Health  Referral; Future  -complete  referral and follow all recommendations  -follow up as needed         ENCOUNTER FOR LONG TERM USE OF HIGH RISK MEDICATION   High Risk Drug Monitoring?  no   Drug being monitored:    Reason for drug:  Use Disorder   What is being monitored?: Dosage, Cravings, Triggers and side effects       Time statement  The total TIME spent on this patient on the day of the appointment was 30 minutes.  This includes time spent preparing to see the patient, reviewing history, ordering/reviewing tests, medications, communicating with and referring to other health care professionals when indicated, and documenting clinical information in Epic      Laura Hall NP  [facility]  [phone]

## 2024-02-08 NOTE — TELEPHONE ENCOUNTER
"Pt is a(n) adult (18+ out of HS) Seeking as eval for Adult SPENSER Assessment required for court..  Appointment scheduled by:  Patient.  (self-pay - complete Cost Estimate)  Caller name:  Taylor    Caller phone #: [phone]  Legal Guardianship Reviewed?  No  Honoring Choices Notified?  No  Brief reason for appt:  Pt needing SPENSER eval for court     needed?  NO    Contact information verified/updated: Yes    Valerie Rhodes    \"We have scheduled your evaluation. In the event that your insurance coverage comes back as out of network, you may receive a call to cancel your appointment and direct you to your insurance company for in-network coverage.\"    Disclaimer regarding insurance read to patient?  Yes    "

## 2024-02-12 ENCOUNTER — LAB (OUTPATIENT)
Dept: LAB | Facility: CLINIC | Age: 29
End: 2024-02-12
Payer: MEDICARE

## 2024-02-12 DIAGNOSIS — Z48.298 AFTERCARE FOLLOWING ORGAN TRANSPLANT: ICD-10-CM

## 2024-02-12 DIAGNOSIS — Z94.0 KIDNEY REPLACED BY TRANSPLANT: ICD-10-CM

## 2024-02-12 DIAGNOSIS — Z79.899 ENCOUNTER FOR LONG-TERM CURRENT USE OF MEDICATION: ICD-10-CM

## 2024-02-12 DIAGNOSIS — Z98.890 OTHER SPECIFIED POSTPROCEDURAL STATES: ICD-10-CM

## 2024-02-12 DIAGNOSIS — Z20.828 CONTACT WITH AND (SUSPECTED) EXPOSURE TO OTHER VIRAL COMMUNICABLE DISEASES: ICD-10-CM

## 2024-02-12 DIAGNOSIS — R76.8 DONOR SPECIFIC ANTIBODY (DSA) POSITIVE: ICD-10-CM

## 2024-02-12 LAB
ANION GAP SERPL CALCULATED.3IONS-SCNC: 11 MMOL/L (ref 7–15)
BK VIRUS DNA IU/ML, INSTRUMENT (6800): ABNORMAL IU/ML
BK VIRUS SPECIMEN TYPE: ABNORMAL
BKV DNA SPEC NAA+PROBE-LOG#: 4.1 {LOG_COPIES}/ML
BUN SERPL-MCNC: 29.9 MG/DL (ref 6–20)
CALCIUM SERPL-MCNC: 10.4 MG/DL (ref 8.6–10)
CHLORIDE SERPL-SCNC: 108 MMOL/L (ref 98–107)
CREAT SERPL-MCNC: 1.13 MG/DL (ref 0.67–1.17)
DEPRECATED HCO3 PLAS-SCNC: 19 MMOL/L (ref 22–29)
EGFRCR SERPLBLD CKD-EPI 2021: >90 ML/MIN/1.73M2
ERYTHROCYTE [DISTWIDTH] IN BLOOD BY AUTOMATED COUNT: 14.8 % (ref 10–15)
GLUCOSE SERPL-MCNC: 126 MG/DL (ref 70–99)
HCT VFR BLD AUTO: 45.6 % (ref 40–53)
HGB BLD-MCNC: 14.3 G/DL (ref 13.3–17.7)
MAGNESIUM SERPL-MCNC: 1.3 MG/DL (ref 1.7–2.3)
MCH RBC QN AUTO: 29.3 PG (ref 26.5–33)
MCHC RBC AUTO-ENTMCNC: 31.4 G/DL (ref 31.5–36.5)
MCV RBC AUTO: 93 FL (ref 78–100)
PHOSPHATE SERPL-MCNC: 2.6 MG/DL (ref 2.5–4.5)
PLATELET # BLD AUTO: 210 10E3/UL (ref 150–450)
POTASSIUM SERPL-SCNC: 4.6 MMOL/L (ref 3.4–5.3)
RBC # BLD AUTO: 4.88 10E6/UL (ref 4.4–5.9)
SODIUM SERPL-SCNC: 138 MMOL/L (ref 135–145)
TACROLIMUS BLD-MCNC: 10.8 UG/L (ref 5–15)
TME LAST DOSE: NORMAL H
TME LAST DOSE: NORMAL H
WBC # BLD AUTO: 7.8 10E3/UL (ref 4–11)

## 2024-02-12 PROCEDURE — 86833 HLA CLASS II HIGH DEFIN QUAL: CPT

## 2024-02-12 PROCEDURE — 80197 ASSAY OF TACROLIMUS: CPT

## 2024-02-12 PROCEDURE — 36415 COLL VENOUS BLD VENIPUNCTURE: CPT

## 2024-02-12 PROCEDURE — 86832 HLA CLASS I HIGH DEFIN QUAL: CPT

## 2024-02-12 PROCEDURE — 87799 DETECT AGENT NOS DNA QUANT: CPT

## 2024-02-12 PROCEDURE — 82565 ASSAY OF CREATININE: CPT

## 2024-02-12 PROCEDURE — 84100 ASSAY OF PHOSPHORUS: CPT

## 2024-02-12 PROCEDURE — 83735 ASSAY OF MAGNESIUM: CPT

## 2024-02-12 PROCEDURE — 85027 COMPLETE CBC AUTOMATED: CPT

## 2024-02-13 ENCOUNTER — HOSPITAL ENCOUNTER (OUTPATIENT)
Dept: BEHAVIORAL HEALTH | Facility: CLINIC | Age: 29
Discharge: HOME | End: 2024-02-13
Payer: MEDICARE

## 2024-02-13 VITALS — WEIGHT: 225 LBS | BODY MASS INDEX: 30.48 KG/M2 | HEIGHT: 72 IN

## 2024-02-13 DIAGNOSIS — F12.21 CANNABIS USE DISORDER, MODERATE, IN SUSTAINED REMISSION (H): Primary | ICD-10-CM

## 2024-02-13 DIAGNOSIS — F12.90 CANNABIS USE, UNCOMPLICATED: ICD-10-CM

## 2024-02-13 LAB
DONOR IDENTIFICATION: NORMAL
DR53: 651
DSA COMMENTS: NORMAL
DSA PRESENT: YES
DSA TEST METHOD: NORMAL
INT SUB RESULT: NORMAL
INTERF SUBSTANCE: NORMAL
INTSUB TEST METHOD: NORMAL
ORGAN: NORMAL
SA 1 CELL: NORMAL
SA 1 TEST METHOD: NORMAL
SA 2 CELL: NORMAL
SA 2 TEST METHOD: NORMAL
SA1 HI RISK ABY: NORMAL
SA1 MOD RISK ABY: NORMAL
SA2 HI RISK ABY: NORMAL
SA2 MOD RISK ABY: NORMAL
UNACCEPTABLE ANTIGENS: NORMAL
UNOS CPRA: 79
ZZZINT SUB COMMENTS: NORMAL
ZZZSA 1  COMMENTS: NORMAL
ZZZSA 2 COMMENTS: NORMAL

## 2024-02-13 PROCEDURE — H0001 ALCOHOL AND/OR DRUG ASSESS: HCPCS

## 2024-02-13 ASSESSMENT — ANXIETY QUESTIONNAIRES
5. BEING SO RESTLESS THAT IT IS HARD TO SIT STILL: NOT AT ALL
6. BECOMING EASILY ANNOYED OR IRRITABLE: NOT AT ALL
7. FEELING AFRAID AS IF SOMETHING AWFUL MIGHT HAPPEN: NOT AT ALL
3. WORRYING TOO MUCH ABOUT DIFFERENT THINGS: NOT AT ALL
1. FEELING NERVOUS, ANXIOUS, OR ON EDGE: NOT AT ALL
4. TROUBLE RELAXING: NOT AT ALL
GAD7 TOTAL SCORE: 0
GAD7 TOTAL SCORE: 0
2. NOT BEING ABLE TO STOP OR CONTROL WORRYING: NOT AT ALL

## 2024-02-13 ASSESSMENT — PAIN SCALES - GENERAL: PAINLEVEL: MODERATE PAIN (4)

## 2024-02-13 ASSESSMENT — COLUMBIA-SUICIDE SEVERITY RATING SCALE - C-SSRS
6. HAVE YOU EVER DONE ANYTHING, STARTED TO DO ANYTHING, OR PREPARED TO DO ANYTHING TO END YOUR LIFE?: NO
1. HAVE YOU WISHED YOU WERE DEAD OR WISHED YOU COULD GO TO SLEEP AND NOT WAKE UP?: NO
2. HAVE YOU ACTUALLY HAD ANY THOUGHTS OF KILLING YOURSELF?: NO
TOTAL  NUMBER OF INTERRUPTED ATTEMPTS LIFETIME: NO
TOTAL  NUMBER OF ABORTED OR SELF INTERRUPTED ATTEMPTS LIFETIME: NO
ATTEMPT LIFETIME: NO

## 2024-02-13 ASSESSMENT — PATIENT HEALTH QUESTIONNAIRE - PHQ9: SUM OF ALL RESPONSES TO PHQ QUESTIONS 1-9: 1

## 2024-02-13 NOTE — PROGRESS NOTES
[facility]  Provider Name:  TAMARA Martinez/LISA     Telephone: [phone]      PATIENT'S NAME: Taylor Valiente  PREFERRED NAME: Taylor  PRONOUNS: he/him/his    MRN: [id number]  : 1995  ADDRESS:   [address] [place] [address] [address] [place] [address]  E-MAIL: [email or web address]   ACCT. NUMBER:  [id number]  DATE OF SERVICE: 2024  START TIME: 4:40 pm  END TIME: 5:28 pm  PREFERRED PHONE: [phone]   May we leave a program related message: Yes  SERVICE MODALITY:  In-person:        Last 4 digits of SSN #: [id number]     EMERGENCY CONTACT:   Chevy Wisetelma (mother)  Tel #: [phone]     Ricarda Wisetelma (father)  Tel #: [phone]      [place] [facility]  Tel #: [phone]  Fax #: [phone]  E-mail: [email or web address]    UNIVERSAL ADULT SUBSTANCE USE DISORDER DIAGNOSTIC ASSESSMENT    Identifying Information:  The patient is a 28 year old,  male of  American descent.  The patient was referred for an assessment by the [place] and [place] DOT.  The patient attended the session alone.     Chief Complaint:   The reason for seeking services at this time is:  The patient reported the reason for participating in the substance use disorder assessment today on 2024 was because it was a condition of the [facility] [employer] [facility] DOT for the patient to get his 's license reinstated.  The precipitating event was the patient reported being arrested for a DWI charge in [place], [place] when he had been pulled over for speeding and he had tested positive for THC/cannabis.  The patient denied having any other history of arrests or legal charges.  The patient had a prior substance use disorder assessment with LISA Tobar at [facility] on 2021.  It had been recommended at that time in 2021 that the patient attend and complete a minimum of a Level I Driving with Care 12-hour DWI class.  The patient  reported he had been having a lot of health difficulties at that time with his chronic kidney disease and he had needed frequent dialysis at that time.  In addition, the patient reported having a very limited income due to being on disability and he had been unable to afford the DWI class at that time.  The patient reported having a successful kidney transplant on 12/5/2023 and he is doing much better physically at this time.  The patient also reported he had been able to set enough money aside to pay for the DWI class.  The patient appeared to be willing to follow the recommendation to attend and complete at a minimum a Level I Driving with Care 12-hour DWI class.  The patient denied having any current concerns about having substance abuse issues.  The patient denied having any history of participating in a substance use disorder treatment program.  The patient denied having any inpatient detoxification admissions or inpatient hospitalizations for withdrawal symptoms.  The patient is not currently receiving any substance use disorder treatment services.  The patient denied having any history of attending 12-step or other recovery support group meetings.  The patient does not appear to be in severe withdrawal, an imminent safety risk to self or others, or requiring immediate medical attention and may proceed with the assessment interview.    Social/Family History:  The patient reported growing up in [place], [place] until age 13 and then in [place], [place] after age 13.  The patient reported being raised by his mother, his father, and his grandparents.  The patient denied experiencing or witnessing any verbal, physical or sexual abuse when he was growing up in the family home.  The patient reported overall his childhood was happy.  The patient reported feeling supported by his mother and his grandfather when he was growing up.  The patient reported being raised in the [place].  The patient described his current  relationships with his family of origin as being good.      The patient describes his cultural background as being  male of  American descent.  Cultural influences and impact on patient's life structure, values, norms, and healthcare: The patient denied cultural concerns had an impact on life structure, values, norms, or healthcare.  Contextual influences on patient's health include: Community Factors: The patient reported he had first started to smoke THC/cannabis with his friends/peers in social situations.  The patient identified his preferred language to be English.  The patient reported he does not need the assistance of an  or other support involved in therapy.  The patient reported he is not currently involved in community jaylon activities.  The patient reported his spirituality would likely have no impact on his recovery.    The patient reported having no significant delays in developmental tasks.  The patient's highest education level was associate degree / vocational certificate.  The patient identified the following learning problems: none reported.  The patient reports he is able to understand written materials.    The patient reported the following relationship history: The patient denied having any history of being .  The patient identified as being heterosexual and he reported being single and not in a romantic relationship at this time.  The patient denied having any children.     The patient's current living/housing situation involves staying with someone.  The patient reported living with his parents, his grandfather and his sister and he reported his housing is stable.  The patient denied having any concerns regarding his immediate living environment and/or neighborhood and he plans to continue to live there.  The patient identified his father, his mother, his sister, and a few close friends as being his primary support network at this time.  The patient identified the  quality of his relationships with his support network as being good.  The patient would like the following people involved in treatment services if recommended: None at this time.     The patient reported engaging in the following recreational/leisure activities: going to concerts, doing car stuff, going to the car show and working on his car.  The patient reported engaging in the following recreation/leisure activities while using alcohol or other non-prescribed mood altering chemicals: The patient's use of THC/cannabis had been done independently of his social/recreational/leisure activities.  The patient reported the following people are supportive of his recovery: his father, his mother, his sister, and a few close friends.  The patient reported being disabled since early 2022 due to his chronic kidney disease and had been unable to work since late 2020.  The patient reported his income is obtained from parents and [facility] disability.  The patient reported having financial stressors at this time, including money being tight at this time and having outstanding medical bills.  Cultural and socioeconomic factors do not affect the patient's access to services.    The patient reported the following substance related arrests or legal issues: The patient reported being arrested for a DWI charge in [place], [place] when he had been pulled over for speeding and he had tested positive for THC/cannabis.  The patient denied having any other history of arrests or legal charges.  The patient denied being on court probation at this time.    Patient's Strengths and Limitations:  The patient identified the following strengths or resources that will help him succeed in treatment: commitment to health and well being and family support.  Things that may interfere with the patient's success in treatment include: lacks awareness regarding the risks and potential negative consequences of substance abuse.     Assessments:  The following  assessments were completed by patient for this visit:  PHQ9:       4/12/2021     4:38 PM 6/1/2021     1:00 PM 7/1/2021     9:32 AM 11/16/2021     8:37 AM 3/7/2023    11:02 AM 2/8/2024    10:13 AM 2/13/2024     4:00 PM   PHQ-9 SCORE   PHQ-9 Total Score MyChart   1 (Minimal depression) 1 (Minimal depression) 3 (Minimal depression) 0    PHQ-9 Total Score 0 2 1 1 3 0 1     GAD7:       4/11/2021     9:45 PM 4/12/2021     3:11 PM 4/12/2021     4:38 PM 6/1/2021     1:00 PM 7/1/2021     9:33 AM 11/16/2021     8:37 AM 2/13/2024     4:00 PM   NIURKA-7 SCORE   Total Score 0 (minimal anxiety)    1 (minimal anxiety) 1 (minimal anxiety)    Total Score 0 2 2 2 1 1 0     PROMIS 10-Global Health (all questions and answers displayed):       6/28/2022     8:00 AM 2/8/2024    10:15 AM 2/13/2024     4:00 PM   PROMIS 10   In general, would you say your health is:  Good    In general, would you say your quality of life is:  Very good    In general, how would you rate your physical health?  Good    In general, how would you rate your mental health, including your mood and your ability to think?  Good    In general, how would you rate your satisfaction with your social activities and relationships?  Very good    In general, please rate how well you carry out your usual social activities and roles  Good    To what extent are you able to carry out your everyday physical activities such as walking, climbing stairs, carrying groceries, or moving a chair?  Completely    In the past 7 days, how often have you been bothered by emotional problems such as feeling anxious, depressed, or irritable?  Never    In the past 7 days, how would you rate your fatigue on average?  Mild    In the past 7 days, how would you rate your pain on average, where 0 means no pain, and 10 means worst imaginable pain?  5    In general, would you say your health is: 3 3 3   In general, would you say your quality of life is: 3 4 4   In general, how would you rate your physical  "health? 1 3 3   In general, how would you rate your mental health, including your mood and your ability to think? 3 3 4   In general, how would you rate your satisfaction with your social activities and relationships? 4 4 4   In general, please rate how well you carry out your usual social activities and roles. (This includes activities at home, at work and in your community, and responsibilities as a parent, child, spouse, employee, friend, etc.) 4 3 4   To what extent are you able to carry out your everyday physical activities such as walking, climbing stairs, carrying groceries, or moving a chair? 3 5 5   In the past 7 days, how often have you been bothered by emotional problems such as feeling anxious, depressed, or irritable? 3 1 2   In the past 7 days, how would you rate your fatigue on average? 2 2 2   In the past 7 days, how would you rate your pain on average, where 0 means no pain, and 10 means worst imaginable pain? 0 5 4   Global Mental Health Score 13 16 16   Global Physical Health Score 13 15 15   PROMIS TOTAL - SUBSCORES 26 31 31     [place] Suicide Severity Rating Scale (Lifetime/Recent)      10/4/2019     2:24 AM 4/12/2021     3:20 PM 6/28/2022     8:00 AM 2/13/2024     4:00 PM   [place] Suicide Severity Rating (Lifetime/Recent)   Q1 Wish to be Dead (Lifetime)  Yes     Comments  suicide attempt 1 1/2 years ago \"sort of suicide attempt\". overdosed on meds. his father and grandfather saw him do it and called police. 72 hour hold at [facility] in [place].     Q2 Non-Specific Active Suicidal Thoughts (Lifetime)  Yes     Most Severe Ideation Rating (Lifetime)  5     Most Severe Ideation Description (Lifetime)  after i did it i wondered how many did i take.     Frequency (Lifetime)  3     Duration (Lifetime)  5     Controllability (Lifetime)  3     Protective Factors  (Lifetime)  1     Reasons for Ideation (Lifetime)  3     Q1 Wished to be Dead (Past Month) no  no    Q2 Suicidal Thoughts (Past Month) no  " no    Q3 Suicidal Thought Method no  no    Q4 Suicidal Intent without Specific Plan yes  no    Q5 Suicide Intent with Specific Plan   no    Q6 Suicide Behavior (Lifetime) yes  yes    Within the Past 3 Months?   no    Level of Risk per Screen   moderate risk    RETIRED: 1. Wish to be Dead (Recent)  No     RETIRED: Wish to be Dead Description (Recent)  see above.     RETIRED: 2. Non-Specific Active Suicidal Thoughts (Recent)  No     3. Active Suicidal Ideation with any Methods (Not Plan) Without Intent to Act (Lifetime)  Yes     RETIRE: Active Suicidal Ideation with any Methods (Not Plan) Description (Lifetime)  see above     RETIRED: 3. Active Suicidal Ideation with any Methods (Not Plan) Without Intent to Act (Recent)  No     RETIRE: 4. Active Suicidal Ideation with Some Intent to Act, Without Specific Plan (Lifetime)  Yes     RETIRE: Active Suicidal Ideation with Some Intent to Act, Without Specific Plan Description (Lifetime)  see above     4. Active Suicidal Ideation with Some Intent to Act, Without Specific Plan (Recent)  No     RETIRE: 5. Active Suicidal Ideation with Specific Plan and Intent (Lifetime)  Yes     RETIRE: Active Suicidal Ideation with Specific Plan and Intent Description (Lifetime)  took pills right after argument with his father     RETIRED: 5. Active Suicidal Ideation with Specific Plan and Intent (Recent)  No     Most Severe Ideation Rating (Past Month)  NA     Frequency (Past Month)  NA     Duration (Past Month)  NA     Controllability (Past Month)  NA     Protective Factors (Past Month)  NA     Reasons for Ideation (Past Month)  NA     Actual Attempt (Lifetime)  Yes     Actual Attempt Description (Lifetime)  see top     Total Number of Actual Attempts (Lifetime)  1     Actual Attempt (Past 3 Months)  No     Has subject engaged in non-suicidal self-injurious behavior? (Lifetime)  No     Has subject engaged in non-suicidal self-injurious behavior? (Past 3 Months)  No     Interrupted Attempts  (Lifetime)  No     Interrupted Attempts (Past 3 Months)  No     Aborted or Self-Interrupted Attempt (Lifetime)  No     Aborted or Self-Interrupted Attempt (Past 3 Months)  No     Preparatory Acts or Behavior (Lifetime)  No     Preparatory Acts or Behavior (Past 3 Months)  No     Most Recent Attempt Actual Lethality Code  0     Most Recent Attempt Potential Lethality Code  1     Most Lethal Attempt Actual Lethality Code  0     Most Lethal Attemplt Potential Lethality Code  1     Initial/First Attempt Date  11/27/2019     Comments  his best guess     Initial/First Attempt Actual Lethality Code  0     Initial/First Attempt Potential Lethality Code  1     Q1 Wish to be Dead (Lifetime)    N   Q2 Non-Specific Active Suicidal Thoughts (Lifetime)    N   Actual Attempt (Lifetime)    N   Has subject engaged in non-suicidal self-injurious behavior? (Lifetime)    N   Interrupted Attempts (Lifetime)    N   Aborted or Self-Interrupted Attempt (Lifetime)    N   Preparatory Acts or Behavior (Lifetime)    N   Calculated C-SSRS Risk Score (Lifetime/Recent)    No Risk Indicated     GAIN-sliding scale:      6/1/2021     1:00 PM 2/13/2024     4:00 PM   When was the last time that you had significant problems...   with feeling very trapped, lonely, sad, blue, depressed or hopeless about the future? 1+ years ago 1+ years ago   with sleep trouble, such as bad dreams, sleeping restlessly, or falling asleep during the day? Never 1+ years ago   with feeling very anxious, nervous, tense, scared, panicked or like something bad was going to happen? Never 1+ years ago   with becoming very distressed & upset when something reminded you of the past? Never 2 to 12 months ago   with thinking about ending your life or committing suicide? 1+ years ago Never          6/1/2021     1:00 PM 2/13/2024     4:00 PM   When was the last time that you did the following things 2 or more times?   Lied or conned to get things you wanted or to avoid having to do  something? 1+ years ago 1+ years ago   Had a hard time paying attention at school, work or home? Never Never   Had a hard time listening to instructions at school, work or home? Never Never   Were a bully or threatened other people? 1+ years ago Never   Started physical fights with other people? 1+ years ago Never     Personal and Family Medical History:  The patient did report a family history of mental health concerns.  The patient reported family history includes Blood Disease in his mother; Coronary Artery Disease in his maternal uncle; Dementia in his paternal grandfather; Depression in his paternal grandfather; Diabetes in his maternal grandmother and mother; Hypertension in his father, maternal grandmother, mother, paternal grandfather, and paternal grandmother; Obesity in his father.     The patient reported the following previous mental health diagnoses: The patient reported a history of Depression NOS.  The patient reported his primary mental health symptoms include: The patient denied having any current mental health symptoms and these do not impact his ability to function.  The patient has not received mental health services in the past: The patient denied having any history of being prescribed psychotropic medications.  The patient reported a history of working with a 1:1 mental health therapist in the past, but he denied working with a 1:1 mental health therapist at this time.  Psychiatric Hospitalizations: None.  The patient denies a history of civil commitment.  Current mental health services/providers include:  The patient denied having any history of being prescribed psychotropic medications.  The patient reported a history of working with a 1:1 mental health therapist in the past, but he denied working with a 1:1 mental health therapist at this time.    The patient has had a physical exam to rule out medical causes for current symptoms.  Date of last physical exam was within the past year. The  patient was encouraged to follow up with PCP if symptoms were to develop.  The patient has a [facility] Primary Care Provider, who is named Priyank Lawrence.  The patient reported the following medical concerns:   Past Medical History:   Diagnosis Date    Adrenal adenoma, left     Anemia     Benign essential hypertension 07/28/2017    Bursitis of left shoulder 07/29/2023    Chronic deep vein thrombosis (DVT) of internal jugular vein (H) 03/2023    Right IJ, catheter-related    CKD (chronic kidney disease) stage 5, GFR less than 15 ml/min (H) 2021    FSGS    Depression     Focal glomerular sclerosis 07/28/2017    History of kidney transplant     HLD (hyperlipidemia)     Kidney replaced by transplant 12/05/2023    DDKT. Induction w/ Thymoglobulin 2mg/kg and basiliximab.    LVH (left ventricular hypertrophy) due to hypertensive disease 07/14/2017    Noncompliance     Obesity, unspecified     OD (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/2021    Prolonged QT interval     Staphylococcus infection 07/27/2023 07/27/23: Blood    Stress-induced cardiomyopathy     Suicide attempt (H) 2019   The patient reported taking his medications as prescribed and following the recommendations of his healthcare providers.  The patient reported pain concerns including having some pain related to hemorrhoids.  The patient did not feel there was any need for additional help addressing this pain concerns.  The patient is male and is not pregnant.  There are not significant appetite / nutritional concerns / weight changes.  The patient does not report having a history of an eating disorder.  The patient does not report a history of head injury / trauma / cognitive impairment.      The patient reported current medications as:   Outpatient Medications Marked as Taking for the 2/13/24 encounter (Hospital Encounter) with Desmond Valdivia LADC   Medication Sig    acetaminophen (TYLENOL) 325 MG tablet Take 2 tablets (650 mg) by mouth every 4 hours as needed for  pain    amLODIPine (NORVASC) 10 MG tablet Take 10 mg by mouth daily     aspirin 81 MG EC tablet Take 81 mg by mouth daily    atorvastatin (LIPITOR) 10 MG tablet Take 1 tablet (10 mg) by mouth daily    carvedilol (COREG) 25 MG tablet TAKE TWO TABLETS BY MOUTH TWICE A DAY WITH A MEAL Strength: 25 mg    hydrALAZINE (APRESOLINE) 100 MG tablet Take 0.5 tablets (50 mg) by mouth 2 times daily    magnesium oxide (MAG-OX) 400 MG tablet Take 2 tablets (800 mg) by mouth 2 times daily    medical cannabis (Patient's own supply) See Admin Instructions (The purpose of this order is to document that the patient reports taking medical cannabis.  This is not a prescription, and is not used to certify that the patient has a qualifying medical condition.)    Patient was advised on the cannabis-tacrolimus drug interaction.    Multiple Vitamin (MULTIVITAMIN ADULT PO) Take 1 tablet by mouth daily    mycophenolic acid (GENERIC EQUIVALENT) 180 MG EC tablet Take 3 tablets (540 mg) by mouth 2 times daily    sodium bicarbonate 650 MG tablet Take 3 tablets (1,950 mg) by mouth 3 times daily    Sodium Bicarbonate, antacid, POWD Mix 1/2 teaspoon of baking soda in full glass of water once daily    sulfamethoxazole-trimethoprim (BACTRIM) 400-80 MG tablet Take 1 tablet by mouth daily    tacrolimus (GENERIC) 0.5 MG capsule Take 1 capsule (0.5 mg) by mouth 2 times daily To be used for dose adjustments.    tacrolimus (GENERIC) 1 MG capsule Take 7 capsules (7 mg) by mouth 2 times daily     Medication Adherence:  The patient reported taking his prescribed medications as prescribed.  The patient reported being able  to self-administer his medications.    Patient Allergies:    Allergies   Allergen Reactions    Benadryl [Diphenhydramine] Itching    Vancomycin Itching     Medical History:    Past Medical History:   Diagnosis Date    Adrenal adenoma, left     Anemia     Benign essential hypertension 07/28/2017    Bursitis of left shoulder 07/29/2023    Chronic  deep vein thrombosis (DVT) of internal jugular vein (H) 03/2023    Right IJ, catheter-related    CKD (chronic kidney disease) stage 5, GFR less than 15 ml/min (H) 2021    FSGS    Depression     Focal glomerular sclerosis 07/28/2017    History of kidney transplant     HLD (hyperlipidemia)     Kidney replaced by transplant 12/05/2023    DDKT. Induction w/ Thymoglobulin 2mg/kg and basiliximab.    LVH (left ventricular hypertrophy) due to hypertensive disease 07/14/2017    Noncompliance     Obesity, unspecified     OD (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/2021    Prolonged QT interval     Staphylococcus infection 07/27/2023 07/27/23: Blood    Stress-induced cardiomyopathy     Suicide attempt (H) 2019      Substance Use:  The patient reported having no family history of chemical health issues.  The patient denied having any history of participating in a substance use disorder treatment program.  The patient denied having any inpatient detoxification admissions or inpatient hospitalizations for withdrawal symptoms.  The patient is not currently receiving any substance use disorder treatment services.  The patient denied having any history of attending 12-step or other recovery support group meetings.       Substance Age of first use Pattern and duration of use (include amounts and frequency) Date of last use     Withdrawal potential Route of use   Has used Alcohol 19 The patient reported his heaviest use of alcohol had been between the ages of 19 and 22, when he reported a pattern of drinking between 4-5 shots of hard alcohol between 2-3 times per week on average.    The patient reported his longest period of time without drinking any alcohol had been since late 2020.   Late 2020  No Oral   Has used Marijuana   19 The patient reported his heaviest use of THC/cannabis had been between the ages of 20 and 21, when he reported a pattern of smoking between 4-5 joints of THC/cannabis between 2-3 times per week on average.    The  patient reported his longest period of time without using cannabis/THC had been for a 1 year period of time between 2020 and 2021 and his return to using cannabis/THC had been due to using THC/cannabis in an attempt to self-medicate his pain, his cramping, his muscle spasms and to increase his appetite.     The patient reported he had been issued a legal medical THC/cannabis card in [facility] in 9/2022 due to his medical issues, including having severe cramps and muscle spasms.    During the past 12-months, he reported a pattern of smoking 1 joint of THC/cannabis on a nightly basis up until having his kidney transplant on 12/5/2023.    Since 12/5/2023, he reported a pattern of using between 1-2 five mg THC/cannabis gummies on a nightly basis to help him fall asleep.   2/12/2024  No Smoke   Has not used Amphetamines          Has not used Cocaine/crack           Has not used Hallucinogens        Has not used Inhalants        Has not used Heroin        Has not used Other Opiates        Has not used Benzodiazepines          Has not used Barbiturates        Has not used Over the counter medications        Has used Nicotine 18 The patient reported a history of vaping nicotine with his last use of nicotine being in 2018.    2018 No  Vaping    Has use Caffeine 4 The patient reported a current pattern of drinking 1 bottle/can of soda around 1 time per month on average.    3-weeks ago No  Oral   Has not used other substances not listed above:  Identify:           The patient reported the following problems as a result of their substance use: legal issues and DUI.  The patient is not concerned about substance use.  The patient reported his recovery goal is: The patient's plan and goal is to abstain from alcohol and from all other non-prescribed mood altering chemicals.     The patient reports experiencing the following withdrawal symptoms within the past 12 months: none within the past 12-months and the following  within the past 30 days: none within the past 12-months. (DSM-11)  The patient reported having urges to use cannabis/THC.  (DSM-4)  The patient reported he has not used more cannabis/THC than intended or over a longer period of time than intended.  (DSM-1)  The patient reported he has not had unsuccessful attempts to cut down or control use of cannabis/THC.  (DSM-2)  The patient reported his longest period of time without using cannabis/THC had been for a 1 year period of time between 2020 and 2021 and his return to using cannabis/THC had been due to using THC/cannabis in an attempt to self-medicate his pain, his cramping, his muscle spasms and to increase his appetite.  The patient reported he has not needed to use more cannabis/THC to achieve the same effect.  (DSM-10)  The patient does not report diminished effect with use of same amount of cannabis/THC.  (DSM-10)     The patient does not report a great deal of time is spent in activities necessary to obtain, use, or recover from cannabis/THC effects.  (DSM-3)  The patient does not report important social, occupational, or recreational activities are given up or reduced because of cannabis/THC use.  (DSM-7)  The patient denied having a persistent or recurrent physical or psychological problem that is likely to have been caused or exacerbated by use.  (DSM-9)  The patient reported the following problem behaviors while under the influence of substances: None within the past 12-months.  (DSM-6)  The patient denied having any recurrent use of cannabis/THC in physically hazardous situations within the past 12-months.  (DSM-8)    The patient reported his substance use has not negatively impacted his ability to function in a school setting within the past 12-months.  (DSM-5)  The patient reported his substance use has not negatively impacted his ability to function in a work setting within the past 12-months.  (DSM-5)  The patient's demographics and history impact his  recovery in the following ways: The patient reported he had first started to smoke THC/cannabis with his friends/peers in social situations.  The patient reported engaging in the following recreation/leisure activities while using alcohol or other non-prescribed mood altering chemicals: The patient's use of THC/cannabis had been done independently of his social/recreational/leisure activities.  The patient reported the following people are supportive of his recovery: his father, his mother, his sister, and a few close friends.    The patient denied having current or past concerns regarding gambling and denied ever participating in a gambling treatment program.  The patient does not have other addictive behaviors he is concerned about at this time.    Dimension Scale Ratings:    Dimension 1 -  Acute Intoxication/Withdrawal: 0 - No Problem    Dimension 2 - Biomedical: 2 - Moderate Problem    Dimension 3 - Emotional/Behavioral/Cognitive Conditions: 1 - Minor Problem    Dimension 4 - Readiness to Change:  1 - Minor Problem    Dimension 5 - Relapse/Continued Use/ Continued Problem Potential: 1 - Minor Problem    Dimension 6 - Recovery Environment:  1 - Minor Problem    Significant Losses / Trauma / Abuse / Neglect Issues:   The patient did not serve in the .  There are indications or report of significant loss, trauma, abuse or neglect issues related to: The patient denied experiencing or witnessing any verbal, physical or sexual abuse when he was growing up in the family home.  The patient denied having any history of being verbally, emotionally, physically, or sexually abused.  The patient reported having a history of trauma issues due to his grandmother dying in 2023, due to being unable to make his grandmother's  because he was in the process of having his kidney transplant surgery, due to two of his grandparents having strokes during this past year and due to going through a difficult break-up with  an ex-girlfriend/significant other in the past.  The patient denied having any history of suicide attempts and denied having any current suicide ideation.  The patient denied having any history of self-injurious behavior.   Concerns for possible neglect are not present.    Safety Assessment:   The patient denies current homicidal ideation and behaviors.  The patient denies current self-injurious ideation and behaviors.    The patient denied risk behaviors associated with substance use.  The patient denied any high risk behaviors associated with mental health symptoms.  The patient reported the following current concerns for their personal safety: None.  The patient reported there are not any firearms in the home.     History of Safety Concerns:  The patient denied a history of homicidal ideation.     The patient denied a history of personal safety concerns.    The patient denied a history of assaultive behaviors.    The patient denied having any history of sexual assault behaviors.  The patient denied having any history of being registered as a sex offender.  The patient reported a history of unsafe motor vehicle operation and reported a history of having legal consequences associated with substance use.  The patient reported a history of substance use associated with mental health symptoms.  The patient reported the following protective factors: positive relationships positive family connections, forward/future oriented thinking, safe and stable environment, adherence with prescribed medication, living with other people, and commitment to well-being.    Risk Plan:  See Recommendations for Safety and Risk Management Plan    Review of Symptoms per patient report:  Substance Use:  No symptoms within the past 12-months.      Diagnostic Criteria:   4.)  Craving, or a strong desire or urge to use the substance.  Met for:  cannabis/THC.    Collateral Contact Summary:   Collateral contacts contributing to this assessment:   The patient's electronic medical records were reviewed at time of assessment.    No additional collateral data had been obtained at the time of this documentation.     If court related records were reviewed, summarize here: None    Information from collateral contacts supported/largely agreed with information from the client and associated risk ratings.    Information in this assessment was obtained from the medical record and provided by the patient who is a good historian.        The patient will have open access to his substance use disorder assessment medical record.    As evidenced by self report and criteria, the patient meets the following DSM-5 Diagnoses: (Sustained by DSM-5 Criteria Listed Above)      1.)  Cannabis Use Disorder Moderate - 304.30 (F12.20), in sustained remission    Specify if: In early remission:  After full criteria for alcohol/drug use disorder were previously met, none of the criteria for alcohol/drug use disorder have been met for at least 3 months but for less than 12 months (with the exception that Criterion A4,  Craving or a strong desire or urge to use alcohol/drug  may be met).     In sustained remission:   After full criteria for alcohol use disorder were previously met, none of the criteria for alcohol/drug use disorder have been met at any time during a period of 12 months or longer (with the exception that Criterion A4,  Craving or strong desire or urge to use alcohol/drug  may be met).     Specify if:   This additional specifier is used if the individual is in an environment where access to alcohol is restricted.    Mild: Presence of 2-3 symptoms  Moderate: Presence of 4-5 symptoms  Severe: Presence of 6 or more symptoms    Recommendations:     1. Plan for Safety and Risk Management:     Recommended that patient call 911 or go to the local ED should there be a change in any of these risk factors..            Report to child / adult protection services was not needed.    2. SPENSER  "Referrals:      Recommendations:      1.)  It was recommended the patient refrain from drinking alcohol and driving at all times.  2.)  Follow all the terms and conditions of the [facility] DOT for getting his 's license reinstated.  3.)  Attend and complete a minimum of a Level I Driving with Care 12-hour DWI class.    4.)  In addition, if the patient chooses to continue to drink alcohol he should only drink alcohol in a minimal and social manner by not exceeding three \"standard\" alcoholic beverages (12 ounces of regular beer in the 5% alcohol range, 5 ounces of wine in the 12% alcohol range, or 1.5 ounces of distilled spirits in the 40% alcohol range) in any 24-hour period of time and by spreading his use of alcohol out over a long enough period of time given his gender and his body weight to avoid exceeding a 0.04 blood alcohol concentration.         Below is a list with programs which offer both the Level I and the Level II Driving with Care DWI classes.    The Level I Driving with Care class is a 12-hour DWI class.  The Level II Driving with Care class is a 24-hour DWI class.    You have been recommended to attend a Level I Driving with Care 12-hour DWI class.    Program name Telephone number Website   [employer]    [phone] https://www.[employer]/     [facility] [phone] www.accessbehavioralchange.com/     [employer]   [phone] www.[employer]Kit.org/   [employer] [phone] http://www.drivingwithcare.org/        [place] [facility] 755-792-2198 [email or web address]/resources/driving-with-care/      [facility] [phone] [email or web address].[employer]ab/driving-with-care-class/        There are additional programs which offer these Level I and Level II Driving with Care classes, so if none of the above programs work for you to attend a class, you can use an internet search to find additional " programs which offer the Driving with Care classes.    It is your responsibility to set up a time to attend and complete the recommended level of the Driving with Care class and it is your responsibility to have the program you attend send a letter of completion of the Driving with Care class to your court , your court probation department and/or to your  as needed.     The patient reported he was willing to follow the above recommendations.      The patient meets criteria for the following levels of care based on ASAM Criteria:      Withdrawal Management - No Withdrawal Management Indicated.    Treatment/Recovery Services - The patient does NOT currently meet the ASAM Criteria to be referred to any level of care of [facility] Treatment at this time.      The patient's placement aligns with the assessment and placement level of care recommendation based on current ASAM Dimension scale ratings.     The patient would like the following family or other support people involved in their treatment:  None at this time.  The patient does not have any history of opioid abuse.      3.  Mental Health Referrals:     The patient did not appear to be in any need of a mental health referral at this time.    4. Clinical Substantiation for the above recommendations: The patient would benefit from increasing his awareness regarding the risks of substance abuse and from working on skills to minimize the potential for having future negative substance use consequences by attending a Level I Driving with Care 12-hour DWI class.    5.  Cultural Concerns:    The patient did not identify having any cultural concerns regarding mental health, physical health, or substance use issues.     6. Recommendations for treatment focus:      Alcohol / Substance Use - See #2. SPENSER Referrals above for details on recommendations.    7. Interactive Complexity: No    8. Safety Plan:  Patient denied any current/recent/lifetime history of  suicidal ideation and/or behaviors.  No safety plan indicated at this time.     Provider Name/ Credentials:  LISA Martinez  February 13, 2024

## 2024-02-13 NOTE — PROGRESS NOTES
"  [facility]  [address] [place] [id number]  2/13/2024    Taylor Valiente  [id number]   [place] [address]      Taylor,    This is to verify that Taylor Valiente (1995) participated in a substance use disorder assessment in person at [facility] in [place], [place] at F[address] in the [facility] with TAMARA Gibson/LISA at [facility] in [place], [place] on 2/13/2024.    Recommendations:  1.)  It was recommended the patient refrain from drinking alcohol and driving at all times.  2.)  Follow all the terms and conditions of the [facility] DOT for getting his 's license reinstated.  3.)  Attend and complete a minimum of a Level I Driving with Care 12-hour DWI class.    4.)  In addition, if the patient chooses to continue to drink alcohol he should only drink alcohol in a minimal and social manner by not exceeding three \"standard\" alcoholic beverages (12 ounces of regular beer in the 5% alcohol range, 5 ounces of wine in the 12% alcohol range, or 1.5 ounces of distilled spirits in the 40% alcohol range) in any 24-hour period of time and by spreading his use of alcohol out over a long enough period of time given his gender and his body weight to avoid exceeding a 0.04 blood alcohol concentration.         Below is a list with programs which offer both the Level I and the Level II Driving with Care DWI classes.    The Level I Driving with Care class is a 12-hour DWI class.  The Level II Driving with Care class is a 24-hour DWI class.    You have been recommended to attend a Level I Driving with Care 12-hour DWI class.    Program name Telephone number Website   [employer]    [phone] https://www.[employer]/     [facility] [phone] www.accessbehavioralchange.com/     [employer]   [phone] www.FileThis.org/   [employer] 830.445.7003 " http://www.drivingwithcare.org/        [place] [facility] [phone] https://[facility].org/resources/driving-with-care/      [facility] [phone] https://www.Footnote.[employer]/driving-with-care-class/        There are additional programs which offer these Level I and Level II Driving with Care classes, so if none of the above programs work for you to attend a class, you can use an internet search to find additional programs which offer the Driving with Care classes.    It is your responsibility to set up a time to attend and complete the recommended level of the Driving with Care class and it is your responsibility to have the program you attend send a letter of completion of the Driving with Care class to your court , your court probation department and/or to your  as needed.     The patient reported he was willing to follow the above recommendations.      For any additional issues or needs for follow-up regarding today's on 2/13/2024 substance use disorder assessment, please contact our patient navigator for assistance:   Jaylene Arguelles [facility]   Tel #: [phone]     If you have any additional questions or concerns, please feel free to contact me by any of the contact methods listed below.    Sincerely,    TAMARA Shaw, [facility]  CD Evaluation Counselor  [facility]  [address] [place] [id number]  [email or web address]  Tel: [phone]  Fax: [phone]

## 2024-02-14 ENCOUNTER — TELEPHONE (OUTPATIENT)
Dept: TRANSPLANT | Facility: CLINIC | Age: 29
End: 2024-02-14
Payer: MEDICARE

## 2024-02-14 DIAGNOSIS — Z94.0 KIDNEY REPLACED BY TRANSPLANT: Primary | ICD-10-CM

## 2024-02-14 NOTE — TELEPHONE ENCOUNTER
Post Kidney and Pancreas Transplant Team Conference  Date: 2/14/2024  Transplant Coordinator: Opal Rashid     Attendees:  []  Dr. Simons [] Randee Barillas, GALLITO [] Lauren Muniz LPN     []  Dr. Lynn [] Stefani Manrique, RN [] Kimberly Ayala LPN    [] Dr. Whyte [] Meka Rashid RN    [] Dr. Webber [] Nilda Duran RN [] Netta Walter RN   [] Dr. Caceres [] Milla Mc RN    [] Dr. Vuong [] Ankita Quintero RN    []  Dr. Cardoza [] Linda Quintero RN    [] Dr. Jimenez [] Britton Miranda RN    [] Vesna Yuan NP [] My Singh RN    [] Kristi Hyde NP [] Latonya Avila RN        Verbal Plan Read Back:   Recommend kidney biopsy     Routed to RN Coordinator   Opal Rashid RN

## 2024-02-15 ENCOUNTER — OFFICE VISIT (OUTPATIENT)
Dept: PHARMACY | Facility: CLINIC | Age: 29
End: 2024-02-15
Payer: MEDICARE

## 2024-02-15 ENCOUNTER — OFFICE VISIT (OUTPATIENT)
Dept: TRANSPLANT | Facility: CLINIC | Age: 29
End: 2024-02-15
Attending: INTERNAL MEDICINE
Payer: MEDICARE

## 2024-02-15 ENCOUNTER — TELEPHONE (OUTPATIENT)
Dept: TRANSPLANT | Facility: CLINIC | Age: 29
End: 2024-02-15

## 2024-02-15 VITALS
WEIGHT: 236 LBS | TEMPERATURE: 98.3 F | OXYGEN SATURATION: 100 % | HEART RATE: 72 BPM | SYSTOLIC BLOOD PRESSURE: 132 MMHG | DIASTOLIC BLOOD PRESSURE: 82 MMHG | BODY MASS INDEX: 32.01 KG/M2

## 2024-02-15 DIAGNOSIS — D84.9 IMMUNOSUPPRESSED STATUS (H): ICD-10-CM

## 2024-02-15 DIAGNOSIS — Z94.0 KIDNEY REPLACED BY TRANSPLANT: Primary | ICD-10-CM

## 2024-02-15 DIAGNOSIS — D72.819 LEUKOPENIA: ICD-10-CM

## 2024-02-15 DIAGNOSIS — R79.89 ELEVATED SERUM CREATININE: ICD-10-CM

## 2024-02-15 DIAGNOSIS — E87.20 METABOLIC ACIDOSIS: ICD-10-CM

## 2024-02-15 DIAGNOSIS — I15.1 HTN, KIDNEY TRANSPLANT RELATED: ICD-10-CM

## 2024-02-15 DIAGNOSIS — R76.8 DONOR SPECIFIC ANTIBODY (DSA) POSITIVE: Primary | ICD-10-CM

## 2024-02-15 DIAGNOSIS — Z94.0 HTN, KIDNEY TRANSPLANT RELATED: ICD-10-CM

## 2024-02-15 DIAGNOSIS — E78.5 DYSLIPIDEMIA: ICD-10-CM

## 2024-02-15 DIAGNOSIS — K21.9 GASTROESOPHAGEAL REFLUX DISEASE, UNSPECIFIED WHETHER ESOPHAGITIS PRESENT: ICD-10-CM

## 2024-02-15 DIAGNOSIS — N17.9 ACUTE KIDNEY INJURY (H): ICD-10-CM

## 2024-02-15 DIAGNOSIS — Z48.298 AFTERCARE FOLLOWING ORGAN TRANSPLANT: ICD-10-CM

## 2024-02-15 DIAGNOSIS — B34.8 BK VIREMIA: ICD-10-CM

## 2024-02-15 DIAGNOSIS — Z78.9 TAKES DIETARY SUPPLEMENTS: ICD-10-CM

## 2024-02-15 DIAGNOSIS — R19.5 LOOSE STOOLS: ICD-10-CM

## 2024-02-15 PROBLEM — N18.9 ANEMIA OF CHRONIC RENAL FAILURE: Status: RESOLVED | Noted: 2020-12-21 | Resolved: 2024-02-15

## 2024-02-15 PROBLEM — M75.52 BURSITIS OF LEFT SHOULDER: Status: RESOLVED | Noted: 2023-07-29 | Resolved: 2024-02-15

## 2024-02-15 PROBLEM — N18.5: Status: RESOLVED | Noted: 2021-06-15 | Resolved: 2024-02-15

## 2024-02-15 PROBLEM — N05.1 FOCAL GLOMERULAR SCLEROSIS: Status: RESOLVED | Noted: 2017-07-28 | Resolved: 2024-02-15

## 2024-02-15 PROBLEM — T82.7XXA: Status: RESOLVED | Noted: 2023-07-29 | Resolved: 2024-02-15

## 2024-02-15 PROBLEM — I10 ESSENTIAL HYPERTENSION: Status: RESOLVED | Noted: 2021-11-29 | Resolved: 2024-02-15

## 2024-02-15 PROBLEM — Z99.2 ESRD NEEDING DIALYSIS (H): Status: RESOLVED | Noted: 2021-06-15 | Resolved: 2024-02-15

## 2024-02-15 PROBLEM — E87.5 HYPERKALEMIA: Status: RESOLVED | Noted: 2021-11-29 | Resolved: 2024-02-15

## 2024-02-15 PROBLEM — M93.20 OD (OSTEOCHONDRITIS DISSECANS): Status: RESOLVED | Noted: 2021-01-19 | Resolved: 2024-02-15

## 2024-02-15 PROBLEM — I82.90 ACUTE DEEP VEIN THROMBOSIS (DVT) OF NON-EXTREMITY VEIN: Status: RESOLVED | Noted: 2023-03-13 | Resolved: 2024-02-15

## 2024-02-15 PROBLEM — B95.8 STAPHYLOCOCCUS INFECTION: Status: RESOLVED | Noted: 2023-07-27 | Resolved: 2024-02-15

## 2024-02-15 PROBLEM — A41.1: Status: RESOLVED | Noted: 2023-07-29 | Resolved: 2024-02-15

## 2024-02-15 PROBLEM — N18.6 ESRD (END STAGE RENAL DISEASE) ON DIALYSIS (H): Status: RESOLVED | Noted: 2021-11-29 | Resolved: 2024-02-15

## 2024-02-15 PROBLEM — Z99.2 ESRD (END STAGE RENAL DISEASE) ON DIALYSIS (H): Status: RESOLVED | Noted: 2021-11-29 | Resolved: 2024-02-15

## 2024-02-15 PROBLEM — N18.6 ESRD NEEDING DIALYSIS (H): Status: RESOLVED | Noted: 2021-06-15 | Resolved: 2024-02-15

## 2024-02-15 PROBLEM — G89.18 ACUTE POST-OPERATIVE PAIN: Status: RESOLVED | Noted: 2023-12-07 | Resolved: 2024-02-15

## 2024-02-15 PROBLEM — R70.0 ELEVATED ERYTHROCYTE SEDIMENTATION RATE: Status: RESOLVED | Noted: 2023-07-28 | Resolved: 2024-02-15

## 2024-02-15 PROBLEM — D72.829 LEUKOCYTOSIS, UNSPECIFIED TYPE: Status: RESOLVED | Noted: 2023-07-28 | Resolved: 2024-02-15

## 2024-02-15 PROBLEM — N17.0: Status: RESOLVED | Noted: 2021-06-15 | Resolved: 2024-02-15

## 2024-02-15 PROBLEM — U07.1 2019 NOVEL CORONAVIRUS DISEASE (COVID-19): Status: RESOLVED | Noted: 2020-11-27 | Resolved: 2024-02-15

## 2024-02-15 PROBLEM — D63.1 ANEMIA OF CHRONIC RENAL FAILURE: Status: RESOLVED | Noted: 2020-12-21 | Resolved: 2024-02-15

## 2024-02-15 PROBLEM — R73.03 PRE-DIABETES: Status: RESOLVED | Noted: 2023-12-05 | Resolved: 2024-02-15

## 2024-02-15 PROBLEM — N18.5 CKD (CHRONIC KIDNEY DISEASE) STAGE 5, GFR LESS THAN 15 ML/MIN (H): Status: RESOLVED | Noted: 2017-07-28 | Resolved: 2024-02-15

## 2024-02-15 PROBLEM — M25.512 ACUTE PAIN OF LEFT SHOULDER: Chronic | Status: RESOLVED | Noted: 2023-07-28 | Resolved: 2024-02-15

## 2024-02-15 PROBLEM — R31.9 HEMATURIA: Status: RESOLVED | Noted: 2024-01-21 | Resolved: 2024-02-15

## 2024-02-15 LAB
DONOR IDENTIFICATION: NORMAL
DR53: 816
DSA COMMENTS: NORMAL
DSA PRESENT: YES
DSA TEST METHOD: NORMAL
INT SUB RESULT: NORMAL
INTERF SUBSTANCE: NORMAL
INTSUB TEST METHOD: NORMAL
ORGAN: NORMAL
SA 1 CELL: NORMAL
SA 1 TEST METHOD: NORMAL
SA 2 CELL: NORMAL
SA 2 TEST METHOD: NORMAL
SA1 HI RISK ABY: NORMAL
SA1 MOD RISK ABY: NORMAL
SA2 HI RISK ABY: NORMAL
SA2 MOD RISK ABY: NORMAL
UNACCEPTABLE ANTIGENS: NORMAL
UNOS CPRA: 79
ZZZINT SUB COMMENTS: NORMAL
ZZZSA 1  COMMENTS: NORMAL
ZZZSA 2 COMMENTS: NORMAL

## 2024-02-15 PROCEDURE — 99215 OFFICE O/P EST HI 40 MIN: CPT | Mod: 24 | Performed by: INTERNAL MEDICINE

## 2024-02-15 PROCEDURE — G0463 HOSPITAL OUTPT CLINIC VISIT: HCPCS | Performed by: INTERNAL MEDICINE

## 2024-02-15 PROCEDURE — 99207 PR NO CHARGE LOS: CPT | Performed by: PHARMACIST

## 2024-02-15 PROCEDURE — G2211 COMPLEX E/M VISIT ADD ON: HCPCS | Performed by: INTERNAL MEDICINE

## 2024-02-15 RX ORDER — VALGANCICLOVIR 450 MG/1
900 TABLET, FILM COATED ORAL DAILY
COMMUNITY
Start: 2024-02-15 | End: 2024-04-16

## 2024-02-15 RX ORDER — SODIUM BICARBONATE
POWDER (GRAM) MISCELLANEOUS
COMMUNITY
Start: 2024-02-15

## 2024-02-15 RX ORDER — FAMOTIDINE 20 MG/1
20 TABLET, FILM COATED ORAL
Qty: 90 TABLET | Refills: 0 | Status: SHIPPED | OUTPATIENT
Start: 2024-02-15 | End: 2024-05-15

## 2024-02-15 RX ORDER — HYDRALAZINE HYDROCHLORIDE 50 MG/1
50 TABLET, FILM COATED ORAL 2 TIMES DAILY
Qty: 180 TABLET | Refills: 3 | Status: SHIPPED | OUTPATIENT
Start: 2024-02-15 | End: 2024-03-29 | Stop reason: ALTCHOICE

## 2024-02-15 RX ORDER — CARVEDILOL 25 MG/1
25 TABLET ORAL 2 TIMES DAILY WITH MEALS
COMMUNITY
Start: 2024-02-15 | End: 2024-08-18

## 2024-02-15 RX ORDER — MAGNESIUM GLYCINATE 100 MG
300 CAPSULE ORAL 2 TIMES DAILY
COMMUNITY
Start: 2024-02-15

## 2024-02-15 ASSESSMENT — PAIN SCALES - GENERAL: PAINLEVEL: NO PAIN (0)

## 2024-02-15 NOTE — NURSING NOTE
Chief Complaint   Patient presents with    RECHECK       /82   Pulse 72   Temp 98.3  F (36.8  C) (Oral)   Wt 107 kg (236 lb)   SpO2 100%   BMI 32.01 kg/m      Sal Wade on 2/15/2024 at 11:25 AM

## 2024-02-15 NOTE — LETTER
2/15/2024         RE: Taylor Valiente  [address]   [place] [address]        Dear Colleague,    Thank you for referring your patient, Taylor Valiente, to the [facility]. Please see a copy of my visit note below.    TRANSPLANT NEPHROLOGY EARLY POST TRANSPLANT VISIT    Assessment & Plan   # DDKT: Trend up   - Baseline Creatinine: ~ 0.8-1   - Proteinuria: Normal (<0.2 grams)   - Date DSA Last Checked: Feb/2024      Latest DSA: Low level DSA (<1000 mfi) to DR53, deNovo, stable    - BK Viremia: Yes, moderately elevated (10-100K)   - Kidney Tx Biopsy: No   - Transplant Ureteral Stent: Removed 1/23/24   -Due to rising BK viremia and dnDSA with increasing Scr we will plan for kidney transplant biopsy in coming week    # Pedro DSA:   -dnDSA detected 1/11/24 to DR53 w/ , stable on 2/5/24   -No decrease in IS despite slightly rising BK unless biopsy demonstrates BK nephropathy   -Attempt to obtain IVIG. Thus far has not been approved. Repeat IgG     # Immunosuppression: Tacrolimus immediate release (goal 8-10) and Mycophenolic acid (dose 540 mg every 12 hours)   - Induction with Recent Transplant:  Intermediate Intensity Protocol   - Continue with intensive monitoring of immunosuppression for efficacy and toxicity.   - Changes: Not at this time    # Infection Prophylaxis:   - PJP: Sulfa/TMP (Bactrim)  - CMV: Valganciclovir (Valcyte). Stop after 3/5/24     # Hypertension: Controlled;  Goal BP: < 130/80   - Volume status: Euvolemic     - Changes: Not at this time. Continue amlodipine 10mg daily, carvedilol 25mg bid and hydralazine 50mg bid    # Anemia in Chronic Renal Disease: Hgb: Stable, near normal      BRIE: No   - Iron studies: Not checked recently    # Mineral Bone Disorder:    - Secondary renal hyperparathyroidism; PTH level: Mildly elevated (151-300 pg/ml)        On treatment: None   - Vitamin D; level: Normal        On supplement: No   - Calcium; level: High  #1. Refer to physical therapy for knee and back pain.   #2. Continue gabapentin 300 mg twice daily.  #3. Follow with cardiologist, Dr. Wood, as directed.   #4. Follow up in 3 months    normal        On supplement: No   - Phosphorus; level: Normal        On supplement: No     # Electrolytes:   - Potassium; level: Normal        On supplement: No  - Magnesium; level: Low normal        On supplement: Yes, mag ox 800mg bid, switch to mag glycinate 300mg bid  - Bicarbonate; level: Low        On supplement: Yes. Prescribed sodium bicarb 1950mg tid and 1 teaspoon baking soda in water daily. Patient states that he is taking    # Loose stools:   -Continue fiber. Change mag to mag glycinate 300mg bid   -Obtain CMV PCR     # GERD:   -Start famotidine 20mg at bedtime PRN    # BK viremia:    -Steadily increasing. 11,600 copies on 2/12/24, up from 2750 copies on 2/1/24   -IgG 647 on 1/15/24, repeat     # Medical Compliance: Yes    # Health Maintenance and Vaccination Review: Recommend:  After 3m post txp obtain COVID and flu vaccines. 6m post txp obtain Shingrix    # Transplant History:  Etiology of Kidney Failure: Focal segmental glomerulosclerosis (FSGS)  Tx: DDKT  Transplant: 12/5/2023 (Kidney)  Donor Type: Donation after Brain Death Donor Class:   Crossmatch at time of Tx: negative  DSA at time of Tx: No  Significant changes in immunosuppression: None  CMV IgG Ab High Risk Discordance (D+/R-): No  EBV IgG Ab High Risk Discordance (D+/R-): No  Significant transplant-related complications: BK Viremia and DSA    Transplant Office Phone Number: [phone]    Assessment and plan was discussed with the patient and he voiced his understanding and agreement.    Return visit: Return in 2 months (on 4/16/2024) for previously scheduled visit, 4 month post transplant visit.    Fausto Whyte MD    I spent 50 minutes on the date of the encounter doing chart review, review of test results, interpretation of tests, patient visit, documentation and discussion with family     The longitudinal plan of care for kidney transplant was addressed during this visit. Due to the added complexity in care, I will continue to support  Taylor Valiente in the subsequent management of this condition(s) and with the ongoing continuity of care of this condition(s).         Chief Complaint   Mr. Valiente is a 28 year old here for kidney transplant, immunosuppression management and routine follow up.     History of Present Illness    He continues to have loose stools, ~2 times per day, more so in the last day. The patient overall feels well. He denies nausea, vomiting, fever, chills, shortness of breath, chest pain, LE edema, unintentional weight loss, nights sweats, dysuria, hematuria.       Home BP:  120s-130 systolic    Problem List   Patient Active Problem List   Diagnosis     LVH (left ventricular hypertrophy) due to hypertensive disease     Renovascular hypertension     CKD (chronic kidney disease) stage 5, GFR less than 15 ml/min (H)     Focal glomerular sclerosis     2019 novel coronavirus disease (COVID-19)     Anemia of chronic renal failure     OD (osteochondritis dissecans)     Adrenal adenoma, left     Stress-induced cardiomyopathy     Hyperlipidemia     Prolonged Q-T interval on ECG     Acute renal failure with acute tubular necrosis superimposed on stage 5 chronic kidney disease, not on chronic dialysis (H)     ESRD needing dialysis (H)     Hyperkalemia     Essential hypertension     ESRD (end stage renal disease) on dialysis (H)     Acute deep vein thrombosis (DVT) of non-extremity vein     Elevated erythrocyte sedimentation rate     Acute pain of left shoulder     Leukocytosis, unspecified type     Sepsis due to coagulase-negative staphylococcal infection (H)     AV fistula infection (H24)     Bursitis of left shoulder     Pre-diabetes     Kidney transplant recipient     Acute post-operative pain     Hypomagnesemia     Low bicarbonate     Immunosuppressed status (H24)     Dehydration     Staphylococcus infection     Hematuria     Diarrhea     BK viremia     Donor specific antibody (DSA) positive     Complication of kidney  transplant     Kidney transplant infection     Cannabis use disorder, moderate, in sustained remission (H)       Allergies   Allergies   Allergen Reactions     Benadryl [Diphenhydramine] Itching     Vancomycin Itching       Medications   Current Outpatient Medications   Medication Sig     carvedilol (COREG) 25 MG tablet Take 1 tablet (25 mg) by mouth 2 times daily (with meals)     famotidine (PEPCID) 20 MG tablet Take 1 tablet (20 mg) by mouth nightly as needed (refux)     hydrALAZINE (APRESOLINE) 50 MG tablet Take 1 tablet (50 mg) by mouth 2 times daily for 90 days     magnesium glycinate 100 MG CAPS capsule Take 3 capsules (300 mg) by mouth 2 times daily Doctor's Best Magnesium or generic Magnesium glycinate     psyllium (METAMUCIL/KONSYL) 58.6 % powder Take 1 teaspoonful by mouth daily     Sodium Bicarbonate, antacid, POWD Mix 1 teaspoon of baking soda in full glass of water once daily     valGANciclovir (VALCYTE) 450 MG tablet Take 2 tablets (900 mg) by mouth daily     acetaminophen (TYLENOL) 325 MG tablet Take 2 tablets (650 mg) by mouth every 4 hours as needed for pain     amLODIPine (NORVASC) 10 MG tablet Take 10 mg by mouth daily      aspirin 81 MG EC tablet Take 81 mg by mouth daily     atorvastatin (LIPITOR) 10 MG tablet Take 1 tablet (10 mg) by mouth daily     medical cannabis (Patient's own supply) See Admin Instructions (The purpose of this order is to document that the patient reports taking medical cannabis.  This is not a prescription, and is not used to certify that the patient has a qualifying medical condition.)    Patient was advised on the cannabis-tacrolimus drug interaction.     Multiple Vitamin (MULTIVITAMIN ADULT PO) Take 1 tablet by mouth daily     mycophenolic acid (GENERIC EQUIVALENT) 180 MG EC tablet Take 3 tablets (540 mg) by mouth 2 times daily     sodium bicarbonate 650 MG tablet Take 3 tablets (1,950 mg) by mouth 3 times daily     sulfamethoxazole-trimethoprim (BACTRIM) 400-80 MG  tablet Take 1 tablet by mouth daily     tacrolimus (GENERIC) 0.5 MG capsule Take 1 capsule (0.5 mg) by mouth 2 times daily To be used for dose adjustments.     tacrolimus (GENERIC) 1 MG capsule Take 7 capsules (7 mg) by mouth 2 times daily     No current facility-administered medications for this visit.     Medications Discontinued During This Encounter   Medication Reason     carvedilol (COREG) 25 MG tablet      hydrALAZINE (APRESOLINE) 100 MG tablet      nitroGLYcerin (NITROSTAT) 0.3 MG sublingual tablet      Sodium Bicarbonate, antacid, POWD      valGANciclovir (VALCYTE) 450 MG tablet      magnesium oxide (MAG-OX) 400 MG tablet        Physical Exam   Vital Signs: /82   Pulse 72   Temp 98.3  F (36.8  C) (Oral)   Wt 107 kg (236 lb)   SpO2 100%   BMI 32.01 kg/m      GENERAL APPEARANCE: alert and no distress  HENT: mouth without ulcers or lesions  RESP: lungs clear to auscultation - no rales, rhonchi or wheezes  CV: regular rhythm, normal rate, no rub, no murmur  EDEMA: no LE edema bilaterally  ABDOMEN: soft, nondistended, nontender, bowel sounds normal  MS: extremities normal - no gross deformities noted, no evidence of inflammation in joints, no muscle tenderness  SKIN: no rash  NEURO: normal strength and tone, sensory exam grossly normal, mentation intact and speech normal  PSYCH: mentation appears normal and affect normal/bright  TX KIDNEY: normal    Data         Latest Ref Rng & Units 2/12/2024     8:54 AM 2/8/2024     8:38 AM 2/5/2024     8:38 AM   Renal   Sodium 135 - 145 mmol/L 138  139  138    K 3.4 - 5.3 mmol/L 4.6  4.4  4.9    Cl 98 - 107 mmol/L 108  107  107    Cl (external) 98 - 107 mmol/L 108  107  107    CO2 22 - 29 mmol/L 19  22  21    Urea Nitrogen 6.0 - 20.0 mg/dL 29.9  20.8  23.8    Creatinine 0.67 - 1.17 mg/dL 1.13  1.02  1.01    Glucose 70 - 99 mg/dL 126  137  163    Calcium 8.6 - 10.0 mg/dL 10.4  10.0  10.2    Magnesium 1.7 - 2.3 mg/dL 1.3   1.4          Latest Ref Rng & Units  2/12/2024     8:54 AM 2/5/2024     8:38 AM 1/25/2024     9:00 AM   Bone Health   Phosphorus 2.5 - 4.5 mg/dL 2.6  3.1  2.1          Latest Ref Rng & Units 2/12/2024     8:54 AM 2/8/2024     8:38 AM 2/5/2024     8:38 AM   Heme   WBC 4.0 - 11.0 10e3/uL 7.8  6.5  7.7    Hgb 13.3 - 17.7 g/dL 14.3  14.1  13.8    Plt 150 - 450 10e3/uL 210  200  227          Latest Ref Rng & Units 12/5/2023    12:07 AM 8/2/2023     5:39 AM 7/27/2023    10:08 PM   Liver   AP 40 - 150 U/L 138   77    TBili <=1.2 mg/dL 0.5   0.3    ALT 0 - 70 U/L 20   8    AST 0 - 45 U/L 13   8    Tot Protein 6.4 - 8.3 g/dL 7.7   7.5    Albumin 3.5 - 5.2 g/dL 4.5  3.3  3.8          Latest Ref Rng & Units 12/5/2023    12:07 AM 5/30/2023     6:23 AM 11/29/2021     6:50 AM   Pancreas   A1C <5.7 % 5.9  4.4     Lipase 73 - 393 U/L   304          Latest Ref Rng & Units 12/19/2023     9:55 AM 12/10/2023     8:03 AM 9/11/2020     6:34 AM   Iron studies   Iron 61 - 157 ug/dL 30  80  33    Iron Saturation Index 15 - 46 %   18    Iron Sat Index 15 - 46 % 14  41     Ferritin 31 - 409 ng/mL 1,477  1,743  325          Latest Ref Rng & Units 1/23/2024     6:07 AM 12/5/2023    12:07 AM 6/17/2021    12:35 PM   [facility] Virology   EBV CAPSID ANTIBODY IGG No detectable antibody.  Positive     EBV DNA COPIES/ML Not Detected copies/mL Not Detected      Hep B Core NR^Nonreactive   Nonreactive         Recent Labs   Lab Test 02/05/24  0838 02/08/24  0838 02/12/24  0854   DOSTAC 2/4/2024/2024 2/7/2024 2/11/2024   TACROL 10.2 9.4 10.8     Recent Labs   Lab Test 01/25/24  0900 02/01/24  0706 02/08/24  0838   DOSMPA 1/24/2024   8:30 PM 1/31/2024   8:30 PM 2/7/2024   8:30 PM   MPACID <0.25* 6.16* 1.67   MPAG 10.6* 28.0* 18.2*       Again, thank you for allowing me to participate in the care of your patient.        Sincerely,        Fausto Whyte MD

## 2024-02-15 NOTE — PROGRESS NOTES
Medication Therapy Management (MTM) Encounter    ASSESSMENT:                            Medication Adherence/Access: No issues identified    Kidney Transplant:    Stable.     Supplements:   Stable.     Loose stools:   Recommended he up his metamucil dose to twice daily.    Hypertension   Stable. BP at goal <140/90 for 2-4 months post txp.    Hyperlipidemia   Stable.    GERD:   Stable.     PLAN:                            Increase 1 teaspoonful twice daily of metamucil.     Follow-up: 2 months    SUBJECTIVE/OBJECTIVE:                          Taylor Valiente is a 28 year old male coming in for a follow-up visit from 12/27.       Reason for visit: 2 months post txp.    Allergies/ADRs: Reviewed in chart  Past Medical History: Reviewed in chart  Tobacco: He reports that he has never smoked. He has never used smokeless tobacco.  Alcohol: not currently using    Medication Adherence/Access: no issues reported     Kidney Transplant:    Tacrolimus 7 mg twice daily  Mycophenolatic acid 540mg twice daily  Pt reports some heartburn, diarrhea.  Transplant date: 12/5/23  Vascular Prophylaxis: Aspirin 81mg daily. Denies gastrointestinal bleed sx.   CMV prophylaxis: CrCl >/=60 mL/minute: Valcyte 900mg daily Treat 3 months post tx   PJP prophylaxis: Bactrim SS daily  Tx Coordinator: Joel Wilson MD: Dr. Whyte, Using Med Card: Yes  Immunizations: annual flu shot 2022; Pneumovax 23:  2021; Prevnar 13: 2022; TDaP:  2018; Shingrix: unknown, HBV: immunity per last titers, COVID: Primary x 3 and Bivalent x 1    Estimated Creatinine Clearance: 124.2 mL/min (based on SCr of 1.13 mg/dL).    Supplements:   Mag Glycinate 300mg twice daily ( 2 hours from MMF) (changed today from oxide formulation due to diarrhea.)  Sodium Bicarbonate 1950mg 3x daily .  Baking soda 1 teaspoonful daily  MVI daily  Lab Results   Component Value Date    CO2 19 (L) 02/12/2024    CO2 22 02/08/2024     Loose stools:   Metamucil 1 tsp daily.   Having  4-5 loose stools daily, can be more formed.     Hypertension   Amlodipine 10mg every evening  Carvedilol 25mg twice daily.  Hydralazine 50mg twice daily.   Patient reports no current medication side effects  Patient self monitors blood pressure.  Home BP monitoring 120-130/80s   BP Readings from Last 3 Encounters:   02/15/24 132/82   01/23/24 129/86   12/21/23 116/74     Pulse Readings from Last 3 Encounters:   02/15/24 72   01/23/24 80   12/21/23 77     Hyperlipidemia Atorvastatin 10mg daily  Patient reports no significant myalgias or other side effects.   Recent Labs   Lab Test 12/22/23  0706 12/05/23  0007   CHOL 163 139   HDL 39* 35*   LDL 96 66   TRIG 139 190*     GERD:   Been having heartburn at night for the past month, Dr. Whyte starting Famotidine 20mg  at bedtime.    Today's Vitals: There were no vitals taken for this visit.  ----------------    I spent 15 minutes with this patient today.  A copy of the visit note was provided to the patient's provider(s).    A summary of these recommendations was sent via [employer].    Danelle AlvaradoD  [facility] Pharmacist    Phone: [phone]     MED [facility] REQUIRED  Post Medication Reconciliation Status: discharge medications reconciled and changed, per note/orders     Medication Therapy Recommendations  Loose stools    Current Medication: psyllium (METAMUCIL/KONSYL) 58.6 % powder   Rationale: Dose too low - Dosage too low - Effectiveness   Recommendation: Increase Frequency   Status: Accepted - no CPA Needed

## 2024-02-15 NOTE — PATIENT INSTRUCTIONS
Patient Recommendations:  - Plan for kidney transplant biopsy  - Magnesium glycinate 300mg twice daily. One brand is Doctor's Best but you can take any generic magnesium glyinate. Once you start taking that, stop magnesium oxide   -Start famotidine 20mg at bedtime as needed for reflux  -After March 5th stop valcyte     Transplant Patient Information  Your Post Transplant Coordinator is: Meka Rashid  For non urgent items, we encourage you to contact your coordinator/care team online via "[employer]  You and your care team can also contact your transplant coordinator Monday - Friday, 8am - 5pm at [phone] (Option 2 to reach the coordinator or Option 4 to schedule an appointment).  After hours for urgent matters, please call [facility] at [phone].

## 2024-02-15 NOTE — PROGRESS NOTES
TRANSPLANT NEPHROLOGY EARLY POST TRANSPLANT VISIT    Assessment & Plan   # DDKT: Trend up   - Baseline Creatinine: ~ 0.8-1   - Proteinuria: Normal (<0.2 grams)   - Date DSA Last Checked: Feb/2024      Latest DSA: Low level DSA (<1000 mfi) to DR53, Lucieo, stable    - BK Viremia: Yes, moderately elevated (10-100K)   - Kidney Tx Biopsy: No   - Transplant Ureteral Stent: Removed 1/23/24   -Due to rising BK viremia and dnDSA with increasing Scr we will plan for kidney transplant biopsy in coming week    # Carleneney DSA:   -dnDSA detected 1/11/24 to DR53 w/ , stable on 2/5/24   -No decrease in IS despite slightly rising BK unless biopsy demonstrates BK nephropathy   -Attempt to obtain IVIG. Thus far has not been approved. Repeat IgG     # Immunosuppression: Tacrolimus immediate release (goal 8-10) and Mycophenolic acid (dose 540 mg every 12 hours)   - Induction with Recent Transplant:  Intermediate Intensity Protocol   - Continue with intensive monitoring of immunosuppression for efficacy and toxicity.   - Changes: Not at this time    # Infection Prophylaxis:   - PJP: Sulfa/TMP (Bactrim)  - CMV: Valganciclovir (Valcyte). Stop after 3/5/24     # Hypertension: Controlled;  Goal BP: < 130/80   - Volume status: Euvolemic     - Changes: Not at this time. Continue amlodipine 10mg daily, carvedilol 25mg bid and hydralazine 50mg bid    # Anemia in Chronic Renal Disease: Hgb: Stable, near normal      BRIE: No   - Iron studies: Not checked recently    # Mineral Bone Disorder:    - Secondary renal hyperparathyroidism; PTH level: Mildly elevated (151-300 pg/ml)        On treatment: None   - Vitamin D; level: Normal        On supplement: No   - Calcium; level: High normal        On supplement: No   - Phosphorus; level: Normal        On supplement: No     # Electrolytes:   - Potassium; level: Normal        On supplement: No  - Magnesium; level: Low normal        On supplement: Yes, mag ox 800mg bid, switch to mag glycinate 300mg  bid  - Bicarbonate; level: Low        On supplement: Yes. Prescribed sodium bicarb 1950mg tid and 1 teaspoon baking soda in water daily. Patient states that he is taking    # Loose stools:   -Continue fiber. Change mag to mag glycinate 300mg bid   -Obtain CMV PCR     # GERD:   -Start famotidine 20mg at bedtime PRN    # BK viremia:    -Steadily increasing. 11,600 copies on 2/12/24, up from 2750 copies on 2/1/24   -IgG 647 on 1/15/24, repeat     # Medical Compliance: Yes    # Health Maintenance and Vaccination Review: Recommend:  After 3m post txp obtain COVID and flu vaccines. 6m post txp obtain Shingrix    # Transplant History:  Etiology of Kidney Failure: Focal segmental glomerulosclerosis (FSGS)  Tx: DDKT  Transplant: 12/5/2023 (Kidney)  Donor Type: Donation after Brain Death Donor Class:   Crossmatch at time of Tx: negative  DSA at time of Tx: No  Significant changes in immunosuppression: None  CMV IgG Ab High Risk Discordance (D+/R-): No  EBV IgG Ab High Risk Discordance (D+/R-): No  Significant transplant-related complications: BK Viremia and DSA    Transplant Office Phone Number: [phone]    Assessment and plan was discussed with the patient and he voiced his understanding and agreement.    Return visit: Return in 2 months (on 4/16/2024) for previously scheduled visit, 4 month post transplant visit.    Fausto Whyte MD    I spent 50 minutes on the date of the encounter doing chart review, review of test results, interpretation of tests, patient visit, documentation and discussion with family     The longitudinal plan of care for kidney transplant was addressed during this visit. Due to the added complexity in care, I will continue to support Taylor Valiente in the subsequent management of this condition(s) and with the ongoing continuity of care of this condition(s).         Chief Complaint   Mr. Valiente is a 28 year old here for kidney transplant, immunosuppression management and routine follow up.      History of Present Illness    He continues to have loose stools, ~2 times per day, more so in the last day. The patient overall feels well. He denies nausea, vomiting, fever, chills, shortness of breath, chest pain, LE edema, unintentional weight loss, nights sweats, dysuria, hematuria.       Home BP:  120s-130 systolic    Problem List   Patient Active Problem List   Diagnosis     LVH (left ventricular hypertrophy) due to hypertensive disease     Renovascular hypertension     CKD (chronic kidney disease) stage 5, GFR less than 15 ml/min (H)     Focal glomerular sclerosis     2019 novel coronavirus disease (COVID-19)     Anemia of chronic renal failure     OD (osteochondritis dissecans)     Adrenal adenoma, left     Stress-induced cardiomyopathy     Hyperlipidemia     Prolonged Q-T interval on ECG     Acute renal failure with acute tubular necrosis superimposed on stage 5 chronic kidney disease, not on chronic dialysis (H)     ESRD needing dialysis (H)     Hyperkalemia     Essential hypertension     ESRD (end stage renal disease) on dialysis (H)     Acute deep vein thrombosis (DVT) of non-extremity vein     Elevated erythrocyte sedimentation rate     Acute pain of left shoulder     Leukocytosis, unspecified type     Sepsis due to coagulase-negative staphylococcal infection (H)     AV fistula infection (H24)     Bursitis of left shoulder     Pre-diabetes     Kidney transplant recipient     Acute post-operative pain     Hypomagnesemia     Low bicarbonate     Immunosuppressed status (H24)     Dehydration     Staphylococcus infection     Hematuria     Diarrhea     BK viremia     Donor specific antibody (DSA) positive     Complication of kidney transplant     Kidney transplant infection     Cannabis use disorder, moderate, in sustained remission (H)       Allergies   Allergies   Allergen Reactions     Benadryl [Diphenhydramine] Itching     Vancomycin Itching       Medications   Current Outpatient Medications    Medication Sig     carvedilol (COREG) 25 MG tablet Take 1 tablet (25 mg) by mouth 2 times daily (with meals)     famotidine (PEPCID) 20 MG tablet Take 1 tablet (20 mg) by mouth nightly as needed (refux)     hydrALAZINE (APRESOLINE) 50 MG tablet Take 1 tablet (50 mg) by mouth 2 times daily for 90 days     magnesium glycinate 100 MG CAPS capsule Take 3 capsules (300 mg) by mouth 2 times daily [facility] Magnesium or generic Magnesium glycinate     psyllium (METAMUCIL/KONSYL) 58.6 % powder Take 1 teaspoonful by mouth daily     Sodium Bicarbonate, antacid, POWD Mix 1 teaspoon of baking soda in full glass of water once daily     valGANciclovir (VALCYTE) 450 MG tablet Take 2 tablets (900 mg) by mouth daily     acetaminophen (TYLENOL) 325 MG tablet Take 2 tablets (650 mg) by mouth every 4 hours as needed for pain     amLODIPine (NORVASC) 10 MG tablet Take 10 mg by mouth daily      aspirin 81 MG EC tablet Take 81 mg by mouth daily     atorvastatin (LIPITOR) 10 MG tablet Take 1 tablet (10 mg) by mouth daily     medical cannabis (Patient's own supply) See Admin Instructions (The purpose of this order is to document that the patient reports taking medical cannabis.  This is not a prescription, and is not used to certify that the patient has a qualifying medical condition.)    Patient was advised on the cannabis-tacrolimus drug interaction.     Multiple Vitamin (MULTIVITAMIN ADULT PO) Take 1 tablet by mouth daily     mycophenolic acid (GENERIC EQUIVALENT) 180 MG EC tablet Take 3 tablets (540 mg) by mouth 2 times daily     sodium bicarbonate 650 MG tablet Take 3 tablets (1,950 mg) by mouth 3 times daily     sulfamethoxazole-trimethoprim (BACTRIM) 400-80 MG tablet Take 1 tablet by mouth daily     tacrolimus (GENERIC) 0.5 MG capsule Take 1 capsule (0.5 mg) by mouth 2 times daily To be used for dose adjustments.     tacrolimus (GENERIC) 1 MG capsule Take 7 capsules (7 mg) by mouth 2 times daily     No current  facility-administered medications for this visit.     Medications Discontinued During This Encounter   Medication Reason     carvedilol (COREG) 25 MG tablet      hydrALAZINE (APRESOLINE) 100 MG tablet      nitroGLYcerin (NITROSTAT) 0.3 MG sublingual tablet      Sodium Bicarbonate, antacid, POWD      valGANciclovir (VALCYTE) 450 MG tablet      magnesium oxide (MAG-OX) 400 MG tablet        Physical Exam   Vital Signs: /82   Pulse 72   Temp 98.3  F (36.8  C) (Oral)   Wt 107 kg (236 lb)   SpO2 100%   BMI 32.01 kg/m      GENERAL APPEARANCE: alert and no distress  HENT: mouth without ulcers or lesions  RESP: lungs clear to auscultation - no rales, rhonchi or wheezes  CV: regular rhythm, normal rate, no rub, no murmur  EDEMA: no LE edema bilaterally  ABDOMEN: soft, nondistended, nontender, bowel sounds normal  MS: extremities normal - no gross deformities noted, no evidence of inflammation in joints, no muscle tenderness  SKIN: no rash  NEURO: normal strength and tone, sensory exam grossly normal, mentation intact and speech normal  PSYCH: mentation appears normal and affect normal/bright  TX KIDNEY: normal    Data         Latest Ref Rng & Units 2/12/2024     8:54 AM 2/8/2024     8:38 AM 2/5/2024     8:38 AM   Renal   Sodium 135 - 145 mmol/L 138  139  138    K 3.4 - 5.3 mmol/L 4.6  4.4  4.9    Cl 98 - 107 mmol/L 108  107  107    Cl (external) 98 - 107 mmol/L 108  107  107    CO2 22 - 29 mmol/L 19  22  21    Urea Nitrogen 6.0 - 20.0 mg/dL 29.9  20.8  23.8    Creatinine 0.67 - 1.17 mg/dL 1.13  1.02  1.01    Glucose 70 - 99 mg/dL 126  137  163    Calcium 8.6 - 10.0 mg/dL 10.4  10.0  10.2    Magnesium 1.7 - 2.3 mg/dL 1.3   1.4          Latest Ref Rng & Units 2/12/2024     8:54 AM 2/5/2024     8:38 AM 1/25/2024     9:00 AM   Bone Health   Phosphorus 2.5 - 4.5 mg/dL 2.6  3.1  2.1          Latest Ref Rng & Units 2/12/2024     8:54 AM 2/8/2024     8:38 AM 2/5/2024     8:38 AM   Heme   WBC 4.0 - 11.0 10e3/uL 7.8  6.5   7.7    Hgb 13.3 - 17.7 g/dL 14.3  14.1  13.8    Plt 150 - 450 10e3/uL 210  200  227          Latest Ref Rng & Units 12/5/2023    12:07 AM 8/2/2023     5:39 AM 7/27/2023    10:08 PM   Liver   AP 40 - 150 U/L 138   77    TBili <=1.2 mg/dL 0.5   0.3    ALT 0 - 70 U/L 20   8    AST 0 - 45 U/L 13   8    Tot Protein 6.4 - 8.3 g/dL 7.7   7.5    Albumin 3.5 - 5.2 g/dL 4.5  3.3  3.8          Latest Ref Rng & Units 12/5/2023    12:07 AM 5/30/2023     6:23 AM 11/29/2021     6:50 AM   Pancreas   A1C <5.7 % 5.9  4.4     Lipase 73 - 393 U/L   304          Latest Ref Rng & Units 12/19/2023     9:55 AM 12/10/2023     8:03 AM 9/11/2020     6:34 AM   Iron studies   Iron 61 - 157 ug/dL 30  80  33    Iron Saturation Index 15 - 46 %   18    Iron Sat Index 15 - 46 % 14  41     Ferritin 31 - 409 ng/mL 1,477  1,743  325          Latest Ref Rng & Units 1/23/2024     6:07 AM 12/5/2023    12:07 AM 6/17/2021    12:35 PM   [facility]   EBV CAPSID ANTIBODY IGG No detectable antibody.  Positive     EBV DNA COPIES/ML Not Detected copies/mL Not Detected      Hep B Core NR^Nonreactive   Nonreactive         Recent Labs   Lab Test 02/05/24  0838 02/08/24  0838 02/12/24  0854   DOSTAC 2/4/2024 2/7/2024 2/11/2024   TACROL 10.2 9.4 10.8     Recent Labs   Lab Test 01/25/24  0900 02/01/24  0706 02/08/24  0838   DOSMPA 1/24/2024   8:30 PM 1/31/2024   8:30 PM 2/7/2024   8:30 PM   MPACID <0.25* 6.16* 1.67   MPAG 10.6* 28.0* 18.2*

## 2024-02-15 NOTE — TELEPHONE ENCOUNTER
IVIG not covered by insurance (for BK viremia, DSA). IgG >600.     Fausto Whyte MD Stegeman, Trista R; Opal Rashid, RN; P [facility]   Noted, thanks. Meka, please order kidney biopsy. Please repeat IgG, CMV PCR with next labs as well. Ok to go to weekly labs. BK only q2 weeks. I changed his mag to glycinate to help with loose stools.    Orders entered, sent to IR to schedule.

## 2024-02-15 NOTE — PATIENT INSTRUCTIONS
"Recommendations from today's MTM visit:                                                      Increase 1 teaspoonful twice daily of metamucil.     Follow-up: 2 months    It was great speaking with you today.  I value your experience and would be very thankful for your time in providing feedback in our clinic survey. In the next few days, you may receive an email or text message from [facility] [employer] with a link to a survey related to your  clinical pharmacist.\"     To schedule another MTM appointment, please call the clinic directly or you may call the MTM scheduling line at [phone] or toll-free at [phone].     My Clinical Pharmacist's contact information:                                                      Please feel free to contact me with any questions or concerns you have.      Joaquin Wadsworth, PharmD  MTM Pharmacist    Phone: [phone]     "

## 2024-02-19 ENCOUNTER — TELEPHONE (OUTPATIENT)
Dept: TRANSPLANT | Facility: CLINIC | Age: 29
End: 2024-02-19

## 2024-02-19 ENCOUNTER — LAB (OUTPATIENT)
Dept: LAB | Facility: CLINIC | Age: 29
End: 2024-02-19
Payer: MEDICARE

## 2024-02-19 DIAGNOSIS — Z48.298 AFTERCARE FOLLOWING ORGAN TRANSPLANT: ICD-10-CM

## 2024-02-19 DIAGNOSIS — Z94.0 KIDNEY REPLACED BY TRANSPLANT: ICD-10-CM

## 2024-02-19 DIAGNOSIS — Z20.828 CONTACT WITH AND (SUSPECTED) EXPOSURE TO OTHER VIRAL COMMUNICABLE DISEASES: ICD-10-CM

## 2024-02-19 DIAGNOSIS — Z79.899 ENCOUNTER FOR LONG-TERM CURRENT USE OF MEDICATION: ICD-10-CM

## 2024-02-19 DIAGNOSIS — Z76.82 KIDNEY TRANSPLANT CANDIDATE: ICD-10-CM

## 2024-02-19 DIAGNOSIS — Z98.890 OTHER SPECIFIED POSTPROCEDURAL STATES: ICD-10-CM

## 2024-02-19 LAB
ALBUMIN MFR UR ELPH: 8.5 MG/DL
ANION GAP SERPL CALCULATED.3IONS-SCNC: 13 MMOL/L (ref 7–15)
BK VIRUS DNA IU/ML, INSTRUMENT (6800): ABNORMAL IU/ML
BK VIRUS SPECIMEN TYPE: ABNORMAL
BKV DNA SPEC NAA+PROBE-LOG#: 4.1 {LOG_COPIES}/ML
BUN SERPL-MCNC: 22.4 MG/DL (ref 6–20)
CALCIUM SERPL-MCNC: 9.9 MG/DL (ref 8.6–10)
CHLORIDE SERPL-SCNC: 104 MMOL/L (ref 98–107)
CREAT SERPL-MCNC: 0.93 MG/DL (ref 0.67–1.17)
CREAT UR-MCNC: 107.5 MG/DL
DEPRECATED HCO3 PLAS-SCNC: 17 MMOL/L (ref 22–29)
EGFRCR SERPLBLD CKD-EPI 2021: >90 ML/MIN/1.73M2
ERYTHROCYTE [DISTWIDTH] IN BLOOD BY AUTOMATED COUNT: 14.4 % (ref 10–15)
GLUCOSE SERPL-MCNC: 160 MG/DL (ref 70–99)
HCT VFR BLD AUTO: 46.6 % (ref 40–53)
HGB BLD-MCNC: 15 G/DL (ref 13.3–17.7)
MCH RBC QN AUTO: 29.4 PG (ref 26.5–33)
MCHC RBC AUTO-ENTMCNC: 32.2 G/DL (ref 31.5–36.5)
MCV RBC AUTO: 91 FL (ref 78–100)
PLATELET # BLD AUTO: 217 10E3/UL (ref 150–450)
POTASSIUM SERPL-SCNC: 4.4 MMOL/L (ref 3.4–5.3)
PROT/CREAT 24H UR: 0.08 MG/MG CR (ref 0–0.2)
RBC # BLD AUTO: 5.1 10E6/UL (ref 4.4–5.9)
SODIUM SERPL-SCNC: 134 MMOL/L (ref 135–145)
TACROLIMUS BLD-MCNC: 13.5 UG/L (ref 5–15)
TME LAST DOSE: NORMAL H
TME LAST DOSE: NORMAL H
WBC # BLD AUTO: 7.9 10E3/UL (ref 4–11)

## 2024-02-19 PROCEDURE — 84156 ASSAY OF PROTEIN URINE: CPT

## 2024-02-19 PROCEDURE — 80197 ASSAY OF TACROLIMUS: CPT

## 2024-02-19 PROCEDURE — 85049 AUTOMATED PLATELET COUNT: CPT

## 2024-02-19 PROCEDURE — 36415 COLL VENOUS BLD VENIPUNCTURE: CPT

## 2024-02-19 PROCEDURE — 80048 BASIC METABOLIC PNL TOTAL CA: CPT

## 2024-02-19 PROCEDURE — 87799 DETECT AGENT NOS DNA QUANT: CPT

## 2024-02-19 NOTE — TELEPHONE ENCOUNTER
ISSUE:   Tacrolimus IR level 13.5 on 2/19, goal 8-10, dose 7 mg BID.    PLAN:   Call Patientand confirm this was an accurate 12-hour trough.   Verify Tacrolimus IR dose 7 mg BID.   Confirm no new medications or illness.   Confirm no missed doses.   If accurate trough and accurate dose, decrease Tacrolimus IR dose to 6 mg BID     Is this more than a 50% increase or decrease in current IS dose: No  If YES, justification: N/A    Repeat labs in 1 weeks.  *If > 50% change in immunosuppression dose, repeat labs in 1 week.     OUTCOME:   Spoke with Patient, they confirm accurate trough level and current dose k7 mg BID.   Patientconfirmed dose change to 6 mg BID.  Patientagreed to repeat labs in 1 weeks.   Orders sent to preferred pharmacy for dose change and lab for repeat labs.   Patientvoiced understanding of plan.

## 2024-02-20 RX ORDER — LIDOCAINE 40 MG/G
CREAM TOPICAL
Status: CANCELLED | OUTPATIENT
Start: 2024-02-20

## 2024-02-20 RX ORDER — TACROLIMUS 1 MG/1
6 CAPSULE ORAL 2 TIMES DAILY
Qty: 360 CAPSULE | Refills: 11 | Status: SHIPPED | OUTPATIENT
Start: 2024-02-20 | End: 2024-04-23

## 2024-02-21 ENCOUNTER — APPOINTMENT (OUTPATIENT)
Dept: MEDSURG UNIT | Facility: CLINIC | Age: 29
End: 2024-02-21
Attending: FAMILY MEDICINE
Payer: MEDICARE

## 2024-02-21 ENCOUNTER — HOSPITAL ENCOUNTER (OUTPATIENT)
Facility: CLINIC | Age: 29
Discharge: HOME | End: 2024-02-21
Attending: INTERNAL MEDICINE | Admitting: INTERNAL MEDICINE
Payer: MEDICARE

## 2024-02-21 ENCOUNTER — MYC REFILL (OUTPATIENT)
Dept: INFUSION THERAPY | Facility: CLINIC | Age: 29
End: 2024-02-21

## 2024-02-21 ENCOUNTER — APPOINTMENT (OUTPATIENT)
Dept: INTERVENTIONAL RADIOLOGY/VASCULAR | Facility: CLINIC | Age: 29
End: 2024-02-21
Attending: INTERNAL MEDICINE
Payer: MEDICARE

## 2024-02-21 VITALS
OXYGEN SATURATION: 100 % | BODY MASS INDEX: 31.86 KG/M2 | WEIGHT: 234.9 LBS | TEMPERATURE: 98.6 F | HEART RATE: 62 BPM | RESPIRATION RATE: 16 BRPM | SYSTOLIC BLOOD PRESSURE: 98 MMHG | DIASTOLIC BLOOD PRESSURE: 53 MMHG

## 2024-02-21 DIAGNOSIS — Z94.0 KIDNEY REPLACED BY TRANSPLANT: Primary | ICD-10-CM

## 2024-02-21 DIAGNOSIS — R76.8 DONOR SPECIFIC ANTIBODY (DSA) POSITIVE: ICD-10-CM

## 2024-02-21 DIAGNOSIS — R79.89 ELEVATED SERUM CREATININE: ICD-10-CM

## 2024-02-21 DIAGNOSIS — B34.8 BK VIREMIA: ICD-10-CM

## 2024-02-21 LAB — INR BLD: 1.1 (ref 2–3)

## 2024-02-21 PROCEDURE — 85610 PROTHROMBIN TIME: CPT

## 2024-02-21 PROCEDURE — 50200 RENAL BIOPSY PERQ: CPT | Mod: 58 | Performed by: STUDENT IN AN ORGANIZED HEALTH CARE EDUCATION/TRAINING PROGRAM

## 2024-02-21 PROCEDURE — 88305 TISSUE EXAM BY PATHOLOGIST: CPT | Mod: 26 | Performed by: PATHOLOGY

## 2024-02-21 PROCEDURE — 99152 MOD SED SAME PHYS/QHP 5/>YRS: CPT | Mod: GC | Performed by: STUDENT IN AN ORGANIZED HEALTH CARE EDUCATION/TRAINING PROGRAM

## 2024-02-21 PROCEDURE — 999N000128 HC STATISTIC PERIPHERAL IV START W/O US GUIDANCE

## 2024-02-21 PROCEDURE — 250N000009 HC RX 250: Performed by: STUDENT IN AN ORGANIZED HEALTH CARE EDUCATION/TRAINING PROGRAM

## 2024-02-21 PROCEDURE — 250N000011 HC RX IP 250 OP 636

## 2024-02-21 PROCEDURE — 99152 MOD SED SAME PHYS/QHP 5/>YRS: CPT

## 2024-02-21 PROCEDURE — 76942 ECHO GUIDE FOR BIOPSY: CPT | Mod: 26 | Performed by: STUDENT IN AN ORGANIZED HEALTH CARE EDUCATION/TRAINING PROGRAM

## 2024-02-21 PROCEDURE — 88348 ELECTRON MICROSCOPY DX: CPT | Mod: 26 | Performed by: PATHOLOGY

## 2024-02-21 PROCEDURE — 88313 SPECIAL STAINS GROUP 2: CPT | Mod: TC,59 | Performed by: INTERNAL MEDICINE

## 2024-02-21 PROCEDURE — 999N000134 HC STATISTIC PP CARE STAGE 3

## 2024-02-21 PROCEDURE — 88348 ELECTRON MICROSCOPY DX: CPT | Mod: TC | Performed by: INTERNAL MEDICINE

## 2024-02-21 PROCEDURE — 88350 IMFLUOR EA ADDL 1ANTB STN PX: CPT | Mod: 26 | Performed by: PATHOLOGY

## 2024-02-21 PROCEDURE — 258N000003 HC RX IP 258 OP 636: Performed by: INTERNAL MEDICINE

## 2024-02-21 PROCEDURE — 999N000285 HC STATISTIC VASC ACCESS LAB DRAW WITH PIV START

## 2024-02-21 PROCEDURE — 88313 SPECIAL STAINS GROUP 2: CPT | Mod: 26 | Performed by: PATHOLOGY

## 2024-02-21 PROCEDURE — 999N000142 HC STATISTIC PROCEDURE PREP ONLY

## 2024-02-21 PROCEDURE — 88346 IMFLUOR 1ST 1ANTB STAIN PX: CPT | Mod: 26 | Performed by: PATHOLOGY

## 2024-02-21 RX ORDER — FENTANYL CITRATE 50 UG/ML
25-50 INJECTION, SOLUTION INTRAMUSCULAR; INTRAVENOUS EVERY 5 MIN PRN
Status: DISCONTINUED | OUTPATIENT
Start: 2024-02-21 | End: 2024-02-21 | Stop reason: HOSPADM

## 2024-02-21 RX ORDER — FLUMAZENIL 0.1 MG/ML
0.2 INJECTION, SOLUTION INTRAVENOUS
Status: DISCONTINUED | OUTPATIENT
Start: 2024-02-21 | End: 2024-02-21 | Stop reason: HOSPADM

## 2024-02-21 RX ORDER — NALOXONE HYDROCHLORIDE 0.4 MG/ML
0.2 INJECTION, SOLUTION INTRAMUSCULAR; INTRAVENOUS; SUBCUTANEOUS
Status: DISCONTINUED | OUTPATIENT
Start: 2024-02-21 | End: 2024-02-21 | Stop reason: HOSPADM

## 2024-02-21 RX ORDER — LIDOCAINE HYDROCHLORIDE 10 MG/ML
1-30 INJECTION, SOLUTION EPIDURAL; INFILTRATION; INTRACAUDAL; PERINEURAL
Status: COMPLETED | OUTPATIENT
Start: 2024-02-21 | End: 2024-02-21

## 2024-02-21 RX ORDER — NALOXONE HYDROCHLORIDE 0.4 MG/ML
0.4 INJECTION, SOLUTION INTRAMUSCULAR; INTRAVENOUS; SUBCUTANEOUS
Status: DISCONTINUED | OUTPATIENT
Start: 2024-02-21 | End: 2024-02-21 | Stop reason: HOSPADM

## 2024-02-21 RX ORDER — SODIUM CHLORIDE 9 MG/ML
INJECTION, SOLUTION INTRAVENOUS CONTINUOUS
Status: DISCONTINUED | OUTPATIENT
Start: 2024-02-21 | End: 2024-02-21 | Stop reason: HOSPADM

## 2024-02-21 RX ADMIN — FENTANYL CITRATE 50 MCG: 50 INJECTION, SOLUTION INTRAMUSCULAR; INTRAVENOUS at 14:51

## 2024-02-21 RX ADMIN — FENTANYL CITRATE 50 MCG: 50 INJECTION, SOLUTION INTRAMUSCULAR; INTRAVENOUS at 14:47

## 2024-02-21 RX ADMIN — FENTANYL CITRATE 50 MCG: 50 INJECTION, SOLUTION INTRAMUSCULAR; INTRAVENOUS at 15:16

## 2024-02-21 RX ADMIN — MIDAZOLAM 1 MG: 1 INJECTION INTRAMUSCULAR; INTRAVENOUS at 15:17

## 2024-02-21 RX ADMIN — LIDOCAINE HYDROCHLORIDE 15 ML: 10 INJECTION, SOLUTION EPIDURAL; INFILTRATION; INTRACAUDAL; PERINEURAL at 15:31

## 2024-02-21 RX ADMIN — SODIUM CHLORIDE: 9 INJECTION, SOLUTION INTRAVENOUS at 13:41

## 2024-02-21 RX ADMIN — FENTANYL CITRATE 50 MCG: 50 INJECTION, SOLUTION INTRAMUSCULAR; INTRAVENOUS at 14:39

## 2024-02-21 RX ADMIN — MIDAZOLAM 1 MG: 1 INJECTION INTRAMUSCULAR; INTRAVENOUS at 15:08

## 2024-02-21 RX ADMIN — MIDAZOLAM 1 MG: 1 INJECTION INTRAMUSCULAR; INTRAVENOUS at 14:43

## 2024-02-21 RX ADMIN — MIDAZOLAM 1.5 MG: 1 INJECTION INTRAMUSCULAR; INTRAVENOUS at 14:47

## 2024-02-21 RX ADMIN — MIDAZOLAM 1 MG: 1 INJECTION INTRAMUSCULAR; INTRAVENOUS at 14:38

## 2024-02-21 RX ADMIN — FENTANYL CITRATE 50 MCG: 50 INJECTION, SOLUTION INTRAMUSCULAR; INTRAVENOUS at 14:43

## 2024-02-21 RX ADMIN — MIDAZOLAM 0.5 MG: 1 INJECTION INTRAMUSCULAR; INTRAVENOUS at 14:51

## 2024-02-21 RX ADMIN — FENTANYL CITRATE 50 MCG: 50 INJECTION, SOLUTION INTRAMUSCULAR; INTRAVENOUS at 15:08

## 2024-02-21 ASSESSMENT — ACTIVITIES OF DAILY LIVING (ADL)
ADLS_ACUITY_SCORE: 37

## 2024-02-21 NOTE — ADDENDUM NOTE
Encounter addended by: Desmond Valdivia [facility] on: 2/21/2024 2:49 PM   Actions taken: Clinical Note Signed

## 2024-02-21 NOTE — IR NOTE
Patient Name: Taylor Valiente  Medical Record Number: [id number]  Today's Date: 2/21/2024    Procedure: Right Sided Transplanted Kidney Biopsy  Proceduralist: Dr. Ren, Dr. Becker, and Dr. Gaspar  Pathology present: Yes    Procedure Start: 1437  Procedure end: 1534  Sedation medications administered: Midazolam 6 mg, Fentanyl 300 mcg       Report given to:  RN   : NA    Other Notes: Pt arrived to IR room 6 from . Consent reviewed. Pt denies any questions or concerns regarding procedure. Pt positioned supine and monitored per protocol. Pt tolerated procedure without any noted complications. Pt transferred back to .    6 passes made and samples sent to lab as ordered.  Gel-Foam used to close biopsy needle track and Direct Pressure Held.  Hemastatis achieved.  Patient to be on bedrest for 2 hours.

## 2024-02-21 NOTE — PROGRESS NOTES
Up to bathroom and voiding clear, yellow urine.  Felt as though he emptied his bladder completely.  Denies pain.

## 2024-02-21 NOTE — PROGRESS NOTES
Pt arrived via cart from IR s/p Kidney Biopsy. Mid abd dressings CDI, no hematoma. Patient denies pain, tolerating regular diet. Family at bedside plan discharge at 1745.

## 2024-02-21 NOTE — PROGRESS NOTES
Prep complete for IR Renal Biopsy. Mother at bedside will transport pt home post procedure.   Asc Procedure Text (D): After obtaining clear surgical margins the patient was sent to an ASC for surgical repair.  The patient understands they will receive post-surgical care and follow-up from the ASC physician.

## 2024-02-21 NOTE — DISCHARGE INSTRUCTIONS
[facility]    Interventional Radiology  Patient Instructions Following Kidney Biopsy    AFTER YOU GO HOME  If you were given sedation, for the first 24 hours: we recommend that an adult stay with you, DO NOT drive or operate machinery at home or at work, DO NOT smoke, DO NOT make any important or legal decisions.  DO NOT drink alcoholic beverages the day of your procedure  Drink plenty of fluids   Resume your regular diet, unless otherwise instructed by your Primary Physician  Relax and take it easy for 48 hours  DO NOT do any strenuous exercise or lifting (> 10 lbs) for at least 7 days following your procedure  Keep the dressing dry and in place for 24 hours. Replace with Band aid for 2 days.  Never leave a wet dressing in place.  Do not take a shower for at least 12 hours following your procedure. No tub bath, hot tub, or swimming for 5 days.  There should be minimum drainage from the biopsy site    CALL THE PHYSICIAN IF:  You start bleeding from the procedure site.  If you do start to bleed from that site, lie down flat and hold pressure on the site for a minimum of 10 minutes.  Your physician will tell you if you need to return to the hospital  You develop nausea or vomiting  You have excessive swelling, redness, or tenderness at the site  You have drainage that looks like it is infected.  You experience severe pain  You develop hives or a rash or unexplained itching  You develop shortness of breath  You develop a temperature of 101 degrees F or greater  You develop bloody clots or red urine after you are discharged    [facility] INTERVENTIONAL RADIOLOGY DEPARTMENT  Procedure Physician:Dr. Miguel Becker                      Date of procedure: February 21, 2024  Telephone Numbers: [phone]      Monday-Friday 7:30 am to 4:00 pm  [phone] After 4:00 pm Monday-Friday, Weekends & Holidays.   Ask for the Interventional Radiologist on call.  Someone is on call 24 hrs/day  [facility] toll free  number: [phone] Monday-Friday 8:00 am to 4:30 pm  [facility] Emergency Dept: [phone]

## 2024-02-21 NOTE — PROCEDURES
[facility]    Procedure: US guided RLQ transplant renal biopsy    Date/Time: 2/21/2024 3:45 PM    Performed by: Tramaine Gaspar MD  Authorized by: Tramaine Gaspar MD  IR Fellow Physician:  Radiology Resident Physician: Hernan Ren        UNIVERSAL PROTOCOL   Site Marked: NA  Prior Images Obtained and Reviewed:  Yes  Required items: Required blood products, implants, devices and special equipment available    Patient identity confirmed:  Verbally with patient, arm band, provided demographic data and hospital-assigned identification number  Patient was reevaluated immediately before administering moderate or deep sedation or anesthesia  Confirmation Checklist:  Patient's identity using two indicators, relevant allergies, procedure was appropriate and matched the consent or emergent situation and correct equipment/implants were available  Time out: Immediately prior to the procedure a time out was called    Universal Protocol: the Joint Commission Universal Protocol was followed    Preparation: Patient was prepped and draped in usual sterile fashion       ANESTHESIA    Anesthesia: Local infiltration  Local Anesthetic:  Lidocaine 1% without epinephrine  Anesthetic Total (mL):  15      SEDATION  Patient Sedated: Yes    Sedation:  Fentanyl and midazolam  Vital signs: Vital signs monitored during sedation    See dictated procedure note for full details.  Findings: Normal appearing RLQ transplant kidney on Ultrasound    Specimens: core needle biopsy specimens sent for pathological analysis    Complications: None    Condition: Stable    Plan: Bedrest for 2 hours and can discharge if patient is doing fine.      PROCEDURE  Describe Procedure: Ultrasound guided RLQ transplant kidney biopsy. Biopsy was difficult due to extensive scar tissue overlying the kidney. 6 cores were obtained, 3 of which were adequate samples per on site pathology. Tract packed with gel foam. Post  procedural US was unremarkable, with images saved. No immediate complication.  Patient Tolerance:  Patient tolerated the procedure well with no immediate complications  Length of time physician/provider present for 1:1 monitoring during sedation: 60

## 2024-02-22 ENCOUNTER — TELEPHONE (OUTPATIENT)
Dept: TRANSPLANT | Facility: CLINIC | Age: 29
End: 2024-02-22
Payer: MEDICARE

## 2024-02-22 RX ORDER — ATORVASTATIN CALCIUM 10 MG/1
10 TABLET, FILM COATED ORAL DAILY
Qty: 90 TABLET | Refills: 3 | Status: SHIPPED | OUTPATIENT
Start: 2024-02-22

## 2024-02-22 NOTE — PROGRESS NOTES
Tolerated bedrest without problems.  Tolerated food, fluids, ambulation and urination of clear, yellow urine.  Reviewed discharge instructions with patient.  PIV removed.  Discharged to home with mother.

## 2024-02-22 NOTE — TELEPHONE ENCOUNTER
Reviewed biopsy results with Dr. Whyte, negative for rejection. C4d pending.   Taylor verbalized understanding.

## 2024-02-23 LAB
PATH REPORT.COMMENTS IMP SPEC: NORMAL
PATH REPORT.FINAL DX SPEC: NORMAL
PATH REPORT.GROSS SPEC: NORMAL
PATH REPORT.MICROSCOPIC SPEC OTHER STN: NORMAL
PATH REPORT.RELEVANT HX SPEC: NORMAL
PHOTO IMAGE: NORMAL

## 2024-02-24 LAB
DONOR IDENTIFICATION: NORMAL
DR53: 832
DSA COMMENTS: NORMAL
DSA PRESENT: YES
DSA TEST METHOD: NORMAL
ORGAN: NORMAL
SA 1 CELL: NORMAL
SA 1 TEST METHOD: NORMAL
SA 2 CELL: NORMAL
SA 2 TEST METHOD: NORMAL
SA1 HI RISK ABY: NORMAL
SA1 MOD RISK ABY: NORMAL
SA2 HI RISK ABY: NORMAL
SA2 MOD RISK ABY: NORMAL
UNACCEPTABLE ANTIGENS: NORMAL
UNOS CPRA: 79
ZZZSA 1  COMMENTS: NORMAL
ZZZSA 2 COMMENTS: NORMAL

## 2024-02-27 ENCOUNTER — LAB (OUTPATIENT)
Dept: LAB | Facility: CLINIC | Age: 29
End: 2024-02-27
Payer: MEDICARE

## 2024-02-27 DIAGNOSIS — Z79.899 ENCOUNTER FOR LONG-TERM CURRENT USE OF MEDICATION: ICD-10-CM

## 2024-02-27 DIAGNOSIS — Z94.0 KIDNEY REPLACED BY TRANSPLANT: ICD-10-CM

## 2024-02-27 DIAGNOSIS — Z48.298 AFTERCARE FOLLOWING ORGAN TRANSPLANT: ICD-10-CM

## 2024-02-27 DIAGNOSIS — Z20.828 CONTACT WITH AND (SUSPECTED) EXPOSURE TO OTHER VIRAL COMMUNICABLE DISEASES: ICD-10-CM

## 2024-02-27 DIAGNOSIS — D72.819 LEUKOPENIA: ICD-10-CM

## 2024-02-27 DIAGNOSIS — Z98.890 OTHER SPECIFIED POSTPROCEDURAL STATES: ICD-10-CM

## 2024-02-27 LAB
ANION GAP SERPL CALCULATED.3IONS-SCNC: 10 MMOL/L (ref 7–15)
BUN SERPL-MCNC: 19.5 MG/DL (ref 6–20)
CALCIUM SERPL-MCNC: 9.8 MG/DL (ref 8.6–10)
CHLORIDE SERPL-SCNC: 107 MMOL/L (ref 98–107)
CMV DNA SPEC NAA+PROBE-ACNC: NOT DETECTED IU/ML
CREAT SERPL-MCNC: 0.98 MG/DL (ref 0.67–1.17)
DEPRECATED HCO3 PLAS-SCNC: 20 MMOL/L (ref 22–29)
EGFRCR SERPLBLD CKD-EPI 2021: >90 ML/MIN/1.73M2
ERYTHROCYTE [DISTWIDTH] IN BLOOD BY AUTOMATED COUNT: 13.7 % (ref 10–15)
GLUCOSE SERPL-MCNC: 148 MG/DL (ref 70–99)
HCT VFR BLD AUTO: 46.7 % (ref 40–53)
HGB BLD-MCNC: 15 G/DL (ref 13.3–17.7)
IGG SERPL-MCNC: 635 MG/DL (ref 610–1616)
MCH RBC QN AUTO: 29.4 PG (ref 26.5–33)
MCHC RBC AUTO-ENTMCNC: 32.1 G/DL (ref 31.5–36.5)
MCV RBC AUTO: 91 FL (ref 78–100)
PLATELET # BLD AUTO: 198 10E3/UL (ref 150–450)
POTASSIUM SERPL-SCNC: 4.1 MMOL/L (ref 3.4–5.3)
RBC # BLD AUTO: 5.11 10E6/UL (ref 4.4–5.9)
SODIUM SERPL-SCNC: 137 MMOL/L (ref 135–145)
TACROLIMUS BLD-MCNC: 10.4 UG/L (ref 5–15)
TME LAST DOSE: NORMAL H
TME LAST DOSE: NORMAL H
WBC # BLD AUTO: 7.2 10E3/UL (ref 4–11)

## 2024-02-27 PROCEDURE — 80197 ASSAY OF TACROLIMUS: CPT

## 2024-02-27 PROCEDURE — 80048 BASIC METABOLIC PNL TOTAL CA: CPT

## 2024-02-27 PROCEDURE — 85027 COMPLETE CBC AUTOMATED: CPT

## 2024-02-27 PROCEDURE — 82784 ASSAY IGA/IGD/IGG/IGM EACH: CPT

## 2024-02-27 PROCEDURE — 36415 COLL VENOUS BLD VENIPUNCTURE: CPT

## 2024-03-10 RX ORDER — SOD PHOS DI, MONO/K PHOS MONO 250 MG
TABLET ORAL
Qty: 60 TABLET | Refills: 0 | OUTPATIENT
Start: 2024-03-10

## 2024-03-11 ENCOUNTER — LAB (OUTPATIENT)
Dept: LAB | Facility: CLINIC | Age: 29
End: 2024-03-11
Payer: MEDICARE

## 2024-03-11 ENCOUNTER — OFFICE VISIT (OUTPATIENT)
Dept: INTERNAL MEDICINE | Facility: CLINIC | Age: 29
End: 2024-03-11
Payer: MEDICARE

## 2024-03-11 VITALS
HEART RATE: 76 BPM | HEIGHT: 72 IN | SYSTOLIC BLOOD PRESSURE: 104 MMHG | OXYGEN SATURATION: 99 % | RESPIRATION RATE: 18 BRPM | DIASTOLIC BLOOD PRESSURE: 62 MMHG | WEIGHT: 243.2 LBS | BODY MASS INDEX: 32.94 KG/M2

## 2024-03-11 DIAGNOSIS — Z98.890 OTHER SPECIFIED POSTPROCEDURAL STATES: ICD-10-CM

## 2024-03-11 DIAGNOSIS — Z79.899 ENCOUNTER FOR LONG-TERM CURRENT USE OF MEDICATION: ICD-10-CM

## 2024-03-11 DIAGNOSIS — K64.4 EXTERNAL HEMORRHOIDS: Primary | ICD-10-CM

## 2024-03-11 DIAGNOSIS — Z94.0 KIDNEY REPLACED BY TRANSPLANT: ICD-10-CM

## 2024-03-11 DIAGNOSIS — Z48.298 AFTERCARE FOLLOWING ORGAN TRANSPLANT: ICD-10-CM

## 2024-03-11 DIAGNOSIS — K64.8 PROLAPSED INTERNAL HEMORRHOIDS: ICD-10-CM

## 2024-03-11 DIAGNOSIS — Z20.828 CONTACT WITH AND (SUSPECTED) EXPOSURE TO OTHER VIRAL COMMUNICABLE DISEASES: ICD-10-CM

## 2024-03-11 LAB
ANION GAP SERPL CALCULATED.3IONS-SCNC: 10 MMOL/L (ref 7–15)
BUN SERPL-MCNC: 28.2 MG/DL (ref 6–20)
CALCIUM SERPL-MCNC: 9.9 MG/DL (ref 8.6–10)
CHLORIDE SERPL-SCNC: 107 MMOL/L (ref 98–107)
CREAT SERPL-MCNC: 0.97 MG/DL (ref 0.67–1.17)
DEPRECATED HCO3 PLAS-SCNC: 22 MMOL/L (ref 22–29)
EGFRCR SERPLBLD CKD-EPI 2021: >90 ML/MIN/1.73M2
ERYTHROCYTE [DISTWIDTH] IN BLOOD BY AUTOMATED COUNT: 13.7 % (ref 10–15)
GLUCOSE SERPL-MCNC: 130 MG/DL (ref 70–99)
HCT VFR BLD AUTO: 48.9 % (ref 40–53)
HGB BLD-MCNC: 15.6 G/DL (ref 13.3–17.7)
MCH RBC QN AUTO: 29.2 PG (ref 26.5–33)
MCHC RBC AUTO-ENTMCNC: 31.9 G/DL (ref 31.5–36.5)
MCV RBC AUTO: 91 FL (ref 78–100)
PLATELET # BLD AUTO: 210 10E3/UL (ref 150–450)
POTASSIUM SERPL-SCNC: 4.4 MMOL/L (ref 3.4–5.3)
RBC # BLD AUTO: 5.35 10E6/UL (ref 4.4–5.9)
SODIUM SERPL-SCNC: 139 MMOL/L (ref 135–145)
TACROLIMUS BLD-MCNC: 9 UG/L (ref 5–15)
TME LAST DOSE: NORMAL H
TME LAST DOSE: NORMAL H
WBC # BLD AUTO: 8.6 10E3/UL (ref 4–11)

## 2024-03-11 PROCEDURE — 99213 OFFICE O/P EST LOW 20 MIN: CPT | Performed by: PHYSICIAN ASSISTANT

## 2024-03-11 PROCEDURE — 80048 BASIC METABOLIC PNL TOTAL CA: CPT

## 2024-03-11 PROCEDURE — 36415 COLL VENOUS BLD VENIPUNCTURE: CPT

## 2024-03-11 PROCEDURE — 80197 ASSAY OF TACROLIMUS: CPT

## 2024-03-11 PROCEDURE — 85027 COMPLETE CBC AUTOMATED: CPT

## 2024-03-11 RX ORDER — TRIAMCINOLONE ACETONIDE 1 MG/G
OINTMENT TOPICAL 2 TIMES DAILY
Qty: 30 G | Refills: 1 | Status: SHIPPED | OUTPATIENT
Start: 2024-03-11 | End: 2024-07-12

## 2024-03-11 RX ORDER — LIDOCAINE 50 MG/G
OINTMENT TOPICAL 2 TIMES DAILY
Qty: 30 G | Refills: 0 | Status: SHIPPED | OUTPATIENT
Start: 2024-03-11 | End: 2024-07-12

## 2024-03-11 NOTE — PROGRESS NOTES
Assessment & Plan     External hemorrhoids    - triamcinolone (KENALOG) 0.1 % external ointment; Apply topically 2 times daily  - lidocaine (XYLOCAINE) 5 % external ointment; Apply topically 2 times daily    Prolapsed internal hemorrhoids  Reviewed exam finds and treatment  Reviewed AVS,   Sitz baths and topicals   Keep stools easy to pass  If not improving in 3-4 weeks then message and will send to colorectal             BMI  Estimated body mass index is 32.98 kg/m  as calculated from the following:    Height as of this encounter: 1.829 m (6').    Weight as of this encounter: 110.3 kg (243 lb 3.2 oz).   Weight management plan: Patient was referred to their PCP to discuss a diet and exercise plan.          Gaudencio Vazquez is a 28 year old, presenting for the following health issues:  Rectal Problem    History of Present Illness       Reason for visit:  Painful lump on butt  Symptom onset:  3-4 weeks ago  Symptoms include:  Pain, itching and discomfort  Symptom intensity:  Severe  Symptom progression:  Worsening  Had these symptoms before:  No  What makes it worse:  Pooping  What makes it better:  Ice saul helps    He eats 0-1 servings of fruits and vegetables daily.He consumes 1 sweetened beverage(s) daily.He exercises with enough effort to increase his heart rate 30 to 60 minutes per day.  He exercises with enough effort to increase his heart rate 5 days per week.   He is taking medications regularly.     Constipation and diarrhea- after transplant   Stools are loose but not diarrhea at this point.             Hemorrhoids  Onset/Duration: 3-4 weeks ago   Description:   Dixie-anal lump: YES  Pain: YES  Itching: YES  Accompanying Signs & Symptoms:  Blood in stool: No  Changes in stool pattern: as above   History:   Any previous GI studies done:none  Family History of colon cancer: No  Precipitating factors:   None  Alleviating factors:  None  Therapies tried and outcome: increased fiber in diet, increased  fluids, and preparation H            Objective    /62   Pulse 76   Resp 18   Ht 1.829 m (6')   Wt 110.3 kg (243 lb 3.2 oz)   SpO2 99%   BMI 32.98 kg/m    Body mass index is 32.98 kg/m .  Physical Exam   GENERAL: alert and no distress  RECTAL (male): prostate of normal size for age, smooth, nontender without masses/nodules and external non thrombosed hemorrhoid  Internal prolapsed hemorrhoid   MS: no gross musculoskeletal defects noted, no edema            Signed Electronically by: Annamarie Rubin PA-C

## 2024-03-19 ENCOUNTER — LAB (OUTPATIENT)
Dept: LAB | Facility: CLINIC | Age: 29
End: 2024-03-19
Payer: MEDICARE

## 2024-03-19 DIAGNOSIS — Z20.828 CONTACT WITH AND (SUSPECTED) EXPOSURE TO OTHER VIRAL COMMUNICABLE DISEASES: ICD-10-CM

## 2024-03-19 DIAGNOSIS — Z48.298 AFTERCARE FOLLOWING ORGAN TRANSPLANT: ICD-10-CM

## 2024-03-19 DIAGNOSIS — Z98.890 OTHER SPECIFIED POSTPROCEDURAL STATES: ICD-10-CM

## 2024-03-19 DIAGNOSIS — Z94.0 KIDNEY REPLACED BY TRANSPLANT: ICD-10-CM

## 2024-03-19 DIAGNOSIS — Z79.899 ENCOUNTER FOR LONG-TERM CURRENT USE OF MEDICATION: ICD-10-CM

## 2024-03-19 LAB
ALBUMIN MFR UR ELPH: 6.4 MG/DL
ANION GAP SERPL CALCULATED.3IONS-SCNC: 18 MMOL/L (ref 7–15)
BK VIRUS DNA IU/ML, INSTRUMENT (6800): 9900 IU/ML
BK VIRUS SPECIMEN TYPE: ABNORMAL
BKV DNA SPEC NAA+PROBE-LOG#: 4 {LOG_COPIES}/ML
BUN SERPL-MCNC: 27.9 MG/DL (ref 6–20)
CALCIUM SERPL-MCNC: 10.2 MG/DL (ref 8.6–10)
CHLORIDE SERPL-SCNC: 103 MMOL/L (ref 98–107)
CREAT SERPL-MCNC: 0.93 MG/DL (ref 0.67–1.17)
CREAT UR-MCNC: 80.3 MG/DL
DEPRECATED HCO3 PLAS-SCNC: 17 MMOL/L (ref 22–29)
EGFRCR SERPLBLD CKD-EPI 2021: >90 ML/MIN/1.73M2
ERYTHROCYTE [DISTWIDTH] IN BLOOD BY AUTOMATED COUNT: 13.8 % (ref 10–15)
GLUCOSE SERPL-MCNC: 136 MG/DL (ref 70–99)
HCT VFR BLD AUTO: 52 % (ref 40–53)
HGB BLD-MCNC: 17.1 G/DL (ref 13.3–17.7)
MAGNESIUM SERPL-MCNC: 1.4 MG/DL (ref 1.7–2.3)
MCH RBC QN AUTO: 29.2 PG (ref 26.5–33)
MCHC RBC AUTO-ENTMCNC: 32.9 G/DL (ref 31.5–36.5)
MCV RBC AUTO: 89 FL (ref 78–100)
PHOSPHATE SERPL-MCNC: 2.8 MG/DL (ref 2.5–4.5)
PLATELET # BLD AUTO: 202 10E3/UL (ref 150–450)
POTASSIUM SERPL-SCNC: 4.1 MMOL/L (ref 3.4–5.3)
PROT/CREAT 24H UR: 0.08 MG/MG CR (ref 0–0.2)
RBC # BLD AUTO: 5.86 10E6/UL (ref 4.4–5.9)
SODIUM SERPL-SCNC: 138 MMOL/L (ref 135–145)
TACROLIMUS BLD-MCNC: 10.6 UG/L (ref 5–15)
TME LAST DOSE: NORMAL H
TME LAST DOSE: NORMAL H
WBC # BLD AUTO: 9.7 10E3/UL (ref 4–11)

## 2024-03-19 PROCEDURE — 80048 BASIC METABOLIC PNL TOTAL CA: CPT

## 2024-03-19 PROCEDURE — 84156 ASSAY OF PROTEIN URINE: CPT

## 2024-03-19 PROCEDURE — 36415 COLL VENOUS BLD VENIPUNCTURE: CPT

## 2024-03-19 PROCEDURE — 83735 ASSAY OF MAGNESIUM: CPT

## 2024-03-19 PROCEDURE — 87799 DETECT AGENT NOS DNA QUANT: CPT

## 2024-03-19 PROCEDURE — 80197 ASSAY OF TACROLIMUS: CPT

## 2024-03-19 PROCEDURE — 85027 COMPLETE CBC AUTOMATED: CPT

## 2024-03-19 PROCEDURE — 84100 ASSAY OF PHOSPHORUS: CPT

## 2024-03-27 ENCOUNTER — LAB (OUTPATIENT)
Dept: LAB | Facility: CLINIC | Age: 29
End: 2024-03-27
Payer: MEDICARE

## 2024-03-27 DIAGNOSIS — Z48.298 AFTERCARE FOLLOWING ORGAN TRANSPLANT: ICD-10-CM

## 2024-03-27 DIAGNOSIS — Z94.0 KIDNEY REPLACED BY TRANSPLANT: ICD-10-CM

## 2024-03-27 DIAGNOSIS — Z20.828 CONTACT WITH AND (SUSPECTED) EXPOSURE TO OTHER VIRAL COMMUNICABLE DISEASES: ICD-10-CM

## 2024-03-27 DIAGNOSIS — Z79.899 ENCOUNTER FOR LONG-TERM CURRENT USE OF MEDICATION: ICD-10-CM

## 2024-03-27 DIAGNOSIS — Z98.890 OTHER SPECIFIED POSTPROCEDURAL STATES: ICD-10-CM

## 2024-03-27 LAB
ANION GAP SERPL CALCULATED.3IONS-SCNC: 18 MMOL/L (ref 7–15)
BUN SERPL-MCNC: 15 MG/DL (ref 6–20)
CALCIUM SERPL-MCNC: 10 MG/DL (ref 8.6–10)
CHLORIDE SERPL-SCNC: 103 MMOL/L (ref 98–107)
CREAT SERPL-MCNC: 1.06 MG/DL (ref 0.67–1.17)
DEPRECATED HCO3 PLAS-SCNC: 18 MMOL/L (ref 22–29)
EGFRCR SERPLBLD CKD-EPI 2021: >90 ML/MIN/1.73M2
ERYTHROCYTE [DISTWIDTH] IN BLOOD BY AUTOMATED COUNT: 13.7 % (ref 10–15)
GLUCOSE SERPL-MCNC: 125 MG/DL (ref 70–99)
HCT VFR BLD AUTO: 54.7 % (ref 40–53)
HGB BLD-MCNC: 17.5 G/DL (ref 13.3–17.7)
MCH RBC QN AUTO: 28.5 PG (ref 26.5–33)
MCHC RBC AUTO-ENTMCNC: 32 G/DL (ref 31.5–36.5)
MCV RBC AUTO: 89 FL (ref 78–100)
PLATELET # BLD AUTO: 211 10E3/UL (ref 150–450)
POTASSIUM SERPL-SCNC: 4.4 MMOL/L (ref 3.4–5.3)
RBC # BLD AUTO: 6.13 10E6/UL (ref 4.4–5.9)
SODIUM SERPL-SCNC: 139 MMOL/L (ref 135–145)
TACROLIMUS BLD-MCNC: 8.3 UG/L (ref 5–15)
TME LAST DOSE: NORMAL H
TME LAST DOSE: NORMAL H
WBC # BLD AUTO: 6.3 10E3/UL (ref 4–11)

## 2024-03-27 PROCEDURE — 87799 DETECT AGENT NOS DNA QUANT: CPT

## 2024-03-27 PROCEDURE — 80048 BASIC METABOLIC PNL TOTAL CA: CPT

## 2024-03-27 PROCEDURE — 85027 COMPLETE CBC AUTOMATED: CPT

## 2024-03-27 PROCEDURE — 80197 ASSAY OF TACROLIMUS: CPT

## 2024-03-27 PROCEDURE — 36415 COLL VENOUS BLD VENIPUNCTURE: CPT

## 2024-03-28 ENCOUNTER — TELEPHONE (OUTPATIENT)
Dept: TRANSPLANT | Facility: CLINIC | Age: 29
End: 2024-03-28
Payer: MEDICARE

## 2024-03-28 DIAGNOSIS — Z48.298 AFTERCARE FOLLOWING ORGAN TRANSPLANT: ICD-10-CM

## 2024-03-28 DIAGNOSIS — D75.1 POST-TRANSPLANT ERYTHROCYTOSIS: Primary | ICD-10-CM

## 2024-03-28 LAB
BK VIRUS DNA IU/ML, INSTRUMENT (6800): 7200 IU/ML
BK VIRUS SPECIMEN TYPE: ABNORMAL
BKV DNA SPEC NAA+PROBE-LOG#: 3.9 {LOG_COPIES}/ML

## 2024-03-28 NOTE — TELEPHONE ENCOUNTER
ISSUE:  Hgb trending up     Fausto Whyte MD Poucher, Jessica, RN  Post txp erythrocytosis. Obtain U/S of native kidneys to rule out mass. Stop hydralazine, start enalapril 10mg daily. Have him log Bps please.    Orders updated, aTylor notified of plan.

## 2024-03-29 RX ORDER — ENALAPRIL MALEATE 10 MG/1
10 TABLET ORAL DAILY
Qty: 90 TABLET | Refills: 1 | Status: SHIPPED | OUTPATIENT
Start: 2024-03-29 | End: 2024-04-16

## 2024-04-03 ENCOUNTER — TELEPHONE (OUTPATIENT)
Dept: TRANSPLANT | Facility: CLINIC | Age: 29
End: 2024-04-03
Payer: MEDICARE

## 2024-04-03 DIAGNOSIS — Z94.0 KIDNEY REPLACED BY TRANSPLANT: Primary | ICD-10-CM

## 2024-04-03 NOTE — TELEPHONE ENCOUNTER
Post Kidney and Pancreas Transplant Team Conference  Date: 4/3/2024  Transplant Coordinator: Meka Rashid     Attendees:  [x]  Dr. Simons [x] Randee Barillas, RN [x] Lauren Muniz LPN     []  Dr. Lynn [x] Stefani Manrique, RN [] Kimberly Ayala LPN    [x] Dr. Whyte [x] Meka Rashid RN    [] Dr. Webber [x] Nilda Duran RN [] Netta Walter RN   [] Dr. Caceres [] Milla Mc, RN    [] Dr. Vuong [] Ankita Quintero RN    []  Dr. Cardoza [] Linda Quintero, GALLITO    [x] Dr. Jimenez [] Britton Miranda RN    [x] Vesna Yuan NP [] My Singh RN    [x] Kristi Hyde NP [] Latonya Avila RN        Verbal Plan Read Back:   Continue current treatment plan      Routed to RN Coordinator   Lauren Muniz LPN

## 2024-04-04 ENCOUNTER — LAB (OUTPATIENT)
Dept: LAB | Facility: CLINIC | Age: 29
End: 2024-04-04
Payer: MEDICARE

## 2024-04-04 DIAGNOSIS — Z48.298 AFTERCARE FOLLOWING ORGAN TRANSPLANT: ICD-10-CM

## 2024-04-04 DIAGNOSIS — Z98.890 OTHER SPECIFIED POSTPROCEDURAL STATES: ICD-10-CM

## 2024-04-04 DIAGNOSIS — Z20.828 CONTACT WITH AND (SUSPECTED) EXPOSURE TO OTHER VIRAL COMMUNICABLE DISEASES: ICD-10-CM

## 2024-04-04 DIAGNOSIS — Z79.899 ENCOUNTER FOR LONG-TERM CURRENT USE OF MEDICATION: ICD-10-CM

## 2024-04-04 DIAGNOSIS — Z94.0 KIDNEY REPLACED BY TRANSPLANT: ICD-10-CM

## 2024-04-04 LAB
ANION GAP SERPL CALCULATED.3IONS-SCNC: 11 MMOL/L (ref 7–15)
BUN SERPL-MCNC: 17 MG/DL (ref 6–20)
CALCIUM SERPL-MCNC: 10.2 MG/DL (ref 8.6–10)
CHLORIDE SERPL-SCNC: 105 MMOL/L (ref 98–107)
CREAT SERPL-MCNC: 0.96 MG/DL (ref 0.67–1.17)
DEPRECATED HCO3 PLAS-SCNC: 22 MMOL/L (ref 22–29)
EGFRCR SERPLBLD CKD-EPI 2021: >90 ML/MIN/1.73M2
ERYTHROCYTE [DISTWIDTH] IN BLOOD BY AUTOMATED COUNT: 13.6 % (ref 10–15)
GLUCOSE SERPL-MCNC: 123 MG/DL (ref 70–99)
HCT VFR BLD AUTO: 53.1 % (ref 40–53)
HGB BLD-MCNC: 17 G/DL (ref 13.3–17.7)
IGG SERPL-MCNC: 787 MG/DL (ref 610–1616)
MCH RBC QN AUTO: 28.1 PG (ref 26.5–33)
MCHC RBC AUTO-ENTMCNC: 32 G/DL (ref 31.5–36.5)
MCV RBC AUTO: 88 FL (ref 78–100)
PLATELET # BLD AUTO: 200 10E3/UL (ref 150–450)
POTASSIUM SERPL-SCNC: 4.6 MMOL/L (ref 3.4–5.3)
RBC # BLD AUTO: 6.04 10E6/UL (ref 4.4–5.9)
SODIUM SERPL-SCNC: 138 MMOL/L (ref 135–145)
TACROLIMUS BLD-MCNC: 10.2 UG/L (ref 5–15)
TME LAST DOSE: NORMAL H
TME LAST DOSE: NORMAL H
WBC # BLD AUTO: 8.8 10E3/UL (ref 4–11)

## 2024-04-04 PROCEDURE — 82374 ASSAY BLOOD CARBON DIOXIDE: CPT

## 2024-04-04 PROCEDURE — 36415 COLL VENOUS BLD VENIPUNCTURE: CPT

## 2024-04-04 PROCEDURE — 82784 ASSAY IGA/IGD/IGG/IGM EACH: CPT

## 2024-04-04 PROCEDURE — 85027 COMPLETE CBC AUTOMATED: CPT

## 2024-04-04 PROCEDURE — 80197 ASSAY OF TACROLIMUS: CPT

## 2024-04-08 ENCOUNTER — ANCILLARY PROCEDURE (OUTPATIENT)
Dept: ULTRASOUND IMAGING | Facility: CLINIC | Age: 29
End: 2024-04-08
Attending: INTERNAL MEDICINE
Payer: MEDICARE

## 2024-04-08 DIAGNOSIS — D75.1 POST-TRANSPLANT ERYTHROCYTOSIS: ICD-10-CM

## 2024-04-08 DIAGNOSIS — Z48.298 AFTERCARE FOLLOWING ORGAN TRANSPLANT: ICD-10-CM

## 2024-04-08 PROCEDURE — 76770 US EXAM ABDO BACK WALL COMP: CPT | Mod: GC | Performed by: RADIOLOGY

## 2024-04-08 PROCEDURE — 76775 US EXAM ABDO BACK WALL LIM: CPT | Mod: GC | Performed by: RADIOLOGY

## 2024-04-11 ENCOUNTER — LAB (OUTPATIENT)
Dept: LAB | Facility: CLINIC | Age: 29
End: 2024-04-11
Payer: MEDICARE

## 2024-04-11 DIAGNOSIS — Z48.298 AFTERCARE FOLLOWING ORGAN TRANSPLANT: ICD-10-CM

## 2024-04-11 DIAGNOSIS — Z20.828 CONTACT WITH AND (SUSPECTED) EXPOSURE TO OTHER VIRAL COMMUNICABLE DISEASES: ICD-10-CM

## 2024-04-11 DIAGNOSIS — Z79.899 ENCOUNTER FOR LONG-TERM CURRENT USE OF MEDICATION: ICD-10-CM

## 2024-04-11 DIAGNOSIS — Z94.0 KIDNEY REPLACED BY TRANSPLANT: ICD-10-CM

## 2024-04-11 DIAGNOSIS — Z98.890 OTHER SPECIFIED POSTPROCEDURAL STATES: ICD-10-CM

## 2024-04-11 LAB
ANION GAP SERPL CALCULATED.3IONS-SCNC: 15 MMOL/L (ref 7–15)
BUN SERPL-MCNC: 21.2 MG/DL (ref 6–20)
CALCIUM SERPL-MCNC: 9.9 MG/DL (ref 8.6–10)
CHLORIDE SERPL-SCNC: 105 MMOL/L (ref 98–107)
CREAT SERPL-MCNC: 0.92 MG/DL (ref 0.67–1.17)
DEPRECATED HCO3 PLAS-SCNC: 18 MMOL/L (ref 22–29)
EGFRCR SERPLBLD CKD-EPI 2021: >90 ML/MIN/1.73M2
ERYTHROCYTE [DISTWIDTH] IN BLOOD BY AUTOMATED COUNT: 13.8 % (ref 10–15)
GLUCOSE SERPL-MCNC: 120 MG/DL (ref 70–99)
HCT VFR BLD AUTO: 54.2 % (ref 40–53)
HGB BLD-MCNC: 17.2 G/DL (ref 13.3–17.7)
MCH RBC QN AUTO: 28.4 PG (ref 26.5–33)
MCHC RBC AUTO-ENTMCNC: 31.7 G/DL (ref 31.5–36.5)
MCV RBC AUTO: 89 FL (ref 78–100)
PLATELET # BLD AUTO: 209 10E3/UL (ref 150–450)
POTASSIUM SERPL-SCNC: 4.3 MMOL/L (ref 3.4–5.3)
RBC # BLD AUTO: 6.06 10E6/UL (ref 4.4–5.9)
SODIUM SERPL-SCNC: 138 MMOL/L (ref 135–145)
TACROLIMUS BLD-MCNC: 10.3 UG/L (ref 5–15)
TME LAST DOSE: NORMAL H
TME LAST DOSE: NORMAL H
WBC # BLD AUTO: 9.7 10E3/UL (ref 4–11)

## 2024-04-11 PROCEDURE — 80197 ASSAY OF TACROLIMUS: CPT

## 2024-04-11 PROCEDURE — 36415 COLL VENOUS BLD VENIPUNCTURE: CPT

## 2024-04-11 PROCEDURE — 82374 ASSAY BLOOD CARBON DIOXIDE: CPT

## 2024-04-11 PROCEDURE — 85027 COMPLETE CBC AUTOMATED: CPT

## 2024-04-16 ENCOUNTER — OFFICE VISIT (OUTPATIENT)
Dept: TRANSPLANT | Facility: CLINIC | Age: 29
End: 2024-04-16
Attending: INTERNAL MEDICINE
Payer: MEDICARE

## 2024-04-16 VITALS
SYSTOLIC BLOOD PRESSURE: 130 MMHG | WEIGHT: 243.1 LBS | TEMPERATURE: 97.7 F | OXYGEN SATURATION: 97 % | DIASTOLIC BLOOD PRESSURE: 82 MMHG | BODY MASS INDEX: 32.97 KG/M2 | HEART RATE: 71 BPM

## 2024-04-16 DIAGNOSIS — B34.8 BK VIREMIA: ICD-10-CM

## 2024-04-16 DIAGNOSIS — D84.9 IMMUNOSUPPRESSION (H): Primary | ICD-10-CM

## 2024-04-16 DIAGNOSIS — E83.42 HYPOMAGNESEMIA: ICD-10-CM

## 2024-04-16 DIAGNOSIS — E87.20 METABOLIC ACIDOSIS: ICD-10-CM

## 2024-04-16 DIAGNOSIS — Z94.0 HTN, KIDNEY TRANSPLANT RELATED: ICD-10-CM

## 2024-04-16 DIAGNOSIS — Z48.298 AFTERCARE FOLLOWING ORGAN TRANSPLANT: ICD-10-CM

## 2024-04-16 DIAGNOSIS — I15.1 HTN, KIDNEY TRANSPLANT RELATED: ICD-10-CM

## 2024-04-16 DIAGNOSIS — Z94.0 KIDNEY REPLACED BY TRANSPLANT: ICD-10-CM

## 2024-04-16 DIAGNOSIS — R76.8 DONOR SPECIFIC ANTIBODY (DSA) POSITIVE: ICD-10-CM

## 2024-04-16 DIAGNOSIS — D75.1 POST-TRANSPLANT ERYTHROCYTOSIS: ICD-10-CM

## 2024-04-16 DIAGNOSIS — D72.818 OTHER DECREASED WHITE BLOOD CELL (WBC) COUNT: ICD-10-CM

## 2024-04-16 PROCEDURE — G2211 COMPLEX E/M VISIT ADD ON: HCPCS | Performed by: INTERNAL MEDICINE

## 2024-04-16 PROCEDURE — G0463 HOSPITAL OUTPT CLINIC VISIT: HCPCS | Performed by: INTERNAL MEDICINE

## 2024-04-16 PROCEDURE — 99214 OFFICE O/P EST MOD 30 MIN: CPT | Performed by: INTERNAL MEDICINE

## 2024-04-16 RX ORDER — ENALAPRIL MALEATE 10 MG/1
20 TABLET ORAL DAILY
Qty: 90 TABLET | Refills: 1 | Status: SHIPPED | OUTPATIENT
Start: 2024-04-16 | End: 2024-07-25

## 2024-04-16 ASSESSMENT — PAIN SCALES - GENERAL: PAINLEVEL: NO PAIN (0)

## 2024-04-16 NOTE — PROGRESS NOTES
TRANSPLANT NEPHROLOGY EARLY POST TRANSPLANT VISIT    Assessment & Plan   # DDKT: Stable   - Baseline Creatinine: ~ 0.8-1   - Proteinuria: Normal (<0.2 grams)   - Date DSA Last Checked: Feb/2024      Latest DSA: Low level DSA (<1000 mfi) to DR53, deNovo, stable    - BK Viremia: Yes, mildly elevated (1-10K) down-trending  - Kidney Tx Biopsy:  yes; Result: Mild acute tubular injury, associated with no other significant signs of active antibody-mediated rejection or acute cellular allograft rejection (i0 t0 v0; g0 ptc0 C4d0) , no global glomerulosclerosis or IFTA (ct0 ci0 cv1 ah1 cg0 mm0)   - Transplant Ureteral Stent: Removed 1/23/24    # De Carolyn DSA:   -dnDSA detected 1/11/24 to DR53 w/ , stable on 2/5/24   -No decrease in IS despite slightly rising BK unless biopsy demonstrates BK nephropathy   -Attempt to obtain IVIG. Thus far has not been approved. Repeat IgG 787 4/2024    # BK Viremia:   - low grade, stable slight downtrend    - detected Jan 2024, peak 10K ~2/2024 most recent 7K copies/ml, due for a recheck now   - IgG 647 on 1/15/24, repeat     # Immunosuppression: Tacrolimus immediate release (goal 8-10) and Mycophenolic acid (dose 540 mg every 12 hours)   - Induction with Recent Transplant:  Intermediate Intensity Protocol   - Continue with intensive monitoring of immunosuppression for efficacy and toxicity.   - Changes: Not at this time pending repeat BK and DSA tomorrow to guide further IS changes    # Infection Prophylaxis:   - PJP: Sulfa/TMP (Bactrim)  - CMV: none, completed 3 m Valganciclovir (Valcyte) non discordant    # Hypertension: Controlled;  Goal BP: < 130/80   - Volume status: Euvolemic     - Changes: Not at this time. 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 Imaging: yes US native and kidney Tx neg renal masses   - ACEI: yes enalapril 10, increase to 20 mg po every day and repeat BMP in 1 week    # Mineral Bone  Disorder:    - Secondary renal hyperparathyroidism; PTH level: Mildly elevated (151-300 pg/ml)        On treatment: None   - Vitamin D; level: Normal        On supplement: No   - Calcium; level: High normal        On supplement: No   - Phosphorus; level: Normal        On supplement: No     # Electrolytes:   - Potassium; level: Normal        On supplement: No  - Magnesium; level: Low normal        On supplement: Yes,Mg glycinate 300mg bid  - Bicarbonate; level: Low        On supplement: Yes. sodium bicarb 1950mg tid and 1 teaspoon baking soda in water daily.     # Loose stools: stable   -Continue fiber.   - CMV PCR neg 2/2024    # GERD:   -increase famotidine 20mg bid     # Obesity:   - BMI- 32.97   - counseled about lifestyle modifications    # Medical Compliance: Yes    # Health Maintenance and Vaccination Review: Recommend:  After 3m post txp obtain COVID and flu vaccines. 6m post txp obtain Shingrix    # Transplant History:  Etiology of Kidney Failure: Focal segmental glomerulosclerosis (FSGS)  Tx: DDKT  Transplant: 12/5/2023 (Kidney)  Donor Type: Donation after Brain Death Donor Class:   Crossmatch at time of Tx: negative  DSA at time of Tx: No  Significant changes in immunosuppression: None  CMV IgG Ab High Risk Discordance (D+/R-): No  EBV IgG Ab High Risk Discordance (D+/R-): No  Significant transplant-related complications: BK Viremia and DSA    Transplant Office Phone Number: [phone]    Assessment and plan was discussed with the patient and he voiced his understanding and agreement.    Return visit: Return for 6 month post transpalnt visit.    Stephanie Lynn MD      The longitudinal plan of care for kidney transplant was addressed during this visit. Due to the added complexity in care, I will continue to support Taylor Valiente in the subsequent management of this condition(s) and with the ongoing continuity of care of this condition(s).         Chief Complaint   Mr. Valiente is a 28 year old  here for kidney transplant, immunosuppression management and routine follow up.     History of Present Illness     Feels good overall. Energy level is good. Denies any fevers, chills, weight loss, night sweats. No nausea, vomiting, abdominal pain, diarrhea. No chest pain, sob, leg swelling.    Home BP:  120s-130 systolic    Problem List   Patient Active Problem List   Diagnosis    LVH (left ventricular hypertrophy) due to hypertensive disease    HTN, kidney transplant related    Adrenal adenoma, left    Hyperlipidemia    Prolonged Q-T interval on ECG    Kidney replaced by transplant    Hypomagnesemia    Metabolic acidosis    Immunosuppressed status (H24)    Dehydration    Diarrhea    BK viremia    Donor specific antibody (DSA) positive    Complication of kidney transplant    Kidney transplant infection    Cannabis use disorder, moderate, in sustained remission (H)    Aftercare following organ transplant       Allergies   Allergies   Allergen Reactions    Benadryl [Diphenhydramine] Itching    Vancomycin Itching       Medications   Current Outpatient Medications   Medication Sig Dispense Refill    acetaminophen (TYLENOL) 325 MG tablet Take 2 tablets (650 mg) by mouth every 4 hours as needed for pain 100 tablet 0    amLODIPine (NORVASC) 10 MG tablet Take 10 mg by mouth daily       aspirin 81 MG EC tablet Take 81 mg by mouth daily      atorvastatin (LIPITOR) 10 MG tablet Take 1 tablet (10 mg) by mouth daily 90 tablet 3    carvedilol (COREG) 25 MG tablet Take 1 tablet (25 mg) by mouth 2 times daily (with meals)      enalapril (VASOTEC) 10 MG tablet Take 1 tablet (10 mg) by mouth daily 90 tablet 1    famotidine (PEPCID) 20 MG tablet Take 1 tablet (20 mg) by mouth nightly as needed (refux) 90 tablet 0    lidocaine (XYLOCAINE) 5 % external ointment Apply topically 2 times daily 30 g 0    magnesium glycinate 100 MG CAPS capsule Take 3 capsules (300 mg) by mouth 2 times daily Doctor's Best Magnesium or generic Magnesium  glycinate      medical cannabis (Patient's own supply) See Admin Instructions (The purpose of this order is to document that the patient reports taking medical cannabis.  This is not a prescription, and is not used to certify that the patient has a qualifying medical condition.)    Patient was advised on the cannabis-tacrolimus drug interaction.      Multiple Vitamin (MULTIVITAMIN ADULT PO) Take 1 tablet by mouth daily      mycophenolic acid (GENERIC EQUIVALENT) 180 MG EC tablet Take 3 tablets (540 mg) by mouth 2 times daily 180 tablet 3    psyllium (METAMUCIL/KONSYL) 58.6 % powder Take 1 teaspoonful by mouth daily      sodium bicarbonate 650 MG tablet Take 3 tablets (1,950 mg) by mouth 3 times daily 270 tablet 1    Sodium Bicarbonate, antacid, POWD Mix 1 teaspoon of baking soda in full glass of water once daily      sulfamethoxazole-trimethoprim (BACTRIM) 400-80 MG tablet Take 1 tablet by mouth daily 30 tablet 11    tacrolimus (GENERIC) 0.5 MG capsule Take 1 capsule (0.5 mg) by mouth 2 times daily To be used for dose adjustments. 60 capsule 11    tacrolimus (GENERIC) 1 MG capsule Take 6 capsules (6 mg) by mouth 2 times daily 360 capsule 11    triamcinolone (KENALOG) 0.1 % external ointment Apply topically 2 times daily 30 g 1    valGANciclovir (VALCYTE) 450 MG tablet Take 2 tablets (900 mg) by mouth daily       No current facility-administered medications for this visit.     There are no discontinued medications.      Physical Exam   Vital Signs: There were no vitals taken for this visit.    GENERAL APPEARANCE: alert and no distress  HENT: mouth without ulcers or lesions  RESP: lungs clear to auscultation - no rales, rhonchi or wheezes  CV: regular rhythm, normal rate, no rub, no murmur  EDEMA: no LE edema bilaterally  ABDOMEN: soft, nondistended, nontender, bowel sounds normal  MS: extremities normal - no gross deformities noted, no evidence of inflammation in joints, no muscle tenderness  SKIN: no rash  NEURO:  normal strength and tone, sensory exam grossly normal, mentation intact and speech normal  PSYCH: mentation appears normal and affect normal/bright  TX KIDNEY: normal    Data         Latest Ref Rng & Units 4/11/2024     8:44 AM 4/4/2024     8:47 AM 3/27/2024     8:52 AM   Renal   Sodium 135 - 145 mmol/L 138  138  139    K 3.4 - 5.3 mmol/L 4.3  4.6  4.4    Cl 98 - 107 mmol/L 105  105  103    Cl (external) 98 - 107 mmol/L 105  105  103    CO2 22 - 29 mmol/L 18  22  18    Urea Nitrogen 6.0 - 20.0 mg/dL 21.2  17.0  15.0    Creatinine 0.67 - 1.17 mg/dL 0.92  0.96  1.06    Glucose 70 - 99 mg/dL 120  123  125    Calcium 8.6 - 10.0 mg/dL 9.9  10.2  10.0          Latest Ref Rng & Units 3/19/2024     8:53 AM 2/12/2024     8:54 AM 2/5/2024     8:38 AM   Bone Health   Phosphorus 2.5 - 4.5 mg/dL 2.8  2.6  3.1          Latest Ref Rng & Units 4/11/2024     8:44 AM 4/4/2024     8:47 AM 3/27/2024     8:52 AM   Heme   WBC 4.0 - 11.0 10e3/uL 9.7  8.8  6.3    Hgb 13.3 - 17.7 g/dL 17.2  17.0  17.5    Plt 150 - 450 10e3/uL 209  200  211          Latest Ref Rng & Units 12/5/2023    12:07 AM 8/2/2023     5:39 AM 7/27/2023    10:08 PM   Liver   AP 40 - 150 U/L 138   77    TBili <=1.2 mg/dL 0.5   0.3    ALT 0 - 70 U/L 20   8    AST 0 - 45 U/L 13   8    Tot Protein 6.4 - 8.3 g/dL 7.7   7.5    Albumin 3.5 - 5.2 g/dL 4.5  3.3  3.8          Latest Ref Rng & Units 12/5/2023    12:07 AM 5/30/2023     6:23 AM 11/29/2021     6:50 AM   Pancreas   A1C <5.7 % 5.9  4.4     Lipase 73 - 393 U/L   304          Latest Ref Rng & Units 12/19/2023     9:55 AM 12/10/2023     8:03 AM 9/11/2020     6:34 AM   Iron studies   Iron 61 - 157 ug/dL 30  80  33    Iron Saturation Index 15 - 46 %   18    Iron Sat Index 15 - 46 % 14  41     Ferritin 31 - 409 ng/mL 1,477  1,743  325          Latest Ref Rng & Units 1/23/2024     6:07 AM 12/5/2023    12:07 AM 6/17/2021    12:35 PM   [facility] Txp Virology   EBV CAPSID ANTIBODY IGG No detectable antibody.  Positive     EBV DNA  COPIES/ML Not Detected copies/mL Not Detected      Hep B Core NR^Nonreactive   Nonreactive      Failed to redirect to the Timeline version of the REVFS SmartLink.  Recent Labs   Lab Test 03/27/24  0852 04/04/24  0847 04/11/24  0844   DOSTAC 3/26/2024 4/3/2024 4/10/2024   TACROL 8.3 10.2 10.3     Recent Labs   Lab Test 01/25/24  0900 02/01/24  0706 02/08/24  0838   DOSMPA 1/24/2024   8:30 PM 1/31/2024   8:30 PM 2/7/2024   8:30 PM   MPACID <0.25* 6.16* 1.67   MPAG 10.6* 28.0* 18.2*

## 2024-04-16 NOTE — LETTER
4/16/2024         RE: Taylor Valiente  [address]   [place] [address]        Dear Colleague,    Thank you for referring your patient, Taylor Valiente, to the [facility]. Please see a copy of my visit note below.    TRANSPLANT NEPHROLOGY EARLY POST TRANSPLANT VISIT    Assessment & Plan  # DDKT: Stable   - Baseline Creatinine: ~ 0.8-1   - Proteinuria: Normal (<0.2 grams)   - Date DSA Last Checked: Feb/2024      Latest DSA: Low level DSA (<1000 mfi) to DR53, deNovo, stable    - BK Viremia: Yes, mildly elevated (1-10K) down-trending  - Kidney Tx Biopsy:  yes; Result: Mild acute tubular injury, associated with no other significant signs of active antibody-mediated rejection or acute cellular allograft rejection (i0 t0 v0; g0 ptc0 C4d0) , no global glomerulosclerosis or IFTA (ct0 ci0 cv1 ah1 cg0 mm0)   - Transplant Ureteral Stent: Removed 1/23/24    # De Carolyn DSA:   -dnDSA detected 1/11/24 to DR53 w/ , stable on 2/5/24   -No decrease in IS despite slightly rising BK unless biopsy demonstrates BK nephropathy   -Attempt to obtain IVIG. Thus far has not been approved. Repeat IgG 787 4/2024    # BK Viremia:   - low grade, stable slight downtrend    - detected Jan 2024, peak 10K ~2/2024 most recent 7K copies/ml, due for a recheck now   - IgG 647 on 1/15/24, repeat     # Immunosuppression: Tacrolimus immediate release (goal 8-10) and Mycophenolic acid (dose 540 mg every 12 hours)   - Induction with Recent Transplant:  Intermediate Intensity Protocol   - Continue with intensive monitoring of immunosuppression for efficacy and toxicity.   - Changes: Not at this time pending repeat BK and DSA tomorrow to guide further IS changes    # Infection Prophylaxis:   - PJP: Sulfa/TMP (Bactrim)  - CMV: none, completed 3 m Valganciclovir (Valcyte) non discordant    # Hypertension: Controlled;  Goal BP: < 130/80   - Volume status: Euvolemic     - Changes: Not at this time. 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 Imaging: yes US native and kidney Tx neg renal masses   - ACEI: yes enalapril 10, increase to 20 mg po every day and repeat BMP in 1 week    # Mineral Bone Disorder:    - Secondary renal hyperparathyroidism; PTH level: Mildly elevated (151-300 pg/ml)        On treatment: None   - Vitamin D; level: Normal        On supplement: No   - Calcium; level: High normal        On supplement: No   - Phosphorus; level: Normal        On supplement: No     # Electrolytes:   - Potassium; level: Normal        On supplement: No  - Magnesium; level: Low normal        On supplement: Yes,Mg glycinate 300mg bid  - Bicarbonate; level: Low        On supplement: Yes. sodium bicarb 1950mg tid and 1 teaspoon baking soda in water daily.     # Loose stools: stable   -Continue fiber.   - CMV PCR neg 2/2024    # GERD:   -increase famotidine 20mg bid     # Obesity:   - BMI- 32.97   - counseled about lifestyle modifications    # Medical Compliance: Yes    # Health Maintenance and Vaccination Review: Recommend:  After 3m post txp obtain COVID and flu vaccines. 6m post txp obtain Shingrix    # Transplant History:  Etiology of Kidney Failure: Focal segmental glomerulosclerosis (FSGS)  Tx: DDKT  Transplant: 12/5/2023 (Kidney)  Donor Type: Donation after Brain Death Donor Class:   Crossmatch at time of Tx: negative  DSA at time of Tx: No  Significant changes in immunosuppression: None  CMV IgG Ab High Risk Discordance (D+/R-): No  EBV IgG Ab High Risk Discordance (D+/R-): No  Significant transplant-related complications: BK Viremia and DSA    Transplant Office Phone Number: [phone]    Assessment and plan was discussed with the patient and he voiced his understanding and agreement.    Return visit: Return for 6 month post transpalnt visit.    Stephanie Lynn MD      The longitudinal plan of care for kidney transplant was addressed during  this visit. Due to the added complexity in care, I will continue to support Taylor Valiente in the subsequent management of this condition(s) and with the ongoing continuity of care of this condition(s).         Chief Complaint  Mr. Valiente is a 28 year old here for kidney transplant, immunosuppression management and routine follow up.     History of Present Illness    Feels good overall. Energy level is good. Denies any fevers, chills, weight loss, night sweats. No nausea, vomiting, abdominal pain, diarrhea. No chest pain, sob, leg swelling.    Home BP:  120s-130 systolic    Problem List  Patient Active Problem List   Diagnosis     LVH (left ventricular hypertrophy) due to hypertensive disease     HTN, kidney transplant related     Adrenal adenoma, left     Hyperlipidemia     Prolonged Q-T interval on ECG     Kidney replaced by transplant     Hypomagnesemia     Metabolic acidosis     Immunosuppressed status (H24)     Dehydration     Diarrhea     BK viremia     Donor specific antibody (DSA) positive     Complication of kidney transplant     Kidney transplant infection     Cannabis use disorder, moderate, in sustained remission (H)     Aftercare following organ transplant       Allergies  Allergies   Allergen Reactions     Benadryl [Diphenhydramine] Itching     Vancomycin Itching       Medications  Current Outpatient Medications   Medication Sig Dispense Refill     acetaminophen (TYLENOL) 325 MG tablet Take 2 tablets (650 mg) by mouth every 4 hours as needed for pain 100 tablet 0     amLODIPine (NORVASC) 10 MG tablet Take 10 mg by mouth daily        aspirin 81 MG EC tablet Take 81 mg by mouth daily       atorvastatin (LIPITOR) 10 MG tablet Take 1 tablet (10 mg) by mouth daily 90 tablet 3     carvedilol (COREG) 25 MG tablet Take 1 tablet (25 mg) by mouth 2 times daily (with meals)       enalapril (VASOTEC) 10 MG tablet Take 1 tablet (10 mg) by mouth daily 90 tablet 1     famotidine (PEPCID) 20 MG tablet Take 1  tablet (20 mg) by mouth nightly as needed (refux) 90 tablet 0     lidocaine (XYLOCAINE) 5 % external ointment Apply topically 2 times daily 30 g 0     magnesium glycinate 100 MG CAPS capsule Take 3 capsules (300 mg) by mouth 2 times daily Doctor's Best Magnesium or generic Magnesium glycinate       medical cannabis (Patient's own supply) See Admin Instructions (The purpose of this order is to document that the patient reports taking medical cannabis.  This is not a prescription, and is not used to certify that the patient has a qualifying medical condition.)    Patient was advised on the cannabis-tacrolimus drug interaction.       Multiple Vitamin (MULTIVITAMIN ADULT PO) Take 1 tablet by mouth daily       mycophenolic acid (GENERIC EQUIVALENT) 180 MG EC tablet Take 3 tablets (540 mg) by mouth 2 times daily 180 tablet 3     psyllium (METAMUCIL/KONSYL) 58.6 % powder Take 1 teaspoonful by mouth daily       sodium bicarbonate 650 MG tablet Take 3 tablets (1,950 mg) by mouth 3 times daily 270 tablet 1     Sodium Bicarbonate, antacid, POWD Mix 1 teaspoon of baking soda in full glass of water once daily       sulfamethoxazole-trimethoprim (BACTRIM) 400-80 MG tablet Take 1 tablet by mouth daily 30 tablet 11     tacrolimus (GENERIC) 0.5 MG capsule Take 1 capsule (0.5 mg) by mouth 2 times daily To be used for dose adjustments. 60 capsule 11     tacrolimus (GENERIC) 1 MG capsule Take 6 capsules (6 mg) by mouth 2 times daily 360 capsule 11     triamcinolone (KENALOG) 0.1 % external ointment Apply topically 2 times daily 30 g 1     valGANciclovir (VALCYTE) 450 MG tablet Take 2 tablets (900 mg) by mouth daily       No current facility-administered medications for this visit.     There are no discontinued medications.      Physical Exam  Vital Signs: There were no vitals taken for this visit.    GENERAL APPEARANCE: alert and no distress  HENT: mouth without ulcers or lesions  RESP: lungs clear to auscultation - no rales, rhonchi  or wheezes  CV: regular rhythm, normal rate, no rub, no murmur  EDEMA: no LE edema bilaterally  ABDOMEN: soft, nondistended, nontender, bowel sounds normal  MS: extremities normal - no gross deformities noted, no evidence of inflammation in joints, no muscle tenderness  SKIN: no rash  NEURO: normal strength and tone, sensory exam grossly normal, mentation intact and speech normal  PSYCH: mentation appears normal and affect normal/bright  TX KIDNEY: normal    Data        Latest Ref Rng & Units 4/11/2024     8:44 AM 4/4/2024     8:47 AM 3/27/2024     8:52 AM   Renal   Sodium 135 - 145 mmol/L 138  138  139    K 3.4 - 5.3 mmol/L 4.3  4.6  4.4    Cl 98 - 107 mmol/L 105  105  103    Cl (external) 98 - 107 mmol/L 105  105  103    CO2 22 - 29 mmol/L 18  22  18    Urea Nitrogen 6.0 - 20.0 mg/dL 21.2  17.0  15.0    Creatinine 0.67 - 1.17 mg/dL 0.92  0.96  1.06    Glucose 70 - 99 mg/dL 120  123  125    Calcium 8.6 - 10.0 mg/dL 9.9  10.2  10.0          Latest Ref Rng & Units 3/19/2024     8:53 AM 2/12/2024     8:54 AM 2/5/2024     8:38 AM   Bone Health   Phosphorus 2.5 - 4.5 mg/dL 2.8  2.6  3.1          Latest Ref Rng & Units 4/11/2024     8:44 AM 4/4/2024     8:47 AM 3/27/2024     8:52 AM   Heme   WBC 4.0 - 11.0 10e3/uL 9.7  8.8  6.3    Hgb 13.3 - 17.7 g/dL 17.2  17.0  17.5    Plt 150 - 450 10e3/uL 209  200  211          Latest Ref Rng & Units 12/5/2023    12:07 AM 8/2/2023     5:39 AM 7/27/2023    10:08 PM   Liver   AP 40 - 150 U/L 138   77    TBili <=1.2 mg/dL 0.5   0.3    ALT 0 - 70 U/L 20   8    AST 0 - 45 U/L 13   8    Tot Protein 6.4 - 8.3 g/dL 7.7   7.5    Albumin 3.5 - 5.2 g/dL 4.5  3.3  3.8          Latest Ref Rng & Units 12/5/2023    12:07 AM 5/30/2023     6:23 AM 11/29/2021     6:50 AM   Pancreas   A1C <5.7 % 5.9  4.4     Lipase 73 - 393 U/L   304          Latest Ref Rng & Units 12/19/2023     9:55 AM 12/10/2023     8:03 AM 9/11/2020     6:34 AM   Iron studies   Iron 61 - 157 ug/dL 30  80  33    Iron Saturation  Index 15 - 46 %   18    Iron Sat Index 15 - 46 % 14  41     Ferritin 31 - 409 ng/mL 1,477  1,743  325          Latest Ref Rng & Units 1/23/2024     6:07 AM 12/5/2023    12:07 AM 6/17/2021    12:35 PM   [facility] Txp Virology   EBV CAPSID ANTIBODY IGG No detectable antibody.  Positive     EBV DNA COPIES/ML Not Detected copies/mL Not Detected      Hep B Core NR^Nonreactive   Nonreactive      Failed to redirect to the Timeline version of the REVFS SmartLink.  Recent Labs   Lab Test 03/27/24  0852 04/04/24  0847 04/11/24  0844   DOSTAC 3/26/2024 4/3/2024 4/10/2024   TACROL 8.3 10.2 10.3     Recent Labs   Lab Test 01/25/24  0900 02/01/24  0706 02/08/24  0838   DOSMPA 1/24/2024   8:30 PM 1/31/2024   8:30 PM 2/7/2024   8:30 PM   MPACID <0.25* 6.16* 1.67   MPAG 10.6* 28.0* 18.2*         Again, thank you for allowing me to participate in the care of your patient.        Sincerely,        Stephanie Lynn MD

## 2024-04-16 NOTE — PATIENT INSTRUCTIONS
Increase enalapril to 20 mg po every day (2 pills in the morning)    Repeat labs next week    Make sure the lab draws BK test tomorrow    Increase pepcid to 20 mg po twice daily for 2 weeks, if symptoms improve, could wean to 1 tab po daily      Transplant Patient Information  Your Post Transplant Coordinator is: Meka Rashid  You and your care team can contact your transplant coordinator Monday - Friday, 8am - 5pm at [phone] (Option 2 to reach the coordinator or Option 4 to schedule an appointment).  You can also reach your care team online via [employer].  After hours for urgent matters, please call [facility] at [phone].

## 2024-04-16 NOTE — NURSING NOTE
Chief Complaint   Patient presents with    RECHECK       /82   Pulse 71   Temp 97.7  F (36.5  C) (Oral)   Wt 110.3 kg (243 lb 1.6 oz)   SpO2 97%   BMI 32.97 kg/m      Sal Wade on 4/16/2024 at 10:30 AM

## 2024-04-17 DIAGNOSIS — Z94.0 KIDNEY REPLACED BY TRANSPLANT: Primary | ICD-10-CM

## 2024-04-17 RX ORDER — AMLODIPINE BESYLATE 5 MG/1
5 TABLET ORAL DAILY
Qty: 90 TABLET | Refills: 2 | Status: SHIPPED | OUTPATIENT
Start: 2024-04-17

## 2024-04-19 ENCOUNTER — LAB (OUTPATIENT)
Dept: LAB | Facility: CLINIC | Age: 29
End: 2024-04-19
Payer: MEDICARE

## 2024-04-19 DIAGNOSIS — Z98.890 OTHER SPECIFIED POSTPROCEDURAL STATES: ICD-10-CM

## 2024-04-19 DIAGNOSIS — Z20.828 CONTACT WITH AND (SUSPECTED) EXPOSURE TO OTHER VIRAL COMMUNICABLE DISEASES: ICD-10-CM

## 2024-04-19 DIAGNOSIS — Z79.899 ENCOUNTER FOR LONG-TERM CURRENT USE OF MEDICATION: ICD-10-CM

## 2024-04-19 DIAGNOSIS — Z48.298 AFTERCARE FOLLOWING ORGAN TRANSPLANT: ICD-10-CM

## 2024-04-19 DIAGNOSIS — Z94.0 KIDNEY REPLACED BY TRANSPLANT: ICD-10-CM

## 2024-04-19 LAB
ANION GAP SERPL CALCULATED.3IONS-SCNC: 12 MMOL/L (ref 7–15)
BUN SERPL-MCNC: 15.7 MG/DL (ref 6–20)
CALCIUM SERPL-MCNC: 9.8 MG/DL (ref 8.6–10)
CHLORIDE SERPL-SCNC: 102 MMOL/L (ref 98–107)
CREAT SERPL-MCNC: 0.98 MG/DL (ref 0.67–1.17)
DEPRECATED HCO3 PLAS-SCNC: 19 MMOL/L (ref 22–29)
EGFRCR SERPLBLD CKD-EPI 2021: >90 ML/MIN/1.73M2
ERYTHROCYTE [DISTWIDTH] IN BLOOD BY AUTOMATED COUNT: 13.9 % (ref 10–15)
GLUCOSE SERPL-MCNC: 124 MG/DL (ref 70–99)
HCT VFR BLD AUTO: 54.1 % (ref 40–53)
HGB BLD-MCNC: 17.2 G/DL (ref 13.3–17.7)
MCH RBC QN AUTO: 28.1 PG (ref 26.5–33)
MCHC RBC AUTO-ENTMCNC: 31.8 G/DL (ref 31.5–36.5)
MCV RBC AUTO: 88 FL (ref 78–100)
PLATELET # BLD AUTO: 194 10E3/UL (ref 150–450)
POTASSIUM SERPL-SCNC: 4.2 MMOL/L (ref 3.4–5.3)
RBC # BLD AUTO: 6.12 10E6/UL (ref 4.4–5.9)
SODIUM SERPL-SCNC: 133 MMOL/L (ref 135–145)
TACROLIMUS BLD-MCNC: 11.4 UG/L (ref 5–15)
TME LAST DOSE: NORMAL H
TME LAST DOSE: NORMAL H
WBC # BLD AUTO: 8.2 10E3/UL (ref 4–11)

## 2024-04-19 PROCEDURE — 80197 ASSAY OF TACROLIMUS: CPT

## 2024-04-19 PROCEDURE — 85027 COMPLETE CBC AUTOMATED: CPT

## 2024-04-19 PROCEDURE — 36415 COLL VENOUS BLD VENIPUNCTURE: CPT

## 2024-04-19 PROCEDURE — 87799 DETECT AGENT NOS DNA QUANT: CPT

## 2024-04-19 PROCEDURE — 82374 ASSAY BLOOD CARBON DIOXIDE: CPT

## 2024-04-21 ENCOUNTER — MYC MEDICAL ADVICE (OUTPATIENT)
Dept: INTERNAL MEDICINE | Facility: CLINIC | Age: 29
End: 2024-04-21
Payer: MEDICARE

## 2024-04-21 ENCOUNTER — MYC REFILL (OUTPATIENT)
Dept: PHARMACY | Facility: CLINIC | Age: 29
End: 2024-04-21
Payer: MEDICARE

## 2024-04-21 DIAGNOSIS — K64.4 EXTERNAL HEMORRHOIDS: Primary | ICD-10-CM

## 2024-04-21 DIAGNOSIS — K64.8 PROLAPSED INTERNAL HEMORRHOIDS: ICD-10-CM

## 2024-04-21 DIAGNOSIS — Z94.0 KIDNEY REPLACED BY TRANSPLANT: ICD-10-CM

## 2024-04-21 LAB
BK VIRUS DNA IU/ML, INSTRUMENT (6800): 9900 IU/ML
BK VIRUS SPECIMEN TYPE: ABNORMAL
BKV DNA SPEC NAA+PROBE-LOG#: 4 {LOG_COPIES}/ML

## 2024-04-22 ENCOUNTER — TELEPHONE (OUTPATIENT)
Dept: TRANSPLANT | Facility: CLINIC | Age: 29
End: 2024-04-22
Payer: MEDICARE

## 2024-04-22 DIAGNOSIS — Z76.82 KIDNEY TRANSPLANT CANDIDATE: ICD-10-CM

## 2024-04-22 RX ORDER — MYCOPHENOLIC ACID 180 MG/1
540 TABLET, DELAYED RELEASE ORAL 2 TIMES DAILY
Qty: 180 TABLET | Refills: 3 | Status: SHIPPED | OUTPATIENT
Start: 2024-04-22 | End: 2024-04-25

## 2024-04-22 NOTE — TELEPHONE ENCOUNTER
ISSUE:   Tacrolimus IR level 11.4 on 4/22, goal 8-10, dose 6 mg BID.    PLAN:   Call Patient and confirm this was an accurate 12-hour trough.   Verify Tacrolimus IR dose 6 mg BID.   Confirm no new medications or or missed doses.   Confirm no new illness / infection / diarrhea.   If accurate trough and accurate dose, decrease Tacrolimus IR dose to 5 mg BID     Is this more than a 50% increase or decrease in current IS dose: No  If YES, justification: N/A    Repeat labs in 1 weeks.  *If > 50% change in immunosuppression dose, repeat labs in 1 week.     OUTCOME:   Spoke with Patient, they confirm accurate trough level and current dose 6 mg BID.   Patient confirmed dose change to 5 mg BID.  Patient agreed to repeat labs in 1 weeks.   Orders sent to preferred pharmacy for dose change and lab for repeat labs.   Patient voiced understanding of plan.

## 2024-04-23 RX ORDER — TACROLIMUS 1 MG/1
5 CAPSULE ORAL 2 TIMES DAILY
Qty: 300 CAPSULE | Refills: 11 | Status: SHIPPED | OUTPATIENT
Start: 2024-04-23 | End: 2024-07-16

## 2024-04-25 ENCOUNTER — TELEPHONE (OUTPATIENT)
Dept: TRANSPLANT | Facility: CLINIC | Age: 29
End: 2024-04-25
Payer: MEDICARE

## 2024-04-25 DIAGNOSIS — Z94.0 KIDNEY REPLACED BY TRANSPLANT: ICD-10-CM

## 2024-04-25 RX ORDER — MYCOPHENOLIC ACID 180 MG/1
540 TABLET, DELAYED RELEASE ORAL 2 TIMES DAILY
Qty: 9 TABLET | Refills: 0 | Status: SHIPPED | OUTPATIENT
Start: 2024-04-25 | End: 2024-04-26

## 2024-04-25 RX ORDER — MYCOPHENOLIC ACID 180 MG/1
540 TABLET, DELAYED RELEASE ORAL 2 TIMES DAILY
Qty: 180 TABLET | Refills: 3 | Status: SHIPPED | OUTPATIENT
Start: 2024-04-25 | End: 2024-04-25

## 2024-04-26 DIAGNOSIS — Z94.0 KIDNEY REPLACED BY TRANSPLANT: ICD-10-CM

## 2024-04-26 RX ORDER — MYCOPHENOLIC ACID 180 MG/1
540 TABLET, DELAYED RELEASE ORAL 2 TIMES DAILY
Qty: 180 TABLET | Refills: 11 | Status: SHIPPED | OUTPATIENT
Start: 2024-04-26

## 2024-04-26 NOTE — TELEPHONE ENCOUNTER
Tamir RNCC spoke with Taylor. Pt reports that he is out of MPA for this evenings dose. Refill was sent to [facility] pharmacy and he was unable to pickup before closing. RNCC sent to [facility] in [place] per pt request.

## 2024-05-02 ENCOUNTER — MYC REFILL (OUTPATIENT)
Dept: FAMILY MEDICINE | Facility: CLINIC | Age: 29
End: 2024-05-02
Payer: MEDICARE

## 2024-05-02 DIAGNOSIS — Z76.82 KIDNEY TRANSPLANT CANDIDATE: ICD-10-CM

## 2024-05-02 RX ORDER — SULFAMETHOXAZOLE AND TRIMETHOPRIM 400; 80 MG/1; MG/1
1 TABLET ORAL DAILY
Qty: 30 TABLET | Refills: 11 | Status: CANCELLED | OUTPATIENT
Start: 2024-05-02

## 2024-05-03 ENCOUNTER — LAB (OUTPATIENT)
Dept: LAB | Facility: CLINIC | Age: 29
End: 2024-05-03
Payer: MEDICARE

## 2024-05-03 DIAGNOSIS — Z98.890 OTHER SPECIFIED POSTPROCEDURAL STATES: ICD-10-CM

## 2024-05-03 DIAGNOSIS — Z76.82 KIDNEY TRANSPLANT CANDIDATE: Primary | ICD-10-CM

## 2024-05-03 DIAGNOSIS — Z79.899 ENCOUNTER FOR LONG-TERM CURRENT USE OF MEDICATION: ICD-10-CM

## 2024-05-03 DIAGNOSIS — Z48.298 AFTERCARE FOLLOWING ORGAN TRANSPLANT: ICD-10-CM

## 2024-05-03 DIAGNOSIS — Z20.828 CONTACT WITH AND (SUSPECTED) EXPOSURE TO OTHER VIRAL COMMUNICABLE DISEASES: ICD-10-CM

## 2024-05-03 DIAGNOSIS — Z94.0 KIDNEY REPLACED BY TRANSPLANT: ICD-10-CM

## 2024-05-03 LAB
ALBUMIN MFR UR ELPH: <6 MG/DL
ANION GAP SERPL CALCULATED.3IONS-SCNC: 13 MMOL/L (ref 7–15)
BUN SERPL-MCNC: 16.8 MG/DL (ref 6–20)
CALCIUM SERPL-MCNC: 9.7 MG/DL (ref 8.6–10)
CHLORIDE SERPL-SCNC: 102 MMOL/L (ref 98–107)
CREAT SERPL-MCNC: 0.96 MG/DL (ref 0.67–1.17)
CREAT UR-MCNC: 67.5 MG/DL
DEPRECATED HCO3 PLAS-SCNC: 19 MMOL/L (ref 22–29)
EGFRCR SERPLBLD CKD-EPI 2021: >90 ML/MIN/1.73M2
ERYTHROCYTE [DISTWIDTH] IN BLOOD BY AUTOMATED COUNT: 14 % (ref 10–15)
GLUCOSE SERPL-MCNC: 118 MG/DL (ref 70–99)
HCT VFR BLD AUTO: 52.4 % (ref 40–53)
HGB BLD-MCNC: 17.1 G/DL (ref 13.3–17.7)
MAGNESIUM SERPL-MCNC: 1.6 MG/DL (ref 1.7–2.3)
MCH RBC QN AUTO: 28.2 PG (ref 26.5–33)
MCHC RBC AUTO-ENTMCNC: 32.6 G/DL (ref 31.5–36.5)
MCV RBC AUTO: 87 FL (ref 78–100)
PHOSPHATE SERPL-MCNC: 2.5 MG/DL (ref 2.5–4.5)
PLATELET # BLD AUTO: 207 10E3/UL (ref 150–450)
POTASSIUM SERPL-SCNC: 4.3 MMOL/L (ref 3.4–5.3)
PROT/CREAT 24H UR: NORMAL MG/G{CREAT}
RBC # BLD AUTO: 6.06 10E6/UL (ref 4.4–5.9)
SODIUM SERPL-SCNC: 134 MMOL/L (ref 135–145)
TACROLIMUS BLD-MCNC: 9.4 UG/L (ref 5–15)
TME LAST DOSE: NORMAL H
TME LAST DOSE: NORMAL H
WBC # BLD AUTO: 10.3 10E3/UL (ref 4–11)

## 2024-05-03 PROCEDURE — 85027 COMPLETE CBC AUTOMATED: CPT

## 2024-05-03 PROCEDURE — 86832 HLA CLASS I HIGH DEFIN QUAL: CPT

## 2024-05-03 PROCEDURE — 84156 ASSAY OF PROTEIN URINE: CPT

## 2024-05-03 PROCEDURE — 84100 ASSAY OF PHOSPHORUS: CPT

## 2024-05-03 PROCEDURE — 86833 HLA CLASS II HIGH DEFIN QUAL: CPT

## 2024-05-03 PROCEDURE — 36415 COLL VENOUS BLD VENIPUNCTURE: CPT

## 2024-05-03 PROCEDURE — 83735 ASSAY OF MAGNESIUM: CPT

## 2024-05-03 PROCEDURE — 80048 BASIC METABOLIC PNL TOTAL CA: CPT

## 2024-05-03 PROCEDURE — 87799 DETECT AGENT NOS DNA QUANT: CPT

## 2024-05-03 PROCEDURE — 80197 ASSAY OF TACROLIMUS: CPT

## 2024-05-03 RX ORDER — SULFAMETHOXAZOLE AND TRIMETHOPRIM 400; 80 MG/1; MG/1
1 TABLET ORAL DAILY
Qty: 30 TABLET | Refills: 11 | Status: SHIPPED | OUTPATIENT
Start: 2024-05-03

## 2024-05-03 NOTE — TELEPHONE ENCOUNTER
Patient Call: Medication Refill  Route to LPN  Instruct the patient to first contact their pharmacy. If they have called their pharmacy and require further assistance, route to LPN.    Pharmacy Name: [facility]    Pharmacy Location: [address] [place] [address]   Name of Medication: Sulfamethoxazole-trimethoprim Dose: 400-80 MG  When will the patient be out of this medication?: Greater than 3 days (Route standard priority)

## 2024-05-03 NOTE — TELEPHONE ENCOUNTER
Please let pt know his pharmacy should have refills on file.  If he is needing assistance with refills, please have pt contact prescribing provider (specialty) for refills.    Jaylene Atwood RN, BSN  [facility]

## 2024-05-05 ENCOUNTER — MYC REFILL (OUTPATIENT)
Dept: TRANSPLANT | Facility: CLINIC | Age: 29
End: 2024-05-05
Payer: MEDICARE

## 2024-05-05 DIAGNOSIS — Z94.0 KIDNEY TRANSPLANT RECIPIENT: Primary | ICD-10-CM

## 2024-05-05 DIAGNOSIS — Z76.82 KIDNEY TRANSPLANT CANDIDATE: ICD-10-CM

## 2024-05-05 LAB
BK VIRUS DNA IU/ML, INSTRUMENT (6800): 6560 IU/ML
BK VIRUS SPECIMEN TYPE: ABNORMAL
BKV DNA SPEC NAA+PROBE-LOG#: 3.8 {LOG_COPIES}/ML

## 2024-05-06 RX ORDER — SODIUM BICARBONATE 650 MG/1
1950 TABLET ORAL 3 TIMES DAILY
Qty: 270 TABLET | Refills: 11 | Status: SHIPPED | OUTPATIENT
Start: 2024-05-06 | End: 2024-07-12

## 2024-05-13 LAB
DONOR IDENTIFICATION: NORMAL
DSA COMMENTS: NORMAL
DSA PRESENT: NO
DSA TEST METHOD: NORMAL
ORGAN: NORMAL
SA 1  COMMENTS: NORMAL
SA 1 CELL: NORMAL
SA 1 TEST METHOD: NORMAL
SA 2 CELL: NORMAL
SA 2 COMMENTS: NORMAL
SA 2 TEST METHOD: NORMAL
SA1 HI RISK ABY: NORMAL
SA1 MOD RISK ABY: NORMAL
SA2 HI RISK ABY: NORMAL
SA2 MOD RISK ABY: NORMAL
UNACCEPTABLE ANTIGENS: NORMAL
UNOS CPRA: 79

## 2024-05-17 ENCOUNTER — LAB (OUTPATIENT)
Dept: LAB | Facility: CLINIC | Age: 29
End: 2024-05-17
Payer: MEDICARE

## 2024-05-17 DIAGNOSIS — Z94.0 KIDNEY REPLACED BY TRANSPLANT: ICD-10-CM

## 2024-05-17 DIAGNOSIS — Z20.828 CONTACT WITH AND (SUSPECTED) EXPOSURE TO OTHER VIRAL COMMUNICABLE DISEASES: ICD-10-CM

## 2024-05-17 DIAGNOSIS — Z48.298 AFTERCARE FOLLOWING ORGAN TRANSPLANT: ICD-10-CM

## 2024-05-17 DIAGNOSIS — Z98.890 OTHER SPECIFIED POSTPROCEDURAL STATES: ICD-10-CM

## 2024-05-17 DIAGNOSIS — Z79.899 ENCOUNTER FOR LONG-TERM CURRENT USE OF MEDICATION: ICD-10-CM

## 2024-05-17 LAB
ANION GAP SERPL CALCULATED.3IONS-SCNC: 15 MMOL/L (ref 7–15)
BK VIRUS DNA IU/ML, INSTRUMENT (6800): ABNORMAL IU/ML
BK VIRUS SPECIMEN TYPE: ABNORMAL
BKV DNA SPEC NAA+PROBE-LOG#: 4 {LOG_COPIES}/ML
BUN SERPL-MCNC: 16.2 MG/DL (ref 6–20)
CALCIUM SERPL-MCNC: 9.7 MG/DL (ref 8.6–10)
CHLORIDE SERPL-SCNC: 102 MMOL/L (ref 98–107)
CREAT SERPL-MCNC: 0.83 MG/DL (ref 0.67–1.17)
DEPRECATED HCO3 PLAS-SCNC: 20 MMOL/L (ref 22–29)
EGFRCR SERPLBLD CKD-EPI 2021: >90 ML/MIN/1.73M2
ERYTHROCYTE [DISTWIDTH] IN BLOOD BY AUTOMATED COUNT: 14.2 % (ref 10–15)
GLUCOSE SERPL-MCNC: 142 MG/DL (ref 70–99)
HCT VFR BLD AUTO: 51.2 % (ref 40–53)
HGB BLD-MCNC: 16.7 G/DL (ref 13.3–17.7)
MCH RBC QN AUTO: 28 PG (ref 26.5–33)
MCHC RBC AUTO-ENTMCNC: 32.6 G/DL (ref 31.5–36.5)
MCV RBC AUTO: 86 FL (ref 78–100)
PLATELET # BLD AUTO: 228 10E3/UL (ref 150–450)
POTASSIUM SERPL-SCNC: 4.2 MMOL/L (ref 3.4–5.3)
RBC # BLD AUTO: 5.96 10E6/UL (ref 4.4–5.9)
SODIUM SERPL-SCNC: 137 MMOL/L (ref 135–145)
TACROLIMUS BLD-MCNC: 9.6 UG/L (ref 5–15)
TME LAST DOSE: NORMAL H
TME LAST DOSE: NORMAL H
WBC # BLD AUTO: 11.9 10E3/UL (ref 4–11)

## 2024-05-17 PROCEDURE — 82374 ASSAY BLOOD CARBON DIOXIDE: CPT

## 2024-05-17 PROCEDURE — 80197 ASSAY OF TACROLIMUS: CPT

## 2024-05-17 PROCEDURE — 87799 DETECT AGENT NOS DNA QUANT: CPT

## 2024-05-17 PROCEDURE — 36415 COLL VENOUS BLD VENIPUNCTURE: CPT

## 2024-05-17 PROCEDURE — 85027 COMPLETE CBC AUTOMATED: CPT

## 2024-06-01 ENCOUNTER — HEALTH MAINTENANCE LETTER (OUTPATIENT)
Age: 29
End: 2024-06-01

## 2024-06-01 ENCOUNTER — LAB (OUTPATIENT)
Dept: LAB | Facility: CLINIC | Age: 29
End: 2024-06-01
Payer: MEDICARE

## 2024-06-01 DIAGNOSIS — Z20.828 CONTACT WITH AND (SUSPECTED) EXPOSURE TO OTHER VIRAL COMMUNICABLE DISEASES: ICD-10-CM

## 2024-06-01 DIAGNOSIS — Z98.890 OTHER SPECIFIED POSTPROCEDURAL STATES: ICD-10-CM

## 2024-06-01 DIAGNOSIS — Z94.0 KIDNEY REPLACED BY TRANSPLANT: ICD-10-CM

## 2024-06-01 DIAGNOSIS — Z79.899 ENCOUNTER FOR LONG-TERM CURRENT USE OF MEDICATION: ICD-10-CM

## 2024-06-01 DIAGNOSIS — Z48.298 AFTERCARE FOLLOWING ORGAN TRANSPLANT: ICD-10-CM

## 2024-06-01 LAB
ANION GAP SERPL CALCULATED.3IONS-SCNC: 14 MMOL/L (ref 7–15)
BUN SERPL-MCNC: 19.8 MG/DL (ref 6–20)
CALCIUM SERPL-MCNC: 10 MG/DL (ref 8.6–10)
CHLORIDE SERPL-SCNC: 104 MMOL/L (ref 98–107)
CREAT SERPL-MCNC: 0.88 MG/DL (ref 0.67–1.17)
DEPRECATED HCO3 PLAS-SCNC: 21 MMOL/L (ref 22–29)
EGFRCR SERPLBLD CKD-EPI 2021: >90 ML/MIN/1.73M2
ERYTHROCYTE [DISTWIDTH] IN BLOOD BY AUTOMATED COUNT: 14.5 % (ref 10–15)
GLUCOSE SERPL-MCNC: 115 MG/DL (ref 70–99)
HCT VFR BLD AUTO: 50.7 % (ref 40–53)
HGB BLD-MCNC: 16.3 G/DL (ref 13.3–17.7)
MAGNESIUM SERPL-MCNC: 1.4 MG/DL (ref 1.7–2.3)
MCH RBC QN AUTO: 28.1 PG (ref 26.5–33)
MCHC RBC AUTO-ENTMCNC: 32.1 G/DL (ref 31.5–36.5)
MCV RBC AUTO: 87 FL (ref 78–100)
PHOSPHATE SERPL-MCNC: 2.7 MG/DL (ref 2.5–4.5)
PLATELET # BLD AUTO: 216 10E3/UL (ref 150–450)
POTASSIUM SERPL-SCNC: 4.4 MMOL/L (ref 3.4–5.3)
RBC # BLD AUTO: 5.8 10E6/UL (ref 4.4–5.9)
SODIUM SERPL-SCNC: 139 MMOL/L (ref 135–145)
TACROLIMUS BLD-MCNC: 8.8 UG/L (ref 5–15)
TME LAST DOSE: NORMAL H
TME LAST DOSE: NORMAL H
WBC # BLD AUTO: 8.6 10E3/UL (ref 4–11)

## 2024-06-01 PROCEDURE — 84100 ASSAY OF PHOSPHORUS: CPT

## 2024-06-01 PROCEDURE — 87799 DETECT AGENT NOS DNA QUANT: CPT

## 2024-06-01 PROCEDURE — 85027 COMPLETE CBC AUTOMATED: CPT

## 2024-06-01 PROCEDURE — 80048 BASIC METABOLIC PNL TOTAL CA: CPT

## 2024-06-01 PROCEDURE — 83735 ASSAY OF MAGNESIUM: CPT

## 2024-06-01 PROCEDURE — 36415 COLL VENOUS BLD VENIPUNCTURE: CPT

## 2024-06-01 PROCEDURE — 80197 ASSAY OF TACROLIMUS: CPT

## 2024-06-02 LAB
BK VIRUS DNA IU/ML, INSTRUMENT (6800): 4180 IU/ML
BK VIRUS SPECIMEN TYPE: ABNORMAL
BKV DNA SPEC NAA+PROBE-LOG#: 3.6 {LOG_COPIES}/ML

## 2024-06-15 ENCOUNTER — LAB (OUTPATIENT)
Dept: LAB | Facility: CLINIC | Age: 29
End: 2024-06-15
Payer: MEDICARE

## 2024-06-15 DIAGNOSIS — Z20.828 CONTACT WITH AND (SUSPECTED) EXPOSURE TO OTHER VIRAL COMMUNICABLE DISEASES: ICD-10-CM

## 2024-06-15 DIAGNOSIS — Z98.890 OTHER SPECIFIED POSTPROCEDURAL STATES: ICD-10-CM

## 2024-06-15 DIAGNOSIS — Z48.298 AFTERCARE FOLLOWING ORGAN TRANSPLANT: ICD-10-CM

## 2024-06-15 DIAGNOSIS — Z79.899 ENCOUNTER FOR LONG-TERM CURRENT USE OF MEDICATION: ICD-10-CM

## 2024-06-15 DIAGNOSIS — Z94.0 KIDNEY REPLACED BY TRANSPLANT: ICD-10-CM

## 2024-06-15 LAB
ANION GAP SERPL CALCULATED.3IONS-SCNC: 15 MMOL/L (ref 7–15)
BUN SERPL-MCNC: 17.3 MG/DL (ref 6–20)
CALCIUM SERPL-MCNC: 10.1 MG/DL (ref 8.6–10)
CHLORIDE SERPL-SCNC: 104 MMOL/L (ref 98–107)
CREAT SERPL-MCNC: 0.95 MG/DL (ref 0.67–1.17)
DEPRECATED HCO3 PLAS-SCNC: 18 MMOL/L (ref 22–29)
EGFRCR SERPLBLD CKD-EPI 2021: >90 ML/MIN/1.73M2
ERYTHROCYTE [DISTWIDTH] IN BLOOD BY AUTOMATED COUNT: 14.6 % (ref 10–15)
GLUCOSE SERPL-MCNC: 141 MG/DL (ref 70–99)
HCT VFR BLD AUTO: 47.2 % (ref 40–53)
HGB BLD-MCNC: 15.3 G/DL (ref 13.3–17.7)
MAGNESIUM SERPL-MCNC: 1.3 MG/DL (ref 1.7–2.3)
MCH RBC QN AUTO: 27.8 PG (ref 26.5–33)
MCHC RBC AUTO-ENTMCNC: 32.4 G/DL (ref 31.5–36.5)
MCV RBC AUTO: 86 FL (ref 78–100)
PHOSPHATE SERPL-MCNC: 3 MG/DL (ref 2.5–4.5)
PLATELET # BLD AUTO: 210 10E3/UL (ref 150–450)
POTASSIUM SERPL-SCNC: 4.4 MMOL/L (ref 3.4–5.3)
RBC # BLD AUTO: 5.5 10E6/UL (ref 4.4–5.9)
SODIUM SERPL-SCNC: 137 MMOL/L (ref 135–145)
TACROLIMUS BLD-MCNC: 9.1 UG/L (ref 5–15)
TME LAST DOSE: NORMAL H
TME LAST DOSE: NORMAL H
WBC # BLD AUTO: 9 10E3/UL (ref 4–11)

## 2024-06-15 PROCEDURE — 85027 COMPLETE CBC AUTOMATED: CPT

## 2024-06-15 PROCEDURE — 87799 DETECT AGENT NOS DNA QUANT: CPT

## 2024-06-15 PROCEDURE — 80048 BASIC METABOLIC PNL TOTAL CA: CPT

## 2024-06-15 PROCEDURE — 86833 HLA CLASS II HIGH DEFIN QUAL: CPT

## 2024-06-15 PROCEDURE — 84100 ASSAY OF PHOSPHORUS: CPT

## 2024-06-15 PROCEDURE — 86832 HLA CLASS I HIGH DEFIN QUAL: CPT

## 2024-06-15 PROCEDURE — 83735 ASSAY OF MAGNESIUM: CPT

## 2024-06-15 PROCEDURE — 36415 COLL VENOUS BLD VENIPUNCTURE: CPT

## 2024-06-15 PROCEDURE — 80197 ASSAY OF TACROLIMUS: CPT

## 2024-06-16 LAB
BK VIRUS DNA IU/ML, INSTRUMENT (6800): 4360 IU/ML
BK VIRUS SPECIMEN TYPE: ABNORMAL
BKV DNA SPEC NAA+PROBE-LOG#: 3.6 {LOG_COPIES}/ML

## 2024-06-17 ENCOUNTER — TELEPHONE (OUTPATIENT)
Dept: TRANSPLANT | Facility: CLINIC | Age: 29
End: 2024-06-17
Payer: MEDICARE

## 2024-06-17 NOTE — TELEPHONE ENCOUNTER
ISSUE:  Mag 1.3    Currently taking [facility]'s Best mag 300 mg bid. Allani message sent to patient to confirm.     Now >6 mos post-transplant. DSA pending. Stable BK. Will review tac goal with MD after DSA results.

## 2024-06-29 ENCOUNTER — LAB (OUTPATIENT)
Dept: LAB | Facility: CLINIC | Age: 29
End: 2024-06-29
Payer: MEDICARE

## 2024-06-29 DIAGNOSIS — Z48.298 AFTERCARE FOLLOWING ORGAN TRANSPLANT: ICD-10-CM

## 2024-06-29 DIAGNOSIS — Z79.899 ENCOUNTER FOR LONG-TERM CURRENT USE OF MEDICATION: ICD-10-CM

## 2024-06-29 DIAGNOSIS — Z20.828 CONTACT WITH AND (SUSPECTED) EXPOSURE TO OTHER VIRAL COMMUNICABLE DISEASES: ICD-10-CM

## 2024-06-29 DIAGNOSIS — Z94.0 KIDNEY REPLACED BY TRANSPLANT: ICD-10-CM

## 2024-06-29 DIAGNOSIS — Z98.890 OTHER SPECIFIED POSTPROCEDURAL STATES: ICD-10-CM

## 2024-06-29 LAB
ANION GAP SERPL CALCULATED.3IONS-SCNC: 13 MMOL/L (ref 7–15)
BUN SERPL-MCNC: 26 MG/DL (ref 6–20)
CALCIUM SERPL-MCNC: 10.2 MG/DL (ref 8.6–10)
CHLORIDE SERPL-SCNC: 105 MMOL/L (ref 98–107)
CREAT SERPL-MCNC: 0.98 MG/DL (ref 0.67–1.17)
DEPRECATED HCO3 PLAS-SCNC: 19 MMOL/L (ref 22–29)
EGFRCR SERPLBLD CKD-EPI 2021: >90 ML/MIN/1.73M2
ERYTHROCYTE [DISTWIDTH] IN BLOOD BY AUTOMATED COUNT: 14.5 % (ref 10–15)
GLUCOSE SERPL-MCNC: 137 MG/DL (ref 70–99)
HCT VFR BLD AUTO: 48.6 % (ref 40–53)
HGB BLD-MCNC: 15.4 G/DL (ref 13.3–17.7)
MCH RBC QN AUTO: 28.2 PG (ref 26.5–33)
MCHC RBC AUTO-ENTMCNC: 31.7 G/DL (ref 31.5–36.5)
MCV RBC AUTO: 89 FL (ref 78–100)
PLATELET # BLD AUTO: 219 10E3/UL (ref 150–450)
POTASSIUM SERPL-SCNC: 4.2 MMOL/L (ref 3.4–5.3)
RBC # BLD AUTO: 5.47 10E6/UL (ref 4.4–5.9)
SODIUM SERPL-SCNC: 137 MMOL/L (ref 135–145)
WBC # BLD AUTO: 9.3 10E3/UL (ref 4–11)

## 2024-06-29 PROCEDURE — 87799 DETECT AGENT NOS DNA QUANT: CPT

## 2024-06-29 PROCEDURE — 82565 ASSAY OF CREATININE: CPT

## 2024-06-29 PROCEDURE — 85027 COMPLETE CBC AUTOMATED: CPT

## 2024-06-29 PROCEDURE — 80197 ASSAY OF TACROLIMUS: CPT

## 2024-06-29 PROCEDURE — 36415 COLL VENOUS BLD VENIPUNCTURE: CPT

## 2024-06-30 LAB
BK VIRUS DNA IU/ML, INSTRUMENT (6800): 3090 IU/ML
BK VIRUS SPECIMEN TYPE: ABNORMAL
BKV DNA SPEC NAA+PROBE-LOG#: 3.5 {LOG_COPIES}/ML
TACROLIMUS BLD-MCNC: 7.7 UG/L (ref 5–15)
TME LAST DOSE: NORMAL H
TME LAST DOSE: NORMAL H

## 2024-07-12 ENCOUNTER — LAB (OUTPATIENT)
Dept: LAB | Facility: CLINIC | Age: 29
End: 2024-07-12
Payer: MEDICARE

## 2024-07-12 ENCOUNTER — MYC REFILL (OUTPATIENT)
Dept: TRANSPLANT | Facility: CLINIC | Age: 29
End: 2024-07-12

## 2024-07-12 ENCOUNTER — OFFICE VISIT (OUTPATIENT)
Dept: TRANSPLANT | Facility: CLINIC | Age: 29
End: 2024-07-12
Attending: INTERNAL MEDICINE
Payer: MEDICARE

## 2024-07-12 VITALS
HEART RATE: 106 BPM | SYSTOLIC BLOOD PRESSURE: 112 MMHG | BODY MASS INDEX: 33.93 KG/M2 | WEIGHT: 250.2 LBS | DIASTOLIC BLOOD PRESSURE: 75 MMHG

## 2024-07-12 DIAGNOSIS — F12.21 CANNABIS USE DISORDER, MODERATE, IN SUSTAINED REMISSION (H): ICD-10-CM

## 2024-07-12 DIAGNOSIS — D35.02 ADRENAL ADENOMA, LEFT: Primary | ICD-10-CM

## 2024-07-12 DIAGNOSIS — Z94.0 KIDNEY REPLACED BY TRANSPLANT: ICD-10-CM

## 2024-07-12 DIAGNOSIS — Z98.890 OTHER SPECIFIED POSTPROCEDURAL STATES: ICD-10-CM

## 2024-07-12 DIAGNOSIS — Z48.298 AFTERCARE FOLLOWING ORGAN TRANSPLANT: ICD-10-CM

## 2024-07-12 DIAGNOSIS — E83.42 HYPOMAGNESEMIA: ICD-10-CM

## 2024-07-12 DIAGNOSIS — I11.9 HYPERTENSIVE LEFT VENTRICULAR HYPERTROPHY, WITHOUT HEART FAILURE: ICD-10-CM

## 2024-07-12 DIAGNOSIS — B34.8 BK VIREMIA: ICD-10-CM

## 2024-07-12 DIAGNOSIS — T86.10 COMPLICATION OF TRANSPLANTED KIDNEY, UNSPECIFIED COMPLICATION: ICD-10-CM

## 2024-07-12 DIAGNOSIS — Z94.0 HTN, KIDNEY TRANSPLANT RELATED: ICD-10-CM

## 2024-07-12 DIAGNOSIS — Z79.899 ENCOUNTER FOR LONG-TERM CURRENT USE OF MEDICATION: ICD-10-CM

## 2024-07-12 DIAGNOSIS — E83.50 UNSPECIFIED DISORDER OF CALCIUM METABOLISM: ICD-10-CM

## 2024-07-12 DIAGNOSIS — I15.1 HTN, KIDNEY TRANSPLANT RELATED: ICD-10-CM

## 2024-07-12 DIAGNOSIS — Z29.89 NEED FOR PNEUMOCYSTIS PROPHYLAXIS: ICD-10-CM

## 2024-07-12 DIAGNOSIS — R76.8 DONOR SPECIFIC ANTIBODY (DSA) POSITIVE: ICD-10-CM

## 2024-07-12 DIAGNOSIS — Z20.828 CONTACT WITH AND (SUSPECTED) EXPOSURE TO OTHER VIRAL COMMUNICABLE DISEASES: ICD-10-CM

## 2024-07-12 DIAGNOSIS — D84.9 IMMUNOSUPPRESSED STATUS (H): ICD-10-CM

## 2024-07-12 DIAGNOSIS — E87.20 METABOLIC ACIDOSIS: ICD-10-CM

## 2024-07-12 DIAGNOSIS — T86.13 KIDNEY TRANSPLANT INFECTION: ICD-10-CM

## 2024-07-12 DIAGNOSIS — Z94.0 KIDNEY TRANSPLANT RECIPIENT: ICD-10-CM

## 2024-07-12 LAB
ALBUMIN MFR UR ELPH: <6 MG/DL
ANION GAP SERPL CALCULATED.3IONS-SCNC: 16 MMOL/L (ref 7–15)
BK VIRUS DNA IU/ML, INSTRUMENT (6800): 2350 IU/ML
BK VIRUS SPECIMEN TYPE: ABNORMAL
BKV DNA SPEC NAA+PROBE-LOG#: 3.4 {LOG_COPIES}/ML
BUN SERPL-MCNC: 16.8 MG/DL (ref 6–20)
CALCIUM SERPL-MCNC: 10.2 MG/DL (ref 8.6–10)
CHLORIDE SERPL-SCNC: 104 MMOL/L (ref 98–107)
CREAT SERPL-MCNC: 0.87 MG/DL (ref 0.67–1.17)
CREAT UR-MCNC: 61.5 MG/DL
DEPRECATED HCO3 PLAS-SCNC: 17 MMOL/L (ref 22–29)
EGFRCR SERPLBLD CKD-EPI 2021: >90 ML/MIN/1.73M2
ERYTHROCYTE [DISTWIDTH] IN BLOOD BY AUTOMATED COUNT: 14.5 % (ref 10–15)
GLUCOSE SERPL-MCNC: 209 MG/DL (ref 70–99)
HCT VFR BLD AUTO: 48.1 % (ref 40–53)
HGB BLD-MCNC: 15.6 G/DL (ref 13.3–17.7)
MCH RBC QN AUTO: 28.5 PG (ref 26.5–33)
MCHC RBC AUTO-ENTMCNC: 32.4 G/DL (ref 31.5–36.5)
MCV RBC AUTO: 88 FL (ref 78–100)
PLATELET # BLD AUTO: 216 10E3/UL (ref 150–450)
POTASSIUM SERPL-SCNC: 3.9 MMOL/L (ref 3.4–5.3)
PROT/CREAT 24H UR: NORMAL MG/G{CREAT}
RBC # BLD AUTO: 5.48 10E6/UL (ref 4.4–5.9)
SODIUM SERPL-SCNC: 137 MMOL/L (ref 135–145)
TACROLIMUS BLD-MCNC: 10.2 UG/L (ref 5–15)
TME LAST DOSE: NORMAL H
TME LAST DOSE: NORMAL H
VIT D+METAB SERPL-MCNC: 29 NG/ML (ref 20–50)
WBC # BLD AUTO: 9.7 10E3/UL (ref 4–11)

## 2024-07-12 PROCEDURE — 80197 ASSAY OF TACROLIMUS: CPT

## 2024-07-12 PROCEDURE — G2211 COMPLEX E/M VISIT ADD ON: HCPCS | Performed by: INTERNAL MEDICINE

## 2024-07-12 PROCEDURE — 87799 DETECT AGENT NOS DNA QUANT: CPT

## 2024-07-12 PROCEDURE — 82306 VITAMIN D 25 HYDROXY: CPT

## 2024-07-12 PROCEDURE — 36415 COLL VENOUS BLD VENIPUNCTURE: CPT

## 2024-07-12 PROCEDURE — 85041 AUTOMATED RBC COUNT: CPT

## 2024-07-12 PROCEDURE — 80048 BASIC METABOLIC PNL TOTAL CA: CPT

## 2024-07-12 PROCEDURE — 99215 OFFICE O/P EST HI 40 MIN: CPT | Mod: GC | Performed by: INTERNAL MEDICINE

## 2024-07-12 PROCEDURE — 84156 ASSAY OF PROTEIN URINE: CPT

## 2024-07-12 PROCEDURE — G0463 HOSPITAL OUTPT CLINIC VISIT: HCPCS | Performed by: INTERNAL MEDICINE

## 2024-07-12 RX ORDER — FAMOTIDINE 20 MG/1
20 TABLET, FILM COATED ORAL 2 TIMES DAILY
COMMUNITY
Start: 2024-07-12

## 2024-07-12 ASSESSMENT — PAIN SCALES - GENERAL: PAINLEVEL: SEVERE PAIN (7)

## 2024-07-12 NOTE — NURSING NOTE
Chief Complaint   Patient presents with    RECHECK     Post kidney txp. Has had indigestion.      Vitals:    07/12/24 1351   BP: 112/75   BP Location: Right arm   Patient Position: Sitting   Cuff Size: Adult Large   Pulse: 106   Weight: 113.5 kg (250 lb 3.2 oz)       BP Readings from Last 3 Encounters:   07/12/24 112/75   04/16/24 130/82   03/11/24 104/62       /75 (BP Location: Right arm, Patient Position: Sitting, Cuff Size: Adult Large)   Pulse 106   Wt 113.5 kg (250 lb 3.2 oz)   BMI 33.93 kg/m       Sunshine Heymjennifer

## 2024-07-12 NOTE — LETTER
7/12/2024      Taylor Valiente  [address]   [place] [address]      Dear Colleague,    Thank you for referring your patient, Taylor Valiente, to the [facility]. Please see a copy of my visit note below.    TRANSPLANT NEPHROLOGY CLINIC VISIT     Assessment & Plan  # DDKT: CKD Stage 1 - Stable   - Baseline Creatinine: ~ 0.8-1   - Proteinuria: Normal (<0.2 grams)              - Date DSA Last Checked: Feb/2024      Latest DSA: Low level DSA (<1000 mfi) to DR53, deNovo, stable               - BK Viremia: Yes, mildly elevated (1-10K) down-trending  - Kidney Tx Biopsy:  yes; Result: Mild acute tubular injury, associated with no other significant signs of active antibody-mediated rejection or acute cellular allograft rejection (i0 t0 v0; g0 ptc0 C4d0) , no global glomerulosclerosis or IFTA (ct0 ci0 cv1 ah1 cg0 mm0)              - Transplant Ureteral Stent: Removed 1/23/24    # De Carolyn DSA:              -dnDSA detected 1/11/24 to DR53 w/ , stable on 2/5/24              -No decrease in IS despite slightly rising BK unless biopsy demonstrates BK nephropathy              -Attempt to obtain IVIG. Thus far has not been approved. Repeat IgG 787 4/2024     # BK Viremia:              - low grade, stable slight downtrend               - detected Jan 2024, peak 10K ~2/2024 most recent 7K copies/ml, recheck pending              - IgG 647 on 1/15/24, repeat     # Immunosuppression: Tacrolimus immediate release (goal 6-8) and Mycophenolic acid (dose 540 mg every 12 hours) Tac goal closer to 8 given positive DSA in past   - Induction with Recent Transplant:  Intermediate Intensity Protocol   - Continue with intensive monitoring of immunosuppression for efficacy and toxicity.   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 Imaging: yes US native and kidney Tx neg renal masses              - ACEI: yes enalapril    # Mineral Bone Disorder:    - Secondary renal hyperparathyroidism; PTH level: Mildly elevated (151-300 pg/ml)        On treatment: None  - Vitamin D; level:  pending         On supplement: No  - Calcium; level: High normal        On supplement: No  - Phosphorus; level: Normal        On supplement: No    # Electrolytes:   - Potassium; level: Normal        On supplement: No  - Magnesium; level: Low        On supplement: Yes  - Bicarbonate; level: Low        On supplement: Yes  - Sodium; level: Normal    Metabolic acidosis: on supplementation, but still noted his bicarb been low. Will order UA to assess his urine ph. If that is normal or low normal, will consider VBG to assess any possibility of respiratory acidosis.     # Other Significant PMH:   - GERD: increase Pepcid to 40 BID, add protonix for 3 months then taper, avoid TUMS as able    - Obesity: BMI 33.9     # Skin Cancer Risk:    - Discussed sun protection and recommend regular follow up with Dermatology.    # Transplant History:  Etiology of Kidney Failure: Focal segmental glomerulosclerosis (FSGS)  Tx: DDKT  Transplant: 12/5/2023 (Kidney)  Significant transplant-related complications: BK Viremia and DSA     Transplant Office Phone Number: [phone]    Assessment and plan was discussed with the patient and he voiced his understanding and agreement.    Return visit: 2 months, as scheduled, or sooner if needed    John Fernando MD    The longitudinal plan of care for the diagnosis(es)/condition(s) as documented were addressed during this visit. Due to the added complexity in care, I will continue to  mary Vazquez in the subsequent management and with ongoing continuity of care.      Chief Complaint  Mr. Valiente is a 28 year old here for kidney transplant and immunosuppression management.     History of Present Illness  He is feeling well today.  He had a cold about 6 weeks ago with a runny nose, resolved on its own.  He is making good urine.  No pain or burning with urination.  No obstructive symptoms.  He does note his GERD is somewhat difficult to manage, he is taking Pepcid 20 mg twice daily but still has breakthrough symptoms.  He is taking Tums 2-3 times per week.  He has associated nausea with his GERD.  Otherwise he does not have any abdominal pain or vomiting.  His loose stools are improving with the psyllium.  No recent fevers, chills, night sweats.  His energy is good.    Home BP:  120-130/70-80    Problem List  Patient Active Problem List   Diagnosis     LVH (left ventricular hypertrophy) due to hypertensive disease     HTN, kidney transplant related     Adrenal adenoma, left     Hyperlipidemia     Prolonged Q-T interval on ECG     Kidney replaced by transplant     Hypomagnesemia     Metabolic acidosis     Immunosuppressed status (H24)     Dehydration     Diarrhea     BK viremia     Donor specific antibody (DSA) positive     Complication of kidney transplant     Kidney transplant infection     Cannabis use disorder, moderate, in sustained remission (H)     Aftercare following organ transplant       Allergies  Allergies   Allergen Reactions     Benadryl [Diphenhydramine] Itching     Vancomycin Itching       Medications  Current Outpatient Medications   Medication Sig Dispense Refill     acetaminophen (TYLENOL) 325 MG tablet Take 2 tablets (650 mg) by mouth every 4 hours as needed for pain 100 tablet 0     amLODIPine (NORVASC) 5 MG tablet Take 1 tablet (5 mg) by mouth daily 90 tablet 2     aspirin 81 MG EC tablet Take 81 mg by mouth daily       atorvastatin (LIPITOR) 10 MG tablet Take 1 tablet  (10 mg) by mouth daily 90 tablet 3     carvedilol (COREG) 25 MG tablet Take 1 tablet (25 mg) by mouth 2 times daily (with meals)       enalapril (VASOTEC) 10 MG tablet Take 2 tablets (20 mg) by mouth daily for 90 days 90 tablet 1     famotidine (PEPCID) 20 MG tablet Take 1 tablet (20 mg) by mouth 2 times daily       magnesium glycinate 100 MG CAPS capsule Take 3 capsules (300 mg) by mouth 2 times daily [facility]'s Best Magnesium or generic Magnesium glycinate       medical cannabis (Patient's own supply) See Admin Instructions (The purpose of this order is to document that the patient reports taking medical cannabis.  This is not a prescription, and is not used to certify that the patient has a qualifying medical condition.)    Patient was advised on the cannabis-tacrolimus drug interaction.       Multiple Vitamin (MULTIVITAMIN ADULT PO) Take 1 tablet by mouth daily       mycophenolic acid (GENERIC EQUIVALENT) 180 MG EC tablet Take 3 tablets (540 mg) by mouth 2 times daily Emergency supply. Pt will use GoodRx 180 tablet 11     psyllium (METAMUCIL/KONSYL) 58.6 % powder Take 1 teaspoonful by mouth daily       sodium bicarbonate 650 MG tablet Take 3 tablets (1,950 mg) by mouth 3 times daily 270 tablet 11     Sodium Bicarbonate, antacid, POWD Mix 1 teaspoon of baking soda in full glass of water once daily       sulfamethoxazole-trimethoprim (BACTRIM) 400-80 MG tablet Take 1 tablet by mouth daily 30 tablet 11     tacrolimus (GENERIC EQUIVALENT) 1 MG capsule Take 5 capsules (5 mg) by mouth 2 times daily 300 capsule 11     tacrolimus (GENERIC) 0.5 MG capsule Take 1 capsule (0.5 mg) by mouth 2 times daily To be used for dose adjustments. 60 capsule 11     No current facility-administered medications for this visit.     Medications Discontinued During This Encounter   Medication Reason     lidocaine (XYLOCAINE) 5 % external ointment Stopped by Patient (No AVS)     triamcinolone (KENALOG) 0.1 % external ointment Stopped by  Patient (No AVS)       Physical Exam  Vital Signs: /75 (BP Location: Right arm, Patient Position: Sitting, Cuff Size: Adult Large)   Pulse 106   Wt 113.5 kg (250 lb 3.2 oz)   BMI 33.93 kg/m      General: Sitting in chair, appropriate conversation  HEENT: Anicteric  Cardiac: RRR, no murmur appreciated  Pulmonary: CTAB, unlabored  Abdomen: Soft, nontender  Extremities: No lower extremity edema    Data        Latest Ref Rng & Units 7/12/2024     9:08 AM 6/29/2024     9:19 AM 6/15/2024     9:14 AM   Renal   Sodium 135 - 145 mmol/L 137  137  137    K 3.4 - 5.3 mmol/L 3.9  4.2  4.4    Cl 98 - 107 mmol/L 104  105  104    Cl (external) 98 - 107 mmol/L 104  105  104    CO2 22 - 29 mmol/L 17  19  18    Urea Nitrogen 6.0 - 20.0 mg/dL 16.8  26.0  17.3    Creatinine 0.67 - 1.17 mg/dL 0.87  0.98  0.95    Glucose 70 - 99 mg/dL 209  137  141    Calcium 8.6 - 10.0 mg/dL 10.2  10.2  10.1    Magnesium 1.7 - 2.3 mg/dL   1.3          Latest Ref Rng & Units 6/15/2024     9:14 AM 6/1/2024     9:23 AM 5/3/2024     9:01 AM   Bone Health   Phosphorus 2.5 - 4.5 mg/dL 3.0  2.7  2.5          Latest Ref Rng & Units 7/12/2024     9:08 AM 6/29/2024     9:19 AM 6/15/2024     9:14 AM   Heme   WBC 4.0 - 11.0 10e3/uL 9.7  9.3  9.0    Hgb 13.3 - 17.7 g/dL 15.6  15.4  15.3    Plt 150 - 450 10e3/uL 216  219  210          Latest Ref Rng & Units 12/5/2023    12:07 AM 8/2/2023     5:39 AM 7/27/2023    10:08 PM   Liver   AP 40 - 150 U/L 138   77    TBili <=1.2 mg/dL 0.5   0.3    ALT 0 - 70 U/L 20   8    AST 0 - 45 U/L 13   8    Tot Protein 6.4 - 8.3 g/dL 7.7   7.5    Albumin 3.5 - 5.2 g/dL 4.5  3.3  3.8          Latest Ref Rng & Units 12/5/2023    12:07 AM 5/30/2023     6:23 AM 11/29/2021     6:50 AM   Pancreas   A1C <5.7 % 5.9  4.4     Lipase 73 - 393 U/L   304          Latest Ref Rng & Units 12/19/2023     9:55 AM 12/10/2023     8:03 AM 9/11/2020     6:34 AM   Iron studies   Iron 61 - 157 ug/dL 30  80  33    Iron Saturation Index 15 - 46 %   18     Iron Sat Index 15 - 46 % 14  41     Ferritin 31 - 409 ng/mL 1,477  1,743  325          Latest Ref Rng & Units 1/23/2024     6:07 AM 12/5/2023    12:07 AM 6/17/2021    12:35 PM   [facility] Virology   EBV CAPSID ANTIBODY IGG No detectable antibody.  Positive     EBV DNA COPIES/ML Not Detected copies/mL Not Detected      Hep B Core NR^Nonreactive   Nonreactive      Failed to redirect to the Timeline version of the REVFS SmartLink.  Recent Labs   Lab Test 06/01/24  0923 06/15/24  0914 06/29/24  0919   DOSTAC 5/31/2024 6/14/2024 6/28/2024   TACROL 8.8 9.1 7.7     Recent Labs   Lab Test 01/25/24  0900 02/01/24  0706 02/08/24  0838   DOSMPA 1/24/2024   8:30 PM 1/31/2024   8:30 PM 2/7/2024   8:30 PM   MPACID <0.25* 6.16* 1.67   MPAG 10.6* 28.0* 18.2*     Physician Attestation  I, Lucinda Webber MD, saw this patient and agree with the findings and plan of care as documented in the note.      Items personally reviewed/procedural attestation: vitals, labs, and imaging and agree with the interpretation documented in the note.    Prescription drug management  I personally spent 45 minutes on the date of the encounter doing chart review, history and exam, documentation and further activities per the note    Lucinda Webber MD        Again, thank you for allowing me to participate in the care of your patient.        Sincerely,        Lucinda Webber MD

## 2024-07-12 NOTE — PROGRESS NOTES
TRANSPLANT NEPHROLOGY CLINIC VISIT     Assessment & Plan   # DDKT: CKD Stage 1 - Stable   - Baseline Creatinine: ~ 0.8-1   - Proteinuria: Normal (<0.2 grams)              - Date DSA Last Checked: Feb/2024      Latest DSA: Low level DSA (<1000 mfi) to DR53, deNovo, stable               - BK Viremia: Yes, mildly elevated (1-10K) down-trending  - Kidney Tx Biopsy:  yes; Result: Mild acute tubular injury, associated with no other significant signs of active antibody-mediated rejection or acute cellular allograft rejection (i0 t0 v0; g0 ptc0 C4d0) , no global glomerulosclerosis or IFTA (ct0 ci0 cv1 ah1 cg0 mm0)              - Transplant Ureteral Stent: Removed 1/23/24    # De Carolyn DSA:              -dnDSA detected 1/11/24 to DR53 w/ , stable on 2/5/24              -No decrease in IS despite slightly rising BK unless biopsy demonstrates BK nephropathy              -Attempt to obtain IVIG. Thus far has not been approved. Repeat IgG 787 4/2024     # BK Viremia:              - low grade, stable slight downtrend               - detected Jan 2024, peak 10K ~2/2024 most recent 7K copies/ml, recheck pending              - IgG 647 on 1/15/24, repeat     # Immunosuppression: Tacrolimus immediate release (goal 6-8) and Mycophenolic acid (dose 540 mg every 12 hours) Tac goal closer to 8 given positive DSA in past   - Induction with Recent Transplant:  Intermediate Intensity Protocol   - Continue with intensive monitoring of immunosuppression for efficacy and toxicity.   - Historical Changes in Immunosuppression: None   - Changes: No    # Infection Prevention:      - PJP: Sulfa/TMP (Bactrim)  - CMV: Valganciclovir (Valcyte) completed 3 m Valganciclovir (Valcyte) non discordant   - CMV IgG Ab High Risk Discordance (D+/R-): No  - EBV IgG Ab High Risk Discordance (D+/R-): No    # Hypertension: Controlled;  Goal BP: < 130/80 On carvedilol 25mg BID, amlodipine 5mg daily, enalapril 20mg daily   - Changes: No    # Post transplant  Erthrocytosis: Hb-17. Imaging: yes US native and kidney Tx neg renal masses              - ACEI: yes enalapril    # Mineral Bone Disorder:    - Secondary renal hyperparathyroidism; PTH level: Mildly elevated (151-300 pg/ml)        On treatment: None  - Vitamin D; level:  pending         On supplement: No  - Calcium; level: High normal        On supplement: No  - Phosphorus; level: Normal        On supplement: No    # Electrolytes:   - Potassium; level: Normal        On supplement: No  - Magnesium; level: Low        On supplement: Yes  - Bicarbonate; level: Low        On supplement: Yes  - Sodium; level: Normal    Metabolic acidosis: on supplementation, but still noted his bicarb been low. Will order UA to assess his urine ph. If that is normal or low normal, will consider VBG to assess any possibility of respiratory acidosis.     # Other Significant PMH:   - GERD: increase Pepcid to 40 BID, add protonix for 3 months then taper, avoid TUMS as able    - Obesity: BMI 33.9     # Skin Cancer Risk:    - Discussed sun protection and recommend regular follow up with Dermatology.    # Transplant History:  Etiology of Kidney Failure: Focal segmental glomerulosclerosis (FSGS)  Tx: DDKT  Transplant: 12/5/2023 (Kidney)  Significant transplant-related complications: BK Viremia and DSA     Transplant Office Phone Number: [phone]    Assessment and plan was discussed with the patient and he voiced his understanding and agreement.    Return visit: 2 months, as scheduled, or sooner if needed    John Fernando MD    The longitudinal plan of care for the diagnosis(es)/condition(s) as documented were addressed during this visit. Due to the added complexity in care, I will continue to support Taylor in the subsequent management and with ongoing continuity of care.      Chief Complaint   Mr. Valiente is a 28 year old here for kidney transplant and immunosuppression management.     History of Present Illness   He is feeling well  today.  He had a cold about 6 weeks ago with a runny nose, resolved on its own.  He is making good urine.  No pain or burning with urination.  No obstructive symptoms.  He does note his GERD is somewhat difficult to manage, he is taking Pepcid 20 mg twice daily but still has breakthrough symptoms.  He is taking Tums 2-3 times per week.  He has associated nausea with his GERD.  Otherwise he does not have any abdominal pain or vomiting.  His loose stools are improving with the psyllium.  No recent fevers, chills, night sweats.  His energy is good.    Home BP:  120-130/70-80    Problem List   Patient Active Problem List   Diagnosis    LVH (left ventricular hypertrophy) due to hypertensive disease    HTN, kidney transplant related    Adrenal adenoma, left    Hyperlipidemia    Prolonged Q-T interval on ECG    Kidney replaced by transplant    Hypomagnesemia    Metabolic acidosis    Immunosuppressed status (H24)    Dehydration    Diarrhea    BK viremia    Donor specific antibody (DSA) positive    Complication of kidney transplant    Kidney transplant infection    Cannabis use disorder, moderate, in sustained remission (H)    Aftercare following organ transplant       Allergies   Allergies   Allergen Reactions    Benadryl [Diphenhydramine] Itching    Vancomycin Itching       Medications   Current Outpatient Medications   Medication Sig Dispense Refill    acetaminophen (TYLENOL) 325 MG tablet Take 2 tablets (650 mg) by mouth every 4 hours as needed for pain 100 tablet 0    amLODIPine (NORVASC) 5 MG tablet Take 1 tablet (5 mg) by mouth daily 90 tablet 2    aspirin 81 MG EC tablet Take 81 mg by mouth daily      atorvastatin (LIPITOR) 10 MG tablet Take 1 tablet (10 mg) by mouth daily 90 tablet 3    carvedilol (COREG) 25 MG tablet Take 1 tablet (25 mg) by mouth 2 times daily (with meals)      enalapril (VASOTEC) 10 MG tablet Take 2 tablets (20 mg) by mouth daily for 90 days 90 tablet 1    famotidine (PEPCID) 20 MG tablet Take 1  tablet (20 mg) by mouth 2 times daily      magnesium glycinate 100 MG CAPS capsule Take 3 capsules (300 mg) by mouth 2 times daily [facility] Best Magnesium or generic Magnesium glycinate      medical cannabis (Patient's own supply) See Admin Instructions (The purpose of this order is to document that the patient reports taking medical cannabis.  This is not a prescription, and is not used to certify that the patient has a qualifying medical condition.)    Patient was advised on the cannabis-tacrolimus drug interaction.      Multiple Vitamin (MULTIVITAMIN ADULT PO) Take 1 tablet by mouth daily      mycophenolic acid (GENERIC EQUIVALENT) 180 MG EC tablet Take 3 tablets (540 mg) by mouth 2 times daily Emergency supply. Pt will use GoodRx 180 tablet 11    psyllium (METAMUCIL/KONSYL) 58.6 % powder Take 1 teaspoonful by mouth daily      sodium bicarbonate 650 MG tablet Take 3 tablets (1,950 mg) by mouth 3 times daily 270 tablet 11    Sodium Bicarbonate, antacid, POWD Mix 1 teaspoon of baking soda in full glass of water once daily      sulfamethoxazole-trimethoprim (BACTRIM) 400-80 MG tablet Take 1 tablet by mouth daily 30 tablet 11    tacrolimus (GENERIC EQUIVALENT) 1 MG capsule Take 5 capsules (5 mg) by mouth 2 times daily 300 capsule 11    tacrolimus (GENERIC) 0.5 MG capsule Take 1 capsule (0.5 mg) by mouth 2 times daily To be used for dose adjustments. 60 capsule 11     No current facility-administered medications for this visit.     Medications Discontinued During This Encounter   Medication Reason    lidocaine (XYLOCAINE) 5 % external ointment Stopped by Patient (No AVS)    triamcinolone (KENALOG) 0.1 % external ointment Stopped by Patient (No AVS)       Physical Exam   Vital Signs: /75 (BP Location: Right arm, Patient Position: Sitting, Cuff Size: Adult Large)   Pulse 106   Wt 113.5 kg (250 lb 3.2 oz)   BMI 33.93 kg/m      General: Sitting in chair, appropriate conversation  HEENT: Anicteric  Cardiac: RRR,  no murmur appreciated  Pulmonary: CTAB, unlabored  Abdomen: Soft, nontender  Extremities: No lower extremity edema    Data         Latest Ref Rng & Units 7/12/2024     9:08 AM 6/29/2024     9:19 AM 6/15/2024     9:14 AM   Renal   Sodium 135 - 145 mmol/L 137  137  137    K 3.4 - 5.3 mmol/L 3.9  4.2  4.4    Cl 98 - 107 mmol/L 104  105  104    Cl (external) 98 - 107 mmol/L 104  105  104    CO2 22 - 29 mmol/L 17  19  18    Urea Nitrogen 6.0 - 20.0 mg/dL 16.8  26.0  17.3    Creatinine 0.67 - 1.17 mg/dL 0.87  0.98  0.95    Glucose 70 - 99 mg/dL 209  137  141    Calcium 8.6 - 10.0 mg/dL 10.2  10.2  10.1    Magnesium 1.7 - 2.3 mg/dL   1.3          Latest Ref Rng & Units 6/15/2024     9:14 AM 6/1/2024     9:23 AM 5/3/2024     9:01 AM   Bone Health   Phosphorus 2.5 - 4.5 mg/dL 3.0  2.7  2.5          Latest Ref Rng & Units 7/12/2024     9:08 AM 6/29/2024     9:19 AM 6/15/2024     9:14 AM   Heme   WBC 4.0 - 11.0 10e3/uL 9.7  9.3  9.0    Hgb 13.3 - 17.7 g/dL 15.6  15.4  15.3    Plt 150 - 450 10e3/uL 216  219  210          Latest Ref Rng & Units 12/5/2023    12:07 AM 8/2/2023     5:39 AM 7/27/2023    10:08 PM   Liver   AP 40 - 150 U/L 138   77    TBili <=1.2 mg/dL 0.5   0.3    ALT 0 - 70 U/L 20   8    AST 0 - 45 U/L 13   8    Tot Protein 6.4 - 8.3 g/dL 7.7   7.5    Albumin 3.5 - 5.2 g/dL 4.5  3.3  3.8          Latest Ref Rng & Units 12/5/2023    12:07 AM 5/30/2023     6:23 AM 11/29/2021     6:50 AM   Pancreas   A1C <5.7 % 5.9  4.4     Lipase 73 - 393 U/L   304          Latest Ref Rng & Units 12/19/2023     9:55 AM 12/10/2023     8:03 AM 9/11/2020     6:34 AM   Iron studies   Iron 61 - 157 ug/dL 30  80  33    Iron Saturation Index 15 - 46 %   18    Iron Sat Index 15 - 46 % 14  41     Ferritin 31 - 409 ng/mL 1,477  1,743  325          Latest Ref Rng & Units 1/23/2024     6:07 AM 12/5/2023    12:07 AM 6/17/2021    12:35 PM   [facility] Vi[facility]   EBV CAPSID ANTIBODY IGG No detectable antibody.  Positive     EBV DNA COPIES/ML Not  Detected copies/mL Not Detected      Hep B Core NR^Nonreactive   Nonreactive      Failed to redirect to the Timeline version of the REVFS SmartLink.  Recent Labs   Lab Test 06/01/24  0923 06/15/24  0914 06/29/24  0919   DOSTAC 5/31/2024 6/14/2024 6/28/2024   TACROL 8.8 9.1 7.7     Recent Labs   Lab Test 01/25/24  0900 02/01/24  0706 02/08/24  0838   DOSMPA 1/24/2024   8:30 PM 1/31/2024   8:30 PM 2/7/2024   8:30 PM   MPACID <0.25* 6.16* 1.67   MPAG 10.6* 28.0* 18.2*     Physician Attestation   I, Lucinda Webber MD, saw this patient and agree with the findings and plan of care as documented in the note.      Items personally reviewed/procedural attestation: vitals, labs, and imaging and agree with the interpretation documented in the note.    Prescription drug management  I personally spent 45 minutes on the date of the encounter doing chart review, history and exam, documentation and further activities per the note    Lucinda Webber MD

## 2024-07-15 ENCOUNTER — TELEPHONE (OUTPATIENT)
Dept: TRANSPLANT | Facility: CLINIC | Age: 29
End: 2024-07-15
Payer: MEDICARE

## 2024-07-15 DIAGNOSIS — Z76.82 KIDNEY TRANSPLANT CANDIDATE: ICD-10-CM

## 2024-07-15 PROBLEM — Z29.89 NEED FOR PNEUMOCYSTIS PROPHYLAXIS: Status: ACTIVE | Noted: 2024-07-15

## 2024-07-15 RX ORDER — SODIUM BICARBONATE 650 MG/1
1950 TABLET ORAL 3 TIMES DAILY
Qty: 270 TABLET | Refills: 11 | Status: SHIPPED | OUTPATIENT
Start: 2024-07-15

## 2024-07-15 NOTE — TELEPHONE ENCOUNTER
ISSUE:   Tacrolimus IR level 10.2 on 7/12, goal 6-8, dose 5 mg BID.    PLAN:   Call Patient and confirm this was an accurate 12-hour trough.   Verify Tacrolimus IR dose 5 mg BID.   Confirm no new medications or or missed doses.   Confirm no new illness / infection / diarrhea.   If accurate trough and accurate dose, decrease Tacrolimus IR dose to 4 mg BID     Is this more than a 50% increase or decrease in current IS dose: No  If YES, justification: N/A    Repeat labs in 1-2 weeks.  *If > 50% change in immunosuppression dose, repeat labs in 1 week.     OUTCOME:   Spoke with Patient, they confirm accurate trough level and current dose 5 mg BID.   Patient confirmed dose change to 4 mg BID.  Patient agreed to repeat labs in 1-2 weeks.   Orders sent to preferred pharmacy for dose change and lab for repeat labs.   Patient voiced understanding of plan.

## 2024-07-16 RX ORDER — TACROLIMUS 1 MG/1
4 CAPSULE ORAL 2 TIMES DAILY
Qty: 240 CAPSULE | Refills: 11 | Status: SHIPPED | OUTPATIENT
Start: 2024-07-16 | End: 2024-08-06

## 2024-07-19 NOTE — TELEPHONE ENCOUNTER
CARDIOLOGY FOLLOW-UP     Patient:  Jimi Larson  YOB: 1947  Date of Visit:  07/23/24    Referring Provider :   Lucian Hinojosa MD    Chief Complaint   Patient presents with   • Office Visit   • Follow-up     Pt denies CP, SOB, dizziness and palpitations        His last cardiovascular follow-up visit was December 2022  Essential hypertension  Hypertension is controlled.  We will continue with HCTZ     History of atrial flutter  In sinus rhythm now.  I am more concerned about atrial fibrillation     Other fatigue  I am certainly concerned about atrial fibrillation.  Advised to purchase [employer] mobile.     Hyperlipidemia LDL goal <100  Lipid abnormalities are reviewed and controlled.        Summary: Return in about 3 months (around 3/19/2023).  My biggest concern obviously is atrial fibrillation given his history of atrial flutter.  Despite the fact that his heart rate was elevated today, his EKG showed sinus rhythm and confirmed by examination, he continues to have a regular rate.  Unfortunately because of the intermittent behavior of the symptoms, I have asked for him to purchase a [employer] mobile device.  Officially he will see me in 3 months.  He will definitely let me know, if there is some irregular heart rate noted.  I did review his lipid panel, we will continue his current statin dose, his blood pressure is perfect.     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  Pathologic fractures, colonic polyps, DVT, atrial flutter status post ablation, pulmonary embolism in the past.  Prostate ca. treated and in remission.  Past surgical history with hip replacement on the right, tonsillectomy, splenectomy, ventral hernia repair.  He has been asymptomatic, feeling well.  Exercise is limited unfortunately because of musculoskeletal issues.  Might be anticipating, needs surgical procedure in the near future.  Cardiovascular risk  Last prescribed in hospital. Ongoing rx?     Dru CEJA RN 3/29/2023 at 3:00 PM     factors:  ASCVD score 16%.  Obesity, age over 45 in a man, hypertension, hyperlipidemia  Exercise: Aside from walking, no formal exercise.  Does have some right knee issues.  Past Medical History:   Diagnosis Date   • Benign prostatic hyperplasia with urinary obstruction    • Colon polyps     tubular adenoma   • Coronary artery disease    • DVT of lower extremity, bilateral (CMD)    • Essential (primary) hypertension    • Fluttering heart    • Hairy cell leukemia (CMD)    • High cholesterol    • History of atrial flutter    • Prostate cancer (CMD)    • Pulmonary emboli (CMD)     bilateral   • Seizures (CMD)      CATH/CABG: None none  Vascular studies: None  Ischemic evaluation: Myocardial perfusion study performed August 27, 2019 showing normal MPI with normal LVEF 63%.  TTE: Transthoracic echocardiogram performed August 27, 2019.  Normal left ventricular function EF 55-60%.  Grade 1 diastolic abnormality.  Upper limits of normal left atrial size.  Normal right ventricular size.  There is mild mitral regurgitation.  Normal pulmonary pressures.    Results for orders placed or performed during the hospital encounter of 04/30/24   Electrocardiogram 12-Lead   Result Value Ref Range    Ventricular Rate EKG/Min (BPM) 84     Atrial Rate (BPM) 84     AZ-Interval (MSEC) 140     QRS-Interval (MSEC) 80     QT-Interval (MSEC) 388     QTc 459     P Axis (Degrees) 11     R Axis (Degrees) -9     T Axis (Degrees) -10     REPORT TEXT       Normal sinus rhythm  Possible  Left atrial enlargement  Nonspecific ST and T wave abnormality  Confirmed by TATA ANDREWS MD ([id number]) on 4/30/2024 12:40:21 PM           ALLERGIES:   Allergen Reactions   • Moxifloxacin RASH and Other (See Comments)     Reports rigors   • Azithromycin RASH   • Shellfish Allergy   (Food Or Med) DIARRHEA and GI UPSET       Current Outpatient Medications   Medication Sig   • hydroCHLOROthiazide 12.5 MG tablet Take 1 tablet by mouth daily.   • atorvastatin (LIPITOR)  10 MG tablet Take 1 tablet by mouth daily.   • topiramate (TOPAMAX) 25 MG tablet TAKE 1 TABLET BY MOUTH IN THE MORNING AND IN THE EVENING   • aspirin (ECOTRIN) 81 MG EC tablet Take 1 tablet by mouth daily.   • tamsulosin (FLOMAX) 0.4 MG Cap Take 1 capsule by mouth daily.     No current facility-administered medications for this visit.        Review of Systems   Constitutional:  Negative for activity change, appetite change, fever and unexpected weight change.   HENT:  Negative for dental problem, ear pain, facial swelling, postnasal drip and sinus pain.    Eyes:  Negative for pain and discharge.   Respiratory:  Negative for apnea, chest tightness, shortness of breath and wheezing.    Cardiovascular:  Negative for chest pain, palpitations and leg swelling.   Gastrointestinal:  Negative for abdominal distention, abdominal pain, blood in stool, constipation, diarrhea and vomiting.   Endocrine: Negative for cold intolerance, heat intolerance and polyuria.   Genitourinary:  Negative for decreased urine volume, difficulty urinating and flank pain.   Musculoskeletal:  Negative for arthralgias, back pain, joint swelling and neck pain.   Skin:  Negative for color change.   Allergic/Immunologic: Negative for environmental allergies and immunocompromised state.   Neurological:  Negative for syncope, facial asymmetry, speech difficulty, weakness, light-headedness, numbness and headaches.   Psychiatric/Behavioral:  Negative for confusion, hallucinations and sleep disturbance.        Vitals:    07/23/24 1410 07/23/24 1411   BP: 102/74 112/64   BP Location: LUE - Left upper extremity LUE - Left upper extremity   Patient Position: Sitting Standing   Cuff Size: Regular Regular   Pulse: (!) 100    Temp: 97.5 °F (36.4 °C)    SpO2: 95%    Weight: 101.6 kg (224 lb)    Height: 5' 6\" (1.676 m)           Physical Exam   Constitutional: No distress.   Bilateral ankle immobilizers.  Walks with a cane and walker   HENT:   Mouth/Throat:  Oropharynx is clear. Pharynx is normal.   Eyes: Pupils are equal, round, and reactive to light. Conjunctivae are normal.   Neck: No JVD present. No neck adenopathy. No thyromegaly present.   Cardiovascular: Normal rate, regular rhythm, S1 normal, S2 normal, normal heart sounds and intact distal pulses. PMI is not displaced.   No murmur heard.  Pulses:       Carotid pulses are 1+ on the right side and 1+ on the left side.       Radial pulses are 2+ on the right side and 2+ on the left side.        Dorsalis pedis pulses are 1+ on the right side and 1+ on the left side.        Posterior tibial pulses are 1+ on the right side and 1+ on the left side.   Pulmonary/Chest: Effort normal and breath sounds normal. No stridor. He has no wheezes. He has no rales. He exhibits no tenderness.   Abdominal: Soft. Bowel sounds are normal. He exhibits no distension and no mass. There is no hepatomegaly. There is no abdominal tenderness.   Musculoskeletal:         General: No edema.   Neurological: He is alert and oriented to person, place, and time.   Skin: Skin is warm.        Laboratory Results:  No components found for: \"64W\"    May 2022  Cholesterol <=199 mg/dL 179    194 R, CM  207 High  R, CM    Comment: Desirable         <200   Borderline High   200 to 239   High              >=240   Triglycerides <=149 mg/dL 67    98 R, CM  84 R, CM    Comment: Normal            <150   Borderline High   150 to 199   High              200 to 499   Very High         >=500   HDL >=40 mg/dL 69    62 R, CM  61 R, CM    Comment: Low              <40   Borderline Low   40 to 49   Near Optimal     50 to 59   Optimal          >=60   LDL <=129 mg/dL 97            Assessment/Plan:  Primary hypertension  Hypertension is controlled and will continue with current medical therapy which includes HCTZ.    History of atrial flutter  No indication of irregular rhythm including atrial flutter and fibrillation.  Will continue with daily aspirin    Dyslipidemia,  goal LDL below 100  Lipid abnormalities are reviewed and controlled, reviewed August 2023 blood work.      Summary: Return in about 1 year (around 7/23/2025).  Plan on, monitoring of heart rates.  BP medications continued with controlled BP.  Continue with daily aspirin.  I renewed statin therapy, LDL is controlled in August 2023, awaiting blood work in the next month or so.  Continue to reinforce exercise and watching diet.  Anticipate knee replacement surgery in the future, and will contact me regarding preoperative evaluation which I can see him virtually.  I did review April 2024 ECG which showed sinus rhythm.  Taylor Rubi MD      Greater than 50% of the visit was spent counseling regarding above issues; total time spent 30 minutes.

## 2024-07-25 ENCOUNTER — MYC REFILL (OUTPATIENT)
Dept: TRANSPLANT | Facility: CLINIC | Age: 29
End: 2024-07-25
Payer: MEDICARE

## 2024-07-25 DIAGNOSIS — Z48.298 AFTERCARE FOLLOWING ORGAN TRANSPLANT: ICD-10-CM

## 2024-07-25 DIAGNOSIS — D75.1 POST-TRANSPLANT ERYTHROCYTOSIS: ICD-10-CM

## 2024-07-25 RX ORDER — ENALAPRIL MALEATE 10 MG/1
20 TABLET ORAL DAILY
Qty: 90 TABLET | Refills: 1 | Status: SHIPPED | OUTPATIENT
Start: 2024-07-25

## 2024-08-02 ENCOUNTER — TELEPHONE (OUTPATIENT)
Dept: TRANSPLANT | Facility: CLINIC | Age: 29
End: 2024-08-02
Payer: MEDICARE

## 2024-08-02 ENCOUNTER — LAB (OUTPATIENT)
Dept: LAB | Facility: CLINIC | Age: 29
End: 2024-08-02
Payer: MEDICARE

## 2024-08-02 DIAGNOSIS — Z48.298 AFTERCARE FOLLOWING ORGAN TRANSPLANT: ICD-10-CM

## 2024-08-02 DIAGNOSIS — Z20.828 CONTACT WITH AND (SUSPECTED) EXPOSURE TO OTHER VIRAL COMMUNICABLE DISEASES: ICD-10-CM

## 2024-08-02 DIAGNOSIS — K21.9 GERD (GASTROESOPHAGEAL REFLUX DISEASE): Primary | ICD-10-CM

## 2024-08-02 DIAGNOSIS — Z94.0 KIDNEY REPLACED BY TRANSPLANT: ICD-10-CM

## 2024-08-02 DIAGNOSIS — Z98.890 OTHER SPECIFIED POSTPROCEDURAL STATES: ICD-10-CM

## 2024-08-02 DIAGNOSIS — Z79.899 ENCOUNTER FOR LONG-TERM CURRENT USE OF MEDICATION: ICD-10-CM

## 2024-08-02 LAB
PTH-INTACT SERPL-MCNC: 250 PG/ML (ref 15–65)
TACROLIMUS BLD-MCNC: 9.6 UG/L (ref 5–15)
TME LAST DOSE: NORMAL H
TME LAST DOSE: NORMAL H

## 2024-08-02 PROCEDURE — 80197 ASSAY OF TACROLIMUS: CPT

## 2024-08-02 PROCEDURE — 36415 COLL VENOUS BLD VENIPUNCTURE: CPT

## 2024-08-02 PROCEDURE — 83970 ASSAY OF PARATHORMONE: CPT

## 2024-08-02 RX ORDER — FAMOTIDINE 40 MG/1
40 TABLET, FILM COATED ORAL 2 TIMES DAILY
Qty: 60 TABLET | Refills: 3 | Status: SHIPPED | OUTPATIENT
Start: 2024-08-02

## 2024-08-02 RX ORDER — PANTOPRAZOLE SODIUM 40 MG/1
40 TABLET, DELAYED RELEASE ORAL DAILY
Qty: 30 TABLET | Refills: 2 | Status: SHIPPED | OUTPATIENT
Start: 2024-08-02

## 2024-08-02 NOTE — TELEPHONE ENCOUNTER
Per last clinic note:   - GERD: increase Pepcid to 40 BID, add protonix for 3 months then taper, avoid TUMS as able     Chaitram voiced understanding.

## 2024-08-03 NOTE — TELEPHONE ENCOUNTER
The following instructions were discussed with patient via phone.    Patient Education    Procedure: RIGHT BRACHIAL-CEPHALIC AVF  Diagnosis: ESRD  Anticoagulation Instruction: HOLD ELIQUIS 1 DAY PRIOR  Pre-Operative Physical Exam: You need to have a pre-op physical exam within 30 days of your procedure. Your procedure may be cancelled if you do not have a current History and Physical. Call your PCP's office to schedule.  Allergies:  Updated in Epic  Bowel Prep: NPO per protocol.   Post Procedure Education: [facility] patient post-procedure fact sheet reviewed with patient.    COVID-19 instructions for isolation and pre testing reviewed with pt.    Learner(s):patient  Method: Listening  Barriers to Learning:No Barrier  Outcome: Patient did verbalize understanding of above education.    Meka Vazquez, BSN, RN-[facility]             

## 2024-08-05 ENCOUNTER — TELEPHONE (OUTPATIENT)
Dept: TRANSPLANT | Facility: CLINIC | Age: 29
End: 2024-08-05
Payer: MEDICARE

## 2024-08-05 DIAGNOSIS — Z76.82 KIDNEY TRANSPLANT CANDIDATE: ICD-10-CM

## 2024-08-05 DIAGNOSIS — Z48.298 AFTERCARE FOLLOWING ORGAN TRANSPLANT: Primary | ICD-10-CM

## 2024-08-05 NOTE — TELEPHONE ENCOUNTER
ISSUE:   Tacrolimus IR level 9.6 on 8/2, goal 6-8, dose 4 mg BID.    PLAN:   Call Patient and confirm this was an accurate 12-hour trough.   Verify Tacrolimus IR dose 4 mg BID.   Confirm no new medications or or missed doses.   Confirm no new illness / infection / diarrhea.   If accurate trough and accurate dose, decrease Tacrolimus IR dose to 3 mg BID     Is this more than a 50% increase or decrease in current IS dose: No  If YES, justification: N/A    Repeat labs in 1-2 weeks.  *If > 50% change in immunosuppression dose, repeat labs in 1 week.     OUTCOME:   Spoke with Patient, they confirm accurate trough level and current dose 4 mg BID.   Patient confirmed dose change to 3 mg BID.  Patient agreed to repeat labs in 1-2 weeks.   Orders sent to preferred pharmacy for dose change and lab for repeat labs.   Patient voiced understanding of plan.

## 2024-08-06 RX ORDER — TACROLIMUS 1 MG/1
3 CAPSULE ORAL 2 TIMES DAILY
Qty: 180 CAPSULE | Refills: 11 | Status: SHIPPED | OUTPATIENT
Start: 2024-08-06 | End: 2024-08-27

## 2024-08-06 ASSESSMENT — ENCOUNTER SYMPTOMS: NEW SYMPTOMS OF CORONARY ARTERY DISEASE: 0

## 2024-08-18 ENCOUNTER — MYC REFILL (OUTPATIENT)
Dept: FAMILY MEDICINE | Facility: CLINIC | Age: 29
End: 2024-08-18
Payer: MEDICARE

## 2024-08-18 DIAGNOSIS — N17.9 ACUTE KIDNEY INJURY (H): ICD-10-CM

## 2024-08-19 DIAGNOSIS — Z94.0 KIDNEY REPLACED BY TRANSPLANT: Primary | ICD-10-CM

## 2024-08-19 RX ORDER — CARVEDILOL 25 MG/1
25 TABLET ORAL 2 TIMES DAILY WITH MEALS
Qty: 60 TABLET | Refills: 11 | Status: SHIPPED | OUTPATIENT
Start: 2024-08-19

## 2024-08-26 ENCOUNTER — LAB (OUTPATIENT)
Dept: LAB | Facility: CLINIC | Age: 29
End: 2024-08-26
Payer: MEDICARE

## 2024-08-26 DIAGNOSIS — Z98.890 OTHER SPECIFIED POSTPROCEDURAL STATES: ICD-10-CM

## 2024-08-26 DIAGNOSIS — Z48.298 AFTERCARE FOLLOWING ORGAN TRANSPLANT: ICD-10-CM

## 2024-08-26 DIAGNOSIS — Z79.899 ENCOUNTER FOR LONG-TERM CURRENT USE OF MEDICATION: ICD-10-CM

## 2024-08-26 DIAGNOSIS — Z94.0 KIDNEY REPLACED BY TRANSPLANT: ICD-10-CM

## 2024-08-26 DIAGNOSIS — Z20.828 CONTACT WITH AND (SUSPECTED) EXPOSURE TO OTHER VIRAL COMMUNICABLE DISEASES: ICD-10-CM

## 2024-08-26 LAB
ANION GAP SERPL CALCULATED.3IONS-SCNC: 13 MMOL/L (ref 7–15)
BK VIRUS DNA IU/ML, INSTRUMENT (6800): 4660 IU/ML
BK VIRUS SPECIMEN TYPE: ABNORMAL
BKV DNA SPEC NAA+PROBE-LOG#: 3.7 {LOG_COPIES}/ML
BUN SERPL-MCNC: 18.2 MG/DL (ref 6–20)
CALCIUM SERPL-MCNC: 10.1 MG/DL (ref 8.8–10.4)
CHLORIDE SERPL-SCNC: 104 MMOL/L (ref 98–107)
CREAT SERPL-MCNC: 1.1 MG/DL (ref 0.67–1.17)
EGFRCR SERPLBLD CKD-EPI 2021: >90 ML/MIN/1.73M2
ERYTHROCYTE [DISTWIDTH] IN BLOOD BY AUTOMATED COUNT: 13.3 % (ref 10–15)
GLUCOSE SERPL-MCNC: 143 MG/DL (ref 70–99)
HCO3 SERPL-SCNC: 19 MMOL/L (ref 22–29)
HCT VFR BLD AUTO: 48.7 % (ref 40–53)
HGB BLD-MCNC: 15.4 G/DL (ref 13.3–17.7)
MCH RBC QN AUTO: 27.8 PG (ref 26.5–33)
MCHC RBC AUTO-ENTMCNC: 31.6 G/DL (ref 31.5–36.5)
MCV RBC AUTO: 88 FL (ref 78–100)
PLATELET # BLD AUTO: 231 10E3/UL (ref 150–450)
POTASSIUM SERPL-SCNC: 4.4 MMOL/L (ref 3.4–5.3)
RBC # BLD AUTO: 5.54 10E6/UL (ref 4.4–5.9)
SODIUM SERPL-SCNC: 136 MMOL/L (ref 135–145)
TACROLIMUS BLD-MCNC: 9.6 UG/L (ref 5–15)
TME LAST DOSE: NORMAL H
TME LAST DOSE: NORMAL H
WBC # BLD AUTO: 8.6 10E3/UL (ref 4–11)

## 2024-08-26 PROCEDURE — 82374 ASSAY BLOOD CARBON DIOXIDE: CPT

## 2024-08-26 PROCEDURE — 87799 DETECT AGENT NOS DNA QUANT: CPT

## 2024-08-26 PROCEDURE — 86833 HLA CLASS II HIGH DEFIN QUAL: CPT

## 2024-08-26 PROCEDURE — 86832 HLA CLASS I HIGH DEFIN QUAL: CPT

## 2024-08-26 PROCEDURE — 36415 COLL VENOUS BLD VENIPUNCTURE: CPT

## 2024-08-26 PROCEDURE — 80197 ASSAY OF TACROLIMUS: CPT

## 2024-08-26 PROCEDURE — 85027 COMPLETE CBC AUTOMATED: CPT

## 2024-08-27 ENCOUNTER — TELEPHONE (OUTPATIENT)
Dept: TRANSPLANT | Facility: CLINIC | Age: 29
End: 2024-08-27
Payer: MEDICARE

## 2024-08-27 DIAGNOSIS — Z94.0 KIDNEY TRANSPLANT RECIPIENT: ICD-10-CM

## 2024-08-27 DIAGNOSIS — Z76.82 KIDNEY TRANSPLANT CANDIDATE: ICD-10-CM

## 2024-08-27 RX ORDER — TACROLIMUS 0.5 MG/1
0.5 CAPSULE ORAL 2 TIMES DAILY
Qty: 60 CAPSULE | Refills: 11 | Status: SHIPPED | OUTPATIENT
Start: 2024-08-27 | End: 2024-09-17

## 2024-08-27 RX ORDER — TACROLIMUS 1 MG/1
2 CAPSULE ORAL 2 TIMES DAILY
Qty: 120 CAPSULE | Refills: 11 | Status: SHIPPED | OUTPATIENT
Start: 2024-08-27 | End: 2024-09-17

## 2024-08-27 NOTE — TELEPHONE ENCOUNTER
ISSUE:   Tacrolimus IR level 9.6 on 08/26/24, goal 7, dose 3 mg BID.    PLAN:   Call Patient and confirm this was an accurate 12-hour trough.   Verify Tacrolimus IR dose 3 mg BID.   Confirm no new medications or illness.   Confirm no missed doses.     If accurate trough and accurate dose, decrease Tacrolimus IR dose to 2.5 mg BID  *Recommended dose rounded from calculated dose 2.19 mg  BID.      Repeat labs in 2 weeks.   For any dose change <50%, recheck labs per guideline or within 1 month.  For any dose change of more than 50%, recheck drug level based on timing to reach steady state:   Immediate release tacrolimus: 2-3 days  Extended-release tacrolimus: 7 days  Cyclosporine: 2 days  Sirolimus: 12 days  Everolimus: 8 days      OUTCOME:   Spoke with Patient, they confirm accurate trough level and current dose 3 mg BID.   Patient confirmed dose change to 2.5 mg BID.  Patient agreed to repeat labs in 2 weeks.   Orders sent to preferred pharmacy for dose change and lab for repeat labs.   Patient voiced understanding of plan.

## 2024-09-12 ENCOUNTER — TELEPHONE (OUTPATIENT)
Dept: TRANSPLANT | Facility: CLINIC | Age: 29
End: 2024-09-12
Payer: MEDICARE

## 2024-09-12 NOTE — TELEPHONE ENCOUNTER
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  I did offer at 10:30 am appt at the [facility] with Dion Castellanos tomorrow. Pt stated that still would not work, but he is wondering if he could see Dr Dion Castellanos by VIDEO for this 10:30 appt tomorrow with Dion Castellanos. I let the pt know I would send the team a msg and we would be getting back to him asap. Let me know please. Thanks!

## 2024-09-13 ENCOUNTER — VIRTUAL VISIT (OUTPATIENT)
Dept: TRANSPLANT | Facility: CLINIC | Age: 29
End: 2024-09-13
Attending: INTERNAL MEDICINE
Payer: MEDICARE

## 2024-09-13 DIAGNOSIS — D84.9 IMMUNOSUPPRESSED STATUS (H): ICD-10-CM

## 2024-09-13 DIAGNOSIS — E87.20 METABOLIC ACIDOSIS: ICD-10-CM

## 2024-09-13 DIAGNOSIS — D35.02 ADRENAL ADENOMA, LEFT: Primary | ICD-10-CM

## 2024-09-13 DIAGNOSIS — Z94.0 HTN, KIDNEY TRANSPLANT RELATED: ICD-10-CM

## 2024-09-13 DIAGNOSIS — Z48.298 AFTERCARE FOLLOWING ORGAN TRANSPLANT: ICD-10-CM

## 2024-09-13 DIAGNOSIS — I11.9 HYPERTENSIVE LEFT VENTRICULAR HYPERTROPHY, WITHOUT HEART FAILURE: ICD-10-CM

## 2024-09-13 DIAGNOSIS — E83.42 HYPOMAGNESEMIA: ICD-10-CM

## 2024-09-13 DIAGNOSIS — R76.8 DONOR SPECIFIC ANTIBODY (DSA) POSITIVE: ICD-10-CM

## 2024-09-13 DIAGNOSIS — T86.10 COMPLICATION OF TRANSPLANTED KIDNEY, UNSPECIFIED COMPLICATION: ICD-10-CM

## 2024-09-13 DIAGNOSIS — Z94.0 KIDNEY REPLACED BY TRANSPLANT: ICD-10-CM

## 2024-09-13 DIAGNOSIS — E78.5 HYPERLIPIDEMIA, UNSPECIFIED HYPERLIPIDEMIA TYPE: ICD-10-CM

## 2024-09-13 DIAGNOSIS — Z29.89 NEED FOR PNEUMOCYSTIS PROPHYLAXIS: ICD-10-CM

## 2024-09-13 DIAGNOSIS — I15.1 HTN, KIDNEY TRANSPLANT RELATED: ICD-10-CM

## 2024-09-13 DIAGNOSIS — B34.8 BK VIREMIA: ICD-10-CM

## 2024-09-13 PROCEDURE — 99215 OFFICE O/P EST HI 40 MIN: CPT | Mod: 95 | Performed by: INTERNAL MEDICINE

## 2024-09-13 PROCEDURE — G2211 COMPLEX E/M VISIT ADD ON: HCPCS | Performed by: INTERNAL MEDICINE

## 2024-09-13 NOTE — PROGRESS NOTES
Virtual Visit Details    Type of service:  Video Visit   Video Start Time: 10:46 AM  Video End Time:11:05 PM    Originating Location (pt. Location): Home    Distant Location (provider location):  On-site  Platform used for Video Visit: [facility]    TRANSPLANT NEPHROLOGY CLINIC VISIT     Assessment & Plan   # DDKT: CKD Stage 1 - Stable   - Baseline Creatinine: ~ 0.8-1   - Proteinuria: Normal (<0.2 grams)              - Date DSA Last Checked: Feb/2024      Latest DSA: Low level DSA (<1000 mfi) to DR53, deNovo, stable               - BK Viremia: Yes, mildly elevated (1-10K) stable  - Kidney Tx Biopsy:  yes; Result: Mild acute tubular injury, associated with no other significant signs of active antibody-mediated rejection or acute cellular allograft rejection (i0 t0 v0; g0 ptc0 C4d0) , no global glomerulosclerosis or IFTA (ct0 ci0 cv1 ah1 cg0 mm0)              - Transplant Ureteral Stent: Removed 1/23/24    # De [place] DSA:              -dnDSA detected 1/11/24 to DR53 w/ , stable on 2/5/24              -No decrease in IS despite slightly rising BK unless biopsy demonstrates BK nephropathy              -Attempt to obtain IVIG. Thus far has not been approved. Repeat IgG 787 4/2024     # BK Viremia:              - low grade, stable slight downtrend               - detected Jan 2024, peak 10K ~2/2024 most recent 2-4K copies/ml              - IgG 787 on 1/15/24,    # Immunosuppression: Tacrolimus immediate release (goal 6-8) and Mycophenolic acid (dose 540 mg every 12 hours) Tac goal closer to 8 given positive DSA in past   - Induction with Recent Transplant:  Intermediate Intensity Protocol   - Continue with intensive monitoring of immunosuppression for efficacy and toxicity.   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 Imaging: yes US native and kidney Tx neg renal masses              - ACEI: yes enalapril    # Mineral Bone Disorder:    - Secondary renal hyperparathyroidism; PTH level: Mildly elevated (151-300 pg/ml)        On treatment: None  - Vitamin D; level:  pending         On supplement: No  - Calcium; level: High normal        On supplement: No  - Phosphorus; level: Normal        On supplement: No    # Electrolytes:   - Potassium; level: Normal        On supplement: No  - Magnesium; level: Low        On supplement: Yes  - Bicarbonate; level: Low        On supplement: Yes  - Sodium; level: Normal    Metabolic acidosis: on supplementation, but still noted his bicarb been low. Will order UA to assess his urine ph. If that is normal or low normal, will consider VBG to assess any possibility of respiratory acidosis.     # Other Significant PMH:   - GERD: increase Pepcid to 40 BID, add protonix for 3 months then taper, avoid TUMS as able    - Obesity: BMI 33.9     # Skin Cancer Risk:    - Discussed sun protection and recommend regular follow up with Dermatology.    # Transplant History:  Etiology of Kidney Failure: Focal segmental glomerulosclerosis (FSGS)  Tx: DDKT  Transplant: 12/5/2023 (Kidney)  Significant transplant-related complications: BK Viremia and DSA     Transplant Office Phone Number: [phone]    Assessment and plan was discussed with the patient and he voiced his understanding and agreement.    Return visit: as scheduled, or sooner if needed    Lucinda Webber MD    The longitudinal plan of care for the diagnosis(es)/condition(s) as documented were addressed during this visit. Due to the added complexity in care, I will continue to support Taylor in the subsequent management  and with ongoing continuity of care.      Chief Complaint   Mr. Valiente is a 29 year old here for kidney transplant and immunosuppression management.     History of Present Illness    Mr. Valietne reports feeling stable overall.  Since last clinic visit:   Hospitalizations: No   New Medical Issues: No  Chest pain or shortness of breath: No  Lower extremity swelling: No  Weight change: No  Nausea and vomiting: No  Diarrhea: No  Heartburn symptoms: No  Fever, sweats or chills: No  Urinary complaints: No    Home BP:  120-130/70-80    Problem List   Patient Active Problem List   Diagnosis    LVH (left ventricular hypertrophy) due to hypertensive disease    HTN, kidney transplant related    Adrenal adenoma, left    Hyperlipidemia    Prolonged Q-T interval on ECG    Kidney replaced by transplant    Hypomagnesemia    Metabolic acidosis    Immunosuppressed status (H24)    Dehydration    Diarrhea    BK viremia    Donor specific antibody (DSA) positive    Complication of kidney transplant    Kidney transplant infection    Cannabis use disorder, moderate, in sustained remission (H)    Aftercare following organ transplant    Need for pneumocystis prophylaxis       Allergies   Allergies   Allergen Reactions    Benadryl [Diphenhydramine] Itching    Vancomycin Itching       Medications   Current Outpatient Medications   Medication Sig Dispense Refill    acetaminophen (TYLENOL) 325 MG tablet Take 2 tablets (650 mg) by mouth every 4 hours as needed for pain 100 tablet 0    amLODIPine (NORVASC) 5 MG tablet Take 1 tablet (5 mg) by mouth daily 90 tablet 2    aspirin 81 MG EC tablet Take 81 mg by mouth daily      atorvastatin (LIPITOR) 10 MG tablet Take 1 tablet (10 mg) by mouth daily 90 tablet 3    carvedilol (COREG) 25 MG tablet Take 1 tablet (25 mg) by mouth 2 times daily (with meals) 60 tablet 11    enalapril (VASOTEC) 10 MG tablet Take 2 tablets (20 mg) by mouth daily 90 tablet 1    famotidine (PEPCID) 20 MG tablet Take 1  tablet (20 mg) by mouth 2 times daily      famotidine (PEPCID) 40 MG tablet Take 1 tablet (40 mg) by mouth 2 times daily 60 tablet 3    magnesium glycinate 100 MG CAPS capsule Take 3 capsules (300 mg) by mouth 2 times daily [facility] [facility] Magnesium or generic Magnesium glycinate      medical cannabis (Patient's own supply) See Admin Instructions (The purpose of this order is to document that the patient reports taking medical cannabis.  This is not a prescription, and is not used to certify that the patient has a qualifying medical condition.)    Patient was advised on the cannabis-tacrolimus drug interaction.      Multiple Vitamin (MULTIVITAMIN ADULT PO) Take 1 tablet by mouth daily      mycophenolic acid (GENERIC EQUIVALENT) 180 MG EC tablet Take 3 tablets (540 mg) by mouth 2 times daily Emergency supply. Pt will use GoodRx 180 tablet 11    pantoprazole (PROTONIX) 40 MG EC tablet Take 1 tablet (40 mg) by mouth daily Wean after 3 months 30 tablet 2    psyllium (METAMUCIL/KONSYL) 58.6 % powder Take 1 teaspoonful by mouth daily      sodium bicarbonate 650 MG tablet Take 3 tablets (1,950 mg) by mouth 3 times daily 270 tablet 11    Sodium Bicarbonate, antacid, POWD Mix 1 teaspoon of baking soda in full glass of water once daily      sulfamethoxazole-trimethoprim (BACTRIM) 400-80 MG tablet Take 1 tablet by mouth daily 30 tablet 11    tacrolimus (GENERIC EQUIVALENT) 0.5 MG capsule Take 1 capsule (0.5 mg) by mouth 2 times daily. Total dose = 2.5 mg twice daily 60 capsule 11    tacrolimus (GENERIC EQUIVALENT) 1 MG capsule Take 2 capsules (2 mg) by mouth 2 times daily. Total dose = 2.5 mg twice daily 120 capsule 11     No current facility-administered medications for this visit.     There are no discontinued medications.      Physical Exam   Vital Signs: There were no vitals taken for this visit.    General: alert and not in acute distress  HEENT: Anicteric and atraumatic  Respiratory : able to speak in full sentences, no  audible wheezing or accessory muscle usage  Extremities: Denied lower extremity edema  Neuro : mentation and speech intact   Skin : no facial rash noted    Data         Latest Ref Rng & Units 8/26/2024     9:18 AM 7/12/2024     9:08 AM 6/29/2024     9:19 AM   Renal   Sodium 135 - 145 mmol/L 136  137  137    K 3.4 - 5.3 mmol/L 4.4  3.9  4.2    Cl 98 - 107 mmol/L 104  104  105    Cl (external) 98 - 107 mmol/L 104  104  105    CO2 22 - 29 mmol/L 19  17  19    Urea Nitrogen 6.0 - 20.0 mg/dL 18.2  16.8  26.0    Creatinine 0.67 - 1.17 mg/dL 1.10  0.87  0.98    Glucose 70 - 99 mg/dL 143  209  137    Calcium 8.8 - 10.4 mg/dL 10.1  10.2  10.2          Latest Ref Rng & Units 8/2/2024     9:19 AM 7/12/2024     9:08 AM 6/15/2024     9:14 AM   Bone Health   Phosphorus 2.5 - 4.5 mg/dL   3.0    Parathyroid Hormone Intact 15 - 65 pg/mL 250      Vit D Def 20 - 50 ng/mL  29           Latest Ref Rng & Units 8/26/2024     9:18 AM 7/12/2024     9:08 AM 6/29/2024     9:19 AM   Heme   WBC 4.0 - 11.0 10e3/uL 8.6  9.7  9.3    Hgb 13.3 - 17.7 g/dL 15.4  15.6  15.4    Plt 150 - 450 10e3/uL 231  216  219          Latest Ref Rng & Units 12/5/2023    12:07 AM 8/2/2023     5:39 AM 7/27/2023    10:08 PM   Liver   AP 40 - 150 U/L 138   77    TBili <=1.2 mg/dL 0.5   0.3    ALT 0 - 70 U/L 20   8    AST 0 - 45 U/L 13   8    Tot Protein 6.4 - 8.3 g/dL 7.7   7.5    Albumin 3.5 - 5.2 g/dL 4.5  3.3  3.8          Latest Ref Rng & Units 12/5/2023    12:07 AM 5/30/2023     6:23 AM 11/29/2021     6:50 AM   Pancreas   A1C <5.7 % 5.9  4.4     Lipase 73 - 393 U/L   304          Latest Ref Rng & Units 12/19/2023     9:55 AM 12/10/2023     8:03 AM 9/11/2020     6:34 AM   Iron studies   Iron 61 - 157 ug/dL 30  80  33    Iron Saturation Index 15 - 46 %   18    Iron Sat Index 15 - 46 % 14  41     Ferritin 31 - 409 ng/mL 1,477  1,743  325          Latest Ref Rng & Units 1/23/2024     6:07 AM 12/5/2023    12:07 AM 6/17/2021    12:35 PM   [facility] T[facility] Virology   EBV CAPSID  ANTIBODY IGG No detectable antibody.  Positive     EBV DNA COPIES/ML Not Detected copies/mL Not Detected      Hep B Core NR^Nonreactive   Nonreactive      Failed to redirect to the Timeline version of the REVFS SmartLink.  Recent Labs   Lab Test 07/12/24  0908 08/02/24  0919 08/26/24  0918   DOSTAC 7/11/2024 8/1/2024 8/25/2024   TACROL 10.2 9.6 9.6     Recent Labs   Lab Test 01/25/24  0900 02/01/24  0706 02/08/24  0838   DOSMPA 1/24/2024   8:30 PM 1/31/2024   8:30 PM 2/7/2024   8:30 PM   MPACID <0.25* 6.16* 1.67   MPAG 10.6* 28.0* 18.2*   Prescription drug management  43 minutes spent by me on the date of the encounter doing chart review, history and exam, documentation and further activities per the noteDeebing Webber MD

## 2024-09-13 NOTE — NURSING NOTE
Current patient location: [address] [place] [address]   [place] [address]    Is the patient currently in the state of [place]? YES    Visit mode:VIDEO    If the visit is dropped, the patient can be reconnected by: VIDEO VISIT: Text to cell phone:   Telephone Information:   Mobile [phone]   Mobile Not on file.       Will anyone else be joining the visit? NO  (If patient encounters technical issues they should call [phone]    How would you like to obtain your AVS? MyChart    Are changes needed to the allergy or medication list? No    Are refills needed on medications prescribed by this physician? NO    Rooming Documentation:  Questionnaire(s) completed      Reason for visit: RECHECK    Jigar PILLAI

## 2024-09-13 NOTE — LETTER
9/13/2024      Taylor Valiente  [address]   [place] [address]      Dear Colleague,    Thank you for referring your patient, Taylor Valiente, to the [facility]PL[facility]. Please see a copy of my visit note below.    Virtual Visit Details    Type of service:  Video Visit   Video Start Time: 10:46 AM  Video End Time:11:05 PM    Originating Location (pt. Location): Home    Distant Location (provider location):  On-site  Platform used for Video Visit: [facility]    TRANSPLANT NEPHROLOGY CLINIC VISIT     Assessment & Plan  # DDKT: CKD Stage 1 - Stable   - Baseline Creatinine: ~ 0.8-1   - Proteinuria: Normal (<0.2 grams)              - Date DSA Last Checked: Feb/2024      Latest DSA: Low level DSA (<1000 mfi) to DR53, deNovo, stable               - BK Viremia: Yes, mildly elevated (1-10K) stable  - Kidney Tx Biopsy:  yes; Result: Mild acute tubular injury, associated with no other significant signs of active antibody-mediated rejection or acute cellular allograft rejection (i0 t0 v0; g0 ptc0 C4d0) , no global glomerulosclerosis or IFTA (ct0 ci0 cv1 ah1 cg0 mm0)              - Transplant Ureteral Stent: Removed 1/23/24    # De Carolyn DSA:              -dnDSA detected 1/11/24 to DR53 w/ , stable on 2/5/24              -No decrease in IS despite slightly rising BK unless biopsy demonstrates BK nephropathy              -Attempt to obtain IVIG. Thus far has not been approved. Repeat IgG 787 4/2024     # BK Viremia:              - low grade, stable slight downtrend               - detected Jan 2024, peak 10K ~2/2024 most recent 2-4K copies/ml              - IgG 787 on 1/15/24,    # Immunosuppression: Tacrolimus immediate release (goal 6-8) and Mycophenolic acid (dose 540 mg every 12 hours) Tac goal closer to 8 given positive DSA in past   - Induction with Recent Transplant:  Intermediate Intensity Protocol   - Continue with intensive monitoring of immunosuppression for efficacy and  toxicity.   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 Imaging: yes US native and kidney Tx neg renal masses              - ACEI: yes enalapril    # Mineral Bone Disorder:    - Secondary renal hyperparathyroidism; PTH level: Mildly elevated (151-300 pg/ml)        On treatment: None  - Vitamin D; level:  pending         On supplement: No  - Calcium; level: High normal        On supplement: No  - Phosphorus; level: Normal        On supplement: No    # Electrolytes:   - Potassium; level: Normal        On supplement: No  - Magnesium; level: Low        On supplement: Yes  - Bicarbonate; level: Low        On supplement: Yes  - Sodium; level: Normal    Metabolic acidosis: on supplementation, but still noted his bicarb been low. Will order UA to assess his urine ph. If that is normal or low normal, will consider VBG to assess any possibility of respiratory acidosis.     # Other Significant PMH:   - GERD: increase Pepcid to 40 BID, add protonix for 3 months then taper, avoid TUMS as able    - Obesity: BMI 33.9     # Skin Cancer Risk:    - Discussed sun protection and recommend regular follow up with Dermatology.    # Transplant History:  Etiology of Kidney Failure: Focal segmental glomerulosclerosis (FSGS)  Tx: DDKT  Transplant: 12/5/2023 (Kidney)  Significant transplant-related complications: BK Viremia and DSA     Transplant Office Phone Number: [phone]    Assessment and plan was discussed with the patient and he voiced his understanding and agreement.    Return visit: as scheduled, or  sooner if needed    Lucinda Webber MD    The longitudinal plan of care for the diagnosis(es)/condition(s) as documented were addressed during this visit. Due to the added complexity in care, I will continue to support Taylor in the subsequent management and with ongoing continuity of care.      Chief Complaint  Mr. Valiente is a 29 year old here for kidney transplant and immunosuppression management.     History of Present Illness   Mr. Valiente reports feeling stable overall.  Since last clinic visit:   Hospitalizations: No   New Medical Issues: No  Chest pain or shortness of breath: No  Lower extremity swelling: No  Weight change: No  Nausea and vomiting: No  Diarrhea: No  Heartburn symptoms: No  Fever, sweats or chills: No  Urinary complaints: No    Home BP:  120-130/70-80    Problem List  Patient Active Problem List   Diagnosis     LVH (left ventricular hypertrophy) due to hypertensive disease     HTN, kidney transplant related     Adrenal adenoma, left     Hyperlipidemia     Prolonged Q-T interval on ECG     Kidney replaced by transplant     Hypomagnesemia     Metabolic acidosis     Immunosuppressed status (H24)     Dehydration     Diarrhea     BK viremia     Donor specific antibody (DSA) positive     Complication of kidney transplant     Kidney transplant infection     Cannabis use disorder, moderate, in sustained remission (H)     Aftercare following organ transplant     Need for pneumocystis prophylaxis       Allergies  Allergies   Allergen Reactions     Benadryl [Diphenhydramine] Itching     Vancomycin Itching       Medications  Current Outpatient Medications   Medication Sig Dispense Refill     acetaminophen (TYLENOL) 325 MG tablet Take 2 tablets (650 mg) by mouth every 4 hours as needed for pain 100 tablet 0     amLODIPine (NORVASC) 5 MG tablet Take 1 tablet (5 mg) by mouth daily 90 tablet 2     aspirin 81 MG EC tablet Take 81 mg by mouth daily       atorvastatin (LIPITOR) 10 MG tablet Take 1  tablet (10 mg) by mouth daily 90 tablet 3     carvedilol (COREG) 25 MG tablet Take 1 tablet (25 mg) by mouth 2 times daily (with meals) 60 tablet 11     enalapril (VASOTEC) 10 MG tablet Take 2 tablets (20 mg) by mouth daily 90 tablet 1     famotidine (PEPCID) 20 MG tablet Take 1 tablet (20 mg) by mouth 2 times daily       famotidine (PEPCID) 40 MG tablet Take 1 tablet (40 mg) by mouth 2 times daily 60 tablet 3     magnesium glycinate 100 MG CAPS capsule Take 3 capsules (300 mg) by mouth 2 times daily [facility] Magnesium or generic Magnesium glycinate       medical cannabis (Patient's own supply) See Admin Instructions (The purpose of this order is to document that the patient reports taking medical cannabis.  This is not a prescription, and is not used to certify that the patient has a qualifying medical condition.)    Patient was advised on the cannabis-tacrolimus drug interaction.       Multiple Vitamin (MULTIVITAMIN ADULT PO) Take 1 tablet by mouth daily       mycophenolic acid (GENERIC EQUIVALENT) 180 MG EC tablet Take 3 tablets (540 mg) by mouth 2 times daily Emergency supply. Pt will use GoodRx 180 tablet 11     pantoprazole (PROTONIX) 40 MG EC tablet Take 1 tablet (40 mg) by mouth daily Wean after 3 months 30 tablet 2     psyllium (METAMUCIL/KONSYL) 58.6 % powder Take 1 teaspoonful by mouth daily       sodium bicarbonate 650 MG tablet Take 3 tablets (1,950 mg) by mouth 3 times daily 270 tablet 11     Sodium Bicarbonate, antacid, POWD Mix 1 teaspoon of baking soda in full glass of water once daily       sulfamethoxazole-trimethoprim (BACTRIM) 400-80 MG tablet Take 1 tablet by mouth daily 30 tablet 11     tacrolimus (GENERIC EQUIVALENT) 0.5 MG capsule Take 1 capsule (0.5 mg) by mouth 2 times daily. Total dose = 2.5 mg twice daily 60 capsule 11     tacrolimus (GENERIC EQUIVALENT) 1 MG capsule Take 2 capsules (2 mg) by mouth 2 times daily. Total dose = 2.5 mg twice daily 120 capsule 11     No current  facility-administered medications for this visit.     There are no discontinued medications.      Physical Exam  Vital Signs: There were no vitals taken for this visit.    General: alert and not in acute distress  HEENT: Anicteric and atraumatic  Respiratory : able to speak in full sentences, no audible wheezing or accessory muscle usage  Extremities: Denied lower extremity edema  Neuro : mentation and speech intact   Skin : no facial rash noted    Data        Latest Ref Rng & Units 8/26/2024     9:18 AM 7/12/2024     9:08 AM 6/29/2024     9:19 AM   Renal   Sodium 135 - 145 mmol/L 136  137  137    K 3.4 - 5.3 mmol/L 4.4  3.9  4.2    Cl 98 - 107 mmol/L 104  104  105    Cl (external) 98 - 107 mmol/L 104  104  105    CO2 22 - 29 mmol/L 19  17  19    Urea Nitrogen 6.0 - 20.0 mg/dL 18.2  16.8  26.0    Creatinine 0.67 - 1.17 mg/dL 1.10  0.87  0.98    Glucose 70 - 99 mg/dL 143  209  137    Calcium 8.8 - 10.4 mg/dL 10.1  10.2  10.2          Latest Ref Rng & Units 8/2/2024     9:19 AM 7/12/2024     9:08 AM 6/15/2024     9:14 AM   Bone Health   Phosphorus 2.5 - 4.5 mg/dL   3.0    Parathyroid Hormone Intact 15 - 65 pg/mL 250      Vit D Def 20 - 50 ng/mL  29           Latest Ref Rng & Units 8/26/2024     9:18 AM 7/12/2024     9:08 AM 6/29/2024     9:19 AM   Heme   WBC 4.0 - 11.0 10e3/uL 8.6  9.7  9.3    Hgb 13.3 - 17.7 g/dL 15.4  15.6  15.4    Plt 150 - 450 10e3/uL 231  216  219          Latest Ref Rng & Units 12/5/2023    12:07 AM 8/2/2023     5:39 AM 7/27/2023    10:08 PM   Liver   AP 40 - 150 U/L 138   77    TBili <=1.2 mg/dL 0.5   0.3    ALT 0 - 70 U/L 20   8    AST 0 - 45 U/L 13   8    Tot Protein 6.4 - 8.3 g/dL 7.7   7.5    Albumin 3.5 - 5.2 g/dL 4.5  3.3  3.8          Latest Ref Rng & Units 12/5/2023    12:07 AM 5/30/2023     6:23 AM 11/29/2021     6:50 AM   Pancreas   A1C <5.7 % 5.9  4.4     Lipase 73 - 393 U/L   304          Latest Ref Rng & Units 12/19/2023     9:55 AM 12/10/2023     8:03 AM 9/11/2020     6:34 AM    Iron studies   Iron 61 - 157 ug/dL 30  80  33    Iron Saturation Index 15 - 46 %   18    Iron Sat Index 15 - 46 % 14  41     Ferritin 31 - 409 ng/mL 1,477  1,743  325          Latest Ref Rng & Units 1/23/2024     6:07 AM 12/5/2023    12:07 AM 6/17/2021    12:35 PM   [facility] Virology   EBV CAPSID ANTIBODY IGG No detectable antibody.  Positive     EBV DNA COPIES/ML Not Detected copies/mL Not Detected      Hep B Core NR^Nonreactive   Nonreactive      Failed to redirect to the Timeline version of the REVFS SmartLink.  Recent Labs   Lab Test 07/12/24  0908 08/02/24  0919 08/26/24  0918   DOSTAC 7/11/2024 8/1/2024 8/25/2024   TACROL 10.2 9.6 9.6     Recent Labs   Lab Test 01/25/24  0900 02/01/24  0706 02/08/24  0838   DOSMPA 1/24/2024   8:30 PM 1/31/2024   8:30 PM 2/7/2024   8:30 PM   MPACID <0.25* 6.16* 1.67   MPAG 10.6* 28.0* 18.2*   Prescription drug management  43 minutes spent by me on the date of the encounter doing chart review, history and exam, documentation and further activities per the Xin Webber MD        Again, thank you for allowing me to participate in the care of your patient.        Sincerely,        Lucinda Webber MD

## 2024-09-17 ENCOUNTER — TELEPHONE (OUTPATIENT)
Dept: TRANSPLANT | Facility: CLINIC | Age: 29
End: 2024-09-17

## 2024-09-17 ENCOUNTER — LAB (OUTPATIENT)
Dept: LAB | Facility: CLINIC | Age: 29
End: 2024-09-17
Payer: MEDICARE

## 2024-09-17 DIAGNOSIS — Z94.0 KIDNEY REPLACED BY TRANSPLANT: ICD-10-CM

## 2024-09-17 DIAGNOSIS — Z98.890 OTHER SPECIFIED POSTPROCEDURAL STATES: ICD-10-CM

## 2024-09-17 DIAGNOSIS — Z94.0 KIDNEY TRANSPLANT RECIPIENT: ICD-10-CM

## 2024-09-17 DIAGNOSIS — Z79.899 ENCOUNTER FOR LONG-TERM CURRENT USE OF MEDICATION: ICD-10-CM

## 2024-09-17 DIAGNOSIS — Z48.298 AFTERCARE FOLLOWING ORGAN TRANSPLANT: Primary | ICD-10-CM

## 2024-09-17 DIAGNOSIS — Z76.82 KIDNEY TRANSPLANT CANDIDATE: ICD-10-CM

## 2024-09-17 DIAGNOSIS — Z20.828 CONTACT WITH AND (SUSPECTED) EXPOSURE TO OTHER VIRAL COMMUNICABLE DISEASES: ICD-10-CM

## 2024-09-17 LAB
ALBUMIN MFR UR ELPH: 7.9 MG/DL
BK VIRUS DNA IU/ML, INSTRUMENT (6800): 4540 IU/ML
BK VIRUS SPECIMEN TYPE: ABNORMAL
BKV DNA SPEC NAA+PROBE-LOG#: 3.7 {LOG_COPIES}/ML
CREAT UR-MCNC: 80.6 MG/DL
HOLD SPECIMEN: NORMAL
PROT/CREAT 24H UR: 0.1 MG/MG CR (ref 0–0.2)
TACROLIMUS BLD-MCNC: 3.8 UG/L (ref 5–15)
TME LAST DOSE: ABNORMAL H
TME LAST DOSE: ABNORMAL H

## 2024-09-17 PROCEDURE — 84156 ASSAY OF PROTEIN URINE: CPT

## 2024-09-17 PROCEDURE — 80197 ASSAY OF TACROLIMUS: CPT

## 2024-09-17 PROCEDURE — 36415 COLL VENOUS BLD VENIPUNCTURE: CPT

## 2024-09-17 PROCEDURE — 87799 DETECT AGENT NOS DNA QUANT: CPT

## 2024-09-17 RX ORDER — TACROLIMUS 1 MG/1
3 CAPSULE ORAL 2 TIMES DAILY
Qty: 180 CAPSULE | Refills: 11 | COMMUNITY
Start: 2024-09-17

## 2024-09-17 RX ORDER — TACROLIMUS 0.5 MG/1
CAPSULE ORAL
Status: SHIPPED
Start: 2024-09-17

## 2024-09-17 NOTE — TELEPHONE ENCOUNTER
ISSUE:   Tacrolimus IR level 3.8 on 9/17, goal 6-8, dose 2.5 mg BID.    PLAN:   Call Patient and confirm this was an accurate 12-hour trough.   Verify Tacrolimus IR dose 2.5 mg BID.   Confirm no new medications or or missed doses.   Confirm no new illness / infection / diarrhea.   If accurate trough and accurate dose, increase Tacrolimus IR dose to 3 mg BID     Is this more than a 50% increase or decrease in current IS dose: No  If YES, justification: N/A    Repeat labs in 1-2 weeks.  *If > 50% change in immunosuppression dose, repeat labs in 1 week.     OUTCOME:   Spoke with Patient, they confirm accurate trough level and current dose 2.5 mg BID.   Patient confirmed dose change to 3 mg BID.  Patient agreed to repeat labs in 1 weeks.   Orders sent to preferred pharmacy for dose change and lab for repeat labs.   Patient voiced understanding of plan.

## 2024-10-08 DIAGNOSIS — D75.1 POST-TRANSPLANT ERYTHROCYTOSIS: ICD-10-CM

## 2024-10-08 DIAGNOSIS — Z48.298 AFTERCARE FOLLOWING ORGAN TRANSPLANT: ICD-10-CM

## 2024-10-08 RX ORDER — ENALAPRIL MALEATE 10 MG/1
20 TABLET ORAL DAILY
Qty: 90 TABLET | Refills: 1 | Status: SHIPPED | OUTPATIENT
Start: 2024-10-08

## 2024-10-15 ENCOUNTER — MYC REFILL (OUTPATIENT)
Dept: TRANSPLANT | Facility: CLINIC | Age: 29
End: 2024-10-15
Payer: MEDICARE

## 2024-10-15 DIAGNOSIS — Z76.82 KIDNEY TRANSPLANT CANDIDATE: ICD-10-CM

## 2024-10-15 DIAGNOSIS — Z94.0 KIDNEY TRANSPLANT RECIPIENT: ICD-10-CM

## 2024-10-16 ENCOUNTER — TELEPHONE (OUTPATIENT)
Dept: TRANSPLANT | Facility: CLINIC | Age: 29
End: 2024-10-16

## 2024-10-16 ENCOUNTER — LAB (OUTPATIENT)
Dept: LAB | Facility: CLINIC | Age: 29
End: 2024-10-16
Payer: MEDICARE

## 2024-10-16 DIAGNOSIS — Z94.0 KIDNEY TRANSPLANT RECIPIENT: ICD-10-CM

## 2024-10-16 DIAGNOSIS — E87.20 METABOLIC ACIDOSIS: ICD-10-CM

## 2024-10-16 DIAGNOSIS — Z98.890 OTHER SPECIFIED POSTPROCEDURAL STATES: ICD-10-CM

## 2024-10-16 DIAGNOSIS — Z48.298 AFTERCARE FOLLOWING ORGAN TRANSPLANT: ICD-10-CM

## 2024-10-16 DIAGNOSIS — Z76.82 KIDNEY TRANSPLANT CANDIDATE: ICD-10-CM

## 2024-10-16 DIAGNOSIS — Z79.899 ENCOUNTER FOR LONG-TERM CURRENT USE OF MEDICATION: ICD-10-CM

## 2024-10-16 DIAGNOSIS — Z20.828 CONTACT WITH AND (SUSPECTED) EXPOSURE TO OTHER VIRAL COMMUNICABLE DISEASES: ICD-10-CM

## 2024-10-16 DIAGNOSIS — Z94.0 KIDNEY REPLACED BY TRANSPLANT: ICD-10-CM

## 2024-10-16 LAB
ALBUMIN MFR UR ELPH: <6 MG/DL
ALBUMIN UR-MCNC: NEGATIVE MG/DL
ANION GAP SERPL CALCULATED.3IONS-SCNC: 16 MMOL/L (ref 7–15)
APPEARANCE UR: CLEAR
BASE EXCESS BLDV CALC-SCNC: 0 MMOL/L (ref -3–3)
BILIRUB UR QL STRIP: NEGATIVE
BK VIRUS DNA IU/ML, INSTRUMENT (6800): 1440 IU/ML
BK VIRUS SPECIMEN TYPE: ABNORMAL
BKV DNA SPEC NAA+PROBE-LOG#: 3.2 {LOG_COPIES}/ML
BUN SERPL-MCNC: 18.1 MG/DL (ref 6–20)
CALCIUM SERPL-MCNC: 10 MG/DL (ref 8.8–10.4)
CHLORIDE SERPL-SCNC: 102 MMOL/L (ref 98–107)
COLOR UR AUTO: NORMAL
CREAT SERPL-MCNC: 0.99 MG/DL (ref 0.67–1.17)
CREAT UR-MCNC: 67 MG/DL
EGFRCR SERPLBLD CKD-EPI 2021: >90 ML/MIN/1.73M2
ERYTHROCYTE [DISTWIDTH] IN BLOOD BY AUTOMATED COUNT: 14.4 % (ref 10–15)
GLUCOSE SERPL-MCNC: 165 MG/DL (ref 70–99)
GLUCOSE UR STRIP-MCNC: NEGATIVE MG/DL
HCO3 BLDV-SCNC: 27 MMOL/L (ref 21–28)
HCO3 SERPL-SCNC: 20 MMOL/L (ref 22–29)
HCT VFR BLD AUTO: 53.8 % (ref 40–53)
HGB BLD-MCNC: 16.8 G/DL (ref 13.3–17.7)
HGB UR QL STRIP: NEGATIVE
KETONES UR STRIP-MCNC: NEGATIVE MG/DL
LEUKOCYTE ESTERASE UR QL STRIP: NEGATIVE
MCH RBC QN AUTO: 27 PG (ref 26.5–33)
MCHC RBC AUTO-ENTMCNC: 31.2 G/DL (ref 31.5–36.5)
MCV RBC AUTO: 87 FL (ref 78–100)
NITRATE UR QL: NEGATIVE
O2/TOTAL GAS SETTING VFR VENT: 21 %
OXYHGB MFR BLDV: 36 % (ref 70–75)
PCO2 BLDV: 51 MM HG (ref 40–50)
PH BLDV: 7.33 [PH] (ref 7.32–7.43)
PH UR STRIP: 6.5 [PH] (ref 5–7)
PLATELET # BLD AUTO: 230 10E3/UL (ref 150–450)
PO2 BLDV: 21 MM HG (ref 25–47)
POTASSIUM SERPL-SCNC: 4.3 MMOL/L (ref 3.4–5.3)
PROT/CREAT 24H UR: NORMAL MG/G{CREAT}
RBC # BLD AUTO: 6.22 10E6/UL (ref 4.4–5.9)
RBC URINE: 0 /HPF
SAO2 % BLDV: 36.3 % (ref 70–75)
SODIUM SERPL-SCNC: 138 MMOL/L (ref 135–145)
SP GR UR STRIP: 1.01 (ref 1–1.03)
TACROLIMUS BLD-MCNC: 4.6 UG/L (ref 5–15)
TME LAST DOSE: ABNORMAL H
TME LAST DOSE: ABNORMAL H
UROBILINOGEN UR STRIP-MCNC: NORMAL MG/DL
WBC # BLD AUTO: 8 10E3/UL (ref 4–11)
WBC URINE: 1 /HPF

## 2024-10-16 PROCEDURE — 84156 ASSAY OF PROTEIN URINE: CPT

## 2024-10-16 PROCEDURE — 86833 HLA CLASS II HIGH DEFIN QUAL: CPT

## 2024-10-16 PROCEDURE — 85018 HEMOGLOBIN: CPT

## 2024-10-16 PROCEDURE — 86832 HLA CLASS I HIGH DEFIN QUAL: CPT

## 2024-10-16 PROCEDURE — 36415 COLL VENOUS BLD VENIPUNCTURE: CPT

## 2024-10-16 PROCEDURE — 87799 DETECT AGENT NOS DNA QUANT: CPT

## 2024-10-16 PROCEDURE — 81001 URINALYSIS AUTO W/SCOPE: CPT

## 2024-10-16 PROCEDURE — 80048 BASIC METABOLIC PNL TOTAL CA: CPT

## 2024-10-16 PROCEDURE — 80197 ASSAY OF TACROLIMUS: CPT

## 2024-10-16 PROCEDURE — 82805 BLOOD GASES W/O2 SATURATION: CPT

## 2024-10-16 RX ORDER — TACROLIMUS 0.5 MG/1
CAPSULE ORAL
Qty: 60 CAPSULE | Refills: 0 | Status: SHIPPED | OUTPATIENT
Start: 2024-10-16 | End: 2024-10-17

## 2024-10-16 RX ORDER — TACROLIMUS 0.5 MG/1
CAPSULE ORAL
Qty: 60 CAPSULE | Refills: 0 | Status: CANCELLED | OUTPATIENT
Start: 2024-10-16

## 2024-10-16 NOTE — TELEPHONE ENCOUNTER
tacrolimus (GENERIC EQUIVALENT) 0.5 MG capsule     [place], [place] - [address] [address] Phone: [phone]   Fax: [phone]          [facility] needs Directions and dose for the Tacrolimus 0.5mg

## 2024-10-17 ENCOUNTER — TELEPHONE (OUTPATIENT)
Dept: TRANSPLANT | Facility: CLINIC | Age: 29
End: 2024-10-17
Payer: MEDICARE

## 2024-10-17 DIAGNOSIS — Z94.0 KIDNEY TRANSPLANT RECIPIENT: ICD-10-CM

## 2024-10-17 DIAGNOSIS — Z76.82 KIDNEY TRANSPLANT CANDIDATE: ICD-10-CM

## 2024-10-17 RX ORDER — TACROLIMUS 0.5 MG/1
0.5 CAPSULE ORAL 2 TIMES DAILY
Qty: 60 CAPSULE | Refills: 11 | Status: SHIPPED | OUTPATIENT
Start: 2024-10-17 | End: 2024-10-31

## 2024-10-17 RX ORDER — TACROLIMUS 1 MG/1
3 CAPSULE ORAL 2 TIMES DAILY
Qty: 180 CAPSULE | Refills: 11 | Status: SHIPPED | OUTPATIENT
Start: 2024-10-17 | End: 2024-10-31

## 2024-10-17 NOTE — TELEPHONE ENCOUNTER
ISSUE:   Tacrolimus IR level 4.8 on 10/16, goal 6-8, dose 3 mg BID.  Dose increased last week, level remains low.     PLAN:   Call Patient and confirm this was an accurate 12-hour trough.   Verify Tacrolimus IR dose 3 mg BID.   Confirm no new medications or or missed doses.   Confirm no new illness / infection / diarrhea.   If accurate trough and accurate dose, increase Tacrolimus IR dose to 3.5 mg BID     Is this more than a 50% increase or decrease in current IS dose: No  If YES, justification: N/A    Repeat labs in 1 weeks.  *If > 50% change in immunosuppression dose, repeat labs in 1 week.     OUTCOME:   Spoke with Patient, they confirm accurate trough level and current dose 3 mg BID.   Patient confirmed dose change to 3.5 mg BID.  Patient agreed to repeat labs in 1 weeks.   Orders sent to preferred pharmacy for dose change and lab for repeat labs.   Patient voiced understanding of plan.

## 2024-10-31 ENCOUNTER — TELEPHONE (OUTPATIENT)
Dept: TRANSPLANT | Facility: CLINIC | Age: 29
End: 2024-10-31
Payer: MEDICARE

## 2024-10-31 DIAGNOSIS — I15.1 HTN, KIDNEY TRANSPLANT RELATED: ICD-10-CM

## 2024-10-31 DIAGNOSIS — Z94.0 HTN, KIDNEY TRANSPLANT RELATED: ICD-10-CM

## 2024-10-31 DIAGNOSIS — Z76.82 KIDNEY TRANSPLANT CANDIDATE: ICD-10-CM

## 2024-10-31 DIAGNOSIS — B34.8 BK VIREMIA: ICD-10-CM

## 2024-10-31 DIAGNOSIS — Z94.0 KIDNEY TRANSPLANT RECIPIENT: ICD-10-CM

## 2024-10-31 DIAGNOSIS — T86.10 COMPLICATION OF TRANSPLANTED KIDNEY, UNSPECIFIED COMPLICATION: ICD-10-CM

## 2024-10-31 RX ORDER — TACROLIMUS 0.5 MG/1
CAPSULE ORAL
Status: SHIPPED
Start: 2024-10-31 | End: 2024-11-11

## 2024-10-31 RX ORDER — TACROLIMUS 1 MG/1
3 CAPSULE ORAL 2 TIMES DAILY
Qty: 180 CAPSULE | Refills: 11 | Status: SHIPPED | OUTPATIENT
Start: 2024-10-31 | End: 2024-11-11

## 2024-10-31 NOTE — TELEPHONE ENCOUNTER
Taylor says he has been taking 3 mg of tacrolimus bid, not 3.5 mg bid as previously instructed. He will have a repeat level drawn today and level will be reflective of him taking 3 mg bid.

## 2024-11-08 ENCOUNTER — TELEPHONE (OUTPATIENT)
Dept: TRANSPLANT | Facility: CLINIC | Age: 29
End: 2024-11-08

## 2024-11-08 ENCOUNTER — LAB (OUTPATIENT)
Dept: LAB | Facility: CLINIC | Age: 29
End: 2024-11-08
Payer: MEDICARE

## 2024-11-08 DIAGNOSIS — Z79.899 ENCOUNTER FOR LONG-TERM CURRENT USE OF MEDICATION: ICD-10-CM

## 2024-11-08 DIAGNOSIS — T86.10 COMPLICATION OF TRANSPLANTED KIDNEY, UNSPECIFIED COMPLICATION: ICD-10-CM

## 2024-11-08 DIAGNOSIS — Z98.890 OTHER SPECIFIED POSTPROCEDURAL STATES: ICD-10-CM

## 2024-11-08 DIAGNOSIS — B34.8 BK VIREMIA: ICD-10-CM

## 2024-11-08 DIAGNOSIS — I15.1 HTN, KIDNEY TRANSPLANT RELATED: ICD-10-CM

## 2024-11-08 DIAGNOSIS — Z20.828 CONTACT WITH AND (SUSPECTED) EXPOSURE TO OTHER VIRAL COMMUNICABLE DISEASES: ICD-10-CM

## 2024-11-08 DIAGNOSIS — Z94.0 KIDNEY TRANSPLANT RECIPIENT: ICD-10-CM

## 2024-11-08 DIAGNOSIS — Z94.0 KIDNEY REPLACED BY TRANSPLANT: ICD-10-CM

## 2024-11-08 DIAGNOSIS — Z94.0 HTN, KIDNEY TRANSPLANT RELATED: ICD-10-CM

## 2024-11-08 DIAGNOSIS — Z76.82 KIDNEY TRANSPLANT CANDIDATE: ICD-10-CM

## 2024-11-08 DIAGNOSIS — Z48.298 AFTERCARE FOLLOWING ORGAN TRANSPLANT: ICD-10-CM

## 2024-11-08 LAB
ALBUMIN MFR UR ELPH: 12.1 MG/DL
ANION GAP SERPL CALCULATED.3IONS-SCNC: 12 MMOL/L (ref 7–15)
BUN SERPL-MCNC: 16.1 MG/DL (ref 6–20)
CALCIUM SERPL-MCNC: 10 MG/DL (ref 8.8–10.4)
CHLORIDE SERPL-SCNC: 100 MMOL/L (ref 98–107)
CREAT SERPL-MCNC: 0.97 MG/DL (ref 0.67–1.17)
CREAT UR-MCNC: 105.2 MG/DL
EGFRCR SERPLBLD CKD-EPI 2021: >90 ML/MIN/1.73M2
ERYTHROCYTE [DISTWIDTH] IN BLOOD BY AUTOMATED COUNT: 16.3 % (ref 10–15)
GLUCOSE SERPL-MCNC: 165 MG/DL (ref 70–99)
HCO3 SERPL-SCNC: 22 MMOL/L (ref 22–29)
HCT VFR BLD AUTO: 55.7 % (ref 40–53)
HGB BLD-MCNC: 17.6 G/DL (ref 13.3–17.7)
MCH RBC QN AUTO: 26.7 PG (ref 26.5–33)
MCHC RBC AUTO-ENTMCNC: 31.6 G/DL (ref 31.5–36.5)
MCV RBC AUTO: 84 FL (ref 78–100)
PLATELET # BLD AUTO: 202 10E3/UL (ref 150–450)
POTASSIUM SERPL-SCNC: 3.7 MMOL/L (ref 3.4–5.3)
PROT/CREAT 24H UR: 0.12 MG/MG CR (ref 0–0.2)
RBC # BLD AUTO: 6.6 10E6/UL (ref 4.4–5.9)
SODIUM SERPL-SCNC: 134 MMOL/L (ref 135–145)
TACROLIMUS BLD-MCNC: 5.6 UG/L (ref 5–15)
TME LAST DOSE: NORMAL H
TME LAST DOSE: NORMAL H
WBC # BLD AUTO: 7.1 10E3/UL (ref 4–11)

## 2024-11-08 PROCEDURE — 36415 COLL VENOUS BLD VENIPUNCTURE: CPT

## 2024-11-08 PROCEDURE — 80048 BASIC METABOLIC PNL TOTAL CA: CPT

## 2024-11-08 PROCEDURE — 85027 COMPLETE CBC AUTOMATED: CPT

## 2024-11-08 PROCEDURE — 84156 ASSAY OF PROTEIN URINE: CPT

## 2024-11-08 PROCEDURE — 87799 DETECT AGENT NOS DNA QUANT: CPT

## 2024-11-08 PROCEDURE — 80197 ASSAY OF TACROLIMUS: CPT

## 2024-11-08 PROCEDURE — 82947 ASSAY GLUCOSE BLOOD QUANT: CPT

## 2024-11-08 NOTE — TELEPHONE ENCOUNTER
ISSUE:   Tacrolimus IR level 5.6 on 11/8, goal 6-8, dose 3 mg BID.    PLAN:   Call Patient and confirm this was an accurate 12-hour trough.   Verify Tacrolimus IR dose 3 mg BID.   Confirm no new medications or or missed doses.   Confirm no new illness / infection / diarrhea.   If accurate trough and accurate dose, increase Tacrolimus IR dose to 3.5 mg BID     Is this more than a 50% increase or decrease in current IS dose: No  If YES, justification: N/A    Repeat labs in 1 weeks.  *If > 50% change in immunosuppression dose, repeat labs in 1 week.     OUTCOME:   Spoke with Patient, they confirm accurate trough level and current dose 3 mg BID.   Patient confirmed dose change to 3.5 mg BID.  Patient agreed to repeat labs in 1 weeks.   Orders sent to preferred pharmacy for dose change and lab for repeat labs.   Patient voiced understanding of plan.

## 2024-11-10 LAB
BK VIRUS DNA IU/ML, INSTRUMENT (6800): 633 IU/ML
BK VIRUS SPECIMEN TYPE: ABNORMAL
BKV DNA SPEC NAA+PROBE-LOG#: 2.8 {LOG_COPIES}/ML

## 2024-11-11 RX ORDER — TACROLIMUS 0.5 MG/1
0.5 CAPSULE ORAL 2 TIMES DAILY
Qty: 60 CAPSULE | Refills: 11 | Status: SHIPPED | OUTPATIENT
Start: 2024-11-11

## 2024-11-11 RX ORDER — TACROLIMUS 1 MG/1
3 CAPSULE ORAL 2 TIMES DAILY
Qty: 180 CAPSULE | Refills: 11 | Status: SHIPPED | OUTPATIENT
Start: 2024-11-11

## 2024-12-09 ENCOUNTER — TELEPHONE (OUTPATIENT)
Dept: TRANSPLANT | Facility: CLINIC | Age: 29
End: 2024-12-09
Payer: MEDICARE

## 2024-12-09 DIAGNOSIS — B34.8 BK VIREMIA: ICD-10-CM

## 2024-12-09 DIAGNOSIS — Z94.0 KIDNEY REPLACED BY TRANSPLANT: Primary | ICD-10-CM

## 2024-12-09 DIAGNOSIS — Z48.298 AFTERCARE FOLLOWING ORGAN TRANSPLANT: ICD-10-CM

## 2024-12-09 DIAGNOSIS — T86.10 COMPLICATION OF TRANSPLANTED KIDNEY, UNSPECIFIED COMPLICATION: ICD-10-CM

## 2024-12-09 DIAGNOSIS — Z20.828 CONTACT WITH AND (SUSPECTED) EXPOSURE TO OTHER VIRAL COMMUNICABLE DISEASES: ICD-10-CM

## 2024-12-09 DIAGNOSIS — I15.1 HTN, KIDNEY TRANSPLANT RELATED: ICD-10-CM

## 2024-12-09 DIAGNOSIS — Z79.899 ENCOUNTER FOR LONG-TERM CURRENT USE OF MEDICATION: ICD-10-CM

## 2024-12-09 DIAGNOSIS — Z94.0 HTN, KIDNEY TRANSPLANT RELATED: ICD-10-CM

## 2024-12-09 DIAGNOSIS — Z98.890 OTHER SPECIFIED POSTPROCEDURAL STATES: ICD-10-CM

## 2024-12-09 LAB
DONOR IDENTIFICATION: NORMAL
DQB5: 562
DR53: 3070
DR7: 554
DSA COMMENTS: NORMAL
DSA PRESENT: YES
DSA TEST METHOD: NORMAL
FLOWPRA1 CELL: NORMAL
FLOWPRA1 COMMENTS: NORMAL
FLOWPRA1 RESULT: NORMAL
FLOWPRA1 TEST METHOD: NORMAL
FLOWPRA2 CELL: NORMAL
FLOWPRA2 COMMENTS: NORMAL
FLOWPRA2 RESULT: NORMAL
FLOWPRA2 TEST METHOD: NORMAL
ORGAN: NORMAL
SA 1  COMMENTS: NORMAL
SA 1 CELL: NORMAL
SA 1 TEST METHOD: NORMAL
SA 2 CELL: NORMAL
SA 2 COMMENTS: NORMAL
SA 2 TEST METHOD: NORMAL
SA1 HI RISK ABY: NORMAL
SA1 MOD RISK ABY: NORMAL
SA2 HI RISK ABY: NORMAL
SA2 MOD RISK ABY: NORMAL
UNACCEPTABLE ANTIGENS: NORMAL
UNOS CPRA: 91

## 2024-12-09 NOTE — LETTER
OUTPATIENT LABORATORY TEST ORDER     Patient Name: Taylor Valiente   YOB: 1995     [facility] MR# [if applicable]: [id number]   Date & Time: December 10, 2024  2:42 PM  Expiration Date: 1 year after date issued      Diagnoses: Kidney Transplant (ICD-10 Z94.0)   Long term use of medications (ICD-10 Z79.899)    Other specified postprocedural states (Z98.890)     We ask your assistance in obtaining the following laboratory tests, which are part of our routine surveillance program for Solid Organ Transplant patients.     Please fax each result to [phone], same day as resulted/available    Critical lab results page [phone]    Year 1-2 post-transplant (12/5/24-12/5/25)  Monthly  CBC with platelets  Basic Metabolic Panel (Sodium, Potassium, Chloride, Creatinine, CO2, Urea Nitrogen, glucose, Calcium)  Tacrolimus drug level - 12-hour trough, please document time of last dose    BK (Polyoma Virus) PCR Quantitative/Plasma    At month 13 (1/5/25)   PRA/DSA (patient to provide )    At month 15 (3/5/25)   T Cell Subset (CD4)    At month 18 (6/5/25)   PRA / DSA (patient to provide )  T Cell Subset (CD4)    Years 2-5 post-transplant:   Every other month   CBC with platelets  Basic Metabolic Panel (Sodium, Potassium, Chloride, CO2, Creatinine, Urea Nitrogen, glucose, Calcium)  Tacrolimus drug level - 12-hour trough, please document time of last dose        Every 6 Months   Urine for protein/creatinine    At 2 years post-transplant (12/5/25)  PRA/DSA level (mailers provided by the patient)  AlloSure (kits will be mailed to lab by [email or web address])  T cell subset (CD4)          If you have any questions, please call [facility] [phone] or [phone], Fax [phone].      Lucinda Webber M.D.   of Medicine  Nephrology and Hypertension  Department of Medicine  [facility]    Call  with any questions. Thanks!

## 2024-12-09 NOTE — TELEPHONE ENCOUNTER
Issue  missed BMP       One year post kidney transplant      Task     Please update transplant  labs Dr Lucinda Webber ordering provider   Call or send [employer] message to obtain transplant  labs prior to his appointment  On Dec 13,2024  Dr Lucinda Webber

## 2024-12-10 ENCOUNTER — TELEPHONE (OUTPATIENT)
Dept: TRANSPLANT | Facility: CLINIC | Age: 29
End: 2024-12-10
Payer: MEDICARE

## 2024-12-10 DIAGNOSIS — T86.10 COMPLICATION OF TRANSPLANTED KIDNEY, UNSPECIFIED COMPLICATION: ICD-10-CM

## 2024-12-10 DIAGNOSIS — B34.8 BK VIREMIA: ICD-10-CM

## 2024-12-10 DIAGNOSIS — Z94.0 HTN, KIDNEY TRANSPLANT RELATED: ICD-10-CM

## 2024-12-10 DIAGNOSIS — Z48.298 AFTERCARE FOLLOWING ORGAN TRANSPLANT: Primary | ICD-10-CM

## 2024-12-10 DIAGNOSIS — Z94.0 KIDNEY REPLACED BY TRANSPLANT: ICD-10-CM

## 2024-12-10 DIAGNOSIS — I15.1 HTN, KIDNEY TRANSPLANT RELATED: ICD-10-CM

## 2024-12-10 NOTE — TELEPHONE ENCOUNTER
ISSUE:  +DSA    Adam Simons MD Poucher, Jessica, RN  Cc: Lucinda Webber MD  New low level DSA and recommend continuing to target tacrolimus level 6-8.    Lab orders updated to check tacrolimus, Taylor notified of need for monthly lab draw.

## 2024-12-13 ENCOUNTER — OFFICE VISIT (OUTPATIENT)
Dept: TRANSPLANT | Facility: CLINIC | Age: 29
End: 2024-12-13
Attending: INTERNAL MEDICINE
Payer: MEDICARE

## 2024-12-13 VITALS
HEART RATE: 81 BPM | BODY MASS INDEX: 37.59 KG/M2 | OXYGEN SATURATION: 98 % | HEIGHT: 71 IN | WEIGHT: 268.5 LBS | DIASTOLIC BLOOD PRESSURE: 97 MMHG | SYSTOLIC BLOOD PRESSURE: 155 MMHG

## 2024-12-13 DIAGNOSIS — R19.7 DIARRHEA, UNSPECIFIED TYPE: Primary | ICD-10-CM

## 2024-12-13 DIAGNOSIS — D84.9 IMMUNOSUPPRESSED STATUS (H): ICD-10-CM

## 2024-12-13 DIAGNOSIS — B34.8 BK VIREMIA: ICD-10-CM

## 2024-12-13 DIAGNOSIS — Z48.298 AFTERCARE FOLLOWING ORGAN TRANSPLANT: ICD-10-CM

## 2024-12-13 DIAGNOSIS — R76.8 DONOR SPECIFIC ANTIBODY (DSA) POSITIVE: ICD-10-CM

## 2024-12-13 DIAGNOSIS — T86.10 COMPLICATION OF TRANSPLANTED KIDNEY, UNSPECIFIED COMPLICATION: ICD-10-CM

## 2024-12-13 DIAGNOSIS — E86.0 DEHYDRATION: ICD-10-CM

## 2024-12-13 DIAGNOSIS — E13.9 POST-TRANSPLANT DIABETES MELLITUS (H): ICD-10-CM

## 2024-12-13 DIAGNOSIS — E78.5 HYPERLIPIDEMIA, UNSPECIFIED HYPERLIPIDEMIA TYPE: ICD-10-CM

## 2024-12-13 DIAGNOSIS — Z29.89 NEED FOR PNEUMOCYSTIS PROPHYLAXIS: ICD-10-CM

## 2024-12-13 DIAGNOSIS — E87.20 METABOLIC ACIDOSIS: ICD-10-CM

## 2024-12-13 DIAGNOSIS — T86.13 KIDNEY TRANSPLANT INFECTION: ICD-10-CM

## 2024-12-13 DIAGNOSIS — R79.9 ABNORMAL FINDING OF BLOOD CHEMISTRY, UNSPECIFIED: ICD-10-CM

## 2024-12-13 DIAGNOSIS — Z94.0 KIDNEY REPLACED BY TRANSPLANT: ICD-10-CM

## 2024-12-13 DIAGNOSIS — E83.42 HYPOMAGNESEMIA: ICD-10-CM

## 2024-12-13 DIAGNOSIS — D35.02 ADRENAL ADENOMA, LEFT: ICD-10-CM

## 2024-12-13 PROBLEM — E66.812 CLASS 2 SEVERE OBESITY DUE TO EXCESS CALORIES WITH SERIOUS COMORBIDITY IN ADULT (H): Status: ACTIVE | Noted: 2024-12-13

## 2024-12-13 PROBLEM — E66.01 CLASS 2 SEVERE OBESITY DUE TO EXCESS CALORIES WITH SERIOUS COMORBIDITY IN ADULT (H): Status: ACTIVE | Noted: 2024-12-13

## 2024-12-13 PROCEDURE — G0463 HOSPITAL OUTPT CLINIC VISIT: HCPCS | Performed by: INTERNAL MEDICINE

## 2024-12-13 PROCEDURE — 99417 PROLNG OP E/M EACH 15 MIN: CPT | Performed by: INTERNAL MEDICINE

## 2024-12-13 PROCEDURE — 99215 OFFICE O/P EST HI 40 MIN: CPT | Performed by: INTERNAL MEDICINE

## 2024-12-13 ASSESSMENT — PAIN SCALES - GENERAL: PAINLEVEL_OUTOF10: NO PAIN (0)

## 2024-12-13 NOTE — PROGRESS NOTES
TRANSPLANT NEPHROLOGY CLINIC VISIT     Assessment & Plan   # DDKT: CKD Stage 1 - Stable   - Baseline Creatinine: ~ 0.8-1   - Proteinuria: Normal (<0.2 grams)   - DSA Hx: Significant DSA (>3000 mfi) to DR53 and < 1000 to DR7 and DQB5    - Last cPRA: 91%   - BK Viremia: Yes, minimally elevated (< 1000), trending down   - Kidney Tx Biopsy Hx: No history of acute rejection.  2/21/2024 Result: Mild acute tubular injury, associated with no other significant signs of active antibody-mediated rejection or acute cellular allograft rejection (i0 t0 v0; g0 ptc0 C4d0) , no global glomerulosclerosis or IFTA (ct0 ci0 cv1 ah1 cg0 mm0)     # Immunosuppression: Tacrolimus immediate release (goal 6-8) and Mycophenolic acid (dose 540 mg every 12 hours)   - Induction with Recent Transplant:  Intermediate Intensity Protocol   - Continue with intensive monitoring of immunosuppression for efficacy and toxicity.   - Historical Changes in Immunosuppression:  on higher tac goal given DSA and lower MPA given BK viremia   - Changes: Not at this time, will wait for his DSA, if stable, will go down on his tac from 6-8 to 4-6 given 1 year from transplant    # Infection Prevention:      - PJP: Sulfa/TMP (Bactrim)  - CMV: None, prophylaxis completed      - CMV IgG Ab High Risk Discordance (D+/R-): No  CMV Serostatus: Positive  - EBV IgG Ab High Risk Discordance (D+/R-): No  EBV Serostatus: Positive    # Hypertension: Borderline control;  Goal BP: < 130/80   - Changes: Not at this time as he started home BP been 120-130's systolic    # Elevated Blood Glucose: Glucose generally running ~ 140's, A1c at 7.6 with average glucose of 171 mg /dl. Unclear if high level is related to high Hb vs true diabetes vs post transplant diabetes  - will refer to endocrine    # Post-Transplant Erythrocytosis: Hgb: Trend up;  On ACEI/ARB: Yes , enalapril 20 mg daily. Imaging: Yes - renal US negative for masses    # Mineral Bone Disorder:    - Secondary renal  hyperparathyroidism; PTH level: Mildly elevated (151-300 pg/ml)        On treatment: None  - Vitamin D; level: Normal        On supplement: No  - Calcium; level: Normal        On supplement: No    # Electrolytes:   - Potassium; level: Normal        On supplement: No  - Magnesium; level: Normal        On supplement: No  - Bicarbonate; level: Low        On supplement: Yes  - Sodium; level: Low normal    # Other Significant PMH:   - GERD: increase Pepcid to 40 BID, add protonix for 3 months then taper, avoid TUMS as able               - Obesity: BMI 33.9 during last visit and now at 37.45     # Skin Cancer Risk:    - Discussed sun protection and recommend regular follow up with Dermatology.    # Transplant History:  Etiology of Kidney Failure: Focal segmental glomerulosclerosis (FSGS)  Tx: DDKT  Transplant: 12/5/2023 (Kidney)  Significant transplant-related complications: BK Viremia and DSA    Transplant Office Phone Number: [phone]    Assessment and plan was discussed with the patient and he voiced his understanding and agreement.    Return visit: No follow-ups on file.    Lucinda Webber MD    The longitudinal plan of care for the diagnosis(es)/condition(s) as documented were addressed during this visit. Due to the added complexity in care, I will continue to support Leesalizabethlelo in the subsequent management and with ongoing continuity of care.      Chief Complaint   Mr. Valiente is a 29 year old here for kidney transplant, immunosuppression management, and BK viremia.     History of Present Illness     Mr. Valiente reports feeling standing overall.  Since last clinic visit:   Hospitalizations: No   New Medical Issues: No  Chest pain or shortness of breath: No  Lower extremity swelling: No  Weight change: No  Nausea and vomiting: No  Diarrhea: No  Heartburn symptoms: No  Fever, sweats or chills: No  Urinary complaints: No    Home BP:  120-140's systolic       Problem List   Patient Active Problem List    Diagnosis    LVH (left ventricular hypertrophy) due to hypertensive disease    HTN, kidney transplant related    Adrenal adenoma, left    Hyperlipidemia    Prolonged Q-T interval on ECG    Kidney replaced by transplant    Hypomagnesemia    Metabolic acidosis    Immunosuppressed status (H)    Dehydration    Diarrhea    BK viremia    Donor specific antibody (DSA) positive    Complication of kidney transplant    Kidney transplant infection    Cannabis use disorder, moderate, in sustained remission (H)    Aftercare following organ transplant    Need for pneumocystis prophylaxis       Allergies   Allergies   Allergen Reactions    Benadryl [Diphenhydramine] Itching    Vancomycin Itching     Medications   Current Outpatient Medications   Medication Sig Dispense Refill    acetaminophen (TYLENOL) 325 MG tablet Take 2 tablets (650 mg) by mouth every 4 hours as needed for pain 100 tablet 0    amLODIPine (NORVASC) 5 MG tablet Take 1 tablet (5 mg) by mouth daily 90 tablet 2    aspirin 81 MG EC tablet Take 81 mg by mouth daily      atorvastatin (LIPITOR) 10 MG tablet Take 1 tablet (10 mg) by mouth daily 90 tablet 3    carvedilol (COREG) 25 MG tablet Take 1 tablet (25 mg) by mouth 2 times daily (with meals) 60 tablet 11    enalapril (VASOTEC) 10 MG tablet Take 2 tablets (20 mg) by mouth daily. 90 tablet 1    famotidine (PEPCID) 20 MG tablet Take 1 tablet (20 mg) by mouth 2 times daily      famotidine (PEPCID) 40 MG tablet Take 1 tablet (40 mg) by mouth 2 times daily 60 tablet 3    magnesium glycinate 100 MG CAPS capsule Take 3 capsules (300 mg) by mouth 2 times daily [facility] Magnesium or generic Magnesium glycinate      medical cannabis (Patient's own supply) See Admin Instructions (The purpose of this order is to document that the patient reports taking medical cannabis.  This is not a prescription, and is not used to certify that the patient has a qualifying medical condition.)    Patient was advised on the  "cannabis-tacrolimus drug interaction.      Multiple Vitamin (MULTIVITAMIN ADULT PO) Take 1 tablet by mouth daily      mycophenolic acid (GENERIC EQUIVALENT) 180 MG EC tablet Take 3 tablets (540 mg) by mouth 2 times daily Emergency supply. Pt will use GoodRx 180 tablet 11    pantoprazole (PROTONIX) 40 MG EC tablet Take 1 tablet (40 mg) by mouth daily Wean after 3 months 30 tablet 2    psyllium (METAMUCIL/KONSYL) 58.6 % powder Take 1 teaspoonful by mouth daily      sodium bicarbonate 650 MG tablet Take 3 tablets (1,950 mg) by mouth 3 times daily 270 tablet 11    Sodium Bicarbonate, antacid, POWD Mix 1 teaspoon of baking soda in full glass of water once daily      sulfamethoxazole-trimethoprim (BACTRIM) 400-80 MG tablet Take 1 tablet by mouth daily 30 tablet 11    tacrolimus (GENERIC EQUIVALENT) 0.5 MG capsule Take 1 capsule (0.5 mg) by mouth 2 times daily. Total dose = 3.5 mg twice daily 60 capsule 11    tacrolimus (GENERIC EQUIVALENT) 1 MG capsule Take 3 capsules (3 mg) by mouth 2 times daily. Total dose = 3.5 mg twice daily 180 capsule 11     No current facility-administered medications for this visit.     There are no discontinued medications.    Physical Exam   Vital Signs: BP (!) 155/97 (BP Location: Right arm, Patient Position: Sitting)   Pulse 81   Ht 1.803 m (5' 11\")   Wt 121.8 kg (268 lb 8 oz)   SpO2 98%   BMI 37.45 kg/m      GENERAL APPEARANCE: alert and no distress  EYES: eyes grossly normal to inspection  HENT: normal cephalic/atraumatic  RESP: lungs clear to auscultation - no rales, rhonchi or wheezes  CV: regular rhythm, normal rate, no rub, no murmur  EDEMA: no LE edema bilaterally  ABDOMEN: soft, nondistended, non tender  MS: extremities normal - no gross deformities notedSKIN: no rash  NEURO: mentation intact and speech normal  PSYCH: mentation appears normal and affect normal/bright  TX KIDNEY: normal    Data         Latest Ref Rng & Units 12/13/2024     9:09 AM 11/8/2024     8:52 AM 10/16/2024 "     8:49 AM   Renal   Sodium 135 - 145 mmol/L 133  134  138    K 3.4 - 5.3 mmol/L 3.7  3.7  4.3    Cl 98 - 107 mmol/L 101  100  102    Cl (external) 98 - 107 mmol/L 101  100  102    CO2 22 - 29 mmol/L 20  22  20    Urea Nitrogen 6.0 - 20.0 mg/dL 17.9  16.1  18.1    Creatinine 0.67 - 1.17 mg/dL 0.80  0.97  0.99    Glucose 70 - 99 mg/dL 186  165  165    Calcium 8.8 - 10.4 mg/dL 10.0  10.0  10.0          Latest Ref Rng & Units 8/2/2024     9:19 AM 7/12/2024     9:08 AM 6/15/2024     9:14 AM   Bone Health   Phosphorus 2.5 - 4.5 mg/dL   3.0    Parathyroid Hormone Intact 15 - 65 pg/mL 250      Vit D Def 20 - 50 ng/mL  29           Latest Ref Rng & Units 12/13/2024     9:09 AM 11/8/2024     8:52 AM 10/16/2024     8:49 AM   Heme   WBC 4.0 - 11.0 10e3/uL 7.0  7.1  8.0    Hgb 13.3 - 17.7 g/dL 17.9  17.6  16.8    Plt 150 - 450 10e3/uL 217  202  230          Latest Ref Rng & Units 12/5/2023    12:07 AM 8/2/2023     5:39 AM 7/27/2023    10:08 PM   Liver   AP 40 - 150 U/L 138   77    TBili <=1.2 mg/dL 0.5   0.3    ALT 0 - 70 U/L 20   8    AST 0 - 45 U/L 13   8    Tot Protein 6.4 - 8.3 g/dL 7.7   7.5    Albumin 3.5 - 5.2 g/dL 4.5  3.3  3.8          Latest Ref Rng & Units 12/5/2023    12:07 AM 5/30/2023     6:23 AM 11/29/2021     6:50 AM   Pancreas   A1C <5.7 % 5.9  4.4     Lipase 73 - 393 U/L   304          Latest Ref Rng & Units 12/19/2023     9:55 AM 12/10/2023     8:03 AM 9/11/2020     6:34 AM   Iron studies   Iron 61 - 157 ug/dL 30  80  33    Iron Saturation Index 15 - 46 %   18    Iron Sat Index 15 - 46 % 14  41     Ferritin 31 - 409 ng/mL 1,477  1,743  325          Latest Ref Rng & Units 1/23/2024     6:07 AM 12/5/2023    12:07 AM 6/17/2021    12:35 PM   [facility] T[facility] Virology   EBV CAPSID ANTIBODY IGG No detectable antibody.  Positive     EBV DNA COPIES/ML Not Detected copies/mL Not Detected      Hep B Core NR^Nonreactive   Nonreactive      Failed to redirect to the Timeline version of the REVFS SmartLink.  Recent Labs   Lab Test  10/16/24  0849 11/08/24  0852 11/29/24  0907   DOSTAC 10/15/2024 11/7/2024 11/28/2024   TACROL 4.6* 5.6 8.1     Recent Labs   Lab Test 01/25/24  0900 02/01/24  0706 02/08/24  0838   DOSMPA 1/24/2024   8:30 PM 1/31/2024   8:30 PM 2/7/2024   8:30 PM   MPACID <0.25* 6.16* 1.67   MPAG 10.6* 28.0* 18.2*    Prescription drug management  55 minutes spent by me on the date of the encounter doing chart review, history and exam, documentation and further activities per the note

## 2024-12-13 NOTE — LETTER
12/13/2024      Taylor Valiente  [id number]   [place] [address]      Dear Colleague,    Thank you for referring your patient, Taylor Valiente, to the [facility]PL[facility]. Please see a copy of my visit note below.    TRANSPLANT NEPHROLOGY CLINIC VISIT     Assessment & Plan  # DDKT: CKD Stage 1 - Stable   - Baseline Creatinine: ~ 0.8-1   - Proteinuria: Normal (<0.2 grams)   - DSA Hx: Significant DSA (>3000 mfi) to DR53 and < 1000 to DR7 and DQB5    - Last cPRA: 91%   - BK Viremia: Yes, minimally elevated (< 1000), trending down   - Kidney Tx Biopsy Hx: No history of acute rejection.  2/21/2024 Result: Mild acute tubular injury, associated with no other significant signs of active antibody-mediated rejection or acute cellular allograft rejection (i0 t0 v0; g0 ptc0 C4d0) , no global glomerulosclerosis or IFTA (ct0 ci0 cv1 ah1 cg0 mm0)     # Immunosuppression: Tacrolimus immediate release (goal 6-8) and Mycophenolic acid (dose 540 mg every 12 hours)   - Induction with Recent Transplant:  Intermediate Intensity Protocol   - Continue with intensive monitoring of immunosuppression for efficacy and toxicity.   - Historical Changes in Immunosuppression:  on higher tac goal given DSA and lower MPA given BK viremia   - Changes: Not at this time, will wait for his DSA, if stable, will go down on his tac from 6-8 to 4-6 given 1 year from transplant    # Infection Prevention:      - PJP: Sulfa/TMP (Bactrim)  - CMV: None, prophylaxis completed      - CMV IgG Ab High Risk Discordance (D+/R-): No  CMV Serostatus: Positive  - EBV IgG Ab High Risk Discordance (D+/R-): No  EBV Serostatus: Positive    # Hypertension: Borderline control;  Goal BP: < 130/80   - Changes: Not at this time as he started home BP been 120-130's systolic    # Elevated Blood Glucose: Glucose generally running ~ 140's, A1c at 7.6 with average glucose of 171 mg /dl. Unclear if high level is related to high Hb vs true diabetes vs  post transplant diabetes  - will refer to endocrine    # Post-Transplant Erythrocytosis: Hgb: Trend up;  On ACEI/ARB: Yes , enalapril 20 mg daily. Imaging: Yes - renal US negative for masses    # Mineral Bone Disorder:    - Secondary renal hyperparathyroidism; PTH level: Mildly elevated (151-300 pg/ml)        On treatment: None  - Vitamin D; level: Normal        On supplement: No  - Calcium; level: Normal        On supplement: No    # Electrolytes:   - Potassium; level: Normal        On supplement: No  - Magnesium; level: Normal        On supplement: No  - Bicarbonate; level: Low        On supplement: Yes  - Sodium; level: Low normal    # Other Significant PMH:   - GERD: increase Pepcid to 40 BID, add protonix for 3 months then taper, avoid TUMS as able               - Obesity: BMI 33.9 during last visit and now at 37.45     # Skin Cancer Risk:    - Discussed sun protection and recommend regular follow up with Dermatology.    # Transplant History:  Etiology of Kidney Failure: Focal segmental glomerulosclerosis (FSGS)  Tx: DDKT  Transplant: 12/5/2023 (Kidney)  Significant transplant-related complications: BK Viremia and DSA    Transplant Office Phone Number: [phone]    Assessment and plan was discussed with the patient and he voiced his understanding and agreement.    Return visit: No follow-ups on file.    Lucinda Webber MD    The longitudinal plan of care for the diagnosis(es)/condition(s) as documented were addressed during this visit. Due to the added complexity in care, I will continue to support Taylor in the subsequent management and with ongoing continuity of care.      Chief Complaint  Mr. Valiente is a 29 year old here for kidney transplant, immunosuppression management, and BK viremia.     History of Present Illness    Mr. Valiente reports feeling standing overall.  Since last clinic visit:   Hospitalizations: No   New Medical Issues: No  Chest pain or shortness of breath: No  Lower extremity  swelling: No  Weight change: No  Nausea and vomiting: No  Diarrhea: No  Heartburn symptoms: No  Fever, sweats or chills: No  Urinary complaints: No    Home BP:  120-140's systolic       Problem List  Patient Active Problem List   Diagnosis     LVH (left ventricular hypertrophy) due to hypertensive disease     HTN, kidney transplant related     Adrenal adenoma, left     Hyperlipidemia     Prolonged Q-T interval on ECG     Kidney replaced by transplant     Hypomagnesemia     Metabolic acidosis     Immunosuppressed status (H)     Dehydration     Diarrhea     BK viremia     Donor specific antibody (DSA) positive     Complication of kidney transplant     Kidney transplant infection     Cannabis use disorder, moderate, in sustained remission (H)     Aftercare following organ transplant     Need for pneumocystis prophylaxis       Allergies  Allergies   Allergen Reactions     Benadryl [Diphenhydramine] Itching     Vancomycin Itching     Medications  Current Outpatient Medications   Medication Sig Dispense Refill     acetaminophen (TYLENOL) 325 MG tablet Take 2 tablets (650 mg) by mouth every 4 hours as needed for pain 100 tablet 0     amLODIPine (NORVASC) 5 MG tablet Take 1 tablet (5 mg) by mouth daily 90 tablet 2     aspirin 81 MG EC tablet Take 81 mg by mouth daily       atorvastatin (LIPITOR) 10 MG tablet Take 1 tablet (10 mg) by mouth daily 90 tablet 3     carvedilol (COREG) 25 MG tablet Take 1 tablet (25 mg) by mouth 2 times daily (with meals) 60 tablet 11     enalapril (VASOTEC) 10 MG tablet Take 2 tablets (20 mg) by mouth daily. 90 tablet 1     famotidine (PEPCID) 20 MG tablet Take 1 tablet (20 mg) by mouth 2 times daily       famotidine (PEPCID) 40 MG tablet Take 1 tablet (40 mg) by mouth 2 times daily 60 tablet 3     magnesium glycinate 100 MG CAPS capsule Take 3 capsules (300 mg) by mouth 2 times daily [facility] Magnesium or generic Magnesium glycinate       medical cannabis (Patient's own supply) See Admin  "Instructions (The purpose of this order is to document that the patient reports taking medical cannabis.  This is not a prescription, and is not used to certify that the patient has a qualifying medical condition.)    Patient was advised on the cannabis-tacrolimus drug interaction.       Multiple Vitamin (MULTIVITAMIN ADULT PO) Take 1 tablet by mouth daily       mycophenolic acid (GENERIC EQUIVALENT) 180 MG EC tablet Take 3 tablets (540 mg) by mouth 2 times daily Emergency supply. Pt will use GoodRx 180 tablet 11     pantoprazole (PROTONIX) 40 MG EC tablet Take 1 tablet (40 mg) by mouth daily Wean after 3 months 30 tablet 2     psyllium (METAMUCIL/KONSYL) 58.6 % powder Take 1 teaspoonful by mouth daily       sodium bicarbonate 650 MG tablet Take 3 tablets (1,950 mg) by mouth 3 times daily 270 tablet 11     Sodium Bicarbonate, antacid, POWD Mix 1 teaspoon of baking soda in full glass of water once daily       sulfamethoxazole-trimethoprim (BACTRIM) 400-80 MG tablet Take 1 tablet by mouth daily 30 tablet 11     tacrolimus (GENERIC EQUIVALENT) 0.5 MG capsule Take 1 capsule (0.5 mg) by mouth 2 times daily. Total dose = 3.5 mg twice daily 60 capsule 11     tacrolimus (GENERIC EQUIVALENT) 1 MG capsule Take 3 capsules (3 mg) by mouth 2 times daily. Total dose = 3.5 mg twice daily 180 capsule 11     No current facility-administered medications for this visit.     There are no discontinued medications.    Physical Exam  Vital Signs: BP (!) 155/97 (BP Location: Right arm, Patient Position: Sitting)   Pulse 81   Ht 1.803 m (5' 11\")   Wt 121.8 kg (268 lb 8 oz)   SpO2 98%   BMI 37.45 kg/m      GENERAL APPEARANCE: alert and no distress  EYES: eyes grossly normal to inspection  HENT: normal cephalic/atraumatic  RESP: lungs clear to auscultation - no rales, rhonchi or wheezes  CV: regular rhythm, normal rate, no rub, no murmur  EDEMA: no LE edema bilaterally  ABDOMEN: soft, nondistended, non tender  MS: extremities normal - no " gross deformities notedSKIN: no rash  NEURO: mentation intact and speech normal  PSYCH: mentation appears normal and affect normal/bright  TX KIDNEY: normal    Data        Latest Ref Rng & Units 12/13/2024     9:09 AM 11/8/2024     8:52 AM 10/16/2024     8:49 AM   Renal   Sodium 135 - 145 mmol/L 133  134  138    K 3.4 - 5.3 mmol/L 3.7  3.7  4.3    Cl 98 - 107 mmol/L 101  100  102    Cl (external) 98 - 107 mmol/L 101  100  102    CO2 22 - 29 mmol/L 20  22  20    Urea Nitrogen 6.0 - 20.0 mg/dL 17.9  16.1  18.1    Creatinine 0.67 - 1.17 mg/dL 0.80  0.97  0.99    Glucose 70 - 99 mg/dL 186  165  165    Calcium 8.8 - 10.4 mg/dL 10.0  10.0  10.0          Latest Ref Rng & Units 8/2/2024     9:19 AM 7/12/2024     9:08 AM 6/15/2024     9:14 AM   Bone Health   Phosphorus 2.5 - 4.5 mg/dL   3.0    Parathyroid Hormone Intact 15 - 65 pg/mL 250      Vit D Def 20 - 50 ng/mL  29           Latest Ref Rng & Units 12/13/2024     9:09 AM 11/8/2024     8:52 AM 10/16/2024     8:49 AM   Heme   WBC 4.0 - 11.0 10e3/uL 7.0  7.1  8.0    Hgb 13.3 - 17.7 g/dL 17.9  17.6  16.8    Plt 150 - 450 10e3/uL 217  202  230          Latest Ref Rng & Units 12/5/2023    12:07 AM 8/2/2023     5:39 AM 7/27/2023    10:08 PM   Liver   AP 40 - 150 U/L 138   77    TBili <=1.2 mg/dL 0.5   0.3    ALT 0 - 70 U/L 20   8    AST 0 - 45 U/L 13   8    Tot Protein 6.4 - 8.3 g/dL 7.7   7.5    Albumin 3.5 - 5.2 g/dL 4.5  3.3  3.8          Latest Ref Rng & Units 12/5/2023    12:07 AM 5/30/2023     6:23 AM 11/29/2021     6:50 AM   Pancreas   A1C <5.7 % 5.9  4.4     Lipase 73 - 393 U/L   304          Latest Ref Rng & Units 12/19/2023     9:55 AM 12/10/2023     8:03 AM 9/11/2020     6:34 AM   Iron studies   Iron 61 - 157 ug/dL 30  80  33    Iron Saturation Index 15 - 46 %   18    Iron Sat Index 15 - 46 % 14  41     Ferritin 31 - 409 ng/mL 1,477  1,743  325          Latest Ref Rng & Units 1/23/2024     6:07 AM 12/5/2023    12:07 AM 6/17/2021    12:35 PM   [facility] Txp Virology   EBV  CAPSID ANTIBODY IGG No detectable antibody.  Positive     EBV DNA COPIES/ML Not Detected copies/mL Not Detected      Hep B Core NR^Nonreactive   Nonreactive      Failed to redirect to the Timeline version of the REVFS SmartLink.  Recent Labs   Lab Test 10/16/24  0849 11/08/24  0852 11/29/24  0907   DOSTAC 10/15/2024 11/7/2024 11/28/2024   TACROL 4.6* 5.6 8.1     Recent Labs   Lab Test 01/25/24  0900 02/01/24  0706 02/08/24  0838   DOSMPA 1/24/2024   8:30 PM 1/31/2024   8:30 PM 2/7/2024   8:30 PM   MPACID <0.25* 6.16* 1.67   MPAG 10.6* 28.0* 18.2*    Prescription drug management  55 minutes spent by me on the date of the encounter doing chart review, history and exam, documentation and further activities per the note      Again, thank you for allowing me to participate in the care of your patient.        Sincerely,        Lucinda Webber MD

## 2024-12-13 NOTE — NURSING NOTE
"Chief Complaint   Patient presents with    RECHECK       HTN, kidney transplant related  7/14/2017    Adrenal adenoma, left           BP (!) 155/97 (BP Location: Right arm, Patient Position: Sitting)   Pulse 81   Ht 1.803 m (5' 11\")   Wt 121.8 kg (268 lb 8 oz)   SpO2 98%   BMI 37.45 kg/m    Katya Quigley CMA on 12/13/2024 at 12:02 PM    "

## 2024-12-15 PROBLEM — E13.9 POST-TRANSPLANT DIABETES MELLITUS (H): Status: ACTIVE | Noted: 2024-12-15

## 2024-12-16 DIAGNOSIS — T86.10 COMPLICATION OF TRANSPLANTED KIDNEY, UNSPECIFIED COMPLICATION: ICD-10-CM

## 2024-12-16 DIAGNOSIS — R73.09 ELEVATED HEMOGLOBIN A1C: ICD-10-CM

## 2024-12-16 DIAGNOSIS — T86.13 KIDNEY TRANSPLANT INFECTION: ICD-10-CM

## 2024-12-16 DIAGNOSIS — Z94.0 KIDNEY TRANSPLANT RECIPIENT: Primary | ICD-10-CM

## 2024-12-16 DIAGNOSIS — Z76.82 KIDNEY TRANSPLANT CANDIDATE: ICD-10-CM

## 2024-12-16 DIAGNOSIS — B34.8 BK VIREMIA: ICD-10-CM

## 2024-12-16 RX ORDER — TACROLIMUS 0.5 MG/1
0.5 CAPSULE ORAL 2 TIMES DAILY
Qty: 60 CAPSULE | Refills: 11 | Status: SHIPPED | OUTPATIENT
Start: 2024-12-16

## 2024-12-16 RX ORDER — TACROLIMUS 1 MG/1
4 CAPSULE ORAL 2 TIMES DAILY
Qty: 180 CAPSULE | Refills: 11 | Status: SHIPPED | OUTPATIENT
Start: 2024-12-16

## 2024-12-16 NOTE — TELEPHONE ENCOUNTER
Lucinda Webber MD Poucher, Jessica, GALLITO Ackerman    This pt's tac came back at ~5, if true trough, maintain tac close to 8 as he is one year, but also had new DSA in November.  Wanted to wait to change his tac goal to 4-6 if his DSA is trending down  Can stop bactrim  He needs endo, hoping he will see them      Thank you  Lucinda      ISSUE:   Tacrolimus IR level 5.5 on 12/13/24, goal 8, dose 3.5 mg BID.    PLAN:   Call Patient and confirm this was an accurate 12-hour trough.   Verify Tacrolimus IR dose 3.5 mg BID.   Confirm no new medications or illness.   Confirm no missed doses.     If accurate trough and accurate dose, increase Tacrolimus IR dose to 4.5 mg BID  *Recommended dose rounded from calculated dose 5.09 mg  BID.      Repeat labs in 1 weeks.   For any dose change <50%, recheck labs per guideline or within 1 month.  For any dose change of more than 50%, recheck drug level based on timing to reach steady state:   Immediate release tacrolimus: 2-3 days  Extended-release tacrolimus: 7 days  Cyclosporine: 2 days  Sirolimus: 12 days  Everolimus: 8 days      OUTCOME:   Spoke with Patient, they confirm accurate trough level and current dose 12 mg BID.   Patient confirmed dose change to 4.5 mg BID.  Patient agreed to repeat labs in 1 weeks.   Orders sent to preferred pharmacy for dose change and lab for repeat labs.   Patient voiced understanding of plan.    LPN Task: Please also tell patient he can stop Bactrim.

## 2024-12-19 ENCOUNTER — TELEPHONE (OUTPATIENT)
Dept: TRANSPLANT | Facility: CLINIC | Age: 29
End: 2024-12-19
Payer: MEDICARE

## 2024-12-19 DIAGNOSIS — T86.10 COMPLICATION OF TRANSPLANTED KIDNEY, UNSPECIFIED COMPLICATION: ICD-10-CM

## 2024-12-19 DIAGNOSIS — B34.8 BK VIREMIA: ICD-10-CM

## 2024-12-19 DIAGNOSIS — T86.13 KIDNEY TRANSPLANT INFECTION: ICD-10-CM

## 2024-12-19 NOTE — TELEPHONE ENCOUNTER
Patient Call: General  Route to LPN    Reason for call: Patient  stated he tested Positive for Covid twice today,  very congested, body aches,  and runny nose, temp is 100.8    Call back needed? Yes    Return Call Needed  Same as documented in contacts section  When to return call?: Same day: Route High Priority

## 2024-12-19 NOTE — TELEPHONE ENCOUNTER
Spoke with Taylor, tested positive for covid today. Feels very congested, fever 102, body aches. Able to tolerate po, denies shortness of breath. Reviewed OTC medications for symptoms support and when to go to ER.     On  mg bid, +DSA.     Sent to Dr. Webber for review.     Lucinda Webber MD Poucher, Jessica, RN  Ward Ackerman    Yes, can decrease to 360 mg BID for 5 days if symptoms improve if not for 7 days.    Thank you  Lucinda

## 2024-12-22 ENCOUNTER — HOSPITAL ENCOUNTER (EMERGENCY)
Facility: CLINIC | Age: 29
Discharge: HOME | End: 2024-12-23
Attending: EMERGENCY MEDICINE | Admitting: EMERGENCY MEDICINE
Payer: MEDICARE

## 2024-12-22 DIAGNOSIS — J34.89 SINUS PAIN: ICD-10-CM

## 2024-12-22 DIAGNOSIS — U07.1 COVID-19: ICD-10-CM

## 2024-12-22 PROCEDURE — 99283 EMERGENCY DEPT VISIT LOW MDM: CPT

## 2024-12-22 ASSESSMENT — COLUMBIA-SUICIDE SEVERITY RATING SCALE - C-SSRS
2. HAVE YOU ACTUALLY HAD ANY THOUGHTS OF KILLING YOURSELF IN THE PAST MONTH?: NO
1. IN THE PAST MONTH, HAVE YOU WISHED YOU WERE DEAD OR WISHED YOU COULD GO TO SLEEP AND NOT WAKE UP?: NO
6. HAVE YOU EVER DONE ANYTHING, STARTED TO DO ANYTHING, OR PREPARED TO DO ANYTHING TO END YOUR LIFE?: NO

## 2024-12-22 ASSESSMENT — ACTIVITIES OF DAILY LIVING (ADL): ADLS_ACUITY_SCORE: 58

## 2024-12-22 NOTE — Clinical Note
Taylor Valiente was seen and treated in our emergency department on 12/22/2024.  He may return to work on 12/26/2024.       If you have any questions or concerns, please don't hesitate to call.      Kong Polo MD

## 2024-12-23 VITALS
SYSTOLIC BLOOD PRESSURE: 152 MMHG | HEART RATE: 88 BPM | HEIGHT: 71 IN | TEMPERATURE: 97.9 F | BODY MASS INDEX: 37.72 KG/M2 | WEIGHT: 269.4 LBS | RESPIRATION RATE: 20 BRPM | DIASTOLIC BLOOD PRESSURE: 103 MMHG | OXYGEN SATURATION: 98 %

## 2024-12-23 LAB
ANION GAP SERPL CALCULATED.3IONS-SCNC: 14 MMOL/L (ref 7–15)
BUN SERPL-MCNC: 14.9 MG/DL (ref 6–20)
CALCIUM SERPL-MCNC: 10.1 MG/DL (ref 8.8–10.4)
CHLORIDE SERPL-SCNC: 104 MMOL/L (ref 98–107)
CREAT SERPL-MCNC: 0.8 MG/DL (ref 0.67–1.17)
EGFRCR SERPLBLD CKD-EPI 2021: >90 ML/MIN/1.73M2
GLUCOSE SERPL-MCNC: 181 MG/DL (ref 70–99)
HCO3 SERPL-SCNC: 19 MMOL/L (ref 22–29)
POTASSIUM SERPL-SCNC: 4 MMOL/L (ref 3.4–5.3)
SODIUM SERPL-SCNC: 137 MMOL/L (ref 135–145)

## 2024-12-23 PROCEDURE — 80048 BASIC METABOLIC PNL TOTAL CA: CPT | Performed by: EMERGENCY MEDICINE

## 2024-12-23 PROCEDURE — 36415 COLL VENOUS BLD VENIPUNCTURE: CPT | Performed by: EMERGENCY MEDICINE

## 2024-12-23 ASSESSMENT — ACTIVITIES OF DAILY LIVING (ADL): ADLS_ACUITY_SCORE: 58

## 2024-12-23 NOTE — ED TRIAGE NOTES
Pt arrives from home covid postitive Thursday today has increased HA with facial pain and increased drainage.  Tylenol PTA pt had kidney transplant approx a year ago.       Triage Assessment (Adult)       Row Name 12/22/24 2409          Triage Assessment    Airway WDL WDL        Respiratory WDL    Respiratory WDL X;cough     Cough Frequency frequent        Skin Circulation/Temperature WDL    Skin Circulation/Temperature WDL WDL        Cardiac WDL    Cardiac WDL WDL        Peripheral/Neurovascular WDL    Peripheral Neurovascular WDL WDL        Cognitive/Neuro/Behavioral WDL    Cognitive/Neuro/Behavioral WDL WDL

## 2024-12-23 NOTE — DISCHARGE INSTRUCTIONS
Discharge Instructions  COVID-19    COVID-19 is a viral illness that spreads from person-to-person primarily by droplets when an infected person coughs or sneezes and the droplets are then breathed in by another person.    Symptoms of COVID-19  Many people have no symptoms or mild symptoms.  Symptoms usually appear within a few days after contact with a person with COVID-19.  A mild COVID-19 illness is like a cold and can have fever, cough, sneezing, sore throat, tiredness, headache, and muscle pain. Some patients also have stomach symptoms like nausea, vomiting, or diarrhea.  A moderate COVID-19 illness might include shortness of breath or pneumonia on a chest x-ray.  A severe COVID-19 illness causes significant breathing problems such as low oxygen levels or more serious pneumonia.  Some patients experience loss of taste or smell which is somewhat unique to COVID-19.    What should I do if I test positive?  If you test positive for COVID and have no symptoms, you should take precautions, as described below, for five days.  If you test positive for COVID and have symptoms, you should stay home and away from others. You can go back to normal activities when you are feeling better and have been without a fever (without using medications to treat the fever) for 24 hours. When you return to normal activities you should take precautions, as described below, for five days.  Precautions to prevent the spread of COVID-19  Clean Air. Viruses spread from person to person in the air. Bring fresh air into your home by opening doors or windows or using exhaust fans if possible. Use an air filter. Be outdoors when possible.  Practice good hygiene. Cover your mouth and nose with a tissue when you cough or sneeze. Wash your hands often with soap and water for at least 20 seconds or use an       alcohol-based hand  containing at least 60% alcohol. Avoid touching your face. Clean surfaces such as countertops, handrails,  and doorknobs.  Wear a facemask. Masks both prevent an infected person from spreading the virus and prevent a well person from getting the virus. Cloth masks are good, surgical/disposable masks are better, and respirators (N95) are the best.  Practice physical distancing. Avoid being close to someone with cough or other symptoms. Avoid crowded spaces.   What should I do for myself while I am sick?  Treat your symptoms. You can take Acetaminophen (Tylenol) to treat body aches and fever as needed for comfort. Ibuprofen (Advil or Motrin) can be used as well if you still have symptoms after taking Tylenol. Drink fluids. Rest.  Watch for worsening symptoms such as shortness of breath/difficulty breathing or very severe weakness.  Exercise/Sports in rare cases, COVID could affect your heart in a way that makes exercise or participation in sports dangerous.  If you have a mild COVID illness (fever, cough, sore throat, and similar symptoms but no difficulty breathing or abnormalities of the lung): After your COVID symptoms have resolved, you can return to exercise. If you develop difficulty breathing or chest discomfort with exercise, palpitations (a sensation of your heart racing or skipping), or lightheadedness/passing out, you should contact your doctor/clinic.  If you have more than a mild illness (meaning that you have problems with your breathing or lungs) or if you participate in competitive or strenuous activity or have a history of heart disease: Please see your primary doctor/provider prior to return to activity/competition.    COVID treatments such as antiviral medications are available. They are recommended for those patients who have a risk for developing more severe COVID illness. Age is the biggest risk factor. Risk is increased for adults greater than 50 years old and particularly for adults greater than 65 years. Importantly, the treatments must be started early in the illness (within five days). These  treatments may have been considered today during your visit. If you have other questions, contact your primary doctor/clinic.    You can learn more about COVID treatments from the [place] [facility]:  [email or web address]/coronavirus/meds.[email or web address]            What should I do if I am exposed to COVID?  If you are exposed to COVID, you should monitor for symptoms and test if you develop symptoms. Practicing the precautions discussed above are a good idea, particularly if you plan to be around any person who is at higher risk of COVID complications such as older patients (>65) or people with significant medical problems.            Return to the Emergency Department if:  If you are developing worsening breathing, weakness, or feel worse you should seek medical attention.  If you are uncertain, contact your health care provider/clinic. If you need emergency medical attention, call 911.

## 2025-01-15 ENCOUNTER — TELEPHONE (OUTPATIENT)
Dept: TRANSPLANT | Facility: CLINIC | Age: 30
End: 2025-01-15
Payer: MEDICARE

## 2025-01-15 DIAGNOSIS — B34.8 BK VIREMIA: ICD-10-CM

## 2025-01-15 DIAGNOSIS — T86.13 KIDNEY TRANSPLANT INFECTION: ICD-10-CM

## 2025-01-15 DIAGNOSIS — T86.10 COMPLICATION OF TRANSPLANTED KIDNEY, UNSPECIFIED COMPLICATION: ICD-10-CM

## 2025-01-15 DIAGNOSIS — R76.8 DONOR SPECIFIC ANTIBODY (DSA) POSITIVE: Primary | ICD-10-CM

## 2025-01-15 NOTE — TELEPHONE ENCOUNTER
Called Taylor to discuss kidney biopsy recommendations, left  requesting return phone call. Sent myChart message as well requesting repeat labs with accurate tac trough.

## 2025-01-15 NOTE — TELEPHONE ENCOUNTER
Post Kidney and Pancreas Transplant Team Conference  Date: 1/15/2025  Transplant Coordinator: Meka     Attendees:  [x]  Dr. Simons [x] Randee Barillas, RN [x] Annamarie Oseguera LPN     [x]  Dr. Lynn [x] Stefani Manrique, RN [] Kimberly Ayala LPN    [x] Dr. Jones [x] Meka Rashid RN    [x] Dr. Webber [x] Nilda Duran, RN [x] Netta Walter RN   [] Dr. Caceres [] Milla Mc, RN    [] Dr. Vuong [] Ankita Quintero, GALLITO    []  Dr. Cardoza [] Linda Quintero, GALLITO    [x] Dr. Jimenez [] Britton Miranda RN    [] Vesna Yuan, BETTY [] My Singh RN    [x] Kristi Hyde NP [] Latonya Alcala, RN        Verbal Plan Read Back:   Biopsy  Repeat Tac (closer to 8)    Routed to RN Coordinator   Annamarie Oseguera LPN

## 2025-01-20 ENCOUNTER — TELEPHONE (OUTPATIENT)
Dept: TRANSPLANT | Facility: CLINIC | Age: 30
End: 2025-01-20

## 2025-01-20 ENCOUNTER — LAB (OUTPATIENT)
Dept: LAB | Facility: CLINIC | Age: 30
End: 2025-01-20
Payer: MEDICARE

## 2025-01-20 DIAGNOSIS — T86.10 COMPLICATION OF TRANSPLANTED KIDNEY, UNSPECIFIED COMPLICATION: ICD-10-CM

## 2025-01-20 DIAGNOSIS — Z48.298 AFTERCARE FOLLOWING ORGAN TRANSPLANT: ICD-10-CM

## 2025-01-20 DIAGNOSIS — D75.1 POST-TRANSPLANT ERYTHROCYTOSIS: ICD-10-CM

## 2025-01-20 DIAGNOSIS — B34.8 BK VIREMIA: ICD-10-CM

## 2025-01-20 DIAGNOSIS — Z94.0 KIDNEY REPLACED BY TRANSPLANT: ICD-10-CM

## 2025-01-20 DIAGNOSIS — T86.13 KIDNEY TRANSPLANT INFECTION: ICD-10-CM

## 2025-01-20 LAB
ALBUMIN MFR UR ELPH: 15.2 MG/DL
ANION GAP SERPL CALCULATED.3IONS-SCNC: 17 MMOL/L (ref 7–15)
BUN SERPL-MCNC: 13 MG/DL (ref 6–20)
CALCIUM SERPL-MCNC: 9.5 MG/DL (ref 8.8–10.4)
CD3 CELLS # BLD: 1312 CELLS/UL (ref 603–2990)
CD3 CELLS NFR BLD: 60 % (ref 49–84)
CD3+CD4+ CELLS # BLD: 780 CELLS/UL (ref 441–2156)
CD3+CD4+ CELLS NFR BLD: 36 % (ref 28–63)
CD3+CD4+ CELLS/CD3+CD8+ CLL BLD: 1.63 % (ref 1.4–2.6)
CD3+CD8+ CELLS # BLD: 478 CELLS/UL (ref 125–1312)
CD3+CD8+ CELLS NFR BLD: 22 % (ref 10–40)
CHLORIDE SERPL-SCNC: 102 MMOL/L (ref 98–107)
CREAT SERPL-MCNC: 0.78 MG/DL (ref 0.67–1.17)
CREAT UR-MCNC: 71.6 MG/DL
EGFRCR SERPLBLD CKD-EPI 2021: >90 ML/MIN/1.73M2
ERYTHROCYTE [DISTWIDTH] IN BLOOD BY AUTOMATED COUNT: 15.2 % (ref 10–15)
GLUCOSE SERPL-MCNC: 234 MG/DL (ref 70–99)
HCO3 SERPL-SCNC: 18 MMOL/L (ref 22–29)
HCT VFR BLD AUTO: 51.3 % (ref 40–53)
HGB BLD-MCNC: 16.5 G/DL (ref 13.3–17.7)
MCH RBC QN AUTO: 26.8 PG (ref 26.5–33)
MCHC RBC AUTO-ENTMCNC: 32.2 G/DL (ref 31.5–36.5)
MCV RBC AUTO: 83 FL (ref 78–100)
MYCOPHENOLATE SERPL LC/MS/MS-MCNC: 1.59 MG/L (ref 1–3.5)
MYCOPHENOLATE-G SERPL LC/MS/MS-MCNC: 27.3 MG/L (ref 30–95)
PLATELET # BLD AUTO: 174 10E3/UL (ref 150–450)
POTASSIUM SERPL-SCNC: 4 MMOL/L (ref 3.4–5.3)
PROT/CREAT 24H UR: 0.21 MG/MG CR (ref 0–0.2)
RBC # BLD AUTO: 6.16 10E6/UL (ref 4.4–5.9)
SODIUM SERPL-SCNC: 137 MMOL/L (ref 135–145)
T CELL COMMENT: NORMAL
TACROLIMUS BLD-MCNC: 10.4 UG/L (ref 5–15)
TME LAST DOSE: ABNORMAL H
TME LAST DOSE: ABNORMAL H
TME LAST DOSE: NORMAL H
TME LAST DOSE: NORMAL H
WBC # BLD AUTO: 8.4 10E3/UL (ref 4–11)

## 2025-01-20 PROCEDURE — 87799 DETECT AGENT NOS DNA QUANT: CPT

## 2025-01-20 PROCEDURE — 80197 ASSAY OF TACROLIMUS: CPT

## 2025-01-20 PROCEDURE — 80180 DRUG SCRN QUAN MYCOPHENOLATE: CPT

## 2025-01-20 PROCEDURE — 86360 T CELL ABSOLUTE COUNT/RATIO: CPT

## 2025-01-20 PROCEDURE — 80048 BASIC METABOLIC PNL TOTAL CA: CPT

## 2025-01-20 PROCEDURE — 84156 ASSAY OF PROTEIN URINE: CPT

## 2025-01-20 PROCEDURE — 85027 COMPLETE CBC AUTOMATED: CPT

## 2025-01-20 PROCEDURE — 86359 T CELLS TOTAL COUNT: CPT

## 2025-01-20 PROCEDURE — 36415 COLL VENOUS BLD VENIPUNCTURE: CPT

## 2025-01-20 PROCEDURE — 84520 ASSAY OF UREA NITROGEN: CPT

## 2025-01-20 RX ORDER — ENALAPRIL MALEATE 10 MG/1
20 TABLET ORAL DAILY
Qty: 90 TABLET | Refills: 1 | Status: SHIPPED | OUTPATIENT
Start: 2025-01-20

## 2025-01-20 RX ORDER — AMLODIPINE BESYLATE 5 MG/1
5 TABLET ORAL DAILY
Qty: 90 TABLET | Refills: 2 | Status: SHIPPED | OUTPATIENT
Start: 2025-01-20

## 2025-01-20 NOTE — TELEPHONE ENCOUNTER
ISSUE:  Biopsy not yet scheduled, Leesabrittany never returned phone call from last week to discuss biopsy.     Called patient, no answer. Left VM requesting a return phone call.

## 2025-01-21 LAB
BK VIRUS DNA IU/ML, INSTRUMENT (6800): 407 IU/ML
BK VIRUS SPECIMEN TYPE: ABNORMAL
BKV DNA SPEC NAA+PROBE-LOG#: 2.6 {LOG_COPIES}/ML

## 2025-01-21 NOTE — TELEPHONE ENCOUNTER
ISSUE:   Tac 10.4 (goal 6-8)  Stable low level BK     With recent increase in DSA and pending kidney biopsy, will make no changes in tacrolimus dose.     Called Taylor reyesto discuss biopsy recommendations, no answer. MyC message remains unread. Left VM requesting a return phone call.    respiratory distress

## 2025-01-22 ENCOUNTER — MYC MEDICAL ADVICE (OUTPATIENT)
Dept: INTERVENTIONAL RADIOLOGY/VASCULAR | Facility: CLINIC | Age: 30
End: 2025-01-22
Payer: MEDICARE

## 2025-01-30 ENCOUNTER — APPOINTMENT (OUTPATIENT)
Dept: INTERVENTIONAL RADIOLOGY/VASCULAR | Facility: CLINIC | Age: 30
End: 2025-01-30
Attending: INTERNAL MEDICINE
Payer: MEDICARE

## 2025-01-30 ENCOUNTER — LAB (OUTPATIENT)
Dept: LAB | Facility: CLINIC | Age: 30
End: 2025-01-30
Payer: MEDICARE

## 2025-01-30 ENCOUNTER — HOSPITAL ENCOUNTER (OUTPATIENT)
Facility: CLINIC | Age: 30
Discharge: HOME | End: 2025-01-30
Attending: RADIOLOGY | Admitting: RADIOLOGY
Payer: MEDICARE

## 2025-01-30 ENCOUNTER — APPOINTMENT (OUTPATIENT)
Dept: MEDSURG UNIT | Facility: CLINIC | Age: 30
End: 2025-01-30
Attending: RADIOLOGY
Payer: MEDICARE

## 2025-01-30 VITALS
WEIGHT: 260 LBS | BODY MASS INDEX: 35.21 KG/M2 | DIASTOLIC BLOOD PRESSURE: 64 MMHG | TEMPERATURE: 98.4 F | OXYGEN SATURATION: 96 % | RESPIRATION RATE: 16 BRPM | HEART RATE: 80 BPM | HEIGHT: 72 IN | SYSTOLIC BLOOD PRESSURE: 122 MMHG

## 2025-01-30 DIAGNOSIS — R76.8 DONOR SPECIFIC ANTIBODY (DSA) POSITIVE: ICD-10-CM

## 2025-01-30 DIAGNOSIS — Z48.298 AFTERCARE FOLLOWING ORGAN TRANSPLANT: ICD-10-CM

## 2025-01-30 DIAGNOSIS — Z94.0 KIDNEY REPLACED BY TRANSPLANT: ICD-10-CM

## 2025-01-30 LAB
ALBUMIN MFR UR ELPH: 14.5 MG/DL
ANION GAP SERPL CALCULATED.3IONS-SCNC: 12 MMOL/L (ref 7–15)
BUN SERPL-MCNC: 14.1 MG/DL (ref 6–20)
CALCIUM SERPL-MCNC: 9.7 MG/DL (ref 8.8–10.4)
CHLORIDE SERPL-SCNC: 100 MMOL/L (ref 98–107)
CREAT SERPL-MCNC: 0.79 MG/DL (ref 0.67–1.17)
CREAT UR-MCNC: 55.1 MG/DL
EGFRCR SERPLBLD CKD-EPI 2021: >90 ML/MIN/1.73M2
ERYTHROCYTE [DISTWIDTH] IN BLOOD BY AUTOMATED COUNT: 14.7 % (ref 10–15)
GLUCOSE SERPL-MCNC: 301 MG/DL (ref 70–99)
HCO3 SERPL-SCNC: 21 MMOL/L (ref 22–29)
HCT VFR BLD AUTO: 47.7 % (ref 40–53)
HGB BLD-MCNC: 15.6 G/DL (ref 13.3–17.7)
INR PPP: 1.07 (ref 0.85–1.15)
MCH RBC QN AUTO: 26.8 PG (ref 26.5–33)
MCHC RBC AUTO-ENTMCNC: 32.7 G/DL (ref 31.5–36.5)
MCV RBC AUTO: 82 FL (ref 78–100)
PLATELET # BLD AUTO: 233 10E3/UL (ref 150–450)
POTASSIUM SERPL-SCNC: 4 MMOL/L (ref 3.4–5.3)
PROT/CREAT 24H UR: 0.26 MG/MG CR (ref 0–0.2)
RBC # BLD AUTO: 5.82 10E6/UL (ref 4.4–5.9)
SODIUM SERPL-SCNC: 133 MMOL/L (ref 135–145)
TACROLIMUS BLD-MCNC: 6.6 UG/L (ref 5–15)
TME LAST DOSE: NORMAL H
TME LAST DOSE: NORMAL H
WBC # BLD AUTO: 9.9 10E3/UL (ref 4–11)

## 2025-01-30 PROCEDURE — 999N000133 HC STATISTIC PP CARE STAGE 2

## 2025-01-30 PROCEDURE — 50200 RENAL BIOPSY PERQ: CPT | Mod: RT | Performed by: PHYSICIAN ASSISTANT

## 2025-01-30 PROCEDURE — 88348 ELECTRON MICROSCOPY DX: CPT | Mod: TC | Performed by: INTERNAL MEDICINE

## 2025-01-30 PROCEDURE — 999N000248 HC STATISTIC IV INSERT WITH US BY RN

## 2025-01-30 PROCEDURE — 36415 COLL VENOUS BLD VENIPUNCTURE: CPT | Performed by: NURSE PRACTITIONER

## 2025-01-30 PROCEDURE — 80197 ASSAY OF TACROLIMUS: CPT | Performed by: INTERNAL MEDICINE

## 2025-01-30 PROCEDURE — 84156 ASSAY OF PROTEIN URINE: CPT | Performed by: PATHOLOGY

## 2025-01-30 PROCEDURE — 50200 RENAL BIOPSY PERQ: CPT

## 2025-01-30 PROCEDURE — 36415 COLL VENOUS BLD VENIPUNCTURE: CPT | Performed by: PATHOLOGY

## 2025-01-30 PROCEDURE — 80048 BASIC METABOLIC PNL TOTAL CA: CPT | Performed by: PATHOLOGY

## 2025-01-30 PROCEDURE — 76942 ECHO GUIDE FOR BIOPSY: CPT | Mod: 26 | Performed by: PHYSICIAN ASSISTANT

## 2025-01-30 PROCEDURE — 88305 TISSUE EXAM BY PATHOLOGIST: CPT | Mod: 26 | Performed by: PATHOLOGY

## 2025-01-30 PROCEDURE — 85027 COMPLETE CBC AUTOMATED: CPT | Performed by: PATHOLOGY

## 2025-01-30 PROCEDURE — 88350 IMFLUOR EA ADDL 1ANTB STN PX: CPT | Mod: 26 | Performed by: PATHOLOGY

## 2025-01-30 PROCEDURE — 88313 SPECIAL STAINS GROUP 2: CPT | Mod: TC | Performed by: INTERNAL MEDICINE

## 2025-01-30 PROCEDURE — 88305 TISSUE EXAM BY PATHOLOGIST: CPT | Mod: TC | Performed by: INTERNAL MEDICINE

## 2025-01-30 PROCEDURE — 99000 SPECIMEN HANDLING OFFICE-LAB: CPT | Performed by: PATHOLOGY

## 2025-01-30 PROCEDURE — 88346 IMFLUOR 1ST 1ANTB STAIN PX: CPT | Mod: 26 | Performed by: PATHOLOGY

## 2025-01-30 PROCEDURE — 999N000142 HC STATISTIC PROCEDURE PREP ONLY

## 2025-01-30 PROCEDURE — 88313 SPECIAL STAINS GROUP 2: CPT | Mod: 26 | Performed by: PATHOLOGY

## 2025-01-30 PROCEDURE — 250N000009 HC RX 250: Performed by: NURSE PRACTITIONER

## 2025-01-30 PROCEDURE — C1889 IMPLANT/INSERT DEVICE, NOC: HCPCS

## 2025-01-30 PROCEDURE — 85610 PROTHROMBIN TIME: CPT | Performed by: NURSE PRACTITIONER

## 2025-01-30 PROCEDURE — 88348 ELECTRON MICROSCOPY DX: CPT | Mod: 26 | Performed by: PATHOLOGY

## 2025-01-30 RX ORDER — LIDOCAINE 40 MG/G
CREAM TOPICAL
Status: DISCONTINUED | OUTPATIENT
Start: 2025-01-30 | End: 2025-01-30 | Stop reason: HOSPADM

## 2025-01-30 RX ADMIN — LIDOCAINE HYDROCHLORIDE 14 ML: 10 INJECTION, SOLUTION EPIDURAL; INFILTRATION; INTRACAUDAL; PERINEURAL at 10:38

## 2025-01-30 ASSESSMENT — ACTIVITIES OF DAILY LIVING (ADL)
ADLS_ACUITY_SCORE: 58

## 2025-01-30 NOTE — PROGRESS NOTES
Patient returned to 2A s/p transplanted kidney biopsy. Patient A&O, VSS on RA, and RLQ site C/D/I without  hematoma. Patient tolerating PO food and fluids. Will continue to monitor.

## 2025-01-30 NOTE — PROCEDURES
[facility]    Procedure: IR Procedure Note    Date/Time: 1/30/2025 10:42 AM    Performed by: Jose Armando Roberts PA-C  Authorized by: Jose Armando Roberts PA-C  IR Fellow Physician:    Pre Procedure Diagnosis: + DSA  Post Procedure Diagnosis: same    UNIVERSAL PROTOCOL   Site Marked: NA  Prior Images Obtained and Reviewed:  Yes  Required items: Required blood products, implants, devices and special equipment available    Patient identity confirmed:  Arm band, provided demographic data, hospital-assigned identification number and verbally with patient  Patient was reevaluated immediately before administering moderate or deep sedation or anesthesia  Confirmation Checklist:  Correct equipment/implants were available, procedure was appropriate and matched the consent or emergent situation, relevant allergies and patient's identity using two indicators  Time out: Immediately prior to the procedure a time out was called    Universal Protocol: the Joint Commission Universal Protocol was followed    Preparation: Patient was prepped and draped in usual sterile fashion       ANESTHESIA    Anesthesia:  Local infiltration  Local Anesthetic:  Lidocaine 1% without epinephrine      SEDATION    Patient Sedated: No    See dictated procedure note for full details.  Findings: N/a    Specimens: core needle biopsy specimens sent for pathological analysis    Procedural Complications: None    Condition: Stable      PROCEDURE  Describe Procedure: Taylor Valiente  [id number]    Completed ultrasound guided RLQ transplant kidney biopsy.  A total of three passes yielded tissue cores collected for further pathological evaluation.  Microscopy support present.  No immediate complications.  Dx: +DSA, transplant kidney biopsy.  [place]  LOCAL      Patient Tolerance:  Patient tolerated the procedure well with no immediate complications  Length of time physician/provider present for 1:1  monitoring during sedation:  0 min

## 2025-01-30 NOTE — DISCHARGE INSTRUCTIONS
[facility]    Interventional Radiology  Patient Instructions Following Biopsy    AFTER YOU GO HOME  If you were given sedation, for the first 24 hours: we recommend that an adult stay with you, DO NOT drive or operate machinery at home or at work, DO NOT smoke, DO NOT make any important or legal decisions.  DO NOT drink alcoholic beverages the day of your procedure  Drink plenty of fluids   Resume your regular diet, unless otherwise instructed by your Primary Physician  Relax and take it easy for 48 hours  DO NOT do any strenuous exercise or lifting (> 10 lbs) for at least 7 days following your procedure  Keep the dressing dry and in place for 24 hours. Replace with Band aid for 2 days.  Never leave a wet dressing in place.  Do not take a shower for at least 12 hours following your procedure. No tub bath, hot tub, or swimming for 5 days.  There should be minimum drainage from the biopsy site    CALL THE PHYSICIAN IF:  You start bleeding from the procedure site.  If you do start to bleed from that site, lie down flat and hold pressure on the site for a minimum of 10 minutes.  Your physician will tell you if you need to return to the hospital  You develop nausea or vomiting  You have excessive swelling, redness, or tenderness at the site  You have drainage that looks like it is infected.  You experience severe pain  You develop hives or a rash or unexplained itching  You develop shortness of breath  You develop a temperature of 101 degrees F or greater  You develop bloody clots or red urine after you are discharged        ADDITIONAL INSTRUCTIONS  If you are taking Coumadin, restart tonight.  Follow up with your Coumadin Clinic or Primary Care MD to have your INR rechecked.    [facility] INTERVENTIONAL RADIOLOGY DEPARTMENT  Procedure Physician: Adam Roberts PA-C                                     Date of procedure: January 30, 2025  Telephone Numbers: [phone]      Monday-Friday 7:30 am to 4:00  pm  [phone] After 4:00 pm Monday-Friday, Weekends & Holidays.   Ask for the Interventional Radiologist on call.  Someone is on call 24 hrs/day  [facility] toll free number: [phone] Monday-Friday 8:00 am to 4:30 pm  [facility] Emergency Dept: [phone]

## 2025-01-30 NOTE — PROGRESS NOTES
Patient Name: Taylor Valiente  Medical Record Number: [id number]  Today's Date: 1/30/2025    Procedure: Image guided transplant renal biopsy  Proceduralist: Mary ART  Pathology present: yes    Procedure Start: 1023  Procedure end: 1040  Sedation medications administered: Local only    Report given to:   RN  : N/A    Other Notes: Pt arrived to IR room 7 from . Consent reviewed. Pt denies any questions or concerns regarding procedure. Pt positioned supine and monitored per protocol. 3 cores obtained and sent to lab as ordered.  Torpedo gelfoam used to close biopsy needle track.  Hemastatis achieved.  Patient to be on bedrest for 2 hours.  Pt tolerated procedure without any noted complications. Pt transferred back to .

## 2025-01-30 NOTE — PROGRESS NOTES
Patient arrives to 2A in room three. Patient arrives with mother. Patient's VSS, AOx4. Awaiting INR result, prep otherwise completed. Will continue to assess patient until taken for procedure.

## 2025-01-30 NOTE — PROGRESS NOTES
Patient tolerated recovery stage well. VSS, RLQ site clean/dry/intact, no hematoma, and denies pain. Patient tolerated PO food and fluids. Teaching was done and discharge instructions were given. Patient ambulated, voided, and PIV was removed. Patient discharged from the hospital  to home with mother.

## 2025-02-03 LAB
PATH REPORT.COMMENTS IMP SPEC: NORMAL
PATH REPORT.COMMENTS IMP SPEC: NORMAL
PATH REPORT.FINAL DX SPEC: NORMAL
PATH REPORT.GROSS SPEC: NORMAL
PATH REPORT.MICROSCOPIC SPEC OTHER STN: NORMAL
PATH REPORT.RELEVANT HX SPEC: NORMAL
PHOTO IMAGE: NORMAL

## 2025-02-04 ENCOUNTER — APPOINTMENT (OUTPATIENT)
Dept: CT IMAGING | Facility: CLINIC | Age: 30
End: 2025-02-04
Attending: EMERGENCY MEDICINE
Payer: MEDICARE

## 2025-02-04 ENCOUNTER — TELEPHONE (OUTPATIENT)
Dept: TRANSPLANT | Facility: CLINIC | Age: 30
End: 2025-02-04

## 2025-02-04 ENCOUNTER — HOSPITAL ENCOUNTER (EMERGENCY)
Facility: CLINIC | Age: 30
Discharge: HOME | End: 2025-02-04
Attending: EMERGENCY MEDICINE | Admitting: EMERGENCY MEDICINE
Payer: MEDICARE

## 2025-02-04 VITALS
WEIGHT: 256.39 LBS | HEART RATE: 97 BPM | OXYGEN SATURATION: 100 % | RESPIRATION RATE: 18 BRPM | BODY MASS INDEX: 34.77 KG/M2 | TEMPERATURE: 99.5 F | SYSTOLIC BLOOD PRESSURE: 151 MMHG | DIASTOLIC BLOOD PRESSURE: 97 MMHG

## 2025-02-04 DIAGNOSIS — E11.9 DIABETES MELLITUS, NEW ONSET (H): Primary | ICD-10-CM

## 2025-02-04 DIAGNOSIS — B34.8 BK VIREMIA: ICD-10-CM

## 2025-02-04 DIAGNOSIS — T86.10 COMPLICATION OF TRANSPLANTED KIDNEY, UNSPECIFIED COMPLICATION: ICD-10-CM

## 2025-02-04 DIAGNOSIS — R73.9 HYPERGLYCEMIA: ICD-10-CM

## 2025-02-04 DIAGNOSIS — R10.9 ABDOMINAL PAIN, UNSPECIFIED ABDOMINAL LOCATION: ICD-10-CM

## 2025-02-04 DIAGNOSIS — T86.13 KIDNEY TRANSPLANT INFECTION: ICD-10-CM

## 2025-02-04 LAB
ALBUMIN SERPL BCG-MCNC: 4 G/DL (ref 3.5–5.2)
ALBUMIN UR-MCNC: 50 MG/DL
ALP SERPL-CCNC: 181 U/L (ref 40–150)
ALT SERPL W P-5'-P-CCNC: 23 U/L (ref 0–70)
ANION GAP SERPL CALCULATED.3IONS-SCNC: 17 MMOL/L (ref 7–15)
APPEARANCE UR: CLEAR
AST SERPL W P-5'-P-CCNC: 15 U/L (ref 0–45)
B-OH-BUTYR SERPL-SCNC: 1.52 MMOL/L
BASE EXCESS BLDV CALC-SCNC: -3 MMOL/L (ref -3–3)
BASOPHILS # BLD AUTO: 0.1 10E3/UL (ref 0–0.2)
BASOPHILS NFR BLD AUTO: 1 %
BILIRUB SERPL-MCNC: 0.5 MG/DL
BILIRUB UR QL STRIP: NEGATIVE
BUN SERPL-MCNC: 13.2 MG/DL (ref 6–20)
CALCIUM SERPL-MCNC: 10.2 MG/DL (ref 8.8–10.4)
CHLORIDE SERPL-SCNC: 100 MMOL/L (ref 98–107)
COLOR UR AUTO: ABNORMAL
CREAT SERPL-MCNC: 0.71 MG/DL (ref 0.67–1.17)
EGFRCR SERPLBLD CKD-EPI 2021: >90 ML/MIN/1.73M2
EOSINOPHIL # BLD AUTO: 0.3 10E3/UL (ref 0–0.7)
EOSINOPHIL NFR BLD AUTO: 3 %
ERYTHROCYTE [DISTWIDTH] IN BLOOD BY AUTOMATED COUNT: 14.7 % (ref 10–15)
EST. AVERAGE GLUCOSE BLD GHB EST-MCNC: 220 MG/DL
ETHANOL SERPL-MCNC: <0.01 G/DL
GLUCOSE BLDC GLUCOMTR-MCNC: 259 MG/DL (ref 70–99)
GLUCOSE SERPL-MCNC: 356 MG/DL (ref 70–99)
GLUCOSE UR STRIP-MCNC: >=1000 MG/DL
HBA1C MFR BLD: 9.3 %
HCO3 BLDV-SCNC: 20 MMOL/L (ref 21–28)
HCO3 SERPL-SCNC: 18 MMOL/L (ref 22–29)
HCT VFR BLD AUTO: 51.7 % (ref 40–53)
HGB BLD-MCNC: 17.2 G/DL (ref 13.3–17.7)
HGB UR QL STRIP: ABNORMAL
IMM GRANULOCYTES # BLD: 0.1 10E3/UL
IMM GRANULOCYTES NFR BLD: 1 %
KETONES UR STRIP-MCNC: 80 MG/DL
LEUKOCYTE ESTERASE UR QL STRIP: NEGATIVE
LIPASE SERPL-CCNC: 21 U/L (ref 13–60)
LYMPHOCYTES # BLD AUTO: 2 10E3/UL (ref 0.8–5.3)
LYMPHOCYTES NFR BLD AUTO: 20 %
MCH RBC QN AUTO: 26.9 PG (ref 26.5–33)
MCHC RBC AUTO-ENTMCNC: 33.3 G/DL (ref 31.5–36.5)
MCV RBC AUTO: 81 FL (ref 78–100)
MONOCYTES # BLD AUTO: 0.8 10E3/UL (ref 0–1.3)
MONOCYTES NFR BLD AUTO: 8 %
NEUTROPHILS # BLD AUTO: 6.7 10E3/UL (ref 1.6–8.3)
NEUTROPHILS NFR BLD AUTO: 68 %
NITRATE UR QL: NEGATIVE
NRBC # BLD AUTO: 0 10E3/UL
NRBC BLD AUTO-RTO: 0 /100
O2/TOTAL GAS SETTING VFR VENT: 0 %
OXYHGB MFR BLDV: 77 % (ref 70–75)
PCO2 BLDV: 32 MM HG (ref 40–50)
PH BLDV: 7.42 [PH] (ref 7.32–7.43)
PH UR STRIP: 6 [PH] (ref 5–7)
PLATELET # BLD AUTO: 297 10E3/UL (ref 150–450)
PO2 BLDV: 39 MM HG (ref 25–47)
POTASSIUM SERPL-SCNC: 4 MMOL/L (ref 3.4–5.3)
PROT SERPL-MCNC: 7.4 G/DL (ref 6.4–8.3)
RBC # BLD AUTO: 6.4 10E6/UL (ref 4.4–5.9)
RBC URINE: 1 /HPF
SAO2 % BLDV: 78.1 % (ref 70–75)
SODIUM SERPL-SCNC: 135 MMOL/L (ref 135–145)
SP GR UR STRIP: 1.03 (ref 1–1.03)
SQUAMOUS EPITHELIAL: <1 /HPF
UROBILINOGEN UR STRIP-MCNC: NORMAL MG/DL
WBC # BLD AUTO: 9.9 10E3/UL (ref 4–11)
WBC URINE: 1 /HPF

## 2025-02-04 PROCEDURE — 96361 HYDRATE IV INFUSION ADD-ON: CPT

## 2025-02-04 PROCEDURE — 82947 ASSAY GLUCOSE BLOOD QUANT: CPT | Performed by: EMERGENCY MEDICINE

## 2025-02-04 PROCEDURE — 82962 GLUCOSE BLOOD TEST: CPT

## 2025-02-04 PROCEDURE — 250N000013 HC RX MED GY IP 250 OP 250 PS 637: Performed by: EMERGENCY MEDICINE

## 2025-02-04 PROCEDURE — 82310 ASSAY OF CALCIUM: CPT | Performed by: EMERGENCY MEDICINE

## 2025-02-04 PROCEDURE — 85041 AUTOMATED RBC COUNT: CPT | Performed by: EMERGENCY MEDICINE

## 2025-02-04 PROCEDURE — 258N000003 HC RX IP 258 OP 636: Performed by: EMERGENCY MEDICINE

## 2025-02-04 PROCEDURE — 96360 HYDRATION IV INFUSION INIT: CPT

## 2025-02-04 PROCEDURE — 36415 COLL VENOUS BLD VENIPUNCTURE: CPT | Performed by: EMERGENCY MEDICINE

## 2025-02-04 PROCEDURE — 74176 CT ABD & PELVIS W/O CONTRAST: CPT

## 2025-02-04 PROCEDURE — 81001 URINALYSIS AUTO W/SCOPE: CPT | Performed by: EMERGENCY MEDICINE

## 2025-02-04 PROCEDURE — 82077 ASSAY SPEC XCP UR&BREATH IA: CPT | Performed by: EMERGENCY MEDICINE

## 2025-02-04 PROCEDURE — 82247 BILIRUBIN TOTAL: CPT | Performed by: EMERGENCY MEDICINE

## 2025-02-04 PROCEDURE — 99291 CRITICAL CARE FIRST HOUR: CPT | Mod: 25

## 2025-02-04 PROCEDURE — 82010 KETONE BODYS QUAN: CPT | Performed by: EMERGENCY MEDICINE

## 2025-02-04 PROCEDURE — 85004 AUTOMATED DIFF WBC COUNT: CPT | Performed by: EMERGENCY MEDICINE

## 2025-02-04 PROCEDURE — 82805 BLOOD GASES W/O2 SATURATION: CPT | Performed by: EMERGENCY MEDICINE

## 2025-02-04 PROCEDURE — 99284 EMERGENCY DEPT VISIT MOD MDM: CPT | Mod: 25

## 2025-02-04 PROCEDURE — 83690 ASSAY OF LIPASE: CPT | Performed by: EMERGENCY MEDICINE

## 2025-02-04 PROCEDURE — 83036 HEMOGLOBIN GLYCOSYLATED A1C: CPT | Performed by: EMERGENCY MEDICINE

## 2025-02-04 RX ADMIN — SODIUM CHLORIDE, POTASSIUM CHLORIDE, SODIUM LACTATE AND CALCIUM CHLORIDE 1000 ML: 600; 310; 30; 20 INJECTION, SOLUTION INTRAVENOUS at 13:46

## 2025-02-04 RX ADMIN — METFORMIN HYDROCHLORIDE 500 MG: 500 TABLET ORAL at 16:18

## 2025-02-04 RX ADMIN — SODIUM CHLORIDE, POTASSIUM CHLORIDE, SODIUM LACTATE AND CALCIUM CHLORIDE 1000 ML: 600; 310; 30; 20 INJECTION, SOLUTION INTRAVENOUS at 15:50

## 2025-02-04 ASSESSMENT — ACTIVITIES OF DAILY LIVING (ADL)
ADLS_ACUITY_SCORE: 58

## 2025-02-04 ASSESSMENT — COLUMBIA-SUICIDE SEVERITY RATING SCALE - C-SSRS
2. HAVE YOU ACTUALLY HAD ANY THOUGHTS OF KILLING YOURSELF IN THE PAST MONTH?: NO
6. HAVE YOU EVER DONE ANYTHING, STARTED TO DO ANYTHING, OR PREPARED TO DO ANYTHING TO END YOUR LIFE?: NO
1. IN THE PAST MONTH, HAVE YOU WISHED YOU WERE DEAD OR WISHED YOU COULD GO TO SLEEP AND NOT WAKE UP?: NO

## 2025-02-04 NOTE — LETTER
February 4, 2025      To Whom It May Concern:      Taylor Valiente was seen in our Emergency Department today, 02/04/25.   He may return to work when improved.    Sincerely,        Lucas Douglass DO/av

## 2025-02-04 NOTE — DISCHARGE INSTRUCTIONS
Please follow-up with your primary care provider and/or specialist regarding your visit to the ER today.    Please return to the emergency department should you experience any of the symptoms we specifically discussed, including but not limited to recurrence or worsening of your symptoms, or development of any new and concerning symptoms such as fever, chest pain, shortness of breath, abdominal pain, or uncontrolled blood sugar.    Please purchase a glucometer to keep track of your blood sugars.  Please return to the ER via blood sugar is persistently 400-500 or greater.    Please watch out for your carbohydrate intake.  Please follow-up with your primary care doctor in 2 days for further management.

## 2025-02-04 NOTE — TELEPHONE ENCOUNTER
Patient Call: General  Route to LPN    Reason for call: Alma Delia from [facility] called to touch base with team and provider about patient's status. More details with call back.     Call back needed? Yes    Return Call Needed  Same as documented in contacts section  When to return call?: Same day: Route High Priority

## 2025-02-04 NOTE — Clinical Note
February 4, 2025      To Whom It May Concern:      Taylor Valiente was seen in our Emergency Department today, 02/04/25.  I expect his condition to improve over the next *** days.  He may return to work/school when improved.    Sincerely,        Soren Prakash RN  Electronically signed

## 2025-02-04 NOTE — ED PROVIDER NOTES
Emergency Department Note      History of Present Illness     Chief Complaint   Abdominal Pain and Post-op Problem      HPI   Taylor Valiente is a 29 year old male with history of hyperlipidemia and hypertension who presents to the ED with abdominal pain. Patient presents after his kidney biopsy 5 days ago when he started feeling intermittent dull crampy abdominal pain the following day. The pain is localized to his right mid to lower abdomen near the umbilicus and his renal transplant site. He states laying down helps with the pain, sitting up or driving makes it worse. He denies chest pain, shortness of breath, nausea, vomiting, fever, dysuria/hematuria, black or bloody stool, or diarrhea. He notes he has been dealing with a cold for a couple weeks with congestion.       Independent Historian   None    Review of External Notes   I reviewed the ED visit note with Dr. Polo from 12/23/24   I reviewed the office visit note with  D12/13/24    Past Medical History     Medical History and Problem List   Left adrenal adenoma  Anemia  Heart failure  Hypertension  Bursitis of left shoulder   DVT of internal jugular vein   CKD stage 5  Depression  Focal glomerular sclerosis   Hyperlipidemia   LVD  Noncompliance   Obesity  Right eye  Staphylococcus infection  Stress induced cardiomyopathy  Suicide attempt     Medications   Norvasc  Aspirin 81mg  Lipitor  Coreg  Vasotec  Dialyvite  Apresoline   Bumex   Sensipar   Cholecalciferol   Prinvil     Surgical History   Left ankle arthroscopy  Right proximal radial to cephalic arteriovenous fistula   Creation of first stage left brachiobasilic arteriovenous fistula   Second stage left brachial to basilic arteriovenous fistula  Cystoscopy with urethral stent removal  CVC tunnel placement x2  CVC tunnel removal right x2  Renal biopsy x3  Evacuation of left arm hematoma  Ligation of right arm arteriovenous fistula   Second stage right brachiocephalic transposition  arteriovenous fistula   Kidney transplant recipient       Physical Exam     Patient Vitals for the past 24 hrs:   BP Temp Temp src Pulse Resp SpO2 Weight   02/04/25 1602 -- -- -- -- -- 100 % --   02/04/25 1601 (!) 151/97 -- -- 97 18 99 % --   02/04/25 1545 -- -- -- -- -- 99 % --   02/04/25 1540 -- -- -- -- -- 100 % --   02/04/25 1537 -- -- -- -- -- 100 % --   02/04/25 1525 -- -- -- -- -- 100 % --   02/04/25 1510 -- -- -- -- -- 100 % --   02/04/25 1500 (!) 157/106 -- -- 105 -- 98 % --   02/04/25 1455 -- -- -- -- -- 98 % --   02/04/25 1450 -- -- -- -- -- 97 % --   02/04/25 1440 -- -- -- -- -- 96 % --   02/04/25 1430 (!) 171/104 -- -- 112 -- 97 % --   02/04/25 1426 -- -- -- -- -- 98 % --   02/04/25 1411 -- -- -- -- -- 98 % --   02/04/25 1402 -- -- -- 88 -- 100 % --   02/04/25 1356 (!) 145/102 -- -- -- -- 97 % --   02/04/25 1347 (!) 178/100 -- -- 112 -- 98 % --   02/04/25 1151 (!) 177/108 -- -- -- -- -- --   02/04/25 1150 -- 99.5  F (37.5  C) Temporal 117 16 98 % 116.3 kg (256 lb 6.3 oz)     Physical Exam  Constitutional:       General: Not in acute distress.        Appearance: Normal appearance.   HENT:      Head: Normocephalic and atraumatic.   Eyes:      Extraocular Movements: Extraocular movements intact.      Conjunctiva/sclera: Conjunctivae normal.   Cardiovascular:      Rate and Rhythm: Normal rate and regular rhythm.   Pulmonary:      Effort: Pulmonary effort is normal. No respiratory distress.      Breath sounds: Normal breath sounds.   Abdominal:      General: Abdomen is flat. There is no distension.      Palpations: Abdomen is soft.      Tenderness: There is right sided periumbilical abdominal tenderness to palpation.   Musculoskeletal:      Cervical back: Normal range of motion. No rigidity.      Right lower leg: No edema.      Left lower leg: No edema.   Skin:     General: Skin is warm and dry.   Neurological:      General: No focal deficit present.      Mental Status: Alert and oriented to person, place,  and time.   Psychiatric:         Mood and Affect: Mood normal.         Behavior: Behavior normal.    Diagnostics     Lab Results   Labs Ordered and Resulted from Time of ED Arrival to Time of ED Departure   COMPREHENSIVE METABOLIC PANEL - Abnormal       Result Value    Sodium 135      Potassium 4.0      Carbon Dioxide (CO2) 18 (*)     Anion Gap 17 (*)     Urea Nitrogen 13.2      Creatinine 0.71      GFR Estimate >90      Calcium 10.2      Chloride 100      Glucose 356 (*)     Alkaline Phosphatase 181 (*)     AST 15      ALT 23      Protein Total 7.4      Albumin 4.0      Bilirubin Total 0.5     ROUTINE UA WITH MICROSCOPIC REFLEX TO CULTURE - Abnormal    Color Urine Light Yellow      Appearance Urine Clear      Glucose Urine >=1000 (*)     Bilirubin Urine Negative      Ketones Urine 80 (*)     Specific Gravity Urine 1.031      Blood Urine Trace (*)     pH Urine 6.0      Protein Albumin Urine 50 (*)     Urobilinogen Urine Normal      Nitrite Urine Negative      Leukocyte Esterase Urine Negative      RBC Urine 1      WBC Urine 1      Squamous Epithelials Urine <1     CBC WITH PLATELETS AND DIFFERENTIAL - Abnormal    WBC Count 9.9      RBC Count 6.40 (*)     Hemoglobin 17.2      Hematocrit 51.7      MCV 81      MCH 26.9      MCHC 33.3      RDW 14.7      Platelet Count 297      % Neutrophils 68      % Lymphocytes 20      % Monocytes 8      % Eosinophils 3      % Basophils 1      % Immature Granulocytes 1      NRBCs per 100 WBC 0      Absolute Neutrophils 6.7      Absolute Lymphocytes 2.0      Absolute Monocytes 0.8      Absolute Eosinophils 0.3      Absolute Basophils 0.1      Absolute Immature Granulocytes 0.1      Absolute NRBCs 0.0     BLOOD GAS VENOUS - Abnormal    pH Venous 7.42      pCO2 Venous 32 (*)     pO2 Venous 39      Bicarbonate Venous 20 (*)     Base Excess/Deficit Venous -3.0      FIO2 0      Oxyhemoglobin Venous 77 (*)     O2 Sat, Venous 78.1 (*)    KETONE BETA-HYDROXYBUTYRATE QUANTITATIVE, RAPID -  Abnormal    Ketone (Beta-Hydroxybutyrate) Quantitative 1.52 (*)    HEMOGLOBIN A1C - Abnormal    Estimated Average Glucose 220 (*)     Hemoglobin A1C 9.3 (*)    GLUCOSE BY METER - Abnormal    GLUCOSE BY METER POCT 259 (*)    LIPASE - Normal    Lipase 21     ETHYL ALCOHOL LEVEL - Normal    Alcohol ethyl <0.01         Imaging   CT Abdomen Pelvis w/o Contrast   Final Result   IMPRESSION:    1.  Mild fat stranding surrounding the right lower quadrant transplant kidney without pete perinephric hematoma, could be related to recent biopsy but recommend correlation with urinalysis to exclude pyelonephritis.   2.  No hydronephrosis of native or transplant kidneys.   3.  Normal appendix.             EKG   None    Independent Interpretation   None    ED Course      Medications Administered   Medications   lactated ringers BOLUS 1,000 mL (0 mLs Intravenous Stopped 2/4/25 1550)   lactated ringers BOLUS 1,000 mL (0 mLs Intravenous Stopped 2/4/25 1611)   metFORMIN (GLUCOPHAGE) tablet 500 mg (500 mg Oral $Given 2/4/25 1618)       Procedures   Procedures     Discussion of Management   See ED course    ED Course   ED Course as of 02/04/25 2122 Tue Feb 04, 2025   1257 Hemoglobin: 17.2  Reassuring   1403 Ketone Quantitative(!!): 1.52   1428 Hemoglobin A1C(!): 9.3  increased   1446 Discussed patient with hospitalist service (Laura Aguila), recommend contacting patient's transplant service for appropriate disposition.   1507 Discussed patient with transplant surgery Odin Walden who recommends treating like new onset DM. No specific consideration from transplant surgery standpoint.   1528 Endocrinology on call Dr. Garza consulted who will call back after she researches on appropriate medication to initiate in transplant patients. She is otherwise not concerned regarding patient's labwork today.   1538 Contacted hospitalist again for potential admission after consultation with endocriniology. Hospitalist declined admission at this time  and recommends discharging with potentially glymerperide/glypezide.   1539 Endocrinology Dr. Garza called back and recommends initiation of  metformin 500mg BID and follow-up in 2 days with PCP. She believes patient can be safely discharged home. She recommends metformin over glymeperide/glypezide given patients low level hyperglycemia and risk for hypoglycemic events.   1610 Patient updated on conversation with hospitalist and endcorinologist and comfortable with dishcarge home.        Additional Documentation  None    Medical Decision Making / Diagnosis     CMS Diagnoses: None    MIPS       None    MDM   29-year-old male as described above presents to the emergency department with right-sided mid abdominal pain near his kidney transplant site.  Patient reports the pain started today after recent kidney biopsy for evaluation for transplant rejection.  Patient hemodynamically stable at time of evaluation.  Afebrile.  Differential diagnosis considered includes, but not limited to, intra-abdominal hematoma, gastric perforation, intra-abdominal abscess, pancreatitis, bowel obstruction, cholecystitis, infectious colitis, right sided diverticulitis, acute appendicitis, and urinary tract infection.  Will consult transplant team nephrologist regarding appropriateness for IV contrasted study for evaluation for bleeding.  Advanced imaging of the abdomen for evaluation of above discussed differentials.  Discussed care plan with patient who voiced understanding and agreement with plan.  Answered all questions.  Additional workup and orders as listed in chart.     Ultimately, work up shows beta hydroxybutyrate of 1.52.  Minimal anion gap elevation of 17.  Glucose level 356.  No evidence of anemia or significant leukocytosis to suggest severe systemic infection.  VBG does not demonstrate evidence of acidosis.  Hemoglobin A1c does demonstrate increased from recent testing to 9.3.  Alcohol level negative to suggest against alcohol  ketoacidosis.  Initial plan was to admit patient under hospitalist service for hyperglycemia/new onset diabetes with borderline mild DKA, but hospital service believes that this may be manage as an outpatient basis and declined admission at this time.  I did consult endocrinology on-call who recommends initiation of metformin after reviewing the patient's history/transplant history.  Endocrinology believes metformin would be more appropriate than glipizide or glimepiride as to avoid hypoglycemic side effects.  Endocrinology not too concerned about patient's beta hydroxybutyrate level at this time.  They believe patient is also appropriate for outpatient discharge for close follow-up with PCP in 2 days.  I will discharge patient with metformin prescription as recommended by endocrinology and discharge also with glucometer prescription.  Patient is comfortable with this plan.  After IV fluid bolus x 2, patient does have decrease in blood sugar to 259.  I will also make patient outpatient endocrinology referral.  Discussed strict return precautions.  Answered all questions.  Patient voiced understanding and agreement with plan and comfortable discharge home.  Suspect abdominal pain today most likely secondary to recent biopsy especially given that stranding seen on CT imaging.  Nonetheless, no evidence of urinary tract infection or severe systemic infection or hematoma or other acute intra-abdominal pathologies.     Please refer to ED course above as part of continuation of MDM for details on the patient's treatment course and any potential changes or updates beyond my initial evaluation and MDM creation.      Disposition   The patient was discharged.     Diagnosis     ICD-10-CM    1. Diabetes mellitus, new onset (H)  E11.9 Adult Endocrinology  Referral      2. Hyperglycemia  R73.9       3. Abdominal pain, unspecified abdominal location  R10.9            Discharge Medications   Discharge Medication List as of  2/4/2025  4:13 PM        START taking these medications    Details   blood glucose (NO BRAND SPECIFIED) test strip Use to test blood sugar 2 times daily or as directed., Disp-100 strip, R-0, E-Prescribe      blood glucose monitoring (NO BRAND SPECIFIED) meter device kit Use to test blood sugar 2 times daily or as directed.Disp-1 kit, [id number]      metFORMIN (GLUCOPHAGE) 500 MG tablet Take 1 tablet (500 mg) by mouth 2 times daily (with meals)., Disp-60 tablet, R-0, E-Prescribe               Scribe Disclosure:  I, Aroldo Flores, am serving as a scribe at 12:14 PM on 2/4/2025 to document services personally performed by Lucas Douglass DO based on my observations and the provider's statements to me.        Lucas Douglass DO  02/04/25 2122

## 2025-02-04 NOTE — ED TRIAGE NOTES
hx of kidney tx. Biopsy completed on Thursday to rule out rejection. No signs of rejection per patient. Since biopsy, has had right sided abdominal pain. Hypertensive in triage.

## 2025-02-05 ENCOUNTER — TELEPHONE (OUTPATIENT)
Dept: TRANSPLANT | Facility: CLINIC | Age: 30
End: 2025-02-05
Payer: MEDICARE

## 2025-02-05 ENCOUNTER — HOSPITAL ENCOUNTER (OUTPATIENT)
Facility: CLINIC | Age: 30
Setting detail: OBSERVATION
LOS: 1 days | Discharge: HOME | End: 2025-02-06
Attending: EMERGENCY MEDICINE | Admitting: INTERNAL MEDICINE
Payer: MEDICARE

## 2025-02-05 DIAGNOSIS — B34.8 BK VIREMIA: ICD-10-CM

## 2025-02-05 DIAGNOSIS — E11.9 TYPE 2 DIABETES MELLITUS WITHOUT COMPLICATION, WITHOUT LONG-TERM CURRENT USE OF INSULIN (H): Primary | ICD-10-CM

## 2025-02-05 DIAGNOSIS — R10.13 EPIGASTRIC PAIN: ICD-10-CM

## 2025-02-05 DIAGNOSIS — T86.10 COMPLICATION OF TRANSPLANTED KIDNEY, UNSPECIFIED COMPLICATION: ICD-10-CM

## 2025-02-05 DIAGNOSIS — T86.13 KIDNEY TRANSPLANT INFECTION: ICD-10-CM

## 2025-02-05 DIAGNOSIS — E88.89 KETOSIS (H): ICD-10-CM

## 2025-02-05 DIAGNOSIS — R19.7 DIARRHEA, UNSPECIFIED TYPE: ICD-10-CM

## 2025-02-05 DIAGNOSIS — E11.9 DIABETES MELLITUS, NEW ONSET (H): ICD-10-CM

## 2025-02-05 LAB
ALBUMIN SERPL BCG-MCNC: 4 G/DL (ref 3.5–5.2)
ALP SERPL-CCNC: 187 U/L (ref 40–150)
ALT SERPL W P-5'-P-CCNC: 24 U/L (ref 0–70)
ANION GAP SERPL CALCULATED.3IONS-SCNC: 14 MMOL/L (ref 7–15)
ANION GAP SERPL CALCULATED.3IONS-SCNC: 17 MMOL/L (ref 7–15)
AST SERPL W P-5'-P-CCNC: 16 U/L (ref 0–45)
B-OH-BUTYR SERPL-SCNC: 0.29 MMOL/L
B-OH-BUTYR SERPL-SCNC: 2.14 MMOL/L
BASE EXCESS BLDV CALC-SCNC: -4.7 MMOL/L (ref -3–3)
BASOPHILS # BLD AUTO: 0.1 10E3/UL (ref 0–0.2)
BASOPHILS NFR BLD AUTO: 1 %
BILIRUB SERPL-MCNC: 0.4 MG/DL
BUN SERPL-MCNC: 12.8 MG/DL (ref 6–20)
BUN SERPL-MCNC: 13.1 MG/DL (ref 6–20)
CALCIUM SERPL-MCNC: 10.1 MG/DL (ref 8.8–10.4)
CALCIUM SERPL-MCNC: 9.7 MG/DL (ref 8.8–10.4)
CHLORIDE SERPL-SCNC: 103 MMOL/L (ref 98–107)
CHLORIDE SERPL-SCNC: 98 MMOL/L (ref 98–107)
CREAT SERPL-MCNC: 0.73 MG/DL (ref 0.67–1.17)
CREAT SERPL-MCNC: 0.73 MG/DL (ref 0.67–1.17)
DONOR IDENTIFICATION: NORMAL
DQB2: 1532
DR53: NORMAL
DSA COMMENTS: NORMAL
DSA PRESENT: YES
DSA TEST METHOD: NORMAL
EGFRCR SERPLBLD CKD-EPI 2021: >90 ML/MIN/1.73M2
EGFRCR SERPLBLD CKD-EPI 2021: >90 ML/MIN/1.73M2
EOSINOPHIL # BLD AUTO: 0.2 10E3/UL (ref 0–0.7)
EOSINOPHIL NFR BLD AUTO: 2 %
ERYTHROCYTE [DISTWIDTH] IN BLOOD BY AUTOMATED COUNT: 14.9 % (ref 10–15)
FLOWPRA1 CELL: NORMAL
FLOWPRA1 COMMENTS: NORMAL
FLOWPRA1 RESULT: NORMAL
FLOWPRA1 TEST METHOD: NORMAL
FLOWPRA2 CELL: NORMAL
FLOWPRA2 COMMENTS: NORMAL
FLOWPRA2 RESULT: NORMAL
FLOWPRA2 TEST METHOD: NORMAL
GLUCOSE BLDC GLUCOMTR-MCNC: 216 MG/DL (ref 70–99)
GLUCOSE BLDC GLUCOMTR-MCNC: 257 MG/DL (ref 70–99)
GLUCOSE BLDC GLUCOMTR-MCNC: 281 MG/DL (ref 70–99)
GLUCOSE BLDC GLUCOMTR-MCNC: 282 MG/DL (ref 70–99)
GLUCOSE SERPL-MCNC: 260 MG/DL (ref 70–99)
GLUCOSE SERPL-MCNC: 346 MG/DL (ref 70–99)
HCO3 BLDV-SCNC: 19 MMOL/L (ref 21–28)
HCO3 SERPL-SCNC: 17 MMOL/L (ref 22–29)
HCO3 SERPL-SCNC: 19 MMOL/L (ref 22–29)
HCT VFR BLD AUTO: 51.1 % (ref 40–53)
HGB BLD-MCNC: 16.7 G/DL (ref 13.3–17.7)
HOLD SPECIMEN: NORMAL
HOLD SPECIMEN: NORMAL
IMM GRANULOCYTES # BLD: 0.1 10E3/UL
IMM GRANULOCYTES NFR BLD: 1 %
LIPASE SERPL-CCNC: 22 U/L (ref 13–60)
LYMPHOCYTES # BLD AUTO: 1.7 10E3/UL (ref 0.8–5.3)
LYMPHOCYTES NFR BLD AUTO: 16 %
MCH RBC QN AUTO: 26.5 PG (ref 26.5–33)
MCHC RBC AUTO-ENTMCNC: 32.7 G/DL (ref 31.5–36.5)
MCV RBC AUTO: 81 FL (ref 78–100)
MONOCYTES # BLD AUTO: 0.8 10E3/UL (ref 0–1.3)
MONOCYTES NFR BLD AUTO: 8 %
NEUTROPHILS # BLD AUTO: 7.8 10E3/UL (ref 1.6–8.3)
NEUTROPHILS NFR BLD AUTO: 73 %
NRBC # BLD AUTO: 0 10E3/UL
NRBC BLD AUTO-RTO: 0 /100
O2/TOTAL GAS SETTING VFR VENT: 21 %
ORGAN: NORMAL
OXYHGB MFR BLDV: 90 % (ref 70–75)
PCO2 BLDV: 33 MM HG (ref 40–50)
PH BLDV: 7.38 [PH] (ref 7.32–7.43)
PLATELET # BLD AUTO: 298 10E3/UL (ref 150–450)
PO2 BLDV: 60 MM HG (ref 25–47)
POTASSIUM SERPL-SCNC: 3.6 MMOL/L (ref 3.4–5.3)
POTASSIUM SERPL-SCNC: 4 MMOL/L (ref 3.4–5.3)
PROT SERPL-MCNC: 7.3 G/DL (ref 6.4–8.3)
RBC # BLD AUTO: 6.3 10E6/UL (ref 4.4–5.9)
SA 1  COMMENTS: NORMAL
SA 1 CELL: NORMAL
SA 1 TEST METHOD: NORMAL
SA 2 CELL: NORMAL
SA 2 COMMENTS: NORMAL
SA 2 TEST METHOD: NORMAL
SA1 HI RISK ABY: NORMAL
SA1 MOD RISK ABY: NORMAL
SA2 HI RISK ABY: NORMAL
SA2 MOD RISK ABY: NORMAL
SAO2 % BLDV: 91.4 % (ref 70–75)
SODIUM SERPL-SCNC: 132 MMOL/L (ref 135–145)
SODIUM SERPL-SCNC: 136 MMOL/L (ref 135–145)
UNACCEPTABLE ANTIGENS: NORMAL
UNOS CPRA: 93
WBC # BLD AUTO: 10.6 10E3/UL (ref 4–11)

## 2025-02-05 PROCEDURE — 999N000285 HC STATISTIC VASC ACCESS LAB DRAW WITH PIV START

## 2025-02-05 PROCEDURE — 82010 KETONE BODYS QUAN: CPT | Performed by: EMERGENCY MEDICINE

## 2025-02-05 PROCEDURE — 82310 ASSAY OF CALCIUM: CPT | Performed by: INTERNAL MEDICINE

## 2025-02-05 PROCEDURE — 99285 EMERGENCY DEPT VISIT HI MDM: CPT | Mod: 25

## 2025-02-05 PROCEDURE — 120N000001 HC R&B MED SURG/OB

## 2025-02-05 PROCEDURE — 258N000003 HC RX IP 258 OP 636: Performed by: INTERNAL MEDICINE

## 2025-02-05 PROCEDURE — 80053 COMPREHEN METABOLIC PANEL: CPT | Performed by: EMERGENCY MEDICINE

## 2025-02-05 PROCEDURE — 82805 BLOOD GASES W/O2 SATURATION: CPT | Performed by: EMERGENCY MEDICINE

## 2025-02-05 PROCEDURE — 86341 ISLET CELL ANTIBODY: CPT | Performed by: INTERNAL MEDICINE

## 2025-02-05 PROCEDURE — 99223 1ST HOSP IP/OBS HIGH 75: CPT | Performed by: INTERNAL MEDICINE

## 2025-02-05 PROCEDURE — 82374 ASSAY BLOOD CARBON DIOXIDE: CPT | Performed by: INTERNAL MEDICINE

## 2025-02-05 PROCEDURE — 36415 COLL VENOUS BLD VENIPUNCTURE: CPT | Performed by: EMERGENCY MEDICINE

## 2025-02-05 PROCEDURE — 83690 ASSAY OF LIPASE: CPT | Performed by: EMERGENCY MEDICINE

## 2025-02-05 PROCEDURE — 85018 HEMOGLOBIN: CPT | Performed by: EMERGENCY MEDICINE

## 2025-02-05 PROCEDURE — 258N000003 HC RX IP 258 OP 636: Performed by: EMERGENCY MEDICINE

## 2025-02-05 PROCEDURE — 84132 ASSAY OF SERUM POTASSIUM: CPT | Performed by: EMERGENCY MEDICINE

## 2025-02-05 PROCEDURE — 85041 AUTOMATED RBC COUNT: CPT | Performed by: EMERGENCY MEDICINE

## 2025-02-05 PROCEDURE — 999N000127 HC STATISTIC PERIPHERAL IV START W US GUIDANCE

## 2025-02-05 PROCEDURE — 36415 COLL VENOUS BLD VENIPUNCTURE: CPT | Performed by: INTERNAL MEDICINE

## 2025-02-05 PROCEDURE — 250N000012 HC RX MED GY IP 250 OP 636 PS 637: Performed by: INTERNAL MEDICINE

## 2025-02-05 PROCEDURE — 250N000009 HC RX 250: Performed by: INTERNAL MEDICINE

## 2025-02-05 PROCEDURE — 82010 KETONE BODYS QUAN: CPT | Performed by: INTERNAL MEDICINE

## 2025-02-05 PROCEDURE — 85004 AUTOMATED DIFF WBC COUNT: CPT | Performed by: EMERGENCY MEDICINE

## 2025-02-05 PROCEDURE — 96374 THER/PROPH/DIAG INJ IV PUSH: CPT

## 2025-02-05 PROCEDURE — 250N000013 HC RX MED GY IP 250 OP 250 PS 637: Performed by: INTERNAL MEDICINE

## 2025-02-05 PROCEDURE — 82962 GLUCOSE BLOOD TEST: CPT

## 2025-02-05 PROCEDURE — 250N000013 HC RX MED GY IP 250 OP 250 PS 637: Performed by: EMERGENCY MEDICINE

## 2025-02-05 PROCEDURE — 82040 ASSAY OF SERUM ALBUMIN: CPT | Performed by: EMERGENCY MEDICINE

## 2025-02-05 RX ORDER — ENALAPRIL MALEATE 10 MG/1
20 TABLET ORAL EVERY EVENING
Status: DISCONTINUED | OUTPATIENT
Start: 2025-02-05 | End: 2025-02-06 | Stop reason: HOSPADM

## 2025-02-05 RX ORDER — AMLODIPINE BESYLATE 5 MG/1
5 TABLET ORAL EVERY EVENING
Status: DISCONTINUED | OUTPATIENT
Start: 2025-02-05 | End: 2025-02-06 | Stop reason: HOSPADM

## 2025-02-05 RX ORDER — MAGNESIUM HYDROXIDE/ALUMINUM HYDROXICE/SIMETHICONE 120; 1200; 1200 MG/30ML; MG/30ML; MG/30ML
30 SUSPENSION ORAL ONCE
Status: COMPLETED | OUTPATIENT
Start: 2025-02-05 | End: 2025-02-05

## 2025-02-05 RX ORDER — MAGNESIUM HYDROXIDE/ALUMINUM HYDROXICE/SIMETHICONE 120; 1200; 1200 MG/30ML; MG/30ML; MG/30ML
15 SUSPENSION ORAL ONCE
Status: COMPLETED | OUTPATIENT
Start: 2025-02-05 | End: 2025-02-05

## 2025-02-05 RX ORDER — TACROLIMUS 0.5 MG/1
0.5 CAPSULE ORAL 2 TIMES DAILY
Status: DISCONTINUED | OUTPATIENT
Start: 2025-02-05 | End: 2025-02-06 | Stop reason: HOSPADM

## 2025-02-05 RX ORDER — MAGNESIUM HYDROXIDE/ALUMINUM HYDROXICE/SIMETHICONE 120; 1200; 1200 MG/30ML; MG/30ML; MG/30ML
30 SUSPENSION ORAL EVERY 4 HOURS PRN
Status: DISCONTINUED | OUTPATIENT
Start: 2025-02-05 | End: 2025-02-06 | Stop reason: HOSPADM

## 2025-02-05 RX ORDER — ASPIRIN 81 MG/1
81 TABLET ORAL DAILY
Status: DISCONTINUED | OUTPATIENT
Start: 2025-02-06 | End: 2025-02-06 | Stop reason: HOSPADM

## 2025-02-05 RX ORDER — NICOTINE POLACRILEX 4 MG
15-30 LOZENGE BUCCAL
Status: DISCONTINUED | OUTPATIENT
Start: 2025-02-05 | End: 2025-02-06 | Stop reason: HOSPADM

## 2025-02-05 RX ORDER — PROCHLORPERAZINE MALEATE 5 MG/1
10 TABLET ORAL EVERY 6 HOURS PRN
Status: DISCONTINUED | OUTPATIENT
Start: 2025-02-05 | End: 2025-02-06 | Stop reason: HOSPADM

## 2025-02-05 RX ORDER — SODIUM CHLORIDE, SODIUM LACTATE, POTASSIUM CHLORIDE, CALCIUM CHLORIDE 600; 310; 30; 20 MG/100ML; MG/100ML; MG/100ML; MG/100ML
INJECTION, SOLUTION INTRAVENOUS CONTINUOUS
Status: ACTIVE | OUTPATIENT
Start: 2025-02-05 | End: 2025-02-06

## 2025-02-05 RX ORDER — DEXTROSE MONOHYDRATE 25 G/50ML
25-50 INJECTION, SOLUTION INTRAVENOUS
Status: DISCONTINUED | OUTPATIENT
Start: 2025-02-05 | End: 2025-02-06 | Stop reason: HOSPADM

## 2025-02-05 RX ORDER — SODIUM BICARBONATE 650 MG/1
1950 TABLET ORAL 3 TIMES DAILY
Status: DISCONTINUED | OUTPATIENT
Start: 2025-02-05 | End: 2025-02-06 | Stop reason: HOSPADM

## 2025-02-05 RX ORDER — ACETAMINOPHEN 325 MG/1
650 TABLET ORAL EVERY 4 HOURS PRN
Status: DISCONTINUED | OUTPATIENT
Start: 2025-02-05 | End: 2025-02-06 | Stop reason: HOSPADM

## 2025-02-05 RX ORDER — ACETAMINOPHEN 500 MG
1000 TABLET ORAL ONCE
Status: COMPLETED | OUTPATIENT
Start: 2025-02-05 | End: 2025-02-05

## 2025-02-05 RX ORDER — AMOXICILLIN 250 MG
1 CAPSULE ORAL 2 TIMES DAILY PRN
Status: DISCONTINUED | OUTPATIENT
Start: 2025-02-05 | End: 2025-02-06 | Stop reason: HOSPADM

## 2025-02-05 RX ORDER — TACROLIMUS 1 MG/1
4 CAPSULE ORAL 2 TIMES DAILY
Status: DISCONTINUED | OUTPATIENT
Start: 2025-02-05 | End: 2025-02-06 | Stop reason: HOSPADM

## 2025-02-05 RX ORDER — AMOXICILLIN 250 MG
2 CAPSULE ORAL 2 TIMES DAILY PRN
Status: DISCONTINUED | OUTPATIENT
Start: 2025-02-05 | End: 2025-02-06 | Stop reason: HOSPADM

## 2025-02-05 RX ORDER — POLYETHYLENE GLYCOL 3350 17 G
2 POWDER IN PACKET (EA) ORAL
Status: DISCONTINUED | OUTPATIENT
Start: 2025-02-05 | End: 2025-02-06 | Stop reason: HOSPADM

## 2025-02-05 RX ORDER — FAMOTIDINE 40 MG/1
40 TABLET, FILM COATED ORAL 2 TIMES DAILY PRN
COMMUNITY

## 2025-02-05 RX ORDER — ONDANSETRON 4 MG/1
4 TABLET, ORALLY DISINTEGRATING ORAL EVERY 6 HOURS PRN
Status: DISCONTINUED | OUTPATIENT
Start: 2025-02-05 | End: 2025-02-06 | Stop reason: HOSPADM

## 2025-02-05 RX ORDER — CARVEDILOL 25 MG/1
25 TABLET ORAL 2 TIMES DAILY WITH MEALS
Status: DISCONTINUED | OUTPATIENT
Start: 2025-02-05 | End: 2025-02-06 | Stop reason: HOSPADM

## 2025-02-05 RX ORDER — ONDANSETRON 2 MG/ML
4 INJECTION INTRAMUSCULAR; INTRAVENOUS EVERY 6 HOURS PRN
Status: DISCONTINUED | OUTPATIENT
Start: 2025-02-05 | End: 2025-02-06 | Stop reason: HOSPADM

## 2025-02-05 RX ORDER — ATORVASTATIN CALCIUM 10 MG/1
10 TABLET, FILM COATED ORAL DAILY
Status: DISCONTINUED | OUTPATIENT
Start: 2025-02-06 | End: 2025-02-06 | Stop reason: HOSPADM

## 2025-02-05 RX ORDER — ACETAMINOPHEN 650 MG/1
650 SUPPOSITORY RECTAL EVERY 4 HOURS PRN
Status: DISCONTINUED | OUTPATIENT
Start: 2025-02-05 | End: 2025-02-06 | Stop reason: HOSPADM

## 2025-02-05 RX ADMIN — MYCOPHENOLIC ACID 540 MG: 360 TABLET, DELAYED RELEASE ORAL at 22:02

## 2025-02-05 RX ADMIN — SODIUM BICARBONATE 1950 MG: 650 TABLET ORAL at 21:26

## 2025-02-05 RX ADMIN — PSYLLIUM HUSK 1 PACKET: 3.4 POWDER ORAL at 21:27

## 2025-02-05 RX ADMIN — INSULIN GLARGINE 20 UNITS: 100 INJECTION, SOLUTION SUBCUTANEOUS at 16:29

## 2025-02-05 RX ADMIN — ACETAMINOPHEN 1000 MG: 500 TABLET, FILM COATED ORAL at 13:54

## 2025-02-05 RX ADMIN — AMLODIPINE BESYLATE 5 MG: 5 TABLET ORAL at 21:26

## 2025-02-05 RX ADMIN — SODIUM CHLORIDE 1000 ML: 9 INJECTION, SOLUTION INTRAVENOUS at 14:32

## 2025-02-05 RX ADMIN — INSULIN ASPART 5 UNITS: 100 INJECTION, SOLUTION INTRAVENOUS; SUBCUTANEOUS at 16:28

## 2025-02-05 RX ADMIN — TACROLIMUS 4 MG: 1 CAPSULE ORAL at 22:00

## 2025-02-05 RX ADMIN — CARVEDILOL 25 MG: 25 TABLET, FILM COATED ORAL at 21:26

## 2025-02-05 RX ADMIN — SODIUM CHLORIDE, POTASSIUM CHLORIDE, SODIUM LACTATE AND CALCIUM CHLORIDE: 600; 310; 30; 20 INJECTION, SOLUTION INTRAVENOUS at 16:02

## 2025-02-05 RX ADMIN — ALUMINUM HYDROXIDE, MAGNESIUM HYDROXIDE, AND SIMETHICONE 30 ML: 1200; 120; 1200 SUSPENSION ORAL at 21:08

## 2025-02-05 RX ADMIN — ENALAPRIL MALEATE 20 MG: 10 TABLET ORAL at 22:01

## 2025-02-05 RX ADMIN — TACROLIMUS 0.5 MG: 0.5 CAPSULE ORAL at 22:01

## 2025-02-05 RX ADMIN — SODIUM CHLORIDE 1000 ML: 9 INJECTION, SOLUTION INTRAVENOUS at 16:00

## 2025-02-05 RX ADMIN — ALUMINUM HYDROXIDE, MAGNESIUM HYDROXIDE, AND SIMETHICONE 15 ML: 1200; 120; 1200 SUSPENSION ORAL at 13:54

## 2025-02-05 RX ADMIN — PANTOPRAZOLE SODIUM 40 MG: 40 INJECTION, POWDER, FOR SOLUTION INTRAVENOUS at 21:08

## 2025-02-05 ASSESSMENT — COLUMBIA-SUICIDE SEVERITY RATING SCALE - C-SSRS
1. IN THE PAST MONTH, HAVE YOU WISHED YOU WERE DEAD OR WISHED YOU COULD GO TO SLEEP AND NOT WAKE UP?: NO
6. HAVE YOU EVER DONE ANYTHING, STARTED TO DO ANYTHING, OR PREPARED TO DO ANYTHING TO END YOUR LIFE?: NO
2. HAVE YOU ACTUALLY HAD ANY THOUGHTS OF KILLING YOURSELF IN THE PAST MONTH?: NO

## 2025-02-05 ASSESSMENT — ACTIVITIES OF DAILY LIVING (ADL)
ADLS_ACUITY_SCORE: 32
ADLS_ACUITY_SCORE: 58
ADLS_ACUITY_SCORE: 32
ADLS_ACUITY_SCORE: 32
ADLS_ACUITY_SCORE: 58
ADLS_ACUITY_SCORE: 32
ADLS_ACUITY_SCORE: 58
ADLS_ACUITY_SCORE: 58
ADLS_ACUITY_SCORE: 32

## 2025-02-05 NOTE — ED TRIAGE NOTES
Was seen and discharged from [facility] ED last evening and return precautions included continued ABD pain and hyperglycemia. Diarrhea started last night after metformin dose.     last night, this

## 2025-02-05 NOTE — PHARMACY-ADMISSION MEDICATION HISTORY
Pharmacy Intern Admission Medication History    Admission medication history is complete. The information provided in this note is only as accurate as the sources available at the time of the update.    Information Source(s): Patient and CareEverywhere/SureScripts via in-person    Pertinent Information: Patient reported that he is currently out of refills for Atorvastatin.     Changes made to PTA medication list:  Added: None  Deleted: None  Changed: Famotidine BID scheduled-> BID PRN     Allergies reviewed with patient and updates made in EHR: yes    Medication History Completed By: Opal You RPH 2/5/2025 3:58 PM    PTA Med List   Medication Sig Last Dose/Taking    acetaminophen (TYLENOL) 325 MG tablet Take 2 tablets (650 mg) by mouth every 4 hours as needed for pain Taking As Needed    amLODIPine (NORVASC) 5 MG tablet Take 1 tablet (5 mg) by mouth daily. 2/4/2025 Evening    aspirin 81 MG EC tablet Take 81 mg by mouth daily 2/5/2025 Morning    atorvastatin (LIPITOR) 10 MG tablet Take 1 tablet (10 mg) by mouth daily Past Month    carvedilol (COREG) 25 MG tablet Take 1 tablet (25 mg) by mouth 2 times daily (with meals) 2/5/2025 Morning    enalapril (VASOTEC) 10 MG tablet Take 2 tablets (20 mg) by mouth daily. 2/4/2025 Evening    famotidine (PEPCID) 40 MG tablet Take 40 mg by mouth 2 times daily as needed for heartburn. 2/4/2025 Evening    magnesium glycinate 100 MG CAPS capsule Take 300 mg by mouth 2 times daily. [facility] Magnesium or generic Magnesium glycinate- noon and evening 2/4/2025 Evening    medical cannabis (Patient's own supply) See Admin Instructions (The purpose of this order is to document that the patient reports taking medical cannabis.  This is not a prescription, and is not used to certify that the patient has a qualifying medical condition.)    Patient was advised on the cannabis-tacrolimus drug interaction. Taking    metFORMIN (GLUCOPHAGE) 500 MG tablet Take 1 tablet (500 mg) by mouth 2  times daily (with meals). 2/5/2025 Morning    Multiple Vitamin (MULTIVITAMIN ADULT PO) Take 1 tablet by mouth daily 2/4/2025 Evening    mycophenolic acid (GENERIC EQUIVALENT) 180 MG EC tablet Take 3 tablets (540 mg) by mouth 2 times daily Emergency supply. Pt will use GoodRx 2/5/2025 Morning    psyllium (METAMUCIL/KONSYL) 58.6 % powder Take 1 teaspoonful by mouth daily Past Week    sodium bicarbonate 650 MG tablet Take 3 tablets (1,950 mg) by mouth 3 times daily 2/5/2025 Morning    Sodium Bicarbonate, antacid, POWD Mix 1 teaspoon of baking soda in full glass of water once daily Past Week    tacrolimus (GENERIC EQUIVALENT) 0.5 MG capsule Take 1 capsule (0.5 mg) by mouth 2 times daily. Total dose = 4.5 mg twice daily 2/5/2025 Morning    tacrolimus (GENERIC EQUIVALENT) 1 MG capsule Take 4 capsules (4 mg) by mouth 2 times daily. Total dose = 4.5 mg twice daily 2/5/2025 Morning

## 2025-02-05 NOTE — TELEPHONE ENCOUNTER
Post Kidney and Pancreas Transplant Team Conference  Date: 2/5/2025  Transplant Coordinator: Meka     Attendees:  [x]  Dr. Simons [x] Randee Barillas, RN [x] Annamarie Oseguera LPN     [x]  Dr. Lynn [x] Stefani Manrique, RN [] Kimberly Ayala LPN    [x] Dr. Jones [x] Meka Rashid RN    [x] Dr. Webber [x] Nilda Duran, RN [x] Netta Walter RN   [] Dr. Caceres [] Milla Mc, RN    [x] Dr. Vuong [] Ankita Quintero, GALLITO    []  Dr. Cardoza [] Linda Quintero RN    [] Dr. Jimenez [] Britton Miranda RN    [] Vesna Yuan NP [] My Singh RN    [x] Kristi Hyde NP [] Latonya Alcala RN        Verbal Plan Read Back:   CPRA going up  In ER yesterday- pain near site. Diagnosed with DM.   Repeat labs- call and see if you can get January one done sooner.    Routed to RN Coordinator   Annamarie Oseguera LPN

## 2025-02-05 NOTE — ED PROVIDER NOTES
Emergency Department Note      History of Present Illness     Chief Complaint   Abdominal Pain and Hyperglycemia      HPI   Taylor Valiente is a 29 year old male with history of renal transplant 12/23 in the setting of ESRD secondary to FSGS on immunosuppression with mycophenolate acid and tacrolimus who presents to the ER for evaluation of generalized abdominal pain, worse in epigastrium that comes in waves.  He had some pain more right-sided yesterday but today the pain is more generalized.  He is also had some looser stools since starting metformin yesterday.  He is not too worried about this.  No black or blood in the stool.  No fever or vomiting.  He has had similar abdominal pain previous to his kidney transplant which seem to go away transiently after the kidney transplant.    Review of External Notes    I reviewed the ER visit from yesterday with abdominal pain after kidney biopsy 5 days previous to ER encounter.  CT abdomen pelvis unrevealing.  Case was discussed with patient's transplant surgeon as well as endocrinology.  Endocrinology recommended starting metformin.    Narrative & Impression   EXAM: CT ABDOMEN PELVIS W/O CONTRAST  LOCATION: [facility]  DATE: 2/4/2025     INDICATION: right sided periumbilical abdominal pain, recent renal biopsy, renal transplant patient  COMPARISON: CT abdomen/pelvis 6/15/2021  TECHNIQUE: CT scan of the abdomen and pelvis was performed without IV contrast. Multiplanar reformats were obtained. Dose reduction techniques were used.  CONTRAST: None.     FINDINGS:   LOWER CHEST: Unremarkable.     HEPATOBILIARY: Normal.     PANCREAS: Normal.     SPLEEN: Normal.     ADRENAL GLANDS: Normal.     KIDNEYS/BLADDER: Atrophic native kidneys with simple-appearing cyst in the right upper pole, no specific follow-up recommended. Right lower quadrant transplant kidney with minimal surrounding fat stranding. No hydronephrosis or perinephric hematoma.   Urinary  bladder is unremarkable.     BOWEL: No obstruction or inflammatory change. Normal appendix.     LYMPH NODES: No suspicious lymphadenopathy. No abdominopelvic free fluid.     VASCULATURE: Scattered calcified atherosclerosis.     PELVIC ORGANS: Normal.     MUSCULOSKELETAL: No acute bony abnormality.                                                                      IMPRESSION:   1.  Mild fat stranding surrounding the right lower quadrant transplant kidney without pete perinephric hematoma, could be related to recent biopsy but recommend correlation with urinalysis to exclude pyelonephritis.  2.  No hydronephrosis of native or transplant kidneys.  3.  Normal appendix.       Past Medical History     Medical History and Problem List   Past Medical History:   Diagnosis Date    Adrenal adenoma, left     Anemia     Benign essential hypertension 07/28/2017    Bursitis of left shoulder 07/29/2023    Chronic deep vein thrombosis (DVT) of internal jugular vein (H) 03/2023    CKD (chronic kidney disease) stage 5, GFR less than 15 ml/min (H) 2021    Depression     Diabetes mellitus, new onset (H) 2/5/2025    Focal glomerular sclerosis 07/28/2017    History of kidney transplant     HLD (hyperlipidemia)     Kidney replaced by transplant 12/05/2023    LVH (left ventricular hypertrophy) due to hypertensive disease 07/14/2017    Noncompliance     Obesity, unspecified     OD (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/2021    Prolonged QT interval     Staphylococcus infection 07/27/2023    Stress-induced cardiomyopathy     Suicide attempt (H) 2019       Medications   acetaminophen (TYLENOL) 325 MG tablet  amLODIPine (NORVASC) 5 MG tablet  aspirin 81 MG EC tablet  atorvastatin (LIPITOR) 10 MG tablet  blood glucose (NO BRAND SPECIFIED) test strip  blood glucose monitoring (NO BRAND SPECIFIED) meter device kit  carvedilol (COREG) 25 MG tablet  enalapril (VASOTEC) 10 MG tablet  famotidine (PEPCID) 40 MG tablet  magnesium glycinate 100 MG CAPS  capsule  medical cannabis (Patient's own supply)  metFORMIN (GLUCOPHAGE) 500 MG tablet  Multiple Vitamin (MULTIVITAMIN ADULT PO)  mycophenolic acid (GENERIC EQUIVALENT) 180 MG EC tablet  psyllium (METAMUCIL/KONSYL) 58.6 % powder  sodium bicarbonate 650 MG tablet  Sodium Bicarbonate, antacid, POWD  tacrolimus (GENERIC EQUIVALENT) 0.5 MG capsule  tacrolimus (GENERIC EQUIVALENT) 1 MG capsule        Surgical History   Past Surgical History:   Procedure Laterality Date    ARTHROSCOPY ANKLE Left 02/24/2021    Procedure: Left ankle arthroscopy and debridement/micro fracture;  Surgeon: Mirza Nelson MD;  [facility]    [facility]    CREATE FISTULA ARTERIOVENOUS UPPER EXTREMITY Right 03/04/2021    Procedure: RIGHT proximal radial  to CEPHALIC ARTERIOVENOUS FISTULA;  Surgeon: Henrik Moran MD;  Location: [facility] OR    CREATE FISTULA ARTERIOVENOUS UPPER EXTREMITY Left 03/09/2023    Procedure: CREATION OF FIRST STAGE LEFT BRACHIOBASILIC ARTERIOVENOUS FISTULA;  Surgeon: Henrik Moran MD;  Location: [facility]    CREATE FISTULA ARTERIOVENOUS UPPER EXTREMITY Left 5/30/2023    Procedure: SECOND STAGE LEFT BRACHIAL TO BASILIC ARTERIOVENOUS FISTULA;  Surgeon: Henrik Moran MD;  Location: [facility]    CYSTOSCOPY, REMOVE STENT(S), COMBINED N/A 1/23/2024    Procedure: CYSTOSCOPY, WITH URETERAL STENT REMOVAL;  Surgeon: Tho Vuong MD;  Location: [facility]    IR CVC TUNNEL PLACEMENT > 5 YRS OF AGE  06/17/2021    IR CVC TUNNEL PLACEMENT > 5 YRS OF AGE  03/09/2023    IR CVC TUNNEL REMOVAL RIGHT  12/03/2021    IR CVC TUNNEL REMOVAL RIGHT  8/2/2023    IR RENAL BIOPSY RIGHT  2/21/2024    IR RENAL BIOPSY RIGHT  1/30/2025    IRRIGATION AND DEBRIDEMENT UPPER EXTREMITY, COMBINED Left 03/16/2023    Procedure: EVACUATION OF LEFT ARM HEMATOMA;  Surgeon: Henrik Moran MD;  Location: [facility]    LIGATE FISTULA ARTERIOVENOUS UPPER EXTREMITY Right 03/09/2023    Procedure: LIGATION OF RIGHT ARM  ARTERIOVENOUS FISTULA;  Surgeon: Henrik Moran MD;  Location: [facility]    REVISION FISTULA ARTERIOVENOUS UPPER EXTREMITY Right 2021    Procedure: Second stage RIGHT BRACHIOCEPHALIC transposition ARTERIOVENOUS FISTULA;  Surgeon: Henrik Moran MD;  Location: [facility]    TRANSPLANT KIDNEY RECIPIENT  DONOR Left 2023    Procedure: Transplant kidney recipient  donor, ureteral stent placement;  Surgeon: Shi Jimenez MD;  Location: [facility]       Physical Exam     Patient Vitals for the past 24 hrs:   BP Temp Temp src Pulse Resp SpO2 Height Weight   25 1500 135/87 -- -- 82 -- 95 % -- --   25 1445 133/86 -- -- 103 -- 94 % -- --   25 1345 (!) 145/97 -- -- 108 -- 98 % -- --   25 1214 (!) 130/100 98.6  F (37  C) Oral 92 16 98 % 1.829 m (6') 115.9 kg (255 lb 8.2 oz)     Physical Exam  VS: Reviewed per above  HENT: Mucous membranes moist  EYES: sclera anicteric  CV: Rate as noted,  regular rhythm.   RESP: Effort normal. Breath sounds are normal bilaterally.  GI: mild epigastric tenderness without rebound/guarding, not distended.  NEURO: Alert, moving all extremities  MSK: No deformity of the extremities  SKIN: Warm and dry    Diagnostics     Lab Results   Labs Ordered and Resulted from Time of ED Arrival to Time of ED Departure   COMPREHENSIVE METABOLIC PANEL - Abnormal       Result Value    Sodium 132 (*)     Potassium 4.0      Carbon Dioxide (CO2) 17 (*)     Anion Gap 17 (*)     Urea Nitrogen 13.1      Creatinine 0.73      GFR Estimate >90      Calcium 10.1      Chloride 98      Glucose 346 (*)     Alkaline Phosphatase 187 (*)     AST 16      ALT 24      Protein Total 7.3      Albumin 4.0      Bilirubin Total 0.4     KETONE BETA-HYDROXYBUTYRATE QUANTITATIVE, RAPID - Abnormal    Ketone (Beta-Hydroxybutyrate) Quantitative 2.14 (*)    BLOOD GAS VENOUS - Abnormal    pH Venous 7.38      pCO2 Venous 33 (*)     pO2 Venous 60 (*)     Bicarbonate Venous 19 (*)     Base  Excess/Deficit Venous -4.7 (*)     FIO2 21      Oxyhemoglobin Venous 90 (*)     O2 Sat, Venous 91.4 (*)    CBC WITH PLATELETS AND DIFFERENTIAL - Abnormal    WBC Count 10.6      RBC Count 6.30 (*)     Hemoglobin 16.7      Hematocrit 51.1      MCV 81      MCH 26.5      MCHC 32.7      RDW 14.9      Platelet Count 298      % Neutrophils 73      % Lymphocytes 16      % Monocytes 8      % Eosinophils 2      % Basophils 1      % Immature Granulocytes 1      NRBCs per 100 WBC 0      Absolute Neutrophils 7.8      Absolute Lymphocytes 1.7      Absolute Monocytes 0.8      Absolute Eosinophils 0.2      Absolute Basophils 0.1      Absolute Immature Granulocytes 0.1      Absolute NRBCs 0.0     LIPASE - Normal    Lipase 22     GLUCOSE MONITOR NURSING POCT   GLUCOSE MONITOR NURSING POCT   GLUCOSE MONITOR NURSING POCT       ED Course      Medications Administered   Medications   sodium chloride 0.9% BOLUS 1,000 mL (1,000 mLs Intravenous $New Bag 2/5/25 8165)   sodium chloride 0.9% BOLUS 1,000 mL (has no administration in time range)   insulin glargine (LANTUS PEN) injection 20 Units (has no administration in time range)   insulin aspart (NovoLOG) injection (RAPID ACTING) (has no administration in time range)   glucose gel 15-30 g (has no administration in time range)     Or   dextrose 50 % injection 25-50 mL (has no administration in time range)     Or   glucagon injection 1 mg (has no administration in time range)   insulin aspart (NovoLOG) injection (RAPID ACTING) (has no administration in time range)   insulin aspart (NovoLOG) injection (RAPID ACTING) (has no administration in time range)   lactated ringers infusion (has no administration in time range)   pantoprazole (PROTONIX) IV push injection 40 mg (has no administration in time range)   alum & mag hydroxide-simethicone (MAALOX) suspension 15 mL (15 mLs Oral $Given 2/5/25 8680)   acetaminophen (TYLENOL) tablet 1,000 mg (1,000 mg Oral $Given 2/5/25 1354)       Procedures    Procedures         Medical Decision Making / Diagnosis       LIYA Valiente is a 29 year old male who presents to the ER for evaluation of persistent upper abdominal pain and new looser stools.  Vital signs reassuring.  He has mild epigastric tenderness.  No peritoneal signs.  After GI cocktail, abdominal pain improved.  Doubt sinister or surgical intra-abdominal process requiring repeat CT imaging.  Considered occult gastritis or peptic ulcer disease.  Labs do reveal persistent hyperglycemia and ketosis and mild acidosis.  He was given IV fluids and admitted for further management of hyperglycemia/diabetes.  In discussion with admitting hospitalist, plan to withhold insulin drip at this time.    Disposition   The patient was admitted to the hospital.     Diagnosis     ICD-10-CM    1. Diabetes mellitus, new onset (H)  E11.9       2. Diarrhea, unspecified type  R19.7       3. Epigastric pain  R10.13       4. Ketosis (H)  E88.89            Discharge Medications   New Prescriptions    No medications on file          Lm Suh MD  02/05/25 5963

## 2025-02-05 NOTE — ED NOTES
[facility]  ED Nurse Handoff Report    ED Chief complaint: Abdominal Pain and Hyperglycemia  . ED Diagnosis:   Final diagnoses:   Diabetes mellitus, new onset (H)   Diarrhea, unspecified type   Epigastric pain   Ketosis (H)       Allergies:   Allergies   Allergen Reactions    Benadryl [Diphenhydramine] Itching    Vancomycin Itching       Code Status: Full Code    Activity level - Baseline/Home:  independent.  Activity Level - Current:   independent.   Lift room needed: No.   Bariatric: No   Needed: No   Isolation: No.   Infection: Not Applicable.     Respiratory status: Room air    Vital Signs (within 30 minutes):   Vitals:    02/05/25 1214 02/05/25 1345 02/05/25 1445   BP: (!) 130/100 (!) 145/97 133/86   Pulse: 92 108 103   Resp: 16     Temp: 98.6  F (37  C)     TempSrc: Oral     SpO2: 98% 98% 94%   Weight: 115.9 kg (255 lb 8.2 oz)     Height: 1.829 m (6')         Cardiac Rhythm:  ,      Pain level:    Patient confused: No.   Patient Falls Risk: assistive device/personal items within reach and activity supervised.   Elimination Status:  has voided      Patient Report - Initial Complaint: Abdominal Pain .   Focused Assessment: Was seen and discharged from [facility] ED last evening and return precautions included continued ABD pain and hyperglycemia. Diarrhea started last night after metformin dose      Abnormal Results:   Labs Ordered and Resulted from Time of ED Arrival to Time of ED Departure   COMPREHENSIVE METABOLIC PANEL - Abnormal       Result Value    Sodium 132 (*)     Potassium 4.0      Carbon Dioxide (CO2) 17 (*)     Anion Gap 17 (*)     Urea Nitrogen 13.1      Creatinine 0.73      GFR Estimate >90      Calcium 10.1      Chloride 98      Glucose 346 (*)     Alkaline Phosphatase 187 (*)     AST 16      ALT 24      Protein Total 7.3      Albumin 4.0      Bilirubin Total 0.4     KETONE BETA-HYDROXYBUTYRATE QUANTITATIVE, RAPID - Abnormal    Ketone (Beta-Hydroxybutyrate)  Quantitative 2.14 (*)    BLOOD GAS VENOUS - Abnormal    pH Venous 7.38      pCO2 Venous 33 (*)     pO2 Venous 60 (*)     Bicarbonate Venous 19 (*)     Base Excess/Deficit Venous -4.7 (*)     FIO2 21      Oxyhemoglobin Venous 90 (*)     O2 Sat, Venous 91.4 (*)    CBC WITH PLATELETS AND DIFFERENTIAL - Abnormal    WBC Count 10.6      RBC Count 6.30 (*)     Hemoglobin 16.7      Hematocrit 51.1      MCV 81      MCH 26.5      MCHC 32.7      RDW 14.9      Platelet Count 298      % Neutrophils 73      % Lymphocytes 16      % Monocytes 8      % Eosinophils 2      % Basophils 1      % Immature Granulocytes 1      NRBCs per 100 WBC 0      Absolute Neutrophils 7.8      Absolute Lymphocytes 1.7      Absolute Monocytes 0.8      Absolute Eosinophils 0.2      Absolute Basophils 0.1      Absolute Immature Granulocytes 0.1      Absolute NRBCs 0.0     LIPASE - Normal    Lipase 22     GLUCOSE MONITOR NURSING POCT   GLUCOSE MONITOR NURSING POCT   GLUCOSE MONITOR NURSING POCT        No orders to display       Treatments provided: Labs, fluid, pain control  Family Comments: Aunt and uncle at bedside  OBS brochure/video discussed/provided to patient:  N/A  ED Medications:   Medications   sodium chloride 0.9% BOLUS 1,000 mL (1,000 mLs Intravenous $New Bag 2/5/25 5502)   sodium chloride 0.9% BOLUS 1,000 mL (has no administration in time range)   insulin glargine (LANTUS PEN) injection 20 Units (has no administration in time range)   insulin aspart (NovoLOG) injection (RAPID ACTING) (has no administration in time range)   glucose gel 15-30 g (has no administration in time range)     Or   dextrose 50 % injection 25-50 mL (has no administration in time range)     Or   glucagon injection 1 mg (has no administration in time range)   insulin aspart (NovoLOG) injection (RAPID ACTING) (has no administration in time range)   insulin aspart (NovoLOG) injection (RAPID ACTING) (has no administration in time range)   lactated ringers infusion (has no  administration in time range)   pantoprazole (PROTONIX) IV push injection 40 mg (has no administration in time range)   alum & mag hydroxide-simethicone (MAALOX) suspension 15 mL (15 mLs Oral $Given 2/5/25 3840)   acetaminophen (TYLENOL) tablet 1,000 mg (1,000 mg Oral $Given 2/5/25 3087)       Drips infusing:  No  For the majority of the shift this patient was Green.   Interventions performed were N/A .    Sepsis treatment initiated: No    Cares/treatment/interventions/medications to be completed following ED care: Follow Plan of care    ED Nurse Name: Rosa Arteaga RN  2:50 PM     RECEIVING UNIT ED HANDOFF REVIEW    Above ED Nurse Handoff Report was reviewed: Yes  Reviewed by: Fausto Hawthorne RN on February 5, 2025 at 4:03 PM   DAI Carlson called the ED to inform them the note was read: Yes

## 2025-02-06 ENCOUNTER — TELEPHONE (OUTPATIENT)
Dept: ENDOCRINOLOGY | Facility: CLINIC | Age: 30
End: 2025-02-06

## 2025-02-06 VITALS
HEIGHT: 72 IN | SYSTOLIC BLOOD PRESSURE: 140 MMHG | RESPIRATION RATE: 16 BRPM | BODY MASS INDEX: 34.61 KG/M2 | TEMPERATURE: 97.6 F | HEART RATE: 85 BPM | OXYGEN SATURATION: 96 % | WEIGHT: 255.51 LBS | DIASTOLIC BLOOD PRESSURE: 84 MMHG

## 2025-02-06 DIAGNOSIS — E13.9 POST-TRANSPLANT DIABETES MELLITUS (H): Primary | ICD-10-CM

## 2025-02-06 DIAGNOSIS — T86.13 KIDNEY TRANSPLANT INFECTION: ICD-10-CM

## 2025-02-06 PROBLEM — E11.9 TYPE 2 DIABETES MELLITUS WITHOUT COMPLICATION, WITHOUT LONG-TERM CURRENT USE OF INSULIN (H): Status: ACTIVE | Noted: 2025-02-06

## 2025-02-06 LAB
ANION GAP SERPL CALCULATED.3IONS-SCNC: 11 MMOL/L (ref 7–15)
BUN SERPL-MCNC: 8.3 MG/DL (ref 6–20)
CALCIUM SERPL-MCNC: 9.6 MG/DL (ref 8.8–10.4)
CHLORIDE SERPL-SCNC: 103 MMOL/L (ref 98–107)
CREAT SERPL-MCNC: 0.67 MG/DL (ref 0.67–1.17)
EGFRCR SERPLBLD CKD-EPI 2021: >90 ML/MIN/1.73M2
GLUCOSE BLDC GLUCOMTR-MCNC: 233 MG/DL (ref 70–99)
GLUCOSE BLDC GLUCOMTR-MCNC: 244 MG/DL (ref 70–99)
GLUCOSE BLDC GLUCOMTR-MCNC: 275 MG/DL (ref 70–99)
GLUCOSE BLDC GLUCOMTR-MCNC: 312 MG/DL (ref 70–99)
GLUCOSE SERPL-MCNC: 253 MG/DL (ref 70–99)
HCO3 SERPL-SCNC: 21 MMOL/L (ref 22–29)
POTASSIUM SERPL-SCNC: 3.7 MMOL/L (ref 3.4–5.3)
SODIUM SERPL-SCNC: 135 MMOL/L (ref 135–145)

## 2025-02-06 PROCEDURE — 258N000003 HC RX IP 258 OP 636: Performed by: INTERNAL MEDICINE

## 2025-02-06 PROCEDURE — 250N000012 HC RX MED GY IP 250 OP 636 PS 637: Performed by: INTERNAL MEDICINE

## 2025-02-06 PROCEDURE — 250N000009 HC RX 250: Performed by: INTERNAL MEDICINE

## 2025-02-06 PROCEDURE — G0378 HOSPITAL OBSERVATION PER HR: HCPCS

## 2025-02-06 PROCEDURE — 80048 BASIC METABOLIC PNL TOTAL CA: CPT | Performed by: INTERNAL MEDICINE

## 2025-02-06 PROCEDURE — 99239 HOSP IP/OBS DSCHRG MGMT >30: CPT | Performed by: INTERNAL MEDICINE

## 2025-02-06 PROCEDURE — 86341 ISLET CELL ANTIBODY: CPT | Performed by: INTERNAL MEDICINE

## 2025-02-06 PROCEDURE — 250N000013 HC RX MED GY IP 250 OP 250 PS 637: Performed by: INTERNAL MEDICINE

## 2025-02-06 PROCEDURE — 86337 INSULIN ANTIBODIES: CPT | Performed by: INTERNAL MEDICINE

## 2025-02-06 PROCEDURE — 96376 TX/PRO/DX INJ SAME DRUG ADON: CPT

## 2025-02-06 PROCEDURE — 36415 COLL VENOUS BLD VENIPUNCTURE: CPT | Performed by: INTERNAL MEDICINE

## 2025-02-06 RX ORDER — INSULIN LISPRO 100 [IU]/ML
1-6 INJECTION, SOLUTION INTRAVENOUS; SUBCUTANEOUS
Qty: 15 ML | Refills: 0 | Status: SHIPPED | OUTPATIENT
Start: 2025-02-06

## 2025-02-06 RX ORDER — INSULIN LISPRO 100 [IU]/ML
6 INJECTION, SOLUTION INTRAVENOUS; SUBCUTANEOUS
Qty: 10 ML | Refills: 0 | Status: SHIPPED | OUTPATIENT
Start: 2025-02-06

## 2025-02-06 RX ADMIN — TACROLIMUS 0.5 MG: 0.5 CAPSULE ORAL at 09:47

## 2025-02-06 RX ADMIN — SODIUM BICARBONATE 1950 MG: 650 TABLET ORAL at 08:29

## 2025-02-06 RX ADMIN — CARVEDILOL 25 MG: 25 TABLET, FILM COATED ORAL at 08:29

## 2025-02-06 RX ADMIN — SODIUM CHLORIDE, POTASSIUM CHLORIDE, SODIUM LACTATE AND CALCIUM CHLORIDE: 600; 310; 30; 20 INJECTION, SOLUTION INTRAVENOUS at 00:12

## 2025-02-06 RX ADMIN — ATORVASTATIN CALCIUM 10 MG: 10 TABLET, FILM COATED ORAL at 08:29

## 2025-02-06 RX ADMIN — PANTOPRAZOLE SODIUM 40 MG: 40 INJECTION, POWDER, FOR SOLUTION INTRAVENOUS at 08:29

## 2025-02-06 RX ADMIN — TACROLIMUS 4 MG: 1 CAPSULE ORAL at 08:29

## 2025-02-06 RX ADMIN — MYCOPHENOLIC ACID 540 MG: 360 TABLET, DELAYED RELEASE ORAL at 08:29

## 2025-02-06 RX ADMIN — ASPIRIN 81 MG: 81 TABLET, COATED ORAL at 08:29

## 2025-02-06 ASSESSMENT — ACTIVITIES OF DAILY LIVING (ADL)
ADLS_ACUITY_SCORE: 32

## 2025-02-06 NOTE — CONSULTS
CLINICAL NUTRITION SERVICES - ASSESSMENT NOTE    RECOMMENDATIONS FOR MDs/PROVIDERS TO ORDER:  Referral to outpatient dietitian to assist with diet and blood sugar management      Malnutrition Status:    Malnutrition Diagnosis: Patient does not meet two of the established criteria necessary for diagnosing malnutrition  Malnutrition Present on Admission: No    Registered Dietitian Interventions:  Provided diabetes diet education (see details below)   If continuing on Metformin, discussed with patient to take the medication with a meal to prevent GI distress.        REASON FOR ASSESSMENT  Positive admission nutrition risk screen, poor appetite and weight loss. Also provided diet education on newly diagnosed diabetes.     SUBJECTIVE INFORMATION  Assessed patient in room.    PMH: CKD 2/2 FSGS s/p renal transplant, dyspepsia, HTN, and recently diagnosed diabetes    Patient admitted for abdominal pain, hyperglycemia, and mixed ketoacidosis.    NUTRITION HISTORY  Patient reports to follow a regular diet at home, he reports no recent changes in eating or weight. He says that he started to have diarrhea a couple of days before starting metformin. Once he started metformin he started to have abdominal pain.     CURRENT NUTRITION ORDERS  Diet: Moderate Consistent Carbohydrate    CURRENT INTAKE/TOLERANCE  Limited intake information documented d/t recent admission. Pt reports abdominal pain is resolved, he is tolerating oral intake.     Patient was agreeable to diet education for diabetes. We discussed carbohydrate sources, serving sizes, reviewed how to balance meals, emphasized the need to eat 3 meals per day (no longer than 4-5 hours apart), limiting snacks to <15 g carb, reviewed how to read a nutrition label. Encouraged frequent blood sugar monitoring and tracking of carbohydrate intake in order to accurately dose insulin. Provided handouts from the NCM/AND that provide information on carbohydrate counting, the plate method,  "and label reading. All questions answered at this time. Encouraged patient to reach out with questions prior to discharge if needed. Recommended outpatient follow up.     LABS  Nutrition-relevant labs: Reviewed    MEDICATIONS  Nutrition-relevant medications:  insulin (sliding scale), lantus, psyllium, sodium bicarbonate, tacrolimus, received LR    SYSTEM FINDINGS    Skin/wounds: no edema or PI noted    I/Os: no information charted      ANTHROPOMETRICS  Height: 182.9 cm (6' 0\")  Most Recent Weight: 115.9 kg (255 lb 8.2 oz)  IBW: 178 lbs  % IBW: 143%  BMI (kg/m ): Obesity Class I BMI 30-34.9  Weight History: -4.9% wt loss in 2 months  Wt Readings from Last 20 Encounters:   02/05/25 115.9 kg (255 lb 8.2 oz)   02/04/25 116.3 kg (256 lb 6.3 oz)   01/30/25 117.9 kg (260 lb)   12/22/24 122.2 kg (269 lb 6.4 oz)   12/13/24 121.8 kg (268 lb 8 oz)   07/12/24 113.5 kg (250 lb 3.2 oz)   04/16/24 110.3 kg (243 lb 1.6 oz)   03/11/24 110.3 kg (243 lb 3.2 oz)   02/21/24 106.5 kg (234 lb 14.4 oz)   02/15/24 107 kg (236 lb)   01/23/24 105.3 kg (232 lb 1.6 oz)   12/18/23 100.9 kg (222 lb 8 oz)   12/11/23 103 kg (227 lb 1.6 oz)   12/10/23 104.2 kg (229 lb 11.2 oz)   12/08/23 106.3 kg (234 lb 6.4 oz)   08/02/23 100.2 kg (221 lb)   07/13/23 104.2 kg (229 lb 11.2 oz)   06/06/23 104.1 kg (229 lb 6.4 oz)   05/30/23 105.6 kg (232 lb 14.4 oz)   05/02/23 106.3 kg (234 lb 6.4 oz)          ASSESSED NUTRITION NEEDS  Dosing Weight: 115 kg, based on actual wt for energy, 80.9 kg IBW for protein   Estimated Energy Needs: 20 - 25 kcals/kg  Justification: Obese  Estimated Protein Needs: 1.5 - 2 grams of pro/kg  Justification: Obesity guidelines  Estimated Fluid Needs: 1 mL/kcal  Justification: Maintenance      MALNUTRITION  % Intake: Decreased intake does not meet criteria  % Weight Loss: Weight loss does not meet criteria   Subcutaneous Fat Loss: None observed  Muscle Loss: None observed  Fluid Accumulation/Edema: None noted  Malnutrition Diagnosis: " "Patient does not meet two of the established criteria necessary for diagnosing malnutrition  Malnutrition Present on Admission: No    NUTRITION DIAGNOSIS  Unintended weight loss related to new diabetes diagnosis as evidenced by weight trends, need for insulin and diet education.    INTERVENTIONS  Nutrition counseling strategies  Nutrition education application  Nutrition education content    Goals  Tolerate adequate oral intake   Apply diet education      Monitoring/Evaluation  Progress toward goals will be monitored and evaluated per policy.      Isabel Ba, MS, RD, LD  Clinical Dietitian II  [facility] Message Group: \"Dietitian [Estephanie]\"  Office Phone: [phone]  Pagers: 3rd floor/ICU: [phone]  All other floors: [phone]  Weekend/holiday: [phone]    "

## 2025-02-06 NOTE — CONSULTS
Discharge Pharmacy Test Claim    Patient may be eligible to receive a continuous glucose monitor covered by Medicare Part B with a 20% copay provided that patient meets CGM Medicare criteria.     CGM Criteria  The patient has a diagnosis of diabetes mellitus.  The patient has been using a blood glucose monitor to test 4+ times a day.  The patient is insulin-treated with three or more daily injections of insulin (basal and rapid acting).  The patient's insulin treatment regimen requires frequent adjustments due sliding scale, carb counting, and/or labile blood sugars.    Latonya Perry 2/6  [facility] Pharmacy Liaison, [place]  Ph: [phone]  Fax: [phone]  Available on Teams and Vocera  Disclaimer: Pharmacy test claims are estimates and may not reflect final costs. Suggested alternatives aim to be cost-effective and may not be therapeutically equivalent. This consult is informational and does not constitute medical advice. Clinical decisions should be made by qualified healthcare providers.

## 2025-02-06 NOTE — H&P
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  Suspect abdominal pain is related to dyspepsia as patient has had similar symptoms in past and improvement with antacids.  New onset DM with hyperglycemia and mixed ketoacidosis will need further management per below.    PLAN    New Onset Diabetes Mellitus w/ Mild Ketoacidosis  -Presents with new onset DM HgA1c 9.3  -Did recently gain weight so may be type II but will check antibodies for type I as well  -Mild ketoacidosis but suspect mixed from poor oral intake from severe abd pain  -Will try to manage patient with subcutaneous insulin although may need infusion if not improving  -MDSS + Lantus 30U every day and frequent adjustments until sugars are controlled  -S/p 2L IVF, continue LR@150ml/hr  -Trend BMP and beta hydroxybutyrate    Dyspepsia  -Had similar symptoms in past and improvement with antacids  -PPI IV bid  -As needed Maalox  -May need GI consult if not improved by tomorrow    CKD 2/2 FSGS s/p Renal Transplant  Abnormal CT abd pelvis  -Had transplant completed in 2023  -Recent concern for rejection, had biopsy last week but negative for rejection  -CT scan yesterday with edema around kidney, suspect due to biopsy  -Home immunosuppressive's    Essential Hypertension  -Home medications    Hyponatremia  -Mild, trend    DVT Prophy  -SCDs    Disposition  -Medically Ready for Discharge: Anticipated Tomorrow       Jarod Benjamin MD    History of Present Illness     Taylor Valiente is a 29 year old with history of 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  Patient was in his normal state of health until a few days ago when he threw experiencing epigastric abdominal  pain.  Pain is the worst in the morning, does not seem to have any relation to eating.  Has had this pain in the past and improved with antacids.  However, was told to wean himself off of these medications and not currently on any scheduled antacids.  Denies fevers or chills.  Denies nausea or vomiting.  He was seen in the ED yesterday.  Discovered to have new onset diabetes.  Case discussed with endocrinology who recommended starting metformin.  Discharged on metformin.  Had some diarrhea from metformin but otherwise tolerated.  Still having epigastric abdominal pain so he presented to the ED.  In the ED, VSS.  Initial blood sugar of 346.  Beta hydroxybutyrate 2.14.  pH 7.38.  Bicarb 17.  Anion gap 17.  Started on fluids and insulin and admitted to medicine.    Review of Systems     A Comprehensive greater than 10 system review of systems was carried out.  Pertinent positives and negatives are noted above.  Otherwise negative for contributory information.     Past Medical History     Past Medical History:   Diagnosis Date    Adrenal adenoma, left     Anemia     Benign essential hypertension 07/28/2017    Bursitis of left shoulder 07/29/2023    Chronic deep vein thrombosis (DVT) of internal jugular vein (H) 03/2023    Right IJ, catheter-related    CKD (chronic kidney disease) stage 5, GFR less than 15 ml/min (H) 2021    FSGS    Depression     Diabetes mellitus, new onset (H) 2/5/2025    Focal glomerular sclerosis 07/28/2017    History of kidney transplant     HLD (hyperlipidemia)     Kidney replaced by transplant 12/05/2023    DDKT. Induction w/ Thymoglobulin 2mg/kg and basiliximab.    LVH (left ventricular hypertrophy) due to hypertensive disease 07/14/2017    Noncompliance     Obesity, unspecified     OD (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/2021    Prolonged QT interval     Staphylococcus infection 07/27/2023 07/27/23: Blood    Stress-induced cardiomyopathy     Suicide attempt (H) 2019     Medications      Medications Prior to Admission   Medication Sig Dispense Refill Last Dose/Taking    acetaminophen (TYLENOL) 325 MG tablet Take 2 tablets (650 mg) by mouth every 4 hours as needed for pain 100 tablet 0 Taking As Needed    amLODIPine (NORVASC) 5 MG tablet Take 1 tablet (5 mg) by mouth daily. 90 tablet 2 2/4/2025 Evening    aspirin 81 MG EC tablet Take 81 mg by mouth daily   2/5/2025 Morning    atorvastatin (LIPITOR) 10 MG tablet Take 1 tablet (10 mg) by mouth daily 90 tablet 3 Past Month    carvedilol (COREG) 25 MG tablet Take 1 tablet (25 mg) by mouth 2 times daily (with meals) 60 tablet 11 2/5/2025 Morning    enalapril (VASOTEC) 10 MG tablet Take 2 tablets (20 mg) by mouth daily. 90 tablet 1 2/4/2025 Evening    famotidine (PEPCID) 40 MG tablet Take 40 mg by mouth 2 times daily as needed for heartburn.   2/4/2025 Evening    magnesium glycinate 100 MG CAPS capsule Take 300 mg by mouth 2 times daily. [employer] Magnesium or generic Magnesium glycinate- noon and evening   2/4/2025 Evening    medical cannabis (Patient's own supply) See Admin Instructions (The purpose of this order is to document that the patient reports taking medical cannabis.  This is not a prescription, and is not used to certify that the patient has a qualifying medical condition.)    Patient was advised on the cannabis-tacrolimus drug interaction.   Taking    metFORMIN (GLUCOPHAGE) 500 MG tablet Take 1 tablet (500 mg) by mouth 2 times daily (with meals). 60 tablet 0 2/5/2025 Morning    Multiple Vitamin (MULTIVITAMIN ADULT PO) Take 1 tablet by mouth daily   2/4/2025 Evening    mycophenolic acid (GENERIC EQUIVALENT) 180 MG EC tablet Take 3 tablets (540 mg) by mouth 2 times daily Emergency supply. Pt will use GoodRx 180 tablet 11 2/5/2025 Morning    psyllium (METAMUCIL/KONSYL) 58.6 % powder Take 1 teaspoonful by mouth daily   Past Week    sodium bicarbonate 650 MG tablet Take 3 tablets (1,950 mg) by mouth 3 times daily 270 tablet 11 2/5/2025  Morning    Sodium Bicarbonate, antacid, POWD Mix 1 teaspoon of baking soda in full glass of water once daily   Past Week    tacrolimus (GENERIC EQUIVALENT) 0.5 MG capsule Take 1 capsule (0.5 mg) by mouth 2 times daily. Total dose = 4.5 mg twice daily 60 capsule 11 2/5/2025 Morning    tacrolimus (GENERIC EQUIVALENT) 1 MG capsule Take 4 capsules (4 mg) by mouth 2 times daily. Total dose = 4.5 mg twice daily 180 capsule 11 2/5/2025 Morning    blood glucose (NO BRAND SPECIFIED) test strip Use to test blood sugar 2 times daily or as directed. 100 strip 0     blood glucose monitoring (NO BRAND SPECIFIED) meter device kit Use to test blood sugar 2 times daily or as directed. 1 kit 0      Past Surgical History     Past Surgical History:   Procedure Laterality Date    ARTHROSCOPY ANKLE Left 02/24/2021    Procedure: Left ankle arthroscopy and debridement/micro fracture;  Surgeon: Mirza Nelson MD;  [facility]    [facility]    CREATE F[facility]ULA ARTERIOVENOUS UPPER EXTREMITY Right 03/04/2021    Procedure: RIGHT proximal radial  to CEPHALIC ARTERIOVENOUS FISTULA;  Surgeon: Henrik Moran MD;  Location: [facility] OR    CREATE FISTULA ARTERIOVENOUS UPPER EXTREMITY Left 03/09/2023    Procedure: CREATION OF FIRST STAGE LEFT BRACHIOBASILIC ARTERIOVENOUS FISTULA;  Surgeon: Henrik Moran MD;  Location: [facility] OR    CREATE FISTULA ARTERIOVENOUS UPPER EXTREMITY Left 5/30/2023    Procedure: SECOND STAGE LEFT BRACHIAL TO BASILIC ARTERIOVENOUS FISTULA;  Surgeon: Henrik Moran MD;  Location: [facility] OR    CYSTOSCOPY, REMOVE STENT(S), COMBINED N/A 1/23/2024    Procedure: CYSTOSCOPY, WITH URETERAL STENT REMOVAL;  Surgeon: Tho Vuong MD;  Location: [facility]    IR CVC TUNNEL PLACEMENT > 5 YRS OF AGE  06/17/2021    IR CVC TUNNEL PLACEMENT > 5 YRS OF AGE  03/09/2023    IR CVC TUNNEL REMOVAL RIGHT  12/03/2021    IR CVC TUNNEL REMOVAL RIGHT  8/2/2023    IR RENAL BIOPSY RIGHT  2/21/2024    IR RENAL  BIOPSY RIGHT  2025    IRRIGATION AND DEBRIDEMENT UPPER EXTREMITY, COMBINED Left 2023    Procedure: EVACUATION OF LEFT ARM HEMATOMA;  Surgeon: Henrik Moran MD;  Location: [facility]    LIGATE FISTULA ARTERIOVENOUS UPPER EXTREMITY Right 2023    Procedure: LIGATION OF RIGHT ARM ARTERIOVENOUS FISTULA;  Surgeon: Henrik Moran MD;  Location: [facility]    REVISION FISTULA ARTERIOVENOUS UPPER EXTREMITY Right 2021    Procedure: Second stage RIGHT BRACHIOCEPHALIC transposition ARTERIOVENOUS FISTULA;  Surgeon: Henrik Moran MD;  Location: [facility] OR    TRANSPLANT KIDNEY RECIPIENT  DONOR Left 2023    Procedure: Transplant kidney recipient  donor, ureteral stent placement;  Surgeon: Shi Jimenez MD;  Location: [facility] OR     Family History     Family History   Problem Relation Age of Onset    Blood Disease Mother         has hep b    Diabetes Mother         gestionanal diabetes    Hypertension Mother     Obesity Father     Hypertension Father     Hypertension Maternal Grandmother     Diabetes Maternal Grandmother     Hypertension Paternal Grandmother     Dementia Paternal Grandfather     Depression Paternal Grandfather     Hypertension Paternal Grandfather     Coronary Artery Disease Maternal Uncle     Cancer No family hx of      Allergies     Allergies   Allergen Reactions    Benadryl [Diphenhydramine] Itching    Vancomycin Itching     Social History     Social History     Tobacco Use    Smoking status: Never    Smokeless tobacco: Never   Substance Use Topics    Alcohol use: No     Physical Exam   Blood pressure (!) 151/102, pulse 92, temperature 98.3  F (36.8  C), temperature source Oral, resp. rate 18, height 1.829 m (6'), weight 115.9 kg (255 lb 8.2 oz), SpO2 100%.    General: Ill appearing, cooperative with exam, in NAD.  HEENT: Atraumatic. No erythema in posterior pharynx.  Lymph: No cervical or inguinal lymphadenopathy.  Cardiac: RRR. No murmurs.  Lungs: CTAB. Nl WOB.  Abd:  Non-tender. No rebound or gaurding. Nl bowel sounds.  Ext: No edema. 2+ pulses.  Skin: No rashes, abrasions, or contusions.  Psych: A&Ox3. Nl affect.  Neuro: 5/5 strength. Sensation intact.    Labs & Imaging     Reviewed and Pertinent results discussed in assessment and plan.

## 2025-02-06 NOTE — PLAN OF CARE
BP (!) 140/84 (BP Location: Right arm)   Pulse 85   Temp 97.6  F (36.4  C) (Oral)   Resp 16   Ht 1.829 m (6')   Wt 115.9 kg (255 lb 8.2 oz)   SpO2 96%   BMI 34.65 kg/m      VSS, PT cleared for discharge by provider. RN reviewed diabetic instructions and discharge paperwork with PT, and PT demonstrated understanding. Plan to discharge via PC @6317.

## 2025-02-06 NOTE — PLAN OF CARE
"Pt A&Ox4, VSS, RA, getting IV protonix, , 216, 275, 233. Gap is closed, ketones 0.29. Did complain of some indigestion, Maalox relieved symptoms. Mod carb diet. Ind in room.       Goal Outcome Evaluation:      Plan of Care Reviewed With: patient    Overall Patient Progress: improvingOverall Patient Progress: improving    Outcome Evaluation: Gap closed, ketones WDL      Problem: Adult Inpatient Plan of Care  Goal: Plan of Care Review  Description: The Plan of Care Review/Shift note should be completed every shift.  The Outcome Evaluation is a brief statement about your assessment that the patient is improving, declining, or no change.  This information will be displayed automatically on your shift  note.  Outcome: Progressing  Flowsheets (Taken 2/6/2025 0028)  Outcome Evaluation: Gap closed, ketones WDL  Plan of Care Reviewed With: patient  Overall Patient Progress: improving  Goal: Patient-Specific Goal (Individualized)  Description: You can add care plan individualizations to a care plan. Examples of Individualization might be:  \"Parent requests to be called daily at 9am for status\", \"I have a hard time hearing out of my right ear\", or \"Do not touch me to wake me up as it startles  me\".  Outcome: Progressing  Goal: Absence of Hospital-Acquired Illness or Injury  Outcome: Progressing  Intervention: Identify and Manage Fall Risk  Recent Flowsheet Documentation  Taken 2/5/2025 1945 by Severson, Amy C, RN  Safety Promotion/Fall Prevention: safety round/check completed  Goal: Optimal Comfort and Wellbeing  Outcome: Progressing  Goal: Readiness for Transition of Care  Outcome: Progressing     "

## 2025-02-06 NOTE — DISCHARGE SUMMARY
"[facility]  Hospitalist Discharge Summary      Date of Admission:  2/5/2025  Date of Discharge:  2/6/2025  Discharging Provider: Jose Wills MD  Discharge Service: Hospitalist Service    Discharge Diagnoses   ***    Clinically Significant Risk Factors     # DMII: A1C = 9.3 % (Ref range: <5.7 %) within past 6 months  # Obesity: Estimated body mass index is 34.65 kg/m  as calculated from the following:    Height as of this encounter: 1.829 m (6').    Weight as of this encounter: 115.9 kg (255 lb 8.2 oz).       Follow-ups Needed After Discharge   Follow-up Appointments       Follow-up and recommended labs and tests       Follow up with primary care provider, Priyank Lawrence, within 7 days for hospital follow- up.  The following labs/tests are recommended: BMP.            {Additional follow-up instructions/to-do's for PCP    :***}    Unresulted Labs Ordered in the Past 30 Days of this Admission       Date and Time Order Name Status Description    2/5/2025 11:26 PM Zinc Transporter 8 Antibody In process     2/5/2025 11:26 PM Glutamic acid decarboxylase antibody In process     2/5/2025 11:26 PM Insulin antibody In process     2/5/2025 11:26 PM Islet cell antibody IgG In process         These results will be followed up by ***    Discharge Disposition   {Discharge to:6460955::\"Discharged to home\"}  Condition at discharge: {CONDITION:787182::\"Stable\"}    Hospital Course   {OPTIONAL -- use to select A&P section   :413861}    Consultations This Hospital Stay   PHARMACY IP CONSULT  PHARMACY DISCHARGE EDUCATION BY PHARMACIST    Code Status   Full Code    Time Spent on this Encounter   {How much time did you spend on discharge?:55435494}       Jose Wills MD  [facility] MEDICAL SURGICAL  [address] [place] [address]  Phone: [phone]  Fax: [phone]  ______________________________________________________________________    Physical Exam   Vital Signs: Temp: 97.6  F " (36.4  C) Temp src: Oral BP: (!) 140/84 Pulse: 85   Resp: 16 SpO2: 96 % O2 Device: None (Room air)    Weight: 255 lbs 8.21 oz  {Recommend personal SmartPhrase or Notewriter for exam (OPTIONAL)   :025137}       Primary Care Physician   Priyank Lawrence    Discharge Orders      Reason for your hospital stay    Abdominal pain and hyperglycemia     Follow-up and recommended labs and tests     Follow up with primary care provider, Priyank Lawrence, within 7 days for hospital follow- up.  The following labs/tests are recommended: BMP.     Activity    Your activity upon discharge: activity as tolerated     Monitor and record    Check blood sugars 4 times daily before and record the readings on his sheet to take to primary care physician.     Diet    Follow this diet upon discharge: Current Diet:Orders Placed This Encounter      Combination Diet Moderate Consistent Carb (60 g CHO per Meal) Diet       Significant Results and Procedures   {Data for Discharge Summary:614408}    Discharge Medications   Current Discharge Medication List        START taking these medications    Details   insulin glargine (LANTUS PEN) 100 UNIT/ML pen Inject 35 Units subcutaneously daily.  Qty: 15 mL, Refills: 0    Comments: If Lantus is not covered by insurance, may substitute Basaglar or Semglee or other insulin glargine product per insurance preference at same dose and frequency.    Associated Diagnoses: Type 2 diabetes mellitus without complication, without long-term current use of insulin (H)      insulin lispro (HUMALOG KWIKPEN) 100 UNIT/ML (1 unit dial) KWIKPEN Inject 1-6 Units subcutaneously 4 times daily (with meals and nightly).  Qty: 15 mL, Refills: 0    Associated Diagnoses: Diabetes mellitus, new onset (H)           CONTINUE these medications which have NOT CHANGED    Details   acetaminophen (TYLENOL) 325 MG tablet Take 2 tablets (650 mg) by mouth every 4 hours as needed for pain  Qty: 100 tablet, Refills: 0    Associated Diagnoses: ESRD (end stage  renal disease) on dialysis (H)      amLODIPine (NORVASC) 5 MG tablet Take 1 tablet (5 mg) by mouth daily.  Qty: 90 tablet, Refills: 2    Associated Diagnoses: Kidney replaced by transplant      aspirin 81 MG EC tablet Take 81 mg by mouth daily      atorvastatin (LIPITOR) 10 MG tablet Take 1 tablet (10 mg) by mouth daily  Qty: 90 tablet, Refills: 3    Associated Diagnoses: Kidney replaced by transplant      carvedilol (COREG) 25 MG tablet Take 1 tablet (25 mg) by mouth 2 times daily (with meals)  Qty: 60 tablet, Refills: 11    Associated Diagnoses: Acute kidney injury      enalapril (VASOTEC) 10 MG tablet Take 2 tablets (20 mg) by mouth daily.  Qty: 90 tablet, Refills: 1    Associated Diagnoses: Post-transplant erythrocytosis; Aftercare following organ transplant      famotidine (PEPCID) 40 MG tablet Take 40 mg by mouth 2 times daily as needed for heartburn.      magnesium glycinate 100 MG CAPS capsule Take 300 mg by mouth 2 times daily. [employer] Magnesium or generic Magnesium glycinate- noon and evening      medical cannabis (Patient's own supply) See Admin Instructions (The purpose of this order is to document that the patient reports taking medical cannabis.  This is not a prescription, and is not used to certify that the patient has a qualifying medical condition.)    Patient was advised on the cannabis-tacrolimus drug interaction.      Multiple Vitamin (MULTIVITAMIN ADULT PO) Take 1 tablet by mouth daily      mycophenolic acid (GENERIC EQUIVALENT) 180 MG EC tablet Take 3 tablets (540 mg) by mouth 2 times daily Emergency supply. Pt will use GoodRx  Qty: 180 tablet, Refills: 11    Comments: TXP DT 12/5/2023 (Kidney) TXP Dischg DT 12/8/2023 DX Kidney replaced by transplant Z94.0 [facility] [place] ([place], [place])  Associated Diagnoses: Kidney replaced by transplant      psyllium (METAMUCIL/KONSYL) 58.6 % powder Take 1 teaspoonful by mouth daily      sodium bicarbonate  650 MG tablet Take 3 tablets (1,950 mg) by mouth 3 times daily  Qty: 270 tablet, Refills: 11    Associated Diagnoses: Kidney transplant recipient      Sodium Bicarbonate, antacid, POWD Mix 1 teaspoon of baking soda in full glass of water once daily      !! tacrolimus (GENERIC EQUIVALENT) 0.5 MG capsule Take 1 capsule (0.5 mg) by mouth 2 times daily. Total dose = 4.5 mg twice daily  Qty: 60 capsule, Refills: 11    Comments: TXP DT 12/5/2023 (Kidney) TXP Dischg DT 12/8/2023 DX Kidney replaced by transplant Z94.0 [facility] ([place], [place])  Associated Diagnoses: Kidney transplant recipient; Kidney transplant candidate      !! tacrolimus (GENERIC EQUIVALENT) 1 MG capsule Take 4 capsules (4 mg) by mouth 2 times daily. Total dose = 4.5 mg twice daily  Qty: 180 capsule, Refills: 11    Comments: TXP DT 12/5/2023 (Kidney) TXP Dischg DT 12/8/2023 DX Kidney replaced by transplant Z94.0 [facility] ([place], [place])  Associated Diagnoses: Kidney transplant recipient; Kidney transplant candidate      blood glucose (NO BRAND SPECIFIED) test strip Use to test blood sugar 2 times daily or as directed.  Qty: 100 strip, Refills: 0      blood glucose monitoring (NO BRAND SPECIFIED) meter device kit Use to test blood sugar 2 times daily or as directed.  Qty: 1 kit, Refills: 0       !! - Potential duplicate medications found. Please discuss with provider.        STOP taking these medications       metFORMIN (GLUCOPHAGE) 500 MG tablet Comments:   Reason for Stopping:             Allergies   Allergies   Allergen Reactions    Benadryl [Diphenhydramine] Itching    Vancomycin Itching      pyelonephritis.  2.  No hydronephrosis of native or transplant kidneys.  3.  Normal appendix.         Discharge Medications   Current Discharge Medication List        START taking these medications    Details   insulin glargine (LANTUS PEN) 100 UNIT/ML pen Inject 35 Units subcutaneously daily.  Qty: 15 mL, Refills: 0    Comments: If Lantus is not covered by insurance, may substitute Basaglar or Semglee or other insulin glargine product per insurance preference at same dose and frequency.    Associated Diagnoses: Type 2 diabetes mellitus without complication, without long-term current use of insulin (H)      insulin lispro (HUMALOG KWIKPEN) 100 UNIT/ML (1 unit dial) KWIKPEN Inject 1-6 Units subcutaneously 4 times daily (with meals and nightly).  Qty: 15 mL, Refills: 0    Associated Diagnoses: Diabetes mellitus, new onset (H)           CONTINUE these medications which have NOT CHANGED    Details   acetaminophen (TYLENOL) 325 MG tablet Take 2 tablets (650 mg) by mouth every 4 hours as needed for pain  Qty: 100 tablet, Refills: 0    Associated Diagnoses: ESRD (end stage renal disease) on dialysis (H)      amLODIPine (NORVASC) 5 MG tablet Take 1 tablet (5 mg) by mouth daily.  Qty: 90 tablet, Refills: 2    Associated Diagnoses: Kidney replaced by transplant      aspirin 81 MG EC tablet Take 81 mg by mouth daily      atorvastatin (LIPITOR) 10 MG tablet Take 1 tablet (10 mg) by mouth daily  Qty: 90 tablet, Refills: 3    Associated Diagnoses: Kidney replaced by transplant      carvedilol (COREG) 25 MG tablet Take 1 tablet (25 mg) by mouth 2 times daily (with meals)  Qty: 60 tablet, Refills: 11    Associated Diagnoses: Acute kidney injury      enalapril (VASOTEC) 10 MG tablet Take 2 tablets (20 mg) by mouth daily.  Qty: 90 tablet, Refills: 1    Associated Diagnoses: Post-transplant erythrocytosis; Aftercare following organ transplant      famotidine (PEPCID) 40 MG tablet Take 40 mg by mouth 2 times daily as needed for  heartburn.      magnesium glycinate 100 MG CAPS capsule Take 300 mg by mouth 2 times daily. [employer] Magnesium or generic Magnesium glycinate- noon and evening      medical cannabis (Patient's own supply) See Admin Instructions (The purpose of this order is to document that the patient reports taking medical cannabis.  This is not a prescription, and is not used to certify that the patient has a qualifying medical condition.)    Patient was advised on the cannabis-tacrolimus drug interaction.      Multiple Vitamin (MULTIVITAMIN ADULT PO) Take 1 tablet by mouth daily      mycophenolic acid (GENERIC EQUIVALENT) 180 MG EC tablet Take 3 tablets (540 mg) by mouth 2 times daily Emergency supply. Pt will use GoodRx  Qty: 180 tablet, Refills: 11    Comments: TXP DT 12/5/2023 (Kidney) TXP Dischg DT 12/8/2023 DX Kidney replaced by transplant Z94.0 [facility] ([place], [place])  Associated Diagnoses: Kidney replaced by transplant      psyllium (METAMUCIL/KONSYL) 58.6 % powder Take 1 teaspoonful by mouth daily      sodium bicarbonate 650 MG tablet Take 3 tablets (1,950 mg) by mouth 3 times daily  Qty: 270 tablet, Refills: 11    Associated Diagnoses: Kidney transplant recipient      Sodium Bicarbonate, antacid, POWD Mix 1 teaspoon of baking soda in full glass of water once daily      !! tacrolimus (GENERIC EQUIVALENT) 0.5 MG capsule Take 1 capsule (0.5 mg) by mouth 2 times daily. Total dose = 4.5 mg twice daily  Qty: 60 capsule, Refills: 11    Comments: TXP DT 12/5/2023 (Kidney) TXP Dischg DT 12/8/2023 DX Kidney replaced by transplant Z94.0 [facility] ([place], [place])  Associated Diagnoses: Kidney transplant recipient; Kidney transplant candidate      !! tacrolimus (GENERIC EQUIVALENT) 1 MG capsule Take 4 capsules (4 mg) by mouth 2 times daily. Total dose = 4.5 mg twice daily  Qty: 180 capsule, Refills: 11    Comments: TXP DT  12/5/2023 (Kidney) TXP Dischg DT 12/8/2023 DX Kidney replaced by transplant Z94.0 [facility] ([place], [place])  Associated Diagnoses: Kidney transplant recipient; Kidney transplant candidate      blood glucose (NO BRAND SPECIFIED) test strip Use to test blood sugar 2 times daily or as directed.  Qty: 100 strip, Refills: 0      blood glucose monitoring (NO BRAND SPECIFIED) meter device kit Use to test blood sugar 2 times daily or as directed.  Qty: 1 kit, Refills: 0       !! - Potential duplicate medications found. Please discuss with provider.        STOP taking these medications       metFORMIN (GLUCOPHAGE) 500 MG tablet Comments:   Reason for Stopping:             Allergies   Allergies   Allergen Reactions    Benadryl [Diphenhydramine] Itching    Vancomycin Itching        Latest Reference Range & Units 02/04/25 12:39   Hemoglobin A1C <5.7 % 9.3 (H)   (H): Data is abnormally high

## 2025-02-06 NOTE — PHARMACY
"Pharmacy: Diabetes education in new DM patient - will focus on \"survival skills\"    Current A1C 9.3. Goal A1C <7  Pt will demonstrate ability to correctly calculate, prepare & self-administer insulin  Pt will check BG three times daily before meals and at bedtime and will share with primary care physician- Pt will ask PCP about Freestyle [place] Continuous glucose monitor  Reminded pt that illness/sickness could worsen blood glucose.   Pt knows how to recognize and treat signs of hypoglycemia    IMPORTANT FOLLOW UP NOTES:   -Pt will follow up with PCP for enhancement of DM regimen.     Time spent: 40 mins   "

## 2025-02-06 NOTE — UTILIZATION REVIEW
"  [facility] Utilization Review  Admission Status; Secondary Review Determination     Admission Date: 2/5/2025 12:22 PM      Under the authority of the Utilization Management Committee, the utilization review process indicated a secondary review on the above patient.  The review outcome is based on review of the medical records, discussions with staff, and applying clinical experience noted on the date of the review.        (X) Observation Status Appropriate - This patient does not meet hospital inpatient criteria and is placed in observation status. If this patient's primary payer is Medicare and was admitted as an inpatient, Condition Code 44 should be used and patient status changed to \"observation\".   () Observation Status concurrent Review           RATIONALE FOR DETERMINATION   79-year-old male with history of CKD, s/p renal transplant, hypertension, recently diagnosed diabetes mellitus, admitted with abdominal pain, hyperglycemia and ketoacidosis, abdominal pain suspected to be related to dyspepsia and improvement with antacids.  Patient had hemoglobin A1c 9.3, mild ketoacidosis, related to poor oral intake from severe abdominal pain, started on Lantus, IV fluid hydration and symptoms improved.  Patient seen by nutritionist during the hospital stay  Patient ready to discharge, after single midnight stay and quick improvement in turnaround, does not meet criteria for inpatient stay, recommend change to observation status, communicated to       The severity of illness, intensity of service provided, expected LOS make the care appropriate for observation status at this time.        The information on this document is developed by the utilization review team in order for the business office to ensure compliance.  This only denotes the appropriateness of proper admission status and does not reflect the quality of care rendered.         The definitions of Inpatient Status and Observation Status " used in making the determination above are those provided in the CMS Coverage Manual, Chapter 1 and Chapter 6, section 70.4.      Sincerely,       Ewa Jimenez MD  Physician Advisor  Utilization Review-[place]    Phone: [phone]

## 2025-02-06 NOTE — PLAN OF CARE
BP (!) 151/102 (BP Location: Right arm)   Pulse 92   Temp 98.3  F (36.8  C) (Oral)   Resp 18   Ht 1.829 m (6')   Wt 115.9 kg (255 lb 8.2 oz)   SpO2 100%   BMI 34.65 kg/m      VSS except elevated BP. PT c/o abd discomfort. A&Ox4. Skin CDI. New IV placed via US. Continued LR gtt. Plan to monitor BG and ketone.

## 2025-02-08 LAB
GAD65 AB SER IA-ACNC: <5 IU/ML
INSULIN AB SER IA-ACNC: <0.4 U/ML
ZNT8 AB SERPL IA-ACNC: <10 U/ML

## 2025-02-08 NOTE — NURSING NOTE
"Chief Complaint   Patient presents with    Follow Up     Kidney transplant follow-up     Vital signs:  Temp: 98.5  F (36.9  C) Temp src: Oral BP: 119/75 Pulse: 89     SpO2: 97 %       Weight: 100.9 kg (222 lb 8 oz)  Estimated body mass index is 31.93 kg/m  as calculated from the following:    Height as of 7/27/23: 1.778 m (5' 10\").    Weight as of this encounter: 100.9 kg (222 lb 8 oz).      Nick Gudino RN on 12/18/2023 at 9:05 AM    "
No

## 2025-02-09 LAB — PANC ISLET CELL AB TITR SER: NORMAL {TITER}

## 2025-02-10 ENCOUNTER — MYC REFILL (OUTPATIENT)
Dept: TRANSPLANT | Facility: CLINIC | Age: 30
End: 2025-02-10
Payer: MEDICARE

## 2025-02-10 DIAGNOSIS — T86.10 COMPLICATION OF TRANSPLANTED KIDNEY, UNSPECIFIED COMPLICATION: ICD-10-CM

## 2025-02-10 DIAGNOSIS — Z94.0 KIDNEY TRANSPLANT RECIPIENT: ICD-10-CM

## 2025-02-10 DIAGNOSIS — T86.13 KIDNEY TRANSPLANT INFECTION: ICD-10-CM

## 2025-02-10 DIAGNOSIS — B34.8 BK VIREMIA: ICD-10-CM

## 2025-02-11 ENCOUNTER — OFFICE VISIT (OUTPATIENT)
Dept: FAMILY MEDICINE | Facility: CLINIC | Age: 30
End: 2025-02-11
Payer: MEDICARE

## 2025-02-11 ENCOUNTER — TELEPHONE (OUTPATIENT)
Dept: FAMILY MEDICINE | Facility: CLINIC | Age: 30
End: 2025-02-11

## 2025-02-11 ENCOUNTER — MYC REFILL (OUTPATIENT)
Dept: INFUSION THERAPY | Facility: CLINIC | Age: 30
End: 2025-02-11

## 2025-02-11 VITALS
RESPIRATION RATE: 24 BRPM | SYSTOLIC BLOOD PRESSURE: 138 MMHG | DIASTOLIC BLOOD PRESSURE: 87 MMHG | HEIGHT: 72 IN | OXYGEN SATURATION: 99 % | WEIGHT: 260 LBS | HEART RATE: 77 BPM | TEMPERATURE: 98 F | BODY MASS INDEX: 35.21 KG/M2

## 2025-02-11 DIAGNOSIS — E78.5 HYPERLIPIDEMIA LDL GOAL <100: ICD-10-CM

## 2025-02-11 DIAGNOSIS — Z94.0 KIDNEY REPLACED BY TRANSPLANT: ICD-10-CM

## 2025-02-11 DIAGNOSIS — B34.8 BK VIREMIA: ICD-10-CM

## 2025-02-11 DIAGNOSIS — I10 BENIGN ESSENTIAL HYPERTENSION: ICD-10-CM

## 2025-02-11 DIAGNOSIS — E11.9 TYPE 2 DIABETES MELLITUS WITHOUT COMPLICATION, WITHOUT LONG-TERM CURRENT USE OF INSULIN (H): Primary | ICD-10-CM

## 2025-02-11 PROBLEM — E88.89 KETOSIS (H): Status: RESOLVED | Noted: 2025-02-05 | Resolved: 2025-02-11

## 2025-02-11 PROBLEM — E83.42 HYPOMAGNESEMIA: Status: RESOLVED | Noted: 2023-12-07 | Resolved: 2025-02-11

## 2025-02-11 PROBLEM — E86.0 DEHYDRATION: Status: RESOLVED | Noted: 2023-12-10 | Resolved: 2025-02-11

## 2025-02-11 PROBLEM — E87.20 METABOLIC ACIDOSIS: Status: RESOLVED | Noted: 2023-12-07 | Resolved: 2025-02-11

## 2025-02-11 PROBLEM — Z48.298 AFTERCARE FOLLOWING ORGAN TRANSPLANT: Status: RESOLVED | Noted: 2024-02-15 | Resolved: 2025-02-11

## 2025-02-11 PROBLEM — Z79.899 MEDICAL MARIJUANA USE: Status: ACTIVE | Noted: 2024-02-13

## 2025-02-11 PROBLEM — T86.10 COMPLICATION OF KIDNEY TRANSPLANT: Status: RESOLVED | Noted: 2024-02-02 | Resolved: 2025-02-11

## 2025-02-11 PROBLEM — Z29.89 NEED FOR PNEUMOCYSTIS PROPHYLAXIS: Status: RESOLVED | Noted: 2024-07-15 | Resolved: 2025-02-11

## 2025-02-11 PROBLEM — R19.7 DIARRHEA, UNSPECIFIED TYPE: Status: RESOLVED | Noted: 2025-02-05 | Resolved: 2025-02-11

## 2025-02-11 PROBLEM — R19.7 DIARRHEA: Status: RESOLVED | Noted: 2024-01-23 | Resolved: 2025-02-11

## 2025-02-11 PROBLEM — U07.1 CLINICAL DIAGNOSIS OF COVID-19: Status: ACTIVE | Noted: 2024-12-19

## 2025-02-11 PROBLEM — R10.13 EPIGASTRIC PAIN: Status: RESOLVED | Noted: 2025-02-05 | Resolved: 2025-02-11

## 2025-02-11 PROBLEM — T86.13 KIDNEY TRANSPLANT INFECTION: Status: RESOLVED | Noted: 2024-02-05 | Resolved: 2025-02-11

## 2025-02-11 PROBLEM — E13.9 POST-TRANSPLANT DIABETES MELLITUS (H): Status: RESOLVED | Noted: 2024-12-15 | Resolved: 2025-02-11

## 2025-02-11 PROCEDURE — 99214 OFFICE O/P EST MOD 30 MIN: CPT | Mod: 25 | Performed by: FAMILY MEDICINE

## 2025-02-11 PROCEDURE — 90480 ADMN SARSCOV2 VAC 1/ONLY CMP: CPT | Performed by: FAMILY MEDICINE

## 2025-02-11 PROCEDURE — G0008 ADMIN INFLUENZA VIRUS VAC: HCPCS | Performed by: FAMILY MEDICINE

## 2025-02-11 PROCEDURE — 91320 SARSCV2 VAC 30MCG TRS-SUC IM: CPT | Performed by: FAMILY MEDICINE

## 2025-02-11 PROCEDURE — G2211 COMPLEX E/M VISIT ADD ON: HCPCS | Performed by: FAMILY MEDICINE

## 2025-02-11 PROCEDURE — 90656 IIV3 VACC NO PRSV 0.5 ML IM: CPT | Performed by: FAMILY MEDICINE

## 2025-02-11 PROCEDURE — 99207 PR FOOT EXAM NO CHARGE: CPT | Performed by: FAMILY MEDICINE

## 2025-02-11 RX ORDER — SODIUM BICARBONATE 650 MG/1
1950 TABLET ORAL 3 TIMES DAILY
Qty: 270 TABLET | Refills: 11 | Status: SHIPPED | OUTPATIENT
Start: 2025-02-11

## 2025-02-11 RX ORDER — ATORVASTATIN CALCIUM 10 MG/1
10 TABLET, FILM COATED ORAL DAILY
Qty: 90 TABLET | Refills: 3 | Status: SHIPPED | OUTPATIENT
Start: 2025-02-11

## 2025-02-11 SDOH — HEALTH STABILITY: PHYSICAL HEALTH: ON AVERAGE, HOW MANY DAYS PER WEEK DO YOU ENGAGE IN MODERATE TO STRENUOUS EXERCISE (LIKE A BRISK WALK)?: 2 DAYS

## 2025-02-11 ASSESSMENT — SOCIAL DETERMINANTS OF HEALTH (SDOH): HOW OFTEN DO YOU GET TOGETHER WITH FRIENDS OR RELATIVES?: TWICE A WEEK

## 2025-02-11 NOTE — TELEPHONE ENCOUNTER
Patient is scheduled for diabetic ed 2/13/25 in person- appears to be [facility].  Also scheduled 4/8/25 for video visit with diabetic ed.  Need to verify if he is going to 2/13/25 appt.  Has not scheduled lab appt.  Radha Chino RN

## 2025-02-11 NOTE — TELEPHONE ENCOUNTER
Outgoing call to patient - attempt #1. Left  requesting call back to [phone] and ask to speak with a nurse. Awaiting call back at this time.     Sent [employer] message advising patient to schedule lab-only appt. Appears patient is already scheduled for diabetes ed appt on 2/13/25.     Will wait to postpone encounter - RN team should try and call again later this afternoon.    Melina RODRIGUEZ RN  [facility]

## 2025-02-11 NOTE — TELEPHONE ENCOUNTER
Pt supposed to get his labs today, please call him to come back and do his labs.  Also, I ordered diabetic ED urgently for patient too as he cannot see MTM for 1 month (not sure why the delay)  Priyank Lawrence MD  [facility].   [phone]

## 2025-02-11 NOTE — PROGRESS NOTES
Assessment & Plan   Problem List Items Addressed This Visit       Benign essential hypertension     Controlled on amlodipine 5 mg, and carvedilol 25 mg twice daily, enalapril 20 mg.         Hyperlipidemia LDL goal <100     Controlled on atorvastatin 10  mg daily.         Kidney replaced by transplant     Doing excellent, GFR is normal, still following up with transplant team, and still using Tacrolimus 4.5 mg twice daily, along with myclophenolic acid 540 mg twice daily.           BK viremia     Last level was 405, following up with transplant team.         Type 2 diabetes mellitus without complication, without long-term current use of insulin (H) - Primary     Diabetes is new onset, pt has been using insulin Glargine 40 units at night time, and before meals, he is doing about 8 plus extra units per blood sugar.  Labs were done to distinguish between type I and type II, and so far, it looks mostly to be type II DM.  Strong family hx of DM with mother, and father.     Continue current treatment regimen. At this time, and will refer to MTM as soon as possible.   Also refer to Diabetic ED too.  Diabetes will be reassessed in 3 months.         Relevant Orders    Albumin Random Urine Quantitative with Creat Ratio    Lipid panel reflex to direct LDL Non-fasting    Adult Eye  Referral    Basic metabolic panel  (Ca, Cl, CO2, Creat, Gluc, K, Na, BUN)    FOOT EXAM (Completed)    Med Therapy Management Referral    Adult Diabetes Education  Referral      In regards to his GI symptoms, his symptoms has resolved completely.         MED REC REQUIRED  Post Medication Reconciliation Status:  Discharge medications reconciled, continue medications without change  BMI  Estimated body mass index is 35.26 kg/m  as calculated from the following:    Height as of this encounter: 1.829 m (6').    Weight as of this encounter: 117.9 kg (260 lb).   Weight management plan: Discussed healthy diet and exercise  guidelines    Counseling  Appropriate preventive services were addressed with this patient via screening, questionnaire, or discussion as appropriate for fall prevention, nutrition, physical activity, Tobacco-use cessation, social engagement, weight loss and cognition.  Checklist reviewing preventive services available has been given to the patient.  Reviewed patient's diet, addressing concerns and/or questions.   He is at risk for lack of exercise and has been provided with information to increase physical activity for the benefit of his well-being.           Gaudencio Vazquez is a 29 year old, presenting for the following health issues:  Hospital F/U      2/11/2025     8:35 AM   Additional Questions   Roomed by Eunice CARRINGTON         [facility] Follow-up Visit:    Hospital/Nursing Home/IP Rehab Facility: [facility]  Date of Admission: 2/5/2025  Date of Discharge: 2/6/2025  Reason(s) for Admission: diabetes  Was the patient in the ICU or did the patient experience delirium during hospitalization?  No  Do you have any other stressors you would like to discuss with your provider? No    Problems taking medications regularly:  None  Medication changes since discharge: None  Problems adhering to non-medication therapy:  None    Summary of hospitalization:  CareEverywhere information obtained and reviewed  Diagnostic Tests/Treatments reviewed.  Follow up needed: [facility] today.  Other Healthcare Providers Involved in Patient s Care:         Specialist appointment - Endoscinorlogy.  Update since discharge: improved.         Plan of care communicated with patient           Diabetes Follow-up    How often are you checking your blood sugar? Three times daily  Blood sugar testing frequency justification:  Uncontrolled diabetes  What time of day are you checking your blood sugars (select all that apply)?  Before and after meals  Have you had any blood sugars above 200?  No  Have you had any blood sugars  below 70?  No  What symptoms do you notice when your blood sugar is low?  Shaky and Dizzy  What concerns do you have today about your diabetes? None   Do you have any of these symptoms? (Select all that apply)  No numbness or tingling in feet.  No redness, sores or blisters on feet.  No complaints of excessive thirst.  No reports of blurry vision.  No significant changes to weight.  Have you had a diabetic eye exam in the last 12 months? No        BP Readings from Last 2 Encounters:   02/11/25 138/87   02/06/25 (!) 140/84     Hemoglobin A1C (%)   Date Value   02/04/2025 9.3 (H)   12/13/2024 7.6 (H)   03/04/2021 5.0     LDL Cholesterol Calculated (mg/dL)   Date Value   12/22/2023 96   12/05/2023 66   07/03/2019 86   07/15/2017 154 (H)               Review of Systems  Constitutional, HEENT, cardiovascular, pulmonary, gi and gu systems are negative, except as otherwise noted.      Objective    There were no vitals taken for this visit.  There is no height or weight on file to calculate BMI.  Physical Exam   GENERAL: alert and no distress  NECK: no adenopathy, no asymmetry, masses, or scars  RESP: lungs clear to auscultation - no rales, rhonchi or wheezes  CV: regular rate and rhythm, normal S1 S2, no S3 or S4, no murmur, click or rub, no peripheral edema  ABDOMEN: soft, nontender, no hepatosplenomegaly, no masses and bowel sounds normal  MS: no gross musculoskeletal defects noted, no edema  Diabetic foot exam: normal DP and PT pulses, no trophic changes or ulcerative lesions, and normal sensory exam            Signed Electronically by: Priyank Lawrence MD

## 2025-02-11 NOTE — TELEPHONE ENCOUNTER
Outgoing call to patient - attempt #2. Left VM requesting call back to [phone] and ask to speak with a nurse. Awaiting call back at this time.     RN notes patient viewed [employer] message today. Has not scheduled lab appt yet.     If patient calls back:  Relay message from Dr. Lawrence (see previous note)  Schedule lab-only appt   Confirm diabetes ed appts w/ patient     Future Appointments 2/11/2025 - 8/10/2025        Date Visit Type Length Department Provider     2/13/2025  8:30 AM DIABETES ED 60 min [facility] DIABETES EDU Irene Lerma RD    Location Instructions:     [facility] ([facility]) [place] with multiple transportation and parking options. You may wish to review options for  service and self-parking in more detail on the [facility] s website at [email or web address].              3/18/2025  9:30 AM MTM NEW 60 min CR MTM Karma Erazo, [facility]              4/8/2025  3:00 PM NEW DIABETES 60 min [facility] ENDO DIABETES Stephanie Read PA-C    Location Instructions:     [facility] ([facility]) is in a dense urban area with multiple transportation and parking options. You may wish to review options for  service and self-parking in more detail on the [facility] s website at [email or web address].              5/12/2025  7:30 AM ANNUAL WELLNESS 30 min CR FP/IM/PEDS Priyank Lawrence MD    Location Instructions:     [facility] is located at [id number] [place] [address]., next to [employer]. Free parking is available; access the lot by turning east from [facility] onto [address] [place] [address].                   Melina RODRIGUEZ RN  [facility]

## 2025-02-11 NOTE — TELEPHONE ENCOUNTER
Patient returns call. Relayed provider message below. Patient confirms 2/13 in [facility] works for diabetes educator appointment, [place] [facility] [place]. Patient transferred to  line to assist with scheduling lab appointment. Scheduled for 2/12 at 8am.     Naomie Cage RN

## 2025-02-11 NOTE — ASSESSMENT & PLAN NOTE
Diabetes is new onset, pt has been using insulin Glargine 40 units at night time, and before meals, he is doing about 8 plus extra units per blood sugar.  Labs were done to distinguish between type I and type II, and so far, it looks mostly to be type II DM.  Strong family hx of DM with mother, and father.     Continue current treatment regimen. At this time, and will refer to MTM as soon as possible.   Also refer to Diabetic ED too.  Diabetes will be reassessed in 3 months.

## 2025-02-11 NOTE — ASSESSMENT & PLAN NOTE
Doing excellent, GFR is normal, still following up with transplant team, and still using Tacrolimus 4.5 mg twice daily, along with myclophenolic acid 540 mg twice daily.

## 2025-02-11 NOTE — CONFIDENTIAL NOTE
RECORDS RECEIVED FROM: internal    DATE RECEIVED:  4/8/25    NOTES (FOR ALL VISITS) STATUS DETAILS   OFFICE NOTES from referring provider internal  Priyank Lawrence MD    ED NOTES internal  2.5.25 Binh    MEDICATION LIST internal     IMAGING      MRI (BRAIN) internal  3.15.23,    XR (Chest) internal  12.4.23, 3.17.23, 3.13.23,    CT (HEAD/NECK/CHEST/ABDOMEN) internal  2.4.25, 12.5.23, 3.15.23   LABS     DIABETES: HBGA1C, CREATININE, FASTING LIPIDS, MICROALBUMIN URINE, POTASSIUM, TSH, T4    THYROID: TSH, T4, CBC, THYRODLONULIN, TOTAL T3, FREE T4, CALCITONIN, CEA internal

## 2025-02-12 ENCOUNTER — TELEPHONE (OUTPATIENT)
Dept: TRANSPLANT | Facility: CLINIC | Age: 30
End: 2025-02-12

## 2025-02-12 DIAGNOSIS — Z48.298 AFTERCARE FOLLOWING ORGAN TRANSPLANT: Primary | ICD-10-CM

## 2025-02-12 DIAGNOSIS — B34.8 BK VIREMIA: ICD-10-CM

## 2025-02-12 NOTE — TELEPHONE ENCOUNTER
Post Kidney and Pancreas Transplant Team Conference  Date: 2/12/2025  Transplant Coordinator: Meka     Attendees:  [x]  Dr. Simons [x] Randee Barillas, RN [x] Annamarie Oseguera LPN     [x]  Dr. Lynn [x] Stefani Manrique, RN [] Kimberly Ayala LPN    [x] Dr. Jones [x] Meka Rashid RN    [x] Dr. Webber [x] Nilda Duran, RN [] Netta Walter RN   [] Dr. Caceres [] Milla Mc, RN    [] Dr. Vuong [] Ankita Quintero, [place]    [x]  Dr. Cardoza [] Linda Quintero, RN    [] Dr. Jimenez [] Britton Miranda RN    [] Vesna Yuan NP [] My Singh RN    [x] Kristi Hyed NP [] Latonya Alcala, [place]        Verbal Plan Read Back:   6-8  Recheck DSA in 3 months    Routed to RN Coordinator   Annamarie Oseguera LPN

## 2025-02-13 ENCOUNTER — ALLIED HEALTH/NURSE VISIT (OUTPATIENT)
Dept: EDUCATION SERVICES | Facility: CLINIC | Age: 30
End: 2025-02-13
Payer: MEDICARE

## 2025-02-13 DIAGNOSIS — E11.9 TYPE 2 DIABETES MELLITUS WITHOUT COMPLICATION, WITHOUT LONG-TERM CURRENT USE OF INSULIN (H): ICD-10-CM

## 2025-02-13 DIAGNOSIS — E13.9 POST-TRANSPLANT DIABETES MELLITUS (H): ICD-10-CM

## 2025-02-13 DIAGNOSIS — T86.13 KIDNEY TRANSPLANT INFECTION: ICD-10-CM

## 2025-02-13 RX ORDER — KETOROLAC TROMETHAMINE 30 MG/ML
1 INJECTION, SOLUTION INTRAMUSCULAR; INTRAVENOUS ONCE
Qty: 1 EACH | Refills: 0 | Status: SHIPPED | OUTPATIENT
Start: 2025-02-13 | End: 2025-02-13

## 2025-02-13 RX ORDER — HYDROCHLOROTHIAZIDE 12.5 MG/1
CAPSULE ORAL
Qty: 6 EACH | Refills: 3 | Status: SHIPPED | OUTPATIENT
Start: 2025-02-13

## 2025-02-13 NOTE — PATIENT INSTRUCTIONS
Contact information:   If you have concerns, please send a [employer] message or call the clinic at [phone].    For more urgent concerns that do not require 911, please call [phone] after hours/weekends and ask to speak with the Endocrinologist on call.    To schedule a Diabetes Education appointment, call [phone]    Please let us know if you are having low blood sugars less than 70 or over 300 mg/dL.  Do not wait until your next appointment if this is happening.      Recommend follow up in about a month OR 1 month after MTM or Endo visits.  Sooner if you have questions, especially as you get the saibne going    B- ok to have 2 pc toast - could add in egg whites to whole eggs and scramble-   L- whole cup rice ok  D- consider adding in lentils, chick peas, other beans/legumes to meals    Protein drinks-ok to continue the max protein ensure- if you lose your connection primere protein, fairlife OR fairlife milk plus protein powder ok substitutions         Insulin to carbohydrate ratio:   1 unit would cover 6g carbohydrate    20-30g=4-5u  36=6u  45g=7u  55g=9u        Sabine 3/3 Plus Instructions:     1.  The first hour after you place the sensor is the warm up period (black out period).  You will need to carry your blood glucose meter and check blood glucose during this time.     2.  Check blood sugar if feeling symptoms of hypoglycemia but meter is not displaying a low number- may be some variability between sensor and meter at times.     3.  Change sensor every 14 days or 15 days (3 Plus).     4.  Keep phone/reader near you, and look at glucose before each meal and at bedtime.     5.  Watch trend arrows- will let you know if blood sugars are rising, falling or stable at the time you check.     6.  Sabine 3 has alarms (low, high, signal loss). You can adjust both the high and low blood glucose alarm (when they will go off) or turn off high blood glucose alarm if alarming too much.     You cannot turn off the  critical low blood glucose alarm     7.  You can shower, bathe, and swim with sensor on. Do not get reader wet.     8.  Remove the sensor if you need to have an MRI or CT scan.     9.  Do not cover the sensor with extra adhesive (the small hole in the center of the sensor must remain uncovered), unless it is made for the Sabine.  If you have trouble with sensor falling off, these are approved products to help with sensor adhesion: Torbot Skin Tac, SKIN-PREP Protective Barrier Wipe and Mastisol Liquid Adhesive     10.  Check the dose if you take a multivitamin or Vitamin C (ascorbic acid). You want to limit daily intake of ascorbic acid to 500 mg/day or less, or it may falsely raise sensor glucose readings. This is more of a concern if you are on insulin or medication that can cause low blood glucose as you may miss a severe low glucose event.         Main AfterSteps Sabine website:   https://www.SensorDynamics/us-en/home.html    If you go to the support tab you can pick Sabine 2 or 3.  From this page you can access instructions and how to videos.       Support:   If you have any trouble with use or if the sensor falls off early, call the customer service number for Sabine located on the sensor's box. They can often help troubleshoot or replace sensor if needed. Keep your Sabine sensor box with lot and/or serial numbers.    Website for sabine replacement due to failure or falling off:   https://www.SensorDynamics/us-en/support/sensor-support-form-questions.html    Sabine Customer Service: [phone]  (7 days a week 8am-8pm ET excluding holidays)     Discount Programs are available through [employer]:   MyFreestyle Program (https://www.SensorDynamics/us-en/myfreestyle.html)   To save on sensors, if told cost is more than $75: call [employer] Voucher line at [phone]     If you are using your phone you can connect and share your data with [facility].    In the phone ary go into menu -->  connected apps --> PIE Software -->connect to a practice -->enter practice ID --> [id number]

## 2025-02-13 NOTE — PROGRESS NOTES
Diabetes Self-Management Education & Support    Presents for: Initial Assessment for new diagnosis    Type of Service: In Person Visit      Assessment  Patient is testing blood sugars fasting, pre meal, and at bedtime.  He is taking a fixed dose plus pre meal correction.  He has adjusted his daily intake, but is likely under eating carbohydrates.  He is prioritizing protein since his kidney transplant.  He would benefit from aiming closer to 45-60g per meal to ensure he is still eating fruit and able to meet his needs.  He would benefit from CGM to check 2 hour post meal readings for better insulin dose adjustment.  He reports taking 8u humalog with breakfast, and 9-10u with lunch or dinner.    He may be ready to try an ICR if he starts to see erratic post meal readings.    Current TDD= 27  450/67=1u/6g carbs    Patient's most recent   Lab Results   Component Value Date    A1C 9.3 02/04/2025    A1C 5.0 03/04/2021     is not meeting goal of <7.0    Diabetes knowledge and skills assessment:   Patient is knowledgeable in diabetes management concepts related to: Being Active    Based on learning assessment above, most appropriate setting for further diabetes education would be: Individual setting.    Care Plan and Education Provided:  Healthy Eating: Carbohydrate Counting and Consistency in amount and timing of carbohydrate intake, Being Active: Precautions to take with exercise and Relationship of activity to glucose,   Monitoring: Blood glucose versus Continuous Glucose Monitoring, Frequency of monitoring, Individual glucose targets, Log and interpret results, Proper sharps disposal.  CGM instruction: Patient was instructed on Freestyle Sabine System: Freestyle Sabine sensor: insertion technique, sensor site location and rotation, insulin administration in relation to sensor placement, sensor wear, reasons to remove sensor (MRI, CT, diathermy), Vitamin C & Aspirin effects on sensor and Sabine [place]: frequency of  scanning sensor, length of time data is visible, use of built in glucose meter, Precision X-tra test strips  Taking Medication: Action of prescribed medication(s)    Patient verbalized understanding of diabetes self-management education concepts discussed, opportunities for ongoing education and support, and recommendations provided today.    Plan    Download freestyle esvin 3 ary- start up sensor.  Increase meal targets to 45-60g per meal for carbohydrates.  Aim for 110-160g protein per day.  IF post meal blood sugars are variable could consider trying ICR 1:6g with same correction scale.    Topics to cover at upcoming visits: Healthy Eating, Monitoring, and Taking Medication    Follow-up:  Via [employer] if sensor is started OR 1 month after Endo visit, sooner if questions.    See Care Plan for co-developed, patient-state behavior change goals.    Education Materials Provided:  Freestyle esvin 3      Subjective/Objective  Taylor is an 29 year old year old, presenting for the following diabetes education related to: Presents for: Initial Assessment for new diagnosis  Accompanied by: Self  Diabetes education in the past 24mo: No  Focus of Visit: Monitoring, Taking Medication, Healthy Eating  Diabetes type: Other (see Comments)  Please elaborate:: Likely T2  Disease course: Worsening  How confident are you filling out medical forms by yourself:: Extremely  Diabetes management related comments/concerns: Is there an easier way to test blood sugars?  Transportation concerns: No  Difficulty affording diabetes medication?: No  Difficulty affording diabetes testing supplies?: Yes (pt reports having a $50 copay for testing supplies- likely due to sending prior to insulin prescription)  Other concerns:: None  Cultural Influences/Ethnic Background:  Not  or     Diabetes Symptoms & Complications:  Diabetes Related Symptoms: Fatigue  Disease course: Worsening  Complications assessed today?: No    Patient Problem  List and Family Medical History reviewed for relevant medical history, current medical status, and diabetes risk factors.    Vitals:  There were no vitals taken for this visit.  Estimated body mass index is 35.26 kg/m  as calculated from the following:    Height as of 2/11/25: 1.829 m (6').    Weight as of 2/11/25: 117.9 kg (260 lb).   Last 3 BP:   BP Readings from Last 3 Encounters:   02/11/25 138/87   02/06/25 (!) 140/84   02/04/25 (!) 151/97       History   Smoking Status    Never   Smokeless Tobacco    Never       Labs:  Lab Results   Component Value Date    A1C 9.3 02/04/2025    A1C 5.0 03/04/2021     Lab Results   Component Value Date     02/06/2025     02/06/2025     06/20/2022    GLC 99 06/19/2021     Lab Results   Component Value Date    LDL 96 12/22/2023    LDL 86 07/03/2019     HDL Cholesterol   Date Value Ref Range Status   07/03/2019 34 (L) >39 mg/dL Final     Direct Measure HDL   Date Value Ref Range Status   12/22/2023 39 (L) >=40 mg/dL Final   ]  GFR Estimate   Date Value Ref Range Status   02/06/2025 >90 >60 mL/min/1.73m2 Final     Comment:     eGFR calculated using 2021 CKD-EPI equation.   06/19/2021 7 (L) >60 mL/min/[1.73_m2] Final     Comment:     Non  GFR Calc  Starting 12/18/2018, serum creatinine based estimated GFR (eGFR) will be   calculated using the Chronic Kidney Disease Epidemiology Collaboration   (CKD-EPI) equation.       GFR, ESTIMATED POCT   Date Value Ref Range Status   06/09/2022 7 (L) >60 mL/min/1.73m2 Final     GFR Estimate If Black   Date Value Ref Range Status   06/19/2021 9 (L) >60 mL/min/[1.73_m2] Final     Comment:      GFR Calc  Starting 12/18/2018, serum creatinine based estimated GFR (eGFR) will be   calculated using the Chronic Kidney Disease Epidemiology Collaboration   (CKD-EPI) equation.       Lab Results   Component Value Date    CR 0.67 02/06/2025    CR 8.93 06/19/2021     No results found for:  "\"MICROALBUMIN\"    Healthy Eating:  Healthy Eating Assessed Today: Yes  Cultural/Roman Catholic diet restrictions?: No  Do you have any food allergies or intolerances?: No  Meal planning/habits: Carb counting  Who cooks/prepares meals for you?: Self  Who purchases food in  your home?: Self, Spouse  Has patient met with a dietitian in the past?: Yes (Transplant RD visits)      B- 1 pc toast, 2 boiled eggs, 1 cup tea, sometimes fruit  L-air fried chicken wings, roasted red pepper, onion, 2 spoons of rice  Snack- sergeant balanced break- wheat thin cheese  D- burger with bun, broccoli veg mix (water jl, carrot, broccoli)  HS- ensure max protein    Monitoring:  Monitoring Assessed Today: Yes  Did patient bring glucose meter to appointment? : Yes  Blood Glucose Meter: Glucocard  Times checking blood sugar at home (number): 4  Times checking blood sugar at home (per): Day  Blood glucose trend: Fluctuating    Testing fasting, pre meal and bedtime    Taking Medications:  Diabetes Medication(s)       Insulin       insulin glargine (LANTUS PEN) 100 UNIT/ML pen Inject 40 Units subcutaneously at bedtime.     insulin lispro (HUMALOG KWIKPEN) 100 UNIT/ML (1 unit dial) KWIKPEN Inject 1-6 Units subcutaneously 4 times daily (with meals and nightly).     insulin lispro (HUMALOG VIAL) 100 UNIT/ML vial Inject 6 Units subcutaneously 3 times daily (before meals).          Taking Medication Assessed Today: Yes  Current Treatments: Insulin Injections  Dose schedule: Pre-breakfast, Pre-lunch, Pre-dinner, At bedtime  Given by: Patient  Injection/Infusion sites: Abdomen  Problems taking diabetes medications regularly?: No  Diabetes medication side effects?: No    Irene Lerma RD  Time Spent: 60 minutes  Encounter Type: Individual    Any diabetes medication dose changes were made via the CDCES Standing Orders under the patient's referring provider.        "

## 2025-02-19 ENCOUNTER — TELEPHONE (OUTPATIENT)
Dept: FAMILY MEDICINE | Facility: CLINIC | Age: 30
End: 2025-02-19
Payer: MEDICARE

## 2025-02-19 NOTE — ORAL ONC MGMT
Hello,     This is [facility] requesting a few things for this patient's Medicare order of the Freestyle Sabine 3 PLUS supplies.     We are requesting an addendum to the 02/13 clinic notes with the diabetes educator Irene Lerma, for MD Priyank Lawrence to include an attestation and agreement to the plans discussed, and to sign off on the visit -  per Medicare's guidelines below:     VISIT NOTE REQUIREMENTS: (must state the following)  Patient must be seen/clinical notes must be written in the last 6 months by the prescribing provider.  Must state that the patient is injecting insulin 1 time daily or more (Can be written that patient is injecting with insulin pump) We cannot accept medication list as insulin regimen.  Must state that the patient is a type 1 or type 2 diabetic.  Must state that patient is using CGM          As a reminder, Medicare requires patients to be seen every 3 months for insulin supplies and every 6 months for CGMs. The provider who sees the patient must be the provider on the prescription, must be written on the day of or after office visit date and office visit must include notes about diabetes and insulin regimen. The Diabetes Care Services team at [facility] may request new prescriptions before renewal dates of the prescription is filled over the allowable amount. Writing the order to match what is above will help ensure we will only need to request every 3 or 6 months.    If you have any questions regarding these requests please call us at [phone] or send a message to the [email or web address] pool     Thank you!     [facility]  Diabetes Care Services Team  [address] [place] [address]  Provider Phone: [phone]  Patient Line: [phone]  Fax: [phone]  E-mail: [email or web address]

## 2025-02-26 ENCOUNTER — TELEPHONE (OUTPATIENT)
Dept: MULTI SPECIALTY CLINIC | Facility: CLINIC | Age: 30
End: 2025-02-26
Payer: MEDICARE

## 2025-02-26 DIAGNOSIS — Z76.82 KIDNEY TRANSPLANT CANDIDATE: ICD-10-CM

## 2025-02-26 DIAGNOSIS — Z94.0 KIDNEY TRANSPLANT RECIPIENT: ICD-10-CM

## 2025-02-26 NOTE — TELEPHONE ENCOUNTER
Hello,     In order for us to bill Medicare B for his Tacrolimus 1mg we need a RX that is written for the qty of #240 capsules to reflect a 30 day supply. The original order was only written for #180 capsules. Please send as soon as possible, the patient is due for a refill.    Thank you,  Sabiha Hebert & [facility] Staff Technician   [facility]  [email or web address]   Ph#: [phone]  Fax#: [phone]

## 2025-02-27 RX ORDER — TACROLIMUS 0.5 MG/1
0.5 CAPSULE ORAL 2 TIMES DAILY
Qty: 60 CAPSULE | Refills: 11 | Status: SHIPPED | OUTPATIENT
Start: 2025-02-27

## 2025-02-27 RX ORDER — TACROLIMUS 1 MG/1
4 CAPSULE ORAL 2 TIMES DAILY
Qty: 240 CAPSULE | Refills: 11 | Status: SHIPPED | OUTPATIENT
Start: 2025-02-27

## 2025-03-02 ENCOUNTER — MYC MEDICAL ADVICE (OUTPATIENT)
Dept: TRANSPLANT | Facility: CLINIC | Age: 30
End: 2025-03-02
Payer: MEDICARE

## 2025-03-02 DIAGNOSIS — E11.9 TYPE 2 DIABETES MELLITUS WITHOUT COMPLICATION, WITHOUT LONG-TERM CURRENT USE OF INSULIN (H): ICD-10-CM

## 2025-03-02 DIAGNOSIS — B34.8 BK VIREMIA: ICD-10-CM

## 2025-03-03 ENCOUNTER — MYC REFILL (OUTPATIENT)
Dept: FAMILY MEDICINE | Facility: CLINIC | Age: 30
End: 2025-03-03
Payer: MEDICARE

## 2025-03-03 ENCOUNTER — TELEPHONE (OUTPATIENT)
Dept: FAMILY MEDICINE | Facility: CLINIC | Age: 30
End: 2025-03-03
Payer: MEDICARE

## 2025-03-03 DIAGNOSIS — E11.9 DIABETES MELLITUS, NEW ONSET (H): ICD-10-CM

## 2025-03-03 RX ORDER — INSULIN LISPRO 100 [IU]/ML
6 INJECTION, SOLUTION INTRAVENOUS; SUBCUTANEOUS
Qty: 30 ML | Refills: 3 | Status: SHIPPED | OUTPATIENT
Start: 2025-03-03 | End: 2025-03-03

## 2025-03-03 RX ORDER — INSULIN LISPRO 100 [IU]/ML
1-6 INJECTION, SOLUTION INTRAVENOUS; SUBCUTANEOUS
Qty: 30 ML | Refills: 3 | OUTPATIENT
Start: 2025-03-03

## 2025-03-03 RX ORDER — INSULIN LISPRO 100 [IU]/ML
1-6 INJECTION, SOLUTION INTRAVENOUS; SUBCUTANEOUS
Qty: 30 ML | Refills: 3 | Status: SHIPPED | OUTPATIENT
Start: 2025-03-03

## 2025-03-03 NOTE — TELEPHONE ENCOUNTER
Patient has gotten the insulin pens in the past.  We called to verify with patient that he needs the Humalog kwikpens instead of the vials.    If approved, please send a new prescription for the Humalog kwikpens so we have the correct medication on file for patient.    Thanks,  Sheba Griffith, [facility]  [phone]

## 2025-03-28 NOTE — PROGRESS NOTES
Diabetes Consult Note  April 8, 2025        Taylor Valiente  is a 29 year old male who has a past medical history of Adrenal adenoma, left, Anemia, Benign essential hypertension, Bursitis of left shoulder, Chronic deep vein thrombosis (DVT) of internal jugular vein (H), CKD (chronic kidney disease) stage 5, GFR less than 15 ml/min (H), Clinical diagnosis of COVID-19, Depression, Diabetes mellitus, new onset (H), Focal glomerular sclerosis, History of kidney transplant, HLD (hyperlipidemia), Kidney replaced by transplant, Kidney transplant infection, LVH (left ventricular hypertrophy) due to hypertensive disease, Noncompliance, Obesity, unspecified, OD (osteochondritis dissecans), Prolonged QT interval, Staphylococcus infection, Stress-induced cardiomyopathy, and Suicide attempt (H). He is here To establish care for recently diagnosed diabetes.            Today:  Taylor is a 29-year-old male who is status post renal transplant due to focal glomerular sclerosis and was recently diagnosed with diabetes when he presented to the emergency room in February with abdominal pain facial pain and COVID infection.  He was found to have mild ketoacidosis and discharged on long and short acting insulin.    Taylor reports that both of his parents he have diabetes and take metformin.  He also had a grandfather who had diabetes.  He has been working to manage his diabetes at home by taking 40 units of long-acting Lantus insulin each evening and Humalog with each of his meals.  He generally is taking 8 units of Humalog with his breakfast and 9 to 10 units with lunch and dinner.  He has been trying to eat more vegetables and meat and less rice.  He does believe that he gets 4-5 servings of vegetables each day including broccoli cauliflower carrots but he does not eat a lot of fruit he also tries to have a low-salt diet.    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  He also goes to the gym 2-3 times per week and  lifts some weights and does not inclined walk for 20 to 25 minutes each time he is there.  He has a esvin CGMS but has not yet began using it.  He does not have details of blood sugars but reports that in the morning when he wakes up the blood sugars between 110 are in the 120s.  Before lunch the blood sugars are 130-140.  And at dinner blood sugar is 120 into the low 130s.  He has had 1 low blood sugar.  His blood sugar got down to 79 he felt shaky and cold sweats and had to eat.      He does not have any family or personal history of pancreatitis pancreatic cancer thyroid cancer or multiple endocrine neoplasia is.  He currently does not have a female sexual partner and denies any history of balanitis.    Current Diabetes Medications:  Lantus 40 units daily  Humalog generally 28 units daily    Antirejection medications:   Tac and Mycophenolate.   It has been some years since steroids.     We reviewed glucometer/CGMS data together.  It revealed:  No glucometer data.  Blood sugars as reported above.    History of DKA: Mild at diagnosis.    History of hospitalizations for diabetes: At diagnosis    Ability to sense low blood sugars: intact.    History of Diabetes monitoring and complications/ prevention:  CAD: no  Last eye exam results: not yet  Last dental exam: not recently , goes to [facility] last went about 18 months ago.    Neuropathy:  Nephropathy: s/p renal transplant   HTN: on multiple agents  On statin: atorvastatin  On ASA: once daily 81 mg - but it is famotidine.  Grandma had second stroke due to plauqes a week ago. Uncle hyad fatal MI at 48yo.   Depression:  ED:  Feet:    Taylor's PMH, PSH and allergies were reviewed today and pertinent information is summarized above.    Taylor's pertinent social and family history are also reviewed today and pertinent information is summarized above.  Also noted is: He currently does not have a female sexual partner.    Current Outpatient Medications   Medication  Sig Dispense Refill    acetaminophen (TYLENOL) 325 MG tablet Take 2 tablets (650 mg) by mouth every 4 hours as needed for pain 100 tablet 0    amLODIPine (NORVASC) 5 MG tablet Take 1 tablet (5 mg) by mouth daily. 90 tablet 2    aspirin 81 MG EC tablet Take 81 mg by mouth daily      atorvastatin (LIPITOR) 10 MG tablet Take 1 tablet (10 mg) by mouth daily. 90 tablet 3    blood glucose (NO BRAND SPECIFIED) lancets standard Use to test blood sugar 4 times daily if not using cgm. 200 each 11    blood glucose (NO BRAND SPECIFIED) test strip Use to test blood sugar 4 times daily or as directed. 150 strip 11    blood glucose monitoring (NO BRAND SPECIFIED) meter device kit Use to test blood sugar 2 times daily or as directed. 1 kit 0    carvedilol (COREG) 25 MG tablet Take 1 tablet (25 mg) by mouth 2 times daily (with meals) 60 tablet 11    Continuous Glucose Sensor (FREESTYLE MARLI 3 PLUS SENSOR) MISC Use 1 sensor every 15 days. Use to read blood sugars per 's instructions. 6 each 3    enalapril (VASOTEC) 10 MG tablet Take 2 tablets (20 mg) by mouth daily. 90 tablet 1    famotidine (PEPCID) 40 MG tablet Take 40 mg by mouth 2 times daily as needed for heartburn.      insulin glargine (LANTUS SOLOSTAR) 100 UNIT/ML pen INJECT 40 UNITS SUBCUTANEOUSLY AT BEDTIME. 45 mL 3    insulin lispro (HUMALOG KWIKPEN) 100 UNIT/ML (1 unit dial) KWIKPEN Inject 1-6 Units subcutaneously 4 times daily (with meals and nightly). 30 mL 3    insulin pen needle (32G X 4 MM) 32G X 4 MM miscellaneous Use 4 pen needles daily or as directed. 100 each 0    magnesium glycinate 100 MG CAPS capsule Take 300 mg by mouth 2 times daily. [facility]'s Best Magnesium or generic Magnesium glycinate- noon and evening      medical cannabis (Patient's own supply) See Admin Instructions (The purpose of this order is to document that the patient reports taking medical cannabis.  This is not a prescription, and is not used to certify that the patient has a  "qualifying medical condition.)    Patient was advised on the cannabis-tacrolimus drug interaction.      Multiple Vitamin (MULTIVITAMIN ADULT PO) Take 1 tablet by mouth daily      mycophenolic acid (GENERIC EQUIVALENT) 180 MG EC tablet Take 3 tablets (540 mg) by mouth 2 times daily Emergency supply. Pt will use GoodRx 180 tablet 11    psyllium (METAMUCIL/KONSYL) 58.6 % powder Take 1 teaspoonful by mouth daily      sodium bicarbonate 650 MG tablet Take 3 tablets (1,950 mg) by mouth 3 times daily. 270 tablet 11    Sodium Bicarbonate, antacid, POWD Mix 1 teaspoon of baking soda in full glass of water once daily      tacrolimus (GENERIC EQUIVALENT) 0.5 MG capsule Take 1 capsule (0.5 mg) by mouth 2 times daily. Total dose = 4.5 mg twice daily 60 capsule 11    tacrolimus (GENERIC EQUIVALENT) 1 MG capsule Take 4 capsules (4 mg) by mouth 2 times daily. Total dose = 4.5 mg twice daily 240 capsule 11     No current facility-administered medications for this visit.         ROS:   Reports good physical activity tolerance.  Denies any pedal lesions or vision changes or concerns. Denies any other acute concerns except as noted above.      Exam:    Ht 1.829 m (6')   BMI 35.26 kg/m    Wt Readings from Last 10 Encounters:   02/11/25 117.9 kg (260 lb)   02/05/25 115.9 kg (255 lb 8.2 oz)   02/04/25 116.3 kg (256 lb 6.3 oz)   01/30/25 117.9 kg (260 lb)   12/22/24 122.2 kg (269 lb 6.4 oz)   12/13/24 121.8 kg (268 lb 8 oz)   07/12/24 113.5 kg (250 lb 3.2 oz)   04/16/24 110.3 kg (243 lb 1.6 oz)   03/11/24 110.3 kg (243 lb 3.2 oz)   02/21/24 106.5 kg (234 lb 14.4 oz)     Estimated body mass index is 35.26 kg/m  as calculated from the following:    Height as of this encounter: 1.829 m (6').    Weight as of 2/11/25: 117.9 kg (260 lb).      General: Pleasant, well nourished and hydrated male in NAD.  Patient is seated in his car relaxed engaged.  Psych:  Mood is \"good,\" affect is appropriate.  Thought form and content are fluid and coherent.  "   HEENT: Eyes and sclera are clear. Extraocular movements are grossly intact without proptosis.  Nares are patent, mucous membranes moist.  Neck: No masses or JVD are noted.    Resp: Easy and unlabored breathing.   Neuro: Alert and oriented, communicating clearly.   Ext: no swelling or edema.  Good distal pulses and capillary refill in digits.  Skin in good condition.  Sensation is intact to monofilament testing bilaterally and throughout.    Data:      Recent Labs   Lab Test 02/06/25  0753 02/05/25  2228 02/05/25  1303 02/04/25  1239 12/23/24  0017 12/13/24  0909 12/26/23  0850 12/22/23  0706 12/05/23  1247 12/05/23  0007 06/09/22  0837 03/16/22  0959 03/03/22  0906 07/16/17  0607 07/15/17  0635 07/14/17  1140 07/14/17  0949 07/14/17  0949 07/14/17  0948   A1C  --   --   --  9.3*  --  7.6*  --   --   --  5.9*   < >  --   --    < >  --   --   --   --   --    TSH  --   --   --   --   --   --   --   --   --   --   --   --  1.67  --   --  4.88*   < > 4.38*  --    T4  --   --   --   --   --   --   --   --   --   --   --   --   --   --  1.26  --   --  1.25  --    LDL  --   --   --   --   --   --   --  96  --  66  --   --   --    < > 154*  --   --   --   --    HDL  --   --   --   --   --   --   --  39*  --  35*  --   --   --    < > 35*  --   --   --   --    TRIG  --   --   --   --   --   --   --  139  --  190*  --   --   --    < > 170*  --   --   --   --    CR 0.67 0.73 0.73 0.71   < > 0.80   < >  --    < > 11.60*   < >  --  9.96*   < > 2.40* 2.63*   < > 2.55*  --    MICROL  --   --   --   --   --   --   --   --   --   --   --  1,850  --   --   --   --   --   --  5,560   AST  --   --  16 15  --   --   --   --   --  13   < >  --   --    < >  --  25  --   --   --    ALT  --   --  24 23  --   --   --   --   --  20   < >  --   --    < >  --  40  --   --   --     < > = values in this interval not displayed.     Cholesterol   Date Value Ref Range Status   12/22/2023 163 <200 mg/dL Final   07/03/2019 145 <200 mg/dL Final        Lab Results   Component Value Date    GADAB <5.0 02/05/2025    ISCAB <1:4 02/05/2025     Cholesterol   Date Value Ref Range Status   12/22/2023 163 <200 mg/dL Final   12/05/2023 139 <200 mg/dL Final   07/03/2019 145 <200 mg/dL Final   07/15/2017 223 (H) <200 mg/dL Final     Comment:     Desirable:       <200 mg/dl       Hemoglobin   Date Value Ref Range Status   02/05/2025 16.7 13.3 - 17.7 g/dL Final   06/18/2021 9.1 (L) 13.3 - 17.7 g/dL Final   ]      Most recent GFRs:  GFR Estimate   Date Value Ref Range Status   02/06/2025 >90 >60 mL/min/1.73m2 Final     Comment:     eGFR calculated using 2021 CKD-EPI equation.   02/05/2025 >90 >60 mL/min/1.73m2 Final     Comment:     eGFR calculated using 2021 CKD-EPI equation.   02/05/2025 >90 >60 mL/min/1.73m2 Final     Comment:     eGFR calculated using 2021 CKD-EPI equation.   06/19/2021 7 (L) >60 mL/min/[1.73_m2] Final     Comment:     Non  GFR Calc  Starting 12/18/2018, serum creatinine based estimated GFR (eGFR) will be   calculated using the Chronic Kidney Disease Epidemiology Collaboration   (CKD-EPI) equation.     06/18/2021 5 (L) >60 mL/min/[1.73_m2] Final     Comment:     Non  GFR Calc  Starting 12/18/2018, serum creatinine based estimated GFR (eGFR) will be   calculated using the Chronic Kidney Disease Epidemiology Collaboration   (CKD-EPI) equation.     06/17/2021 4 (L) >60 mL/min/[1.73_m2] Final     Comment:     Non  GFR Calc  Starting 12/18/2018, serum creatinine based estimated GFR (eGFR) will be   calculated using the Chronic Kidney Disease Epidemiology Collaboration   (CKD-EPI) equation.       GFR, ESTIMATED POCT   Date Value Ref Range Status   06/09/2022 7 (L) >60 mL/min/1.73m2 Final       Lab Results   Component Value Date    GADAB <5.0 02/05/2025    ISCAB <1:4 02/05/2025       FIB-4 Calculation: 0.31 at 2/5/2025  1:03 PM  Calculated from:  SGOT/AST: 16 U/L at 2/5/2025  1:03 PM  SGPT/ALT: 24 U/L at  2/5/2025  1:03 PM  Platelets: 298 10e3/uL at 2/5/2025  1:03 PM  Age: 29 years  AST   Date Value Ref Range Status   02/05/2025 16 0 - 45 U/L Final   02/09/2021 6 0 - 45 U/L Final     Lab Results   Component Value Date    ALT 24 02/05/2025    ALT 19 02/09/2021             Assessment/Plan:    Taylro Valiente is a 29 year old male with recent onset of diabetes which presented following some weeks of fatigue and which was discovered when he presented to the emergency room with positive COVID infection facial pain and severe abdominal pain.     1.  Insulin treated diabetes: He has had negative insulin GA islet cell antibodyD, zinc transporter antibody and insulin antibodies, and has a family history of type 2 diabetes but currently has been treated with insulin.    For now we will continue treatment with insulin.  He has not yet been able to acquire esvin 3 CGMS so this has been ordered so I am requesting a consult with pharmacy liaison to see why this is not available to him.  He will continue to work with diabetes education.  We discussed checking a nonfasting C-peptide and glucose to see if he indeed has type 2 diabetes.  If so he is open to treatment with GLP-1 agonist and/or SGLT2 transport inhibitor.    He is obese and I prefer to start with GLP-1 agonist, and we may be able to reduce or quit using short acting insulin as he is at higher risk for cardiovascular disease and this should prevent that as well as the renal protective.    Return to clinic in 3 months.        2. DM Complications-     He has new onset of diabetes and no known complications.  He did have mild DKA at diagnosis but is aware of low blood sugars and her symptoms.     Retinopathy:  No.  We will discuss annual screening at follow-up.  Nephropathy: Status post renal transplant and followed by solid organ transplant team.  He has normal range GFR and creatinine.  He is on enalapril.   he has not been screened for microalbuminuria since  transplant and I will order this.  Neuropathy: No.    Feet: OK, no ulcers or lesions.   Lipids: 10-year atherosclerotic cardiovascular disease risk <20%, age <40. Patient not taking statin  He does have a history of lower HDL and elevated LDL we will need to follow this closely.        60 minutes spent on the date of the encounter doing chart review, history and exam, documentation, education and counseling, as well as communication and coordination of care, and further activities as noted above.  This time includes time spent reviewing CGM.      It is my privilege to be involved in the care of the above patient.     Stephanie Read PA-C, MPAS  [facility]  Diabetes, Endocrinology, and Metabolism  [phone] Appointments/Nurse  [phone] pager  [phone] nurse line    This note was completed in part using Dragon voice recognition, and may contain word and grammatical errors.     Joined the call at 4/8/2025, 3:17:54 pm.  Left the call at 4/8/2025, 3:51:56 pm.  You were on the call for 34 minutes 2 seconds .     Outcome for 04/08/25 2:58 PM:  Pt stated he ran out of sensors and has been checking BG with meter. He has ordered more esvin sensors but it hasn't arrived yet. He does not have any readings with him at the moment because he isn't at home.   Britton Concepcion MA

## 2025-04-08 ENCOUNTER — VIRTUAL VISIT (OUTPATIENT)
Dept: ENDOCRINOLOGY | Facility: CLINIC | Age: 30
End: 2025-04-08
Attending: EMERGENCY MEDICINE
Payer: MEDICARE

## 2025-04-08 ENCOUNTER — PRE VISIT (OUTPATIENT)
Dept: ENDOCRINOLOGY | Facility: CLINIC | Age: 30
End: 2025-04-08

## 2025-04-08 VITALS — BODY MASS INDEX: 35.26 KG/M2 | HEIGHT: 72 IN

## 2025-04-08 DIAGNOSIS — E11.9 DIABETES MELLITUS, NEW ONSET (H): ICD-10-CM

## 2025-04-08 DIAGNOSIS — R63.5 WEIGHT GAIN: Primary | ICD-10-CM

## 2025-04-08 PROCEDURE — 1126F AMNT PAIN NOTED NONE PRSNT: CPT | Mod: 95 | Performed by: PHYSICIAN ASSISTANT

## 2025-04-08 PROCEDURE — 98003 SYNCH AUDIO-VIDEO NEW HI 60: CPT | Performed by: PHYSICIAN ASSISTANT

## 2025-04-08 ASSESSMENT — PAIN SCALES - GENERAL: PAINLEVEL_OUTOF10: NO PAIN (0)

## 2025-04-08 NOTE — PATIENT INSTRUCTIONS
Dear Taylor,  It is good to meet you and I am glad to see that you are managing your diabetes well.   Please get blood drawn at lab AFTER eating (goal is blood sugar >120 when tested,) start Sabine 3 when able to procure,and continue to work with diabetes education. Please see myself or an MD in my department in 3-4 months and the other in 6-12 months.      My best wishes,    Stephanie Read PA-C, [facility]  [facility]  Diabetes, Endocrinology, and Metabolism  [phone] Appointments/Nurse  [phone] Fax    Welcome to the [facility] Endocrinology and Diabetes Clinics     Our Endocrinology Clinics are here to provide you with a team-based, collaborative approach in the diagnosis and treatment of patients with diabetes and endocrine disorders. The team is made up of Physicians, Physician Assistants, Certified Diabetes Educators, Registered Nurses, Medical Assistants, Emergency Medical Technicians, and many others, all of whom have the unified goal of providing our patients with high quality care.     Please see below for some helpful tips to best navigate and use the [facility] Endocrinology clinic:     [place] Respect: At [facility], we are committed to a respectful and safe space for all patients, visitors, and staff.  We believe that mutual respect between patients and their care team is the foundation of quality care.  It is our expectation that you will be treated with respect by your care team.  In turn, we ask that all communication with the care team (written and verbal) be respectful and free from profanity, threatening, or abusive language.  Disrespectful communication undermines our therapeutic relationship with you and may result in us being unable to continue to provide your care.    Refills: A provider must see you at least annually to prescribe and refill medications. This is to ensure your safety as well as meet insurance and compliance  regulations.    Scheduling: Many of our Providers offer both in-person or video visits. Please call to schedule any needed follow ups as soon as possible because our provider schedules fill up very quickly. Our care team has the right to require an in-person visit when they believe that it is medically necessary. Please remember that for any virtual visits, you must be in the state [facility] at the time of the visit, otherwise we are unable to see you and you will need to be rescheduled.    Missed Appointments: If you need to cancel or miss your scheduled appointment, please call the clinic at [phone] to reschedule.  Please note if you repeatedly miss appointments or repeatedly miss appointments without calling to inform us ahead of time (no-show), the clinic may elect to not allow you to reschedule without speaking to a manager, may require a Partnership In Care Agreement prior to rescheduling, or could result in you no longer being able to receive care from the clinic. Providing the clinic with timely notification if you have to miss an appointment, allows us to better serve the needs of all of our patients.    Primary Care Provider: Our Endocrinologists are Specialists in their field. We expect you to have a Primary Care Provider established to handle any needs outside of your diabetes and endocrine care.  We would be happy to assist you find a Primary Care Provider, if you do not have one.    FanBoom: FanBoom is a wonderful resource that allows you access to your Care Team via online or the ary. Please ask a member of the team if you would like help creating an account. Please note that it may take up to 2 business days for a response. FanBoom messages are not reviewed on weekends or after business hours.  Emergent or urgent care needs should never be communicated via FanBoom.  If you experience a medical emergency call 911 or go to the nearest emergency room.    Labs: It is recommended that you stay  within the [facility] for labs but you are welcome to obtain ordered labs (with some exceptions) from any location of your choice as long as they are able to complete and process the needed labs. If you need us to fax orders to your preferred lab, please provide us the name and fax number of the lab you would like to go to so we can fax the orders. If your labs are drawn outside of the [facility], please have them fax the results to [phone] ([place]) or [phone] ([place]) or via [facility]. It is your responsibility to ensure that outside lab results are sent to us.    We look forward to working with you. Please do not hesitate to reach out with any questions.    Thank you,    The Endocrine Team    [facility] Address:   [facility] Address:     [address] [place] [address]    Phone: [phone]  Fax: [phone]   [address] [place] [address]    Phone: [phone]  Fax: [phone]     [employer] Cost Estimate Phone Number: [phone]    General Lab and Imaging Scheduling Phone Number: [phone]      If you are interested in exploring research opportunities in the Division of Diabetes, Endocrinology and Metabolism and within the [facility] you are welcome to view the following QR codes by scanning them using your phone's camera to access a link to more information.  Participating in a research study is voluntary. Your decision whether or not to participate will not affect your current or future relations with the [facility] or [facility]. Current available studies are:     Type 1 Diabetes  Adults     Pediatrics     Type 2 Diabetes  Weight Loss - People Treated with Diet or Metformin Only     Pediatrics and Young Adults

## 2025-04-08 NOTE — LETTER
4/8/2025       RE: Taylor Valiente  [address] [place] [address]     Dear Colleague,    Thank you for referring your patient, Taylor Valiente, to the [facility] [place] [facility]. Please see a copy of my visit note below.             Diabetes Consult Note  April 8, 2025        Taylor Valiente  is a 29 year old male who has a past medical history of Adrenal adenoma, left, Anemia, Benign essential hypertension, Bursitis of left shoulder, Chronic deep vein thrombosis (DVT) of internal jugular vein (H), CKD (chronic kidney disease) stage 5, GFR less than 15 ml/min (H), Clinical diagnosis of COVID-19, Depression, Diabetes mellitus, new onset (H), Focal glomerular sclerosis, History of kidney transplant, HLD (hyperlipidemia), Kidney replaced by transplant, Kidney transplant infection, LVH (left ventricular hypertrophy) due to hypertensive disease, Noncompliance, Obesity, unspecified, OD (osteochondritis dissecans), Prolonged QT interval, Staphylococcus infection, Stress-induced cardiomyopathy, and Suicide attempt (H). He is here To establish care for recently diagnosed diabetes.            Today:  Taylor is a 29-year-old male who is status post renal transplant due to focal glomerular sclerosis and was recently diagnosed with diabetes when he presented to the emergency room in February with abdominal pain facial pain and COVID infection.  He was found to have mild ketoacidosis and discharged on long and short acting insulin.    Taylor reports that both of his parents he have diabetes and take metformin.  He also had a grandfather who had diabetes.  He has been working to manage his diabetes at home by taking 40 units of long-acting Lantus insulin each evening and Humalog with each of his meals.  He generally is taking 8 units of Humalog with his breakfast and 9 to 10 units with lunch and dinner.  He has been  trying to eat more vegetables and meat and less rice.  He does believe that he gets 4-5 servings of vegetables each day including broccoli cauliflower carrots but he does not eat a lot of fruit he also tries to have a low-salt diet.    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  He also goes to the gym 2-3 times per week and lifts some weights and does not inclined walk for 20 to 25 minutes each time he is there.  He has a esvin CGMS but has not yet began using it.  He does not have details of blood sugars but reports that in the morning when he wakes up the blood sugars between 110 are in the 120s.  Before lunch the blood sugars are 130-140.  And at dinner blood sugar is 120 into the low 130s.  He has had 1 low blood sugar.  His blood sugar got down to 79 he felt shaky and cold sweats and had to eat.      He does not have any family or personal history of pancreatitis pancreatic cancer thyroid cancer or multiple endocrine neoplasia is.  He currently does not have a female sexual partner and denies any history of balanitis.    Current Diabetes Medications:  Lantus 40 units daily  Humalog generally 28 units daily    Antirejection medications:   Tac and Mycophenolate.   It has been some years since steroids.     We reviewed glucometer/CGMS data together.  It revealed:  No glucometer data.  Blood sugars as reported above.    History of DKA: Mild at diagnosis.    History of hospitalizations for diabetes: At diagnosis    Ability to sense low blood sugars: intact.    History of Diabetes monitoring and complications/ prevention:  CAD: no  Last eye exam results: not yet  Last dental exam: not recently , goes to [facility] last went about 18 months ago.    Neuropathy:  Nephropathy: s/p renal transplant   HTN: on multiple agents  On statin: atorvastatin  On ASA: once daily 81 mg - but it is famotidine.  Grandma had second stroke due to plauqes a week ago. Uncle hyad fatal MI at 48yo.    Depression:  ED:  Feet:    Taylor's PMH, PSH and allergies were reviewed today and pertinent information is summarized above.    Taylor's pertinent social and family history are also reviewed today and pertinent information is summarized above.  Also noted is: He currently does not have a female sexual partner.    Current Outpatient Medications   Medication Sig Dispense Refill     acetaminophen (TYLENOL) 325 MG tablet Take 2 tablets (650 mg) by mouth every 4 hours as needed for pain 100 tablet 0     amLODIPine (NORVASC) 5 MG tablet Take 1 tablet (5 mg) by mouth daily. 90 tablet 2     aspirin 81 MG EC tablet Take 81 mg by mouth daily       atorvastatin (LIPITOR) 10 MG tablet Take 1 tablet (10 mg) by mouth daily. 90 tablet 3     blood glucose (NO BRAND SPECIFIED) lancets standard Use to test blood sugar 4 times daily if not using cgm. 200 each 11     blood glucose (NO BRAND SPECIFIED) test strip Use to test blood sugar 4 times daily or as directed. 150 strip 11     blood glucose monitoring (NO BRAND SPECIFIED) meter device kit Use to test blood sugar 2 times daily or as directed. 1 kit 0     carvedilol (COREG) 25 MG tablet Take 1 tablet (25 mg) by mouth 2 times daily (with meals) 60 tablet 11     Continuous Glucose Sensor (FREESTYLE MARLI 3 PLUS SENSOR) MISC Use 1 sensor every 15 days. Use to read blood sugars per 's instructions. 6 each 3     enalapril (VASOTEC) 10 MG tablet Take 2 tablets (20 mg) by mouth daily. 90 tablet 1     famotidine (PEPCID) 40 MG tablet Take 40 mg by mouth 2 times daily as needed for heartburn.       insulin glargine (LANTUS SOLOSTAR) 100 UNIT/ML pen INJECT 40 UNITS SUBCUTANEOUSLY AT BEDTIME. 45 mL 3     insulin lispro (HUMALOG KWIKPEN) 100 UNIT/ML (1 unit dial) KWIKPEN Inject 1-6 Units subcutaneously 4 times daily (with meals and nightly). 30 mL 3     insulin pen needle (32G X 4 MM) 32G X 4 MM miscellaneous Use 4 pen needles daily or as directed. 100 each 0     magnesium  glycinate 100 MG CAPS capsule Take 300 mg by mouth 2 times daily. [facility] Magnesium or generic Magnesium glycinate- noon and evening       medical cannabis (Patient's own supply) See Admin Instructions (The purpose of this order is to document that the patient reports taking medical cannabis.  This is not a prescription, and is not used to certify that the patient has a qualifying medical condition.)    Patient was advised on the cannabis-tacrolimus drug interaction.       Multiple Vitamin (MULTIVITAMIN ADULT PO) Take 1 tablet by mouth daily       mycophenolic acid (GENERIC EQUIVALENT) 180 MG EC tablet Take 3 tablets (540 mg) by mouth 2 times daily Emergency supply. Pt will use GoodRx 180 tablet 11     psyllium (METAMUCIL/KONSYL) 58.6 % powder Take 1 teaspoonful by mouth daily       sodium bicarbonate 650 MG tablet Take 3 tablets (1,950 mg) by mouth 3 times daily. 270 tablet 11     Sodium Bicarbonate, antacid, POWD Mix 1 teaspoon of baking soda in full glass of water once daily       tacrolimus (GENERIC EQUIVALENT) 0.5 MG capsule Take 1 capsule (0.5 mg) by mouth 2 times daily. Total dose = 4.5 mg twice daily 60 capsule 11     tacrolimus (GENERIC EQUIVALENT) 1 MG capsule Take 4 capsules (4 mg) by mouth 2 times daily. Total dose = 4.5 mg twice daily 240 capsule 11     No current facility-administered medications for this visit.         ROS:   Reports good physical activity tolerance.  Denies any pedal lesions or vision changes or concerns. Denies any other acute concerns except as noted above.      Exam:    Ht 1.829 m (6')   BMI 35.26 kg/m    Wt Readings from Last 10 Encounters:   02/11/25 117.9 kg (260 lb)   02/05/25 115.9 kg (255 lb 8.2 oz)   02/04/25 116.3 kg (256 lb 6.3 oz)   01/30/25 117.9 kg (260 lb)   12/22/24 122.2 kg (269 lb 6.4 oz)   12/13/24 121.8 kg (268 lb 8 oz)   07/12/24 113.5 kg (250 lb 3.2 oz)   04/16/24 110.3 kg (243 lb 1.6 oz)   03/11/24 110.3 kg (243 lb 3.2 oz)   02/21/24 106.5 kg (234 lb  "14.4 oz)     Estimated body mass index is 35.26 kg/m  as calculated from the following:    Height as of this encounter: 1.829 m (6').    Weight as of 2/11/25: 117.9 kg (260 lb).      General: Pleasant, well nourished and hydrated male in NAD.  Patient is seated in his car relaxed engaged.  Psych:  Mood is \"good,\" affect is appropriate.  Thought form and content are fluid and coherent.    HEENT: Eyes and sclera are clear. Extraocular movements are grossly intact without proptosis.  Nares are patent, mucous membranes moist.  Neck: No masses or JVD are noted.    Resp: Easy and unlabored breathing.   Neuro: Alert and oriented, communicating clearly.   Ext: no swelling or edema.  Good distal pulses and capillary refill in digits.  Skin in good condition.  Sensation is intact to monofilament testing bilaterally and throughout.    Data:      Recent Labs   Lab Test 02/06/25  0753 02/05/25  2228 02/05/25  1303 02/04/25  1239 12/23/24  0017 12/13/24  0909 12/26/23  0850 12/22/23  0706 12/05/23  1247 12/05/23  0007 06/09/22  0837 03/16/22  0959 03/03/22  0906 07/16/17  0607 07/15/17  0635 07/14/17  1140 07/14/17  0949 07/14/17  0949 07/14/17  0948   A1C  --   --   --  9.3*  --  7.6*  --   --   --  5.9*   < >  --   --    < >  --   --   --   --   --    TSH  --   --   --   --   --   --   --   --   --   --   --   --  1.67  --   --  4.88*   < > 4.38*  --    T4  --   --   --   --   --   --   --   --   --   --   --   --   --   --  1.26  --   --  1.25  --    LDL  --   --   --   --   --   --   --  96  --  66  --   --   --    < > 154*  --   --   --   --    HDL  --   --   --   --   --   --   --  39*  --  35*  --   --   --    < > 35*  --   --   --   --    TRIG  --   --   --   --   --   --   --  139  --  190*  --   --   --    < > 170*  --   --   --   --    CR 0.67 0.73 0.73 0.71   < > 0.80   < >  --    < > 11.60*   < >  --  9.96*   < > 2.40* 2.63*   < > 2.55*  --    MICROL  --   --   --   --   --   --   --   --   --   --   --  1,850  --   " --   --   --   --   --  5,560   AST  --   --  16 15  --   --   --   --   --  13   < >  --   --    < >  --  25  --   --   --    ALT  --   --  24 23  --   --   --   --   --  20   < >  --   --    < >  --  40  --   --   --     < > = values in this interval not displayed.     Cholesterol   Date Value Ref Range Status   12/22/2023 163 <200 mg/dL Final   07/03/2019 145 <200 mg/dL Final       Lab Results   Component Value Date    GADAB <5.0 02/05/2025    ISCAB <1:4 02/05/2025     Cholesterol   Date Value Ref Range Status   12/22/2023 163 <200 mg/dL Final   12/05/2023 139 <200 mg/dL Final   07/03/2019 145 <200 mg/dL Final   07/15/2017 223 (H) <200 mg/dL Final     Comment:     Desirable:       <200 mg/dl       Hemoglobin   Date Value Ref Range Status   02/05/2025 16.7 13.3 - 17.7 g/dL Final   06/18/2021 9.1 (L) 13.3 - 17.7 g/dL Final   ]      Most recent GFRs:  GFR Estimate   Date Value Ref Range Status   02/06/2025 >90 >60 mL/min/1.73m2 Final     Comment:     eGFR calculated using 2021 CKD-EPI equation.   02/05/2025 >90 >60 mL/min/1.73m2 Final     Comment:     eGFR calculated using 2021 CKD-EPI equation.   02/05/2025 >90 >60 mL/min/1.73m2 Final     Comment:     eGFR calculated using 2021 CKD-EPI equation.   06/19/2021 7 (L) >60 mL/min/[1.73_m2] Final     Comment:     Non  GFR Calc  Starting 12/18/2018, serum creatinine based estimated GFR (eGFR) will be   calculated using the Chronic Kidney Disease Epidemiology Collaboration   (CKD-EPI) equation.     06/18/2021 5 (L) >60 mL/min/[1.73_m2] Final     Comment:     Non  GFR Calc  Starting 12/18/2018, serum creatinine based estimated GFR (eGFR) will be   calculated using the Chronic Kidney Disease Epidemiology Collaboration   (CKD-EPI) equation.     06/17/2021 4 (L) >60 mL/min/[1.73_m2] Final     Comment:     Non  GFR Calc  Starting 12/18/2018, serum creatinine based estimated GFR (eGFR) will be   calculated using the Chronic Kidney  Disease Epidemiology Collaboration   (CKD-EPI) equation.       GFR, ESTIMATED POCT   Date Value Ref Range Status   06/09/2022 7 (L) >60 mL/min/1.73m2 Final       Lab Results   Component Value Date    GADAB <5.0 02/05/2025    ISCAB <1:4 02/05/2025       FIB-4 Calculation: 0.31 at 2/5/2025  1:03 PM  Calculated from:  SGOT/AST: 16 U/L at 2/5/2025  1:03 PM  SGPT/ALT: 24 U/L at 2/5/2025  1:03 PM  Platelets: 298 10e3/uL at 2/5/2025  1:03 PM  Age: 29 years  AST   Date Value Ref Range Status   02/05/2025 16 0 - 45 U/L Final   02/09/2021 6 0 - 45 U/L Final     Lab Results   Component Value Date    ALT 24 02/05/2025    ALT 19 02/09/2021             Assessment/Plan:    Taylor Valiente is a 29 year old male with recent onset of diabetes which presented following some weeks of fatigue and which was discovered when he presented to the emergency room with positive COVID infection facial pain and severe abdominal pain.     1.  Insulin treated diabetes: He has had negative insulin GA islet cell antibodyD, zinc transporter antibody and insulin antibodies, and has a family history of type 2 diabetes but currently has been treated with insulin.    For now we will continue treatment with insulin.  He has not yet been able to acquire esvin 3 CGMS so this has been ordered so I am requesting a consult with pharmacy liaison to see why this is not available to him.  He will continue to work with diabetes education.  We discussed checking a nonfasting C-peptide and glucose to see if he indeed has type 2 diabetes.  If so he is open to treatment with GLP-1 agonist and/or SGLT2 transport inhibitor.    He is obese and I prefer to start with GLP-1 agonist, and we may be able to reduce or quit using short acting insulin as he is at higher risk for cardiovascular disease and this should prevent that as well as the renal protective.    Return to clinic in 3 months.        2. DM Complications-     He has new onset of diabetes and no known  complications.  He did have mild DKA at diagnosis but is aware of low blood sugars and her symptoms.     Retinopathy:  No.  We will discuss annual screening at follow-up.  Nephropathy: Status post renal transplant and followed by solid organ transplant team.  He has normal range GFR and creatinine.  He is on enalapril.   he has not been screened for microalbuminuria since transplant and I will order this.  Neuropathy: No.    Feet: OK, no ulcers or lesions.   Lipids: 10-year atherosclerotic cardiovascular disease risk <20%, age <40. Patient not taking statin  He does have a history of lower HDL and elevated LDL we will need to follow this closely.        60 minutes spent on the date of the encounter doing chart review, history and exam, documentation, education and counseling, as well as communication and coordination of care, and further activities as noted above.  This time includes time spent reviewing CGM.      It is my privilege to be involved in the care of the above patient.     Stephanie Read PA-C, [facility]  [facility]  Diabetes, Endocrinology, and Metabolism  [phone] Appointments/Nurse  [phone] pager  [phone] nurse line    This note was completed in part using Dragon voice recognition, and may contain word and grammatical errors.     Joined the call at 4/8/2025, 3:17:54 pm.  Left the call at 4/8/2025, 3:51:56 pm.  You were on the call for 34 minutes 2 seconds .     Outcome for 04/08/25 2:58 PM:  Pt stated he ran out of sensors and has been checking BG with meter. He has ordered more esvin sensors but it hasn't arrived yet. He does not have any readings with him at the moment because he isn't at home.   Britton Concepcion MA      Again, thank you for allowing me to participate in the care of your patient.      Sincerely,    Stephanie Read PA-C

## 2025-04-08 NOTE — NURSING NOTE
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 YES    Visit mode: VIDEO    If the visit is dropped, the patient can be reconnected by:VIDEO VISIT: Text to cell phone:   Telephone Information:   Mobile [phone]   Mobile Not on file.       Will anyone else be joining the visit? NO  (If patient encounters technical issues they should call [phone])    Are changes needed to the allergy or medication list? No    Are refills needed on medications prescribed by this physician? NO    Rooming Documentation:  Questionnaire(s) completed    Reason for visit: Consult    Britton Concepcion MA MA

## 2025-04-08 NOTE — PROGRESS NOTES
"Virtual Visit Details    Type of service:  Video Visit   Video Start Time: {video visit start/end time for provider to select:343812}  Video End Time:{video visit start/end time for provider to select:982560}    Originating Location (pt. Location): {video visit patient location:418579::\"Home\"}  {PROVIDER LOCATION On-site should be selected for visits conducted from your clinic location or adjoining [facility] hospital, academic office, or other nearby [facility] building. Off-site should be selected for all other provider locations, including home:714031}  Distant Location (provider location):  {virtual location provider:331422}  Platform used for Video Visit: {Virtual Visit Platforms:718130::\"Codelearn\"}    "

## 2025-04-15 ENCOUNTER — TELEPHONE (OUTPATIENT)
Dept: TRANSPLANT | Facility: CLINIC | Age: 30
End: 2025-04-15
Payer: MEDICARE

## 2025-04-15 DIAGNOSIS — Z48.298 AFTERCARE FOLLOWING ORGAN TRANSPLANT: Primary | ICD-10-CM

## 2025-04-15 DIAGNOSIS — B34.8 BK VIREMIA: ICD-10-CM

## 2025-04-15 NOTE — TELEPHONE ENCOUNTER
ISSUE:  Overdue for labs   Due for follow up this summer.     Trendyta message sent to patient, SOT follow up placed.

## 2025-04-21 ENCOUNTER — TELEPHONE (OUTPATIENT)
Dept: TRANSPLANT | Facility: CLINIC | Age: 30
End: 2025-04-21

## 2025-04-21 ENCOUNTER — LAB (OUTPATIENT)
Dept: LAB | Facility: CLINIC | Age: 30
End: 2025-04-21
Payer: MEDICARE

## 2025-04-21 DIAGNOSIS — Z94.0 KIDNEY TRANSPLANT RECIPIENT: Primary | ICD-10-CM

## 2025-04-21 DIAGNOSIS — R63.5 WEIGHT GAIN: ICD-10-CM

## 2025-04-21 DIAGNOSIS — T86.10 COMPLICATION OF TRANSPLANTED KIDNEY, UNSPECIFIED COMPLICATION: ICD-10-CM

## 2025-04-21 DIAGNOSIS — Z94.0 KIDNEY REPLACED BY TRANSPLANT: ICD-10-CM

## 2025-04-21 DIAGNOSIS — B34.8 BK VIREMIA: ICD-10-CM

## 2025-04-21 DIAGNOSIS — Z48.298 AFTERCARE FOLLOWING ORGAN TRANSPLANT: ICD-10-CM

## 2025-04-21 DIAGNOSIS — E11.9 DIABETES MELLITUS, NEW ONSET (H): ICD-10-CM

## 2025-04-21 LAB
ALBUMIN MFR UR ELPH: 29.9 MG/DL
ANION GAP SERPL CALCULATED.3IONS-SCNC: 14 MMOL/L (ref 7–15)
BK VIRUS DNA IU/ML, INSTRUMENT (6800): 601 IU/ML
BK VIRUS SPECIMEN TYPE: ABNORMAL
BKV DNA SPEC NAA+PROBE-LOG#: 2.8 {LOG_COPIES}/ML
BUN SERPL-MCNC: 20.6 MG/DL (ref 6–20)
CALCIUM SERPL-MCNC: 9.9 MG/DL (ref 8.8–10.4)
CD3 CELLS # BLD: 864 CELLS/UL (ref 603–2990)
CD3 CELLS NFR BLD: 57 % (ref 49–84)
CD3+CD4+ CELLS # BLD: 499 CELLS/UL (ref 441–2156)
CD3+CD4+ CELLS NFR BLD: 33 % (ref 28–63)
CD3+CD4+ CELLS/CD3+CD8+ CLL BLD: 1.52 % (ref 1.4–2.6)
CD3+CD8+ CELLS # BLD: 328 CELLS/UL (ref 125–1312)
CD3+CD8+ CELLS NFR BLD: 22 % (ref 10–40)
CHLORIDE SERPL-SCNC: 103 MMOL/L (ref 98–107)
CREAT SERPL-MCNC: 0.89 MG/DL (ref 0.67–1.17)
CREAT UR-MCNC: 35.5 MG/DL
EGFRCR SERPLBLD CKD-EPI 2021: >90 ML/MIN/1.73M2
ERYTHROCYTE [DISTWIDTH] IN BLOOD BY AUTOMATED COUNT: 16.7 % (ref 10–15)
FASTING STATUS PATIENT QL REPORTED: YES
FASTING STATUS PATIENT QL REPORTED: YES
GLUCOSE SERPL-MCNC: 84 MG/DL (ref 70–99)
GLUCOSE SERPL-MCNC: 84 MG/DL (ref 70–99)
HCO3 SERPL-SCNC: 24 MMOL/L (ref 22–29)
HCT VFR BLD AUTO: 57.2 % (ref 40–53)
HGB BLD-MCNC: 17.8 G/DL (ref 13.3–17.7)
MCH RBC QN AUTO: 26.9 PG (ref 26.5–33)
MCHC RBC AUTO-ENTMCNC: 31.1 G/DL (ref 31.5–36.5)
MCV RBC AUTO: 86 FL (ref 78–100)
PLATELET # BLD AUTO: 191 10E3/UL (ref 150–450)
POTASSIUM SERPL-SCNC: 3.5 MMOL/L (ref 3.4–5.3)
PROT/CREAT 24H UR: 0.84 MG/MG CR (ref 0–0.2)
RBC # BLD AUTO: 6.62 10E6/UL (ref 4.4–5.9)
SODIUM SERPL-SCNC: 141 MMOL/L (ref 135–145)
T CELL COMMENT: NORMAL
TACROLIMUS BLD-MCNC: 5.6 UG/L (ref 5–15)
TME LAST DOSE: NORMAL H
TME LAST DOSE: NORMAL H
TSH SERPL DL<=0.005 MIU/L-ACNC: 3.73 UIU/ML (ref 0.3–4.2)
WBC # BLD AUTO: 11.7 10E3/UL (ref 4–11)

## 2025-04-21 PROCEDURE — 84681 ASSAY OF C-PEPTIDE: CPT

## 2025-04-21 PROCEDURE — 86359 T CELLS TOTAL COUNT: CPT

## 2025-04-21 PROCEDURE — 87799 DETECT AGENT NOS DNA QUANT: CPT

## 2025-04-21 PROCEDURE — 84443 ASSAY THYROID STIM HORMONE: CPT

## 2025-04-21 PROCEDURE — 80197 ASSAY OF TACROLIMUS: CPT

## 2025-04-21 PROCEDURE — 84156 ASSAY OF PROTEIN URINE: CPT

## 2025-04-21 PROCEDURE — 36415 COLL VENOUS BLD VENIPUNCTURE: CPT

## 2025-04-21 PROCEDURE — 85014 HEMATOCRIT: CPT

## 2025-04-21 PROCEDURE — 80048 BASIC METABOLIC PNL TOTAL CA: CPT

## 2025-04-21 NOTE — TELEPHONE ENCOUNTER
Lucinda Webber MD Kemmer, Sarah, RN  Cc: Opal Rashid RN Hi Sarah    Seems he have some dehydration as well. Let him hydrate this week and repeat labs next week, unless he develops fevers which he needs further evaluation with sooner labs.    Thank you  Lucinda

## 2025-04-21 NOTE — TELEPHONE ENCOUNTER
ISSUE:   WBC 11.7, proteinuria 0.84    PLAN:  Call to assess for:   Recent illness?  Fever?  S/S UTI?  Hydration?  BP?  Glucose, 84 on lab?    OUTCOME:   No answer, VM left. Will send my chart message.     Latonya Sosa RN   Transplant Coordinator  [phone]

## 2025-04-22 LAB
C PEPTIDE SERPL-MCNC: 0.5 NG/ML (ref 0.9–6.9)
FASTING STATUS PATIENT QL REPORTED: YES

## 2025-05-01 DIAGNOSIS — Z94.0 KIDNEY REPLACED BY TRANSPLANT: ICD-10-CM

## 2025-05-01 RX ORDER — MYCOPHENOLIC ACID 180 MG/1
540 TABLET, DELAYED RELEASE ORAL 2 TIMES DAILY
Qty: 180 TABLET | Refills: 11 | Status: SHIPPED | OUTPATIENT
Start: 2025-05-01

## 2025-05-12 DIAGNOSIS — B34.8 BK VIREMIA: ICD-10-CM

## 2025-05-12 DIAGNOSIS — Z48.298 AFTERCARE FOLLOWING ORGAN TRANSPLANT: ICD-10-CM

## 2025-05-12 DIAGNOSIS — D75.1 POST-TRANSPLANT ERYTHROCYTOSIS: ICD-10-CM

## 2025-05-12 RX ORDER — ENALAPRIL MALEATE 10 MG/1
20 TABLET ORAL DAILY
Qty: 90 TABLET | Refills: 1 | Status: SHIPPED | OUTPATIENT
Start: 2025-05-12

## 2025-05-24 ENCOUNTER — HEALTH MAINTENANCE LETTER (OUTPATIENT)
Age: 30
End: 2025-05-24

## 2025-05-30 NOTE — TELEPHONE ENCOUNTER
05/30/25  Screened by: Jessica Ellis    Referring Provider     Pre- Screening:     There is no height or weight on file to calculate BMI.  Has patient been referred for a routine screening Colonoscopy? yes  Is the patient between 45-75 years old? yes      Previous Colonoscopy yes   If yes:    Date:     Facility:     Reason:     Does the patient want to see a Gastroenterologist prior to their procedure OR are they having any GI symptoms? no    Has the patient been hospitalized or had abdominal surgery in the past 6 months? no    Does the patient use supplemental oxygen? no    Does the patient take Coumadin, Lovenox, Plavix, Elliquis, Xarelto, or other blood thinning medication? no    Has the patient had a stroke, cardiac event, or stent placed in the past year? no    If patient is between 45yrs - 49yrs, please advise patient that we will have to confirm benefits & coverage with their insurance company for a routine screening colonoscopy.     Called pt for update on how his visits were going with care counseling. Left VM with direct line for return call.

## 2025-06-14 ENCOUNTER — HEALTH MAINTENANCE LETTER (OUTPATIENT)
Age: 30
End: 2025-06-14

## 2025-07-07 ENCOUNTER — MYC REFILL (OUTPATIENT)
Dept: EDUCATION SERVICES | Facility: CLINIC | Age: 30
End: 2025-07-07
Payer: MEDICARE

## 2025-07-07 DIAGNOSIS — E13.9 POST-TRANSPLANT DIABETES MELLITUS (H): ICD-10-CM

## 2025-07-07 DIAGNOSIS — E11.9 TYPE 2 DIABETES MELLITUS WITHOUT COMPLICATION, WITHOUT LONG-TERM CURRENT USE OF INSULIN (H): ICD-10-CM

## 2025-07-07 RX ORDER — HYDROCHLOROTHIAZIDE 12.5 MG/1
CAPSULE ORAL
Qty: 6 EACH | Refills: 3 | Status: SHIPPED | OUTPATIENT
Start: 2025-07-07

## 2025-07-24 DIAGNOSIS — B34.8 BK VIREMIA: ICD-10-CM

## 2025-08-11 DIAGNOSIS — N17.9 ACUTE KIDNEY INJURY: ICD-10-CM

## 2025-08-11 DIAGNOSIS — Z94.0 KIDNEY REPLACED BY TRANSPLANT: Primary | ICD-10-CM

## 2025-08-11 DIAGNOSIS — B34.8 BK VIREMIA: ICD-10-CM

## 2025-08-12 RX ORDER — CARVEDILOL 25 MG/1
25 TABLET ORAL 2 TIMES DAILY WITH MEALS
Qty: 60 TABLET | Refills: 0 | Status: SHIPPED | OUTPATIENT
Start: 2025-08-12

## 2025-08-21 ENCOUNTER — TELEPHONE (OUTPATIENT)
Dept: ENDOCRINOLOGY | Facility: CLINIC | Age: 30
End: 2025-08-21
Payer: MEDICARE

## 2025-08-21 ENCOUNTER — MYC REFILL (OUTPATIENT)
Dept: EDUCATION SERVICES | Facility: CLINIC | Age: 30
End: 2025-08-21
Payer: MEDICARE

## 2025-08-21 DIAGNOSIS — E11.9 TYPE 2 DIABETES MELLITUS WITHOUT COMPLICATION, WITHOUT LONG-TERM CURRENT USE OF INSULIN (H): ICD-10-CM

## 2025-08-21 DIAGNOSIS — E13.9 POST-TRANSPLANT DIABETES MELLITUS (H): ICD-10-CM

## 2025-08-21 RX ORDER — HYDROCHLOROTHIAZIDE 12.5 MG/1
CAPSULE ORAL
Qty: 6 EACH | Refills: 3 | Status: SHIPPED | OUTPATIENT
Start: 2025-08-21

## (undated) DEVICE — GOWN LG DISP 9515

## (undated) DEVICE — DRAPE TIBURON TOP SHEET 100X60" 29352

## (undated) DEVICE — BLADE KNIFE BEAVER MINI BEAVER6400

## (undated) DEVICE — SU SILK 3-0 TIE 24" SA74H

## (undated) DEVICE — SPONGE LAP 18X18" X8435

## (undated) DEVICE — SYR 10ML SLIP TIP W/O NDL

## (undated) DEVICE — TOURNIQUET CUFF 30" REPRO BLUE 60-7070-105

## (undated) DEVICE — NDL SPINAL 18GA 3.5" 405184

## (undated) DEVICE — PAD CHUX UNDERPAD 23X24" 7136

## (undated) DEVICE — BUR ARTHREX COOLCUT ROUND 8 FLUTE 4.0MMX13CM AR-8400RBE

## (undated) DEVICE — SURGICEL HEMOSTAT 2X14" 1951

## (undated) DEVICE — SU SILK 2-0 TIE 24" SA75H

## (undated) DEVICE — SOL NACL 0.9% INJ 250ML BAG 2B1322Q

## (undated) DEVICE — SU MONOCRYL 4-0 PS-2 27" UND Y426H

## (undated) DEVICE — SU VICRYL 3-0 SH 27" UND J416H

## (undated) DEVICE — PACK LOWER EXTREMITY RIVERSIDE SOP32LEFSX

## (undated) DEVICE — NDL ANGIOCATH 20GA 1.25" 4056

## (undated) DEVICE — LINEN TOWEL PACK X30 5481

## (undated) DEVICE — TUBING SUCTION MEDI-VAC SOFT 3/16"X20' N520A

## (undated) DEVICE — VESSEL LOOPS YELLOW MINI 31145660

## (undated) DEVICE — SUCTION MANIFOLD NEPTUNE 2 SYS 4 PORT 0702-020-000

## (undated) DEVICE — SU VICRYL 3-0 SH 27" J316H

## (undated) DEVICE — GOWN IMPERVIOUS SPECIALTY XL/XLONG 39049

## (undated) DEVICE — SU PROLENE 7-0 BV-1DA 4X24" M8702

## (undated) DEVICE — CLIP SPRING FOGARTY SOFTJAW CSOFT6

## (undated) DEVICE — TUBING IRRIG CYSTO/BLADDER SET 81" LF 2C4040

## (undated) DEVICE — DRAIN JACKSON PRATT CHANNEL 19FR ROUND HUBLESS SIL JP-2230

## (undated) DEVICE — SOL NACL 0.9% IRRIG 1000ML BOTTLE 2F7124

## (undated) DEVICE — SU PROLENE 4-0 SHDA 36" 8521H

## (undated) DEVICE — LINEN TOWEL PACK X5 5464

## (undated) DEVICE — DRSG GAUZE 4X4" 3033

## (undated) DEVICE — BLADE KNIFE SURG 10 371110

## (undated) DEVICE — ESU HOLDER LAP INST DISP YELLOW SHORT 250MM H-PRO-250

## (undated) DEVICE — DECANTER BAG 2002S

## (undated) DEVICE — ESU GROUND PAD UNIVERSAL W/O CORD

## (undated) DEVICE — SU MONOCRYL 4-0 PS-2 18" UND Y496G

## (undated) DEVICE — DECANTER VIAL 2006S

## (undated) DEVICE — ESU PENCIL SMOKE EVAC W/ROCKER SWITCH 0703-047-000

## (undated) DEVICE — SOL NACL 0.9% INJ 1000ML BAG 2B1324X

## (undated) DEVICE — SU SILK 3-0 TIE 12X30" A304H

## (undated) DEVICE — PREP SKIN SCRUB TRAY 4461A

## (undated) DEVICE — CAST PADDING 4" STERILE 9044S

## (undated) DEVICE — ESU PENCIL W/HOLSTER E2350H

## (undated) DEVICE — DRAPE IOBAN INCISE 23X17" 6650EZ

## (undated) DEVICE — SYR 03ML LL W/O NDL 309657

## (undated) DEVICE — SU VICRYL 0 TIE 12X18" J906G

## (undated) DEVICE — SU SILK 4-0 TIE 24" SA73H

## (undated) DEVICE — SU PDS II 4-0 SH 27" Z315H

## (undated) DEVICE — SU PROLENE 6-0 RB-2DA 30" 8711H

## (undated) DEVICE — CLIP ETHICON LIGACLIP SM BLUE LT100

## (undated) DEVICE — GLOVE SENSICARE 7.0 MSG1070 LATEX FREE

## (undated) DEVICE — GEL ULTRASOUND AQUASONIC 20GM 01-01

## (undated) DEVICE — PACK AV FISTULA CUSTOM SCV15AVFS1

## (undated) DEVICE — GOWN XXL 9575

## (undated) DEVICE — SU PROLENE 5-0 RB-1DA 36"  8556H

## (undated) DEVICE — SOL WATER IRRIG 1000ML BOTTLE 2F7114

## (undated) DEVICE — NDL 19GA 1.5"

## (undated) DEVICE — CLIP APPLIER SURGICLIP III 9.0 LF 133650

## (undated) DEVICE — STPL SKIN 35W ROTATING HEAD PRW35

## (undated) DEVICE — GLOVE LINER COTTON MED 32-2076

## (undated) DEVICE — LINEN TOWEL PACK X6 WHITE 5487

## (undated) DEVICE — Device

## (undated) DEVICE — GLOVE BIOGEL PI ORTHOPRO SZ 8.0 47680

## (undated) DEVICE — VESSEL LOOPS RED MAXI

## (undated) DEVICE — SURGICEL HEMOSTAT 2X3" 1953

## (undated) DEVICE — TUBING ARTHROSCOPY PUMP ARTHREX AR-6410

## (undated) DEVICE — PACK UNIVERSAL SPLIT 29131

## (undated) DEVICE — DRSG STERI STRIP 1/2X4" R1547

## (undated) DEVICE — TUBING SUCTION 10'X3/16" N510

## (undated) DEVICE — BUR ARTHREX COOLCUT DISSECTOR 3.5MM  AR-8350DS

## (undated) DEVICE — STRAP KNEE/BODY 31143004

## (undated) DEVICE — PACK GOWN 3/PK DISP XL SBA32GPFCB

## (undated) DEVICE — DRAPE EXTREMITY W/ARMBOARD 29405

## (undated) DEVICE — SU PDS II 5-0 RB-1 27" Z303H

## (undated) DEVICE — SU SILK 0 24" TIE SA76G

## (undated) DEVICE — DRAPE SHEET REV FOLD 3/4 9349

## (undated) DEVICE — SU PROLENE 6-0 C-1DA 30" 8706H

## (undated) DEVICE — GOWN XLG DISP 9545

## (undated) DEVICE — PREP CHLORAPREP 26ML TINTED ORANGE  260815

## (undated) DEVICE — DRSG KERLIX FLUFFS X5

## (undated) DEVICE — APPLICATOR COTTON TIP 6"X2 STERILE LF 6012

## (undated) DEVICE — PREP CHLORAPREP 26ML TINTED HI-LITE ORANGE 930815

## (undated) DEVICE — SU ETHILON 3-0 PS-1 18" 1663H

## (undated) DEVICE — SUCTION CANISTER MEDIVAC LINER 3000ML W/LID 65651-530

## (undated) DEVICE — SURGICEL ABSORBABLE HEMOSTAT SNOW 4"X4" 2083

## (undated) DEVICE — SU PROLENE 5-0 C-1DA 36" 8720H

## (undated) DEVICE — SU PROLENE 7-0 BV-1DA 24" 8702H

## (undated) DEVICE — PITCHER STERILE 1000ML  SSK9004A

## (undated) DEVICE — DRSG KERLIX 4 1/2"X4YDS ROLL 6715

## (undated) DEVICE — DRSG MEDIPORE 3 1/2X13 3/4" 3573

## (undated) DEVICE — DECANTER TRANSFER DEVICE 2008S

## (undated) DEVICE — CATH FOLEY 3WAY 16FR 30ML LATEX 0167SI16

## (undated) DEVICE — SU PDS II 6-0 RB-2DA 30" Z149H

## (undated) DEVICE — DRSG ISLAND 4X4" SQUARE LF MSC3244

## (undated) DEVICE — SURGICEL HEMOSTAT 3X4" NUKNIT 1943

## (undated) DEVICE — PACK SET-UP STD 9102

## (undated) DEVICE — IMM LEG ELEVATOR 79-90191

## (undated) DEVICE — DRSG TELFA ISLAND 4X10"

## (undated) DEVICE — SPONGE RAY-TEC 4X8" 7318

## (undated) DEVICE — SU VICRYL 2-0 SH 27" J317H

## (undated) DEVICE — MANIFOLD NEPTUNE 4 PORT 700-20

## (undated) DEVICE — PACK MINOR SBA15MIFSE

## (undated) DEVICE — INSERT FOGARTY 33MM TRACTION HYDRAJAW HYDRA33

## (undated) DEVICE — DRSG PRIMAPORE 02X3" 7133

## (undated) DEVICE — SU SILK 2-0 SH 30" K833H

## (undated) DEVICE — DRAIN JACKSON PRATT RESERVOIR 100ML SU130-1305

## (undated) DEVICE — DRAPE STOCKINETTE IMPERVIOUS 12" 1587

## (undated) DEVICE — PREP POVIDONE-IODINE 10% SOLUTION 4OZ BOTTLE MDS093944

## (undated) DEVICE — SU SILK 0 TIE 6X30" A306H

## (undated) DEVICE — SOL NACL 0.9% IRRIG 3000ML BAG 2B7477

## (undated) DEVICE — SPECIMEN CULTURETTE DBL SWAB 220109

## (undated) DEVICE — ESU PENCIL W/SMOKE EVAC NEPTUNE STRYKER 0703-046-000

## (undated) DEVICE — SYR 50ML LL W/O NDL 309653

## (undated) DEVICE — BASIN EMESIS STERILE  SSK9005A

## (undated) DEVICE — NDL COUNTER 20CT 31142493

## (undated) DEVICE — SU PDS II 0 TP-1 60" Z991G

## (undated) DEVICE — SYR BULB IRRIG DOVER 60 ML LATEX FREE 67000

## (undated) DEVICE — SU PROLENE 7-0 BV-1DA 30" 8703H

## (undated) DEVICE — GLOVE PROTEXIS BLUE W/NEU-THERA 8.0  2D73EB80

## (undated) DEVICE — GLOVE PROTEXIS W/NEU-THERA 7.5  2D73TE75

## (undated) DEVICE — SU ETHILON 3-0 FS-1 18" 669H

## (undated) DEVICE — GLOVE PROTEXIS POWDER FREE 8.0 ORTHOPEDIC 2D73ET80

## (undated) DEVICE — SUCTION TIP YANKAUER W/O VENT K86

## (undated) DEVICE — SPECIMEN CONTAINER 5OZ STERILE 2600SA

## (undated) DEVICE — BLADE CLIPPER SGL USE 9680

## (undated) DEVICE — DRAPE FLUID WARMING 52 X 60" ORS-321

## (undated) DEVICE — DRSG TELFA ISLAND 4X8" 7541

## (undated) DEVICE — BNDG ELASTIC 4"X5YDS STERILE 6611-4S

## (undated) DEVICE — DEVICE CATH STABILIZATION STATLOCK FOLEY 3-WAY FOL0105

## (undated) DEVICE — DRSG GAUZE 4X4" TRAY 6939

## (undated) DEVICE — DRSG TEGADERM IV ADVANCED 2.5X2.75" 1683

## (undated) DEVICE — SU SILK 1 TIE 6X30" A307H

## (undated) DEVICE — SU SILK 4-0 TIE 12X30" A303H

## (undated) DEVICE — SU SILK 3-0 SH CR 8X18" C013D

## (undated) DEVICE — JELLY LUBRICATING SURGILUBE 2OZ TUBE

## (undated) DEVICE — SU SILK 2-0 TIE 12X30" A305H

## (undated) RX ORDER — HYDROMORPHONE HYDROCHLORIDE 1 MG/ML
INJECTION, SOLUTION INTRAMUSCULAR; INTRAVENOUS; SUBCUTANEOUS
Status: DISPENSED
Start: 2023-12-05

## (undated) RX ORDER — CEFAZOLIN SODIUM/WATER 2 G/20 ML
SYRINGE (ML) INTRAVENOUS
Status: DISPENSED
Start: 2023-03-09

## (undated) RX ORDER — FENTANYL CITRATE 50 UG/ML
INJECTION, SOLUTION INTRAMUSCULAR; INTRAVENOUS
Status: DISPENSED
Start: 2023-05-30

## (undated) RX ORDER — FENTANYL CITRATE 50 UG/ML
INJECTION, SOLUTION INTRAMUSCULAR; INTRAVENOUS
Status: DISPENSED
Start: 2021-02-24

## (undated) RX ORDER — METOPROLOL TARTRATE 1 MG/ML
INJECTION, SOLUTION INTRAVENOUS
Status: DISPENSED
Start: 2022-06-09

## (undated) RX ORDER — FENTANYL CITRATE 50 UG/ML
INJECTION, SOLUTION INTRAMUSCULAR; INTRAVENOUS
Status: DISPENSED
Start: 2023-03-16

## (undated) RX ORDER — FENTANYL CITRATE 50 UG/ML
INJECTION, SOLUTION INTRAMUSCULAR; INTRAVENOUS
Status: DISPENSED
Start: 2023-12-05

## (undated) RX ORDER — HEPARIN SODIUM 1000 [USP'U]/ML
INJECTION, SOLUTION INTRAVENOUS; SUBCUTANEOUS
Status: DISPENSED
Start: 2023-05-30

## (undated) RX ORDER — LIDOCAINE HYDROCHLORIDE 10 MG/ML
INJECTION, SOLUTION EPIDURAL; INFILTRATION; INTRACAUDAL; PERINEURAL
Status: DISPENSED
Start: 2025-01-30

## (undated) RX ORDER — LIDOCAINE HYDROCHLORIDE 10 MG/ML
INJECTION, SOLUTION INFILTRATION; PERINEURAL
Status: DISPENSED
Start: 2023-03-09

## (undated) RX ORDER — FENTANYL CITRATE 50 UG/ML
INJECTION, SOLUTION INTRAMUSCULAR; INTRAVENOUS
Status: DISPENSED
Start: 2024-01-23

## (undated) RX ORDER — HYDROMORPHONE HCL IN WATER/PF 6 MG/30 ML
PATIENT CONTROLLED ANALGESIA SYRINGE INTRAVENOUS
Status: DISPENSED
Start: 2023-12-05

## (undated) RX ORDER — OXYCODONE HYDROCHLORIDE 5 MG/1
TABLET ORAL
Status: DISPENSED
Start: 2023-03-09

## (undated) RX ORDER — LIDOCAINE HYDROCHLORIDE 20 MG/ML
JELLY TOPICAL
Status: DISPENSED
Start: 2024-01-23

## (undated) RX ORDER — FENTANYL CITRATE 50 UG/ML
INJECTION, SOLUTION INTRAMUSCULAR; INTRAVENOUS
Status: DISPENSED
Start: 2021-08-26

## (undated) RX ORDER — PAPAVERINE HYDROCHLORIDE 30 MG/ML
INJECTION INTRAMUSCULAR; INTRAVENOUS
Status: DISPENSED
Start: 2023-05-30

## (undated) RX ORDER — LIDOCAINE HYDROCHLORIDE 10 MG/ML
INJECTION, SOLUTION EPIDURAL; INFILTRATION; INTRACAUDAL; PERINEURAL
Status: DISPENSED
Start: 2023-03-09

## (undated) RX ORDER — ONDANSETRON 2 MG/ML
INJECTION INTRAMUSCULAR; INTRAVENOUS
Status: DISPENSED
Start: 2021-02-24

## (undated) RX ORDER — PROPOFOL 10 MG/ML
INJECTION, EMULSION INTRAVENOUS
Status: DISPENSED
Start: 2021-02-24

## (undated) RX ORDER — PROPOFOL 10 MG/ML
INJECTION, EMULSION INTRAVENOUS
Status: DISPENSED
Start: 2021-03-04

## (undated) RX ORDER — SODIUM CHLORIDE, SODIUM GLUCONATE, SODIUM ACETATE, POTASSIUM CHLORIDE AND MAGNESIUM CHLORIDE 526; 502; 368; 37; 30 MG/100ML; MG/100ML; MG/100ML; MG/100ML; MG/100ML
INJECTION, SOLUTION INTRAVENOUS
Status: DISPENSED
Start: 2023-05-30

## (undated) RX ORDER — FENTANYL CITRATE 50 UG/ML
INJECTION, SOLUTION INTRAMUSCULAR; INTRAVENOUS
Status: DISPENSED
Start: 2024-02-21

## (undated) RX ORDER — PROPOFOL 10 MG/ML
INJECTION, EMULSION INTRAVENOUS
Status: DISPENSED
Start: 2023-03-09

## (undated) RX ORDER — HEPARIN SODIUM 1000 [USP'U]/ML
INJECTION, SOLUTION INTRAVENOUS; SUBCUTANEOUS
Status: DISPENSED
Start: 2023-03-09

## (undated) RX ORDER — PROPOFOL 10 MG/ML
INJECTION, EMULSION INTRAVENOUS
Status: DISPENSED
Start: 2021-08-26

## (undated) RX ORDER — NEOSTIGMINE METHYLSULFATE 1 MG/ML
VIAL (ML) INJECTION
Status: DISPENSED
Start: 2023-05-30

## (undated) RX ORDER — CEFUROXIME SODIUM 1.5 G/16ML
INJECTION, POWDER, FOR SOLUTION INTRAVENOUS
Status: DISPENSED
Start: 2023-12-05

## (undated) RX ORDER — LIDOCAINE HYDROCHLORIDE 20 MG/ML
INJECTION, SOLUTION EPIDURAL; INFILTRATION; INTRACAUDAL; PERINEURAL
Status: DISPENSED
Start: 2021-08-26

## (undated) RX ORDER — PROTAMINE SULFATE 10 MG/ML
INJECTION, SOLUTION INTRAVENOUS
Status: DISPENSED
Start: 2021-08-26

## (undated) RX ORDER — ACETAMINOPHEN 325 MG/1
TABLET ORAL
Status: DISPENSED
Start: 2023-12-05

## (undated) RX ORDER — HEPARIN SODIUM 1000 [USP'U]/ML
INJECTION, SOLUTION INTRAVENOUS; SUBCUTANEOUS
Status: DISPENSED
Start: 2023-12-05

## (undated) RX ORDER — BUPIVACAINE HYDROCHLORIDE 2.5 MG/ML
INJECTION, SOLUTION EPIDURAL; INFILTRATION; INTRACAUDAL
Status: DISPENSED
Start: 2023-05-30

## (undated) RX ORDER — PROPOFOL 10 MG/ML
INJECTION, EMULSION INTRAVENOUS
Status: DISPENSED
Start: 2023-12-05

## (undated) RX ORDER — ONDANSETRON 2 MG/ML
INJECTION INTRAMUSCULAR; INTRAVENOUS
Status: DISPENSED
Start: 2023-03-09

## (undated) RX ORDER — GLYCOPYRROLATE 0.2 MG/ML
INJECTION, SOLUTION INTRAMUSCULAR; INTRAVENOUS
Status: DISPENSED
Start: 2023-05-30

## (undated) RX ORDER — LIDOCAINE HYDROCHLORIDE 10 MG/ML
INJECTION, SOLUTION EPIDURAL; INFILTRATION; INTRACAUDAL; PERINEURAL
Status: DISPENSED
Start: 2024-02-21

## (undated) RX ORDER — FENTANYL CITRATE-0.9 % NACL/PF 10 MCG/ML
PLASTIC BAG, INJECTION (ML) INTRAVENOUS
Status: DISPENSED
Start: 2023-12-05

## (undated) RX ORDER — OXYCODONE HYDROCHLORIDE 5 MG/1
TABLET ORAL
Status: DISPENSED
Start: 2021-08-26

## (undated) RX ORDER — HYDROCODONE BITARTRATE AND ACETAMINOPHEN 5; 325 MG/1; MG/1
TABLET ORAL
Status: DISPENSED
Start: 2023-05-30

## (undated) RX ORDER — PAPAVERINE HYDROCHLORIDE 30 MG/ML
INJECTION INTRAMUSCULAR; INTRAVENOUS
Status: DISPENSED
Start: 2023-03-09

## (undated) RX ORDER — PROPOFOL 10 MG/ML
INJECTION, EMULSION INTRAVENOUS
Status: DISPENSED
Start: 2023-03-16

## (undated) RX ORDER — SODIUM CHLORIDE 9 MG/ML
INJECTION, SOLUTION INTRAVENOUS
Status: DISPENSED
Start: 2023-12-05

## (undated) RX ORDER — FENTANYL CITRATE 50 UG/ML
INJECTION, SOLUTION INTRAMUSCULAR; INTRAVENOUS
Status: DISPENSED
Start: 2023-03-09

## (undated) RX ORDER — FENTANYL CITRATE 50 UG/ML
INJECTION, SOLUTION INTRAMUSCULAR; INTRAVENOUS
Status: DISPENSED
Start: 2021-03-04

## (undated) RX ORDER — DEXAMETHASONE SODIUM PHOSPHATE 4 MG/ML
INJECTION, SOLUTION INTRA-ARTICULAR; INTRALESIONAL; INTRAMUSCULAR; INTRAVENOUS; SOFT TISSUE
Status: DISPENSED
Start: 2023-12-05

## (undated) RX ORDER — CEFAZOLIN SODIUM 2 G/100ML
INJECTION, SOLUTION INTRAVENOUS
Status: DISPENSED
Start: 2021-02-24

## (undated) RX ORDER — NITROGLYCERIN 0.4 MG/1
TABLET SUBLINGUAL
Status: DISPENSED
Start: 2022-06-09

## (undated) RX ORDER — HEPARIN SODIUM 1000 [USP'U]/ML
INJECTION, SOLUTION INTRAVENOUS; SUBCUTANEOUS
Status: DISPENSED
Start: 2021-03-04

## (undated) RX ORDER — BUPIVACAINE HYDROCHLORIDE 5 MG/ML
INJECTION, SOLUTION EPIDURAL; INTRACAUDAL
Status: DISPENSED
Start: 2021-02-24

## (undated) RX ORDER — ONDANSETRON 2 MG/ML
INJECTION INTRAMUSCULAR; INTRAVENOUS
Status: DISPENSED
Start: 2023-05-30

## (undated) RX ORDER — PAPAVERINE HYDROCHLORIDE 30 MG/ML
INJECTION INTRAMUSCULAR; INTRAVENOUS
Status: DISPENSED
Start: 2021-03-04

## (undated) RX ORDER — LIDOCAINE HYDROCHLORIDE 20 MG/ML
INJECTION, SOLUTION EPIDURAL; INFILTRATION; INTRACAUDAL; PERINEURAL
Status: DISPENSED
Start: 2021-03-04

## (undated) RX ORDER — BUPIVACAINE HYDROCHLORIDE 2.5 MG/ML
INJECTION, SOLUTION EPIDURAL; INFILTRATION; INTRACAUDAL
Status: DISPENSED
Start: 2021-03-04

## (undated) RX ORDER — HYDROCODONE BITARTRATE AND ACETAMINOPHEN 5; 325 MG/1; MG/1
TABLET ORAL
Status: DISPENSED
Start: 2021-02-24

## (undated) RX ORDER — LIDOCAINE HYDROCHLORIDE 10 MG/ML
INJECTION, SOLUTION EPIDURAL; INFILTRATION; INTRACAUDAL; PERINEURAL
Status: DISPENSED
Start: 2023-05-30

## (undated) RX ORDER — DEXTROSE, SODIUM CHLORIDE, SODIUM LACTATE, POTASSIUM CHLORIDE, AND CALCIUM CHLORIDE 5; .6; .31; .03; .02 G/100ML; G/100ML; G/100ML; G/100ML; G/100ML
INJECTION, SOLUTION INTRAVENOUS
Status: DISPENSED
Start: 2023-12-05

## (undated) RX ORDER — PAPAVERINE HYDROCHLORIDE 30 MG/ML
INJECTION INTRAMUSCULAR; INTRAVENOUS
Status: DISPENSED
Start: 2023-12-05

## (undated) RX ORDER — ONDANSETRON 2 MG/ML
INJECTION INTRAMUSCULAR; INTRAVENOUS
Status: DISPENSED
Start: 2023-12-05

## (undated) RX ORDER — FENTANYL CITRATE 0.05 MG/ML
INJECTION, SOLUTION INTRAMUSCULAR; INTRAVENOUS
Status: DISPENSED
Start: 2023-03-16

## (undated) RX ORDER — DIPHENHYDRAMINE HYDROCHLORIDE 50 MG/ML
INJECTION INTRAMUSCULAR; INTRAVENOUS
Status: DISPENSED
Start: 2021-03-04

## (undated) RX ORDER — FUROSEMIDE 10 MG/ML
INJECTION INTRAMUSCULAR; INTRAVENOUS
Status: DISPENSED
Start: 2023-12-05

## (undated) RX ORDER — LIDOCAINE HYDROCHLORIDE 10 MG/ML
INJECTION, SOLUTION INFILTRATION; PERINEURAL
Status: DISPENSED
Start: 2021-03-04

## (undated) RX ORDER — METOPROLOL TARTRATE 50 MG
TABLET ORAL
Status: DISPENSED
Start: 2022-06-09

## (undated) RX ORDER — HYDROMORPHONE HYDROCHLORIDE 1 MG/ML
INJECTION, SOLUTION INTRAMUSCULAR; INTRAVENOUS; SUBCUTANEOUS
Status: DISPENSED
Start: 2021-02-24

## (undated) RX ORDER — DEXAMETHASONE SODIUM PHOSPHATE 4 MG/ML
INJECTION, SOLUTION INTRA-ARTICULAR; INTRALESIONAL; INTRAMUSCULAR; INTRAVENOUS; SOFT TISSUE
Status: DISPENSED
Start: 2023-05-30

## (undated) RX ORDER — IVABRADINE 5 MG/1
TABLET, FILM COATED ORAL
Status: DISPENSED
Start: 2022-06-09

## (undated) RX ORDER — REGADENOSON 0.08 MG/ML
INJECTION, SOLUTION INTRAVENOUS
Status: DISPENSED
Start: 2023-06-21

## (undated) RX ORDER — DEXAMETHASONE SODIUM PHOSPHATE 4 MG/ML
INJECTION, SOLUTION INTRA-ARTICULAR; INTRALESIONAL; INTRAMUSCULAR; INTRAVENOUS; SOFT TISSUE
Status: DISPENSED
Start: 2021-08-26

## (undated) RX ORDER — DEXAMETHASONE SODIUM PHOSPHATE 4 MG/ML
INJECTION, SOLUTION INTRA-ARTICULAR; INTRALESIONAL; INTRAMUSCULAR; INTRAVENOUS; SOFT TISSUE
Status: DISPENSED
Start: 2021-03-04

## (undated) RX ORDER — LIDOCAINE HYDROCHLORIDE 10 MG/ML
INJECTION, SOLUTION EPIDURAL; INFILTRATION; INTRACAUDAL; PERINEURAL
Status: DISPENSED
Start: 2023-07-28

## (undated) RX ORDER — BUPIVACAINE HYDROCHLORIDE AND EPINEPHRINE 2.5; 5 MG/ML; UG/ML
INJECTION, SOLUTION EPIDURAL; INFILTRATION; INTRACAUDAL; PERINEURAL
Status: DISPENSED
Start: 2023-03-09

## (undated) RX ORDER — ONDANSETRON 2 MG/ML
INJECTION INTRAMUSCULAR; INTRAVENOUS
Status: DISPENSED
Start: 2021-03-04

## (undated) RX ORDER — LIDOCAINE HYDROCHLORIDE 20 MG/ML
INJECTION, SOLUTION EPIDURAL; INFILTRATION; INTRACAUDAL; PERINEURAL
Status: DISPENSED
Start: 2021-02-24

## (undated) RX ORDER — PROPOFOL 10 MG/ML
INJECTION, EMULSION INTRAVENOUS
Status: DISPENSED
Start: 2023-05-30

## (undated) RX ORDER — VERAPAMIL HYDROCHLORIDE 2.5 MG/ML
INJECTION, SOLUTION INTRAVENOUS
Status: DISPENSED
Start: 2023-12-05

## (undated) RX ORDER — SODIUM CHLORIDE 9 MG/ML
INJECTION, SOLUTION INTRAVENOUS
Status: DISPENSED
Start: 2024-02-21